# Patient Record
Sex: MALE | Race: BLACK OR AFRICAN AMERICAN | NOT HISPANIC OR LATINO | Employment: OTHER | URBAN - METROPOLITAN AREA
[De-identification: names, ages, dates, MRNs, and addresses within clinical notes are randomized per-mention and may not be internally consistent; named-entity substitution may affect disease eponyms.]

---

## 2017-01-10 ENCOUNTER — OFFICE VISIT (OUTPATIENT)
Dept: ORTHOPEDICS | Facility: CLINIC | Age: 62
End: 2017-01-10
Payer: COMMERCIAL

## 2017-01-10 VITALS
RESPIRATION RATE: 12 BRPM | DIASTOLIC BLOOD PRESSURE: 74 MMHG | HEART RATE: 64 BPM | WEIGHT: 228.81 LBS | SYSTOLIC BLOOD PRESSURE: 109 MMHG | HEIGHT: 69 IN | BODY MASS INDEX: 33.89 KG/M2

## 2017-01-10 DIAGNOSIS — M94.261 CHONDROMALACIA, RIGHT KNEE: Primary | ICD-10-CM

## 2017-01-10 DIAGNOSIS — S81.041S: ICD-10-CM

## 2017-01-10 PROCEDURE — 3078F DIAST BP <80 MM HG: CPT | Mod: S$GLB,,, | Performed by: ORTHOPAEDIC SURGERY

## 2017-01-10 PROCEDURE — 99999 PR PBB SHADOW E&M-EST. PATIENT-LVL III: CPT | Mod: PBBFAC,,, | Performed by: ORTHOPAEDIC SURGERY

## 2017-01-10 PROCEDURE — 3074F SYST BP LT 130 MM HG: CPT | Mod: S$GLB,,, | Performed by: ORTHOPAEDIC SURGERY

## 2017-01-10 PROCEDURE — 1159F MED LIST DOCD IN RCRD: CPT | Mod: S$GLB,,, | Performed by: ORTHOPAEDIC SURGERY

## 2017-01-10 PROCEDURE — 99214 OFFICE O/P EST MOD 30 MIN: CPT | Mod: S$GLB,,, | Performed by: ORTHOPAEDIC SURGERY

## 2017-01-10 RX ORDER — MELOXICAM 7.5 MG/1
7.5 TABLET ORAL DAILY
COMMUNITY
End: 2017-12-01

## 2017-01-10 RX ORDER — TRAMADOL HYDROCHLORIDE 50 MG/1
50 TABLET ORAL EVERY 6 HOURS PRN
COMMUNITY
End: 2020-11-24

## 2017-01-10 NOTE — PROGRESS NOTES
"       CC:This is a 61-year-old male that complains of right knee pain.    HPI:The patient states that he has had long-standing right knee pain that interferes with his exact activities of daily living.  The [facility]" [facility]".  He states that the pain is rated a 3 out of 10.He does report mechanical symptoms and has pain over the lateral aspect of his knee.  He has undergone back surgery in the past.    PMH:    Past Medical History   Diagnosis Date    [facility]      [facility]    Asthma     BPH (benign prostatic hyperplasia)     CAD (coronary artery disease)     Cardiomyopathy     CHF (congestive heart failure)     Chronic hoarseness      vocal cord surg    Chronic pain     CVA (cerebral infarction)      8/2012 olol; reviewed ed note    Ex-smoker     Hepatitis C     Hypertension     Pancreatitis     Sleep apnea     Stroke     Substance abuse      cocaine, etoh , tob in past       PSH:    Past Surgical History   Procedure Laterality Date    Foot surgery      Back surgery      Penile prosthesis implant      Cardiac catheterization      Upper gastrointestinal endoscopy  2011    Colonoscopy  2011       Family Hx:    Family History   Problem Relation Age of Onset    Heart disease Mother     Heart attack Sister     Heart attack Brother     Colon cancer Neg Hx     Colon polyps Neg Hx     Liver cancer Neg Hx     Inflammatory bowel disease Neg Hx     Liver disease Neg Hx     Rectal cancer Neg Hx     Stomach cancer Neg Hx     Ulcerative colitis Neg Hx        Allergy:  Review of patient's allergies indicates:  No Known Allergies    Medication:    Current Outpatient Prescriptions:     aspirin (ECOTRIN) 81 MG EC tablet, Take 81 mg by mouth once daily., Disp: , Rfl:     carvedilol (COREG) 25 MG tablet, Take 12.5 mg by mouth 2 (two) times daily., Disp: , Rfl:     clonazePAM (KLONOPIN) 0.5 MG tablet, Take 0.5 mg by mouth., Disp: , Rfl:     divalproex " "(DEPAKOTE) 250 MG EC tablet, Take 250 mg by mouth., Disp: , Rfl:     flunisolide 25 mcg, 0.025%, (NASALIDE) 25 mcg (0.025 %) Spry, 2 sprays by Nasal route once daily., Disp: , Rfl:     losartan (COZAAR) 100 MG tablet, Take 100 mg by mouth once daily., Disp: , Rfl:     meloxicam (MOBIC) 7.5 MG tablet, Take 7.5 mg by mouth once daily., Disp: , Rfl:     methyl salicylate-menthol 15-10% 15-10 % Crea, Apply topically 2 (two) times daily., Disp: , Rfl:     pantoprazole (PROTONIX) 40 MG tablet, Take 40 mg by mouth., Disp: , Rfl:     sertraline (ZOLOFT) 100 MG tablet, Take 100 mg by mouth once daily., Disp: , Rfl:     tamsulosin (FLOMAX) 0.4 mg Cp24, Take 0.4 mg by mouth once daily., Disp: , Rfl:     tramadol (ULTRAM) 50 mg tablet, Take 50 mg by mouth every 6 (six) hours as needed for Pain., Disp: , Rfl:     Social History:    Social History     Social History    Marital status:      Spouse name: N/A    Number of children: N/A    Years of education: N/A     Occupational History    Not on file.     Social History Main Topics    Smoking status: Former Smoker     Packs/day: 2.00     Years: 8.00     Quit date: 8/24/2012    Smokeless tobacco: Never Used    Alcohol use No    Drug use: No    Sexual activity: Yes     Other Topics Concern    Not on file     Social History Narrative       Vitals:     Visit Vitals    /74    Pulse 64    Resp 12    Ht 5' 9" (1.753 m)    Wt 103.8 kg (228 lb 13.4 oz)    BMI 33.79 kg/m2        ROS:  GENERAL: No fever, chills, fatigability or weight loss.  SKIN: No rashes, itching or changes in color or texture of skin.  HEAD: No headaches or recent head trauma.  EYES: Visual acuity fine. No photophobia, ocular pain or diplopia.  EARS: Denies ear pain, discharge or vertigo.  NOSE: No loss of smell, no epistaxis or postnasal drip.  MOUTH & THROAT: No hoarseness or change in voice. No excessive gum bleeding.  NODES: Denies swollen glands.  CHEST: Denies BIRD, cyanosis, " wheezing, cough and sputum production.  CARDIOVASCULAR: Denies chest pain, PND, orthopnea or reduced exercise tolerance.  ABDOMEN: Appetite fine. No weight loss. Denies diarrhea, abdominal pain, hematemesis or blood in stool.  URINARY: No flank pain, dysuria or hematuria.  PERIPHERAL VASCULAR: No claudication or cyanosis.  NEUROLOGIC: No history of seizures, paralysis, alteration of gait or coordination.  MUSCULOSKELETAL: See HPI    PE:  APPEARANCE: Well nourished, well developed, in no acute distress.   HEAD: Normocephalic, atraumatic.  EYES: PERRL. EOMI.   EARS: TM's intact. Light reflex normal. No retraction or perforation.   NOSE: Mucosa pink. Airway clear.  MOUTH & THROAT: No tonsillar enlargement. No pharyngeal erythema or exudate. No stridor.  NECK: Supple.   NODES: No cervical, axillary or inguinal lymph node enlargement.  CHEST: Lungs clear to auscultation.  CARDIOVASCULAR: Normal S1, S2. No rubs, murmurs or gallops.  ABDOMEN: Bowel sounds normal. Not distended. Soft. No tenderness or masses.  NEUROLOGIC: Cranial Nerves: II-XII grossly intact, also see MUSCULOSKELETAL  MUSCULOSKELETAL:          Right  Knee Exam-abnormal    Gait-abnormal  Muscle Appearance:abnormal  Grooming:normal  Spine Alignment-normal  Muscle Atrophy-Positive  Deformities-Negative  Tenderness-Positive  Paresthesias-Negative  Range of Motion         Ext-normal, 0 degrees         Flex-abnormal  Muscle Strength-abnormal  Sensation-normal  Reflexes-normal  Crepitus-Positive                                Swelling-Negative  Effusion- Positive                                Edema-Negative  Lachman-Negative                                Erythema-Negative  [facility]'s-Positive                              Apley Grind-Positive  Patellar Comp-Positive                         Alignment-normal/symmetric  Patellar Apprehension-Negative              Synovial fullness-Positive  Passive Patellar Tilt-normal  Patellar Tracking-normal   Patellar  Glide-normal  Q-Angle at 90 degrees-normal  Patellar Grind-abnormal  P-Vffi-[id number]  Fatigue-Negative                                     HS Tightness-Negative  Tests on Exam, No ligamentous laxity  Neurovascular Status-normal+2 DP and PT artery pulses  Skin-normal    Assessment:  The patient understands that a radiculopathy from the lumbar spine can cause the skin over the knee to be sensitive.             Diagnosis:              1.right knee arthritis                2.  right knee meniscus tear    Diagnostic Studies  MRI-no, I have discussed the results.  X-Ray-No  EMG/NCV-No  Arthrogram-No  Bone Scan-No  CT Scan-No  Doppler-No  ESR-No  CRP-No  CBC with Diff-No   Rheumatoid/Arthritis Panel-No      Plan:                                                 1. PT-yes                                                 2.OT-no                                          3.NSAID-yes                                        4. Narcotics-no                                     5. Wound care-No                                 6. Rest-yes                                           7. Surgery-I recommend the patient undergo a right knee arthroscope.                                        8. JESÚS Hose-no                                    9. Anticoagulation therapy-no               10. Elevation-no                                     11. Crutches-no                                    12. Walker-no             13. Cane no                        14. Referral-no                                     15.Injection-no                            16. Splint   /    Cast   /   Cast Shoe-No              17. RICE-none            18. Follow up- 3 weeks

## 2017-01-11 PROBLEM — S81.041A: Status: ACTIVE | Noted: 2017-01-11

## 2017-01-11 PROBLEM — M94.261 CHONDROMALACIA, RIGHT KNEE: Status: ACTIVE | Noted: 2017-01-11

## 2017-01-11 NOTE — PATIENT INSTRUCTIONS
How Your Knee Works  A healthy knee bends easily and rotates slightly. The joint absorbs stress and moves smoothly. This allows you to walk, squat, and turn without pain.    A healthy knee  The knee is a hinge joint, formed where the thighbone (femur) and the shinbone (tibia) meet. It is the largest joint in the body. The joint is covered with smooth tissue and powered by large muscles. When all the parts listed below are healthy, a knee should move easily:  · Cartilage is a layer of smooth tissue. It covers the ends of the thighbone and shinbone. It also lines the back side of the kneecap. Healthy cartilage absorbs stress and allows the knee to bend easily.  · Muscles power the knee and leg for movement.  · Tendons attach the muscles to the bones.  · Ligaments are bands of tissue that connect bones and brace the joint.  · Bones that make up your knee joint include your thighbone (femur), shinbone (tibia), and kneecap (patella).  · Menisci are 2 wedge shaped pieces of cartilage that absorb shock between the thighbone and shinbone.  © 0344-5309 [employer]. [address] [place] [address], [place] [address]. All rights reserved. This information is not intended as a substitute for professional medical care. Always follow your healthcare professional's instructions.

## 2017-01-17 ENCOUNTER — OFFICE VISIT (OUTPATIENT)
Dept: INTERNAL MEDICINE | Facility: CLINIC | Age: 62
End: 2017-01-17
Payer: COMMERCIAL

## 2017-01-17 ENCOUNTER — LAB VISIT (OUTPATIENT)
Dept: LAB | Facility: HOSPITAL | Age: 62
End: 2017-01-17
Attending: PEDIATRICS
Payer: COMMERCIAL

## 2017-01-17 VITALS
RESPIRATION RATE: 16 BRPM | BODY MASS INDEX: 34.12 KG/M2 | SYSTOLIC BLOOD PRESSURE: 112 MMHG | TEMPERATURE: 97 F | HEIGHT: 69 IN | DIASTOLIC BLOOD PRESSURE: 78 MMHG | WEIGHT: 230.38 LBS

## 2017-01-17 DIAGNOSIS — M10.072 IDIOPATHIC GOUT OF LEFT ANKLE, UNSPECIFIED CHRONICITY: Primary | ICD-10-CM

## 2017-01-17 DIAGNOSIS — M10.072 IDIOPATHIC GOUT OF LEFT ANKLE, UNSPECIFIED CHRONICITY: ICD-10-CM

## 2017-01-17 PROBLEM — M10.9 GOUT OF LEFT ANKLE: Status: ACTIVE | Noted: 2017-01-17

## 2017-01-17 PROCEDURE — 1159F MED LIST DOCD IN RCRD: CPT | Mod: S$GLB,,, | Performed by: PHYSICIAN ASSISTANT

## 2017-01-17 PROCEDURE — 3074F SYST BP LT 130 MM HG: CPT | Mod: S$GLB,,, | Performed by: PHYSICIAN ASSISTANT

## 2017-01-17 PROCEDURE — 36415 COLL VENOUS BLD VENIPUNCTURE: CPT | Mod: PO

## 2017-01-17 PROCEDURE — 3078F DIAST BP <80 MM HG: CPT | Mod: S$GLB,,, | Performed by: PHYSICIAN ASSISTANT

## 2017-01-17 PROCEDURE — 99999 PR PBB SHADOW E&M-EST. PATIENT-LVL IV: CPT | Mod: PBBFAC,,, | Performed by: PHYSICIAN ASSISTANT

## 2017-01-17 PROCEDURE — 84550 ASSAY OF BLOOD/URIC ACID: CPT

## 2017-01-17 PROCEDURE — 99213 OFFICE O/P EST LOW 20 MIN: CPT | Mod: S$GLB,,, | Performed by: PHYSICIAN ASSISTANT

## 2017-01-17 RX ORDER — INDOMETHACIN 25 MG/1
25 CAPSULE ORAL 3 TIMES DAILY
Qty: 21 CAPSULE | Refills: 2 | Status: SHIPPED | OUTPATIENT
Start: 2017-01-17 | End: 2017-12-01 | Stop reason: SDUPTHER

## 2017-01-17 RX ORDER — PROBENECID AND COLCHICINE .5; 5 MG/1; MG/1
1 TABLET ORAL DAILY
Qty: 30 TABLET | Refills: 11 | Status: SHIPPED | OUTPATIENT
Start: 2017-01-17 | End: 2017-12-01 | Stop reason: SDUPTHER

## 2017-01-17 NOTE — PROGRESS NOTES
Subjective:       Patient ID: Abdullahi Urias is a 61 y.o.B/ male.    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  He is on his feet a lot in his job.  He does work [employer] in his employment which go up well over the ankle to give him support.  He started last Thursday to swell on his left ankle on the medial aspect and it has gotten bigger almost daily.  Is also been a little red and hot on the medial side.  He has had gout occur in his big toe in the past but he's never had it occur in his ankle.  He did start retaking his allopurinol.  He did not have any cultures seen at home and he has not been taking any OTC NSAIDs.  He has tried using ice and heat packs to try and make the swelling go down but nothing is help so far.  It is making him limp at this point because it is tender enough to do that.  It's a 9/10 level of pain.        He does have a history of elevation of his uric acid 8.4 about 11 years ago and he did have gout diagnosed at that time but it has not been on his problem list.  To his knowledge, he doesn't have any renal disease and he is not on any blood thinners.  He is taking some blood pressure medicine and occasionally he will take a thiazide.  He has not done anything to injure his ankle such as spraining it, striking anything against it while walking etc.    Review of Systems    Otherwise negative concerning the ORTHOPEDIC, MUSCULOSKELETAL, RHEUMATOLOGIC system review.    Objective:      Physical Exam    He has medial swelling of his left ankle and there is a red spot that is somewhat warm to touch just below the medial malleolus.  There is no ecchymoses present on either side of the ankle and foot.  The right ankle appears normal with no swelling whatsoever.  His ambulation is normal and I don't detect any kind of limp.  He is compensating pretty well for the discomfort  that he has.    Assessment:       1. Idiopathic gout of left ankle, unspecified chronicity        Plan:     1.  Will get a uric acid on him today.  2.  Start Indocin 25 mg 3 times a day with food for the next 7-10 days and then stop that medicine.  3.  Probenemid to be taken twice a day for 10 days.  4.  Info sheet concerning gout was given to the patient.  It also outlined foods that he should avoid in order to prevent a gout attack from happening.  5.  He is not to start taking his allopurinol until 30 days from now.  Then when he starts it, he needs to stay on it daily throughout life.

## 2017-01-17 NOTE — PATIENT INSTRUCTIONS
Gout    Gout or is an inflammation of a joint due to a build-up of gout crystals in the joint fluid. This occurs when there is an excess of uric acid (a normal waste product) in the body. Uric acid builds up in the body when the kidneys are unable to filter enough of it from the blood. This may occur with age. It is also associated with kidney disease. Gout occurs more often in persons with obesity, diabetes, hypertension, or high levels of fats in the blood. It may be run in families. Gout tends to come and go. A flare up of gout is called an attack. Drinking alcohol or eating certain foods (such as shellfish or foods with additives such as high-fructose corn syrup) may increase uric acid levels in the blood and cause a gout attack.  During a gout attack, the affected joint may become a hot, red, swollen and painful. If you have had one attack of gout, you are likely to have another. An attack of gout can be treated with medicine. If these attacks become frequent, a daily medicine may be prescribed to help the kidneys remove uric acid from the body.  Home care  During a gout attack:  · Rest painful joints. If gout affects the joints of your foot or leg, you may want to use crutches for the first few days to keep from bearing weight on the affected joint.  · When sitting or lying down, raise the painful joint to a level higher than your heart.  · Apply an ice pack (ice cubes in a plastic bag wrapped in a thin towel) over the injured area for 20 minutes every 1-2 hours the first day for pain relief. Continue this 3-4 times a day for swelling and pain.  · Avoid alcohol and foods listed below (see Preventing attacks) during a gout attack. Drink extra fluid to help flush the uric acid through your kidneys.  · If you were prescribed a medication to treat gout, take it as your healthcare provider has instructed. Don't skip doses.  · Take anti-inflammatory medicine as directed.   · If pain medicines have been prescribed,  take them exactly as directed.    Preventing attacks  · Minimize or avoid alcohol use. Excess alcohol intake can cause a gout attack.  · Limit these foods and beverages:  ¨ Organ meats, such as kidneys and liver  ¨ Certain seafoods (anchovies, sardines, shrimp, scallops, herring, mackerel)  ¨ Wild game, meat extracts and meat gravies  ¨ Foods and beverages sweetened with high-fructose corn syrup, such as sodas  · Eat a healthy diet including low-fat and nonfat dairy, whole grains, and vegetables.  · If you are overweight, talk to your healthcare provider about a weight reduction plan. Avoid fasting or extreme low calorie diets (less than 900 calories per day). This will increase uric acid levels in the body.  · If you have diabetes or high blood pressure, work with your doctor to manage these conditions.  · Protect the joint from injury. Trauma can trigger a gout attack.  Follow-up care  Follow up with your healthcare provider or as advised.   When to seek medical advice  Call your healthcare provider if you have any of the following:  · Fever over 100.4°F (38.ºC) with worsening joint pain  · Increasing redness around the joint  · Pain developing in another joint  · Repeated vomiting, abdominal pain, or blood in the vomit or stool (black or red color)  © 5382-2105 [employer]. [address] [place] [address], [place] [address]. All rights reserved. This information is not intended as a substitute for professional medical care. Always follow your healthcare professional's instructions.        Gout Diet  Gout is a painful condition caused by an excess of uric acid, a waste product made by the body. Uric acid forms crystals that collect in the joints. The immune response to these crystals brings on symptoms of joint pain and swelling. This is called a gout attack. Often, medications and diet changes are combined to manage gout. Below are some guidelines for changing your diet to help you manage gout and prevent  attacks. Your health care provider will help you determine the best eating plan for you.     Eating to manage gout  Weight loss for those who are overweight may help reduce gout attacks.  Eat less of these foods  Eating too many foods containing purines may raise the levels of uric acid in your body. This raises your risk for a gout attack. Try to limit these foods and drinks:  · Alcohol, such as beer and red wine. You may be told to avoid alcohol completely.  · Soft drinks that contain sugar or high fructose corn syrup  · Certain fish, including anchovies, sardines, fish eggs, and herring  · Shellfish  · Certain meats, such as red meat, hot dogs, luncheon meats, and turkey  · Organ meats, such as liver, kidneys, and sweetbreads  · Legumes, such as dried beans and peas  · Other high fat foods such as gravy, whole milk, and high fat cheeses  · Vegetables such as asparagus, cauliflower, spinach, and mushrooms used to be thought to contribute to an increased risk for a gout attack, but recent studies show that high purine vegetables don't increase the risk for a gout attack.  Eat more of these foods  Other foods may be helpful for people with gout. Add some of these foods to your diet:  · Cherries contain chemicals that may lower uric acid.  · Omega fatty acids. These are found in some fatty fish such as salmon, certain oils (flax, olive, or nut), and nuts themselves. Omega fatty acids may help prevent inflammation due to gout.  · Dairy products that are low-fat or fat-free, such as cheese and yogurt  · Complex carbohydrate foods, including whole grains, brown rice, oats, and beans  · Coffee, in moderation  · Water, approximately 64 ounces per day  Follow-up care  Follow up with your healthcare provider as advised.  When to seek medical advice  Call your healthcare provider right away if any of these occur:  · Return of gout symptoms, usually at night:  · Severe pain, swelling, and heat in a joint, especially the base  of the big toe  · Affected joint is hard to move  · Skin of the affected joint is purple or red  · Fever of 100.4°F (38°C) or higher  · Pain that doesn't get better even with prescribed medicine   © 3754-4451 [employer]. [address] [place] [address]. All rights reserved. This information is not intended as a substitute for professional medical care. Always follow your healthcare professional's instructions.

## 2017-01-17 NOTE — MR AVS SNAPSHOT
[facility]  [address]  [place] [address]  Phone: [phone]  Fax: [phone]                  Abdullahi Urias   2017 3:00 PM   Office Visit    Description:  Male : 1955   Provider:  Demetris Chavez PA-C   Department:  [facility] - Internal Medicine           Reason for Visit     Joint Swelling           Diagnoses this Visit        Comments    Idiopathic gout of left ankle, unspecified chronicity    -  Primary            To Do List           Goals (5 Years of Data)     None       These Medications        Disp Refills Start End    colchicine-probenecid 0.5-500 mg (CO-BENEMID) 0.5-500 mg Tab 30 tablet 11 2017    Take 1 tablet by mouth once daily. - Oral    [place] [facility] [place] [address] Ph #: [phone]       indomethacin (INDOCIN) 25 MG capsule 21 capsule 2 2017     Take 1 capsule (25 mg total) by mouth 3 (three) times daily. With food. - Oral    Pharmacy: [facility] [place] [address] Ph #: [phone]         [facility]     [facility] Care [facility] -  Assistance  Registered nurses in the [facility] provide clinical advisement, health education, appointment booking, and other advisory services.  Call for this free service at [phone].             Medications           Message regarding Medications     Verify the changes and/or additions to your medication regime listed below are the same as discussed with your clinician today.  If any of these changes or additions are incorrect, please notify your healthcare provider.        START taking these NEW medications        Refills    colchicine-probenecid 0.5-500 mg (CO-BENEMID) 0.5-500 mg Tab 11    Sig: Take 1 tablet by mouth once daily.    Class: Normal    Route: Oral    indomethacin (INDOCIN) 25 MG capsule 2    Sig: Take 1 capsule (25 mg total) by mouth 3 (three) times daily. With  "food.    Class: Normal    Route: Oral           Verify that the below list of medications is an accurate representation of the medications you are currently taking.  If none reported, the list may be blank. If incorrect, please contact your healthcare provider. Carry this list with you in case of emergency.           Current Medications     aspirin (ECOTRIN) 81 MG EC tablet Take 81 mg by mouth once daily.    carvedilol (COREG) 25 MG tablet Take 12.5 mg by mouth 2 (two) times daily.    clonazePAM (KLONOPIN) 0.5 MG tablet Take 0.5 mg by mouth.    divalproex (DEPAKOTE) 250 MG EC tablet Take 250 mg by mouth.    flunisolide 25 mcg, 0.025%, (NASALIDE) 25 mcg (0.025 %) Spry 2 sprays by Nasal route once daily.    losartan (COZAAR) 100 MG tablet Take 100 mg by mouth once daily.    meloxicam (MOBIC) 7.5 MG tablet Take 7.5 mg by mouth once daily.    methyl salicylate-menthol 15-10% 15-10 % Crea Apply topically 2 (two) times daily.    pantoprazole (PROTONIX) 40 MG tablet Take 40 mg by mouth.    sertraline (ZOLOFT) 100 MG tablet Take 100 mg by mouth once daily.    tamsulosin (FLOMAX) 0.4 mg Cp24 Take 0.4 mg by mouth once daily.    tramadol (ULTRAM) 50 mg tablet Take 50 mg by mouth every 6 (six) hours as needed for Pain.    colchicine-probenecid 0.5-500 mg (CO-BENEMID) 0.5-500 mg Tab Take 1 tablet by mouth once daily.    indomethacin (INDOCIN) 25 MG capsule Take 1 capsule (25 mg total) by mouth 3 (three) times daily. With food.           Clinical Reference Information           Vital Signs - Last Recorded  Most recent update: 1/17/2017  3:02 PM by Bernice Boyce    BP Temp Resp    112/78 (BP Location: Right arm, Patient Position: Sitting, BP Method: Manual) 97.3 °F (36.3 °C) (Tympanic) 16    Ht Wt BMI    5' 9" (1.753 m) 104.5 kg (230 lb 6.1 oz) 34.02 kg/m2      Blood Pressure          Most Recent Value    BP  112/78      Allergies as of 1/17/2017     No Known Allergies      Immunizations Administered on Date of Encounter - " 1/17/2017     None      [facility] Sign-Up     Activating your [facility] account is as easy as 1-2-3!     1) Visit [email or web address], select Sign Up Now, enter this activation code and your date of birth, then select Next.  [email or web address]  Expires: 3/3/2017  3:39 PM      2) Create a username and password to use when you visit [facility] in the future and select a security question in case you lose your password and select Next.    3) Enter your e-mail address and click Sign Up!    Additional Information  If you have questions, please e-mail [email or web address] or call [phone] to talk to our [facility] staff. Remember, [facility] is NOT to be used for urgent needs. For medical emergencies, dial 911.         Instructions      Gout    Gout or is an inflammation of a joint due to a build-up of gout crystals in the joint fluid. This occurs when there is an excess of uric acid (a normal waste product) in the body. Uric acid builds up in the body when the kidneys are unable to filter enough of it from the blood. This may occur with age. It is also associated with kidney disease. Gout occurs more often in persons with obesity, diabetes, hypertension, or high levels of fats in the blood. It may be run in families. Gout tends to come and go. A flare up of gout is called an attack. Drinking alcohol or eating certain foods (such as shellfish or foods with additives such as high-fructose corn syrup) may increase uric acid levels in the blood and cause a gout attack.  During a gout attack, the affected joint may become a hot, red, swollen and painful. If you have had one attack of gout, you are likely to have another. An attack of gout can be treated with medicine. If these attacks become frequent, a daily medicine may be prescribed to help the kidneys remove uric acid from the body.  Home care  During a gout attack:  · Rest painful joints. If gout affects the joints of your foot or leg, you may want to use crutches for the  first few days to keep from bearing weight on the affected joint.  · When sitting or lying down, raise the painful joint to a level higher than your heart.  · Apply an ice pack (ice cubes in a plastic bag wrapped in a thin towel) over the injured area for 20 minutes every 1-2 hours the first day for pain relief. Continue this 3-4 times a day for swelling and pain.  · Avoid alcohol and foods listed below (see Preventing attacks) during a gout attack. Drink extra fluid to help flush the uric acid through your kidneys.  · If you were prescribed a medication to treat gout, take it as your healthcare provider has instructed. Don't skip doses.  · Take anti-inflammatory medicine as directed.   · If pain medicines have been prescribed, take them exactly as directed.    Preventing attacks  · Minimize or avoid alcohol use. Excess alcohol intake can cause a gout attack.  · Limit these foods and beverages:  ¨ Organ meats, such as kidneys and liver  ¨ Certain seafoods (anchovies, sardines, shrimp, scallops, herring, mackerel)  ¨ Wild game, meat extracts and meat gravies  ¨ Foods and beverages sweetened with high-fructose corn syrup, such as sodas  · Eat a healthy diet including low-fat and nonfat dairy, whole grains, and vegetables.  · If you are overweight, talk to your healthcare provider about a weight reduction plan. Avoid fasting or extreme low calorie diets (less than 900 calories per day). This will increase uric acid levels in the body.  · If you have diabetes or high blood pressure, work with your doctor to manage these conditions.  · Protect the joint from injury. Trauma can trigger a gout attack.  Follow-up care  Follow up with your healthcare provider or as advised.   When to seek medical advice  Call your healthcare provider if you have any of the following:  · Fever over 100.4°F (38.ºC) with worsening joint pain  · Increasing redness around the joint  · Pain developing in another joint  · Repeated vomiting,  abdominal pain, or blood in the vomit or stool (black or red color)  © 7110-4864 [employer]. [address] [place] [address], [place] [address]. All rights reserved. This information is not intended as a substitute for professional medical care. Always follow your healthcare professional's instructions.        Gout Diet  Gout is a painful condition caused by an excess of uric acid, a waste product made by the body. Uric acid forms crystals that collect in the joints. The immune response to these crystals brings on symptoms of joint pain and swelling. This is called a gout attack. Often, medications and diet changes are combined to manage gout. Below are some guidelines for changing your diet to help you manage gout and prevent attacks. Your health care provider will help you determine the best eating plan for you.     Eating to manage gout  Weight loss for those who are overweight may help reduce gout attacks.  Eat less of these foods  Eating too many foods containing purines may raise the levels of uric acid in your body. This raises your risk for a gout attack. Try to limit these foods and drinks:  · Alcohol, such as beer and red wine. You may be told to avoid alcohol completely.  · Soft drinks that contain sugar or high fructose corn syrup  · Certain fish, including anchovies, sardines, fish eggs, and herring  · Shellfish  · Certain meats, such as red meat, hot dogs, luncheon meats, and turkey  · Organ meats, such as liver, kidneys, and sweetbreads  · Legumes, such as dried beans and peas  · Other high fat foods such as gravy, whole milk, and high fat cheeses  · Vegetables such as asparagus, cauliflower, spinach, and mushrooms used to be thought to contribute to an increased risk for a gout attack, but recent studies show that high purine vegetables don't increase the risk for a gout attack.  Eat more of these foods  Other foods may be helpful for people with gout. Add some of these foods to your  diet:  · Cherries contain chemicals that may lower uric acid.  · Omega fatty acids. These are found in some fatty fish such as salmon, certain oils (flax, olive, or nut), and nuts themselves. Omega fatty acids may help prevent inflammation due to gout.  · Dairy products that are low-fat or fat-free, such as cheese and yogurt  · Complex carbohydrate foods, including whole grains, brown rice, oats, and beans  · Coffee, in moderation  · Water, approximately 64 ounces per day  Follow-up care  Follow up with your healthcare provider as advised.  When to seek medical advice  Call your healthcare provider right away if any of these occur:  · Return of gout symptoms, usually at night:  · Severe pain, swelling, and heat in a joint, especially the base of the big toe  · Affected joint is hard to move  · Skin of the affected joint is purple or red  · Fever of 100.4°F (38°C) or higher  · Pain that doesn't get better even with prescribed medicine   © 6031-5998 [employer]. [address] [place] [address], [place] [address]. All rights reserved. This information is not intended as a substitute for professional medical care. Always follow your healthcare professional's instructions.

## 2017-01-18 ENCOUNTER — TELEPHONE (OUTPATIENT)
Dept: INTERNAL MEDICINE | Facility: CLINIC | Age: 62
End: 2017-01-18

## 2017-01-18 LAB — URATE SERPL-MCNC: 4.4 MG/DL

## 2017-01-18 NOTE — TELEPHONE ENCOUNTER
----- Message from Nabila Simon sent at 1/18/2017  8:27 AM CST -----  Please call patient in regards to indomethacin, [phone] (home)

## 2017-01-18 NOTE — TELEPHONE ENCOUNTER
Pt called stating he started Indomethacin on yesterday and had really bad night sweats which he's never had before. He states he is nervous about continuing this med and wants to know if he can have something different prescribed.     He has not started colchicine because it was not in stock ar his pharmacy on yesterday.

## 2017-01-18 NOTE — TELEPHONE ENCOUNTER
Spoke with pt, states he has picked up Colchicine and notified to stop indocin. Patient verbalized understanding.

## 2017-01-18 NOTE — TELEPHONE ENCOUNTER
Pt can stop the indocin but he must take the Co-Benemid. Spoke with pharmacy and they filled this for him, and he needs to take it

## 2017-01-23 ENCOUNTER — TELEPHONE (OUTPATIENT)
Dept: INTERNAL MEDICINE | Facility: CLINIC | Age: 62
End: 2017-01-23

## 2017-01-23 NOTE — TELEPHONE ENCOUNTER
Pt called this morning stating that his gout has moved from his L ankle and now causing pain in L knee. He stated that he still has Indomethacin and Colchicine he is wondering if can he start taking these. Informed that his uric acid level was normal. Can you please advise.

## 2017-01-24 NOTE — TELEPHONE ENCOUNTER
His uric acid is not elevated so the only thing that's helpful is to take his Indocin 25 mg 3 times a day.  With food.  Call back if no improvement within 7-10 days.

## 2017-12-01 ENCOUNTER — OFFICE VISIT (OUTPATIENT)
Dept: INTERNAL MEDICINE | Facility: CLINIC | Age: 62
End: 2017-12-01
Payer: COMMERCIAL

## 2017-12-01 VITALS
BODY MASS INDEX: 33.99 KG/M2 | TEMPERATURE: 98 F | WEIGHT: 229.5 LBS | SYSTOLIC BLOOD PRESSURE: 110 MMHG | HEART RATE: 87 BPM | OXYGEN SATURATION: 97 % | RESPIRATION RATE: 16 BRPM | HEIGHT: 69 IN | DIASTOLIC BLOOD PRESSURE: 82 MMHG

## 2017-12-01 DIAGNOSIS — M10.062 ACUTE IDIOPATHIC GOUT OF LEFT KNEE: Primary | ICD-10-CM

## 2017-12-01 PROCEDURE — 99999 PR PBB SHADOW E&M-EST. PATIENT-LVL IV: CPT | Mod: PBBFAC,,, | Performed by: PHYSICIAN ASSISTANT

## 2017-12-01 PROCEDURE — 99213 OFFICE O/P EST LOW 20 MIN: CPT | Mod: S$GLB,,, | Performed by: PHYSICIAN ASSISTANT

## 2017-12-01 RX ORDER — ALLOPURINOL 300 MG/1
300 TABLET ORAL DAILY
Qty: 90 TABLET | Refills: 3 | Status: SHIPPED | OUTPATIENT
Start: 2017-12-01 | End: 2021-01-12

## 2017-12-01 RX ORDER — PROBENECID AND COLCHICINE .5; 5 MG/1; MG/1
1 TABLET ORAL DAILY
Qty: 20 TABLET | Refills: 1 | Status: SHIPPED | OUTPATIENT
Start: 2017-12-01 | End: 2018-07-25

## 2017-12-01 RX ORDER — CYCLOBENZAPRINE HCL 10 MG
10 TABLET ORAL
COMMUNITY
Start: 2015-11-20 | End: 2018-09-14

## 2017-12-01 RX ORDER — INDOMETHACIN 25 MG/1
25 CAPSULE ORAL 3 TIMES DAILY
Qty: 21 CAPSULE | Refills: 2 | Status: SHIPPED | OUTPATIENT
Start: 2017-12-01 | End: 2018-09-14

## 2017-12-02 NOTE — PROGRESS NOTES
Subjective:       Patient ID: Abdullahi Urias is a 62 y.o.B/ male.    Chief Complaint: Knee Pain (L) and Joint Swelling (L)    HPI         He comes in today accompanied by his wife and has the above problem.  He started with joint swelling about 48 hours ago on his left knee.  It's also very painful for him to try to bear weight on it.  He's not using a crutch or cane at this point and he doesn't have any walker.  In the past he's had to have fluid drained from his knee in the past 18 months.  The first time 24 cc was drained from the knee and it was sent to rheumatology for evaluation for uric acid crystals which turned up positive.  The second time he had to have 14 cc removed about 2 weeks later.  He's been okay for the past almost a year without a flare.  He has not been taking his allopurinol like he supposed to.  He may need a refill on that medicine also.  He's hoping the get his knee tapped today and the fluid drained out.    Review of Systems    Otherwise negative concerning the RHEUMATOLOGIC, MUSCULOSKELETAL, ORTHOPEDIC system review.    Objective:      Physical Exam    Both knees are almost identical and normal.  The left one is slightly swollen but is not red or hot to touch.  He also has no popliteal swelling.  There is a trace amount of post patellar effusion felt on the medial and lateral aspect of the knee.  Flexion-extension is completely full and normal at this time.    Assessment:       1. Acute idiopathic gout of left knee        Plan:     1.  Info sheets concerning gout were given to the patient.  2.  Start him back on his Indocin 25 mg 3 times a day with food ×10 days.  3.  Also start probenecid med to be taken twice a day for the next 10 days.  4.  Will probably start him back on Zyloprim 300 mg in about 3 weeks' time and he is to stay on that daily.  5.  Recheck Tuesday or Wednesday if the swelling persist or gets larger and we may take the fluid off that time.

## 2018-07-25 DIAGNOSIS — M10.062 ACUTE IDIOPATHIC GOUT OF LEFT KNEE: ICD-10-CM

## 2018-07-31 RX ORDER — PROBENECID AND COLCHICINE .5; 5 MG/1; MG/1
1 TABLET ORAL DAILY
Qty: 20 TABLET | Refills: 1 | Status: SHIPPED | OUTPATIENT
Start: 2018-07-31 | End: 2018-09-14

## 2018-09-14 ENCOUNTER — OFFICE VISIT (OUTPATIENT)
Dept: INTERNAL MEDICINE | Facility: CLINIC | Age: 63
End: 2018-09-14
Payer: COMMERCIAL

## 2018-09-14 ENCOUNTER — HOSPITAL ENCOUNTER (OUTPATIENT)
Dept: RADIOLOGY | Facility: HOSPITAL | Age: 63
Discharge: HOME | End: 2018-09-14
Attending: PHYSICIAN ASSISTANT
Payer: COMMERCIAL

## 2018-09-14 VITALS
RESPIRATION RATE: 16 BRPM | HEART RATE: 49 BPM | HEIGHT: 69 IN | TEMPERATURE: 98 F | DIASTOLIC BLOOD PRESSURE: 70 MMHG | BODY MASS INDEX: 33.16 KG/M2 | OXYGEN SATURATION: 99 % | SYSTOLIC BLOOD PRESSURE: 100 MMHG | WEIGHT: 223.88 LBS

## 2018-09-14 DIAGNOSIS — J45.41 MODERATE PERSISTENT ASTHMATIC BRONCHITIS WITH ACUTE EXACERBATION: Primary | ICD-10-CM

## 2018-09-14 DIAGNOSIS — J45.41 MODERATE PERSISTENT ASTHMATIC BRONCHITIS WITH ACUTE EXACERBATION: ICD-10-CM

## 2018-09-14 PROBLEM — G47.33 OSA ON CPAP: Status: ACTIVE | Noted: 2018-09-14

## 2018-09-14 PROCEDURE — 3008F BODY MASS INDEX DOCD: CPT | Mod: CPTII,S$GLB,, | Performed by: PHYSICIAN ASSISTANT

## 2018-09-14 PROCEDURE — 71046 X-RAY EXAM CHEST 2 VIEWS: CPT | Mod: 26,,, | Performed by: RADIOLOGY

## 2018-09-14 PROCEDURE — 3074F SYST BP LT 130 MM HG: CPT | Mod: CPTII,S$GLB,, | Performed by: PHYSICIAN ASSISTANT

## 2018-09-14 PROCEDURE — 71046 X-RAY EXAM CHEST 2 VIEWS: CPT | Mod: TC,FY,PO

## 2018-09-14 PROCEDURE — 99213 OFFICE O/P EST LOW 20 MIN: CPT | Mod: 25,S$GLB,, | Performed by: PHYSICIAN ASSISTANT

## 2018-09-14 PROCEDURE — 99999 PR PBB SHADOW E&M-EST. PATIENT-LVL IV: CPT | Mod: PBBFAC,,, | Performed by: PHYSICIAN ASSISTANT

## 2018-09-14 PROCEDURE — 3078F DIAST BP <80 MM HG: CPT | Mod: CPTII,S$GLB,, | Performed by: PHYSICIAN ASSISTANT

## 2018-09-14 PROCEDURE — 94640 AIRWAY INHALATION TREATMENT: CPT | Mod: S$GLB,,, | Performed by: PHYSICIAN ASSISTANT

## 2018-09-14 RX ORDER — METHYLPREDNISOLONE 4 MG/1
TABLET ORAL
Qty: 21 TABLET | Refills: 0 | Status: SHIPPED | OUTPATIENT
Start: 2018-09-14 | End: 2020-04-21

## 2018-09-14 RX ORDER — IPRATROPIUM BROMIDE AND ALBUTEROL SULFATE 2.5; .5 MG/3ML; MG/3ML
3 SOLUTION RESPIRATORY (INHALATION)
Status: COMPLETED | OUTPATIENT
Start: 2018-09-14 | End: 2018-09-14

## 2018-09-14 RX ORDER — LEVOFLOXACIN 500 MG/1
500 TABLET, FILM COATED ORAL DAILY
Qty: 8 TABLET | Refills: 0 | Status: SHIPPED | OUTPATIENT
Start: 2018-09-14 | End: 2018-09-22

## 2018-09-14 RX ADMIN — IPRATROPIUM BROMIDE AND ALBUTEROL SULFATE 3 ML: 2.5; .5 SOLUTION RESPIRATORY (INHALATION) at 10:09

## 2018-09-14 NOTE — PROGRESS NOTES
Subjective:       Patient ID: Abdullahi Urias is a 62 y.o.B/ male.    Chief Complaint: Cough (X 4 days); Flank Pain; and Nasal Congestion    HPI         He comes in by himself and has the above complaint.  This came on him rather suddenly.  He has been strangling himself when he coughs and coughing real severe to the point where he is getting severe pleurisy.  When he coughs he all walls bends over with pain in his right lower rib cage on the lateral aspect.  He has been getting yellow mucus up with his cough the last 2 days.  He also had 101 degree temperature 2 days ago.  He really does not have any nose congestion or stuffiness and no earache or sore throat.  He has been using Tussend DM cough syrup but it does not seem to be helping that much.    Review of Systems    Otherwise negative concerning the:  ENT, RESPIRATORY, PULMONARY, and GI system review.    Objective:      Physical Exam    ENT:  All essentially WNL.  His canals are devoid of wax and normal in color and size without swelling.  Tympanic membranes are transparent and clear.  His nose looks open and clear without turbinate redness or edema.  There is no rhinorrhea or mucopurulent present.  His throat is open and clear with no redness or swelling to the posterior pharynx or soft palate.  He has no exudate or halitosis.  He does not have any tender glands or adenopathy.  CHEST:  He has a wheezy type of cough but I do not really hear wheezes inside his lungs or rales or rhonchi.  Deep breathing almost chased him off the table because of pain in his right lateral posterior rib cage with a deep breath.  He was given a nebulized DuoNeb treatment here in the office.  Then he was sent upstairs for some lab work.  CBC:  Normal.  CHEST PA AND LATERAL X-RAY:  Radiology interpreted as normal without any changes.  No pneumonia is seen.  The patient felt much better breathing and coughing less severely after his return from the lab.    Assessment:       1. Moderate  persistent asthmatic bronchitis with acute exacerbation        Plan:     1.  He is to take in more fluids.  Start Mucinex DM 1200 mg q.12 hours to loosen and promote drainage.  2.  Start Levaquin 500 mg q.d. x8 days.  Also start Medrol Dosepak with food at supper time as directed.  3. Recheck in 3-4 days if no significant improvement with his coughing.

## 2018-09-18 ENCOUNTER — OFFICE VISIT (OUTPATIENT)
Dept: INTERNAL MEDICINE | Facility: CLINIC | Age: 63
End: 2018-09-18
Payer: COMMERCIAL

## 2018-09-18 VITALS
BODY MASS INDEX: 33.96 KG/M2 | SYSTOLIC BLOOD PRESSURE: 104 MMHG | DIASTOLIC BLOOD PRESSURE: 64 MMHG | HEART RATE: 95 BPM | TEMPERATURE: 98 F | HEIGHT: 69 IN | OXYGEN SATURATION: 98 % | WEIGHT: 229.25 LBS

## 2018-09-18 DIAGNOSIS — J45.50 SEVERE PERSISTENT ASTHMA WITHOUT COMPLICATION: Primary | ICD-10-CM

## 2018-09-18 PROCEDURE — 99213 OFFICE O/P EST LOW 20 MIN: CPT | Mod: 25,S$GLB,, | Performed by: PHYSICIAN ASSISTANT

## 2018-09-18 PROCEDURE — 99999 PR PBB SHADOW E&M-EST. PATIENT-LVL III: CPT | Mod: PBBFAC,,, | Performed by: PHYSICIAN ASSISTANT

## 2018-09-18 PROCEDURE — 94640 AIRWAY INHALATION TREATMENT: CPT | Mod: S$GLB,,, | Performed by: PHYSICIAN ASSISTANT

## 2018-09-18 PROCEDURE — 3078F DIAST BP <80 MM HG: CPT | Mod: CPTII,S$GLB,, | Performed by: PHYSICIAN ASSISTANT

## 2018-09-18 PROCEDURE — 3074F SYST BP LT 130 MM HG: CPT | Mod: CPTII,S$GLB,, | Performed by: PHYSICIAN ASSISTANT

## 2018-09-18 PROCEDURE — 3008F BODY MASS INDEX DOCD: CPT | Mod: CPTII,S$GLB,, | Performed by: PHYSICIAN ASSISTANT

## 2018-09-18 RX ORDER — IPRATROPIUM BROMIDE AND ALBUTEROL SULFATE 2.5; .5 MG/3ML; MG/3ML
3 SOLUTION RESPIRATORY (INHALATION)
Status: COMPLETED | OUTPATIENT
Start: 2018-09-18 | End: 2018-09-18

## 2018-09-18 RX ADMIN — IPRATROPIUM BROMIDE AND ALBUTEROL SULFATE 3 ML: 2.5; .5 SOLUTION RESPIRATORY (INHALATION) at 10:09

## 2018-09-18 NOTE — PROGRESS NOTES
Subjective:       Patient ID: Adbullahi Urias is a 62 y.o.B/ male.    Chief Complaint: Follow-up    HPI         I just saw him 4 days ago on the 14th of September and he is back today because he is still having a lot of bronchospasm.  He is still taking the antibiotic and using the steroid Dosepak and Mucinex DM.  He does not have any handheld bronchodilators or steroids and he does not have a nebulizer at home.  His daughter does have a nebulizer.  He wants to get a breathing treatment today because it helps so much for days ago.  He is doing a lot of audible wheezing here in the office today and coughing frequently because of the wheezes.    Review of Systems    Otherwise negative concerning the:  ENT, RESPIRATORY, PULMONARY, and GI system review.    Objective:      Physical Exam    CHEST:  He has loud wheezes with inspiration and expiration and there are a few rhonchi present also.  He is not retracting any rib muscles or using any accessory muscles to breathe.  He was given a DuoNeb nebulized treatment here in the office and his wheezes completely disappeared and he was feeling somewhat better.    Assessment:       1. Severe persistent asthma without complication        Plan:     1.  He asked if he needed to by a nebulizer, and I told him I did think it was necessary right now.  He can either use his daughter's nebulizer or he can come in here every 4 hr while we are open to get a breathing treatment.  2.  Continue with his antibiotic and steroid Dosepak and then recheck in about 5 or 6 days if he still having problems.

## 2019-03-01 ENCOUNTER — NURSE TRIAGE (OUTPATIENT)
Dept: ADMINISTRATIVE | Facility: CLINIC | Age: 64
End: 2019-03-01

## 2019-03-01 NOTE — TELEPHONE ENCOUNTER
Patient called to report the following:     -hx of epidural Wednesday   -started feeling bad yesterday evening   -this is the worst I have   -chills and fever, congestion, headache   -back pain, pain in legs, back has never hurt this bad   -urinary incontinence   -can barely stand   -fever 102.9  -advised to report to ED     Reason for Disposition   Shock suspected (e.g., cold/pale/clammy skin, too weak to stand, low BP, rapid pulse)    Protocols used:  FEVER-A-

## 2020-03-17 ENCOUNTER — OFFICE VISIT (OUTPATIENT)
Dept: DERMATOLOGY | Facility: CLINIC | Age: 65
End: 2020-03-17
Payer: COMMERCIAL

## 2020-03-17 ENCOUNTER — OFFICE VISIT (OUTPATIENT)
Dept: INTERNAL MEDICINE | Facility: CLINIC | Age: 65
End: 2020-03-17
Payer: COMMERCIAL

## 2020-03-17 VITALS
OXYGEN SATURATION: 97 % | HEART RATE: 85 BPM | HEIGHT: 69 IN | SYSTOLIC BLOOD PRESSURE: 130 MMHG | TEMPERATURE: 98 F | BODY MASS INDEX: 33.44 KG/M2 | DIASTOLIC BLOOD PRESSURE: 78 MMHG | WEIGHT: 225.75 LBS

## 2020-03-17 DIAGNOSIS — L81.9 HYPERPIGMENTATION: ICD-10-CM

## 2020-03-17 DIAGNOSIS — R21 RASH: Primary | ICD-10-CM

## 2020-03-17 PROCEDURE — 3008F PR BODY MASS INDEX (BMI) DOCUMENTED: ICD-10-PCS | Mod: CPTII,S$GLB,, | Performed by: NURSE PRACTITIONER

## 2020-03-17 PROCEDURE — 88312 PR  SPECIAL STAINS,GROUP I: ICD-10-PCS | Mod: 26,,, | Performed by: PATHOLOGY

## 2020-03-17 PROCEDURE — 3075F PR MOST RECENT SYSTOLIC BLOOD PRESS GE 130-139MM HG: ICD-10-PCS | Mod: CPTII,S$GLB,, | Performed by: NURSE PRACTITIONER

## 2020-03-17 PROCEDURE — 99202 OFFICE O/P NEW SF 15 MIN: CPT | Mod: 25,S$GLB,, | Performed by: DERMATOLOGY

## 2020-03-17 PROCEDURE — 3078F DIAST BP <80 MM HG: CPT | Mod: CPTII,S$GLB,, | Performed by: DERMATOLOGY

## 2020-03-17 PROCEDURE — 11104 PR PUNCH BIOPSY, SKIN, SINGLE LESION: ICD-10-PCS | Mod: S$GLB,,, | Performed by: DERMATOLOGY

## 2020-03-17 PROCEDURE — 3078F DIAST BP <80 MM HG: CPT | Mod: CPTII,S$GLB,, | Performed by: NURSE PRACTITIONER

## 2020-03-17 PROCEDURE — 99999 PR PBB SHADOW E&M-EST. PATIENT-LVL III: CPT | Mod: PBBFAC,,, | Performed by: DERMATOLOGY

## 2020-03-17 PROCEDURE — 88312 SPECIAL STAINS GROUP 1: CPT | Mod: 26,,, | Performed by: PATHOLOGY

## 2020-03-17 PROCEDURE — 99999 PR PBB SHADOW E&M-EST. PATIENT-LVL III: ICD-10-PCS | Mod: PBBFAC,,, | Performed by: DERMATOLOGY

## 2020-03-17 PROCEDURE — 99999 PR PBB SHADOW E&M-EST. PATIENT-LVL V: ICD-10-PCS | Mod: PBBFAC,,, | Performed by: NURSE PRACTITIONER

## 2020-03-17 PROCEDURE — 99999 PR PBB SHADOW E&M-EST. PATIENT-LVL V: CPT | Mod: PBBFAC,,, | Performed by: NURSE PRACTITIONER

## 2020-03-17 PROCEDURE — 11104 PUNCH BX SKIN SINGLE LESION: CPT | Mod: S$GLB,,, | Performed by: DERMATOLOGY

## 2020-03-17 PROCEDURE — 3074F SYST BP LT 130 MM HG: CPT | Mod: CPTII,S$GLB,, | Performed by: DERMATOLOGY

## 2020-03-17 PROCEDURE — 99214 OFFICE O/P EST MOD 30 MIN: CPT | Mod: S$GLB,,, | Performed by: NURSE PRACTITIONER

## 2020-03-17 PROCEDURE — 3078F PR MOST RECENT DIASTOLIC BLOOD PRESSURE < 80 MM HG: ICD-10-PCS | Mod: CPTII,S$GLB,, | Performed by: DERMATOLOGY

## 2020-03-17 PROCEDURE — 3075F SYST BP GE 130 - 139MM HG: CPT | Mod: CPTII,S$GLB,, | Performed by: NURSE PRACTITIONER

## 2020-03-17 PROCEDURE — 3008F BODY MASS INDEX DOCD: CPT | Mod: CPTII,S$GLB,, | Performed by: NURSE PRACTITIONER

## 2020-03-17 PROCEDURE — 88305 TISSUE EXAM BY PATHOLOGIST: CPT | Mod: 59 | Performed by: PATHOLOGY

## 2020-03-17 PROCEDURE — 3078F PR MOST RECENT DIASTOLIC BLOOD PRESSURE < 80 MM HG: ICD-10-PCS | Mod: CPTII,S$GLB,, | Performed by: NURSE PRACTITIONER

## 2020-03-17 PROCEDURE — 3074F PR MOST RECENT SYSTOLIC BLOOD PRESSURE < 130 MM HG: ICD-10-PCS | Mod: CPTII,S$GLB,, | Performed by: DERMATOLOGY

## 2020-03-17 PROCEDURE — 88305 TISSUE EXAM BY PATHOLOGIST: ICD-10-PCS | Mod: 26,,, | Performed by: PATHOLOGY

## 2020-03-17 PROCEDURE — 99214 PR OFFICE/OUTPT VISIT, EST, LEVL IV, 30-39 MIN: ICD-10-PCS | Mod: S$GLB,,, | Performed by: NURSE PRACTITIONER

## 2020-03-17 PROCEDURE — 88312 SPECIAL STAINS GROUP 1: CPT | Performed by: PATHOLOGY

## 2020-03-17 PROCEDURE — 88305 TISSUE EXAM BY PATHOLOGIST: CPT | Mod: 26,,, | Performed by: PATHOLOGY

## 2020-03-17 PROCEDURE — 99202 PR OFFICE/OUTPT VISIT, NEW, LEVL II, 15-29 MIN: ICD-10-PCS | Mod: 25,S$GLB,, | Performed by: DERMATOLOGY

## 2020-03-17 RX ORDER — KETOCONAZOLE 20 MG/G
CREAM TOPICAL 2 TIMES DAILY
Qty: 1 TUBE | Refills: 0 | Status: SHIPPED | OUTPATIENT
Start: 2020-03-17 | End: 2020-11-24

## 2020-03-17 RX ORDER — FAMOTIDINE 20 MG/1
20 TABLET, FILM COATED ORAL 2 TIMES DAILY
COMMUNITY
End: 2021-01-12

## 2020-03-17 NOTE — PROGRESS NOTES
Subjective:       Patient ID:  Abdullahi Urias is a 64 y.o. male who presents for   Chief Complaint   Patient presents with    Rash     ARMS     Hyperpigmentation     ARMS     Mr. Urias is a 64M who presents to clinic in consultation today for a rash and hyperpigmentation.   He notes this started about 2 years ago on the head and neck. [facility] but [facility] not [facility] a dermatologist. He has had persistent dark spots in this area since then.   Yesterday, he noticed new spots on the forearms.   He denies any associated symptoms.   He has used a topical cream on these areas, cannot recall name, no changes with treatment.     The only new medications he has started is GERD medicine (famotidine listed in medication list) and zolpidem.       Review of Systems   Constitutional: Negative for fever and malaise.   Skin: Positive for rash. Negative for itching.        Objective:    Physical Exam   Constitutional: He appears well-developed and well-nourished. No distress.   Eyes: No conjunctival no injection.   Neurological: He is alert and oriented to person, place, and time.   Psychiatric: He has a normal mood and affect.   Skin:   Areas Examined (abnormalities noted in diagram):   Scalp / Hair Palpated and Inspected  Head / Face Inspection Performed  Neck Inspection Performed  Chest / Axilla Inspection Performed  Abdomen Inspection Performed  Back Inspection Performed  RUE Inspected  LUE Inspection Performed              Diagram Legend     Erythematous scaling macule/papule c/w actinic keratosis       Vascular papule c/w angioma      Pigmented verrucoid papule/plaque c/w seborrheic keratosis      Yellow umbilicated papule c/w sebaceous hyperplasia      Irregularly shaped tan macule c/w lentigo     1-2 mm smooth white papules consistent with Milia      Movable subcutaneous cyst with punctum c/w epidermal inclusion cyst      Subcutaneous movable cyst c/w pilar cyst      Firm pink to brown papule  c/w dermatofibroma      Pedunculated fleshy papule(s) c/w skin tag(s)      Evenly pigmented macule c/w junctional nevus     Mildly variegated pigmented, slightly irregular-bordered macule c/w mildly atypical nevus      Flesh colored to evenly pigmented papule c/w intradermal nevus       Pink pearly papule/plaque c/w basal cell carcinoma      Erythematous hyperkeratotic cursted plaque c/w SCC      Surgical scar with no sign of skin cancer recurrence      Open and closed comedones      Inflammatory papules and pustules      Verrucoid papule consistent consistent with wart     Erythematous eczematous patches and plaques     Dystrophic onycholytic nail with subungual debris c/w onychomycosis     Umbilicated papule    Erythematous-base heme-crusted tan verrucoid plaque consistent with inflamed seborrheic keratosis     Erythematous Silvery Scaling Plaque c/w Psoriasis     See annotation      Assessment / Plan:      Pathology Orders:     Normal Orders This Visit    Specimen to Pathology, Dermatology     Questions:    Procedure Type:  Dermatology and skin neoplasms    Number of Specimens:  2    ------------------------:  -------------------------    Spec 1 Procedure:  Biopsy Comment - punch    Spec 1 Clinical Impression:  EDP vs multifocal FDE vs drug induced hyperpigmentation vs other    Spec 1 Source:  right forearm    ------------------------:  -------------------------    Spec 2 Procedure:  Biopsy Comment - punch    Spec 2 Clinical Impression:  EDP vs multifocal FDE vs drug induced hyperpigmentation vs other    Spec 2 Source:  left neck        Rash  -     Ambulatory referral/consult to Dermatology  -     PUNCH BIOPSY, f/u results  -     Specimen to Pathology, Dermatology    Punch biopsy procedure note:  Punch biopsy performed after verbal consent obtained. Area marked and prepped with alcohol. Approximately 1cc of 1% lidocaine with epinephrine injected. 4 mm disposable punch used to remove lesion. Hemostasis obtained  and biopsy site closed with 1 - 2 Ethilon sutures. Wound care instructions reviewed with patient and handout given.    Patient is asymptomatic, treatment based on biopsy results      Hyperpigmentation  Punch biopsy as above, f/u results           Follow up for based on biopsy results.

## 2020-03-17 NOTE — PROGRESS NOTES
Subjective:       Patient ID: Abdullahi Urias is a 64 y.o. male.    Chief Complaint: Rash    Patient present with some dark raised to bilateral.  No itching.  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  Noticed that changes earlier this week.     Review of Systems   Constitutional: Negative for chills and fatigue.   Respiratory: Negative for cough and shortness of breath.    Genitourinary: Negative for frequency.   Skin: Positive for color change and rash.   Psychiatric/Behavioral: Negative for agitation and confusion.       Objective:      Physical Exam   Constitutional: He is oriented to person, place, and time. Vital signs are normal. He appears well-developed and well-nourished.   HENT:   Head: Normocephalic and atraumatic.   Neck: Normal range of motion.   Cardiovascular: Normal rate.   Pulmonary/Chest: Effort normal.   Musculoskeletal: Normal range of motion.   Neurological: He is alert and oriented to person, place, and time.   Skin: Skin is warm. Rash noted.        Very few raised circular area noted to forearms.  Denies itching.   More prominent to right calvert.    Psychiatric: He has a normal mood and affect. His behavior is normal.       Assessment:       1. Rash        Plan:         Rash  -     ketoconazole (NIZORAL) 2 % cream; Apply topically 2 (two) times daily. Continue use for 1 week after resolution of fungal rash.  Dispense: 1 Tube; Refill: 0  -     Ambulatory referral/consult to Dermatology; Future; Expected date: 03/24/2020        Overall the skin look normal.   A few slightly raised areas to the right forearm.  Possibly tinea.  Will start ketoconazole cream and schedule derm appointment.     Will schedule annual exam with Dr. Valentin.  Patient request to start care back at [facility].

## 2020-03-20 ENCOUNTER — TELEPHONE (OUTPATIENT)
Dept: DERMATOLOGY | Facility: CLINIC | Age: 65
End: 2020-03-20

## 2020-03-20 NOTE — TELEPHONE ENCOUNTER
Spoke with patient and told him his results are not in yet from the biopsy.       ----- Message from Kirstin Lan sent at 3/20/2020 10:55 AM CDT -----  Contact: self  Type:  Patient Returning Call    Who Called:pt  Who Left Message for Patient:n/a  Does the patient know what this is regarding?:no  Would the patient rather a call back or a response via [facility]? Call back  Best Call Back Number:[phone]  Additional Information: none    Thanks,  Kirstin Lan

## 2020-03-23 LAB
FINAL PATHOLOGIC DIAGNOSIS: NORMAL
GROSS: NORMAL
MICROSCOPIC EXAM: NORMAL

## 2020-03-26 ENCOUNTER — TELEPHONE (OUTPATIENT)
Dept: DERMATOLOGY | Facility: CLINIC | Age: 65
End: 2020-03-26

## 2020-03-26 DIAGNOSIS — L53.8 ERYTHEMA DYSCHROMICUM PERSTANS: Primary | ICD-10-CM

## 2020-03-26 RX ORDER — TRIAMCINOLONE ACETONIDE 1 MG/G
CREAM TOPICAL 2 TIMES DAILY PRN
Qty: 80 G | Refills: 2 | Status: SHIPPED | OUTPATIENT
Start: 2020-03-26 | End: 2020-11-24

## 2020-03-26 RX ORDER — HYDROCORTISONE 25 MG/G
CREAM TOPICAL 2 TIMES DAILY PRN
Qty: 30 G | Refills: 2 | Status: SHIPPED | OUTPATIENT
Start: 2020-03-26

## 2020-03-26 NOTE — TELEPHONE ENCOUNTER
Spoke to pt and confirmed mailing address on file to place his rx in the mail.  Instructed pt to contact clinic once he receives the rx.  Pt verbalized understanding to all information given and had no further questions.  Rx placed in mail today.

## 2020-03-31 ENCOUNTER — TELEPHONE (OUTPATIENT)
Dept: DERMATOLOGY | Facility: CLINIC | Age: 65
End: 2020-03-31

## 2020-03-31 NOTE — TELEPHONE ENCOUNTER
Spoke to pt and he states he had help from a family member and they removed the sutures.  Pt states the areas are looking great with no complications.  Pt instructed to call clinic with any concerns.  Pt verbalized understanding to all information given and had no further questions.

## 2020-04-21 ENCOUNTER — OFFICE VISIT (OUTPATIENT)
Dept: INTERNAL MEDICINE | Facility: CLINIC | Age: 65
End: 2020-04-21
Payer: COMMERCIAL

## 2020-04-21 DIAGNOSIS — Z86.19 HISTORY OF HEPATITIS C: ICD-10-CM

## 2020-04-21 DIAGNOSIS — Z11.4 ENCOUNTER FOR SCREENING FOR HUMAN IMMUNODEFICIENCY VIRUS (HIV): ICD-10-CM

## 2020-04-21 DIAGNOSIS — I25.10 CORONARY ARTERY DISEASE, ANGINA PRESENCE UNSPECIFIED, UNSPECIFIED VESSEL OR LESION TYPE, UNSPECIFIED WHETHER NATIVE OR TRANSPLANTED HEART: Chronic | ICD-10-CM

## 2020-04-21 DIAGNOSIS — Z86.73 HISTORY OF CVA IN ADULTHOOD: ICD-10-CM

## 2020-04-21 DIAGNOSIS — R09.81 NASAL CONGESTION: Primary | ICD-10-CM

## 2020-04-21 DIAGNOSIS — M1A.9XX0 CHRONIC GOUT INVOLVING TOE WITHOUT TOPHUS, UNSPECIFIED CAUSE, UNSPECIFIED LATERALITY: ICD-10-CM

## 2020-04-21 DIAGNOSIS — C61 PROSTATE CANCER: ICD-10-CM

## 2020-04-21 PROCEDURE — 99214 OFFICE O/P EST MOD 30 MIN: CPT | Mod: 95,,, | Performed by: FAMILY MEDICINE

## 2020-04-21 PROCEDURE — 99214 PR OFFICE/OUTPT VISIT, EST, LEVL IV, 30-39 MIN: ICD-10-PCS | Mod: 95,,, | Performed by: FAMILY MEDICINE

## 2020-04-21 RX ORDER — MINERAL OIL
180 ENEMA (ML) RECTAL DAILY PRN
Qty: 30 TABLET | Refills: 5 | Status: SHIPPED | OUTPATIENT
Start: 2020-04-21 | End: 2023-04-13

## 2020-04-21 RX ORDER — ALBUTEROL SULFATE 90 UG/1
2 AEROSOL, METERED RESPIRATORY (INHALATION) EVERY 6 HOURS PRN
Qty: 1 G | Refills: 2 | Status: SHIPPED | OUTPATIENT
Start: 2020-04-21 | End: 2022-08-25 | Stop reason: SDUPTHER

## 2020-04-21 NOTE — PROGRESS NOTES
Subjective:       Patient ID: Abdullahi Urias is a 64 y.o. male.    Chief Complaint: Multiple issues see below    HPI The patient location is:home  The chief complaint leading to consultation is in hpi  Visit type: Virtual visit with synchronous audio and video  Total time spent with patient: 20 min  Each patient to whom he or she provides medical services by telemedicine is:  (1) informed of the relationship between the physician and patient and the respective role of any other health care provider with respect to management of the patient; and (2) notified that he or she may decline to receive medical services by telemedicine and may withdraw from such care at any time.    One month nasal lavern. No fever , sob. Clear mucous. Using flonase    [facility]ad [facility]    [facility]    Asthma typ ok . Some wheezing w current congestion    Hep c hx says was cured w harvni    Prost [facility] s[facility]    Past Medical History:   Diagnosis Date    [facility]    Asthma     BPH (benign prostatic hyperplasia)     CAD (coronary artery disease)     Cardiomyopathy     CHF (congestive heart failure)     Chronic hoarseness     vocal cord surg    Chronic pain     CVA (cerebral infarction)     8/2012 olol; reviewed ed note    Ex-smoker     Hepatitis C     treatedharvoni says cured    Hypertension     Pancreatitis     Prostate cancer     Prostate cancer     Substance abuse     cocaine, etoh , tob in past     Past Surgical History:   Procedure Laterality Date    AORTIC VALVE REPLACEMENT  05/19/2014    Tissue valve replacement    BACK SURGERY      CARDIAC CATHETERIZATION      COLONOSCOPY  2011    FOOT SURGERY      PENILE PROSTHESIS IMPLANT      PENILE PROSTHESIS REVISION  04/30/2018    [facility]    UPPER GASTROINTESTINAL ENDOSCOPY  2011     Family History   Problem Relation Age of Onset    Heart disease Mother      Heart attack Sister     Heart attack Brother     Colon cancer Neg Hx     Colon polyps Neg Hx     Liver cancer Neg Hx     Inflammatory bowel disease Neg Hx     Liver disease Neg Hx     Rectal cancer Neg Hx     Stomach cancer Neg Hx     Ulcerative colitis Neg Hx      Social History     Socioeconomic History    Marital status:      Spouse name: Not on file    Number of children: Not on file    Years of education: Not on file    Highest education level: Not on file   Occupational History    Not on file   Social Needs    Financial resource strain: Not on file    Food insecurity:     Worry: Not on file     Inability: Not on file    Transportation needs:     Medical: Not on file     Non-medical: Not on file   Tobacco Use    Smoking status: Former Smoker     Packs/day: 2.00     Years: 8.00     Pack years: 16.00     Last attempt to quit: 2012     Years since quittin.6    Smokeless tobacco: Never Used   Substance and Sexual Activity    Alcohol use: No     Comment: Sober since 2012    Drug use: No    Sexual activity: Yes   Lifestyle    Physical activity:     Days per week: Not on file     Minutes per session: Not on file    Stress: Not on file   Relationships    Social connections:     Talks on phone: Not on file     Gets together: Not on file     Attends Gnosticist service: Not on file     Active member of club or organization: Not on file     Attends meetings of clubs or organizations: Not on file     Relationship status: Not on file   Other Topics Concern    Not on file   Social History Narrative    No pets in household, wife and daughter smokers. Former [employer].    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 Nasal congestion    2. History of CVA in adulthood    3. Coronary artery disease, angina presence unspecified, unspecified vessel or lesion  type, unspecified whether native or transplanted heart    4. Chronic gout involving toe without tophus, unspecified cause, unspecified laterality      asthma  Plan:       *Nasal congestion    History of CVA in adulthood    Coronary artery disease, angina presence unspecified, unspecified vessel or lesion type, unspecified whether native or transplanted heart  -     Ambulatory referral/consult to Cardiology; Future; Expected date: 07/08/2020  -     Lipid panel; Future; Expected date: 07/08/2020    Chronic gout involving toe without tophus, unspecified cause, unspecified laterality  -     Comprehensive metabolic panel; Future; Expected date: 07/08/2020  -     CBC auto differential; Future; Expected date: 07/08/2020  -     Uric acid; Future; Expected date: 07/08/2020    Prostate cancer    Encounter for screening for human immunodeficiency virus (HIV)  -     HIV 1/2 Ag/Ab (4th Gen); Future; Expected date: 07/08/2020    History of hepatitis C  -     Hepatitis C RNA, quantitative, PCR; Future; Expected date: 07/08/2020    Other orders  -     albuterol (PROVENTIL/VENTOLIN HFA) 90 mcg/actuation inhaler; Inhale 2 puffs into the lungs every 6 (six) hours as needed for Wheezing.  Dispense: 1 g; Refill: 2  -     fexofenadine (ALLEGRA) 180 MG tablet; Take 1 tablet (180 mg total) by mouth daily as needed.  Dispense: 30 tablet; Refill: 5; notify no help    **  Lab andf /u two months  Also cardiol in couple months

## 2020-06-10 ENCOUNTER — LAB VISIT (OUTPATIENT)
Dept: PRIMARY CARE CLINIC | Facility: OTHER | Age: 65
End: 2020-06-10
Payer: COMMERCIAL

## 2020-06-10 DIAGNOSIS — Z03.818 ENCOUNTER FOR OBSERVATION FOR SUSPECTED EXPOSURE TO OTHER BIOLOGICAL AGENTS RULED OUT: Primary | ICD-10-CM

## 2020-06-10 PROCEDURE — U0003 INFECTIOUS AGENT DETECTION BY NUCLEIC ACID (DNA OR RNA); SEVERE ACUTE RESPIRATORY SYNDROME CORONAVIRUS 2 (SARS-COV-2) (CORONAVIRUS DISEASE [COVID-19]), AMPLIFIED PROBE TECHNIQUE, MAKING USE OF HIGH THROUGHPUT TECHNOLOGIES AS DESCRIBED BY CMS-2020-01-R: HCPCS

## 2020-06-12 LAB — SARS-COV-2 RNA RESP QL NAA+PROBE: NOT DETECTED

## 2020-06-26 DIAGNOSIS — I10 ESSENTIAL HYPERTENSION: Primary | ICD-10-CM

## 2020-06-30 ENCOUNTER — LAB VISIT (OUTPATIENT)
Dept: LAB | Facility: HOSPITAL | Age: 65
End: 2020-06-30
Attending: FAMILY MEDICINE
Payer: COMMERCIAL

## 2020-06-30 ENCOUNTER — OFFICE VISIT (OUTPATIENT)
Dept: CARDIOLOGY | Facility: CLINIC | Age: 65
End: 2020-06-30
Payer: COMMERCIAL

## 2020-06-30 ENCOUNTER — OFFICE VISIT (OUTPATIENT)
Dept: INTERNAL MEDICINE | Facility: CLINIC | Age: 65
End: 2020-06-30
Payer: COMMERCIAL

## 2020-06-30 ENCOUNTER — HOSPITAL ENCOUNTER (OUTPATIENT)
Dept: CARDIOLOGY | Facility: HOSPITAL | Age: 65
Discharge: HOME | End: 2020-06-30
Attending: INTERNAL MEDICINE
Payer: COMMERCIAL

## 2020-06-30 VITALS
TEMPERATURE: 98 F | HEART RATE: 83 BPM | DIASTOLIC BLOOD PRESSURE: 76 MMHG | HEIGHT: 70 IN | BODY MASS INDEX: 33.07 KG/M2 | WEIGHT: 231 LBS | SYSTOLIC BLOOD PRESSURE: 106 MMHG

## 2020-06-30 VITALS
DIASTOLIC BLOOD PRESSURE: 78 MMHG | WEIGHT: 231 LBS | BODY MASS INDEX: 34.11 KG/M2 | SYSTOLIC BLOOD PRESSURE: 110 MMHG | OXYGEN SATURATION: 96 % | HEART RATE: 83 BPM

## 2020-06-30 DIAGNOSIS — Z11.4 ENCOUNTER FOR SCREENING FOR HUMAN IMMUNODEFICIENCY VIRUS (HIV): ICD-10-CM

## 2020-06-30 DIAGNOSIS — I10 ESSENTIAL HYPERTENSION: Primary | Chronic | ICD-10-CM

## 2020-06-30 DIAGNOSIS — Z95.2 S/P AVR (AORTIC VALVE REPLACEMENT): ICD-10-CM

## 2020-06-30 DIAGNOSIS — Z86.73 HISTORY OF CVA IN ADULTHOOD: ICD-10-CM

## 2020-06-30 DIAGNOSIS — I10 ESSENTIAL HYPERTENSION: ICD-10-CM

## 2020-06-30 DIAGNOSIS — N18.30 STAGE 3 CHRONIC KIDNEY DISEASE: ICD-10-CM

## 2020-06-30 DIAGNOSIS — I25.10 CORONARY ARTERY DISEASE, ANGINA PRESENCE UNSPECIFIED, UNSPECIFIED VESSEL OR LESION TYPE, UNSPECIFIED WHETHER NATIVE OR TRANSPLANTED HEART: Chronic | ICD-10-CM

## 2020-06-30 DIAGNOSIS — C61 PROSTATE CANCER: ICD-10-CM

## 2020-06-30 DIAGNOSIS — Z86.19 HISTORY OF HEPATITIS C: ICD-10-CM

## 2020-06-30 DIAGNOSIS — M1A.9XX0 CHRONIC GOUT INVOLVING TOE WITHOUT TOPHUS, UNSPECIFIED CAUSE, UNSPECIFIED LATERALITY: ICD-10-CM

## 2020-06-30 DIAGNOSIS — I35.0 AORTIC VALVE STENOSIS, ETIOLOGY OF CARDIAC VALVE DISEASE UNSPECIFIED: ICD-10-CM

## 2020-06-30 DIAGNOSIS — E66.9 OBESITY, CLASS I, BMI 30-34.9: Chronic | ICD-10-CM

## 2020-06-30 DIAGNOSIS — R94.31 EKG ABNORMALITIES: ICD-10-CM

## 2020-06-30 DIAGNOSIS — R07.89 OTHER CHEST PAIN: ICD-10-CM

## 2020-06-30 LAB
ALBUMIN SERPL BCP-MCNC: 3.9 G/DL (ref 3.5–5.2)
ALP SERPL-CCNC: 112 U/L (ref 55–135)
ALT SERPL W/O P-5'-P-CCNC: 16 U/L (ref 10–44)
ANION GAP SERPL CALC-SCNC: 8 MMOL/L (ref 8–16)
AST SERPL-CCNC: 27 U/L (ref 10–40)
BASOPHILS # BLD AUTO: 0.03 K/UL (ref 0–0.2)
BASOPHILS NFR BLD: 0.7 % (ref 0–1.9)
BILIRUB SERPL-MCNC: 0.3 MG/DL (ref 0.1–1)
BNP SERPL-MCNC: 48 PG/ML (ref 0–99)
BUN SERPL-MCNC: 17 MG/DL (ref 8–23)
CALCIUM SERPL-MCNC: 9.3 MG/DL (ref 8.7–10.5)
CHLORIDE SERPL-SCNC: 109 MMOL/L (ref 95–110)
CHOLEST SERPL-MCNC: 190 MG/DL (ref 120–199)
CHOLEST/HDLC SERPL: 4.6 {RATIO} (ref 2–5)
CO2 SERPL-SCNC: 22 MMOL/L (ref 23–29)
CREAT SERPL-MCNC: 1.5 MG/DL (ref 0.5–1.4)
DIFFERENTIAL METHOD: ABNORMAL
EOSINOPHIL # BLD AUTO: 0.2 K/UL (ref 0–0.5)
EOSINOPHIL NFR BLD: 5 % (ref 0–8)
ERYTHROCYTE [DISTWIDTH] IN BLOOD BY AUTOMATED COUNT: 14.2 % (ref 11.5–14.5)
EST. GFR  (AFRICAN AMERICAN): 56.1 ML/MIN/1.73 M^2
EST. GFR  (NON AFRICAN AMERICAN): 48.5 ML/MIN/1.73 M^2
GLUCOSE SERPL-MCNC: 97 MG/DL (ref 70–110)
HCT VFR BLD AUTO: 39.1 % (ref 40–54)
HDLC SERPL-MCNC: 41 MG/DL (ref 40–75)
HDLC SERPL: 21.6 % (ref 20–50)
HGB BLD-MCNC: 13.1 G/DL (ref 14–18)
IMM GRANULOCYTES # BLD AUTO: 0.01 K/UL (ref 0–0.04)
IMM GRANULOCYTES NFR BLD AUTO: 0.2 % (ref 0–0.5)
LDLC SERPL CALC-MCNC: 121 MG/DL (ref 63–159)
LYMPHOCYTES # BLD AUTO: 1.7 K/UL (ref 1–4.8)
LYMPHOCYTES NFR BLD: 38.1 % (ref 18–48)
MCH RBC QN AUTO: 28.3 PG (ref 27–31)
MCHC RBC AUTO-ENTMCNC: 33.5 G/DL (ref 32–36)
MCV RBC AUTO: 84 FL (ref 82–98)
MONOCYTES # BLD AUTO: 0.5 K/UL (ref 0.3–1)
MONOCYTES NFR BLD: 11.3 % (ref 4–15)
NEUTROPHILS # BLD AUTO: 2 K/UL (ref 1.8–7.7)
NEUTROPHILS NFR BLD: 44.7 % (ref 38–73)
NONHDLC SERPL-MCNC: 149 MG/DL
NRBC BLD-RTO: 0 /100 WBC
PLATELET # BLD AUTO: 179 K/UL (ref 150–350)
PMV BLD AUTO: 11 FL (ref 9.2–12.9)
POTASSIUM SERPL-SCNC: 4.2 MMOL/L (ref 3.5–5.1)
PROT SERPL-MCNC: 7.7 G/DL (ref 6–8.4)
RBC # BLD AUTO: 4.63 M/UL (ref 4.6–6.2)
SODIUM SERPL-SCNC: 139 MMOL/L (ref 136–145)
TRIGL SERPL-MCNC: 140 MG/DL (ref 30–150)
TSH SERPL DL<=0.005 MIU/L-ACNC: 3.37 UIU/ML (ref 0.4–4)
URATE SERPL-MCNC: 3.7 MG/DL (ref 3.4–7)
WBC # BLD AUTO: 4.41 K/UL (ref 3.9–12.7)

## 2020-06-30 PROCEDURE — 84550 ASSAY OF BLOOD/URIC ACID: CPT

## 2020-06-30 PROCEDURE — 99205 OFFICE O/P NEW HI 60 MIN: CPT | Mod: S$GLB,,, | Performed by: INTERNAL MEDICINE

## 2020-06-30 PROCEDURE — 3078F PR MOST RECENT DIASTOLIC BLOOD PRESSURE < 80 MM HG: ICD-10-PCS | Mod: CPTII,S$GLB,, | Performed by: FAMILY MEDICINE

## 2020-06-30 PROCEDURE — 3074F SYST BP LT 130 MM HG: CPT | Mod: CPTII,S$GLB,, | Performed by: INTERNAL MEDICINE

## 2020-06-30 PROCEDURE — 93010 ELECTROCARDIOGRAM REPORT: CPT | Mod: ,,, | Performed by: INTERNAL MEDICINE

## 2020-06-30 PROCEDURE — 3078F DIAST BP <80 MM HG: CPT | Mod: CPTII,S$GLB,, | Performed by: INTERNAL MEDICINE

## 2020-06-30 PROCEDURE — 3008F BODY MASS INDEX DOCD: CPT | Mod: CPTII,S$GLB,, | Performed by: FAMILY MEDICINE

## 2020-06-30 PROCEDURE — 84443 ASSAY THYROID STIM HORMONE: CPT

## 2020-06-30 PROCEDURE — 99999 PR PBB SHADOW E&M-EST. PATIENT-LVL IV: CPT | Mod: PBBFAC,,, | Performed by: INTERNAL MEDICINE

## 2020-06-30 PROCEDURE — 93005 ELECTROCARDIOGRAM TRACING: CPT

## 2020-06-30 PROCEDURE — 3008F BODY MASS INDEX DOCD: CPT | Mod: CPTII,S$GLB,, | Performed by: INTERNAL MEDICINE

## 2020-06-30 PROCEDURE — 3078F PR MOST RECENT DIASTOLIC BLOOD PRESSURE < 80 MM HG: ICD-10-PCS | Mod: CPTII,S$GLB,, | Performed by: INTERNAL MEDICINE

## 2020-06-30 PROCEDURE — 3008F PR BODY MASS INDEX (BMI) DOCUMENTED: ICD-10-PCS | Mod: CPTII,S$GLB,, | Performed by: FAMILY MEDICINE

## 2020-06-30 PROCEDURE — 93010 EKG 12-LEAD: ICD-10-PCS | Mod: ,,, | Performed by: INTERNAL MEDICINE

## 2020-06-30 PROCEDURE — 3008F PR BODY MASS INDEX (BMI) DOCUMENTED: ICD-10-PCS | Mod: CPTII,S$GLB,, | Performed by: INTERNAL MEDICINE

## 2020-06-30 PROCEDURE — 99214 OFFICE O/P EST MOD 30 MIN: CPT | Mod: S$GLB,,, | Performed by: FAMILY MEDICINE

## 2020-06-30 PROCEDURE — 3074F PR MOST RECENT SYSTOLIC BLOOD PRESSURE < 130 MM HG: ICD-10-PCS | Mod: CPTII,S$GLB,, | Performed by: INTERNAL MEDICINE

## 2020-06-30 PROCEDURE — 83880 ASSAY OF NATRIURETIC PEPTIDE: CPT

## 2020-06-30 PROCEDURE — 3074F PR MOST RECENT SYSTOLIC BLOOD PRESSURE < 130 MM HG: ICD-10-PCS | Mod: CPTII,S$GLB,, | Performed by: FAMILY MEDICINE

## 2020-06-30 PROCEDURE — 99999 PR PBB SHADOW E&M-EST. PATIENT-LVL IV: ICD-10-PCS | Mod: PBBFAC,,, | Performed by: FAMILY MEDICINE

## 2020-06-30 PROCEDURE — 86703 HIV-1/HIV-2 1 RESULT ANTBDY: CPT

## 2020-06-30 PROCEDURE — 87522 HEPATITIS C REVRS TRNSCRPJ: CPT

## 2020-06-30 PROCEDURE — 80061 LIPID PANEL: CPT

## 2020-06-30 PROCEDURE — 99214 PR OFFICE/OUTPT VISIT, EST, LEVL IV, 30-39 MIN: ICD-10-PCS | Mod: S$GLB,,, | Performed by: FAMILY MEDICINE

## 2020-06-30 PROCEDURE — 85025 COMPLETE CBC W/AUTO DIFF WBC: CPT

## 2020-06-30 PROCEDURE — 99205 PR OFFICE/OUTPT VISIT, NEW, LEVL V, 60-74 MIN: ICD-10-PCS | Mod: S$GLB,,, | Performed by: INTERNAL MEDICINE

## 2020-06-30 PROCEDURE — 3078F DIAST BP <80 MM HG: CPT | Mod: CPTII,S$GLB,, | Performed by: FAMILY MEDICINE

## 2020-06-30 PROCEDURE — 36415 COLL VENOUS BLD VENIPUNCTURE: CPT

## 2020-06-30 PROCEDURE — 99999 PR PBB SHADOW E&M-EST. PATIENT-LVL IV: ICD-10-PCS | Mod: PBBFAC,,, | Performed by: INTERNAL MEDICINE

## 2020-06-30 PROCEDURE — 99999 PR PBB SHADOW E&M-EST. PATIENT-LVL IV: CPT | Mod: PBBFAC,,, | Performed by: FAMILY MEDICINE

## 2020-06-30 PROCEDURE — 3074F SYST BP LT 130 MM HG: CPT | Mod: CPTII,S$GLB,, | Performed by: FAMILY MEDICINE

## 2020-06-30 PROCEDURE — 80053 COMPREHEN METABOLIC PANEL: CPT

## 2020-06-30 RX ORDER — ACETAMINOPHEN AND CODEINE PHOSPHATE 300; 30 MG/1; MG/1
TABLET ORAL
COMMUNITY
Start: 2020-06-04 | End: 2022-01-27 | Stop reason: ALTCHOICE

## 2020-06-30 RX ORDER — CLINDAMYCIN HYDROCHLORIDE 300 MG/1
CAPSULE ORAL
COMMUNITY
Start: 2020-06-26 | End: 2020-11-24

## 2020-06-30 NOTE — PROGRESS NOTES
Subjective:       Patient ID: Abdullahi Urias is a . male.    Chief Complaint: Multiple issues see below    HPInasal lavern better s/ p neg covid test    Cad now [facility] card     [facility]; he is uncertain if on ppi vs pepcid;egd [facility] planned    Asthma typ ok . Some wheezing w current congestion    Hep c hx says was cured w harvni    Prost ca [facility] s/p prostectomy    Past Medical History:   Diagnosis Date    [facility]    Asthma     BPH (benign prostatic hyperplasia)     CAD (coronary artery disease)     Cardiomyopathy     CHF (congestive heart failure)     Chronic hoarseness     vocal cord surg    Chronic pain     CVA (cerebral infarction)     8/2012 olol; reviewed ed note    Ex-smoker     Hepatitis C     treatedharvoni says cured    Hypertension     Pancreatitis     Prostate cancer     Prostate cancer     Substance abuse     cocaine, etoh , tob in past     Past Surgical History:   Procedure Laterality Date    AORTIC VALVE REPLACEMENT  05/19/2014    Tissue valve replacement    BACK SURGERY      CARDIAC CATHETERIZATION      COLONOSCOPY  2011    FOOT SURGERY      PENILE PROSTHESIS IMPLANT      PENILE PROSTHESIS REVISION  04/30/2018    [facility]    UPPER GASTROINTESTINAL ENDOSCOPY  2011     Family History   Problem Relation Age of Onset    Heart disease Mother     Heart attack Sister     Heart attack Brother     Colon cancer Neg Hx     Colon polyps Neg Hx     Liver cancer Neg Hx     Inflammatory bowel disease Neg Hx     Liver disease Neg Hx     Rectal cancer Neg Hx     Stomach cancer Neg Hx     Ulcerative colitis Neg Hx      Social History     Socioeconomic History    Marital status:      Spouse name: Not on file    Number of children: Not on file    Years of education: Not on file    Highest education level: Not on file   Occupational History    Not on file   Social Needs    Financial resource  strain: Not on file    Food insecurity:     Worry: Not on file     Inability: Not on file    Transportation needs:     Medical: Not on file     Non-medical: Not on file   Tobacco Use    Smoking status: Former Smoker     Packs/day: 2.00     Years: 8.00     Pack years: 16.00     Last attempt to quit: 2012     Years since quittin.6    Smokeless tobacco: Never Used   Substance and Sexual Activity    Alcohol use: No     Comment: Sober since 2012    Drug use: No    Sexual activity: Yes   Lifestyle    Physical activity:     Days per week: Not on file     Minutes per session: Not on file    Stress: Not on file   Relationships    Social connections:     Talks on phone: Not on file     Gets together: Not on file     Attends Jehovah's witness service: Not on file     Active member of club or organization: Not on file     Attends meetings of clubs or organizations: Not on file     Relationship status: Not on file   Other Topics Concern    Not on file   Social History Narrative    No pets in household, wife and daughter smokers. Former [employer].    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 Nasal congestion    2. History of CVA in adulthood    3. Coronary artery disease, angina presence unspecified, unspecified vessel or lesion type, unspecified whether native or transplanted heart    4. Chronic gout involving toe without tophus, unspecified cause, unspecified laterality      Asthma  gerd  Plan:   F/u card , urol when due  Bring meds to f/u  [facility]    Determine if can start statin at f/u  [facility] p[facility]av or prevnar [facility]    [facility]     Add lab to todays lab    F/u 4 weeks    Essential hypertension    History of CVA in adulthood    Chronic gout involving toe without tophus, unspecified cause, unspecified laterality  -     Uric acid; Future; Expected date:  06/30/2020    Coronary artery disease, angina presence unspecified, unspecified vessel or lesion type, unspecified whether native or transplanted heart    Aortic valve stenosis, etiology of cardiac valve disease unspecified    Prostate cancer    Stage 3 chronic kidney disease    History of hepatitis C  -     Hepatitis C RNA, quantitative, PCR; Future; Expected date: 06/30/2020    Encounter for screening for human immunodeficiency virus (HIV)  -     HIV 1/2 Ag/Ab (4th Gen); Future; Expected date: 06/30/2020

## 2020-06-30 NOTE — LETTER
June 30, 2020      Reji Valentin MD  [id number] [address] [facility] [address]           [facility]  [address]  [place] [address]  Phone: [phone]  Fax: [phone]          Patient: Abdullahi Urias   MR Number: [id number]   YOB: 1955   Date of Visit: 6/30/2020       Dear Dr. Reji Valentin:    Thank you for referring Abdullahi Urias to me for evaluation. Attached you will find relevant portions of my assessment and plan of care.    If you have questions, please do not hesitate to call me. I look forward to following Abdullahi Urias along with you.    Sincerely,    Jeffery Mckeon MD    Enclosure  CC:  No Recipients    If you would like to receive this communication electronically, please contact [email or web address] or [phone] to request more information on [employer] access.    For providers and/or their staff who would like to refer a patient to [facility], please contact us through our one-stop-shop provider referral line, [facility] , at [phone].    If you feel you have received this communication in error or would no longer like to receive these types of communications, please e-mail [email or web address]

## 2020-06-30 NOTE — PROGRESS NOTES
Subjective:   Patient ID:  Abdullahi Urias is a 64 y.o. male who presents for evaluation of No chief complaint on file.      65 yo male[facility]  PMH s/p bioprosthetic AVR at [facility] in 2014 Salazar [id number] 25 mm pericardial tissue valve, h/o stroke in 2012, HTN, HLD CHEO on CPAP remote h/o cocaine in 2012, quit drinking in 2012 and no smoking.  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 Lasted for minutes and no radiating pain  C/o SOB when tied the shoes  Sleeps with 3 pillows and CPAP at night  Off CPAP for 1 month due to congestion  BNP in 2016 37    ECHO in  normal EF, perivalvular AI and s/p AVR mean G 16 mmHG and peak V 2.85      Past Medical History:   Diagnosis Date    [facility]    Asthma     BPH (benign prostatic hyperplasia)     CAD (coronary artery disease)     Cardiomyopathy     CHF (congestive heart failure)     Chronic hoarseness     vocal cord surg    Chronic pain     CVA (cerebral infarction)     8/2012 olol; reviewed ed note    Ex-smoker     Hepatitis C     treatedharvoni says cured    Hypertension     Pancreatitis     Prostate cancer     Prostate cancer     Substance abuse     cocaine, etoh , tob in past       Past Surgical History:   Procedure Laterality Date    AORTIC VALVE REPLACEMENT  05/19/2014    Tissue valve replacement    BACK SURGERY      CARDIAC CATHETERIZATION      COLONOSCOPY  2011    FOOT SURGERY      PENILE PROSTHESIS IMPLANT      PENILE PROSTHESIS REVISION  04/30/2018    [facility]    UPPER GASTROINTESTINAL ENDOSCOPY  2011       Social History     Tobacco Use    Smoking status: Former Smoker     Packs/day: 2.00     Years: 8.00     Pack years: 16.00     Quit date: 8/24/2012     Years since  quittin.8    Smokeless tobacco: Never Used   Substance Use Topics    Alcohol use: No     Comment: Sober since 2012    Drug use: No       Family History   Problem Relation Age of Onset    Heart disease Mother     Heart attack Sister     Heart attack Brother     Colon cancer Neg Hx     Colon polyps Neg Hx     Liver cancer Neg Hx     Inflammatory bowel disease Neg Hx     Liver disease Neg Hx     Rectal cancer Neg Hx     Stomach cancer Neg Hx     Ulcerative colitis Neg Hx        Review of Systems   Constitution: Negative for decreased appetite, diaphoresis, fever, malaise/fatigue and night sweats.   HENT: Negative for nosebleeds.    Eyes: Negative for blurred vision and double vision.   Cardiovascular: Positive for chest pain and dyspnea on exertion. Negative for claudication, irregular heartbeat, leg swelling, near-syncope, orthopnea, palpitations, paroxysmal nocturnal dyspnea and syncope.   Respiratory: Negative for cough, shortness of breath, sleep disturbances due to breathing, snoring, sputum production and wheezing.    Endocrine: Negative for cold intolerance and polyuria.   Hematologic/Lymphatic: Does not bruise/bleed easily.   Skin: Negative for rash.   Musculoskeletal: Positive for arthritis and back pain. Negative for falls, joint pain, joint swelling and neck pain.   Gastrointestinal: Positive for heartburn. Negative for abdominal pain, nausea and vomiting.   Genitourinary: Negative for dysuria, frequency and hematuria.   Neurological: Negative for difficulty with concentration, dizziness, focal weakness, headaches, light-headedness, numbness, seizures and weakness.   Psychiatric/Behavioral: Negative for depression, memory loss and substance abuse. The patient does not have insomnia.    Allergic/Immunologic: Negative for HIV exposure and hives.       Objective:   Physical Exam   Constitutional: He is oriented to person, place, and time. He appears well-nourished.   HENT:   Head:  Normocephalic.   Eyes: Pupils are equal, round, and reactive to light.   Neck: Normal carotid pulses and no JVD present. Carotid bruit is not present. No thyromegaly present.   Cardiovascular: Normal rate, regular rhythm and normal pulses.  No extrasystoles are present. PMI is not displaced. Exam reveals no gallop and no S3.   Murmur (ESM + on RUSB ) heard.  Pulmonary/Chest: Breath sounds normal. No stridor. No respiratory distress.   Abdominal: Soft. Bowel sounds are normal. There is no abdominal tenderness. There is no rebound.   Musculoskeletal: Normal range of motion.   Neurological: He is alert and oriented to person, place, and time.   Skin: Skin is intact. No rash noted.   Psychiatric: His behavior is normal.       Lab Results   Component Value Date    CHOL 151 04/08/2014    CHOL 141 03/29/2012    CHOL 145 12/20/2010     Lab Results   Component Value Date    HDL 33 (L) 04/08/2014    HDL 35 (L) 03/29/2012    HDL 40 12/20/2010     Lab Results   Component Value Date    LDLCALC 101.0 04/08/2014    LDLCALC 94.0 03/29/2012    LDLCALC 85.4 12/20/2010     Lab Results   Component Value Date    TRIG 85 04/08/2014    TRIG 61 03/29/2012    TRIG 98 12/20/2010     Lab Results   Component Value Date    CHOLHDL 21.9 04/08/2014    CHOLHDL 24.8 03/29/2012    CHOLHDL 27.6 12/20/2010       Chemistry        Component Value Date/Time     04/08/2014 1455    K 4.6 04/08/2014 1455     04/08/2014 1455    CO2 28 04/08/2014 1455    BUN 26 (H) 04/08/2014 1455    CREATININE 1.6 (H) 04/08/2014 1455    GLU 82 04/08/2014 1455        Component Value Date/Time    CALCIUM 10.0 04/08/2014 1455    ALKPHOS 73 04/08/2014 1455    AST 64 (H) 04/08/2014 1455     (H) 04/08/2014 1455    BILITOT 0.6 04/08/2014 1455    ESTGFRAFRICA 54.1 (A) 04/08/2014 1455    EGFRNONAA 46.8 (A) 04/08/2014 1455          Lab Results   Component Value Date    HGBA1C 5.2 10/23/2007     Lab Results   Component Value Date    TSH 1.1 01/24/2005     Lab Results    Component Value Date    INR 1.0 04/08/2014    INR 1.0 03/26/2010    INR 0.9 08/28/2009     Lab Results   Component Value Date    WBC 6.37 09/14/2018    HGB 14.1 09/14/2018    HCT 41.7 09/14/2018    MCV 86 09/14/2018     (L) 09/14/2018     BNP  @LABRCNTIP(BNP,BNPTRIAGEBLO)@  CrCl cannot be calculated (Patient's most recent lab result is older than the maximum 7 days allowed.).  No results found in the last 24 hours.  No results found in the last 24 hours.  No results found in the last 24 hours.    Assessment:      1. Essential hypertension    2. Coronary artery disease, angina presence unspecified, unspecified vessel or lesion type, unspecified whether native or transplanted heart    3. S/P AVR (aortic valve replacement) biopresthetic miller 25 mm in 2014     4. History of CVA in adulthood    5. Obesity, Class I, BMI 30-34.9    6. EKG abnormalities    7. Other chest pain    8. Stage 3 chronic kidney disease        Plan:   Echo for s/p AVR  Phar MPI for abnormal EKG and atypical CP.  Check BMP  CBC and BNP TSH Lipid profile  Continue ASA Coreg and Losartan  Counseled DASH  Check Lipid profile in 6 months  Recommend heart-healthy diet, weight control and regular exercise.  Wilbur. Risk modification.   RTC in 6 months    I have reviewed all pertinent labs and cardiac studies independently. Plans and recommendations have been formulated under my direct supervision. All questions answered and patient voiced understanding. Patient to continue current medications.   Return sooner for concerns or questions.   If symptoms persist go to the ED

## 2020-07-01 ENCOUNTER — HOSPITAL ENCOUNTER (OUTPATIENT)
Dept: CARDIOLOGY | Facility: HOSPITAL | Age: 65
Discharge: HOME | End: 2020-07-01
Attending: INTERNAL MEDICINE
Payer: COMMERCIAL

## 2020-07-01 VITALS — HEIGHT: 70 IN | BODY MASS INDEX: 33.15 KG/M2

## 2020-07-01 DIAGNOSIS — Z95.2 S/P AVR (AORTIC VALVE REPLACEMENT): ICD-10-CM

## 2020-07-01 DIAGNOSIS — R07.89 OTHER CHEST PAIN: ICD-10-CM

## 2020-07-01 DIAGNOSIS — I25.10 CORONARY ARTERY DISEASE, ANGINA PRESENCE UNSPECIFIED, UNSPECIFIED VESSEL OR LESION TYPE, UNSPECIFIED WHETHER NATIVE OR TRANSPLANTED HEART: ICD-10-CM

## 2020-07-01 LAB
AV INDEX (PROSTH): 0.42
AV MEAN GRADIENT: 19 MMHG
AV PEAK GRADIENT: 32 MMHG
AV VALVE AREA: 1.37 CM2
AV VELOCITY RATIO: 0.47
CV ECHO LV RWT: 0.46 CM
DOP CALC AO PEAK VEL: 2.85 M/S
DOP CALC AO VTI: 51.29 CM
DOP CALC LVOT AREA: 3.2 CM2
DOP CALC LVOT DIAMETER: 2.03 CM
DOP CALC LVOT PEAK VEL: 1.33 M/S
DOP CALC LVOT STROKE VOLUME: 70.42 CM3
DOP CALC RVOT PEAK VEL: 0.73 M/S
DOP CALC RVOT VTI: 15.37 CM
DOP CALCLVOT PEAK VEL VTI: 21.77 CM
E WAVE DECELERATION TIME: 297.49 MSEC
E/A RATIO: 1.08
E/E' RATIO: 13.5 M/S
ECHO LV POSTERIOR WALL: 0.99 CM (ref 0.6–1.1)
FRACTIONAL SHORTENING: 31 % (ref 28–44)
HIV 1+2 AB+HIV1 P24 AG SERPL QL IA: NEGATIVE
INTERVENTRICULAR SEPTUM: 1.21 CM (ref 0.6–1.1)
IVRT: 82.78 MSEC
LA MAJOR: 4.68 CM
LA MINOR: 4.13 CM
LA WIDTH: 2.99 CM
LEFT ATRIUM SIZE: 3.96 CM
LEFT ATRIUM VOLUME: 44.16 CM3
LEFT INTERNAL DIMENSION IN SYSTOLE: 2.99 CM (ref 2.1–4)
LEFT VENTRICLE DIASTOLIC VOLUME: 83.78 ML
LEFT VENTRICLE SYSTOLIC VOLUME: 34.77 ML
LEFT VENTRICULAR INTERNAL DIMENSION IN DIASTOLE: 4.32 CM (ref 3.5–6)
LEFT VENTRICULAR MASS: 164.13 G
LV LATERAL E/E' RATIO: 13.5 M/S
LV SEPTAL E/E' RATIO: 13.5 M/S
MV PEAK A VEL: 1 M/S
MV PEAK E VEL: 1.08 M/S
PISA TR MAX VEL: 3.07 M/S
PV MEAN GRADIENT: 1.41 MMHG
PV PEAK VELOCITY: 1.24 CM/S
RA MAJOR: 5.13 CM
RA PRESSURE: 3 MMHG
RA WIDTH: 3.54 CM
RIGHT VENTRICULAR END-DIASTOLIC DIMENSION: 3 CM
TDI LATERAL: 0.08 M/S
TDI SEPTAL: 0.08 M/S
TDI: 0.08 M/S
TR MAX PG: 38 MMHG
TV REST PULMONARY ARTERY PRESSURE: 41 MMHG

## 2020-07-01 PROCEDURE — 93306 TTE W/DOPPLER COMPLETE: CPT | Mod: 26,,, | Performed by: INTERNAL MEDICINE

## 2020-07-01 PROCEDURE — 93306 TTE W/DOPPLER COMPLETE: CPT

## 2020-07-01 PROCEDURE — 93306 ECHO (CUPID ONLY): ICD-10-PCS | Mod: 26,,, | Performed by: INTERNAL MEDICINE

## 2020-07-02 ENCOUNTER — TELEPHONE (OUTPATIENT)
Dept: CARDIOLOGY | Facility: CLINIC | Age: 65
End: 2020-07-02

## 2020-07-02 NOTE — TELEPHONE ENCOUNTER
Patient contacted and was advised that   LAB SHOWED TSH WNL.  NO CHF  CKD STAGE III F/U WITH PCP. The patient stated understanding with no questions or concerns.

## 2020-07-06 ENCOUNTER — TELEPHONE (OUTPATIENT)
Dept: CARDIOLOGY | Facility: CLINIC | Age: 65
End: 2020-07-06

## 2020-07-06 NOTE — TELEPHONE ENCOUNTER
The patient contacted and was advised that his  Echo showed s/p AVR. Small perivalvular AI. Function is ok   the patient was reminded that he has an appointment for a nuke stress test. The patient was advised to take all morning medications with a sip of water and toast for breakfast. No caffeine products.

## 2020-07-08 LAB
HCV RNA SERPL NAA+PROBE-LOG IU: <1.08 LOG (10) IU/ML
HCV RNA SERPL QL NAA+PROBE: NOT DETECTED IU/ML
HCV RNA SPEC NAA+PROBE-ACNC: <12 IU/ML

## 2020-07-09 ENCOUNTER — HOSPITAL ENCOUNTER (OUTPATIENT)
Dept: CARDIOLOGY | Facility: HOSPITAL | Age: 65
Discharge: HOME | End: 2020-07-09
Attending: INTERNAL MEDICINE
Payer: COMMERCIAL

## 2020-07-09 ENCOUNTER — HOSPITAL ENCOUNTER (OUTPATIENT)
Dept: RADIOLOGY | Facility: HOSPITAL | Age: 65
Discharge: HOME | End: 2020-07-09
Attending: INTERNAL MEDICINE
Payer: COMMERCIAL

## 2020-07-09 VITALS — WEIGHT: 231 LBS | BODY MASS INDEX: 33.15 KG/M2

## 2020-07-09 DIAGNOSIS — R07.89 OTHER CHEST PAIN: ICD-10-CM

## 2020-07-09 DIAGNOSIS — I25.10 CORONARY ARTERY DISEASE, ANGINA PRESENCE UNSPECIFIED, UNSPECIFIED VESSEL OR LESION TYPE, UNSPECIFIED WHETHER NATIVE OR TRANSPLANTED HEART: Chronic | ICD-10-CM

## 2020-07-09 LAB
CV STRESS BASE HR: 79 BPM
DIASTOLIC BLOOD PRESSURE: 76 MMHG
NUC REST EJECTION FRACTION: 60
NUC STRESS EJECTION FRACTION: 55 %
OHS CV CPX 1 MINUTE RECOVERY HEART RATE: 121 BPM
OHS CV CPX 85 PERCENT MAX PREDICTED HEART RATE MALE: 133
OHS CV CPX ESTIMATED METS: 7
OHS CV CPX MAX PREDICTED HEART RATE: 156
OHS CV CPX PATIENT IS FEMALE: 0
OHS CV CPX PATIENT IS MALE: 1
OHS CV CPX PEAK DIASTOLIC BLOOD PRESSURE: 81 MMHG
OHS CV CPX PEAK HEAR RATE: 136 BPM
OHS CV CPX PEAK RATE PRESSURE PRODUCT: NORMAL
OHS CV CPX PEAK SYSTOLIC BLOOD PRESSURE: 167 MMHG
OHS CV CPX PERCENT MAX PREDICTED HEART RATE ACHIEVED: 87
OHS CV CPX RATE PRESSURE PRODUCT PRESENTING: 8532
STRESS ECHO POST EXERCISE DUR MIN: 6 MINUTES
SYSTOLIC BLOOD PRESSURE: 108 MMHG

## 2020-07-09 PROCEDURE — 78452 STRESS TEST WITH MYOCARDIAL PERFUSION (CUPID ONLY): ICD-10-PCS | Mod: 26,,, | Performed by: INTERNAL MEDICINE

## 2020-07-09 PROCEDURE — 78452 HT MUSCLE IMAGE SPECT MULT: CPT | Mod: 26,,, | Performed by: INTERNAL MEDICINE

## 2020-07-09 PROCEDURE — 93018 CV STRESS TEST I&R ONLY: CPT | Mod: ,,, | Performed by: INTERNAL MEDICINE

## 2020-07-09 PROCEDURE — 93018 STRESS TEST WITH MYOCARDIAL PERFUSION (CUPID ONLY): ICD-10-PCS | Mod: ,,, | Performed by: INTERNAL MEDICINE

## 2020-07-09 PROCEDURE — 93017 CV STRESS TEST TRACING ONLY: CPT

## 2020-07-09 PROCEDURE — 93016 STRESS TEST WITH MYOCARDIAL PERFUSION (CUPID ONLY): ICD-10-PCS | Mod: ,,, | Performed by: INTERNAL MEDICINE

## 2020-07-09 PROCEDURE — 93016 CV STRESS TEST SUPVJ ONLY: CPT | Mod: ,,, | Performed by: INTERNAL MEDICINE

## 2020-07-09 PROCEDURE — A9502 TC99M TETROFOSMIN: HCPCS

## 2020-07-10 ENCOUNTER — TELEPHONE (OUTPATIENT)
Dept: CARDIOLOGY | Facility: CLINIC | Age: 65
End: 2020-07-10

## 2020-07-10 NOTE — TELEPHONE ENCOUNTER
Patient contacted and was advised that his  NUKE STRESS TEST NORMAL    the patient stated understanding with no question or concerns

## 2020-07-24 ENCOUNTER — TELEPHONE (OUTPATIENT)
Dept: CARDIOLOGY | Facility: CLINIC | Age: 65
End: 2020-07-24

## 2020-07-24 ENCOUNTER — HOSPITAL ENCOUNTER (INPATIENT)
Facility: HOSPITAL | Age: 65
LOS: 1 days | Discharge: HOME | DRG: 281 | End: 2020-07-25
Attending: EMERGENCY MEDICINE | Admitting: INTERNAL MEDICINE
Payer: OTHER GOVERNMENT

## 2020-07-24 ENCOUNTER — NURSE TRIAGE (OUTPATIENT)
Dept: ADMINISTRATIVE | Facility: CLINIC | Age: 65
End: 2020-07-24

## 2020-07-24 DIAGNOSIS — R79.89 ELEVATED TROPONIN: ICD-10-CM

## 2020-07-24 DIAGNOSIS — E66.9 OBESITY, CLASS I, BMI 30-34.9: Chronic | ICD-10-CM

## 2020-07-24 DIAGNOSIS — R79.89 TROPONIN LEVEL ELEVATED: ICD-10-CM

## 2020-07-24 DIAGNOSIS — I21.4 NSTEMI (NON-ST ELEVATED MYOCARDIAL INFARCTION): ICD-10-CM

## 2020-07-24 DIAGNOSIS — R07.9 CHEST PAIN: Primary | ICD-10-CM

## 2020-07-24 LAB
ALBUMIN SERPL BCP-MCNC: 4.1 G/DL (ref 3.5–5.2)
ALP SERPL-CCNC: 98 U/L (ref 55–135)
ALT SERPL W/O P-5'-P-CCNC: 14 U/L (ref 10–44)
ANION GAP SERPL CALC-SCNC: 11 MMOL/L (ref 8–16)
APTT BLDCRRT: 26.4 SEC (ref 21–32)
AST SERPL-CCNC: 22 U/L (ref 10–40)
BASOPHILS # BLD AUTO: 0.04 K/UL (ref 0–0.2)
BASOPHILS NFR BLD: 0.6 % (ref 0–1.9)
BILIRUB SERPL-MCNC: 0.5 MG/DL (ref 0.1–1)
BILIRUB UR QL STRIP: NEGATIVE
BNP SERPL-MCNC: 21 PG/ML (ref 0–99)
BUN SERPL-MCNC: 14 MG/DL (ref 8–23)
CALCIUM SERPL-MCNC: 9.7 MG/DL (ref 8.7–10.5)
CHLORIDE SERPL-SCNC: 104 MMOL/L (ref 95–110)
CK SERPL-CCNC: 205 U/L (ref 20–200)
CLARITY UR: CLEAR
CO2 SERPL-SCNC: 22 MMOL/L (ref 23–29)
COLOR UR: YELLOW
CREAT SERPL-MCNC: 1.7 MG/DL (ref 0.5–1.4)
D DIMER PPP IA.FEU-MCNC: 1.87 MG/L FEU
DIFFERENTIAL METHOD: ABNORMAL
EOSINOPHIL # BLD AUTO: 0.2 K/UL (ref 0–0.5)
EOSINOPHIL NFR BLD: 2.5 % (ref 0–8)
ERYTHROCYTE [DISTWIDTH] IN BLOOD BY AUTOMATED COUNT: 13.4 % (ref 11.5–14.5)
EST. GFR  (AFRICAN AMERICAN): 48 ML/MIN/1.73 M^2
EST. GFR  (NON AFRICAN AMERICAN): 42 ML/MIN/1.73 M^2
GLUCOSE SERPL-MCNC: 93 MG/DL (ref 70–110)
GLUCOSE UR QL STRIP: NEGATIVE
HCT VFR BLD AUTO: 40.7 % (ref 40–54)
HGB BLD-MCNC: 13.6 G/DL (ref 14–18)
HGB UR QL STRIP: NEGATIVE
IMM GRANULOCYTES # BLD AUTO: 0.03 K/UL (ref 0–0.04)
IMM GRANULOCYTES NFR BLD AUTO: 0.4 % (ref 0–0.5)
INR PPP: 1 (ref 0.8–1.2)
KETONES UR QL STRIP: NEGATIVE
LEUKOCYTE ESTERASE UR QL STRIP: NEGATIVE
LYMPHOCYTES # BLD AUTO: 1.7 K/UL (ref 1–4.8)
LYMPHOCYTES NFR BLD: 24.5 % (ref 18–48)
MCH RBC QN AUTO: 28.3 PG (ref 27–31)
MCHC RBC AUTO-ENTMCNC: 33.4 G/DL (ref 32–36)
MCV RBC AUTO: 85 FL (ref 82–98)
MONOCYTES # BLD AUTO: 0.6 K/UL (ref 0.3–1)
MONOCYTES NFR BLD: 8 % (ref 4–15)
NEUTROPHILS # BLD AUTO: 4.6 K/UL (ref 1.8–7.7)
NEUTROPHILS NFR BLD: 64 % (ref 38–73)
NITRITE UR QL STRIP: NEGATIVE
NRBC BLD-RTO: 0 /100 WBC
PH UR STRIP: 7 [PH] (ref 5–8)
PLATELET # BLD AUTO: 194 K/UL (ref 150–350)
PMV BLD AUTO: 10 FL (ref 9.2–12.9)
POTASSIUM SERPL-SCNC: 4.7 MMOL/L (ref 3.5–5.1)
PROT SERPL-MCNC: 8 G/DL (ref 6–8.4)
PROT UR QL STRIP: NEGATIVE
PROTHROMBIN TIME: 10.8 SEC (ref 9–12.5)
RBC # BLD AUTO: 4.8 M/UL (ref 4.6–6.2)
SARS-COV-2 RDRP RESP QL NAA+PROBE: NEGATIVE
SODIUM SERPL-SCNC: 137 MMOL/L (ref 136–145)
SP GR UR STRIP: <=1.005 (ref 1–1.03)
TROPONIN I SERPL DL<=0.01 NG/ML-MCNC: 0.15 NG/ML (ref 0–0.03)
TROPONIN I SERPL DL<=0.01 NG/ML-MCNC: 0.2 NG/ML (ref 0–0.03)
URN SPEC COLLECT METH UR: ABNORMAL
UROBILINOGEN UR STRIP-ACNC: NEGATIVE EU/DL
WBC # BLD AUTO: 7.11 K/UL (ref 3.9–12.7)

## 2020-07-24 PROCEDURE — 25000003 PHARM REV CODE 250: Performed by: NURSE PRACTITIONER

## 2020-07-24 PROCEDURE — 25000003 PHARM REV CODE 250: Performed by: EMERGENCY MEDICINE

## 2020-07-24 PROCEDURE — 93010 EKG 12-LEAD: ICD-10-PCS | Mod: 59,,, | Performed by: INTERNAL MEDICINE

## 2020-07-24 PROCEDURE — 27201423 OPTIME MED/SURG SUP & DEVICES STERILE SUPPLY: Performed by: INTERNAL MEDICINE

## 2020-07-24 PROCEDURE — 93454 CORONARY ARTERY ANGIO S&I: CPT | Performed by: INTERNAL MEDICINE

## 2020-07-24 PROCEDURE — 25000003 PHARM REV CODE 250: Performed by: INTERNAL MEDICINE

## 2020-07-24 PROCEDURE — 63600175 PHARM REV CODE 636 W HCPCS: Performed by: INTERNAL MEDICINE

## 2020-07-24 PROCEDURE — U0002 COVID-19 LAB TEST NON-CDC: HCPCS

## 2020-07-24 PROCEDURE — 21400001 HC TELEMETRY ROOM

## 2020-07-24 PROCEDURE — 81003 URINALYSIS AUTO W/O SCOPE: CPT

## 2020-07-24 PROCEDURE — C1760 CLOSURE DEV, VASC: HCPCS | Performed by: INTERNAL MEDICINE

## 2020-07-24 PROCEDURE — 36415 COLL VENOUS BLD VENIPUNCTURE: CPT

## 2020-07-24 PROCEDURE — C1769 GUIDE WIRE: HCPCS | Performed by: INTERNAL MEDICINE

## 2020-07-24 PROCEDURE — 25500020 PHARM REV CODE 255: Performed by: INTERNAL MEDICINE

## 2020-07-24 PROCEDURE — 94761 N-INVAS EAR/PLS OXIMETRY MLT: CPT

## 2020-07-24 PROCEDURE — 99152 MOD SED SAME PHYS/QHP 5/>YRS: CPT | Performed by: INTERNAL MEDICINE

## 2020-07-24 PROCEDURE — 93454 CORONARY ARTERY ANGIO S&I: CPT | Mod: 26,,, | Performed by: INTERNAL MEDICINE

## 2020-07-24 PROCEDURE — 99152 PR MOD CONSCIOUS SEDATION, SAME PHYS, 5+ YRS, FIRST 15 MIN: ICD-10-PCS | Mod: ,,, | Performed by: INTERNAL MEDICINE

## 2020-07-24 PROCEDURE — 93454 PR CATH PLACE/CORONARY ANGIO, IMG SUPER/INTERP: ICD-10-PCS | Mod: 26,,, | Performed by: INTERNAL MEDICINE

## 2020-07-24 PROCEDURE — 84484 ASSAY OF TROPONIN QUANT: CPT

## 2020-07-24 PROCEDURE — 63600175 PHARM REV CODE 636 W HCPCS: Performed by: NURSE PRACTITIONER

## 2020-07-24 PROCEDURE — 84484 ASSAY OF TROPONIN QUANT: CPT | Mod: 91

## 2020-07-24 PROCEDURE — 99152 MOD SED SAME PHYS/QHP 5/>YRS: CPT | Mod: ,,, | Performed by: INTERNAL MEDICINE

## 2020-07-24 PROCEDURE — 85025 COMPLETE CBC W/AUTO DIFF WBC: CPT

## 2020-07-24 PROCEDURE — 93005 ELECTROCARDIOGRAM TRACING: CPT

## 2020-07-24 PROCEDURE — 82550 ASSAY OF CK (CPK): CPT

## 2020-07-24 PROCEDURE — 63600175 PHARM REV CODE 636 W HCPCS: Performed by: EMERGENCY MEDICINE

## 2020-07-24 PROCEDURE — 85379 FIBRIN DEGRADATION QUANT: CPT

## 2020-07-24 PROCEDURE — 80053 COMPREHEN METABOLIC PANEL: CPT

## 2020-07-24 PROCEDURE — 93010 ELECTROCARDIOGRAM REPORT: CPT | Mod: 59,,, | Performed by: INTERNAL MEDICINE

## 2020-07-24 PROCEDURE — 99214 PR OFFICE/OUTPT VISIT, EST, LEVL IV, 30-39 MIN: ICD-10-PCS | Mod: 25,,, | Performed by: INTERNAL MEDICINE

## 2020-07-24 PROCEDURE — 85730 THROMBOPLASTIN TIME PARTIAL: CPT

## 2020-07-24 PROCEDURE — 85610 PROTHROMBIN TIME: CPT

## 2020-07-24 PROCEDURE — 99291 CRITICAL CARE FIRST HOUR: CPT | Mod: 25

## 2020-07-24 PROCEDURE — 99214 OFFICE O/P EST MOD 30 MIN: CPT | Mod: 25,,, | Performed by: INTERNAL MEDICINE

## 2020-07-24 PROCEDURE — 96374 THER/PROPH/DIAG INJ IV PUSH: CPT

## 2020-07-24 PROCEDURE — 83880 ASSAY OF NATRIURETIC PEPTIDE: CPT

## 2020-07-24 RX ORDER — SODIUM CHLORIDE 9 MG/ML
INJECTION, SOLUTION INTRAVENOUS CONTINUOUS
Status: CANCELLED | OUTPATIENT
Start: 2020-07-24

## 2020-07-24 RX ORDER — ONDANSETRON 2 MG/ML
4 INJECTION INTRAMUSCULAR; INTRAVENOUS EVERY 8 HOURS PRN
Status: DISCONTINUED | OUTPATIENT
Start: 2020-07-24 | End: 2020-07-25 | Stop reason: HOSPADM

## 2020-07-24 RX ORDER — MORPHINE SULFATE 4 MG/ML
4 INJECTION, SOLUTION INTRAMUSCULAR; INTRAVENOUS EVERY 4 HOURS PRN
Status: DISCONTINUED | OUTPATIENT
Start: 2020-07-24 | End: 2020-07-25 | Stop reason: HOSPADM

## 2020-07-24 RX ORDER — DIPHENHYDRAMINE HCL 25 MG
25 TABLET ORAL NIGHTLY PRN
COMMUNITY
End: 2023-09-07

## 2020-07-24 RX ORDER — HEPARIN SODIUM,PORCINE/D5W 25000/250
12 INTRAVENOUS SOLUTION INTRAVENOUS CONTINUOUS
Status: DISCONTINUED | OUTPATIENT
Start: 2020-07-24 | End: 2020-07-24

## 2020-07-24 RX ORDER — NITROGLYCERIN 0.4 MG/1
0.4 TABLET SUBLINGUAL EVERY 5 MIN PRN
Status: DISCONTINUED | OUTPATIENT
Start: 2020-07-24 | End: 2020-07-25 | Stop reason: HOSPADM

## 2020-07-24 RX ORDER — SIMETHICONE 80 MG
80 TABLET,CHEWABLE ORAL EVERY 6 HOURS PRN
COMMUNITY

## 2020-07-24 RX ORDER — CETIRIZINE HYDROCHLORIDE 10 MG/1
10 TABLET ORAL DAILY
Status: DISCONTINUED | OUTPATIENT
Start: 2020-07-25 | End: 2020-07-25 | Stop reason: HOSPADM

## 2020-07-24 RX ORDER — DIPHENHYDRAMINE HYDROCHLORIDE 50 MG/ML
INJECTION INTRAMUSCULAR; INTRAVENOUS
Status: DISCONTINUED | OUTPATIENT
Start: 2020-07-24 | End: 2020-07-24 | Stop reason: HOSPADM

## 2020-07-24 RX ORDER — LIDOCAINE HYDROCHLORIDE 20 MG/ML
INJECTION, SOLUTION EPIDURAL; INFILTRATION; INTRACAUDAL; PERINEURAL
Status: DISCONTINUED | OUTPATIENT
Start: 2020-07-24 | End: 2020-07-24 | Stop reason: HOSPADM

## 2020-07-24 RX ORDER — CARVEDILOL 12.5 MG/1
25 TABLET ORAL 2 TIMES DAILY
Status: DISCONTINUED | OUTPATIENT
Start: 2020-07-24 | End: 2020-07-25 | Stop reason: HOSPADM

## 2020-07-24 RX ORDER — ATROPINE SULFATE 0.1 MG/ML
0.5 INJECTION INTRAVENOUS
Status: DISCONTINUED | OUTPATIENT
Start: 2020-07-24 | End: 2020-07-25 | Stop reason: HOSPADM

## 2020-07-24 RX ORDER — ATORVASTATIN CALCIUM 10 MG/1
20 TABLET, FILM COATED ORAL DAILY
Status: DISCONTINUED | OUTPATIENT
Start: 2020-07-25 | End: 2020-07-25 | Stop reason: HOSPADM

## 2020-07-24 RX ORDER — OXYCODONE AND ACETAMINOPHEN 5; 325 MG/1; MG/1
1 TABLET ORAL EVERY 4 HOURS PRN
COMMUNITY
End: 2020-11-24

## 2020-07-24 RX ORDER — PANTOPRAZOLE SODIUM 40 MG/1
40 TABLET, DELAYED RELEASE ORAL DAILY
Status: DISCONTINUED | OUTPATIENT
Start: 2020-07-24 | End: 2020-07-25 | Stop reason: HOSPADM

## 2020-07-24 RX ORDER — GABAPENTIN 300 MG/1
400 CAPSULE ORAL 3 TIMES DAILY
COMMUNITY
End: 2022-02-22 | Stop reason: DRUGHIGH

## 2020-07-24 RX ORDER — SUCRALFATE 1 G/10ML
1 SUSPENSION ORAL 4 TIMES DAILY
COMMUNITY

## 2020-07-24 RX ORDER — SODIUM CHLORIDE 9 MG/ML
INJECTION, SOLUTION INTRAVENOUS CONTINUOUS
Status: ACTIVE | OUTPATIENT
Start: 2020-07-24 | End: 2020-07-25

## 2020-07-24 RX ORDER — MIDAZOLAM HYDROCHLORIDE 1 MG/ML
INJECTION, SOLUTION INTRAMUSCULAR; INTRAVENOUS
Status: DISCONTINUED | OUTPATIENT
Start: 2020-07-24 | End: 2020-07-24 | Stop reason: HOSPADM

## 2020-07-24 RX ORDER — NAPROXEN SODIUM 220 MG/1
81 TABLET, FILM COATED ORAL DAILY
Status: DISCONTINUED | OUTPATIENT
Start: 2020-07-25 | End: 2020-07-25 | Stop reason: HOSPADM

## 2020-07-24 RX ORDER — TAMSULOSIN HYDROCHLORIDE 0.4 MG/1
0.4 CAPSULE ORAL DAILY
Status: DISCONTINUED | OUTPATIENT
Start: 2020-07-25 | End: 2020-07-25 | Stop reason: HOSPADM

## 2020-07-24 RX ORDER — MAG HYDROX/ALUMINUM HYD/SIMETH 200-200-20
30 SUSPENSION, ORAL (FINAL DOSE FORM) ORAL
Status: COMPLETED | OUTPATIENT
Start: 2020-07-24 | End: 2020-07-24

## 2020-07-24 RX ORDER — GABAPENTIN 300 MG/1
300 CAPSULE ORAL 3 TIMES DAILY
Status: DISCONTINUED | OUTPATIENT
Start: 2020-07-24 | End: 2020-07-25 | Stop reason: HOSPADM

## 2020-07-24 RX ORDER — OXYCODONE HYDROCHLORIDE 5 MG/1
10 TABLET ORAL EVERY 4 HOURS PRN
Status: DISCONTINUED | OUTPATIENT
Start: 2020-07-24 | End: 2020-07-25 | Stop reason: HOSPADM

## 2020-07-24 RX ORDER — DIPHENHYDRAMINE HCL 50 MG
50 CAPSULE ORAL ONCE
Status: CANCELLED | OUTPATIENT
Start: 2020-07-24

## 2020-07-24 RX ORDER — CETIRIZINE HYDROCHLORIDE 10 MG/1
10 TABLET ORAL DAILY
COMMUNITY
End: 2021-11-11 | Stop reason: ALTCHOICE

## 2020-07-24 RX ORDER — ACETAMINOPHEN 325 MG/1
650 TABLET ORAL EVERY 6 HOURS PRN
Status: DISCONTINUED | OUTPATIENT
Start: 2020-07-24 | End: 2020-07-25 | Stop reason: HOSPADM

## 2020-07-24 RX ORDER — ALLOPURINOL 300 MG/1
300 TABLET ORAL DAILY
Status: DISCONTINUED | OUTPATIENT
Start: 2020-07-25 | End: 2020-07-25 | Stop reason: HOSPADM

## 2020-07-24 RX ORDER — ATORVASTATIN CALCIUM 20 MG/1
20 TABLET, FILM COATED ORAL DAILY
COMMUNITY
End: 2021-01-12

## 2020-07-24 RX ORDER — SUCRALFATE 1 G/10ML
1 SUSPENSION ORAL 4 TIMES DAILY
Status: DISCONTINUED | OUTPATIENT
Start: 2020-07-24 | End: 2020-07-25 | Stop reason: HOSPADM

## 2020-07-24 RX ORDER — CALCIUM CARBONATE 200(500)MG
500 TABLET,CHEWABLE ORAL DAILY PRN
Status: DISCONTINUED | OUTPATIENT
Start: 2020-07-24 | End: 2020-07-25 | Stop reason: HOSPADM

## 2020-07-24 RX ORDER — HYDROCODONE BITARTRATE AND ACETAMINOPHEN 5; 325 MG/1; MG/1
1 TABLET ORAL EVERY 4 HOURS PRN
Status: DISCONTINUED | OUTPATIENT
Start: 2020-07-24 | End: 2020-07-25 | Stop reason: HOSPADM

## 2020-07-24 RX ORDER — SODIUM CHLORIDE 0.9 % (FLUSH) 0.9 %
10 SYRINGE (ML) INJECTION
Status: DISCONTINUED | OUTPATIENT
Start: 2020-07-24 | End: 2020-07-25 | Stop reason: HOSPADM

## 2020-07-24 RX ORDER — MAG HYDROX/ALUMINUM HYD/SIMETH 200-200-20
30 SUSPENSION, ORAL (FINAL DOSE FORM) ORAL
COMMUNITY
End: 2021-01-12

## 2020-07-24 RX ORDER — FENTANYL CITRATE 50 UG/ML
INJECTION, SOLUTION INTRAMUSCULAR; INTRAVENOUS
Status: DISCONTINUED | OUTPATIENT
Start: 2020-07-24 | End: 2020-07-24 | Stop reason: HOSPADM

## 2020-07-24 RX ORDER — SERTRALINE HYDROCHLORIDE 50 MG/1
150 TABLET, FILM COATED ORAL DAILY
Status: DISCONTINUED | OUTPATIENT
Start: 2020-07-25 | End: 2020-07-25 | Stop reason: HOSPADM

## 2020-07-24 RX ORDER — LOSARTAN POTASSIUM 50 MG/1
100 TABLET ORAL DAILY
Status: DISCONTINUED | OUTPATIENT
Start: 2020-07-25 | End: 2020-07-25 | Stop reason: HOSPADM

## 2020-07-24 RX ADMIN — CARVEDILOL 25 MG: 12.5 TABLET, FILM COATED ORAL at 08:07

## 2020-07-24 RX ADMIN — PROMETHAZINE HYDROCHLORIDE 12.5 MG: 25 INJECTION INTRAMUSCULAR; INTRAVENOUS at 04:07

## 2020-07-24 RX ADMIN — PANTOPRAZOLE SODIUM 40 MG: 40 TABLET, DELAYED RELEASE ORAL at 03:07

## 2020-07-24 RX ADMIN — HEPARIN SODIUM 12 UNITS/KG/HR: 10000 INJECTION, SOLUTION INTRAVENOUS at 01:07

## 2020-07-24 RX ADMIN — ALUMINUM HYDROXIDE, MAGNESIUM HYDROXIDE, AND SIMETHICONE 30 ML: 200; 200; 20 SUSPENSION ORAL at 11:07

## 2020-07-24 RX ADMIN — GABAPENTIN 300 MG: 300 CAPSULE ORAL at 08:07

## 2020-07-24 RX ADMIN — SUCRALFATE 1 G: 1 SUSPENSION ORAL at 08:07

## 2020-07-24 RX ADMIN — SODIUM CHLORIDE: 0.9 INJECTION, SOLUTION INTRAVENOUS at 06:07

## 2020-07-24 NOTE — TELEPHONE ENCOUNTER
Since Wednesday night he's been having pain in the center of his chest. Chest discomfort 7/10. Constant since Wednesday. Pt stated he can't eat any food or drink. Care advice recommend pt call 911. Pt stated he may drive himself in. Informed pt that care advice recommends he call 911. Not sure if pt will follow advice.     Reason for Disposition   [1] Chest pain lasts > 5 minutes AND [2] age > 45    Additional Information   Negative: Severe difficulty breathing (e.g., struggling for each breath, speaks in single words)   Negative: Difficult to awaken or acting confused (e.g., disoriented, slurred speech)   Negative: Shock suspected (e.g., cold/pale/clammy skin, too weak to stand, low BP, rapid pulse)    Protocols used: CHEST PAIN-A-AH

## 2020-07-24 NOTE — Clinical Note
140 ml injected throughout the case. 10 mL total wasted during the case. 150 mL total used in the case.

## 2020-07-24 NOTE — ASSESSMENT & PLAN NOTE
ACUTE CORONARY SYNDROME  B BLOCKERS, ASA, PLAVIX, HEPARIN, RECOMMEND CATH  Risks and benefits explained

## 2020-07-24 NOTE — ED NOTES
Pt in NAD, VSS, Resp e/u. Stretcher locked in lowest position. Side rails up x2. Call bell within reach. Will continue to monitor.

## 2020-07-24 NOTE — PLAN OF CARE
POC reviewed with patient and spouce  Received from Cath lab post heart cath  Bedrest,  Fall risk- bed alarm , non skid sock  Call light in reach

## 2020-07-24 NOTE — CONSULTS
[facility] -   Cardiology  Consult Note    Patient Name: Abdullahi Urias  MRN: [id number]  Admission Date: 7/24/2020  Hospital Length of Stay: 0 days  Code Status: Full Code   Attending Provider: Raymon Snider MD   Consulting Provider: Pasha Martin MD  Primary Care Physician: Reji Valentin MD  Principal Problem:<principal problem not specified>    Patient information was obtained from patient and ER records.     Inpatient consult to Cardiology  Consult performed by: Pasha Martin MD  Consult ordered by: ANSHU Alfaro        Subjective:     Chief Complaint:  CP     HPI:   Pt states CP different from his GERD. NMT stress last week normal. 1st troponin (+).  63 yo male, care [facility] pt  PMH s/p bioprosthetic AVR at [facility] in 2014 Salazar [id number] 25 mm pericardial tissue valve, h/o stroke in 2012, HTN, HLD CHEO on CPAP remote h/o cocaine in 2012, quit drinking in 2012 and no smoking.  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 Lasted for minutes and no radiating pain  C/o SOB when tied the shoes  Sleeps with 3 pillows and CPAP at night  Off CPAP for 1 month due to congestion  BNP in 2016 37     ECHO in  normal EF, perivalvular AI and s/p AVR mean G 16 mmHG and peak V 2.85          Past Medical History:   Diagnosis Date    [facility]    Asthma     BPH (benign prostatic hyperplasia)     CAD (coronary artery disease)     Cardiomyopathy     CHF (congestive heart failure)     Chronic hoarseness     vocal cord surg    Chronic pain     CVA (cerebral infarction)     8/2012 olol; reviewed ed note    Ex-smoker     GERD (gastroesophageal reflux disease)     Hepatitis C     treatedharvoni says cured, RNA NEG 6/2020    Hypertension     Pancreatitis      Prostate cancer     Prostate cancer     Substance abuse     cocaine, etoh , tob in past       Past Surgical History:   Procedure Laterality Date    AORTIC VALVE REPLACEMENT  05/19/2014    Tissue valve replacement    BACK SURGERY      CARDIAC CATHETERIZATION      COLONOSCOPY  2011    FOOT SURGERY      PENILE PROSTHESIS IMPLANT      PENILE PROSTHESIS REVISION  04/30/2018    [facility]    UPPER GASTROINTESTINAL ENDOSCOPY  2011       Review of patient's allergies indicates:  No Known Allergies    No current facility-administered medications on file prior to encounter.      Current Outpatient Medications on File Prior to Encounter   Medication Sig    acetaminophen-codeine 300-30mg (TYLENOL #3) 300-30 mg Tab TK 1 T PO Q 4 TO 6 H PRF PAIN    albuterol (PROVENTIL/VENTOLIN HFA) 90 mcg/actuation inhaler Inhale 2 puffs into the lungs every 6 (six) hours as needed for Wheezing.    allopurinol (ZYLOPRIM) 300 MG tablet Take 1 tablet (300 mg total) by mouth once daily.    aluminum-magnesium hydroxide-simethicone (MAALOX) 200-200-20 mg/5 mL Susp Take 30 mLs by mouth 4 (four) times daily before meals and nightly.    aspirin (ECOTRIN) 81 MG EC tablet Take 81 mg by mouth once daily.    atorvastatin (LIPITOR) 20 MG tablet Take 20 mg by mouth once daily.    carvedilol (COREG) 25 MG tablet Take 12.5 mg by mouth 2 (two) times daily.    cetirizine (ZYRTEC) 10 MG tablet Take 10 mg by mouth once daily.    clindamycin (CLEOCIN) 300 MG capsule TK 1 C PO QID UNTIL GONE BEGIN 2 DAYS PRIOR TO SURGERY    diphenhydrAMINE (SOMINEX) 25 mg tablet Take 25 mg by mouth nightly as needed for Insomnia.    famotidine (PEPCID) 20 MG tablet Take 20 mg by mouth 2 (two) times daily.    fexofenadine (ALLEGRA) 180 MG tablet Take 1 tablet (180 mg total) by mouth daily as needed.    flunisolide 25 mcg, 0.025%, (NASALIDE) 25 mcg (0.025 %) Spry 2 sprays by Nasal route as needed.     gabapentin (NEURONTIN) 300 MG  capsule Take 300 mg by mouth 3 (three) times daily.    hydrocortisone 2.5 % cream Apply topically 2 (two) times daily as needed. Apply to affected areas of the face and neck.    ketoconazole (NIZORAL) 2 % cream Apply topically 2 (two) times daily. Continue use for 1 week after resolution of fungal rash.    losartan (COZAAR) 100 MG tablet Take 100 mg by mouth once daily.    methyl salicylate-menthol 15-10% 15-10 % Crea Apply topically as needed.     oxyCODONE-acetaminophen (PERCOCET) 5-325 mg per tablet Take 1 tablet by mouth every 4 (four) hours as needed for Pain.    pantoprazole (PROTONIX) 40 MG tablet Take 40 mg by mouth 2 (two) times daily.     sertraline (ZOLOFT) 100 MG tablet Take 150 mg by mouth once daily.     simethicone (MYLICON) 80 MG chewable tablet Take 80 mg by mouth every 6 (six) hours as needed for Flatulence.    sucralfate (CARAFATE) 100 mg/mL suspension Take 1 g by mouth 4 (four) times daily.    tamsulosin (FLOMAX) 0.4 mg Cp24 Take 0.4 mg by mouth once daily.    tramadol (ULTRAM) 50 mg tablet Take 50 mg by mouth every 6 (six) hours as needed for Pain.    triamcinolone acetonide 0.1% (KENALOG) 0.1 % cream Apply topically 2 (two) times daily as needed. For discolored areas of the arms and scalp.     Family History     Problem Relation (Age of Onset)    Heart attack Sister, Brother    Heart disease Mother        Tobacco Use    Smoking status: Former Smoker     Packs/day: 2.00     Years: 8.00     Pack years: 16.00     Quit date: 2012     Years since quittin.9    Smokeless tobacco: Never Used   Substance and Sexual Activity    Alcohol use: No     Comment: Sober since 2012    Drug use: No    Sexual activity: Yes     Review of Systems   Constitution: Negative. Negative for weight gain.   HENT: Negative.    Eyes: Negative.    Cardiovascular: Negative.  Negative for chest pain, leg swelling and palpitations.   Respiratory: Negative.  Negative for shortness of breath.     Endocrine: Negative.    Hematologic/Lymphatic: Negative.    Skin: Negative.    Musculoskeletal: Negative for muscle weakness.   Gastrointestinal: Negative.    Genitourinary: Negative.    Neurological: Negative.  Negative for dizziness.   Psychiatric/Behavioral: Negative.    Allergic/Immunologic: Negative.      Objective:     Vital Signs (Most Recent):  Temp: 98.6 °F (37 °C) (07/24/20 0918)  Pulse: 72 (07/24/20 1230)  Resp: 19 (07/24/20 1230)  BP: 131/78 (07/24/20 1230)  SpO2: 95 % (07/24/20 1230) Vital Signs (24h Range):  Temp:  [98.6 °F (37 °C)] 98.6 °F (37 °C)  Pulse:  [69-82] 72  Resp:  [18-20] 19  SpO2:  [95 %-98 %] 95 %  BP: (131-135)/(78-91) 131/78     Weight: 106 kg (233 lb 11.2 oz)  Body mass index is 33.53 kg/m².    SpO2: 95 %  O2 Device (Oxygen Therapy): room air    No intake or output data in the 24 hours ending 07/24/20 1542    Lines/Drains/Airways     Peripheral Intravenous Line     Name Duration         Peripheral IV - Single Lumen 07/24/20 0937 18 G Left Forearm less than 1 day         Peripheral IV - Single Lumen 07/24/20 1253 20 G Right Antecubital less than 1 day                Physical Exam   Constitutional: He is oriented to person, place, and time. He appears well-developed and well-nourished.   HENT:   Head: Normocephalic and atraumatic.   Eyes: Pupils are equal, round, and reactive to light. Conjunctivae are normal.   Neck: Normal range of motion. Neck supple.   Cardiovascular: Normal rate, regular rhythm, normal heart sounds and intact distal pulses.   Pulmonary/Chest: Effort normal and breath sounds normal.   Abdominal: Soft. Bowel sounds are normal.   Neurological: He is alert and oriented to person, place, and time.   Skin: Skin is warm and dry.   Psychiatric: He has a normal mood and affect.   Nursing note and vitals reviewed.      Significant Labs: All pertinent lab results from the last 24 hours have been reviewed.    Significant Imaging: X-Ray: CXR: X-Ray Chest 1 View (CXR): No  results found for this visit on 07/24/20.    Assessment and Plan:     Chest pain  ACUTE CORONARY SYNDROME  B BLOCKERS, ASA, PLAVIX, HEPARIN, RECOMMEND CATH  Risks and benefits explained        VTE Risk Mitigation (From admission, onward)         Ordered     heparin 25,000 units in dextrose 5% 250 mL (100 units/mL) infusion LOW INTENSITY nomogram - [facility]  Continuous     Question:  Heparin Infusion Adjustment (DO NOT MODIFY ANSWER)  Answer:  \\[email or web address]\epic\Images\Pharmacy\HeparinInfusions\heparin LOW INTENSITY nomogram for [facility] [id number].pdf    07/24/20 1244     heparin 25,000 units in dextrose 5% (100 units/ml) IV bolus from bag - ADDITIONAL PRN BOLUS - 60 units/kg (max bolus 4000 units)  As needed (PRN)     Question:  Heparin Infusion Adjustment (DO NOT MODIFY ANSWER)  Answer:  \\[email or web address]\epic\Images\Pharmacy\HeparinInfusions\heparin LOW INTENSITY nomogram for [facility] [id number].pdf    07/24/20 1244     heparin 25,000 units in dextrose 5% (100 units/ml) IV bolus from bag - ADDITIONAL PRN BOLUS - 30 units/kg (max bolus 4000 units)  As needed (PRN)     Question:  Heparin Infusion Adjustment (DO NOT MODIFY ANSWER)  Answer:  \\[email or web address]\epic\Images\Pharmacy\HeparinInfusions\heparin LOW INTENSITY nomogram for [facility] [id number].pdf    07/24/20 1244                Thank you for your consult. I will follow-up with patient. Please contact us if you have any additional questions.    Pasha Martin MD  Cardiology   [facility]

## 2020-07-24 NOTE — SUBJECTIVE & OBJECTIVE
Past Medical History:   Diagnosis Date    [facility]    Asthma     BPH (benign prostatic hyperplasia)     CAD (coronary artery disease)     Cardiomyopathy     CHF (congestive heart failure)     Chronic hoarseness     vocal cord surg    Chronic pain     CVA (cerebral infarction)     8/2012 olol; reviewed ed note    Ex-smoker     GERD (gastroesophageal reflux disease)     Hepatitis C     treatedharvoni says cured, RNA NEG 6/2020    Hypertension     Pancreatitis     Prostate cancer     Prostate cancer     Substance abuse     cocaine, etoh , tob in past       Past Surgical History:   Procedure Laterality Date    AORTIC VALVE REPLACEMENT  05/19/2014    Tissue valve replacement    BACK SURGERY      CARDIAC CATHETERIZATION      COLONOSCOPY  2011    FOOT SURGERY      PENILE PROSTHESIS IMPLANT      PENILE PROSTHESIS REVISION  04/30/2018    [facility]    UPPER GASTROINTESTINAL ENDOSCOPY  2011       Review of patient's allergies indicates:  No Known Allergies    No current facility-administered medications on file prior to encounter.      Current Outpatient Medications on File Prior to Encounter   Medication Sig    acetaminophen-codeine 300-30mg (TYLENOL #3) 300-30 mg Tab TK 1 T PO Q 4 TO 6 H PRF PAIN    albuterol (PROVENTIL/VENTOLIN HFA) 90 mcg/actuation inhaler Inhale 2 puffs into the lungs every 6 (six) hours as needed for Wheezing.    allopurinol (ZYLOPRIM) 300 MG tablet Take 1 tablet (300 mg total) by mouth once daily.    aluminum-magnesium hydroxide-simethicone (MAALOX) 200-200-20 mg/5 mL Susp Take 30 mLs by mouth 4 (four) times daily before meals and nightly.    aspirin (ECOTRIN) 81 MG EC tablet Take 81 mg by mouth once daily.    atorvastatin (LIPITOR) 20 MG tablet Take 20 mg by mouth once daily.    carvedilol (COREG) 25 MG tablet Take 12.5 mg by mouth 2 (two) times daily.    cetirizine (ZYRTEC) 10 MG tablet Take 10 mg by mouth  once daily.    clindamycin (CLEOCIN) 300 MG capsule TK 1 C PO QID UNTIL GONE BEGIN 2 DAYS PRIOR TO SURGERY    diphenhydrAMINE (SOMINEX) 25 mg tablet Take 25 mg by mouth nightly as needed for Insomnia.    famotidine (PEPCID) 20 MG tablet Take 20 mg by mouth 2 (two) times daily.    fexofenadine (ALLEGRA) 180 MG tablet Take 1 tablet (180 mg total) by mouth daily as needed.    flunisolide 25 mcg, 0.025%, (NASALIDE) 25 mcg (0.025 %) Spry 2 sprays by Nasal route as needed.     gabapentin (NEURONTIN) 300 MG capsule Take 300 mg by mouth 3 (three) times daily.    hydrocortisone 2.5 % cream Apply topically 2 (two) times daily as needed. Apply to affected areas of the face and neck.    ketoconazole (NIZORAL) 2 % cream Apply topically 2 (two) times daily. Continue use for 1 week after resolution of fungal rash.    losartan (COZAAR) 100 MG tablet Take 100 mg by mouth once daily.    methyl salicylate-menthol 15-10% 15-10 % Crea Apply topically as needed.     oxyCODONE-acetaminophen (PERCOCET) 5-325 mg per tablet Take 1 tablet by mouth every 4 (four) hours as needed for Pain.    pantoprazole (PROTONIX) 40 MG tablet Take 40 mg by mouth 2 (two) times daily.     sertraline (ZOLOFT) 100 MG tablet Take 150 mg by mouth once daily.     simethicone (MYLICON) 80 MG chewable tablet Take 80 mg by mouth every 6 (six) hours as needed for Flatulence.    sucralfate (CARAFATE) 100 mg/mL suspension Take 1 g by mouth 4 (four) times daily.    tamsulosin (FLOMAX) 0.4 mg Cp24 Take 0.4 mg by mouth once daily.    tramadol (ULTRAM) 50 mg tablet Take 50 mg by mouth every 6 (six) hours as needed for Pain.    triamcinolone acetonide 0.1% (KENALOG) 0.1 % cream Apply topically 2 (two) times daily as needed. For discolored areas of the arms and scalp.     Family History     Problem Relation (Age of Onset)    Heart attack Sister, Brother    Heart disease Mother        Tobacco Use    Smoking status: Former Smoker     Packs/day: 2.00     Years:  8.00     Pack years: 16.00     Quit date: 2012     Years since quittin.9    Smokeless tobacco: Never Used   Substance and Sexual Activity    Alcohol use: No     Comment: Sober since 2012    Drug use: No    Sexual activity: Yes     Review of Systems   Constitutional: Positive for activity change.   HENT: Negative for trouble swallowing.    Eyes: Negative for visual disturbance.   Respiratory: Negative for apnea, cough, choking, chest tightness, shortness of breath and stridor.    Cardiovascular: Positive for chest pain. Negative for palpitations and leg swelling.   Gastrointestinal: Negative for abdominal pain, constipation, diarrhea, nausea and vomiting.   Genitourinary: Negative for decreased urine volume, difficulty urinating, dysuria, frequency and urgency.   Musculoskeletal: Negative for arthralgias, back pain, gait problem, joint swelling, myalgias, neck pain and neck stiffness.   Skin: Negative for color change.   Neurological: Negative for dizziness, tremors, seizures, syncope, facial asymmetry, speech difficulty, weakness, light-headedness, numbness and headaches.   Hematological: Negative.    Psychiatric/Behavioral: Negative for agitation, behavioral problems and confusion.     Objective:     Vital Signs (Most Recent):  Temp: 97.2 °F (36.2 °C) (20 1645)  Pulse: 76 (20 1715)  Resp: 12 (20 171)  BP: 125/76 (20 1715)  SpO2: 96 % (20 171) Vital Signs (24h Range):  Temp:  [97.2 °F (36.2 °C)-98.6 °F (37 °C)] 97.2 °F (36.2 °C)  Pulse:  [69-82] 76  Resp:  [11-20] 12  SpO2:  [95 %-98 %] 96 %  BP: (125-152)/(76-91) 125/76     Weight: 106 kg (233 lb 11.2 oz)  Body mass index is 33.53 kg/m².    Physical Exam  Vitals signs and nursing note reviewed.   Constitutional:       General: He is not in acute distress.     Appearance: Normal appearance. He is not ill-appearing or diaphoretic.   HENT:      Head: Normocephalic and atraumatic.      Mouth/Throat:      Mouth: Mucous  membranes are moist.   Eyes:      Extraocular Movements: Extraocular movements intact.      Conjunctiva/sclera: Conjunctivae normal.   Neck:      Musculoskeletal: Normal range of motion and neck supple. No neck rigidity or muscular tenderness.   Cardiovascular:      Rate and Rhythm: Normal rate and regular rhythm.      Heart sounds: Normal heart sounds.   Pulmonary:      Effort: Pulmonary effort is normal. No respiratory distress.      Breath sounds: Normal breath sounds. No stridor. No wheezing, rhonchi or rales.   Chest:      Chest wall: No tenderness.   Abdominal:      General: Bowel sounds are normal. There is no distension.      Palpations: Abdomen is soft.      Tenderness: There is no abdominal tenderness. There is no guarding or rebound.   Genitourinary:     Comments: Deferred  Musculoskeletal:        Legs:    Skin:     General: Skin is warm and dry.      Capillary Refill: Capillary refill takes less than 2 seconds.   Neurological:      General: No focal deficit present.      Mental Status: He is alert and oriented to person, place, and time. Mental status is at baseline.   Psychiatric:         Mood and Affect: Mood normal.         Behavior: Behavior normal.         Thought Content: Thought content normal.             Significant Labs:   CBC:   Recent Labs   Lab 07/24/20  0937   WBC 7.11   HGB 13.6*   HCT 40.7        CMP:   Recent Labs   Lab 07/24/20  1139      K 4.7      CO2 22*   GLU 93   BUN 14   CREATININE 1.7*   CALCIUM 9.7   PROT 8.0   ALBUMIN 4.1   BILITOT 0.5   ALKPHOS 98   AST 22   ALT 14   ANIONGAP 11   EGFRNONAA 42*     Coagulation:   Recent Labs   Lab 07/24/20  1253   INR 1.0   APTT 26.4     Troponin:   Recent Labs   Lab 07/24/20  1139   TROPONINI 0.201*       Significant Imaging: I have reviewed all pertinent imaging results/findings within the past 24 hours.

## 2020-07-24 NOTE — HPI
Abdullahi Urias is a 64 year old male with a PMHx of HTN, GERD, CHF, CAD, Asthma, Hepatitis C, and BPH who presented to the Emergency Department with c/o chest pain. Located mid sternum x 1 week. Pt reports that he went to his cardiologist last week and had tests done -- NM stress test on 07/09/20 -- normal myocardial perfusion, free of evidence from myocardial ischemia or injury. ECHO on 07/01 showed EF 60% with PHTN. ED discussed case with Cardiology who recommending heparin gtt. Cardiology performed LHC today, luminal irregularities, otherwise normal coronaries. Will place pt in observation to monitor overnight

## 2020-07-24 NOTE — HPI
Pt states CP different from his GERD. NMT stress last week normal. 1st troponin (+).  65 [facility] male[facility]  PMH s/p bioprosthetic AVR at [facility] in 2014 Salazar [id number] 25 mm pericardial tissue valve, h/o stroke in 2012, HTN, HLD CHEO on CPAP remote h/o cocaine in 2012, quit drinking in 2012 and no smoking.  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 Lasted for minutes and no radiating pain  C/o SOB when tied the shoes  Sleeps with 3 pillows and CPAP at night  Off CPAP for 1 month due to congestion  BNP in 2016 37     ECHO in  normal EF, perivalvular AI and s/p AVR mean G 16 mmHG and peak V 2.85

## 2020-07-24 NOTE — NURSING TRANSFER
Nursing Transfer Note      7/24/2020     Transfer TRANSFER TO . 243/FROM:[facility]    Transfer via stretcher    Transfer with belongings    Transported by [facility]    Medicines sent:normal saline/home meds brought in by patient    Chart send with patient: YES    Notified: wife at bedside    Patient reassessed at: 7/24/20 1730    Upon arrival to floor: right groin dressing clean, dry and intact received by Cali CLIFTON

## 2020-07-24 NOTE — BRIEF OP NOTE
<[facility]  Surgery Department  Operative Note    SUMMARY     Date of Procedure: 7/24/2020     Procedure: Procedure(s) (LRB):  CATHETERIZATION, HEART, LEFT (Left)     Surgeon(s) and Role:     * Pasha Martin MD - Primary    Assisting Surgeon: None    Pre-Operative Diagnosis:     Post-Operative Diagnosis: Post-Op Diagnosis Codes:     * NSTEMI (non-ST elevated myocardial infarction) [I21.4]    Anesthesia: RN IV Sedation    Technical Procedures Used: [facility]    Description of the Findings of the Procedure: [facility], DX: ABNORMAL TROPONIN, FINDINGS: LUMINAL IRREGULARITIES, OTHERWISE NORMAL CORONARIES    RECOMMEND CONTINUE MEDICAL TREATMENT, FOLLOW UP DR. PIPER 2-4 WEEEKS    Significant Surgical Tasks Conducted by the Assistant(s), if Applicable: NONE    Complications: No    Estimated Blood Loss (EBL): < 50 cc           Implants: * No implants in log *    Specimens:   Specimen (12h ago, onward)    None                  Condition: Good    Disposition: PACU - hemodynamically stable.    Attestation: I was present and scrubbed for the entire procedure.

## 2020-07-24 NOTE — H&P
[facility] Hospital Medicine  History & Physical    Patient Name: Abdullahi Urias  MRN: [id number]  Admission Date: 7/24/2020  Attending Physician: Demetris Torres MD   Primary Care Provider: Reji Valentin MD         Patient information was obtained from patient and ER records.     Subjective:     Principal Problem:Chest pain    Chief Complaint:   Chief Complaint   Patient presents with    Chest Pain     Midsternal chest pain since Wed. Hx GERD but CP unrelieved by reflux meds. Saw cardiologist last week.        HPI: Abdullahi Urias is a 64 year old male with a PMHx of HTN, GERD, CHF, CAD, Asthma, Hepatitis C, and BPH who presented to the Emergency Department with c/o chest pain. Located mid sternum x 1 week. Pt reports that he went to his cardiologist last week and had tests done -- NM stress test on 07/09/20 -- normal myocardial perfusion, free of evidence from myocardial ischemia or injury. ECHO on 07/01 showed EF 60% with PHTN. ED discussed case with Cardiology who recommending heparin gtt. Cardiology performed LHC today, luminal irregularities, otherwise normal coronaries. Will place pt in observation to monitor overnight    Past Medical History:   Diagnosis Date    [facility]    Asthma     BPH (benign prostatic hyperplasia)     CAD (coronary artery disease)     Cardiomyopathy     CHF (congestive heart failure)     Chronic hoarseness     vocal cord surg    Chronic pain     CVA (cerebral infarction)     8/2012 olol; reviewed ed note    Ex-smoker     GERD (gastroesophageal reflux disease)     Hepatitis C     treatedharvoni says cured, RNA NEG 6/2020    Hypertension     Pancreatitis     Prostate cancer     Prostate cancer     Substance abuse     cocaine, etoh , tob in past       Past Surgical History:   Procedure Laterality Date    AORTIC VALVE REPLACEMENT  05/19/2014    Tissue valve replacement    BACK SURGERY      CARDIAC CATHETERIZATION       COLONOSCOPY  2011    FOOT SURGERY      PENILE PROSTHESIS IMPLANT      PENILE PROSTHESIS REVISION  04/30/2018    [facility]    UPPER GASTROINTESTINAL ENDOSCOPY  2011       Review of patient's allergies indicates:  No Known Allergies    No current facility-administered medications on file prior to encounter.      Current Outpatient Medications on File Prior to Encounter   Medication Sig    acetaminophen-codeine 300-30mg (TYLENOL #3) 300-30 mg Tab TK 1 T PO Q 4 TO 6 H PRF PAIN    albuterol (PROVENTIL/VENTOLIN HFA) 90 mcg/actuation inhaler Inhale 2 puffs into the lungs every 6 (six) hours as needed for Wheezing.    allopurinol (ZYLOPRIM) 300 MG tablet Take 1 tablet (300 mg total) by mouth once daily.    aluminum-magnesium hydroxide-simethicone (MAALOX) 200-200-20 mg/5 mL Susp Take 30 mLs by mouth 4 (four) times daily before meals and nightly.    aspirin (ECOTRIN) 81 MG EC tablet Take 81 mg by mouth once daily.    atorvastatin (LIPITOR) 20 MG tablet Take 20 mg by mouth once daily.    carvedilol (COREG) 25 MG tablet Take 12.5 mg by mouth 2 (two) times daily.    cetirizine (ZYRTEC) 10 MG tablet Take 10 mg by mouth once daily.    clindamycin (CLEOCIN) 300 MG capsule TK 1 C PO QID UNTIL GONE BEGIN 2 DAYS PRIOR TO SURGERY    diphenhydrAMINE (SOMINEX) 25 mg tablet Take 25 mg by mouth nightly as needed for Insomnia.    famotidine (PEPCID) 20 MG tablet Take 20 mg by mouth 2 (two) times daily.    fexofenadine (ALLEGRA) 180 MG tablet Take 1 tablet (180 mg total) by mouth daily as needed.    flunisolide 25 mcg, 0.025%, (NASALIDE) 25 mcg (0.025 %) Spry 2 sprays by Nasal route as needed.     gabapentin (NEURONTIN) 300 MG capsule Take 300 mg by mouth 3 (three) times daily.    hydrocortisone 2.5 % cream Apply topically 2 (two) times daily as needed. Apply to affected areas of the face and neck.    ketoconazole (NIZORAL) 2 % cream Apply topically 2 (two) times daily. Continue use for 1 week  after resolution of fungal rash.    losartan (COZAAR) 100 MG tablet Take 100 mg by mouth once daily.    methyl salicylate-menthol 15-10% 15-10 % Crea Apply topically as needed.     oxyCODONE-acetaminophen (PERCOCET) 5-325 mg per tablet Take 1 tablet by mouth every 4 (four) hours as needed for Pain.    pantoprazole (PROTONIX) 40 MG tablet Take 40 mg by mouth 2 (two) times daily.     sertraline (ZOLOFT) 100 MG tablet Take 150 mg by mouth once daily.     simethicone (MYLICON) 80 MG chewable tablet Take 80 mg by mouth every 6 (six) hours as needed for Flatulence.    sucralfate (CARAFATE) 100 mg/mL suspension Take 1 g by mouth 4 (four) times daily.    tamsulosin (FLOMAX) 0.4 mg Cp24 Take 0.4 mg by mouth once daily.    tramadol (ULTRAM) 50 mg tablet Take 50 mg by mouth every 6 (six) hours as needed for Pain.    triamcinolone acetonide 0.1% (KENALOG) 0.1 % cream Apply topically 2 (two) times daily as needed. For discolored areas of the arms and scalp.     Family History     Problem Relation (Age of Onset)    Heart attack Sister, Brother    Heart disease Mother        Tobacco Use    Smoking status: Former Smoker     Packs/day: 2.00     Years: 8.00     Pack years: 16.00     Quit date: 2012     Years since quittin.9    Smokeless tobacco: Never Used   Substance and Sexual Activity    Alcohol use: No     Comment: Sober since 2012    Drug use: No    Sexual activity: Yes     Review of Systems   Constitutional: Positive for activity change.   HENT: Negative for trouble swallowing.    Eyes: Negative for visual disturbance.   Respiratory: Negative for apnea, cough, choking, chest tightness, shortness of breath and stridor.    Cardiovascular: Positive for chest pain. Negative for palpitations and leg swelling.   Gastrointestinal: Negative for abdominal pain, constipation, diarrhea, nausea and vomiting.   Genitourinary: Negative for decreased urine volume, difficulty urinating, dysuria, frequency and  urgency.   Musculoskeletal: Negative for arthralgias, back pain, gait problem, joint swelling, myalgias, neck pain and neck stiffness.   Skin: Negative for color change.   Neurological: Negative for dizziness, tremors, seizures, syncope, facial asymmetry, speech difficulty, weakness, light-headedness, numbness and headaches.   Hematological: Negative.    Psychiatric/Behavioral: Negative for agitation, behavioral problems and confusion.     Objective:     Vital Signs (Most Recent):  Temp: 97.2 °F (36.2 °C) (07/24/20 1645)  Pulse: 76 (07/24/20 1715)  Resp: 12 (07/24/20 1715)  BP: 125/76 (07/24/20 1715)  SpO2: 96 % (07/24/20 1715) Vital Signs (24h Range):  Temp:  [97.2 °F (36.2 °C)-98.6 °F (37 °C)] 97.2 °F (36.2 °C)  Pulse:  [69-82] 76  Resp:  [11-20] 12  SpO2:  [95 %-98 %] 96 %  BP: (125-152)/(76-91) 125/76     Weight: 106 kg (233 lb 11.2 oz)  Body mass index is 33.53 kg/m².    Physical Exam  Vitals signs and nursing note reviewed.   Constitutional:       General: He is not in acute distress.     Appearance: Normal appearance. He is not ill-appearing or diaphoretic.   HENT:      Head: Normocephalic and atraumatic.      Mouth/Throat:      Mouth: Mucous membranes are moist.   Eyes:      Extraocular Movements: Extraocular movements intact.      Conjunctiva/sclera: Conjunctivae normal.   Neck:      Musculoskeletal: Normal range of motion and neck supple. No neck rigidity or muscular tenderness.   Cardiovascular:      Rate and Rhythm: Normal rate and regular rhythm.      Heart sounds: Normal heart sounds.   Pulmonary:      Effort: Pulmonary effort is normal. No respiratory distress.      Breath sounds: Normal breath sounds. No stridor. No wheezing, rhonchi or rales.   Chest:      Chest wall: No tenderness.   Abdominal:      General: Bowel sounds are normal. There is no distension.      Palpations: Abdomen is soft.      Tenderness: There is no abdominal tenderness. There is no guarding or rebound.   Genitourinary:      Comments: Deferred  Musculoskeletal:        Legs:    Skin:     General: Skin is warm and dry.      Capillary Refill: Capillary refill takes less than 2 seconds.   Neurological:      General: No focal deficit present.      Mental Status: He is alert and oriented to person, place, and time. Mental status is at baseline.   Psychiatric:         Mood and Affect: Mood normal.         Behavior: Behavior normal.         Thought Content: Thought content normal.             Significant Labs:   CBC:   Recent Labs   Lab 07/24/20  0937   WBC 7.11   HGB 13.6*   HCT 40.7        CMP:   Recent Labs   Lab 07/24/20  1139      K 4.7      CO2 22*   GLU 93   BUN 14   CREATININE 1.7*   CALCIUM 9.7   PROT 8.0   ALBUMIN 4.1   BILITOT 0.5   ALKPHOS 98   AST 22   ALT 14   ANIONGAP 11   EGFRNONAA 42*     Coagulation:   Recent Labs   Lab 07/24/20  1253   INR 1.0   APTT 26.4     Troponin:   Recent Labs   Lab 07/24/20  1139   TROPONINI 0.201*       Significant Imaging: I have reviewed all pertinent imaging results/findings within the past 24 hours.    Assessment/Plan:     * Chest pain  - Observation  - Initial troponin 0.201  - Cardiology consulted -- performed LHC today -- luminal irregularities otherwise normal coronaries  - Continue ASA, Statin, ARB, BB  - Morphine PRN  - Supplemental oxygen PRN, keep O2 sats > 92%  - Obtain TSH and lipid panel on 06/30 unremarkable  - Symptoms may be due to GERD. May need to f/u with GI      BPH (benign prostatic hyperplasia)  - Continue home medication including flomax      CAD (coronary artery disease)  - Continue home medications including asa, statin, bb      Hypertension  - Continue home medications including carvedilol, losartan      VTE Risk Mitigation (From admission, onward)         Ordered     Place sequential compression device  Until discontinued      07/24/20 9676                   ANSHU Chris  Department of Hospital Medicine   [facility]

## 2020-07-24 NOTE — SUBJECTIVE & OBJECTIVE
Past Medical History:   Diagnosis Date    [facility]    Asthma     BPH (benign prostatic hyperplasia)     CAD (coronary artery disease)     Cardiomyopathy     CHF (congestive heart failure)     Chronic hoarseness     vocal cord surg    Chronic pain     CVA (cerebral infarction)     8/2012 olol; reviewed ed note    Ex-smoker     GERD (gastroesophageal reflux disease)     Hepatitis C     treatedharvoni says cured, RNA NEG 6/2020    Hypertension     Pancreatitis     Prostate cancer     Prostate cancer     Substance abuse     cocaine, etoh , tob in past       Past Surgical History:   Procedure Laterality Date    AORTIC VALVE REPLACEMENT  05/19/2014    Tissue valve replacement    BACK SURGERY      CARDIAC CATHETERIZATION      COLONOSCOPY  2011    FOOT SURGERY      PENILE PROSTHESIS IMPLANT      PENILE PROSTHESIS REVISION  04/30/2018    [facility]    UPPER GASTROINTESTINAL ENDOSCOPY  2011       Review of patient's allergies indicates:  No Known Allergies    No current facility-administered medications on file prior to encounter.      Current Outpatient Medications on File Prior to Encounter   Medication Sig    acetaminophen-codeine 300-30mg (TYLENOL #3) 300-30 mg Tab TK 1 T PO Q 4 TO 6 H PRF PAIN    albuterol (PROVENTIL/VENTOLIN HFA) 90 mcg/actuation inhaler Inhale 2 puffs into the lungs every 6 (six) hours as needed for Wheezing.    allopurinol (ZYLOPRIM) 300 MG tablet Take 1 tablet (300 mg total) by mouth once daily.    aluminum-magnesium hydroxide-simethicone (MAALOX) 200-200-20 mg/5 mL Susp Take 30 mLs by mouth 4 (four) times daily before meals and nightly.    aspirin (ECOTRIN) 81 MG EC tablet Take 81 mg by mouth once daily.    atorvastatin (LIPITOR) 20 MG tablet Take 20 mg by mouth once daily.    carvedilol (COREG) 25 MG tablet Take 12.5 mg by mouth 2 (two) times daily.    cetirizine (ZYRTEC) 10 MG tablet Take 10 mg by mouth  once daily.    clindamycin (CLEOCIN) 300 MG capsule TK 1 C PO QID UNTIL GONE BEGIN 2 DAYS PRIOR TO SURGERY    diphenhydrAMINE (SOMINEX) 25 mg tablet Take 25 mg by mouth nightly as needed for Insomnia.    famotidine (PEPCID) 20 MG tablet Take 20 mg by mouth 2 (two) times daily.    fexofenadine (ALLEGRA) 180 MG tablet Take 1 tablet (180 mg total) by mouth daily as needed.    flunisolide 25 mcg, 0.025%, (NASALIDE) 25 mcg (0.025 %) Spry 2 sprays by Nasal route as needed.     gabapentin (NEURONTIN) 300 MG capsule Take 300 mg by mouth 3 (three) times daily.    hydrocortisone 2.5 % cream Apply topically 2 (two) times daily as needed. Apply to affected areas of the face and neck.    ketoconazole (NIZORAL) 2 % cream Apply topically 2 (two) times daily. Continue use for 1 week after resolution of fungal rash.    losartan (COZAAR) 100 MG tablet Take 100 mg by mouth once daily.    methyl salicylate-menthol 15-10% 15-10 % Crea Apply topically as needed.     oxyCODONE-acetaminophen (PERCOCET) 5-325 mg per tablet Take 1 tablet by mouth every 4 (four) hours as needed for Pain.    pantoprazole (PROTONIX) 40 MG tablet Take 40 mg by mouth 2 (two) times daily.     sertraline (ZOLOFT) 100 MG tablet Take 150 mg by mouth once daily.     simethicone (MYLICON) 80 MG chewable tablet Take 80 mg by mouth every 6 (six) hours as needed for Flatulence.    sucralfate (CARAFATE) 100 mg/mL suspension Take 1 g by mouth 4 (four) times daily.    tamsulosin (FLOMAX) 0.4 mg Cp24 Take 0.4 mg by mouth once daily.    tramadol (ULTRAM) 50 mg tablet Take 50 mg by mouth every 6 (six) hours as needed for Pain.    triamcinolone acetonide 0.1% (KENALOG) 0.1 % cream Apply topically 2 (two) times daily as needed. For discolored areas of the arms and scalp.     Family History     Problem Relation (Age of Onset)    Heart attack Sister, Brother    Heart disease Mother        Tobacco Use    Smoking status: Former Smoker     Packs/day: 2.00     Years:  8.00     Pack years: 16.00     Quit date: 2012     Years since quittin.9    Smokeless tobacco: Never Used   Substance and Sexual Activity    Alcohol use: No     Comment: Sober since 2012    Drug use: No    Sexual activity: Yes     Review of Systems   Constitution: Negative. Negative for weight gain.   HENT: Negative.    Eyes: Negative.    Cardiovascular: Negative.  Negative for chest pain, leg swelling and palpitations.   Respiratory: Negative.  Negative for shortness of breath.    Endocrine: Negative.    Hematologic/Lymphatic: Negative.    Skin: Negative.    Musculoskeletal: Negative for muscle weakness.   Gastrointestinal: Negative.    Genitourinary: Negative.    Neurological: Negative.  Negative for dizziness.   Psychiatric/Behavioral: Negative.    Allergic/Immunologic: Negative.      Objective:     Vital Signs (Most Recent):  Temp: 98.6 °F (37 °C) (20 0918)  Pulse: 72 (20 1230)  Resp: 19 (20 1230)  BP: 131/78 (20 1230)  SpO2: 95 % (20 1230) Vital Signs (24h Range):  Temp:  [98.6 °F (37 °C)] 98.6 °F (37 °C)  Pulse:  [69-82] 72  Resp:  [18-20] 19  SpO2:  [95 %-98 %] 95 %  BP: (131-135)/(78-91) 131/78     Weight: 106 kg (233 lb 11.2 oz)  Body mass index is 33.53 kg/m².    SpO2: 95 %  O2 Device (Oxygen Therapy): room air    No intake or output data in the 24 hours ending 20 1542    Lines/Drains/Airways     Peripheral Intravenous Line     Name Duration         Peripheral IV - Single Lumen 20 0937 18 G Left Forearm less than 1 day         Peripheral IV - Single Lumen 20 1253 20 G Right Antecubital less than 1 day                Physical Exam   Constitutional: He is oriented to person, place, and time. He appears well-developed and well-nourished.   HENT:   Head: Normocephalic and atraumatic.   Eyes: Pupils are equal, round, and reactive to light. Conjunctivae are normal.   Neck: Normal range of motion. Neck supple.   Cardiovascular: Normal rate,  regular rhythm, normal heart sounds and intact distal pulses.   Pulmonary/Chest: Effort normal and breath sounds normal.   Abdominal: Soft. Bowel sounds are normal.   Neurological: He is alert and oriented to person, place, and time.   Skin: Skin is warm and dry.   Psychiatric: He has a normal mood and affect.   Nursing note and vitals reviewed.      Significant Labs: All pertinent lab results from the last 24 hours have been reviewed.    Significant Imaging: X-Ray: CXR: X-Ray Chest 1 View (CXR): No results found for this visit on 07/24/20.

## 2020-07-24 NOTE — ASSESSMENT & PLAN NOTE
- Observation  - Initial troponin 0.201  - Cardiology consulted -- performed LHC today -- luminal irregularities otherwise normal coronaries  - Continue ASA, Statin, ARB, BB  - Morphine PRN  - Supplemental oxygen PRN, keep O2 sats > 92%  - Obtain TSH and lipid panel on 06/30 unremarkable  - Symptoms may be due to GERD. May need to f/u with GI

## 2020-07-24 NOTE — ED PROVIDER NOTES
SCRIBE #1 NOTE: I, Kimberley King, am scribing for, and in the presence of, Raymon Snider MD. I have scribed the entire note.       History     Chief Complaint   Patient presents with    Chest Pain     Midsternal chest pain since Wed. Hx GERD but CP unrelieved by reflux meds. Saw cardiologist last week.     Review of patient's allergies indicates:  No Known Allergies      History of Present Illness     HPI    7/24/2020, 9:25 AM  History obtained from the patient      History of Present Illness: Abdullahi Urias is a 64 y.o. male patient with a h/o aortic stenosis, BPH, CAD, cardiomyopathy, CHF, CVA, hepatitis C, GERD, pancreatitis, prostate cancer, who presents to the Emergency Department for evaluation of mid-sternal CP which onset last week. Pt reports that he went to his cardiologist last week and had tests done, Symptoms are intermittent and moderate in severity. No mitigating or exacerbating factors reported. Associated sxs include intermittent mild facial numbness and bilateral arm numbness. Patient denies any fever, cough, body aches, SOB, and all other sxs at this time. Prior Tx includes reflux medications with no improvement in sxs. No further complaints or concerns at this time.       Arrival mode: Personal transportation     PCP: Reji Valentin MD      Past Medical History:  Past Medical History:   Diagnosis Date    [facility]    Asthma     BPH (benign prostatic hyperplasia)     CAD (coronary artery disease)     Cardiomyopathy     CHF (congestive heart failure)     Chronic hoarseness     vocal cord surg    Chronic pain     CVA (cerebral infarction)     8/2012 olol; reviewed ed note    Ex-smoker     GERD (gastroesophageal reflux disease)     Hepatitis C     treatedharvoni says cured, RNA NEG 6/2020    Hypertension     Pancreatitis     Prostate cancer     Prostate cancer     Substance abuse     cocaine, etoh , tob in past       Past Surgical  History:  Past Surgical History:   Procedure Laterality Date    AORTIC VALVE REPLACEMENT  2014    Tissue valve replacement    BACK SURGERY      CARDIAC CATHETERIZATION      COLONOSCOPY      FOOT SURGERY      PENILE PROSTHESIS IMPLANT      PENILE PROSTHESIS REVISION  2018    [facility]    UPPER GASTROINTESTINAL ENDOSCOPY           Family History:  Family History   Problem Relation Age of Onset    Heart disease Mother     Heart attack Sister     Heart attack Brother     Colon cancer Neg Hx     Colon polyps Neg Hx     Liver cancer Neg Hx     Inflammatory bowel disease Neg Hx     Liver disease Neg Hx     Rectal cancer Neg Hx     Stomach cancer Neg Hx     Ulcerative colitis Neg Hx        Social History:   Social History     Tobacco Use    Smoking status: Former Smoker     Packs/day: 2.00     Years: 8.00     Pack years: 16.00     Quit date: 2012     Years since quittin.9    Smokeless tobacco: Never Used   Substance and Sexual Activity    Alcohol use: No     Comment: Sober since 2012    Drug use: No    Sexual activity: Yes        Review of Systems     Review of Systems   Constitutional: Negative for fever.   HENT: Negative for sore throat.    Respiratory: Negative for shortness of breath.    Cardiovascular: Positive for chest pain (mid sternal).   Gastrointestinal: Negative for diarrhea, nausea and vomiting.   Genitourinary: Negative for dysuria.   Musculoskeletal: Negative for back pain.        (-) body aches   Skin: Negative for rash.   Neurological: Positive for numbness (facial and bilateral arms). Negative for headaches.   Hematological: Does not bruise/bleed easily.   All other systems reviewed and are negative.       Physical Exam     Initial Vitals [20 0918]   BP Pulse Resp Temp SpO2   131/86 82 18 98.6 °F (37 °C) 95 %      MAP       --          Physical Exam  Nursing Notes and Vital Signs Reviewed.  Constitutional:  Well-developed and well-nourished.   Head: Atraumatic. Normocephalic.  Eyes: EOM intact. No scleral icterus.  ENT: Mucous membranes are moist. Oropharynx is clear and symmetric.    Neck: Supple. Full ROM. No lymphadenopathy.  Cardiovascular: Regular rate. Regular rhythm. No murmurs, rubs, or gallops. Distal pulses are 2+ and symmetric.  Pulmonary/Chest: No respiratory distress. Clear to auscultation bilaterally. No wheezing or rales.  Abdominal: Soft and non-distended.  There is no tenderness.  No rebound, guarding, or rigidity. Good bowel sounds.  Genitourinary: No CVA tenderness  Musculoskeletal: Moves all extremities. No obvious deformities. No calf tenderness.  Skin: Warm and dry.  Neurological:  Alert, awake, and appropriate.  Normal speech.  No acute focal neurological deficits are appreciated.  Psychiatric: Normal affect. Good eye contact. Appropriate in content.     ED Course   Critical Care    Date/Time: 7/24/2020 12:47 PM  Performed by: Raymon Snider MD  Authorized by: Raymon Snider MD   Direct patient critical care time: 17 minutes  Additional history critical care time: 8 minutes  Ordering / reviewing critical care time: 16 minutes  Documentation critical care time: 19 minutes  Total critical care time (exclusive of procedural time) : 60 minutes  Critical care time was exclusive of separately billable procedures and treating other patients and teaching time.  Critical care was necessary to treat or prevent imminent or life-threatening deterioration of the following conditions: elevated troponin and NSTEMI.  Critical care was time spent personally by me on the following activities: blood draw for specimens, development of treatment plan with patient or surrogate, interpretation of cardiac output measurements, evaluation of patient's response to treatment, examination of patient, obtaining history from patient or surrogate, ordering and performing treatments and interventions, ordering and review  of laboratory studies, ordering and review of radiographic studies, pulse oximetry, re-evaluation of patient's condition and review of old charts.        ED Vital Signs:  Vitals:    07/24/20 0918 07/24/20 0934 07/24/20 1100 07/24/20 1130   BP: 131/86  (!) 132/91 135/78   Pulse: 82 79 76 69   Resp: 18  20 20   Temp: 98.6 °F (37 °C)      TempSrc: Oral      SpO2: 95%  96% 98%   Weight:        07/24/20 1230 07/24/20 1240   BP: 131/78    Pulse: 72    Resp: 19    Temp:     TempSrc:     SpO2: 95%    Weight:  106 kg (233 lb 11.2 oz)       Abnormal Lab Results:  Labs Reviewed   CBC W/ AUTO DIFFERENTIAL - Abnormal; Notable for the following components:       Result Value    Hemoglobin 13.6 (*)     All other components within normal limits   URINALYSIS, REFLEX TO URINE CULTURE - Abnormal; Notable for the following components:    Specific Gravity, UA <=1.005 (*)     All other components within normal limits    Narrative:     Specimen Source->Urine   CK - Abnormal; Notable for the following components:     (*)     All other components within normal limits   COMPREHENSIVE METABOLIC PANEL - Abnormal; Notable for the following components:    CO2 22 (*)     Creatinine 1.7 (*)     eGFR if  48 (*)     eGFR if non  42 (*)     All other components within normal limits   TROPONIN I - Abnormal; Notable for the following components:    Troponin I 0.201 (*)     All other components within normal limits   B-TYPE NATRIURETIC PEPTIDE   SARS-COV-2 RNA AMPLIFICATION, QUAL   APTT    Narrative:     (if patient is on warfarin prior to heparin therapy)   PROTIME-INR    Narrative:     (if patient is on warfarin prior to heparin therapy)        All Lab Results:  Results for orders placed or performed during the hospital encounter of 07/24/20   CBC auto differential   Result Value Ref Range    WBC 7.11 3.90 - 12.70 K/uL    RBC 4.80 4.60 - 6.20 M/uL    Hemoglobin 13.6 (L) 14.0 - 18.0 g/dL    Hematocrit 40.7 40.0 -  54.0 %    Mean Corpuscular Volume 85 82 - 98 fL    Mean Corpuscular Hemoglobin 28.3 27.0 - 31.0 pg    Mean Corpuscular Hemoglobin Conc 33.4 32.0 - 36.0 g/dL    RDW 13.4 11.5 - 14.5 %    Platelets 194 150 - 350 K/uL    MPV 10.0 9.2 - 12.9 fL    Immature Granulocytes 0.4 0.0 - 0.5 %    Gran # (ANC) 4.6 1.8 - 7.7 K/uL    Immature Grans (Abs) 0.03 0.00 - 0.04 K/uL    Lymph # 1.7 1.0 - 4.8 K/uL    Mono # 0.6 0.3 - 1.0 K/uL    Eos # 0.2 0.0 - 0.5 K/uL    Baso # 0.04 0.00 - 0.20 K/uL    nRBC 0 0 /100 WBC    Gran% 64.0 38.0 - 73.0 %    Lymph% 24.5 18.0 - 48.0 %    Mono% 8.0 4.0 - 15.0 %    Eosinophil% 2.5 0.0 - 8.0 %    Basophil% 0.6 0.0 - 1.9 %    Differential Method Automated    Urinalysis, Reflex to Urine Culture Urine, Clean Catch    Specimen: Urine   Result Value Ref Range    Specimen UA Urine, Clean Catch     Color, UA Yellow Yellow, Straw, Kallie    Appearance, UA Clear Clear    pH, UA 7.0 5.0 - 8.0    Specific Gravity, UA <=1.005 (A) 1.005 - 1.030    Protein, UA Negative Negative    Glucose, UA Negative Negative    Ketones, UA Negative Negative    Bilirubin (UA) Negative Negative    Occult Blood UA Negative Negative    Nitrite, UA Negative Negative    Urobilinogen, UA Negative <2.0 EU/dL    Leukocytes, UA Negative Negative   Brain Natriuretic Peptide   Result Value Ref Range    BNP 21 0 - 99 pg/mL   CK   Result Value Ref Range     (H) 20 - 200 U/L   Comprehensive metabolic panel   Result Value Ref Range    Sodium 137 136 - 145 mmol/L    Potassium 4.7 3.5 - 5.1 mmol/L    Chloride 104 95 - 110 mmol/L    CO2 22 (L) 23 - 29 mmol/L    Glucose 93 70 - 110 mg/dL    BUN, Bld 14 8 - 23 mg/dL    Creatinine 1.7 (H) 0.5 - 1.4 mg/dL    Calcium 9.7 8.7 - 10.5 mg/dL    Total Protein 8.0 6.0 - 8.4 g/dL    Albumin 4.1 3.5 - 5.2 g/dL    Total Bilirubin 0.5 0.1 - 1.0 mg/dL    Alkaline Phosphatase 98 55 - 135 U/L    AST 22 10 - 40 U/L    ALT 14 10 - 44 U/L    Anion Gap 11 8 - 16 mmol/L    eGFR if  48 (A) >60  mL/min/1.73 m^2    eGFR if non African American 42 (A) >60 mL/min/1.73 m^2   Troponin I   Result Value Ref Range    Troponin I 0.201 (H) 0.000 - 0.026 ng/mL   COVID-19 Rapid Screening   Result Value Ref Range    SARS-CoV-2 RNA, Amplification, Qual Negative Negative   APTT   Result Value Ref Range    aPTT 26.4 21.0 - 32.0 sec   Protime-INR   Result Value Ref Range    Prothrombin Time 10.8 9.0 - 12.5 sec    INR 1.0 0.8 - 1.2         Imaging Results          X-Ray Chest AP Portable (Final result)  Result time 07/24/20 09:52:49    Final result by Reji Liz MD (07/24/20 09:52:49)                 Impression:      1.  Negative for acute process involving the chest.    2.  Stable findings as noted above.      Electronically signed by: Reji Liz MD  Date:    07/24/2020  Time:    09:52             Narrative:    EXAMINATION:  XR CHEST AP PORTABLE    CLINICAL HISTORY:  chest pain;    COMPARISON:  September 14, 2018    FINDINGS:  EKG leads overlie the chest which is lordotic in position and rotated to the right.  Low lung volumes.  The lungs are clear. The cardiac silhouette size is enlarged.  The trachea is midline and the mediastinal width is normal. Negative for focal infiltrate, effusion or pneumothorax. Pulmonary vasculature is normal. Negative for osseous abnormalities. Median sternotomy wires and prosthetic valve device in place in the aortic region.  Ectatic and tortuous aorta.  Cardiophrenic fat pads.  Mild degenerative changes of the spine and both shoulder girdles.  Soft tissue calcification within the right neck.  Bruit?                                 The EKG was ordered, reviewed, and independently interpreted by the ED provider.  Interpretation time: 9:21  Rate: 82 BPM  Rhythm: Sinus rhythm with possible premature atrial complexes with aberrant conduction  Interpretation: ST & T wave abnormality, consider anterior ischemia. Prolonged QT. No STEMI.      The Emergency Provider reviewed the vital signs and  test results, which are outlined above.     ED Discussion     12:37 PM: Discussed pt's case with Dr. Martin (Cardiology) who recommends IV heparin and admitting pt to hospital.    2:08 PM: Discussed case with Ryley Reynolds NP (Hospital Medicine). Dr. Torres agrees with current care and management of pt and accepts admission.   Admitting Service: Hospital Medicine  Admit to: obs med tele    Re-evaluated pt. I have discussed test results, shared treatment plan, and the need for admission with patient and family at bedside. Pt and family express understanding at this time and agree with all information. All questions answered. Pt and family have no further questions or concerns at this time. Pt is ready for admit.       MDM        Medical Decision Making:   Clinical Tests:   Lab Tests: Ordered and Reviewed  Radiological Study: Ordered and Reviewed  Medical Tests: Ordered and Reviewed           ED Medication(s):  Medications   heparin 25,000 units in dextrose 5% 250 mL (100 units/mL) infusion LOW INTENSITY nomogram - [facility] (12 Units/kg/hr × 86.2 kg (Adjusted) Intravenous New Bag 7/24/20 1338)   heparin 25,000 units in dextrose 5% (100 units/ml) IV bolus from bag - ADDITIONAL PRN BOLUS - 60 units/kg (max bolus 4000 units) (has no administration in time range)   heparin 25,000 units in dextrose 5% (100 units/ml) IV bolus from bag - ADDITIONAL PRN BOLUS - 30 units/kg (max bolus 4000 units) (has no administration in time range)   aluminum-magnesium hydroxide-simethicone 200-200-20 mg/5 mL suspension 30 mL (30 mLs Oral Given 7/24/20 1148)   heparin 25,000 units in dextrose 5% (100 units/ml) IV bolus from bag INITIAL BOLUS (max bolus 4000 units) (4,000 Units Intravenous Bolus from Bag 7/24/20 1338)       New Prescriptions    No medications on file               Scribe Attestation:   Scribe #1: I performed the above scribed service and the documentation accurately describes the services I performed. I attest to the  accuracy of the note.     Attending:   Physician Attestation Statement for Scribe #1: I, Raymon Snider MD, personally performed the services described in this documentation, as scribed by Kimberley King, in my presence, and it is both accurate and complete.           Clinical Impression       ICD-10-CM ICD-9-CM   1. Chest pain  R07.9 786.50   2. Elevated troponin  R79.89 790.6   3. NSTEMI (non-ST elevated myocardial infarction)  I21.4 410.70       Disposition:   Disposition: Placed in Observation  Condition: Fair         Raymon Snider MD  07/24/20 0278

## 2020-07-24 NOTE — ED NOTES
Secure chat sent to hospital medicine:    Good afternoon! What time would you like for me to draw a second troponin? It has been right under 3 hours since the first one resulted. Thank you so much.

## 2020-07-25 VITALS
DIASTOLIC BLOOD PRESSURE: 75 MMHG | OXYGEN SATURATION: 97 % | RESPIRATION RATE: 18 BRPM | BODY MASS INDEX: 31.72 KG/M2 | SYSTOLIC BLOOD PRESSURE: 114 MMHG | HEIGHT: 70 IN | WEIGHT: 221.56 LBS | TEMPERATURE: 98 F | HEART RATE: 75 BPM

## 2020-07-25 PROBLEM — R79.89 TROPONIN LEVEL ELEVATED: Status: RESOLVED | Noted: 2020-07-24 | Resolved: 2020-07-25

## 2020-07-25 LAB
ALBUMIN SERPL BCP-MCNC: 3.6 G/DL (ref 3.5–5.2)
ALP SERPL-CCNC: 91 U/L (ref 55–135)
ALT SERPL W/O P-5'-P-CCNC: 13 U/L (ref 10–44)
ANION GAP SERPL CALC-SCNC: 11 MMOL/L (ref 8–16)
AST SERPL-CCNC: 19 U/L (ref 10–40)
BASOPHILS # BLD AUTO: 0.04 K/UL (ref 0–0.2)
BASOPHILS NFR BLD: 0.8 % (ref 0–1.9)
BILIRUB SERPL-MCNC: 0.5 MG/DL (ref 0.1–1)
BUN SERPL-MCNC: 14 MG/DL (ref 8–23)
CALCIUM SERPL-MCNC: 9.3 MG/DL (ref 8.7–10.5)
CHLORIDE SERPL-SCNC: 107 MMOL/L (ref 95–110)
CHOLEST SERPL-MCNC: 201 MG/DL (ref 120–199)
CHOLEST/HDLC SERPL: 5.9 {RATIO} (ref 2–5)
CO2 SERPL-SCNC: 22 MMOL/L (ref 23–29)
CREAT SERPL-MCNC: 1.5 MG/DL (ref 0.5–1.4)
DIFFERENTIAL METHOD: ABNORMAL
EOSINOPHIL # BLD AUTO: 0.2 K/UL (ref 0–0.5)
EOSINOPHIL NFR BLD: 3.3 % (ref 0–8)
ERYTHROCYTE [DISTWIDTH] IN BLOOD BY AUTOMATED COUNT: 13.5 % (ref 11.5–14.5)
EST. GFR  (AFRICAN AMERICAN): 56 ML/MIN/1.73 M^2
EST. GFR  (NON AFRICAN AMERICAN): 49 ML/MIN/1.73 M^2
GLUCOSE SERPL-MCNC: 93 MG/DL (ref 70–110)
HCT VFR BLD AUTO: 40 % (ref 40–54)
HDLC SERPL-MCNC: 34 MG/DL (ref 40–75)
HDLC SERPL: 16.9 % (ref 20–50)
HGB BLD-MCNC: 13.2 G/DL (ref 14–18)
IMM GRANULOCYTES # BLD AUTO: 0.02 K/UL (ref 0–0.04)
IMM GRANULOCYTES NFR BLD AUTO: 0.4 % (ref 0–0.5)
LDLC SERPL CALC-MCNC: 146 MG/DL (ref 63–159)
LYMPHOCYTES # BLD AUTO: 1.3 K/UL (ref 1–4.8)
LYMPHOCYTES NFR BLD: 26.2 % (ref 18–48)
MAGNESIUM SERPL-MCNC: 2.1 MG/DL (ref 1.6–2.6)
MCH RBC QN AUTO: 28.3 PG (ref 27–31)
MCHC RBC AUTO-ENTMCNC: 33 G/DL (ref 32–36)
MCV RBC AUTO: 86 FL (ref 82–98)
MONOCYTES # BLD AUTO: 0.5 K/UL (ref 0.3–1)
MONOCYTES NFR BLD: 8.9 % (ref 4–15)
NEUTROPHILS # BLD AUTO: 3.1 K/UL (ref 1.8–7.7)
NEUTROPHILS NFR BLD: 60.4 % (ref 38–73)
NONHDLC SERPL-MCNC: 167 MG/DL
NRBC BLD-RTO: 0 /100 WBC
PLATELET # BLD AUTO: 169 K/UL (ref 150–350)
PMV BLD AUTO: 9.9 FL (ref 9.2–12.9)
POTASSIUM SERPL-SCNC: 4.6 MMOL/L (ref 3.5–5.1)
PROT SERPL-MCNC: 7.3 G/DL (ref 6–8.4)
RBC # BLD AUTO: 4.67 M/UL (ref 4.6–6.2)
SODIUM SERPL-SCNC: 140 MMOL/L (ref 136–145)
TRIGL SERPL-MCNC: 105 MG/DL (ref 30–150)
TROPONIN I SERPL DL<=0.01 NG/ML-MCNC: 0.17 NG/ML (ref 0–0.03)
WBC # BLD AUTO: 5.08 K/UL (ref 3.9–12.7)

## 2020-07-25 PROCEDURE — 36415 COLL VENOUS BLD VENIPUNCTURE: CPT

## 2020-07-25 PROCEDURE — 80053 COMPREHEN METABOLIC PANEL: CPT

## 2020-07-25 PROCEDURE — 25000003 PHARM REV CODE 250: Performed by: NURSE PRACTITIONER

## 2020-07-25 PROCEDURE — 80061 LIPID PANEL: CPT

## 2020-07-25 PROCEDURE — 85025 COMPLETE CBC W/AUTO DIFF WBC: CPT

## 2020-07-25 PROCEDURE — 25000003 PHARM REV CODE 250: Performed by: INTERNAL MEDICINE

## 2020-07-25 PROCEDURE — 83735 ASSAY OF MAGNESIUM: CPT

## 2020-07-25 RX ORDER — CLOPIDOGREL BISULFATE 75 MG/1
75 TABLET ORAL DAILY
Status: DISCONTINUED | OUTPATIENT
Start: 2020-07-25 | End: 2020-07-25 | Stop reason: HOSPADM

## 2020-07-25 RX ADMIN — ASPIRIN 81 MG 81 MG: 81 TABLET ORAL at 08:07

## 2020-07-25 RX ADMIN — CLOPIDOGREL 75 MG: 75 TABLET, FILM COATED ORAL at 08:07

## 2020-07-25 RX ADMIN — SUCRALFATE 1 G: 1 SUSPENSION ORAL at 01:07

## 2020-07-25 RX ADMIN — SODIUM CHLORIDE: 0.9 INJECTION, SOLUTION INTRAVENOUS at 12:07

## 2020-07-25 RX ADMIN — SERTRALINE HYDROCHLORIDE 150 MG: 50 TABLET ORAL at 08:07

## 2020-07-25 RX ADMIN — GABAPENTIN 300 MG: 300 CAPSULE ORAL at 08:07

## 2020-07-25 RX ADMIN — GABAPENTIN 300 MG: 300 CAPSULE ORAL at 03:07

## 2020-07-25 RX ADMIN — ATORVASTATIN CALCIUM 20 MG: 10 TABLET, FILM COATED ORAL at 08:07

## 2020-07-25 RX ADMIN — TAMSULOSIN HYDROCHLORIDE 0.4 MG: 0.4 CAPSULE ORAL at 08:07

## 2020-07-25 RX ADMIN — ALLOPURINOL 300 MG: 300 TABLET ORAL at 08:07

## 2020-07-25 RX ADMIN — LOSARTAN POTASSIUM 100 MG: 50 TABLET, FILM COATED ORAL at 08:07

## 2020-07-25 RX ADMIN — CETIRIZINE HYDROCHLORIDE 10 MG: 10 TABLET, FILM COATED ORAL at 08:07

## 2020-07-25 RX ADMIN — CARVEDILOL 25 MG: 12.5 TABLET, FILM COATED ORAL at 08:07

## 2020-07-25 RX ADMIN — PANTOPRAZOLE SODIUM 40 MG: 40 TABLET, DELAYED RELEASE ORAL at 08:07

## 2020-07-25 RX ADMIN — SUCRALFATE 1 G: 1 SUSPENSION ORAL at 08:07

## 2020-07-25 NOTE — PLAN OF CARE
POC reviewed with patient and wife  AAO- able to make needs known  Fall risk - bed alarm, non skid socks   Dressing to Right groin CDI.   Ambulated in room independently   SR on cardiac monitor   Call light in reach

## 2020-07-25 NOTE — PLAN OF CARE
64 year old male admitted with chief complaint of chest pain and also found to have elevated troponin.  Pt is alert and oriented and reports that he is still experiencing chest pain.  Did not identify any precipitating factors that led to chest pain. Pt is independent with ADLs.  Help at home provided by his spouse.  No changes to appetite or sleep habits.  No discharge needs anticipated at this time.  CM provided a transitional care folder, information on advanced directives, information on pharmacy bedside delivery, and discharge planning begins on admission with contact information for any needs/questions.    D/C plan: home        07/25/20 1124   Discharge Assessment   Assessment Type Discharge Planning Assessment   Confirmed/corrected address and phone number on facesheet? Yes   Assessment information obtained from? Patient;Medical Record   Expected Length of Stay (days) 2   Communicated expected length of stay with patient/caregiver no   Prior to hospitilization cognitive status: Alert/Oriented   Prior to hospitalization functional status: Independent   Current cognitive status: Alert/Oriented   Current Functional Status: Independent   Facility Arrived From: home   Lives With spouse   Able to Return to Prior Arrangements yes   Is patient able to care for self after discharge? Yes   Who are your caregiver(s) and their phone number(s)? Vinita Urias, spouse: [phone]   Patient's perception of discharge disposition home or selfcare   Readmission Within the Last 30 Days no previous admission in last 30 days   Patient currently being followed by outpatient case management? No   Patient currently receives any other outside agency services? No   Equipment Currently Used at Home none   Do you have any problems affording any of your prescribed medications? No   Is the patient taking medications as prescribed? yes   Does the patient have transportation home? Yes   Transportation Anticipated family or friend will  provide   Does the patient receive services at the [facility]? No   Discharge Plan A Home   Discharge Plan B Home   DME Needed Upon Discharge  none   Patient/Family in Agreement with Plan yes

## 2020-07-25 NOTE — PLAN OF CARE
Patient in bed resting quiet. Easily aroused. AAOX4. Denies chest pain, SOB, palpitations, dizziness, weakness, numbness, tingling. No acute distress noted. Groin incision dressing clean, dry, intact. Site soft, no hematoma noted. Neuro checks q4 WNL. Cardiac monitoring HR70s  sinus rhythm. Plan of care reviewed, verbalized understanding. Patient educated on frequent weight shifting to prevent pressure injuries. Safety measures in place, bed in lowest position, wheels locked, call light with in reach, non-skid socks in use, bed alarm in use, SCDs in use. Will continue to monitor.

## 2020-07-25 NOTE — NURSING
Discharge orders reviewed with patient and spouse  Verbalizes understanding  PIV removed, Cardiac monitor removed  Spouse to transport pt home

## 2020-07-26 NOTE — HOSPITAL COURSE
Patient 63 yo male placed in observation due to CP. Patient ruled out for acs, symptoms improved prior to his discharge to home. Patient instructed to take all medications and follow up with his PCP.

## 2020-07-26 NOTE — DISCHARGE SUMMARY
[facility] Hospital Medicine  Discharge Summary      Patient Name: Abdullahi Urias  MRN: [id number]  Admission Date: 7/24/2020  Hospital Length of Stay: 1 days  Discharge Date and Time: 7/25/2020  4:19 PM  Attending Physician: No att. providers found   Discharging Provider: Demetris Torres MD  Primary Care Provider: Reji Valentin MD      HPI:   Abdullahi Urias is a 64 year old male with a PMHx of HTN, GERD, CHF, CAD, Asthma, Hepatitis C, and BPH who presented to the Emergency Department with c/o chest pain. Located mid sternum x 1 week. Pt reports that he went to his cardiologist last week and had tests done -- NM stress test on 07/09/20 -- normal myocardial perfusion, free of evidence from myocardial ischemia or injury. ECHO on 07/01 showed EF 60% with PHTN. ED discussed case with Cardiology who recommending heparin gtt. Cardiology performed LHC today, luminal irregularities, otherwise normal coronaries. Will place pt in observation to monitor overnight    Procedure(s) (LRB):  CATHETERIZATION, HEART, LEFT (Left)      Hospital Course:   Patient 65 yo male placed in observation due to CP. Patient ruled out for acs, symptoms improved prior to his discharge to home. Patient instructed to take all medications and follow up with his PCP.      Consults:   Consults (From admission, onward)        Status Ordering Provider     Inpatient consult to Cardiology  Once     Provider:  Pasha Martin MD    Completed JOY PAIGE          No new Assessment & Plan notes have been filed under this hospital service since the last note was generated.  Service: Hospital Medicine    Final Active Diagnoses:    Diagnosis Date Noted POA    Hypertension [I10]  Yes     Chronic    CAD (coronary artery disease) [I25.10]  Yes     Chronic    BPH (benign prostatic hyperplasia) [N40.0]  Yes     Chronic      Problems Resolved During this Admission:    Diagnosis Date Noted Date Resolved POA    PRINCIPAL PROBLEM:  Troponin  level elevated [R79.89] 07/24/2020 07/25/2020 Yes       Discharged Condition: stable    Disposition: Home or Self Care    Follow Up:  Follow-up Information     Reji Valentin MD In 3 days.    Specialty: Family Medicine  Contact information:  [address] [facility]  [place] [id number]  [phone]                 Patient Instructions:      Diet Cardiac     Activity as tolerated       Significant Diagnostic Studies: Labs:   BMP:   Recent Labs   Lab 07/25/20  0530   GLU 93      K 4.6      CO2 22*   BUN 14   CREATININE 1.5*   CALCIUM 9.3   MG 2.1   , CMP   Recent Labs   Lab 07/25/20  0530      K 4.6      CO2 22*   GLU 93   BUN 14   CREATININE 1.5*   CALCIUM 9.3   PROT 7.3   ALBUMIN 3.6   BILITOT 0.5   ALKPHOS 91   AST 19   ALT 13   ANIONGAP 11   ESTGFRAFRICA 56*   EGFRNONAA 49*   , CBC   Recent Labs   Lab 07/25/20  0530   WBC 5.08   HGB 13.2*   HCT 40.0      , Troponin   Recent Labs   Lab 07/24/20  2321   TROPONINI 0.172*    and All labs within the past 24 hours have been reviewed    Pending Diagnostic Studies:     None         Medications:  Reconciled Home Medications:      Medication List      CONTINUE taking these medications    acetaminophen-codeine 300-30mg 300-30 mg Tab  Commonly known as: TYLENOL #3  TK 1 T PO Q 4 TO 6 H PRF PAIN     albuterol 90 mcg/actuation inhaler  Commonly known as: PROVENTIL/VENTOLIN HFA  Inhale 2 puffs into the lungs every 6 (six) hours as needed for Wheezing.     allopurinoL 300 MG tablet  Commonly known as: ZYLOPRIM  Take 1 tablet (300 mg total) by mouth once daily.     aluminum-magnesium hydroxide-simethicone 200-200-20 mg/5 mL Susp  Commonly known as: MAALOX  Take 30 mLs by mouth 4 (four) times daily before meals and nightly.     aspirin 81 MG EC tablet  Commonly known as: ECOTRIN  Take 81 mg by mouth once daily.     CARAFATE 100 mg/mL suspension  Generic drug: sucralfate  Take 1 g by mouth 4 (four) times daily.     carvediloL 25 MG tablet  Commonly  known as: COREG  Take 25 mg by mouth 2 (two) times daily.     cetirizine 10 MG tablet  Commonly known as: ZYRTEC  Take 10 mg by mouth once daily.     clindamycin 300 MG capsule  Commonly known as: CLEOCIN  TK 1 C PO QID UNTIL GONE BEGIN 2 DAYS PRIOR TO SURGERY     diphenhydrAMINE 25 mg tablet  Commonly known as: SOMINEX  Take 25 mg by mouth nightly as needed for Insomnia.     famotidine 20 MG tablet  Commonly known as: PEPCID  Take 20 mg by mouth 2 (two) times daily.     fexofenadine 180 MG tablet  Commonly known as: ALLEGRA  Take 1 tablet (180 mg total) by mouth daily as needed.     flunisolide 25 mcg (0.025%) 25 mcg (0.025 %) Spry  Commonly known as: NASALIDE  2 sprays by Nasal route as needed.     gabapentin 300 MG capsule  Commonly known as: NEURONTIN  Take 300 mg by mouth 3 (three) times daily.     hydrocortisone 2.5 % cream  Apply topically 2 (two) times daily as needed. Apply to affected areas of the face and neck.     ketoconazole 2 % cream  Commonly known as: NIZORAL  Apply topically 2 (two) times daily. Continue use for 1 week after resolution of fungal rash.     LIPITOR 20 MG tablet  Generic drug: atorvastatin  Take 20 mg by mouth once daily.     losartan 100 MG tablet  Commonly known as: COZAAR  Take 100 mg by mouth once daily.     methyl salicylate-menthol 15-10% 15-10 % Crea  Apply topically as needed.     oxyCODONE-acetaminophen 5-325 mg per tablet  Commonly known as: PERCOCET  Take 1 tablet by mouth every 4 (four) hours as needed for Pain.     pantoprazole 40 MG tablet  Commonly known as: PROTONIX  Take 40 mg by mouth 2 (two) times daily.     sertraline 100 MG tablet  Commonly known as: ZOLOFT  Take 150 mg by mouth once daily.     simethicone 80 MG chewable tablet  Commonly known as: MYLICON  Take 80 mg by mouth every 6 (six) hours as needed for Flatulence.     tamsulosin 0.4 mg Cap  Commonly known as: FLOMAX  Take 0.4 mg by mouth once daily.     traMADoL 50 mg tablet  Commonly known as: ULTRAM  Take  50 mg by mouth every 6 (six) hours as needed for Pain.     triamcinolone acetonide 0.1% 0.1 % cream  Commonly known as: KENALOG  Apply topically 2 (two) times daily as needed. For discolored areas of the arms and scalp.            Indwelling Lines/Drains at time of discharge:   Lines/Drains/Airways     None                 Time spent on the discharge of patient: 36 minutes  Patient was seen and examined on the date of discharge and determined to be suitable for discharge.         Demetris Torres MD  Department of Hospital Medicine  [facility]

## 2020-10-18 ENCOUNTER — PATIENT MESSAGE (OUTPATIENT)
Dept: INTERNAL MEDICINE | Facility: CLINIC | Age: 65
End: 2020-10-18

## 2020-10-19 ENCOUNTER — TELEPHONE (OUTPATIENT)
Dept: INTERNAL MEDICINE | Facility: CLINIC | Age: 65
End: 2020-10-19

## 2020-10-19 NOTE — TELEPHONE ENCOUNTER
----- Message from Chantal Allred sent at 10/19/2020  9:41 AM CDT -----  Regarding: reschedule  Contact: pt  Pt got tied up at work and was unable to make appt today. Can pt reschedule before the 1st available in January? [phone]

## 2020-11-23 ENCOUNTER — TELEPHONE (OUTPATIENT)
Dept: INTERNAL MEDICINE | Facility: CLINIC | Age: 65
End: 2020-11-23

## 2020-11-23 NOTE — TELEPHONE ENCOUNTER
Spoke with patient. Request visit to be changed to virtual audio visit. Visit changed. Provider notified.//ddw

## 2020-11-23 NOTE — TELEPHONE ENCOUNTER
----- Message from Felisa Alvarez sent at 11/23/2020 11:01 AM CST -----  Contact: [phone]  Type:  Needs Medical Advice    Who Called: PT  Reason for call: pt would like to speak with the nurse regarding changing his appt to a Virtual Visit for Today, pt has been in Contact with a family Member that has the Virus  Would the patient rather a call back or a response via [facility]? Call back  Best Call Back Number: [phone]

## 2020-11-23 NOTE — TELEPHONE ENCOUNTER
----- Message from Patsy Pendleton sent at 11/23/2020  2:45 PM CST -----  Type:  Same Day Appointment Request    Caller is requesting a same day appointment.  Caller declined first available appointment listed below.    Name of Caller: Pt  Abdullahi   When is the first available appointment?   Symptoms:  Pt states he had an appt with Dr Valentin at 2:20pm today//11/23/20//it was suppose to be a virtual but the Dr wanted him to come into the office//when he started getting dress he got a panic attack and not able to drive to the appt//  Best Call Back Number:  [phone]  Additional Information:   Please call to discuss//thanks///[facility]

## 2020-11-24 ENCOUNTER — OFFICE VISIT (OUTPATIENT)
Dept: INTERNAL MEDICINE | Facility: CLINIC | Age: 65
End: 2020-11-24
Payer: MEDICARE

## 2020-11-24 VITALS
WEIGHT: 232.81 LBS | TEMPERATURE: 96 F | BODY MASS INDEX: 33.33 KG/M2 | HEART RATE: 85 BPM | DIASTOLIC BLOOD PRESSURE: 90 MMHG | OXYGEN SATURATION: 99 % | HEIGHT: 70 IN | SYSTOLIC BLOOD PRESSURE: 112 MMHG

## 2020-11-24 DIAGNOSIS — M54.2 MUSCULOSKELETAL NECK PAIN: Primary | ICD-10-CM

## 2020-11-24 DIAGNOSIS — M47.892 OTHER SPONDYLOSIS, CERVICAL REGION: ICD-10-CM

## 2020-11-24 DIAGNOSIS — M50.30 DDD (DEGENERATIVE DISC DISEASE), CERVICAL: ICD-10-CM

## 2020-11-24 DIAGNOSIS — R51.9 ACUTE NONINTRACTABLE HEADACHE, UNSPECIFIED HEADACHE TYPE: ICD-10-CM

## 2020-11-24 PROCEDURE — 99999 PR PBB SHADOW E&M-EST. PATIENT-LVL V: CPT | Mod: PBBFAC,,, | Performed by: NURSE PRACTITIONER

## 2020-11-24 PROCEDURE — 99214 PR OFFICE/OUTPT VISIT, EST, LEVL IV, 30-39 MIN: ICD-10-PCS | Mod: S$GLB,,, | Performed by: NURSE PRACTITIONER

## 2020-11-24 PROCEDURE — 99214 OFFICE O/P EST MOD 30 MIN: CPT | Mod: S$GLB,,, | Performed by: NURSE PRACTITIONER

## 2020-11-24 PROCEDURE — 99215 OFFICE O/P EST HI 40 MIN: CPT | Mod: PBBFAC | Performed by: NURSE PRACTITIONER

## 2020-11-24 PROCEDURE — 99999 PR PBB SHADOW E&M-EST. PATIENT-LVL V: ICD-10-PCS | Mod: PBBFAC,,, | Performed by: NURSE PRACTITIONER

## 2020-11-24 RX ORDER — TIZANIDINE 4 MG/1
4 TABLET ORAL 2 TIMES DAILY PRN
Qty: 30 TABLET | Refills: 0 | Status: SHIPPED | OUTPATIENT
Start: 2020-11-24 | End: 2020-12-09

## 2020-11-24 NOTE — PROGRESS NOTES
Subjective:       Patient ID: Abdullahi Urias is a 65 y.o. male.    Chief Complaint: Neck Pain (x 3 wks)    Patient presents with neck pain that radiates to head (occipital) that started about 3 weeks ago.  Thinks it could have pulled a muscle working out but pain continues.  Has stopped working out.  Tried Tylenol, Tramadol, and Gabapentin.     CT Cervical Spine 11/2016  IMPRESSION:    1.  No evidence of fracture, subluxation, or other acute bony abnormality in the cervical spine.    2.  Multilevel cervical degenerative disc disease and spondylosis, predominating in the lower cervical spine, with multilevel stenoses, as discussed above. There is also narrowing of the AP dimension of the spinal canal in the upper to midthoracic spine, suggestive of some superimposed congenital shortening of the cervical vertebral pedicles.      Review of Systems   Constitutional: Negative for chills and fatigue.   Respiratory: Negative for cough and shortness of breath.    Musculoskeletal: Positive for arthralgias, myalgias, neck pain and neck stiffness.   Neurological: Positive for numbness and headaches.   Psychiatric/Behavioral: Negative for agitation, confusion and decreased concentration.         Objective:      Physical Exam  Vitals signs reviewed.   HENT:      Head: Normocephalic.   Eyes:      Pupils: Pupils are equal, round, and reactive to light.   Neck:      Musculoskeletal: Muscular tenderness present.     Cardiovascular:      Rate and Rhythm: Normal rate and regular rhythm.   Pulmonary:      Effort: Pulmonary effort is normal.      Breath sounds: Normal breath sounds.   Skin:     General: Skin is warm.   Neurological:      General: No focal deficit present.      Mental Status: He is alert.      Cranial Nerves: No facial asymmetry.      Motor: No weakness.      Comments: Decrease facial sensation to the left side.    Psychiatric:         Mood and Affect: Mood normal.         Behavior: Behavior normal. Behavior is  cooperative.         Assessment:       1. Musculoskeletal neck pain    2. DDD (degenerative disc disease), cervical    3. Other spondylosis, cervical region    4. Acute nonintractable headache, unspecified headache type        Plan:         Musculoskeletal neck pain    DDD (degenerative disc disease), cervical  -     tiZANidine (ZANAFLEX) 4 MG tablet; Take 1 tablet (4 mg total) by mouth 2 (two) times daily as needed.  Dispense: 30 tablet; Refill: 0    Other spondylosis, cervical region  -     CT Cervical Spine Without Contrast; Future; Expected date: 11/24/2020    Acute nonintractable headache, unspecified headache type  -     CT Head Without Contrast; Future; Expected date: 11/24/2020        Possible muscular pain.  Will get CT of head and cervical spine.     Follow up as scheduled or sooner if needed.

## 2020-12-01 ENCOUNTER — HOSPITAL ENCOUNTER (OUTPATIENT)
Dept: RADIOLOGY | Facility: HOSPITAL | Age: 65
Discharge: HOME | End: 2020-12-01
Attending: NURSE PRACTITIONER
Payer: MEDICARE

## 2020-12-01 ENCOUNTER — HOSPITAL ENCOUNTER (OUTPATIENT)
Dept: RADIOLOGY | Facility: HOSPITAL | Age: 65
Discharge: HOME | End: 2020-12-01
Attending: NURSE PRACTITIONER
Payer: OTHER GOVERNMENT

## 2020-12-01 DIAGNOSIS — R51.9 ACUTE NONINTRACTABLE HEADACHE, UNSPECIFIED HEADACHE TYPE: ICD-10-CM

## 2020-12-01 DIAGNOSIS — M47.892 OTHER SPONDYLOSIS, CERVICAL REGION: ICD-10-CM

## 2020-12-01 PROCEDURE — 72125 CT NECK SPINE W/O DYE: CPT | Mod: TC

## 2020-12-01 PROCEDURE — 72125 CT NECK SPINE W/O DYE: CPT | Mod: 26,,, | Performed by: RADIOLOGY

## 2020-12-01 PROCEDURE — 70450 CT HEAD/BRAIN W/O DYE: CPT | Mod: TC

## 2020-12-01 PROCEDURE — 72125 CT CERVICAL SPINE WITHOUT CONTRAST: ICD-10-PCS | Mod: 26,,, | Performed by: RADIOLOGY

## 2020-12-01 PROCEDURE — 70450 CT HEAD WITHOUT CONTRAST: ICD-10-PCS | Mod: 26,,, | Performed by: RADIOLOGY

## 2020-12-01 PROCEDURE — 70450 CT HEAD/BRAIN W/O DYE: CPT | Mod: 26,,, | Performed by: RADIOLOGY

## 2020-12-02 ENCOUNTER — TELEPHONE (OUTPATIENT)
Dept: INTERNAL MEDICINE | Facility: CLINIC | Age: 65
End: 2020-12-02

## 2020-12-02 DIAGNOSIS — M50.30 DDD (DEGENERATIVE DISC DISEASE), CERVICAL: Primary | ICD-10-CM

## 2020-12-02 NOTE — TELEPHONE ENCOUNTER
----- Message from Janina Wick sent at 12/2/2020  3:20 PM CST -----  Regarding: explain his results  Contact: pt  Caller is requesting a call back regarding explaining his results.  Please call back at [phone] Thanks.

## 2020-12-02 NOTE — TELEPHONE ENCOUNTER
I returned a call back to the pt who was inquiring about his CT results I informed him of results of the neck and spine . He verbalized understanding and would like the PT . Please place order. //kah

## 2020-12-04 ENCOUNTER — TELEPHONE (OUTPATIENT)
Dept: URGENT CARE | Facility: CLINIC | Age: 65
End: 2020-12-04

## 2020-12-04 DIAGNOSIS — M54.9 BACK PAIN, UNSPECIFIED BACK LOCATION, UNSPECIFIED BACK PAIN LATERALITY, UNSPECIFIED CHRONICITY: Primary | ICD-10-CM

## 2020-12-04 NOTE — TELEPHONE ENCOUNTER
I returned a call back to the pt and he states he would need another referral to for his back pain per the PT when they called and scheduled him . Can you add another referral for that . Thanks //kah

## 2020-12-04 NOTE — TELEPHONE ENCOUNTER
----- Message from Chantal Allred sent at 12/4/2020 12:58 PM CST -----  Regarding: referral for siatic nerve  Contact: pt  Pt needs a referral for siatic nerve for his back. Pt can reached at [phone]

## 2020-12-07 ENCOUNTER — OFFICE VISIT (OUTPATIENT)
Dept: INTERNAL MEDICINE | Facility: CLINIC | Age: 65
End: 2020-12-07
Payer: COMMERCIAL

## 2020-12-07 ENCOUNTER — TELEPHONE (OUTPATIENT)
Dept: INTERNAL MEDICINE | Facility: CLINIC | Age: 65
End: 2020-12-07

## 2020-12-07 DIAGNOSIS — M54.2 NECK PAIN: ICD-10-CM

## 2020-12-07 DIAGNOSIS — C61 PROSTATE CANCER: ICD-10-CM

## 2020-12-07 DIAGNOSIS — Z12.11 SCREEN FOR COLON CANCER: Primary | ICD-10-CM

## 2020-12-07 DIAGNOSIS — M25.559 HIP PAIN: ICD-10-CM

## 2020-12-07 DIAGNOSIS — I50.9 CONGESTIVE HEART FAILURE, UNSPECIFIED HF CHRONICITY, UNSPECIFIED HEART FAILURE TYPE: ICD-10-CM

## 2020-12-07 DIAGNOSIS — Z13.6 SCREENING FOR ABDOMINAL AORTIC ANEURYSM: ICD-10-CM

## 2020-12-07 DIAGNOSIS — N28.9 RENAL INSUFFICIENCY: ICD-10-CM

## 2020-12-07 PROCEDURE — 99442 PR PHYSICIAN TELEPHONE EVALUATION 11-20 MIN: CPT | Mod: 95,,, | Performed by: FAMILY MEDICINE

## 2020-12-07 PROCEDURE — 99442 PR PHYSICIAN TELEPHONE EVALUATION 11-20 MIN: ICD-10-PCS | Mod: 95,,, | Performed by: FAMILY MEDICINE

## 2020-12-07 NOTE — PROGRESS NOTES
Established Patient - Audio Only Telehealth Visit     The patient location is: home  The chief complaint leading to consultation is: *neck , hip pain  Visit type: Virtual visit with audio only (telephone)  Total time spent with patient: 15     The reason for the audio only service rather than synchronous audio and video virtual visit was related to technical difficulties or patient preference/necessity.     Each patient to whom I provide medical services by telemedicine is:  (1) informed of the relationship between the physician and patient and the respective role of any other health care provider with respect to management of the patient; and (2) notified that they may decline to receive medical services by telemedicine and may withdraw from such care at any time. Patient verbally consented to receive this service via voice-only telephone call.       HPI: *neck , hip pain for a month.[facility] will [facility] 12[facility];  And sees pain mgmt next injxn avail April 2021    utd urol prost cancer;last psa 0.7 a month or two ; [facility]rol at [facility]    Mild inc creat needs abril    Asthma no c/o    No chest pain sob    Past Medical History:   Diagnosis Date    [facility] stenosis     [facility]    Asthma     BPH (benign prostatic hyperplasia)     CAD (coronary artery disease)     Cardiomyopathy     CHF (congestive heart failure)     Chronic hoarseness     vocal cord surg    Chronic pain     CVA (cerebral infarction)     8/2012 olol; reviewed ed note    Ex-smoker     GERD (gastroesophageal reflux disease)     Hepatitis C     treatedharvoni says cured, RNA NEG 6/2020    Hypertension     Pancreatitis     Prostate cancer     Prostate cancer     Substance abuse     cocaine, etoh , tob in past     Past Surgical History:   Procedure Laterality Date    AORTIC VALVE REPLACEMENT  05/19/2014    Tissue valve replacement    BACK SURGERY      CARDIAC CATHETERIZATION      COLONOSCOPY  2011    FOOT SURGERY       LEFT HEART CATHETERIZATION Left 2020    Procedure: CATHETERIZATION, HEART, LEFT;  Surgeon: Pasha Martin MD;  [facility] CATH LAB;  Service: Cardiology;  Laterality: Left;    PENILE PROSTHESIS IMPLANT      PENILE PROSTHESIS REVISION  2018    PROST[facility]    UPPER GASTROINTESTINAL ENDOSCOPY       Family History   Problem Relation Age of Onset    Heart disease Mother     Heart attack Sister     Heart attack Brother     Colon cancer Neg Hx     Colon polyps Neg Hx     Liver cancer Neg Hx     Inflammatory bowel disease Neg Hx     Liver disease Neg Hx     Rectal cancer Neg Hx     Stomach cancer Neg Hx     Ulcerative colitis Neg Hx      Social History     Socioeconomic History    Marital status:      Spouse name: Not on file    Number of children: Not on file    Years of education: Not on file    Highest education level: Not on file   Occupational History    Not on file   Social Needs    Financial resource strain: Not on file    Food insecurity     Worry: Not on file     Inability: Not on file    Transportation needs     Medical: Not on file     Non-medical: Not on file   Tobacco Use    Smoking status: Former Smoker     Packs/day: 2.00     Years: 8.00     Pack years: 16.00     Quit date: 2012     Years since quittin.2    Smokeless tobacco: Never Used   Substance and Sexual Activity    Alcohol use: No     Comment: Sober since 2012    Drug use: No    Sexual activity: Yes   Lifestyle    Physical activity     Days per week: Not on file     Minutes per session: Not on file    Stress: Not at all   Relationships    Social connections     Talks on phone: Not on file     Gets together: Not on file     Attends Taoist service: Not on file     Active member of club or organization: Not on file     Attends meetings of clubs or organizations: Not on file     Relationship status: Not on file   Other Topics Concern    Not on file    Social History Narrative    No pets in household, wife and daughter smokers. [employer].    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      Hep c hx says was cured w yenny     Prost ca [facility] s/p prost[facility]    He wants [facility] of [facility]; add hip pain to current [facility] doing now for neck pain    [facility] since [facility] to 65 (?))     [facility]    laband f/u in person review lab/pain mgmt etc      [facility] so [facility]             This service was not originating from a related E/M service provided within the previous 7 days nor will  to an E/M service or procedure within the next 24 hours or my soonest available appointment.  Prevailing standard of care was able to be met in this audio-only visit.

## 2020-12-07 NOTE — TELEPHONE ENCOUNTER
----- Message from Anca Jeronimo sent at 12/7/2020  3:09 PM CST -----  .Type:  Patient Returning Call    Who Called:pt  Who Left Message for Patient:nurse  Does the patient know what this is regarding?:yes  Would the patient rather a call back or a response via [facility]? Call back  Best Call Back Number:[phone]  Additional Information:

## 2020-12-08 ENCOUNTER — CLINICAL SUPPORT (OUTPATIENT)
Dept: REHABILITATION | Facility: HOSPITAL | Age: 65
End: 2020-12-08
Attending: NURSE PRACTITIONER
Payer: MEDICARE

## 2020-12-08 DIAGNOSIS — R53.1 WEAKNESS: ICD-10-CM

## 2020-12-08 DIAGNOSIS — M25.559 HIP PAIN: ICD-10-CM

## 2020-12-08 DIAGNOSIS — M54.9 BACK PAIN, UNSPECIFIED BACK LOCATION, UNSPECIFIED BACK PAIN LATERALITY, UNSPECIFIED CHRONICITY: ICD-10-CM

## 2020-12-08 DIAGNOSIS — M50.30 DDD (DEGENERATIVE DISC DISEASE), CERVICAL: ICD-10-CM

## 2020-12-08 DIAGNOSIS — M54.2 NECK PAIN: ICD-10-CM

## 2020-12-08 DIAGNOSIS — M25.60 DECREASED RANGE OF MOTION: ICD-10-CM

## 2020-12-08 DIAGNOSIS — M79.604 PAIN OF RIGHT LOWER EXTREMITY: ICD-10-CM

## 2020-12-08 PROCEDURE — 97110 THERAPEUTIC EXERCISES: CPT

## 2020-12-08 PROCEDURE — 97162 PT EVAL MOD COMPLEX 30 MIN: CPT
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
## 2020-12-09 ENCOUNTER — PATIENT OUTREACH (OUTPATIENT)
Dept: ADMINISTRATIVE | Facility: OTHER | Age: 65
End: 2020-12-09

## 2020-12-09 ENCOUNTER — LAB VISIT (OUTPATIENT)
Dept: LAB | Facility: HOSPITAL | Age: 65
End: 2020-12-09
Attending: UROLOGY
Payer: MEDICARE

## 2020-12-09 ENCOUNTER — OFFICE VISIT (OUTPATIENT)
Dept: UROLOGY | Facility: CLINIC | Age: 65
End: 2020-12-09
Payer: MEDICARE

## 2020-12-09 VITALS
SYSTOLIC BLOOD PRESSURE: 137 MMHG | BODY MASS INDEX: 33.99 KG/M2 | HEIGHT: 70 IN | HEART RATE: 80 BPM | DIASTOLIC BLOOD PRESSURE: 88 MMHG | TEMPERATURE: 98 F | WEIGHT: 237.44 LBS

## 2020-12-09 DIAGNOSIS — Z12.11 ENCOUNTER FOR FIT (FECAL IMMUNOCHEMICAL TEST) SCREENING: Primary | ICD-10-CM

## 2020-12-09 DIAGNOSIS — C61 PROSTATE CANCER: Primary | ICD-10-CM

## 2020-12-09 DIAGNOSIS — C61 PROSTATE CANCER: ICD-10-CM

## 2020-12-09 LAB
MICROSCOPIC COMMENT: NORMAL
SQUAMOUS #/AREA URNS HPF: 1 /HPF

## 2020-12-09 PROCEDURE — 99214 OFFICE O/P EST MOD 30 MIN: CPT | Mod: PBBFAC | Performed by: UROLOGY

## 2020-12-09 PROCEDURE — 87086 URINE CULTURE/COLONY COUNT: CPT

## 2020-12-09 PROCEDURE — 99999 PR PBB SHADOW E&M-EST. PATIENT-LVL IV: ICD-10-PCS | Mod: PBBFAC,,, | Performed by: UROLOGY

## 2020-12-09 PROCEDURE — 36415 COLL VENOUS BLD VENIPUNCTURE: CPT

## 2020-12-09 PROCEDURE — 99999 PR PBB SHADOW E&M-EST. PATIENT-LVL IV: CPT | Mod: PBBFAC,,, | Performed by: UROLOGY

## 2020-12-09 PROCEDURE — 84154 ASSAY OF PSA FREE: CPT

## 2020-12-09 PROCEDURE — 81000 URINALYSIS NONAUTO W/SCOPE: CPT

## 2020-12-09 PROCEDURE — 99203 OFFICE O/P NEW LOW 30 MIN: CPT | Mod: S$PBB,,, | Performed by: UROLOGY

## 2020-12-09 PROCEDURE — 99203 PR OFFICE/OUTPT VISIT, NEW, LEVL III, 30-44 MIN: ICD-10-PCS | Mod: S$PBB,,, | Performed by: UROLOGY

## 2020-12-09 NOTE — LETTER
December 9, 2020      Reji Valentin MD  [address] [place] [address]           [facility]  [address] [place] [address]  Phone: [phone]  Fax: [phone]          Patient: Abdullahi Urias   MR Number: [id number]   YOB: 1955   Date of Visit: 12/9/2020       Dear Dr. Reji Valentin:    Thank you for referring Abdullahi Urias to me for evaluation. Attached you will find relevant portions of my assessment and plan of care.    If you have questions, please do not hesitate to call me. I look forward to following Abdullahi Urias along with you.    Sincerely,    Rommel Rodriguez MD    Enclosure  CC:  No Recipients    If you would like to receive this communication electronically, please contact [email or web address] or [phone] to request more information on [employer] access.    For providers and/or their staff who would like to refer a patient to [facility], please contact us through our one-stop-shop provider referral line, [facility], at [phone].    If you feel you have received this communication in error or would no longer like to receive these types of communications, please e-mail [email or web address]

## 2020-12-10 ENCOUNTER — TELEPHONE (OUTPATIENT)
Dept: RADIOLOGY | Facility: HOSPITAL | Age: 65
End: 2020-12-10

## 2020-12-10 LAB
PROSTATE SPECIFIC ANTIGEN, TOTAL: 0.11 NG/ML (ref 0–4)
PSA FREE MFR SERPL: 54.55 %
PSA FREE SERPL-MCNC: 0.06 NG/ML (ref 0.01–1.5)

## 2020-12-10 NOTE — PROGRESS NOTES
Chief Complaint:  History of prostate cancer    HPI:   Abdullahi Urias is a 65 y.o. male that presents today as a referral from Dr. Valentin for history of prostate cancer.  Patient notes a history of prostate cancer for which he [facility] [place] [facility].  Patient is not sure what Sara grade prostate cancer he had or with the pathology was a from his radical prostatectomy, and I do not have his records from the [facility] as yet.  He notes that his initial postop PSA was undetectable however he has had subsequent biochemical recurrence, again I am unsure of the current values as I do not have his outside records.  [facility] states that [facility] post[facility]state[facility] in mid November which was concerning for prostate cancer recurrence and stated that he would need radiation.  Thus the patient has decided to seek a 2nd opinion.  Patient notes that he initially had incontinence after his surgery, but this has resolved.  He currently wears no pads.  He had erectile dysfunction prior to his radical prostatectomy and had an IPP placed in 2018.  This continues to work well for him.  Patient does note some sciatic nerve pain on the back of his right leg for the past 6 months.  He notes chronic back pain since 2001 for which he gets epidural injections which works well for him.  Other significant medical history includes a stroke in 2012 and in open heart surgery in 2014.  He denies any gross hematuria but does confirm some dysuria today.  He denies a history of kidney stones.  He also denies a family history of prostate cancer.      PMH:  Past Medical History:   Diagnosis Date    [facility]    Asthma     BPH (benign prostatic hyperplasia)     CAD (coronary artery disease)     Cardiomyopathy     CHF (congestive heart failure)     Chronic hoarseness     vocal cord surg    Chronic pain     CVA (cerebral infarction)     8/2012  maggie; reviewed ed note    Ex-smoker     GERD (gastroesophageal reflux disease)     Hepatitis C     treatedharvostephanie says cured, RNA NEG 2020    Hypertension     Pancreatitis     Prostate cancer     Prostate cancer     Substance abuse     cocaine, etoh , tob in past       PSH:  Past Surgical History:   Procedure Laterality Date    AORTIC VALVE REPLACEMENT  2014    Tissue valve replacement    BACK SURGERY      CARDIAC CATHETERIZATION      COLONOSCOPY      FOOT SURGERY      LEFT HEART CATHETERIZATION Left 2020    Procedure: CATHETERIZATION, HEART, LEFT;  Surgeon: Psaha Martin MD;  [facility];  Service: Cardiology;  Laterality: Left;    PENILE PROSTHESIS IMPLANT      PENILE PROSTHESIS REVISION  2018    [facility]    UPPER GASTROINTESTINAL ENDOSCOPY         Family History:  Family History   Problem Relation Age of Onset    Heart disease Mother     Heart attack Sister     Heart attack Brother     Colon cancer Neg Hx     Colon polyps Neg Hx     Liver cancer Neg Hx     Inflammatory bowel disease Neg Hx     Liver disease Neg Hx     Rectal cancer Neg Hx     Stomach cancer Neg Hx     Ulcerative colitis Neg Hx        Social History:  Social History     Tobacco Use    Smoking status: Former Smoker     Packs/day: 2.00     Years: 8.00     Pack years: 16.00     Quit date: 2012     Years since quittin.3    Smokeless tobacco: Never Used   Substance Use Topics    Alcohol use: No     Comment: Sober since 2012    Drug use: No        Review of Systems:  General: No fever, chills  Skin: No rashes  Chest:  Denies cough and sputum production  Heart: Denies chest pain  Resp: Denies dyspnea  Abdomen: Denies diarrhea, abdominal pain, hematemesis, or blood in stool.  Musculoskeletal: No joint stiffness or swelling. Denies back pain.  : see HPI  Neuro: no dizziness or weakness    Allergies:  Patient has no known  allergies.    Medications:    Current Outpatient Medications:     acetaminophen-codeine 300-30mg (TYLENOL #3) 300-30 mg Tab, TK 1 T PO Q 4 TO 6 H PRF PAIN, Disp: , Rfl:     albuterol (PROVENTIL/VENTOLIN HFA) 90 mcg/actuation inhaler, Inhale 2 puffs into the lungs every 6 (six) hours as needed for Wheezing., Disp: 1 g, Rfl: 2    allopurinol (ZYLOPRIM) 300 MG tablet, Take 1 tablet (300 mg total) by mouth once daily., Disp: 90 tablet, Rfl: 3    aluminum-magnesium hydroxide-simethicone (MAALOX) 200-200-20 mg/5 mL Susp, Take 30 mLs by mouth 4 (four) times daily before meals and nightly., Disp: , Rfl:     aspirin (ECOTRIN) 81 MG EC tablet, Take 81 mg by mouth once daily., Disp: , Rfl:     atorvastatin (LIPITOR) 20 MG tablet, Take 20 mg by mouth once daily., Disp: , Rfl:     carvedilol (COREG) 25 MG tablet, Take 25 mg by mouth 2 (two) times daily. , Disp: , Rfl:     cetirizine (ZYRTEC) 10 MG tablet, Take 10 mg by mouth once daily., Disp: , Rfl:     diphenhydrAMINE (SOMINEX) 25 mg tablet, Take 25 mg by mouth nightly as needed for Insomnia., Disp: , Rfl:     famotidine (PEPCID) 20 MG tablet, Take 20 mg by mouth 2 (two) times daily., Disp: , Rfl:     fexofenadine (ALLEGRA) 180 MG tablet, Take 1 tablet (180 mg total) by mouth daily as needed., Disp: 30 tablet, Rfl: 5    flunisolide 25 mcg, 0.025%, (NASALIDE) 25 mcg (0.025 %) Spry, 2 sprays by Nasal route as needed. , Disp: , Rfl:     gabapentin (NEURONTIN) 300 MG capsule, Take 300 mg by mouth 3 (three) times daily., Disp: , Rfl:     hydrocortisone 2.5 % cream, Apply topically 2 (two) times daily as needed. Apply to affected areas of the face and neck., Disp: 30 g, Rfl: 2    losartan (COZAAR) 100 MG tablet, Take 100 mg by mouth once daily., Disp: , Rfl:     methyl salicylate-menthol 15-10% 15-10 % Crea, Apply topically as needed. , Disp: , Rfl:     pantoprazole (PROTONIX) 40 MG tablet, Take 40 mg by mouth 2 (two) times daily. , Disp: , Rfl:     sertraline  (ZOLOFT) 100 MG tablet, Take 150 mg by mouth once daily. , Disp: , Rfl:     simethicone (MYLICON) 80 MG chewable tablet, Take 80 mg by mouth every 6 (six) hours as needed for Flatulence., Disp: , Rfl:     sucralfate (CARAFATE) 100 mg/mL suspension, Take 1 g by mouth 4 (four) times daily., Disp: , Rfl:     Physical Exam:  Vitals:    12/09/20 1118   BP: 137/88   Pulse: 80   Temp: 98.1 °F (36.7 °C)     General: awake, alert, cooperative  Head: NC/AT  Ears: external ears normal  Eyes: sclera normal  Lungs: normal inspiration, NAD  Heart: well-perfused  Abdomen: Soft, NT, ND  : Normal circ'd phallus, meatus normal in size and position, BL testicles palpable, no masses, nontender, no abnormalities of epididymi, all components of IPP nontender and well seated  WILLIE: deferred  Lymphatic: groin nodes negative  Skin: The skin is warm and dry  Ext: No c/c/e.  Neuro: grossly intact, AOx3    RADIOLOGY:  No recent relevant imaging available for review.    LABS:  I personally reviewed the following lab values:  Lab Results   Component Value Date    WBC 5.08 07/25/2020    HGB 13.2 (L) 07/25/2020    HCT 40.0 07/25/2020     07/25/2020     07/25/2020    K 4.6 07/25/2020     07/25/2020    CREATININE 1.5 (H) 07/25/2020    BUN 14 07/25/2020    CO2 22 (L) 07/25/2020    TSH 3.370 06/30/2020    PSA 0.88 04/08/2014    INR 1.0 07/24/2020    GLUF 83 03/13/2007    HGBA1C 5.2 10/23/2007    CHOL 201 (H) 07/25/2020    TRIG 105 07/25/2020    HDL 34 (L) 07/25/2020    ALT 13 07/25/2020    AST 19 07/25/2020       URINALYSIS:  Microscopic urinalysis negative    Assessment/Plan:   Abdullahi Urias is a 65 y.o. male with history of a prostate cancer status post radical prostatectomy in September of 2019 with biochemical recurrence.  [facility] including [facility] results, PSA trends, and surgery records.  Once these have been reviewed I will call the patient and discuss further steps.  Patient understands  and agrees I will obtain a PSA today and send his urine off for culture.    Thank you for allowing me the opportunity to participate in this patient's care.     12/10/2020 - PSA - 0.11, Free% - 54    Rommel Rodriguez MD  Urology

## 2020-12-11 ENCOUNTER — HOSPITAL ENCOUNTER (OUTPATIENT)
Dept: RADIOLOGY | Facility: HOSPITAL | Age: 65
Discharge: HOME | End: 2020-12-11
Attending: FAMILY MEDICINE
Payer: MEDICARE

## 2020-12-11 DIAGNOSIS — Z13.6 SCREENING FOR ABDOMINAL AORTIC ANEURYSM: ICD-10-CM

## 2020-12-11 LAB — BACTERIA UR CULT: NO GROWTH

## 2020-12-11 PROCEDURE — 76775 US ABDOMINAL AORTA: ICD-10-PCS | Mod: 26,,, | Performed by: RADIOLOGY

## 2020-12-11 PROCEDURE — 76775 US EXAM ABDO BACK WALL LIM: CPT | Mod: 26,,, | Performed by: RADIOLOGY

## 2020-12-11 PROCEDURE — 76775 US EXAM ABDO BACK WALL LIM: CPT | Mod: TC

## 2020-12-14 ENCOUNTER — CLINICAL SUPPORT (OUTPATIENT)
Dept: REHABILITATION | Facility: HOSPITAL | Age: 65
End: 2020-12-14
Payer: MEDICARE

## 2020-12-14 DIAGNOSIS — R53.1 WEAKNESS: ICD-10-CM

## 2020-12-14 DIAGNOSIS — M79.604 PAIN OF RIGHT LOWER EXTREMITY: ICD-10-CM

## 2020-12-14 DIAGNOSIS — M54.2 NECK PAIN: ICD-10-CM

## 2020-12-14 DIAGNOSIS — M25.60 DECREASED RANGE OF MOTION: ICD-10-CM

## 2020-12-14 PROCEDURE — 97140 MANUAL THERAPY 1/> REGIONS: CPT

## 2020-12-14 PROCEDURE — 97110 THERAPEUTIC EXERCISES: CPT

## 2020-12-14 NOTE — PROGRESS NOTES
"  Physical Therapy Treatment Note     Name: Abdullahi Urias  Clinic Number: [id number]    Therapy Diagnosis:   Encounter Diagnoses   Name Primary?    Weakness     Decreased range of motion     Neck pain     Pain of right lower extremity      Physician: Tiera Lopez NP    Visit Date: 12/14/2020    Physician Orders: PT Eval and Treat   Medical Diagnosis from Referral: M54.2 (ICD-10-CM) - Neck pain; M25.559 (ICD-10-CM) - Hip pain; M54.9 (ICD-10-CM) - Back pain, unspecified back location, unspecified back pain laterality, unspecified chronicity  Authorization Period Expiration: 12/07/2021  Plan of Care Expiration: 12/31/2021  Visit # / Visits authorized: 1/20 (total 2)    Time In: 9:30 am  Time Out: 10:15 am  Total Billable Time: 39 minutes    Precautions: history of cancer    Subjective     Pt reports:  Can only stand for about 5 min before starts to have problems, his neck feels "electrical" when he bends forward to tie his shoes.  It spreads from his neck up to his head on both sides.  He reports that he is having pain in his R buttock musculature.    He was compliant with home exercise program.  Response to previous treatment: No change  Functional change: no change    Pain: 5/10  Location: right buttocks and down to foot    Objective     Abdullahi received therapeutic exercises to develop strength, endurance, flexibility and core stabilization for 27 minutes including:    Bike 5 min, to increase ROM, strength  Bridges 15x  Clamshells 10x/ea  Brooten and arrow B sides 10x/ea  S/L hip ABD 10x/ea  LTR 20x  Sciatic nerve glide 5x pumps, 3x  Sit to stand 10x 2  paloff press 15s, 3x green band  demo'd lacrosse ball for gluteals and scapular muscles    Increased time with all therex due to positioning and avoiding substitution.      Abdullahi received the following manual therapy techniques: Myofacial release and Soft tissue Mobilization were applied to the: B subscapularis, levator scap and upper trap, R piriformis upper and " mid gluteal for 12 minutes    Home Exercises Provided and Patient Education Provided     Education provided:   - HEP, importance of activity.    Written Home Exercises Provided: yes.  Exercises were reviewed and Abdullahi was able to demonstrate them prior to the end of the session.  Abdullahi demonstrated good  understanding of the education provided.     See EMR under Patient Instructions for exercises provided 12/14/2020.    Assessment     Pt tolerated treatment well, he reports relief of symptoms post treatment in leg/hip but reports he is having increased pain in his low back.  He requires cues both verbal and tactile to perform all therex correctly and avoid substitution.    Abdullahi is progressing well towards his goals.   Pt prognosis is Good.     Pt will continue to benefit from skilled outpatient physical therapy to address the deficits listed in the problem list box on initial evaluation, provide pt/family education and to maximize pt's level of independence in the home and community environment.     Pt's spiritual, cultural and educational needs considered and pt agreeable to plan of care and goals.     Anticipated barriers to physical therapy: prior LB surgery, long history of pain, overall deconditioning, high BMI, multiple medical issues    Goals: Short Term Goals:  In 4 weeks   1.Pt to be educated on HEP.  2.Patient to increase LB AROM by 10 degrees or % all classical planes with out increased pain in order to improve overall ease of walking.  3.Patient to increase UE/LE MMT strength by 1/2 grade, to increase endurance.  4.Patient to have pain less than 6/10 at all times, to improve QOL.  5.Patient to complete FOTO with goals made PRN.    6. Pt to be educated on postural/body mechanics awareness.  7. Pt to move through available ROM of cervical spine without pain, in order to improve ability of patient to perform tasks involving cervical ROM.     Long Term Goals: In 8 weeks  1. Patient to have LTG of FOTO  made PRN.  2. Patient to demo increase in UE/LE strength to 4+/5-5/5, to increase endurance.  3. Patient to have decreased pain to 3/10 at all times, to improve QOL.  4. Patient to move through AROM LB WFL's without pain, order to improve overall ease of walking.  5. Patient to perform daily activities including walk, and stand for greater than 5 minutes, and move neck during normal ADL's without increased symptoms.  6.  Increase SL balance to 15s each side in order to assist with improving gait pattern, and decreasing fall risk.        Plan     Plan of care Certification: 12/8/2020 to 2/2/2021.     Outpatient Physical Therapy 2 times weekly for 8 weeks to include the following interventions: Electrical Stimulation PRN, Gait Training, Manual Therapy, Moist Heat/ Ice, Neuromuscular Re-ed, Patient Education, Self Care, Therapeutic Activites and Therapeutic Exercise, Virtual visits.    Kaylah Bartlett, PT

## 2020-12-15 NOTE — PROGRESS NOTES
"  Physical Therapy Treatment Note     Name: Abdullahi Urias  Clinic Number: [id number]    Therapy Diagnosis:   Encounter Diagnoses   Name Primary?    Weakness     Decreased range of motion     Neck pain     Pain of right lower extremity      Physician: Tiera Lopez NP    Visit Date: 12/16/2020    Physician Orders: PT Eval and Treat   Medical Diagnosis from Referral: M54.2 (ICD-10-CM) - Neck pain; M25.559 (ICD-10-CM) - Hip pain; M54.9 (ICD-10-CM) - Back pain, unspecified back location, unspecified back pain laterality, unspecified chronicity  Authorization Period Expiration: 12/07/2021  Plan of Care Expiration: 12/31/2021  Visit # / Visits authorized: 2/20 (total 3)    Time In: 0842  Time Out: 0932  Total Billable Time: 50 minutes    Precautions: history of cancer    Subjective     Pt reports: doing well today. Has been working on HEP from Monday. Tried to do some aerobics but was limited by symptoms after about 15 minutes.    He was compliant with home exercise program.  Response to previous treatment: No change  Functional change: no change    Pain: 3/10  Location: right buttocks and down to foot    Objective     Abdullahi received therapeutic exercises to develop strength, endurance, flexibility and core stabilization for 50 minutes including:  Supine LTR x20 ea  SKTC w/ towel x15 ea  Seated 3D ball rollout x10 ea  Goblet box squat 3x8 15#  KB deadlift 3x8 15#  Standing cable AR press 2x10 ea 40#  Supine bridge 2x12 (3")  Sidelying clam 2x12 (3") ea NR    Abdullahi received the following manual therapy techniques: Myofacial release and Soft tissue Mobilization were applied to the: B subscapularis, levator scap and upper trap, R piriformis upper and mid gluteal for 0 minutes -NT    Home Exercises Provided and Patient Education Provided     Education provided:   - HEP, importance of activity.    Written Home Exercises Provided: yes.  Exercises were reviewed and Abdullahi was able to demonstrate them prior to the end " of the session.  Abdullahi demonstrated good  understanding of the education provided.     See EMR under Patient Instructions for exercises provided 12/14/2020.    Assessment     Doing much better today. Pain and radicular symptoms much more controlled at this time, able to significant progress loading in clinic. Good tolerance to large functional patterns with external loading challenging trunk and lower quarter. Good understanding of symptom modulation strategies to utilize outside of clinic.    Abdullahi is progressing well towards his goals.   Pt prognosis is Good.     Pt will continue to benefit from skilled outpatient physical therapy to address the deficits listed in the problem list box on initial evaluation, provide pt/family education and to maximize pt's level of independence in the home and community environment.     Pt's spiritual, cultural and educational needs considered and pt agreeable to plan of care and goals.     Anticipated barriers to physical therapy: prior LB surgery, long history of pain, overall deconditioning, high BMI, multiple medical issues    Goals: Short Term Goals:  In 4 weeks  (progressing, not met)  1.Pt to be educated on HEP.  2.Patient to increase LB AROM by 10 degrees or % all classical planes with out increased pain in order to improve overall ease of walking.  3.Patient to increase UE/LE MMT strength by 1/2 grade, to increase endurance.  4.Patient to have pain less than 6/10 at all times, to improve QOL.  5.Patient to complete FOTO with goals made PRN.    6. Pt to be educated on postural/body mechanics awareness.  7. Pt to move through available ROM of cervical spine without pain, in order to improve ability of patient to perform tasks involving cervical ROM.     Long Term Goals: In 8 weeks (progressing, not met)  1. Patient to have LTG of FOTO made PRN.  2. Patient to demo increase in UE/LE strength to 4+/5-5/5, to increase endurance.  3. Patient to have decreased pain to 3/10 at  all times, to improve QOL.  4. Patient to move through AROM LB WFL's without pain, order to improve overall ease of walking.  5. Patient to perform daily activities including walk, and stand for greater than 5 minutes, and move neck during normal ADL's without increased symptoms.  6.  Increase SL balance to 15s each side in order to assist with improving gait pattern, and decreasing fall risk.        Plan     Plan of care Certification: 12/8/2020 to 2/2/2021.     Outpatient Physical Therapy 2 times weekly for 8 weeks to include the following interventions: Electrical Stimulation PRN, Gait Training, Manual Therapy, Moist Heat/ Ice, Neuromuscular Re-ed, Patient Education, Self Care, Therapeutic Activites and Therapeutic Exercise, Virtual visits.    Karie Murray PT

## 2020-12-16 ENCOUNTER — CLINICAL SUPPORT (OUTPATIENT)
Dept: REHABILITATION | Facility: HOSPITAL | Age: 65
End: 2020-12-16
Payer: MEDICARE

## 2020-12-16 ENCOUNTER — PATIENT OUTREACH (OUTPATIENT)
Dept: ADMINISTRATIVE | Facility: HOSPITAL | Age: 65
End: 2020-12-16

## 2020-12-16 ENCOUNTER — TELEPHONE (OUTPATIENT)
Dept: INTERNAL MEDICINE | Facility: CLINIC | Age: 65
End: 2020-12-16

## 2020-12-16 DIAGNOSIS — M54.2 NECK PAIN: ICD-10-CM

## 2020-12-16 DIAGNOSIS — M25.60 DECREASED RANGE OF MOTION: ICD-10-CM

## 2020-12-16 DIAGNOSIS — R53.1 WEAKNESS: ICD-10-CM

## 2020-12-16 DIAGNOSIS — M79.604 PAIN OF RIGHT LOWER EXTREMITY: ICD-10-CM

## 2020-12-16 PROCEDURE — 97110 THERAPEUTIC EXERCISES: CPT

## 2020-12-16 NOTE — PROGRESS NOTES
Working HTN Report.  Requested updated bp reading. BP checked while on the phone 116/89. Remote entry made.

## 2020-12-18 ENCOUNTER — TELEPHONE (OUTPATIENT)
Dept: INTERNAL MEDICINE | Facility: CLINIC | Age: 65
End: 2020-12-18

## 2020-12-18 NOTE — TELEPHONE ENCOUNTER
----- Message from Mary Bennett sent at 12/18/2020 12:48 PM CST -----  Contact: Abdullahi Fernando would like a call back at [phone], Regards to getting something called in to for pain.    [facility] [employer] [id number] - [place] - [address] AT [facility] [address]  [address] [address]  Phone: [phone] Fax: [phone]    Thanks  Td

## 2020-12-18 NOTE — TELEPHONE ENCOUNTER
Spoke with patient. C/o back pain that radiates to leg and R foot. 7.5/10 on pain scale. Pt is requesting pain medication to be sent to pharmacy ([facility]).//ddw

## 2020-12-30 ENCOUNTER — DOCUMENTATION ONLY (OUTPATIENT)
Dept: REHABILITATION | Facility: HOSPITAL | Age: 65
End: 2020-12-30

## 2020-12-30 NOTE — PROGRESS NOTES
Spoke with patient regarding recent cancellations and no-shows for last 3 appointments. Pt states he's been having some neck pain that's prevented him from coming. States that he still has back pain but is able to do exercises to improve it and work around it. Does note he intends to come in for next scheduled appointment on Monday 1/4. Will plan for this, but if pt again fails to present will have to discuss termination of remaining appointments based on clinic policy. Will f/u prn.    Karie Murray, PT, DPT

## 2021-01-04 ENCOUNTER — CLINICAL SUPPORT (OUTPATIENT)
Dept: REHABILITATION | Facility: HOSPITAL | Age: 66
End: 2021-01-04
Payer: MEDICARE

## 2021-01-04 DIAGNOSIS — M79.604 PAIN OF RIGHT LOWER EXTREMITY: ICD-10-CM

## 2021-01-04 DIAGNOSIS — R53.1 WEAKNESS: ICD-10-CM

## 2021-01-04 DIAGNOSIS — M54.2 NECK PAIN: ICD-10-CM

## 2021-01-04 DIAGNOSIS — M25.60 DECREASED RANGE OF MOTION: ICD-10-CM

## 2021-01-04 PROCEDURE — 97110 THERAPEUTIC EXERCISES: CPT

## 2021-01-05 ENCOUNTER — LAB VISIT (OUTPATIENT)
Dept: LAB | Facility: HOSPITAL | Age: 66
End: 2021-01-05
Attending: FAMILY MEDICINE
Payer: MEDICARE

## 2021-01-05 DIAGNOSIS — N28.9 RENAL INSUFFICIENCY: ICD-10-CM

## 2021-01-05 LAB
ALBUMIN SERPL BCP-MCNC: 4.2 G/DL (ref 3.5–5.2)
ALP SERPL-CCNC: 94 U/L (ref 55–135)
ALT SERPL W/O P-5'-P-CCNC: 15 U/L (ref 10–44)
ANION GAP SERPL CALC-SCNC: 8 MMOL/L (ref 8–16)
AST SERPL-CCNC: 23 U/L (ref 10–40)
BASOPHILS # BLD AUTO: 0.04 K/UL (ref 0–0.2)
BASOPHILS NFR BLD: 0.9 % (ref 0–1.9)
BILIRUB SERPL-MCNC: 0.6 MG/DL (ref 0.1–1)
BUN SERPL-MCNC: 20 MG/DL (ref 8–23)
CALCIUM SERPL-MCNC: 9.4 MG/DL (ref 8.7–10.5)
CHLORIDE SERPL-SCNC: 107 MMOL/L (ref 95–110)
CHOLEST SERPL-MCNC: 185 MG/DL (ref 120–199)
CHOLEST/HDLC SERPL: 4.9 {RATIO} (ref 2–5)
CO2 SERPL-SCNC: 24 MMOL/L (ref 23–29)
CREAT SERPL-MCNC: 1.8 MG/DL (ref 0.5–1.4)
DIFFERENTIAL METHOD: ABNORMAL
EOSINOPHIL # BLD AUTO: 0.3 K/UL (ref 0–0.5)
EOSINOPHIL NFR BLD: 7.1 % (ref 0–8)
ERYTHROCYTE [DISTWIDTH] IN BLOOD BY AUTOMATED COUNT: 14.7 % (ref 11.5–14.5)
EST. GFR  (AFRICAN AMERICAN): 44.7 ML/MIN/1.73 M^2
EST. GFR  (NON AFRICAN AMERICAN): 38.6 ML/MIN/1.73 M^2
GLUCOSE SERPL-MCNC: 100 MG/DL (ref 70–110)
HCT VFR BLD AUTO: 42 % (ref 40–54)
HDLC SERPL-MCNC: 38 MG/DL (ref 40–75)
HDLC SERPL: 20.5 % (ref 20–50)
HGB BLD-MCNC: 13.7 G/DL (ref 14–18)
IMM GRANULOCYTES # BLD AUTO: 0.02 K/UL (ref 0–0.04)
IMM GRANULOCYTES NFR BLD AUTO: 0.5 % (ref 0–0.5)
LDLC SERPL CALC-MCNC: 127.2 MG/DL (ref 63–159)
LYMPHOCYTES # BLD AUTO: 1.6 K/UL (ref 1–4.8)
LYMPHOCYTES NFR BLD: 38.6 % (ref 18–48)
MCH RBC QN AUTO: 28.5 PG (ref 27–31)
MCHC RBC AUTO-ENTMCNC: 32.6 G/DL (ref 32–36)
MCV RBC AUTO: 87 FL (ref 82–98)
MONOCYTES # BLD AUTO: 0.4 K/UL (ref 0.3–1)
MONOCYTES NFR BLD: 10.2 % (ref 4–15)
NEUTROPHILS # BLD AUTO: 1.8 K/UL (ref 1.8–7.7)
NEUTROPHILS NFR BLD: 42.7 % (ref 38–73)
NONHDLC SERPL-MCNC: 147 MG/DL
NRBC BLD-RTO: 0 /100 WBC
PLATELET # BLD AUTO: 192 K/UL (ref 150–350)
PMV BLD AUTO: 10.6 FL (ref 9.2–12.9)
POTASSIUM SERPL-SCNC: 4.5 MMOL/L (ref 3.5–5.1)
PROT SERPL-MCNC: 8 G/DL (ref 6–8.4)
RBC # BLD AUTO: 4.81 M/UL (ref 4.6–6.2)
SODIUM SERPL-SCNC: 139 MMOL/L (ref 136–145)
TRIGL SERPL-MCNC: 99 MG/DL (ref 30–150)
URATE SERPL-MCNC: 4.4 MG/DL (ref 3.4–7)
WBC # BLD AUTO: 4.22 K/UL (ref 3.9–12.7)

## 2021-01-05 PROCEDURE — 84550 ASSAY OF BLOOD/URIC ACID: CPT

## 2021-01-05 PROCEDURE — 36415 COLL VENOUS BLD VENIPUNCTURE: CPT

## 2021-01-05 PROCEDURE — 80053 COMPREHEN METABOLIC PANEL: CPT

## 2021-01-05 PROCEDURE — 85025 COMPLETE CBC W/AUTO DIFF WBC: CPT

## 2021-01-05 PROCEDURE — 80061 LIPID PANEL: CPT

## 2021-01-06 ENCOUNTER — CLINICAL SUPPORT (OUTPATIENT)
Dept: REHABILITATION | Facility: HOSPITAL | Age: 66
End: 2021-01-06
Payer: MEDICARE

## 2021-01-06 DIAGNOSIS — M25.60 DECREASED RANGE OF MOTION: ICD-10-CM

## 2021-01-06 DIAGNOSIS — R53.1 WEAKNESS: ICD-10-CM

## 2021-01-06 DIAGNOSIS — M79.604 PAIN OF RIGHT LOWER EXTREMITY: ICD-10-CM

## 2021-01-06 DIAGNOSIS — M54.2 NECK PAIN: ICD-10-CM

## 2021-01-06 PROCEDURE — 97110 THERAPEUTIC EXERCISES: CPT

## 2021-01-12 ENCOUNTER — OFFICE VISIT (OUTPATIENT)
Dept: INTERNAL MEDICINE | Facility: CLINIC | Age: 66
End: 2021-01-12
Payer: COMMERCIAL

## 2021-01-12 ENCOUNTER — HOSPITAL ENCOUNTER (OUTPATIENT)
Dept: RADIOLOGY | Facility: HOSPITAL | Age: 66
Discharge: HOME | End: 2021-01-12
Attending: SPECIALIST
Payer: MEDICARE

## 2021-01-12 VITALS
WEIGHT: 240.06 LBS | HEIGHT: 70 IN | HEART RATE: 90 BPM | DIASTOLIC BLOOD PRESSURE: 76 MMHG | BODY MASS INDEX: 34.37 KG/M2 | TEMPERATURE: 97 F | SYSTOLIC BLOOD PRESSURE: 108 MMHG | OXYGEN SATURATION: 100 %

## 2021-01-12 DIAGNOSIS — N18.30 STAGE 3 CHRONIC KIDNEY DISEASE, UNSPECIFIED WHETHER STAGE 3A OR 3B CKD: Primary | ICD-10-CM

## 2021-01-12 DIAGNOSIS — J45.50 SEVERE PERSISTENT ASTHMA WITHOUT COMPLICATION: ICD-10-CM

## 2021-01-12 DIAGNOSIS — K21.9 GASTROESOPHAGEAL REFLUX DISEASE, UNSPECIFIED WHETHER ESOPHAGITIS PRESENT: ICD-10-CM

## 2021-01-12 DIAGNOSIS — I10 ESSENTIAL HYPERTENSION: Chronic | ICD-10-CM

## 2021-01-12 DIAGNOSIS — M1A.9XX0 CHRONIC GOUT INVOLVING TOE WITHOUT TOPHUS, UNSPECIFIED CAUSE, UNSPECIFIED LATERALITY: ICD-10-CM

## 2021-01-12 DIAGNOSIS — C61 PROSTATE CANCER: ICD-10-CM

## 2021-01-12 DIAGNOSIS — N28.9 RENAL INSUFFICIENCY: ICD-10-CM

## 2021-01-12 DIAGNOSIS — I25.10 CORONARY ARTERY DISEASE, ANGINA PRESENCE UNSPECIFIED, UNSPECIFIED VESSEL OR LESION TYPE, UNSPECIFIED WHETHER NATIVE OR TRANSPLANTED HEART: Chronic | ICD-10-CM

## 2021-01-12 DIAGNOSIS — Z86.73 HISTORY OF CVA IN ADULTHOOD: ICD-10-CM

## 2021-01-12 DIAGNOSIS — I35.0 AORTIC VALVE STENOSIS, ETIOLOGY OF CARDIAC VALVE DISEASE UNSPECIFIED: ICD-10-CM

## 2021-01-12 DIAGNOSIS — G47.33 OSA ON CPAP: ICD-10-CM

## 2021-01-12 DIAGNOSIS — Z01.89 ENCOUNTER FOR OTHER SPECIFIED SPECIAL EXAMINATIONS: ICD-10-CM

## 2021-01-12 PROCEDURE — 99999 PR PBB SHADOW E&M-EST. PATIENT-LVL III: CPT | Mod: PBBFAC,,, | Performed by: FAMILY MEDICINE

## 2021-01-12 PROCEDURE — 72148 MRI LUMBAR SPINE W/O DYE: CPT | Mod: TC

## 2021-01-12 PROCEDURE — 99214 OFFICE O/P EST MOD 30 MIN: CPT | Mod: S$PBB,,, | Performed by: FAMILY MEDICINE

## 2021-01-12 PROCEDURE — 72148 MRI LUMBAR SPINE W/O DYE: CPT | Mod: 26,,, | Performed by: RADIOLOGY

## 2021-01-12 PROCEDURE — 72148 MRI LUMBAR SPINE WITHOUT CONTRAST: ICD-10-PCS | Mod: 26,,, | Performed by: RADIOLOGY

## 2021-01-12 PROCEDURE — 99213 OFFICE O/P EST LOW 20 MIN: CPT | Mod: PBBFAC | Performed by: FAMILY MEDICINE

## 2021-01-12 PROCEDURE — 99999 PR PBB SHADOW E&M-EST. PATIENT-LVL III: ICD-10-PCS | Mod: PBBFAC,,, | Performed by: FAMILY MEDICINE

## 2021-01-12 PROCEDURE — 99214 PR OFFICE/OUTPT VISIT, EST, LEVL IV, 30-39 MIN: ICD-10-PCS | Mod: S$PBB,,, | Performed by: FAMILY MEDICINE

## 2021-01-12 RX ORDER — ASPIRIN 81 MG/1
TABLET ORAL
COMMUNITY
Start: 2020-05-14 | End: 2022-07-19 | Stop reason: SDUPTHER

## 2021-01-12 RX ORDER — ATORVASTATIN CALCIUM 20 MG/1
TABLET, FILM COATED ORAL
COMMUNITY
Start: 2020-11-04 | End: 2021-07-22

## 2021-01-12 RX ORDER — FERROUS FUMARATE 324(106)MG
TABLET ORAL
COMMUNITY
Start: 2020-11-04 | End: 2022-01-27 | Stop reason: ALTCHOICE

## 2021-01-12 RX ORDER — CARVEDILOL 25 MG/1
TABLET ORAL
COMMUNITY
Start: 2020-11-04 | End: 2021-05-28 | Stop reason: SDUPTHER

## 2021-01-12 RX ORDER — FAMOTIDINE 40 MG/1
TABLET, FILM COATED ORAL
COMMUNITY
Start: 2020-04-30

## 2021-01-12 RX ORDER — MONTELUKAST SODIUM 10 MG/1
TABLET ORAL
COMMUNITY
Start: 2020-03-30 | End: 2022-01-27

## 2021-01-12 RX ORDER — ESOMEPRAZOLE MAGNESIUM 40 MG/1
CAPSULE, DELAYED RELEASE ORAL
COMMUNITY
Start: 2020-09-18 | End: 2022-04-08

## 2021-01-12 RX ORDER — SERTRALINE HYDROCHLORIDE 100 MG/1
TABLET, FILM COATED ORAL
COMMUNITY
Start: 2020-09-30

## 2021-01-12 RX ORDER — ALPRAZOLAM 0.25 MG/1
TABLET ORAL
COMMUNITY
Start: 2020-09-30 | End: 2022-01-27 | Stop reason: ALTCHOICE

## 2021-01-12 RX ORDER — OXYBUTYNIN CHLORIDE 10 MG/1
TABLET, EXTENDED RELEASE ORAL
COMMUNITY
Start: 2020-06-11 | End: 2022-01-27 | Stop reason: ALTCHOICE

## 2021-01-12 RX ORDER — ZOLPIDEM TARTRATE 12.5 MG/1
TABLET, FILM COATED, EXTENDED RELEASE ORAL
COMMUNITY
Start: 2020-09-30 | End: 2022-01-27 | Stop reason: ALTCHOICE

## 2021-01-12 RX ORDER — LOSARTAN POTASSIUM 100 MG/1
TABLET ORAL
COMMUNITY
Start: 2020-08-03 | End: 2021-07-22

## 2021-01-12 RX ORDER — HYDROXYZINE HYDROCHLORIDE 25 MG/1
25 TABLET, FILM COATED ORAL 3 TIMES DAILY PRN
COMMUNITY
End: 2023-07-21 | Stop reason: SDUPTHER

## 2021-01-12 RX ORDER — ALLOPURINOL 300 MG/1
TABLET ORAL
COMMUNITY
Start: 2020-11-04 | End: 2021-05-28 | Stop reason: SDUPTHER

## 2021-01-12 RX ORDER — CYCLOBENZAPRINE HCL 10 MG
TABLET ORAL
COMMUNITY
Start: 2020-12-22 | End: 2022-10-11 | Stop reason: ALTCHOICE

## 2021-01-12 RX ORDER — LORATADINE 10 MG/1
TABLET ORAL
COMMUNITY
Start: 2020-11-04 | End: 2021-11-11 | Stop reason: ALTCHOICE

## 2021-01-12 RX ORDER — ALUMINUM HYDROXIDE, MAGNESIUM HYDROXIDE, AND SIMETHICONE 2400; 240; 2400 MG/30ML; MG/30ML; MG/30ML
SUSPENSION ORAL
COMMUNITY
Start: 2020-06-11

## 2021-01-15 ENCOUNTER — TELEPHONE (OUTPATIENT)
Dept: ENDOSCOPY | Facility: HOSPITAL | Age: 66
End: 2021-01-15

## 2021-01-15 DIAGNOSIS — K21.9 GASTROESOPHAGEAL REFLUX DISEASE, UNSPECIFIED WHETHER ESOPHAGITIS PRESENT: Primary | ICD-10-CM

## 2021-01-15 RX ORDER — SODIUM, POTASSIUM,MAG SULFATES 17.5-3.13G
1 SOLUTION, RECONSTITUTED, ORAL ORAL DAILY
Qty: 1 KIT | Refills: 0 | Status: SHIPPED | OUTPATIENT
Start: 2021-01-15 | End: 2021-01-17

## 2021-01-19 ENCOUNTER — TELEPHONE (OUTPATIENT)
Dept: UROLOGY | Facility: CLINIC | Age: 66
End: 2021-01-19

## 2021-01-20 ENCOUNTER — PATIENT OUTREACH (OUTPATIENT)
Dept: ADMINISTRATIVE | Facility: OTHER | Age: 66
End: 2021-01-20

## 2021-01-21 ENCOUNTER — TELEPHONE (OUTPATIENT)
Dept: REHABILITATION | Facility: HOSPITAL | Age: 66
End: 2021-01-21

## 2021-01-21 ENCOUNTER — OFFICE VISIT (OUTPATIENT)
Dept: UROLOGY | Facility: CLINIC | Age: 66
End: 2021-01-21
Payer: MEDICARE

## 2021-01-21 VITALS
BODY MASS INDEX: 33.96 KG/M2 | WEIGHT: 237.19 LBS | SYSTOLIC BLOOD PRESSURE: 128 MMHG | HEART RATE: 74 BPM | HEIGHT: 70 IN | TEMPERATURE: 98 F | DIASTOLIC BLOOD PRESSURE: 76 MMHG

## 2021-01-21 DIAGNOSIS — C61 PROSTATE CANCER: Primary | ICD-10-CM

## 2021-01-21 PROCEDURE — 99215 OFFICE O/P EST HI 40 MIN: CPT | Mod: PBBFAC | Performed by: UROLOGY

## 2021-01-21 PROCEDURE — 99213 PR OFFICE/OUTPT VISIT, EST, LEVL III, 20-29 MIN: ICD-10-PCS | Mod: S$PBB,,, | Performed by: UROLOGY

## 2021-01-21 PROCEDURE — 99999 PR PBB SHADOW E&M-EST. PATIENT-LVL V: ICD-10-PCS | Mod: PBBFAC,,, | Performed by: UROLOGY

## 2021-01-21 PROCEDURE — 99999 PR PBB SHADOW E&M-EST. PATIENT-LVL V: CPT | Mod: PBBFAC,,, | Performed by: UROLOGY

## 2021-01-21 PROCEDURE — 99213 OFFICE O/P EST LOW 20 MIN: CPT | Mod: S$PBB,,, | Performed by: UROLOGY

## 2021-02-01 ENCOUNTER — INITIAL CONSULT (OUTPATIENT)
Dept: RADIATION ONCOLOGY | Facility: CLINIC | Age: 66
End: 2021-02-01
Payer: MEDICARE

## 2021-02-01 VITALS
HEART RATE: 81 BPM | DIASTOLIC BLOOD PRESSURE: 75 MMHG | BODY MASS INDEX: 35.15 KG/M2 | OXYGEN SATURATION: 98 % | HEIGHT: 69 IN | SYSTOLIC BLOOD PRESSURE: 122 MMHG | RESPIRATION RATE: 18 BRPM | TEMPERATURE: 98 F | WEIGHT: 237.31 LBS

## 2021-02-01 DIAGNOSIS — C61 PROSTATE CANCER: Primary | ICD-10-CM

## 2021-02-01 PROCEDURE — 99999 PR PBB SHADOW E&M-EST. PATIENT-LVL V: CPT | Mod: PBBFAC,,, | Performed by: RADIOLOGY

## 2021-02-01 PROCEDURE — 99205 OFFICE O/P NEW HI 60 MIN: CPT | Mod: S$PBB,,, | Performed by: RADIOLOGY

## 2021-02-01 PROCEDURE — 99215 OFFICE O/P EST HI 40 MIN: CPT | Mod: PBBFAC | Performed by: RADIOLOGY

## 2021-02-01 PROCEDURE — 99205 PR OFFICE/OUTPT VISIT, NEW, LEVL V, 60-74 MIN: ICD-10-PCS | Mod: S$PBB,,, | Performed by: RADIOLOGY

## 2021-02-01 PROCEDURE — 99999 PR PBB SHADOW E&M-EST. PATIENT-LVL V: ICD-10-PCS | Mod: PBBFAC,,, | Performed by: RADIOLOGY

## 2021-02-02 ENCOUNTER — TELEPHONE (OUTPATIENT)
Dept: UROLOGY | Facility: CLINIC | Age: 66
End: 2021-02-02

## 2021-02-03 ENCOUNTER — OFFICE VISIT (OUTPATIENT)
Dept: NEPHROLOGY | Facility: CLINIC | Age: 66
End: 2021-02-03
Payer: MEDICARE

## 2021-02-03 ENCOUNTER — TELEPHONE (OUTPATIENT)
Dept: REHABILITATION | Facility: HOSPITAL | Age: 66
End: 2021-02-03

## 2021-02-03 ENCOUNTER — DOCUMENTATION ONLY (OUTPATIENT)
Dept: REHABILITATION | Facility: HOSPITAL | Age: 66
End: 2021-02-03

## 2021-02-03 VITALS
HEIGHT: 69 IN | HEART RATE: 74 BPM | WEIGHT: 235.44 LBS | SYSTOLIC BLOOD PRESSURE: 120 MMHG | BODY MASS INDEX: 34.87 KG/M2 | DIASTOLIC BLOOD PRESSURE: 80 MMHG

## 2021-02-03 DIAGNOSIS — M54.2 NECK PAIN: ICD-10-CM

## 2021-02-03 DIAGNOSIS — N28.9 RENAL INSUFFICIENCY: ICD-10-CM

## 2021-02-03 DIAGNOSIS — M79.604 PAIN OF RIGHT LOWER EXTREMITY: ICD-10-CM

## 2021-02-03 DIAGNOSIS — R53.1 WEAKNESS: Primary | ICD-10-CM

## 2021-02-03 DIAGNOSIS — N18.31 STAGE 3A CHRONIC KIDNEY DISEASE: Primary | ICD-10-CM

## 2021-02-03 DIAGNOSIS — M25.60 DECREASED RANGE OF MOTION: ICD-10-CM

## 2021-02-03 DIAGNOSIS — I10 HTN (HYPERTENSION), BENIGN: ICD-10-CM

## 2021-02-03 DIAGNOSIS — N25.81 SECONDARY HYPERPARATHYROIDISM OF RENAL ORIGIN: ICD-10-CM

## 2021-02-03 PROCEDURE — 99999 PR PBB SHADOW E&M-EST. PATIENT-LVL V: ICD-10-PCS | Mod: PBBFAC,,, | Performed by: INTERNAL MEDICINE

## 2021-02-03 PROCEDURE — 99215 OFFICE O/P EST HI 40 MIN: CPT | Mod: PBBFAC | Performed by: INTERNAL MEDICINE

## 2021-02-03 PROCEDURE — 99204 PR OFFICE/OUTPT VISIT, NEW, LEVL IV, 45-59 MIN: ICD-10-PCS | Mod: S$PBB,,, | Performed by: INTERNAL MEDICINE

## 2021-02-03 PROCEDURE — 99204 OFFICE O/P NEW MOD 45 MIN: CPT | Mod: S$PBB,,, | Performed by: INTERNAL MEDICINE

## 2021-02-03 PROCEDURE — 99999 PR PBB SHADOW E&M-EST. PATIENT-LVL V: CPT | Mod: PBBFAC,,, | Performed by: INTERNAL MEDICINE

## 2021-02-10 RX ORDER — SODIUM, POTASSIUM,MAG SULFATES 17.5-3.13G
1 SOLUTION, RECONSTITUTED, ORAL ORAL DAILY
Qty: 1 KIT | Refills: 0 | Status: SHIPPED | OUTPATIENT
Start: 2021-02-10 | End: 2021-02-14

## 2021-02-12 ENCOUNTER — LAB VISIT (OUTPATIENT)
Dept: OTOLARYNGOLOGY | Facility: CLINIC | Age: 66
End: 2021-02-12
Payer: MEDICARE

## 2021-02-12 DIAGNOSIS — K21.9 GASTROESOPHAGEAL REFLUX DISEASE, UNSPECIFIED WHETHER ESOPHAGITIS PRESENT: ICD-10-CM

## 2021-02-12 LAB — SARS-COV-2 RNA RESP QL NAA+PROBE: NOT DETECTED

## 2021-02-12 PROCEDURE — U0003 INFECTIOUS AGENT DETECTION BY NUCLEIC ACID (DNA OR RNA); SEVERE ACUTE RESPIRATORY SYNDROME CORONAVIRUS 2 (SARS-COV-2) (CORONAVIRUS DISEASE [COVID-19]), AMPLIFIED PROBE TECHNIQUE, MAKING USE OF HIGH THROUGHPUT TECHNOLOGIES AS DESCRIBED BY CMS-2020-01-R: HCPCS

## 2021-02-12 PROCEDURE — U0005 INFEC AGEN DETEC AMPLI PROBE: HCPCS

## 2021-02-17 ENCOUNTER — TELEPHONE (OUTPATIENT)
Dept: ENDOSCOPY | Facility: HOSPITAL | Age: 66
End: 2021-02-17

## 2021-02-17 DIAGNOSIS — Z01.818 PRE-OP TESTING: ICD-10-CM

## 2021-02-19 ENCOUNTER — TELEPHONE (OUTPATIENT)
Dept: ENDOSCOPY | Facility: HOSPITAL | Age: 66
End: 2021-02-19

## 2021-02-22 ENCOUNTER — LAB VISIT (OUTPATIENT)
Dept: OTOLARYNGOLOGY | Facility: CLINIC | Age: 66
End: 2021-02-22
Payer: MEDICARE

## 2021-02-22 DIAGNOSIS — Z01.818 PRE-OP TESTING: ICD-10-CM

## 2021-02-22 PROCEDURE — U0005 INFEC AGEN DETEC AMPLI PROBE: HCPCS

## 2021-02-22 PROCEDURE — U0003 INFECTIOUS AGENT DETECTION BY NUCLEIC ACID (DNA OR RNA); SEVERE ACUTE RESPIRATORY SYNDROME CORONAVIRUS 2 (SARS-COV-2) (CORONAVIRUS DISEASE [COVID-19]), AMPLIFIED PROBE TECHNIQUE, MAKING USE OF HIGH THROUGHPUT TECHNOLOGIES AS DESCRIBED BY CMS-2020-01-R: HCPCS

## 2021-02-23 PROBLEM — Z12.11 ENCOUNTER FOR SCREENING COLONOSCOPY: Status: ACTIVE | Noted: 2021-02-23

## 2021-02-23 PROBLEM — K21.9 GERD (GASTROESOPHAGEAL REFLUX DISEASE): Status: ACTIVE | Noted: 2021-02-23

## 2021-02-23 LAB — SARS-COV-2 RNA RESP QL NAA+PROBE: NOT DETECTED

## 2021-02-24 ENCOUNTER — ANESTHESIA EVENT (OUTPATIENT)
Dept: ENDOSCOPY | Facility: HOSPITAL | Age: 66
End: 2021-02-24
Payer: MEDICARE

## 2021-02-24 ENCOUNTER — HOSPITAL ENCOUNTER (OUTPATIENT)
Facility: HOSPITAL | Age: 66
Discharge: HOME | End: 2021-02-24
Attending: INTERNAL MEDICINE | Admitting: INTERNAL MEDICINE
Payer: MEDICARE

## 2021-02-24 ENCOUNTER — ANESTHESIA (OUTPATIENT)
Dept: ENDOSCOPY | Facility: HOSPITAL | Age: 66
End: 2021-02-24
Payer: MEDICARE

## 2021-02-24 VITALS
OXYGEN SATURATION: 92 % | HEART RATE: 78 BPM | WEIGHT: 227.31 LBS | TEMPERATURE: 98 F | HEIGHT: 70 IN | DIASTOLIC BLOOD PRESSURE: 82 MMHG | SYSTOLIC BLOOD PRESSURE: 105 MMHG | BODY MASS INDEX: 32.54 KG/M2 | RESPIRATION RATE: 20 BRPM

## 2021-02-24 DIAGNOSIS — K21.9 GERD (GASTROESOPHAGEAL REFLUX DISEASE): ICD-10-CM

## 2021-02-24 DIAGNOSIS — Z12.11 COLON CANCER SCREENING: ICD-10-CM

## 2021-02-24 DIAGNOSIS — Z12.11 ENCOUNTER FOR SCREENING COLONOSCOPY: Primary | ICD-10-CM

## 2021-02-24 PROBLEM — Z86.0100 PERSONAL HISTORY OF COLONIC POLYPS: Status: ACTIVE | Noted: 2021-02-23

## 2021-02-24 PROBLEM — Z86.010 PERSONAL HISTORY OF COLONIC POLYPS: Status: ACTIVE | Noted: 2021-02-23

## 2021-02-24 PROCEDURE — 27201012 HC FORCEPS, HOT/COLD, DISP: Performed by: INTERNAL MEDICINE

## 2021-02-24 PROCEDURE — 45380 COLONOSCOPY AND BIOPSY: CPT | Performed by: INTERNAL MEDICINE

## 2021-02-24 PROCEDURE — 63600175 PHARM REV CODE 636 W HCPCS: Performed by: NURSE ANESTHETIST, CERTIFIED REGISTERED

## 2021-02-24 PROCEDURE — 43239 EGD BIOPSY SINGLE/MULTIPLE: CPT | Mod: 51,,, | Performed by: INTERNAL MEDICINE

## 2021-02-24 PROCEDURE — 88305 TISSUE EXAM BY PATHOLOGIST: CPT | Mod: 26,,, | Performed by: PATHOLOGY

## 2021-02-24 PROCEDURE — 37000009 HC ANESTHESIA EA ADD 15 MINS: Performed by: INTERNAL MEDICINE

## 2021-02-24 PROCEDURE — 45380 PR COLONOSCOPY,BIOPSY: ICD-10-PCS | Mod: PT,,, | Performed by: INTERNAL MEDICINE

## 2021-02-24 PROCEDURE — 43239 PR EGD, FLEX, W/BIOPSY, SGL/MULTI: ICD-10-PCS | Mod: 51,,, | Performed by: INTERNAL MEDICINE

## 2021-02-24 PROCEDURE — 88305 TISSUE EXAM BY PATHOLOGIST: CPT | Performed by: PATHOLOGY

## 2021-02-24 PROCEDURE — 88305 TISSUE EXAM BY PATHOLOGIST: ICD-10-PCS | Mod: 26,,, | Performed by: PATHOLOGY

## 2021-02-24 PROCEDURE — 43239 EGD BIOPSY SINGLE/MULTIPLE: CPT | Performed by: INTERNAL MEDICINE

## 2021-02-24 PROCEDURE — 37000008 HC ANESTHESIA 1ST 15 MINUTES: Performed by: INTERNAL MEDICINE

## 2021-02-24 PROCEDURE — 45380 COLONOSCOPY AND BIOPSY: CPT | Mod: PT,,, | Performed by: INTERNAL MEDICINE

## 2021-02-24 PROCEDURE — 25000003 PHARM REV CODE 250: Performed by: NURSE ANESTHETIST, CERTIFIED REGISTERED

## 2021-02-24 RX ORDER — SODIUM CHLORIDE 0.9 % (FLUSH) 0.9 %
10 SYRINGE (ML) INJECTION
Status: ACTIVE | OUTPATIENT
Start: 2021-02-24

## 2021-02-24 RX ORDER — LIDOCAINE HYDROCHLORIDE 20 MG/ML
INJECTION, SOLUTION EPIDURAL; INFILTRATION; INTRACAUDAL; PERINEURAL
Status: DISCONTINUED | OUTPATIENT
Start: 2021-02-24 | End: 2021-02-24

## 2021-02-24 RX ORDER — PROPOFOL 10 MG/ML
VIAL (ML) INTRAVENOUS
Status: DISCONTINUED | OUTPATIENT
Start: 2021-02-24 | End: 2021-02-24

## 2021-02-24 RX ORDER — SODIUM CHLORIDE, SODIUM LACTATE, POTASSIUM CHLORIDE, CALCIUM CHLORIDE 600; 310; 30; 20 MG/100ML; MG/100ML; MG/100ML; MG/100ML
INJECTION, SOLUTION INTRAVENOUS CONTINUOUS PRN
Status: DISCONTINUED | OUTPATIENT
Start: 2021-02-24 | End: 2021-02-24

## 2021-02-24 RX ADMIN — PROPOFOL 30 MG: 10 INJECTION, EMULSION INTRAVENOUS at 07:02

## 2021-02-24 RX ADMIN — GLYCOPYRROLATE 0.2 MG: 0.2 INJECTION, SOLUTION INTRAMUSCULAR; INTRAVITREAL at 07:02

## 2021-02-24 RX ADMIN — PROPOFOL 100 MG: 10 INJECTION, EMULSION INTRAVENOUS at 07:02

## 2021-02-24 RX ADMIN — SODIUM CHLORIDE, SODIUM LACTATE, POTASSIUM CHLORIDE, AND CALCIUM CHLORIDE: 600; 310; 30; 20 INJECTION, SOLUTION INTRAVENOUS at 07:02

## 2021-02-24 RX ADMIN — LIDOCAINE HYDROCHLORIDE 100 MG: 20 INJECTION, SOLUTION EPIDURAL; INFILTRATION; INTRACAUDAL; PERINEURAL at 07:02

## 2021-02-26 ENCOUNTER — LAB VISIT (OUTPATIENT)
Dept: LAB | Facility: HOSPITAL | Age: 66
End: 2021-02-26
Attending: RADIOLOGY
Payer: MEDICARE

## 2021-02-26 DIAGNOSIS — C61 PROSTATE CANCER: ICD-10-CM

## 2021-02-26 PROCEDURE — 84153 ASSAY OF PSA TOTAL: CPT

## 2021-02-26 PROCEDURE — 36415 COLL VENOUS BLD VENIPUNCTURE: CPT

## 2021-02-27 LAB — COMPLEXED PSA SERPL-MCNC: 0.29 NG/ML (ref 0–4)

## 2021-03-01 ENCOUNTER — TELEPHONE (OUTPATIENT)
Dept: UROLOGY | Facility: CLINIC | Age: 66
End: 2021-03-01

## 2021-03-01 DIAGNOSIS — C61 PROSTATE CANCER: Primary | ICD-10-CM

## 2021-03-01 LAB
FINAL PATHOLOGIC DIAGNOSIS: NORMAL
GROSS: NORMAL
Lab: NORMAL

## 2021-03-02 DIAGNOSIS — C61 PROSTATE CANCER: Primary | ICD-10-CM

## 2021-03-04 ENCOUNTER — TELEPHONE (OUTPATIENT)
Dept: UROLOGY | Facility: CLINIC | Age: 66
End: 2021-03-04

## 2021-03-05 ENCOUNTER — CLINICAL SUPPORT (OUTPATIENT)
Dept: UROLOGY | Facility: CLINIC | Age: 66
End: 2021-03-05
Payer: MEDICARE

## 2021-03-05 VITALS
WEIGHT: 226.44 LBS | BODY MASS INDEX: 32.49 KG/M2 | TEMPERATURE: 97 F | SYSTOLIC BLOOD PRESSURE: 112 MMHG | DIASTOLIC BLOOD PRESSURE: 70 MMHG

## 2021-03-05 DIAGNOSIS — C61 PROSTATE CANCER: Primary | ICD-10-CM

## 2021-03-05 PROCEDURE — 99999 PR PBB SHADOW E&M-EST. PATIENT-LVL V: ICD-10-PCS | Mod: PBBFAC,,, | Performed by: UROLOGY

## 2021-03-05 PROCEDURE — 99215 OFFICE O/P EST HI 40 MIN: CPT | Mod: PBBFAC,25 | Performed by: UROLOGY

## 2021-03-05 PROCEDURE — 99999 PR PBB SHADOW E&M-EST. PATIENT-LVL V: CPT | Mod: PBBFAC,,, | Performed by: UROLOGY

## 2021-03-05 PROCEDURE — 99214 PR OFFICE/OUTPT VISIT, EST, LEVL IV, 30-39 MIN: ICD-10-PCS | Mod: S$PBB,,, | Performed by: UROLOGY

## 2021-03-05 PROCEDURE — 99214 OFFICE O/P EST MOD 30 MIN: CPT | Mod: S$PBB,,, | Performed by: UROLOGY

## 2021-03-05 PROCEDURE — 96402 CHEMO HORMON ANTINEOPL SQ/IM: CPT | Mod: PBBFAC

## 2021-03-05 RX ADMIN — LEUPROLIDE ACETATE 45 MG: KIT at 10:03

## 2021-03-22 ENCOUNTER — TELEPHONE (OUTPATIENT)
Dept: RADIOLOGY | Facility: HOSPITAL | Age: 66
End: 2021-03-22

## 2021-03-24 ENCOUNTER — HOSPITAL ENCOUNTER (OUTPATIENT)
Dept: RADIOLOGY | Facility: HOSPITAL | Age: 66
Discharge: HOME | End: 2021-03-24
Attending: RADIOLOGY
Payer: MEDICARE

## 2021-03-24 DIAGNOSIS — C61 PROSTATE CANCER: ICD-10-CM

## 2021-03-24 PROCEDURE — A9588 FLUCICLOVINE F-18: HCPCS

## 2021-03-24 PROCEDURE — 78815 NM PET CT FLUCICLOVINE F18(PROSTATE CANCER RECURRENCE): ICD-10-PCS | Mod: 26,PI,, | Performed by: RADIOLOGY

## 2021-03-24 PROCEDURE — 78815 PET IMAGE W/CT SKULL-THIGH: CPT | Mod: 26,PI,, | Performed by: RADIOLOGY

## 2021-04-01 ENCOUNTER — TELEPHONE (OUTPATIENT)
Dept: UROLOGY | Facility: CLINIC | Age: 66
End: 2021-04-01

## 2021-04-01 ENCOUNTER — HOSPITAL ENCOUNTER (OUTPATIENT)
Dept: RADIOLOGY | Facility: HOSPITAL | Age: 66
Discharge: HOME | End: 2021-04-01
Attending: RADIOLOGY
Payer: MEDICARE

## 2021-04-01 ENCOUNTER — HOSPITAL ENCOUNTER (OUTPATIENT)
Dept: RADIATION THERAPY | Facility: HOSPITAL | Age: 66
Discharge: HOME | End: 2021-04-01
Attending: RADIOLOGY
Payer: MEDICARE

## 2021-04-01 PROCEDURE — 77334 PR  RADN TREATMENT AID(S) COMPLX: ICD-10-PCS | Mod: 26,,, | Performed by: RADIOLOGY

## 2021-04-01 PROCEDURE — 77014 HC CT GUIDANCE RADIATION THERAPY FLDS PLACEMENT: CPT | Mod: TC | Performed by: RADIOLOGY

## 2021-04-01 PROCEDURE — 77263 THER RADIOLOGY TX PLNG CPLX: CPT | Mod: ,,, | Performed by: RADIOLOGY

## 2021-04-01 PROCEDURE — 77290 THER RAD SIMULAJ FIELD CPLX: CPT | Mod: TC | Performed by: RADIOLOGY

## 2021-04-01 PROCEDURE — 77334 RADIATION TREATMENT AID(S): CPT | Mod: TC | Performed by: RADIOLOGY

## 2021-04-01 PROCEDURE — 77263 PR  RADIATION THERAPY PLAN COMPLEX: ICD-10-PCS | Mod: ,,, | Performed by: RADIOLOGY

## 2021-04-01 PROCEDURE — 77334 RADIATION TREATMENT AID(S): CPT | Mod: 26,,, | Performed by: RADIOLOGY

## 2021-04-08 PROCEDURE — 77301 RADIOTHERAPY DOSE PLAN IMRT: CPT | Mod: TC | Performed by: RADIOLOGY

## 2021-04-08 PROCEDURE — 77301 RADIOTHERAPY DOSE PLAN IMRT: CPT | Mod: 26,,, | Performed by: RADIOLOGY

## 2021-04-08 PROCEDURE — 77301 PR  INTEN MOD RADIOTHER PLAN W/DOSE VOL HIST: ICD-10-PCS | Mod: 26,,, | Performed by: RADIOLOGY

## 2021-04-09 PROCEDURE — 77338 PR  MLC IMRT DESIGN & CONSTRUCTION PER IMRT PLAN: ICD-10-PCS | Mod: 26,,, | Performed by: RADIOLOGY

## 2021-04-09 PROCEDURE — 77338 DESIGN MLC DEVICE FOR IMRT: CPT | Mod: TC | Performed by: RADIOLOGY

## 2021-04-09 PROCEDURE — 77300 PR RADIATION THERAPY,DOSIMETRY PLAN: ICD-10-PCS | Mod: 26,,, | Performed by: RADIOLOGY

## 2021-04-09 PROCEDURE — 77338 DESIGN MLC DEVICE FOR IMRT: CPT | Mod: 26,,, | Performed by: RADIOLOGY

## 2021-04-09 PROCEDURE — 77300 RADIATION THERAPY DOSE PLAN: CPT | Mod: TC | Performed by: RADIOLOGY

## 2021-04-09 PROCEDURE — 77300 RADIATION THERAPY DOSE PLAN: CPT | Mod: 26,,, | Performed by: RADIOLOGY

## 2021-04-12 PROCEDURE — 77014 PR  CT GUIDANCE PLACEMENT RAD THERAPY FIELDS: ICD-10-PCS | Mod: 26,,, | Performed by: RADIOLOGY

## 2021-04-12 PROCEDURE — 77014 HC CT GUIDANCE RADIATION THERAPY FLDS PLACEMENT: CPT | Mod: TC | Performed by: RADIOLOGY

## 2021-04-12 PROCEDURE — 77014 PR  CT GUIDANCE PLACEMENT RAD THERAPY FIELDS: CPT | Mod: 26,,, | Performed by: RADIOLOGY

## 2021-04-12 PROCEDURE — 77385 HC IMRT, SIMPLE: CPT | Performed by: RADIOLOGY

## 2021-04-13 ENCOUNTER — DOCUMENTATION ONLY (OUTPATIENT)
Dept: RADIATION ONCOLOGY | Facility: CLINIC | Age: 66
End: 2021-04-13

## 2021-04-13 PROCEDURE — 77014 HC CT GUIDANCE RADIATION THERAPY FLDS PLACEMENT: CPT | Mod: TC | Performed by: RADIOLOGY

## 2021-04-13 PROCEDURE — 77385 HC IMRT, SIMPLE: CPT | Performed by: RADIOLOGY

## 2021-04-13 PROCEDURE — 77014 PR  CT GUIDANCE PLACEMENT RAD THERAPY FIELDS: ICD-10-PCS | Mod: 26,,, | Performed by: RADIOLOGY

## 2021-04-13 PROCEDURE — 77014 PR  CT GUIDANCE PLACEMENT RAD THERAPY FIELDS: CPT | Mod: 26,,, | Performed by: RADIOLOGY

## 2021-04-14 ENCOUNTER — DOCUMENTATION ONLY (OUTPATIENT)
Dept: RADIATION ONCOLOGY | Facility: CLINIC | Age: 66
End: 2021-04-14

## 2021-04-14 PROCEDURE — 77014 PR  CT GUIDANCE PLACEMENT RAD THERAPY FIELDS: CPT | Mod: 26,,, | Performed by: RADIOLOGY

## 2021-04-14 PROCEDURE — 77014 HC CT GUIDANCE RADIATION THERAPY FLDS PLACEMENT: CPT | Mod: TC | Performed by: RADIOLOGY

## 2021-04-14 PROCEDURE — 77385 HC IMRT, SIMPLE: CPT | Performed by: RADIOLOGY

## 2021-04-14 PROCEDURE — 77014 PR  CT GUIDANCE PLACEMENT RAD THERAPY FIELDS: ICD-10-PCS | Mod: 26,,, | Performed by: RADIOLOGY

## 2021-04-15 PROCEDURE — 77014 PR  CT GUIDANCE PLACEMENT RAD THERAPY FIELDS: CPT | Mod: 26,,, | Performed by: RADIOLOGY

## 2021-04-15 PROCEDURE — 77385 HC IMRT, SIMPLE: CPT | Performed by: RADIOLOGY

## 2021-04-15 PROCEDURE — 77014 HC CT GUIDANCE RADIATION THERAPY FLDS PLACEMENT: CPT | Mod: TC | Performed by: RADIOLOGY

## 2021-04-15 PROCEDURE — 77014 PR  CT GUIDANCE PLACEMENT RAD THERAPY FIELDS: ICD-10-PCS | Mod: 26,,, | Performed by: RADIOLOGY

## 2021-04-16 PROCEDURE — 77014 HC CT GUIDANCE RADIATION THERAPY FLDS PLACEMENT: CPT | Mod: TC | Performed by: RADIOLOGY

## 2021-04-16 PROCEDURE — 77385 HC IMRT, SIMPLE: CPT | Performed by: RADIOLOGY

## 2021-04-16 PROCEDURE — 77014 PR  CT GUIDANCE PLACEMENT RAD THERAPY FIELDS: ICD-10-PCS | Mod: 26,,, | Performed by: RADIOLOGY

## 2021-04-16 PROCEDURE — 77014 PR  CT GUIDANCE PLACEMENT RAD THERAPY FIELDS: CPT | Mod: 26,,, | Performed by: RADIOLOGY

## 2021-04-19 PROCEDURE — 77014 HC CT GUIDANCE RADIATION THERAPY FLDS PLACEMENT: CPT | Mod: TC | Performed by: RADIOLOGY

## 2021-04-19 PROCEDURE — 77014 PR  CT GUIDANCE PLACEMENT RAD THERAPY FIELDS: CPT | Mod: 26,,, | Performed by: RADIOLOGY

## 2021-04-19 PROCEDURE — 77014 PR  CT GUIDANCE PLACEMENT RAD THERAPY FIELDS: ICD-10-PCS | Mod: 26,,, | Performed by: RADIOLOGY

## 2021-04-19 PROCEDURE — 77385 HC IMRT, SIMPLE: CPT | Performed by: RADIOLOGY

## 2021-04-19 PROCEDURE — 77336 RADIATION PHYSICS CONSULT: CPT | Performed by: RADIOLOGY

## 2021-04-20 PROCEDURE — 77014 PR  CT GUIDANCE PLACEMENT RAD THERAPY FIELDS: CPT | Mod: 26,,, | Performed by: RADIOLOGY

## 2021-04-20 PROCEDURE — 77385 HC IMRT, SIMPLE: CPT | Performed by: RADIOLOGY

## 2021-04-20 PROCEDURE — 77014 PR  CT GUIDANCE PLACEMENT RAD THERAPY FIELDS: ICD-10-PCS | Mod: 26,,, | Performed by: RADIOLOGY

## 2021-04-20 PROCEDURE — 77014 HC CT GUIDANCE RADIATION THERAPY FLDS PLACEMENT: CPT | Mod: TC | Performed by: RADIOLOGY

## 2021-04-21 ENCOUNTER — DOCUMENTATION ONLY (OUTPATIENT)
Dept: RADIATION ONCOLOGY | Facility: CLINIC | Age: 66
End: 2021-04-21

## 2021-04-21 PROCEDURE — 77385 HC IMRT, SIMPLE: CPT | Performed by: RADIOLOGY

## 2021-04-21 PROCEDURE — 77014 PR  CT GUIDANCE PLACEMENT RAD THERAPY FIELDS: ICD-10-PCS | Mod: 26,,, | Performed by: RADIOLOGY

## 2021-04-21 PROCEDURE — 77014 PR  CT GUIDANCE PLACEMENT RAD THERAPY FIELDS: CPT | Mod: 26,,, | Performed by: RADIOLOGY

## 2021-04-21 PROCEDURE — 77014 HC CT GUIDANCE RADIATION THERAPY FLDS PLACEMENT: CPT | Mod: TC | Performed by: RADIOLOGY

## 2021-04-22 PROCEDURE — 77014 PR  CT GUIDANCE PLACEMENT RAD THERAPY FIELDS: CPT | Mod: 26,,, | Performed by: RADIOLOGY

## 2021-04-22 PROCEDURE — 77014 HC CT GUIDANCE RADIATION THERAPY FLDS PLACEMENT: CPT | Mod: TC | Performed by: RADIOLOGY

## 2021-04-22 PROCEDURE — 77014 PR  CT GUIDANCE PLACEMENT RAD THERAPY FIELDS: ICD-10-PCS | Mod: 26,,, | Performed by: RADIOLOGY

## 2021-04-22 PROCEDURE — 77385 HC IMRT, SIMPLE: CPT | Performed by: RADIOLOGY

## 2021-04-23 PROCEDURE — 77385 HC IMRT, SIMPLE: CPT | Performed by: RADIOLOGY

## 2021-04-23 PROCEDURE — 77014 PR  CT GUIDANCE PLACEMENT RAD THERAPY FIELDS: ICD-10-PCS | Mod: 26,,, | Performed by: RADIOLOGY

## 2021-04-23 PROCEDURE — 77014 PR  CT GUIDANCE PLACEMENT RAD THERAPY FIELDS: CPT | Mod: 26,,, | Performed by: RADIOLOGY

## 2021-04-23 PROCEDURE — 77014 HC CT GUIDANCE RADIATION THERAPY FLDS PLACEMENT: CPT | Mod: TC | Performed by: RADIOLOGY

## 2021-04-26 PROCEDURE — 77014 PR  CT GUIDANCE PLACEMENT RAD THERAPY FIELDS: CPT | Mod: 26,,, | Performed by: RADIOLOGY

## 2021-04-26 PROCEDURE — 77014 HC CT GUIDANCE RADIATION THERAPY FLDS PLACEMENT: CPT | Mod: TC | Performed by: RADIOLOGY

## 2021-04-26 PROCEDURE — 77385 HC IMRT, SIMPLE: CPT | Performed by: RADIOLOGY

## 2021-04-26 PROCEDURE — 77014 PR  CT GUIDANCE PLACEMENT RAD THERAPY FIELDS: ICD-10-PCS | Mod: 26,,, | Performed by: RADIOLOGY

## 2021-04-27 PROCEDURE — 77336 RADIATION PHYSICS CONSULT: CPT | Performed by: RADIOLOGY

## 2021-04-27 PROCEDURE — 77014 PR  CT GUIDANCE PLACEMENT RAD THERAPY FIELDS: CPT | Mod: 26,,, | Performed by: RADIOLOGY

## 2021-04-27 PROCEDURE — 77014 PR  CT GUIDANCE PLACEMENT RAD THERAPY FIELDS: ICD-10-PCS | Mod: 26,,, | Performed by: RADIOLOGY

## 2021-04-27 PROCEDURE — 77385 HC IMRT, SIMPLE: CPT | Performed by: RADIOLOGY

## 2021-04-27 PROCEDURE — 77014 HC CT GUIDANCE RADIATION THERAPY FLDS PLACEMENT: CPT | Mod: TC | Performed by: RADIOLOGY

## 2021-04-28 ENCOUNTER — DOCUMENTATION ONLY (OUTPATIENT)
Dept: RADIATION ONCOLOGY | Facility: CLINIC | Age: 66
End: 2021-04-28

## 2021-04-28 PROCEDURE — 77014 PR  CT GUIDANCE PLACEMENT RAD THERAPY FIELDS: ICD-10-PCS | Mod: 26,,, | Performed by: RADIOLOGY

## 2021-04-28 PROCEDURE — 77014 PR  CT GUIDANCE PLACEMENT RAD THERAPY FIELDS: CPT | Mod: 26,,, | Performed by: RADIOLOGY

## 2021-04-28 PROCEDURE — 77014 HC CT GUIDANCE RADIATION THERAPY FLDS PLACEMENT: CPT | Mod: TC | Performed by: RADIOLOGY

## 2021-04-28 PROCEDURE — 77385 HC IMRT, SIMPLE: CPT | Performed by: RADIOLOGY

## 2021-04-29 PROCEDURE — 77014 HC CT GUIDANCE RADIATION THERAPY FLDS PLACEMENT: CPT | Mod: TC | Performed by: RADIOLOGY

## 2021-04-29 PROCEDURE — 77014 PR  CT GUIDANCE PLACEMENT RAD THERAPY FIELDS: CPT | Mod: 26,,, | Performed by: RADIOLOGY

## 2021-04-29 PROCEDURE — 77385 HC IMRT, SIMPLE: CPT | Performed by: RADIOLOGY

## 2021-04-29 PROCEDURE — 77014 PR  CT GUIDANCE PLACEMENT RAD THERAPY FIELDS: ICD-10-PCS | Mod: 26,,, | Performed by: RADIOLOGY

## 2021-04-30 PROCEDURE — 77014 HC CT GUIDANCE RADIATION THERAPY FLDS PLACEMENT: CPT | Mod: TC | Performed by: RADIOLOGY

## 2021-04-30 PROCEDURE — 77014 PR  CT GUIDANCE PLACEMENT RAD THERAPY FIELDS: CPT | Mod: 26,,, | Performed by: RADIOLOGY

## 2021-04-30 PROCEDURE — 77014 PR  CT GUIDANCE PLACEMENT RAD THERAPY FIELDS: ICD-10-PCS | Mod: 26,,, | Performed by: RADIOLOGY

## 2021-04-30 PROCEDURE — 77385 HC IMRT, SIMPLE: CPT | Performed by: RADIOLOGY

## 2021-05-03 ENCOUNTER — HOSPITAL ENCOUNTER (OUTPATIENT)
Dept: RADIATION THERAPY | Facility: HOSPITAL | Age: 66
Discharge: HOME | End: 2021-05-03
Attending: RADIOLOGY
Payer: MEDICARE

## 2021-05-03 PROCEDURE — 77014 PR  CT GUIDANCE PLACEMENT RAD THERAPY FIELDS: ICD-10-PCS | Mod: 26,,, | Performed by: RADIOLOGY

## 2021-05-03 PROCEDURE — 77014 HC CT GUIDANCE RADIATION THERAPY FLDS PLACEMENT: CPT | Mod: TC | Performed by: RADIOLOGY

## 2021-05-03 PROCEDURE — 77014 PR  CT GUIDANCE PLACEMENT RAD THERAPY FIELDS: CPT | Mod: 26,,, | Performed by: RADIOLOGY

## 2021-05-03 PROCEDURE — 77385 HC IMRT, SIMPLE: CPT | Performed by: RADIOLOGY

## 2021-05-04 PROCEDURE — 77385 HC IMRT, SIMPLE: CPT | Performed by: RADIOLOGY

## 2021-05-04 PROCEDURE — 77014 HC CT GUIDANCE RADIATION THERAPY FLDS PLACEMENT: CPT | Mod: TC | Performed by: RADIOLOGY

## 2021-05-04 PROCEDURE — 77014 PR  CT GUIDANCE PLACEMENT RAD THERAPY FIELDS: ICD-10-PCS | Mod: 26,,, | Performed by: RADIOLOGY

## 2021-05-04 PROCEDURE — 77014 PR  CT GUIDANCE PLACEMENT RAD THERAPY FIELDS: CPT | Mod: 26,,, | Performed by: RADIOLOGY

## 2021-05-05 ENCOUNTER — DOCUMENTATION ONLY (OUTPATIENT)
Dept: RADIATION ONCOLOGY | Facility: CLINIC | Age: 66
End: 2021-05-05

## 2021-05-05 PROCEDURE — 77336 RADIATION PHYSICS CONSULT: CPT | Performed by: RADIOLOGY

## 2021-05-05 PROCEDURE — 77014 PR  CT GUIDANCE PLACEMENT RAD THERAPY FIELDS: ICD-10-PCS | Mod: 26,,, | Performed by: RADIOLOGY

## 2021-05-05 PROCEDURE — 77014 PR  CT GUIDANCE PLACEMENT RAD THERAPY FIELDS: CPT | Mod: 26,,, | Performed by: RADIOLOGY

## 2021-05-05 PROCEDURE — 77385 HC IMRT, SIMPLE: CPT | Performed by: RADIOLOGY

## 2021-05-05 PROCEDURE — 77014 HC CT GUIDANCE RADIATION THERAPY FLDS PLACEMENT: CPT | Mod: TC | Performed by: RADIOLOGY

## 2021-05-06 PROCEDURE — 77385 HC IMRT, SIMPLE: CPT | Performed by: RADIOLOGY

## 2021-05-06 PROCEDURE — 77014 HC CT GUIDANCE RADIATION THERAPY FLDS PLACEMENT: CPT | Mod: TC | Performed by: RADIOLOGY

## 2021-05-06 PROCEDURE — 77014 PR  CT GUIDANCE PLACEMENT RAD THERAPY FIELDS: ICD-10-PCS | Mod: 26,,, | Performed by: RADIOLOGY

## 2021-05-06 PROCEDURE — 77014 PR  CT GUIDANCE PLACEMENT RAD THERAPY FIELDS: CPT | Mod: 26,,, | Performed by: RADIOLOGY

## 2021-05-07 PROCEDURE — 77014 HC CT GUIDANCE RADIATION THERAPY FLDS PLACEMENT: CPT | Mod: TC | Performed by: RADIOLOGY

## 2021-05-07 PROCEDURE — 77385 HC IMRT, SIMPLE: CPT | Performed by: RADIOLOGY

## 2021-05-07 PROCEDURE — 77014 PR  CT GUIDANCE PLACEMENT RAD THERAPY FIELDS: ICD-10-PCS | Mod: 26,,, | Performed by: RADIOLOGY

## 2021-05-07 PROCEDURE — 77014 PR  CT GUIDANCE PLACEMENT RAD THERAPY FIELDS: CPT | Mod: 26,,, | Performed by: RADIOLOGY

## 2021-05-10 PROCEDURE — 77385 HC IMRT, SIMPLE: CPT | Performed by: RADIOLOGY

## 2021-05-10 PROCEDURE — 77014 HC CT GUIDANCE RADIATION THERAPY FLDS PLACEMENT: CPT | Mod: TC | Performed by: RADIOLOGY

## 2021-05-10 PROCEDURE — 77014 PR  CT GUIDANCE PLACEMENT RAD THERAPY FIELDS: CPT | Mod: 26,,, | Performed by: RADIOLOGY

## 2021-05-10 PROCEDURE — 77014 PR  CT GUIDANCE PLACEMENT RAD THERAPY FIELDS: ICD-10-PCS | Mod: 26,,, | Performed by: RADIOLOGY

## 2021-05-11 PROCEDURE — 77014 PR  CT GUIDANCE PLACEMENT RAD THERAPY FIELDS: ICD-10-PCS | Mod: 26,,, | Performed by: RADIOLOGY

## 2021-05-11 PROCEDURE — 77385 HC IMRT, SIMPLE: CPT | Performed by: RADIOLOGY

## 2021-05-11 PROCEDURE — 77014 HC CT GUIDANCE RADIATION THERAPY FLDS PLACEMENT: CPT | Mod: TC | Performed by: RADIOLOGY

## 2021-05-11 PROCEDURE — 77014 PR  CT GUIDANCE PLACEMENT RAD THERAPY FIELDS: CPT | Mod: 26,,, | Performed by: RADIOLOGY

## 2021-05-12 ENCOUNTER — DOCUMENTATION ONLY (OUTPATIENT)
Dept: RADIATION ONCOLOGY | Facility: CLINIC | Age: 66
End: 2021-05-12

## 2021-05-12 PROCEDURE — 77014 PR  CT GUIDANCE PLACEMENT RAD THERAPY FIELDS: ICD-10-PCS | Mod: 26,,, | Performed by: RADIOLOGY

## 2021-05-12 PROCEDURE — 77014 HC CT GUIDANCE RADIATION THERAPY FLDS PLACEMENT: CPT | Mod: TC | Performed by: RADIOLOGY

## 2021-05-12 PROCEDURE — 77385 HC IMRT, SIMPLE: CPT | Performed by: RADIOLOGY

## 2021-05-12 PROCEDURE — 77336 RADIATION PHYSICS CONSULT: CPT | Performed by: RADIOLOGY

## 2021-05-12 PROCEDURE — 77014 PR  CT GUIDANCE PLACEMENT RAD THERAPY FIELDS: CPT | Mod: 26,,, | Performed by: RADIOLOGY

## 2021-05-13 PROCEDURE — 77385 HC IMRT, SIMPLE: CPT | Performed by: RADIOLOGY

## 2021-05-13 PROCEDURE — 77014 HC CT GUIDANCE RADIATION THERAPY FLDS PLACEMENT: CPT | Mod: TC | Performed by: RADIOLOGY

## 2021-05-13 PROCEDURE — 77014 PR  CT GUIDANCE PLACEMENT RAD THERAPY FIELDS: CPT | Mod: 26,,, | Performed by: RADIOLOGY

## 2021-05-13 PROCEDURE — 77014 PR  CT GUIDANCE PLACEMENT RAD THERAPY FIELDS: ICD-10-PCS | Mod: 26,,, | Performed by: RADIOLOGY

## 2021-05-14 ENCOUNTER — TELEPHONE (OUTPATIENT)
Dept: DERMATOLOGY | Facility: CLINIC | Age: 66
End: 2021-05-14

## 2021-05-14 PROCEDURE — 77014 PR  CT GUIDANCE PLACEMENT RAD THERAPY FIELDS: ICD-10-PCS | Mod: 26,,, | Performed by: RADIOLOGY

## 2021-05-14 PROCEDURE — 77300 RADIATION THERAPY DOSE PLAN: CPT | Mod: 26,,, | Performed by: RADIOLOGY

## 2021-05-14 PROCEDURE — 77338 DESIGN MLC DEVICE FOR IMRT: CPT | Mod: TC,59 | Performed by: RADIOLOGY

## 2021-05-14 PROCEDURE — 77014 HC CT GUIDANCE RADIATION THERAPY FLDS PLACEMENT: CPT | Mod: TC | Performed by: RADIOLOGY

## 2021-05-14 PROCEDURE — 77300 PR RADIATION THERAPY,DOSIMETRY PLAN: ICD-10-PCS | Mod: 26,,, | Performed by: RADIOLOGY

## 2021-05-14 PROCEDURE — 77385 HC IMRT, SIMPLE: CPT | Performed by: RADIOLOGY

## 2021-05-14 PROCEDURE — 77014 PR  CT GUIDANCE PLACEMENT RAD THERAPY FIELDS: CPT | Mod: 26,,, | Performed by: RADIOLOGY

## 2021-05-14 PROCEDURE — 77338 PR  MLC IMRT DESIGN & CONSTRUCTION PER IMRT PLAN: ICD-10-PCS | Mod: 26,,, | Performed by: RADIOLOGY

## 2021-05-14 PROCEDURE — 77338 DESIGN MLC DEVICE FOR IMRT: CPT | Mod: 26,,, | Performed by: RADIOLOGY

## 2021-05-14 PROCEDURE — 77300 RADIATION THERAPY DOSE PLAN: CPT | Mod: TC | Performed by: RADIOLOGY

## 2021-05-17 PROCEDURE — 77385 HC IMRT, SIMPLE: CPT | Performed by: RADIOLOGY

## 2021-05-17 PROCEDURE — 77014 HC CT GUIDANCE RADIATION THERAPY FLDS PLACEMENT: CPT | Mod: TC | Performed by: RADIOLOGY

## 2021-05-17 PROCEDURE — 77014 PR  CT GUIDANCE PLACEMENT RAD THERAPY FIELDS: CPT | Mod: 26,,, | Performed by: RADIOLOGY

## 2021-05-17 PROCEDURE — 77014 PR  CT GUIDANCE PLACEMENT RAD THERAPY FIELDS: ICD-10-PCS | Mod: 26,,, | Performed by: RADIOLOGY

## 2021-05-18 PROCEDURE — 77014 HC CT GUIDANCE RADIATION THERAPY FLDS PLACEMENT: CPT | Mod: TC | Performed by: RADIOLOGY

## 2021-05-18 PROCEDURE — 77014 PR  CT GUIDANCE PLACEMENT RAD THERAPY FIELDS: ICD-10-PCS | Mod: 26,,, | Performed by: RADIOLOGY

## 2021-05-18 PROCEDURE — 77385 HC IMRT, SIMPLE: CPT | Performed by: RADIOLOGY

## 2021-05-18 PROCEDURE — 77014 PR  CT GUIDANCE PLACEMENT RAD THERAPY FIELDS: CPT | Mod: 26,,, | Performed by: RADIOLOGY

## 2021-05-19 PROCEDURE — 77014 HC CT GUIDANCE RADIATION THERAPY FLDS PLACEMENT: CPT | Mod: TC | Performed by: RADIOLOGY

## 2021-05-19 PROCEDURE — 77014 PR  CT GUIDANCE PLACEMENT RAD THERAPY FIELDS: CPT | Mod: 26,,, | Performed by: RADIOLOGY

## 2021-05-19 PROCEDURE — 77385 HC IMRT, SIMPLE: CPT | Performed by: RADIOLOGY

## 2021-05-19 PROCEDURE — 77014 PR  CT GUIDANCE PLACEMENT RAD THERAPY FIELDS: ICD-10-PCS | Mod: 26,,, | Performed by: RADIOLOGY

## 2021-05-20 ENCOUNTER — DOCUMENTATION ONLY (OUTPATIENT)
Dept: RADIATION ONCOLOGY | Facility: CLINIC | Age: 66
End: 2021-05-20

## 2021-05-20 PROCEDURE — 77014 PR  CT GUIDANCE PLACEMENT RAD THERAPY FIELDS: CPT | Mod: 26,,, | Performed by: RADIOLOGY

## 2021-05-20 PROCEDURE — 77336 RADIATION PHYSICS CONSULT: CPT | Performed by: RADIOLOGY

## 2021-05-20 PROCEDURE — 77014 HC CT GUIDANCE RADIATION THERAPY FLDS PLACEMENT: CPT | Mod: TC | Performed by: RADIOLOGY

## 2021-05-20 PROCEDURE — 77014 PR  CT GUIDANCE PLACEMENT RAD THERAPY FIELDS: ICD-10-PCS | Mod: 26,,, | Performed by: RADIOLOGY

## 2021-05-20 PROCEDURE — 77385 HC IMRT, SIMPLE: CPT | Performed by: RADIOLOGY

## 2021-05-21 PROCEDURE — 77014 PR  CT GUIDANCE PLACEMENT RAD THERAPY FIELDS: ICD-10-PCS | Mod: 26,,, | Performed by: RADIOLOGY

## 2021-05-21 PROCEDURE — 77014 HC CT GUIDANCE RADIATION THERAPY FLDS PLACEMENT: CPT | Mod: TC | Performed by: RADIOLOGY

## 2021-05-21 PROCEDURE — 77385 HC IMRT, SIMPLE: CPT | Performed by: RADIOLOGY

## 2021-05-21 PROCEDURE — 77014 PR  CT GUIDANCE PLACEMENT RAD THERAPY FIELDS: CPT | Mod: 26,,, | Performed by: RADIOLOGY

## 2021-05-24 PROCEDURE — 77385 HC IMRT, SIMPLE: CPT | Performed by: RADIOLOGY

## 2021-05-24 PROCEDURE — 77014 PR  CT GUIDANCE PLACEMENT RAD THERAPY FIELDS: CPT | Mod: 26,,, | Performed by: RADIOLOGY

## 2021-05-24 PROCEDURE — 77014 HC CT GUIDANCE RADIATION THERAPY FLDS PLACEMENT: CPT | Mod: TC | Performed by: RADIOLOGY

## 2021-05-24 PROCEDURE — 77014 PR  CT GUIDANCE PLACEMENT RAD THERAPY FIELDS: ICD-10-PCS | Mod: 26,,, | Performed by: RADIOLOGY

## 2021-05-25 ENCOUNTER — LAB VISIT (OUTPATIENT)
Dept: LAB | Facility: HOSPITAL | Age: 66
End: 2021-05-25
Attending: INTERNAL MEDICINE
Payer: MEDICARE

## 2021-05-25 DIAGNOSIS — N25.81 SECONDARY HYPERPARATHYROIDISM OF RENAL ORIGIN: ICD-10-CM

## 2021-05-25 DIAGNOSIS — N18.31 STAGE 3A CHRONIC KIDNEY DISEASE: ICD-10-CM

## 2021-05-25 LAB
25(OH)D3+25(OH)D2 SERPL-MCNC: 13 NG/ML (ref 30–96)
ALBUMIN SERPL BCP-MCNC: 3.7 G/DL (ref 3.5–5.2)
ANION GAP SERPL CALC-SCNC: 11 MMOL/L (ref 8–16)
BASOPHILS # BLD AUTO: 0.02 K/UL (ref 0–0.2)
BASOPHILS NFR BLD: 0.5 % (ref 0–1.9)
BUN SERPL-MCNC: 16 MG/DL (ref 8–23)
CALCIUM SERPL-MCNC: 9.2 MG/DL (ref 8.7–10.5)
CHLORIDE SERPL-SCNC: 105 MMOL/L (ref 95–110)
CO2 SERPL-SCNC: 21 MMOL/L (ref 23–29)
CREAT SERPL-MCNC: 1.6 MG/DL (ref 0.5–1.4)
DIFFERENTIAL METHOD: ABNORMAL
EOSINOPHIL # BLD AUTO: 0.3 K/UL (ref 0–0.5)
EOSINOPHIL NFR BLD: 9.3 % (ref 0–8)
ERYTHROCYTE [DISTWIDTH] IN BLOOD BY AUTOMATED COUNT: 14.1 % (ref 11.5–14.5)
EST. GFR  (AFRICAN AMERICAN): 51 ML/MIN/1.73 M^2
EST. GFR  (NON AFRICAN AMERICAN): 45 ML/MIN/1.73 M^2
GLUCOSE SERPL-MCNC: 119 MG/DL (ref 70–110)
HCT VFR BLD AUTO: 33.7 % (ref 40–54)
HGB BLD-MCNC: 11.7 G/DL (ref 14–18)
IMM GRANULOCYTES # BLD AUTO: 0.01 K/UL (ref 0–0.04)
IMM GRANULOCYTES NFR BLD AUTO: 0.3 % (ref 0–0.5)
LYMPHOCYTES # BLD AUTO: 0.7 K/UL (ref 1–4.8)
LYMPHOCYTES NFR BLD: 18.5 % (ref 18–48)
MCH RBC QN AUTO: 29.4 PG (ref 27–31)
MCHC RBC AUTO-ENTMCNC: 34.7 G/DL (ref 32–36)
MCV RBC AUTO: 85 FL (ref 82–98)
MONOCYTES # BLD AUTO: 0.4 K/UL (ref 0.3–1)
MONOCYTES NFR BLD: 9.5 % (ref 4–15)
NEUTROPHILS # BLD AUTO: 2.3 K/UL (ref 1.8–7.7)
NEUTROPHILS NFR BLD: 61.9 % (ref 38–73)
NRBC BLD-RTO: 0 /100 WBC
PHOSPHATE SERPL-MCNC: 3.9 MG/DL (ref 2.7–4.5)
PLATELET # BLD AUTO: 145 K/UL (ref 150–450)
PMV BLD AUTO: 9.6 FL (ref 9.2–12.9)
POTASSIUM SERPL-SCNC: 4.3 MMOL/L (ref 3.5–5.1)
PTH-INTACT SERPL-MCNC: 66 PG/ML (ref 9–77)
RBC # BLD AUTO: 3.98 M/UL (ref 4.6–6.2)
SODIUM SERPL-SCNC: 137 MMOL/L (ref 136–145)
WBC # BLD AUTO: 3.67 K/UL (ref 3.9–12.7)

## 2021-05-25 PROCEDURE — 82306 VITAMIN D 25 HYDROXY: CPT | Performed by: INTERNAL MEDICINE

## 2021-05-25 PROCEDURE — 77014 PR  CT GUIDANCE PLACEMENT RAD THERAPY FIELDS: ICD-10-PCS | Mod: 26,,, | Performed by: RADIOLOGY

## 2021-05-25 PROCEDURE — 85025 COMPLETE CBC W/AUTO DIFF WBC: CPT | Performed by: INTERNAL MEDICINE

## 2021-05-25 PROCEDURE — 36415 COLL VENOUS BLD VENIPUNCTURE: CPT | Performed by: INTERNAL MEDICINE

## 2021-05-25 PROCEDURE — 83970 ASSAY OF PARATHORMONE: CPT | Performed by: INTERNAL MEDICINE

## 2021-05-25 PROCEDURE — 77385 HC IMRT, SIMPLE: CPT | Performed by: RADIOLOGY

## 2021-05-25 PROCEDURE — 77014 HC CT GUIDANCE RADIATION THERAPY FLDS PLACEMENT: CPT | Mod: TC | Performed by: RADIOLOGY

## 2021-05-25 PROCEDURE — 80069 RENAL FUNCTION PANEL: CPT | Performed by: INTERNAL MEDICINE

## 2021-05-25 PROCEDURE — 77014 PR  CT GUIDANCE PLACEMENT RAD THERAPY FIELDS: CPT | Mod: 26,,, | Performed by: RADIOLOGY

## 2021-05-26 ENCOUNTER — DOCUMENTATION ONLY (OUTPATIENT)
Dept: RADIATION ONCOLOGY | Facility: CLINIC | Age: 66
End: 2021-05-26

## 2021-05-26 PROCEDURE — 77014 PR  CT GUIDANCE PLACEMENT RAD THERAPY FIELDS: ICD-10-PCS | Mod: 26,,, | Performed by: RADIOLOGY

## 2021-05-26 PROCEDURE — 77014 HC CT GUIDANCE RADIATION THERAPY FLDS PLACEMENT: CPT | Mod: TC | Performed by: RADIOLOGY

## 2021-05-26 PROCEDURE — 77385 HC IMRT, SIMPLE: CPT | Performed by: RADIOLOGY

## 2021-05-26 PROCEDURE — 77014 PR  CT GUIDANCE PLACEMENT RAD THERAPY FIELDS: CPT | Mod: 26,,, | Performed by: RADIOLOGY

## 2021-05-27 PROCEDURE — 77014 PR  CT GUIDANCE PLACEMENT RAD THERAPY FIELDS: ICD-10-PCS | Mod: 26,,, | Performed by: RADIOLOGY

## 2021-05-27 PROCEDURE — 77014 PR  CT GUIDANCE PLACEMENT RAD THERAPY FIELDS: CPT | Mod: 26,,, | Performed by: RADIOLOGY

## 2021-05-27 PROCEDURE — 77385 HC IMRT, SIMPLE: CPT | Performed by: RADIOLOGY

## 2021-05-27 PROCEDURE — 77014 HC CT GUIDANCE RADIATION THERAPY FLDS PLACEMENT: CPT | Mod: TC | Performed by: RADIOLOGY

## 2021-05-28 ENCOUNTER — OFFICE VISIT (OUTPATIENT)
Dept: NEPHROLOGY | Facility: CLINIC | Age: 66
End: 2021-05-28
Payer: MEDICARE

## 2021-05-28 VITALS
OXYGEN SATURATION: 99 % | WEIGHT: 235.25 LBS | DIASTOLIC BLOOD PRESSURE: 62 MMHG | SYSTOLIC BLOOD PRESSURE: 90 MMHG | HEART RATE: 77 BPM | HEIGHT: 70 IN | BODY MASS INDEX: 33.68 KG/M2

## 2021-05-28 DIAGNOSIS — N18.30 STAGE 3 CHRONIC KIDNEY DISEASE, UNSPECIFIED WHETHER STAGE 3A OR 3B CKD: Primary | ICD-10-CM

## 2021-05-28 PROCEDURE — 99214 PR OFFICE/OUTPT VISIT, EST, LEVL IV, 30-39 MIN: ICD-10-PCS | Mod: S$PBB,,, | Performed by: INTERNAL MEDICINE

## 2021-05-28 PROCEDURE — 77014 PR  CT GUIDANCE PLACEMENT RAD THERAPY FIELDS: ICD-10-PCS | Mod: 26,,, | Performed by: RADIOLOGY

## 2021-05-28 PROCEDURE — 99999 PR PBB SHADOW E&M-EST. PATIENT-LVL III: ICD-10-PCS | Mod: PBBFAC,,, | Performed by: INTERNAL MEDICINE

## 2021-05-28 PROCEDURE — 99999 PR PBB SHADOW E&M-EST. PATIENT-LVL III: CPT | Mod: PBBFAC,,, | Performed by: INTERNAL MEDICINE

## 2021-05-28 PROCEDURE — 77014 PR  CT GUIDANCE PLACEMENT RAD THERAPY FIELDS: CPT | Mod: 26,,, | Performed by: RADIOLOGY

## 2021-05-28 PROCEDURE — 77336 RADIATION PHYSICS CONSULT: CPT | Performed by: RADIOLOGY

## 2021-05-28 PROCEDURE — 99213 OFFICE O/P EST LOW 20 MIN: CPT | Mod: PBBFAC,25 | Performed by: INTERNAL MEDICINE

## 2021-05-28 PROCEDURE — 99214 OFFICE O/P EST MOD 30 MIN: CPT | Mod: S$PBB,,, | Performed by: INTERNAL MEDICINE

## 2021-05-28 PROCEDURE — 77385 HC IMRT, SIMPLE: CPT | Performed by: RADIOLOGY

## 2021-05-28 PROCEDURE — 77014 HC CT GUIDANCE RADIATION THERAPY FLDS PLACEMENT: CPT | Mod: TC | Performed by: RADIOLOGY

## 2021-05-28 RX ORDER — CARVEDILOL 12.5 MG/1
12.5 TABLET ORAL 2 TIMES DAILY
Qty: 60 TABLET | Refills: 11 | Status: SHIPPED | OUTPATIENT
Start: 2021-05-28

## 2021-05-28 RX ORDER — ALLOPURINOL 100 MG/1
100 TABLET ORAL DAILY
Qty: 30 TABLET | Refills: 11 | Status: ON HOLD | OUTPATIENT
Start: 2021-05-28 | End: 2022-10-20 | Stop reason: HOSPADM

## 2021-05-31 PROCEDURE — 77385 HC IMRT, SIMPLE: CPT | Performed by: RADIOLOGY

## 2021-05-31 PROCEDURE — 77014 HC CT GUIDANCE RADIATION THERAPY FLDS PLACEMENT: CPT | Mod: TC | Performed by: RADIOLOGY

## 2021-05-31 PROCEDURE — 77014 PR  CT GUIDANCE PLACEMENT RAD THERAPY FIELDS: CPT | Mod: 26,,, | Performed by: RADIOLOGY

## 2021-05-31 PROCEDURE — 77014 PR  CT GUIDANCE PLACEMENT RAD THERAPY FIELDS: ICD-10-PCS | Mod: 26,,, | Performed by: RADIOLOGY

## 2021-06-01 ENCOUNTER — HOSPITAL ENCOUNTER (OUTPATIENT)
Dept: RADIATION THERAPY | Facility: HOSPITAL | Age: 66
Discharge: HOME | End: 2021-06-01
Attending: RADIOLOGY
Payer: MEDICARE

## 2021-06-01 PROCEDURE — 77014 HC CT GUIDANCE RADIATION THERAPY FLDS PLACEMENT: CPT | Mod: TC | Performed by: RADIOLOGY

## 2021-06-01 PROCEDURE — 77014 PR  CT GUIDANCE PLACEMENT RAD THERAPY FIELDS: CPT | Mod: 26,,, | Performed by: RADIOLOGY

## 2021-06-01 PROCEDURE — 77385 HC IMRT, SIMPLE: CPT | Performed by: RADIOLOGY

## 2021-06-01 PROCEDURE — 77014 PR  CT GUIDANCE PLACEMENT RAD THERAPY FIELDS: ICD-10-PCS | Mod: 26,,, | Performed by: RADIOLOGY

## 2021-06-02 ENCOUNTER — DOCUMENTATION ONLY (OUTPATIENT)
Dept: RADIATION ONCOLOGY | Facility: CLINIC | Age: 66
End: 2021-06-02

## 2021-06-02 DIAGNOSIS — C61 PROSTATE CANCER: Primary | ICD-10-CM

## 2021-06-02 PROCEDURE — 77014 PR  CT GUIDANCE PLACEMENT RAD THERAPY FIELDS: CPT | Mod: 26,,, | Performed by: RADIOLOGY

## 2021-06-02 PROCEDURE — 77014 PR  CT GUIDANCE PLACEMENT RAD THERAPY FIELDS: ICD-10-PCS | Mod: 26,,, | Performed by: RADIOLOGY

## 2021-06-02 PROCEDURE — 77385 HC IMRT, SIMPLE: CPT | Performed by: RADIOLOGY

## 2021-06-02 PROCEDURE — 77336 RADIATION PHYSICS CONSULT: CPT | Performed by: RADIOLOGY

## 2021-06-02 PROCEDURE — 77014 HC CT GUIDANCE RADIATION THERAPY FLDS PLACEMENT: CPT | Mod: TC | Performed by: RADIOLOGY

## 2021-06-04 ENCOUNTER — OFFICE VISIT (OUTPATIENT)
Dept: DERMATOLOGY | Facility: CLINIC | Age: 66
End: 2021-06-04
Payer: MEDICARE

## 2021-06-04 DIAGNOSIS — L85.3 XEROSIS CUTIS: Primary | ICD-10-CM

## 2021-06-04 DIAGNOSIS — L29.9 PRURITUS: ICD-10-CM

## 2021-06-04 DIAGNOSIS — L81.0 POSTINFLAMMATORY HYPERPIGMENTATION: ICD-10-CM

## 2021-06-04 PROCEDURE — 99213 PR OFFICE/OUTPT VISIT, EST, LEVL III, 20-29 MIN: ICD-10-PCS | Mod: S$PBB,,, | Performed by: STUDENT IN AN ORGANIZED HEALTH CARE EDUCATION/TRAINING PROGRAM

## 2021-06-04 PROCEDURE — 99213 OFFICE O/P EST LOW 20 MIN: CPT | Mod: S$PBB,,, | Performed by: STUDENT IN AN ORGANIZED HEALTH CARE EDUCATION/TRAINING PROGRAM

## 2021-06-04 PROCEDURE — 99999 PR PBB SHADOW E&M-EST. PATIENT-LVL III: ICD-10-PCS | Mod: PBBFAC,,, | Performed by: STUDENT IN AN ORGANIZED HEALTH CARE EDUCATION/TRAINING PROGRAM

## 2021-06-04 PROCEDURE — 99999 PR PBB SHADOW E&M-EST. PATIENT-LVL III: CPT | Mod: PBBFAC,,, | Performed by: STUDENT IN AN ORGANIZED HEALTH CARE EDUCATION/TRAINING PROGRAM

## 2021-06-04 PROCEDURE — 99213 OFFICE O/P EST LOW 20 MIN: CPT | Mod: PBBFAC | Performed by: STUDENT IN AN ORGANIZED HEALTH CARE EDUCATION/TRAINING PROGRAM

## 2021-06-04 RX ORDER — TRIAMCINOLONE ACETONIDE 1 MG/G
CREAM TOPICAL 2 TIMES DAILY
Qty: 454 G | Refills: 1 | Status: SHIPPED | OUTPATIENT
Start: 2021-06-04 | End: 2022-07-14

## 2021-06-04 RX ORDER — ASPIRIN 81 MG/1
TABLET ORAL
COMMUNITY
Start: 2021-06-02 | End: 2022-01-27 | Stop reason: SDUPTHER

## 2021-06-04 RX ORDER — FERROUS GLUCONATE 324(38)MG
TABLET ORAL
COMMUNITY
Start: 2020-11-04 | End: 2022-01-27 | Stop reason: ALTCHOICE

## 2021-06-04 RX ORDER — ALLOPURINOL 300 MG/1
TABLET ORAL
COMMUNITY
Start: 2020-11-04 | End: 2022-01-27 | Stop reason: SDUPTHER

## 2021-06-04 RX ORDER — DESONIDE 0.5 MG/G
CREAM TOPICAL
COMMUNITY
Start: 2021-05-05 | End: 2022-01-27 | Stop reason: ALTCHOICE

## 2021-06-04 RX ORDER — GABAPENTIN 300 MG/1
CAPSULE ORAL
COMMUNITY
Start: 2021-05-05 | End: 2022-01-27 | Stop reason: SDUPTHER

## 2021-06-04 RX ORDER — MONTELUKAST SODIUM 10 MG/1
TABLET ORAL
COMMUNITY
Start: 2021-05-07 | End: 2022-01-27 | Stop reason: ALTCHOICE

## 2021-06-04 RX ORDER — ZOLPIDEM TARTRATE 12.5 MG/1
TABLET, FILM COATED, EXTENDED RELEASE ORAL
COMMUNITY
Start: 2021-05-18 | End: 2022-01-27 | Stop reason: ALTCHOICE

## 2021-06-04 RX ORDER — OXYBUTYNIN CHLORIDE 10 MG/1
TABLET, EXTENDED RELEASE ORAL
COMMUNITY
Start: 2020-06-11 | End: 2022-01-27 | Stop reason: ALTCHOICE

## 2021-06-04 RX ORDER — ESOMEPRAZOLE MAGNESIUM 40 MG/1
CAPSULE, DELAYED RELEASE ORAL
COMMUNITY
Start: 2020-09-18 | End: 2022-01-27 | Stop reason: SDUPTHER

## 2021-06-04 RX ORDER — CARVEDILOL 25 MG/1
TABLET ORAL
COMMUNITY
Start: 2020-11-04 | End: 2022-01-18

## 2021-06-04 RX ORDER — LOSARTAN POTASSIUM 100 MG/1
TABLET ORAL
COMMUNITY
Start: 2020-08-03 | End: 2021-07-22

## 2021-06-04 RX ORDER — SERTRALINE HYDROCHLORIDE 100 MG/1
TABLET, FILM COATED ORAL
COMMUNITY
Start: 2021-05-18 | End: 2022-01-27 | Stop reason: SDUPTHER

## 2021-06-04 RX ORDER — ALPRAZOLAM 0.25 MG/1
TABLET ORAL
COMMUNITY
Start: 2021-05-18 | End: 2022-01-27 | Stop reason: ALTCHOICE

## 2021-06-04 RX ORDER — ATORVASTATIN CALCIUM 20 MG/1
TABLET, FILM COATED ORAL
COMMUNITY
Start: 2020-11-04 | End: 2021-07-22

## 2021-06-04 RX ORDER — ALUMINUM HYDROXIDE, MAGNESIUM HYDROXIDE, AND SIMETHICONE 2400; 240; 2400 MG/30ML; MG/30ML; MG/30ML
SUSPENSION ORAL
COMMUNITY
Start: 2020-06-11 | End: 2022-01-27 | Stop reason: SDUPTHER

## 2021-06-04 RX ORDER — CYCLOBENZAPRINE HCL 10 MG
TABLET ORAL
COMMUNITY
Start: 2020-12-22 | End: 2022-01-27 | Stop reason: SDUPTHER

## 2021-06-11 ENCOUNTER — OFFICE VISIT (OUTPATIENT)
Dept: UROLOGY | Facility: CLINIC | Age: 66
End: 2021-06-11
Payer: MEDICARE

## 2021-06-11 VITALS
DIASTOLIC BLOOD PRESSURE: 79 MMHG | WEIGHT: 232.13 LBS | BODY MASS INDEX: 33.31 KG/M2 | SYSTOLIC BLOOD PRESSURE: 114 MMHG | HEART RATE: 75 BPM

## 2021-06-11 DIAGNOSIS — C61 PROSTATE CANCER: Primary | ICD-10-CM

## 2021-06-11 PROCEDURE — 99999 PR PBB SHADOW E&M-EST. PATIENT-LVL IV: ICD-10-PCS | Mod: PBBFAC,,, | Performed by: UROLOGY

## 2021-06-11 PROCEDURE — 99213 OFFICE O/P EST LOW 20 MIN: CPT | Mod: S$PBB,,, | Performed by: UROLOGY

## 2021-06-11 PROCEDURE — 99213 PR OFFICE/OUTPT VISIT, EST, LEVL III, 20-29 MIN: ICD-10-PCS | Mod: S$PBB,,, | Performed by: UROLOGY

## 2021-06-11 PROCEDURE — 99999 PR PBB SHADOW E&M-EST. PATIENT-LVL IV: CPT | Mod: PBBFAC,,, | Performed by: UROLOGY

## 2021-06-11 PROCEDURE — 99214 OFFICE O/P EST MOD 30 MIN: CPT | Mod: PBBFAC | Performed by: UROLOGY

## 2021-07-11 ENCOUNTER — PATIENT MESSAGE (OUTPATIENT)
Dept: INTERNAL MEDICINE | Facility: CLINIC | Age: 66
End: 2021-07-11

## 2021-07-12 ENCOUNTER — OFFICE VISIT (OUTPATIENT)
Dept: INTERNAL MEDICINE | Facility: CLINIC | Age: 66
End: 2021-07-12
Payer: MEDICARE

## 2021-07-12 VITALS
SYSTOLIC BLOOD PRESSURE: 110 MMHG | BODY MASS INDEX: 35.36 KG/M2 | HEIGHT: 69 IN | OXYGEN SATURATION: 98 % | HEART RATE: 92 BPM | DIASTOLIC BLOOD PRESSURE: 60 MMHG | RESPIRATION RATE: 20 BRPM | TEMPERATURE: 98 F | WEIGHT: 238.75 LBS

## 2021-07-12 DIAGNOSIS — J06.9 VIRAL URI WITH COUGH: Primary | ICD-10-CM

## 2021-07-12 PROCEDURE — 99213 PR OFFICE/OUTPT VISIT, EST, LEVL III, 20-29 MIN: ICD-10-PCS | Mod: 25,S$PBB,, | Performed by: INTERNAL MEDICINE

## 2021-07-12 PROCEDURE — 99213 OFFICE O/P EST LOW 20 MIN: CPT | Mod: 25,S$PBB,, | Performed by: INTERNAL MEDICINE

## 2021-07-12 PROCEDURE — 99999 PR PBB SHADOW E&M-EST. PATIENT-LVL V: ICD-10-PCS | Mod: PBBFAC,,, | Performed by: INTERNAL MEDICINE

## 2021-07-12 PROCEDURE — 99999 PR PBB SHADOW E&M-EST. PATIENT-LVL V: CPT | Mod: PBBFAC,,, | Performed by: INTERNAL MEDICINE

## 2021-07-12 PROCEDURE — 99215 OFFICE O/P EST HI 40 MIN: CPT | Mod: PBBFAC,PO | Performed by: INTERNAL MEDICINE

## 2021-07-12 PROCEDURE — 96372 THER/PROPH/DIAG INJ SC/IM: CPT | Mod: PBBFAC,PO

## 2021-07-12 RX ORDER — PROMETHAZINE HYDROCHLORIDE AND CODEINE PHOSPHATE 6.25; 1 MG/5ML; MG/5ML
5 SOLUTION ORAL EVERY 4 HOURS PRN
Qty: 118 ML | Refills: 0 | Status: SHIPPED | OUTPATIENT
Start: 2021-07-12 | End: 2021-07-22

## 2021-07-12 RX ORDER — METHYLPREDNISOLONE ACETATE 80 MG/ML
80 INJECTION, SUSPENSION INTRA-ARTICULAR; INTRALESIONAL; INTRAMUSCULAR; SOFT TISSUE
Status: COMPLETED | OUTPATIENT
Start: 2021-07-12 | End: 2021-07-12

## 2021-07-12 RX ADMIN — METHYLPREDNISOLONE ACETATE 80 MG: 80 INJECTION, SUSPENSION INTRALESIONAL; INTRAMUSCULAR; INTRASYNOVIAL; SOFT TISSUE at 02:07

## 2021-07-16 ENCOUNTER — TELEPHONE (OUTPATIENT)
Dept: INTERNAL MEDICINE | Facility: CLINIC | Age: 66
End: 2021-07-16

## 2021-07-20 ENCOUNTER — OFFICE VISIT (OUTPATIENT)
Dept: INTERNAL MEDICINE | Facility: CLINIC | Age: 66
End: 2021-07-20
Payer: MEDICARE

## 2021-07-20 VITALS
WEIGHT: 238.56 LBS | OXYGEN SATURATION: 99 % | HEIGHT: 69 IN | SYSTOLIC BLOOD PRESSURE: 110 MMHG | HEART RATE: 77 BPM | BODY MASS INDEX: 35.33 KG/M2 | TEMPERATURE: 95 F | DIASTOLIC BLOOD PRESSURE: 68 MMHG

## 2021-07-20 DIAGNOSIS — N18.30 STAGE 3 CHRONIC KIDNEY DISEASE, UNSPECIFIED WHETHER STAGE 3A OR 3B CKD: ICD-10-CM

## 2021-07-20 DIAGNOSIS — J45.20 MILD INTERMITTENT ASTHMA WITHOUT COMPLICATION: ICD-10-CM

## 2021-07-20 DIAGNOSIS — M1A.00X0 IDIOPATHIC CHRONIC GOUT WITHOUT TOPHUS, UNSPECIFIED SITE: ICD-10-CM

## 2021-07-20 DIAGNOSIS — G47.33 OSA ON CPAP: ICD-10-CM

## 2021-07-20 DIAGNOSIS — I35.0 AORTIC VALVE STENOSIS, ETIOLOGY OF CARDIAC VALVE DISEASE UNSPECIFIED: ICD-10-CM

## 2021-07-20 DIAGNOSIS — C61 PROSTATE CANCER: ICD-10-CM

## 2021-07-20 DIAGNOSIS — I25.10 CORONARY ARTERY DISEASE INVOLVING NATIVE CORONARY ARTERY OF NATIVE HEART WITHOUT ANGINA PECTORIS: Chronic | ICD-10-CM

## 2021-07-20 DIAGNOSIS — I10 ESSENTIAL HYPERTENSION: Primary | Chronic | ICD-10-CM

## 2021-07-20 PROCEDURE — 99214 PR OFFICE/OUTPT VISIT, EST, LEVL IV, 30-39 MIN: ICD-10-PCS | Mod: S$PBB,,, | Performed by: FAMILY MEDICINE

## 2021-07-20 PROCEDURE — 99999 PR PBB SHADOW E&M-EST. PATIENT-LVL V: CPT | Mod: PBBFAC,,, | Performed by: FAMILY MEDICINE

## 2021-07-20 PROCEDURE — 99999 PR PBB SHADOW E&M-EST. PATIENT-LVL V: ICD-10-PCS | Mod: PBBFAC,,, | Performed by: FAMILY MEDICINE

## 2021-07-20 PROCEDURE — 99215 OFFICE O/P EST HI 40 MIN: CPT | Mod: PBBFAC | Performed by: FAMILY MEDICINE

## 2021-07-20 PROCEDURE — 99214 OFFICE O/P EST MOD 30 MIN: CPT | Mod: S$PBB,,, | Performed by: FAMILY MEDICINE

## 2021-07-21 ENCOUNTER — PATIENT OUTREACH (OUTPATIENT)
Dept: ADMINISTRATIVE | Facility: OTHER | Age: 66
End: 2021-07-21

## 2021-07-22 ENCOUNTER — OFFICE VISIT (OUTPATIENT)
Dept: CARDIOLOGY | Facility: CLINIC | Age: 66
End: 2021-07-22
Payer: MEDICARE

## 2021-07-22 ENCOUNTER — LAB VISIT (OUTPATIENT)
Dept: LAB | Facility: HOSPITAL | Age: 66
End: 2021-07-22
Attending: INTERNAL MEDICINE
Payer: MEDICARE

## 2021-07-22 VITALS
BODY MASS INDEX: 35.35 KG/M2 | HEART RATE: 76 BPM | SYSTOLIC BLOOD PRESSURE: 106 MMHG | OXYGEN SATURATION: 100 % | DIASTOLIC BLOOD PRESSURE: 75 MMHG | WEIGHT: 235.88 LBS

## 2021-07-22 DIAGNOSIS — Z86.73 HISTORY OF CVA IN ADULTHOOD: ICD-10-CM

## 2021-07-22 DIAGNOSIS — R55 SYNCOPE AND COLLAPSE: ICD-10-CM

## 2021-07-22 DIAGNOSIS — I10 ESSENTIAL HYPERTENSION: Chronic | ICD-10-CM

## 2021-07-22 DIAGNOSIS — I25.10 CORONARY ARTERY DISEASE INVOLVING NATIVE CORONARY ARTERY OF NATIVE HEART WITHOUT ANGINA PECTORIS: Chronic | ICD-10-CM

## 2021-07-22 DIAGNOSIS — G47.33 OSA ON CPAP: ICD-10-CM

## 2021-07-22 DIAGNOSIS — C61 PROSTATE CANCER: ICD-10-CM

## 2021-07-22 DIAGNOSIS — E78.2 MIXED HYPERLIPIDEMIA: ICD-10-CM

## 2021-07-22 DIAGNOSIS — N18.30 STAGE 3 CHRONIC KIDNEY DISEASE, UNSPECIFIED WHETHER STAGE 3A OR 3B CKD: ICD-10-CM

## 2021-07-22 DIAGNOSIS — Z95.2 S/P AVR (AORTIC VALVE REPLACEMENT): Primary | ICD-10-CM

## 2021-07-22 PROCEDURE — 84484 ASSAY OF TROPONIN QUANT: CPT | Performed by: INTERNAL MEDICINE

## 2021-07-22 PROCEDURE — 80053 COMPREHEN METABOLIC PANEL: CPT | Performed by: INTERNAL MEDICINE

## 2021-07-22 PROCEDURE — 99214 PR OFFICE/OUTPT VISIT, EST, LEVL IV, 30-39 MIN: ICD-10-PCS | Mod: S$PBB,,, | Performed by: INTERNAL MEDICINE

## 2021-07-22 PROCEDURE — 83880 ASSAY OF NATRIURETIC PEPTIDE: CPT | Performed by: INTERNAL MEDICINE

## 2021-07-22 PROCEDURE — 99215 OFFICE O/P EST HI 40 MIN: CPT | Mod: PBBFAC | Performed by: INTERNAL MEDICINE

## 2021-07-22 PROCEDURE — 99999 PR PBB SHADOW E&M-EST. PATIENT-LVL V: ICD-10-PCS | Mod: PBBFAC,,, | Performed by: INTERNAL MEDICINE

## 2021-07-22 PROCEDURE — 99214 OFFICE O/P EST MOD 30 MIN: CPT | Mod: S$PBB,,, | Performed by: INTERNAL MEDICINE

## 2021-07-22 PROCEDURE — 36415 COLL VENOUS BLD VENIPUNCTURE: CPT | Performed by: INTERNAL MEDICINE

## 2021-07-22 PROCEDURE — 99999 PR PBB SHADOW E&M-EST. PATIENT-LVL V: CPT | Mod: PBBFAC,,, | Performed by: INTERNAL MEDICINE

## 2021-07-22 RX ORDER — ATORVASTATIN CALCIUM 40 MG/1
40 TABLET, FILM COATED ORAL DAILY
Qty: 90 TABLET | Refills: 3 | Status: SHIPPED | OUTPATIENT
Start: 2021-07-22 | End: 2022-01-18

## 2021-07-22 RX ORDER — LOSARTAN POTASSIUM 50 MG/1
50 TABLET ORAL DAILY
Qty: 30 TABLET | Refills: 11 | Status: SHIPPED | OUTPATIENT
Start: 2021-07-22 | End: 2021-07-22 | Stop reason: SDUPTHER

## 2021-07-22 RX ORDER — LOSARTAN POTASSIUM 50 MG/1
50 TABLET ORAL DAILY
Qty: 30 TABLET | Refills: 11 | Status: SHIPPED | OUTPATIENT
Start: 2021-07-22

## 2021-07-23 LAB
ALBUMIN SERPL BCP-MCNC: 4.1 G/DL (ref 3.5–5.2)
ALP SERPL-CCNC: 103 U/L (ref 55–135)
ALT SERPL W/O P-5'-P-CCNC: 13 U/L (ref 10–44)
ANION GAP SERPL CALC-SCNC: 9 MMOL/L (ref 8–16)
AST SERPL-CCNC: 18 U/L (ref 10–40)
BILIRUB SERPL-MCNC: 0.3 MG/DL (ref 0.1–1)
BNP SERPL-MCNC: 32 PG/ML (ref 0–99)
BUN SERPL-MCNC: 24 MG/DL (ref 8–23)
CALCIUM SERPL-MCNC: 9.9 MG/DL (ref 8.7–10.5)
CHLORIDE SERPL-SCNC: 104 MMOL/L (ref 95–110)
CO2 SERPL-SCNC: 25 MMOL/L (ref 23–29)
CREAT SERPL-MCNC: 1.7 MG/DL (ref 0.5–1.4)
EST. GFR  (AFRICAN AMERICAN): 47.9 ML/MIN/1.73 M^2
EST. GFR  (NON AFRICAN AMERICAN): 41.4 ML/MIN/1.73 M^2
GLUCOSE SERPL-MCNC: 83 MG/DL (ref 70–110)
POTASSIUM SERPL-SCNC: 4.6 MMOL/L (ref 3.5–5.1)
PROT SERPL-MCNC: 8.2 G/DL (ref 6–8.4)
SODIUM SERPL-SCNC: 138 MMOL/L (ref 136–145)
TROPONIN I SERPL DL<=0.01 NG/ML-MCNC: <0.006 NG/ML (ref 0–0.03)

## 2021-07-29 DIAGNOSIS — I25.10 CORONARY ARTERY DISEASE INVOLVING NATIVE CORONARY ARTERY OF NATIVE HEART WITHOUT ANGINA PECTORIS: ICD-10-CM

## 2021-07-29 DIAGNOSIS — N18.30 STAGE 3 CHRONIC KIDNEY DISEASE, UNSPECIFIED WHETHER STAGE 3A OR 3B CKD: ICD-10-CM

## 2021-07-29 DIAGNOSIS — R94.31 EKG ABNORMALITIES: ICD-10-CM

## 2021-07-29 DIAGNOSIS — Z86.73 HISTORY OF CVA IN ADULTHOOD: Primary | ICD-10-CM

## 2021-07-29 DIAGNOSIS — G47.33 OSA ON CPAP: ICD-10-CM

## 2021-07-29 DIAGNOSIS — I35.0 AORTIC VALVE STENOSIS, ETIOLOGY OF CARDIAC VALVE DISEASE UNSPECIFIED: ICD-10-CM

## 2021-07-29 DIAGNOSIS — I10 HYPERTENSION, UNSPECIFIED TYPE: ICD-10-CM

## 2021-07-29 DIAGNOSIS — Z95.2 S/P AVR (AORTIC VALVE REPLACEMENT): ICD-10-CM

## 2021-07-29 DIAGNOSIS — R55 SYNCOPE AND COLLAPSE: ICD-10-CM

## 2021-07-29 DIAGNOSIS — E66.9 OBESITY, CLASS I, BMI 30-34.9: ICD-10-CM

## 2021-07-29 DIAGNOSIS — I42.9 CARDIOMYOPATHY, UNSPECIFIED TYPE: ICD-10-CM

## 2021-08-10 ENCOUNTER — HOSPITAL ENCOUNTER (OUTPATIENT)
Dept: CARDIOLOGY | Facility: HOSPITAL | Age: 66
Discharge: HOME | End: 2021-08-10
Attending: INTERNAL MEDICINE
Payer: MEDICARE

## 2021-08-10 DIAGNOSIS — Z95.2 S/P AVR (AORTIC VALVE REPLACEMENT): ICD-10-CM

## 2021-08-10 DIAGNOSIS — G47.33 OSA ON CPAP: ICD-10-CM

## 2021-08-10 DIAGNOSIS — E66.9 OBESITY, CLASS I, BMI 30-34.9: ICD-10-CM

## 2021-08-10 DIAGNOSIS — Z86.73 HISTORY OF CVA IN ADULTHOOD: ICD-10-CM

## 2021-08-10 DIAGNOSIS — I42.9 CARDIOMYOPATHY, UNSPECIFIED TYPE: ICD-10-CM

## 2021-08-10 DIAGNOSIS — R55 SYNCOPE AND COLLAPSE: ICD-10-CM

## 2021-08-10 DIAGNOSIS — I10 HYPERTENSION, UNSPECIFIED TYPE: ICD-10-CM

## 2021-08-10 DIAGNOSIS — N18.30 STAGE 3 CHRONIC KIDNEY DISEASE, UNSPECIFIED WHETHER STAGE 3A OR 3B CKD: ICD-10-CM

## 2021-08-10 DIAGNOSIS — I25.10 CORONARY ARTERY DISEASE INVOLVING NATIVE CORONARY ARTERY OF NATIVE HEART WITHOUT ANGINA PECTORIS: ICD-10-CM

## 2021-08-10 DIAGNOSIS — R94.31 EKG ABNORMALITIES: ICD-10-CM

## 2021-08-10 DIAGNOSIS — I35.0 AORTIC VALVE STENOSIS, ETIOLOGY OF CARDIAC VALVE DISEASE UNSPECIFIED: ICD-10-CM

## 2021-08-10 PROCEDURE — 93010 EKG 12-LEAD: ICD-10-PCS | Mod: ,,, | Performed by: INTERNAL MEDICINE

## 2021-08-10 PROCEDURE — 93010 ELECTROCARDIOGRAM REPORT: CPT | Mod: ,,, | Performed by: INTERNAL MEDICINE

## 2021-08-10 PROCEDURE — 93005 ELECTROCARDIOGRAM TRACING: CPT

## 2021-09-03 ENCOUNTER — LAB VISIT (OUTPATIENT)
Dept: LAB | Facility: HOSPITAL | Age: 66
End: 2021-09-03
Attending: RADIOLOGY
Payer: MEDICARE

## 2021-09-03 DIAGNOSIS — C61 PROSTATE CANCER: ICD-10-CM

## 2021-09-03 LAB — COMPLEXED PSA SERPL-MCNC: <0.01 NG/ML (ref 0–4)

## 2021-09-03 PROCEDURE — 84153 ASSAY OF PSA TOTAL: CPT | Performed by: RADIOLOGY

## 2021-09-03 PROCEDURE — 36415 COLL VENOUS BLD VENIPUNCTURE: CPT | Performed by: RADIOLOGY

## 2021-09-09 ENCOUNTER — OFFICE VISIT (OUTPATIENT)
Dept: RADIATION ONCOLOGY | Facility: CLINIC | Age: 66
End: 2021-09-09
Payer: MEDICARE

## 2021-09-09 VITALS
RESPIRATION RATE: 18 BRPM | HEART RATE: 89 BPM | WEIGHT: 242.13 LBS | HEIGHT: 69 IN | BODY MASS INDEX: 35.86 KG/M2 | SYSTOLIC BLOOD PRESSURE: 112 MMHG | TEMPERATURE: 98 F | OXYGEN SATURATION: 96 % | DIASTOLIC BLOOD PRESSURE: 75 MMHG

## 2021-09-09 DIAGNOSIS — C61 PROSTATE CANCER: Primary | ICD-10-CM

## 2021-09-09 PROCEDURE — 99999 PR PBB SHADOW E&M-EST. PATIENT-LVL V: ICD-10-PCS | Mod: PBBFAC,,, | Performed by: RADIOLOGY

## 2021-09-09 PROCEDURE — 99215 OFFICE O/P EST HI 40 MIN: CPT | Mod: PBBFAC | Performed by: RADIOLOGY

## 2021-09-09 PROCEDURE — 99999 PR PBB SHADOW E&M-EST. PATIENT-LVL V: CPT | Mod: PBBFAC,,, | Performed by: RADIOLOGY

## 2021-09-09 PROCEDURE — 99213 OFFICE O/P EST LOW 20 MIN: CPT | Mod: S$PBB,,, | Performed by: RADIOLOGY

## 2021-09-09 PROCEDURE — 99213 PR OFFICE/OUTPT VISIT, EST, LEVL III, 20-29 MIN: ICD-10-PCS | Mod: S$PBB,,, | Performed by: RADIOLOGY

## 2021-09-16 ENCOUNTER — PATIENT OUTREACH (OUTPATIENT)
Dept: ADMINISTRATIVE | Facility: OTHER | Age: 66
End: 2021-09-16

## 2021-09-17 ENCOUNTER — OFFICE VISIT (OUTPATIENT)
Dept: UROLOGY | Facility: CLINIC | Age: 66
End: 2021-09-17
Payer: MEDICARE

## 2021-09-17 VITALS
BODY MASS INDEX: 35.79 KG/M2 | SYSTOLIC BLOOD PRESSURE: 118 MMHG | HEART RATE: 76 BPM | WEIGHT: 241.63 LBS | DIASTOLIC BLOOD PRESSURE: 72 MMHG | HEIGHT: 69 IN

## 2021-09-17 DIAGNOSIS — C61 PROSTATE CANCER: Primary | ICD-10-CM

## 2021-09-17 PROCEDURE — 99213 OFFICE O/P EST LOW 20 MIN: CPT | Mod: S$PBB,,, | Performed by: UROLOGY

## 2021-09-17 PROCEDURE — 99999 PR PBB SHADOW E&M-EST. PATIENT-LVL III: ICD-10-PCS | Mod: PBBFAC,,, | Performed by: UROLOGY

## 2021-09-17 PROCEDURE — 99213 OFFICE O/P EST LOW 20 MIN: CPT | Mod: PBBFAC | Performed by: UROLOGY

## 2021-09-17 PROCEDURE — 99213 PR OFFICE/OUTPT VISIT, EST, LEVL III, 20-29 MIN: ICD-10-PCS | Mod: S$PBB,,, | Performed by: UROLOGY

## 2021-09-17 PROCEDURE — 99999 PR PBB SHADOW E&M-EST. PATIENT-LVL III: CPT | Mod: PBBFAC,,, | Performed by: UROLOGY

## 2021-09-17 RX ORDER — ATORVASTATIN CALCIUM 80 MG/1
TABLET, FILM COATED ORAL
COMMUNITY
Start: 2021-07-23

## 2021-10-22 ENCOUNTER — LAB VISIT (OUTPATIENT)
Dept: LAB | Facility: HOSPITAL | Age: 66
End: 2021-10-22
Attending: INTERNAL MEDICINE
Payer: MEDICARE

## 2021-10-22 DIAGNOSIS — R55 SYNCOPE AND COLLAPSE: ICD-10-CM

## 2021-10-22 DIAGNOSIS — Z86.73 HISTORY OF CVA IN ADULTHOOD: ICD-10-CM

## 2021-10-22 DIAGNOSIS — E78.2 MIXED HYPERLIPIDEMIA: ICD-10-CM

## 2021-10-22 LAB
CHOLEST SERPL-MCNC: 134 MG/DL (ref 120–199)
CHOLEST/HDLC SERPL: 3 {RATIO} (ref 2–5)
D DIMER PPP IA.FEU-MCNC: 1.12 MG/L FEU
HDLC SERPL-MCNC: 44 MG/DL (ref 40–75)
HDLC SERPL: 32.8 % (ref 20–50)
LDLC SERPL CALC-MCNC: 78.8 MG/DL (ref 63–159)
NONHDLC SERPL-MCNC: 90 MG/DL
TRIGL SERPL-MCNC: 56 MG/DL (ref 30–150)

## 2021-10-22 PROCEDURE — 85379 FIBRIN DEGRADATION QUANT: CPT | Performed by: INTERNAL MEDICINE

## 2021-10-22 PROCEDURE — 80061 LIPID PANEL: CPT | Performed by: INTERNAL MEDICINE

## 2021-10-22 PROCEDURE — 36415 COLL VENOUS BLD VENIPUNCTURE: CPT | Performed by: INTERNAL MEDICINE

## 2021-11-04 ENCOUNTER — OFFICE VISIT (OUTPATIENT)
Dept: INTERNAL MEDICINE | Facility: CLINIC | Age: 66
End: 2021-11-04
Payer: MEDICARE

## 2021-11-04 ENCOUNTER — HOSPITAL ENCOUNTER (OUTPATIENT)
Dept: RADIOLOGY | Facility: HOSPITAL | Age: 66
Discharge: HOME | End: 2021-11-04
Attending: PHYSICIAN ASSISTANT
Payer: MEDICARE

## 2021-11-04 VITALS
WEIGHT: 245.81 LBS | DIASTOLIC BLOOD PRESSURE: 70 MMHG | HEART RATE: 94 BPM | BODY MASS INDEX: 36.41 KG/M2 | OXYGEN SATURATION: 96 % | SYSTOLIC BLOOD PRESSURE: 112 MMHG | HEIGHT: 69 IN | TEMPERATURE: 97 F

## 2021-11-04 DIAGNOSIS — I10 PRIMARY HYPERTENSION: Chronic | ICD-10-CM

## 2021-11-04 DIAGNOSIS — R06.2 WHEEZING: ICD-10-CM

## 2021-11-04 DIAGNOSIS — R06.02 SHORTNESS OF BREATH: ICD-10-CM

## 2021-11-04 DIAGNOSIS — J20.9 ACUTE BRONCHITIS, UNSPECIFIED ORGANISM: ICD-10-CM

## 2021-11-04 DIAGNOSIS — R06.02 SHORTNESS OF BREATH: Primary | ICD-10-CM

## 2021-11-04 PROCEDURE — 99215 OFFICE O/P EST HI 40 MIN: CPT | Mod: PBBFAC,25 | Performed by: PHYSICIAN ASSISTANT

## 2021-11-04 PROCEDURE — 71046 X-RAY EXAM CHEST 2 VIEWS: CPT | Mod: 26,,, | Performed by: RADIOLOGY

## 2021-11-04 PROCEDURE — 99999 PR PBB SHADOW E&M-EST. PATIENT-LVL V: ICD-10-PCS | Mod: PBBFAC,,, | Performed by: PHYSICIAN ASSISTANT

## 2021-11-04 PROCEDURE — 71046 X-RAY EXAM CHEST 2 VIEWS: CPT | Mod: TC

## 2021-11-04 PROCEDURE — 71046 XR CHEST PA AND LATERAL: ICD-10-PCS | Mod: 26,,, | Performed by: RADIOLOGY

## 2021-11-04 PROCEDURE — 99999 PR PBB SHADOW E&M-EST. PATIENT-LVL V: CPT | Mod: PBBFAC,,, | Performed by: PHYSICIAN ASSISTANT

## 2021-11-04 PROCEDURE — 99214 OFFICE O/P EST MOD 30 MIN: CPT | Mod: S$PBB,,, | Performed by: PHYSICIAN ASSISTANT

## 2021-11-04 PROCEDURE — 99214 PR OFFICE/OUTPT VISIT, EST, LEVL IV, 30-39 MIN: ICD-10-PCS | Mod: S$PBB,,, | Performed by: PHYSICIAN ASSISTANT

## 2021-11-04 RX ORDER — DOXYCYCLINE 100 MG/1
100 CAPSULE ORAL 2 TIMES DAILY
Qty: 20 CAPSULE | Refills: 0 | Status: SHIPPED | OUTPATIENT
Start: 2021-11-04 | End: 2021-11-14

## 2021-11-04 RX ORDER — METHYLPREDNISOLONE 4 MG/1
TABLET ORAL
Qty: 21 TABLET | Refills: 0 | Status: SHIPPED | OUTPATIENT
Start: 2021-11-04 | End: 2022-01-19

## 2021-11-04 RX ORDER — ALBUTEROL SULFATE 90 UG/1
2 AEROSOL, METERED RESPIRATORY (INHALATION) EVERY 6 HOURS PRN
Qty: 6.7 G | Refills: 0 | Status: SHIPPED | OUTPATIENT
Start: 2021-11-04 | End: 2022-05-20 | Stop reason: SDUPTHER

## 2021-11-11 ENCOUNTER — OFFICE VISIT (OUTPATIENT)
Dept: INTERNAL MEDICINE | Facility: CLINIC | Age: 66
End: 2021-11-11
Payer: MEDICARE

## 2021-11-11 VITALS
HEIGHT: 69 IN | WEIGHT: 249.81 LBS | BODY MASS INDEX: 37 KG/M2 | SYSTOLIC BLOOD PRESSURE: 132 MMHG | HEART RATE: 94 BPM | OXYGEN SATURATION: 97 % | DIASTOLIC BLOOD PRESSURE: 78 MMHG

## 2021-11-11 DIAGNOSIS — J30.9 ALLERGIC RHINITIS, UNSPECIFIED SEASONALITY, UNSPECIFIED TRIGGER: Primary | ICD-10-CM

## 2021-11-11 PROCEDURE — 99999 PR PBB SHADOW E&M-EST. PATIENT-LVL V: ICD-10-PCS | Mod: PBBFAC,,, | Performed by: PHYSICIAN ASSISTANT

## 2021-11-11 PROCEDURE — 99213 OFFICE O/P EST LOW 20 MIN: CPT | Mod: S$PBB,,, | Performed by: PHYSICIAN ASSISTANT

## 2021-11-11 PROCEDURE — 99999 PR PBB SHADOW E&M-EST. PATIENT-LVL V: CPT | Mod: PBBFAC,,, | Performed by: PHYSICIAN ASSISTANT

## 2021-11-11 PROCEDURE — 99213 PR OFFICE/OUTPT VISIT, EST, LEVL III, 20-29 MIN: ICD-10-PCS | Mod: S$PBB,,, | Performed by: PHYSICIAN ASSISTANT

## 2021-11-11 PROCEDURE — 99215 OFFICE O/P EST HI 40 MIN: CPT | Mod: PBBFAC | Performed by: PHYSICIAN ASSISTANT

## 2021-11-11 RX ORDER — FLUTICASONE PROPIONATE 50 MCG
1 SPRAY, SUSPENSION (ML) NASAL DAILY
Qty: 16 G | Refills: 0 | Status: SHIPPED | OUTPATIENT
Start: 2021-11-11 | End: 2022-02-23 | Stop reason: SDUPTHER

## 2021-11-11 RX ORDER — LEVOCETIRIZINE DIHYDROCHLORIDE 5 MG/1
5 TABLET, FILM COATED ORAL NIGHTLY
Qty: 30 TABLET | Refills: 0 | Status: ON HOLD | OUTPATIENT
Start: 2021-11-11 | End: 2023-01-26 | Stop reason: HOSPADM

## 2021-12-09 ENCOUNTER — LAB VISIT (OUTPATIENT)
Dept: LAB | Facility: HOSPITAL | Age: 66
End: 2021-12-09
Attending: UROLOGY
Payer: MEDICARE

## 2021-12-09 DIAGNOSIS — C61 PROSTATE CANCER: ICD-10-CM

## 2021-12-09 PROCEDURE — 36415 COLL VENOUS BLD VENIPUNCTURE: CPT | Performed by: UROLOGY

## 2021-12-09 PROCEDURE — 84153 ASSAY OF PSA TOTAL: CPT | Performed by: UROLOGY

## 2021-12-10 LAB — COMPLEXED PSA SERPL-MCNC: <0.01 NG/ML (ref 0–4)

## 2021-12-16 ENCOUNTER — PATIENT OUTREACH (OUTPATIENT)
Dept: ADMINISTRATIVE | Facility: OTHER | Age: 66
End: 2021-12-16
Payer: OTHER GOVERNMENT

## 2021-12-17 ENCOUNTER — OFFICE VISIT (OUTPATIENT)
Dept: UROLOGY | Facility: CLINIC | Age: 66
End: 2021-12-17
Payer: MEDICARE

## 2021-12-17 VITALS
SYSTOLIC BLOOD PRESSURE: 120 MMHG | DIASTOLIC BLOOD PRESSURE: 75 MMHG | HEIGHT: 68 IN | BODY MASS INDEX: 37.86 KG/M2 | TEMPERATURE: 97 F | HEART RATE: 76 BPM | WEIGHT: 249.81 LBS

## 2021-12-17 DIAGNOSIS — C61 PROSTATE CANCER: Primary | ICD-10-CM

## 2021-12-17 PROCEDURE — 99999 PR PBB SHADOW E&M-EST. PATIENT-LVL V: ICD-10-PCS | Mod: PBBFAC,,, | Performed by: UROLOGY

## 2021-12-17 PROCEDURE — 99215 OFFICE O/P EST HI 40 MIN: CPT | Mod: PBBFAC | Performed by: UROLOGY

## 2021-12-17 PROCEDURE — 99213 OFFICE O/P EST LOW 20 MIN: CPT | Mod: S$PBB,,, | Performed by: UROLOGY

## 2021-12-17 PROCEDURE — 99999 PR PBB SHADOW E&M-EST. PATIENT-LVL V: CPT | Mod: PBBFAC,,, | Performed by: UROLOGY

## 2021-12-17 PROCEDURE — 99213 PR OFFICE/OUTPT VISIT, EST, LEVL III, 20-29 MIN: ICD-10-PCS | Mod: S$PBB,,, | Performed by: UROLOGY

## 2021-12-21 ENCOUNTER — TELEPHONE (OUTPATIENT)
Dept: PAIN MEDICINE | Facility: CLINIC | Age: 66
End: 2021-12-21
Payer: OTHER GOVERNMENT

## 2021-12-30 ENCOUNTER — TELEPHONE (OUTPATIENT)
Dept: INTERNAL MEDICINE | Facility: CLINIC | Age: 66
End: 2021-12-30
Payer: OTHER GOVERNMENT

## 2021-12-30 ENCOUNTER — PATIENT MESSAGE (OUTPATIENT)
Dept: ADMINISTRATIVE | Facility: CLINIC | Age: 66
End: 2021-12-30
Payer: OTHER GOVERNMENT

## 2021-12-30 ENCOUNTER — OFFICE VISIT (OUTPATIENT)
Dept: INTERNAL MEDICINE | Facility: CLINIC | Age: 66
End: 2021-12-30
Payer: MEDICARE

## 2021-12-30 ENCOUNTER — CLINICAL SUPPORT (OUTPATIENT)
Dept: INTERNAL MEDICINE | Facility: CLINIC | Age: 66
End: 2021-12-30
Payer: MEDICARE

## 2021-12-30 VITALS
HEART RATE: 85 BPM | HEIGHT: 68 IN | BODY MASS INDEX: 37.86 KG/M2 | OXYGEN SATURATION: 100 % | TEMPERATURE: 98 F | SYSTOLIC BLOOD PRESSURE: 132 MMHG | DIASTOLIC BLOOD PRESSURE: 78 MMHG | WEIGHT: 249.81 LBS

## 2021-12-30 DIAGNOSIS — U07.1 COVID-19 VIRUS INFECTION: Primary | ICD-10-CM

## 2021-12-30 DIAGNOSIS — R09.81 SINUS CONGESTION: ICD-10-CM

## 2021-12-30 DIAGNOSIS — R06.02 SHORTNESS OF BREATH: Primary | ICD-10-CM

## 2021-12-30 LAB
CTP QC/QA: YES
SARS-COV-2 RDRP RESP QL NAA+PROBE: POSITIVE

## 2021-12-30 PROCEDURE — 99999 PR PBB SHADOW E&M-EST. PATIENT-LVL V: CPT | Mod: PBBFAC,CR,, | Performed by: FAMILY MEDICINE

## 2021-12-30 PROCEDURE — U0002 COVID-19 LAB TEST NON-CDC: HCPCS | Mod: PBBFAC

## 2021-12-30 PROCEDURE — 99214 OFFICE O/P EST MOD 30 MIN: CPT | Mod: S$PBB,CR,, | Performed by: FAMILY MEDICINE

## 2021-12-30 PROCEDURE — 99999 PR PBB SHADOW E&M-EST. PATIENT-LVL V: ICD-10-PCS | Mod: PBBFAC,CR,, | Performed by: FAMILY MEDICINE

## 2021-12-30 PROCEDURE — 99215 OFFICE O/P EST HI 40 MIN: CPT | Mod: PBBFAC | Performed by: FAMILY MEDICINE

## 2021-12-30 PROCEDURE — 99214 PR OFFICE/OUTPT VISIT, EST, LEVL IV, 30-39 MIN: ICD-10-PCS | Mod: S$PBB,CR,, | Performed by: FAMILY MEDICINE

## 2021-12-30 RX ORDER — BENZONATATE 200 MG/1
200 CAPSULE ORAL 3 TIMES DAILY PRN
Qty: 30 CAPSULE | Refills: 1 | Status: SHIPPED | OUTPATIENT
Start: 2021-12-30 | End: 2022-01-09

## 2021-12-30 NOTE — PROGRESS NOTES
"CHIEF COMPLAINT: COVID-19 Concerns    He reports symptoms concerning for COVID-19. (This is a new problem - acute illness with systemic symptoms.)    Onset of symptoms reported as yesterday. Quality of symptoms described as nonproductive cough. Associated symptoms include fatigue and malaise and nasal/sinus congestion. Severity of symptoms described as mild-moderate. He reports no fever. Symptoms denied include shortness of breath and chest pain. Alleviating factors were not identified.    THIS PATIENT'S COVID RISK SCORE = 6    Clinical Support on 12/30/2021   Component Date Value Ref Range Status    POC Rapid COVID 12/30/2021 Positive* Negative Final     Acceptable 12/30/2021 Yes   Final      Instructions were reviewed with him. Refer to "Patient Instructions" section of after visit summary.     DIAGNOSES SPECIFICALLY EVALUATED AND TREATED THIS ENCOUNTER  1. COVID-19 virus infection  - Ambulatory referral/consult to [facility]; Future  - COVID-19 Home Symptom Monitoring  - Duration (days): 14  - COVID-19 Surveillance Program  - benzonatate (TESSALON) 200 MG capsule; Take 1 capsule (200 mg total) by mouth 3 (three) times daily as needed for Cough.  Dispense: 30 capsule; Refill: 1  - dextromethorphan-guaiFENesin  mg/15 mL Liqd; Take 10 mLs by mouth every 6 (six) hours as needed (for cough).  Dispense: 354 mL; Refill: 0     No follow-ups on file.    Problem List Items Addressed This Visit    None     Visit Diagnoses     COVID-19 virus infection    -  Primary    Relevant Medications    benzonatate (TESSALON) 200 MG capsule    dextromethorphan-guaiFENesin  mg/15 mL Liqd    Other Relevant Orders    Ambulatory referral/consult to [facility]    COVID-19 Home Symptom Monitoring  - Duration (days): 14    COVID-19 Surveillance Program (Completed)      Unless noted herein, any chronic conditions are represented as and appear compensated/controlled and stable, and no other significant complaints " or concerns were reported.    PRESCRIPTION DRUG MANAGEMENT  Outpatient Medications Prior to Visit   Medication Sig Dispense Refill    acetaminophen-codeine 300-30mg (TYLENOL #3) 300-30 mg Tab TK 1 T PO Q 4 TO 6 H PRF PAIN      albuterol (PROVENTIL/VENTOLIN HFA) 90 mcg/actuation inhaler Inhale 2 puffs into the lungs every 6 (six) hours as needed for Wheezing. 1 g 2    allopurinoL (ZYLOPRIM) 100 MG tablet Take 1 tablet (100 mg total) by mouth once daily. 30 tablet 11    allopurinoL (ZYLOPRIM) 300 MG tablet TAKE ONE TABLET BY MOUTH EVERY DAY FOR GOUT      ALPRAZolam (XANAX) 0.25 MG tablet TAKE ONE TABLET BY MOUTH EVERY DAY AS NEEDED FOR ANXIETY TAKE IT FOR PANIC ATTACKS.      ALPRAZolam (XANAX) 0.25 MG tablet TAKE ONE TABLET BY MOUTH EVERY DAY AS NEEDED FOR ANXIETY TAKE IT FOR PANIC ATTACKS.      aluminum & magnesium hydroxide-simethicone (MYLANTA MAX STRENGTH) 400-400-40 mg/5 mL suspension TAKE 15ML BY MOUTH FOUR TIMES A DAY AS NEEDED FOR STOMACH ACID      aluminum & magnesium hydroxide-simethicone (MYLANTA MAX STRENGTH) 400-400-40 mg/5 mL suspension TAKE 15ML BY MOUTH FOUR TIMES A DAY AS NEEDED FOR STOMACH ACID      aspirin (ECOTRIN) 81 MG EC tablet TAKE ONE TABLET BY MOUTH EVERY DAY TO PREVENT BLOOD CLOT      aspirin (ECOTRIN) 81 MG EC tablet TAKE ONE TABLET BY MOUTH EVERY DAY TO PREVENT BLOOD CLOT      atorvastatin (LIPITOR) 40 MG tablet Take 1 tablet (40 mg total) by mouth once daily. 90 tablet 3    atorvastatin (LIPITOR) 80 MG tablet TAKE ONE-HALF TABLET BY MOUTH EVERY DAY FOR CHOLESTEROL      carvediloL (COREG) 12.5 MG tablet Take 1 tablet (12.5 mg total) by mouth 2 (two) times daily. 60 tablet 11    carvediloL (COREG) 25 MG tablet TAKE ONE TABLET BY MOUTH TWICE A DAY FOR HEART      cyclobenzaprine (FLEXERIL) 10 MG tablet TAKE ONE TABLET BY MOUTH THREE TIMES A DAY AS A MUSCLE RELAXANT      cyclobenzaprine (FLEXERIL) 10 MG tablet TAKE ONE TABLET BY MOUTH THREE TIMES A DAY AS A MUSCLE RELAXANT       desonide (DESOWEN) 0.05 % cream APPLY MODERATE AMOUNT TOPICALLY TWICE A DAY FOR RASH/IRRITATION      diphenhydrAMINE (SOMINEX) 25 mg tablet Take 25 mg by mouth nightly as needed for Insomnia.      esomeprazole (NEXIUM) 40 MG capsule TAKE ONE CAPSULE BY MOUTH TWICE A DAY ON AN EMPTY STOMACH FOR ACID REFLUX      esomeprazole (NEXIUM) 40 MG capsule TAKE ONE CAPSULE BY MOUTH TWICE A DAY ON AN EMPTY STOMACH FOR ACID REFLUX      famotidine (PEPCID) 40 MG tablet TAKE ONE TABLET BY MOUTH AT BEDTIME FOR ACID REFLUX      ferrous fumarate 324 mg (106 mg iron) Tab TAKE ONE TABLET BY MOUTH TWICE A DAY AS AN IRON SUPPLEMENT      ferrous gluconate (FERGON) 324 MG tablet TAKE ONE TABLET BY MOUTH TWICE A DAY AS AN IRON SUPPLEMENT      fexofenadine (ALLEGRA) 180 MG tablet Take 1 tablet (180 mg total) by mouth daily as needed. 30 tablet 5    flunisolide 25 mcg, 0.025%, (NASALIDE) 25 mcg (0.025 %) Spry 2 sprays by Nasal route as needed.       gabapentin (NEURONTIN) 300 MG capsule Take 300 mg by mouth 3 (three) times daily.      gabapentin (NEURONTIN) 300 MG capsule TAKE ONE CAPSULE BY MOUTH THREE TIMES A DAY FOR NERVE PAIN      hydrocortisone 2.5 % cream Apply topically 2 (two) times daily as needed. Apply to affected areas of the face and neck. 30 g 2    hydrocortisone-pramoxine (PROCTOFOAM-HS) rectal foam INSERT 1 APPLICATORFUL INTO RECTALLY TWICE A DAY FOR HEMORRHOIDS      hydrOXYzine HCL (ATARAX) 25 MG tablet Take 25 mg by mouth 3 (three) times daily as needed for Itching.      levocetirizine (XYZAL) 5 MG tablet Take 1 tablet (5 mg total) by mouth every evening. 30 tablet 0    losartan (COZAAR) 50 MG tablet Take 1 tablet (50 mg total) by mouth once daily. 30 tablet 11    methyl salicylate-menthol 15-10% 15-10 % Crea Apply topically as needed.       methylPREDNISolone (MEDROL DOSEPACK) 4 mg tablet Take as directed on package (Patient taking differently: Take as directed on package) 21 tablet 0    montelukast  (SINGULAIR) 10 mg tablet TAKE ONE TABLET BY MOUTH EVERY DAY FOR BREATHING      montelukast (SINGULAIR) 10 mg tablet TAKE ONE TABLET BY MOUTH ONCE DAILY ROUTINELY FOR BREATHING      oxybutynin (DITROPAN-XL) 10 MG 24 hr tablet TAKE ONE TABLET BY MOUTH ONCE DAILY FOR OVERACTIVE BLADDER      oxybutynin (DITROPAN-XL) 10 MG 24 hr tablet TAKE ONE TABLET BY MOUTH ONCE DAILY FOR OVERACTIVE BLADDER      pantoprazole (PROTONIX) 40 MG tablet Take 40 mg by mouth 2 (two) times daily.       sertraline (ZOLOFT) 100 MG tablet TAKE TWO TABLETS BY MOUTH EVERY DAY FOR MENTAL HEALTH DOSE INCREASED TO 200MG/DAY      sertraline (ZOLOFT) 100 MG tablet TAKE TWO TABLETS BY MOUTH EVERY DAY FOR MENTAL HEALTH DOSE INCREASED TO 200MG/DAY      simethicone (MYLICON) 80 MG chewable tablet Take 80 mg by mouth every 6 (six) hours as needed for Flatulence.      sucralfate (CARAFATE) 100 mg/mL suspension Take 1 g by mouth 4 (four) times daily.       triamcinolone acetonide 0.1% (KENALOG) 0.1 % cream Apply topically 2 (two) times daily. Use for up to 2 weeks as needed for itching. Repeat if flaring. 454 g 1    zolpidem (AMBIEN CR) 12.5 MG CR tablet TAKE ONE TABLET BY MOUTH AT BEDTIME AS NEEDED      zolpidem (AMBIEN CR) 12.5 MG CR tablet TAKE ONE TABLET BY MOUTH AT BEDTIME AS NEEDED FOR SLEEP       Facility-Administered Medications Prior to Visit   Medication Dose Route Frequency Provider Last Rate Last Admin    sodium chloride 0.9% flush 10 mL  10 mL Intravenous PRN Wilda Horne MD       There are no discontinued medications.  Medications Ordered This Encounter   Medications    benzonatate (TESSALON) 200 MG capsule     Sig: Take 1 capsule (200 mg total) by mouth 3 (three) times daily as needed for Cough.     Dispense:  30 capsule     Refill:  1    dextromethorphan-guaiFENesin  mg/15 mL Liqd     Sig: Take 10 mLs by mouth every 6 (six) hours as needed (for cough).     Dispense:  354 mL     Refill:  0     PHYSICAL EXAM  Vitals:  "   12/30/21 1603   BP: 132/78   BP Location: Right arm   Patient Position: Sitting   BP Method: Large (Manual)   Pulse: 85   Temp: 98.4 °F (36.9 °C)   TempSrc: Oral   SpO2: 100%   Weight: 113.3 kg (249 lb 12.5 oz)   Height: 5' 8" (1.727 m)   CONSTITUTIONAL: Vital signs noted. No apparent distress. Does not appear acutely ill or septic. Appears adequately hydrated.  EYE: Sclerae anicteric.  ENT: External ENT unremarkable. Hearing grossly intact.  PULM: Lungs clear. Breathing unlabored.  CV: Auscultation reveals regular rate and rhythm.  DERM: Skin warm and moist with normal turgor.  NEURO: There are no gross focal motor deficits or gross deficits of cranial nerves III-XII.  PSYCH: Alert and oriented x 3. Mood is grossly neutral. Affect appropriate. Judgment and insight grossly intact.     Documentation entered by me for this encounter may have been done in part using speech-recognition technology. Although I have made an effort to ensure accuracy, "sound like" errors may exist and should be interpreted in context.   "

## 2021-12-30 NOTE — PATIENT INSTRUCTIONS
Abdullahi Teixeira.    Your test was POSITIVE for COVID-19 (coronavirus). This means that you have COVID-19.     Based on your risk for severe illness from COVID-19, you may benefit from monoclonal antibody infusion.    Some people are at higher risk for severe illness and death from COVID-19, and monoclonal antibody therapy can help lower the risk for hospitalization and death in people at the highest risk. However, there is a critical nationwide shortage of the monoclonal antibody formula effective against the Omicron variant of COVID-19, so the supply is being prioritized for people with high COVID-19 risk scores.    The COVID-19 risk score predicts a person's risk for severe illness and death from COVID-19 infection. Your COVID-19 risk score is 6 out of a possible total score of 15. If we have enough supply to make it available for people with a COVID-19 risk score of 6, you will be contacted and given information about the treatment and offered an appointment. You will be allowed to ask questions before you receive the treatment.    I've entered an order for you to be enrolled in [facility]'s COVID-19 home symptom monitoring program. You'll be getting a text message notifying you about this. The service is free, and it will make it easier for you to communicate your symptoms to our trained nurses who can help you with guidance while you are recovering.    You MUST FOLLOW the CDC's instructions to isolate yourself to protect your friends and family now. Until you have completed your required isolation period, you must:   Separate yourself from other people and animals in your home.   Call ahead before visiting your doctor to notify them that you have COVID-19.   Wear a face mask.   Cover your coughs and sneezes.   Wash your hands often with soap and water or alcohol-based hand .   Avoid sharing personal household items.   Wipe down surfaces used daily.   Monitor your symptoms. Seek prompt medical  attention if your illness is worsening (e.g., difficulty breathing).    Before seeking care, call your healthcare provider.   If you have a medical emergency and need to call 911, notify the dispatch personnel that you have COVID-19. Put on a face mask before emergency medical services arrive.    Please take the time now to visit the following websites to learn important information about how to care for yourself and protect others while you are sick:   [email or web address]   https://www.cdc.gov/coronavirus/2019-ncov/if-you-are-sick/    If you are getting worse and don't know what to do, you can:   -Call [facility] COVID-19 Information Line at [phone];    -Call [facility] at [phone] to be connected to one of our excellent triage nurses who can give you advice; or   -Schedule a telemedicine virtual visit with an [facility] healthcare provider. To schedule a virtual visit, go to one of these websites:     [email or web address]     [email or web address]    In the meantime, stay well-hydrated, and you can take over-the-counter medicines if you have fever, aches, or cough.    Thanks for letting me care for you, and thanks for [facility]. I hope you feel better soon.    Sincerely,    CAM Tinsley MD

## 2021-12-31 ENCOUNTER — PATIENT MESSAGE (OUTPATIENT)
Dept: INFECTIOUS DISEASES | Facility: HOSPITAL | Age: 66
End: 2021-12-31
Payer: OTHER GOVERNMENT

## 2021-12-31 ENCOUNTER — NURSE TRIAGE (OUTPATIENT)
Dept: ADMINISTRATIVE | Facility: CLINIC | Age: 66
End: 2021-12-31
Payer: OTHER GOVERNMENT

## 2021-12-31 ENCOUNTER — PATIENT MESSAGE (OUTPATIENT)
Dept: INTERNAL MEDICINE | Facility: CLINIC | Age: 66
End: 2021-12-31
Payer: OTHER GOVERNMENT

## 2021-12-31 ENCOUNTER — PATIENT MESSAGE (OUTPATIENT)
Dept: ADMINISTRATIVE | Facility: OTHER | Age: 66
End: 2021-12-31
Payer: OTHER GOVERNMENT

## 2022-01-01 ENCOUNTER — PATIENT MESSAGE (OUTPATIENT)
Dept: ADMINISTRATIVE | Facility: OTHER | Age: 67
End: 2022-01-01
Payer: MEDICARE

## 2022-01-01 ENCOUNTER — PATIENT MESSAGE (OUTPATIENT)
Dept: ADMINISTRATIVE | Facility: CLINIC | Age: 67
End: 2022-01-01
Payer: MEDICARE

## 2022-01-02 ENCOUNTER — PATIENT MESSAGE (OUTPATIENT)
Dept: ADMINISTRATIVE | Facility: OTHER | Age: 67
End: 2022-01-02
Payer: MEDICARE

## 2022-01-03 ENCOUNTER — PATIENT MESSAGE (OUTPATIENT)
Dept: ADMINISTRATIVE | Facility: OTHER | Age: 67
End: 2022-01-03
Payer: MEDICARE

## 2022-01-03 ENCOUNTER — INFUSION (OUTPATIENT)
Dept: INFECTIOUS DISEASES | Facility: HOSPITAL | Age: 67
End: 2022-01-03
Attending: FAMILY MEDICINE
Payer: MEDICARE

## 2022-01-03 VITALS
SYSTOLIC BLOOD PRESSURE: 138 MMHG | TEMPERATURE: 98 F | DIASTOLIC BLOOD PRESSURE: 76 MMHG | HEART RATE: 66 BPM | OXYGEN SATURATION: 98 % | RESPIRATION RATE: 20 BRPM

## 2022-01-03 DIAGNOSIS — U07.1 COVID-19 VIRUS INFECTION: ICD-10-CM

## 2022-01-03 PROCEDURE — M0243 CASIRIVI AND IMDEVI INFUSION: HCPCS | Performed by: INTERNAL MEDICINE

## 2022-01-03 PROCEDURE — 63600175 PHARM REV CODE 636 W HCPCS: Performed by: INTERNAL MEDICINE

## 2022-01-03 PROCEDURE — 25000003 PHARM REV CODE 250: Performed by: INTERNAL MEDICINE

## 2022-01-03 RX ORDER — ACETAMINOPHEN 325 MG/1
650 TABLET ORAL ONCE AS NEEDED
Status: DISCONTINUED | OUTPATIENT
Start: 2022-01-03 | End: 2022-04-08

## 2022-01-03 RX ORDER — ONDANSETRON 4 MG/1
4 TABLET, ORALLY DISINTEGRATING ORAL ONCE AS NEEDED
Status: DISCONTINUED | OUTPATIENT
Start: 2022-01-03 | End: 2022-04-08

## 2022-01-03 RX ORDER — SODIUM CHLORIDE 0.9 % (FLUSH) 0.9 %
10 SYRINGE (ML) INJECTION
Status: DISCONTINUED | OUTPATIENT
Start: 2022-01-03 | End: 2022-01-27

## 2022-01-03 RX ORDER — EPINEPHRINE 0.3 MG/.3ML
0.3 INJECTION SUBCUTANEOUS
Status: DISCONTINUED | OUTPATIENT
Start: 2022-01-03 | End: 2022-04-08

## 2022-01-03 RX ORDER — DIPHENHYDRAMINE HYDROCHLORIDE 50 MG/ML
25 INJECTION INTRAMUSCULAR; INTRAVENOUS ONCE AS NEEDED
Status: DISCONTINUED | OUTPATIENT
Start: 2022-01-03 | End: 2022-04-08

## 2022-01-03 RX ORDER — ALBUTEROL SULFATE 90 UG/1
2 AEROSOL, METERED RESPIRATORY (INHALATION)
Status: DISCONTINUED | OUTPATIENT
Start: 2022-01-03 | End: 2022-01-27 | Stop reason: SDUPTHER

## 2022-01-03 RX ADMIN — CASIRIVIMAB AND IMDEVIMAB 600 MG: 600; 600 INJECTION, SOLUTION, CONCENTRATE INTRAVENOUS at 07:01

## 2022-01-03 NOTE — PROGRESS NOTES
If you have any further COVID related symptoms that have not improved or feel you're having a reaction to your infusion please notify your primary care physician, go to the nearest emergency room or call 911. Patient discharged with escort to front door of hospital in no apparent distress. Tolerated infusion well. Instructed patient to notify primary care physician if symptoms do not resolve or worsen and to continue to quarantine for the full 10 day period. Also, instructed to wait 90 days post-infusion to receive any form of the Covid vaccine. Patient verbalized intent to comply.

## 2022-01-04 ENCOUNTER — PATIENT MESSAGE (OUTPATIENT)
Dept: ADMINISTRATIVE | Facility: OTHER | Age: 67
End: 2022-01-04
Payer: MEDICARE

## 2022-01-05 ENCOUNTER — PATIENT MESSAGE (OUTPATIENT)
Dept: ADMINISTRATIVE | Facility: OTHER | Age: 67
End: 2022-01-05
Payer: MEDICARE

## 2022-01-06 ENCOUNTER — PATIENT MESSAGE (OUTPATIENT)
Dept: ADMINISTRATIVE | Facility: OTHER | Age: 67
End: 2022-01-06
Payer: MEDICARE

## 2022-01-07 ENCOUNTER — PATIENT MESSAGE (OUTPATIENT)
Dept: ADMINISTRATIVE | Facility: CLINIC | Age: 67
End: 2022-01-07
Payer: MEDICARE

## 2022-01-07 ENCOUNTER — PATIENT MESSAGE (OUTPATIENT)
Dept: ADMINISTRATIVE | Facility: OTHER | Age: 67
End: 2022-01-07
Payer: MEDICARE

## 2022-01-08 ENCOUNTER — PATIENT MESSAGE (OUTPATIENT)
Dept: ADMINISTRATIVE | Facility: OTHER | Age: 67
End: 2022-01-08
Payer: MEDICARE

## 2022-01-09 ENCOUNTER — PATIENT MESSAGE (OUTPATIENT)
Dept: ADMINISTRATIVE | Facility: OTHER | Age: 67
End: 2022-01-09
Payer: MEDICARE

## 2022-01-10 ENCOUNTER — PATIENT MESSAGE (OUTPATIENT)
Dept: ADMINISTRATIVE | Facility: OTHER | Age: 67
End: 2022-01-10
Payer: MEDICARE

## 2022-01-11 ENCOUNTER — PATIENT MESSAGE (OUTPATIENT)
Dept: ADMINISTRATIVE | Facility: OTHER | Age: 67
End: 2022-01-11
Payer: MEDICARE

## 2022-01-12 ENCOUNTER — PATIENT MESSAGE (OUTPATIENT)
Dept: ADMINISTRATIVE | Facility: OTHER | Age: 67
End: 2022-01-12
Payer: MEDICARE

## 2022-01-13 ENCOUNTER — PATIENT MESSAGE (OUTPATIENT)
Dept: ADMINISTRATIVE | Facility: OTHER | Age: 67
End: 2022-01-13
Payer: MEDICARE

## 2022-01-15 ENCOUNTER — NURSE TRIAGE (OUTPATIENT)
Dept: ADMINISTRATIVE | Facility: CLINIC | Age: 67
End: 2022-01-15
Payer: MEDICARE

## 2022-01-15 NOTE — TELEPHONE ENCOUNTER
Pt called with c/o severe pain in back took meds and not helping has appt with pain management but uncontrolled with OTC. Pt told to go to the ED for medication and no one will call in pain medication for him Pt will go to the ED   Reason for Disposition   [1] SEVERE back pain (e.g., excruciating, unable to do any normal activities) AND [2] not improved 2 hours after pain medicine    Additional Information   Negative: Passed out (i.e., lost consciousness, collapsed and was not responding)   Negative: Shock suspected (e.g., cold/pale/clammy skin, too weak to stand, low BP, rapid pulse)   Negative: Sounds like a life-threatening emergency to the triager   Negative: [1] SEVERE back pain (e.g., excruciating) AND [2] sudden onset AND [3] age > 60 years   Negative: [1] Unable to urinate (or only a few drops) > 4 hours AND [2] bladder feels very full (e.g., palpable bladder or strong urge to urinate)   Negative: [1] Loss of bladder or bowel control (urine or bowel incontinence; wetting self, leaking stool) AND [2] new-onset   Negative: Numbness in groin or rectal area (i.e., loss of sensation)   Negative: [1] SEVERE abdominal pain AND [2] present > 1 hour   Negative: [1] Abdominal pain AND [2] age > 60 years   Negative: Weakness of a leg or foot (e.g., unable to bear weight, dragging foot)   Negative: Unable to walk   Negative: Patient sounds very sick or weak to the triager    Protocols used: BACK PAIN-A-

## 2022-01-16 ENCOUNTER — PATIENT OUTREACH (OUTPATIENT)
Dept: ADMINISTRATIVE | Facility: OTHER | Age: 67
End: 2022-01-16
Payer: MEDICARE

## 2022-01-18 ENCOUNTER — TELEPHONE (OUTPATIENT)
Dept: INTERNAL MEDICINE | Facility: CLINIC | Age: 67
End: 2022-01-18
Payer: MEDICARE

## 2022-01-18 ENCOUNTER — HOSPITAL ENCOUNTER (OUTPATIENT)
Dept: CARDIOLOGY | Facility: HOSPITAL | Age: 67
Discharge: HOME | End: 2022-01-18
Attending: INTERNAL MEDICINE
Payer: MEDICARE

## 2022-01-18 ENCOUNTER — OFFICE VISIT (OUTPATIENT)
Dept: CARDIOLOGY | Facility: CLINIC | Age: 67
End: 2022-01-18
Payer: MEDICARE

## 2022-01-18 VITALS
WEIGHT: 246 LBS | DIASTOLIC BLOOD PRESSURE: 64 MMHG | HEIGHT: 68 IN | BODY MASS INDEX: 37.28 KG/M2 | SYSTOLIC BLOOD PRESSURE: 110 MMHG

## 2022-01-18 VITALS
BODY MASS INDEX: 37.51 KG/M2 | SYSTOLIC BLOOD PRESSURE: 130 MMHG | OXYGEN SATURATION: 96 % | WEIGHT: 246.69 LBS | HEART RATE: 68 BPM | DIASTOLIC BLOOD PRESSURE: 82 MMHG

## 2022-01-18 DIAGNOSIS — M79.604 PAIN IN BOTH LOWER EXTREMITIES: ICD-10-CM

## 2022-01-18 DIAGNOSIS — E66.9 OBESITY, CLASS I, BMI 30-34.9: Chronic | ICD-10-CM

## 2022-01-18 DIAGNOSIS — Z95.2 S/P AVR (AORTIC VALVE REPLACEMENT): ICD-10-CM

## 2022-01-18 DIAGNOSIS — R22.42 LOCALIZED SWELLING OF LEFT FOOT: ICD-10-CM

## 2022-01-18 DIAGNOSIS — I25.10 CORONARY ARTERY DISEASE INVOLVING NATIVE CORONARY ARTERY OF NATIVE HEART WITHOUT ANGINA PECTORIS: Primary | Chronic | ICD-10-CM

## 2022-01-18 DIAGNOSIS — M79.605 PAIN IN BOTH LOWER EXTREMITIES: ICD-10-CM

## 2022-01-18 DIAGNOSIS — I10 PRIMARY HYPERTENSION: Chronic | ICD-10-CM

## 2022-01-18 DIAGNOSIS — I35.0 AORTIC VALVE STENOSIS, ETIOLOGY OF CARDIAC VALVE DISEASE UNSPECIFIED: ICD-10-CM

## 2022-01-18 PROCEDURE — 99999 PR PBB SHADOW E&M-EST. PATIENT-LVL IV: CPT | Mod: PBBFAC,,, | Performed by: INTERNAL MEDICINE

## 2022-01-18 PROCEDURE — 93922 UPR/L XTREMITY ART 2 LEVELS: CPT

## 2022-01-18 PROCEDURE — 93922 ANKLE BRACHIAL INDICES (ABI): ICD-10-PCS | Mod: 26,,, | Performed by: INTERNAL MEDICINE

## 2022-01-18 PROCEDURE — 99214 OFFICE O/P EST MOD 30 MIN: CPT | Mod: PBBFAC | Performed by: INTERNAL MEDICINE

## 2022-01-18 PROCEDURE — 93922 UPR/L XTREMITY ART 2 LEVELS: CPT | Mod: 26,,, | Performed by: INTERNAL MEDICINE

## 2022-01-18 PROCEDURE — 99214 PR OFFICE/OUTPT VISIT, EST, LEVL IV, 30-39 MIN: ICD-10-PCS | Mod: S$GLB,,, | Performed by: INTERNAL MEDICINE

## 2022-01-18 PROCEDURE — 99999 PR PBB SHADOW E&M-EST. PATIENT-LVL IV: ICD-10-PCS | Mod: PBBFAC,,, | Performed by: INTERNAL MEDICINE

## 2022-01-18 PROCEDURE — 99214 OFFICE O/P EST MOD 30 MIN: CPT | Mod: S$GLB,,, | Performed by: INTERNAL MEDICINE

## 2022-01-18 NOTE — PROGRESS NOTES
Subjective:   Patient ID:  Abdullahi Urias is a 66 y.o. male who presents for follow up of No chief complaint on file.      67 yo male, 6 monthsf/u  PMH s/p bioprosthetic AVR at [facility] in 2014 Salazar [id number] 25 mm pericardial tissue valve, nonobstructive CAD s/p LHC in  nonobstructive, 20% midLAD and RCA by Dr. quiroz, h/o stroke in 2012, HTN, HLD CHEO on CPAP remote h/o cocaine in 2012, quit drinking in 2012 and no smoking. 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 Lasted for minutes and no radiating pain  C/o SOB when tied the shoes  Sleeps with 3 pillows and CPAP at night  Off CPAP for 1 month due to congestion  BNP in 2016 37  ECHO in  normal EF, perivalvular AI and s/p AVR mean G 16 mmHG and peak V 2.85    07/2021 visit  Limited exercise due to hip pain. C/o arm numbness. Sweating at rest.    [facility] cancer and [facility] [place] [facility]  BIRD when climbing the stairs over 6 months, and episode of syncope after got out the bed. Woke up quickly  V/Q scan in  low risk for PE and d dimer 1.8    Interval history  H/O COVID 19 infection on 12/30/2021 and had AB infusion at [facility].   Hands and legs numbness for few months. Occasional chest discomfort and ingestion. Lower back pain.   Some calf pain and left foot swelling. The burning sensation worse below the knee. Some mild numbness and pain at thigh, L> R. occred at rest and with exertion.     Allergic to Pletal and will add Plavix  Past Medical History:   Diagnosis Date    [facility]    Asthma     BPH (benign prostatic hyperplasia)     CAD (coronary artery disease)     Cardiomyopathy     CHF (congestive heart failure)      Chronic hoarseness     vocal cord surg    Chronic pain     CKD (chronic kidney disease) stage 3, GFR 30-59 ml/min     CVA (cerebral infarction)     2012 olol; reviewed ed note    Ex-smoker     GERD (gastroesophageal reflux disease)     Hepatitis C     treatedharvoni says cured, RNA NEG 2020    Hypertension     Pancreatitis     Prostate cancer     Prostate cancer     Prostate cancer     Renal insufficiency     Substance abuse     cocaine, etoh , tob in past       Past Surgical History:   Procedure Laterality Date    AORTIC VALVE REPLACEMENT  2014    Tissue valve replacement    BACK SURGERY      CARDIAC CATHETERIZATION      COLONOSCOPY      COLONOSCOPY N/A 2021    Procedure: COLONOSCOPY;  Surgeon: Faith Carrillo MD;  Location: [facility] ENDO;  Service: Endoscopy;  Laterality: N/A;    ESOPHAGOGASTRODUODENOSCOPY N/A 2021    Procedure: ESOPHAGOGASTRODUODENOSCOPY (EGD);  Surgeon: Faith Carrillo MD;  Location: [facility] ENDO;  Service: Endoscopy;  Laterality: N/A;    FOOT SURGERY      LEFT HEART CATHETERIZATION Left 2020    Procedure: CATHETERIZATION, HEART, LEFT;  Surgeon: Pasha Martin MD;  Location: [facility] CATH LAB;  Service: Cardiology;  Laterality: Left;    PENILE PROSTHESIS IMPLANT      PENILE PROSTHESIS REVISION  2018    [facility]    radiation for prostate      UPPER GASTROINTESTINAL ENDOSCOPY         Social History     Tobacco Use    Smoking status: Former Smoker     Packs/day: 2.00     Years: 8.00     Pack years: 16.00     Quit date: 2012     Years since quittin.4    Smokeless tobacco: Never Used   Substance Use Topics    Alcohol use: No     Comment: Sober since 2012    Drug use: No       Family History   Problem Relation Age of Onset    Heart disease Mother     Heart attack Sister     Heart attack Brother     Colon cancer Neg Hx     Colon polyps Neg Hx     Liver cancer Neg Hx      Inflammatory bowel disease Neg Hx     Liver disease Neg Hx     Rectal cancer Neg Hx     Stomach cancer Neg Hx     Ulcerative colitis Neg Hx          Review of Systems   Constitutional: Negative for decreased appetite, diaphoresis, fever, malaise/fatigue and night sweats.   HENT: Negative for nosebleeds.    Eyes: Negative for blurred vision and double vision.   Cardiovascular: Positive for chest pain and dyspnea on exertion. Negative for claudication, irregular heartbeat, leg swelling, near-syncope, orthopnea, palpitations and paroxysmal nocturnal dyspnea.   Respiratory: Negative for cough, shortness of breath, sleep disturbances due to breathing, snoring, sputum production and wheezing.    Endocrine: Negative for cold intolerance and polyuria.   Hematologic/Lymphatic: Does not bruise/bleed easily.   Skin: Negative for rash.   Musculoskeletal: Positive for arthritis and back pain. Negative for falls, joint pain, joint swelling and neck pain.   Gastrointestinal: Positive for heartburn. Negative for abdominal pain, nausea and vomiting.   Genitourinary: Negative for dysuria, frequency and hematuria.   Neurological: Negative for difficulty with concentration, dizziness, focal weakness, headaches, light-headedness, numbness, seizures and weakness.   Psychiatric/Behavioral: Negative for depression, memory loss and substance abuse. The patient does not have insomnia.    Allergic/Immunologic: Negative for HIV exposure and hives.       Objective:   Physical Exam  HENT:      Head: Normocephalic.   Eyes:      Pupils: Pupils are equal, round, and reactive to light.   Neck:      Thyroid: No thyromegaly.      Vascular: Normal carotid pulses. No carotid bruit or JVD.   Cardiovascular:      Rate and Rhythm: Normal rate and regular rhythm.  No extrasystoles are present.     Chest Wall: PMI is not displaced.      Pulses: Normal pulses.      Heart sounds: Murmur (ESM + on RUSB ) heard.   No gallop. No S3 sounds.    Pulmonary:       Effort: No respiratory distress.      Breath sounds: Normal breath sounds. No stridor.   Abdominal:      General: Bowel sounds are normal.      Palpations: Abdomen is soft.      Tenderness: There is no abdominal tenderness. There is no rebound.   Musculoskeletal:         General: Normal range of motion.   Skin:     Findings: No rash.   Neurological:      Mental Status: He is alert and oriented to person, place, and time.   Psychiatric:         Behavior: Behavior normal.         Lab Results   Component Value Date    CHOL 134 10/22/2021    CHOL 185 01/05/2021    CHOL 201 (H) 07/25/2020     Lab Results   Component Value Date    HDL 44 10/22/2021    HDL 38 (L) 01/05/2021    HDL 34 (L) 07/25/2020     Lab Results   Component Value Date    LDLCALC 78.8 10/22/2021    LDLCALC 127.2 01/05/2021    LDLCALC 146.0 07/25/2020     Lab Results   Component Value Date    TRIG 56 10/22/2021    TRIG 99 01/05/2021    TRIG 105 07/25/2020     Lab Results   Component Value Date    CHOLHDL 32.8 10/22/2021    CHOLHDL 20.5 01/05/2021    CHOLHDL 16.9 (L) 07/25/2020       Chemistry        Component Value Date/Time     07/22/2021 1454    K 4.6 07/22/2021 1454     07/22/2021 1454    CO2 25 07/22/2021 1454    BUN 24 (H) 07/22/2021 1454    CREATININE 1.7 (H) 07/22/2021 1454    GLU 83 07/22/2021 1454        Component Value Date/Time    CALCIUM 9.9 07/22/2021 1454    ALKPHOS 103 07/22/2021 1454    AST 18 07/22/2021 1454    ALT 13 07/22/2021 1454    BILITOT 0.3 07/22/2021 1454    ESTGFRAFRICA 47.9 (A) 07/22/2021 1454    EGFRNONAA 41.4 (A) 07/22/2021 1454          Lab Results   Component Value Date    HGBA1C 5.2 10/23/2007     Lab Results   Component Value Date    TSH 3.370 06/30/2020     Lab Results   Component Value Date    INR 1.0 07/24/2020    INR 1.0 04/08/2014    INR 1.0 03/26/2010     Lab Results   Component Value Date    WBC 3.67 (L) 05/25/2021    HGB 11.7 (L) 05/25/2021    HCT 33.7 (L) 05/25/2021    MCV 85 05/25/2021     (L)  05/25/2021     BMP  Sodium   Date Value Ref Range Status   07/22/2021 138 136 - 145 mmol/L Final     Potassium   Date Value Ref Range Status   07/22/2021 4.6 3.5 - 5.1 mmol/L Final     Chloride   Date Value Ref Range Status   07/22/2021 104 95 - 110 mmol/L Final     CO2   Date Value Ref Range Status   07/22/2021 25 23 - 29 mmol/L Final     BUN   Date Value Ref Range Status   07/22/2021 24 (H) 8 - 23 mg/dL Final     Creatinine   Date Value Ref Range Status   07/22/2021 1.7 (H) 0.5 - 1.4 mg/dL Final     Calcium   Date Value Ref Range Status   07/22/2021 9.9 8.7 - 10.5 mg/dL Final     Anion Gap   Date Value Ref Range Status   07/22/2021 9 8 - 16 mmol/L Final     eGFR if    Date Value Ref Range Status   07/22/2021 47.9 (A) >60 mL/min/1.73 m^2 Final     eGFR if non    Date Value Ref Range Status   07/22/2021 41.4 (A) >60 mL/min/1.73 m^2 Final     Comment:     Calculation used to obtain the estimated glomerular filtration  rate (eGFR) is the CKD-EPI equation.        BNP  @LABRCNTIP(BNP,BNPTRIAGEBLO)@  @LABRCNTIP(troponini)@  CrCl cannot be calculated (Patient's most recent lab result is older than the maximum 7 days allowed.).  No results found in the last 24 hours.  No results found in the last 24 hours.  No results found in the last 24 hours.    Assessment:      1. Coronary artery disease involving native coronary artery of native heart without angina pectoris    2. Primary hypertension    3. S/P AVR (aortic valve replacement) biopresthetic miller 25 mm in 2014     4. Aortic valve stenosis, etiology of cardiac valve disease unspecified    5. Obesity, Class I, BMI 30-34.9    6. Pain in both lower extremities    7. Localized swelling of left foot        Plan:   LE venous US to r/o DVT due to left foot swelling and recent h/o COVID  LE arterial US to r/o PAD due to calf muscle pain  Continue ASA Lipitor Coreg and Losartan  F/u PCP for peripheral neuropathy  Counseled DASH  Check Lipid  profile in 6 months  Recommend heart-healthy diet, weight control and regular exercise.  Wilbur. Risk modification.   I have reviewed all pertinent labs and cardiac studies independently. Plans and recommendations have been formulated under my direct supervision. All questions answered and patient voiced understanding.   If symptoms persist go to the ED  RTC in 6 months

## 2022-01-18 NOTE — TELEPHONE ENCOUNTER
----- Message from Talat Willson sent at 1/18/2022 11:56 AM CST -----  Contact: [phone]  Patient is calling to speak with nurse, states he is having leg pain. Please call back at [phone]. Thanks/francine

## 2022-01-18 NOTE — TELEPHONE ENCOUNTER
Spoke with pt. Informed pt provider does not have availability until 4/2022. Offered to schedule with another provider. Declined due to appt being available after the appt he has scheduled with provider on 1/27/2022. Pt is requesting to be seen sooner. Transferred pt to main line to speak with the representative to check availability with another provider at a different location.

## 2022-01-19 ENCOUNTER — OFFICE VISIT (OUTPATIENT)
Dept: FAMILY MEDICINE | Facility: CLINIC | Age: 67
End: 2022-01-19
Payer: MEDICARE

## 2022-01-19 VITALS
RESPIRATION RATE: 16 BRPM | WEIGHT: 244.94 LBS | BODY MASS INDEX: 37.24 KG/M2 | TEMPERATURE: 98 F | DIASTOLIC BLOOD PRESSURE: 76 MMHG | SYSTOLIC BLOOD PRESSURE: 110 MMHG

## 2022-01-19 DIAGNOSIS — G89.29 CHRONIC MIDLINE LOW BACK PAIN, UNSPECIFIED WHETHER SCIATICA PRESENT: Primary | ICD-10-CM

## 2022-01-19 DIAGNOSIS — M54.50 CHRONIC MIDLINE LOW BACK PAIN, UNSPECIFIED WHETHER SCIATICA PRESENT: Primary | ICD-10-CM

## 2022-01-19 DIAGNOSIS — M79.604 PAIN IN BOTH LOWER EXTREMITIES: ICD-10-CM

## 2022-01-19 DIAGNOSIS — M79.605 PAIN IN BOTH LOWER EXTREMITIES: ICD-10-CM

## 2022-01-19 PROCEDURE — 4010F ACE/ARB THERAPY RXD/TAKEN: CPT | Mod: CPTII,S$GLB,, | Performed by: INTERNAL MEDICINE

## 2022-01-19 PROCEDURE — 3008F BODY MASS INDEX DOCD: CPT | Mod: CPTII,S$GLB,, | Performed by: INTERNAL MEDICINE

## 2022-01-19 PROCEDURE — 1159F PR MEDICATION LIST DOCUMENTED IN MEDICAL RECORD: ICD-10-PCS | Mod: CPTII,S$GLB,, | Performed by: INTERNAL MEDICINE

## 2022-01-19 PROCEDURE — 1159F MED LIST DOCD IN RCRD: CPT | Mod: CPTII,S$GLB,, | Performed by: INTERNAL MEDICINE

## 2022-01-19 PROCEDURE — 99999 PR PBB SHADOW E&M-EST. PATIENT-LVL V: CPT | Mod: PBBFAC,,, | Performed by: INTERNAL MEDICINE

## 2022-01-19 PROCEDURE — 1101F PR PT FALLS ASSESS DOC 0-1 FALLS W/OUT INJ PAST YR: ICD-10-PCS | Mod: CPTII,S$GLB,, | Performed by: INTERNAL MEDICINE

## 2022-01-19 PROCEDURE — 3078F PR MOST RECENT DIASTOLIC BLOOD PRESSURE < 80 MM HG: ICD-10-PCS | Mod: CPTII,S$GLB,, | Performed by: INTERNAL MEDICINE

## 2022-01-19 PROCEDURE — 1101F PT FALLS ASSESS-DOCD LE1/YR: CPT | Mod: CPTII,S$GLB,, | Performed by: INTERNAL MEDICINE

## 2022-01-19 PROCEDURE — 99213 OFFICE O/P EST LOW 20 MIN: CPT | Mod: S$GLB,,, | Performed by: INTERNAL MEDICINE

## 2022-01-19 PROCEDURE — 3288F FALL RISK ASSESSMENT DOCD: CPT | Mod: CPTII,S$GLB,, | Performed by: INTERNAL MEDICINE

## 2022-01-19 PROCEDURE — 3074F SYST BP LT 130 MM HG: CPT | Mod: CPTII,S$GLB,, | Performed by: INTERNAL MEDICINE

## 2022-01-19 PROCEDURE — 99999 PR PBB SHADOW E&M-EST. PATIENT-LVL V: ICD-10-PCS | Mod: PBBFAC,,, | Performed by: INTERNAL MEDICINE

## 2022-01-19 PROCEDURE — 3008F PR BODY MASS INDEX (BMI) DOCUMENTED: ICD-10-PCS | Mod: CPTII,S$GLB,, | Performed by: INTERNAL MEDICINE

## 2022-01-19 PROCEDURE — 99213 PR OFFICE/OUTPT VISIT, EST, LEVL III, 20-29 MIN: ICD-10-PCS | Mod: S$GLB,,, | Performed by: INTERNAL MEDICINE

## 2022-01-19 PROCEDURE — 3078F DIAST BP <80 MM HG: CPT | Mod: CPTII,S$GLB,, | Performed by: INTERNAL MEDICINE

## 2022-01-19 PROCEDURE — 1125F AMNT PAIN NOTED PAIN PRSNT: CPT | Mod: CPTII,S$GLB,, | Performed by: INTERNAL MEDICINE

## 2022-01-19 PROCEDURE — 4010F PR ACE/ARB THEARPY RXD/TAKEN: ICD-10-PCS | Mod: CPTII,S$GLB,, | Performed by: INTERNAL MEDICINE

## 2022-01-19 PROCEDURE — 1125F PR PAIN SEVERITY QUANTIFIED, PAIN PRESENT: ICD-10-PCS | Mod: CPTII,S$GLB,, | Performed by: INTERNAL MEDICINE

## 2022-01-19 PROCEDURE — 3074F PR MOST RECENT SYSTOLIC BLOOD PRESSURE < 130 MM HG: ICD-10-PCS | Mod: CPTII,S$GLB,, | Performed by: INTERNAL MEDICINE

## 2022-01-19 PROCEDURE — 3288F PR FALLS RISK ASSESSMENT DOCUMENTED: ICD-10-PCS | Mod: CPTII,S$GLB,, | Performed by: INTERNAL MEDICINE

## 2022-01-19 RX ORDER — ZOLPIDEM TARTRATE 12.5 MG/1
1 TABLET, FILM COATED, EXTENDED RELEASE ORAL NIGHTLY PRN
COMMUNITY
Start: 2021-10-05 | End: 2022-01-27 | Stop reason: ALTCHOICE

## 2022-01-19 RX ORDER — LIDOCAINE 50 MG/G
PATCH TOPICAL
COMMUNITY
Start: 2021-11-23

## 2022-01-19 RX ORDER — AMITRIPTYLINE HYDROCHLORIDE 10 MG/1
TABLET, FILM COATED ORAL
Qty: 30 TABLET | Refills: 0 | Status: SHIPPED | OUTPATIENT
Start: 2022-01-19 | End: 2022-01-19

## 2022-01-19 RX ORDER — ALPRAZOLAM 0.5 MG/1
TABLET ORAL
COMMUNITY
Start: 2021-10-05 | End: 2022-01-27 | Stop reason: ALTCHOICE

## 2022-01-19 RX ORDER — DICLOFENAC SODIUM 10 MG/G
GEL TOPICAL
COMMUNITY
Start: 2021-11-18 | End: 2023-09-07

## 2022-01-19 RX ORDER — LOSARTAN POTASSIUM 100 MG/1
TABLET ORAL
COMMUNITY
Start: 2021-07-23 | End: 2022-01-27 | Stop reason: SDUPTHER

## 2022-01-19 RX ORDER — PREDNISONE 20 MG/1
20 TABLET ORAL DAILY
Qty: 5 TABLET | Refills: 0 | Status: SHIPPED | OUTPATIENT
Start: 2022-01-19 | End: 2022-01-24

## 2022-01-19 RX ORDER — GABAPENTIN 400 MG/1
1 CAPSULE ORAL 3 TIMES DAILY
COMMUNITY
Start: 2021-11-23 | End: 2022-02-22 | Stop reason: DRUGHIGH

## 2022-01-19 NOTE — PROGRESS NOTES
Subjective:       Patient ID: Abdullahi Urias is a 66 y.o. male.    Chief Complaint: Low-back Pain, Leg Pain, and Back Pain    Long hx of low back pain to bilat leg pain---was seeing pain doc.      Has had multiple procedures and difrerrent meds----    Low-back Pain  This is a chronic problem. The current episode started more than 1 year ago. Associated symptoms include arthralgias. Pertinent negatives include no abdominal pain, chest pain, chills, coughing, fever, nausea, vomiting or weakness.   Leg Pain     Back Pain  Associated symptoms include leg pain. Pertinent negatives include no abdominal pain, chest pain, fever or weakness.     Past Medical History:   Diagnosis Date    [facility]    Asthma     BPH (benign prostatic hyperplasia)     CAD (coronary artery disease)     Cardiomyopathy     CHF (congestive heart failure)     Chronic hoarseness     vocal cord surg    Chronic pain     CKD (chronic kidney disease) stage 3, GFR 30-59 ml/min     CVA (cerebral infarction)     8/2012 olol; reviewed ed note    Ex-smoker     GERD (gastroesophageal reflux disease)     Hepatitis C     treatedharvoni says cured, RNA NEG 6/2020    Hypertension     Pancreatitis     Prostate cancer     Prostate cancer     Prostate cancer     Renal insufficiency     Substance abuse     cocaine, etoh , tob in past     Past Surgical History:   Procedure Laterality Date    AORTIC VALVE REPLACEMENT  05/19/2014    Tissue valve replacement    BACK SURGERY      CARDIAC CATHETERIZATION      COLONOSCOPY  2011    COLONOSCOPY N/A 2/24/2021    Procedure: COLONOSCOPY;  Surgeon: Faith Carrillo MD;  Location: [facility];  Service: Endoscopy;  Laterality: N/A;    ESOPHAGOGASTRODUODENOSCOPY N/A 2/24/2021    Procedure: ESOPHAGOGASTRODUODENOSCOPY (EGD);  Surgeon: Faith Carrillo MD;  Location: [facility];  Service: Endoscopy;  Laterality: N/A;    FOOT SURGERY      LEFT HEART CATHETERIZATION Left  2020    Procedure: CATHETERIZATION, HEART, LEFT;  Surgeon: Pasha Martin MD;  Location[facility] CATH LAB;  Service: Cardiology;  Laterality: Left;    PENILE PROSTHESIS IMPLANT      PENILE PROSTHESIS REVISION  2018    [facility]    radiation for prostate      UPPER GASTROINTESTINAL ENDOSCOPY       Family History   Problem Relation Age of Onset    Heart disease Mother     Heart attack Sister     Heart attack Brother     Colon cancer Neg Hx     Colon polyps Neg Hx     Liver cancer Neg Hx     Inflammatory bowel disease Neg Hx     Liver disease Neg Hx     Rectal cancer Neg Hx     Stomach cancer Neg Hx     Ulcerative colitis Neg Hx      Social History     Socioeconomic History    Marital status:    Tobacco Use    Smoking status: Former Smoker     Packs/day: 2.00     Years: 8.00     Pack years: 16.00     Quit date: 2012     Years since quittin.4    Smokeless tobacco: Never Used   Substance and Sexual Activity    Alcohol use: No     Comment: Sober since 2012    Drug use: No    Sexual activity: Yes   Social History Narrative    No pets in household, wife and daughter smokers. Former [employer].    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   HENT: Negative.    Respiratory: Negative for apnea, cough, choking, chest tightness, shortness of breath, wheezing and stridor.    Cardiovascular: Negative for chest pain and palpitations.   Gastrointestinal: Negative for abdominal pain, nausea and vomiting.   Musculoskeletal: Positive for arthralgias, back pain and gait problem.   Neurological: Negative for weakness.   Psychiatric/Behavioral: Negative for agitation, behavioral problems and confusion.       Objective:      Physical Exam  Vitals and nursing note reviewed.   Constitutional:       General: He is not in acute distress.     Appearance: Normal  appearance. He is well-developed and well-nourished. He is not diaphoretic.   HENT:      Head: Normocephalic and atraumatic.      Mouth/Throat:      Pharynx: No oropharyngeal exudate.   Eyes:      General: No scleral icterus.     Pupils: Pupils are equal, round, and reactive to light.   Neck:      Thyroid: No thyromegaly.      Vascular: No carotid bruit or JVD.      Trachea: No tracheal deviation.   Cardiovascular:      Rate and Rhythm: Normal rate and regular rhythm.      Pulses: Intact distal pulses.      Heart sounds: Normal heart sounds.   Pulmonary:      Effort: Pulmonary effort is normal. No respiratory distress.      Breath sounds: Normal breath sounds. No wheezing or rales.   Chest:      Chest wall: No tenderness.   Abdominal:      General: Bowel sounds are normal. There is no distension.      Palpations: Abdomen is soft.      Tenderness: There is no abdominal tenderness. There is no guarding or rebound.   Musculoskeletal:         General: No tenderness or edema. Normal range of motion.      Cervical back: Normal range of motion and neck supple.   Lymphadenopathy:      Cervical: No cervical adenopathy.   Skin:     General: Skin is warm and dry.      Coloration: Skin is not pale.      Findings: No erythema or rash.   Neurological:      Mental Status: He is alert and oriented to person, place, and time.   Psychiatric:         Mood and Affect: Mood and affect normal.         Behavior: Behavior normal.         Thought Content: Thought content normal.         Judgment: Judgment normal.         CMP  Sodium   Date Value Ref Range Status   01/18/2022 137 136 - 145 mmol/L Final     Potassium   Date Value Ref Range Status   01/18/2022 5.0 3.5 - 5.1 mmol/L Final     Chloride   Date Value Ref Range Status   01/18/2022 103 95 - 110 mmol/L Final     CO2   Date Value Ref Range Status   01/18/2022 24 23 - 29 mmol/L Final     Glucose   Date Value Ref Range Status   01/18/2022 82 70 - 110 mg/dL Final     BUN   Date Value Ref  Range Status   01/18/2022 13 8 - 23 mg/dL Final     Creatinine   Date Value Ref Range Status   01/18/2022 1.4 0.5 - 1.4 mg/dL Final     Calcium   Date Value Ref Range Status   01/18/2022 9.4 8.7 - 10.5 mg/dL Final     Total Protein   Date Value Ref Range Status   01/18/2022 7.1 6.0 - 8.4 g/dL Final     Albumin   Date Value Ref Range Status   01/18/2022 4.1 3.5 - 5.2 g/dL Final     Total Bilirubin   Date Value Ref Range Status   01/18/2022 0.5 0.1 - 1.0 mg/dL Final     Comment:     For infants and newborns, interpretation of results should be based  on gestational age, weight and in agreement with clinical  observations.    Premature Infant recommended reference ranges:  Up to 24 hours.............<8.0 mg/dL  Up to 48 hours............<12.0 mg/dL  3-5 days..................<15.0 mg/dL  6-29 days.................<15.0 mg/dL       Alkaline Phosphatase   Date Value Ref Range Status   01/18/2022 104 55 - 135 U/L Final     AST   Date Value Ref Range Status   01/18/2022 22 10 - 40 U/L Final     ALT   Date Value Ref Range Status   01/18/2022 16 10 - 44 U/L Final     Anion Gap   Date Value Ref Range Status   01/18/2022 10 8 - 16 mmol/L Final     eGFR if    Date Value Ref Range Status   01/18/2022 >60.0 >60 mL/min/1.73 m^2 Final     eGFR if non    Date Value Ref Range Status   01/18/2022 52.0 (A) >60 mL/min/1.73 m^2 Final     Comment:     Calculation used to obtain the estimated glomerular filtration  rate (eGFR) is the CKD-EPI equation.        Lab Results   Component Value Date    WBC 3.67 (L) 05/25/2021    HGB 11.7 (L) 05/25/2021    HCT 33.7 (L) 05/25/2021    MCV 85 05/25/2021     (L) 05/25/2021     Lab Results   Component Value Date    CHOL 123 01/18/2022     Lab Results   Component Value Date    HDL 39 (L) 01/18/2022     Lab Results   Component Value Date    LDLCALC 72.6 01/18/2022     Lab Results   Component Value Date    TRIG 57 01/18/2022     Lab Results   Component Value Date     CHOLHDL 31.7 01/18/2022     Lab Results   Component Value Date    TSH 3.370 06/30/2020     Lab Results   Component Value Date    HGBA1C 5.2 10/23/2007     Assessment:       1. Chronic midline low back pain, unspecified whether sciatica present    2. Pain in both lower extremities        Plan:   Chronic midline low back pain, unspecified whether sciatica present    Pain in both lower extremities    Other orders  -     Discontinue: amitriptyline (ELAVIL) 10 MG tablet; One q hs prn pain  Dispense: 30 tablet; Refill: 0  -     predniSONE (DELTASONE) 20 MG tablet; Take 1 tablet (20 mg total) by mouth once daily. for 5 days  Dispense: 5 tablet; Refill: 0    Continue meds----------trial prednisone-------------f/u pain clinic as scheduled----------has u/s legs -veins and arterial scheduled --thru cards and jason[facility] done yesterday---------f/u PCP----------

## 2022-01-24 ENCOUNTER — HOSPITAL ENCOUNTER (OUTPATIENT)
Dept: CARDIOLOGY | Facility: HOSPITAL | Age: 67
Discharge: HOME | End: 2022-01-24
Attending: INTERNAL MEDICINE
Payer: MEDICARE

## 2022-01-24 DIAGNOSIS — R22.42 LOCALIZED SWELLING OF LEFT FOOT: ICD-10-CM

## 2022-01-24 LAB
LEFT ABI: 0.77
LEFT ANT TIBIAL SYS PSV: 33 CM/S
LEFT ARM BP: 110 MMHG
LEFT CFA PSV: 110 CM/S
LEFT DORSALIS PEDIS: 85 MMHG
LEFT PERONEAL SYS PSV: 63 CM/S
LEFT POPLITEAL PSV: 49 CM/S
LEFT POST TIBIAL SYS PSV: 40 CM/S
LEFT POSTERIOR TIBIAL: 73 MMHG
LEFT PROFUNDA SYS PSV: 49 CM/S
LEFT SUPER FEMORAL DIST SYS PSV: 66 CM/S
LEFT SUPER FEMORAL MID SYS PSV: 95 CM/S
LEFT SUPER FEMORAL OSTIAL SYS PSV: 167 CM/S
LEFT SUPER FEMORAL PROX SYS PSV: 85 CM/S
LEFT TBI: 0.12
LEFT TIB/PER TRUNK SYS PSV: 64 CM/S
LEFT TOE PRESSURE: 13 MMHG
OHS CV LEFT LOWER EXTREMITY ABI (NO CALC): 0.77
OHS CV RIGHT ABI LOWER EXTREMITY (NO CALC): 0.65
RIGHT ABI: 0.65
RIGHT ANT TIBIAL SYS PSV: 22 CM/S
RIGHT ARM BP: 107 MMHG
RIGHT CFA PSV: 131 CM/S
RIGHT DORSALIS PEDIS: 72 MMHG
RIGHT PERONEAL SYS PSV: 13 CM/S
RIGHT POPLITEAL PSV: 52 CM/S
RIGHT POST TIBIAL SYS PSV: 200 CM/S
RIGHT POSTERIOR TIBIAL: 71 MMHG
RIGHT PROFUNDA SYS PSV: 77 CM/S
RIGHT SUPER FEMORAL DIST SYS PSV: 46 CM/S
RIGHT SUPER FEMORAL MID SYS PSV: 142 CM/S
RIGHT SUPER FEMORAL OSTIAL SYS PSV: 143 CM/S
RIGHT SUPER FEMORAL PROX SYS PSV: 86 CM/S
RIGHT TIB/PER TRUNK SYS PSV: 67 CM/S

## 2022-01-24 PROCEDURE — 93970 EXTREMITY STUDY: CPT | Mod: 26,,, | Performed by: INTERNAL MEDICINE

## 2022-01-24 PROCEDURE — 93970 CV US DOPPLER VENOUS LEGS BILATERAL (CUPID ONLY): ICD-10-PCS | Mod: 26,,, | Performed by: INTERNAL MEDICINE

## 2022-01-24 PROCEDURE — 93925 CV US DOPPLER ARTERIAL LEGS BILATERAL (CUPID ONLY): ICD-10-PCS | Mod: 26,,, | Performed by: INTERNAL MEDICINE

## 2022-01-24 PROCEDURE — 93925 LOWER EXTREMITY STUDY: CPT | Mod: 26,,, | Performed by: INTERNAL MEDICINE

## 2022-01-24 PROCEDURE — 93970 EXTREMITY STUDY: CPT | Mod: 50

## 2022-01-24 PROCEDURE — 93925 LOWER EXTREMITY STUDY: CPT

## 2022-01-26 ENCOUNTER — TELEPHONE (OUTPATIENT)
Dept: CARDIOLOGY | Facility: CLINIC | Age: 67
End: 2022-01-26
Payer: MEDICARE

## 2022-01-26 RX ORDER — CILOSTAZOL 50 MG/1
50 TABLET ORAL 2 TIMES DAILY
Qty: 60 TABLET | Refills: 11 | Status: SHIPPED | OUTPATIENT
Start: 2022-01-26 | End: 2022-02-01

## 2022-01-26 NOTE — TELEPHONE ENCOUNTER
Patient contacted and was advised that   The LE arterial US showed moderate Dz  Add Pletal 50 mg bid  RTC in 2 months  Encourage walking 30 min a day and 5 days a week    The patient stated understanding with no questions.

## 2022-01-27 ENCOUNTER — LAB VISIT (OUTPATIENT)
Dept: LAB | Facility: HOSPITAL | Age: 67
End: 2022-01-27
Attending: FAMILY MEDICINE
Payer: MEDICARE

## 2022-01-27 ENCOUNTER — OFFICE VISIT (OUTPATIENT)
Dept: PAIN MEDICINE | Facility: CLINIC | Age: 67
End: 2022-01-27
Payer: MEDICARE

## 2022-01-27 ENCOUNTER — OFFICE VISIT (OUTPATIENT)
Dept: INTERNAL MEDICINE | Facility: CLINIC | Age: 67
End: 2022-01-27
Payer: MEDICARE

## 2022-01-27 VITALS
DIASTOLIC BLOOD PRESSURE: 76 MMHG | HEART RATE: 85 BPM | BODY MASS INDEX: 37.43 KG/M2 | WEIGHT: 246.94 LBS | HEIGHT: 68 IN | SYSTOLIC BLOOD PRESSURE: 114 MMHG

## 2022-01-27 VITALS
HEART RATE: 85 BPM | SYSTOLIC BLOOD PRESSURE: 114 MMHG | WEIGHT: 246.94 LBS | DIASTOLIC BLOOD PRESSURE: 76 MMHG | TEMPERATURE: 98 F | OXYGEN SATURATION: 98 % | HEIGHT: 68 IN | BODY MASS INDEX: 37.43 KG/M2

## 2022-01-27 DIAGNOSIS — G89.29 CHRONIC MYOFASCIAL PAIN: ICD-10-CM

## 2022-01-27 DIAGNOSIS — G57.93 NEUROPATHIC PAIN OF BOTH FEET: ICD-10-CM

## 2022-01-27 DIAGNOSIS — R20.8 OTHER DISTURBANCES OF SKIN SENSATION: ICD-10-CM

## 2022-01-27 DIAGNOSIS — Z00.00 ROUTINE HEALTH MAINTENANCE: Primary | ICD-10-CM

## 2022-01-27 DIAGNOSIS — I10 PRIMARY HYPERTENSION: Chronic | ICD-10-CM

## 2022-01-27 DIAGNOSIS — G89.4 CHRONIC PAIN SYNDROME: ICD-10-CM

## 2022-01-27 DIAGNOSIS — Z85.46 HISTORY OF PROSTATE CANCER: ICD-10-CM

## 2022-01-27 DIAGNOSIS — M47.816 LUMBAR SPONDYLOSIS: ICD-10-CM

## 2022-01-27 DIAGNOSIS — G47.33 OSA ON CPAP: ICD-10-CM

## 2022-01-27 DIAGNOSIS — I25.10 CORONARY ARTERY DISEASE INVOLVING NATIVE CORONARY ARTERY OF NATIVE HEART WITHOUT ANGINA PECTORIS: Chronic | ICD-10-CM

## 2022-01-27 DIAGNOSIS — I73.9 PVD (PERIPHERAL VASCULAR DISEASE): ICD-10-CM

## 2022-01-27 DIAGNOSIS — M79.18 MYALGIA, OTHER SITE: ICD-10-CM

## 2022-01-27 DIAGNOSIS — M1A.00X0 IDIOPATHIC CHRONIC GOUT WITHOUT TOPHUS, UNSPECIFIED SITE: ICD-10-CM

## 2022-01-27 DIAGNOSIS — G57.03 PIRIFORMIS SYNDROME OF BOTH SIDES: Primary | ICD-10-CM

## 2022-01-27 DIAGNOSIS — Z86.73 HISTORY OF CVA IN ADULTHOOD: ICD-10-CM

## 2022-01-27 DIAGNOSIS — I35.0 AORTIC VALVE STENOSIS, ETIOLOGY OF CARDIAC VALVE DISEASE UNSPECIFIED: ICD-10-CM

## 2022-01-27 DIAGNOSIS — M79.18 CHRONIC MYOFASCIAL PAIN: ICD-10-CM

## 2022-01-27 DIAGNOSIS — J45.20 MILD INTERMITTENT ASTHMA WITHOUT COMPLICATION: ICD-10-CM

## 2022-01-27 DIAGNOSIS — M96.1 POSTLAMINECTOMY SYNDROME OF LUMBAR REGION: ICD-10-CM

## 2022-01-27 DIAGNOSIS — M51.36 DDD (DEGENERATIVE DISC DISEASE), LUMBAR: ICD-10-CM

## 2022-01-27 PROCEDURE — 1101F PR PT FALLS ASSESS DOC 0-1 FALLS W/OUT INJ PAST YR: ICD-10-PCS | Mod: CPTII,S$GLB,, | Performed by: FAMILY MEDICINE

## 2022-01-27 PROCEDURE — 1159F MED LIST DOCD IN RCRD: CPT | Mod: CPTII,S$GLB,, | Performed by: PHYSICAL MEDICINE & REHABILITATION

## 2022-01-27 PROCEDURE — 3288F PR FALLS RISK ASSESSMENT DOCUMENTED: ICD-10-PCS | Mod: CPTII,S$GLB,, | Performed by: FAMILY MEDICINE

## 2022-01-27 PROCEDURE — 36415 COLL VENOUS BLD VENIPUNCTURE: CPT | Performed by: FAMILY MEDICINE

## 2022-01-27 PROCEDURE — 3008F BODY MASS INDEX DOCD: CPT | Mod: CPTII,S$GLB,, | Performed by: PHYSICAL MEDICINE & REHABILITATION

## 2022-01-27 PROCEDURE — 3288F FALL RISK ASSESSMENT DOCD: CPT | Mod: CPTII,S$GLB,, | Performed by: PHYSICAL MEDICINE & REHABILITATION

## 2022-01-27 PROCEDURE — 1159F PR MEDICATION LIST DOCUMENTED IN MEDICAL RECORD: ICD-10-PCS | Mod: CPTII,S$GLB,, | Performed by: FAMILY MEDICINE

## 2022-01-27 PROCEDURE — 20552 NJX 1/MLT TRIGGER POINT 1/2: CPT | Mod: S$GLB,,, | Performed by: PHYSICAL MEDICINE & REHABILITATION

## 2022-01-27 PROCEDURE — 82607 VITAMIN B-12: CPT | Performed by: FAMILY MEDICINE

## 2022-01-27 PROCEDURE — 3008F BODY MASS INDEX DOCD: CPT | Mod: CPTII,S$GLB,, | Performed by: FAMILY MEDICINE

## 2022-01-27 PROCEDURE — 1159F MED LIST DOCD IN RCRD: CPT | Mod: CPTII,S$GLB,, | Performed by: FAMILY MEDICINE

## 2022-01-27 PROCEDURE — 99999 PR PBB SHADOW E&M-EST. PATIENT-LVL V: ICD-10-PCS | Mod: PBBFAC,,, | Performed by: FAMILY MEDICINE

## 2022-01-27 PROCEDURE — 3078F DIAST BP <80 MM HG: CPT | Mod: CPTII,S$GLB,, | Performed by: PHYSICAL MEDICINE & REHABILITATION

## 2022-01-27 PROCEDURE — 20552 PR INJECT TRIGGER POINT, 1 OR 2: ICD-10-PCS | Mod: S$GLB,,, | Performed by: PHYSICAL MEDICINE & REHABILITATION

## 2022-01-27 PROCEDURE — 3074F SYST BP LT 130 MM HG: CPT | Mod: CPTII,S$GLB,, | Performed by: PHYSICAL MEDICINE & REHABILITATION

## 2022-01-27 PROCEDURE — 1159F PR MEDICATION LIST DOCUMENTED IN MEDICAL RECORD: ICD-10-PCS | Mod: CPTII,S$GLB,, | Performed by: PHYSICAL MEDICINE & REHABILITATION

## 2022-01-27 PROCEDURE — 1125F AMNT PAIN NOTED PAIN PRSNT: CPT | Mod: CPTII,S$GLB,, | Performed by: PHYSICAL MEDICINE & REHABILITATION

## 2022-01-27 PROCEDURE — 99999 PR PBB SHADOW E&M-EST. PATIENT-LVL V: CPT | Mod: PBBFAC,,, | Performed by: FAMILY MEDICINE

## 2022-01-27 PROCEDURE — 3288F PR FALLS RISK ASSESSMENT DOCUMENTED: ICD-10-PCS | Mod: CPTII,S$GLB,, | Performed by: PHYSICAL MEDICINE & REHABILITATION

## 2022-01-27 PROCEDURE — 3008F PR BODY MASS INDEX (BMI) DOCUMENTED: ICD-10-PCS | Mod: CPTII,S$GLB,, | Performed by: FAMILY MEDICINE

## 2022-01-27 PROCEDURE — 1101F PT FALLS ASSESS-DOCD LE1/YR: CPT | Mod: CPTII,S$GLB,, | Performed by: FAMILY MEDICINE

## 2022-01-27 PROCEDURE — 99999 PR PBB SHADOW E&M-EST. PATIENT-LVL V: CPT | Mod: PBBFAC,,, | Performed by: PHYSICAL MEDICINE & REHABILITATION

## 2022-01-27 PROCEDURE — 3074F SYST BP LT 130 MM HG: CPT | Mod: CPTII,S$GLB,, | Performed by: FAMILY MEDICINE

## 2022-01-27 PROCEDURE — 1160F PR REVIEW ALL MEDS BY PRESCRIBER/CLIN PHARMACIST DOCUMENTED: ICD-10-PCS | Mod: CPTII,S$GLB,, | Performed by: PHYSICAL MEDICINE & REHABILITATION

## 2022-01-27 PROCEDURE — 3078F DIAST BP <80 MM HG: CPT | Mod: CPTII,S$GLB,, | Performed by: FAMILY MEDICINE

## 2022-01-27 PROCEDURE — 1125F PR PAIN SEVERITY QUANTIFIED, PAIN PRESENT: ICD-10-PCS | Mod: CPTII,S$GLB,, | Performed by: FAMILY MEDICINE

## 2022-01-27 PROCEDURE — 1125F PR PAIN SEVERITY QUANTIFIED, PAIN PRESENT: ICD-10-PCS | Mod: CPTII,S$GLB,, | Performed by: PHYSICAL MEDICINE & REHABILITATION

## 2022-01-27 PROCEDURE — 1101F PT FALLS ASSESS-DOCD LE1/YR: CPT | Mod: CPTII,S$GLB,, | Performed by: PHYSICAL MEDICINE & REHABILITATION

## 2022-01-27 PROCEDURE — 1160F RVW MEDS BY RX/DR IN RCRD: CPT | Mod: CPTII,S$GLB,, | Performed by: PHYSICAL MEDICINE & REHABILITATION

## 2022-01-27 PROCEDURE — 99204 PR OFFICE/OUTPT VISIT, NEW, LEVL IV, 45-59 MIN: ICD-10-PCS | Mod: 25,S$GLB,, | Performed by: PHYSICAL MEDICINE & REHABILITATION

## 2022-01-27 PROCEDURE — 99204 OFFICE O/P NEW MOD 45 MIN: CPT | Mod: 25,S$GLB,, | Performed by: PHYSICAL MEDICINE & REHABILITATION

## 2022-01-27 PROCEDURE — 99397 PR PREVENTIVE VISIT,EST,65 & OVER: ICD-10-PCS | Mod: S$GLB,,, | Performed by: FAMILY MEDICINE

## 2022-01-27 PROCEDURE — 3288F FALL RISK ASSESSMENT DOCD: CPT | Mod: CPTII,S$GLB,, | Performed by: FAMILY MEDICINE

## 2022-01-27 PROCEDURE — 3078F PR MOST RECENT DIASTOLIC BLOOD PRESSURE < 80 MM HG: ICD-10-PCS | Mod: CPTII,S$GLB,, | Performed by: PHYSICAL MEDICINE & REHABILITATION

## 2022-01-27 PROCEDURE — 4010F PR ACE/ARB THEARPY RXD/TAKEN: ICD-10-PCS | Mod: CPTII,S$GLB,, | Performed by: FAMILY MEDICINE

## 2022-01-27 PROCEDURE — 3074F PR MOST RECENT SYSTOLIC BLOOD PRESSURE < 130 MM HG: ICD-10-PCS | Mod: CPTII,S$GLB,, | Performed by: PHYSICAL MEDICINE & REHABILITATION

## 2022-01-27 PROCEDURE — 99397 PER PM REEVAL EST PAT 65+ YR: CPT | Mod: S$GLB,,, | Performed by: FAMILY MEDICINE

## 2022-01-27 PROCEDURE — 99999 PR PBB SHADOW E&M-EST. PATIENT-LVL V: ICD-10-PCS | Mod: PBBFAC,,, | Performed by: PHYSICAL MEDICINE & REHABILITATION

## 2022-01-27 PROCEDURE — 1125F AMNT PAIN NOTED PAIN PRSNT: CPT | Mod: CPTII,S$GLB,, | Performed by: FAMILY MEDICINE

## 2022-01-27 PROCEDURE — 3078F PR MOST RECENT DIASTOLIC BLOOD PRESSURE < 80 MM HG: ICD-10-PCS | Mod: CPTII,S$GLB,, | Performed by: FAMILY MEDICINE

## 2022-01-27 PROCEDURE — 1101F PR PT FALLS ASSESS DOC 0-1 FALLS W/OUT INJ PAST YR: ICD-10-PCS | Mod: CPTII,S$GLB,, | Performed by: PHYSICAL MEDICINE & REHABILITATION

## 2022-01-27 PROCEDURE — 4010F ACE/ARB THERAPY RXD/TAKEN: CPT | Mod: CPTII,S$GLB,, | Performed by: FAMILY MEDICINE

## 2022-01-27 PROCEDURE — 3074F PR MOST RECENT SYSTOLIC BLOOD PRESSURE < 130 MM HG: ICD-10-PCS | Mod: CPTII,S$GLB,, | Performed by: FAMILY MEDICINE

## 2022-01-27 PROCEDURE — 3008F PR BODY MASS INDEX (BMI) DOCUMENTED: ICD-10-PCS | Mod: CPTII,S$GLB,, | Performed by: PHYSICAL MEDICINE & REHABILITATION

## 2022-01-27 RX ORDER — METHYLPREDNISOLONE ACETATE 40 MG/ML
40 INJECTION, SUSPENSION INTRA-ARTICULAR; INTRALESIONAL; INTRAMUSCULAR; SOFT TISSUE
Status: COMPLETED | OUTPATIENT
Start: 2022-01-27 | End: 2022-01-27

## 2022-01-27 RX ADMIN — METHYLPREDNISOLONE ACETATE 40 MG: 40 INJECTION, SUSPENSION INTRA-ARTICULAR; INTRALESIONAL; INTRAMUSCULAR; SOFT TISSUE at 04:01

## 2022-01-27 NOTE — PROGRESS NOTES
Subjective:       Patient ID: Abdullahi Urias is a . male.    Chief Complaint: Multiple issues see below    HPI    Cad now [facility] card     [facility] 2020 per him was incomplete and [facility] but hasnt been done    Asthma  ok .     Hep c hx says was cured w yenny;hep C rna negative summer 2020    Prost ca now sees [facility] ;[facility]    Gout:  No recent flares. Tolerating medication     Cri d/wd nephrol;better    Couple months burning bottom feet. Hx  lumb disc dz. No problem if lying down (on gpntin)    pvd recent dx via dr pina 1/22;pltal erxd    Past Medical History:   Diagnosis Date    [facility]    Asthma     BPH (benign prostatic hyperplasia)     CAD (coronary artery disease)     Cardiomyopathy     CHF (congestive heart failure)     Chronic hoarseness     vocal cord surg    Chronic pain     CVA (cerebral infarction)     8/2012 olol; reviewed ed note    Ex-smoker     GERD (gastroesophageal reflux disease)     Hepatitis C     treatedharvoni says cured, RNA NEG 6/2020    Hypertension     Pancreatitis     Prostate cancer     Prostate cancer     Renal insufficiency     Substance abuse     cocaine, etoh , tob in past     Past Surgical History:   Procedure Laterality Date    AORTIC VALVE REPLACEMENT  05/19/2014    Tissue valve replacement    BACK SURGERY      CARDIAC CATHETERIZATION      COLONOSCOPY  2011    FOOT SURGERY      LEFT HEART CATHETERIZATION Left 7/24/2020    Procedure: CATHETERIZATION, HEART, LEFT;  Surgeon: Pasha Martin MD;  Location: [facility] CATH LAB;  Service: Cardiology;  Laterality: Left;    PENILE PROSTHESIS IMPLANT      PENILE PROSTHESIS REVISION  04/30/2018    [facility]    UPPER GASTROINTESTINAL ENDOSCOPY  2011     Family History   Problem Relation Age of Onset    Heart disease Mother     Heart attack Sister     Heart attack Brother      Colon cancer Neg Hx     Colon polyps Neg Hx     Liver cancer Neg Hx     Inflammatory bowel disease Neg Hx     Liver disease Neg Hx     Rectal cancer Neg Hx     Stomach cancer Neg Hx     Ulcerative colitis Neg Hx      Social History     Socioeconomic History    Marital status:      Spouse name: Not on file    Number of children: Not on file    Years of education: Not on file    Highest education level: Not on file   Occupational History    Not on file   Social Needs    Financial resource strain: Not on file    Food insecurity     Worry: Not on file     Inability: Not on file    Transportation needs     Medical: Not on file     Non-medical: Not on file   Tobacco Use    Smoking status: Former Smoker     Packs/day: 2.00     Years: 8.00     Pack years: 16.00     Quit date: 2012     Years since quittin.3    Smokeless tobacco: Never Used   Substance and Sexual Activity    Alcohol use: No     Comment: Sober since 2012    Drug use: No    Sexual activity: Yes   Lifestyle    Physical activity     Days per week: Not on file     Minutes per session: Not on file    Stress: Not at all   Relationships    Social connections     Talks on phone: Not on file     Gets together: Not on file     Attends Yazidi service: Not on file     Active member of club or organization: Not on file     Attends meetings of clubs or organizations: Not on file     Relationship status: Not on file   Other Topics Concern    Not on file   Social History Narrative    No pets in household, wife and daughter smokers. [employer]    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 History of CVA in  adulthood    3. Coronary artery disease, angina presence unspecified, unspecified vessel or lesion type, unspecified whether native or transplanted heart    4. Chronic gout involving toe without tophus, unspecified cause, unspecified laterality      Asthma  gerd  Cri improved  Plan:   F/u , urol, radonc when due  F/u card due    c    [facility]ed meds today;[facility]         Lab and F/u 12months    He will talk w pain mgmt today about foot burning and disc issues  Will check b`12

## 2022-01-27 NOTE — PROGRESS NOTES
New Patient Chronic Pain Note (Initial Visit)    Referring Physician: David Dollrefcoltal    PCP: Reji Valentin MD    Chief Complaint:   Chief Complaint   Patient presents with    Low-back Pain     Radiating into bilateral lower ext.        SUBJECTIVE:    Abdullahi Urias is a 66 y.o. male who presents to the clinic for the evaluation of lower back pain.  He is self referred.  He has past medical history of CVA, substance abuse, asthma, CHF, hypertension, CAD, cardiomyopathy, renal insufficiency, CKD stage 3, H/O hepatitis-C, H/O prostate cancer, multiple other medical comorbidities as listed in his chart.  The pain started several years ago and symptoms have been worsening.The pain is located in the lumbosacral area and radiates to the bilateral lower extremities extending posteriorly to the bilateral feet.  Of note, patient recently had cardiovascular studies done on his lower extremities which did demonstrate vasculogenic claudication.  The pain is described as Aching, burning, tingling and is rated as 8/10. The pain is rated with a score of  6/10 on the BEST day and a score of 10/10 on the WORST day.  Symptoms interfere with daily activity. The pain is exacerbated by activities.  The pain is mitigated by medications.    Patient denies night fever/night sweats, urinary incontinence, bowel incontinence, significant weight loss, significant motor weakness and loss of sensations.    Pain Disability Index Review:  Last 3 PDI Scores 1/27/2022   Pain Disability Index (PDI) 56       Non-Pharmacologic Treatments:  Physical Therapy/Home Exercise: yes  Ice/Heat:yes  TENS: yes  Acupuncture: no  Massage: yes  Chiropractic: no    Other: no      Pain Medications:  - Opioids: Norco, Tylenol 3  - Adjuvant Medications: Ambien, alprazolam, Flexeril, Voltaren gel, gabapentin, lidocaine patch, Zoloft  - Anti-Coagulants: Aspirin, Pletal     report:  Reviewed and consistent with medication use as prescribed.      Pain Procedures:    -previous lumbar PIEDAD  -previous lumbar MBB/RFA  -previous lumbar laminectomy in 2001 at L5-S1      Imaging:   MRI lumbar spine 01/12/2021:  Visualized distal cord demonstrates normal signal.     No marrow replacement process.  No fracture.  No listhesis.     There are degenerative changes of the lower lumbar spine with mild disc space narrowing, most severe at L5-S1 with marginal spurring with facet arthropathy     L1-L2: There is posterior disc bulge with indentation of the thecal sac.  No spinal canal or neural foraminal encroachment.     L2-L3: Mild facet arthropathy.  Mild posterior disc bulge.  No spinal canal or neural foraminal encroachment.     L3-L4: Bilateral ligamentum flavum and facet arthropathy.  Mild posterior disc bulge with indentation of thecal sac.  No spinal canal or neural foraminal encroachment.     L4-L5: Ligamentum flavum and facet arthropathy with broad-based disc bulge.  No significant spinal canal stenosis.  There is crowding of the lateral recesses with narrowing along the inferior right greater than left neural foramen.     L5-S1: Posterior disc osteophyte complex with facet arthropathy.  Appearance of possible prior right hemilaminectomy changes..  There is severe narrowing along the right neural foramen  with mild to moderate narrowing along the left neural foramen.  No significant spinal canal stenosis      CT cervical spine 12/01/2020:  Vertebral body heights maintained.  2 mm anterolisthesis of C3 on C4.  Disc height loss and osteophyte changes at C6-7 and C7-T1.  Multilevel facet degenerative findings most prevalent at the right C2-3 and left C3-4 levels.     Neck soft tissues are unremarkable.     C2-3: No CT evidence of spinal canal stenosis.  Mild right neural foraminal narrowing.     C3-4: No CT evidence of significant spinal canal stenosis.  Left greater than right facet and uncovertebral degenerative changes results in mild right and severe left neural foraminal  narrowing.     C4-5: Posterior disc osteophyte complex present with mild spinal canal stenosis.  Mild to moderate right neural foraminal narrowing secondary to facet and uncovertebral degenerative findings.     C5-6: Posterior disc osteophyte complex present asymmetric to the right with mild spinal canal stenosis.  Severe right neural foraminal narrowing secondary to facet and uncovertebral degenerative findings.     C6-7: Posterior disc osteophyte complex present causing at least mild spinal canal stenosis.  Left greater than right facet and uncovertebral degenerative findings with mild-to-moderate right and moderate to severe left neural foraminal narrowing.     C7-T1: Posterior disc osteophyte complex with spinal canal stenosis suspected.  Mild left neural foraminal narrowing secondary to uncovertebral degenerative findings.       Past Medical History:   Diagnosis Date    [facility]    Asthma     BPH (benign prostatic hyperplasia)     CAD (coronary artery disease)     Cardiomyopathy     CHF (congestive heart failure)     Chronic hoarseness     vocal cord surg    Chronic pain     CKD (chronic kidney disease) stage 3, GFR 30-59 ml/min     CVA (cerebral infarction)     8/2012 olol; reviewed ed note    Ex-smoker     GERD (gastroesophageal reflux disease)     Hepatitis C     treatedharvoni says cured, RNA NEG 6/2020    Hypertension     Pancreatitis     Prostate cancer     Prostate cancer     Prostate cancer     PVD (peripheral vascular disease)     Renal insufficiency     Substance abuse     cocaine, etoh , tob in past     Past Surgical History:   Procedure Laterality Date    AORTIC VALVE REPLACEMENT  05/19/2014    Tissue valve replacement    BACK SURGERY      CARDIAC CATHETERIZATION      COLONOSCOPY  2011    COLONOSCOPY N/A 2/24/2021    Procedure: COLONOSCOPY;  Surgeon: Faith Carrillo MD;  Location[facility];  Service: Endoscopy;  Laterality: N/A;     ESOPHAGOGASTRODUODENOSCOPY N/A 2021    Procedure: ESOPHAGOGASTRODUODENOSCOPY (EGD);  Surgeon: Faith Carrillo MD;  Location: [facility] ENDO;  Service: Endoscopy;  Laterality: N/A;    FOOT SURGERY      LEFT HEART CATHETERIZATION Left 2020    Procedure: CATHETERIZATION, HEART, LEFT;  Surgeon: Pasha Martin MD;  Location: [facility] CATH LAB;  Service: Cardiology;  Laterality: Left;    PENILE PROSTHESIS IMPLANT      PENILE PROSTHESIS REVISION  2018    [facility]    radiation for prostate      UPPER GASTROINTESTINAL ENDOSCOPY       Social History     Socioeconomic History    Marital status:    Tobacco Use    Smoking status: Former Smoker     Packs/day: 2.00     Years: 8.00     Pack years: 16.00     Quit date: 2012     Years since quittin.4    Smokeless tobacco: Never Used   Substance and Sexual Activity    Alcohol use: No     Comment: Sober since 2012    Drug use: No    Sexual activity: Yes   Social History Narrative    No pets in household, wife and daughter smokers. Former [employer].    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; as of  retired     Family History   Problem Relation Age of Onset    Heart disease Mother     Heart attack Sister     Heart attack Brother     Colon cancer Neg Hx     Colon polyps Neg Hx     Liver cancer Neg Hx     Inflammatory bowel disease Neg Hx     Liver disease Neg Hx     Rectal cancer Neg Hx     Stomach cancer Neg Hx     Ulcerative colitis Neg Hx        Review of patient's allergies indicates:  No Known Allergies    Current Outpatient Medications   Medication Sig    albuterol (PROVENTIL/VENTOLIN HFA) 90 mcg/actuation inhaler Inhale 2 puffs into the lungs every 6 (six) hours as needed for Wheezing.    allopurinoL (ZYLOPRIM) 100 MG tablet Take 1 tablet (100 mg total) by mouth once daily.    aluminum & magnesium hydroxide-simethicone (MYLANTA MAX STRENGTH) 400-400-40 mg/5 mL suspension TAKE 15ML  BY MOUTH FOUR TIMES A DAY AS NEEDED FOR STOMACH ACID    aspirin (ECOTRIN) 81 MG EC tablet TAKE ONE TABLET BY MOUTH EVERY DAY TO PREVENT BLOOD CLOT    atorvastatin (LIPITOR) 80 MG tablet TAKE ONE-HALF TABLET BY MOUTH EVERY DAY FOR CHOLESTEROL    carvediloL (COREG) 12.5 MG tablet Take 1 tablet (12.5 mg total) by mouth 2 (two) times daily.    cilostazoL (PLETAL) 50 MG Tab Take 1 tablet (50 mg total) by mouth 2 (two) times daily.    diclofenac sodium (VOLTAREN) 1 % Gel APPLY 2 GRAMS TOPICALLY FOUR TIMES A DAY AS NEEDED FOR PAIN AND INFLAMMATION. USE ENCLOSED DOSING CARD.    esomeprazole (NEXIUM) 40 MG capsule TAKE ONE CAPSULE BY MOUTH TWICE A DAY ON AN EMPTY STOMACH FOR ACID REFLUX    famotidine (PEPCID) 40 MG tablet TAKE ONE TABLET BY MOUTH AT BEDTIME FOR ACID REFLUX    flunisolide 25 mcg, 0.025%, (NASALIDE) 25 mcg (0.025 %) Spry 2 sprays by Nasal route as needed.     gabapentin (NEURONTIN) 300 MG capsule Take 400 mg by mouth 3 (three) times daily.    gabapentin (NEURONTIN) 400 MG capsule Take 1 capsule by mouth 3 (three) times daily.    hydrocortisone 2.5 % cream Apply topically 2 (two) times daily as needed. Apply to affected areas of the face and neck.    hydrocortisone-pramoxine (PROCTOFOAM-HS) rectal foam INSERT 1 APPLICATORFUL INTO RECTALLY TWICE A DAY FOR HEMORRHOIDS    hydrOXYzine HCL (ATARAX) 25 MG tablet Take 25 mg by mouth 3 (three) times daily as needed for Itching.    levocetirizine (XYZAL) 5 MG tablet Take 1 tablet (5 mg total) by mouth every evening.    LIDOcaine (LIDODERM) 5 % APPLY 1 PATCH TOPICALLY EVERY DAY FOR PAIN. WEAR FOR 12 HOURS, THEN REMOVE. DO NOT APPLY NEW PATCH FOR AT LEAST 12 HOURS.    losartan (COZAAR) 50 MG tablet Take 1 tablet (50 mg total) by mouth once daily.    montelukast (SINGULAIR) 10 mg tablet TAKE ONE TABLET BY MOUTH EVERY DAY FOR BREATHING    oxybutynin (DITROPAN-XL) 10 MG 24 hr tablet TAKE ONE TABLET BY MOUTH ONCE DAILY FOR OVERACTIVE BLADDER    sertraline  (ZOLOFT) 100 MG tablet TAKE TWO TABLETS BY MOUTH EVERY DAY FOR MENTAL HEALTH DOSE INCREASED TO 200MG/DAY    simethicone (MYLICON) 80 MG chewable tablet Take 80 mg by mouth every 6 (six) hours as needed for Flatulence.    sucralfate (CARAFATE) 100 mg/mL suspension Take 1 g by mouth 4 (four) times daily.     triamcinolone acetonide 0.1% (KENALOG) 0.1 % cream Apply topically 2 (two) times daily. Use for up to 2 weeks as needed for itching. Repeat if flaring.    ALPRAZolam (XANAX) 0.25 MG tablet TAKE ONE TABLET BY MOUTH EVERY DAY AS NEEDED FOR ANXIETY TAKE IT FOR PANIC ATTACKS.    ALPRAZolam (XANAX) 0.25 MG tablet TAKE ONE TABLET BY MOUTH EVERY DAY AS NEEDED FOR ANXIETY TAKE IT FOR PANIC ATTACKS.    ALPRAZolam (XANAX) 0.5 MG tablet TAKE ONE TABLET BY MOUTH EVERY DAY AS NEEDED FOR ANXIETY TAKE IT FOR PANIC ATTACKS.    cyclobenzaprine (FLEXERIL) 10 MG tablet TAKE ONE TABLET BY MOUTH THREE TIMES A DAY AS A MUSCLE RELAXANT    desonide (DESOWEN) 0.05 % cream APPLY MODERATE AMOUNT TOPICALLY TWICE A DAY FOR RASH/IRRITATION    diphenhydrAMINE (SOMINEX) 25 mg tablet Take 25 mg by mouth nightly as needed for Insomnia.    ferrous fumarate 324 mg (106 mg iron) Tab TAKE ONE TABLET BY MOUTH TWICE A DAY AS AN IRON SUPPLEMENT    ferrous gluconate (FERGON) 324 MG tablet TAKE ONE TABLET BY MOUTH TWICE A DAY AS AN IRON SUPPLEMENT    fexofenadine (ALLEGRA) 180 MG tablet Take 1 tablet (180 mg total) by mouth daily as needed.    methyl salicylate-menthol 15-10% 15-10 % Crea Apply topically as needed.     montelukast (SINGULAIR) 10 mg tablet TAKE ONE TABLET BY MOUTH ONCE DAILY ROUTINELY FOR BREATHING    oxybutynin (DITROPAN-XL) 10 MG 24 hr tablet TAKE ONE TABLET BY MOUTH ONCE DAILY FOR OVERACTIVE BLADDER    pantoprazole (PROTONIX) 40 MG tablet Take 40 mg by mouth 2 (two) times daily.     zolpidem (AMBIEN CR) 12.5 MG CR tablet TAKE ONE TABLET BY MOUTH AT BEDTIME AS NEEDED    zolpidem (AMBIEN CR) 12.5 MG CR tablet TAKE ONE  "TABLET BY MOUTH AT BEDTIME AS NEEDED FOR SLEEP    zolpidem (AMBIEN CR) 12.5 MG CR tablet Take 1 tablet by mouth nightly as needed.     Current Facility-Administered Medications   Medication    acetaminophen tablet 650 mg    diphenhydrAMINE injection 25 mg    EPINEPHrine (EPIPEN) 0.3 mg/0.3 mL pen injection 0.3 mg    methylPREDNISolone sodium succinate injection 40 mg    ondansetron disintegrating tablet 4 mg    sodium chloride 0.9% 500 mL flush bag    sodium chloride 0.9% flush 10 mL     Facility-Administered Medications Ordered in Other Visits   Medication    sodium chloride 0.9% flush 10 mL       Review of Systems     GENERAL:  No weight loss, malaise or fevers.  HEENT:   No recent changes in vision or hearing  NECK:  Negative for lumps, no difficulty with swallowing.  RESPIRATORY:  Negative for cough, wheezing or shortness of breath, patient denies any recent URI.  CARDIOVASCULAR:  Negative for chest pain, leg swelling or palpitations.  GI:  Negative for abdominal discomfort, blood in stools or black stools or change in bowel habits.  MUSCULOSKELETAL:  See HPI.  SKIN:  Negative for lesions, rash, and itching.  PSYCH:  No mood disorder or recent psychosocial stressors.  Patients sleep is not disturbed secondary to pain.  HEMATOLOGY/LYMPHOLOGY:  Negative for prolonged bleeding, bruising easily or swollen nodes.  Patient is currently taking anti-coagulants - ASA and Pletal  NEURO:   No history of headaches, syncope, paralysis, seizures or tremors.  All other reviewed and negative other than HPI.    OBJECTIVE:    /76   Pulse 85   Ht 5' 8" (1.727 m)   Wt 112 kg (246 lb 14.6 oz)   BMI 37.54 kg/m²         Physical Exam    GENERAL: Well appearing, in no acute distress, alert and oriented x3.  PSYCH:  Mood and affect appropriate.  SKIN: Skin color, texture, turgor normal, no rashes or lesions.  HEAD/FACE:  Normocephalic, atraumatic. Cranial nerves grossly intact.   CV: RRR with palpation of the radial " artery.  PULM: No evidence of respiratory difficulty, symmetric chest rise.  GI:  Soft and non-tender.  BACK:  Surgical scarring evident.  Straight leg raising in the sitting and supine positions is positive to radicular pain on the left.  Moderate pain to palpation over the facet joints of the lumbar spine and lumbar paraspinals limited range of motion secondary to pain reproduction.  Axial loading positive bilaterally  EXTREMITIES: Peripheral joint ROM is full and pain free without obvious instability or laxity in all four extremities. No deformities, edema, or skin discoloration. Good capillary refill.  MUSCULOSKELETAL:  Hip and knee provocative maneuvers are unremarkable.  There is mild pain with palpation over the sacroiliac joints bilaterally.  FABERs test is equivocal.  FADIRs test is positive bilaterally.   Bilateral upper and lower extremity strength is normal and symmetric.  No atrophy or tone abnormalities are noted.  NEURO: Bilateral upper and lower extremity coordination and muscle stretch reflexes are physiologic and symmetric.  Plantar response are downgoing. No clonus.  No loss of sensation is noted.  GAIT:  Slow, antalgic.      LABS:  Lab Results   Component Value Date    WBC 3.67 (L) 05/25/2021    HGB 11.7 (L) 05/25/2021    HCT 33.7 (L) 05/25/2021    MCV 85 05/25/2021     (L) 05/25/2021       CMP  Sodium   Date Value Ref Range Status   01/18/2022 137 136 - 145 mmol/L Final     Potassium   Date Value Ref Range Status   01/18/2022 5.0 3.5 - 5.1 mmol/L Final     Chloride   Date Value Ref Range Status   01/18/2022 103 95 - 110 mmol/L Final     CO2   Date Value Ref Range Status   01/18/2022 24 23 - 29 mmol/L Final     Glucose   Date Value Ref Range Status   01/18/2022 82 70 - 110 mg/dL Final     BUN   Date Value Ref Range Status   01/18/2022 13 8 - 23 mg/dL Final     Creatinine   Date Value Ref Range Status   01/18/2022 1.4 0.5 - 1.4 mg/dL Final     Calcium   Date Value Ref Range Status    01/18/2022 9.4 8.7 - 10.5 mg/dL Final     Total Protein   Date Value Ref Range Status   01/18/2022 7.1 6.0 - 8.4 g/dL Final     Albumin   Date Value Ref Range Status   01/18/2022 4.1 3.5 - 5.2 g/dL Final     Total Bilirubin   Date Value Ref Range Status   01/18/2022 0.5 0.1 - 1.0 mg/dL Final     Comment:     For infants and newborns, interpretation of results should be based  on gestational age, weight and in agreement with clinical  observations.    Premature Infant recommended reference ranges:  Up to 24 hours.............<8.0 mg/dL  Up to 48 hours............<12.0 mg/dL  3-5 days..................<15.0 mg/dL  6-29 days.................<15.0 mg/dL       Alkaline Phosphatase   Date Value Ref Range Status   01/18/2022 104 55 - 135 U/L Final     AST   Date Value Ref Range Status   01/18/2022 22 10 - 40 U/L Final     ALT   Date Value Ref Range Status   01/18/2022 16 10 - 44 U/L Final     Anion Gap   Date Value Ref Range Status   01/18/2022 10 8 - 16 mmol/L Final     eGFR if    Date Value Ref Range Status   01/18/2022 >60.0 >60 mL/min/1.73 m^2 Final     eGFR if non    Date Value Ref Range Status   01/18/2022 52.0 (A) >60 mL/min/1.73 m^2 Final     Comment:     Calculation used to obtain the estimated glomerular filtration  rate (eGFR) is the CKD-EPI equation.          Lab Results   Component Value Date    HGBA1C 5.2 10/23/2007             ASSESSMENT: 66 y.o. year old male with lower back and leg pain, consistent with     1. Piriformis syndrome of both sides     2. PVD (peripheral vascular disease)     3. DDD (degenerative disc disease), lumbar     4. Postlaminectomy syndrome of lumbar region     5. Lumbar spondylosis     6. Chronic myofascial pain     7. Chronic pain syndrome     8. Myalgia, other site           PLAN:   - Interventions: Performed bilateral piriformis trigger point injections today in clinic for diagnostic therapeutic purposes.. Explained the risks and benefits of the  procedure in detail with the patient today in clinic along with alternative treatment options, and the patient elected to pursue the intervention at this time.  Informed consent obtained, see procedure note below for more details.    - Anticoagulation use: yes Aspirin and Pletal    - If no benefit with above procedure, consider Repeat lumbar RFA.     - Medications: I have stressed the importance of physical activity and a home exercise plan to help with pain and improve health. and Patient can continue with medications for now since they are providing benefits, using them appropriately, and without side effects.  Advised patient that I do not believe opioids are in his best interest for his chronic non malignant pain.    - Therapy:  Advised patient continue with home exercises as tolerated    - Psychological:  Discussed coping mechanisms of address chronic pain issues    - Labs:  Reviewed    - Imaging: Reviewed available imaging with patient and answered any questions they had regarding study.    - Consults/Referrals:  None at this time    - Records:   Attempt  to obtain outside records from previous pain management provider.  Reviewed/Obtain old records from outside physicians and imaging    - Follow up visit: return to clinic as needed, will contact patient after we have received outside records    - Counseled patient regarding the importance of activity modification and physical therapy    - This condition does not require this patient to take time off of work, and the primary goal of our Pain Management services is to improve the patient's functional capacity.    - Patient Questions: Answered all of the patient's questions regarding diagnosis, therapy, and treatment      PROCEDURE NOTE:  Piriformis trigger point Injection:   The procedure was discussed with the patient including complications of nerve damage,  bleeding, infection, and failure of pain relief.   After bony landmarks and points of maximal  tenderness were identified by palpation and marked. Alcohol swab prep of sites done. A mixture of 4mL 1% lidocaine + 40mg Depo-Medrol  was prepared (5 mL total).   A 25-gauge needle was advanced into the right piriformis, and  5mL  was injected after negative aspiration.  The left piriformis trigger point injection was done in the same manner. Patient tolerated the procedure well and without complications. Patient was monitored for 15 min following the injection before discharged from the clinic.         The above plan and management options were discussed at length with patient. Patient is in agreement with the above and verbalized understanding.    I discussed the goals of interventional chronic pain management with the patient on today's visit.  I explained the utility of injections for diagnostic and therapeutic purposes.  We discussed a multimodal approach to pain including treating the patient's given worst pain at any given time.  We will use a systematic approach to addressing pain.  We will also adopt a multimodal approach that includes injections, adjuvant medications, physical therapy, at times psychiatry.  There may be a limited role for opioid use intermittently in the treatment of pain, more particularly for acute pain although no one approach can be used as a sole treatment modality.    I emphasized the importance of regular exercise, core strengthening and stretching, diet and weight loss as a cornerstone of long-term pain management.      Nehemiah Carmen MD  Interventional Pain Management  [facility]    Disclaimer:  This note was prepared using voice recognition system and is likely to have sound alike errors that may have been overlooked even after proof reading.  Please call me with any questions

## 2022-01-28 ENCOUNTER — TELEPHONE (OUTPATIENT)
Dept: PAIN MEDICINE | Facility: CLINIC | Age: 67
End: 2022-01-28
Payer: MEDICARE

## 2022-01-28 LAB — VIT B12 SERPL-MCNC: 441 PG/ML (ref 210–950)

## 2022-02-01 ENCOUNTER — PATIENT MESSAGE (OUTPATIENT)
Dept: CARDIOLOGY | Facility: CLINIC | Age: 67
End: 2022-02-01
Payer: MEDICARE

## 2022-02-01 ENCOUNTER — HOSPITAL ENCOUNTER (EMERGENCY)
Facility: HOSPITAL | Age: 67
Discharge: HOME | End: 2022-02-02
Attending: EMERGENCY MEDICINE
Payer: MEDICARE

## 2022-02-01 DIAGNOSIS — I73.9 CLAUDICATION, INTERMITTENT: ICD-10-CM

## 2022-02-01 DIAGNOSIS — R21 RASH: Primary | ICD-10-CM

## 2022-02-01 PROCEDURE — 63600175 PHARM REV CODE 636 W HCPCS: Performed by: EMERGENCY MEDICINE

## 2022-02-01 PROCEDURE — 99284 EMERGENCY DEPT VISIT MOD MDM: CPT | Mod: 25

## 2022-02-01 PROCEDURE — 96372 THER/PROPH/DIAG INJ SC/IM: CPT

## 2022-02-01 RX ORDER — OXYCODONE AND ACETAMINOPHEN 10; 325 MG/1; MG/1
1 TABLET ORAL EVERY 4 HOURS PRN
Qty: 9 TABLET | Refills: 0 | Status: SHIPPED | OUTPATIENT
Start: 2022-02-01 | End: 2022-06-17

## 2022-02-01 RX ORDER — CLOPIDOGREL BISULFATE 75 MG/1
75 TABLET ORAL DAILY
Qty: 30 TABLET | Refills: 11 | Status: SHIPPED | OUTPATIENT
Start: 2022-02-01 | End: 2023-01-17

## 2022-02-01 RX ORDER — DEXAMETHASONE SODIUM PHOSPHATE 4 MG/ML
8 INJECTION, SOLUTION INTRA-ARTICULAR; INTRALESIONAL; INTRAMUSCULAR; INTRAVENOUS; SOFT TISSUE
Status: COMPLETED | OUTPATIENT
Start: 2022-02-01 | End: 2022-02-01

## 2022-02-01 RX ORDER — HYDROMORPHONE HYDROCHLORIDE 1 MG/ML
1 INJECTION, SOLUTION INTRAMUSCULAR; INTRAVENOUS; SUBCUTANEOUS
Status: COMPLETED | OUTPATIENT
Start: 2022-02-01 | End: 2022-02-01

## 2022-02-01 RX ORDER — CLOPIDOGREL BISULFATE 75 MG/1
75 TABLET ORAL DAILY
Qty: 30 TABLET | Refills: 11 | Status: SHIPPED | OUTPATIENT
Start: 2022-02-01 | End: 2022-02-01 | Stop reason: SDUPTHER

## 2022-02-01 RX ADMIN — HYDROMORPHONE HYDROCHLORIDE 1 MG: 1 INJECTION, SOLUTION INTRAMUSCULAR; INTRAVENOUS; SUBCUTANEOUS at 11:02

## 2022-02-01 RX ADMIN — DEXAMETHASONE SODIUM PHOSPHATE 8 MG: 4 INJECTION INTRA-ARTICULAR; INTRALESIONAL; INTRAMUSCULAR; INTRAVENOUS; SOFT TISSUE at 11:02

## 2022-02-01 NOTE — TELEPHONE ENCOUNTER
Pt states the blood thinner is giving him a rash on right side of stomach and right arm. Pt states raised and red. He is c/o more pain in his legs 8/10. Please advise thanks pb

## 2022-02-01 NOTE — ADDENDUM NOTE
Addended by: YONNY PIPER on: 2/1/2022 03:58 PM     Modules accepted: Orders     Inpatient Rehabilitation - Speech Language Pathology Treatment Note  [facility]     Patient Name: Chance Lopez  : 1955  MRN: [id number]  Today's Date: 2021             Admit Date: 2021     Chance Lopez was seen this am in speech therapy office for continued speech/language/cognitive therapy.  He is pleasant and interactive to participate in all tx tasks.        Long Term Goal:  Pt will demonstrate wfl speech language skills in all activities and w/ all communication partners to complete adls.      Short Term Goals:  1. Pt will recall various pictures/items to increase STM in 3/5 opp over 3 consecutive sessions w/ min cues.   * 5 stimuli objects presented w/ requests to recall. He is able to recall 2 w/o cues initially, 3 w/ min cues.  Second time 4 independently, min cues for 1.       2. Pt will recall personal LTM information in 3/5 opp over 3 consecutive sessions w/ min cues.   *Mr. Lopez is able to verbally provide his name correctly. Min-mod cues required to provide  and age.  He receptively ids his grandchildren's names from field of 4 names initially.  Second time, able to independently provide grandchildren's names and general ages.  He is not able to verbally describe to me his previous work history, despite cues. He is evidenced w/ moderate circumlocations and perseverations in his attempts to provide this information.      3. Pt will complete following multi-step directives tasks related to adls in 3/5 opp over 3 consecutive sessions w/ min cues.   * No specifically targeted on this date.     4. Pt will perform sequencing tasks related to adls in 3/5 opp over 3 consecutive sessions w/ min cues.   * 7 steps of familiar task. (changing oil in car)      5. Pt will perform convergent naming tasks w/ min cues in 3/5 opp over 3 consecutive sessions.   * Not specifically addressed on this date.     6. Pt will perform divergent naming tasks w/ min cues in approx 1 min in 3/5 opp over 3 consecutive  sessions.   *  Not specifically addressed on this date.     7. Pt will perform graphic tasks related to adls w/ min cues in 3/5 opp over 3 consecutive sessions.   * Pt able to write name independently.      8. Pt will demonstrate topic maintenance across session w/ min cues in 3/5 opp over 3 consecutive sessions.    * Mr. Lopez maintained topics of family, fixing cars, daily activities w/o cues and in appropriate contexts for approximatley 5 minutes each topic. He independently transitions appropriately to new topic.     9. Pt will label items/pictures presented in 3/5 opp to decrease anomia w/ min cues over 3 consecutive sessions.   * Confrontation naming of common nouns in pictures at 100% w/o cues.     10. Pt will respond to simple/moderate OE questions pertaining to adls w/ moderate cues 3/5 opp over 3 consecutive sessions.   * responds to simple OE questions pertaining to adls w/ mod cues in 2/5 opp.       ----------------------------------------------------------------------------------------------------      DYSPHAGIA THERAPY PLAN OF CARE:     Chance Lopez was seen this am in speech therapy office for formal therapy.      Long Term Goal:  Pt will accept least restrictive diet tolerance w/o overt s/s aspiration.      Short Term Goals:     1. Pt will perform OROM/SHANA exercises x3 sets x10 reps w/ min cues.  * Not addressed on this date.     2. Pt will perform compensatory techniques during meals w/ min cues.  * Discussed w/ pt regarding techniques during meals. Assisted w/ tray set up.            Thank you-  Claribel Lovell M.S., CFY-SLP     Visit Dx:  No diagnosis found.  Patient Active Problem List   Diagnosis   • Colon cancer screening   • Gastritis   • CVA (cerebral vascular accident) (CMS/HCC)     Past Medical History:   Diagnosis Date   • Diabetes mellitus (CMS/HCC)    • Diarrhea    • Full dentures     INSTRUCTED NO ADHESIVES THE DOS. REPORTS USUALLY ONLY WEARS UPPER PLATE.   • Hearing loss     REPORTS MORE LOSS  ON LEFT SIDE BUT THAT HE HAS BILATERAL LOSS. NO USE OF HEARING AIDS.   • Hepatitis     REPORTS AS A CHILD.  UNSURE IF TYPE A OR B.   • History of acute pancreatitis     REPORTS PRIOR TO 2000   • History of gout    • Hypertension    • Seasonal allergies    • Stroke (CMS/HCC)    • Wears glasses     RX GLASSES     Past Surgical History:   Procedure Laterality Date   • COLONOSCOPY N/A 11/26/2018    Procedure: COLONOSCOPY;  Surgeon: Ha Sykes MD;  Location: [facility] ENDOSCOPY;  Service: Gastroenterology   • ENDOSCOPY N/A 11/26/2018    Procedure: ESOPHAGOGASTRODUODENOSCOPY with cold forcep biopsy;  Surgeon: Ha Sykes MD;  Location: [facility] ENDOSCOPY;  Service: Gastroenterology   • TOOTH EXTRACTION        EDUCATION  The patient has been educated in the following areas:     Cognitive Impairment Communication Impairment.    Impression: Mr. Lopez continues to make progress towards his goals.     SLP Recommendation and Plan   Continue current POC to allow for maximal opportunities for improvement.     Thank you   Claribel Lovell M.S., CFY-SLP                   Time Calculation:                        Claribel Lovell MS, CFY-SLP  7/8/2021

## 2022-02-01 NOTE — TELEPHONE ENCOUNTER
Patient was contacted and was advised to stop the pletal and start the Plavix medication. The patient stated understanding with no questions .    The patient stated understanding with no questions.

## 2022-02-02 ENCOUNTER — TELEPHONE (OUTPATIENT)
Dept: CARDIOLOGY | Facility: CLINIC | Age: 67
End: 2022-02-02
Payer: MEDICARE

## 2022-02-02 VITALS
RESPIRATION RATE: 18 BRPM | BODY MASS INDEX: 37.58 KG/M2 | HEIGHT: 68 IN | WEIGHT: 247.94 LBS | TEMPERATURE: 98 F | HEART RATE: 88 BPM | SYSTOLIC BLOOD PRESSURE: 138 MMHG | OXYGEN SATURATION: 99 % | DIASTOLIC BLOOD PRESSURE: 70 MMHG

## 2022-02-02 NOTE — DISCHARGE INSTRUCTIONS
Have 2 issues today.  He had bilateral leg pain likely due to claudication or decreased blood flow that is worse with exercise.  Please see her cardiologist for re-evaluation.  They can direct for possible stenting etc..  And on use Percocet for pain.  Rash that you have appears to be a reaction to some chemical.  Have given the steroids in the ED.  Use Benadryl for itching.  Return as needed for any worsening symptoms, problems, questions or concerns.

## 2022-02-02 NOTE — TELEPHONE ENCOUNTER
Pt verbalized understanding of orders. Stated he went to ER on 2/1/2022 for leg pain. He stated he was given something for pain and an injection for itching.        Ok to d/c Pletal   Add Plavix 75 mg daily now

## 2022-02-02 NOTE — TELEPHONE ENCOUNTER
Pt contacted about leg pain and let him know that we have sent a message to Dr. pina to let him know what is going on and we are just waiting on a response.            ----- Message from Zita Urias sent at 2/2/2022  2:38 PM CST -----  Contact: [phone]  Patient would like to get medical advice.  Please call patient about his ER visit, and leg blockage? Patient is scheduled to go out of town on Friday and need to know if he need to go. Please call and advise.

## 2022-02-03 ENCOUNTER — OFFICE VISIT (OUTPATIENT)
Dept: CARDIOLOGY | Facility: CLINIC | Age: 67
End: 2022-02-03
Payer: MEDICARE

## 2022-02-03 ENCOUNTER — TELEPHONE (OUTPATIENT)
Dept: CARDIOLOGY | Facility: CLINIC | Age: 67
End: 2022-02-03
Payer: MEDICARE

## 2022-02-03 VITALS
DIASTOLIC BLOOD PRESSURE: 76 MMHG | HEART RATE: 78 BPM | WEIGHT: 244.69 LBS | HEIGHT: 68 IN | OXYGEN SATURATION: 98 % | BODY MASS INDEX: 37.08 KG/M2 | SYSTOLIC BLOOD PRESSURE: 116 MMHG

## 2022-02-03 DIAGNOSIS — I73.9 PAD (PERIPHERAL ARTERY DISEASE): Primary | ICD-10-CM

## 2022-02-03 DIAGNOSIS — E66.9 OBESITY, CLASS I, BMI 30-34.9: Chronic | ICD-10-CM

## 2022-02-03 DIAGNOSIS — N18.30 STAGE 3 CHRONIC KIDNEY DISEASE, UNSPECIFIED WHETHER STAGE 3A OR 3B CKD: ICD-10-CM

## 2022-02-03 DIAGNOSIS — C61 PROSTATE CANCER: ICD-10-CM

## 2022-02-03 DIAGNOSIS — I73.9 PVD (PERIPHERAL VASCULAR DISEASE): ICD-10-CM

## 2022-02-03 DIAGNOSIS — R94.31 EKG ABNORMALITIES: ICD-10-CM

## 2022-02-03 DIAGNOSIS — I35.0 AORTIC VALVE STENOSIS, ETIOLOGY OF CARDIAC VALVE DISEASE UNSPECIFIED: ICD-10-CM

## 2022-02-03 DIAGNOSIS — G47.33 OSA ON CPAP: ICD-10-CM

## 2022-02-03 DIAGNOSIS — I10 PRIMARY HYPERTENSION: Chronic | ICD-10-CM

## 2022-02-03 DIAGNOSIS — J45.20 MILD INTERMITTENT ASTHMA WITHOUT COMPLICATION: ICD-10-CM

## 2022-02-03 DIAGNOSIS — I25.10 CORONARY ARTERY DISEASE INVOLVING NATIVE CORONARY ARTERY OF NATIVE HEART WITHOUT ANGINA PECTORIS: Chronic | ICD-10-CM

## 2022-02-03 DIAGNOSIS — Z86.73 HISTORY OF CVA IN ADULTHOOD: ICD-10-CM

## 2022-02-03 DIAGNOSIS — Z95.2 S/P AVR (AORTIC VALVE REPLACEMENT): ICD-10-CM

## 2022-02-03 DIAGNOSIS — R55 SYNCOPE AND COLLAPSE: ICD-10-CM

## 2022-02-03 PROCEDURE — 3074F PR MOST RECENT SYSTOLIC BLOOD PRESSURE < 130 MM HG: ICD-10-PCS | Mod: CPTII,S$GLB,, | Performed by: INTERNAL MEDICINE

## 2022-02-03 PROCEDURE — 3078F PR MOST RECENT DIASTOLIC BLOOD PRESSURE < 80 MM HG: ICD-10-PCS | Mod: CPTII,S$GLB,, | Performed by: INTERNAL MEDICINE

## 2022-02-03 PROCEDURE — 3288F FALL RISK ASSESSMENT DOCD: CPT | Mod: CPTII,S$GLB,, | Performed by: INTERNAL MEDICINE

## 2022-02-03 PROCEDURE — 3078F DIAST BP <80 MM HG: CPT | Mod: CPTII,S$GLB,, | Performed by: INTERNAL MEDICINE

## 2022-02-03 PROCEDURE — 1126F AMNT PAIN NOTED NONE PRSNT: CPT | Mod: CPTII,S$GLB,, | Performed by: INTERNAL MEDICINE

## 2022-02-03 PROCEDURE — 3074F SYST BP LT 130 MM HG: CPT | Mod: CPTII,S$GLB,, | Performed by: INTERNAL MEDICINE

## 2022-02-03 PROCEDURE — 3288F PR FALLS RISK ASSESSMENT DOCUMENTED: ICD-10-PCS | Mod: CPTII,S$GLB,, | Performed by: INTERNAL MEDICINE

## 2022-02-03 PROCEDURE — 99999 PR PBB SHADOW E&M-EST. PATIENT-LVL V: CPT | Mod: PBBFAC,,, | Performed by: INTERNAL MEDICINE

## 2022-02-03 PROCEDURE — 99214 PR OFFICE/OUTPT VISIT, EST, LEVL IV, 30-39 MIN: ICD-10-PCS | Mod: S$GLB,,, | Performed by: INTERNAL MEDICINE

## 2022-02-03 PROCEDURE — 1159F PR MEDICATION LIST DOCUMENTED IN MEDICAL RECORD: ICD-10-PCS | Mod: CPTII,S$GLB,, | Performed by: INTERNAL MEDICINE

## 2022-02-03 PROCEDURE — 1101F PT FALLS ASSESS-DOCD LE1/YR: CPT | Mod: CPTII,S$GLB,, | Performed by: INTERNAL MEDICINE

## 2022-02-03 PROCEDURE — 3008F PR BODY MASS INDEX (BMI) DOCUMENTED: ICD-10-PCS | Mod: CPTII,S$GLB,, | Performed by: INTERNAL MEDICINE

## 2022-02-03 PROCEDURE — 99999 PR PBB SHADOW E&M-EST. PATIENT-LVL V: ICD-10-PCS | Mod: PBBFAC,,, | Performed by: INTERNAL MEDICINE

## 2022-02-03 PROCEDURE — 3008F BODY MASS INDEX DOCD: CPT | Mod: CPTII,S$GLB,, | Performed by: INTERNAL MEDICINE

## 2022-02-03 PROCEDURE — 1159F MED LIST DOCD IN RCRD: CPT | Mod: CPTII,S$GLB,, | Performed by: INTERNAL MEDICINE

## 2022-02-03 PROCEDURE — 1126F PR PAIN SEVERITY QUANTIFIED, NO PAIN PRESENT: ICD-10-PCS | Mod: CPTII,S$GLB,, | Performed by: INTERNAL MEDICINE

## 2022-02-03 PROCEDURE — 99214 OFFICE O/P EST MOD 30 MIN: CPT | Mod: S$GLB,,, | Performed by: INTERNAL MEDICINE

## 2022-02-03 PROCEDURE — 1101F PR PT FALLS ASSESS DOC 0-1 FALLS W/OUT INJ PAST YR: ICD-10-PCS | Mod: CPTII,S$GLB,, | Performed by: INTERNAL MEDICINE

## 2022-02-03 NOTE — PROGRESS NOTES
Subjective:   Patient ID:  Abdullahi Urias is a 66 y.o. male who presents for evaluation of Peripheral Arterial Disease      HPI  65 yo male, 6 monthsf/u  PMH s/p bioprosthetic AVR at [facility] in 2014 Salazar [id number] 25 mm pericardial tissue valve, nonobstructive CAD s/p LHC in  nonobstructive, 20% midLAD and RCA by Dr. quiroz, h/o stroke in 2012, HTN, HLD CHEO on CPAP remote h/o cocaine in 2012, quit drinking in 2012 and no smoking. 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 Lasted for minutes and no radiating pain  C/o SOB when tied the shoes  Sleeps with 3 pillows and CPAP at night  Off CPAP for 1 month due to congestion  BNP in 2016 37  ECHO in  normal EF, perivalvular AI and s/p AVR mean G 16 mmHG and peak V 2.85     07/2021 visit  Limited exercise due to hip pain. C/o arm numbness. Sweating at rest.    Fin[facility] for prostate cancer and [facility] [place] [facility]  BIRD when climbing the stairs over 6 months, and episode of syncope after got out the bed. Woke up quickly  V/Q scan in  low risk for PE and d dimer 1.8     Interval history  H/O COVID 19 infection on 12/30/2021 and had AB infusion at [facility].   Hands and legs numbness for few months. Occasional chest discomfort and ingestion. Lower back pain.   Some calf pain and left foot swelling. The burning sensation worse below the knee. Some mild numbness and pain at thigh, L> R. occred at rest and with exertion.      Allergic to Pletal and will add Plavix    2/3/2022   Here referred from DR PIPER FOR EVALUATION OF PVD AND LEG PAIN. THE PAIN IS CONSTANT IN BOTH LEGS FROM KNEE TO FEET IT IS BURNING AND HURTING PAIN OCCURS WHILE LYING STANDING AND WALKING NO DISCOLORATION IN  FEET  HE HAD BACK SURGERY IN 2001  . THE PAIN IN HIS LEGS IS OF MANY YEARS rs duration has no discoloration in his feet or ulceration. He is s/p avr he had non invasive veal showing worse pvd on the rt than left his PAIN DOES NOT RESPOND TO NEURONTIN.  Past Medical History:   Diagnosis Date    [facility]    Asthma     BPH (benign prostatic hyperplasia)     CAD (coronary artery disease)     Cardiomyopathy     CHF (congestive heart failure)     Chronic hoarseness     vocal cord surg    Chronic pain     CKD (chronic kidney disease) stage 3, GFR 30-59 ml/min     CVA (cerebral infarction)     8/2012 olol; reviewed ed note    Ex-smoker     GERD (gastroesophageal reflux disease)     Hepatitis C     treatedharvoni says cured, RNA NEG 6/2020    Hypertension     Pancreatitis     Prostate cancer     Prostate cancer     Prostate cancer     PVD (peripheral vascular disease)     Renal insufficiency     Substance abuse     cocaine, etoh , tob in past       Past Surgical History:   Procedure Laterality Date    AORTIC VALVE REPLACEMENT  05/19/2014    Tissue valve replacement    BACK SURGERY      CARDIAC CATHETERIZATION      COLONOSCOPY  2011    COLONOSCOPY N/A 2/24/2021    Procedure: COLONOSCOPY;  Surgeon: Faith Carrillo MD;  Location: [facility] ENDO;  Service: Endoscopy;  Laterality: N/A;    ESOPHAGOGASTRODUODENOSCOPY N/A 2/24/2021    Procedure: ESOPHAGOGASTRODUODENOSCOPY (EGD);  Surgeon: Faith Carrillo MD;  Location: [facility] ENDO;  Service: Endoscopy;  Laterality: N/A;    FOOT SURGERY      LEFT HEART CATHETERIZATION Left 7/24/2020    Procedure: CATHETERIZATION, HEART, LEFT;  Surgeon: Pasha Martin MD;  Location: [facility] CATH LAB;  Service: Cardiology;  Laterality: Left;    PENILE PROSTHESIS IMPLANT      PENILE PROSTHESIS REVISION  04/30/2018    [facility]    radiation for prostate      UPPER GASTROINTESTINAL ENDOSCOPY  2011       Social  History     Tobacco Use    Smoking status: Former Smoker     Packs/day: 2.00     Years: 8.00     Pack years: 16.00     Quit date: 2012     Years since quittin.4    Smokeless tobacco: Never Used   Substance Use Topics    Alcohol use: No     Comment: Sober since 2012    Drug use: No       Family History   Problem Relation Age of Onset    Heart disease Mother     Heart attack Sister     Heart attack Brother     Colon cancer Neg Hx     Colon polyps Neg Hx     Liver cancer Neg Hx     Inflammatory bowel disease Neg Hx     Liver disease Neg Hx     Rectal cancer Neg Hx     Stomach cancer Neg Hx     Ulcerative colitis Neg Hx        Current Outpatient Medications   Medication Sig    albuterol (PROVENTIL/VENTOLIN HFA) 90 mcg/actuation inhaler Inhale 2 puffs into the lungs every 6 (six) hours as needed for Wheezing.    allopurinoL (ZYLOPRIM) 100 MG tablet Take 1 tablet (100 mg total) by mouth once daily.    aluminum & magnesium hydroxide-simethicone (MYLANTA MAX STRENGTH) 400-400-40 mg/5 mL suspension TAKE 15ML BY MOUTH FOUR TIMES A DAY AS NEEDED FOR STOMACH ACID    aspirin (ECOTRIN) 81 MG EC tablet TAKE ONE TABLET BY MOUTH EVERY DAY TO PREVENT BLOOD CLOT    atorvastatin (LIPITOR) 80 MG tablet TAKE ONE-HALF TABLET BY MOUTH EVERY DAY FOR CHOLESTEROL    carvediloL (COREG) 12.5 MG tablet Take 1 tablet (12.5 mg total) by mouth 2 (two) times daily.    cyclobenzaprine (FLEXERIL) 10 MG tablet TAKE ONE TABLET BY MOUTH THREE TIMES A DAY AS A MUSCLE RELAXANT    diclofenac sodium (VOLTAREN) 1 % Gel APPLY 2 GRAMS TOPICALLY FOUR TIMES A DAY AS NEEDED FOR PAIN AND INFLAMMATION. USE ENCLOSED DOSING CARD.    diphenhydrAMINE (SOMINEX) 25 mg tablet Take 25 mg by mouth nightly as needed for Insomnia.    famotidine (PEPCID) 40 MG tablet TAKE ONE TABLET BY MOUTH AT BEDTIME FOR ACID REFLUX    fexofenadine (ALLEGRA) 180 MG tablet Take 1 tablet (180 mg total) by mouth daily as needed.    flunisolide 25 mcg,  0.025%, (NASALIDE) 25 mcg (0.025 %) Spry 2 sprays by Nasal route as needed.     gabapentin (NEURONTIN) 400 MG capsule Take 1 capsule by mouth 3 (three) times daily.    hydrOXYzine HCL (ATARAX) 25 MG tablet Take 25 mg by mouth 3 (three) times daily as needed for Itching.    LIDOcaine (LIDODERM) 5 % APPLY 1 PATCH TOPICALLY EVERY DAY FOR PAIN. WEAR FOR 12 HOURS, THEN REMOVE. DO NOT APPLY NEW PATCH FOR AT LEAST 12 HOURS.    losartan (COZAAR) 50 MG tablet Take 1 tablet (50 mg total) by mouth once daily.    methyl salicylate-menthol 15-10% 15-10 % Crea Apply topically as needed.     oxyCODONE-acetaminophen (PERCOCET)  mg per tablet Take 1 tablet by mouth every 4 (four) hours as needed for Pain.    pantoprazole (PROTONIX) 40 MG tablet Take 40 mg by mouth 2 (two) times daily.     sertraline (ZOLOFT) 100 MG tablet TAKE TWO TABLETS BY MOUTH EVERY DAY FOR MENTAL HEALTH DOSE INCREASED TO 200MG/DAY    simethicone (MYLICON) 80 MG chewable tablet Take 80 mg by mouth every 6 (six) hours as needed for Flatulence.    sucralfate (CARAFATE) 100 mg/mL suspension Take 1 g by mouth 4 (four) times daily.     triamcinolone acetonide 0.1% (KENALOG) 0.1 % cream Apply topically 2 (two) times daily. Use for up to 2 weeks as needed for itching. Repeat if flaring.    clopidogreL (PLAVIX) 75 mg tablet Take 1 tablet (75 mg total) by mouth once daily.    esomeprazole (NEXIUM) 40 MG capsule TAKE ONE CAPSULE BY MOUTH TWICE A DAY ON AN EMPTY STOMACH FOR ACID REFLUX    gabapentin (NEURONTIN) 300 MG capsule Take 400 mg by mouth 3 (three) times daily.    hydrocortisone 2.5 % cream Apply topically 2 (two) times daily as needed. Apply to affected areas of the face and neck. (Patient not taking: Reported on 2/3/2022)    hydrocortisone-pramoxine (PROCTOFOAM-HS) rectal foam INSERT 1 APPLICATORFUL INTO RECTALLY TWICE A DAY FOR HEMORRHOIDS    levocetirizine (XYZAL) 5 MG tablet Take 1 tablet (5 mg total) by mouth every evening.     Current  Facility-Administered Medications   Medication    acetaminophen tablet 650 mg    diphenhydrAMINE injection 25 mg    EPINEPHrine (EPIPEN) 0.3 mg/0.3 mL pen injection 0.3 mg    methylPREDNISolone sodium succinate injection 40 mg    ondansetron disintegrating tablet 4 mg     Facility-Administered Medications Ordered in Other Visits   Medication    sodium chloride 0.9% flush 10 mL     Current Outpatient Medications on File Prior to Visit   Medication Sig    albuterol (PROVENTIL/VENTOLIN HFA) 90 mcg/actuation inhaler Inhale 2 puffs into the lungs every 6 (six) hours as needed for Wheezing.    allopurinoL (ZYLOPRIM) 100 MG tablet Take 1 tablet (100 mg total) by mouth once daily.    aluminum & magnesium hydroxide-simethicone (MYLANTA MAX STRENGTH) 400-400-40 mg/5 mL suspension TAKE 15ML BY MOUTH FOUR TIMES A DAY AS NEEDED FOR STOMACH ACID    aspirin (ECOTRIN) 81 MG EC tablet TAKE ONE TABLET BY MOUTH EVERY DAY TO PREVENT BLOOD CLOT    atorvastatin (LIPITOR) 80 MG tablet TAKE ONE-HALF TABLET BY MOUTH EVERY DAY FOR CHOLESTEROL    carvediloL (COREG) 12.5 MG tablet Take 1 tablet (12.5 mg total) by mouth 2 (two) times daily.    cyclobenzaprine (FLEXERIL) 10 MG tablet TAKE ONE TABLET BY MOUTH THREE TIMES A DAY AS A MUSCLE RELAXANT    diclofenac sodium (VOLTAREN) 1 % Gel APPLY 2 GRAMS TOPICALLY FOUR TIMES A DAY AS NEEDED FOR PAIN AND INFLAMMATION. USE ENCLOSED DOSING CARD.    diphenhydrAMINE (SOMINEX) 25 mg tablet Take 25 mg by mouth nightly as needed for Insomnia.    famotidine (PEPCID) 40 MG tablet TAKE ONE TABLET BY MOUTH AT BEDTIME FOR ACID REFLUX    fexofenadine (ALLEGRA) 180 MG tablet Take 1 tablet (180 mg total) by mouth daily as needed.    flunisolide 25 mcg, 0.025%, (NASALIDE) 25 mcg (0.025 %) Spry 2 sprays by Nasal route as needed.     gabapentin (NEURONTIN) 400 MG capsule Take 1 capsule by mouth 3 (three) times daily.    hydrOXYzine HCL (ATARAX) 25 MG tablet Take 25 mg by mouth 3 (three) times daily  as needed for Itching.    LIDOcaine (LIDODERM) 5 % APPLY 1 PATCH TOPICALLY EVERY DAY FOR PAIN. WEAR FOR 12 HOURS, THEN REMOVE. DO NOT APPLY NEW PATCH FOR AT LEAST 12 HOURS.    losartan (COZAAR) 50 MG tablet Take 1 tablet (50 mg total) by mouth once daily.    methyl salicylate-menthol 15-10% 15-10 % Crea Apply topically as needed.     oxyCODONE-acetaminophen (PERCOCET)  mg per tablet Take 1 tablet by mouth every 4 (four) hours as needed for Pain.    pantoprazole (PROTONIX) 40 MG tablet Take 40 mg by mouth 2 (two) times daily.     sertraline (ZOLOFT) 100 MG tablet TAKE TWO TABLETS BY MOUTH EVERY DAY FOR MENTAL HEALTH DOSE INCREASED TO 200MG/DAY    simethicone (MYLICON) 80 MG chewable tablet Take 80 mg by mouth every 6 (six) hours as needed for Flatulence.    sucralfate (CARAFATE) 100 mg/mL suspension Take 1 g by mouth 4 (four) times daily.     triamcinolone acetonide 0.1% (KENALOG) 0.1 % cream Apply topically 2 (two) times daily. Use for up to 2 weeks as needed for itching. Repeat if flaring.    clopidogreL (PLAVIX) 75 mg tablet Take 1 tablet (75 mg total) by mouth once daily.    esomeprazole (NEXIUM) 40 MG capsule TAKE ONE CAPSULE BY MOUTH TWICE A DAY ON AN EMPTY STOMACH FOR ACID REFLUX    gabapentin (NEURONTIN) 300 MG capsule Take 400 mg by mouth 3 (three) times daily.    hydrocortisone 2.5 % cream Apply topically 2 (two) times daily as needed. Apply to affected areas of the face and neck. (Patient not taking: Reported on 2/3/2022)    hydrocortisone-pramoxine (PROCTOFOAM-HS) rectal foam INSERT 1 APPLICATORFUL INTO RECTALLY TWICE A DAY FOR HEMORRHOIDS    levocetirizine (XYZAL) 5 MG tablet Take 1 tablet (5 mg total) by mouth every evening.    [DISCONTINUED] amitriptyline (ELAVIL) 10 MG tablet One q hs prn pain     Current Facility-Administered Medications on File Prior to Visit   Medication    acetaminophen tablet 650 mg    diphenhydrAMINE injection 25 mg    EPINEPHrine (EPIPEN) 0.3 mg/0.3 mL  pen injection 0.3 mg    methylPREDNISolone sodium succinate injection 40 mg    ondansetron disintegrating tablet 4 mg    sodium chloride 0.9% flush 10 mL       Review of patient's allergies indicates:   Allergen Reactions    Pletal [cilostazol]      Rash and leg pain       Review of Systems   Constitutional: Negative for malaise/fatigue.   Eyes: Negative for blurred vision.   Cardiovascular: Negative for chest pain, claudication, cyanosis, dyspnea on exertion, irregular heartbeat, leg swelling, near-syncope, orthopnea, palpitations and paroxysmal nocturnal dyspnea.   Respiratory: Negative for cough, hemoptysis and shortness of breath.    Hematologic/Lymphatic: Negative for bleeding problem. Does not bruise/bleed easily.   Skin: Negative for dry skin and itching.   Musculoskeletal: Positive for back pain, joint pain and stiffness. Negative for falls, muscle weakness and myalgias.   Gastrointestinal: Negative for abdominal pain, diarrhea, heartburn, hematemesis, hematochezia and melena.   Genitourinary: Negative for flank pain and hematuria.   Neurological: Positive for numbness and paresthesias. Negative for dizziness, focal weakness, headaches, light-headedness, seizures and weakness.   Psychiatric/Behavioral: Negative for altered mental status and memory loss. The patient is not nervous/anxious.    Allergic/Immunologic: Negative for hives.       Objective:   Physical Exam  Vitals and nursing note reviewed.   Constitutional:       General: He is not in acute distress.     Appearance: He is well-developed and well-nourished. He is not diaphoretic.   HENT:      Head: Normocephalic and atraumatic.   Eyes:      General:         Right eye: No discharge.         Left eye: No discharge.      Extraocular Movements: EOM normal.      Pupils: Pupils are equal, round, and reactive to light.   Neck:      Thyroid: No thyromegaly.      Vascular: No JVD.   Cardiovascular:      Rate and Rhythm: Normal rate and regular rhythm.     "  Pulses: Intact distal pulses.           Carotid pulses are 2+ on the right side and 2+ on the left side.       Radial pulses are 2+ on the right side and 2+ on the left side.        Femoral pulses are 2+ on the right side and 2+ on the left side.       Popliteal pulses are 2+ on the right side and 2+ on the left side.        Dorsalis pedis pulses are 0 on the right side and 0 on the left side.        Posterior tibial pulses are 0 on the right side and 0 on the left side.      Heart sounds: Normal heart sounds. No murmur heard.  No friction rub. No gallop.    Pulmonary:      Effort: Pulmonary effort is normal. No respiratory distress.      Breath sounds: Normal breath sounds. No wheezing or rales.      Comments: SCAR CABG WELL HEALED.   Chest:      Chest wall: No tenderness.   Abdominal:      General: Bowel sounds are normal. There is no distension.      Palpations: Abdomen is soft.      Tenderness: There is no abdominal tenderness.      Comments: OBESE    Musculoskeletal:         General: No edema. Normal range of motion.      Cervical back: Neck supple.      Right lower leg: No edema.      Left lower leg: No edema.   Skin:     General: Skin is warm and dry.      Findings: No erythema or rash.   Neurological:      Mental Status: He is alert and oriented to person, place, and time.      Cranial Nerves: No cranial nerve deficit.   Psychiatric:         Mood and Affect: Mood and affect normal.         Behavior: Behavior normal.         Judgment: Judgment normal.       Vitals:    02/03/22 1448 02/03/22 1451   BP: 120/80 116/76   BP Location: Left arm Right arm   Patient Position: Sitting Sitting   BP Method: Large (Manual) Large (Manual)   Pulse: 78    SpO2: 98%    Weight: 111 kg (244 lb 11.4 oz)    Height: 5' 8" (1.727 m)      Lab Results   Component Value Date    CHOL 123 01/18/2022    CHOL 134 10/22/2021    CHOL 185 01/05/2021     Lab Results   Component Value Date    HDL 39 (L) 01/18/2022    HDL 44 10/22/2021    " HDL 38 (L) 01/05/2021     Lab Results   Component Value Date    LDLCALC 72.6 01/18/2022    LDLCALC 78.8 10/22/2021    LDLCALC 127.2 01/05/2021     Lab Results   Component Value Date    TRIG 57 01/18/2022    TRIG 56 10/22/2021    TRIG 99 01/05/2021     Lab Results   Component Value Date    CHOLHDL 31.7 01/18/2022    CHOLHDL 32.8 10/22/2021    CHOLHDL 20.5 01/05/2021       Chemistry        Component Value Date/Time     01/18/2022 0851    K 5.0 01/18/2022 0851     01/18/2022 0851    CO2 24 01/18/2022 0851    BUN 13 01/18/2022 0851    CREATININE 1.4 01/18/2022 0851    GLU 82 01/18/2022 0851        Component Value Date/Time    CALCIUM 9.4 01/18/2022 0851    ALKPHOS 104 01/18/2022 0851    AST 22 01/18/2022 0851    ALT 16 01/18/2022 0851    BILITOT 0.5 01/18/2022 0851    ESTGFRAFRICA >60.0 01/18/2022 0851    EGFRNONAA 52.0 (A) 01/18/2022 0851          Lab Results   Component Value Date    TSH 3.370 06/30/2020     Lab Results   Component Value Date    INR 1.0 07/24/2020    INR 1.0 04/08/2014    INR 1.0 03/26/2010     Lab Results   Component Value Date    WBC 3.67 (L) 05/25/2021    HGB 11.7 (L) 05/25/2021    HCT 33.7 (L) 05/25/2021    MCV 85 05/25/2021     (L) 05/25/2021     BNP  @LABRCNTIP(BNP,BNPTRIAGEBLO)@  CrCl cannot be calculated (Patient's most recent lab result is older than the maximum 7 days allowed.).   Conclusion    · Bilateral lillie suggestive of moderate disease worse on the right side.  · The rt lower extremity waveforms are normal and triphaisci in the popliteal sfa and cfa. the infrapopliteal vessels have monophasic waveforms in the wilver and peroneal the pta has a 50-75% stenosis.  · The left infrapopliteal vessels have monophasic waveforms otherwise the waveforms are normal w/o any significant disease.       Conclusion    · Decreased LILLIE bilaterally with monophasic waveforms  · Moderate to severe PAD bilaterally       Assessment:     1. PAD (peripheral artery disease)    2. Coronary artery  disease involving native coronary artery of native heart without angina pectoris    3. Primary hypertension    4. Obesity, Class I, BMI 30-34.9    5. Aortic valve stenosis, etiology of cardiac valve disease unspecified    6. Mild intermittent asthma without complication    7. CHEO on CPAP    8. History of CVA in adulthood    9. Prostate cancer    10. S/P AVR (aortic valve replacement) biopresthetic miller 25 mm in 2014     11. EKG abnormalities    12. Stage 3 chronic kidney disease, unspecified whether stage 3a or 3b CKD    13. Syncope and collapse    14. PVD (peripheral vascular disease)    HE HAS PVD INFRAPOPLITEAL WORSE RT THAN LEFT WITH CONTINOUS SYMPTOMS OF MANY YEARS DURATION THAT APPEARS UNRELATED TO HIS PVD . I THINK HE AHS NEUROPATHIC PAIN AND DOES NOT HAVE CLAUDICATION. HIS RF ZAMORA WELL CONTROLLED HE IS ON APPROPRIATE LIPID THERAPY AND ANTIPLATELET THERAPY I HAVE NO FURTHER RECOMMENDATION AS  FAR AS PVD MANAGEMENT EXCEPT WALKING EXERCISE PROGRAM AND GET PROFESSIONAL FOOT CARE WITH PODIATRY.   HE HAS NO CONTRAINDICATION TO TRAVEL FROM PVD STANDPOINT HE NEEDS TO GET UP AND MOVE AROUND ON THE PLANE.    I HAVE REVIEWED HIS VASCULAR STUDIES MMYSELF AGREE WITH OFFICIAL INTERPRETATION.   Plan:   AS PER ABOVE.   DISCUSSED WITH DR PIPER

## 2022-02-15 ENCOUNTER — OFFICE VISIT (OUTPATIENT)
Dept: PODIATRY | Facility: CLINIC | Age: 67
End: 2022-02-15
Payer: MEDICARE

## 2022-02-15 VITALS — HEIGHT: 68 IN | WEIGHT: 244 LBS | BODY MASS INDEX: 36.98 KG/M2

## 2022-02-15 DIAGNOSIS — Z86.73 HISTORY OF CVA IN ADULTHOOD: ICD-10-CM

## 2022-02-15 DIAGNOSIS — G60.9 IDIOPATHIC PERIPHERAL NEUROPATHY: Primary | ICD-10-CM

## 2022-02-15 DIAGNOSIS — Z79.01 CURRENT USE OF LONG TERM ANTICOAGULATION: ICD-10-CM

## 2022-02-15 DIAGNOSIS — N18.31 STAGE 3A CHRONIC KIDNEY DISEASE: ICD-10-CM

## 2022-02-15 DIAGNOSIS — L60.3 ONYCHODYSTROPHY: ICD-10-CM

## 2022-02-15 PROCEDURE — 11721 PR DEBRIDEMENT OF NAILS, 6 OR MORE: ICD-10-PCS | Mod: Q9,S$GLB,, | Performed by: PODIATRIST

## 2022-02-15 PROCEDURE — 1101F PR PT FALLS ASSESS DOC 0-1 FALLS W/OUT INJ PAST YR: ICD-10-PCS | Mod: CPTII,S$GLB,, | Performed by: PODIATRIST

## 2022-02-15 PROCEDURE — 1101F PT FALLS ASSESS-DOCD LE1/YR: CPT | Mod: CPTII,S$GLB,, | Performed by: PODIATRIST

## 2022-02-15 PROCEDURE — 99999 PR PBB SHADOW E&M-EST. PATIENT-LVL V: CPT | Mod: PBBFAC,,, | Performed by: PODIATRIST

## 2022-02-15 PROCEDURE — 1126F PR PAIN SEVERITY QUANTIFIED, NO PAIN PRESENT: ICD-10-PCS | Mod: CPTII,S$GLB,, | Performed by: PODIATRIST

## 2022-02-15 PROCEDURE — 1159F PR MEDICATION LIST DOCUMENTED IN MEDICAL RECORD: ICD-10-PCS | Mod: CPTII,S$GLB,, | Performed by: PODIATRIST

## 2022-02-15 PROCEDURE — 99203 OFFICE O/P NEW LOW 30 MIN: CPT | Mod: 25,S$GLB,, | Performed by: PODIATRIST

## 2022-02-15 PROCEDURE — 1160F RVW MEDS BY RX/DR IN RCRD: CPT | Mod: CPTII,S$GLB,, | Performed by: PODIATRIST

## 2022-02-15 PROCEDURE — 3008F PR BODY MASS INDEX (BMI) DOCUMENTED: ICD-10-PCS | Mod: CPTII,S$GLB,, | Performed by: PODIATRIST

## 2022-02-15 PROCEDURE — 1159F MED LIST DOCD IN RCRD: CPT | Mod: CPTII,S$GLB,, | Performed by: PODIATRIST

## 2022-02-15 PROCEDURE — 11721 DEBRIDE NAIL 6 OR MORE: CPT | Mod: Q9,S$GLB,, | Performed by: PODIATRIST

## 2022-02-15 PROCEDURE — 1160F PR REVIEW ALL MEDS BY PRESCRIBER/CLIN PHARMACIST DOCUMENTED: ICD-10-PCS | Mod: CPTII,S$GLB,, | Performed by: PODIATRIST

## 2022-02-15 PROCEDURE — 99999 PR PBB SHADOW E&M-EST. PATIENT-LVL V: ICD-10-PCS | Mod: PBBFAC,,, | Performed by: PODIATRIST

## 2022-02-15 PROCEDURE — 3288F FALL RISK ASSESSMENT DOCD: CPT | Mod: CPTII,S$GLB,, | Performed by: PODIATRIST

## 2022-02-15 PROCEDURE — 3008F BODY MASS INDEX DOCD: CPT | Mod: CPTII,S$GLB,, | Performed by: PODIATRIST

## 2022-02-15 PROCEDURE — 99203 PR OFFICE/OUTPT VISIT, NEW, LEVL III, 30-44 MIN: ICD-10-PCS | Mod: 25,S$GLB,, | Performed by: PODIATRIST

## 2022-02-15 PROCEDURE — 3288F PR FALLS RISK ASSESSMENT DOCUMENTED: ICD-10-PCS | Mod: CPTII,S$GLB,, | Performed by: PODIATRIST

## 2022-02-15 PROCEDURE — 1126F AMNT PAIN NOTED NONE PRSNT: CPT | Mod: CPTII,S$GLB,, | Performed by: PODIATRIST

## 2022-02-15 NOTE — PROGRESS NOTES
Subjective:       Patient ID: Abdullahi Urias is a 66 y.o. male.    Chief Complaint: Routine Foot Care (Routine foot care, LTAT, Neuropathy, wears tennis and socks, last seen PCP Dr. Valentin 01/27/22)    HPI: Abdullahi Urias presents to the clinic today with the complaint of persistent burning and tingling to the B/L lower extremity. Patient states these symptoms have been on going now for the past several months, and are worsening. Patient states the symptoms seem to be moreso nocturnal. Pain/Discomfort is rated at approx. 8/10. Pain/Discomfort is described as an irritant, but is occasionally sharp and shooting like. Patient does have a history of lumbosacral pathology; spinal stenosis, radiculopathy, sciatica.Patient is currently on Gabapentin.. Patient states no trauma.  Also complains of elongated, thickened toenails to the right left foot which causes pain with in closed shoe gear.  He continues to follow with his primary care provider for anticoagulation therapy due to history of CVA.  Ambulates with a cane.    Review of patient's allergies indicates:   Allergen Reactions    Pletal [cilostazol]      Rash and leg pain       Past Medical History:   Diagnosis Date    [facility]    Asthma     BPH (benign prostatic hyperplasia)     CAD (coronary artery disease)     Cardiomyopathy     CHF (congestive heart failure)     Chronic hoarseness     vocal cord surg    Chronic pain     CKD (chronic kidney disease) stage 3, GFR 30-59 ml/min     CVA (cerebral infarction)     8/2012 olol; reviewed ed note    Ex-smoker     GERD (gastroesophageal reflux disease)     Hepatitis C     treatedharvoni says cured, RNA NEG 6/2020    Hypertension     Pancreatitis     Prostate cancer     Prostate cancer     Prostate cancer     PVD (peripheral vascular disease)     Renal insufficiency     Substance abuse     cocaine, etoh , tob in past       Family History   Problem Relation Age of Onset     "Heart disease Mother     Heart attack Sister     Heart attack Brother     Colon cancer Neg Hx     Colon polyps Neg Hx     Liver cancer Neg Hx     Inflammatory bowel disease Neg Hx     Liver disease Neg Hx     Rectal cancer Neg Hx     Stomach cancer Neg Hx     Ulcerative colitis Neg Hx        Social History     Socioeconomic History    Marital status:    Tobacco Use    Smoking status: Former Smoker     Packs/day: 2.00     Years: 8.00     Pack years: 16.00     Quit date: 2012     Years since quittin.4    Smokeless tobacco: Never Used   Substance and Sexual Activity    Alcohol use: No     Comment: Sober since 2012    Drug use: No    Sexual activity: Yes   Social History Narrative    No pets in household, wife and daughter smokers. Former [email or web address].    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; as of  retired       Past Surgical History:   Procedure Laterality Date    AORTIC VALVE REPLACEMENT  2014    Tissue valve replacement    BACK SURGERY      CARDIAC CATHETERIZATION      COLONOSCOPY      COLONOSCOPY N/A 2021    Procedure: COLONOSCOPY;  Surgeon: Faith Carrillo MD;  Location: [facility] ENDO;  Service: Endoscopy;  Laterality: N/A;    ESOPHAGOGASTRODUODENOSCOPY N/A 2021    Procedure: ESOPHAGOGASTRODUODENOSCOPY (EGD);  Surgeon: Faith Carrillo MD;  Location: [facility] ENDO;  Service: Endoscopy;  Laterality: N/A;    FOOT SURGERY      LEFT HEART CATHETERIZATION Left 2020    Procedure: CATHETERIZATION, HEART, LEFT;  Surgeon: Pasha Martin MD;  Location: [facility] CATH LAB;  Service: Cardiology;  Laterality: Left;    PENILE PROSTHESIS IMPLANT      PENILE PROSTHESIS REVISION  2018    [facility]    radiation for prostate      UPPER GASTROINTESTINAL ENDOSCOPY         Review of Systems       Objective:   Ht 5' 8" (1.727 m)   Wt 110.7 kg (244 lb)   BMI 37.10 kg/m²     CV Ultrasound doppler arterial legs bilat  · " Bilateral lillie suggestive of moderate disease worse on the right side.  · The rt lower extremity waveforms are normal and triphaisci in the   popliteal sfa and cfa. the infrapopliteal vessels have monophasic   waveforms in the wilver and peroneal the pta has a 50-75% stenosis.  · The left infrapopliteal vessels have monophasic waveforms otherwise the   waveforms are normal w/o any significant disease.     CV Ultrasound doppler venous legs bilat  · There is no evidence of a right lower extremity DVT.  · There is no evidence of a left lower extremity DVT.     Ankle Brachial Indices (LILLIE)  · Decreased LILLIE bilaterally with monophasic waveforms  · Moderate to severe PAD bilaterally            Physical Exam    LOWER EXTREMITY PHYSICAL EXAMINATION    Vascular:  The right dorsalis pedis pulse 2/4 and the right posterior tibial pulse 1/4.  The left dorsalis pedis pulse 2/4 and posterior tibial pulse on the left is 1/4.  Capillary refill is intact.  Pedal hair growth decreased.    Neurological:  Protective sensation is not intact to the right left foot.  Vibratory sensation is diminished.  Proprioception is intact.  Sharp/dull is reduced.    Dermatological:  Skin is supple, moist, intact.  There is no signs of callusing, ulcerations, other lesions identified to the dorsal or plantar aspect of the right or left foot.  The R1, 2, 5 and left L1,2, 5 are thickened, discolored dystrophic.  There is subungual debris.  Nail plates have area of dark discoloration.  The remaining nails 3-4 on the right foot and the left foot are elongated but of normal color, thickness, and texture.   There is no signs of ingrowing into the medial or lateral borders.  There is no evidence of wounds or skin breakdown.  No edema or erythema.  No obvious lacerations or fissuring.  Interdigital spaces are clean, dry, intact.  No rashes or scars appreciated.    Musculoskeletal:  No gross or anatomic deformity noted bilaterally.  Manual muscle strength testing  is equal bilateral.  Ambulates with a cane      Assessment:     1. Idiopathic peripheral neuropathy    2. Current use of long term anticoagulation    3. History of CVA in adulthood    4. Stage 3a chronic kidney disease    5. Onychodystrophy        Plan:     Idiopathic peripheral neuropathy    Current use of long term anticoagulation    History of CVA in adulthood    Stage 3a chronic kidney disease    Onychodystrophy        The probable diagnosis, given the clinical examination and history presented, does have a differential of peripheral neuropathy with radiculopathy. Thorough discussion is had with the patient today, concerning the diagnosis, its etiology, and the treatment algorithm at present.     Foot counseling and education is provided at this visit. Patient is advised to wear socks and shoes at all times.  Do not walk barefoot, or with just socks, even when indoors.  Be sure to check and inspect the inside of the shoe before putting them on her feet.  Protect your feet at all times.  Walking shoes and/or athletic shoes are the best types of shoe gear. Do not wear vinyl or plastic type shoe gear, as they do not stretch and/or breathe.  Protect your feet from hot and/or cold. Elevate the extremities when sitting.  Do not wear excessively tight socks and/or shoe gear. Wiggle your toes for a few minutes throughout the day. Move your ankles up and down, in and out, to help blood flow in your lower extremity.    Dystrophic nail plates, as outlined above (R#1,2,5  ; L#1,2,5 ), are sharply debrided with double action nail nipper, and/or with the assistance of a mechanical rotary jami, with removal of all offending nail and nail border(s), for reduction of pains. Nails are reduced in terms of length, width and girth with removal of subungual debris to facilitate pain free weight bearing and ambulation. The elongated nails as outlined in the objective portion of this note, were trimmed to appropriate length, with a  double action nail nipper, for alleviation/reduction of pains as well. Follow up in approx. 3-4 months.      Future Appointments   Date Time Provider Department Center   3/3/2022  9:00 AM TIKA FRANCOIS CC LAB [facility] LAB DS [facility]   3/10/2022 11:00 AM Dominic Garces III, MD [facility] RAD ONC BR   3/11/2022  9:40 AM Jeffery Mckeon MD [facility] CARDIO [facility]   3/11/2022 10:10 AM LABORATORY, [facility] HG LAB [facility]   3/18/2022 11:00 AM Rommel Rodriguez MD [facility] UROLOGY [facility]   5/30/2022 10:15 AM Osiris Lilly DPM ONLC POD [facility]   7/19/2022  9:00 AM Jeffery Mckeon MD [facility] CARDIO [facility]   1/25/2023  7:50 AM LABORATORY, [facility] HGV LAB [facility]   1/30/2023  1:40 PM Reji Valentin MD [facility] IM [place]

## 2022-02-16 ENCOUNTER — TELEPHONE (OUTPATIENT)
Dept: CARDIOLOGY | Facility: CLINIC | Age: 67
End: 2022-02-16
Payer: MEDICARE

## 2022-02-16 NOTE — TELEPHONE ENCOUNTER
LVM for pt to be off blood thinner for five days  ----- Message from Jeffery Mckeon MD sent at 2/16/2022  4:36 PM CST -----  Five days  ----- Message -----  From: Belkis Willson MA  Sent: 2/16/2022  11:45 AM CST  To: Jeffery Mckeon MD    Pt contacted and wanted to know how many days he should be off his blood thinner?  ----- Message -----  From: Joycelyn Lopez MA  Sent: 2/16/2022  10:52 AM CST  To: Mike Reyes    Good Morning,    This patient showed up here at the clinic to get [facility]. Please call patient at [phone]. Dr. Mckeon is the patient's cardiologist.    Thanks Joycelyn

## 2022-02-18 ENCOUNTER — PATIENT MESSAGE (OUTPATIENT)
Dept: PAIN MEDICINE | Facility: CLINIC | Age: 67
End: 2022-02-18
Payer: MEDICARE

## 2022-02-18 NOTE — TELEPHONE ENCOUNTER
Good Afternoon, I just spoke with patient about his continous pain on his leg and back thats been last for 24 hours now. Pt recived an Performed bilateral piriformis trigger point injections in clinic on 1/27/22 and pt stated that's it gave him 80% for 1 1/2 week. Pt inform me that he went to the ER recently for the pain that's is going down the legs.  The patient is reqesting for you to give him any type of medication to help the pain. Pt is currenlty taking Pardeep 300mg and had a short term scrip of Oxycodone on 2/11/2022. Is there something that you are will to do ?     Jesus Alberto Morgan MA  Interventional Pain Management Department

## 2022-02-22 RX ORDER — BACLOFEN 10 MG/1
10 TABLET ORAL 3 TIMES DAILY
Qty: 90 TABLET | Refills: 11 | Status: SHIPPED | OUTPATIENT
Start: 2022-02-22 | End: 2023-01-09 | Stop reason: SDUPTHER

## 2022-02-22 RX ORDER — GABAPENTIN 600 MG/1
600 TABLET ORAL 3 TIMES DAILY
Qty: 90 TABLET | Refills: 11 | Status: SHIPPED | OUTPATIENT
Start: 2022-02-22 | End: 2023-01-09 | Stop reason: SDUPTHER

## 2022-02-23 ENCOUNTER — PATIENT MESSAGE (OUTPATIENT)
Dept: PAIN MEDICINE | Facility: CLINIC | Age: 67
End: 2022-02-23
Payer: MEDICARE

## 2022-02-23 DIAGNOSIS — J30.9 ALLERGIC RHINITIS, UNSPECIFIED SEASONALITY, UNSPECIFIED TRIGGER: ICD-10-CM

## 2022-02-23 NOTE — TELEPHONE ENCOUNTER
No new care gaps identified.  Powered by [employer]. Reference number: [id number].   2/23/2022 3:59:07 AM CST

## 2022-02-24 RX ORDER — FLUTICASONE PROPIONATE 50 MCG
1 SPRAY, SUSPENSION (ML) NASAL DAILY
Qty: 16 G | Refills: 0 | Status: SHIPPED | OUTPATIENT
Start: 2022-02-24 | End: 2023-01-20 | Stop reason: SDUPTHER

## 2022-02-25 RX ORDER — AMITRIPTYLINE HYDROCHLORIDE 10 MG/1
TABLET, FILM COATED ORAL
Qty: 30 TABLET | Refills: 0 | Status: SHIPPED | OUTPATIENT
Start: 2022-02-25 | End: 2022-03-29

## 2022-03-03 ENCOUNTER — LAB VISIT (OUTPATIENT)
Dept: LAB | Facility: HOSPITAL | Age: 67
End: 2022-03-03
Attending: RADIOLOGY
Payer: MEDICARE

## 2022-03-03 DIAGNOSIS — C61 PROSTATE CANCER: ICD-10-CM

## 2022-03-03 LAB — COMPLEXED PSA SERPL-MCNC: <0.01 NG/ML (ref 0–4)

## 2022-03-03 PROCEDURE — 36415 COLL VENOUS BLD VENIPUNCTURE: CPT | Performed by: RADIOLOGY

## 2022-03-03 PROCEDURE — 84153 ASSAY OF PSA TOTAL: CPT | Performed by: RADIOLOGY

## 2022-03-04 DIAGNOSIS — I25.10 CORONARY ARTERY DISEASE INVOLVING NATIVE CORONARY ARTERY OF NATIVE HEART WITHOUT ANGINA PECTORIS: Primary | ICD-10-CM

## 2022-03-10 ENCOUNTER — PATIENT OUTREACH (OUTPATIENT)
Dept: ADMINISTRATIVE | Facility: OTHER | Age: 67
End: 2022-03-10
Payer: MEDICARE

## 2022-03-11 ENCOUNTER — HOSPITAL ENCOUNTER (OUTPATIENT)
Dept: CARDIOLOGY | Facility: HOSPITAL | Age: 67
Discharge: HOME | End: 2022-03-11
Attending: INTERNAL MEDICINE
Payer: MEDICARE

## 2022-03-11 ENCOUNTER — OFFICE VISIT (OUTPATIENT)
Dept: CARDIOLOGY | Facility: CLINIC | Age: 67
End: 2022-03-11
Payer: MEDICARE

## 2022-03-11 VITALS
HEART RATE: 86 BPM | DIASTOLIC BLOOD PRESSURE: 64 MMHG | WEIGHT: 246 LBS | SYSTOLIC BLOOD PRESSURE: 116 MMHG | BODY MASS INDEX: 37.4 KG/M2 | OXYGEN SATURATION: 99 %

## 2022-03-11 DIAGNOSIS — I25.10 CORONARY ARTERY DISEASE INVOLVING NATIVE CORONARY ARTERY OF NATIVE HEART WITHOUT ANGINA PECTORIS: ICD-10-CM

## 2022-03-11 DIAGNOSIS — Z86.73 HISTORY OF CVA IN ADULTHOOD: ICD-10-CM

## 2022-03-11 DIAGNOSIS — I10 PRIMARY HYPERTENSION: Chronic | ICD-10-CM

## 2022-03-11 DIAGNOSIS — I73.9 PAD (PERIPHERAL ARTERY DISEASE): ICD-10-CM

## 2022-03-11 DIAGNOSIS — I42.9 CARDIOMYOPATHY, UNSPECIFIED TYPE: ICD-10-CM

## 2022-03-11 DIAGNOSIS — Z95.2 S/P AVR (AORTIC VALVE REPLACEMENT): ICD-10-CM

## 2022-03-11 DIAGNOSIS — I25.10 CORONARY ARTERY DISEASE INVOLVING NATIVE CORONARY ARTERY OF NATIVE HEART WITHOUT ANGINA PECTORIS: Chronic | ICD-10-CM

## 2022-03-11 DIAGNOSIS — Z01.810 PREOP CARDIOVASCULAR EXAM: Primary | ICD-10-CM

## 2022-03-11 DIAGNOSIS — N18.31 STAGE 3A CHRONIC KIDNEY DISEASE: ICD-10-CM

## 2022-03-11 PROCEDURE — 93010 ELECTROCARDIOGRAM REPORT: CPT | Mod: ,,, | Performed by: INTERNAL MEDICINE

## 2022-03-11 PROCEDURE — 99214 OFFICE O/P EST MOD 30 MIN: CPT | Mod: S$GLB,,, | Performed by: INTERNAL MEDICINE

## 2022-03-11 PROCEDURE — 1159F MED LIST DOCD IN RCRD: CPT | Mod: CPTII,S$GLB,, | Performed by: INTERNAL MEDICINE

## 2022-03-11 PROCEDURE — 99999 PR PBB SHADOW E&M-EST. PATIENT-LVL V: CPT | Mod: PBBFAC,,, | Performed by: INTERNAL MEDICINE

## 2022-03-11 PROCEDURE — 3008F BODY MASS INDEX DOCD: CPT | Mod: CPTII,S$GLB,, | Performed by: INTERNAL MEDICINE

## 2022-03-11 PROCEDURE — 3008F PR BODY MASS INDEX (BMI) DOCUMENTED: ICD-10-PCS | Mod: CPTII,S$GLB,, | Performed by: INTERNAL MEDICINE

## 2022-03-11 PROCEDURE — 3078F DIAST BP <80 MM HG: CPT | Mod: CPTII,S$GLB,, | Performed by: INTERNAL MEDICINE

## 2022-03-11 PROCEDURE — 99499 UNLISTED E&M SERVICE: CPT | Mod: HCNC,S$GLB,, | Performed by: INTERNAL MEDICINE

## 2022-03-11 PROCEDURE — 3074F PR MOST RECENT SYSTOLIC BLOOD PRESSURE < 130 MM HG: ICD-10-PCS | Mod: CPTII,S$GLB,, | Performed by: INTERNAL MEDICINE

## 2022-03-11 PROCEDURE — 93010 EKG 12-LEAD: ICD-10-PCS | Mod: ,,, | Performed by: INTERNAL MEDICINE

## 2022-03-11 PROCEDURE — 1160F RVW MEDS BY RX/DR IN RCRD: CPT | Mod: CPTII,S$GLB,, | Performed by: INTERNAL MEDICINE

## 2022-03-11 PROCEDURE — 99999 PR PBB SHADOW E&M-EST. PATIENT-LVL V: ICD-10-PCS | Mod: PBBFAC,,, | Performed by: INTERNAL MEDICINE

## 2022-03-11 PROCEDURE — 1160F PR REVIEW ALL MEDS BY PRESCRIBER/CLIN PHARMACIST DOCUMENTED: ICD-10-PCS | Mod: CPTII,S$GLB,, | Performed by: INTERNAL MEDICINE

## 2022-03-11 PROCEDURE — 1159F PR MEDICATION LIST DOCUMENTED IN MEDICAL RECORD: ICD-10-PCS | Mod: CPTII,S$GLB,, | Performed by: INTERNAL MEDICINE

## 2022-03-11 PROCEDURE — 99214 PR OFFICE/OUTPT VISIT, EST, LEVL IV, 30-39 MIN: ICD-10-PCS | Mod: S$GLB,,, | Performed by: INTERNAL MEDICINE

## 2022-03-11 PROCEDURE — 93005 ELECTROCARDIOGRAM TRACING: CPT

## 2022-03-11 PROCEDURE — 99499 RISK ADDL DX/OHS AUDIT: ICD-10-PCS | Mod: HCNC,S$GLB,, | Performed by: INTERNAL MEDICINE

## 2022-03-11 PROCEDURE — 3074F SYST BP LT 130 MM HG: CPT | Mod: CPTII,S$GLB,, | Performed by: INTERNAL MEDICINE

## 2022-03-11 PROCEDURE — 3078F PR MOST RECENT DIASTOLIC BLOOD PRESSURE < 80 MM HG: ICD-10-PCS | Mod: CPTII,S$GLB,, | Performed by: INTERNAL MEDICINE

## 2022-03-11 NOTE — PROGRESS NOTES
Subjective:   Patient ID:  Abdullahi Urias is a 66 y.o. male who presents for follow up of No chief complaint on file.      67 yo male, came in for dental work clearance   PMH s/p bioprosthetic AVR at [facility] in 2014 Sal[place] [id number] 25 mm pericardial tissue valve, nonobstructive CAD s/p LHC in  nonobstructive, 20% midLAD and RCA by Dr. quiroz, h/o stroke in 2012, HTN, HLD CHEO on CPAP remote h/o cocaine in 2012, quit drinking in 2012 and no smoking. H/o prostate cancer in 2018,   PAD leg pain from neuropathy.  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 Lasted for minutes and no radiating pain  C/o SOB when tied the shoes  Sleeps with 3 pillows and CPAP at night  Off CPAP for 1 month due to congestion  BNP in 2016 37  ECHO in  normal EF, perivalvular AI and s/p AVR mean G 16 mmHG and peak V 2.85    07/2021 visit  Limited exercise due to hip pain. C/o arm numbness. Sweating at rest.    [facility] X[facility] for prostate cancer and [facility] [place] [facility]  BIRD when climbing the stairs over 6 months, and episode of syncope after got out the bed. Woke up quickly  V/Q scan in  low risk for PE and d dimer 1.8     visit  H/O COVID 19 infection on 12/30/2021 and had AB infusion at [facility].   Hands and legs numbness for few months. Occasional chest discomfort and ingestion. Lower back pain.   Some calf pain and left foot swelling. The burning sensation worse below the knee. Some mild numbness and pain at thigh, L> R. occred at rest and with exertion.     Interval history  eval by Dr Kamara at vascular medicine clinic in  and r/o PAD related leg pain. Had uncomplicated CA travel.   Leg pain controlled and f/u at pain clinic  Plan to have dental work. No chest  pain. SOB chronic stable. A lot of GERD.   On plavix for PAD and allergic to pletal      Past Medical History:   Diagnosis Date    [facility]    Asthma     BPH (benign prostatic hyperplasia)     CAD (coronary artery disease)     Cardiomyopathy     CHF (congestive heart failure)     Chronic hoarseness     vocal cord surg    Chronic pain     CKD (chronic kidney disease) stage 3, GFR 30-59 ml/min     CVA (cerebral infarction)     8/2012 olol; reviewed ed note    Ex-smoker     GERD (gastroesophageal reflux disease)     Hepatitis C     treatedharvoni says cured, RNA NEG 6/2020    Hypertension     Pancreatitis     Prostate cancer     Prostate cancer     Prostate cancer     PVD (peripheral vascular disease)     Renal insufficiency     Substance abuse     cocaine, etoh , tob in past       Past Surgical History:   Procedure Laterality Date    AORTIC VALVE REPLACEMENT  05/19/2014    Tissue valve replacement    BACK SURGERY      CARDIAC CATHETERIZATION      COLONOSCOPY  2011    COLONOSCOPY N/A 2/24/2021    Procedure: COLONOSCOPY;  Surgeon: Faith Carrillo MD;  Location: [facility] ENDO;  Service: Endoscopy;  Laterality: N/A;    ESOPHAGOGASTRODUODENOSCOPY N/A 2/24/2021    Procedure: ESOPHAGOGASTRODUODENOSCOPY (EGD);  Surgeon: Faith Carrillo MD;  Location: [facility] ENDO;  Service: Endoscopy;  Laterality: N/A;    FOOT SURGERY      LEFT HEART CATHETERIZATION Left 7/24/2020    Procedure: CATHETERIZATION, HEART, LEFT;  Surgeon: Pasha Martin MD;  Location: [facility] CATH LAB;  Service: Cardiology;  Laterality: Left;    PENILE PROSTHESIS IMPLANT      PENILE PROSTHESIS REVISION  04/30/2018    [facility]    radiation for prostate      UPPER GASTROINTESTINAL ENDOSCOPY  2011       Social History     Tobacco Use    Smoking status: Former Smoker     Packs/day: 2.00     Years: 8.00     Pack years: 16.00     Quit date: 8/24/2012     Years since  quittin.5    Smokeless tobacco: Never Used   Substance Use Topics    Alcohol use: No     Comment: Sober since 2012    Drug use: No       Family History   Problem Relation Age of Onset    Heart disease Mother     Heart attack Sister     Heart attack Brother     Colon cancer Neg Hx     Colon polyps Neg Hx     Liver cancer Neg Hx     Inflammatory bowel disease Neg Hx     Liver disease Neg Hx     Rectal cancer Neg Hx     Stomach cancer Neg Hx     Ulcerative colitis Neg Hx          Review of Systems   Constitutional: Negative for decreased appetite, diaphoresis, fever, malaise/fatigue and night sweats.   HENT: Negative for nosebleeds.    Eyes: Negative for blurred vision and double vision.   Cardiovascular: Positive for chest pain and dyspnea on exertion. Negative for claudication, irregular heartbeat, leg swelling, near-syncope, orthopnea, palpitations and paroxysmal nocturnal dyspnea.   Respiratory: Negative for cough, shortness of breath, sleep disturbances due to breathing, snoring, sputum production and wheezing.    Endocrine: Negative for cold intolerance and polyuria.   Hematologic/Lymphatic: Does not bruise/bleed easily.   Skin: Negative for rash.   Musculoskeletal: Positive for arthritis and back pain. Negative for falls, joint pain, joint swelling and neck pain.   Gastrointestinal: Positive for heartburn. Negative for abdominal pain, nausea and vomiting.   Genitourinary: Negative for dysuria, frequency and hematuria.   Neurological: Negative for difficulty with concentration, dizziness, focal weakness, headaches, light-headedness, numbness, seizures and weakness.   Psychiatric/Behavioral: Negative for depression, memory loss and substance abuse. The patient does not have insomnia.    Allergic/Immunologic: Negative for HIV exposure and hives.       Objective:   Physical Exam  HENT:      Head: Normocephalic.   Eyes:      Pupils: Pupils are equal, round, and reactive to light.   Neck:       Thyroid: No thyromegaly.      Vascular: Normal carotid pulses. No carotid bruit or JVD.   Cardiovascular:      Rate and Rhythm: Normal rate and regular rhythm.  No extrasystoles are present.     Chest Wall: PMI is not displaced.      Pulses: Normal pulses.      Heart sounds: Murmur (ESM + on RUSB ) heard.     No gallop. No S3 sounds.   Pulmonary:      Effort: No respiratory distress.      Breath sounds: Normal breath sounds. No stridor.   Abdominal:      General: Bowel sounds are normal.      Palpations: Abdomen is soft.      Tenderness: There is no abdominal tenderness. There is no rebound.   Musculoskeletal:         General: Normal range of motion.   Skin:     Findings: No rash.   Neurological:      Mental Status: He is alert and oriented to person, place, and time.   Psychiatric:         Behavior: Behavior normal.         Lab Results   Component Value Date    CHOL 123 01/18/2022    CHOL 134 10/22/2021    CHOL 185 01/05/2021     Lab Results   Component Value Date    HDL 39 (L) 01/18/2022    HDL 44 10/22/2021    HDL 38 (L) 01/05/2021     Lab Results   Component Value Date    LDLCALC 72.6 01/18/2022    LDLCALC 78.8 10/22/2021    LDLCALC 127.2 01/05/2021     Lab Results   Component Value Date    TRIG 57 01/18/2022    TRIG 56 10/22/2021    TRIG 99 01/05/2021     Lab Results   Component Value Date    CHOLHDL 31.7 01/18/2022    CHOLHDL 32.8 10/22/2021    CHOLHDL 20.5 01/05/2021       Chemistry        Component Value Date/Time     01/18/2022 0851    K 5.0 01/18/2022 0851     01/18/2022 0851    CO2 24 01/18/2022 0851    BUN 13 01/18/2022 0851    CREATININE 1.4 01/18/2022 0851    GLU 82 01/18/2022 0851        Component Value Date/Time    CALCIUM 9.4 01/18/2022 0851    ALKPHOS 104 01/18/2022 0851    AST 22 01/18/2022 0851    ALT 16 01/18/2022 0851    BILITOT 0.5 01/18/2022 0851    ESTGFRAFRICA >60.0 01/18/2022 0851    EGFRNONAA 52.0 (A) 01/18/2022 0851          Lab Results   Component Value Date    HGBA1C 5.2  10/23/2007     Lab Results   Component Value Date    TSH 3.370 06/30/2020     Lab Results   Component Value Date    INR 1.0 07/24/2020    INR 1.0 04/08/2014    INR 1.0 03/26/2010     Lab Results   Component Value Date    WBC 3.67 (L) 05/25/2021    HGB 11.7 (L) 05/25/2021    HCT 33.7 (L) 05/25/2021    MCV 85 05/25/2021     (L) 05/25/2021     BMP  Sodium   Date Value Ref Range Status   01/18/2022 137 136 - 145 mmol/L Final     Potassium   Date Value Ref Range Status   01/18/2022 5.0 3.5 - 5.1 mmol/L Final     Chloride   Date Value Ref Range Status   01/18/2022 103 95 - 110 mmol/L Final     CO2   Date Value Ref Range Status   01/18/2022 24 23 - 29 mmol/L Final     BUN   Date Value Ref Range Status   01/18/2022 13 8 - 23 mg/dL Final     Creatinine   Date Value Ref Range Status   01/18/2022 1.4 0.5 - 1.4 mg/dL Final     Calcium   Date Value Ref Range Status   01/18/2022 9.4 8.7 - 10.5 mg/dL Final     Anion Gap   Date Value Ref Range Status   01/18/2022 10 8 - 16 mmol/L Final     eGFR if    Date Value Ref Range Status   01/18/2022 >60.0 >60 mL/min/1.73 m^2 Final     eGFR if non    Date Value Ref Range Status   01/18/2022 52.0 (A) >60 mL/min/1.73 m^2 Final     Comment:     Calculation used to obtain the estimated glomerular filtration  rate (eGFR) is the CKD-EPI equation.        BNP  @LABRCNTIP(BNP,BNPTRIAGEBLO)@  @LABRCNTIP(troponini)@  CrCl cannot be calculated (Patient's most recent lab result is older than the maximum 7 days allowed.).  No results found in the last 24 hours.  No results found in the last 24 hours.  No results found in the last 24 hours.    Assessment:      1. Preop cardiovascular exam    2. History of CVA in adulthood    3. Coronary artery disease involving native coronary artery of native heart without angina pectoris    4. S/P AVR (aortic valve replacement) biopresthetic miller 25 mm in 2014     5. Primary hypertension    6. Cardiomyopathy, unspecified type     7. PAD (peripheral artery disease)    8. Stage 3a chronic kidney disease        Plan:   Elevated periop risk of CV events for dental procedure.  Good functional and exercise capacity.  No chest pain, active arrhythmia and CHF symptoms.  Ok to proceed the scheduled surgery without further cardiac study.  OK to hold Aspirin and Plavix 5 to 7 days before the procedure and resume ASAP postop.    .  Continue ASA Plavix (for PAD and allergic to pletal) Lipitor Coreg and Losartan  DM Rx per PCP  Counseled DASH  Check Lipid profile in 6 months  Recommend heart-healthy diet, weight control and regular exercise.  Wilbur. Risk modification.   I have reviewed all pertinent labs and cardiac studies independently. Plans and recommendations have been formulated under my direct supervision. All questions answered and patient voiced understanding.   If symptoms persist go to the ED  RTC in 4 months

## 2022-03-14 ENCOUNTER — OFFICE VISIT (OUTPATIENT)
Dept: RADIATION ONCOLOGY | Facility: CLINIC | Age: 67
End: 2022-03-14
Payer: MEDICARE

## 2022-03-14 VITALS
RESPIRATION RATE: 18 BRPM | SYSTOLIC BLOOD PRESSURE: 134 MMHG | HEART RATE: 76 BPM | OXYGEN SATURATION: 97 % | HEIGHT: 69 IN | BODY MASS INDEX: 36.86 KG/M2 | TEMPERATURE: 97 F | DIASTOLIC BLOOD PRESSURE: 91 MMHG | WEIGHT: 248.88 LBS

## 2022-03-14 DIAGNOSIS — C61 PROSTATE CANCER: Primary | ICD-10-CM

## 2022-03-14 PROCEDURE — 3288F PR FALLS RISK ASSESSMENT DOCUMENTED: ICD-10-PCS | Mod: CPTII,S$GLB,, | Performed by: RADIOLOGY

## 2022-03-14 PROCEDURE — 3080F DIAST BP >= 90 MM HG: CPT | Mod: CPTII,S$GLB,, | Performed by: RADIOLOGY

## 2022-03-14 PROCEDURE — 99999 PR PBB SHADOW E&M-EST. PATIENT-LVL V: CPT | Mod: PBBFAC,,, | Performed by: RADIOLOGY

## 2022-03-14 PROCEDURE — 1159F PR MEDICATION LIST DOCUMENTED IN MEDICAL RECORD: ICD-10-PCS | Mod: CPTII,S$GLB,, | Performed by: RADIOLOGY

## 2022-03-14 PROCEDURE — 3080F PR MOST RECENT DIASTOLIC BLOOD PRESSURE >= 90 MM HG: ICD-10-PCS | Mod: CPTII,S$GLB,, | Performed by: RADIOLOGY

## 2022-03-14 PROCEDURE — 99213 OFFICE O/P EST LOW 20 MIN: CPT | Mod: S$GLB,,, | Performed by: RADIOLOGY

## 2022-03-14 PROCEDURE — 3075F PR MOST RECENT SYSTOLIC BLOOD PRESS GE 130-139MM HG: ICD-10-PCS | Mod: CPTII,S$GLB,, | Performed by: RADIOLOGY

## 2022-03-14 PROCEDURE — 99213 PR OFFICE/OUTPT VISIT, EST, LEVL III, 20-29 MIN: ICD-10-PCS | Mod: S$GLB,,, | Performed by: RADIOLOGY

## 2022-03-14 PROCEDURE — 1101F PR PT FALLS ASSESS DOC 0-1 FALLS W/OUT INJ PAST YR: ICD-10-PCS | Mod: CPTII,S$GLB,, | Performed by: RADIOLOGY

## 2022-03-14 PROCEDURE — 1126F AMNT PAIN NOTED NONE PRSNT: CPT | Mod: CPTII,S$GLB,, | Performed by: RADIOLOGY

## 2022-03-14 PROCEDURE — 3288F FALL RISK ASSESSMENT DOCD: CPT | Mod: CPTII,S$GLB,, | Performed by: RADIOLOGY

## 2022-03-14 PROCEDURE — 1126F PR PAIN SEVERITY QUANTIFIED, NO PAIN PRESENT: ICD-10-PCS | Mod: CPTII,S$GLB,, | Performed by: RADIOLOGY

## 2022-03-14 PROCEDURE — 3008F BODY MASS INDEX DOCD: CPT | Mod: CPTII,S$GLB,, | Performed by: RADIOLOGY

## 2022-03-14 PROCEDURE — 3075F SYST BP GE 130 - 139MM HG: CPT | Mod: CPTII,S$GLB,, | Performed by: RADIOLOGY

## 2022-03-14 PROCEDURE — 1159F MED LIST DOCD IN RCRD: CPT | Mod: CPTII,S$GLB,, | Performed by: RADIOLOGY

## 2022-03-14 PROCEDURE — 99999 PR PBB SHADOW E&M-EST. PATIENT-LVL V: ICD-10-PCS | Mod: PBBFAC,,, | Performed by: RADIOLOGY

## 2022-03-14 PROCEDURE — 1101F PT FALLS ASSESS-DOCD LE1/YR: CPT | Mod: CPTII,S$GLB,, | Performed by: RADIOLOGY

## 2022-03-14 PROCEDURE — 3008F PR BODY MASS INDEX (BMI) DOCUMENTED: ICD-10-PCS | Mod: CPTII,S$GLB,, | Performed by: RADIOLOGY

## 2022-03-14 NOTE — PROGRESS NOTES
"[facility]  RADIATION ONCOLOGY FOLLOW UP    Name: Abdullahi Urias : 1955     DIAGNOSIS: biochemical recurrence after prostatectomy in 2019, pre-tx PSA 0.29, pT3aN0, negative margins, [place] 4+5    TREATMENT HISTORY:   1. Prostatectomy 2019  2. 68.4Gy/38fx to prostate fossa and pelvic nodes with concurrent short term ADT completed 21    INTERVAL HISTORY: Abdullahi Urias is a pleasant 66 y.o. male who presents today for follow-up.    Last seen 21.    Today, he is doing well overall. Feels more energetic and less hot flashes.   He denies diarrhea, constipation, melena, rectal pain.   He denies hematuria, dysuria, or straining.  No bladder meds, states his leakage has improved.  PSA 3/11/22 <0.01  Bleeding from external hemorrhoids using preparation H and bothering him more lately.    PHYSICAL EXAM:   Constitutional: well appearing, no acute distress, ECOG 1 - Ambulates, capable of light work  Vitals:    BP (!) 134/91   Pulse 76   Temp 97.3 °F (36.3 °C)   Resp 18   Ht 5' 9" (1.753 m)   Wt 112.9 kg (248 lb 14.4 oz)   SpO2 97%   BMI 36.76 kg/m²   Eyes: sclera anicteric, EOMI, pupils equal, round and reactive to light  ENT: oral cavity without lesions, moist mucous membranes  Cardiovascular: regular rate, no edema of the upper or lower extremities, radial pulse 2+  Respiratory: unlabored effort, clear to auscultation, no wheezes  Abdomen: soft, non-tender, no rigidity, no masses, no hepatomegaly  Spine: non-tender to percussion cervical, thoracic and lumbosacral spine  Rectal: external hemorrhoids noted at 12:00, no gross blood    Laboratory & X-Ray Findings: Per above.      PSA  3/11/22 <0.01  9/3/21 - <0.01  21 - 0.29  20 - 0.07  20 - 0.03  Osmel at 0.01 on 20    ASSESSMENT: no evidence of disease    PLAN: Mr. Urias is doing well. PSA is undetectable.  Sees urology this week.  Ref to colorectal surgery for symptomatic hemorrhoids after discussion. "     Follow up 6 months with PSA.     I spent approximately 20 minutes reviewing the available records and evaluating the patient, out of which over 50% of the time was spent face to face with the patient in counseling and coordinating this patient's care.    Dominic Garces III, M.D.  Radiation Oncology  [facility] [address] [place] [address]  Ph: [phone]  [email or web address]

## 2022-03-23 ENCOUNTER — LAB VISIT (OUTPATIENT)
Dept: PRIMARY CARE CLINIC | Facility: OTHER | Age: 67
End: 2022-03-23
Attending: INTERNAL MEDICINE
Payer: MEDICARE

## 2022-03-23 DIAGNOSIS — Z20.822 ENCOUNTER FOR LABORATORY TESTING FOR COVID-19 VIRUS: Primary | ICD-10-CM

## 2022-03-23 LAB — SARS-COV-2 RNA RESP QL NAA+PROBE: NOT DETECTED

## 2022-03-23 PROCEDURE — U0003 INFECTIOUS AGENT DETECTION BY NUCLEIC ACID (DNA OR RNA); SEVERE ACUTE RESPIRATORY SYNDROME CORONAVIRUS 2 (SARS-COV-2) (CORONAVIRUS DISEASE [COVID-19]), AMPLIFIED PROBE TECHNIQUE, MAKING USE OF HIGH THROUGHPUT TECHNOLOGIES AS DESCRIBED BY CMS-2020-01-R: HCPCS | Performed by: INTERNAL MEDICINE

## 2022-04-07 ENCOUNTER — TELEPHONE (OUTPATIENT)
Dept: SURGERY | Facility: CLINIC | Age: 67
End: 2022-04-07
Payer: MEDICARE

## 2022-04-07 ENCOUNTER — TELEPHONE (OUTPATIENT)
Dept: NEPHROLOGY | Facility: CLINIC | Age: 67
End: 2022-04-07
Payer: MEDICARE

## 2022-04-07 NOTE — TELEPHONE ENCOUNTER
Spoke to and confirmed patient's appt with Dr Berg for Monday 4/18 [facility] @ 3040 - Patient correctly repeated back all appt information      ----- Message from Talat Willson sent at 4/7/2022  8:09 AM CDT -----  Contact: [phone]  Patient is calling regarding appt. Please call back at [phone] . Thanks/dj

## 2022-04-07 NOTE — TELEPHONE ENCOUNTER
----- Message from Talat Willson sent at 4/7/2022  8:07 AM CDT -----  Contact: [phone]  Patient is calling regarding appt. Please call back at [phone]. Thanks/dj

## 2022-04-08 ENCOUNTER — OFFICE VISIT (OUTPATIENT)
Dept: INTERNAL MEDICINE | Facility: CLINIC | Age: 67
End: 2022-04-08
Payer: MEDICARE

## 2022-04-08 VITALS
HEIGHT: 69 IN | OXYGEN SATURATION: 97 % | WEIGHT: 252 LBS | HEART RATE: 84 BPM | DIASTOLIC BLOOD PRESSURE: 80 MMHG | BODY MASS INDEX: 37.33 KG/M2 | SYSTOLIC BLOOD PRESSURE: 120 MMHG

## 2022-04-08 DIAGNOSIS — J30.1 SEASONAL ALLERGIC RHINITIS DUE TO POLLEN: Primary | ICD-10-CM

## 2022-04-08 PROCEDURE — 99999 PR PBB SHADOW E&M-EST. PATIENT-LVL V: ICD-10-PCS | Mod: PBBFAC,,, | Performed by: INTERNAL MEDICINE

## 2022-04-08 PROCEDURE — 3008F BODY MASS INDEX DOCD: CPT | Mod: CPTII,S$GLB,, | Performed by: INTERNAL MEDICINE

## 2022-04-08 PROCEDURE — 96372 THER/PROPH/DIAG INJ SC/IM: CPT | Mod: S$GLB,,, | Performed by: INTERNAL MEDICINE

## 2022-04-08 PROCEDURE — 99999 PR PBB SHADOW E&M-EST. PATIENT-LVL V: CPT | Mod: PBBFAC,,, | Performed by: INTERNAL MEDICINE

## 2022-04-08 PROCEDURE — 3288F FALL RISK ASSESSMENT DOCD: CPT | Mod: CPTII,S$GLB,, | Performed by: INTERNAL MEDICINE

## 2022-04-08 PROCEDURE — 3074F PR MOST RECENT SYSTOLIC BLOOD PRESSURE < 130 MM HG: ICD-10-PCS | Mod: CPTII,S$GLB,, | Performed by: INTERNAL MEDICINE

## 2022-04-08 PROCEDURE — 3008F PR BODY MASS INDEX (BMI) DOCUMENTED: ICD-10-PCS | Mod: CPTII,S$GLB,, | Performed by: INTERNAL MEDICINE

## 2022-04-08 PROCEDURE — 3288F PR FALLS RISK ASSESSMENT DOCUMENTED: ICD-10-PCS | Mod: CPTII,S$GLB,, | Performed by: INTERNAL MEDICINE

## 2022-04-08 PROCEDURE — 1101F PR PT FALLS ASSESS DOC 0-1 FALLS W/OUT INJ PAST YR: ICD-10-PCS | Mod: CPTII,S$GLB,, | Performed by: INTERNAL MEDICINE

## 2022-04-08 PROCEDURE — 96372 PR INJECTION,THERAP/PROPH/DIAG2ST, IM OR SUBCUT: ICD-10-PCS | Mod: S$GLB,,, | Performed by: INTERNAL MEDICINE

## 2022-04-08 PROCEDURE — 99213 PR OFFICE/OUTPT VISIT, EST, LEVL III, 20-29 MIN: ICD-10-PCS | Mod: 25,S$GLB,, | Performed by: INTERNAL MEDICINE

## 2022-04-08 PROCEDURE — 3079F PR MOST RECENT DIASTOLIC BLOOD PRESSURE 80-89 MM HG: ICD-10-PCS | Mod: CPTII,S$GLB,, | Performed by: INTERNAL MEDICINE

## 2022-04-08 PROCEDURE — 1159F PR MEDICATION LIST DOCUMENTED IN MEDICAL RECORD: ICD-10-PCS | Mod: CPTII,S$GLB,, | Performed by: INTERNAL MEDICINE

## 2022-04-08 PROCEDURE — 3079F DIAST BP 80-89 MM HG: CPT | Mod: CPTII,S$GLB,, | Performed by: INTERNAL MEDICINE

## 2022-04-08 PROCEDURE — 1126F AMNT PAIN NOTED NONE PRSNT: CPT | Mod: CPTII,S$GLB,, | Performed by: INTERNAL MEDICINE

## 2022-04-08 PROCEDURE — 99213 OFFICE O/P EST LOW 20 MIN: CPT | Mod: 25,S$GLB,, | Performed by: INTERNAL MEDICINE

## 2022-04-08 PROCEDURE — 1126F PR PAIN SEVERITY QUANTIFIED, NO PAIN PRESENT: ICD-10-PCS | Mod: CPTII,S$GLB,, | Performed by: INTERNAL MEDICINE

## 2022-04-08 PROCEDURE — 1101F PT FALLS ASSESS-DOCD LE1/YR: CPT | Mod: CPTII,S$GLB,, | Performed by: INTERNAL MEDICINE

## 2022-04-08 PROCEDURE — 1159F MED LIST DOCD IN RCRD: CPT | Mod: CPTII,S$GLB,, | Performed by: INTERNAL MEDICINE

## 2022-04-08 PROCEDURE — 3074F SYST BP LT 130 MM HG: CPT | Mod: CPTII,S$GLB,, | Performed by: INTERNAL MEDICINE

## 2022-04-08 RX ORDER — METHYLPREDNISOLONE ACETATE 40 MG/ML
80 INJECTION, SUSPENSION INTRA-ARTICULAR; INTRALESIONAL; INTRAMUSCULAR; SOFT TISSUE
Status: COMPLETED | OUTPATIENT
Start: 2022-04-08 | End: 2022-04-08

## 2022-04-08 RX ADMIN — METHYLPREDNISOLONE ACETATE 80 MG: 40 INJECTION, SUSPENSION INTRA-ARTICULAR; INTRALESIONAL; INTRAMUSCULAR; SOFT TISSUE at 11:04

## 2022-04-08 NOTE — PROGRESS NOTES
Administered Methylprednisolone  80 mg / 2 ml    into  LVT    . Patient tolerated well , suggested 15 min wait

## 2022-04-08 NOTE — PROGRESS NOTES
"HPI:  Patient is 66-year-old man who comes today with complaints of sinus and head congestion for the last several weeks.  He denies any fever.  He does have thick mucus.  Denies any ear congestion pressure or pain    Current meds have been verified and updated per the EMR  Exam:/80 (BP Location: Right arm)   Pulse 84   Ht 5' 9" (1.753 m)   Wt 114.3 kg (251 lb 15.8 oz)   SpO2 97%   BMI 37.21 kg/m²   TMs are normal, neck is supple without adenopathy, nasal mucosa unremarkable, throat shows normal oropharynx  Chest clear  Lab Results   Component Value Date    WBC 3.67 (L) 05/25/2021    HGB 11.7 (L) 05/25/2021    HCT 33.7 (L) 05/25/2021     (L) 05/25/2021    CHOL 123 01/18/2022    TRIG 57 01/18/2022    HDL 39 (L) 01/18/2022    ALT 16 01/18/2022    AST 22 01/18/2022     01/18/2022    K 5.0 01/18/2022     01/18/2022    CREATININE 1.4 01/18/2022    BUN 13 01/18/2022    CO2 24 01/18/2022    TSH 3.370 06/30/2020    PSA 0.88 04/08/2014    INR 1.0 07/24/2020    GLUF 83 03/13/2007    HGBA1C 5.2 10/23/2007       Impression:  Allergic sinusitis  Patient Active Problem List   Diagnosis    Aortic stenosis    ED (erectile dysfunction)    Asthma    Hypertension    CAD (coronary artery disease)    BPH (benign prostatic hyperplasia)    Chronic pain    Cardiomyopathy    BRBPR (bright red blood per rectum)    Obesity, Class I, BMI 30-34.9    Old peripheral tear of lateral meniscus of right knee    Arthritis of right knee    Chondromalacia, right knee    Penetrating foreign body of skin of right knee    Chronic gout    CHEO on CPAP    History of CVA in adulthood    Prostate cancer    S/P AVR (aortic valve replacement) biopresthetic miller 25 mm in 2014     EKG abnormalities    Stage 3 chronic kidney disease    History of hepatitis C    Pain in both lower extremities    GERD (gastroesophageal reflux disease)    Personal history of colonic polyps    Syncope and collapse    Localized " swelling of left foot    History of prostate cancer    PAD (peripheral artery disease)    Preop cardiovascular exam       Plan:  Orders Placed This Encounter    methylPREDNISolone acetate injection 80 mg     He is given 1 cc of Depo-Medrol 80. He should continue with the Nasalide, Allegra and Mucinex DM    This note is generated with speech recognition software and is subject to transcription error and sound alike phrases that may be missed by proofreading.

## 2022-04-18 ENCOUNTER — OFFICE VISIT (OUTPATIENT)
Dept: SURGERY | Facility: CLINIC | Age: 67
End: 2022-04-18
Payer: MEDICARE

## 2022-04-18 VITALS
WEIGHT: 247.38 LBS | DIASTOLIC BLOOD PRESSURE: 69 MMHG | SYSTOLIC BLOOD PRESSURE: 112 MMHG | HEART RATE: 87 BPM | BODY MASS INDEX: 36.53 KG/M2 | TEMPERATURE: 99 F

## 2022-04-18 DIAGNOSIS — K64.8 INTERNAL HEMORRHOID: ICD-10-CM

## 2022-04-18 DIAGNOSIS — K92.1 HEMATOCHEZIA: Primary | ICD-10-CM

## 2022-04-18 DIAGNOSIS — C61 PROSTATE CANCER: ICD-10-CM

## 2022-04-18 PROCEDURE — 1126F PR PAIN SEVERITY QUANTIFIED, NO PAIN PRESENT: ICD-10-PCS | Mod: CPTII,S$GLB,, | Performed by: COLON & RECTAL SURGERY

## 2022-04-18 PROCEDURE — 3074F SYST BP LT 130 MM HG: CPT | Mod: CPTII,S$GLB,, | Performed by: COLON & RECTAL SURGERY

## 2022-04-18 PROCEDURE — 3008F PR BODY MASS INDEX (BMI) DOCUMENTED: ICD-10-PCS | Mod: CPTII,S$GLB,, | Performed by: COLON & RECTAL SURGERY

## 2022-04-18 PROCEDURE — 3078F PR MOST RECENT DIASTOLIC BLOOD PRESSURE < 80 MM HG: ICD-10-PCS | Mod: CPTII,S$GLB,, | Performed by: COLON & RECTAL SURGERY

## 2022-04-18 PROCEDURE — 1126F AMNT PAIN NOTED NONE PRSNT: CPT | Mod: CPTII,S$GLB,, | Performed by: COLON & RECTAL SURGERY

## 2022-04-18 PROCEDURE — 99204 OFFICE O/P NEW MOD 45 MIN: CPT | Mod: 25,S$GLB,, | Performed by: COLON & RECTAL SURGERY

## 2022-04-18 PROCEDURE — 3078F DIAST BP <80 MM HG: CPT | Mod: CPTII,S$GLB,, | Performed by: COLON & RECTAL SURGERY

## 2022-04-18 PROCEDURE — 99204 PR OFFICE/OUTPT VISIT, NEW, LEVL IV, 45-59 MIN: ICD-10-PCS | Mod: 25,S$GLB,, | Performed by: COLON & RECTAL SURGERY

## 2022-04-18 PROCEDURE — 3008F BODY MASS INDEX DOCD: CPT | Mod: CPTII,S$GLB,, | Performed by: COLON & RECTAL SURGERY

## 2022-04-18 PROCEDURE — 46600 DIAGNOSTIC ANOSCOPY SPX: CPT | Mod: S$GLB,,, | Performed by: COLON & RECTAL SURGERY

## 2022-04-18 PROCEDURE — 99999 PR PBB SHADOW E&M-EST. PATIENT-LVL III: CPT | Mod: PBBFAC,,, | Performed by: COLON & RECTAL SURGERY

## 2022-04-18 PROCEDURE — 3074F PR MOST RECENT SYSTOLIC BLOOD PRESSURE < 130 MM HG: ICD-10-PCS | Mod: CPTII,S$GLB,, | Performed by: COLON & RECTAL SURGERY

## 2022-04-18 PROCEDURE — 46600 PR DIAG2STIC A2SCOPY: ICD-10-PCS | Mod: S$GLB,,, | Performed by: COLON & RECTAL SURGERY

## 2022-04-18 PROCEDURE — 99999 PR PBB SHADOW E&M-EST. PATIENT-LVL III: ICD-10-PCS | Mod: PBBFAC,,, | Performed by: COLON & RECTAL SURGERY

## 2022-04-19 NOTE — PROGRESS NOTES
History & Physical    SUBJECTIVE:     Chief Complaint   Patient presents with    Hemorrhoids   Ref: Dominic Garces MD    History of Present Illness:  Patient is a 66 y.o. male presents for evaluation hemorrhoidal disease.  Patient has had multi month history swelling more so than bleeding with bowel movements.  He states he sees bleeding both in the toilet and when wiping after bowel movements.  He has known previous history of colonic polyps last underwent colonoscopy in February 2021.  He is on aspirin and Plavix for anticoagulation.  He denies any family history of colorectal cancer.  He denies a significant pain with bowel movements.  He states he has 2-3 bowel movements per day, the stool is not hard, he does not strain, spends 30 minutes on the toilet per bowel movement, drinks greater than 64 oz of water per day, does not take fiber supplements, does not take stool softeners, and uses both toilet paper and wet wipes after bowel movements.  He denies any fever, chills, nausea, vomiting.      Review of patient's allergies indicates:   Allergen Reactions    Pletal [cilostazol]      Rash and leg pain       Current Outpatient Medications   Medication Sig Dispense Refill    albuterol (PROVENTIL/VENTOLIN HFA) 90 mcg/actuation inhaler Inhale 2 puffs into the lungs every 6 (six) hours as needed for Wheezing. 1 g 2    allopurinoL (ZYLOPRIM) 100 MG tablet Take 1 tablet (100 mg total) by mouth once daily. 30 tablet 11    aluminum & magnesium hydroxide-simethicone (MYLANTA MAX STRENGTH) 400-400-40 mg/5 mL suspension TAKE 15ML BY MOUTH FOUR TIMES A DAY AS NEEDED FOR STOMACH ACID      amitriptyline (ELAVIL) 10 MG tablet TAKE 1 TABLET BY MOUTH AT BEDTIME AS NEEDED FOR PAIN 30 tablet 11    aspirin (ECOTRIN) 81 MG EC tablet TAKE ONE TABLET BY MOUTH EVERY DAY TO PREVENT BLOOD CLOT      atorvastatin (LIPITOR) 80 MG tablet TAKE ONE-HALF TABLET BY MOUTH EVERY DAY FOR CHOLESTEROL      baclofen (LIORESAL) 10 MG tablet Take 1  tablet (10 mg total) by mouth 3 (three) times daily. 90 tablet 11    carvediloL (COREG) 12.5 MG tablet Take 1 tablet (12.5 mg total) by mouth 2 (two) times daily. 60 tablet 11    clopidogreL (PLAVIX) 75 mg tablet Take 1 tablet (75 mg total) by mouth once daily. 30 tablet 11    cyclobenzaprine (FLEXERIL) 10 MG tablet TAKE ONE TABLET BY MOUTH THREE TIMES A DAY AS A MUSCLE RELAXANT      diclofenac sodium (VOLTAREN) 1 % Gel APPLY 2 GRAMS TOPICALLY FOUR TIMES A DAY AS NEEDED FOR PAIN AND INFLAMMATION. USE ENCLOSED DOSING CARD.      diphenhydrAMINE (SOMINEX) 25 mg tablet Take 25 mg by mouth nightly as needed for Insomnia.      famotidine (PEPCID) 40 MG tablet TAKE ONE TABLET BY MOUTH AT BEDTIME FOR ACID REFLUX      fexofenadine (ALLEGRA) 180 MG tablet Take 1 tablet (180 mg total) by mouth daily as needed. 30 tablet 5    flunisolide 25 mcg, 0.025%, (NASALIDE) 25 mcg (0.025 %) Spry 2 sprays by Nasal route as needed.       gabapentin (NEURONTIN) 600 MG tablet Take 1 tablet (600 mg total) by mouth 3 (three) times daily. 90 tablet 11    hydrocortisone 2.5 % cream Apply topically 2 (two) times daily as needed. Apply to affected areas of the face and neck. 30 g 2    hydrocortisone-pramoxine (PROCTOFOAM-HS) rectal foam INSERT 1 APPLICATORFUL INTO RECTALLY TWICE A DAY FOR HEMORRHOIDS      hydrOXYzine HCL (ATARAX) 25 MG tablet Take 25 mg by mouth 3 (three) times daily as needed for Itching.      LIDOcaine (LIDODERM) 5 % APPLY 1 PATCH TOPICALLY EVERY DAY FOR PAIN. WEAR FOR 12 HOURS, THEN REMOVE. DO NOT APPLY NEW PATCH FOR AT LEAST 12 HOURS.      losartan (COZAAR) 50 MG tablet Take 1 tablet (50 mg total) by mouth once daily. 30 tablet 11    methyl salicylate-menthol 15-10% 15-10 % Crea Apply topically as needed.       oxyCODONE-acetaminophen (PERCOCET)  mg per tablet Take 1 tablet by mouth every 4 (four) hours as needed for Pain. 9 tablet 0    pantoprazole (PROTONIX) 40 MG tablet Take 40 mg by mouth 2 (two)  times daily.       sertraline (ZOLOFT) 100 MG tablet TAKE TWO TABLETS BY MOUTH EVERY DAY FOR MENTAL HEALTH DOSE INCREASED TO 200MG/DAY      simethicone (MYLICON) 80 MG chewable tablet Take 80 mg by mouth every 6 (six) hours as needed for Flatulence.      sucralfate (CARAFATE) 100 mg/mL suspension Take 1 g by mouth 4 (four) times daily.       triamcinolone acetonide 0.1% (KENALOG) 0.1 % cream Apply topically 2 (two) times daily. Use for up to 2 weeks as needed for itching. Repeat if flaring. 454 g 1    levocetirizine (XYZAL) 5 MG tablet Take 1 tablet (5 mg total) by mouth every evening. 30 tablet 0     No current facility-administered medications for this visit.     Facility-Administered Medications Ordered in Other Visits   Medication Dose Route Frequency Provider Last Rate Last Admin    sodium chloride 0.9% flush 10 mL  10 mL Intravenous PRN Wilda Horne MD           Past Medical History:   Diagnosis Date    [facility]    Asthma     BPH (benign prostatic hyperplasia)     CAD (coronary artery disease)     Cardiomyopathy     CHF (congestive heart failure)     Chronic hoarseness     vocal cord surg    Chronic pain     CKD (chronic kidney disease) stage 3, GFR 30-59 ml/min     CVA (cerebral infarction)     8/2012 olol; reviewed ed note    Ex-smoker     GERD (gastroesophageal reflux disease)     Hepatitis C     treatedharvoni says cured, RNA NEG 6/2020    Hypertension     Pancreatitis     Prostate cancer     Prostate cancer     Prostate cancer     PVD (peripheral vascular disease)     Renal insufficiency     Substance abuse     cocaine, etoh , tob in past     Past Surgical History:   Procedure Laterality Date    AORTIC VALVE REPLACEMENT  05/19/2014    Tissue valve replacement    BACK SURGERY      CARDIAC CATHETERIZATION      COLONOSCOPY  2011    COLONOSCOPY N/A 2/24/2021    Procedure: COLONOSCOPY;  Surgeon: Faith Carrillo MD;  Location: [facility];   Service: Endoscopy;  Laterality: N/A;    ESOPHAGOGASTRODUODENOSCOPY N/A 2021    Procedure: ESOPHAGOGASTRODUODENOSCOPY (EGD);  Surgeon: Faith Carrillo MD;  Location: [facility] ENDO;  Service: Endoscopy;  Laterality: N/A;    FOOT SURGERY      LEFT HEART CATHETERIZATION Left 2020    Procedure: CATHETERIZATION, HEART, LEFT;  Surgeon: Pasha Martin MD;  Location: [facility] CATH LAB;  Service: Cardiology;  Laterality: Left;    PENILE PROSTHESIS IMPLANT      PENILE PROSTHESIS REVISION  2018    [facility]    radiation for prostate      UPPER GASTROINTESTINAL ENDOSCOPY       Family History   Problem Relation Age of Onset    Heart disease Mother     Heart attack Sister     Heart attack Brother     Colon cancer Neg Hx     Colon polyps Neg Hx     Liver cancer Neg Hx     Inflammatory bowel disease Neg Hx     Liver disease Neg Hx     Rectal cancer Neg Hx     Stomach cancer Neg Hx     Ulcerative colitis Neg Hx      Social History     Tobacco Use    Smoking status: Former Smoker     Packs/day: 2.00     Years: 8.00     Pack years: 16.00     Quit date: 2012     Years since quittin.6    Smokeless tobacco: Never Used   Substance Use Topics    Alcohol use: No     Comment: Sober since 2012    Drug use: No        Review of Systems:  Review of Systems   Constitutional: Negative for activity change, appetite change, chills, fatigue, fever and unexpected weight change.   HENT: Negative for congestion, ear pain, sore throat and trouble swallowing.    Eyes: Negative for pain, redness and itching.   Respiratory: Negative for cough, shortness of breath and wheezing.    Cardiovascular: Negative for chest pain, palpitations and leg swelling.   Gastrointestinal: Positive for anal bleeding and blood in stool. Negative for abdominal distention, abdominal pain, constipation, diarrhea, nausea and vomiting.   Endocrine: Negative for cold intolerance, heat intolerance  and polyuria.   Genitourinary: Negative for dysuria, flank pain, frequency and hematuria.   Musculoskeletal: Negative for gait problem, joint swelling and neck pain.   Skin: Negative for color change, rash and wound.   Allergic/Immunologic: Negative for environmental allergies and immunocompromised state.   Neurological: Negative for dizziness, speech difficulty, weakness and numbness.   Psychiatric/Behavioral: Negative for agitation, confusion and hallucinations.       OBJECTIVE:     Vital Signs (Most Recent)  Temp: 98.6 °F (37 °C) (04/18/22 1515)  Pulse: 87 (04/18/22 1515)  BP: 112/69 (04/18/22 1515)     112.2 kg (247 lb 5.7 oz)     Physical Exam:  Physical Exam  Exam conducted with a chaperone present.   Constitutional:       Appearance: He is well-developed.   HENT:      Head: Normocephalic and atraumatic.   Eyes:      Conjunctiva/sclera: Conjunctivae normal.   Neck:      Thyroid: No thyromegaly.   Cardiovascular:      Rate and Rhythm: Normal rate and regular rhythm.   Pulmonary:      Effort: Pulmonary effort is normal. No respiratory distress.   Abdominal:      General: There is no distension.      Palpations: Abdomen is soft. There is no mass.      Tenderness: There is no abdominal tenderness.   Genitourinary:     Comments: Anorectal: no external lesions; WILLIE with good tone, no blood, no palpable masses or defects  Musculoskeletal:         General: No tenderness. Normal range of motion.      Cervical back: Normal range of motion.   Skin:     General: Skin is warm and dry.      Capillary Refill: Capillary refill takes less than 2 seconds.      Findings: No rash.   Neurological:      Mental Status: He is alert and oriented to person, place, and time.       Anoscopy Procedure Note    Pre-procedure diagnosis: Rectal bleeding    Post-procedure diagnosis: Internal hemorrhoids    Procedure: Anoscopy    Surgeon: Tyrell Berg MD    Assistant: Paulette Olsen LPN    Specimen: none    Findings:  Anoscope inserted and all  4 quadrants examined.  Mildly enlarged left lateral, right posterior right anterior internal hemorrhoidal columns without evidence of recent bleeding or prolapse.  No other large lesions or defects appreciated.    Patient tolerated procedure well.     Laboratory  Lab Results   Component Value Date    WBC 3.67 (L) 05/25/2021    HGB 11.7 (L) 05/25/2021    HCT 33.7 (L) 05/25/2021     (L) 05/25/2021    CHOL 123 01/18/2022    TRIG 57 01/18/2022    HDL 39 (L) 01/18/2022    ALT 16 01/18/2022    AST 22 01/18/2022     01/18/2022    K 5.0 01/18/2022     01/18/2022    CREATININE 1.4 01/18/2022    BUN 13 01/18/2022    CO2 24 01/18/2022    TSH 3.370 06/30/2020    PSA 0.88 04/08/2014    INR 1.0 07/24/2020    GLUF 83 03/13/2007    HGBA1C 5.2 10/23/2007     Diagnostic Results:  Colonoscopy: reviewed    ASSESSMENT/PLAN:     67yo M with hematochezia and internal hemorrhoids    - Long discussed with the patient regarding his hemorrhoidal disease, causes and treatment options  - Discussed that a minimum I would recommend behavioral, lifestyle and medication modifications to improve bowel habits. Usual bowel management handout given to patient. This includes a stool softener twice per day, fiber powder supplementation daily, drinking at least 64oz of water/day, avoiding straining with bowel movements, spending less than 5 min on toilet per bowel movement, eating a high fiber diet, using miralax as needed to achieve a bowel movement daily and using wet wipes to wipe after bowel movements when irritated.   - discussed further interventions such as hemorrhoid banding or surgical excision.  We discussed the risks, benefits and alternatives to each approach, including holding his anticoagulation with periprocedure.  During these time frames.  After discussing this, he would like to hold off on any further interventions at this time which I think is reasonable  - did not see any evidence of radiation proctitis causing the  bleeding at this time  - RTC PRN    Tyrell Berg MD  Colon and Rectal Surgery  [facility]

## 2022-04-20 ENCOUNTER — OFFICE VISIT (OUTPATIENT)
Dept: PODIATRY | Facility: CLINIC | Age: 67
End: 2022-04-20
Payer: MEDICARE

## 2022-04-20 ENCOUNTER — PATIENT OUTREACH (OUTPATIENT)
Dept: ADMINISTRATIVE | Facility: OTHER | Age: 67
End: 2022-04-20
Payer: MEDICARE

## 2022-04-20 VITALS — HEIGHT: 69 IN | WEIGHT: 247.38 LBS | BODY MASS INDEX: 36.64 KG/M2

## 2022-04-20 DIAGNOSIS — Z79.01 CURRENT USE OF LONG TERM ANTICOAGULATION: ICD-10-CM

## 2022-04-20 DIAGNOSIS — L60.3 ONYCHODYSTROPHY: ICD-10-CM

## 2022-04-20 DIAGNOSIS — G60.9 IDIOPATHIC PERIPHERAL NEUROPATHY: Primary | ICD-10-CM

## 2022-04-20 DIAGNOSIS — Z86.73 HISTORY OF CVA IN ADULTHOOD: ICD-10-CM

## 2022-04-20 PROCEDURE — 99499 UNLISTED E&M SERVICE: CPT | Mod: S$GLB,,, | Performed by: PODIATRIST

## 2022-04-20 PROCEDURE — 99212 OFFICE O/P EST SF 10 MIN: CPT | Mod: PBBFAC | Performed by: PODIATRIST

## 2022-04-20 PROCEDURE — 11721 PR DEBRIDEMENT OF NAILS, 6 OR MORE: ICD-10-PCS | Mod: Q9,S$GLB,ICN, | Performed by: PODIATRIST

## 2022-04-20 PROCEDURE — 11721 DEBRIDE NAIL 6 OR MORE: CPT | Mod: Q9,PBBFAC | Performed by: PODIATRIST

## 2022-04-20 PROCEDURE — 99999 PR PBB SHADOW E&M-EST. PATIENT-LVL II: ICD-10-PCS | Mod: PBBFAC,,, | Performed by: PODIATRIST

## 2022-04-20 PROCEDURE — 99999 PR PBB SHADOW E&M-EST. PATIENT-LVL II: CPT | Mod: PBBFAC,,, | Performed by: PODIATRIST

## 2022-04-20 PROCEDURE — 99499 NO LOS: ICD-10-PCS | Mod: S$GLB,,, | Performed by: PODIATRIST

## 2022-04-20 PROCEDURE — 11721 DEBRIDE NAIL 6 OR MORE: CPT | Mod: Q9,S$GLB,ICN, | Performed by: PODIATRIST

## 2022-04-20 NOTE — PROGRESS NOTES
Subjective:       Patient ID: Abdullahi Urias is a 66 y.o. male.    Chief Complaint: Ingrown Toenail (C/o ingrown toenail on left great toe, rates pain 8/10, nondiabetic, wearing socks and slippers, last seen PCP Dr. Valentin on  01/27/2022.)      HPI: Patient presents to the office today with the chief complaint of elongated, thickened and dystrophic nail plates to the B/L foot. This patient does have a PMHx. of Peripheral Neuropathy. Patient does follow with Primary Care for management of comorbid states. This patient's PMD is Reji Valentin MD. This patient last saw his/her primary care provider on 01/27/2022.  Patient continues on long-term anticoagulation therapy as well.  States 8/10 pain associated with possible ingrown toenails.    Review of patient's allergies indicates:   Allergen Reactions    Pletal [cilostazol]      Rash and leg pain       Past Medical History:   Diagnosis Date    [facility]    Asthma     BPH (benign prostatic hyperplasia)     CAD (coronary artery disease)     Cardiomyopathy     CHF (congestive heart failure)     Chronic hoarseness     vocal cord surg    Chronic pain     CKD (chronic kidney disease) stage 3, GFR 30-59 ml/min     CVA (cerebral infarction)     8/2012 olol; reviewed ed note    Ex-smoker     GERD (gastroesophageal reflux disease)     Hepatitis C     treatedharvoni says cured, RNA NEG 6/2020    Hypertension     Pancreatitis     Prostate cancer     Prostate cancer     Prostate cancer     PVD (peripheral vascular disease)     Renal insufficiency     Substance abuse     cocaine, etoh , tob in past       Family History   Problem Relation Age of Onset    Heart disease Mother     Heart attack Sister     Heart attack Brother     Colon cancer Neg Hx     Colon polyps Neg Hx     Liver cancer Neg Hx     Inflammatory bowel disease Neg Hx     Liver disease Neg Hx     Rectal cancer Neg Hx     Stomach cancer Neg Hx      "Ulcerative colitis Neg Hx        Social History     Socioeconomic History    Marital status:    Tobacco Use    Smoking status: Former Smoker     Packs/day: 2.00     Years: 8.00     Pack years: 16.00     Quit date: 2012     Years since quittin.6    Smokeless tobacco: Never Used   Substance and Sexual Activity    Alcohol use: No     Comment: Sober since 2012    Drug use: No    Sexual activity: Yes   Social History Narrative    No pets in household, wife and daughter smokers. [employer].    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; as of  retired       Past Surgical History:   Procedure Laterality Date    AORTIC VALVE REPLACEMENT  2014    Tissue valve replacement    BACK SURGERY      CARDIAC CATHETERIZATION      COLONOSCOPY      COLONOSCOPY N/A 2021    Procedure: COLONOSCOPY;  Surgeon: Faith Carrillo MD;  Location: [facility];  Service: Endoscopy;  Laterality: N/A;    ESOPHAGOGASTRODUODENOSCOPY N/A 2021    Procedure: ESOPHAGOGASTRODUODENOSCOPY (EGD);  Surgeon: Faith Carrillo MD;  Location: [facility];  Service: Endoscopy;  Laterality: N/A;    FOOT SURGERY      LEFT HEART CATHETERIZATION Left 2020    Procedure: CATHETERIZATION, HEART, LEFT;  Surgeon: Pasha Martin MD;  Location[facility] CATH LAB;  Service: Cardiology;  Laterality: Left;    PENILE PROSTHESIS IMPLANT      PENILE PROSTHESIS REVISION  2018    [facility]    radiation for prostate      UPPER GASTROINTESTINAL ENDOSCOPY         Review of Systems       Objective:   Ht 5' 9" (1.753 m)   Wt 112.2 kg (247 lb 5.7 oz)   BMI 36.53 kg/m²     Physical Exam  LOWER EXTREMITY PHYSICAL EXAMINATION    VASCULAR:  The right dorsalis pedis pulse 2/4 and the right posterior tibial pulse 2/4.  The left dorsalis pedis pulse 2/4 and posterior tibial pulse on the left is 2/4.  Capillary refill is intact.  Pedal hair growth intact    NEUROLOGY: Protective " sensation is not intact to the left and right plantar surfaces of the foot and digits, as the patient has no sensation/detection at greater than 4 distinct points of contact with 5.07 [place] monofilament. Sensation to light touch is intact on the left and right foot. Proprioception is intact, bilateral. Sensation to pin prick is reduced to absent. Vibratory sensation is diminished.    DERMATOLOGY:  Skin is supple, moist, intact.  There is no signs of callusing, ulcerations, other lesions identified to the dorsal or plantar aspect of the right or left foot.  The R1, 2, 5 and left L1,2, 5 are thickened, discolored dystrophic.  There is subungual debris.  Nail plates have area of dark discoloration.  The remaining nails 3-4 on the right foot and the left foot are elongated but of normal color, thickness, and texture.   Slight ingrowing to the medial borders bilaterally.  There is no evidence of wounds or skin breakdown.  No edema or erythema.  No obvious lacerations or fissuring.  Interdigital spaces are clean, dry, intact.  No rashes or scars appreciated.    ORTHOPEDIC: Manual Muscle Testing is 5/5 in all planes on the left and right, without pains, with and without resistance. Gait pattern is non-antalgic.    Assessment:     1. Idiopathic peripheral neuropathy    2. Current use of long term anticoagulation    3. History of CVA in adulthood    4. Onychodystrophy        Plan:     Idiopathic peripheral neuropathy    Current use of long term anticoagulation    History of CVA in adulthood    Onychodystrophy        Dystrophic nail plates, as outlined above (R#1,2,5  ; L#1,2,5 ), are sharply debrided with double action nail nipper, and/or with the assistance of a mechanical rotary jami, with removal of all offending nail and nail border(s), for reduction of pains. Nails are reduced in terms of length, width and girth with removal of subungual debris to facilitate pain free weight bearing and ambulation. The  elongated nails as outlined in the objective portion of this note, were trimmed to appropriate length, with a double action nail nipper, for alleviation/reduction of pains as well. Follow up in approx. 3-4 months.    Foot counseling and education is provided at this visit. Patient is advised to wear socks and shoes at all times.  Do not walk barefoot, or with just socks, even when indoors.  Be sure to check and inspect the inside of the shoe before putting them on her feet.  Protect your feet at all times.  Walking shoes and/or athletic shoes are the best types of shoe gear. Do not wear vinyl or plastic type shoe gear, as they do not stretch and/or breathe.  Protect your feet from hot and/or cold. Elevate the extremities when sitting.  Do not wear excessively tight socks and/or shoe gear. Wiggle your toes for a few minutes throughout the day. Move your ankles up and down, in and out, to help blood flow in your lower extremity.         Future Appointments   Date Time Provider Department Center   4/28/2022  3:30 PM Rommel Rodriguez MD ON UROLOGY  [facility]   5/30/2022 10:15 AM Osiris Lilly DPM ON POD  [facility]   6/2/2022 10:50 AM TIKA FRANCOIS CC LAB [facility] LAB [facility]   6/9/2022  8:30 AM Brijesh Delatorre MD ON NEPHRO  [facility]   7/19/2022  9:00 AM Jeffery Mckeon MD [facility] CARDIO [facility]   9/13/2022 10:00 AM TIKA FRANCOIS CC LAB [facility] LAB DS [facility]   9/20/2022  1:00 PM Dominic Garces III, MD [facility] RAD ONC [facility]   1/25/2023  7:50 AM LABORATORY, HGV [facility] LAB [facility]   1/30/2023  1:40 PM Reji Valentin MD [facility] [place]

## 2022-04-20 NOTE — PROGRESS NOTES
Health Maintenance Due   Topic Date Due    Shingles Vaccine (1 of 2) 04/01/2016     Updates were requested from care everywhere.  Chart was reviewed for overdue Proactive [facility] ([facility]) topics (CRS, Breast Cancer Screening, Eye exam)  Health Maintenance has been updated.  LINKS immunization registry triggered.  Immunizations were reconciled.

## 2022-04-28 ENCOUNTER — OFFICE VISIT (OUTPATIENT)
Dept: UROLOGY | Facility: CLINIC | Age: 67
End: 2022-04-28
Payer: MEDICARE

## 2022-04-28 VITALS
WEIGHT: 248.38 LBS | DIASTOLIC BLOOD PRESSURE: 79 MMHG | HEART RATE: 86 BPM | SYSTOLIC BLOOD PRESSURE: 130 MMHG | BODY MASS INDEX: 36.67 KG/M2

## 2022-04-28 DIAGNOSIS — C61 PROSTATE CANCER: Primary | ICD-10-CM

## 2022-04-28 PROCEDURE — 3078F DIAST BP <80 MM HG: CPT | Mod: CPTII,S$GLB,, | Performed by: UROLOGY

## 2022-04-28 PROCEDURE — 3008F PR BODY MASS INDEX (BMI) DOCUMENTED: ICD-10-PCS | Mod: CPTII,S$GLB,, | Performed by: UROLOGY

## 2022-04-28 PROCEDURE — 99213 PR OFFICE/OUTPT VISIT, EST, LEVL III, 20-29 MIN: ICD-10-PCS | Mod: S$GLB,,, | Performed by: UROLOGY

## 2022-04-28 PROCEDURE — 1159F PR MEDICATION LIST DOCUMENTED IN MEDICAL RECORD: ICD-10-PCS | Mod: CPTII,S$GLB,, | Performed by: UROLOGY

## 2022-04-28 PROCEDURE — 3078F PR MOST RECENT DIASTOLIC BLOOD PRESSURE < 80 MM HG: ICD-10-PCS | Mod: CPTII,S$GLB,, | Performed by: UROLOGY

## 2022-04-28 PROCEDURE — 99999 PR PBB SHADOW E&M-EST. PATIENT-LVL III: ICD-10-PCS | Mod: PBBFAC,,, | Performed by: UROLOGY

## 2022-04-28 PROCEDURE — 99999 PR PBB SHADOW E&M-EST. PATIENT-LVL III: CPT | Mod: PBBFAC,,, | Performed by: UROLOGY

## 2022-04-28 PROCEDURE — 99213 OFFICE O/P EST LOW 20 MIN: CPT | Mod: S$GLB,,, | Performed by: UROLOGY

## 2022-04-28 PROCEDURE — 3075F SYST BP GE 130 - 139MM HG: CPT | Mod: CPTII,S$GLB,, | Performed by: UROLOGY

## 2022-04-28 PROCEDURE — 3075F PR MOST RECENT SYSTOLIC BLOOD PRESS GE 130-139MM HG: ICD-10-PCS | Mod: CPTII,S$GLB,, | Performed by: UROLOGY

## 2022-04-28 PROCEDURE — 3008F BODY MASS INDEX DOCD: CPT | Mod: CPTII,S$GLB,, | Performed by: UROLOGY

## 2022-04-28 PROCEDURE — 1159F MED LIST DOCD IN RCRD: CPT | Mod: CPTII,S$GLB,, | Performed by: UROLOGY

## 2022-04-28 PROCEDURE — 1160F PR REVIEW ALL MEDS BY PRESCRIBER/CLIN PHARMACIST DOCUMENTED: ICD-10-PCS | Mod: CPTII,S$GLB,, | Performed by: UROLOGY

## 2022-04-28 PROCEDURE — 1160F RVW MEDS BY RX/DR IN RCRD: CPT | Mod: CPTII,S$GLB,, | Performed by: UROLOGY

## 2022-04-30 NOTE — PROGRESS NOTES
Chief Complaint:  History of prostate cancer    HPI:   04/28/2022 - returns today for follow-up, PSA remains undetectable, energy good, voiding well, denies GH or leakage    12/17/2021 - patient returns today for follow-up, PSA remains undetectable, his energy level has improved and his hot flashes have diminished as his ADT has worn off, voiding well, denies any gross hematuria    09/17/2021 - patient returns for follow-up, notes decreased urgency, is voiding well with a good stream and denies gross hematuria, nocturia x2 but is not bothersome, PSA undetectable, has some hot flashes which are bothersome but he can tolerate them    06/11/2021 - patient presents for follow-up, has completed radiation, did fine during radiation but notes fatigue possibly from radiation versus the Lupron, denies any gross hematuria or voiding difficulty, not having any leakage, denies UTIs  03/05/2021 - patient presents for follow-up, [facility] GG 4+5= 9 prostate cancer with negative margins, patient's PSA never went to undetectable, Dr Garces thinks Axumin  PET followed by salvage radiation with ADT x 6 months is the best course of action  01/21/2021 - patient presents for follow-up, we were unable to obtain his outside records due to a clerical error, however we have requested these and they should be faxed soon, he he continues to note dysuria and notes that his urine is very dark and concentrated, he denies any foul-smelling urine or gross hematuria, his follow-up PSA was 0.11, denies weak stream or incomplete emptying  12/10/2020 - 66 y.o. male that presents for history of prostate cancer, [facility] [place] [facility], patient believes he was told that he had aggressive prostate cancer, he believes thathis initial postop PSA was undetectable however he has had subsequent biochemical recurrence, then had a postprostatectomy MRI  in mid November which was concerning for prostate cancer recurrence and was told that he would need radiation.  Thus the patient has decided to seek a 2nd opinion.  Patient notes that he initially had incontinence after his surgery, but this has resolved.  He currently wears no pads.  He had erectile dysfunction prior to his radical prostatectomy and had an IPP placed in 2018.  This continues to work well for him.  Patient does note some sciatic nerve pain on the back of his right leg for the past 6 months.  He notes chronic back pain since 2001 for which he gets epidural injections which works well for him.  Other significant medical history includes a stroke in 2012 and in open heart surgery in 2014.  He denies any gross hematuria but does confirm some dysuria. He denies a history of kidney stones.  He also denies a family history of prostate cancer.      PMH:  Past Medical History:   Diagnosis Date    [facility]    Asthma     BPH (benign prostatic hyperplasia)     CAD (coronary artery disease)     Cardiomyopathy     CHF (congestive heart failure)     Chronic hoarseness     vocal cord surg    Chronic pain     CKD (chronic kidney disease) stage 3, GFR 30-59 ml/min     CVA (cerebral infarction)     8/2012 olol; reviewed ed note    Ex-smoker     GERD (gastroesophageal reflux disease)     Hepatitis C     treatedharvoni says cured, RNA NEG 6/2020    Hypertension     Pancreatitis     Prostate cancer     Prostate cancer     Prostate cancer     PVD (peripheral vascular disease)     Renal insufficiency     Substance abuse     cocaine, etoh , tob in past       PSH:  Past Surgical History:   Procedure Laterality Date    AORTIC VALVE REPLACEMENT  05/19/2014    Tissue valve replacement    BACK SURGERY      CARDIAC CATHETERIZATION      COLONOSCOPY  2011    COLONOSCOPY N/A 2/24/2021    Procedure: COLONOSCOPY;  Surgeon: Faith Carrillo MD;  [facility];  Service:  Endoscopy;  Laterality: N/A;    ESOPHAGOGASTRODUODENOSCOPY N/A 2021    Procedure: ESOPHAGOGASTRODUODENOSCOPY (EGD);  Surgeon: Faith Carrillo MD;  Location: [facility] ENDO;  Service: Endoscopy;  Laterality: N/A;    FOOT SURGERY      LEFT HEART CATHETERIZATION Left 2020    Procedure: CATHETERIZATION, HEART, LEFT;  Surgeon: Pasha Martin MD;  Location: [facility] CATH LAB;  Service: Cardiology;  Laterality: Left;    PENILE PROSTHESIS IMPLANT      PENILE PROSTHESIS REVISION  2018    [facility]    radiation for prostate      UPPER GASTROINTESTINAL ENDOSCOPY         Family History:  Family History   Problem Relation Age of Onset    Heart disease Mother     Heart attack Sister     Heart attack Brother     Colon cancer Neg Hx     Colon polyps Neg Hx     Liver cancer Neg Hx     Inflammatory bowel disease Neg Hx     Liver disease Neg Hx     Rectal cancer Neg Hx     Stomach cancer Neg Hx     Ulcerative colitis Neg Hx        Social History:  Social History     Tobacco Use    Smoking status: Former Smoker     Packs/day: 2.00     Years: 8.00     Pack years: 16.00     Quit date: 2012     Years since quittin.6    Smokeless tobacco: Never Used   Substance Use Topics    Alcohol use: No     Comment: Sober since 2012    Drug use: No        Review of Systems:  General: No fever, chills  Skin: No rashes  Chest:  Denies cough and sputum production  Heart: Denies chest pain  Resp: Denies dyspnea  Abdomen: Denies diarrhea, abdominal pain, hematemesis, or blood in stool.  Musculoskeletal: No joint stiffness or swelling. Denies back pain.  : see HPI  Neuro: no dizziness or weakness    Allergies:  Pletal [cilostazol]    Medications:    Current Outpatient Medications:     albuterol (PROVENTIL/VENTOLIN HFA) 90 mcg/actuation inhaler, Inhale 2 puffs into the lungs every 6 (six) hours as needed for Wheezing., Disp: 1 g, Rfl: 2    allopurinoL (ZYLOPRIM) 100 MG  tablet, Take 1 tablet (100 mg total) by mouth once daily., Disp: 30 tablet, Rfl: 11    aluminum & magnesium hydroxide-simethicone (MYLANTA MAX STRENGTH) 400-400-40 mg/5 mL suspension, TAKE 15ML BY MOUTH FOUR TIMES A DAY AS NEEDED FOR STOMACH ACID, Disp: , Rfl:     amitriptyline (ELAVIL) 10 MG tablet, TAKE 1 TABLET BY MOUTH AT BEDTIME AS NEEDED FOR PAIN, Disp: 30 tablet, Rfl: 11    aspirin (ECOTRIN) 81 MG EC tablet, TAKE ONE TABLET BY MOUTH EVERY DAY TO PREVENT BLOOD CLOT, Disp: , Rfl:     atorvastatin (LIPITOR) 80 MG tablet, TAKE ONE-HALF TABLET BY MOUTH EVERY DAY FOR CHOLESTEROL, Disp: , Rfl:     baclofen (LIORESAL) 10 MG tablet, Take 1 tablet (10 mg total) by mouth 3 (three) times daily., Disp: 90 tablet, Rfl: 11    carvediloL (COREG) 12.5 MG tablet, Take 1 tablet (12.5 mg total) by mouth 2 (two) times daily., Disp: 60 tablet, Rfl: 11    clopidogreL (PLAVIX) 75 mg tablet, Take 1 tablet (75 mg total) by mouth once daily., Disp: 30 tablet, Rfl: 11    cyclobenzaprine (FLEXERIL) 10 MG tablet, TAKE ONE TABLET BY MOUTH THREE TIMES A DAY AS A MUSCLE RELAXANT, Disp: , Rfl:     diclofenac sodium (VOLTAREN) 1 % Gel, APPLY 2 GRAMS TOPICALLY FOUR TIMES A DAY AS NEEDED FOR PAIN AND INFLAMMATION. USE ENCLOSED DOSING CARD., Disp: , Rfl:     diphenhydrAMINE (SOMINEX) 25 mg tablet, Take 25 mg by mouth nightly as needed for Insomnia., Disp: , Rfl:     famotidine (PEPCID) 40 MG tablet, TAKE ONE TABLET BY MOUTH AT BEDTIME FOR ACID REFLUX, Disp: , Rfl:     fexofenadine (ALLEGRA) 180 MG tablet, Take 1 tablet (180 mg total) by mouth daily as needed., Disp: 30 tablet, Rfl: 5    flunisolide 25 mcg, 0.025%, (NASALIDE) 25 mcg (0.025 %) Spry, 2 sprays by Nasal route as needed. , Disp: , Rfl:     gabapentin (NEURONTIN) 600 MG tablet, Take 1 tablet (600 mg total) by mouth 3 (three) times daily., Disp: 90 tablet, Rfl: 11    hydrocortisone 2.5 % cream, Apply topically 2 (two) times daily as needed. Apply to affected areas of the  face and neck., Disp: 30 g, Rfl: 2    hydrocortisone-pramoxine (PROCTOFOAM-HS) rectal foam, INSERT 1 APPLICATORFUL INTO RECTALLY TWICE A DAY FOR HEMORRHOIDS, Disp: , Rfl:     hydrOXYzine HCL (ATARAX) 25 MG tablet, Take 25 mg by mouth 3 (three) times daily as needed for Itching., Disp: , Rfl:     LIDOcaine (LIDODERM) 5 %, APPLY 1 PATCH TOPICALLY EVERY DAY FOR PAIN. WEAR FOR 12 HOURS, THEN REMOVE. DO NOT APPLY NEW PATCH FOR AT LEAST 12 HOURS., Disp: , Rfl:     losartan (COZAAR) 50 MG tablet, Take 1 tablet (50 mg total) by mouth once daily., Disp: 30 tablet, Rfl: 11    methyl salicylate-menthol 15-10% 15-10 % Crea, Apply topically as needed. , Disp: , Rfl:     oxyCODONE-acetaminophen (PERCOCET)  mg per tablet, Take 1 tablet by mouth every 4 (four) hours as needed for Pain., Disp: 9 tablet, Rfl: 0    pantoprazole (PROTONIX) 40 MG tablet, Take 40 mg by mouth 2 (two) times daily. , Disp: , Rfl:     sertraline (ZOLOFT) 100 MG tablet, TAKE TWO TABLETS BY MOUTH EVERY DAY FOR MENTAL HEALTH DOSE INCREASED TO 200MG/DAY, Disp: , Rfl:     simethicone (MYLICON) 80 MG chewable tablet, Take 80 mg by mouth every 6 (six) hours as needed for Flatulence., Disp: , Rfl:     sucralfate (CARAFATE) 100 mg/mL suspension, Take 1 g by mouth 4 (four) times daily. , Disp: , Rfl:     triamcinolone acetonide 0.1% (KENALOG) 0.1 % cream, Apply topically 2 (two) times daily. Use for up to 2 weeks as needed for itching. Repeat if flaring., Disp: 454 g, Rfl: 1    levocetirizine (XYZAL) 5 MG tablet, Take 1 tablet (5 mg total) by mouth every evening., Disp: 30 tablet, Rfl: 0  No current facility-administered medications for this visit.    Facility-Administered Medications Ordered in Other Visits:     sodium chloride 0.9% flush 10 mL, 10 mL, Intravenous, PRN, Wilda Horne MD    Physical Exam:  Vitals:    04/28/22 1506   BP: 130/79   Pulse: 86     General: awake, alert, cooperative  Head: NC/AT  Ears: external ears normal  Eyes:  sclera normal  Lungs: normal inspiration, NAD  Heart: well-perfused  Skin: The skin is warm and dry  Ext: No c/c/e.  Neuro: grossly intact, AOx3    RADIOLOGY:  NM PET CT FLUCICLOVINE F18 03/24/2021     CLINICAL HISTORY:  Biochemical failure after prostatectomy in 2019, high risk;  Malignant neoplasm of prostate     TECHNIQUE:  Segmented attenuation corrected 3-D PET imaging was obtained from the skull base through the mid thighs utilizing 10.38 mCi F-18-fluciclovine.  Noncontrast CT imaging was performed for attenuation correction, diagnosis, and anatomical fusion with PET.     COMPARISON:  None.     FINDINGS:  There is physiologic tracer uptake in the liver, pancreas, and bowel.     Patient is status post prostatectomy.  No abnormal hypermetabolism is demonstrated within the prostate bed.  No hypermetabolic regional lymphadenopathy in the pelvis. No hypermetabolic osseous lesions are identified.     Incidental: Penile prosthesis, aortic atherosclerosis, right renal cyst, median sternotomy/CABG changes, degenerative changes in the spine.     Impression:     Status post prostatectomy.  No evidence to indicate residual or recurrent malignancy.    LABS:  I personally reviewed the following lab values:  Lab Results   Component Value Date    WBC 3.67 (L) 05/25/2021    HGB 11.7 (L) 05/25/2021    HCT 33.7 (L) 05/25/2021     (L) 05/25/2021     01/18/2022    K 5.0 01/18/2022     01/18/2022    CREATININE 1.4 01/18/2022    BUN 13 01/18/2022    CO2 24 01/18/2022    TSH 3.370 06/30/2020    PSA 0.88 04/08/2014    INR 1.0 07/24/2020    GLUF 83 03/13/2007    HGBA1C 5.2 10/23/2007    CHOL 123 01/18/2022    TRIG 57 01/18/2022    HDL 39 (L) 01/18/2022    ALT 16 01/18/2022    AST 22 01/18/2022     Assessment/Plan:   Abdullahi Urias is a 66 y.o. male with history of GG 9 prostate cancer status post radical prostatectomy in September of 2019 with biochemical recurrence now s/p RT and ADT, PSA remains undetectable, f/u 3  months with PSA    Thank you for allowing me the opportunity to participate in this patient's care.       Rommel Rodriguez MD  Urology

## 2022-05-02 ENCOUNTER — PATIENT MESSAGE (OUTPATIENT)
Dept: PODIATRY | Facility: CLINIC | Age: 67
End: 2022-05-02
Payer: MEDICARE

## 2022-05-03 ENCOUNTER — TELEPHONE (OUTPATIENT)
Dept: PODIATRY | Facility: CLINIC | Age: 67
End: 2022-05-03
Payer: MEDICARE

## 2022-05-11 ENCOUNTER — OFFICE VISIT (OUTPATIENT)
Dept: PAIN MEDICINE | Facility: CLINIC | Age: 67
End: 2022-05-11
Payer: OTHER GOVERNMENT

## 2022-05-11 ENCOUNTER — OFFICE VISIT (OUTPATIENT)
Dept: PODIATRY | Facility: CLINIC | Age: 67
End: 2022-05-11
Payer: MEDICARE

## 2022-05-11 VITALS
DIASTOLIC BLOOD PRESSURE: 71 MMHG | HEART RATE: 85 BPM | WEIGHT: 246.69 LBS | BODY MASS INDEX: 36.54 KG/M2 | HEIGHT: 69 IN | SYSTOLIC BLOOD PRESSURE: 108 MMHG

## 2022-05-11 VITALS — HEIGHT: 69 IN | WEIGHT: 248.25 LBS | BODY MASS INDEX: 36.77 KG/M2

## 2022-05-11 DIAGNOSIS — G60.9 IDIOPATHIC PERIPHERAL NEUROPATHY: ICD-10-CM

## 2022-05-11 DIAGNOSIS — Z79.01 CURRENT USE OF LONG TERM ANTICOAGULATION: ICD-10-CM

## 2022-05-11 DIAGNOSIS — M54.12 CERVICAL RADICULOPATHY: Primary | ICD-10-CM

## 2022-05-11 DIAGNOSIS — L60.0 ONYCHOCRYPTOSIS: Primary | ICD-10-CM

## 2022-05-11 DIAGNOSIS — M47.812 CERVICAL SPONDYLOSIS: ICD-10-CM

## 2022-05-11 DIAGNOSIS — Z86.73 HISTORY OF CVA IN ADULTHOOD: ICD-10-CM

## 2022-05-11 PROCEDURE — 1159F PR MEDICATION LIST DOCUMENTED IN MEDICAL RECORD: ICD-10-PCS | Mod: CPTII,S$GLB,, | Performed by: PODIATRIST

## 2022-05-11 PROCEDURE — 99999 PR PBB SHADOW E&M-EST. PATIENT-LVL V: CPT | Mod: PBBFAC,,, | Performed by: PHYSICAL MEDICINE & REHABILITATION

## 2022-05-11 PROCEDURE — 99214 PR OFFICE/OUTPT VISIT, EST, LEVL IV, 30-39 MIN: ICD-10-PCS | Mod: S$GLB,,, | Performed by: PODIATRIST

## 2022-05-11 PROCEDURE — 99214 OFFICE O/P EST MOD 30 MIN: CPT | Mod: S$GLB,,, | Performed by: PODIATRIST

## 2022-05-11 PROCEDURE — 1125F AMNT PAIN NOTED PAIN PRSNT: CPT | Mod: CPTII,S$GLB,, | Performed by: PODIATRIST

## 2022-05-11 PROCEDURE — 3288F PR FALLS RISK ASSESSMENT DOCUMENTED: ICD-10-PCS | Mod: CPTII,S$GLB,, | Performed by: PODIATRIST

## 2022-05-11 PROCEDURE — 99999 PR PBB SHADOW E&M-EST. PATIENT-LVL V: ICD-10-PCS | Mod: PBBFAC,,, | Performed by: PHYSICAL MEDICINE & REHABILITATION

## 2022-05-11 PROCEDURE — 1101F PR PT FALLS ASSESS DOC 0-1 FALLS W/OUT INJ PAST YR: ICD-10-PCS | Mod: CPTII,S$GLB,, | Performed by: PODIATRIST

## 2022-05-11 PROCEDURE — 1125F PR PAIN SEVERITY QUANTIFIED, PAIN PRESENT: ICD-10-PCS | Mod: CPTII,S$GLB,, | Performed by: PODIATRIST

## 2022-05-11 PROCEDURE — 99214 OFFICE O/P EST MOD 30 MIN: CPT | Mod: S$PBB,,, | Performed by: PHYSICAL MEDICINE & REHABILITATION

## 2022-05-11 PROCEDURE — 3008F BODY MASS INDEX DOCD: CPT | Mod: CPTII,S$GLB,, | Performed by: PODIATRIST

## 2022-05-11 PROCEDURE — 1160F PR REVIEW ALL MEDS BY PRESCRIBER/CLIN PHARMACIST DOCUMENTED: ICD-10-PCS | Mod: CPTII,S$GLB,, | Performed by: PODIATRIST

## 2022-05-11 PROCEDURE — 1101F PT FALLS ASSESS-DOCD LE1/YR: CPT | Mod: CPTII,S$GLB,, | Performed by: PODIATRIST

## 2022-05-11 PROCEDURE — 99215 OFFICE O/P EST HI 40 MIN: CPT | Mod: PBBFAC | Performed by: PHYSICAL MEDICINE & REHABILITATION

## 2022-05-11 PROCEDURE — 3008F PR BODY MASS INDEX (BMI) DOCUMENTED: ICD-10-PCS | Mod: CPTII,S$GLB,, | Performed by: PODIATRIST

## 2022-05-11 PROCEDURE — 1159F MED LIST DOCD IN RCRD: CPT | Mod: CPTII,S$GLB,, | Performed by: PODIATRIST

## 2022-05-11 PROCEDURE — 3288F FALL RISK ASSESSMENT DOCD: CPT | Mod: CPTII,S$GLB,, | Performed by: PODIATRIST

## 2022-05-11 PROCEDURE — 1160F RVW MEDS BY RX/DR IN RCRD: CPT | Mod: CPTII,S$GLB,, | Performed by: PODIATRIST

## 2022-05-11 PROCEDURE — 99214 PR OFFICE/OUTPT VISIT, EST, LEVL IV, 30-39 MIN: ICD-10-PCS | Mod: S$PBB,,, | Performed by: PHYSICAL MEDICINE & REHABILITATION

## 2022-05-11 PROCEDURE — 99999 PR PBB SHADOW E&M-EST. PATIENT-LVL II: ICD-10-PCS | Mod: PBBFAC,,, | Performed by: PODIATRIST

## 2022-05-11 PROCEDURE — 99999 PR PBB SHADOW E&M-EST. PATIENT-LVL II: CPT | Mod: PBBFAC,,, | Performed by: PODIATRIST

## 2022-05-11 RX ORDER — AMOXICILLIN AND CLAVULANATE POTASSIUM 875; 125 MG/1; MG/1
1 TABLET, FILM COATED ORAL 2 TIMES DAILY
Qty: 6 TABLET | Refills: 0 | Status: SHIPPED | OUTPATIENT
Start: 2022-05-11 | End: 2022-05-14

## 2022-05-11 NOTE — PROGRESS NOTES
Subjective:       Patient ID: Abdullahi Urias is a 66 y.o. male.    Chief Complaint: Ingrown Toenail (C/o left ingrown hallux toe, rates pain 8/10, nondiabetic, wearing socks and slippers, last seen PCP Dr. Valentin on 01/27/2022.)      HPI: Abdullahi Urias presents to the office with complaints of pains to the left great  toe, due to ingrowing. States mild drainage. States swelling, redness and moderate to severe pains. Symptoms have been on going for several days and are worsening. States difficulties with walking as a result of pains. Walking and standing, particularly with shoe gear, exacerbates the ailment. Pains are sharp in nature and are rated at approx. 8/10. Patient's Primary Care Provider is Reji Valentin MD.     Review of patient's allergies indicates:   Allergen Reactions    Pletal [cilostazol]      Rash and leg pain       Past Medical History:   Diagnosis Date    [facility]    Asthma     BPH (benign prostatic hyperplasia)     CAD (coronary artery disease)     Cardiomyopathy     CHF (congestive heart failure)     Chronic hoarseness     vocal cord surg    Chronic pain     CKD (chronic kidney disease) stage 3, GFR 30-59 ml/min     CVA (cerebral infarction)     8/2012 olol; reviewed ed note    Ex-smoker     GERD (gastroesophageal reflux disease)     Hepatitis C     treatedharvoni says cured, RNA NEG 6/2020    Hypertension     Pancreatitis     Prostate cancer     Prostate cancer     Prostate cancer     PVD (peripheral vascular disease)     Renal insufficiency     Substance abuse     cocaine, etoh , tob in past       Family History   Problem Relation Age of Onset    Heart disease Mother     Heart attack Sister     Heart attack Brother     Colon cancer Neg Hx     Colon polyps Neg Hx     Liver cancer Neg Hx     Inflammatory bowel disease Neg Hx     Liver disease Neg Hx     Rectal cancer Neg Hx     Stomach cancer Neg Hx     Ulcerative colitis Neg  "Hx        Social History     Socioeconomic History    Marital status:    Tobacco Use    Smoking status: Former Smoker     Packs/day: 2.00     Years: 8.00     Pack years: 16.00     Quit date: 2012     Years since quittin.7    Smokeless tobacco: Never Used   Substance and Sexual Activity    Alcohol use: No     Comment: Sober since 2012    Drug use: No    Sexual activity: Yes   Social History Narrative    No pets in household, wife and daughter smokers. [employer].    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; as of  retired       Past Surgical History:   Procedure Laterality Date    AORTIC VALVE REPLACEMENT  2014    Tissue valve replacement    BACK SURGERY      CARDIAC CATHETERIZATION      COLONOSCOPY      COLONOSCOPY N/A 2021    Procedure: COLONOSCOPY;  Surgeon: Faith Carrillo MD;  Location: [facility];  Service: Endoscopy;  Laterality: N/A;    ESOPHAGOGASTRODUODENOSCOPY N/A 2021    Procedure: ESOPHAGOGASTRODUODENOSCOPY (EGD);  Surgeon: Faith Carrillo MD;  Location: [facility];  Service: Endoscopy;  Laterality: N/A;    FOOT SURGERY      LEFT HEART CATHETERIZATION Left 2020    Procedure: CATHETERIZATION, HEART, LEFT;  Surgeon: Pasha Martin MD;  Location: [facility] CATH LAB;  Service: Cardiology;  Laterality: Left;    PENILE PROSTHESIS IMPLANT      PENILE PROSTHESIS REVISION  2018    PROSTATECTOMY  2019    [facility]rol at [facility] for prostate      UPPER GASTROINTESTINAL ENDOSCOPY         Review of Systems      Objective:   Ht 5' 9" (1.753 m)   Wt 112.6 kg (248 lb 3.8 oz)   BMI 36.66 kg/m²     Physical Exam  LOWER EXTREMITY PHYSICAL EXAMINATION    NEUROLOGY: Sensation to light touch is intact. Proprioception is intact.     VASCULAR: On the left foot, the dorsalis pedis pulse is 2/4 and the posterior tibial pulse is 2/4. Capillary refill time is less than 3 seconds. Hair growth is present on the dorsum of the foot and at " the digits. Proximal to distal temperature is warm to warm.    DERMATOLOGY: Ingrowing of the left foot lateral nail border of the great toe. The nail is incurvated into the skin of the affected border, causing pains, which are moderate in nature. There is minimal edema. There is mild cellulitis noted. There is scant drainage. No fluctuance. Granuloma formation is absent.    ORTHOPEDIC: Manual Muscle Testing is 5/5 in all planes on the left, without pains, with and without resistance. Gait pattern is slightly antalgic.    Assessment:     1. Onychocryptosis    2. Idiopathic peripheral neuropathy    3. Current use of long term anticoagulation    4. History of CVA in adulthood        Plan:     Onychocryptosis    Idiopathic peripheral neuropathy    Current use of long term anticoagulation    History of CVA in adulthood        We discussed patient's options for treating the ingrown toenail.  We discussed slant back procedure to remove the distal offending edge, we discussed temporary partial avulsion, temporary complete avulsion, and attempted permanent matricectomy.  Patient would like to proceed with slant back as patient is on long-term anticoagulation therapy  Discussed the risk and benefits of the procedure.  Discussed risk of recurrence.  Patient did give written consent to proceed with procedure.    Patient tolerated procedure well without complications.  Large spicule was removed without complications.  History patient will start soaking in warm water and Epson salt twice daily.  Apply antibiotic cream and a Band-Aid to the affected borders following soaking and showering.  Patient will call if there is any acute signs of infection associated with increased redness, swelling, abnormal drainage, increased pain.    Will start patient on oral antibiotics as there is a small amount of purulence/trapped drainage present to the lateral nail fold..        Future Appointments   Date Time Provider Department Center    5/30/2022 10:15 AM Osiris Lilly DPM [facility] POD [facility]   6/2/2022 10:50 AM TIKA FRANCOIS [facility] LAB [facility] LAB DS BR   6/9/2022  8:30 AM Brijesh Delatorre MD [facility] NEPHRO [facility]   7/19/2022  9:00 AM Jeffery Mckeon MD [facility] CARDIO [facility]   9/13/2022 10:00 AM TIKA FRANCOIS [facility] LAB DS [facility]   9/20/2022  1:00 PM Dominic Garces III, MD [facility] RAD ONC BR   11/3/2022  3:15 PM Rommel Rodriguez MD [facility] UROLOGY  Medical C   1/25/2023  7:50 AM LABORATORY, [facility] LAB [facility]   1/30/2023  1:40 PM Reji Valentin MD [facility]

## 2022-05-11 NOTE — PROGRESS NOTES
Established Patient Chronic Pain Note (Follow up visit)    Chief Complaint:   Chief Complaint   Patient presents with    Chronic Pain Syndrome    Piriformis syndrome of both sides       SUBJECTIVE:    Abdullahi Urias is a 66 y.o. male who presents to the clinic for a follow-up.  He reports that his back pain is much improved ever since undergoing bilateral piriformis trigger point injections.  He wants to focus more on his neck and upper extremity pain currently.  Since the last visit, Abdullahi Urias states the pain has been worsening. Current pain intensity is 8/10.      Patient denies night fever/night sweats, urinary incontinence, bowel incontinence, significant weight loss, significant motor weakness and loss of sensations.    Pain Disability Index Review:  Last 3 PDI Scores 5/11/2022 1/27/2022   Pain Disability Index (PDI) 58 56     Interval HPI 01/27/2022:  Abdullahi Urias is a 66 y.o. male who presents to the clinic for the evaluation of lower back pain.  He is self referred.  He has past medical history of CVA, substance abuse, asthma, CHF, hypertension, CAD, cardiomyopathy, renal insufficiency, CKD stage 3, H/O hepatitis-C, H/O prostate cancer, multiple other medical comorbidities as listed in his chart.  The pain started several years ago and symptoms have been worsening.The pain is located in the lumbosacral area and radiates to the bilateral lower extremities extending posteriorly to the bilateral feet.  Of note, patient recently had cardiovascular studies done on his lower extremities which did demonstrate vasculogenic claudication.  The pain is described as Aching, burning, tingling and is rated as 8/10. The pain is rated with a score of  6/10 on the BEST day and a score of 10/10 on the WORST day.  Symptoms interfere with daily activity. The pain is exacerbated by activities.  The pain is mitigated by medications.           Non-Pharmacologic Treatments:  Physical Therapy/Home Exercise:  yes  Ice/Heat:yes  TENS: yes  Acupuncture: no  Massage: yes  Chiropractic: no    Other: no        Pain Medications:  - Opioids: Norco, Tylenol 3  - Adjuvant Medications: Ambien, alprazolam, Flexeril, Voltaren gel, gabapentin, lidocaine patch, Zoloft  - Anti-Coagulants: Aspirin, Pletal      report:  Reviewed and consistent with medication use as prescribed.       Pain Procedures:   -previous lumbar PIEDAD  -previous lumbar MBB/RFA  -previous lumbar laminectomy in 2001 at L5-S1  -01/27/2022:  Bilateral piriformis trigger point injections, significant relief that is still ongoing        Imaging:   MRI lumbar spine 01/12/2021:  Visualized distal cord demonstrates normal signal.     No marrow replacement process.  No fracture.  No listhesis.     There are degenerative changes of the lower lumbar spine with mild disc space narrowing, most severe at L5-S1 with marginal spurring with facet arthropathy     L1-L2: There is posterior disc bulge with indentation of the thecal sac.  No spinal canal or neural foraminal encroachment.     L2-L3: Mild facet arthropathy.  Mild posterior disc bulge.  No spinal canal or neural foraminal encroachment.     L3-L4: Bilateral ligamentum flavum and facet arthropathy.  Mild posterior disc bulge with indentation of thecal sac.  No spinal canal or neural foraminal encroachment.     L4-L5: Ligamentum flavum and facet arthropathy with broad-based disc bulge.  No significant spinal canal stenosis.  There is crowding of the lateral recesses with narrowing along the inferior right greater than left neural foramen.     L5-S1: Posterior disc osteophyte complex with facet arthropathy.  Appearance of possible prior right hemilaminectomy changes..  There is severe narrowing along the right neural foramen  with mild to moderate narrowing along the left neural foramen.  No significant spinal canal stenosis        CT cervical spine 12/01/2020:  Vertebral body heights maintained.  2 mm anterolisthesis of C3  on C4.  Disc height loss and osteophyte changes at C6-7 and C7-T1.  Multilevel facet degenerative findings most prevalent at the right C2-3 and left C3-4 levels.     Neck soft tissues are unremarkable.     C2-3: No CT evidence of spinal canal stenosis.  Mild right neural foraminal narrowing.     C3-4: No CT evidence of significant spinal canal stenosis.  Left greater than right facet and uncovertebral degenerative changes results in mild right and severe left neural foraminal narrowing.     C4-5: Posterior disc osteophyte complex present with mild spinal canal stenosis.  Mild to moderate right neural foraminal narrowing secondary to facet and uncovertebral degenerative findings.     C5-6: Posterior disc osteophyte complex present asymmetric to the right with mild spinal canal stenosis.  Severe right neural foraminal narrowing secondary to facet and uncovertebral degenerative findings.     C6-7: Posterior disc osteophyte complex present causing at least mild spinal canal stenosis.  Left greater than right facet and uncovertebral degenerative findings with mild-to-moderate right and moderate to severe left neural foraminal narrowing.     C7-T1: Posterior disc osteophyte complex with spinal canal stenosis suspected.  Mild left neural foraminal narrowing secondary to uncovertebral degenerative findings.        PMHx,PSHx, Social history, and Family history:  I have reviewed the patient's medical, surgical, social, and family history in detail and updated the computerized patient record.      Review of patient's allergies indicates:   Allergen Reactions    Pletal [cilostazol]      Rash and leg pain       Current Outpatient Medications   Medication Sig    albuterol (PROVENTIL/VENTOLIN HFA) 90 mcg/actuation inhaler Inhale 2 puffs into the lungs every 6 (six) hours as needed for Wheezing.    allopurinoL (ZYLOPRIM) 100 MG tablet Take 1 tablet (100 mg total) by mouth once daily.    aluminum & magnesium  hydroxide-simethicone (MYLANTA MAX STRENGTH) 400-400-40 mg/5 mL suspension TAKE 15ML BY MOUTH FOUR TIMES A DAY AS NEEDED FOR STOMACH ACID    amitriptyline (ELAVIL) 10 MG tablet TAKE 1 TABLET BY MOUTH AT BEDTIME AS NEEDED FOR PAIN    amoxicillin-clavulanate 875-125mg (AUGMENTIN) 875-125 mg per tablet Take 1 tablet by mouth 2 (two) times daily. for 3 days    aspirin (ECOTRIN) 81 MG EC tablet TAKE ONE TABLET BY MOUTH EVERY DAY TO PREVENT BLOOD CLOT    atorvastatin (LIPITOR) 80 MG tablet TAKE ONE-HALF TABLET BY MOUTH EVERY DAY FOR CHOLESTEROL    baclofen (LIORESAL) 10 MG tablet Take 1 tablet (10 mg total) by mouth 3 (three) times daily.    carvediloL (COREG) 12.5 MG tablet Take 1 tablet (12.5 mg total) by mouth 2 (two) times daily.    clopidogreL (PLAVIX) 75 mg tablet Take 1 tablet (75 mg total) by mouth once daily.    cyclobenzaprine (FLEXERIL) 10 MG tablet TAKE ONE TABLET BY MOUTH THREE TIMES A DAY AS A MUSCLE RELAXANT    diclofenac sodium (VOLTAREN) 1 % Gel APPLY 2 GRAMS TOPICALLY FOUR TIMES A DAY AS NEEDED FOR PAIN AND INFLAMMATION. USE ENCLOSED DOSING CARD.    diphenhydrAMINE (SOMINEX) 25 mg tablet Take 25 mg by mouth nightly as needed for Insomnia.    famotidine (PEPCID) 40 MG tablet TAKE ONE TABLET BY MOUTH AT BEDTIME FOR ACID REFLUX    fexofenadine (ALLEGRA) 180 MG tablet Take 1 tablet (180 mg total) by mouth daily as needed.    flunisolide 25 mcg, 0.025%, (NASALIDE) 25 mcg (0.025 %) Spry 2 sprays by Nasal route as needed.     gabapentin (NEURONTIN) 600 MG tablet Take 1 tablet (600 mg total) by mouth 3 (three) times daily.    hydrocortisone 2.5 % cream Apply topically 2 (two) times daily as needed. Apply to affected areas of the face and neck.    hydrocortisone-pramoxine (PROCTOFOAM-HS) rectal foam INSERT 1 APPLICATORFUL INTO RECTALLY TWICE A DAY FOR HEMORRHOIDS    hydrOXYzine HCL (ATARAX) 25 MG tablet Take 25 mg by mouth 3 (three) times daily as needed for Itching.    LIDOcaine (LIDODERM) 5 %  APPLY 1 PATCH TOPICALLY EVERY DAY FOR PAIN. WEAR FOR 12 HOURS, THEN REMOVE. DO NOT APPLY NEW PATCH FOR AT LEAST 12 HOURS.    losartan (COZAAR) 50 MG tablet Take 1 tablet (50 mg total) by mouth once daily.    methyl salicylate-menthol 15-10% 15-10 % Crea Apply topically as needed.     oxyCODONE-acetaminophen (PERCOCET)  mg per tablet Take 1 tablet by mouth every 4 (four) hours as needed for Pain.    pantoprazole (PROTONIX) 40 MG tablet Take 40 mg by mouth 2 (two) times daily.     sertraline (ZOLOFT) 100 MG tablet TAKE TWO TABLETS BY MOUTH EVERY DAY FOR MENTAL HEALTH DOSE INCREASED TO 200MG/DAY    simethicone (MYLICON) 80 MG chewable tablet Take 80 mg by mouth every 6 (six) hours as needed for Flatulence.    sucralfate (CARAFATE) 100 mg/mL suspension Take 1 g by mouth 4 (four) times daily.     triamcinolone acetonide 0.1% (KENALOG) 0.1 % cream Apply topically 2 (two) times daily. Use for up to 2 weeks as needed for itching. Repeat if flaring.    levocetirizine (XYZAL) 5 MG tablet Take 1 tablet (5 mg total) by mouth every evening.     No current facility-administered medications for this visit.     Facility-Administered Medications Ordered in Other Visits   Medication    sodium chloride 0.9% flush 10 mL         REVIEW OF SYSTEMS:    GENERAL:  No weight loss, malaise or fevers.  HEENT:   No recent changes in vision or hearing  NECK:  Negative for lumps, no difficulty with swallowing.  RESPIRATORY:  Negative for cough, wheezing or shortness of breath, patient denies any recent URI.  CARDIOVASCULAR:  Negative for chest pain, leg swelling or palpitations.  GI:  Negative for abdominal discomfort, blood in stools or black stools or change in bowel habits.  MUSCULOSKELETAL:  See HPI.  SKIN:  Negative for lesions, rash, and itching.  PSYCH:  No mood disorder or recent psychosocial stressors.  Patients sleep is not disturbed secondary to pain.  HEMATOLOGY/LYMPHOLOGY:  Negative for prolonged bleeding, bruising  "easily or swollen nodes.  Patient is currently taking anti-coagulants - ASA and Pletal  NEURO:   No history of headaches, syncope, paralysis, seizures or tremors.  All other reviewed and negative other than HPI.       OBJECTIVE:    /71   Pulse 85   Ht 5' 9" (1.753 m)   Wt 111.9 kg (246 lb 11.1 oz)   BMI 36.43 kg/m²     PHYSICAL EXAMINATION:    GENERAL: Well appearing, in no acute distress, alert and oriented x3.  PSYCH:  Mood and affect appropriate.  SKIN: Skin color, texture, turgor normal, no rashes or lesions.  HEAD/FACE:  Normocephalic, atraumatic. Cranial nerves grossly intact.   NECK:  Tenderness palpation over the bilateral cervical paraspinous muscles.  Spurling's positive 5 bilaterally.  Limited range of motion secondary to pain in all planes.  CV: RRR with palpation of the radial artery.  PULM: No evidence of respiratory difficulty, symmetric chest rise.  GI:  Soft and non-tender.  BACK:  Surgical scarring evident.  Straight leg raising in the sitting and supine positions is positive to radicular pain on the left.  Moderate pain to palpation over the facet joints of the lumbar spine and lumbar paraspinals limited range of motion secondary to pain reproduction.  Axial loading positive bilaterally  EXTREMITIES: Peripheral joint ROM is full and pain free without obvious instability or laxity in all four extremities. No deformities, edema, or skin discoloration. Good capillary refill.  MUSCULOSKELETAL:   mild shoulder impingement signs bilaterally.  Hip and knee provocative maneuvers are unremarkable.  There is mild pain with palpation over the sacroiliac joints bilaterally.  FABERs test is equivocal.  FADIRs test is positive bilaterally.   Bilateral upper and lower extremity strength is normal and symmetric.  No atrophy or tone abnormalities are noted.  NEURO: Bilateral upper and lower extremity coordination and muscle stretch reflexes are physiologic and symmetric.  Plantar response are downgoing. " No clonus.  No loss of sensation is noted.  GAIT:  Slow, antalgic.      LABS:  Lab Results   Component Value Date    WBC 3.67 (L) 05/25/2021    HGB 11.7 (L) 05/25/2021    HCT 33.7 (L) 05/25/2021    MCV 85 05/25/2021     (L) 05/25/2021       CMP  Sodium   Date Value Ref Range Status   01/18/2022 137 136 - 145 mmol/L Final     Potassium   Date Value Ref Range Status   01/18/2022 5.0 3.5 - 5.1 mmol/L Final     Chloride   Date Value Ref Range Status   01/18/2022 103 95 - 110 mmol/L Final     CO2   Date Value Ref Range Status   01/18/2022 24 23 - 29 mmol/L Final     Glucose   Date Value Ref Range Status   01/18/2022 82 70 - 110 mg/dL Final     BUN   Date Value Ref Range Status   01/18/2022 13 8 - 23 mg/dL Final     Creatinine   Date Value Ref Range Status   01/18/2022 1.4 0.5 - 1.4 mg/dL Final     Calcium   Date Value Ref Range Status   01/18/2022 9.4 8.7 - 10.5 mg/dL Final     Total Protein   Date Value Ref Range Status   01/18/2022 7.1 6.0 - 8.4 g/dL Final     Albumin   Date Value Ref Range Status   01/18/2022 4.1 3.5 - 5.2 g/dL Final     Total Bilirubin   Date Value Ref Range Status   01/18/2022 0.5 0.1 - 1.0 mg/dL Final     Comment:     For infants and newborns, interpretation of results should be based  on gestational age, weight and in agreement with clinical  observations.    Premature Infant recommended reference ranges:  Up to 24 hours.............<8.0 mg/dL  Up to 48 hours............<12.0 mg/dL  3-5 days..................<15.0 mg/dL  6-29 days.................<15.0 mg/dL       Alkaline Phosphatase   Date Value Ref Range Status   01/18/2022 104 55 - 135 U/L Final     AST   Date Value Ref Range Status   01/18/2022 22 10 - 40 U/L Final     ALT   Date Value Ref Range Status   01/18/2022 16 10 - 44 U/L Final     Anion Gap   Date Value Ref Range Status   01/18/2022 10 8 - 16 mmol/L Final     eGFR if    Date Value Ref Range Status   01/18/2022 >60.0 >60 mL/min/1.73 m^2 Final     eGFR if non     Date Value Ref Range Status   01/18/2022 52.0 (A) >60 mL/min/1.73 m^2 Final     Comment:     Calculation used to obtain the estimated glomerular filtration  rate (eGFR) is the CKD-EPI equation.          Lab Results   Component Value Date    HGBA1C 5.2 10/23/2007             ASSESSMENT: 66 y.o. year old male with neck pain, consistent with     1. Cervical radiculopathy  IR PIEDAD Cervical/THoracic w/ Img    Case Request-RAD/Other Procedure Area: C7-T1 IL PIEDAD         PLAN:   - Interventions:  will schedule for C7-T1 IL PIEDAD for diagnostic and therapeutic purposes.  Explained the risks and benefits of the procedure in detail with the patient today in clinic along with alternative treatment options, and the patient elected to pursue the intervention at this time.     If limited relief with above, consider cervical medial branch blocks targeting C4-7 bilaterally    - Anticoagulation use: yes Aspirin and Pletal, need to hold aspirin for 5 days and Pletal for 3 days to perform cervical PIEDAD     - Medications: I have stressed the importance of physical activity and a home exercise plan to help with pain and improve health. and Patient can continue with medications for now since they are providing benefits, using them appropriately, and without side effects.  Advised patient that I do not believe opioids are in his best interest for his chronic non malignant pain.     - Therapy:  Advised patient continue with home exercises as tolerated     - Psychological:  Discussed coping mechanisms of address chronic pain issues     - Labs:  Reviewed     - Imaging: Reviewed available imaging with patient and answered any questions they had regarding study.     - Consults/Referrals:  None at this time     - Records:   Attempt  to obtain outside records from previous pain management provider.  Reviewed/Obtain old records from outside physicians and imaging     - Follow up visit: return to clinic as needed, will contact  patient after we have received outside records     - Counseled patient regarding the importance of activity modification and physical therapy     - This condition does not require this patient to take time off of work, and the primary goal of our Pain Management services is to improve the patient's functional capacity.     - Patient Questions: Answered all of the patient's questions regarding diagnosis, therapy, and treatment       The above plan and management options were discussed at length with patient. Patient is in agreement with the above and verbalized understanding.      Nehemiah Carmen MD  Interventional Pain Management  [facility]    Disclaimer:  This note was prepared using voice recognition system and is likely to have sound alike errors that may have been overlooked even after proof reading.  Please call me with any questions

## 2022-05-20 DIAGNOSIS — R06.02 SHORTNESS OF BREATH: ICD-10-CM

## 2022-05-22 ENCOUNTER — HOSPITAL ENCOUNTER (OUTPATIENT)
Dept: RADIOLOGY | Facility: CLINIC | Age: 67
Discharge: HOME | End: 2022-05-22
Attending: PHYSICIAN ASSISTANT

## 2022-05-22 ENCOUNTER — OFFICE VISIT (OUTPATIENT)
Dept: URGENT CARE | Facility: CLINIC | Age: 67
End: 2022-05-22
Payer: MEDICARE

## 2022-05-22 VITALS
OXYGEN SATURATION: 99 % | TEMPERATURE: 97 F | HEART RATE: 99 BPM | RESPIRATION RATE: 20 BRPM | HEIGHT: 69 IN | DIASTOLIC BLOOD PRESSURE: 73 MMHG | SYSTOLIC BLOOD PRESSURE: 126 MMHG | BODY MASS INDEX: 36.29 KG/M2 | WEIGHT: 245 LBS

## 2022-05-22 DIAGNOSIS — R05.9 COUGH: ICD-10-CM

## 2022-05-22 DIAGNOSIS — K21.9 GASTROESOPHAGEAL REFLUX DISEASE, UNSPECIFIED WHETHER ESOPHAGITIS PRESENT: Primary | ICD-10-CM

## 2022-05-22 DIAGNOSIS — R06.02 SHORTNESS OF BREATH: ICD-10-CM

## 2022-05-22 LAB
CTP QC/QA: YES
SARS-COV-2 RDRP RESP QL NAA+PROBE: NEGATIVE

## 2022-05-22 PROCEDURE — 1126F AMNT PAIN NOTED NONE PRSNT: CPT | Mod: CPTII,S$GLB,, | Performed by: PHYSICIAN ASSISTANT

## 2022-05-22 PROCEDURE — 93010 EKG 12-LEAD: ICD-10-PCS | Mod: S$GLB,,, | Performed by: STUDENT IN AN ORGANIZED HEALTH CARE EDUCATION/TRAINING PROGRAM

## 2022-05-22 PROCEDURE — 1126F PR PAIN SEVERITY QUANTIFIED, NO PAIN PRESENT: ICD-10-PCS | Mod: CPTII,S$GLB,, | Performed by: PHYSICIAN ASSISTANT

## 2022-05-22 PROCEDURE — 99214 OFFICE O/P EST MOD 30 MIN: CPT | Mod: S$GLB,,, | Performed by: PHYSICIAN ASSISTANT

## 2022-05-22 PROCEDURE — 93010 ELECTROCARDIOGRAM REPORT: CPT | Mod: S$GLB,,, | Performed by: STUDENT IN AN ORGANIZED HEALTH CARE EDUCATION/TRAINING PROGRAM

## 2022-05-22 PROCEDURE — 3008F BODY MASS INDEX DOCD: CPT | Mod: CPTII,S$GLB,, | Performed by: PHYSICIAN ASSISTANT

## 2022-05-22 PROCEDURE — 1160F PR REVIEW ALL MEDS BY PRESCRIBER/CLIN PHARMACIST DOCUMENTED: ICD-10-PCS | Mod: CPTII,S$GLB,, | Performed by: PHYSICIAN ASSISTANT

## 2022-05-22 PROCEDURE — 3074F PR MOST RECENT SYSTOLIC BLOOD PRESSURE < 130 MM HG: ICD-10-PCS | Mod: CPTII,S$GLB,, | Performed by: PHYSICIAN ASSISTANT

## 2022-05-22 PROCEDURE — 1159F MED LIST DOCD IN RCRD: CPT | Mod: CPTII,S$GLB,, | Performed by: PHYSICIAN ASSISTANT

## 2022-05-22 PROCEDURE — 71046 XR CHEST PA AND LATERAL: ICD-10-PCS | Mod: S$GLB,,, | Performed by: RADIOLOGY

## 2022-05-22 PROCEDURE — U0002 COVID-19 LAB TEST NON-CDC: HCPCS | Mod: QW,S$GLB,, | Performed by: PHYSICIAN ASSISTANT

## 2022-05-22 PROCEDURE — 1159F PR MEDICATION LIST DOCUMENTED IN MEDICAL RECORD: ICD-10-PCS | Mod: CPTII,S$GLB,, | Performed by: PHYSICIAN ASSISTANT

## 2022-05-22 PROCEDURE — 93005 EKG 12-LEAD: ICD-10-PCS | Mod: S$GLB,,, | Performed by: PHYSICIAN ASSISTANT

## 2022-05-22 PROCEDURE — U0002: ICD-10-PCS | Mod: QW,S$GLB,, | Performed by: PHYSICIAN ASSISTANT

## 2022-05-22 PROCEDURE — 3074F SYST BP LT 130 MM HG: CPT | Mod: CPTII,S$GLB,, | Performed by: PHYSICIAN ASSISTANT

## 2022-05-22 PROCEDURE — 71046 X-RAY EXAM CHEST 2 VIEWS: CPT | Mod: S$GLB,,, | Performed by: RADIOLOGY

## 2022-05-22 PROCEDURE — 99214 PR OFFICE/OUTPT VISIT, EST, LEVL IV, 30-39 MIN: ICD-10-PCS | Mod: S$GLB,,, | Performed by: PHYSICIAN ASSISTANT

## 2022-05-22 PROCEDURE — 3078F DIAST BP <80 MM HG: CPT | Mod: CPTII,S$GLB,, | Performed by: PHYSICIAN ASSISTANT

## 2022-05-22 PROCEDURE — 3078F PR MOST RECENT DIASTOLIC BLOOD PRESSURE < 80 MM HG: ICD-10-PCS | Mod: CPTII,S$GLB,, | Performed by: PHYSICIAN ASSISTANT

## 2022-05-22 PROCEDURE — 1160F RVW MEDS BY RX/DR IN RCRD: CPT | Mod: CPTII,S$GLB,, | Performed by: PHYSICIAN ASSISTANT

## 2022-05-22 PROCEDURE — 93005 ELECTROCARDIOGRAM TRACING: CPT | Mod: S$GLB,,, | Performed by: PHYSICIAN ASSISTANT

## 2022-05-22 PROCEDURE — 3008F PR BODY MASS INDEX (BMI) DOCUMENTED: ICD-10-PCS | Mod: CPTII,S$GLB,, | Performed by: PHYSICIAN ASSISTANT

## 2022-05-22 RX ORDER — MAG HYDROX/ALUMINUM HYD/SIMETH 200-200-20
30 SUSPENSION, ORAL (FINAL DOSE FORM) ORAL
Status: COMPLETED | OUTPATIENT
Start: 2022-05-22 | End: 2022-05-22

## 2022-05-22 RX ORDER — DICYCLOMINE HYDROCHLORIDE 10 MG/5ML
20 SOLUTION ORAL
Status: COMPLETED | OUTPATIENT
Start: 2022-05-22 | End: 2022-05-22

## 2022-05-22 RX ORDER — LIDOCAINE HYDROCHLORIDE 20 MG/ML
10 SOLUTION OROPHARYNGEAL
Status: COMPLETED | OUTPATIENT
Start: 2022-05-22 | End: 2022-05-22

## 2022-05-22 RX ADMIN — LIDOCAINE HYDROCHLORIDE 10 ML: 20 SOLUTION OROPHARYNGEAL at 05:05

## 2022-05-22 RX ADMIN — Medication 30 ML: at 05:05

## 2022-05-22 RX ADMIN — DICYCLOMINE HYDROCHLORIDE 20 MG: 10 SOLUTION ORAL at 05:05

## 2022-05-22 NOTE — PROGRESS NOTES
"Subjective:       Patient ID: Abdullahi Urias is a 66 y.o. male.    Vitals:  height is 5' 9" (1.753 m) and weight is 111.1 kg (245 lb). His tympanic temperature is 97 °F (36.1 °C). His blood pressure is 126/73 and his pulse is 99. His respiration is 20 and oxygen saturation is 99%.     Chief Complaint: Shortness of Breath    Pt complaining of congestion and cough for the last couple of weeks.  Pt states shortness of breath for the last few days.  Pt denies any fever.      Shortness of Breath  This is a new problem. The current episode started 1 to 4 weeks ago. The problem occurs constantly. The problem has been gradually worsening. Associated symptoms include a sore throat. Pertinent negatives include no abdominal pain, chest pain, claudication, coryza, ear pain, fever, headaches, hemoptysis, leg pain, leg swelling, neck pain, orthopnea, PND, rash, rhinorrhea, sputum production, swollen glands, syncope, vomiting or wheezing. Nothing aggravates the symptoms. The patient has no known risk factors for DVT/PE. He has tried nothing for the symptoms.       Constitution: Negative for fever.   HENT: Positive for sore throat. Negative for ear pain.    Neck: Negative for neck pain.   Cardiovascular: Negative for chest pain, leg swelling and passing out.   Respiratory: Positive for shortness of breath. Negative for sputum production, bloody sputum and wheezing.    Gastrointestinal: Negative for abdominal pain and vomiting.   Skin: Negative for rash.   Neurological: Negative for headaches.       Objective:      Physical Exam   HENT:   Head: Normocephalic.   Ears:   Right Ear: Tympanic membrane normal.   Left Ear: Tympanic membrane normal.   Nose: Rhinorrhea and congestion present.   Mouth/Throat: Mucous membranes are moist.   Neck: No neck rigidity present.   Cardiovascular: Normal rate and regular rhythm.   Pulmonary/Chest: No stridor. No respiratory distress. He has no wheezes. He has no rhonchi. He exhibits no tenderness. "   Abdominal: Bowel sounds are normal.   Lymphadenopathy:     He has no cervical adenopathy.   Neurological: He is alert.   Nursing note and vitals reviewed.        Assessment:       1. Gastroesophageal reflux disease, unspecified whether esophagitis present    2. Cough    3. Shortness of breath        Here with shortness of breath, sinus congestion and acid reflux. He has been having chest burning on and off for the last 3 days, but reports it feels like his reflux. EKG today shows NSR with ST wave abnormalities but those changes were present on last 2 EKG performed last year. He was given GI cocktail, covid test was ordered and chest xray was ordered. Covid was negative. Chest xray did not show any acute infiltrate but will call with final read.     After walking to xray pulse ox was 99%. He denies increase in swelling in hands or legs. He denies shortness of breath getting worse with laying down. He has been constantly belching and complaints of lots of reflux. He was given GI cocktail with some relief. With his shortness of breath on exertion I recommended he go to the ED to have his heart checked out. He felt that his reflux was the issue. He states that if anything changed or worsening he could not hesitate to go to the ED. I told him there were no EKG changes but could not rule out cardiac issues without blood work that could be done in the ED.     Plan:         Gastroesophageal reflux disease, unspecified whether esophagitis present  -     LIDOcaine HCl 2% oral solution 10 mL  -     aluminum-magnesium hydroxide-simethicone 200-200-20 mg/5 mL suspension 30 mL  -     dicyclomine 10 mg/5 mL syrup 20 mg    Cough  -     POCT COVID-19 Rapid Screening  -     XR CHEST PA AND LATERAL; Future; Expected date: 05/22/2022    Shortness of breath  -     IN OFFICE EKG 12-LEAD (to Alex)

## 2022-05-23 ENCOUNTER — TELEPHONE (OUTPATIENT)
Dept: URGENT CARE | Facility: CLINIC | Age: 67
End: 2022-05-23
Payer: MEDICARE

## 2022-05-24 ENCOUNTER — TELEPHONE (OUTPATIENT)
Dept: URGENT CARE | Facility: CLINIC | Age: 67
End: 2022-05-24
Payer: MEDICARE

## 2022-05-25 ENCOUNTER — TELEPHONE (OUTPATIENT)
Dept: URGENT CARE | Facility: CLINIC | Age: 67
End: 2022-05-25
Payer: MEDICARE

## 2022-05-25 ENCOUNTER — PATIENT MESSAGE (OUTPATIENT)
Dept: RESEARCH | Facility: HOSPITAL | Age: 67
End: 2022-05-25
Payer: MEDICARE

## 2022-05-25 ENCOUNTER — OFFICE VISIT (OUTPATIENT)
Dept: INTERNAL MEDICINE | Facility: CLINIC | Age: 67
End: 2022-05-25
Payer: MEDICARE

## 2022-05-25 VITALS
SYSTOLIC BLOOD PRESSURE: 132 MMHG | RESPIRATION RATE: 20 BRPM | TEMPERATURE: 98 F | DIASTOLIC BLOOD PRESSURE: 80 MMHG | HEIGHT: 69 IN | HEART RATE: 86 BPM | WEIGHT: 246.69 LBS | BODY MASS INDEX: 36.54 KG/M2 | OXYGEN SATURATION: 94 %

## 2022-05-25 DIAGNOSIS — J02.9 PHARYNGITIS, UNSPECIFIED ETIOLOGY: ICD-10-CM

## 2022-05-25 DIAGNOSIS — R49.0 VOICE HOARSENESSES: ICD-10-CM

## 2022-05-25 DIAGNOSIS — R22.1 LOCALIZED SWELLING, MASS AND LUMP, NECK: Primary | ICD-10-CM

## 2022-05-25 PROCEDURE — 3079F DIAST BP 80-89 MM HG: CPT | Mod: CPTII,S$GLB,, | Performed by: NURSE PRACTITIONER

## 2022-05-25 PROCEDURE — 3008F BODY MASS INDEX DOCD: CPT | Mod: CPTII,S$GLB,, | Performed by: NURSE PRACTITIONER

## 2022-05-25 PROCEDURE — 99214 PR OFFICE/OUTPT VISIT, EST, LEVL IV, 30-39 MIN: ICD-10-PCS | Mod: S$GLB,,, | Performed by: NURSE PRACTITIONER

## 2022-05-25 PROCEDURE — 3075F SYST BP GE 130 - 139MM HG: CPT | Mod: CPTII,S$GLB,, | Performed by: NURSE PRACTITIONER

## 2022-05-25 PROCEDURE — 3008F PR BODY MASS INDEX (BMI) DOCUMENTED: ICD-10-PCS | Mod: CPTII,S$GLB,, | Performed by: NURSE PRACTITIONER

## 2022-05-25 PROCEDURE — 3079F PR MOST RECENT DIASTOLIC BLOOD PRESSURE 80-89 MM HG: ICD-10-PCS | Mod: CPTII,S$GLB,, | Performed by: NURSE PRACTITIONER

## 2022-05-25 PROCEDURE — 1159F PR MEDICATION LIST DOCUMENTED IN MEDICAL RECORD: ICD-10-PCS | Mod: CPTII,S$GLB,, | Performed by: NURSE PRACTITIONER

## 2022-05-25 PROCEDURE — 1159F MED LIST DOCD IN RCRD: CPT | Mod: CPTII,S$GLB,, | Performed by: NURSE PRACTITIONER

## 2022-05-25 PROCEDURE — 99999 PR PBB SHADOW E&M-EST. PATIENT-LVL III: CPT | Mod: PBBFAC,,, | Performed by: NURSE PRACTITIONER

## 2022-05-25 PROCEDURE — 99999 PR PBB SHADOW E&M-EST. PATIENT-LVL III: ICD-10-PCS | Mod: PBBFAC,,, | Performed by: NURSE PRACTITIONER

## 2022-05-25 PROCEDURE — 99214 OFFICE O/P EST MOD 30 MIN: CPT | Mod: S$GLB,,, | Performed by: NURSE PRACTITIONER

## 2022-05-25 PROCEDURE — 3075F PR MOST RECENT SYSTOLIC BLOOD PRESS GE 130-139MM HG: ICD-10-PCS | Mod: CPTII,S$GLB,, | Performed by: NURSE PRACTITIONER

## 2022-05-25 RX ORDER — METHYLPREDNISOLONE 4 MG/1
TABLET ORAL
Qty: 21 EACH | Refills: 0 | Status: SHIPPED | OUTPATIENT
Start: 2022-05-25 | End: 2022-06-15

## 2022-05-25 RX ORDER — AMOXICILLIN 500 MG/1
500 TABLET, FILM COATED ORAL EVERY 12 HOURS
Qty: 14 TABLET | Refills: 0 | Status: SHIPPED | OUTPATIENT
Start: 2022-05-25 | End: 2022-06-01

## 2022-05-25 NOTE — TELEPHONE ENCOUNTER
Telephone and notified of EKG results, follow-up with Cardiology in the near future.  Advise go to emergency room for further evaluation if shortness of breath and chest pain.

## 2022-05-25 NOTE — PROGRESS NOTES
Subjective:       Patient ID: Abdullahi Urias is a 66 y.o. male.    Chief Complaint: Sore Throat    HPI    Pt here with c/o sore throat x 4 days/lump in neck R sided (x1 neck) Hx of chronic hoarseness  Had vocal cord surgery in the past       Past Medical History:   Diagnosis Date    [facility]    Asthma     BPH (benign prostatic hyperplasia)     CAD (coronary artery disease)     Cardiomyopathy     CHF (congestive heart failure)     Chronic hoarseness     vocal cord surg    Chronic pain     CKD (chronic kidney disease) stage 3, GFR 30-59 ml/min     CVA (cerebral infarction)     8/2012 olol; reviewed ed note    Ex-smoker     GERD (gastroesophageal reflux disease)     Hepatitis C     treatedharvoni says cured, RNA NEG 6/2020    Hypertension     Pancreatitis     Prostate cancer     Prostate cancer     Prostate cancer     PVD (peripheral vascular disease)     Renal insufficiency     Substance abuse     cocaine, etoh , tob in past     Past Surgical History:   Procedure Laterality Date    AORTIC VALVE REPLACEMENT  05/19/2014    Tissue valve replacement    BACK SURGERY      CARDIAC CATHETERIZATION      COLONOSCOPY  2011    COLONOSCOPY N/A 2/24/2021    Procedure: COLONOSCOPY;  Surgeon: Faith Carrillo MD;  Location: [facility];  Service: Endoscopy;  Laterality: N/A;    ESOPHAGOGASTRODUODENOSCOPY N/A 2/24/2021    Procedure: ESOPHAGOGASTRODUODENOSCOPY (EGD);  Surgeon: Faith Carrillo MD;  Location: [facility];  Service: Endoscopy;  Laterality: N/A;    FOOT SURGERY      LEFT HEART CATHETERIZATION Left 7/24/2020    Procedure: CATHETERIZATION, HEART, LEFT;  Surgeon: Pasha Martin MD;  Location: [facility] CATH LAB;  Service: Cardiology;  Laterality: Left;    PENILE PROSTHESIS IMPLANT      PENILE PROSTHESIS REVISION  04/30/2018    [facility]    radiation for prostate      UPPER GASTROINTESTINAL ENDOSCOPY  2011     Social History      Socioeconomic History    Marital status:    Tobacco Use    Smoking status: Former Smoker     Packs/day: 2.00     Years: 8.00     Pack years: 16.00     Quit date: 2012     Years since quittin.7    Smokeless tobacco: Never Used   Substance and Sexual Activity    Alcohol use: No     Comment: Sober since 2012    Drug use: No    Sexual activity: Yes   Social History Narrative    No pets in household, wife and daughter smokers. Former [employer].    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    allopurinoL (ZYLOPRIM) 100 MG tablet Take 1 tablet (100 mg total) by mouth once daily.    aluminum & magnesium hydroxide-simethicone (MYLANTA MAX STRENGTH) 400-400-40 mg/5 mL suspension TAKE 15ML BY MOUTH FOUR TIMES A DAY AS NEEDED FOR STOMACH ACID    amitriptyline (ELAVIL) 10 MG tablet TAKE 1 TABLET BY MOUTH AT BEDTIME AS NEEDED FOR PAIN    aspirin (ECOTRIN) 81 MG EC tablet TAKE ONE TABLET BY MOUTH EVERY DAY TO PREVENT BLOOD CLOT    atorvastatin (LIPITOR) 80 MG tablet TAKE ONE-HALF TABLET BY MOUTH EVERY DAY FOR CHOLESTEROL    baclofen (LIORESAL) 10 MG tablet Take 1 tablet (10 mg total) by mouth 3 (three) times daily.    carvediloL (COREG) 12.5 MG tablet Take 1 tablet (12.5 mg total) by mouth 2 (two) times daily.    clopidogreL (PLAVIX) 75 mg tablet Take 1 tablet (75 mg total) by mouth once daily.    cyclobenzaprine (FLEXERIL) 10 MG tablet TAKE ONE TABLET BY MOUTH THREE TIMES A DAY AS A MUSCLE RELAXANT    diclofenac sodium (VOLTAREN) 1 % Gel APPLY 2 GRAMS TOPICALLY FOUR TIMES A DAY AS NEEDED FOR PAIN AND INFLAMMATION. USE ENCLOSED DOSING CARD.    diphenhydrAMINE (SOMINEX) 25  mg tablet Take 25 mg by mouth nightly as needed for Insomnia.    famotidine (PEPCID) 40 MG tablet TAKE ONE TABLET BY MOUTH AT BEDTIME FOR ACID REFLUX    fexofenadine (ALLEGRA) 180 MG tablet Take 1 tablet (180 mg total) by mouth daily as needed.    flunisolide 25 mcg, 0.025%, (NASALIDE) 25 mcg (0.025 %) Spry 2 sprays by Nasal route as needed.     gabapentin (NEURONTIN) 600 MG tablet Take 1 tablet (600 mg total) by mouth 3 (three) times daily.    hydrocortisone 2.5 % cream Apply topically 2 (two) times daily as needed. Apply to affected areas of the face and neck.    hydrocortisone-pramoxine (PROCTOFOAM-HS) rectal foam INSERT 1 APPLICATORFUL INTO RECTALLY TWICE A DAY FOR HEMORRHOIDS    hydrOXYzine HCL (ATARAX) 25 MG tablet Take 25 mg by mouth 3 (three) times daily as needed for Itching.    LIDOcaine (LIDODERM) 5 % APPLY 1 PATCH TOPICALLY EVERY DAY FOR PAIN. WEAR FOR 12 HOURS, THEN REMOVE. DO NOT APPLY NEW PATCH FOR AT LEAST 12 HOURS.    losartan (COZAAR) 50 MG tablet Take 1 tablet (50 mg total) by mouth once daily.    methyl salicylate-menthol 15-10% 15-10 % Crea Apply topically as needed.     oxyCODONE-acetaminophen (PERCOCET)  mg per tablet Take 1 tablet by mouth every 4 (four) hours as needed for Pain.    pantoprazole (PROTONIX) 40 MG tablet Take 40 mg by mouth 2 (two) times daily.     sertraline (ZOLOFT) 100 MG tablet TAKE TWO TABLETS BY MOUTH EVERY DAY FOR MENTAL HEALTH DOSE INCREASED TO 200MG/DAY    simethicone (MYLICON) 80 MG chewable tablet Take 80 mg by mouth every 6 (six) hours as needed for Flatulence.    sucralfate (CARAFATE) 100 mg/mL suspension Take 1 g by mouth 4 (four) times daily.     triamcinolone acetonide 0.1% (KENALOG) 0.1 % cream Apply topically 2 (two) times daily. Use for up to 2 weeks as needed for itching. Repeat if flaring.    amoxicillin (AMOXIL) 500 MG Tab Take 1 tablet (500 mg total) by mouth every 12 (twelve) hours. for 7 days    levocetirizine (XYZAL) 5 MG tablet  Take 1 tablet (5 mg total) by mouth every evening.    methylPREDNISolone (MEDROL DOSEPACK) 4 mg tablet use as directed     No current facility-administered medications for this visit.     Facility-Administered Medications Ordered in Other Visits   Medication    sodium chloride 0.9% flush 10 mL           Review of Systems   Constitutional: Negative for activity change, appetite change, chills, diaphoresis, fatigue, fever and unexpected weight change.   HENT: Positive for sore throat and voice change. Negative for congestion, ear pain, postnasal drip, rhinorrhea, sinus pressure, sinus pain, sneezing, tinnitus and trouble swallowing.         Neck pain R side   Eyes: Negative for photophobia, pain and visual disturbance.   Respiratory: Negative for cough, chest tightness, shortness of breath and wheezing.    Cardiovascular: Negative for chest pain, palpitations and leg swelling.   Gastrointestinal: Negative for abdominal distention, abdominal pain, constipation, diarrhea, nausea and vomiting.   Genitourinary: Negative for decreased urine volume, difficulty urinating, dysuria, flank pain, frequency, hematuria and urgency.   Musculoskeletal: Negative for arthralgias, back pain, joint swelling, neck pain and neck stiffness.   Allergic/Immunologic: Negative for immunocompromised state.   Neurological: Negative for dizziness, tremors, seizures, syncope, facial asymmetry, speech difficulty, weakness, light-headedness, numbness and headaches.   Hematological: Negative for adenopathy. Does not bruise/bleed easily.   Psychiatric/Behavioral: Negative for confusion and sleep disturbance.       Objective:      Physical Exam  Vitals reviewed.   HENT:      Right Ear: Tympanic membrane normal.      Left Ear: Tympanic membrane normal.      Nose: Rhinorrhea present.      Mouth/Throat:      Pharynx: Posterior oropharyngeal erythema present.   Neck:        Comments: Mass noted to R vocal cord region-soft/nontender  Cardiovascular:       Rate and Rhythm: Normal rate and regular rhythm.      Heart sounds: Normal heart sounds.   Pulmonary:      Effort: Pulmonary effort is normal.      Breath sounds: Normal breath sounds.   Musculoskeletal:      Cervical back: Normal range of motion and neck supple.   Neurological:      Mental Status: He is alert.         Assessment:     Vitals:    05/25/22 1042   BP: 132/80   Pulse: 86   Resp: 20   Temp: 98.4 °F (36.9 °C)         1. Localized swelling, mass and lump, neck    2. Pharyngitis, unspecified etiology    3. Voice hoarsenesses        Plan:   Localized swelling, mass and lump, neck  -     US Soft Tissue Head Neck Thyroid; Future; Expected date: 05/25/2022    Pharyngitis, unspecified etiology    Voice hoarsenesses    Other orders  -     amoxicillin (AMOXIL) 500 MG Tab; Take 1 tablet (500 mg total) by mouth every 12 (twelve) hours. for 7 days  Dispense: 14 tablet; Refill: 0  -     methylPREDNISolone (MEDROL DOSEPACK) 4 mg tablet; use as directed  Dispense: 1 each; Refill: 0

## 2022-05-26 ENCOUNTER — HOSPITAL ENCOUNTER (OUTPATIENT)
Dept: RADIOLOGY | Facility: HOSPITAL | Age: 67
Discharge: HOME | End: 2022-05-26
Attending: NURSE PRACTITIONER
Payer: MEDICARE

## 2022-05-26 DIAGNOSIS — R22.1 LOCALIZED SWELLING, MASS AND LUMP, NECK: ICD-10-CM

## 2022-05-26 PROCEDURE — 76536 US EXAM OF HEAD AND NECK: CPT | Mod: TC

## 2022-05-26 PROCEDURE — 76536 US SOFT TISSUE HEAD NECK THYROID: ICD-10-PCS | Mod: 26,,, | Performed by: RADIOLOGY

## 2022-05-26 PROCEDURE — 76536 US EXAM OF HEAD AND NECK: CPT | Mod: 26,,, | Performed by: RADIOLOGY

## 2022-05-26 RX ORDER — ALBUTEROL SULFATE 90 UG/1
2 AEROSOL, METERED RESPIRATORY (INHALATION) EVERY 6 HOURS PRN
Qty: 6.7 G | Refills: 11 | Status: SHIPPED | OUTPATIENT
Start: 2022-05-26 | End: 2023-02-14

## 2022-05-27 ENCOUNTER — TELEPHONE (OUTPATIENT)
Dept: PAIN MEDICINE | Facility: CLINIC | Age: 67
End: 2022-05-27
Payer: MEDICARE

## 2022-05-27 DIAGNOSIS — M54.12 CERVICAL RADICULOPATHY: Primary | ICD-10-CM

## 2022-05-27 NOTE — TELEPHONE ENCOUNTER
Pt states he is no longer taking Pletal and now takes Clopidogrel (Plavix) 75 mg, states is still takes ASA 81 mg. Will get updates clearance instructions and request clearance from cardiologist, Dr. Mckeon accordingly.

## 2022-05-27 NOTE — TELEPHONE ENCOUNTER
Pt procedure was scheduled with Dr. Carmen  on 6/21/22. Went over pre procedure instructions and sent instructions to [facility]. Pt must hold plavix 7 days prior to procedure and asa 5 days. Go clearance .Patient verbalized understanding

## 2022-05-27 NOTE — TELEPHONE ENCOUNTER
Patient is taking ASA and clopidogrel (Plavix); he will have to stop ASA for 5 days and Plavix for 7 days prior to procedure.  Will get clearance from Dr. Mckeon (Cardiology).

## 2022-06-01 ENCOUNTER — OFFICE VISIT (OUTPATIENT)
Dept: URGENT CARE | Facility: CLINIC | Age: 67
End: 2022-06-01
Payer: MEDICARE

## 2022-06-01 ENCOUNTER — OFFICE VISIT (OUTPATIENT)
Dept: PODIATRY | Facility: CLINIC | Age: 67
End: 2022-06-01
Payer: MEDICARE

## 2022-06-01 VITALS — BODY MASS INDEX: 36.54 KG/M2 | WEIGHT: 246.69 LBS | HEIGHT: 69 IN

## 2022-06-01 VITALS
RESPIRATION RATE: 17 BRPM | HEART RATE: 78 BPM | TEMPERATURE: 98 F | HEIGHT: 69 IN | SYSTOLIC BLOOD PRESSURE: 119 MMHG | WEIGHT: 246.69 LBS | OXYGEN SATURATION: 98 % | BODY MASS INDEX: 36.54 KG/M2 | DIASTOLIC BLOOD PRESSURE: 83 MMHG

## 2022-06-01 DIAGNOSIS — N18.30 STAGE 3 CHRONIC KIDNEY DISEASE, UNSPECIFIED WHETHER STAGE 3A OR 3B CKD: ICD-10-CM

## 2022-06-01 DIAGNOSIS — I10 HYPERTENSION, UNSPECIFIED TYPE: Primary | ICD-10-CM

## 2022-06-01 DIAGNOSIS — Z79.01 CURRENT USE OF LONG TERM ANTICOAGULATION: ICD-10-CM

## 2022-06-01 DIAGNOSIS — L60.0 ONYCHOCRYPTOSIS: Primary | ICD-10-CM

## 2022-06-01 DIAGNOSIS — L60.9 NAIL PROBLEM: ICD-10-CM

## 2022-06-01 DIAGNOSIS — L60.3 ONYCHODYSTROPHY: ICD-10-CM

## 2022-06-01 PROCEDURE — 1101F PR PT FALLS ASSESS DOC 0-1 FALLS W/OUT INJ PAST YR: ICD-10-PCS | Mod: CPTII,S$GLB,, | Performed by: PODIATRIST

## 2022-06-01 PROCEDURE — 3074F PR MOST RECENT SYSTOLIC BLOOD PRESSURE < 130 MM HG: ICD-10-PCS | Mod: CPTII,S$GLB,, | Performed by: NURSE PRACTITIONER

## 2022-06-01 PROCEDURE — 3288F FALL RISK ASSESSMENT DOCD: CPT | Mod: CPTII,S$GLB,, | Performed by: PODIATRIST

## 2022-06-01 PROCEDURE — 1101F PT FALLS ASSESS-DOCD LE1/YR: CPT | Mod: CPTII,S$GLB,, | Performed by: PODIATRIST

## 2022-06-01 PROCEDURE — 3079F PR MOST RECENT DIASTOLIC BLOOD PRESSURE 80-89 MM HG: ICD-10-PCS | Mod: CPTII,S$GLB,, | Performed by: NURSE PRACTITIONER

## 2022-06-01 PROCEDURE — 3008F BODY MASS INDEX DOCD: CPT | Mod: CPTII,S$GLB,, | Performed by: PODIATRIST

## 2022-06-01 PROCEDURE — 99214 PR OFFICE/OUTPT VISIT, EST, LEVL IV, 30-39 MIN: ICD-10-PCS | Mod: S$GLB,,, | Performed by: NURSE PRACTITIONER

## 2022-06-01 PROCEDURE — 1126F PR PAIN SEVERITY QUANTIFIED, NO PAIN PRESENT: ICD-10-PCS | Mod: CPTII,S$GLB,, | Performed by: NURSE PRACTITIONER

## 2022-06-01 PROCEDURE — 1159F MED LIST DOCD IN RCRD: CPT | Mod: CPTII,S$GLB,, | Performed by: PODIATRIST

## 2022-06-01 PROCEDURE — 11750 EXCISION NAIL&NAIL MATRIX: CPT | Mod: TA,S$GLB,, | Performed by: PODIATRIST

## 2022-06-01 PROCEDURE — 99214 OFFICE O/P EST MOD 30 MIN: CPT | Mod: S$GLB,,, | Performed by: NURSE PRACTITIONER

## 2022-06-01 PROCEDURE — 3079F DIAST BP 80-89 MM HG: CPT | Mod: CPTII,S$GLB,, | Performed by: NURSE PRACTITIONER

## 2022-06-01 PROCEDURE — 3008F PR BODY MASS INDEX (BMI) DOCUMENTED: ICD-10-PCS | Mod: CPTII,S$GLB,, | Performed by: NURSE PRACTITIONER

## 2022-06-01 PROCEDURE — 99499 UNLISTED E&M SERVICE: CPT | Mod: S$GLB,,, | Performed by: PODIATRIST

## 2022-06-01 PROCEDURE — 3008F PR BODY MASS INDEX (BMI) DOCUMENTED: ICD-10-PCS | Mod: CPTII,S$GLB,, | Performed by: PODIATRIST

## 2022-06-01 PROCEDURE — 1160F RVW MEDS BY RX/DR IN RCRD: CPT | Mod: CPTII,S$GLB,, | Performed by: NURSE PRACTITIONER

## 2022-06-01 PROCEDURE — 1160F RVW MEDS BY RX/DR IN RCRD: CPT | Mod: CPTII,S$GLB,, | Performed by: PODIATRIST

## 2022-06-01 PROCEDURE — 99999 PR PBB SHADOW E&M-EST. PATIENT-LVL IV: ICD-10-PCS | Mod: PBBFAC,,, | Performed by: PODIATRIST

## 2022-06-01 PROCEDURE — 1160F PR REVIEW ALL MEDS BY PRESCRIBER/CLIN PHARMACIST DOCUMENTED: ICD-10-PCS | Mod: CPTII,S$GLB,, | Performed by: PODIATRIST

## 2022-06-01 PROCEDURE — 1125F PR PAIN SEVERITY QUANTIFIED, PAIN PRESENT: ICD-10-PCS | Mod: CPTII,S$GLB,, | Performed by: PODIATRIST

## 2022-06-01 PROCEDURE — 1160F PR REVIEW ALL MEDS BY PRESCRIBER/CLIN PHARMACIST DOCUMENTED: ICD-10-PCS | Mod: CPTII,S$GLB,, | Performed by: NURSE PRACTITIONER

## 2022-06-01 PROCEDURE — 1125F AMNT PAIN NOTED PAIN PRSNT: CPT | Mod: CPTII,S$GLB,, | Performed by: PODIATRIST

## 2022-06-01 PROCEDURE — 1126F AMNT PAIN NOTED NONE PRSNT: CPT | Mod: CPTII,S$GLB,, | Performed by: NURSE PRACTITIONER

## 2022-06-01 PROCEDURE — 3008F BODY MASS INDEX DOCD: CPT | Mod: CPTII,S$GLB,, | Performed by: NURSE PRACTITIONER

## 2022-06-01 PROCEDURE — 1159F MED LIST DOCD IN RCRD: CPT | Mod: CPTII,S$GLB,, | Performed by: NURSE PRACTITIONER

## 2022-06-01 PROCEDURE — 99999 PR PBB SHADOW E&M-EST. PATIENT-LVL IV: CPT | Mod: PBBFAC,,, | Performed by: PODIATRIST

## 2022-06-01 PROCEDURE — 3288F PR FALLS RISK ASSESSMENT DOCUMENTED: ICD-10-PCS | Mod: CPTII,S$GLB,, | Performed by: PODIATRIST

## 2022-06-01 PROCEDURE — 11750 NAIL REMOVAL: ICD-10-PCS | Mod: TA,S$GLB,, | Performed by: PODIATRIST

## 2022-06-01 PROCEDURE — 1159F PR MEDICATION LIST DOCUMENTED IN MEDICAL RECORD: ICD-10-PCS | Mod: CPTII,S$GLB,, | Performed by: NURSE PRACTITIONER

## 2022-06-01 PROCEDURE — 3074F SYST BP LT 130 MM HG: CPT | Mod: CPTII,S$GLB,, | Performed by: NURSE PRACTITIONER

## 2022-06-01 PROCEDURE — 99499 NO LOS: ICD-10-PCS | Mod: S$GLB,,, | Performed by: PODIATRIST

## 2022-06-01 PROCEDURE — 1159F PR MEDICATION LIST DOCUMENTED IN MEDICAL RECORD: ICD-10-PCS | Mod: CPTII,S$GLB,, | Performed by: PODIATRIST

## 2022-06-01 NOTE — PROCEDURES
Nail Removal    Date/Time: 6/1/2022 8:45 AM  Performed by: Osiris Lilly DPM  Authorized by: Osiris Lilly DPM     Consent Done?:  Yes (Written)  Location:     Location:  Left foot    Location detail:  Left big toe  Anesthesia:     Anesthesia:  Digital block    Local anesthetic:  Lidocaine 1% without epinephrine    Anesthetic total (ml):  3  Procedure Details:     Preparation:  Skin prepped with alcohol and skin prepped with Betadine    Amount removed:  Complete    Wedge excision of skin of nail fold: No      Nail bed sutured?: No      Nail matrix removed:  Complete    Removal method:  Phenol and alcohol    Removed nail replaced and anchored: No      Dressing applied:  4x4, antibiotic ointment and dressing applied    Patient tolerance:  Patient tolerated the procedure well with no immediate complications

## 2022-06-01 NOTE — PROGRESS NOTES
"Subjective:       Patient ID: Abdullahi Urias is a 66 y.o. male.    Vitals:  height is 5' 9" (1.753 m) and weight is 111.9 kg (246 lb 11.1 oz). His tympanic temperature is 98.2 °F (36.8 °C). His blood pressure is 119/83 and his pulse is 78. His respiration is 17 and oxygen saturation is 98%.     Chief Complaint: Nail Problem    Patient presents to Urgent Care this afternoon complaining of bleeding significantly from his left foot big toe. Patient had toe nail removed this morning at Podiatry with Dr. Lilly. Patient states toe has never stopped bleeding, but, got worse since leaving podiatry office. Patient states he has been taking blood thinners and aspirin. Patient did not take dose yesterday or this morning of either medication due to procedure. Patient denies any pain to the area. Patient states he tried to put a bandage on area, but, it was bleeding too significantly.     Nail Problem  This is a new problem. The current episode started today. The problem occurs constantly. Pertinent negatives include no abdominal pain, anorexia, arthralgias, change in bowel habit, chest pain, chills, congestion, coughing, diaphoresis, fatigue, fever, headaches, joint swelling, myalgias, nausea, neck pain, numbness, rash, sore throat, swollen glands, urinary symptoms, vertigo, visual change, vomiting or weakness. Nothing aggravates the symptoms. He has tried nothing for the symptoms.       Constitution: Negative for chills, sweating, fatigue and fever.   HENT: Negative for congestion and sore throat.    Neck: Negative for neck pain.   Cardiovascular: Negative for chest pain.   Respiratory: Negative for cough.    Gastrointestinal: Negative for abdominal pain, nausea and vomiting.   Musculoskeletal: Negative for joint pain, joint swelling and muscle ache.   Skin: Negative for rash.   Neurological: Negative for history of vertigo, headaches and numbness.         Past Medical History:   Diagnosis Date    [facility]    Asthma     BPH (benign prostatic hyperplasia)     CAD (coronary artery disease)     Cardiomyopathy     CHF (congestive heart failure)     Chronic hoarseness     vocal cord surg    Chronic pain     CKD (chronic kidney disease) stage 3, GFR 30-59 ml/min     CVA (cerebral infarction)     2012 olol; reviewed ed note    Ex-smoker     GERD (gastroesophageal reflux disease)     Hepatitis C     treatedharvoni says cured, RNA NEG 2020    Hypertension     Pancreatitis     Prostate cancer     Prostate cancer     Prostate cancer     PVD (peripheral vascular disease)     Renal insufficiency     Substance abuse     cocaine, etoh , tob in past          Social History     Socioeconomic History    Marital status:    Tobacco Use    Smoking status: Former Smoker     Packs/day: 2.00     Years: 8.00     Pack years: 16.00     Quit date: 2012     Years since quittin.7    Smokeless tobacco: Never Used   Substance and Sexual Activity    Alcohol use: No     Comment: Sober since 2012    Drug use: No    Sexual activity: Yes   Social History Narrative    No pets in household, wife and daughter smokers. Former [employer].    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  SKIN: No rashes, itching or changes in color or texture of skin.  HEENT: No headaches or recent head trauma. Visual acuity fine. No photophobia, ocular pain or diplopia. Denies ear pain, discharge or vertigo. No loss of smell, no epistaxis or postnasal drip. No hoarseness or  change in voice.   NODES: No masses or lesions. Denies swollen glands.  CHEST: Denies cyanosis, wheezing, cough and sputum production.  CARDIOVASCULAR: Denies chest pain, PND, orthopnea or reduced exercise tolerance.  ABDOMEN: Appetite fine. No weight loss. Denies diarrhea, abdominal pain, hematemesis or blood in stool.  MUSCULOSKELETAL:  Left great toenail bed with blood clot, Denies back pain.  NEUROLOGIC: No history of seizures, paralysis, alteration of gait or coordination.  PSYCHIATRIC: Denies mood swings, depression or suicidal thoughts.    PE:   APPEARANCE: Well nourished, well developed, in no acute distress.   V/S: Reviewed.  SKIN:  Left great toenail bed with blood clot, no active bleeding, blood tinged fluid on of left foot and toes.  CHEST:  No respiratory symptoms.  CARDIOVASCULAR: Regular rate and rhythm   MUSCULOSKELETAL:  Left great toe and foot with full range of motion, toenail bed with blood clot, no active bleeding, blood-tinged fluid on of foot and toes, left foot and toes clean and pressure dressing applied to left great toenail bed  NEUROLOGIC: No sensory deficits. Gait & Posture: Normal gait and fine motion. No cerebellar signs.  MENTAL STATUS: Patient alert, oriented x 3 & conversant.    PLAN:   Consult podiatry  Advise elevate and apply cool compress  Advise clean foot and apply dressing  Practice good handwashing.  Tylenol  for fever, headache and body aches..  Advise follow up with PCP in 2- 3 days for recheck  Advise go to ER if nausea, vomiting, fever, increased pain, or fail to improve with treatment.  AVS provided and reviewed with patient including supportive care, follow up, and red flag symptoms.   Patient verbalizes understanding and agrees with treatment plan. Discharged from Urgent Care in stable condition.      DIAGNOSIS:  Left great toenail/nail bed problem  Hypertension  Chronic kidney disease  Anticoagulation/long-term

## 2022-06-01 NOTE — PROGRESS NOTES
Subjective:       Patient ID: Abdullahi Urias is a 66 y.o. male.    Chief Complaint: Ingrown Toenail (C/o ingrown toenail on left hallux, rates pain 7/10, nondiabetic, wearing socks and slippers, last seen PCP Dr. Valentin on 01/27/2022.)      HPI: Abdullahi Urias presents to the office with complaints of pains to the left great  toe, due to ingrowing. States no drainage. States swelling, redness and moderate to severe pains. Symptoms have been on going for several days and are worsening. States difficulties with walking as a result of pains. Walking and standing, particularly with shoe gear, exacerbates the ailment. Pains are sharp in nature and are rated at approx. 7/10.  Has held his anticoagulation therapy for 2 days prior.  Will restart medication tomorrow following procedure.  Patient's Primary Care Provider is Reji Valentin MD.     Review of patient's allergies indicates:   Allergen Reactions    Pletal [cilostazol]      Rash and leg pain       Past Medical History:   Diagnosis Date    [facility]    Asthma     BPH (benign prostatic hyperplasia)     CAD (coronary artery disease)     Cardiomyopathy     CHF (congestive heart failure)     Chronic hoarseness     vocal cord surg    Chronic pain     CKD (chronic kidney disease) stage 3, GFR 30-59 ml/min     CVA (cerebral infarction)     8/2012 olol; reviewed ed note    Ex-smoker     GERD (gastroesophageal reflux disease)     Hepatitis C     treatedharvoni says cured, RNA NEG 6/2020    Hypertension     Pancreatitis     Prostate cancer     Prostate cancer     Prostate cancer     PVD (peripheral vascular disease)     Renal insufficiency     Substance abuse     cocaine, etoh , tob in past       Family History   Problem Relation Age of Onset    Heart disease Mother     Heart attack Sister     Heart attack Brother     Colon cancer Neg Hx     Colon polyps Neg Hx     Liver cancer Neg Hx     Inflammatory bowel  "disease Neg Hx     Liver disease Neg Hx     Rectal cancer Neg Hx     Stomach cancer Neg Hx     Ulcerative colitis Neg Hx        Social History     Socioeconomic History    Marital status:    Tobacco Use    Smoking status: Former Smoker     Packs/day: 2.00     Years: 8.00     Pack years: 16.00     Quit date: 2012     Years since quittin.7    Smokeless tobacco: Never Used   Substance and Sexual Activity    Alcohol use: No     Comment: Sober since 2012    Drug use: No    Sexual activity: Yes   Social History Narrative    No pets in household, wife and daughter smokers. Former [employer].    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; as of  retired       Past Surgical History:   Procedure Laterality Date    AORTIC VALVE REPLACEMENT  2014    Tissue valve replacement    BACK SURGERY      CARDIAC CATHETERIZATION      COLONOSCOPY      COLONOSCOPY N/A 2021    Procedure: COLONOSCOPY;  Surgeon: Faith Carrillo MD;  Location: [facility] ENDO;  Service: Endoscopy;  Laterality: N/A;    ESOPHAGOGASTRODUODENOSCOPY N/A 2021    Procedure: ESOPHAGOGASTRODUODENOSCOPY (EGD);  Surgeon: Faith Carrillo MD;  Location: [facility] ENDO;  Service: Endoscopy;  Laterality: N/A;    FOOT SURGERY      LEFT HEART CATHETERIZATION Left 2020    Procedure: CATHETERIZATION, HEART, LEFT;  Surgeon: Pasha Martin MD;  Location: [facility] CATH LAB;  Service: Cardiology;  Laterality: Left;    PENILE PROSTHESIS IMPLANT      PENILE PROSTHESIS REVISION  2018    [facility]    radiation for prostate      UPPER GASTROINTESTINAL ENDOSCOPY         Review of Systems      Objective:   Ht 5' 9" (1.753 m)   Wt 111.9 kg (246 lb 11.1 oz)   BMI 36.43 kg/m²     Physical Exam  LOWER EXTREMITY PHYSICAL EXAMINATION    NEUROLOGY: Sensation to light touch is intact. Proprioception is intact.     VASCULAR: On the left foot, the dorsalis pedis pulse is 2/4 and the posterior " tibial pulse is 2/4. Capillary refill time is less than 3 seconds. Hair growth is present on the dorsum of the foot and at the digits. Proximal to distal temperature is warm to warm.    DERMATOLOGY: Ingrowing of the left foot medial and lateral nail border of the great toe. The nail is incurvated into the skin of the affected border, causing pains, which are moderate in nature. There is moderate to severe edema. There is no cellulitis noted. There is no drainage. No fluctuance. Granuloma formation is absent.    ORTHOPEDIC: Manual Muscle Testing is 5/5 in all planes on the left, without pains, with and without resistance. Gait pattern is slightly antalgic.    Assessment:     1. Onychocryptosis    2. Onychodystrophy    3. Current use of long term anticoagulation        Plan:     Onychocryptosis  -     Nail Removal    Onychodystrophy  -     Nail Removal    Current use of long term anticoagulation        We discussed patient's options for treating the ingrown toenail.  We discussed slant back procedure to remove the distal offending edge, we discussed temporary partial avulsion, temporary complete avulsion, and attempted permanent matricectomy.  Patient would like to proceed with attempted per matricectomy of the left hallux nail plate.  Discussed the risk and benefits of the procedure.  Discussed risk of recurrence.  Patient did give written consent to proceed with procedure.    Patient tolerated procedure well without complications.  Large spicule was removed without complications.  History patient will start soaking in warm water and Epson salt twice daily.  Apply antibiotic cream and a Band-Aid to the affected borders following soaking and showering.  Patient will call if there is any acute signs of infection associated with increased redness, swelling, abnormal drainage, increased pain.        Future Appointments   Date Time Provider Department Center   6/2/2022 10:50 AM TIKA FRANCOIS [facility]    6/9/2022  8:30 AM Brijesh Delatorre MD [facility]   6/17/2022  3:00 PM Ramses Juarez III, MD [facility] GASTRO [facility]   7/19/2022  9:00 AM Jeffery Mckeon MD [facility] CARDIO [facility]   9/13/2022 10:00 AM TIKA FRANCOIS [facility]   9/20/2022  1:00 PM Dominic Garces III, MD [facility]   11/3/2022  3:15 PM Rommel Rodriguez MD [facility] UROLOGY [facility]   1/25/2023  7:50 AM LABORATORY, [facility]   1/30/2023  1:40 PM Reji Valentin MD [facility]

## 2022-06-01 NOTE — PATIENT INSTRUCTIONS
PLAN:   Consult podiatry  Advise elevate and apply cool compress  Advise clean foot and apply dressing  Practice good handwashing.  Tylenol  for fever, headache and body aches..  Advise follow up with PCP in 2- 3 days for recheck  Advise go to ER if nausea, vomiting, fever, increased pain, or fail to improve with treatment.  AVS provided and reviewed with patient including supportive care, follow up, and red flag symptoms.   Patient verbalizes understanding and agrees with treatment plan. Discharged from Urgent Care in stable condition.

## 2022-06-02 ENCOUNTER — LAB VISIT (OUTPATIENT)
Dept: LAB | Facility: HOSPITAL | Age: 67
End: 2022-06-02
Attending: INTERNAL MEDICINE
Payer: MEDICARE

## 2022-06-02 DIAGNOSIS — N18.30 STAGE 3 CHRONIC KIDNEY DISEASE, UNSPECIFIED WHETHER STAGE 3A OR 3B CKD: ICD-10-CM

## 2022-06-02 LAB
ALBUMIN SERPL BCP-MCNC: 3.8 G/DL (ref 3.5–5.2)
ANION GAP SERPL CALC-SCNC: 7 MMOL/L (ref 8–16)
BASOPHILS # BLD AUTO: 0.02 K/UL (ref 0–0.2)
BASOPHILS NFR BLD: 0.4 % (ref 0–1.9)
BUN SERPL-MCNC: 17 MG/DL (ref 8–23)
CALCIUM SERPL-MCNC: 8.9 MG/DL (ref 8.7–10.5)
CHLORIDE SERPL-SCNC: 107 MMOL/L (ref 95–110)
CO2 SERPL-SCNC: 24 MMOL/L (ref 23–29)
CREAT SERPL-MCNC: 1.6 MG/DL (ref 0.5–1.4)
DIFFERENTIAL METHOD: ABNORMAL
EOSINOPHIL # BLD AUTO: 0.1 K/UL (ref 0–0.5)
EOSINOPHIL NFR BLD: 2.2 % (ref 0–8)
ERYTHROCYTE [DISTWIDTH] IN BLOOD BY AUTOMATED COUNT: 14.7 % (ref 11.5–14.5)
EST. GFR  (AFRICAN AMERICAN): 51.1 ML/MIN/1.73 M^2
EST. GFR  (NON AFRICAN AMERICAN): 44.2 ML/MIN/1.73 M^2
GLUCOSE SERPL-MCNC: 97 MG/DL (ref 70–110)
HCT VFR BLD AUTO: 36.2 % (ref 40–54)
HGB BLD-MCNC: 12.3 G/DL (ref 14–18)
IMM GRANULOCYTES # BLD AUTO: 0.05 K/UL (ref 0–0.04)
IMM GRANULOCYTES NFR BLD AUTO: 1.1 % (ref 0–0.5)
LYMPHOCYTES # BLD AUTO: 1 K/UL (ref 1–4.8)
LYMPHOCYTES NFR BLD: 20.5 % (ref 18–48)
MCH RBC QN AUTO: 28.6 PG (ref 27–31)
MCHC RBC AUTO-ENTMCNC: 34 G/DL (ref 32–36)
MCV RBC AUTO: 84 FL (ref 82–98)
MONOCYTES # BLD AUTO: 0.4 K/UL (ref 0.3–1)
MONOCYTES NFR BLD: 9.5 % (ref 4–15)
NEUTROPHILS # BLD AUTO: 3.1 K/UL (ref 1.8–7.7)
NEUTROPHILS NFR BLD: 66.3 % (ref 38–73)
NRBC BLD-RTO: 0 /100 WBC
PHOSPHATE SERPL-MCNC: 3.4 MG/DL (ref 2.7–4.5)
PLATELET # BLD AUTO: 154 K/UL (ref 150–450)
PMV BLD AUTO: 9.8 FL (ref 9.2–12.9)
POTASSIUM SERPL-SCNC: 4.1 MMOL/L (ref 3.5–5.1)
PTH-INTACT SERPL-MCNC: 153.2 PG/ML (ref 9–77)
RBC # BLD AUTO: 4.3 M/UL (ref 4.6–6.2)
SODIUM SERPL-SCNC: 138 MMOL/L (ref 136–145)
WBC # BLD AUTO: 4.63 K/UL (ref 3.9–12.7)

## 2022-06-02 PROCEDURE — 85025 COMPLETE CBC W/AUTO DIFF WBC: CPT | Performed by: INTERNAL MEDICINE

## 2022-06-02 PROCEDURE — 36415 COLL VENOUS BLD VENIPUNCTURE: CPT | Performed by: INTERNAL MEDICINE

## 2022-06-02 PROCEDURE — 80069 RENAL FUNCTION PANEL: CPT | Performed by: INTERNAL MEDICINE

## 2022-06-02 PROCEDURE — 83970 ASSAY OF PARATHORMONE: CPT | Performed by: INTERNAL MEDICINE

## 2022-06-08 NOTE — PRE-PROCEDURE INSTRUCTIONS
Spoke with patient regarding procedure scheduled on 6.21     Arrival time 1000     Has patient been sick with fever or on antibiotics within the last 7 days? No     Does the patient have any open wounds, sores or rashes? No     Does the patient have any recent fractures? no     Has patient received a vaccination within the last 7 days? No     Received the COVID vaccination?      Has the patient stopped all medications as directed? na     Does patient have a pacemaker and or defibrillator? no     Does the patient have a ride to and from procedure and someone reliable to remain with patient? wife     Is the patient diabetic? no     Does the patient have sleep apnea? Or use O2 at home? elda cpap     Is the patient receiving sedation? yes     Is the patient instructed to remain NPO beginning at midnight the night before their procedure? yes     Procedure location confirmed with patient? Yes     Covid- Denies signs/symptoms. Instructed to notify PAT/MD if any changes.

## 2022-06-09 ENCOUNTER — OFFICE VISIT (OUTPATIENT)
Dept: NEPHROLOGY | Facility: CLINIC | Age: 67
End: 2022-06-09
Payer: MEDICARE

## 2022-06-09 VITALS
HEIGHT: 69 IN | RESPIRATION RATE: 20 BRPM | DIASTOLIC BLOOD PRESSURE: 88 MMHG | WEIGHT: 246.69 LBS | BODY MASS INDEX: 36.54 KG/M2 | SYSTOLIC BLOOD PRESSURE: 122 MMHG | HEART RATE: 57 BPM

## 2022-06-09 DIAGNOSIS — N18.30 STAGE 3 CHRONIC KIDNEY DISEASE, UNSPECIFIED WHETHER STAGE 3A OR 3B CKD: Primary | ICD-10-CM

## 2022-06-09 PROCEDURE — 99214 OFFICE O/P EST MOD 30 MIN: CPT | Mod: S$GLB,,, | Performed by: INTERNAL MEDICINE

## 2022-06-09 PROCEDURE — 1125F PR PAIN SEVERITY QUANTIFIED, PAIN PRESENT: ICD-10-PCS | Mod: CPTII,S$GLB,, | Performed by: INTERNAL MEDICINE

## 2022-06-09 PROCEDURE — 3008F PR BODY MASS INDEX (BMI) DOCUMENTED: ICD-10-PCS | Mod: CPTII,S$GLB,, | Performed by: INTERNAL MEDICINE

## 2022-06-09 PROCEDURE — 3074F SYST BP LT 130 MM HG: CPT | Mod: CPTII,S$GLB,, | Performed by: INTERNAL MEDICINE

## 2022-06-09 PROCEDURE — 1125F AMNT PAIN NOTED PAIN PRSNT: CPT | Mod: CPTII,S$GLB,, | Performed by: INTERNAL MEDICINE

## 2022-06-09 PROCEDURE — 3074F PR MOST RECENT SYSTOLIC BLOOD PRESSURE < 130 MM HG: ICD-10-PCS | Mod: CPTII,S$GLB,, | Performed by: INTERNAL MEDICINE

## 2022-06-09 PROCEDURE — 3066F PR DOCUMENTATION OF TREATMENT FOR NEPHROPATHY: ICD-10-PCS | Mod: CPTII,S$GLB,, | Performed by: INTERNAL MEDICINE

## 2022-06-09 PROCEDURE — 1101F PT FALLS ASSESS-DOCD LE1/YR: CPT | Mod: CPTII,S$GLB,, | Performed by: INTERNAL MEDICINE

## 2022-06-09 PROCEDURE — 3288F PR FALLS RISK ASSESSMENT DOCUMENTED: ICD-10-PCS | Mod: CPTII,S$GLB,, | Performed by: INTERNAL MEDICINE

## 2022-06-09 PROCEDURE — 3288F FALL RISK ASSESSMENT DOCD: CPT | Mod: CPTII,S$GLB,, | Performed by: INTERNAL MEDICINE

## 2022-06-09 PROCEDURE — 99499 UNLISTED E&M SERVICE: CPT | Mod: S$GLB,,, | Performed by: INTERNAL MEDICINE

## 2022-06-09 PROCEDURE — 99999 PR PBB SHADOW E&M-EST. PATIENT-LVL IV: CPT | Mod: PBBFAC,,, | Performed by: INTERNAL MEDICINE

## 2022-06-09 PROCEDURE — 1159F PR MEDICATION LIST DOCUMENTED IN MEDICAL RECORD: ICD-10-PCS | Mod: CPTII,S$GLB,, | Performed by: INTERNAL MEDICINE

## 2022-06-09 PROCEDURE — 3008F BODY MASS INDEX DOCD: CPT | Mod: CPTII,S$GLB,, | Performed by: INTERNAL MEDICINE

## 2022-06-09 PROCEDURE — 3079F DIAST BP 80-89 MM HG: CPT | Mod: CPTII,S$GLB,, | Performed by: INTERNAL MEDICINE

## 2022-06-09 PROCEDURE — 3066F NEPHROPATHY DOC TX: CPT | Mod: CPTII,S$GLB,, | Performed by: INTERNAL MEDICINE

## 2022-06-09 PROCEDURE — 99214 PR OFFICE/OUTPT VISIT, EST, LEVL IV, 30-39 MIN: ICD-10-PCS | Mod: S$GLB,,, | Performed by: INTERNAL MEDICINE

## 2022-06-09 PROCEDURE — 1159F MED LIST DOCD IN RCRD: CPT | Mod: CPTII,S$GLB,, | Performed by: INTERNAL MEDICINE

## 2022-06-09 PROCEDURE — 99499 RISK ADDL DX/OHS AUDIT: ICD-10-PCS | Mod: S$GLB,,, | Performed by: INTERNAL MEDICINE

## 2022-06-09 PROCEDURE — 99999 PR PBB SHADOW E&M-EST. PATIENT-LVL IV: ICD-10-PCS | Mod: PBBFAC,,, | Performed by: INTERNAL MEDICINE

## 2022-06-09 PROCEDURE — 3079F PR MOST RECENT DIASTOLIC BLOOD PRESSURE 80-89 MM HG: ICD-10-PCS | Mod: CPTII,S$GLB,, | Performed by: INTERNAL MEDICINE

## 2022-06-09 PROCEDURE — 1101F PR PT FALLS ASSESS DOC 0-1 FALLS W/OUT INJ PAST YR: ICD-10-PCS | Mod: CPTII,S$GLB,, | Performed by: INTERNAL MEDICINE

## 2022-06-09 NOTE — PROGRESS NOTES
Renal clinic f/u note:  Date of clinic visit: 6/9/22  Reason for f/u and chief c/o: CKD stage 3     HPI: Pt is a 64 y/o male with CKD stage 3, gout, HTN, and prostate cancer, who presents for f/u. Pt was last seen in renal clinic 1 year ago. Chart and urology note reviewed. Pt has no new c/o's today, feels no SOB, no leg swelling. No recent gout attacks. BP meds reviewed with pt.     PAST MEDICAL HISTORY: CKD stage 3, HTN, aortic stenosis, Asthma, BPH (benign prostatic hyperplasia), CAD (coronary artery disease), Cardiomyopathy, CHF (congestive heart failure), Chronic hoarseness, CVA (cerebral infarction), Ex-smoker, GERD (gastroesophageal reflux disease), Hepatitis C, Pancreatitis, Prostate cancer, Prostate cancer (h/o of prostatectomy and XRT treatment) and Substance abuse.     PAST SURGICAL HISTORY:  He  has a past surgical history that includes Foot surgery; Back surgery; Penile prosthesis implant; Cardiac catheterization; Upper gastrointestinal endoscopy (2011); Colonoscopy (2011); Aortic valve replacement (05/19/2014); Penile prosthesis revision (04/30/2018); Prostatectomy (09/2019); Left heart catheterization (Left, 7/24/2020); Esophagogastroduodenoscopy (N/A, 2/24/2021); and Colonoscopy (N/A, 2/24/2021).     SOCIAL HISTORY:  He  reports that he quit smoking about 8 years ago. He has a 16.00 pack-year smoking history. He has never used smokeless tobacco. He reports that he does not drink alcohol and does not use drugs.     FAMILY MEDICAL HISTORY:  His family history includes Heart attack in his brother and sister; Heart disease in his mother.     Review of patient's allergies indicates:  No Known Allergies     Meds reviewed    Current Outpatient Medications:     albuterol (PROVENTIL HFA) 90 mcg/actuation inhaler, Inhale 2 puffs into the lungs every 6 (six) hours as needed for Wheezing. Rescue, Disp: 6.7 g, Rfl: 11    allopurinoL (ZYLOPRIM) 100 MG tablet, Take 1 tablet (100 mg total) by mouth once daily.,  Disp: 30 tablet, Rfl: 11    aluminum & magnesium hydroxide-simethicone (MYLANTA MAX STRENGTH) 400-400-40 mg/5 mL suspension, TAKE 15ML BY MOUTH FOUR TIMES A DAY AS NEEDED FOR STOMACH ACID, Disp: , Rfl:     amitriptyline (ELAVIL) 10 MG tablet, TAKE 1 TABLET BY MOUTH AT BEDTIME AS NEEDED FOR PAIN, Disp: 30 tablet, Rfl: 11    aspirin (ECOTRIN) 81 MG EC tablet, TAKE ONE TABLET BY MOUTH EVERY DAY TO PREVENT BLOOD CLOT, Disp: , Rfl:     atorvastatin (LIPITOR) 80 MG tablet, TAKE ONE-HALF TABLET BY MOUTH EVERY DAY FOR CHOLESTEROL, Disp: , Rfl:     baclofen (LIORESAL) 10 MG tablet, Take 1 tablet (10 mg total) by mouth 3 (three) times daily., Disp: 90 tablet, Rfl: 11    carvediloL (COREG) 12.5 MG tablet, Take 1 tablet (12.5 mg total) by mouth 2 (two) times daily., Disp: 60 tablet, Rfl: 11    clopidogreL (PLAVIX) 75 mg tablet, Take 1 tablet (75 mg total) by mouth once daily., Disp: 30 tablet, Rfl: 11    cyclobenzaprine (FLEXERIL) 10 MG tablet, TAKE ONE TABLET BY MOUTH THREE TIMES A DAY AS A MUSCLE RELAXANT, Disp: , Rfl:     diclofenac sodium (VOLTAREN) 1 % Gel, APPLY 2 GRAMS TOPICALLY FOUR TIMES A DAY AS NEEDED FOR PAIN AND INFLAMMATION. USE ENCLOSED DOSING CARD., Disp: , Rfl:     diphenhydrAMINE (SOMINEX) 25 mg tablet, Take 25 mg by mouth nightly as needed for Insomnia., Disp: , Rfl:     famotidine (PEPCID) 40 MG tablet, TAKE ONE TABLET BY MOUTH AT BEDTIME FOR ACID REFLUX, Disp: , Rfl:     fexofenadine (ALLEGRA) 180 MG tablet, Take 1 tablet (180 mg total) by mouth daily as needed., Disp: 30 tablet, Rfl: 5    flunisolide 25 mcg, 0.025%, (NASALIDE) 25 mcg (0.025 %) Spry, 2 sprays by Nasal route as needed. , Disp: , Rfl:     gabapentin (NEURONTIN) 600 MG tablet, Take 1 tablet (600 mg total) by mouth 3 (three) times daily., Disp: 90 tablet, Rfl: 11    hydrocortisone 2.5 % cream, Apply topically 2 (two) times daily as needed. Apply to affected areas of the face and neck., Disp: 30 g, Rfl: 2     hydrocortisone-pramoxine (PROCTOFOAM-HS) rectal foam, INSERT 1 APPLICATORFUL INTO RECTALLY TWICE A DAY FOR HEMORRHOIDS, Disp: , Rfl:     hydrOXYzine HCL (ATARAX) 25 MG tablet, Take 25 mg by mouth 3 (three) times daily as needed for Itching., Disp: , Rfl:     LIDOcaine (LIDODERM) 5 %, APPLY 1 PATCH TOPICALLY EVERY DAY FOR PAIN. WEAR FOR 12 HOURS, THEN REMOVE. DO NOT APPLY NEW PATCH FOR AT LEAST 12 HOURS., Disp: , Rfl:     losartan (COZAAR) 50 MG tablet, Take 1 tablet (50 mg total) by mouth once daily., Disp: 30 tablet, Rfl: 11    methyl salicylate-menthol 15-10% 15-10 % Crea, Apply topically as needed. , Disp: , Rfl:     pantoprazole (PROTONIX) 40 MG tablet, Take 40 mg by mouth 2 (two) times daily. , Disp: , Rfl:     sertraline (ZOLOFT) 100 MG tablet, TAKE TWO TABLETS BY MOUTH EVERY DAY FOR MENTAL HEALTH DOSE INCREASED TO 200MG/DAY, Disp: , Rfl:     simethicone (MYLICON) 80 MG chewable tablet, Take 80 mg by mouth every 6 (six) hours as needed for Flatulence., Disp: , Rfl:     sucralfate (CARAFATE) 100 mg/mL suspension, Take 1 g by mouth 4 (four) times daily. , Disp: , Rfl:     triamcinolone acetonide 0.1% (KENALOG) 0.1 % cream, Apply topically 2 (two) times daily. Use for up to 2 weeks as needed for itching. Repeat if flaring., Disp: 454 g, Rfl: 1    albuterol (PROVENTIL/VENTOLIN HFA) 90 mcg/actuation inhaler, Inhale 2 puffs into the lungs every 6 (six) hours as needed for Wheezing., Disp: 1 g, Rfl: 2    levocetirizine (XYZAL) 5 MG tablet, Take 1 tablet (5 mg total) by mouth every evening., Disp: 30 tablet, Rfl: 0    methylPREDNISolone (MEDROL DOSEPACK) 4 mg tablet, use as directed, Disp: 21 each, Rfl: 0    oxyCODONE-acetaminophen (PERCOCET)  mg per tablet, Take 1 tablet by mouth every 4 (four) hours as needed for Pain. (Patient not taking: Reported on 6/9/2022), Disp: 9 tablet, Rfl: 0     REVIEW OF SYSTEMS:  Patient has no fever, fatigue, visual changes, chest pain, edema, cough, dyspnea, nausea,  "vomiting, constipation, diarrhea, arthralgias, pruritis, dizziness, weakness, depression, confusion.        PHYSICAL EXAM:  Blood pressure 122/88, pulse (!) 57, resp. rate 20, height 5' 9" (1.753 m), weight 111.9 kg (246 lb 11.1 oz).  Gen:    WDWN male in no apparent distress  Psych: Normal mood and affect  Neck:   No JVD  Chest:  Clear with no rales, rhonchi, wheezing with normal effort  CV:      Regular with no murmurs, gallops or rubs  Abd:     Soft, nontender, no distension  Ext:      No edema        Labs reviewed  BMP  Lab Results   Component Value Date     06/02/2022    K 4.1 06/02/2022     06/02/2022    CO2 24 06/02/2022    BUN 17 06/02/2022    CREATININE 1.6 (H) 06/02/2022    CALCIUM 8.9 06/02/2022    ANIONGAP 7 (L) 06/02/2022    ESTGFRAFRICA 51.1 (A) 06/02/2022    EGFRNONAA 44.2 (A) 06/02/2022     Lab Results   Component Value Date    WBC 4.63 06/02/2022    HGB 12.3 (L) 06/02/2022    HCT 36.2 (L) 06/02/2022    MCV 84 06/02/2022     06/02/2022       Lab Results   Component Value Date    .2 (H) 06/02/2022    CALCIUM 8.9 06/02/2022    PHOS 3.4 06/02/2022     PSA zero     U/a: no protein, no blood        IMPRESSION AND RECOMMENDATIONS:  64 y/o male with CKD stage 3 presents for f/u:     1. Renal: s Cr stable, at baseline, no change  Stable renal function. CKD stage 3  K normal  Na normal  Ca normal  Acid base stable  PTH normal  PO4 normal  Secondary hyperparathyroidism, mild  Anemia, mild     CKD likely due to HTN and gout/hyperuricemia     2. HTN: controlled, not longer low after reducing coreg dose  Meds reviewed     3. H/o of prostate CA: reviewed the chart  S/p radical prostatectomy Sept 2019  Had residually elevated PSA  S/p XRT  PSA undetectable      4. Med review: was done     Plans and recommendations:  As reviewed above.  Total time spent 30 minutes including time needed to review the records, the   patient evaluation, documentation, face-to-face discussion with the patient, "   more than 50% of the time was spent on coordination of care and counseling.    Level IV visit.  RTC 1 year     Brijesh Delatorre MD

## 2022-06-17 ENCOUNTER — TELEPHONE (OUTPATIENT)
Dept: GASTROENTEROLOGY | Facility: CLINIC | Age: 67
End: 2022-06-17

## 2022-06-17 ENCOUNTER — OFFICE VISIT (OUTPATIENT)
Dept: GASTROENTEROLOGY | Facility: CLINIC | Age: 67
End: 2022-06-17
Payer: MEDICARE

## 2022-06-17 VITALS
DIASTOLIC BLOOD PRESSURE: 78 MMHG | HEART RATE: 97 BPM | HEIGHT: 69 IN | OXYGEN SATURATION: 99 % | WEIGHT: 245.38 LBS | SYSTOLIC BLOOD PRESSURE: 110 MMHG | BODY MASS INDEX: 36.34 KG/M2

## 2022-06-17 DIAGNOSIS — K21.9 GASTROESOPHAGEAL REFLUX DISEASE WITHOUT ESOPHAGITIS: Primary | ICD-10-CM

## 2022-06-17 PROCEDURE — 3074F PR MOST RECENT SYSTOLIC BLOOD PRESSURE < 130 MM HG: ICD-10-PCS | Mod: CPTII,S$GLB,, | Performed by: INTERNAL MEDICINE

## 2022-06-17 PROCEDURE — 1125F PR PAIN SEVERITY QUANTIFIED, PAIN PRESENT: ICD-10-PCS | Mod: CPTII,S$GLB,, | Performed by: INTERNAL MEDICINE

## 2022-06-17 PROCEDURE — 3288F FALL RISK ASSESSMENT DOCD: CPT | Mod: CPTII,S$GLB,, | Performed by: INTERNAL MEDICINE

## 2022-06-17 PROCEDURE — 3066F NEPHROPATHY DOC TX: CPT | Mod: CPTII,S$GLB,, | Performed by: INTERNAL MEDICINE

## 2022-06-17 PROCEDURE — 99999 PR PBB SHADOW E&M-EST. PATIENT-LVL III: CPT | Mod: PBBFAC,,, | Performed by: INTERNAL MEDICINE

## 2022-06-17 PROCEDURE — 3078F DIAST BP <80 MM HG: CPT | Mod: CPTII,S$GLB,, | Performed by: INTERNAL MEDICINE

## 2022-06-17 PROCEDURE — 3074F SYST BP LT 130 MM HG: CPT | Mod: CPTII,S$GLB,, | Performed by: INTERNAL MEDICINE

## 2022-06-17 PROCEDURE — 3008F BODY MASS INDEX DOCD: CPT | Mod: CPTII,S$GLB,, | Performed by: INTERNAL MEDICINE

## 2022-06-17 PROCEDURE — 3008F PR BODY MASS INDEX (BMI) DOCUMENTED: ICD-10-PCS | Mod: CPTII,S$GLB,, | Performed by: INTERNAL MEDICINE

## 2022-06-17 PROCEDURE — 3066F PR DOCUMENTATION OF TREATMENT FOR NEPHROPATHY: ICD-10-PCS | Mod: CPTII,S$GLB,, | Performed by: INTERNAL MEDICINE

## 2022-06-17 PROCEDURE — 3288F PR FALLS RISK ASSESSMENT DOCUMENTED: ICD-10-PCS | Mod: CPTII,S$GLB,, | Performed by: INTERNAL MEDICINE

## 2022-06-17 PROCEDURE — 1101F PT FALLS ASSESS-DOCD LE1/YR: CPT | Mod: CPTII,S$GLB,, | Performed by: INTERNAL MEDICINE

## 2022-06-17 PROCEDURE — 3078F PR MOST RECENT DIASTOLIC BLOOD PRESSURE < 80 MM HG: ICD-10-PCS | Mod: CPTII,S$GLB,, | Performed by: INTERNAL MEDICINE

## 2022-06-17 PROCEDURE — 99999 PR PBB SHADOW E&M-EST. PATIENT-LVL III: ICD-10-PCS | Mod: PBBFAC,,, | Performed by: INTERNAL MEDICINE

## 2022-06-17 PROCEDURE — 1101F PR PT FALLS ASSESS DOC 0-1 FALLS W/OUT INJ PAST YR: ICD-10-PCS | Mod: CPTII,S$GLB,, | Performed by: INTERNAL MEDICINE

## 2022-06-17 PROCEDURE — 99213 PR OFFICE/OUTPT VISIT, EST, LEVL III, 20-29 MIN: ICD-10-PCS | Mod: S$GLB,,, | Performed by: INTERNAL MEDICINE

## 2022-06-17 PROCEDURE — 99213 OFFICE O/P EST LOW 20 MIN: CPT | Mod: S$GLB,,, | Performed by: INTERNAL MEDICINE

## 2022-06-17 PROCEDURE — 1125F AMNT PAIN NOTED PAIN PRSNT: CPT | Mod: CPTII,S$GLB,, | Performed by: INTERNAL MEDICINE

## 2022-06-17 NOTE — PROGRESS NOTES
Subjective:       Patient ID: Abdullahi Urias is a 66 y.o. male.    Chief Complaint: Gastroesophageal Reflux (Acid Reflux issue has been going on for so long, just getting worse)    The patient is known to service from previous encounters, but was only last seen in the office in 2015 regarding issues were related to hepatitis-C.  Our most recent encounter was last year in endoscopy unit when the patient was referred by his PCP regarding symptoms of GERD.  The patient was also in need of a colonoscopy because of history of colon polyps.  The colonoscopy was only remarkable for polyp shape structures which on she histological review showed no neoplastic tissue.  The EGD on biopsy showed reactive gastropathy, but endoscopically was without evidence of a reflux apparatus.    Despite the negative findings, the patient continues to exhibit what he considers symptoms of ongoing and worsening reflux.  He was seen in his PCPs office with complaints of sore throat and a palpable protrusion on the right side of his neck and pharyngeal erythema.  He was treated with antibiotics without resolution of this picture.  He suspects it may be from reflux.    Review of Systems   Constitutional: Positive for fatigue. Negative for activity change, appetite change, chills, diaphoresis, fever and unexpected weight change.   HENT: Positive for voice change. Negative for congestion, ear discharge, facial swelling, hearing loss, nosebleeds, postnasal drip, sinus pressure, sneezing, tinnitus and trouble swallowing.    Eyes: Positive for visual disturbance. Negative for photophobia and redness.   Respiratory: Positive for wheezing. Negative for cough, chest tightness and shortness of breath.    Cardiovascular: Negative for chest pain and palpitations.   Gastrointestinal: Negative for abdominal distention, abdominal pain, blood in stool, constipation, diarrhea, nausea, rectal pain and vomiting.        Bloating  Gas  ASIA   Genitourinary: Negative  for difficulty urinating, dysuria, flank pain, frequency, hematuria, penile discharge, scrotal swelling, testicular pain and urgency.   Musculoskeletal: Positive for back pain. Negative for arthralgias, gait problem, joint swelling, myalgias and neck stiffness.        Leg weakness  Joint stiffness   Skin: Positive for color change and rash. Negative for pallor and wound.        Pruritus   Neurological: Negative for dizziness, tremors, seizures, syncope, facial asymmetry, speech difficulty, weakness, light-headedness, numbness and headaches.   Hematological: Negative for adenopathy. Does not bruise/bleed easily.   Psychiatric/Behavioral: Negative for agitation, confusion, hallucinations, sleep disturbance and suicidal ideas.       Objective:      Physical Exam  Vitals reviewed.         Assessment:   Gastroesophageal reflux disease without esophagitis  -     Case Request Endoscopy: EGD (ESOPHAGOGASTRODUODENOSCOPY), PH MONITORING, ESOPHAGUS, WIRELESS, (OFF REFLUX MEDS)          Plan:   As above.

## 2022-06-17 NOTE — TELEPHONE ENCOUNTER
Anuj Willson, Dr tavarez has ordered for a procedure on this pt, can you please contact the pt. to schedule as usual?, and thanks for much in advance, happy weekend.

## 2022-06-20 ENCOUNTER — TELEPHONE (OUTPATIENT)
Dept: CARDIOLOGY | Facility: CLINIC | Age: 67
End: 2022-06-20
Payer: MEDICARE

## 2022-06-20 ENCOUNTER — TELEPHONE (OUTPATIENT)
Dept: GASTROENTEROLOGY | Facility: CLINIC | Age: 67
End: 2022-06-20
Payer: MEDICARE

## 2022-06-20 NOTE — TELEPHONE ENCOUNTER
Ok to proceed the scheduled surgery without further cardiac study.  OK to hold Aspirin and Plavix 7 days before the procedure and resume ASAP postop.

## 2022-06-20 NOTE — TELEPHONE ENCOUNTER
Pt. informed to stop his Aspirin and Plavix 7 days prior to his procedure per Dr. Mckeon's office. Mr. Willson will contact pt. to schedule his procedure.

## 2022-06-20 NOTE — TELEPHONE ENCOUNTER
----- Message from Belkis Willson MA sent at 6/20/2022 10:45 AM CDT -----  Regarding: FW: clearance  Please advise.  ----- Message -----  From: Kayla Marley MA  Sent: 6/20/2022   9:00 AM CDT  To: [facility] Staff  Subject: clearance                                        Good Morning Dr. Mckeon's office, I send this message to you guys few minutes ago and forgot to attach the pt, but he is attached now. Please Dr. Juarez is requesting a clearance for the pt. Procedure, thanks in advance for helping.

## 2022-06-21 ENCOUNTER — HOSPITAL ENCOUNTER (OUTPATIENT)
Facility: HOSPITAL | Age: 67
Discharge: HOME | End: 2022-06-21
Attending: PHYSICAL MEDICINE & REHABILITATION | Admitting: PHYSICAL MEDICINE & REHABILITATION
Payer: MEDICARE

## 2022-06-21 VITALS
OXYGEN SATURATION: 97 % | RESPIRATION RATE: 18 BRPM | BODY MASS INDEX: 36.24 KG/M2 | WEIGHT: 244.69 LBS | TEMPERATURE: 98 F | HEIGHT: 69 IN | DIASTOLIC BLOOD PRESSURE: 80 MMHG | SYSTOLIC BLOOD PRESSURE: 136 MMHG | HEART RATE: 83 BPM

## 2022-06-21 DIAGNOSIS — M54.12 CERVICAL RADICULOPATHY: Primary | ICD-10-CM

## 2022-06-21 PROCEDURE — 25000003 PHARM REV CODE 250: Performed by: PHYSICAL MEDICINE & REHABILITATION

## 2022-06-21 PROCEDURE — 63600175 PHARM REV CODE 636 W HCPCS: Performed by: PHYSICAL MEDICINE & REHABILITATION

## 2022-06-21 PROCEDURE — 62321 NJX INTERLAMINAR CRV/THRC: CPT | Mod: ,,, | Performed by: PHYSICAL MEDICINE & REHABILITATION

## 2022-06-21 PROCEDURE — 62321 NJX INTERLAMINAR CRV/THRC: CPT | Performed by: PHYSICAL MEDICINE & REHABILITATION

## 2022-06-21 PROCEDURE — 62321 PR INJ CERV/THORAC, W/GUIDANCE: ICD-10-PCS | Mod: ,,, | Performed by: PHYSICAL MEDICINE & REHABILITATION

## 2022-06-21 PROCEDURE — 25500020 PHARM REV CODE 255: Performed by: PHYSICAL MEDICINE & REHABILITATION

## 2022-06-21 RX ORDER — FENTANYL CITRATE 50 UG/ML
INJECTION, SOLUTION INTRAMUSCULAR; INTRAVENOUS
Status: DISCONTINUED | OUTPATIENT
Start: 2022-06-21 | End: 2022-06-21 | Stop reason: HOSPADM

## 2022-06-21 RX ORDER — ONDANSETRON 2 MG/ML
4 INJECTION INTRAMUSCULAR; INTRAVENOUS ONCE AS NEEDED
Status: DISCONTINUED | OUTPATIENT
Start: 2022-06-21 | End: 2022-06-21 | Stop reason: HOSPADM

## 2022-06-21 RX ORDER — BUPIVACAINE HYDROCHLORIDE 2.5 MG/ML
INJECTION, SOLUTION EPIDURAL; INFILTRATION; INTRACAUDAL
Status: DISCONTINUED | OUTPATIENT
Start: 2022-06-21 | End: 2022-06-21 | Stop reason: HOSPADM

## 2022-06-21 RX ORDER — MIDAZOLAM HYDROCHLORIDE 1 MG/ML
INJECTION, SOLUTION INTRAMUSCULAR; INTRAVENOUS
Status: DISCONTINUED | OUTPATIENT
Start: 2022-06-21 | End: 2022-06-21 | Stop reason: HOSPADM

## 2022-06-21 RX ORDER — DEXAMETHASONE SODIUM PHOSPHATE 10 MG/ML
INJECTION INTRAMUSCULAR; INTRAVENOUS
Status: DISCONTINUED | OUTPATIENT
Start: 2022-06-21 | End: 2022-06-21 | Stop reason: HOSPADM

## 2022-06-21 NOTE — DISCHARGE SUMMARY
Discharge Note  Short Stay      SUMMARY     Admit Date: 6/21/2022    Attending Physician: Nehemiah Carmen MD        Discharge Physician: Nehemiah Carmen MD        Discharge Date: 6/21/2022 11:12 AM    Procedure(s) (LRB):  C7-T1 IL PIEDAD (N/A)    Final Diagnosis: Cervical radiculopathy [M54.12]    Disposition: Home or self care    Patient Instructions:   Current Discharge Medication List      CONTINUE these medications which have NOT CHANGED    Details   allopurinoL (ZYLOPRIM) 100 MG tablet Take 1 tablet (100 mg total) by mouth once daily.  Qty: 30 tablet, Refills: 11      aluminum & magnesium hydroxide-simethicone (MYLANTA MAX STRENGTH) 400-400-40 mg/5 mL suspension TAKE 15ML BY MOUTH FOUR TIMES A DAY AS NEEDED FOR STOMACH ACID      amitriptyline (ELAVIL) 10 MG tablet TAKE 1 TABLET BY MOUTH AT BEDTIME AS NEEDED FOR PAIN  Qty: 30 tablet, Refills: 11      aspirin (ECOTRIN) 81 MG EC tablet TAKE ONE TABLET BY MOUTH EVERY DAY TO PREVENT BLOOD CLOT      atorvastatin (LIPITOR) 80 MG tablet TAKE ONE-HALF TABLET BY MOUTH EVERY DAY FOR CHOLESTEROL      carvediloL (COREG) 12.5 MG tablet Take 1 tablet (12.5 mg total) by mouth 2 (two) times daily.  Qty: 60 tablet, Refills: 11    Comments: .      clopidogreL (PLAVIX) 75 mg tablet Take 1 tablet (75 mg total) by mouth once daily.  Qty: 30 tablet, Refills: 11      cyclobenzaprine (FLEXERIL) 10 MG tablet TAKE ONE TABLET BY MOUTH THREE TIMES A DAY AS A MUSCLE RELAXANT      diphenhydrAMINE (SOMINEX) 25 mg tablet Take 25 mg by mouth nightly as needed for Insomnia.      famotidine (PEPCID) 40 MG tablet TAKE ONE TABLET BY MOUTH AT BEDTIME FOR ACID REFLUX      fexofenadine (ALLEGRA) 180 MG tablet Take 1 tablet (180 mg total) by mouth daily as needed.  Qty: 30 tablet, Refills: 5      gabapentin (NEURONTIN) 600 MG tablet Take 1 tablet (600 mg total) by mouth 3 (three) times daily.  Qty: 90 tablet, Refills: 11      hydrOXYzine HCL (ATARAX) 25 MG tablet Take 25 mg by mouth 3 (three) times  daily as needed for Itching.      losartan (COZAAR) 50 MG tablet Take 1 tablet (50 mg total) by mouth once daily.  Qty: 30 tablet, Refills: 11    Comments: .      pantoprazole (PROTONIX) 40 MG tablet Take 40 mg by mouth 2 (two) times daily.       sertraline (ZOLOFT) 100 MG tablet TAKE TWO TABLETS BY MOUTH EVERY DAY FOR MENTAL HEALTH DOSE INCREASED TO 200MG/DAY      simethicone (MYLICON) 80 MG chewable tablet Take 80 mg by mouth every 6 (six) hours as needed for Flatulence.      sucralfate (CARAFATE) 100 mg/mL suspension Take 1 g by mouth 4 (four) times daily.       !! albuterol (PROVENTIL HFA) 90 mcg/actuation inhaler Inhale 2 puffs into the lungs every 6 (six) hours as needed for Wheezing. Rescue  Qty: 6.7 g, Refills: 11    Associated Diagnoses: Shortness of breath      !! albuterol (PROVENTIL/VENTOLIN HFA) 90 mcg/actuation inhaler Inhale 2 puffs into the lungs every 6 (six) hours as needed for Wheezing.  Qty: 1 g, Refills: 2      baclofen (LIORESAL) 10 MG tablet Take 1 tablet (10 mg total) by mouth 3 (three) times daily.  Qty: 90 tablet, Refills: 11      diclofenac sodium (VOLTAREN) 1 % Gel APPLY 2 GRAMS TOPICALLY FOUR TIMES A DAY AS NEEDED FOR PAIN AND INFLAMMATION. USE ENCLOSED DOSING CARD.      flunisolide 25 mcg, 0.025%, (NASALIDE) 25 mcg (0.025 %) Spry 2 sprays by Nasal route as needed.       hydrocortisone 2.5 % cream Apply topically 2 (two) times daily as needed. Apply to affected areas of the face and neck.  Qty: 30 g, Refills: 2    Associated Diagnoses: Erythema dyschromicum perstans      hydrocortisone-pramoxine (PROCTOFOAM-HS) rectal foam INSERT 1 APPLICATORFUL INTO RECTALLY TWICE A DAY FOR HEMORRHOIDS      levocetirizine (XYZAL) 5 MG tablet Take 1 tablet (5 mg total) by mouth every evening.  Qty: 30 tablet, Refills: 0    Associated Diagnoses: Allergic rhinitis, unspecified seasonality, unspecified trigger      LIDOcaine (LIDODERM) 5 % APPLY 1 PATCH TOPICALLY EVERY DAY FOR PAIN. WEAR FOR 12 HOURS, THEN  REMOVE. DO NOT APPLY NEW PATCH FOR AT LEAST 12 HOURS.      methyl salicylate-menthol 15-10% 15-10 % Crea Apply topically as needed.       triamcinolone acetonide 0.1% (KENALOG) 0.1 % cream Apply topically 2 (two) times daily. Use for up to 2 weeks as needed for itching. Repeat if flaring.  Qty: 454 g, Refills: 1    Associated Diagnoses: Xerosis cutis; Pruritus       !! - Potential duplicate medications found. Please discuss with provider.              Discharge Diagnosis: Cervical radiculopathy [M54.12]  Condition on Discharge: Stable with no complications to procedure   Diet on Discharge: Same as before.  Activity: as per instruction sheet.  Discharge to: Home with a responsible adult.  Follow up: 2-4 weeks       Please call the office at [phone] if you experience any weakness or loss of sensation, fever > 101.5, pain uncontrolled with oral medications, persistent nausea/vomiting/or diarrhea, redness or drainage from the incisions, or any other worrisome concerns. If physician on call was not reached or could not communicate with our office for any reason please go to the nearest emergency department

## 2022-06-21 NOTE — H&P
HPI  Patient presenting for Procedure(s) (LRB):  C7-T1 IL PIEDAD (N/A)     Patient on Anti-coagulation Yes    No health changes since previous encounter    Past Medical History:   Diagnosis Date    [facility]    Asthma     BPH (benign prostatic hyperplasia)     CAD (coronary artery disease)     Cardiomyopathy     CHF (congestive heart failure)     Chronic hoarseness     vocal cord surg    Chronic pain     CKD (chronic kidney disease) stage 3, GFR 30-59 ml/min     CVA (cerebral infarction)     8/2012 olol; reviewed ed note    Ex-smoker     GERD (gastroesophageal reflux disease)     Hepatitis C     treatedharvoni says cured, RNA NEG 6/2020    Hypertension     Pancreatitis     Prostate cancer     Prostate cancer     Prostate cancer     PVD (peripheral vascular disease)     Renal insufficiency     Substance abuse     cocaine, etoh , tob in past     Past Surgical History:   Procedure Laterality Date    AORTIC VALVE REPLACEMENT  05/19/2014    Tissue valve replacement    BACK SURGERY      CARDIAC CATHETERIZATION      COLONOSCOPY  2011    COLONOSCOPY N/A 2/24/2021    Procedure: COLONOSCOPY;  Surgeon: Faith Carrillo MD;  Location: [facility] ENDO;  Service: Endoscopy;  Laterality: N/A;    ESOPHAGOGASTRODUODENOSCOPY N/A 2/24/2021    Procedure: ESOPHAGOGASTRODUODENOSCOPY (EGD);  Surgeon: Faith Carrillo MD;  Location: [facility] ENDO;  Service: Endoscopy;  Laterality: N/A;    FOOT SURGERY      LEFT HEART CATHETERIZATION Left 7/24/2020    Procedure: CATHETERIZATION, HEART, LEFT;  Surgeon: Pasha Martin MD;  Location: [facility] CATH LAB;  Service: Cardiology;  Laterality: Left;    PENILE PROSTHESIS IMPLANT      PENILE PROSTHESIS REVISION  04/30/2018    [facility]    radiation for prostate      UPPER GASTROINTESTINAL ENDOSCOPY  2011     Review of patient's allergies indicates:   Allergen Reactions    Pletal [cilostazol]      Rash and leg pain         Current Facility-Administered Medications on File Prior to Encounter   Medication Dose Route Frequency Provider Last Rate Last Admin    sodium chloride 0.9% flush 10 mL  10 mL Intravenous PRN Wilda Horne MD         Current Outpatient Medications on File Prior to Encounter   Medication Sig Dispense Refill    allopurinoL (ZYLOPRIM) 100 MG tablet Take 1 tablet (100 mg total) by mouth once daily. 30 tablet 11    aluminum & magnesium hydroxide-simethicone (MYLANTA MAX STRENGTH) 400-400-40 mg/5 mL suspension TAKE 15ML BY MOUTH FOUR TIMES A DAY AS NEEDED FOR STOMACH ACID      amitriptyline (ELAVIL) 10 MG tablet TAKE 1 TABLET BY MOUTH AT BEDTIME AS NEEDED FOR PAIN 30 tablet 11    aspirin (ECOTRIN) 81 MG EC tablet TAKE ONE TABLET BY MOUTH EVERY DAY TO PREVENT BLOOD CLOT      atorvastatin (LIPITOR) 80 MG tablet TAKE ONE-HALF TABLET BY MOUTH EVERY DAY FOR CHOLESTEROL      carvediloL (COREG) 12.5 MG tablet Take 1 tablet (12.5 mg total) by mouth 2 (two) times daily. 60 tablet 11    clopidogreL (PLAVIX) 75 mg tablet Take 1 tablet (75 mg total) by mouth once daily. 30 tablet 11    cyclobenzaprine (FLEXERIL) 10 MG tablet TAKE ONE TABLET BY MOUTH THREE TIMES A DAY AS A MUSCLE RELAXANT      diphenhydrAMINE (SOMINEX) 25 mg tablet Take 25 mg by mouth nightly as needed for Insomnia.      famotidine (PEPCID) 40 MG tablet TAKE ONE TABLET BY MOUTH AT BEDTIME FOR ACID REFLUX      fexofenadine (ALLEGRA) 180 MG tablet Take 1 tablet (180 mg total) by mouth daily as needed. 30 tablet 5    gabapentin (NEURONTIN) 600 MG tablet Take 1 tablet (600 mg total) by mouth 3 (three) times daily. 90 tablet 11    hydrOXYzine HCL (ATARAX) 25 MG tablet Take 25 mg by mouth 3 (three) times daily as needed for Itching.      losartan (COZAAR) 50 MG tablet Take 1 tablet (50 mg total) by mouth once daily. 30 tablet 11    pantoprazole (PROTONIX) 40 MG tablet Take 40 mg by mouth 2 (two) times daily.       sertraline (ZOLOFT) 100 MG tablet  "TAKE TWO TABLETS BY MOUTH EVERY DAY FOR MENTAL HEALTH DOSE INCREASED TO 200MG/DAY      simethicone (MYLICON) 80 MG chewable tablet Take 80 mg by mouth every 6 (six) hours as needed for Flatulence.      sucralfate (CARAFATE) 100 mg/mL suspension Take 1 g by mouth 4 (four) times daily.       albuterol (PROVENTIL/VENTOLIN HFA) 90 mcg/actuation inhaler Inhale 2 puffs into the lungs every 6 (six) hours as needed for Wheezing. 1 g 2    baclofen (LIORESAL) 10 MG tablet Take 1 tablet (10 mg total) by mouth 3 (three) times daily. 90 tablet 11    diclofenac sodium (VOLTAREN) 1 % Gel APPLY 2 GRAMS TOPICALLY FOUR TIMES A DAY AS NEEDED FOR PAIN AND INFLAMMATION. USE ENCLOSED DOSING CARD.      flunisolide 25 mcg, 0.025%, (NASALIDE) 25 mcg (0.025 %) Spry 2 sprays by Nasal route as needed.       hydrocortisone 2.5 % cream Apply topically 2 (two) times daily as needed. Apply to affected areas of the face and neck. 30 g 2    hydrocortisone-pramoxine (PROCTOFOAM-HS) rectal foam INSERT 1 APPLICATORFUL INTO RECTALLY TWICE A DAY FOR HEMORRHOIDS      levocetirizine (XYZAL) 5 MG tablet Take 1 tablet (5 mg total) by mouth every evening. 30 tablet 0    LIDOcaine (LIDODERM) 5 % APPLY 1 PATCH TOPICALLY EVERY DAY FOR PAIN. WEAR FOR 12 HOURS, THEN REMOVE. DO NOT APPLY NEW PATCH FOR AT LEAST 12 HOURS.      methyl salicylate-menthol 15-10% 15-10 % Crea Apply topically as needed.       triamcinolone acetonide 0.1% (KENALOG) 0.1 % cream Apply topically 2 (two) times daily. Use for up to 2 weeks as needed for itching. Repeat if flaring. 454 g 1        PMHx, PSHx, Allergies, Medications reviewed in epic    ROS negative except pain complaints in HPI    OBJECTIVE:    BP (!) 141/88 (BP Location: Right arm, Patient Position: Lying)   Pulse 87   Temp 97.4 °F (36.3 °C) (Temporal)   Resp 17   Ht 5' 9" (1.753 m)   Wt 111 kg (244 lb 11.4 oz)   SpO2 96%   BMI 36.14 kg/m²     PHYSICAL EXAMINATION:    GENERAL: Well appearing, in no acute " distress, alert and oriented x3.  PSYCH:  Mood and affect appropriate.  SKIN: Skin color, texture, turgor normal, no rashes or lesions which will impact the procedure.  CV: RRR with palpation of the radial artery.  PULM: No evidence of respiratory difficulty, symmetric chest rise. Clear to auscultation.  NEURO: Cranial nerves grossly intact.    Plan:    Proceed with procedure as planned Procedure(s) (LRB):  C7-T1 IL PIEDAD (N/A)    Nehemiah Carmen MD  06/21/2022

## 2022-06-21 NOTE — DISCHARGE INSTRUCTIONS

## 2022-06-21 NOTE — OP NOTE
Cervical Interlaminar Epidural Steroid Injection under Fluoroscopic Guidance.   Time-out taken to identify patient and procedure prior to starting the procedure.     Date of Procedure: 06/21/2022    PROCEDURE: Cervical Interlaminar epidural steroid injection C7/T1 under fluoroscopic guidance.     Pre-Op diagnosis: Cervical radiculopathy [M54.12]    Post-Op diagnosis: Cervical radiculopathy [M54.12]    PHYSICIAN: Nehemiah Carmen MD    ASSISTANTS: None     SEDATION: Conscious sedation provided by M.D    The patient was monitored with continuous pulse oximetry, EKG, and intermittent blood pressure monitors, immediately prior to administration of sedation.  The patient was hemodynamically stable throughout the entire process was responsive to voice, and breathing spontaneously.  Supplemental O2 was provided at 2L/min via nasal cannula.  Patient was comfortable for the duration of the procedure.     There was a total of 2mg IV Midazolam and 50mcg Fentanyl titrated for the procedure    Total sedation time was >10minutes and <20minutes      MEDICATIONS INJECTED: Preservative-free Decadron 10 mg with 1mL of sterile 0.25%Bupivicaine and 3ml of preservative free normal saline.     LOCAL ANESTHETIC INJECTED: Xylocaine 1% 3mL    ESTIMATED BLOOD LOSS: none.     COMPLICATIONS: none.     TECHNIQUE: With the patient laying in a prone position, the area was prepped and draped in the usual sterile fashion using ChloraPrep and a fenestrated drape. Local anesthetic was given using a 27-gauge needle by raising a wheal and going down to the hub of the needle over the C7/T1 interlaminar space.  The interlaminar space was then approached with a 3.5 inch 18-gauge Touhy needle was introduced under fluoroscopic guidance in the AP and Lateral view. Once the Ligamentum flavum was encountered loss of resistance to saline was used to enter the epidural space. With positive loss of resistance and negative CSF or Blood, 2mL contrast dye Omnipaque  (300mg/ml) was injected to confirm placement and there was no vascular runoff. The medication was then injected slowly. The patient tolerated the procedure well.       The patient was monitored after the procedure.   They were given post-procedure and discharge instructions to follow at home.  The patient was discharged in a stable condition.

## 2022-07-07 ENCOUNTER — TELEPHONE (OUTPATIENT)
Dept: DERMATOLOGY | Facility: CLINIC | Age: 67
End: 2022-07-07
Payer: MEDICARE

## 2022-07-07 ENCOUNTER — PATIENT MESSAGE (OUTPATIENT)
Dept: DERMATOLOGY | Facility: CLINIC | Age: 67
End: 2022-07-07
Payer: MEDICARE

## 2022-07-10 ENCOUNTER — OFFICE VISIT (OUTPATIENT)
Dept: URGENT CARE | Facility: CLINIC | Age: 67
End: 2022-07-10
Payer: MEDICARE

## 2022-07-10 VITALS
HEART RATE: 84 BPM | BODY MASS INDEX: 35.4 KG/M2 | HEIGHT: 69 IN | DIASTOLIC BLOOD PRESSURE: 79 MMHG | OXYGEN SATURATION: 99 % | WEIGHT: 239 LBS | TEMPERATURE: 97 F | SYSTOLIC BLOOD PRESSURE: 119 MMHG | RESPIRATION RATE: 14 BRPM

## 2022-07-10 DIAGNOSIS — N18.30 STAGE 3 CHRONIC KIDNEY DISEASE, UNSPECIFIED WHETHER STAGE 3A OR 3B CKD: ICD-10-CM

## 2022-07-10 DIAGNOSIS — I10 HYPERTENSION, UNSPECIFIED TYPE: ICD-10-CM

## 2022-07-10 DIAGNOSIS — R21 RASH: ICD-10-CM

## 2022-07-10 DIAGNOSIS — L30.9 DERMATITIS: Primary | ICD-10-CM

## 2022-07-10 DIAGNOSIS — L29.9 PRURITUS: ICD-10-CM

## 2022-07-10 PROCEDURE — 99214 OFFICE O/P EST MOD 30 MIN: CPT | Mod: S$GLB,,, | Performed by: NURSE PRACTITIONER

## 2022-07-10 PROCEDURE — 3008F PR BODY MASS INDEX (BMI) DOCUMENTED: ICD-10-PCS | Mod: CPTII,S$GLB,, | Performed by: NURSE PRACTITIONER

## 2022-07-10 PROCEDURE — 1126F AMNT PAIN NOTED NONE PRSNT: CPT | Mod: CPTII,S$GLB,, | Performed by: NURSE PRACTITIONER

## 2022-07-10 PROCEDURE — 1160F RVW MEDS BY RX/DR IN RCRD: CPT | Mod: CPTII,S$GLB,, | Performed by: NURSE PRACTITIONER

## 2022-07-10 PROCEDURE — 3008F BODY MASS INDEX DOCD: CPT | Mod: CPTII,S$GLB,, | Performed by: NURSE PRACTITIONER

## 2022-07-10 PROCEDURE — 1159F MED LIST DOCD IN RCRD: CPT | Mod: CPTII,S$GLB,, | Performed by: NURSE PRACTITIONER

## 2022-07-10 PROCEDURE — 3078F DIAST BP <80 MM HG: CPT | Mod: CPTII,S$GLB,, | Performed by: NURSE PRACTITIONER

## 2022-07-10 PROCEDURE — 3066F PR DOCUMENTATION OF TREATMENT FOR NEPHROPATHY: ICD-10-PCS | Mod: CPTII,S$GLB,, | Performed by: NURSE PRACTITIONER

## 2022-07-10 PROCEDURE — 3078F PR MOST RECENT DIASTOLIC BLOOD PRESSURE < 80 MM HG: ICD-10-PCS | Mod: CPTII,S$GLB,, | Performed by: NURSE PRACTITIONER

## 2022-07-10 PROCEDURE — 1126F PR PAIN SEVERITY QUANTIFIED, NO PAIN PRESENT: ICD-10-PCS | Mod: CPTII,S$GLB,, | Performed by: NURSE PRACTITIONER

## 2022-07-10 PROCEDURE — 3074F PR MOST RECENT SYSTOLIC BLOOD PRESSURE < 130 MM HG: ICD-10-PCS | Mod: CPTII,S$GLB,, | Performed by: NURSE PRACTITIONER

## 2022-07-10 PROCEDURE — 3074F SYST BP LT 130 MM HG: CPT | Mod: CPTII,S$GLB,, | Performed by: NURSE PRACTITIONER

## 2022-07-10 PROCEDURE — 3066F NEPHROPATHY DOC TX: CPT | Mod: CPTII,S$GLB,, | Performed by: NURSE PRACTITIONER

## 2022-07-10 PROCEDURE — 1160F PR REVIEW ALL MEDS BY PRESCRIBER/CLIN PHARMACIST DOCUMENTED: ICD-10-PCS | Mod: CPTII,S$GLB,, | Performed by: NURSE PRACTITIONER

## 2022-07-10 PROCEDURE — 1159F PR MEDICATION LIST DOCUMENTED IN MEDICAL RECORD: ICD-10-PCS | Mod: CPTII,S$GLB,, | Performed by: NURSE PRACTITIONER

## 2022-07-10 PROCEDURE — 99214 PR OFFICE/OUTPT VISIT, EST, LEVL IV, 30-39 MIN: ICD-10-PCS | Mod: S$GLB,,, | Performed by: NURSE PRACTITIONER

## 2022-07-10 RX ORDER — HYDROXYZINE HYDROCHLORIDE 25 MG/1
25 TABLET, FILM COATED ORAL NIGHTLY
Qty: 10 TABLET | Refills: 0 | Status: SHIPPED | OUTPATIENT
Start: 2022-07-10 | End: 2022-07-20

## 2022-07-10 RX ORDER — PREDNISONE 20 MG/1
20 TABLET ORAL 2 TIMES DAILY
Qty: 10 TABLET | Refills: 0 | Status: SHIPPED | OUTPATIENT
Start: 2022-07-10 | End: 2022-07-15

## 2022-07-10 NOTE — PROGRESS NOTES
"Subjective:       Patient ID: Abdullahi Urias is a 66 y.o. male.    Vitals:  height is 5' 9" (1.753 m) and weight is 108.4 kg (238 lb 15.7 oz). His tympanic temperature is 97.3 °F (36.3 °C). His blood pressure is 119/79 and his pulse is 84. His respiration is 14 and oxygen saturation is 99%.     Chief Complaint: Rash    Patient presents with rash on left side of trunk and along the neck.  Symptoms started 2 weeks ago.    Rash  This is a new problem. The current episode started 1 to 4 weeks ago (2). The problem has been gradually worsening since onset. The affected locations include the torso, back and neck. The rash is characterized by itchiness. Associated symptoms include congestion. Pertinent negatives include no anorexia, cough, diarrhea, eye pain, facial edema, fatigue, fever, joint pain, nail changes, rhinorrhea, shortness of breath, sore throat or vomiting. Past treatments include topical steroids and antihistamine. The treatment provided mild relief.       Constitution: Negative for fatigue and fever.   HENT: Positive for congestion. Negative for sore throat.    Eyes: Negative for eye pain.   Respiratory: Negative for cough and shortness of breath.    Gastrointestinal: Negative for vomiting and diarrhea.   Skin: Positive for rash.            CHIEF COMPLAINT/REASON FOR VISIT:  Itchy rash on neck & back    HISTORY OF PRESENT ILLNESS:  66-year-old male complains of itchy rash on neck & back onset 2-3 weeks ago.  Patient admits had steroid injection in back approximately 3 weeks ago per pain management.  Patient denies any new foods & drinks.  Admits tried over-the-counter Benadryl, topical hydrocortisone cream and lotions with no relief.  Denies seeking emergency room treatment. Patient denies chest pain, shortness of breath, congestion,  cough, dizziness, blurred vision, nausea, vomiting, diarrhea, " aching all over", fatigue, loss of appetite & fever      Past Medical History:   Diagnosis Date    [facility] " [facility]     [facility]    Asthma     BPH (benign prostatic hyperplasia)     CAD (coronary artery disease)     Cardiomyopathy     CHF (congestive heart failure)     Chronic hoarseness     vocal cord surg    Chronic pain     CKD (chronic kidney disease) stage 3, GFR 30-59 ml/min     CVA (cerebral infarction)     2012 olol; reviewed ed note    Ex-smoker     GERD (gastroesophageal reflux disease)     Hepatitis C     treatedharvoni says cured, RNA NEG 2020    Hypertension     Pancreatitis     Prostate cancer     Prostate cancer     Prostate cancer     PVD (peripheral vascular disease)     Renal insufficiency     Substance abuse     cocaine, etoh , tob in past          Social History     Socioeconomic History    Marital status:    Tobacco Use    Smoking status: Former Smoker     Packs/day: 2.00     Years: 8.00     Pack years: 16.00     Quit date: 2012     Years since quittin.8    Smokeless tobacco: Never Used   Substance and Sexual Activity    Alcohol use: No     Comment: Sober since 2012    Drug use: No    Sexual activity: Yes   Social History Narrative    No pets in household, wife and daughter smokers. Former [employer].    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  SKIN:  Itchy rash.  HEENT: No headaches or recent head trauma. Visual acuity fine. No photophobia, ocular pain or diplopia. Denies ear pain, discharge or vertigo. No loss of smell, no epistaxis or postnasal drip. No hoarseness or change in voice.   NODES: No  masses or lesions. Denies swollen glands.  CHEST: Denies cyanosis, wheezing, cough and sputum production.  CARDIOVASCULAR: Denies chest pain, PND, orthopnea or reduced exercise tolerance.  ABDOMEN: Appetite fine. No weight loss. Denies diarrhea, abdominal pain, .  MUSCULOSKELETAL: No joint stiffness or swelling. Denies back pain.  NEUROLOGIC: No history of seizures, paralysis, alteration of gait or coordination.  PSYCHIATRIC: Denies mood swings, depression or suicidal thoughts.    PE:   APPEARANCE: Well nourished, well developed, in moderate distress.  Temp 97.3°, pulse ox 99%  V/S: Reviewed.  SKIN:  Lumbar region with large minimal red macular/ papular lesions with excoriations, posterior neck with hyperpigmented dry skin lesions      HEENT: turbinates pink, pink pharynx, TMs clear bilateral.  CHEST: Lungs clear to auscultation.  CARDIOVASCULAR: Regular rate and rhythm   MUSCULOSKELETAL: Motor: 5/5 strength major flexors/extensors.  NEUROLOGIC: No sensory deficits. Gait & Posture: Normal gait and fine motion. No cerebellar signs.  MENTAL STATUS: Patient alert, oriented x 3 & conversant.    PLAN:   Consult pain management  Advise keep appointments with Dermatology  Advise increase p.o. fluids-- water/juice & rest  Meds: Prednisone, Atarax / no refills  Advise unscented soaps  Advise use of All clear detergents  Advise avoid hot baths  Practice good handwashing.  Tylenol  for fever, headache and body aches..  Advise follow up with PCP in 2- 3 days for recheck  Advise go to ER if nausea, vomiting, fever, increased pain, or fail to improve with treatment.  AVS provided and reviewed with patient including supportive care, follow up, and red flag symptoms.   Patient verbalizes understanding and agrees with treatment plan. Discharged from Urgent Care in stable condition.          DIAGNOSIS:  Rash  Pruritus  Dermatitis  Essential hypertension  Chronic kidney disease

## 2022-07-10 NOTE — PATIENT INSTRUCTIONS
PLAN:   Consult pain management  Advise keep appointments with Dermatology  Advise increase p.o. fluids-- water/juice & rest  Meds: Prednisone, Atarax / no refills  Advise unscented soaps  Advise use of All clear detergents  Advise avoid hot baths  Practice good handwashing.  Tylenol  for fever, headache and body aches..  Advise follow up with PCP in 2- 3 days for recheck  Advise go to ER if nausea, vomiting, fever, increased pain, or fail to improve with treatment.  AVS provided and reviewed with patient including supportive care, follow up, and red flag symptoms.   Patient verbalizes understanding and agrees with treatment plan. Discharged from Urgent Care in stable condition.

## 2022-07-14 ENCOUNTER — OFFICE VISIT (OUTPATIENT)
Dept: DERMATOLOGY | Facility: CLINIC | Age: 67
End: 2022-07-14
Payer: MEDICARE

## 2022-07-14 DIAGNOSIS — L53.8 ERYTHEMA DYSCHROMICUM PERSTANS: Primary | ICD-10-CM

## 2022-07-14 PROCEDURE — 1160F RVW MEDS BY RX/DR IN RCRD: CPT | Mod: CPTII,S$GLB,, | Performed by: STUDENT IN AN ORGANIZED HEALTH CARE EDUCATION/TRAINING PROGRAM

## 2022-07-14 PROCEDURE — 3066F PR DOCUMENTATION OF TREATMENT FOR NEPHROPATHY: ICD-10-PCS | Mod: CPTII,S$GLB,, | Performed by: STUDENT IN AN ORGANIZED HEALTH CARE EDUCATION/TRAINING PROGRAM

## 2022-07-14 PROCEDURE — 1160F PR REVIEW ALL MEDS BY PRESCRIBER/CLIN PHARMACIST DOCUMENTED: ICD-10-PCS | Mod: CPTII,S$GLB,, | Performed by: STUDENT IN AN ORGANIZED HEALTH CARE EDUCATION/TRAINING PROGRAM

## 2022-07-14 PROCEDURE — 99999 PR PBB SHADOW E&M-EST. PATIENT-LVL II: ICD-10-PCS | Mod: PBBFAC,,, | Performed by: STUDENT IN AN ORGANIZED HEALTH CARE EDUCATION/TRAINING PROGRAM

## 2022-07-14 PROCEDURE — 99999 PR PBB SHADOW E&M-EST. PATIENT-LVL II: CPT | Mod: PBBFAC,,, | Performed by: STUDENT IN AN ORGANIZED HEALTH CARE EDUCATION/TRAINING PROGRAM

## 2022-07-14 PROCEDURE — 1159F PR MEDICATION LIST DOCUMENTED IN MEDICAL RECORD: ICD-10-PCS | Mod: CPTII,S$GLB,, | Performed by: STUDENT IN AN ORGANIZED HEALTH CARE EDUCATION/TRAINING PROGRAM

## 2022-07-14 PROCEDURE — 99214 PR OFFICE/OUTPT VISIT, EST, LEVL IV, 30-39 MIN: ICD-10-PCS | Mod: S$GLB,,, | Performed by: STUDENT IN AN ORGANIZED HEALTH CARE EDUCATION/TRAINING PROGRAM

## 2022-07-14 PROCEDURE — 1159F MED LIST DOCD IN RCRD: CPT | Mod: CPTII,S$GLB,, | Performed by: STUDENT IN AN ORGANIZED HEALTH CARE EDUCATION/TRAINING PROGRAM

## 2022-07-14 PROCEDURE — 99214 OFFICE O/P EST MOD 30 MIN: CPT | Mod: S$GLB,,, | Performed by: STUDENT IN AN ORGANIZED HEALTH CARE EDUCATION/TRAINING PROGRAM

## 2022-07-14 PROCEDURE — 3066F NEPHROPATHY DOC TX: CPT | Mod: CPTII,S$GLB,, | Performed by: STUDENT IN AN ORGANIZED HEALTH CARE EDUCATION/TRAINING PROGRAM

## 2022-07-14 RX ORDER — TRIAMCINOLONE ACETONIDE 1 MG/G
CREAM TOPICAL 2 TIMES DAILY
Qty: 80 G | Refills: 1 | Status: SHIPPED | OUTPATIENT
Start: 2022-07-14 | End: 2023-09-07

## 2022-07-14 NOTE — PROGRESS NOTES
Patient Information  Name: Abdullahi Urias  : 1955  MRN: [id number]     Referring Physician:  Dr. Mendoza ref. provider found   Primary Care Physician:  Dr. Reji Valentin MD   Date of Visit: 2022      Subjective:       Abdullahi Urias is a 66 y.o. male who presents for   Chief Complaint   Patient presents with    Rash     On neck. Tx steriod pills+ atarax . Improving since the steriod pills.      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 He reports has been using oral steroids with improvement but minimal.    Patient was last seen:Visit date not found     Prior notes by myself reviewed.   Clinical documentation obtained by nursing staff reviewed.    Review of Systems   Skin: Positive for itching and rash.        Objective:    Physical Exam   Constitutional: He appears well-developed and well-nourished. No distress.   Eyes: No conjunctival no injection.   Neurological: He is alert and oriented to person, place, and time. He is not disoriented.   Psychiatric: He has a normal mood and affect.   Skin:   Areas Examined (abnormalities noted in diagram):   Scalp / Hair Palpated and Inspected  Head / Face Inspection Performed  Neck Inspection Performed  RUE Inspected  LUE Inspection Performed              Diagram Legend     Erythematous scaling macule/papule c/w actinic keratosis       Vascular papule c/w angioma      Pigmented verrucoid papule/plaque c/w seborrheic keratosis      Yellow umbilicated papule c/w sebaceous hyperplasia      Irregularly shaped tan macule c/w lentigo     1-2 mm smooth white papules consistent with Milia      Movable subcutaneous cyst with punctum c/w epidermal inclusion cyst      Subcutaneous movable cyst c/w pilar cyst      Firm pink to brown papule c/w dermatofibroma      Pedunculated fleshy papule(s) c/w skin tag(s)      Evenly pigmented macule c/w junctional nevus     Mildly variegated pigmented, slightly irregular-bordered macule c/w mildly  atypical nevus      Flesh colored to evenly pigmented papule c/w intradermal nevus       Pink pearly papule/plaque c/w basal cell carcinoma      Erythematous hyperkeratotic cursted plaque c/w SCC      Surgical scar with no sign of skin cancer recurrence      Open and closed comedones      Inflammatory papules and pustules      Verrucoid papule consistent consistent with wart     Erythematous eczematous patches and plaques     Dystrophic onycholytic nail with subungual debris c/w onychomycosis     Umbilicated papule    Erythematous-base heme-crusted tan verrucoid plaque consistent with inflamed seborrheic keratosis     Erythematous Silvery Scaling Plaque c/w Psoriasis     See annotation      No images are attached to the encounter or orders placed in the encounter.    [] Data reviewed  [] Independent review of test  [] Management discussed with another provider    Assessment / Plan:        Erythema dyschromicum perstans  -     triamcinolone acetonide 0.1% (KENALOG) 0.1 % cream; Apply topically 2 (two) times daily.  Dispense: 80 g; Refill: 1  Counseling on topical steroids:  Patient counseled that the prolonged use of topical steroids can result in the increased appearance superficial blood vessels (telangiectasias) lightening (hypopigmentation), and   thinning of the skin ( atrophy).  Patient understands to avoid using high potency steroids in skin folds, the groin or the face.  The patient verbalized understanding of proper use and possible adverse effects of topical steroids.  All patient's questions and concerns were addressed.  - Discussed difficult nature of treating condition as there are not a lot of treatment options for EDP         LOS NUMBER AND COMPLEXITY OF PROBLEMS    COMPLEXITY OF DATA RISK TOTAL TIME (m)   83284  [id number] [] 1 self-limited or minor problem [x] Minimal to none [] No treatment recommended or patient to monitor 15-29  10-19   98852  26969 Low  [] 2 or > self limited or minor problems  [] 1  stable chronic illness  [] 1 acute, uncomplicated illness or injury Limited (2)  [] Prior external notes from each unique source  [] Review result of each unique test  [] Order each unique test []  Low  OTC medications, minor skin biopsy 30-44  20-29   08237  54090 Moderate  [x]  1 or > chronic illness with progression, exacerbation or SE of treatment  []  2 or more stable chronic illnesses  []  1 acute illness with systemic symptoms  []  1 acute complicated injury  []  1 undiagnosed new problem with uncertain prognosis Moderate (1/3 below)  []  3 or more data items        *Now includes assessment requiring independent historian  []  Independent interpretation of a test  []  Discuss management/test with another provider Moderate  [x]  Prescription drug mgmt  []  Minor surgery with risk discussed  []  Mgmt limited by social determinates 45-59  30-39   27173  73514 High  []  1 or more chronic illness with severe exacerbation, progression or SE of treatment  []  1 acute or chronic illness/injury that poses a threat to life or bodily function Extensive (2/3 below)  []  3 or more data items        *Now includes assessment requiring independent historian.  []  Independent interpretation of a test  []  Discuss management/test with another provider High  []  Major surgery with risk discussed  []  Drug therapy requiring intensive monitoring for toxicity  []  Hospitalization  []  Decision for DNR 60-74  40-54      No follow-ups on file.    Stormy Medina MD, FAAD  [facility]

## 2022-07-19 ENCOUNTER — OFFICE VISIT (OUTPATIENT)
Dept: CARDIOLOGY | Facility: CLINIC | Age: 67
End: 2022-07-19
Payer: MEDICARE

## 2022-07-19 VITALS
OXYGEN SATURATION: 99 % | HEART RATE: 67 BPM | SYSTOLIC BLOOD PRESSURE: 112 MMHG | DIASTOLIC BLOOD PRESSURE: 80 MMHG | HEIGHT: 69 IN | WEIGHT: 240.31 LBS | BODY MASS INDEX: 35.59 KG/M2

## 2022-07-19 DIAGNOSIS — I73.9 PAD (PERIPHERAL ARTERY DISEASE): ICD-10-CM

## 2022-07-19 DIAGNOSIS — E66.9 OBESITY, CLASS I, BMI 30-34.9: Chronic | ICD-10-CM

## 2022-07-19 DIAGNOSIS — Z86.73 HISTORY OF CVA IN ADULTHOOD: ICD-10-CM

## 2022-07-19 DIAGNOSIS — I10 PRIMARY HYPERTENSION: Chronic | ICD-10-CM

## 2022-07-19 DIAGNOSIS — I25.10 CORONARY ARTERY DISEASE INVOLVING NATIVE CORONARY ARTERY OF NATIVE HEART WITHOUT ANGINA PECTORIS: Chronic | ICD-10-CM

## 2022-07-19 DIAGNOSIS — I35.0 AORTIC VALVE STENOSIS, ETIOLOGY OF CARDIAC VALVE DISEASE UNSPECIFIED: ICD-10-CM

## 2022-07-19 DIAGNOSIS — Z95.2 S/P AVR (AORTIC VALVE REPLACEMENT): Primary | ICD-10-CM

## 2022-07-19 PROCEDURE — 3008F BODY MASS INDEX DOCD: CPT | Mod: CPTII,S$GLB,, | Performed by: INTERNAL MEDICINE

## 2022-07-19 PROCEDURE — 3074F SYST BP LT 130 MM HG: CPT | Mod: CPTII,S$GLB,, | Performed by: INTERNAL MEDICINE

## 2022-07-19 PROCEDURE — 1159F MED LIST DOCD IN RCRD: CPT | Mod: CPTII,S$GLB,, | Performed by: INTERNAL MEDICINE

## 2022-07-19 PROCEDURE — 1160F RVW MEDS BY RX/DR IN RCRD: CPT | Mod: CPTII,S$GLB,, | Performed by: INTERNAL MEDICINE

## 2022-07-19 PROCEDURE — 3074F PR MOST RECENT SYSTOLIC BLOOD PRESSURE < 130 MM HG: ICD-10-PCS | Mod: CPTII,S$GLB,, | Performed by: INTERNAL MEDICINE

## 2022-07-19 PROCEDURE — 3008F PR BODY MASS INDEX (BMI) DOCUMENTED: ICD-10-PCS | Mod: CPTII,S$GLB,, | Performed by: INTERNAL MEDICINE

## 2022-07-19 PROCEDURE — 1159F PR MEDICATION LIST DOCUMENTED IN MEDICAL RECORD: ICD-10-PCS | Mod: CPTII,S$GLB,, | Performed by: INTERNAL MEDICINE

## 2022-07-19 PROCEDURE — 3066F PR DOCUMENTATION OF TREATMENT FOR NEPHROPATHY: ICD-10-PCS | Mod: CPTII,S$GLB,, | Performed by: INTERNAL MEDICINE

## 2022-07-19 PROCEDURE — 99214 OFFICE O/P EST MOD 30 MIN: CPT | Mod: S$GLB,,, | Performed by: INTERNAL MEDICINE

## 2022-07-19 PROCEDURE — 1126F AMNT PAIN NOTED NONE PRSNT: CPT | Mod: CPTII,S$GLB,, | Performed by: INTERNAL MEDICINE

## 2022-07-19 PROCEDURE — 1160F PR REVIEW ALL MEDS BY PRESCRIBER/CLIN PHARMACIST DOCUMENTED: ICD-10-PCS | Mod: CPTII,S$GLB,, | Performed by: INTERNAL MEDICINE

## 2022-07-19 PROCEDURE — 99499 RISK ADDL DX/OHS AUDIT: ICD-10-PCS | Mod: S$GLB,,, | Performed by: INTERNAL MEDICINE

## 2022-07-19 PROCEDURE — 3079F PR MOST RECENT DIASTOLIC BLOOD PRESSURE 80-89 MM HG: ICD-10-PCS | Mod: CPTII,S$GLB,, | Performed by: INTERNAL MEDICINE

## 2022-07-19 PROCEDURE — 99499 UNLISTED E&M SERVICE: CPT | Mod: S$GLB,,, | Performed by: INTERNAL MEDICINE

## 2022-07-19 PROCEDURE — 1126F PR PAIN SEVERITY QUANTIFIED, NO PAIN PRESENT: ICD-10-PCS | Mod: CPTII,S$GLB,, | Performed by: INTERNAL MEDICINE

## 2022-07-19 PROCEDURE — 3066F NEPHROPATHY DOC TX: CPT | Mod: CPTII,S$GLB,, | Performed by: INTERNAL MEDICINE

## 2022-07-19 PROCEDURE — 99214 PR OFFICE/OUTPT VISIT, EST, LEVL IV, 30-39 MIN: ICD-10-PCS | Mod: S$GLB,,, | Performed by: INTERNAL MEDICINE

## 2022-07-19 PROCEDURE — 3079F DIAST BP 80-89 MM HG: CPT | Mod: CPTII,S$GLB,, | Performed by: INTERNAL MEDICINE

## 2022-07-19 PROCEDURE — 99999 PR PBB SHADOW E&M-EST. PATIENT-LVL III: CPT | Mod: PBBFAC,,, | Performed by: INTERNAL MEDICINE

## 2022-07-19 PROCEDURE — 99999 PR PBB SHADOW E&M-EST. PATIENT-LVL III: ICD-10-PCS | Mod: PBBFAC,,, | Performed by: INTERNAL MEDICINE

## 2022-07-19 RX ORDER — ASPIRIN 81 MG/1
81 TABLET ORAL DAILY
Qty: 90 TABLET | Refills: 3 | Status: SHIPPED | OUTPATIENT
Start: 2022-07-19 | End: 2022-07-19 | Stop reason: SDUPTHER

## 2022-07-19 RX ORDER — ASPIRIN 81 MG/1
81 TABLET ORAL DAILY
Qty: 90 TABLET | Refills: 3 | Status: SHIPPED | OUTPATIENT
Start: 2022-07-19

## 2022-07-19 NOTE — PROGRESS NOTES
Patient, Abdullahi Urias (MRN #[id number]), presented with a recorded BMI of 35.49 kg/m^2 and a documented comorbidity(s):  - Hypertension  to which the severe obesity is a contributing factor. This is consistent with the definition of severe obesity (BMI 35.0-39.9) with comorbidity (ICD-10 E66.01, Z68.35). The patient's severe obesity was monitored, evaluated, addressed and/or treated. This addendum to the medical record is made on 07/19/2022.

## 2022-08-23 ENCOUNTER — TELEPHONE (OUTPATIENT)
Dept: PAIN MEDICINE | Facility: CLINIC | Age: 67
End: 2022-08-23
Payer: MEDICARE

## 2022-08-23 NOTE — TELEPHONE ENCOUNTER
Patient instructed to go to the emergency room or call cardiology to be evaluated due to face involvement per his previous message.  Dr. Vela and Dr. Carmen both agreed.  Patient verbalized understanding.

## 2022-08-25 ENCOUNTER — OFFICE VISIT (OUTPATIENT)
Dept: INTERNAL MEDICINE | Facility: CLINIC | Age: 67
End: 2022-08-25
Payer: OTHER GOVERNMENT

## 2022-08-25 ENCOUNTER — PATIENT MESSAGE (OUTPATIENT)
Dept: INFECTIOUS DISEASES | Facility: HOSPITAL | Age: 67
End: 2022-08-25
Payer: MEDICARE

## 2022-08-25 ENCOUNTER — HOSPITAL ENCOUNTER (EMERGENCY)
Facility: HOSPITAL | Age: 67
Discharge: HOME | End: 2022-08-26
Attending: EMERGENCY MEDICINE
Payer: MEDICARE

## 2022-08-25 ENCOUNTER — TELEPHONE (OUTPATIENT)
Dept: INTERNAL MEDICINE | Facility: CLINIC | Age: 67
End: 2022-08-25
Payer: MEDICARE

## 2022-08-25 ENCOUNTER — NURSE TRIAGE (OUTPATIENT)
Dept: ADMINISTRATIVE | Facility: CLINIC | Age: 67
End: 2022-08-25
Payer: MEDICARE

## 2022-08-25 VITALS
SYSTOLIC BLOOD PRESSURE: 110 MMHG | BODY MASS INDEX: 35.96 KG/M2 | HEART RATE: 74 BPM | OXYGEN SATURATION: 97 % | TEMPERATURE: 98 F | WEIGHT: 242.81 LBS | HEIGHT: 69 IN | DIASTOLIC BLOOD PRESSURE: 74 MMHG

## 2022-08-25 DIAGNOSIS — R05.9 COUGH: Primary | ICD-10-CM

## 2022-08-25 DIAGNOSIS — U07.1 COVID: ICD-10-CM

## 2022-08-25 DIAGNOSIS — U07.1 COVID-19 VIRUS INFECTION: ICD-10-CM

## 2022-08-25 DIAGNOSIS — R06.02 SOB (SHORTNESS OF BREATH): ICD-10-CM

## 2022-08-25 DIAGNOSIS — U07.1 COVID: Primary | ICD-10-CM

## 2022-08-25 LAB
ALBUMIN SERPL BCP-MCNC: 3.7 G/DL (ref 3.5–5.2)
ALP SERPL-CCNC: 94 U/L (ref 55–135)
ALT SERPL W/O P-5'-P-CCNC: 14 U/L (ref 10–44)
ANION GAP SERPL CALC-SCNC: 10 MMOL/L (ref 8–16)
APTT BLDCRRT: 22.7 SEC (ref 21–32)
AST SERPL-CCNC: 21 U/L (ref 10–40)
BASOPHILS # BLD AUTO: 0.02 K/UL (ref 0–0.2)
BASOPHILS NFR BLD: 0.4 % (ref 0–1.9)
BILIRUB SERPL-MCNC: 0.4 MG/DL (ref 0.1–1)
BILIRUB UR QL STRIP: NEGATIVE
BNP SERPL-MCNC: 36 PG/ML (ref 0–99)
BUN SERPL-MCNC: 15 MG/DL (ref 8–23)
CALCIUM SERPL-MCNC: 8.9 MG/DL (ref 8.7–10.5)
CHLORIDE SERPL-SCNC: 106 MMOL/L (ref 95–110)
CLARITY UR: CLEAR
CO2 SERPL-SCNC: 22 MMOL/L (ref 23–29)
COLOR UR: COLORLESS
CREAT SERPL-MCNC: 1.7 MG/DL (ref 0.5–1.4)
CTP QC/QA: YES
D DIMER PPP IA.FEU-MCNC: 0.89 MG/L FEU
DIFFERENTIAL METHOD: ABNORMAL
EOSINOPHIL # BLD AUTO: 0.1 K/UL (ref 0–0.5)
EOSINOPHIL NFR BLD: 2.1 % (ref 0–8)
ERYTHROCYTE [DISTWIDTH] IN BLOOD BY AUTOMATED COUNT: 13.6 % (ref 11.5–14.5)
EST. GFR  (NO RACE VARIABLE): 44 ML/MIN/1.73 M^2
GLUCOSE SERPL-MCNC: 100 MG/DL (ref 70–110)
GLUCOSE UR QL STRIP: NEGATIVE
HCT VFR BLD AUTO: 35.6 % (ref 40–54)
HGB BLD-MCNC: 11.9 G/DL (ref 14–18)
HGB UR QL STRIP: NEGATIVE
IMM GRANULOCYTES # BLD AUTO: 0.03 K/UL (ref 0–0.04)
IMM GRANULOCYTES NFR BLD AUTO: 0.6 % (ref 0–0.5)
INR PPP: 1 (ref 0.8–1.2)
KETONES UR QL STRIP: NEGATIVE
LEUKOCYTE ESTERASE UR QL STRIP: NEGATIVE
LYMPHOCYTES # BLD AUTO: 0.9 K/UL (ref 1–4.8)
LYMPHOCYTES NFR BLD: 16.9 % (ref 18–48)
MCH RBC QN AUTO: 28.3 PG (ref 27–31)
MCHC RBC AUTO-ENTMCNC: 33.4 G/DL (ref 32–36)
MCV RBC AUTO: 85 FL (ref 82–98)
MONOCYTES # BLD AUTO: 0.6 K/UL (ref 0.3–1)
MONOCYTES NFR BLD: 12 % (ref 4–15)
NEUTROPHILS # BLD AUTO: 3.6 K/UL (ref 1.8–7.7)
NEUTROPHILS NFR BLD: 68 % (ref 38–73)
NITRITE UR QL STRIP: NEGATIVE
NRBC BLD-RTO: 0 /100 WBC
PH UR STRIP: 7 [PH] (ref 5–8)
PLATELET # BLD AUTO: 133 K/UL (ref 150–450)
PMV BLD AUTO: 10.2 FL (ref 9.2–12.9)
POTASSIUM SERPL-SCNC: 3.8 MMOL/L (ref 3.5–5.1)
PROT SERPL-MCNC: 7 G/DL (ref 6–8.4)
PROT UR QL STRIP: NEGATIVE
PROTHROMBIN TIME: 10.8 SEC (ref 9–12.5)
RBC # BLD AUTO: 4.2 M/UL (ref 4.6–6.2)
SARS-COV-2 RDRP RESP QL NAA+PROBE: POSITIVE
SODIUM SERPL-SCNC: 138 MMOL/L (ref 136–145)
SP GR UR STRIP: <1.005 (ref 1–1.03)
TROPONIN I SERPL DL<=0.01 NG/ML-MCNC: 0.01 NG/ML (ref 0–0.03)
URN SPEC COLLECT METH UR: ABNORMAL
UROBILINOGEN UR STRIP-ACNC: NEGATIVE EU/DL
WBC # BLD AUTO: 5.33 K/UL (ref 3.9–12.7)

## 2022-08-25 PROCEDURE — U0002: ICD-10-PCS | Mod: QW,S$GLB,, | Performed by: FAMILY MEDICINE

## 2022-08-25 PROCEDURE — 3288F FALL RISK ASSESSMENT DOCD: CPT | Mod: CPTII,S$GLB,, | Performed by: FAMILY MEDICINE

## 2022-08-25 PROCEDURE — 84484 ASSAY OF TROPONIN QUANT: CPT | Performed by: EMERGENCY MEDICINE

## 2022-08-25 PROCEDURE — 99214 PR OFFICE/OUTPT VISIT, EST, LEVL IV, 30-39 MIN: ICD-10-PCS | Mod: S$GLB,,, | Performed by: FAMILY MEDICINE

## 2022-08-25 PROCEDURE — 1126F PR PAIN SEVERITY QUANTIFIED, NO PAIN PRESENT: ICD-10-PCS | Mod: CPTII,S$GLB,, | Performed by: FAMILY MEDICINE

## 2022-08-25 PROCEDURE — 3066F NEPHROPATHY DOC TX: CPT | Mod: CPTII,S$GLB,, | Performed by: FAMILY MEDICINE

## 2022-08-25 PROCEDURE — 93005 ELECTROCARDIOGRAM TRACING: CPT

## 2022-08-25 PROCEDURE — 1101F PR PT FALLS ASSESS DOC 0-1 FALLS W/OUT INJ PAST YR: ICD-10-PCS | Mod: CPTII,S$GLB,, | Performed by: FAMILY MEDICINE

## 2022-08-25 PROCEDURE — 3066F PR DOCUMENTATION OF TREATMENT FOR NEPHROPATHY: ICD-10-PCS | Mod: CPTII,S$GLB,, | Performed by: FAMILY MEDICINE

## 2022-08-25 PROCEDURE — 85025 COMPLETE CBC W/AUTO DIFF WBC: CPT | Performed by: EMERGENCY MEDICINE

## 2022-08-25 PROCEDURE — 83880 ASSAY OF NATRIURETIC PEPTIDE: CPT | Performed by: EMERGENCY MEDICINE

## 2022-08-25 PROCEDURE — 1159F PR MEDICATION LIST DOCUMENTED IN MEDICAL RECORD: ICD-10-PCS | Mod: CPTII,S$GLB,, | Performed by: FAMILY MEDICINE

## 2022-08-25 PROCEDURE — 3078F PR MOST RECENT DIASTOLIC BLOOD PRESSURE < 80 MM HG: ICD-10-PCS | Mod: CPTII,S$GLB,, | Performed by: FAMILY MEDICINE

## 2022-08-25 PROCEDURE — 3078F DIAST BP <80 MM HG: CPT | Mod: CPTII,S$GLB,, | Performed by: FAMILY MEDICINE

## 2022-08-25 PROCEDURE — 80053 COMPREHEN METABOLIC PANEL: CPT | Performed by: EMERGENCY MEDICINE

## 2022-08-25 PROCEDURE — 25500020 PHARM REV CODE 255: Performed by: EMERGENCY MEDICINE

## 2022-08-25 PROCEDURE — 93010 ELECTROCARDIOGRAM REPORT: CPT | Mod: ,,, | Performed by: INTERNAL MEDICINE

## 2022-08-25 PROCEDURE — 1160F RVW MEDS BY RX/DR IN RCRD: CPT | Mod: CPTII,S$GLB,, | Performed by: FAMILY MEDICINE

## 2022-08-25 PROCEDURE — 85730 THROMBOPLASTIN TIME PARTIAL: CPT | Performed by: EMERGENCY MEDICINE

## 2022-08-25 PROCEDURE — 1159F MED LIST DOCD IN RCRD: CPT | Mod: CPTII,S$GLB,, | Performed by: FAMILY MEDICINE

## 2022-08-25 PROCEDURE — 96360 HYDRATION IV INFUSION INIT: CPT

## 2022-08-25 PROCEDURE — 3074F PR MOST RECENT SYSTOLIC BLOOD PRESSURE < 130 MM HG: ICD-10-PCS | Mod: CPTII,S$GLB,, | Performed by: FAMILY MEDICINE

## 2022-08-25 PROCEDURE — 93010 EKG 12-LEAD: ICD-10-PCS | Mod: ,,, | Performed by: INTERNAL MEDICINE

## 2022-08-25 PROCEDURE — 99999 PR PBB SHADOW E&M-EST. PATIENT-LVL V: CPT | Mod: PBBFAC,,, | Performed by: FAMILY MEDICINE

## 2022-08-25 PROCEDURE — 3008F PR BODY MASS INDEX (BMI) DOCUMENTED: ICD-10-PCS | Mod: CPTII,S$GLB,, | Performed by: FAMILY MEDICINE

## 2022-08-25 PROCEDURE — 3074F SYST BP LT 130 MM HG: CPT | Mod: CPTII,S$GLB,, | Performed by: FAMILY MEDICINE

## 2022-08-25 PROCEDURE — 3008F BODY MASS INDEX DOCD: CPT | Mod: CPTII,S$GLB,, | Performed by: FAMILY MEDICINE

## 2022-08-25 PROCEDURE — 1126F AMNT PAIN NOTED NONE PRSNT: CPT | Mod: CPTII,S$GLB,, | Performed by: FAMILY MEDICINE

## 2022-08-25 PROCEDURE — 81003 URINALYSIS AUTO W/O SCOPE: CPT | Mod: HCNC | Performed by: EMERGENCY MEDICINE

## 2022-08-25 PROCEDURE — 85379 FIBRIN DEGRADATION QUANT: CPT | Performed by: EMERGENCY MEDICINE

## 2022-08-25 PROCEDURE — 85610 PROTHROMBIN TIME: CPT | Performed by: EMERGENCY MEDICINE

## 2022-08-25 PROCEDURE — 99214 OFFICE O/P EST MOD 30 MIN: CPT | Mod: S$GLB,,, | Performed by: FAMILY MEDICINE

## 2022-08-25 PROCEDURE — 1160F PR REVIEW ALL MEDS BY PRESCRIBER/CLIN PHARMACIST DOCUMENTED: ICD-10-PCS | Mod: CPTII,S$GLB,, | Performed by: FAMILY MEDICINE

## 2022-08-25 PROCEDURE — 99285 EMERGENCY DEPT VISIT HI MDM: CPT | Mod: 25

## 2022-08-25 PROCEDURE — 96361 HYDRATE IV INFUSION ADD-ON: CPT

## 2022-08-25 PROCEDURE — U0002 COVID-19 LAB TEST NON-CDC: HCPCS | Mod: QW,S$GLB,, | Performed by: FAMILY MEDICINE

## 2022-08-25 PROCEDURE — 25000003 PHARM REV CODE 250: Performed by: EMERGENCY MEDICINE

## 2022-08-25 PROCEDURE — 3288F PR FALLS RISK ASSESSMENT DOCUMENTED: ICD-10-PCS | Mod: CPTII,S$GLB,, | Performed by: FAMILY MEDICINE

## 2022-08-25 PROCEDURE — 99999 PR PBB SHADOW E&M-EST. PATIENT-LVL V: ICD-10-PCS | Mod: PBBFAC,,, | Performed by: FAMILY MEDICINE

## 2022-08-25 PROCEDURE — 1101F PT FALLS ASSESS-DOCD LE1/YR: CPT | Mod: CPTII,S$GLB,, | Performed by: FAMILY MEDICINE

## 2022-08-25 PROCEDURE — 63600175 PHARM REV CODE 636 W HCPCS: Performed by: EMERGENCY MEDICINE

## 2022-08-25 RX ORDER — ACETAMINOPHEN 500 MG
1000 TABLET ORAL
Status: COMPLETED | OUTPATIENT
Start: 2022-08-25 | End: 2022-08-25

## 2022-08-25 RX ORDER — ALBUTEROL SULFATE 90 UG/1
2 AEROSOL, METERED RESPIRATORY (INHALATION) EVERY 6 HOURS PRN
Qty: 18 G | Refills: 0 | Status: SHIPPED | OUTPATIENT
Start: 2022-08-25 | End: 2022-09-26

## 2022-08-25 RX ORDER — BENZONATATE 100 MG/1
100-200 CAPSULE ORAL 3 TIMES DAILY PRN
Qty: 30 CAPSULE | Refills: 1 | Status: SHIPPED | OUTPATIENT
Start: 2022-08-25 | End: 2022-09-04

## 2022-08-25 RX ADMIN — ACETAMINOPHEN 1000 MG: 500 TABLET ORAL at 11:08

## 2022-08-25 RX ADMIN — SODIUM CHLORIDE, POTASSIUM CHLORIDE, SODIUM LACTATE AND CALCIUM CHLORIDE 1000 ML: 600; 310; 30; 20 INJECTION, SOLUTION INTRAVENOUS at 09:08

## 2022-08-25 RX ADMIN — IOHEXOL 100 ML: 350 INJECTION, SOLUTION INTRAVENOUS at 11:08

## 2022-08-25 NOTE — TELEPHONE ENCOUNTER
Reason for Disposition   MODERATE difficulty breathing (e.g., speaks in phrases, SOB even at rest, pulse 100-120)    Additional Information   Negative: SEVERE difficulty breathing (e.g., struggling for each breath, speaks in single words)   Negative: Difficult to awaken or acting confused (e.g., disoriented, slurred speech)   Negative: Bluish (or gray) lips or face now   Negative: Shock suspected (e.g., cold/pale/clammy skin, too weak to stand, low BP, rapid pulse)   Negative: Sounds like a life-threatening emergency to the triager   Negative: [1] Diagnosed or suspected COVID-19 AND [2] symptoms lasting 3 or more weeks   Negative: [1] COVID-19 exposure AND [2] NO symptoms   Negative: COVID-19 vaccine reaction suspected (e.g., fever, headache, muscle aches) occurring 1 to 3 days after getting vaccine   Negative: COVID-19 vaccine, questions about   Negative: [1] Lives with someone known to have influenza (flu test positive) AND [2] flu-like symptoms (e.g., cough, runny nose, sore throat, SOB; with or without fever)   Negative: [1] Adult with possible COVID-19 symptoms AND [2] triager concerned about severity of symptoms or other causes   Negative: COVID-19 and breastfeeding, questions about   Negative: SEVERE or constant chest pain or pressure  (Exception: Mild central chest pain, present only when coughing.)   Negative: Passed out (i.e., lost consciousness, collapsed and was not responding)   Negative: Shock suspected (e.g., cold/pale/clammy skin, too weak to stand, low BP, rapid pulse)   Negative: Difficult to awaken or acting confused  (e.g., disoriented, slurred speech)    Protocols used: CORONAVIRUS (COVID-19) DIAGNOSED OR [id number], ST HEART RATE AND HEARTBEAT [id number]    Pt stated he tested positive for COVID. Stated he has difficulty breathing and feels sob. Pt is with his wife. Per triage protocol advised to go to the ED now for evaluation. Instructed to wear a mask and inform  staff he has COVID. Pt verbalized understanding.

## 2022-08-25 NOTE — PATIENT INSTRUCTIONS
ER if in distress.    Please copy and paste these links for more information on isolating: https://www.cdc.gov/coronavirus/2019-ncov/if-you-are-sick/steps-when-sick.html  https://www.cdc.gov/coronavirus/2019-ncov/if-you-are-sick/isolation.html  https://www.cdc.gov/coronavirus/2019-ncov/if-[email or web address]-sick/end-home-isolation.html

## 2022-08-25 NOTE — TELEPHONE ENCOUNTER
----- Message from Jaycee Agarwal sent at 8/25/2022  7:12 AM CDT -----  Type:  Same Day Appointment Request    Caller is requesting a same day appointment.  Caller declined first available appointment listed below.    Name of Caller:patient  When is the first available appointment?na  Symptoms:sinus,coughing,sore throat  Best Call Back Number:[phone]  Additional Information: na

## 2022-08-25 NOTE — PROGRESS NOTES
Subjective:       Patient ID: Abdullahi Urias is a 66 y.o. male.    Chief Complaint: Sinus Problem    PCP: Dr. Valentin    Patient is new to me. Patient presents to clinic today for cough and congestion. Patient reports bad cough x 4-5 days, getting worse. He reports sinus congestion, headache. Reports chills. No fever. Denies chest pain or SOB. Patient is otherwise without concerns today.      Review of Systems   Constitutional: Positive for chills. Negative for fatigue, fever and unexpected weight change.   HENT: Positive for congestion.    Eyes: Negative for visual disturbance.   Respiratory: Positive for cough. Negative for shortness of breath.    Cardiovascular: Negative for chest pain.   Musculoskeletal: Negative for myalgias.   Neurological: Negative for headaches.         Objective:      Physical Exam  Vitals reviewed.   Constitutional:       General: He is not in acute distress.     Appearance: He is well-developed.   HENT:      Head: Normocephalic and atraumatic.   Eyes:      General: Lids are normal. No scleral icterus.     Extraocular Movements: Extraocular movements intact.      Conjunctiva/sclera: Conjunctivae normal.      Pupils: Pupils are equal, round, and reactive to light.   Cardiovascular:      Rate and Rhythm: Normal rate and regular rhythm.      Heart sounds: No murmur heard.    No friction rub. No gallop.   Pulmonary:      Effort: Pulmonary effort is normal.      Breath sounds: Normal breath sounds. No decreased breath sounds, wheezing, rhonchi or rales.      Comments: Able to speak in complete sentences without difficulty.  Neurological:      Mental Status: He is alert and oriented to person, place, and time.      Cranial Nerves: No cranial nerve deficit.      Gait: Gait normal.   Psychiatric:         Mood and Affect: Mood and affect normal.         Assessment:       1. Cough    2. COVID-19 virus infection    3. COVID        Plan:   1. Cough  -     POCT COVID-19 Rapid Screening  -     albuterol  (PROVENTIL/VENTOLIN HFA) 90 mcg/actuation inhaler  -     benzonatate (TESSALON) 100 MG capsule    2. COVID-19 virus infection  -     COVID-19 Home Symptom Monitoring  - Duration (days): 14  -     Ambulatory referral/consult to [facility]    3. COVID    covid risk score is 6. paxlovid is contraindicated due to plavix.  Infusion ordered.  Advised rest and fluids.  Cough medications as above.  Follow up if worsening.  Advised ER if in distress.  Isolate per CDC guidelines.  Patient expressed understanding and agreement with plan.

## 2022-08-26 ENCOUNTER — PATIENT MESSAGE (OUTPATIENT)
Dept: ADMINISTRATIVE | Facility: OTHER | Age: 67
End: 2022-08-26
Payer: MEDICARE

## 2022-08-26 ENCOUNTER — INFUSION (OUTPATIENT)
Dept: INFECTIOUS DISEASES | Facility: HOSPITAL | Age: 67
End: 2022-08-26
Attending: FAMILY MEDICINE
Payer: MEDICARE

## 2022-08-26 VITALS
OXYGEN SATURATION: 99 % | DIASTOLIC BLOOD PRESSURE: 79 MMHG | DIASTOLIC BLOOD PRESSURE: 86 MMHG | SYSTOLIC BLOOD PRESSURE: 133 MMHG | RESPIRATION RATE: 20 BRPM | HEART RATE: 87 BPM | BODY MASS INDEX: 36.41 KG/M2 | TEMPERATURE: 98 F | RESPIRATION RATE: 24 BRPM | TEMPERATURE: 99 F | HEIGHT: 69 IN | OXYGEN SATURATION: 95 % | WEIGHT: 245.81 LBS | HEART RATE: 81 BPM | SYSTOLIC BLOOD PRESSURE: 120 MMHG

## 2022-08-26 DIAGNOSIS — U07.1 COVID-19 VIRUS INFECTION: Primary | ICD-10-CM

## 2022-08-26 PROCEDURE — 63600175 PHARM REV CODE 636 W HCPCS: Performed by: FAMILY MEDICINE

## 2022-08-26 PROCEDURE — M0222 HC IV INJECTION, BEBTELOVIMAB, INCL POST ADMIN MONIT: HCPCS | Performed by: FAMILY MEDICINE

## 2022-08-26 RX ORDER — BEBTELOVIMAB 87.5 MG/ML
175 INJECTION, SOLUTION INTRAVENOUS
Status: COMPLETED | OUTPATIENT
Start: 2022-08-26 | End: 2022-08-26

## 2022-08-26 RX ORDER — ACETAMINOPHEN 325 MG/1
650 TABLET ORAL
Status: ACTIVE | OUTPATIENT
Start: 2022-08-26 | End: 2022-08-27

## 2022-08-26 RX ORDER — ONDANSETRON 4 MG/1
4 TABLET, ORALLY DISINTEGRATING ORAL
Status: ACTIVE | OUTPATIENT
Start: 2022-08-26 | End: 2022-08-27

## 2022-08-26 RX ORDER — EPINEPHRINE 0.3 MG/.3ML
0.3 INJECTION SUBCUTANEOUS
Status: ACTIVE | OUTPATIENT
Start: 2022-08-26 | End: 2022-08-29

## 2022-08-26 RX ORDER — ALBUTEROL SULFATE 90 UG/1
2 AEROSOL, METERED RESPIRATORY (INHALATION)
Status: ACTIVE | OUTPATIENT
Start: 2022-08-26 | End: 2022-08-29

## 2022-08-26 RX ORDER — DIPHENHYDRAMINE HYDROCHLORIDE 50 MG/ML
25 INJECTION INTRAMUSCULAR; INTRAVENOUS
Status: ACTIVE | OUTPATIENT
Start: 2022-08-26 | End: 2022-08-27

## 2022-08-26 RX ADMIN — BEBTELOVIMAB 175 MG: 87.5 INJECTION, SOLUTION INTRAVENOUS at 11:08

## 2022-08-26 NOTE — ED PROVIDER NOTES
SCRIBE #1 NOTE: I, Davy Cam, am scribing for, and in the presence of, Nehemiah Monroy MD. I have scribed the entire note.       History     Chief Complaint   Patient presents with    Chest Pain     And SOB; +COVID today     Review of patient's allergies indicates:   Allergen Reactions    Pletal [cilostazol]      Rash and leg pain         History of Present Illness     HPI    8/25/2022, 9:50 PM  History obtained from the spouse and patient      History of Present Illness: Abdullahi Urias is a 66 y.o. male patient with a PMHx of aortic stenosis, asthma, BPH, CAD, cardiomyopathy, CHF, CKD, CVA, GERD, ex-smoker, hepatitis C, HTN, pancreatitis, PVD, renal insufficiency who presents to the Emergency Department for evaluation of sore throat which onset gradually 5 days PTA. Pt reports fatigue, palpitations, chest pain, cough. Pt tested positive for COVID today. Pt was vaccinated and received his booster shots. Pt also describes his usual chronic neck pain. Symptoms are constant and moderate in severity. No mitigating or exacerbating factors reported. Associated sxs include fatigue, palpitations, cough, chest pain. Patient denies any urinary incontinence, n/v/d, dysuria, weakness, and all other sxs at this time. No prior Tx reported. No further complaints or concerns at this time.       Arrival mode: Personal vehicle    PCP: Reji Valentin MD        Past Medical History:  Past Medical History:   Diagnosis Date    [facility]    Asthma     BPH (benign prostatic hyperplasia)     CAD (coronary artery disease)     Cardiomyopathy     CHF (congestive heart failure)     Chronic hoarseness     vocal cord surg    Chronic pain     CKD (chronic kidney disease) stage 3, GFR 30-59 ml/min     CVA (cerebral infarction)     8/2012 olol; reviewed ed note    Ex-smoker     GERD (gastroesophageal reflux disease)     Hepatitis C     treatedharvoni says cured, RNA NEG 6/2020    Hypertension      Pancreatitis     Prostate cancer     Prostate cancer     Prostate cancer     PVD (peripheral vascular disease)     Renal insufficiency     Substance abuse     cocaine, etoh , tob in past       Past Surgical History:  Past Surgical History:   Procedure Laterality Date    AORTIC VALVE REPLACEMENT  05/19/2014    Tissue valve replacement    BACK SURGERY      CARDIAC CATHETERIZATION      COLONOSCOPY  2011    COLONOSCOPY N/A 2/24/2021    Procedure: COLONOSCOPY;  Surgeon: Faith Carrillo MD;  Location: [facility] ENDO;  Service: Endoscopy;  Laterality: N/A;    EPIDURAL STEROID INJECTION INTO CERVICAL SPINE N/A 6/21/2022    Procedure: C7-T1 IL PIEDAD;  Surgeon: Nehemiah Carmen MD;  Location: [facility] PAIN MGT;  Service: Pain Management;  Laterality: N/A;    ESOPHAGOGASTRODUODENOSCOPY N/A 2/24/2021    Procedure: ESOPHAGOGASTRODUODENOSCOPY (EGD);  Surgeon: Faith Carrillo MD;  Location: [facility] ENDO;  Service: Endoscopy;  Laterality: N/A;    FOOT SURGERY      LEFT HEART CATHETERIZATION Left 7/24/2020    Procedure: CATHETERIZATION, HEART, LEFT;  Surgeon: Pasha Martin MD;  Location: [facility] CATH LAB;  Service: Cardiology;  Laterality: Left;    PENILE PROSTHESIS IMPLANT      PENILE PROSTHESIS REVISION  04/30/2018    [facility]    radiation for prostate      UPPER GASTROINTESTINAL ENDOSCOPY  2011         Family History:  Family History   Problem Relation Age of Onset    Heart disease Mother     Heart attack Sister     Heart attack Brother     Colon cancer Neg Hx     Colon polyps Neg Hx     Liver cancer Neg Hx     Inflammatory bowel disease Neg Hx     Liver disease Neg Hx     Rectal cancer Neg Hx     Stomach cancer Neg Hx     Ulcerative colitis Neg Hx        Social History:  Social History     Tobacco Use    Smoking status: Former Smoker     Packs/day: 2.00     Years: 8.00     Pack years: 16.00     Quit date: 8/24/2012     Years since quitting: 10.0    Smokeless tobacco:  Never Used   Substance and Sexual Activity    Alcohol use: No     Comment: Sober since 08/24/2012    Drug use: No    Sexual activity: Yes        Review of Systems     Review of Systems   Constitutional: Positive for fatigue. Negative for fever.   HENT: Positive for sore throat.    Respiratory: Positive for cough.    Cardiovascular: Positive for chest pain and palpitations.   Gastrointestinal: Negative for diarrhea, nausea and vomiting.   Genitourinary: Negative for dysuria.        (-) Urinary incontinence   Musculoskeletal: Negative for back pain.   Skin: Negative for rash.   Neurological: Negative for weakness.   Hematological: Does not bruise/bleed easily.   All other systems reviewed and are negative.     Physical Exam     Initial Vitals [08/25/22 2039]   BP Pulse Resp Temp SpO2   (!) 81/63 96 20 100 °F (37.8 °C) 98 %      MAP       --          Physical Exam  Nursing Notes and Vital Signs Reviewed.  Constitutional: Patient is in no acute distress. Well-developed and well-nourished.  Head: Atraumatic. Normocephalic.  Eyes: PERRL. EOM intact. Conjunctivae are not pale. No scleral icterus.  ENT: Mucous membranes are moist. Oropharynx is clear and symmetric.    Neck: Supple. Full ROM. No lymphadenopathy.  Cardiovascular: Regular rate. Regular rhythm. No murmurs, rubs, or gallops. Distal pulses are 2+ and symmetric.  Pulmonary/Chest: No respiratory distress. Clear to auscultation bilaterally. No wheezing or rales.  Abdominal: Soft and non-distended.  There is no tenderness.  No rebound, guarding, or rigidity. Good bowel sounds.  Genitourinary: No CVA tenderness  Musculoskeletal: Moves all extremities. No obvious deformities. No edema. No calf tenderness.  Skin: Warm and dry.  Neurological:  Alert, awake, and appropriate.  Normal speech.  No acute focal neurological deficits are appreciated.  Psychiatric: Normal affect. Good eye contact. Appropriate in content.     ED Course   Procedures  ED Vital Signs:  Vitals:     "08/25/22 2039 08/25/22 2145 08/25/22 2300 08/26/22 0000   BP: (!) 81/63 103/70 133/80 133/79   Pulse: 96 91 82 81   Resp: 20 (!) 27 (!) 25 (!) 24   Temp: 100 °F (37.8 °C) 99.9 °F (37.7 °C) 99.9 °F (37.7 °C) 99.2 °F (37.3 °C)   TempSrc: Oral      SpO2: 98% 95% (!) 94% 95%   Weight: 111.5 kg (245 lb 13 oz)      Height: 5' 9" (1.753 m)          Abnormal Lab Results:  Labs Reviewed   CBC W/ AUTO DIFFERENTIAL - Abnormal; Notable for the following components:       Result Value    RBC 4.20 (*)     Hemoglobin 11.9 (*)     Hematocrit 35.6 (*)     Platelets 133 (*)     Immature Granulocytes 0.6 (*)     Lymph # 0.9 (*)     Lymph % 16.9 (*)     All other components within normal limits   COMPREHENSIVE METABOLIC PANEL - Abnormal; Notable for the following components:    CO2 22 (*)     Creatinine 1.7 (*)     eGFR 44 (*)     All other components within normal limits   D DIMER, QUANTITATIVE - Abnormal; Notable for the following components:    D-Dimer 0.89 (*)     All other components within normal limits   URINALYSIS, REFLEX TO URINE CULTURE - Abnormal; Notable for the following components:    Color, UA Colorless (*)     Specific Gravity, UA <1.005 (*)     All other components within normal limits    Narrative:     Specimen Source->Urine   APTT   PROTIME-INR   TROPONIN I   B-TYPE NATRIURETIC PEPTIDE        All Lab Results:  Results for orders placed or performed during the hospital encounter of 08/25/22   CBC auto differential   Result Value Ref Range    WBC 5.33 3.90 - 12.70 K/uL    RBC 4.20 (L) 4.60 - 6.20 M/uL    Hemoglobin 11.9 (L) 14.0 - 18.0 g/dL    Hematocrit 35.6 (L) 40.0 - 54.0 %    MCV 85 82 - 98 fL    MCH 28.3 27.0 - 31.0 pg    MCHC 33.4 32.0 - 36.0 g/dL    RDW 13.6 11.5 - 14.5 %    Platelets 133 (L) 150 - 450 K/uL    MPV 10.2 9.2 - 12.9 fL    Immature Granulocytes 0.6 (H) 0.0 - 0.5 %    Gran # (ANC) 3.6 1.8 - 7.7 K/uL    Immature Grans (Abs) 0.03 0.00 - 0.04 K/uL    Lymph # 0.9 (L) 1.0 - 4.8 K/uL    Mono # 0.6 0.3 - " 1.0 K/uL    Eos # 0.1 0.0 - 0.5 K/uL    Baso # 0.02 0.00 - 0.20 K/uL    nRBC 0 0 /100 WBC    Gran % 68.0 38.0 - 73.0 %    Lymph % 16.9 (L) 18.0 - 48.0 %    Mono % 12.0 4.0 - 15.0 %    Eosinophil % 2.1 0.0 - 8.0 %    Basophil % 0.4 0.0 - 1.9 %    Differential Method Automated    Comprehensive metabolic panel   Result Value Ref Range    Sodium 138 136 - 145 mmol/L    Potassium 3.8 3.5 - 5.1 mmol/L    Chloride 106 95 - 110 mmol/L    CO2 22 (L) 23 - 29 mmol/L    Glucose 100 70 - 110 mg/dL    BUN 15 8 - 23 mg/dL    Creatinine 1.7 (H) 0.5 - 1.4 mg/dL    Calcium 8.9 8.7 - 10.5 mg/dL    Total Protein 7.0 6.0 - 8.4 g/dL    Albumin 3.7 3.5 - 5.2 g/dL    Total Bilirubin 0.4 0.1 - 1.0 mg/dL    Alkaline Phosphatase 94 55 - 135 U/L    AST 21 10 - 40 U/L    ALT 14 10 - 44 U/L    Anion Gap 10 8 - 16 mmol/L    eGFR 44 (A) >60 mL/min/1.73 m^2   APTT   Result Value Ref Range    aPTT 22.7 21.0 - 32.0 sec   Protime-INR   Result Value Ref Range    Prothrombin Time 10.8 9.0 - 12.5 sec    INR 1.0 0.8 - 1.2   D dimer, quantitative   Result Value Ref Range    D-Dimer 0.89 (H) <0.50 mg/L FEU   Troponin I   Result Value Ref Range    Troponin I 0.013 0.000 - 0.026 ng/mL   Brain natriuretic peptide   Result Value Ref Range    BNP 36 0 - 99 pg/mL   Urinalysis, Reflex to Urine Culture Urine, Clean Catch    Specimen: Urine   Result Value Ref Range    Specimen UA Urine, Clean Catch     Color, UA Colorless (A) Yellow, Straw, Kallie    Appearance, UA Clear Clear    pH, UA 7.0 5.0 - 8.0    Specific Gravity, UA <1.005 (A) 1.005 - 1.030    Protein, UA Negative Negative    Glucose, UA Negative Negative    Ketones, UA Negative Negative    Bilirubin (UA) Negative Negative    Occult Blood UA Negative Negative    Nitrite, UA Negative Negative    Urobilinogen, UA Negative <2.0 EU/dL    Leukocytes, UA Negative Negative         Imaging Results:  Imaging Results          CTA Chest Non-Coronary (PE Study) (Final result)  Result time 08/25/22 23:24:18    Final  result by Bonilla Madrigal MD (08/25/22 23:24:18)                 Impression:      No definite acute abnormality identified.  Lungs are clear.  No pulmonary thromboembolism.    All CT scans at this facility are performed  using dose modulation techniques as appropriate to performed exam including the following:  automated exposure control; adjustment of mA and/or kV according to the patients size (this includes techniques or standardized protocols for targeted exams where dose is matched to indication/reason for exam: i.e. extremities or head);  iterative reconstruction technique.      Electronically signed by: Bonilla Madrigal  Date:    08/25/2022  Time:    23:24             Narrative:    EXAMINATION:  CTA CHEST NON CORONARY    CLINICAL HISTORY:  Pulmonary embolism (PE) suspected, positive D-dimer;    TECHNIQUE:  Low dose axial images, sagittal and coronal reformations were obtained from the thoracic inlet to the lung bases following the IV administration of 100 mL of Omnipaque 350.  Contrast timing was optimized to evaluate the pulmonary arteries.  MIP images were performed.    COMPARISON:  None    FINDINGS:  Base of Neck: No significant abnormality.    Thoracic soft tissues: Unremarkable.    Aorta: Left-sided aortic arch.  No aneurysm and no significant atherosclerosis    Heart: Normal size. No effusion.  Moderate coronary artery atherosclerosis is present.    Pulmonary vasculature: Pulmonary arteries are well opacified.  There is no pulmonary thromboembolism.    Stefania/Mediastinum: No pathologic alfonso enlargement.    Airways: Patent.    Lungs/Pleura: Clear lungs. No pleural effusion or thickening.    Esophagus: Unremarkable.    Upper Abdomen: No abnormality of the partially imaged upper abdomen.    Bones: No acute fracture. No suspicious lytic or sclerotic lesions.                               X-Ray Chest AP Portable (Final result)  Result time 08/25/22 21:38:32    Final result by Bonilla Madrigal MD (08/25/22  21:38:32)                 Impression:      No acute abnormality.      Electronically signed by: Bonilla Madrigal  Date:    08/25/2022  Time:    21:38             Narrative:    EXAMINATION:  XR CHEST AP PORTABLE    CLINICAL HISTORY:  SOB;    TECHNIQUE:  Single frontal view of the chest was performed.    COMPARISON:  None    FINDINGS:  The lungs are clear, with normal appearance of pulmonary vasculature and no pleural effusion or pneumothorax.    The cardiac silhouette is normal in size. The hilar and mediastinal contours are unremarkable.    Bones are intact.                                 The EKG was ordered, reviewed, and independently interpreted by the ED provider.  Interpretation time: 2027  Rate: 103 BPM  Rhythm: sinus tachycardia with frequent premature ventricular complexes  Interpretation: T wave abnormality, consider anterolateral ischemia. Abnormal EKG. No STEMI.             The Emergency Provider reviewed the vital signs and test results, which are outlined above.     ED Discussion       11:45 PM: Reassessed pt at this time. Discussed with pt all pertinent ED information and results. Discussed pt dx and plan of tx. Gave pt all f/u and return to the ED instructions. All questions and concerns were addressed at this time. Pt expresses understanding of information and instructions, and is comfortable with plan to discharge. Pt is stable for discharge.    I discussed with patient and/or family/caretaker that evaluation in the ED does not suggest any emergent or life threatening medical conditions requiring immediate intervention beyond what was provided in the ED, and I believe patient is safe for discharge.  Regardless, an unremarkable evaluation in the ED does not preclude the development or presence of a serious of life threatening condition. As such, patient was instructed to return immediately for any worsening or change in current symptoms.         Medical Decision Making:   Clinical Tests:   Lab Tests:  Ordered and Reviewed  Radiological Study: Ordered and Reviewed  Medical Tests: Ordered and Reviewed           ED Medication(s):  Medications   lactated ringers bolus 1,000 mL (0 mLs Intravenous Stopped 8/25/22 2318)   acetaminophen tablet 1,000 mg (1,000 mg Oral Given 8/25/22 2321)   iohexoL (OMNIPAQUE 350) injection 100 mL (100 mLs Intravenous Given 8/25/22 2310)       Discharge Medication List as of 8/26/2022 12:07 AM           Follow-up Information     Reji Valentin MD. Call in 1 day.    Specialty: Family Medicine  Why: For re-evaluation and further treatment  Contact information:  [address]  [place] [address] [phone]             [facility] - Emergency Dept.. Go today.    Specialty: Emergency Medicine  Why: If symptoms worsen, For re-evaluation and further treatment, As needed  Contact information:  [address] [facility] [address] [phone]                           Scribe Attestation:   Scribe #1: I performed the above scribed service and the documentation accurately describes the services I performed. I attest to the accuracy of the note.     Attending:   Physician Attestation Statement for Scribe #1: I, Nehemiah Monroy MD, personally performed the services described in this documentation, as scribed by Davy Cam, in my presence, and it is both accurate and complete.           Clinical Impression       ICD-10-CM ICD-9-CM   1. COVID  U07.1 079.89   2. SOB (shortness of breath)  R06.02 786.05       Disposition:   Disposition: Discharged  Condition: Stable         Nehemiah Monroy MD  08/26/22 [id number]

## 2022-09-06 ENCOUNTER — NURSE TRIAGE (OUTPATIENT)
Dept: ADMINISTRATIVE | Facility: CLINIC | Age: 67
End: 2022-09-06
Payer: MEDICARE

## 2022-09-06 NOTE — TELEPHONE ENCOUNTER
[facility] call -       Pt c/o  chest congestion, coughed up deep yellow phlegm, afebrile, mild sob with walking. Covid protocol followed and pt advised to tx at home while waiting for further advice from pcp, and a message will be sent to his pcp for further evaluation per protocol disposition. Education provided on covid, isolation, how to tx cough, use of expectorant to clear secretions, when to call ooc at 1 [phone] at anytime for new or worsening symptoms or questions, see care advice for additional education. Alert and oriented, Pt agrees with above and has no further questions. Encounter routed to PCP/staff as high priority.   Reason for Disposition   MILD difficulty breathing (e.g., minimal/no SOB at rest, SOB with walking, pulse <100)    Additional Information   Negative: SEVERE difficulty breathing (e.g., struggling for each breath, speaks in single words)   Negative: Difficult to awaken or acting confused (e.g., disoriented, slurred speech)   Negative: Bluish (or gray) lips or face now   Negative: Shock suspected (e.g., cold/pale/clammy skin, too weak to stand, low BP, rapid pulse)   Negative: Sounds like a life-threatening emergency to the triager   Negative: SEVERE or constant chest pain or pressure  (Exception: Mild central chest pain, present only when coughing.)   Negative: MODERATE difficulty breathing (e.g., speaks in phrases, SOB even at rest, pulse 100-120)   Negative: Headache and stiff neck (can't touch chin to chest)   Negative: Oxygen level (e.g., pulse oximetry) 90 percent or lower   Negative: Chest pain or pressure   Negative: Patient sounds very sick or weak to the triager    Protocols used: Coronavirus (COVID-19) Diagnosed or [id number]

## 2022-09-08 ENCOUNTER — NURSE TRIAGE (OUTPATIENT)
Dept: ADMINISTRATIVE | Facility: CLINIC | Age: 67
End: 2022-09-08
Payer: MEDICARE

## 2022-09-12 ENCOUNTER — HOSPITAL ENCOUNTER (OUTPATIENT)
Dept: CARDIOLOGY | Facility: HOSPITAL | Age: 67
Discharge: HOME | End: 2022-09-12
Attending: INTERNAL MEDICINE
Payer: MEDICARE

## 2022-09-12 VITALS
HEIGHT: 69 IN | WEIGHT: 245 LBS | SYSTOLIC BLOOD PRESSURE: 120 MMHG | DIASTOLIC BLOOD PRESSURE: 86 MMHG | BODY MASS INDEX: 36.29 KG/M2

## 2022-09-12 DIAGNOSIS — Z95.2 S/P AVR (AORTIC VALVE REPLACEMENT): ICD-10-CM

## 2022-09-12 LAB
AORTIC ROOT ANNULUS: 3.29 CM
ASCENDING AORTA: 3.37 CM
AV INDEX (PROSTH): 0.57
AV MEAN GRADIENT: 14 MMHG
AV PEAK GRADIENT: 24 MMHG
AV VALVE AREA: 1.79 CM2
AV VELOCITY RATIO: 0.52
BSA FOR ECHO PROCEDURE: 2.33 M2
CV ECHO LV RWT: 0.45 CM
DOP CALC AO PEAK VEL: 2.47 M/S
DOP CALC AO VTI: 46.1 CM
DOP CALC LVOT AREA: 3.1 CM2
DOP CALC LVOT DIAMETER: 2 CM
DOP CALC LVOT PEAK VEL: 1.28 M/S
DOP CALC LVOT STROKE VOLUME: 82.58 CM3
DOP CALC RVOT PEAK VEL: 0.56 M/S
DOP CALC RVOT VTI: 11.8 CM
DOP CALCLVOT PEAK VEL VTI: 26.3 CM
E WAVE DECELERATION TIME: 278.17 MSEC
E/A RATIO: 0.86
E/E' RATIO: 13.69 M/S
ECHO LV POSTERIOR WALL: 1.03 CM (ref 0.6–1.1)
EJECTION FRACTION: 65 %
FRACTIONAL SHORTENING: 30 % (ref 28–44)
INTERVENTRICULAR SEPTUM: 1.1 CM (ref 0.6–1.1)
IVC DIAMETER: 1.45 CM
IVRT: 105.61 MSEC
LA MAJOR: 5.22 CM
LA MINOR: 4.93 CM
LA WIDTH: 3.7 CM
LEFT ATRIUM SIZE: 3.41 CM
LEFT ATRIUM VOLUME INDEX MOD: 26.3 ML/M2
LEFT ATRIUM VOLUME INDEX: 24.2 ML/M2
LEFT ATRIUM VOLUME MOD: 59.22 CM3
LEFT ATRIUM VOLUME: 54.38 CM3
LEFT INTERNAL DIMENSION IN SYSTOLE: 3.17 CM (ref 2.1–4)
LEFT VENTRICLE DIASTOLIC VOLUME INDEX: 41.94 ML/M2
LEFT VENTRICLE DIASTOLIC VOLUME: 94.37 ML
LEFT VENTRICLE MASS INDEX: 75 G/M2
LEFT VENTRICLE SYSTOLIC VOLUME INDEX: 17.7 ML/M2
LEFT VENTRICLE SYSTOLIC VOLUME: 39.93 ML
LEFT VENTRICULAR INTERNAL DIMENSION IN DIASTOLE: 4.54 CM (ref 3.5–6)
LEFT VENTRICULAR MASS: 169.63 G
LV LATERAL E/E' RATIO: 12.71 M/S
LV SEPTAL E/E' RATIO: 14.83 M/S
LVOT MG: 3.57 MMHG
LVOT MV: 0.85 CM/S
MV PEAK A VEL: 1.04 M/S
MV PEAK E VEL: 0.89 M/S
MV STENOSIS PRESSURE HALF TIME: 80.67 MS
MV VALVE AREA P 1/2 METHOD: 2.73 CM2
PISA TR MAX VEL: 2.72 M/S
PV MEAN GRADIENT: 0.61 MMHG
PV PEAK VELOCITY: 1.18 CM/S
RA MAJOR: 5.95 CM
RA PRESSURE: 3 MMHG
RA WIDTH: 5.73 CM
RIGHT VENTRICULAR END-DIASTOLIC DIMENSION: 4.48 CM
SINUS: 3.32 CM
STJ: 2.85 CM
TDI LATERAL: 0.07 M/S
TDI SEPTAL: 0.06 M/S
TDI: 0.07 M/S
TR MAX PG: 30 MMHG
TRICUSPID ANNULAR PLANE SYSTOLIC EXCURSION: 1.07 CM
TV REST PULMONARY ARTERY PRESSURE: 33 MMHG

## 2022-09-12 PROCEDURE — 93306 TTE W/DOPPLER COMPLETE: CPT | Mod: 26,,, | Performed by: INTERNAL MEDICINE

## 2022-09-12 PROCEDURE — 93306 TTE W/DOPPLER COMPLETE: CPT

## 2022-09-12 PROCEDURE — 93306 ECHO (CUPID ONLY): ICD-10-PCS | Mod: 26,,, | Performed by: INTERNAL MEDICINE

## 2022-09-13 ENCOUNTER — TELEPHONE (OUTPATIENT)
Dept: CARDIOLOGY | Facility: CLINIC | Age: 67
End: 2022-09-13
Payer: MEDICARE

## 2022-09-13 NOTE — TELEPHONE ENCOUNTER
Pt contacted about results, pt verbalized understanding.            ----- Message from Jeffery Mckeon MD sent at 9/13/2022  7:55 AM CDT -----  Echo showed normal biv function, artifact aortic valve functioning well.

## 2022-09-16 ENCOUNTER — TELEPHONE (OUTPATIENT)
Dept: PAIN MEDICINE | Facility: CLINIC | Age: 67
End: 2022-09-16
Payer: MEDICARE

## 2022-09-16 NOTE — TELEPHONE ENCOUNTER
----- Message from Sasha Dodge sent at 9/16/2022 11:34 AM CDT -----  Pt stated his back is hurting so bad and it goes down his right leg. He stated the pain have him in depression and anxiety. He stated he is in pain 24 hours a day and can't take it.  Please call him back at .[phone]. x. EL

## 2022-09-16 NOTE — TELEPHONE ENCOUNTER
Reached out to patient to schedule appointment from messages because pt is have right leg pain. Pt is requesting for a injection. . Apt has been made.   Pt understand. All questions answered.     Jesus Alberto Morgan  Medical Assistant

## 2022-09-19 ENCOUNTER — TELEPHONE (OUTPATIENT)
Dept: PAIN MEDICINE | Facility: CLINIC | Age: 67
End: 2022-09-19
Payer: MEDICARE

## 2022-09-19 NOTE — TELEPHONE ENCOUNTER
called multiple times to try to reschedule 9/23 apt since provider out, in the morning and afternoon no answer LVM

## 2022-09-20 ENCOUNTER — OFFICE VISIT (OUTPATIENT)
Dept: RADIATION ONCOLOGY | Facility: CLINIC | Age: 67
End: 2022-09-20
Payer: MEDICARE

## 2022-09-20 VITALS
TEMPERATURE: 97 F | SYSTOLIC BLOOD PRESSURE: 129 MMHG | WEIGHT: 254.5 LBS | HEART RATE: 93 BPM | HEIGHT: 69 IN | RESPIRATION RATE: 18 BRPM | BODY MASS INDEX: 37.69 KG/M2 | DIASTOLIC BLOOD PRESSURE: 66 MMHG | OXYGEN SATURATION: 99 %

## 2022-09-20 DIAGNOSIS — C61 PROSTATE CANCER: Primary | ICD-10-CM

## 2022-09-20 PROCEDURE — 3066F PR DOCUMENTATION OF TREATMENT FOR NEPHROPATHY: ICD-10-PCS | Mod: CPTII,S$GLB,, | Performed by: RADIOLOGY

## 2022-09-20 PROCEDURE — 1101F PT FALLS ASSESS-DOCD LE1/YR: CPT | Mod: CPTII,S$GLB,, | Performed by: RADIOLOGY

## 2022-09-20 PROCEDURE — 3288F FALL RISK ASSESSMENT DOCD: CPT | Mod: CPTII,S$GLB,, | Performed by: RADIOLOGY

## 2022-09-20 PROCEDURE — 3078F PR MOST RECENT DIASTOLIC BLOOD PRESSURE < 80 MM HG: ICD-10-PCS | Mod: CPTII,S$GLB,, | Performed by: RADIOLOGY

## 2022-09-20 PROCEDURE — 99213 PR OFFICE/OUTPT VISIT, EST, LEVL III, 20-29 MIN: ICD-10-PCS | Mod: S$GLB,,, | Performed by: RADIOLOGY

## 2022-09-20 PROCEDURE — 1159F MED LIST DOCD IN RCRD: CPT | Mod: CPTII,S$GLB,, | Performed by: RADIOLOGY

## 2022-09-20 PROCEDURE — 1125F PR PAIN SEVERITY QUANTIFIED, PAIN PRESENT: ICD-10-PCS | Mod: CPTII,S$GLB,, | Performed by: RADIOLOGY

## 2022-09-20 PROCEDURE — 3008F BODY MASS INDEX DOCD: CPT | Mod: CPTII,S$GLB,, | Performed by: RADIOLOGY

## 2022-09-20 PROCEDURE — 3074F SYST BP LT 130 MM HG: CPT | Mod: CPTII,S$GLB,, | Performed by: RADIOLOGY

## 2022-09-20 PROCEDURE — 1159F PR MEDICATION LIST DOCUMENTED IN MEDICAL RECORD: ICD-10-PCS | Mod: CPTII,S$GLB,, | Performed by: RADIOLOGY

## 2022-09-20 PROCEDURE — 1101F PR PT FALLS ASSESS DOC 0-1 FALLS W/OUT INJ PAST YR: ICD-10-PCS | Mod: CPTII,S$GLB,, | Performed by: RADIOLOGY

## 2022-09-20 PROCEDURE — 3078F DIAST BP <80 MM HG: CPT | Mod: CPTII,S$GLB,, | Performed by: RADIOLOGY

## 2022-09-20 PROCEDURE — 99999 PR PBB SHADOW E&M-EST. PATIENT-LVL III: CPT | Mod: PBBFAC,,, | Performed by: RADIOLOGY

## 2022-09-20 PROCEDURE — 3288F PR FALLS RISK ASSESSMENT DOCUMENTED: ICD-10-PCS | Mod: CPTII,S$GLB,, | Performed by: RADIOLOGY

## 2022-09-20 PROCEDURE — 3066F NEPHROPATHY DOC TX: CPT | Mod: CPTII,S$GLB,, | Performed by: RADIOLOGY

## 2022-09-20 PROCEDURE — 3074F PR MOST RECENT SYSTOLIC BLOOD PRESSURE < 130 MM HG: ICD-10-PCS | Mod: CPTII,S$GLB,, | Performed by: RADIOLOGY

## 2022-09-20 PROCEDURE — 1125F AMNT PAIN NOTED PAIN PRSNT: CPT | Mod: CPTII,S$GLB,, | Performed by: RADIOLOGY

## 2022-09-20 PROCEDURE — 99999 PR PBB SHADOW E&M-EST. PATIENT-LVL III: ICD-10-PCS | Mod: PBBFAC,,, | Performed by: RADIOLOGY

## 2022-09-20 PROCEDURE — 99213 OFFICE O/P EST LOW 20 MIN: CPT | Mod: S$GLB,,, | Performed by: RADIOLOGY

## 2022-09-20 PROCEDURE — 3008F PR BODY MASS INDEX (BMI) DOCUMENTED: ICD-10-PCS | Mod: CPTII,S$GLB,, | Performed by: RADIOLOGY

## 2022-09-20 NOTE — PROGRESS NOTES
"[facility] [place]  RADIATION ONCOLOGY FOLLOW UP    Name: Abdullahi Urias : 1955     DIAGNOSIS: biochemical recurrence after prostatectomy in 2019, pre-tx PSA 0.29, pT3aN0, negative margins, [place] 4+5    TREATMENT HISTORY:   1. Prostatectomy 2019  2. 68.4Gy/38fx to prostate fossa and pelvic nodes with concurrent short term ADT completed 21    INTERVAL HISTORY: Abdullahi Urias is a pleasant 66 y.o. male who presents today for follow-up.    Last seen 2021  Had seen Dr Berg for hemorrhoids conservative measures  Also had pain med procedure for neck/back    Today, he is doing well overall.  Hot flashes gone.  He denies diarrhea, constipation, melena, rectal pain.   He denies hematuria, dysuria, or straining.  No bladder meds, states his leakage has worsened on standing.  PSA 22 <0.01    PHYSICAL EXAM:   Constitutional: well appearing, no acute distress, ECOG 1 - Ambulates, capable of light work  Vitals:    /66   Pulse 93   Temp 97.2 °F (36.2 °C)   Resp 18   Ht 5' 9" (1.753 m)   Wt 115.4 kg (254 lb 8 oz)   SpO2 99%   BMI 37.58 kg/m²   Eyes: sclera anicteric, EOMI, pupils equal, round and reactive to light  ENT: oral cavity without lesions, moist mucous membranes  Cardiovascular: regular rate, no edema of the upper or lower extremities, radial pulse 2+  Respiratory: unlabored effort, clear to auscultation, no wheezes  Abdomen: soft, non-tender, no rigidity, no masses, no hepatomegaly  Spine: non-tender to percussion cervical, thoracic and lumbosacral spine    Laboratory & X-Ray Findings: Per above.      PSA  22 <0.01  3/11/22 <0.01  9/3/21 - <0.01  21 - 0.29  20 - 0.07  20 - 0.03  Osmel at 0.01 on 20    ASSESSMENT: no evidence of disease    PLAN: Mr. Urias is doing well. PSA is undetectable.  Sees urology in November, may need medication for leakage or pelvic PT.    Follow up May 2023 with PSA.     I spent approximately 20 minutes reviewing the " available records and evaluating the patient, out of which over 50% of the time was spent face to face with the patient in counseling and coordinating this patient's care.    Dominic Garces III, M.D.  Radiation Oncology  [facility] [address] [place] [address]  Ph: [phone]  [email or web address]

## 2022-10-03 ENCOUNTER — TELEPHONE (OUTPATIENT)
Dept: PAIN MEDICINE | Facility: CLINIC | Age: 67
End: 2022-10-03
Payer: MEDICARE

## 2022-10-04 ENCOUNTER — OFFICE VISIT (OUTPATIENT)
Dept: PAIN MEDICINE | Facility: CLINIC | Age: 67
End: 2022-10-04
Payer: MEDICARE

## 2022-10-04 VITALS
BODY MASS INDEX: 37.65 KG/M2 | HEIGHT: 69 IN | SYSTOLIC BLOOD PRESSURE: 139 MMHG | DIASTOLIC BLOOD PRESSURE: 85 MMHG | HEART RATE: 90 BPM | WEIGHT: 254.19 LBS

## 2022-10-04 DIAGNOSIS — M96.1 POSTLAMINECTOMY SYNDROME OF LUMBAR REGION: Primary | ICD-10-CM

## 2022-10-04 DIAGNOSIS — M54.16 LUMBAR RADICULOPATHY: ICD-10-CM

## 2022-10-04 PROCEDURE — 1125F AMNT PAIN NOTED PAIN PRSNT: CPT | Mod: CPTII,S$GLB,, | Performed by: PHYSICIAN ASSISTANT

## 2022-10-04 PROCEDURE — 3288F PR FALLS RISK ASSESSMENT DOCUMENTED: ICD-10-PCS | Mod: CPTII,S$GLB,, | Performed by: PHYSICIAN ASSISTANT

## 2022-10-04 PROCEDURE — 3066F PR DOCUMENTATION OF TREATMENT FOR NEPHROPATHY: ICD-10-PCS | Mod: CPTII,S$GLB,, | Performed by: PHYSICIAN ASSISTANT

## 2022-10-04 PROCEDURE — 1159F PR MEDICATION LIST DOCUMENTED IN MEDICAL RECORD: ICD-10-PCS | Mod: CPTII,S$GLB,, | Performed by: PHYSICIAN ASSISTANT

## 2022-10-04 PROCEDURE — 3008F BODY MASS INDEX DOCD: CPT | Mod: CPTII,S$GLB,, | Performed by: PHYSICIAN ASSISTANT

## 2022-10-04 PROCEDURE — 3079F DIAST BP 80-89 MM HG: CPT | Mod: CPTII,S$GLB,, | Performed by: PHYSICIAN ASSISTANT

## 2022-10-04 PROCEDURE — 3079F PR MOST RECENT DIASTOLIC BLOOD PRESSURE 80-89 MM HG: ICD-10-PCS | Mod: CPTII,S$GLB,, | Performed by: PHYSICIAN ASSISTANT

## 2022-10-04 PROCEDURE — 99999 PR PBB SHADOW E&M-EST. PATIENT-LVL V: CPT | Mod: PBBFAC,,, | Performed by: PHYSICIAN ASSISTANT

## 2022-10-04 PROCEDURE — 1159F MED LIST DOCD IN RCRD: CPT | Mod: CPTII,S$GLB,, | Performed by: PHYSICIAN ASSISTANT

## 2022-10-04 PROCEDURE — 99214 PR OFFICE/OUTPT VISIT, EST, LEVL IV, 30-39 MIN: ICD-10-PCS | Mod: S$GLB,,, | Performed by: PHYSICIAN ASSISTANT

## 2022-10-04 PROCEDURE — 3008F PR BODY MASS INDEX (BMI) DOCUMENTED: ICD-10-PCS | Mod: CPTII,S$GLB,, | Performed by: PHYSICIAN ASSISTANT

## 2022-10-04 PROCEDURE — 1101F PT FALLS ASSESS-DOCD LE1/YR: CPT | Mod: CPTII,S$GLB,, | Performed by: PHYSICIAN ASSISTANT

## 2022-10-04 PROCEDURE — 1125F PR PAIN SEVERITY QUANTIFIED, PAIN PRESENT: ICD-10-PCS | Mod: CPTII,S$GLB,, | Performed by: PHYSICIAN ASSISTANT

## 2022-10-04 PROCEDURE — 99214 OFFICE O/P EST MOD 30 MIN: CPT | Mod: S$GLB,,, | Performed by: PHYSICIAN ASSISTANT

## 2022-10-04 PROCEDURE — 1101F PR PT FALLS ASSESS DOC 0-1 FALLS W/OUT INJ PAST YR: ICD-10-PCS | Mod: CPTII,S$GLB,, | Performed by: PHYSICIAN ASSISTANT

## 2022-10-04 PROCEDURE — 3288F FALL RISK ASSESSMENT DOCD: CPT | Mod: CPTII,S$GLB,, | Performed by: PHYSICIAN ASSISTANT

## 2022-10-04 PROCEDURE — 3075F PR MOST RECENT SYSTOLIC BLOOD PRESS GE 130-139MM HG: ICD-10-PCS | Mod: CPTII,S$GLB,, | Performed by: PHYSICIAN ASSISTANT

## 2022-10-04 PROCEDURE — 3075F SYST BP GE 130 - 139MM HG: CPT | Mod: CPTII,S$GLB,, | Performed by: PHYSICIAN ASSISTANT

## 2022-10-04 PROCEDURE — 99999 PR PBB SHADOW E&M-EST. PATIENT-LVL V: ICD-10-PCS | Mod: PBBFAC,,, | Performed by: PHYSICIAN ASSISTANT

## 2022-10-04 PROCEDURE — 3066F NEPHROPATHY DOC TX: CPT | Mod: CPTII,S$GLB,, | Performed by: PHYSICIAN ASSISTANT

## 2022-10-04 PROCEDURE — 1160F RVW MEDS BY RX/DR IN RCRD: CPT | Mod: CPTII,S$GLB,, | Performed by: PHYSICIAN ASSISTANT

## 2022-10-04 PROCEDURE — 1160F PR REVIEW ALL MEDS BY PRESCRIBER/CLIN PHARMACIST DOCUMENTED: ICD-10-PCS | Mod: CPTII,S$GLB,, | Performed by: PHYSICIAN ASSISTANT

## 2022-10-04 RX ORDER — SERTRALINE HYDROCHLORIDE 100 MG/1
200 TABLET, FILM COATED ORAL
COMMUNITY
Start: 2022-08-16 | End: 2023-04-13 | Stop reason: SDUPTHER

## 2022-10-04 RX ORDER — ESOMEPRAZOLE MAGNESIUM 40 MG/1
40 CAPSULE, DELAYED RELEASE ORAL
COMMUNITY
Start: 2022-09-11 | End: 2023-09-07

## 2022-10-04 NOTE — PROGRESS NOTES
Established Patient Chronic Pain Note (Follow up visit)    Chief Complaint:   Chief Complaint   Patient presents with    Back Pain    Leg Pain     Back and leg pain radiating into the feet          SUBJECTIVE:  Interval History (10/4/2022): Abdullahi Urias presents today for follow-up visit.  he underwent C7/T1 IL PIEDAD on 06/21/2022.  The patient reports that he is/was better following the procedure.  he reports 60% pain relief.  The changes lasted 6 weeks before neck stiffness and arm numbness returned. Patient reports pain as 8/10 today.    Today he reports his primary complaint is low back pain with radiation into right leg into the posterior and lateral aspect along L4-S1 distribution. Reports back pain is worse than neck pain at this time. Also has history of vasculogenic claudication. Patient reports pain is causing him to be depressed and anxious. Pain interferes with daily activities. [facility]ator [facility]. Recently went on a cruise, needed to use a wheelchair due to weakness and pain.    MRI lumbar spine  FINDINGS:  Visualized distal cord demonstrates normal signal.     No marrow replacement process.  No fracture.  No listhesis.     There are degenerative changes of the lower lumbar spine with mild disc space narrowing, most severe at L5-S1 with marginal spurring with facet arthropathy     L1-L2: There is posterior disc bulge with indentation of the thecal sac.  No spinal canal or neural foraminal encroachment.     L2-L3: Mild facet arthropathy.  Mild posterior disc bulge.  No spinal canal or neural foraminal encroachment.     L3-L4: Bilateral ligamentum flavum and facet arthropathy.  Mild posterior disc bulge with indentation of thecal sac.  No spinal canal or neural foraminal encroachment.     L4-L5: Ligamentum flavum and facet arthropathy with broad-based disc bulge.  No significant spinal canal stenosis.  There is crowding of the lateral recesses with narrowing along the  inferior right greater than left neural foramen.     L5-S1: Posterior disc osteophyte complex with facet arthropathy.  Appearance of possible prior right hemilaminectomy changes..  There is severe narrowing along the right neural foramen  with mild to moderate narrowing along the left neural foramen.  No significant spinal canal stenosis.     Impression:     Discogenic degenerative changes most severe at L5-S1 as above.    Interval HPI  Abdullahi Urias is a 67 y.o. male who presents to the clinic for a follow-up.  He reports that his back pain is much improved ever since undergoing bilateral piriformis trigger point injections.  He wants to focus more on his neck and upper extremity pain currently.  Since the last visit, Abdullahi Urias states the pain has been worsening. Current pain intensity is 8/10.      Patient denies night fever/night sweats, urinary incontinence, bowel incontinence, significant weight loss, significant motor weakness and loss of sensations.    Pain Disability Index Review:  Last 3 PDI Scores 5/11/2022 1/27/2022   Pain Disability Index (PDI) 58 56     Interval HPI 01/27/2022:  Abdullahi Urias is a 66 y.o. male who presents to the clinic for the evaluation of lower back pain.  He is self referred.  He has past medical history of CVA, substance abuse, asthma, CHF, hypertension, CAD, cardiomyopathy, renal insufficiency, CKD stage 3, H/O hepatitis-C, H/O prostate cancer, multiple other medical comorbidities as listed in his chart.  The pain started several years ago and symptoms have been worsening.The pain is located in the lumbosacral area and radiates to the bilateral lower extremities extending posteriorly to the bilateral feet.  Of note, patient recently had cardiovascular studies done on his lower extremities which did demonstrate vasculogenic claudication.  The pain is described as Aching, burning, tingling and is rated as 8/10. The pain is rated with a score of  6/10 on the BEST day and a score  of 10/10 on the WORST day.  Symptoms interfere with daily activity. The pain is exacerbated by activities.  The pain is mitigated by medications.           Non-Pharmacologic Treatments:  Physical Therapy/Home Exercise: yes  Ice/Heat:yes  TENS: yes  Acupuncture: no  Massage: yes  Chiropractic: no    Other: no        Pain Medications:  - Opioids: Norco, Tylenol 3  - Adjuvant Medications: Ambien, alprazolam, Flexeril, Voltaren gel, gabapentin, lidocaine patch, Zoloft  - Anti-Coagulants: Aspirin, Pletal      report:  Reviewed and consistent with medication use as prescribed.       Pain Procedures:   -previous lumbar PIEDAD  -previous lumbar MBB/RFA  -previous lumbar laminectomy in 2001 at L5-S1  -01/27/2022:  Bilateral piriformis trigger point injections, significant relief that is still ongoing        Imaging:   MRI lumbar spine 01/12/2021:  Visualized distal cord demonstrates normal signal.     No marrow replacement process.  No fracture.  No listhesis.     There are degenerative changes of the lower lumbar spine with mild disc space narrowing, most severe at L5-S1 with marginal spurring with facet arthropathy     L1-L2: There is posterior disc bulge with indentation of the thecal sac.  No spinal canal or neural foraminal encroachment.     L2-L3: Mild facet arthropathy.  Mild posterior disc bulge.  No spinal canal or neural foraminal encroachment.     L3-L4: Bilateral ligamentum flavum and facet arthropathy.  Mild posterior disc bulge with indentation of thecal sac.  No spinal canal or neural foraminal encroachment.     L4-L5: Ligamentum flavum and facet arthropathy with broad-based disc bulge.  No significant spinal canal stenosis.  There is crowding of the lateral recesses with narrowing along the inferior right greater than left neural foramen.     L5-S1: Posterior disc osteophyte complex with facet arthropathy.  Appearance of possible prior right hemilaminectomy changes..  There is severe narrowing along the right  neural foramen  with mild to moderate narrowing along the left neural foramen.  No significant spinal canal stenosis        CT cervical spine 12/01/2020:  Vertebral body heights maintained.  2 mm anterolisthesis of C3 on C4.  Disc height loss and osteophyte changes at C6-7 and C7-T1.  Multilevel facet degenerative findings most prevalent at the right C2-3 and left C3-4 levels.     Neck soft tissues are unremarkable.     C2-3: No CT evidence of spinal canal stenosis.  Mild right neural foraminal narrowing.     C3-4: No CT evidence of significant spinal canal stenosis.  Left greater than right facet and uncovertebral degenerative changes results in mild right and severe left neural foraminal narrowing.     C4-5: Posterior disc osteophyte complex present with mild spinal canal stenosis.  Mild to moderate right neural foraminal narrowing secondary to facet and uncovertebral degenerative findings.     C5-6: Posterior disc osteophyte complex present asymmetric to the right with mild spinal canal stenosis.  Severe right neural foraminal narrowing secondary to facet and uncovertebral degenerative findings.     C6-7: Posterior disc osteophyte complex present causing at least mild spinal canal stenosis.  Left greater than right facet and uncovertebral degenerative findings with mild-to-moderate right and moderate to severe left neural foraminal narrowing.     C7-T1: Posterior disc osteophyte complex with spinal canal stenosis suspected.  Mild left neural foraminal narrowing secondary to uncovertebral degenerative findings.        PMHx,PSHx, Social history, and Family history:  I have reviewed the patient's medical, surgical, social, and family history in detail and updated the computerized patient record.      Review of patient's allergies indicates:  No Known Allergies      Current Outpatient Medications   Medication Sig    albuterol (PROVENTIL/VENTOLIN HFA) 90 mcg/actuation inhaler INHALE 2 PUFFS INTO THE LUNGS EVERY 6  HOURS AS NEEDED FOR COUGH    aluminum & magnesium hydroxide-simethicone (MYLANTA MAX STRENGTH) 400-400-40 mg/5 mL suspension TAKE 15ML BY MOUTH FOUR TIMES A DAY AS NEEDED FOR STOMACH ACID    amitriptyline (ELAVIL) 10 MG tablet TAKE 1 TABLET BY MOUTH AT BEDTIME AS NEEDED FOR PAIN    aspirin (ECOTRIN) 81 MG EC tablet Take 1 tablet (81 mg total) by mouth once daily.    atorvastatin (LIPITOR) 80 MG tablet TAKE ONE-HALF TABLET BY MOUTH EVERY DAY FOR CHOLESTEROL    carvediloL (COREG) 12.5 MG tablet Take 1 tablet (12.5 mg total) by mouth 2 (two) times daily.    clopidogreL (PLAVIX) 75 mg tablet Take 1 tablet (75 mg total) by mouth once daily.    cyclobenzaprine (FLEXERIL) 10 MG tablet TAKE ONE TABLET BY MOUTH THREE TIMES A DAY AS A MUSCLE RELAXANT    diclofenac sodium (VOLTAREN) 1 % Gel APPLY 2 GRAMS TOPICALLY FOUR TIMES A DAY AS NEEDED FOR PAIN AND INFLAMMATION. USE ENCLOSED DOSING CARD.    esomeprazole (NEXIUM) 40 MG capsule 40 mg.    famotidine (PEPCID) 40 MG tablet TAKE ONE TABLET BY MOUTH AT BEDTIME FOR ACID REFLUX    fexofenadine (ALLEGRA) 180 MG tablet Take 1 tablet (180 mg total) by mouth daily as needed.    gabapentin (NEURONTIN) 600 MG tablet Take 1 tablet (600 mg total) by mouth 3 (three) times daily.    hydrocortisone 2.5 % cream Apply topically 2 (two) times daily as needed. Apply to affected areas of the face and neck.    hydrOXYzine HCL (ATARAX) 25 MG tablet Take 25 mg by mouth 3 (three) times daily as needed for Itching.    LIDOcaine (LIDODERM) 5 % APPLY 1 PATCH TOPICALLY EVERY DAY FOR PAIN. WEAR FOR 12 HOURS, THEN REMOVE. DO NOT APPLY NEW PATCH FOR AT LEAST 12 HOURS.    losartan (COZAAR) 50 MG tablet Take 1 tablet (50 mg total) by mouth once daily.    pantoprazole (PROTONIX) 40 MG tablet Take 40 mg by mouth 2 (two) times daily.     sertraline (ZOLOFT) 100 MG tablet TAKE TWO TABLETS BY MOUTH EVERY DAY FOR MENTAL HEALTH DOSE INCREASED TO 200MG/DAY    simethicone (MYLICON) 80 MG chewable tablet Take 80 mg by  mouth every 6 (six) hours as needed for Flatulence.    sucralfate (CARAFATE) 100 mg/mL suspension Take 1 g by mouth 4 (four) times daily.     triamcinolone acetonide 0.1% (KENALOG) 0.1 % cream Apply topically 2 (two) times daily.    allopurinoL (ZYLOPRIM) 100 MG tablet Take 1 tablet (100 mg total) by mouth once daily. (Patient not taking: Reported on 10/4/2022)    baclofen (LIORESAL) 10 MG tablet Take 1 tablet (10 mg total) by mouth 3 (three) times daily. (Patient not taking: Reported on 10/4/2022)    diphenhydrAMINE (SOMINEX) 25 mg tablet Take 25 mg by mouth nightly as needed for Insomnia.    flunisolide 25 mcg, 0.025%, (NASALIDE) 25 mcg (0.025 %) Spry 2 sprays by Nasal route as needed.     hydrocortisone-pramoxine (PROCTOFOAM-HS) rectal foam INSERT 1 APPLICATORFUL INTO RECTALLY TWICE A DAY FOR HEMORRHOIDS    levocetirizine (XYZAL) 5 MG tablet Take 1 tablet (5 mg total) by mouth every evening.    methyl salicylate-menthol 15-10% 15-10 % Crea Apply topically as needed.     sertraline (ZOLOFT) 100 MG tablet 200 mg.     No current facility-administered medications for this visit.     Facility-Administered Medications Ordered in Other Visits   Medication    sodium chloride 0.9% flush 10 mL         REVIEW OF SYSTEMS:    GENERAL:  No weight loss, malaise or fevers.  HEENT:   No recent changes in vision or hearing  NECK:  Negative for lumps, no difficulty with swallowing.  RESPIRATORY:  Negative for cough, wheezing or shortness of breath, patient denies any recent URI.  CARDIOVASCULAR:  Negative for chest pain, leg swelling or palpitations.  GI:  Negative for abdominal discomfort, blood in stools or black stools or change in bowel habits.  MUSCULOSKELETAL:  See HPI.  SKIN:  Negative for lesions, rash, and itching.  PSYCH:  No mood disorder or recent psychosocial stressors.  Patients sleep is not disturbed secondary to pain.  HEMATOLOGY/LYMPHOLOGY:  Negative for prolonged bleeding, bruising easily or swollen nodes.   "Patient is currently taking anti-coagulants - ASA and Pletal  NEURO:   No history of headaches, syncope, paralysis, seizures or tremors.  All other reviewed and negative other than HPI.       OBJECTIVE:    /85   Pulse 90   Ht 5' 9" (1.753 m)   Wt 115.3 kg (254 lb 3.1 oz)   BMI 37.54 kg/m²     PHYSICAL EXAMINATION:    GENERAL: Well appearing, in no acute distress, alert and oriented x3.  PSYCH:  Mood and affect appropriate.  SKIN: Skin color, texture, turgor normal, no rashes or lesions.  HEAD/FACE:  Normocephalic, atraumatic. Cranial nerves grossly intact.   NECK:  Tenderness palpation over the bilateral cervical paraspinous muscles.  Spurling's positive 5 bilaterally.  Limited range of motion secondary to pain in all planes.  CV: RRR with palpation of the radial artery.  PULM: No evidence of respiratory difficulty, symmetric chest rise.  GI:  Soft and non-tender.  BACK:  Surgical scarring evident.  Straight leg raising in the sitting and supine positions is positive to radicular pain on the left.  Moderate pain to palpation over the facet joints of the lumbar spine and lumbar paraspinals limited range of motion secondary to pain reproduction.  Axial loading positive bilaterally  EXTREMITIES: Peripheral joint ROM is full and pain free without obvious instability or laxity in all four extremities. No deformities, edema, or skin discoloration. Good capillary refill.  MUSCULOSKELETAL:   mild shoulder impingement signs bilaterally.  Hip and knee provocative maneuvers are unremarkable.  There is mild pain with palpation over the sacroiliac joints bilaterally.  FABERs test is equivocal.  FADIRs test is positive bilaterally.   Bilateral upper and lower extremity strength is normal and symmetric.  No atrophy or tone abnormalities are noted.  NEURO: Bilateral upper and lower extremity coordination and muscle stretch reflexes are physiologic and symmetric.  Plantar response are downgoing. No clonus.  No loss of " sensation is noted.  GAIT:  Slow, antalgic.      LABS:  Lab Results   Component Value Date    WBC 5.33 08/25/2022    HGB 11.9 (L) 08/25/2022    HCT 35.6 (L) 08/25/2022    MCV 85 08/25/2022     (L) 08/25/2022       CMP  Sodium   Date Value Ref Range Status   08/25/2022 138 136 - 145 mmol/L Final     Potassium   Date Value Ref Range Status   08/25/2022 3.8 3.5 - 5.1 mmol/L Final     Chloride   Date Value Ref Range Status   08/25/2022 106 95 - 110 mmol/L Final     CO2   Date Value Ref Range Status   08/25/2022 22 (L) 23 - 29 mmol/L Final     Glucose   Date Value Ref Range Status   08/25/2022 100 70 - 110 mg/dL Final     BUN   Date Value Ref Range Status   08/25/2022 15 8 - 23 mg/dL Final     Creatinine   Date Value Ref Range Status   08/25/2022 1.7 (H) 0.5 - 1.4 mg/dL Final     Calcium   Date Value Ref Range Status   08/25/2022 8.9 8.7 - 10.5 mg/dL Final     Total Protein   Date Value Ref Range Status   08/25/2022 7.0 6.0 - 8.4 g/dL Final     Albumin   Date Value Ref Range Status   08/25/2022 3.7 3.5 - 5.2 g/dL Final     Total Bilirubin   Date Value Ref Range Status   08/25/2022 0.4 0.1 - 1.0 mg/dL Final     Comment:     For infants and newborns, interpretation of results should be based  on gestational age, weight and in agreement with clinical  observations.    Premature Infant recommended reference ranges:  Up to 24 hours.............<8.0 mg/dL  Up to 48 hours............<12.0 mg/dL  3-5 days..................<15.0 mg/dL  6-29 days.................<15.0 mg/dL       Alkaline Phosphatase   Date Value Ref Range Status   08/25/2022 94 55 - 135 U/L Final     AST   Date Value Ref Range Status   08/25/2022 21 10 - 40 U/L Final     ALT   Date Value Ref Range Status   08/25/2022 14 10 - 44 U/L Final     Anion Gap   Date Value Ref Range Status   08/25/2022 10 8 - 16 mmol/L Final     eGFR if    Date Value Ref Range Status   06/02/2022 51.1 (A) >60 mL/min/1.73 m^2 Final     eGFR if non African American    Date Value Ref Range Status   06/02/2022 44.2 (A) >60 mL/min/1.73 m^2 Final     Comment:     Calculation used to obtain the estimated glomerular filtration  rate (eGFR) is the CKD-EPI equation.          Lab Results   Component Value Date    HGBA1C 5.2 10/23/2007             ASSESSMENT: 67 y.o. year old male with neck pain, consistent with     1. Postlaminectomy syndrome of lumbar region  IR Epidural Transforaminal Inj 1st Vert Lumbar Uni    IR Epidural Transfor Inj Ea Add Lumb Uni    Case Request-RAD/Other Procedure Area: Right  L4/5 + L5/S1 TF PIEDAD RN IV Sedation      2. Lumbar radiculopathy  IR Epidural Transforaminal Inj 1st Vert Lumbar Uni    IR Epidural Transfor Inj Ea Add Lumb Uni    Case Request-RAD/Other Procedure Area: Right  L4/5 + L5/S1 TF PIEDAD RN IV Sedation              PLAN:   - Interventions:  Schedule for right L4/5 +L5/S1 TF PIEDAD for right sided radicular symptoms. Explained the risks and benefits of the procedure in detail with the patient today in clinic along with alternative treatment options, and the patient elected to pursue the intervention at this time.     S/p C7-T1 IL PIEDAD for diagnostic and therapeutic purposes with 60% relief    - Anticoagulation use: yes Aspirin and Plavix,do not need to hold aspirin and hold Plavix for 5 days prior to perform lumbar PIEDAD - Dr. Mckeon for clearance     - Medications: I have stressed the importance of physical activity and a home exercise plan to help with pain and improve health. and Patient can continue with medications for now since they are providing benefits, using them appropriately, and without side effects.  Advised patient that I do not believe opioids are in his best interest for his chronic non malignant pain.     - Therapy:  Advised patient continue with home exercises as tolerated     - Psychological:  Discussed coping mechanisms of address chronic pain issues     - Labs:  Reviewed     - Imaging: Reviewed available imaging with patient and  answered any questions they had regarding study.     - Consults/Referrals:  None at this time     - Records:   Attempt  to obtain outside records from previous pain management provider.  Reviewed/Obtain old records from outside physicians and imaging     - Follow up visit: return to clinic as needed, will contact patient after we have received outside records     - Counseled patient regarding the importance of activity modification and physical therapy     - This condition does not require this patient to take time off of work, and the primary goal of our Pain Management services is to improve the patient's functional capacity.     - Patient Questions: Answered all of the patient's questions regarding diagnosis, therapy, and treatment       The above plan and management options were discussed at length with patient. Patient is in agreement with the above and verbalized understanding.      Khadijah Machado PA-C  Interventional Pain Management  [facility]    Disclaimer:  This note was prepared using voice recognition system and is likely to have sound alike errors that may have been overlooked even after proof reading.  Please call me with any questions

## 2022-10-06 ENCOUNTER — TELEPHONE (OUTPATIENT)
Dept: PAIN MEDICINE | Facility: CLINIC | Age: 67
End: 2022-10-06
Payer: MEDICARE

## 2022-10-11 ENCOUNTER — TELEPHONE (OUTPATIENT)
Dept: PAIN MEDICINE | Facility: CLINIC | Age: 67
End: 2022-10-11
Payer: MEDICARE

## 2022-10-11 ENCOUNTER — OFFICE VISIT (OUTPATIENT)
Dept: URGENT CARE | Facility: CLINIC | Age: 67
End: 2022-10-11
Payer: MEDICARE

## 2022-10-11 VITALS
HEART RATE: 87 BPM | DIASTOLIC BLOOD PRESSURE: 75 MMHG | OXYGEN SATURATION: 99 % | RESPIRATION RATE: 20 BRPM | HEIGHT: 69 IN | TEMPERATURE: 97 F | WEIGHT: 254 LBS | BODY MASS INDEX: 37.62 KG/M2 | SYSTOLIC BLOOD PRESSURE: 121 MMHG

## 2022-10-11 DIAGNOSIS — M54.16 LUMBAR BACK PAIN WITH RADICULOPATHY AFFECTING RIGHT LOWER EXTREMITY: Primary | ICD-10-CM

## 2022-10-11 PROCEDURE — 3078F DIAST BP <80 MM HG: CPT | Mod: CPTII,S$GLB,, | Performed by: PHYSICIAN ASSISTANT

## 2022-10-11 PROCEDURE — 1125F AMNT PAIN NOTED PAIN PRSNT: CPT | Mod: CPTII,S$GLB,, | Performed by: PHYSICIAN ASSISTANT

## 2022-10-11 PROCEDURE — 99213 OFFICE O/P EST LOW 20 MIN: CPT | Mod: S$GLB,,, | Performed by: PHYSICIAN ASSISTANT

## 2022-10-11 PROCEDURE — 1125F PR PAIN SEVERITY QUANTIFIED, PAIN PRESENT: ICD-10-PCS | Mod: CPTII,S$GLB,, | Performed by: PHYSICIAN ASSISTANT

## 2022-10-11 PROCEDURE — 3008F PR BODY MASS INDEX (BMI) DOCUMENTED: ICD-10-PCS | Mod: CPTII,S$GLB,, | Performed by: PHYSICIAN ASSISTANT

## 2022-10-11 PROCEDURE — 3074F SYST BP LT 130 MM HG: CPT | Mod: CPTII,S$GLB,, | Performed by: PHYSICIAN ASSISTANT

## 2022-10-11 PROCEDURE — 3074F PR MOST RECENT SYSTOLIC BLOOD PRESSURE < 130 MM HG: ICD-10-PCS | Mod: CPTII,S$GLB,, | Performed by: PHYSICIAN ASSISTANT

## 2022-10-11 PROCEDURE — 1160F RVW MEDS BY RX/DR IN RCRD: CPT | Mod: CPTII,S$GLB,, | Performed by: PHYSICIAN ASSISTANT

## 2022-10-11 PROCEDURE — 1160F PR REVIEW ALL MEDS BY PRESCRIBER/CLIN PHARMACIST DOCUMENTED: ICD-10-PCS | Mod: CPTII,S$GLB,, | Performed by: PHYSICIAN ASSISTANT

## 2022-10-11 PROCEDURE — 3078F PR MOST RECENT DIASTOLIC BLOOD PRESSURE < 80 MM HG: ICD-10-PCS | Mod: CPTII,S$GLB,, | Performed by: PHYSICIAN ASSISTANT

## 2022-10-11 PROCEDURE — 1159F MED LIST DOCD IN RCRD: CPT | Mod: CPTII,S$GLB,, | Performed by: PHYSICIAN ASSISTANT

## 2022-10-11 PROCEDURE — 3008F BODY MASS INDEX DOCD: CPT | Mod: CPTII,S$GLB,, | Performed by: PHYSICIAN ASSISTANT

## 2022-10-11 PROCEDURE — 99213 PR OFFICE/OUTPT VISIT, EST, LEVL III, 20-29 MIN: ICD-10-PCS | Mod: S$GLB,,, | Performed by: PHYSICIAN ASSISTANT

## 2022-10-11 PROCEDURE — 1159F PR MEDICATION LIST DOCUMENTED IN MEDICAL RECORD: ICD-10-PCS | Mod: CPTII,S$GLB,, | Performed by: PHYSICIAN ASSISTANT

## 2022-10-11 PROCEDURE — 3066F NEPHROPATHY DOC TX: CPT | Mod: CPTII,S$GLB,, | Performed by: PHYSICIAN ASSISTANT

## 2022-10-11 PROCEDURE — 3066F PR DOCUMENTATION OF TREATMENT FOR NEPHROPATHY: ICD-10-PCS | Mod: CPTII,S$GLB,, | Performed by: PHYSICIAN ASSISTANT

## 2022-10-11 RX ORDER — METHOCARBAMOL 500 MG/1
500 TABLET, FILM COATED ORAL 3 TIMES DAILY
Qty: 15 TABLET | Refills: 0 | Status: SHIPPED | OUTPATIENT
Start: 2022-10-11 | End: 2022-10-16

## 2022-10-11 RX ORDER — PREDNISONE 10 MG/1
10 TABLET ORAL 2 TIMES DAILY
Qty: 10 TABLET | Refills: 0 | Status: SHIPPED | OUTPATIENT
Start: 2022-10-11 | End: 2022-10-16

## 2022-10-11 RX ORDER — ACETAMINOPHEN AND CODEINE PHOSPHATE 300; 30 MG/1; MG/1
1 TABLET ORAL EVERY 8 HOURS PRN
Qty: 10 TABLET | Refills: 0 | Status: SHIPPED | OUTPATIENT
Start: 2022-10-11 | End: 2022-10-16

## 2022-10-11 NOTE — TELEPHONE ENCOUNTER
MARTIN Machado PA-C (no meds given)    Pt on baclofen currently 10mg TID  Gabapentin 600mh TID  Elavil 10mg QHS PRN  Scheduled injection 10/20/2022    Please advise.   Pt requesting pain medication or muscle relaxer to get Dr. Carmen to his injection.

## 2022-10-11 NOTE — PROGRESS NOTES
"Subjective:       Patient ID: Abdullahi Urias is a 67 y.o. male.    Vitals:  height is 5' 9" (1.753 m) and weight is 115.2 kg (254 lb). His temperature is 96.7 °F (35.9 °C). His blood pressure is 121/75 and his pulse is 87. His respiration is 20 and oxygen saturation is 99%.     Chief Complaint: Back Pain    PT states he is having bad back pain that leads all the down to his right foot. The pain started 3 weeks ago and he states he cant deal with it anymore.  Patient has epidural injection scheduled for 10 20 with pain management.  Patient has been taking Tylenol without relief.  Patient denies any bowel or bladder incontinence.  Patient has not had any saddle paresthesias.  Patient does complain of tingling in the leg.  Patient states this has been persistent for months now.  Patient denies any new injury.  Patient states standing gives most relief.  Patient states that walking increases pain.    Back Pain  This is a recurrent problem. The current episode started 1 to 4 weeks ago. The problem occurs constantly. The problem is unchanged. The pain radiates to the right foot. The pain is at a severity of 10/10. The pain is severe. The pain is The same all the time. The symptoms are aggravated by bending. Stiffness is present All day. Pertinent negatives include no abdominal pain, bladder incontinence, bowel incontinence, chest pain, dysuria, fever, headaches, leg pain, numbness, paresis, paresthesias, pelvic pain, perianal numbness, tingling, weakness or weight loss. The treatment provided no relief.     Constitution: Negative for chills and fever.   Cardiovascular:  Negative for chest pain.   Respiratory:  Negative for shortness of breath.    Gastrointestinal:  Negative for abdominal pain and bowel incontinence.   Genitourinary:  Negative for dysuria, bladder incontinence and pelvic pain.   Musculoskeletal:  Positive for back pain, pain with walking and muscle ache. Negative for pain, trauma, joint pain, joint " swelling, abnormal ROM of joint and muscle cramps.   Neurological:  Positive for tingling. Negative for headaches and numbness.     Objective:      Physical Exam   Constitutional: He is oriented to person, place, and time. He appears well-developed. He is cooperative. No distress.   HENT:   Head: Normocephalic and atraumatic.   Nose: Nose normal.   Mouth/Throat: Oropharynx is clear and moist and mucous membranes are normal.   Eyes: Conjunctivae and lids are normal.   Neck: Trachea normal and phonation normal. Neck supple.   Cardiovascular: Normal rate, regular rhythm, normal heart sounds and normal pulses.   No murmur heard.  Pulmonary/Chest: Effort normal and breath sounds normal. No respiratory distress.   Abdominal: Normal appearance. He exhibits no mass. Soft.   Musculoskeletal:         General: No deformity.      Lumbar back: He exhibits tenderness. He exhibits normal range of motion, no bony tenderness, no swelling, no edema, no deformity, no laceration and no spasm.        Back:    Neurological: He is alert and oriented to person, place, and time. He has normal strength and normal reflexes. No sensory deficit.   Skin: Skin is warm, dry, intact and not diaphoretic.   Psychiatric: His speech is normal and behavior is normal. Judgment and thought content normal.   Nursing note and vitals reviewed.      Assessment:       1. Lumbar back pain with radiculopathy affecting right lower extremity          Plan:         Lumbar back pain with radiculopathy affecting right lower extremity  -     predniSONE (DELTASONE) 10 MG tablet; Take 1 tablet (10 mg total) by mouth 2 (two) times daily. for 5 days  Dispense: 10 tablet; Refill: 0  -     acetaminophen-codeine 300-30mg (TYLENOL #3) 300-30 mg Tab; Take 1 tablet by mouth every 8 (eight) hours as needed (pain).  Dispense: 10 tablet; Refill: 0  -     methocarbamoL (ROBAXIN) 500 MG Tab; Take 1 tablet (500 mg total) by mouth 3 (three) times daily. for 5 days  Dispense: 15  tablet; Refill: 0       Discussed Tylenol 3 for pain control.  Discussed Robaxin as muscle relaxer need to not drive or operate heavy machinery on taking this medication.  Discussed oral prednisone for anti-inflammatory effect.  Do not start taking this medication until you have spoken with your pain management doctor as to minimize risk of delaying epidural injection.  Discussed strict ER precautions for worsening or changing symptoms.  Patient verbalized understanding and agrees with plan.  Patient's wife is present verbalizes understanding and agrees with pain.    Dalila Lewis PA-C    Patient Instructions   Muscle strain (Back Spasm)  Please return here or go to the Emergency Department for any concerns or worsening of condition.  If you were prescribed a narcotic medication, do not drive or operate heavy equipment or machinery while taking these medications.  Warm compresses to affected area three times a day.  Mild back stretching as discussed.  Avoid any heavy lifting with current symptoms.  Please follow up with your primary care doctor or specialist in the next 48-72hrs if no improvement.     See below Occupational Medicine Referral for follow up and management of your condition.  If you  smoke, please stop smoking.

## 2022-10-11 NOTE — TELEPHONE ENCOUNTER
----- Message from Marnie Matt sent at 10/11/2022  3:40 PM CDT -----  Contact: Abdullahi Fernando is needing a call back regarding him needing something for pain to be called in as the pain is too severe for him top go until his procedure without any medication. He stated he wants either pain medication or muscle relaxers. Please call him back at [phone].      Please Send it to:   [id number] - [place] - [facility] AT [facility] [address] [place]  [address] [id number]  Phone: [phone] Fax: [phone]

## 2022-10-12 NOTE — PRE-PROCEDURE INSTRUCTIONS
Spoke with patient regarding procedure scheduled on 10.20     Arrival time 0740     Has patient been sick with fever or on antibiotics within the last 7 days? No     Does the patient have any open wounds, sores or rashes? No     Does the patient have any recent fractures? no     Has patient received a vaccination within the last 7 days? No     Received the COVID vaccination?      Has the patient stopped all medications as directed? Plavix 5 days prior     Does patient have a pacemaker and or defibrillator? no     Does the patient have a ride to and from procedure and someone reliable to remain with patient? wife      Is the patient diabetic? no     Does the patient have sleep apnea? Or use O2 at home? Quirino cpap      Is the patient receiving sedation? yes     Is the patient instructed to remain NPO beginning at midnight the night before their procedure? yes     Procedure location confirmed with patient? Yes     Covid- Denies signs/symptoms. Instructed to notify PAT/MD if any changes.

## 2022-10-13 RX ORDER — TRAMADOL HYDROCHLORIDE 50 MG/1
50 TABLET ORAL EVERY 12 HOURS PRN
Qty: 14 TABLET | Refills: 0 | Status: SHIPPED | OUTPATIENT
Start: 2022-10-13 | End: 2022-10-13

## 2022-10-13 RX ORDER — TRAMADOL HYDROCHLORIDE 50 MG/1
50 TABLET ORAL EVERY 12 HOURS PRN
Qty: 14 TABLET | Refills: 0 | Status: SHIPPED | OUTPATIENT
Start: 2022-10-13 | End: 2022-10-20

## 2022-10-14 ENCOUNTER — TELEPHONE (OUTPATIENT)
Dept: UROLOGY | Facility: CLINIC | Age: 67
End: 2022-10-14
Payer: MEDICARE

## 2022-10-14 NOTE — TELEPHONE ENCOUNTER
Returned call to pt; states he is experiencing lots of leakage. Last night, he soaked his entire bed without knowing it.  Please advise.

## 2022-10-20 ENCOUNTER — PATIENT MESSAGE (OUTPATIENT)
Dept: PAIN MEDICINE | Facility: CLINIC | Age: 67
End: 2022-10-20

## 2022-10-20 ENCOUNTER — HOSPITAL ENCOUNTER (OUTPATIENT)
Facility: HOSPITAL | Age: 67
Discharge: HOME | End: 2022-10-20
Attending: PHYSICAL MEDICINE & REHABILITATION | Admitting: PHYSICAL MEDICINE & REHABILITATION
Payer: MEDICARE

## 2022-10-20 VITALS
DIASTOLIC BLOOD PRESSURE: 90 MMHG | OXYGEN SATURATION: 97 % | SYSTOLIC BLOOD PRESSURE: 129 MMHG | TEMPERATURE: 98 F | HEART RATE: 75 BPM | RESPIRATION RATE: 20 BRPM | BODY MASS INDEX: 37.81 KG/M2 | HEIGHT: 69 IN | WEIGHT: 255.25 LBS

## 2022-10-20 DIAGNOSIS — M54.16 LUMBAR RADICULOPATHY: Primary | ICD-10-CM

## 2022-10-20 PROCEDURE — 64483 PR EPIDURAL INJ, ANES/STEROID, TRANSFORAMINAL, LUMB/SACR, SNGL LEVL: ICD-10-PCS | Mod: RT,,, | Performed by: PHYSICAL MEDICINE & REHABILITATION

## 2022-10-20 PROCEDURE — 63600175 PHARM REV CODE 636 W HCPCS: Performed by: PHYSICAL MEDICINE & REHABILITATION

## 2022-10-20 PROCEDURE — 64484 NJX AA&/STRD TFRM EPI L/S EA: CPT | Mod: RT,,, | Performed by: PHYSICAL MEDICINE & REHABILITATION

## 2022-10-20 PROCEDURE — 25000003 PHARM REV CODE 250: Performed by: PHYSICAL MEDICINE & REHABILITATION

## 2022-10-20 PROCEDURE — 64483 NJX AA&/STRD TFRM EPI L/S 1: CPT | Mod: RT,,, | Performed by: PHYSICAL MEDICINE & REHABILITATION

## 2022-10-20 PROCEDURE — 25500020 PHARM REV CODE 255: Performed by: PHYSICAL MEDICINE & REHABILITATION

## 2022-10-20 PROCEDURE — 64484 NJX AA&/STRD TFRM EPI L/S EA: CPT | Performed by: PHYSICAL MEDICINE & REHABILITATION

## 2022-10-20 PROCEDURE — 64484 PRA INJECT ANES/STEROID FORAMEN LUMBAR/SACRAL W IMG GUIDE ,EA ADD LEVEL: ICD-10-PCS | Mod: RT,,, | Performed by: PHYSICAL MEDICINE & REHABILITATION

## 2022-10-20 PROCEDURE — 64483 NJX AA&/STRD TFRM EPI L/S 1: CPT | Performed by: PHYSICAL MEDICINE & REHABILITATION

## 2022-10-20 RX ORDER — MIDAZOLAM HYDROCHLORIDE 1 MG/ML
INJECTION, SOLUTION INTRAMUSCULAR; INTRAVENOUS
Status: DISCONTINUED | OUTPATIENT
Start: 2022-10-20 | End: 2022-10-20 | Stop reason: HOSPADM

## 2022-10-20 RX ORDER — BUPIVACAINE HYDROCHLORIDE 2.5 MG/ML
INJECTION, SOLUTION EPIDURAL; INFILTRATION; INTRACAUDAL
Status: DISCONTINUED | OUTPATIENT
Start: 2022-10-20 | End: 2022-10-20 | Stop reason: HOSPADM

## 2022-10-20 RX ORDER — FENTANYL CITRATE 50 UG/ML
INJECTION, SOLUTION INTRAMUSCULAR; INTRAVENOUS
Status: DISCONTINUED | OUTPATIENT
Start: 2022-10-20 | End: 2022-10-20 | Stop reason: HOSPADM

## 2022-10-20 RX ORDER — ONDANSETRON 2 MG/ML
4 INJECTION INTRAMUSCULAR; INTRAVENOUS ONCE AS NEEDED
Status: ACTIVE | OUTPATIENT
Start: 2022-10-20 | End: 2034-03-18

## 2022-10-20 RX ORDER — METHYLPREDNISOLONE ACETATE 40 MG/ML
INJECTION, SUSPENSION INTRA-ARTICULAR; INTRALESIONAL; INTRAMUSCULAR; SOFT TISSUE
Status: DISCONTINUED | OUTPATIENT
Start: 2022-10-20 | End: 2022-10-20 | Stop reason: HOSPADM

## 2022-10-20 NOTE — H&P
HPI  Patient presenting for Procedure(s) (LRB):  Right  L4/5 + L5/S1 TF PIEDAD RN IV Sedation (Right)     Patient on Anti-coagulation Yes    No health changes since previous encounter    Past Medical History:   Diagnosis Date    Aortic stenosis     dr ph[facility]    Asthma     BPH (benign prostatic hyperplasia)     CAD (coronary artery disease)     Cardiomyopathy     CHF (congestive heart failure)     Chronic hoarseness     vocal cord surg    Chronic pain     CKD (chronic kidney disease) stage 3, GFR 30-59 ml/min     CVA (cerebral infarction)     8/2012 olol; reviewed ed note    Ex-smoker     GERD (gastroesophageal reflux disease)     Hepatitis C     treatedharvoni says cured, RNA NEG 6/2020    Hypertension     Pancreatitis     Prostate cancer     Prostate cancer     Prostate cancer     PVD (peripheral vascular disease)     Renal insufficiency     Substance abuse     cocaine, etoh , tob in past     Past Surgical History:   Procedure Laterality Date    AORTIC VALVE REPLACEMENT  05/19/2014    Tissue valve replacement    BACK SURGERY      CARDIAC CATHETERIZATION      COLONOSCOPY  2011    COLONOSCOPY N/A 2/24/2021    Procedure: COLONOSCOPY;  Surgeon: Faith Carrillo MD;  Location: [facility] ENDO;  Service: Endoscopy;  Laterality: N/A;    EPIDURAL STEROID INJECTION INTO CERVICAL SPINE N/A 6/21/2022    Procedure: C7-T1 IL PIEDAD;  Surgeon: Nehemiah Carmen MD;  Location: [facility] PAIN MGT;  Service: Pain Management;  Laterality: N/A;    ESOPHAGOGASTRODUODENOSCOPY N/A 2/24/2021    Procedure: ESOPHAGOGASTRODUODENOSCOPY (EGD);  Surgeon: Faith Carrillo MD;  Location: [facility] ENDO;  Service: Endoscopy;  Laterality: N/A;    FOOT SURGERY      LEFT HEART CATHETERIZATION Left 7/24/2020    Procedure: CATHETERIZATION, HEART, LEFT;  Surgeon: Pasha Martin MD;  Location: [facility] CATH LAB;  Service: Cardiology;  Laterality: Left;    PENILE PROSTHESIS IMPLANT      PENILE PROSTHESIS REVISION  04/30/2018    PROSTATECTOMY  09/2019    [facility]    radiation for prostate      UPPER GASTROINTESTINAL ENDOSCOPY  2011     Review of patient's allergies indicates:  No Known Allergies     Current Facility-Administered Medications on File Prior to Encounter   Medication Dose Route Frequency Provider Last Rate Last Admin    sodium chloride 0.9% flush 10 mL  10 mL Intravenous PRN Wilda Horne MD         Current Outpatient Medications on File Prior to Encounter   Medication Sig Dispense Refill    albuterol (PROVENTIL/VENTOLIN HFA) 90 mcg/actuation inhaler INHALE 2 PUFFS INTO THE LUNGS EVERY 6 HOURS AS NEEDED FOR COUGH 8.5 g 11    amitriptyline (ELAVIL) 10 MG tablet TAKE 1 TABLET BY MOUTH AT BEDTIME AS NEEDED FOR PAIN 30 tablet 11    baclofen (LIORESAL) 10 MG tablet Take 1 tablet (10 mg total) by mouth 3 (three) times daily. 90 tablet 11    carvediloL (COREG) 12.5 MG tablet Take 1 tablet (12.5 mg total) by mouth 2 (two) times daily. 60 tablet 11    gabapentin (NEURONTIN) 600 MG tablet Take 1 tablet (600 mg total) by mouth 3 (three) times daily. 90 tablet 11    losartan (COZAAR) 50 MG tablet Take 1 tablet (50 mg total) by mouth once daily. 30 tablet 11    allopurinoL (ZYLOPRIM) 100 MG tablet Take 1 tablet (100 mg total) by mouth once daily. (Patient not taking: Reported on 10/4/2022) 30 tablet 11    aluminum & magnesium hydroxide-simethicone (MYLANTA MAX STRENGTH) 400-400-40 mg/5 mL suspension TAKE 15ML BY MOUTH FOUR TIMES A DAY AS NEEDED FOR STOMACH ACID      aspirin (ECOTRIN) 81 MG EC tablet Take 1 tablet (81 mg total) by mouth once daily. 90 tablet 3    atorvastatin (LIPITOR) 80 MG tablet TAKE ONE-HALF TABLET BY MOUTH EVERY DAY FOR CHOLESTEROL      clopidogreL (PLAVIX) 75 mg tablet Take 1 tablet (75 mg total) by mouth once daily. 30 tablet 11    diclofenac sodium (VOLTAREN) 1 % Gel APPLY 2 GRAMS TOPICALLY FOUR TIMES A DAY AS NEEDED FOR PAIN AND INFLAMMATION. USE ENCLOSED DOSING CARD.      diphenhydrAMINE (SOMINEX) 25 mg tablet Take 25 mg by mouth nightly  "as needed for Insomnia.      esomeprazole (NEXIUM) 40 MG capsule 40 mg.      famotidine (PEPCID) 40 MG tablet TAKE ONE TABLET BY MOUTH AT BEDTIME FOR ACID REFLUX      fexofenadine (ALLEGRA) 180 MG tablet Take 1 tablet (180 mg total) by mouth daily as needed. 30 tablet 5    flunisolide 25 mcg, 0.025%, (NASALIDE) 25 mcg (0.025 %) Spry 2 sprays by Nasal route as needed.       hydrocortisone 2.5 % cream Apply topically 2 (two) times daily as needed. Apply to affected areas of the face and neck. 30 g 2    hydrocortisone-pramoxine (PROCTOFOAM-HS) rectal foam INSERT 1 APPLICATORFUL INTO RECTALLY TWICE A DAY FOR HEMORRHOIDS      hydrOXYzine HCL (ATARAX) 25 MG tablet Take 25 mg by mouth 3 (three) times daily as needed for Itching.      levocetirizine (XYZAL) 5 MG tablet Take 1 tablet (5 mg total) by mouth every evening. 30 tablet 0    LIDOcaine (LIDODERM) 5 % APPLY 1 PATCH TOPICALLY EVERY DAY FOR PAIN. WEAR FOR 12 HOURS, THEN REMOVE. DO NOT APPLY NEW PATCH FOR AT LEAST 12 HOURS.      methyl salicylate-menthol 15-10% 15-10 % Crea Apply topically as needed.       pantoprazole (PROTONIX) 40 MG tablet Take 40 mg by mouth 2 (two) times daily.       sertraline (ZOLOFT) 100 MG tablet TAKE TWO TABLETS BY MOUTH EVERY DAY FOR MENTAL HEALTH DOSE INCREASED TO 200MG/DAY      sertraline (ZOLOFT) 100 MG tablet 200 mg.      simethicone (MYLICON) 80 MG chewable tablet Take 80 mg by mouth every 6 (six) hours as needed for Flatulence.      sucralfate (CARAFATE) 100 mg/mL suspension Take 1 g by mouth 4 (four) times daily.       triamcinolone acetonide 0.1% (KENALOG) 0.1 % cream Apply topically 2 (two) times daily. 80 g 1        PMHx, PSHx, Allergies, Medications reviewed in epic    ROS negative except pain complaints in HPI    OBJECTIVE:    BP (!) 140/80 (BP Location: Right arm, Patient Position: Sitting)   Pulse 84   Temp 97.2 °F (36.2 °C) (Temporal)   Resp 16   Ht 5' 9" (1.753 m)   Wt 115.8 kg (255 lb 4 oz)   SpO2 96%   BMI 37.69 kg/m² "     PHYSICAL EXAMINATION:    GENERAL: Well appearing, in no acute distress, alert and oriented x3.  PSYCH:  Mood and affect appropriate.  SKIN: Skin color, texture, turgor normal, no rashes or lesions which will impact the procedure.  CV: RRR with palpation of the radial artery.  PULM: No evidence of respiratory difficulty, symmetric chest rise. Clear to auscultation.  NEURO: Cranial nerves grossly intact.    Plan:    Proceed with procedure as planned Procedure(s) (LRB):  Right  L4/5 + L5/S1 TF PIEDAD RN IV Sedation (Right)    Nehemiah Carmen MD  10/20/2022

## 2022-10-20 NOTE — DISCHARGE INSTRUCTIONS

## 2022-10-20 NOTE — DISCHARGE SUMMARY
Discharge Note  Short Stay      SUMMARY     Admit Date: 10/20/2022    Attending Physician: Nehemiah Carmen MD        Discharge Physician: Nehemiah Carmen MD        Discharge Date: 10/20/2022 9:35 AM    Procedure(s) (LRB):  Right  L4/5 + L5/S1 TF PIEDAD RN IV Sedation (Right)    Final Diagnosis: Postlaminectomy syndrome of lumbar region [M96.1]  Lumbar radiculopathy [M54.16]    Disposition: Home or self care    Patient Instructions:   Current Discharge Medication List        CONTINUE these medications which have NOT CHANGED    Details   albuterol (PROVENTIL/VENTOLIN HFA) 90 mcg/actuation inhaler INHALE 2 PUFFS INTO THE LUNGS EVERY 6 HOURS AS NEEDED FOR COUGH  Qty: 8.5 g, Refills: 11    Associated Diagnoses: Cough      amitriptyline (ELAVIL) 10 MG tablet TAKE 1 TABLET BY MOUTH AT BEDTIME AS NEEDED FOR PAIN  Qty: 30 tablet, Refills: 11      baclofen (LIORESAL) 10 MG tablet Take 1 tablet (10 mg total) by mouth 3 (three) times daily.  Qty: 90 tablet, Refills: 11      carvediloL (COREG) 12.5 MG tablet Take 1 tablet (12.5 mg total) by mouth 2 (two) times daily.  Qty: 60 tablet, Refills: 11    Comments: .      gabapentin (NEURONTIN) 600 MG tablet Take 1 tablet (600 mg total) by mouth 3 (three) times daily.  Qty: 90 tablet, Refills: 11      losartan (COZAAR) 50 MG tablet Take 1 tablet (50 mg total) by mouth once daily.  Qty: 30 tablet, Refills: 11    Comments: .      traMADoL (ULTRAM) 50 mg tablet Take 1 tablet (50 mg total) by mouth every 12 (twelve) hours as needed for Pain (pain).  Qty: 14 tablet, Refills: 0    Comments: Quantity prescribed more than 7 day supply? No      aluminum & magnesium hydroxide-simethicone (MYLANTA MAX STRENGTH) 400-400-40 mg/5 mL suspension TAKE 15ML BY MOUTH FOUR TIMES A DAY AS NEEDED FOR STOMACH ACID      aspirin (ECOTRIN) 81 MG EC tablet Take 1 tablet (81 mg total) by mouth once daily.  Qty: 90 tablet, Refills: 3      atorvastatin (LIPITOR) 80 MG tablet TAKE ONE-HALF TABLET BY MOUTH EVERY DAY  FOR CHOLESTEROL      clopidogreL (PLAVIX) 75 mg tablet Take 1 tablet (75 mg total) by mouth once daily.  Qty: 30 tablet, Refills: 11      diclofenac sodium (VOLTAREN) 1 % Gel APPLY 2 GRAMS TOPICALLY FOUR TIMES A DAY AS NEEDED FOR PAIN AND INFLAMMATION. USE ENCLOSED DOSING CARD.      diphenhydrAMINE (SOMINEX) 25 mg tablet Take 25 mg by mouth nightly as needed for Insomnia.      esomeprazole (NEXIUM) 40 MG capsule 40 mg.      famotidine (PEPCID) 40 MG tablet TAKE ONE TABLET BY MOUTH AT BEDTIME FOR ACID REFLUX      fexofenadine (ALLEGRA) 180 MG tablet Take 1 tablet (180 mg total) by mouth daily as needed.  Qty: 30 tablet, Refills: 5      flunisolide 25 mcg, 0.025%, (NASALIDE) 25 mcg (0.025 %) Spry 2 sprays by Nasal route as needed.       hydrocortisone 2.5 % cream Apply topically 2 (two) times daily as needed. Apply to affected areas of the face and neck.  Qty: 30 g, Refills: 2    Associated Diagnoses: Erythema dyschromicum perstans      hydrocortisone-pramoxine (PROCTOFOAM-HS) rectal foam INSERT 1 APPLICATORFUL INTO RECTALLY TWICE A DAY FOR HEMORRHOIDS      hydrOXYzine HCL (ATARAX) 25 MG tablet Take 25 mg by mouth 3 (three) times daily as needed for Itching.      levocetirizine (XYZAL) 5 MG tablet Take 1 tablet (5 mg total) by mouth every evening.  Qty: 30 tablet, Refills: 0    Associated Diagnoses: Allergic rhinitis, unspecified seasonality, unspecified trigger      LIDOcaine (LIDODERM) 5 % APPLY 1 PATCH TOPICALLY EVERY DAY FOR PAIN. WEAR FOR 12 HOURS, THEN REMOVE. DO NOT APPLY NEW PATCH FOR AT LEAST 12 HOURS.      methyl salicylate-menthol 15-10% 15-10 % Crea Apply topically as needed.       pantoprazole (PROTONIX) 40 MG tablet Take 40 mg by mouth 2 (two) times daily.       !! sertraline (ZOLOFT) 100 MG tablet TAKE TWO TABLETS BY MOUTH EVERY DAY FOR MENTAL HEALTH DOSE INCREASED TO 200MG/DAY      !! sertraline (ZOLOFT) 100 MG tablet 200 mg.      simethicone (MYLICON) 80 MG chewable tablet Take 80 mg by mouth every 6  (six) hours as needed for Flatulence.      sucralfate (CARAFATE) 100 mg/mL suspension Take 1 g by mouth 4 (four) times daily.       triamcinolone acetonide 0.1% (KENALOG) 0.1 % cream Apply topically 2 (two) times daily.  Qty: 80 g, Refills: 1    Associated Diagnoses: Erythema dyschromicum perstans       !! - Potential duplicate medications found. Please discuss with provider.        STOP taking these medications       allopurinoL (ZYLOPRIM) 100 MG tablet Comments:   Reason for Stopping:                   Discharge Diagnosis: Postlaminectomy syndrome of lumbar region [M96.1]  Lumbar radiculopathy [M54.16]  Condition on Discharge: Stable with no complications to procedure   Diet on Discharge: Same as before.  Activity: as per instruction sheet.  Discharge to: Home with a responsible adult.  Follow up: 2-4 weeks       Please call the office at [phone] if you experience any weakness or loss of sensation, fever > 101.5, pain uncontrolled with oral medications, persistent nausea/vomiting/or diarrhea, redness or drainage from the incisions, or any other worrisome concerns. If physician on call was not reached or could not communicate with our office for any reason please go to the nearest emergency department

## 2022-10-20 NOTE — OP NOTE
INFORMED CONSENT: The procedure, risks, benefits and options were discussed with patient. There are no contraindications to the procedure. The patient expressed understanding and agreed to proceed. The personnel performing the procedure was discussed.    10/20/2022    Surgeon: Nehemiah Carmen MD    Assistants: None    Sedation: Conscious sedation provided by M.D    The patient was monitored with continuous pulse oximetry, EKG, and intermittent blood pressure monitors, immediately prior to administration of sedation.  The patient was hemodynamically stable throughout the entire process was responsive to voice, and breathing spontaneously.  Supplemental O2 was provided at 2L/min via nasal cannula.  Patient was comfortable for the duration of the procedure.     There was a total of 2mg IV Midazolam and 100mcg Fentanyl titrated for the procedure    Total sedation time was >10minutes and <20minutes      PROCEDURE:    1)  Right  L4-5 TRANSFORAMINAL EPIDURAL STEROID INJECTION    2)  Right  L5-S1 TRANSFORAMINAL EPIDURAL STEROID INJECTION    Pre Procedure diagnosis:    Right  L4 and L5  Postlaminectomy syndrome of lumbar region [M96.1]  Lumbar radiculopathy [M54.16]    Post-Procedure diagnosis:   same    Complications: None    Specimens: None      DESCRIPTION OF PROCEDURE: The patient was brought to the procedure room. IV access was obtained prior to the procedure. The patient was positioned prone on the fluoroscopy table. Continuous hemodynamic monitoring was initiated including blood pressure, EKG, and pulse oximetry. . The skin was prepped with chlorhexidine and draped in a sterile fashion. Skin anesthesia was achieved using a total of 10mL of lidocaine, 5mL over each respective injection site.     The  L4-5 and L5-S1  transforaminal spaces were identified with fluoroscopy in the  AP, oblique, and lateral views.  A 22 gauge spinal quinke needle was then advanced into the area of the trans foraminal spaces at the above  levels with confirmation of proper needle position using AP, oblique, and lateral fluoroscopic views. Once the needle tip was in the area of the transforaminal space, and there was no blood, CSF or paraesthesias,  1 mL of Omnipaque 300mg/ml was injected on each side for a total of 2 mL.  Fluoroscopic imaging in the AP and lateral views revealed a clear outline of the spinal nerve with proximal spread of agent through the neural foramen into the epidural space. A total combination of 1 mL of Bupivicaine 0.25% and 40 mg depo medrol was injected into each level for a total of 4mL of injected medications with displacement of the contrast dye confirming that the medication went into the area of the transforaminal spaces at each level. A sterile dressing was applied.   Patient tolerated the procedure well.    Patient was taken back to the recovery room for further observation.     The patient was discharged to home in stable condition

## 2022-10-21 ENCOUNTER — TELEPHONE (OUTPATIENT)
Dept: UROLOGY | Facility: CLINIC | Age: 67
End: 2022-10-21
Payer: MEDICARE

## 2022-10-21 NOTE — TELEPHONE ENCOUNTER
Attempted to call pt to followup on his urinary leakage; if he was still leaking, Dr. Rodriguez wanted him to drop off a sample for urine culture; no answer.  Unable to leave .

## 2022-11-03 ENCOUNTER — OFFICE VISIT (OUTPATIENT)
Dept: UROLOGY | Facility: CLINIC | Age: 67
End: 2022-11-03
Payer: MEDICARE

## 2022-11-03 VITALS
SYSTOLIC BLOOD PRESSURE: 99 MMHG | DIASTOLIC BLOOD PRESSURE: 66 MMHG | HEIGHT: 69 IN | HEART RATE: 76 BPM | WEIGHT: 253 LBS | RESPIRATION RATE: 18 BRPM | TEMPERATURE: 98 F | BODY MASS INDEX: 37.47 KG/M2

## 2022-11-03 DIAGNOSIS — C61 PROSTATE CANCER: ICD-10-CM

## 2022-11-03 DIAGNOSIS — N39.3 STRESS INCONTINENCE: Primary | ICD-10-CM

## 2022-11-03 PROCEDURE — 99999 PR PBB SHADOW E&M-EST. PATIENT-LVL III: ICD-10-PCS | Mod: PBBFAC,,, | Performed by: UROLOGY

## 2022-11-03 PROCEDURE — 3074F PR MOST RECENT SYSTOLIC BLOOD PRESSURE < 130 MM HG: ICD-10-PCS | Mod: CPTII,S$GLB,, | Performed by: UROLOGY

## 2022-11-03 PROCEDURE — 3066F PR DOCUMENTATION OF TREATMENT FOR NEPHROPATHY: ICD-10-PCS | Mod: CPTII,S$GLB,, | Performed by: UROLOGY

## 2022-11-03 PROCEDURE — 3078F DIAST BP <80 MM HG: CPT | Mod: CPTII,S$GLB,, | Performed by: UROLOGY

## 2022-11-03 PROCEDURE — 1159F PR MEDICATION LIST DOCUMENTED IN MEDICAL RECORD: ICD-10-PCS | Mod: CPTII,S$GLB,, | Performed by: UROLOGY

## 2022-11-03 PROCEDURE — 1160F RVW MEDS BY RX/DR IN RCRD: CPT | Mod: CPTII,S$GLB,, | Performed by: UROLOGY

## 2022-11-03 PROCEDURE — 3008F BODY MASS INDEX DOCD: CPT | Mod: CPTII,S$GLB,, | Performed by: UROLOGY

## 2022-11-03 PROCEDURE — 99214 PR OFFICE/OUTPT VISIT, EST, LEVL IV, 30-39 MIN: ICD-10-PCS | Mod: S$GLB,,, | Performed by: UROLOGY

## 2022-11-03 PROCEDURE — 99214 OFFICE O/P EST MOD 30 MIN: CPT | Mod: S$GLB,,, | Performed by: UROLOGY

## 2022-11-03 PROCEDURE — 1126F AMNT PAIN NOTED NONE PRSNT: CPT | Mod: CPTII,S$GLB,, | Performed by: UROLOGY

## 2022-11-03 PROCEDURE — 3074F SYST BP LT 130 MM HG: CPT | Mod: CPTII,S$GLB,, | Performed by: UROLOGY

## 2022-11-03 PROCEDURE — 3078F PR MOST RECENT DIASTOLIC BLOOD PRESSURE < 80 MM HG: ICD-10-PCS | Mod: CPTII,S$GLB,, | Performed by: UROLOGY

## 2022-11-03 PROCEDURE — 3288F FALL RISK ASSESSMENT DOCD: CPT | Mod: CPTII,S$GLB,, | Performed by: UROLOGY

## 2022-11-03 PROCEDURE — 3288F PR FALLS RISK ASSESSMENT DOCUMENTED: ICD-10-PCS | Mod: CPTII,S$GLB,, | Performed by: UROLOGY

## 2022-11-03 PROCEDURE — 1126F PR PAIN SEVERITY QUANTIFIED, NO PAIN PRESENT: ICD-10-PCS | Mod: CPTII,S$GLB,, | Performed by: UROLOGY

## 2022-11-03 PROCEDURE — 99999 PR PBB SHADOW E&M-EST. PATIENT-LVL III: CPT | Mod: PBBFAC,,, | Performed by: UROLOGY

## 2022-11-03 PROCEDURE — 1160F PR REVIEW ALL MEDS BY PRESCRIBER/CLIN PHARMACIST DOCUMENTED: ICD-10-PCS | Mod: CPTII,S$GLB,, | Performed by: UROLOGY

## 2022-11-03 PROCEDURE — 3066F NEPHROPATHY DOC TX: CPT | Mod: CPTII,S$GLB,, | Performed by: UROLOGY

## 2022-11-03 PROCEDURE — 1101F PR PT FALLS ASSESS DOC 0-1 FALLS W/OUT INJ PAST YR: ICD-10-PCS | Mod: CPTII,S$GLB,, | Performed by: UROLOGY

## 2022-11-03 PROCEDURE — 1101F PT FALLS ASSESS-DOCD LE1/YR: CPT | Mod: CPTII,S$GLB,, | Performed by: UROLOGY

## 2022-11-03 PROCEDURE — 1159F MED LIST DOCD IN RCRD: CPT | Mod: CPTII,S$GLB,, | Performed by: UROLOGY

## 2022-11-03 PROCEDURE — 3008F PR BODY MASS INDEX (BMI) DOCUMENTED: ICD-10-PCS | Mod: CPTII,S$GLB,, | Performed by: UROLOGY

## 2022-11-03 NOTE — PROGRESS NOTES
Chief Complaint:  History of prostate cancer    HPI:   11/03/2022 - returns for follow-up, PSA remains undetectable, notes some new stress incontinence when he stands from a seated or lying position, does not do pelvic floor exercises, also notes some urinary leakage when he achieves orgasm, as well as some postvoid dribbling, no gross hematuria or dysuria    04/28/2022 - returns today for follow-up, PSA remains undetectable, energy good, voiding well, denies GH or leakage    12/17/2021 - patient returns today for follow-up, PSA remains undetectable, his energy level has improved and his hot flashes have diminished as his ADT has worn off, voiding well, denies any gross hematuria    09/17/2021 - patient returns for follow-up, notes decreased urgency, is voiding well with a good stream and denies gross hematuria, nocturia x2 but is not bothersome, PSA undetectable, has some hot flashes which are bothersome but he can tolerate them    06/11/2021 - patient presents for follow-up, has completed radiation, did fine during radiation but notes fatigue possibly from radiation versus the Lupron, denies any gross hematuria or voiding difficulty, not having any leakage, denies UTIs  03/05/2021 - patient presents for follow-up, [facility] which [facility] GG 4+5= 9 prostate cancer with negative margins, patient's PSA never went to undetectable, Dr Garces thinks Axumin  PET followed by salvage radiation with ADT x 6 months is the best course of action  01/21/2021 - patient presents for follow-up, we were unable to obtain his outside records due to a clerical error, however we have requested these and they should be faxed soon, he he continues to note dysuria and notes that his urine is very dark and concentrated, he denies any foul-smelling urine or gross hematuria, his follow-up PSA was 0.11, denies weak stream or incomplete emptying  12/10/2020 - 67 y.o. male that presents for history of  prostate cancer, [facility] [place] [facility], patient believes he was told that he had aggressive prostate cancer, he believes thathis initial postop PSA was undetectable however he has had subsequent biochemical recurrence, then had a postprostatectomy MRI in mid November which was concerning for prostate cancer recurrence and was told that he would need radiation.  Thus the patient has decided to seek a 2nd opinion.  Patient notes that he initially had incontinence after his surgery, but this has resolved.  He currently wears no pads.  He had erectile dysfunction prior to his radical prostatectomy and had an IPP placed in 2018.  This continues to work well for him.  Patient does note some sciatic nerve pain on the back of his right leg for the past 6 months.  He notes chronic back pain since 2001 for which he gets epidural injections which works well for him.  Other significant medical history includes a stroke in 2012 and in open heart surgery in 2014.  He denies any gross hematuria but does confirm some dysuria. He denies a history of kidney stones.  He also denies a family history of prostate cancer.      PMH:  Past Medical History:   Diagnosis Date    Aortic stenosis     [facility]    Asthma     BPH (benign prostatic hyperplasia)     CAD (coronary artery disease)     Cardiomyopathy     CHF (congestive heart failure)     Chronic hoarseness     vocal cord surg    Chronic pain     CKD (chronic kidney disease) stage 3, GFR 30-59 ml/min     CVA (cerebral infarction)     8/2012 olol; reviewed ed note    Ex-smoker     GERD (gastroesophageal reflux disease)     Hepatitis C     treatedharvoni says cured, RNA NEG 6/2020    Hypertension     Pancreatitis     Prostate cancer     Prostate cancer     Prostate cancer     PVD (peripheral vascular disease)     Renal insufficiency     Substance abuse     cocaine, etoh , tob in past       PSH:  Past Surgical History:    Procedure Laterality Date    AORTIC VALVE REPLACEMENT  05/19/2014    Tissue valve replacement    BACK SURGERY      CARDIAC CATHETERIZATION      COLONOSCOPY  2011    COLONOSCOPY N/A 2/24/2021    Procedure: COLONOSCOPY;  Surgeon: Faith Carrillo MD;  Location: [facility] ENDO;  Service: Endoscopy;  Laterality: N/A;    EPIDURAL STEROID INJECTION INTO CERVICAL SPINE N/A 6/21/2022    Procedure: C7-T1 IL PIEDAD;  Surgeon: Nehemiah Carmen MD;  Location: [facility] PAIN MGT;  Service: Pain Management;  Laterality: N/A;    ESOPHAGOGASTRODUODENOSCOPY N/A 2/24/2021    Procedure: ESOPHAGOGASTRODUODENOSCOPY (EGD);  Surgeon: Faith Carrillo MD;  Location: [facility] ENDO;  Service: Endoscopy;  Laterality: N/A;    FOOT SURGERY      LEFT HEART CATHETERIZATION Left 7/24/2020    Procedure: CATHETERIZATION, HEART, LEFT;  Surgeon: Pasha Martin MD;  Location: [facility] CATH LAB;  Service: Cardiology;  Laterality: Left;    PENILE PROSTHESIS IMPLANT      PENILE PROSTHESIS REVISION  04/30/2018    PROSTATECTOMY  09/2019    [facility]    radiation for prostate      TRANSFORAMINAL EPIDURAL INJECTION OF STEROID Right 10/20/2022    Procedure: Right  L4/5 + L5/S1 TF PIEDAD RN IV Sedation;  Surgeon: Nehemiah Carmen MD;  Location: [facility] PAIN MGT;  Service: Pain Management;  Laterality: Right;    UPPER GASTROINTESTINAL ENDOSCOPY  2011       Family History:  Family History   Problem Relation Age of Onset    Heart disease Mother     Heart attack Sister     Heart attack Brother     Colon cancer Neg Hx     Colon polyps Neg Hx     Liver cancer Neg Hx     Inflammatory bowel disease Neg Hx     Liver disease Neg Hx     Rectal cancer Neg Hx     Stomach cancer Neg Hx     Ulcerative colitis Neg Hx        Social History:  Social History     Tobacco Use    Smoking status: Former     Packs/day: 2.00     Years: 8.00     Pack years: 16.00     Types: Cigarettes     Quit date: 8/24/2012     Years since quitting: 10.2    Smokeless tobacco: Never   Substance Use Topics     Alcohol use: No     Comment: Sober since 08/24/2012    Drug use: No        Review of Systems:  General: No fever, chills  Skin: No rashes  Chest:  Denies cough and sputum production  Heart: Denies chest pain  Resp: Denies dyspnea  Abdomen: Denies diarrhea, abdominal pain, hematemesis, or blood in stool.  Musculoskeletal: No joint stiffness or swelling. Denies back pain.  : see HPI  Neuro: no dizziness or weakness    Allergies:  Patient has no known allergies.    Medications:    Current Outpatient Medications:     albuterol (PROVENTIL/VENTOLIN HFA) 90 mcg/actuation inhaler, INHALE 2 PUFFS INTO THE LUNGS EVERY 6 HOURS AS NEEDED FOR COUGH, Disp: 8.5 g, Rfl: 11    aluminum & magnesium hydroxide-simethicone (MYLANTA MAX STRENGTH) 400-400-40 mg/5 mL suspension, TAKE 15ML BY MOUTH FOUR TIMES A DAY AS NEEDED FOR STOMACH ACID, Disp: , Rfl:     amitriptyline (ELAVIL) 10 MG tablet, TAKE 1 TABLET BY MOUTH AT BEDTIME AS NEEDED FOR PAIN, Disp: 30 tablet, Rfl: 11    aspirin (ECOTRIN) 81 MG EC tablet, Take 1 tablet (81 mg total) by mouth once daily., Disp: 90 tablet, Rfl: 3    atorvastatin (LIPITOR) 80 MG tablet, TAKE ONE-HALF TABLET BY MOUTH EVERY DAY FOR CHOLESTEROL, Disp: , Rfl:     baclofen (LIORESAL) 10 MG tablet, Take 1 tablet (10 mg total) by mouth 3 (three) times daily., Disp: 90 tablet, Rfl: 11    carvediloL (COREG) 12.5 MG tablet, Take 1 tablet (12.5 mg total) by mouth 2 (two) times daily., Disp: 60 tablet, Rfl: 11    clopidogreL (PLAVIX) 75 mg tablet, Take 1 tablet (75 mg total) by mouth once daily., Disp: 30 tablet, Rfl: 11    diclofenac sodium (VOLTAREN) 1 % Gel, APPLY 2 GRAMS TOPICALLY FOUR TIMES A DAY AS NEEDED FOR PAIN AND INFLAMMATION. USE ENCLOSED DOSING CARD., Disp: , Rfl:     diphenhydrAMINE (SOMINEX) 25 mg tablet, Take 25 mg by mouth nightly as needed for Insomnia., Disp: , Rfl:     esomeprazole (NEXIUM) 40 MG capsule, 40 mg., Disp: , Rfl:     famotidine (PEPCID) 40 MG tablet, TAKE ONE TABLET BY MOUTH AT  BEDTIME FOR ACID REFLUX, Disp: , Rfl:     fexofenadine (ALLEGRA) 180 MG tablet, Take 1 tablet (180 mg total) by mouth daily as needed., Disp: 30 tablet, Rfl: 5    flunisolide 25 mcg, 0.025%, (NASALIDE) 25 mcg (0.025 %) Spry, 2 sprays by Nasal route as needed. , Disp: , Rfl:     gabapentin (NEURONTIN) 600 MG tablet, Take 1 tablet (600 mg total) by mouth 3 (three) times daily., Disp: 90 tablet, Rfl: 11    hydrocortisone 2.5 % cream, Apply topically 2 (two) times daily as needed. Apply to affected areas of the face and neck., Disp: 30 g, Rfl: 2    hydrocortisone-pramoxine (PROCTOFOAM-HS) rectal foam, INSERT 1 APPLICATORFUL INTO RECTALLY TWICE A DAY FOR HEMORRHOIDS, Disp: , Rfl:     hydrOXYzine HCL (ATARAX) 25 MG tablet, Take 25 mg by mouth 3 (three) times daily as needed for Itching., Disp: , Rfl:     LIDOcaine (LIDODERM) 5 %, APPLY 1 PATCH TOPICALLY EVERY DAY FOR PAIN. WEAR FOR 12 HOURS, THEN REMOVE. DO NOT APPLY NEW PATCH FOR AT LEAST 12 HOURS., Disp: , Rfl:     losartan (COZAAR) 50 MG tablet, Take 1 tablet (50 mg total) by mouth once daily., Disp: 30 tablet, Rfl: 11    methyl salicylate-menthol 15-10% 15-10 % Crea, Apply topically as needed. , Disp: , Rfl:     pantoprazole (PROTONIX) 40 MG tablet, Take 40 mg by mouth 2 (two) times daily. , Disp: , Rfl:     sertraline (ZOLOFT) 100 MG tablet, TAKE TWO TABLETS BY MOUTH EVERY DAY FOR MENTAL HEALTH DOSE INCREASED TO 200MG/DAY, Disp: , Rfl:     sertraline (ZOLOFT) 100 MG tablet, 200 mg., Disp: , Rfl:     simethicone (MYLICON) 80 MG chewable tablet, Take 80 mg by mouth every 6 (six) hours as needed for Flatulence., Disp: , Rfl:     sucralfate (CARAFATE) 100 mg/mL suspension, Take 1 g by mouth 4 (four) times daily. , Disp: , Rfl:     triamcinolone acetonide 0.1% (KENALOG) 0.1 % cream, Apply topically 2 (two) times daily., Disp: 80 g, Rfl: 1    levocetirizine (XYZAL) 5 MG tablet, Take 1 tablet (5 mg total) by mouth every evening., Disp: 30 tablet, Rfl: 0  No current  facility-administered medications for this visit.    Facility-Administered Medications Ordered in Other Visits:     ondansetron injection 4 mg, 4 mg, Intravenous, Once PRN, Nehemiah Carmen MD    sodium chloride 0.9% flush 10 mL, 10 mL, Intravenous, PRN, Wilda Horne MD    Physical Exam:  Vitals:    11/03/22 1508   BP: 99/66   Pulse: 76   Resp: 18   Temp: 97.6 °F (36.4 °C)     General: awake, alert, cooperative  Head: NC/AT  Ears: external ears normal  Eyes: sclera normal  Lungs: normal inspiration, NAD  Heart: well-perfused  Skin: The skin is warm and dry  Ext: No c/c/e.  Neuro: grossly intact, AOx3    RADIOLOGY:  NM PET CT FLUCICLOVINE F18 03/24/2021     CLINICAL HISTORY:  Biochemical failure after prostatectomy in 2019, high risk;  Malignant neoplasm of prostate     TECHNIQUE:  Segmented attenuation corrected 3-D PET imaging was obtained from the skull base through the mid thighs utilizing 10.38 mCi F-18-fluciclovine.  Noncontrast CT imaging was performed for attenuation correction, diagnosis, and anatomical fusion with PET.     COMPARISON:  None.     FINDINGS:  There is physiologic tracer uptake in the liver, pancreas, and bowel.     Patient is status post prostatectomy.  No abnormal hypermetabolism is demonstrated within the prostate bed.  No hypermetabolic regional lymphadenopathy in the pelvis. No hypermetabolic osseous lesions are identified.     Incidental: Penile prosthesis, aortic atherosclerosis, right renal cyst, median sternotomy/CABG changes, degenerative changes in the spine.     Impression:     Status post prostatectomy.  No evidence to indicate residual or recurrent malignancy.    LABS:  I personally reviewed the following lab values:  Lab Results   Component Value Date    WBC 5.33 08/25/2022    HGB 11.9 (L) 08/25/2022    HCT 35.6 (L) 08/25/2022     (L) 08/25/2022     08/25/2022    K 3.8 08/25/2022     08/25/2022    CREATININE 1.7 (H) 08/25/2022    BUN 15 08/25/2022     CO2 22 (L) 08/25/2022    TSH 3.370 06/30/2020    PSA 0.88 04/08/2014    INR 1.0 08/25/2022    GLUF 83 03/13/2007    HGBA1C 5.2 10/23/2007    CHOL 123 01/18/2022    TRIG 57 01/18/2022    HDL 39 (L) 01/18/2022    ALT 14 08/25/2022    AST 21 08/25/2022     Assessment/Plan:   Abdullahi Urias is a 67 y.o. male with:    History of GG 9 prostate cancer - status post radical prostatectomy in September of 2019 with biochemical recurrence now s/p RT and ADT, PSA remains undetectable, f/u 6 months with PSA    NATI - refer to PFPT    Leakage with orgasm - likely due to rhythmic contractions of the pelvic floor, options would be to wear an occlusive penile ring or a condom      Rommel Rodriguez MD  Urology

## 2022-11-21 ENCOUNTER — PES CALL (OUTPATIENT)
Dept: ADMINISTRATIVE | Facility: CLINIC | Age: 67
End: 2022-11-21
Payer: MEDICARE

## 2022-11-22 ENCOUNTER — OFFICE VISIT (OUTPATIENT)
Dept: INTERNAL MEDICINE | Facility: CLINIC | Age: 67
End: 2022-11-22
Payer: MEDICARE

## 2022-11-22 VITALS
HEART RATE: 84 BPM | DIASTOLIC BLOOD PRESSURE: 90 MMHG | HEIGHT: 69 IN | OXYGEN SATURATION: 98 % | BODY MASS INDEX: 37.84 KG/M2 | WEIGHT: 255.5 LBS | SYSTOLIC BLOOD PRESSURE: 140 MMHG

## 2022-11-22 DIAGNOSIS — I10 HYPERTENSION, UNSPECIFIED TYPE: ICD-10-CM

## 2022-11-22 DIAGNOSIS — G47.33 OSA (OBSTRUCTIVE SLEEP APNEA): ICD-10-CM

## 2022-11-22 DIAGNOSIS — Z99.89 DEPENDENCE ON OTHER ENABLING MACHINES AND DEVICES: ICD-10-CM

## 2022-11-22 DIAGNOSIS — R15.9 INCONTINENCE OF FECES, UNSPECIFIED FECAL INCONTINENCE TYPE: ICD-10-CM

## 2022-11-22 DIAGNOSIS — R20.8 BURNING SENSATION: ICD-10-CM

## 2022-11-22 DIAGNOSIS — R26.9 ABNORMALITY OF GAIT AND MOBILITY: ICD-10-CM

## 2022-11-22 DIAGNOSIS — R20.2 NUMBNESS AND TINGLING: ICD-10-CM

## 2022-11-22 DIAGNOSIS — I25.10 CORONARY ARTERY DISEASE, UNSPECIFIED VESSEL OR LESION TYPE, UNSPECIFIED WHETHER ANGINA PRESENT, UNSPECIFIED WHETHER NATIVE OR TRANSPLANTED HEART: ICD-10-CM

## 2022-11-22 DIAGNOSIS — E66.01 SEVERE OBESITY (BMI 35.0-39.9) WITH COMORBIDITY: ICD-10-CM

## 2022-11-22 DIAGNOSIS — Z13.31 POSITIVE DEPRESSION SCREENING: ICD-10-CM

## 2022-11-22 DIAGNOSIS — Z00.00 ENCOUNTER FOR PREVENTIVE HEALTH EXAMINATION: Primary | ICD-10-CM

## 2022-11-22 DIAGNOSIS — J45.909 ASTHMA, UNSPECIFIED ASTHMA SEVERITY, UNSPECIFIED WHETHER COMPLICATED, UNSPECIFIED WHETHER PERSISTENT: ICD-10-CM

## 2022-11-22 DIAGNOSIS — I73.9 PAD (PERIPHERAL ARTERY DISEASE): ICD-10-CM

## 2022-11-22 DIAGNOSIS — Z95.2 S/P AVR: ICD-10-CM

## 2022-11-22 DIAGNOSIS — I42.9 CARDIOMYOPATHY, UNSPECIFIED TYPE: ICD-10-CM

## 2022-11-22 DIAGNOSIS — R20.0 NUMBNESS AND TINGLING: ICD-10-CM

## 2022-11-22 DIAGNOSIS — Z91.89 POTENTIAL FOR COGNITIVE IMPAIRMENT: ICD-10-CM

## 2022-11-22 DIAGNOSIS — N18.32 STAGE 3B CHRONIC KIDNEY DISEASE: ICD-10-CM

## 2022-11-22 DIAGNOSIS — Z85.46 HISTORY OF PROSTATE CANCER: ICD-10-CM

## 2022-11-22 DIAGNOSIS — R32 URINARY INCONTINENCE, UNSPECIFIED TYPE: ICD-10-CM

## 2022-11-22 DIAGNOSIS — H91.90 DECREASED HEARING, UNSPECIFIED LATERALITY: ICD-10-CM

## 2022-11-22 DIAGNOSIS — I70.0 AORTO-ILIAC ATHEROSCLEROSIS: ICD-10-CM

## 2022-11-22 DIAGNOSIS — I70.8 AORTO-ILIAC ATHEROSCLEROSIS: ICD-10-CM

## 2022-11-22 PROCEDURE — 1101F PR PT FALLS ASSESS DOC 0-1 FALLS W/OUT INJ PAST YR: ICD-10-PCS | Mod: CPTII,S$GLB,, | Performed by: NURSE PRACTITIONER

## 2022-11-22 PROCEDURE — 1160F RVW MEDS BY RX/DR IN RCRD: CPT | Mod: CPTII,S$GLB,, | Performed by: NURSE PRACTITIONER

## 2022-11-22 PROCEDURE — 3288F FALL RISK ASSESSMENT DOCD: CPT | Mod: CPTII,S$GLB,, | Performed by: NURSE PRACTITIONER

## 2022-11-22 PROCEDURE — 3066F NEPHROPATHY DOC TX: CPT | Mod: CPTII,S$GLB,, | Performed by: NURSE PRACTITIONER

## 2022-11-22 PROCEDURE — 3077F PR MOST RECENT SYSTOLIC BLOOD PRESSURE >= 140 MM HG: ICD-10-PCS | Mod: CPTII,S$GLB,, | Performed by: NURSE PRACTITIONER

## 2022-11-22 PROCEDURE — 1125F PR PAIN SEVERITY QUANTIFIED, PAIN PRESENT: ICD-10-PCS | Mod: CPTII,S$GLB,, | Performed by: NURSE PRACTITIONER

## 2022-11-22 PROCEDURE — G0439 PPPS, SUBSEQ VISIT: HCPCS | Mod: S$GLB,,, | Performed by: NURSE PRACTITIONER

## 2022-11-22 PROCEDURE — 1159F MED LIST DOCD IN RCRD: CPT | Mod: CPTII,S$GLB,, | Performed by: NURSE PRACTITIONER

## 2022-11-22 PROCEDURE — 3077F SYST BP >= 140 MM HG: CPT | Mod: CPTII,S$GLB,, | Performed by: NURSE PRACTITIONER

## 2022-11-22 PROCEDURE — 3008F BODY MASS INDEX DOCD: CPT | Mod: CPTII,S$GLB,, | Performed by: NURSE PRACTITIONER

## 2022-11-22 PROCEDURE — 99999 PR PBB SHADOW E&M-EST. PATIENT-LVL V: CPT | Mod: PBBFAC,,, | Performed by: NURSE PRACTITIONER

## 2022-11-22 PROCEDURE — 3288F PR FALLS RISK ASSESSMENT DOCUMENTED: ICD-10-PCS | Mod: CPTII,S$GLB,, | Performed by: NURSE PRACTITIONER

## 2022-11-22 PROCEDURE — 1101F PT FALLS ASSESS-DOCD LE1/YR: CPT | Mod: CPTII,S$GLB,, | Performed by: NURSE PRACTITIONER

## 2022-11-22 PROCEDURE — G0439 PR MEDICARE ANNUAL WELLNESS SUBSEQUENT VISIT: ICD-10-PCS | Mod: S$GLB,,, | Performed by: NURSE PRACTITIONER

## 2022-11-22 PROCEDURE — 3066F PR DOCUMENTATION OF TREATMENT FOR NEPHROPATHY: ICD-10-PCS | Mod: CPTII,S$GLB,, | Performed by: NURSE PRACTITIONER

## 2022-11-22 PROCEDURE — 1160F PR REVIEW ALL MEDS BY PRESCRIBER/CLIN PHARMACIST DOCUMENTED: ICD-10-PCS | Mod: CPTII,S$GLB,, | Performed by: NURSE PRACTITIONER

## 2022-11-22 PROCEDURE — 99499 RISK ADDL DX/OHS AUDIT: ICD-10-PCS | Mod: HCNC,S$GLB,, | Performed by: NURSE PRACTITIONER

## 2022-11-22 PROCEDURE — 99499 UNLISTED E&M SERVICE: CPT | Mod: HCNC,S$GLB,, | Performed by: NURSE PRACTITIONER

## 2022-11-22 PROCEDURE — 1170F FXNL STATUS ASSESSED: CPT | Mod: CPTII,S$GLB,, | Performed by: NURSE PRACTITIONER

## 2022-11-22 PROCEDURE — 3080F DIAST BP >= 90 MM HG: CPT | Mod: CPTII,S$GLB,, | Performed by: NURSE PRACTITIONER

## 2022-11-22 PROCEDURE — 3080F PR MOST RECENT DIASTOLIC BLOOD PRESSURE >= 90 MM HG: ICD-10-PCS | Mod: CPTII,S$GLB,, | Performed by: NURSE PRACTITIONER

## 2022-11-22 PROCEDURE — 1125F AMNT PAIN NOTED PAIN PRSNT: CPT | Mod: CPTII,S$GLB,, | Performed by: NURSE PRACTITIONER

## 2022-11-22 PROCEDURE — 1159F PR MEDICATION LIST DOCUMENTED IN MEDICAL RECORD: ICD-10-PCS | Mod: CPTII,S$GLB,, | Performed by: NURSE PRACTITIONER

## 2022-11-22 PROCEDURE — 1170F PR FUNCTIONAL STATUS ASSESSED: ICD-10-PCS | Mod: CPTII,S$GLB,, | Performed by: NURSE PRACTITIONER

## 2022-11-22 PROCEDURE — 99999 PR PBB SHADOW E&M-EST. PATIENT-LVL V: ICD-10-PCS | Mod: PBBFAC,,, | Performed by: NURSE PRACTITIONER

## 2022-11-22 PROCEDURE — 3008F PR BODY MASS INDEX (BMI) DOCUMENTED: ICD-10-PCS | Mod: CPTII,S$GLB,, | Performed by: NURSE PRACTITIONER

## 2022-11-22 RX ORDER — ALPRAZOLAM 1 MG/1
1 TABLET ORAL DAILY
COMMUNITY

## 2022-11-22 RX ORDER — ALLOPURINOL 100 MG/1
100 TABLET ORAL DAILY
COMMUNITY

## 2022-11-22 NOTE — PATIENT INSTRUCTIONS
Counseling and Referral of Other Preventative  (Italic type indicates deductible and co-insurance are waived)    Patient Name: Abdullahi Urias  Today's Date: 11/22/2022    Health Maintenance       Date Due Completion Date    Shingles Vaccine (1 of 2) 04/01/2016 2/5/2016    COVID-19 Vaccine (5 - Booster for Pfizer series) 05/19/2022 3/24/2022    Aspirin/Antiplatelet Therapy 11/03/2023 11/3/2022    Lipid Panel 01/18/2027 1/18/2022    Colorectal Cancer Screening 02/24/2028 2/24/2021    TETANUS VACCINE 10/19/2032 10/19/2022        Orders Placed This Encounter   Procedures    Ambulatory referral/consult to Audiology       The following information is provided to all patients.  This information is to help you find resources for any of the problems found today that may be affecting your health:                Living healthy guide: www.[facility].[place].gov      Understanding Diabetes: www.diabetes.org      Eating healthy: www.cdc.gov/healthyweight      [facility] home safety checklist: www.cdc.gov/steadi/patient.html      Agency on Aging: www.goea.[place].[facility]      Alcoholics anonymous (AA): www.aa.org      Physical Activity: www.kashif.nih.gov/dw9ixyx      Tobacco use: www.quitwithusla.org

## 2022-11-22 NOTE — Clinical Note
1 week PO: Patient is doing well post-operatively. The importance of post-op drop compliance was emphasized. Drop scheduled reviewed with patient. Patient to call if any visual changes or concerns. Lab andf /u two monthsAlso cardiol in couple months

## 2022-11-22 NOTE — PROGRESS NOTES
"  Abdullahi Urias presented for a  Medicare AWV and comprehensive Health Risk Assessment today. The following components were reviewed and updated:    Medical history  Family History  Social history  Allergies and Current Medications  Health Risk Assessment  Health Maintenance  Care Team         ** See Completed Assessments for Annual Wellness Visit within the encounter summary.**         The following assessments were completed:  Living Situation  CAGE  Depression Screening  Timed Get Up and Go  Whisper Test  Cognitive Function Screening  Nutrition Screening  ADL Screening  PAQ Screening        Vitals:    22 0917   BP: (!) 140/90   BP Location: Left arm   Patient Position: Sitting   Pulse: 84   SpO2: 98%   Weight: 115.9 kg (255 lb 8.2 oz)   Height: 5' 9" (1.753 m)     Body mass index is 37.73 kg/m².  Physical Exam  Vitals and nursing note reviewed.   Constitutional:       Appearance: He is well-developed.   HENT:      Head: Normocephalic.   Cardiovascular:      Rate and Rhythm: Normal rate and regular rhythm.      Pulses:           Dorsalis pedis pulses are 0 on the right side and 0 on the left side.      Heart sounds: Normal heart sounds.      Comments: Reports edema is normal finding on RLE  No erythema, increased warmth, or tenderness noted to either calf.    Bilateral feet warm to touch with normal color.   Pulmonary:      Effort: Pulmonary effort is normal. No respiratory distress.      Breath sounds: Decreased breath sounds present.   Abdominal:      Palpations: Abdomen is soft. There is no mass.      Tenderness: There is no abdominal tenderness.   Musculoskeletal:         General: Normal range of motion.      Right lower le+ Edema present.      Left lower leg: No edema.   Skin:     General: Skin is warm and dry.   Neurological:      Mental Status: He is alert and oriented to person, place, and time.      Motor: No abnormal muscle tone.   Psychiatric:         Speech: Speech normal.         Behavior: " Behavior normal.             Diagnoses and health risks identified today and associated recommendations/orders:    1. Encounter for preventive health examination  MA to schedule  PCP  Optometry      Advised to bring medications to next appointment so that chart can be updated appropriately. Patient verbalized understanding.    Discussed receiving Shingrix [facility].    Discussed locations to receive COVID booster   Reports cardiac conditions are stable.    Discussed s/s of heart failure (patient denies any s/s) and advised to follow up with cardiologist/ER (if severe) if occur. Patient expressed understanding.      - Ambulatory referral/consult to Outpatient Case Management       Review for Opioid Screening: Patient on Tramadol (approx a month per pt)-.   Followed by pain management  Risk factors reviewed. Risk factors may include Sleep-disordered breathing, renal or hepatic insufficiency, increased risk for falls and fractures, risk of opioid misuse/overdose/opioid use disorder.  Pain evaluated during visit, documented under vitals.  Discussed nonpharmacologic treatments options, will follow up with prescribing provider to discuss further     Review for Substance Use Disorders: Patient does not use substance  per chart     2. PAD (peripheral artery disease)  Reports chronic leg pain with legs elevated that improves independent position  Red flag s/s discussed (denies any) and advised 911/ER if occur. Patient expressed understanding.    US 1/22  Advised to follow up with cardiologist for further evaluation and recommendations. Patient expressed understanding.      3. Aorto-iliac atherosclerosis  US 12/20  Stable. Continue current treatment plan as previously prescribed with your  pcp     4. Cardiomyopathy, unspecified type  Stable. Continue current treatment plan as previously prescribed with your  cardiologist.     5. Stage 3b chronic kidney disease  Stable. Continue current treatment plan as previously  prescribed with your  nephrologist.     6. Severe obesity (BMI 35.0-39.9) with comorbidity  Encouraged healthy diet and exercise as tolerated to help bring BMI into normal range.    Continue current treatment plan as previously prescribed with your  pcp     7. Asthma, unspecified asthma severity, unspecified whether complicated, unspecified whether persistent  Reports a gradual worsening of SOB and wheezing  He knows to go to the ER for severe symptoms.   Advised to follow up with PCP for further evaluation and recommendations. Patient expressed understanding.      8. Coronary artery disease, unspecified vessel or lesion type, unspecified whether angina present, unspecified whether native or transplanted heart  Continue current treatment plan as previously prescribed with your  cardiologist.     9. Hypertension, unspecified type  Reports home readings 110s-120s/60s-70s  Has not taken BP medication but will take as soon as possible.    Advised patient to monitor BP (keep a log) and if continues to stay elevated (greater than 140/90) to follow up with PCP for further evaluation and treatment. He will bring in machine to correlate at upcoming apt    10. S/P AVR  Continue current treatment plan as previously prescribed with your  cardiologist.     11. CHEO (obstructive sleep apnea)  Continue current [facility] as [facility]    12. History of prostate cancer  Continue current treatment plan as previously prescribed with your  urologist and radiation oncologist.     13. Urinary incontinence, unspecified type  Continue current treatment plan as previously prescribed with your  urologist.     14. Incontinence of feces, unspecified fecal incontinence type  Chronic  Advised to follow up with PCP for further evaluation and recommendations. Patient expressed understanding.      15. Potential for cognitive impairment  Reports problems with memory and concentration  Abnormal cognitive function  screening.    Advised to follow up with PCP for further evaluation and recommendations. Patient expressed understanding.        16. Positive depression screening  Reports stress  PHQ 9-11  Denies SI  [facility]  [facility] with [facility] for [facility] and recommendations. Patient expressed understanding.      17. Decreased hearing, unspecified laterality  Abnormal whisper test.   Advised to follow up with PCP for further evaluation and recommendations. Patient expressed understanding.     - Ambulatory referral/consult to Audiology; Future    18. Numbness and tingling  Chronic  BLE and BUE  Encouraged daily foot inspections.   Advised to follow up with PCP for further evaluation and recommendations. Patient expressed understanding.      19. Burning sensation  Feet  Chronic  Advised to follow up with PCP for further evaluation and recommendations. Patient expressed understanding.      20. Dependence on other enabling machines and devices   normal timed get up and go test. Denies any falls in the last 12 months. Uses a cane to aid with ambulation.   Fall precautions reviewed with patient. Advised to follow up with PCP for further recommendations. Patient expressed understanding.       21. Abnormality of gait and mobility   normal timed get up and go test. Denies any falls in the last 12 months. Uses a cane to aid with ambulation.   Fall precautions reviewed with patient. Advised to follow up with PCP for further recommendations. Patient expressed understanding.         Provided Abdullahi with a 5-10 year written screening schedule and personal prevention plan. Recommendations were developed using the [place]STF age appropriate recommendations. Education, counseling, and referrals were provided as needed. After Visit Summary printed and given to patient which includes a list of additional screenings\tests needed.    Follow up in about 1 year (around 11/22/2023) for awv.    Stephanie Sanderson NP  I offered  to discuss advanced care planning, including how to pick a person who would make decisions for you if you were unable to make them for yourself, called a health care power of , and what kind of decisions you might make such as use of life sustaining treatments such as ventilators and tube feeding when faced with a life limiting illness recorded on a living will that they will need to know. (How you want to be cared for as you near the end of your natural life)     X Patient is interested in learning more about how to make advanced directives.  I provided them paperwork and offered to discuss this with them.

## 2022-11-30 ENCOUNTER — OFFICE VISIT (OUTPATIENT)
Dept: INTERNAL MEDICINE | Facility: CLINIC | Age: 67
End: 2022-11-30
Payer: MEDICARE

## 2022-11-30 VITALS
OXYGEN SATURATION: 96 % | TEMPERATURE: 97 F | WEIGHT: 252 LBS | RESPIRATION RATE: 16 BRPM | BODY MASS INDEX: 37.33 KG/M2 | DIASTOLIC BLOOD PRESSURE: 86 MMHG | HEART RATE: 76 BPM | HEIGHT: 69 IN | SYSTOLIC BLOOD PRESSURE: 138 MMHG

## 2022-11-30 DIAGNOSIS — F33.1 MODERATE EPISODE OF RECURRENT MAJOR DEPRESSIVE DISORDER: ICD-10-CM

## 2022-11-30 DIAGNOSIS — N40.0 BENIGN PROSTATIC HYPERPLASIA, UNSPECIFIED WHETHER LOWER URINARY TRACT SYMPTOMS PRESENT: Chronic | ICD-10-CM

## 2022-11-30 DIAGNOSIS — N18.30 STAGE 3 CHRONIC KIDNEY DISEASE, UNSPECIFIED WHETHER STAGE 3A OR 3B CKD: ICD-10-CM

## 2022-11-30 DIAGNOSIS — I10 HYPERTENSION, UNSPECIFIED TYPE: Primary | Chronic | ICD-10-CM

## 2022-11-30 PROCEDURE — 1159F PR MEDICATION LIST DOCUMENTED IN MEDICAL RECORD: ICD-10-PCS | Mod: CPTII,S$GLB,, | Performed by: NURSE PRACTITIONER

## 2022-11-30 PROCEDURE — 3288F PR FALLS RISK ASSESSMENT DOCUMENTED: ICD-10-PCS | Mod: CPTII,S$GLB,, | Performed by: NURSE PRACTITIONER

## 2022-11-30 PROCEDURE — 3066F NEPHROPATHY DOC TX: CPT | Mod: CPTII,S$GLB,, | Performed by: NURSE PRACTITIONER

## 2022-11-30 PROCEDURE — 1101F PR PT FALLS ASSESS DOC 0-1 FALLS W/OUT INJ PAST YR: ICD-10-PCS | Mod: CPTII,S$GLB,, | Performed by: NURSE PRACTITIONER

## 2022-11-30 PROCEDURE — 99499 UNLISTED E&M SERVICE: CPT | Mod: HCNC,S$GLB,, | Performed by: NURSE PRACTITIONER

## 2022-11-30 PROCEDURE — 1159F MED LIST DOCD IN RCRD: CPT | Mod: CPTII,S$GLB,, | Performed by: NURSE PRACTITIONER

## 2022-11-30 PROCEDURE — 3066F PR DOCUMENTATION OF TREATMENT FOR NEPHROPATHY: ICD-10-PCS | Mod: CPTII,S$GLB,, | Performed by: NURSE PRACTITIONER

## 2022-11-30 PROCEDURE — 3008F PR BODY MASS INDEX (BMI) DOCUMENTED: ICD-10-PCS | Mod: CPTII,S$GLB,, | Performed by: NURSE PRACTITIONER

## 2022-11-30 PROCEDURE — 99214 OFFICE O/P EST MOD 30 MIN: CPT | Mod: S$GLB,,, | Performed by: NURSE PRACTITIONER

## 2022-11-30 PROCEDURE — 99999 PR PBB SHADOW E&M-EST. PATIENT-LVL V: ICD-10-PCS | Mod: PBBFAC,,, | Performed by: NURSE PRACTITIONER

## 2022-11-30 PROCEDURE — 99999 PR PBB SHADOW E&M-EST. PATIENT-LVL V: CPT | Mod: PBBFAC,,, | Performed by: NURSE PRACTITIONER

## 2022-11-30 PROCEDURE — 99499 RISK ADDL DX/OHS AUDIT: ICD-10-PCS | Mod: HCNC,S$GLB,, | Performed by: NURSE PRACTITIONER

## 2022-11-30 PROCEDURE — 1125F PR PAIN SEVERITY QUANTIFIED, PAIN PRESENT: ICD-10-PCS | Mod: CPTII,S$GLB,, | Performed by: NURSE PRACTITIONER

## 2022-11-30 PROCEDURE — 3079F DIAST BP 80-89 MM HG: CPT | Mod: CPTII,S$GLB,, | Performed by: NURSE PRACTITIONER

## 2022-11-30 PROCEDURE — 3075F SYST BP GE 130 - 139MM HG: CPT | Mod: CPTII,S$GLB,, | Performed by: NURSE PRACTITIONER

## 2022-11-30 PROCEDURE — 3008F BODY MASS INDEX DOCD: CPT | Mod: CPTII,S$GLB,, | Performed by: NURSE PRACTITIONER

## 2022-11-30 PROCEDURE — 3288F FALL RISK ASSESSMENT DOCD: CPT | Mod: CPTII,S$GLB,, | Performed by: NURSE PRACTITIONER

## 2022-11-30 PROCEDURE — 3075F PR MOST RECENT SYSTOLIC BLOOD PRESS GE 130-139MM HG: ICD-10-PCS | Mod: CPTII,S$GLB,, | Performed by: NURSE PRACTITIONER

## 2022-11-30 PROCEDURE — 3079F PR MOST RECENT DIASTOLIC BLOOD PRESSURE 80-89 MM HG: ICD-10-PCS | Mod: CPTII,S$GLB,, | Performed by: NURSE PRACTITIONER

## 2022-11-30 PROCEDURE — 1125F AMNT PAIN NOTED PAIN PRSNT: CPT | Mod: CPTII,S$GLB,, | Performed by: NURSE PRACTITIONER

## 2022-11-30 PROCEDURE — 1101F PT FALLS ASSESS-DOCD LE1/YR: CPT | Mod: CPTII,S$GLB,, | Performed by: NURSE PRACTITIONER

## 2022-11-30 PROCEDURE — 99214 PR OFFICE/OUTPT VISIT, EST, LEVL IV, 30-39 MIN: ICD-10-PCS | Mod: S$GLB,,, | Performed by: NURSE PRACTITIONER

## 2022-11-30 NOTE — PROGRESS NOTES
"  HPI     Chief Complaint  Chief Complaint   Patient presents with    Follow-up       HPI  Abdullahi Urias is a 67 y.o. male with multiple medical diagnoses as listed in the medical history and problem list that presents for Positive Depression Screening. This patient is new to me.     Positive Depression Screening: Visited for Preventative Health Examination 11/22/2022 where he answered questions that lead to a positive depression screening. He reports a nephew was killed 09/29/2022 that lead to some depression however, he reports current treatment at [facility] for Depression.  Currently using Zoloft and Xanax to treat Depression. He denies SI or HI. He denies a plan to hurt himself or others. When asked if he love himself he replied "yes." [facility] Delayed responses today in clinic due to history of CVA. Patient explained he knows where his outlets are in the event he becomes suicidal or experiences severe depression. Wife lives at home with him.     History     PAST MEDICAL HISTORY:  Past Medical History:   Diagnosis Date    Aortic stenosis     [facility]    Asthma     BPH (benign prostatic hyperplasia)     CAD (coronary artery disease)     Cardiomyopathy     CHF (congestive heart failure)     Chronic hoarseness     vocal cord surg    Chronic pain     CKD (chronic kidney disease) stage 3, GFR 30-59 ml/min     CVA (cerebral infarction)     8/2012 olol; reviewed ed note    Ex-smoker     GERD (gastroesophageal reflux disease)     Hepatitis C     treatedharvoni says cured, RNA NEG 6/2020    Hypertension     Pancreatitis     Prostate cancer     Prostate cancer     Prostate cancer     PVD (peripheral vascular disease)     Renal insufficiency     Substance abuse     cocaine, etoh , tob in past       PAST SURGICAL HISTORY:  Past Surgical History:   Procedure Laterality Date    AORTIC VALVE REPLACEMENT  05/19/2014    Tissue valve replacement    BACK SURGERY      CARDIAC CATHETERIZATION   " "   COLONOSCOPY  2011    COLONOSCOPY N/A 2/24/2021    Procedure: COLONOSCOPY;  Surgeon: Faith Carrillo MD;  Location: [facility] ENDO;  Service: Endoscopy;  Laterality: N/A;    EPIDURAL STEROID INJECTION INTO CERVICAL SPINE N/A 6/21/2022    Procedure: C7-T1 IL PIEDAD;  Surgeon: Nehemiah Carmen MD;  Location: [facility] PAIN MGT;  Service: Pain Management;  Laterality: N/A;    ESOPHAGOGASTRODUODENOSCOPY N/A 2/24/2021    Procedure: ESOPHAGOGASTRODUODENOSCOPY (EGD);  Surgeon: Faith Carrillo MD;  Location: [facility] ENDO;  Service: Endoscopy;  Laterality: N/A;    FOOT SURGERY      LEFT HEART CATHETERIZATION Left 7/24/2020    Procedure: CATHETERIZATION, HEART, LEFT;  Surgeon: Pasha Martin MD;  Location: [facility] CATH LAB;  Service: Cardiology;  Laterality: Left;    PENILE PROSTHESIS IMPLANT      PENILE PROSTHESIS REVISION  04/30/2018    PROSTATECTOMY  09/2019    [facility]    radiation for prostate      TRANSFORAMINAL EPIDURAL INJECTION OF STEROID Right 10/20/2022    Procedure: Right  L4/5 + L5/S1 TF PIEDAD RN IV Sedation;  Surgeon: Nehemiah Cramen MD;  Location: [facility] PAIN MGT;  Service: Pain Management;  Laterality: Right;    UPPER GASTROINTESTINAL ENDOSCOPY  2011       SOCIAL HISTORY:  Social History     Socioeconomic History    Marital status:    Tobacco Use    Smoking status: Former     Packs/day: 2.00     Years: 8.00     Pack years: 16.00     Types: Cigarettes     Quit date: 8/24/2012     Years since quitting: 10.2    Smokeless tobacco: Never   Substance and Sexual Activity    Alcohol use: No     Comment: Sober since 08/24/2012    Drug use: Not Currently     Types: "Crack" cocaine, Marijuana     Comment: Quit in 2012    Sexual activity: Yes   Social History Narrative    No pets in household, wife and daughter smokers. Former [employer].    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     Social Determinants of Health     Financial Resource Strain: Low Risk     Difficulty of Paying Living Expenses: Not " hard at all   Food Insecurity: No Food Insecurity    Worried About Running Out of Food in the Last Year: Never true    Ran Out of Food in the Last Year: Never true   Transportation Needs: No Transportation Needs    Lack of Transportation (Medical): No    Lack of Transportation (Non-Medical): No   Physical Activity: Inactive    Days of Exercise per Week: 0 days    Minutes of Exercise per Session: 0 min   Stress: Stress Concern Present    Feeling of Stress : Very much   Social Connections: Moderately Integrated    Frequency of Communication with Friends and Family: Three times a week    Frequency of Social Gatherings with Friends and Family: Once a week    Attends [employer] Services: 1 to 4 times per year    Active Member of Clubs or Organizations: No    Attends Club or Organization Meetings: Never    Marital Status:    Housing Stability: Low Risk     Unable to Pay for Housing in the Last Year: No    Number of Places Lived in the Last Year: 1    Unstable Housing in the Last Year: No       FAMILY HISTORY:  Family History   Problem Relation Age of Onset    Heart disease Mother     Heart attack Sister     Heart attack Brother     Colon cancer Neg Hx     Colon polyps Neg Hx     Liver cancer Neg Hx     Inflammatory bowel disease Neg Hx     Liver disease Neg Hx     Rectal cancer Neg Hx     Stomach cancer Neg Hx     Ulcerative colitis Neg Hx        ALLERGIES AND MEDICATIONS: updated and reviewed.  Review of patient's allergies indicates:  No Known Allergies  Current Outpatient Medications   Medication Sig Dispense Refill    albuterol (PROVENTIL/VENTOLIN HFA) 90 mcg/actuation inhaler INHALE 2 PUFFS INTO THE LUNGS EVERY 6 HOURS AS NEEDED FOR COUGH 8.5 g 11    allopurinoL (ZYLOPRIM) 100 MG tablet Take 100 mg by mouth once daily. Takes 0.5 tab      ALPRAZolam (XANAX) 1 MG tablet Take 1 mg by mouth.      aluminum & magnesium hydroxide-simethicone (MYLANTA MAX STRENGTH) 400-400-40 mg/5 mL suspension TAKE 15ML BY MOUTH FOUR  TIMES A DAY AS NEEDED FOR STOMACH ACID      amitriptyline (ELAVIL) 10 MG tablet TAKE 1 TABLET BY MOUTH AT BEDTIME AS NEEDED FOR PAIN 30 tablet 11    aspirin (ECOTRIN) 81 MG EC tablet Take 1 tablet (81 mg total) by mouth once daily. 90 tablet 3    atorvastatin (LIPITOR) 80 MG tablet TAKE ONE-HALF TABLET BY MOUTH EVERY DAY FOR CHOLESTEROL      baclofen (LIORESAL) 10 MG tablet Take 1 tablet (10 mg total) by mouth 3 (three) times daily. (Patient taking differently: Take 10 mg by mouth 3 (three) times daily. Takes prn) 90 tablet 11    carvediloL (COREG) 12.5 MG tablet Take 1 tablet (12.5 mg total) by mouth 2 (two) times daily. 60 tablet 11    clopidogreL (PLAVIX) 75 mg tablet Take 1 tablet (75 mg total) by mouth once daily. 30 tablet 11    cyclobenzaprine HCl (FLEXERIL ORAL) Take by mouth. Takes prn      diclofenac sodium (VOLTAREN) 1 % Gel APPLY 2 GRAMS TOPICALLY FOUR TIMES A DAY AS NEEDED FOR PAIN AND INFLAMMATION. USE ENCLOSED DOSING CARD.      diphenhydrAMINE (SOMINEX) 25 mg tablet Take 25 mg by mouth nightly as needed for Insomnia.      esomeprazole (NEXIUM) 40 MG capsule 40 mg.      famotidine (PEPCID) 40 MG tablet TAKE ONE TABLET BY MOUTH AT BEDTIME FOR ACID REFLUX      fexofenadine (ALLEGRA) 180 MG tablet Take 1 tablet (180 mg total) by mouth daily as needed. 30 tablet 5    flunisolide 25 mcg, 0.025%, (NASALIDE) 25 mcg (0.025 %) Spry 2 sprays by Nasal route as needed.       gabapentin (NEURONTIN) 600 MG tablet Take 1 tablet (600 mg total) by mouth 3 (three) times daily. 90 tablet 11    hydrocortisone 2.5 % cream Apply topically 2 (two) times daily as needed. Apply to affected areas of the face and neck. 30 g 2    hydrocortisone-pramoxine (PROCTOFOAM-HS) rectal foam INSERT 1 APPLICATORFUL INTO RECTALLY TWICE A DAY FOR HEMORRHOIDS      hydrOXYzine HCL (ATARAX) 25 MG tablet Take 25 mg by mouth 3 (three) times daily as needed for Itching.      LIDOcaine (LIDODERM) 5 % APPLY 1 PATCH TOPICALLY EVERY DAY FOR PAIN. WEAR  FOR 12 HOURS, THEN REMOVE. DO NOT APPLY NEW PATCH FOR AT LEAST 12 HOURS.      losartan (COZAAR) 50 MG tablet Take 1 tablet (50 mg total) by mouth once daily. 30 tablet 11    methyl salicylate-menthol 15-10% 15-10 % Crea Apply topically as needed.       pantoprazole (PROTONIX) 40 MG tablet Take 40 mg by mouth 2 (two) times daily.       sertraline (ZOLOFT) 100 MG tablet 200 mg.      simethicone (MYLICON) 80 MG chewable tablet Take 80 mg by mouth every 6 (six) hours as needed for Flatulence.      sucralfate (CARAFATE) 100 mg/mL suspension Take 1 g by mouth 4 (four) times daily.       tramadol HCl (TRAMADOL ORAL) Take by mouth. Takes prn      triamcinolone acetonide 0.1% (KENALOG) 0.1 % cream Apply topically 2 (two) times daily. 80 g 1    levocetirizine (XYZAL) 5 MG tablet Take 1 tablet (5 mg total) by mouth every evening. 30 tablet 0    sertraline (ZOLOFT) 100 MG tablet TAKE TWO TABLETS BY MOUTH EVERY DAY FOR MENTAL HEALTH DOSE INCREASED TO 200MG/DAY       No current facility-administered medications for this visit.     Facility-Administered Medications Ordered in Other Visits   Medication Dose Route Frequency Provider Last Rate Last Admin    ondansetron injection 4 mg  4 mg Intravenous Once PRN Nehemiah Carmen MD        sodium chloride 0.9% flush 10 mL  10 mL Intravenous PRN Wilda Horne MD           Exam     ROS  Review of Systems   Constitutional:  Negative for appetite change, chills, fatigue and fever.   HENT:  Negative for congestion, ear pain, postnasal drip, rhinorrhea, sneezing, sore throat and tinnitus.    Respiratory:  Negative for cough and shortness of breath.    Cardiovascular:  Negative for chest pain and palpitations.   Gastrointestinal:  Negative for abdominal pain, constipation, diarrhea, nausea and vomiting.   Genitourinary:  Negative for difficulty urinating and dysuria.   Musculoskeletal:  Negative for arthralgias.   Neurological:  Negative for headaches.   Psychiatric/Behavioral:   "Negative for agitation, behavioral problems, confusion, decreased concentration, dysphoric mood, hallucinations, self-injury, sleep disturbance and suicidal ideas. The patient is nervous/anxious. The patient is not hyperactive.          Physical Exam  Vitals:    11/30/22 0815   BP: 138/86   BP Location: Right arm   Patient Position: Sitting   BP Method: Large (Manual)   Pulse: 76   Resp: 16   Temp: 97.1 °F (36.2 °C)   TempSrc: Tympanic   SpO2: 96%   Weight: 114.3 kg (251 lb 15.8 oz)   Height: 5' 9" (1.753 m)    Body mass index is 37.21 kg/m².  Weight: 114.3 kg (251 lb 15.8 oz)   Height: 5' 9" (175.3 cm)   Physical Exam  Constitutional:       General: He is not in acute distress.     Appearance: Normal appearance. He is well-developed. He is obese.   HENT:      Head: Normocephalic and atraumatic.      Right Ear: External ear normal.      Left Ear: External ear normal.      Nose: Nose normal.      Mouth/Throat:      Pharynx: No oropharyngeal exudate.   Eyes:      Pupils: Pupils are equal, round, and reactive to light.   Cardiovascular:      Rate and Rhythm: Normal rate and regular rhythm.      Heart sounds: No murmur heard.    No friction rub. No gallop.   Pulmonary:      Effort: Pulmonary effort is normal. No respiratory distress.      Breath sounds: Normal breath sounds. No wheezing.   Abdominal:      General: Bowel sounds are normal.      Palpations: Abdomen is soft.   Musculoskeletal:      Cervical back: Neck supple.   Lymphadenopathy:      Cervical: No cervical adenopathy.   Skin:     General: Skin is warm and dry.      Capillary Refill: Capillary refill takes less than 2 seconds.   Neurological:      General: No focal deficit present.      Mental Status: He is alert and oriented to person, place, and time. Mental status is at baseline.   Psychiatric:         Mood and Affect: Mood normal.         Health Maintenance         Date Due Completion Date    Shingles Vaccine (1 of 2) 04/01/2016 2/5/2016    COVID-19 " Vaccine (5 - Booster for Pfizer series) 05/19/2022 3/24/2022    Aspirin/Antiplatelet Therapy 11/22/2023 11/22/2022    Lipid Panel 01/18/2027 1/18/2022    Colorectal Cancer Screening 02/24/2028 2/24/2021    TETANUS VACCINE 10/19/2032 10/19/2022            Assessment & Plan     Assessment & Plan  Problem List Items Addressed This Visit          Cardiac/Vascular    Hypertension - Primary (Chronic)  -The current medical regimen is effective;  continue present plan and medications.     Relevant Orders    Hypertension Digital Medicine (HDMP) Enrollment Order (Completed)    Hypertension Digital Medicine (HDMP): Assign Onboarding Questionnaires (Completed)       Renal/    BPH (benign prostatic hyperplasia) (Chronic)  -The current medical regimen is effective;  continue present plan and medications.     Stage 3 chronic kidney disease  -Stable; Monitor     Other Visit Diagnoses       Moderate episode of recurrent major depressive disorder      -The current medical regimen is effective;  continue present plan and medications.   - [facility]              Health Maintenance reviewed: Deferred per patient    Follow-up: 2 months for Routine Physical Exam     30+ minutes of total time spent on the encounter, which includes face to face time and non-face to face time preparing to see the patient (eg, review of tests), Obtaining and/or reviewing separately obtained history, documenting clinical information in the electronic or other health record, independently interpreting results (not separately reported) and communicating results to the patient/family/caregiver, or Care coordination (not separately reported).

## 2022-12-08 ENCOUNTER — OUTPATIENT CASE MANAGEMENT (OUTPATIENT)
Dept: ADMINISTRATIVE | Facility: OTHER | Age: 67
End: 2022-12-08
Payer: MEDICARE

## 2022-12-13 ENCOUNTER — OUTPATIENT CASE MANAGEMENT (OUTPATIENT)
Dept: ADMINISTRATIVE | Facility: OTHER | Age: 67
End: 2022-12-13
Payer: MEDICARE

## 2022-12-16 ENCOUNTER — TELEPHONE (OUTPATIENT)
Dept: PAIN MEDICINE | Facility: CLINIC | Age: 67
End: 2022-12-16
Payer: MEDICARE

## 2022-12-16 ENCOUNTER — OUTPATIENT CASE MANAGEMENT (OUTPATIENT)
Dept: ADMINISTRATIVE | Facility: OTHER | Age: 67
End: 2022-12-16
Payer: MEDICARE

## 2022-12-16 NOTE — TELEPHONE ENCOUNTER
Notified Pt we cannot RX pain medication as we have not had a recent visit with him. Pt states it is not his fault he did not follow up and as pain management we should manage his pain but he will [facility] the [facility] to [facility] as he is going on a cruise 12/31/22

## 2022-12-16 NOTE — PROGRESS NOTES
Outpatient Care Management  Initial Patient Assessment    Patient: Abdullahi Urias  MRN: [id number]  Date of Service: 12/16/2022  Completed by: Yung Ramos RN  Referral Date: 11/22/2022  Program: High Risk  Status: Ongoing  Effective Dates: 12/16/2022 - present  Responsible Staff: Yung Ramos RN        Reason for Visit   Patient presents with    OPCM Enrollment Call    Initial Assessment    OPCM Chart Review    PHQ-9    Update Plan Of Care    OPCM Welcome Letter    Plan Of Care    OPCM RN Third Follow-Up Attempt       Brief Summary:  Abdullahi Urias was referred by Dr. Valentin for lumbar pain. Patient qualifies for program based on his risk score of 74.1%.   Active problem list, medical, surgical and social history reviewed.  Areas of need identified by patient include sciatica pain in right hip.   Complex care plan created with patient/caregiver input. By next encounter, patient agrees to collaborate with Dr. Carmen for pain control plan and participate in follow up educational calls  .   Next steps: Mail:  Welcome letter  Pain education  Collaborate with Dr. Carmen to notify him of Mr. Urias pain  Follow up in ten days

## 2022-12-16 NOTE — TELEPHONE ENCOUNTER
----- Message from Yung Ramos RN sent at 12/16/2022  2:34 PM CST -----  Regarding: Hip pain  Good afternoon,    I just spoke with Mr. Urias and he stated that he was having right hip pain again. He reports that he got an injection about a month ago and it helped significantly but now the pain is back. Thank you for your time and assistance.    Kind regards,    Yung Ramos RN O[facility]

## 2022-12-16 NOTE — LETTER
December 16, 2022 December 16, 2022    Abdullahi Adonay  [address] [address]        Dear Mr. Urias,     Welcome to [facility]s Complex Care Management Program.  It was a pleasure talking with you today.  My name is Yung Ramos. I look forward to working with you as your .  My goal is to help you function at the healthiest and highest level possible.  You can contact me directly at [phone].    As an [facility] patient with [employer], some of the services we may be able to provide include:      Development of an individualized care plan with a Registered Nurse    Connection with a    Connection with available resources and services     Coordinate communication among your care team members    Provide coaching and education    Help you understand your doctors treatment plan   Help you obtain information about your insurance coverage.     All services provided by [facility]s Complex Care Managers and other care team members are coordinated with and communicated to your primary care team.    As part of your enrollment, you will be receiving education materials and more information about these services in your [facility] account, by phone or through the mail.  If you do not wish to participate or receive information, please contact our office at [phone].      Sincerely,    Yung Ramos, RN  [facility]   Out-patient RN Complex Care Manager

## 2022-12-16 NOTE — TELEPHONE ENCOUNTER
----- Message from Timur Pak sent at 12/16/2022  2:47 PM CST -----  Contact: Abdullahi Benavidez is requesting meds for pain. Please call him back at [phone].            [id number] - [place] - [address] AT [facility] [address] [facility]  [address] [address]  Phone: [phone] Fax: [phone]          Thanks  DD

## 2023-01-06 ENCOUNTER — TELEPHONE (OUTPATIENT)
Dept: PAIN MEDICINE | Facility: CLINIC | Age: 68
End: 2023-01-06
Payer: MEDICARE

## 2023-01-09 ENCOUNTER — OFFICE VISIT (OUTPATIENT)
Dept: PAIN MEDICINE | Facility: CLINIC | Age: 68
End: 2023-01-09
Payer: MEDICARE

## 2023-01-09 VITALS
SYSTOLIC BLOOD PRESSURE: 152 MMHG | BODY MASS INDEX: 38.57 KG/M2 | HEIGHT: 69 IN | WEIGHT: 260.38 LBS | DIASTOLIC BLOOD PRESSURE: 95 MMHG | HEART RATE: 88 BPM

## 2023-01-09 DIAGNOSIS — M54.16 LUMBAR RADICULOPATHY: Primary | ICD-10-CM

## 2023-01-09 PROCEDURE — 3288F FALL RISK ASSESSMENT DOCD: CPT | Mod: HCNC,CPTII,S$GLB, | Performed by: PHYSICIAN ASSISTANT

## 2023-01-09 PROCEDURE — 3008F BODY MASS INDEX DOCD: CPT | Mod: HCNC,CPTII,S$GLB, | Performed by: PHYSICIAN ASSISTANT

## 2023-01-09 PROCEDURE — 99214 PR OFFICE/OUTPT VISIT, EST, LEVL IV, 30-39 MIN: ICD-10-PCS | Mod: HCNC,S$GLB,, | Performed by: PHYSICIAN ASSISTANT

## 2023-01-09 PROCEDURE — 1159F PR MEDICATION LIST DOCUMENTED IN MEDICAL RECORD: ICD-10-PCS | Mod: HCNC,CPTII,S$GLB, | Performed by: PHYSICIAN ASSISTANT

## 2023-01-09 PROCEDURE — 99999 PR PBB SHADOW E&M-EST. PATIENT-LVL V: ICD-10-PCS | Mod: PBBFAC,HCNC,, | Performed by: PHYSICIAN ASSISTANT

## 2023-01-09 PROCEDURE — 1125F AMNT PAIN NOTED PAIN PRSNT: CPT | Mod: HCNC,CPTII,S$GLB, | Performed by: PHYSICIAN ASSISTANT

## 2023-01-09 PROCEDURE — 99999 PR PBB SHADOW E&M-EST. PATIENT-LVL V: CPT | Mod: PBBFAC,HCNC,, | Performed by: PHYSICIAN ASSISTANT

## 2023-01-09 PROCEDURE — 3080F PR MOST RECENT DIASTOLIC BLOOD PRESSURE >= 90 MM HG: ICD-10-PCS | Mod: HCNC,CPTII,S$GLB, | Performed by: PHYSICIAN ASSISTANT

## 2023-01-09 PROCEDURE — 3077F SYST BP >= 140 MM HG: CPT | Mod: HCNC,CPTII,S$GLB, | Performed by: PHYSICIAN ASSISTANT

## 2023-01-09 PROCEDURE — 3288F PR FALLS RISK ASSESSMENT DOCUMENTED: ICD-10-PCS | Mod: HCNC,CPTII,S$GLB, | Performed by: PHYSICIAN ASSISTANT

## 2023-01-09 PROCEDURE — 3077F PR MOST RECENT SYSTOLIC BLOOD PRESSURE >= 140 MM HG: ICD-10-PCS | Mod: HCNC,CPTII,S$GLB, | Performed by: PHYSICIAN ASSISTANT

## 2023-01-09 PROCEDURE — 1159F MED LIST DOCD IN RCRD: CPT | Mod: HCNC,CPTII,S$GLB, | Performed by: PHYSICIAN ASSISTANT

## 2023-01-09 PROCEDURE — 1101F PT FALLS ASSESS-DOCD LE1/YR: CPT | Mod: HCNC,CPTII,S$GLB, | Performed by: PHYSICIAN ASSISTANT

## 2023-01-09 PROCEDURE — 1101F PR PT FALLS ASSESS DOC 0-1 FALLS W/OUT INJ PAST YR: ICD-10-PCS | Mod: HCNC,CPTII,S$GLB, | Performed by: PHYSICIAN ASSISTANT

## 2023-01-09 PROCEDURE — 1160F PR REVIEW ALL MEDS BY PRESCRIBER/CLIN PHARMACIST DOCUMENTED: ICD-10-PCS | Mod: HCNC,CPTII,S$GLB, | Performed by: PHYSICIAN ASSISTANT

## 2023-01-09 PROCEDURE — 3080F DIAST BP >= 90 MM HG: CPT | Mod: HCNC,CPTII,S$GLB, | Performed by: PHYSICIAN ASSISTANT

## 2023-01-09 PROCEDURE — 1160F RVW MEDS BY RX/DR IN RCRD: CPT | Mod: HCNC,CPTII,S$GLB, | Performed by: PHYSICIAN ASSISTANT

## 2023-01-09 PROCEDURE — 99214 OFFICE O/P EST MOD 30 MIN: CPT | Mod: HCNC,S$GLB,, | Performed by: PHYSICIAN ASSISTANT

## 2023-01-09 PROCEDURE — 1125F PR PAIN SEVERITY QUANTIFIED, PAIN PRESENT: ICD-10-PCS | Mod: HCNC,CPTII,S$GLB, | Performed by: PHYSICIAN ASSISTANT

## 2023-01-09 PROCEDURE — 3008F PR BODY MASS INDEX (BMI) DOCUMENTED: ICD-10-PCS | Mod: HCNC,CPTII,S$GLB, | Performed by: PHYSICIAN ASSISTANT

## 2023-01-09 RX ORDER — BACLOFEN 10 MG/1
10 TABLET ORAL 3 TIMES DAILY
Qty: 90 TABLET | Refills: 11 | Status: SHIPPED | OUTPATIENT
Start: 2023-01-09 | End: 2023-04-13 | Stop reason: ALTCHOICE

## 2023-01-09 RX ORDER — GABAPENTIN 600 MG/1
600 TABLET ORAL 3 TIMES DAILY
Qty: 90 TABLET | Refills: 11 | Status: SHIPPED | OUTPATIENT
Start: 2023-01-09 | End: 2024-02-26

## 2023-01-09 NOTE — PROGRESS NOTES
Established Patient Chronic Pain Note (Follow up visit)    Chief Complaint:   Chief Complaint   Patient presents with    Low-back Pain         SUBJECTIVE:  Interval History (1/9/2023): Abdullahi Urias presents today for follow-up visit.  he underwent right L4/5 +L5/S1 TF PIEDAD on 10/20/22.  The patient reports that he is/was better following the procedure.  he reports 40% pain relief.  The changes lasted for a few weeks before the pain insidiously returned.  Patient reports pain as 9/10 today. Also has history of vasculogenic claudication. Recently went on a cruise, needed a wheelchair to get on the ship. Continues to report low back pain with radiation into his right lower extremity along L4/5-S1 distribution with associated numbness in the right foot.      Interval History (10/4/2022): Abdullahi Urias presents today for follow-up visit.  he underwent C7/T1 IL PIEDAD on 06/21/2022.  The patient reports that he is/was better following the procedure.  he reports 60% pain relief.  The changes lasted 6 weeks before neck stiffness and arm numbness returned. Patient reports pain as 8/10 today.    Today he reports his primary complaint is low back pain with radiation into right leg into the posterior and lateral aspect along L4-S1 distribution. Reports back pain is worse than neck pain at this time. Also has history of vasculogenic claudication. Patient reports pain is causing him to be depressed and anxious. Pain interferes with daily activities. [facility]. Recently went on a cruise, needed to use a wheelchair due to weakness and pain.    MRI lumbar spine  FINDINGS:  Visualized distal cord demonstrates normal signal.     No marrow replacement process.  No fracture.  No listhesis.     There are degenerative changes of the lower lumbar spine with mild disc space narrowing, most severe at L5-S1 with marginal spurring with facet arthropathy     L1-L2: There is posterior disc bulge with  indentation of the thecal sac.  No spinal canal or neural foraminal encroachment.     L2-L3: Mild facet arthropathy.  Mild posterior disc bulge.  No spinal canal or neural foraminal encroachment.     L3-L4: Bilateral ligamentum flavum and facet arthropathy.  Mild posterior disc bulge with indentation of thecal sac.  No spinal canal or neural foraminal encroachment.     L4-L5: Ligamentum flavum and facet arthropathy with broad-based disc bulge.  No significant spinal canal stenosis.  There is crowding of the lateral recesses with narrowing along the inferior right greater than left neural foramen.     L5-S1: Posterior disc osteophyte complex with facet arthropathy.  Appearance of possible prior right hemilaminectomy changes..  There is severe narrowing along the right neural foramen  with mild to moderate narrowing along the left neural foramen.  No significant spinal canal stenosis.     Impression:     Discogenic degenerative changes most severe at L5-S1 as above.    Interval HPI  Abdullahi Urias is a 67 y.o. male who presents to the clinic for a follow-up.  He reports that his back pain is much improved ever since undergoing bilateral piriformis trigger point injections.  He wants to focus more on his neck and upper extremity pain currently.  Since the last visit, Abdullahi Urias states the pain has been worsening. Current pain intensity is 8/10.      Patient denies night fever/night sweats, urinary incontinence, bowel incontinence, significant weight loss, significant motor weakness and loss of sensations.    Pain Disability Index Review:  Last 3 PDI Scores 1/9/2023 5/11/2022 1/27/2022   Pain Disability Index (PDI) 57 58 56     Interval HPI 01/27/2022:  Abdullahi Urias is a 66 y.o. male who presents to the clinic for the evaluation of lower back pain.  He is self referred.  He has past medical history of CVA, substance abuse, asthma, CHF, hypertension, CAD, cardiomyopathy, renal insufficiency, CKD stage 3, H/O  hepatitis-C, H/O prostate cancer, multiple other medical comorbidities as listed in his chart.  The pain started several years ago and symptoms have been worsening.The pain is located in the lumbosacral area and radiates to the bilateral lower extremities extending posteriorly to the bilateral feet.  Of note, patient recently had cardiovascular studies done on his lower extremities which did demonstrate vasculogenic claudication.  The pain is described as Aching, burning, tingling and is rated as 8/10. The pain is rated with a score of  6/10 on the BEST day and a score of 10/10 on the WORST day.  Symptoms interfere with daily activity. The pain is exacerbated by activities.  The pain is mitigated by medications.           Non-Pharmacologic Treatments:  Physical Therapy/Home Exercise: yes  Ice/Heat:yes  TENS: yes  Acupuncture: no  Massage: yes  Chiropractic: no    Other: no        Pain Medications:  - Opioids: Norco, Tylenol 3  - Adjuvant Medications: Ambien, alprazolam, Flexeril, Voltaren gel, gabapentin, lidocaine patch, Zoloft  - Anti-Coagulants: Aspirin, Pletal      report:  Reviewed and consistent with medication use as prescribed.       Pain Procedures:   -previous lumbar PIEDAD  -previous lumbar MBB/RFA  -previous lumbar laminectomy in 2001 at L5-S1  -01/27/2022:  Bilateral piriformis trigger point injections, significant relief that is still ongoing  -right L4/5 +L5/S1 TF PIEDAD on 10/20/22 with 40% relief        Imaging:   MRI lumbar spine 01/12/2021:  Visualized distal cord demonstrates normal signal.     No marrow replacement process.  No fracture.  No listhesis.     There are degenerative changes of the lower lumbar spine with mild disc space narrowing, most severe at L5-S1 with marginal spurring with facet arthropathy     L1-L2: There is posterior disc bulge with indentation of the thecal sac.  No spinal canal or neural foraminal encroachment.     L2-L3: Mild facet arthropathy.  Mild posterior disc bulge.   No spinal canal or neural foraminal encroachment.     L3-L4: Bilateral ligamentum flavum and facet arthropathy.  Mild posterior disc bulge with indentation of thecal sac.  No spinal canal or neural foraminal encroachment.     L4-L5: Ligamentum flavum and facet arthropathy with broad-based disc bulge.  No significant spinal canal stenosis.  There is crowding of the lateral recesses with narrowing along the inferior right greater than left neural foramen.     L5-S1: Posterior disc osteophyte complex with facet arthropathy.  Appearance of possible prior right hemilaminectomy changes..  There is severe narrowing along the right neural foramen  with mild to moderate narrowing along the left neural foramen.  No significant spinal canal stenosis        CT cervical spine 12/01/2020:  Vertebral body heights maintained.  2 mm anterolisthesis of C3 on C4.  Disc height loss and osteophyte changes at C6-7 and C7-T1.  Multilevel facet degenerative findings most prevalent at the right C2-3 and left C3-4 levels.     Neck soft tissues are unremarkable.     C2-3: No CT evidence of spinal canal stenosis.  Mild right neural foraminal narrowing.     C3-4: No CT evidence of significant spinal canal stenosis.  Left greater than right facet and uncovertebral degenerative changes results in mild right and severe left neural foraminal narrowing.     C4-5: Posterior disc osteophyte complex present with mild spinal canal stenosis.  Mild to moderate right neural foraminal narrowing secondary to facet and uncovertebral degenerative findings.     C5-6: Posterior disc osteophyte complex present asymmetric to the right with mild spinal canal stenosis.  Severe right neural foraminal narrowing secondary to facet and uncovertebral degenerative findings.     C6-7: Posterior disc osteophyte complex present causing at least mild spinal canal stenosis.  Left greater than right facet and uncovertebral degenerative findings with mild-to-moderate right and  moderate to severe left neural foraminal narrowing.     C7-T1: Posterior disc osteophyte complex with spinal canal stenosis suspected.  Mild left neural foraminal narrowing secondary to uncovertebral degenerative findings.        PMHx,PSHx, Social history, and Family history:  I have reviewed the patient's medical, surgical, social, and family history in detail and updated the computerized patient record.      Review of patient's allergies indicates:  No Known Allergies      Current Outpatient Medications   Medication Sig    albuterol (PROVENTIL/VENTOLIN HFA) 90 mcg/actuation inhaler INHALE 2 PUFFS INTO THE LUNGS EVERY 6 HOURS AS NEEDED FOR COUGH    allopurinoL (ZYLOPRIM) 100 MG tablet Take 100 mg by mouth once daily. Takes 0.5 tab    ALPRAZolam (XANAX) 1 MG tablet Take 1 mg by mouth.    aluminum & magnesium hydroxide-simethicone (MYLANTA MAX STRENGTH) 400-400-40 mg/5 mL suspension TAKE 15ML BY MOUTH FOUR TIMES A DAY AS NEEDED FOR STOMACH ACID    aspirin (ECOTRIN) 81 MG EC tablet Take 1 tablet (81 mg total) by mouth once daily.    atorvastatin (LIPITOR) 80 MG tablet TAKE ONE-HALF TABLET BY MOUTH EVERY DAY FOR CHOLESTEROL    carvediloL (COREG) 12.5 MG tablet Take 1 tablet (12.5 mg total) by mouth 2 (two) times daily.    clopidogreL (PLAVIX) 75 mg tablet Take 1 tablet (75 mg total) by mouth once daily.    cyclobenzaprine HCl (FLEXERIL ORAL) Take by mouth. Takes prn    diclofenac sodium (VOLTAREN) 1 % Gel APPLY 2 GRAMS TOPICALLY FOUR TIMES A DAY AS NEEDED FOR PAIN AND INFLAMMATION. USE ENCLOSED DOSING CARD.    diphenhydrAMINE (SOMINEX) 25 mg tablet Take 25 mg by mouth nightly as needed for Insomnia.    esomeprazole (NEXIUM) 40 MG capsule 40 mg.    famotidine (PEPCID) 40 MG tablet TAKE ONE TABLET BY MOUTH AT BEDTIME FOR ACID REFLUX    fexofenadine (ALLEGRA) 180 MG tablet Take 1 tablet (180 mg total) by mouth daily as needed.    flunisolide 25 mcg, 0.025%, (NASALIDE) 25 mcg (0.025 %) Spry 2 sprays by Nasal route as  needed.     hydrocortisone 2.5 % cream Apply topically 2 (two) times daily as needed. Apply to affected areas of the face and neck.    hydrocortisone-pramoxine (PROCTOFOAM-HS) rectal foam INSERT 1 APPLICATORFUL INTO RECTALLY TWICE A DAY FOR HEMORRHOIDS    hydrOXYzine HCL (ATARAX) 25 MG tablet Take 25 mg by mouth 3 (three) times daily as needed for Itching.    LIDOcaine (LIDODERM) 5 % APPLY 1 PATCH TOPICALLY EVERY DAY FOR PAIN. WEAR FOR 12 HOURS, THEN REMOVE. DO NOT APPLY NEW PATCH FOR AT LEAST 12 HOURS.    losartan (COZAAR) 50 MG tablet Take 1 tablet (50 mg total) by mouth once daily.    sertraline (ZOLOFT) 100 MG tablet TAKE TWO TABLETS BY MOUTH EVERY DAY FOR MENTAL HEALTH DOSE INCREASED TO 200MG/DAY    sertraline (ZOLOFT) 100 MG tablet 200 mg.    simethicone (MYLICON) 80 MG chewable tablet Take 80 mg by mouth every 6 (six) hours as needed for Flatulence.    sucralfate (CARAFATE) 100 mg/mL suspension Take 1 g by mouth 4 (four) times daily.     tramadol HCl (TRAMADOL ORAL) Take by mouth. Takes prn    triamcinolone acetonide 0.1% (KENALOG) 0.1 % cream Apply topically 2 (two) times daily.    baclofen (LIORESAL) 10 MG tablet Take 1 tablet (10 mg total) by mouth 3 (three) times daily.    gabapentin (NEURONTIN) 600 MG tablet Take 1 tablet (600 mg total) by mouth 3 (three) times daily.    levocetirizine (XYZAL) 5 MG tablet Take 1 tablet (5 mg total) by mouth every evening. (Patient not taking: Reported on 1/9/2023)    methyl salicylate-menthol 15-10% 15-10 % Crea Apply topically as needed.     pantoprazole (PROTONIX) 40 MG tablet Take 40 mg by mouth 2 (two) times daily.      No current facility-administered medications for this visit.     Facility-Administered Medications Ordered in Other Visits   Medication    ondansetron injection 4 mg    sodium chloride 0.9% flush 10 mL         REVIEW OF SYSTEMS:    GENERAL:  No weight loss, malaise or fevers.  HEENT:   No recent changes in vision or hearing  NECK:  Negative for lumps,  "no difficulty with swallowing.  RESPIRATORY:  Negative for cough, wheezing or shortness of breath, patient denies any recent URI.  CARDIOVASCULAR:  Negative for chest pain, leg swelling or palpitations.  GI:  Negative for abdominal discomfort, blood in stools or black stools or change in bowel habits.  MUSCULOSKELETAL:  See HPI.  SKIN:  Negative for lesions, rash, and itching.  PSYCH:  No mood disorder or recent psychosocial stressors.  Patients sleep is not disturbed secondary to pain.  HEMATOLOGY/LYMPHOLOGY:  Negative for prolonged bleeding, bruising easily or swollen nodes.  Patient is currently taking anti-coagulants - ASA and Pletal  NEURO:   No history of headaches, syncope, paralysis, seizures or tremors.  All other reviewed and negative other than HPI.       OBJECTIVE:    BP (!) 152/95   Pulse 88   Ht 5' 9" (1.753 m)   Wt 118.1 kg (260 lb 5.8 oz)   BMI 38.45 kg/m²     PHYSICAL EXAMINATION:    GENERAL: Well appearing, in no acute distress, alert and oriented x3.  PSYCH:  Mood and affect appropriate.  SKIN: Skin color, texture, turgor normal, no rashes or lesions.  HEAD/FACE:  Normocephalic, atraumatic. Cranial nerves grossly intact.   NECK:  Tenderness palpation over the bilateral cervical paraspinous muscles.  Spurling's positive 5 bilaterally.  Limited range of motion secondary to pain in all planes.  CV: RRR with palpation of the radial artery.  PULM: No evidence of respiratory difficulty, symmetric chest rise.  GI:  Soft and non-tender.  BACK:  Surgical scarring evident.  Straight leg raising in the sitting and supine positions is positive to radicular pain on the right.  Moderate pain to palpation over the facet joints of the lumbar spine and lumbar paraspinals limited range of motion secondary to pain reproduction.  Axial loading positive bilaterally  EXTREMITIES: Peripheral joint ROM is full and pain free without obvious instability or laxity in all four extremities. No deformities, edema, or skin " discoloration. Good capillary refill.  MUSCULOSKELETAL:   mild shoulder impingement signs bilaterally.  Hip and knee provocative maneuvers are unremarkable.  There is mild pain with palpation over the sacroiliac joints bilaterally.  FABERs test is equivocal.  FADIRs test is positive bilaterally.   Bilateral upper and lower extremity strength is normal and symmetric.  No atrophy or tone abnormalities are noted.  NEURO: Bilateral upper and lower extremity coordination and muscle stretch reflexes are physiologic and symmetric.  Plantar response are downgoing. No clonus.  No loss of sensation is noted.  GAIT:  Slow, antalgic.      LABS:  Lab Results   Component Value Date    WBC 5.33 08/25/2022    HGB 11.9 (L) 08/25/2022    HCT 35.6 (L) 08/25/2022    MCV 85 08/25/2022     (L) 08/25/2022       CMP  Sodium   Date Value Ref Range Status   08/25/2022 138 136 - 145 mmol/L Final     Potassium   Date Value Ref Range Status   08/25/2022 3.8 3.5 - 5.1 mmol/L Final     Chloride   Date Value Ref Range Status   08/25/2022 106 95 - 110 mmol/L Final     CO2   Date Value Ref Range Status   08/25/2022 22 (L) 23 - 29 mmol/L Final     Glucose   Date Value Ref Range Status   08/25/2022 100 70 - 110 mg/dL Final     BUN   Date Value Ref Range Status   08/25/2022 15 8 - 23 mg/dL Final     Creatinine   Date Value Ref Range Status   08/25/2022 1.7 (H) 0.5 - 1.4 mg/dL Final     Calcium   Date Value Ref Range Status   08/25/2022 8.9 8.7 - 10.5 mg/dL Final     Total Protein   Date Value Ref Range Status   08/25/2022 7.0 6.0 - 8.4 g/dL Final     Albumin   Date Value Ref Range Status   08/25/2022 3.7 3.5 - 5.2 g/dL Final     Total Bilirubin   Date Value Ref Range Status   08/25/2022 0.4 0.1 - 1.0 mg/dL Final     Comment:     For infants and newborns, interpretation of results should be based  on gestational age, weight and in agreement with clinical  observations.    Premature Infant recommended reference ranges:  Up to 24  hours.............<8.0 mg/dL  Up to 48 hours............<12.0 mg/dL  3-5 days..................<15.0 mg/dL  6-29 days.................<15.0 mg/dL       Alkaline Phosphatase   Date Value Ref Range Status   08/25/2022 94 55 - 135 U/L Final     AST   Date Value Ref Range Status   08/25/2022 21 10 - 40 U/L Final     ALT   Date Value Ref Range Status   08/25/2022 14 10 - 44 U/L Final     Anion Gap   Date Value Ref Range Status   08/25/2022 10 8 - 16 mmol/L Final     eGFR if    Date Value Ref Range Status   06/02/2022 51.1 (A) >60 mL/min/1.73 m^2 Final     eGFR if non    Date Value Ref Range Status   06/02/2022 44.2 (A) >60 mL/min/1.73 m^2 Final     Comment:     Calculation used to obtain the estimated glomerular filtration  rate (eGFR) is the CKD-EPI equation.          Lab Results   Component Value Date    HGBA1C 5.2 10/23/2007             ASSESSMENT: 67 y.o. year old male with neck pain, consistent with     1. Lumbar radiculopathy  baclofen (LIORESAL) 10 MG tablet    gabapentin (NEURONTIN) 600 MG tablet    IR Epidural Transforaminal Inj 1st Vert Lumbar Uni    IR Epidural Transfor Inj Ea Add Lumb Uni    Case Request-RAD/Other Procedure Area: Right  L5/S1 + S1 TF PIEDAD RN IV Sedation                PLAN:   - Interventions:  Schedule for right L4/5 +L5/S1 TF PIEDAD for right sided radicular symptoms. Explained the risks and benefits of the procedure in detail with the patient today in clinic along with alternative treatment options, and the patient elected to pursue the intervention at this time.     If no improvement, consider referral to NSG    S/p right L4/5 +L5/S1 TF PIEDAD on 10/20/22 with 40% relief  S/p C7-T1 IL PIEDAD for diagnostic and therapeutic purposes with 60% relief    - Anticoagulation use: yes Aspirin and Plavix,do not need to hold aspirin and hold Plavix for 5 days prior to perform lumbar PIEDAD - Dr. Mckeon for clearance     - Medications: I have stressed the importance of physical  activity and a home exercise plan to help with pain and improve health. and Patient can continue with medications for now since they are providing benefits, using them appropriately, and without side effects.  Advised patient that I do not believe opioids are in his best interest for his chronic non malignant pain.     - Therapy:  Advised patient continue with home exercises as tolerated     - Psychological:  Discussed coping mechanisms of address chronic pain issues     - Labs:  Reviewed     - Imaging: Reviewed available imaging with patient and answered any questions they had regarding study.     - Consults/Referrals:  None at this time     - Records:   Attempt  to obtain outside records from previous pain management provider.  Reviewed/Obtain old records from outside physicians and imaging     - Follow up visit: 4 weeks after injection     - Counseled patient regarding the importance of activity modification and physical therapy     - This condition does not require this patient to take time off of work, and the primary goal of our Pain Management services is to improve the patient's functional capacity.     - Patient Questions: Answered all of the patient's questions regarding diagnosis, therapy, and treatment       The above plan and management options were discussed at length with patient. Patient is in agreement with the above and verbalized understanding.      Khadijah Machado PA-C  Interventional Pain Management  [facility]    Disclaimer:  This note was prepared using voice recognition system and is likely to have sound alike errors that may have been overlooked even after proof reading.  Please call me with any questions

## 2023-01-11 ENCOUNTER — OUTPATIENT CASE MANAGEMENT (OUTPATIENT)
Dept: ADMINISTRATIVE | Facility: OTHER | Age: 68
End: 2023-01-11
Payer: MEDICARE

## 2023-01-11 ENCOUNTER — TELEPHONE (OUTPATIENT)
Dept: CARDIOLOGY | Facility: CLINIC | Age: 68
End: 2023-01-11
Payer: MEDICARE

## 2023-01-11 NOTE — PROGRESS NOTES
Outpatient Care Management  Plan of Care Follow Up Visit    Patient: Abdullahi Urias  MRN: [id number]  Date of Service: 01/11/2023  Completed by: Yung Ramos RN  Referral Date: 11/22/2022    Reason for Visit   Patient presents with    OPCM Chart Review    Update Plan Of Care       Brief Summary: Phone contact made with Mr. Urias and we discussed his pain care plan. He needs cardiac clearance before he can have his pain procedure. I will reach out to Dr. Mckeon for this. Plan to follow up in two weeks.

## 2023-01-11 NOTE — TELEPHONE ENCOUNTER
Pt contacted m for pt to call back to get appt scheduled        ----- Message from Jeffery Mckeon MD sent at 1/11/2023  1:41 PM CST -----  Regarding: RE: cardiac clearance  Schedule a visit for preop    ----- Message -----  From: Belkis Willson MA  Sent: 1/11/2023   1:17 PM CST  To: Jeffery Mckeon MD  Subject: FW: cardiac clearance                              ----- Message -----  From: Yung Ramos RN  Sent: 1/11/2023  12:55 PM CST  To: [facility] Staff  Subject: cardiac clearance                                Good afternoon,    I just spoke to Mr. Urias and he reported that he needs cardiac clearance before he can have a procedure to control his pain. He was wondering if the office could call him to schedule this. Thank you so much for your time and assistance.    Kind regards,    Yung Ramos RN OPCM

## 2023-01-12 ENCOUNTER — OFFICE VISIT (OUTPATIENT)
Dept: URGENT CARE | Facility: CLINIC | Age: 68
End: 2023-01-12
Payer: MEDICARE

## 2023-01-12 ENCOUNTER — TELEPHONE (OUTPATIENT)
Dept: INTERNAL MEDICINE | Facility: CLINIC | Age: 68
End: 2023-01-12
Payer: MEDICARE

## 2023-01-12 ENCOUNTER — HOSPITAL ENCOUNTER (OUTPATIENT)
Dept: RADIOLOGY | Facility: CLINIC | Age: 68
Discharge: HOME | End: 2023-01-12
Attending: NURSE PRACTITIONER
Payer: MEDICARE

## 2023-01-12 VITALS
SYSTOLIC BLOOD PRESSURE: 136 MMHG | DIASTOLIC BLOOD PRESSURE: 74 MMHG | OXYGEN SATURATION: 98 % | RESPIRATION RATE: 16 BRPM | HEIGHT: 69 IN | TEMPERATURE: 97 F | HEART RATE: 91 BPM | BODY MASS INDEX: 38.51 KG/M2 | WEIGHT: 260 LBS

## 2023-01-12 DIAGNOSIS — R10.9 ABDOMINAL PAIN, UNSPECIFIED ABDOMINAL LOCATION: Primary | ICD-10-CM

## 2023-01-12 DIAGNOSIS — R10.9 ABDOMINAL PAIN, UNSPECIFIED ABDOMINAL LOCATION: ICD-10-CM

## 2023-01-12 PROCEDURE — 3075F PR MOST RECENT SYSTOLIC BLOOD PRESS GE 130-139MM HG: ICD-10-PCS | Mod: CPTII,S$GLB,, | Performed by: NURSE PRACTITIONER

## 2023-01-12 PROCEDURE — 74018 RADEX ABDOMEN 1 VIEW: CPT | Mod: S$GLB,,, | Performed by: RADIOLOGY

## 2023-01-12 PROCEDURE — 1160F RVW MEDS BY RX/DR IN RCRD: CPT | Mod: CPTII,S$GLB,, | Performed by: NURSE PRACTITIONER

## 2023-01-12 PROCEDURE — 1160F PR REVIEW ALL MEDS BY PRESCRIBER/CLIN PHARMACIST DOCUMENTED: ICD-10-PCS | Mod: CPTII,S$GLB,, | Performed by: NURSE PRACTITIONER

## 2023-01-12 PROCEDURE — 1126F PR PAIN SEVERITY QUANTIFIED, NO PAIN PRESENT: ICD-10-PCS | Mod: CPTII,S$GLB,, | Performed by: NURSE PRACTITIONER

## 2023-01-12 PROCEDURE — 1126F AMNT PAIN NOTED NONE PRSNT: CPT | Mod: CPTII,S$GLB,, | Performed by: NURSE PRACTITIONER

## 2023-01-12 PROCEDURE — 3008F PR BODY MASS INDEX (BMI) DOCUMENTED: ICD-10-PCS | Mod: CPTII,S$GLB,, | Performed by: NURSE PRACTITIONER

## 2023-01-12 PROCEDURE — 1159F MED LIST DOCD IN RCRD: CPT | Mod: CPTII,S$GLB,, | Performed by: NURSE PRACTITIONER

## 2023-01-12 PROCEDURE — 3078F PR MOST RECENT DIASTOLIC BLOOD PRESSURE < 80 MM HG: ICD-10-PCS | Mod: CPTII,S$GLB,, | Performed by: NURSE PRACTITIONER

## 2023-01-12 PROCEDURE — 99214 OFFICE O/P EST MOD 30 MIN: CPT | Mod: S$GLB,,, | Performed by: NURSE PRACTITIONER

## 2023-01-12 PROCEDURE — 1159F PR MEDICATION LIST DOCUMENTED IN MEDICAL RECORD: ICD-10-PCS | Mod: CPTII,S$GLB,, | Performed by: NURSE PRACTITIONER

## 2023-01-12 PROCEDURE — 99214 PR OFFICE/OUTPT VISIT, EST, LEVL IV, 30-39 MIN: ICD-10-PCS | Mod: S$GLB,,, | Performed by: NURSE PRACTITIONER

## 2023-01-12 PROCEDURE — 74018 XR KUB: ICD-10-PCS | Mod: S$GLB,,, | Performed by: RADIOLOGY

## 2023-01-12 PROCEDURE — 3078F DIAST BP <80 MM HG: CPT | Mod: CPTII,S$GLB,, | Performed by: NURSE PRACTITIONER

## 2023-01-12 PROCEDURE — 3075F SYST BP GE 130 - 139MM HG: CPT | Mod: CPTII,S$GLB,, | Performed by: NURSE PRACTITIONER

## 2023-01-12 PROCEDURE — 3008F BODY MASS INDEX DOCD: CPT | Mod: CPTII,S$GLB,, | Performed by: NURSE PRACTITIONER

## 2023-01-12 RX ORDER — SYRING-NEEDL,DISP,INSUL,0.3 ML 29 G X1/2"
296 SYRINGE, EMPTY DISPOSABLE MISCELLANEOUS ONCE
Qty: 293 ML | Refills: 0 | Status: SHIPPED | OUTPATIENT
Start: 2023-01-12 | End: 2023-01-12

## 2023-01-12 NOTE — TELEPHONE ENCOUNTER
Spoke with pt. States he has an air filled knot in his abd that he noticed x 5 days ago. States when he push on the area it sounds/feels like air in coming out. Denies nausea and vomiting. State his is having regular bowel movements and he can eat without any problems. Advised pt to be seen in the ER or urgent care for new issue. Verbalized understanding.//elaine ADLER

## 2023-01-12 NOTE — PROGRESS NOTES
"Subjective:       Patient ID: Abdullahi Urias is a 67 y.o. male.    Vitals:  height is 5' 9" (1.753 m) and weight is 117.9 kg (260 lb). His tympanic temperature is 97 °F (36.1 °C). His blood pressure is 136/74 and his pulse is 91. His respiration is 16 and oxygen saturation is 98%.     Chief Complaint: Abdominal Pain    Patient presents with abdominal pain.  Symptoms started 3 days ago. Patient states that he feels a knot just above the navel.  He rates the pain 7/10.      Abdominal Pain  This is a new problem. The current episode started in the past 7 days (3). The problem occurs intermittently. The problem has been gradually worsening. The pain is located in the periumbilical region. The pain is at a severity of 7/10. The pain is moderate. The quality of the pain is aching. The abdominal pain does not radiate. Associated symptoms include flatus. Pertinent negatives include no anorexia, arthralgias, belching, constipation, diarrhea, dysuria, fever, frequency, headaches, hematochezia, hematuria, melena, myalgias, nausea, vomiting or weight loss. He has tried nothing for the symptoms.     Constitution: Negative for fever.   Gastrointestinal:  Positive for abdominal pain. Negative for nausea, vomiting, constipation, diarrhea and bright red blood in stool.   Genitourinary:  Negative for dysuria, frequency and hematuria.   Musculoskeletal:  Negative for joint pain and muscle ache.   Neurological:  Negative for headaches.     Objective:      Physical Exam   Constitutional: He is oriented to person, place, and time. He appears well-developed. He is cooperative.  Non-toxic appearance. He does not appear ill. No distress.   HENT:   Head: Normocephalic and atraumatic.   Ears:   Right Ear: Hearing, tympanic membrane, external ear and ear canal normal.   Left Ear: Hearing, tympanic membrane, external ear and ear canal normal.   Nose: Nose normal. No mucosal edema, rhinorrhea or nasal deformity. No epistaxis. Right sinus exhibits " no maxillary sinus tenderness and no frontal sinus tenderness. Left sinus exhibits no maxillary sinus tenderness and no frontal sinus tenderness.   Mouth/Throat: Uvula is midline, oropharynx is clear and moist and mucous membranes are normal. No trismus in the jaw. Normal dentition. No uvula swelling. No posterior oropharyngeal erythema.   Eyes: Conjunctivae and lids are normal. Right eye exhibits no discharge. Left eye exhibits no discharge. No scleral icterus.   Neck: Trachea normal and phonation normal. Neck supple.   Cardiovascular: Normal rate, regular rhythm, normal heart sounds and normal pulses.   Pulmonary/Chest: Effort normal and breath sounds normal. No respiratory distress.   Abdominal: Normal appearance. He exhibits distension (increase). He exhibits no mass. Soft. Bowel sounds are decreased. There is abdominal tenderness. There is no rebound, no guarding, negative De La Cruz's sign, no left CVA tenderness, negative Rovsing's sign and no right CVA tenderness.   Musculoskeletal: Normal range of motion.         General: No deformity. Normal range of motion.   Neurological: He is alert and oriented to person, place, and time. He exhibits normal muscle tone. Coordination normal.   Skin: Skin is warm, dry, intact, not diaphoretic and not pale.   Psychiatric: His speech is normal and behavior is normal. Judgment and thought content normal.   Nursing note and vitals reviewed.      Assessment:       1. Abdominal pain, unspecified abdominal location          Plan:         Abdominal pain, unspecified abdominal location  -     XR KUB; Future; Expected date: 01/12/2023  -     magnesium citrate solution; Take 296 mLs by mouth once. for 1 dose  Dispense: 293 mL; Refill: 0         Patient presents for evaluation of abdominal pain. He is noted th have severe abdominal distention on exam. Patient does report having bowl movements. KUB imaging reviewed, however radiology interpretation not available. Will give dose of Mag  citrate and assess response.

## 2023-01-13 ENCOUNTER — TELEPHONE (OUTPATIENT)
Dept: URGENT CARE | Facility: CLINIC | Age: 68
End: 2023-01-13
Payer: MEDICARE

## 2023-01-13 ENCOUNTER — PATIENT MESSAGE (OUTPATIENT)
Dept: PAIN MEDICINE | Facility: CLINIC | Age: 68
End: 2023-01-13
Payer: MEDICARE

## 2023-01-14 ENCOUNTER — HOSPITAL ENCOUNTER (EMERGENCY)
Facility: HOSPITAL | Age: 68
Discharge: HOME | End: 2023-01-14
Attending: FAMILY MEDICINE
Payer: MEDICARE

## 2023-01-14 VITALS
WEIGHT: 257.69 LBS | BODY MASS INDEX: 38.17 KG/M2 | HEART RATE: 74 BPM | RESPIRATION RATE: 20 BRPM | DIASTOLIC BLOOD PRESSURE: 90 MMHG | SYSTOLIC BLOOD PRESSURE: 152 MMHG | HEIGHT: 69 IN | OXYGEN SATURATION: 99 % | TEMPERATURE: 98 F

## 2023-01-14 DIAGNOSIS — K42.9 UMBILICAL HERNIA WITHOUT OBSTRUCTION AND WITHOUT GANGRENE: ICD-10-CM

## 2023-01-14 DIAGNOSIS — R10.9 ABDOMINAL PAIN: Primary | ICD-10-CM

## 2023-01-14 LAB
ALBUMIN SERPL BCP-MCNC: 3.9 G/DL (ref 3.5–5.2)
ALP SERPL-CCNC: 95 U/L (ref 55–135)
ALT SERPL W/O P-5'-P-CCNC: 21 U/L (ref 10–44)
ANION GAP SERPL CALC-SCNC: 12 MMOL/L (ref 8–16)
AST SERPL-CCNC: 21 U/L (ref 10–40)
BASOPHILS # BLD AUTO: 0.03 K/UL (ref 0–0.2)
BASOPHILS NFR BLD: 0.7 % (ref 0–1.9)
BILIRUB SERPL-MCNC: 0.4 MG/DL (ref 0.1–1)
BILIRUB UR QL STRIP: NEGATIVE
BNP SERPL-MCNC: 27 PG/ML (ref 0–99)
BUN SERPL-MCNC: 12 MG/DL (ref 8–23)
CALCIUM SERPL-MCNC: 9.3 MG/DL (ref 8.7–10.5)
CHLORIDE SERPL-SCNC: 108 MMOL/L (ref 95–110)
CLARITY UR: CLEAR
CO2 SERPL-SCNC: 20 MMOL/L (ref 23–29)
COLOR UR: YELLOW
CREAT SERPL-MCNC: 1.7 MG/DL (ref 0.5–1.4)
DIFFERENTIAL METHOD: ABNORMAL
EOSINOPHIL # BLD AUTO: 0.4 K/UL (ref 0–0.5)
EOSINOPHIL NFR BLD: 9.1 % (ref 0–8)
ERYTHROCYTE [DISTWIDTH] IN BLOOD BY AUTOMATED COUNT: 14.3 % (ref 11.5–14.5)
EST. GFR  (NO RACE VARIABLE): 44 ML/MIN/1.73 M^2
GLUCOSE SERPL-MCNC: 113 MG/DL (ref 70–110)
GLUCOSE UR QL STRIP: NEGATIVE
HCT VFR BLD AUTO: 36.9 % (ref 40–54)
HGB BLD-MCNC: 12.4 G/DL (ref 14–18)
HGB UR QL STRIP: NEGATIVE
IMM GRANULOCYTES # BLD AUTO: 0.02 K/UL (ref 0–0.04)
IMM GRANULOCYTES NFR BLD AUTO: 0.5 % (ref 0–0.5)
KETONES UR QL STRIP: NEGATIVE
LACTATE SERPL-SCNC: 1.3 MMOL/L (ref 0.5–2.2)
LEUKOCYTE ESTERASE UR QL STRIP: NEGATIVE
LIPASE SERPL-CCNC: 78 U/L (ref 4–60)
LYMPHOCYTES # BLD AUTO: 1.3 K/UL (ref 1–4.8)
LYMPHOCYTES NFR BLD: 29.2 % (ref 18–48)
MCH RBC QN AUTO: 28.8 PG (ref 27–31)
MCHC RBC AUTO-ENTMCNC: 33.6 G/DL (ref 32–36)
MCV RBC AUTO: 86 FL (ref 82–98)
MONOCYTES # BLD AUTO: 0.4 K/UL (ref 0.3–1)
MONOCYTES NFR BLD: 8.6 % (ref 4–15)
NEUTROPHILS # BLD AUTO: 2.2 K/UL (ref 1.8–7.7)
NEUTROPHILS NFR BLD: 51.9 % (ref 38–73)
NITRITE UR QL STRIP: NEGATIVE
NRBC BLD-RTO: 0 /100 WBC
PH UR STRIP: 6 [PH] (ref 5–8)
PLATELET # BLD AUTO: 163 K/UL (ref 150–450)
PMV BLD AUTO: 9.8 FL (ref 9.2–12.9)
POTASSIUM SERPL-SCNC: 3.8 MMOL/L (ref 3.5–5.1)
PROCALCITONIN SERPL IA-MCNC: 0.05 NG/ML
PROT SERPL-MCNC: 7.5 G/DL (ref 6–8.4)
PROT UR QL STRIP: NEGATIVE
RBC # BLD AUTO: 4.31 M/UL (ref 4.6–6.2)
SODIUM SERPL-SCNC: 140 MMOL/L (ref 136–145)
SP GR UR STRIP: 1.02 (ref 1–1.03)
TROPONIN I SERPL DL<=0.01 NG/ML-MCNC: <0.006 NG/ML (ref 0–0.03)
URN SPEC COLLECT METH UR: NORMAL
UROBILINOGEN UR STRIP-ACNC: NEGATIVE EU/DL
WBC # BLD AUTO: 4.28 K/UL (ref 3.9–12.7)

## 2023-01-14 PROCEDURE — 83690 ASSAY OF LIPASE: CPT | Mod: HCNC | Performed by: FAMILY MEDICINE

## 2023-01-14 PROCEDURE — 83880 ASSAY OF NATRIURETIC PEPTIDE: CPT | Mod: HCNC | Performed by: FAMILY MEDICINE

## 2023-01-14 PROCEDURE — 83605 ASSAY OF LACTIC ACID: CPT | Mod: HCNC | Performed by: FAMILY MEDICINE

## 2023-01-14 PROCEDURE — 93005 ELECTROCARDIOGRAM TRACING: CPT | Mod: HCNC

## 2023-01-14 PROCEDURE — 99285 EMERGENCY DEPT VISIT HI MDM: CPT | Mod: 25,HCNC

## 2023-01-14 PROCEDURE — 93010 ELECTROCARDIOGRAM REPORT: CPT | Mod: HCNC,,, | Performed by: INTERNAL MEDICINE

## 2023-01-14 PROCEDURE — 25000003 PHARM REV CODE 250: Mod: HCNC | Performed by: FAMILY MEDICINE

## 2023-01-14 PROCEDURE — 84145 PROCALCITONIN (PCT): CPT | Mod: HCNC | Performed by: FAMILY MEDICINE

## 2023-01-14 PROCEDURE — 93010 EKG 12-LEAD: ICD-10-PCS | Mod: HCNC,,, | Performed by: INTERNAL MEDICINE

## 2023-01-14 PROCEDURE — 81003 URINALYSIS AUTO W/O SCOPE: CPT | Mod: HCNC | Performed by: FAMILY MEDICINE

## 2023-01-14 PROCEDURE — 63600175 PHARM REV CODE 636 W HCPCS: Mod: HCNC | Performed by: FAMILY MEDICINE

## 2023-01-14 PROCEDURE — 85025 COMPLETE CBC W/AUTO DIFF WBC: CPT | Mod: HCNC | Performed by: FAMILY MEDICINE

## 2023-01-14 PROCEDURE — 87040 BLOOD CULTURE FOR BACTERIA: CPT | Mod: HCNC | Performed by: FAMILY MEDICINE

## 2023-01-14 PROCEDURE — 96360 HYDRATION IV INFUSION INIT: CPT | Mod: HCNC

## 2023-01-14 PROCEDURE — 80053 COMPREHEN METABOLIC PANEL: CPT | Mod: HCNC | Performed by: FAMILY MEDICINE

## 2023-01-14 PROCEDURE — 84484 ASSAY OF TROPONIN QUANT: CPT | Mod: HCNC | Performed by: FAMILY MEDICINE

## 2023-01-14 RX ORDER — HYDROCODONE BITARTRATE AND ACETAMINOPHEN 10; 325 MG/1; MG/1
1 TABLET ORAL EVERY 4 HOURS PRN
Qty: 18 TABLET | Refills: 0 | Status: ON HOLD | OUTPATIENT
Start: 2023-01-14 | End: 2023-01-26 | Stop reason: HOSPADM

## 2023-01-14 RX ORDER — HYDROCODONE BITARTRATE AND ACETAMINOPHEN 10; 325 MG/1; MG/1
1 TABLET ORAL
Status: COMPLETED | OUTPATIENT
Start: 2023-01-14 | End: 2023-01-14

## 2023-01-14 RX ADMIN — HYDROCODONE BITARTRATE AND ACETAMINOPHEN 1 TABLET: 10; 325 TABLET ORAL at 11:01

## 2023-01-14 RX ADMIN — SODIUM CHLORIDE, POTASSIUM CHLORIDE, SODIUM LACTATE AND CALCIUM CHLORIDE 3507 ML: 600; 310; 30; 20 INJECTION, SOLUTION INTRAVENOUS at 08:01

## 2023-01-15 ENCOUNTER — TELEPHONE (OUTPATIENT)
Dept: URGENT CARE | Facility: CLINIC | Age: 68
End: 2023-01-15
Payer: MEDICARE

## 2023-01-15 NOTE — TELEPHONE ENCOUNTER
Courtesy call to patient . States he went to ER last night had a Cat Scan and was diagnosed with a hernia.

## 2023-01-15 NOTE — ED PROVIDER NOTES
"SCRIBE #1 NOTE: I, Irma Andre, am scribing for, and in the presence of, Geraldine Ceja MD. I have scribed the entire note.       History     Chief Complaint   Patient presents with    Abdominal Pain     Pt reports umbilical pain from "knot that is getting bigger" X5 days. UC sent pt to the ER.     Review of patient's allergies indicates:  No Known Allergies      History of Present Illness     HPI    1/14/2023, 8:16 PM  History obtained from the patient      History of Present Illness: Abdullahi Urias is a 67 y.o. male patient with a PMHx of prostate cancer and pancreatitis who presents to the Emergency Department for evaluation of umbilical abdominal pain from a "knot that is getting bigger". Urgent care sent pt to ER. Symptoms are constant and moderate in severity. No mitigating or exacerbating factors reported. No other sxs reported. Patient denies any fever, chills, chest pain, N/V/D, and all other sxs at this time. No prior tx reported. No further complaints or concerns at this time.       Arrival mode: Personal vehicle      PCP: Reji Valentin MD        Past Medical History:  Past Medical History:   Diagnosis Date    Aortic stenosis     dr [facility]    Asthma     BPH (benign prostatic hyperplasia)     CAD (coronary artery disease)     Cardiomyopathy     CHF (congestive heart failure)     Chronic hoarseness     vocal cord surg    Chronic pain     CKD (chronic kidney disease) stage 3, GFR 30-59 ml/min     CVA (cerebral infarction)     8/2012 olol; reviewed ed note    Ex-smoker     GERD (gastroesophageal reflux disease)     Hepatitis C     treatedharvoni says cured, RNA NEG 6/2020    Hypertension     Pancreatitis     Prostate cancer     Prostate cancer     Prostate cancer     PVD (peripheral vascular disease)     Renal insufficiency     Substance abuse     cocaine, etoh , tob in past       Past Surgical History:  Past Surgical History:   Procedure Laterality Date    AORTIC VALVE REPLACEMENT  " 05/19/2014    Tissue valve replacement    BACK SURGERY      CARDIAC CATHETERIZATION      COLONOSCOPY  2011    COLONOSCOPY N/A 2/24/2021    Procedure: COLONOSCOPY;  Surgeon: Faith Carrillo MD;  Location: [facility] ENDO;  Service: Endoscopy;  Laterality: N/A;    EPIDURAL STEROID INJECTION INTO CERVICAL SPINE N/A 6/21/2022    Procedure: C7-T1 IL PIEDAD;  Surgeon: Nehemiah Carmen MD;  Location: [facility] PAIN MGT;  Service: Pain Management;  Laterality: N/A;    ESOPHAGOGASTRODUODENOSCOPY N/A 2/24/2021    Procedure: ESOPHAGOGASTRODUODENOSCOPY (EGD);  Surgeon: Faith Carrillo MD;  Location: [facility] ENDO;  Service: Endoscopy;  Laterality: N/A;    FOOT SURGERY      LEFT HEART CATHETERIZATION Left 7/24/2020    Procedure: CATHETERIZATION, HEART, LEFT;  Surgeon: Pasha Martin MD;  Location: [facility] CATH LAB;  Service: Cardiology;  Laterality: Left;    PENILE PROSTHESIS IMPLANT      PENILE PROSTHESIS REVISION  04/30/2018    PROSTATECTOMY  09/2019    [facility]    radiation for prostate      TRANSFORAMINAL EPIDURAL INJECTION OF STEROID Right 10/20/2022    Procedure: Right  L4/5 + L5/S1 TF PIEDAD RN IV Sedation;  Surgeon: Nehemiah Carmen MD;  Location: [facility] PAIN MGT;  Service: Pain Management;  Laterality: Right;    UPPER GASTROINTESTINAL ENDOSCOPY  2011         Family History:  Family History   Problem Relation Age of Onset    Heart disease Mother     Heart attack Sister     Heart attack Brother     Colon cancer Neg Hx     Colon polyps Neg Hx     Liver cancer Neg Hx     Inflammatory bowel disease Neg Hx     Liver disease Neg Hx     Rectal cancer Neg Hx     Stomach cancer Neg Hx     Ulcerative colitis Neg Hx        Social History:  Social History     Tobacco Use    Smoking status: Former     Packs/day: 2.00     Years: 8.00     Pack years: 16.00     Types: Cigarettes     Quit date: 8/24/2012     Years since quitting: 10.4    Smokeless tobacco: Never   Substance and Sexual Activity    Alcohol use: No     Comment: Sober since  "08/24/2012    Drug use: Not Currently     Types: "Crack" cocaine, Marijuana     Comment: Quit in 2012    Sexual activity: Yes        Review of Systems     Review of Systems   Constitutional:  Negative for chills and fever.   HENT:  Negative for congestion and sore throat.    Eyes:  Negative for pain and redness.   Respiratory:  Negative for cough and shortness of breath.    Cardiovascular:  Negative for chest pain and palpitations.   Gastrointestinal:  Positive for abdominal pain. Negative for diarrhea, nausea and vomiting.   Genitourinary:  Negative for dysuria and hematuria.   Musculoskeletal:  Negative for back pain and neck pain.   Skin:  Negative for rash and wound.   Neurological:  Positive for dizziness and weakness.   Psychiatric/Behavioral:  Negative for agitation and confusion.    All other systems reviewed and are negative.     Physical Exam     Initial Vitals [01/14/23 2006]   BP Pulse Resp Temp SpO2   108/73 96 20 97.5 °F (36.4 °C) 98 %      MAP       --          Physical Exam  Nursing Notes and Vital Signs Reviewed.  Constitutional: Patient is in no acute distress. Well-developed and well-nourished.  Head: Atraumatic. Normocephalic.  Eyes:  EOM intact. Conjunctivae are not pale. No scleral icterus.  ENT: Mucous membranes are moist. Oropharynx is clear and symmetric.    Neck: Supple. Full ROM.   Cardiovascular: Regular rate. Regular rhythm. No murmurs, rubs, or gallops. Distal pulses are 2+ and symmetric.  Pulmonary/Chest: No respiratory distress. Clear to auscultation bilaterally. No wheezing or rales.  Abdominal: Soft and non-distended.  Diffuse kassy-umbilical tenderness.  No rebound, guarding, or rigidity.   Musculoskeletal: Moves all extremities. No obvious deformities. No edema.   Skin: Warm and dry.  Neurological:  Alert, awake, and appropriate.  Normal speech.  No acute focal neurological deficits are appreciated.  Psychiatric: Normal affect. Good eye contact. Appropriate in content.     ED " "Course   Procedures  ED Vital Signs:  Vitals:    01/14/23 2006 01/14/23 2100 01/14/23 2202 01/14/23 2313   BP: 108/73 120/79 137/79    Pulse: 96 87 80    Resp: 20 20 (!) 25 18   Temp: 97.5 °F (36.4 °C)      TempSrc: Oral      SpO2: 98% 95% 97%    Weight: 116.9 kg (257 lb 11.5 oz)      Height: 5' 9" (1.753 m)       01/14/23 2331   BP: (!) 152/90   Pulse: 74   Resp: 20   Temp:    TempSrc:    SpO2: 99%   Weight:    Height:        Abnormal Lab Results:  Labs Reviewed   CBC W/ AUTO DIFFERENTIAL - Abnormal; Notable for the following components:       Result Value    RBC 4.31 (*)     Hemoglobin 12.4 (*)     Hematocrit 36.9 (*)     Eosinophil % 9.1 (*)     All other components within normal limits   COMPREHENSIVE METABOLIC PANEL - Abnormal; Notable for the following components:    CO2 20 (*)     Glucose 113 (*)     Creatinine 1.7 (*)     eGFR 44 (*)     All other components within normal limits   LIPASE - Abnormal; Notable for the following components:    Lipase 78 (*)     All other components within normal limits   CULTURE, BLOOD    Narrative:     Aerobic and anaerobic   CULTURE, BLOOD    Narrative:     Aerobic and anaerobic   LACTIC ACID, PLASMA   URINALYSIS, REFLEX TO URINE CULTURE    Narrative:     Specimen Source->Urine   B-TYPE NATRIURETIC PEPTIDE   TROPONIN I   PROCALCITONIN        All Lab Results:  Results for orders placed or performed during the hospital encounter of 01/14/23   Blood culture x two cultures. Draw prior to antibiotics.    Specimen: Peripheral, Wrist, Right; Blood   Result Value Ref Range    Blood Culture, Routine No Growth to date    Blood culture x two cultures. Draw prior to antibiotics.    Specimen: Peripheral, Antecubital, Right; Blood   Result Value Ref Range    Blood Culture, Routine No Growth to date    CBC auto differential   Result Value Ref Range    WBC 4.28 3.90 - 12.70 K/uL    RBC 4.31 (L) 4.60 - 6.20 M/uL    Hemoglobin 12.4 (L) 14.0 - 18.0 g/dL    Hematocrit 36.9 (L) 40.0 - 54.0 %    " MCV 86 82 - 98 fL    MCH 28.8 27.0 - 31.0 pg    MCHC 33.6 32.0 - 36.0 g/dL    RDW 14.3 11.5 - 14.5 %    Platelets 163 150 - 450 K/uL    MPV 9.8 9.2 - 12.9 fL    Immature Granulocytes 0.5 0.0 - 0.5 %    Gran # (ANC) 2.2 1.8 - 7.7 K/uL    Immature Grans (Abs) 0.02 0.00 - 0.04 K/uL    Lymph # 1.3 1.0 - 4.8 K/uL    Mono # 0.4 0.3 - 1.0 K/uL    Eos # 0.4 0.0 - 0.5 K/uL    Baso # 0.03 0.00 - 0.20 K/uL    nRBC 0 0 /100 WBC    Gran % 51.9 38.0 - 73.0 %    Lymph % 29.2 18.0 - 48.0 %    Mono % 8.6 4.0 - 15.0 %    Eosinophil % 9.1 (H) 0.0 - 8.0 %    Basophil % 0.7 0.0 - 1.9 %    Differential Method Automated    Comprehensive metabolic panel   Result Value Ref Range    Sodium 140 136 - 145 mmol/L    Potassium 3.8 3.5 - 5.1 mmol/L    Chloride 108 95 - 110 mmol/L    CO2 20 (L) 23 - 29 mmol/L    Glucose 113 (H) 70 - 110 mg/dL    BUN 12 8 - 23 mg/dL    Creatinine 1.7 (H) 0.5 - 1.4 mg/dL    Calcium 9.3 8.7 - 10.5 mg/dL    Total Protein 7.5 6.0 - 8.4 g/dL    Albumin 3.9 3.5 - 5.2 g/dL    Total Bilirubin 0.4 0.1 - 1.0 mg/dL    Alkaline Phosphatase 95 55 - 135 U/L    AST 21 10 - 40 U/L    ALT 21 10 - 44 U/L    Anion Gap 12 8 - 16 mmol/L    eGFR 44 (A) >60 mL/min/1.73 m^2   Lactic acid, plasma #1   Result Value Ref Range    Lactate (Lactic Acid) 1.3 0.5 - 2.2 mmol/L   Urinalysis, Reflex to Urine Culture Urine, Clean Catch    Specimen: Urine   Result Value Ref Range    Specimen UA Urine, Clean Catch     Color, UA Yellow Yellow, Straw, Kallie    Appearance, UA Clear Clear    pH, UA 6.0 5.0 - 8.0    Specific Gravity, UA 1.020 1.005 - 1.030    Protein, UA Negative Negative    Glucose, UA Negative Negative    Ketones, UA Negative Negative    Bilirubin (UA) Negative Negative    Occult Blood UA Negative Negative    Nitrite, UA Negative Negative    Urobilinogen, UA Negative <2.0 EU/dL    Leukocytes, UA Negative Negative   Brain natriuretic peptide   Result Value Ref Range    BNP 27 0 - 99 pg/mL   Troponin I   Result Value Ref Range    Troponin I  <0.006 0.000 - 0.026 ng/mL   Lipase   Result Value Ref Range    Lipase 78 (H) 4 - 60 U/L   Procalcitonin   Result Value Ref Range    Procalcitonin 0.05 <0.25 ng/mL         Imaging Results:  Imaging Results              CT Abdomen Pelvis  Without Contrast (Final result)  Result time 01/14/23 22:08:20      Final result by Geri Jackson MD (01/14/23 22:08:20)                   Impression:      Fat containing supraumbilical hernia with mild stranding raises possibility of incarceration/complication particularly if clinical symptoms correlate    All CT scans at this facility use dose modulation, iterative reconstruction, and/or weight based dosing when appropriate to reduce radiation dose to as low as reasonable achievable.      Electronically signed by: Geri Jackson  Date:    01/14/2023  Time:    22:08               Narrative:    EXAMINATION:  CT ABDOMEN PELVIS WITHOUT CONTRAST    CLINICAL HISTORY:  Abdominal pain, acute, nonlocalized;    TECHNIQUE:  Unenhanced imaging, 2D reformations provided    COMPARISON:  March 2021    FINDINGS:  Unenhanced liver and spleen stable size    Gallbladder present    Pancreas stable    No adrenal gland mass    No hydronephrosis.  Small exophytic hypodensity right renal stable.    No hydroureter    Urinary bladder decompressed    No obstructive or inflammatory bowel findings    Fat containing paraumbilical hernia with mild fat stranding similar to more conspicuous                                       X-Ray Chest AP Portable (Final result)  Result time 01/14/23 20:22:32      Final result by Bonilla Madrigal MD (01/14/23 20:22:32)                   Impression:      No acute abnormality.      Electronically signed by: Bonilla Madrigal  Date:    01/14/2023  Time:    20:22               Narrative:    EXAMINATION:  XR CHEST AP PORTABLE    CLINICAL HISTORY:  Sepsis;    TECHNIQUE:  Single frontal view of the chest was performed.    COMPARISON:  Multiple priors.    FINDINGS:  Median  sternotomy wires in place.    The lungs are clear, with normal appearance of pulmonary vasculature and no pleural effusion or pneumothorax.    The cardiac silhouette is normal in size. The hilar and mediastinal contours are unremarkable.    Degenerative change of the shoulders.                                       The EKG was ordered, reviewed, and independently interpreted by the ED provider.  Interpretation time: 20:31  Rate: 91 BPM  Rhythm: Sinus rhythm with frequent premature ventricular complexes  Interpretation: T wave abnormality. Consider anterior ischemia. Abnormal ECG. No STEMI.             The Emergency Provider reviewed the vital signs and test results, which are outlined above.     ED Discussion       11:15 PM: Discussed pt's case with Tyrell Berg MD (General Surgery) who recommends f/u in general surgery clinic as outpatient.     11:36 PM: Reassessed pt at this time.  Discussed with pt all pertinent ED information and results. Discussed pt dx and plan of tx. Gave pt all f/u and return to the ED instructions. All questions and concerns were addressed at this time. Pt expresses understanding of information and instructions, and is comfortable with plan to discharge. Pt is stable for discharge.    I discussed with patient and/or family/caretaker that evaluation in the ED does not suggest any emergent or life threatening medical conditions requiring immediate intervention beyond what was provided in the ED, and I believe patient is safe for discharge.  Regardless, an unremarkable evaluation in the ED does not preclude the development or presence of a serious of life threatening condition. As such, patient was instructed to return immediately for any worsening or change in current symptoms.      Medical Decision Making:   Clinical Tests:   Lab Tests: Ordered and Reviewed  Radiological Study: Ordered and Reviewed  Medical Tests: Ordered and Reviewed         ED Medication(s):  Medications    HYDROcodone-acetaminophen  mg per tablet 1 tablet (1 tablet Oral Given 1/14/23 2313)       Discharge Medication List as of 1/14/2023 11:19 PM        START taking these medications    Details   HYDROcodone-acetaminophen (NORCO)  mg per tablet Take 1 tablet by mouth every 4 (four) hours as needed., Starting Sat 1/14/2023, Print              Follow-up Information       Schedule an appointment as soon as possible for a visit  with Tyrell Berg MD.    Specialties: Colon and Rectal Surgery, General Surgery  Why: As needed  Contact information:  [address] [place] [address] [address]onal GARCIA [id number]  [phone]                                 Scribe Attestation:   Scribe #1: I performed the above scribed service and the documentation accurately describes the services I performed. I attest to the accuracy of the note.     Attending:   Physician Attestation Statement for Scribe #1: I, Geraldine Ceja MD, personally performed the services described in this documentation, as scribed by Irma Holly, in my presence, and it is both accurate and complete.           Clinical Impression       ICD-10-CM ICD-9-CM   1. Abdominal pain  R10.9 789.00   2. Umbilical hernia without obstruction and without gangrene  K42.9 553.1       Disposition:   Disposition: Discharged  Condition: Stable       Geraldine Ceja MD  01/15/23 1020

## 2023-01-18 ENCOUNTER — OFFICE VISIT (OUTPATIENT)
Dept: SURGERY | Facility: CLINIC | Age: 68
End: 2023-01-18
Payer: MEDICARE

## 2023-01-18 ENCOUNTER — PATIENT MESSAGE (OUTPATIENT)
Dept: SLEEP MEDICINE | Facility: CLINIC | Age: 68
End: 2023-01-18
Payer: MEDICARE

## 2023-01-18 VITALS
HEIGHT: 69 IN | DIASTOLIC BLOOD PRESSURE: 89 MMHG | TEMPERATURE: 98 F | SYSTOLIC BLOOD PRESSURE: 125 MMHG | BODY MASS INDEX: 37.03 KG/M2 | WEIGHT: 250 LBS | HEART RATE: 89 BPM

## 2023-01-18 DIAGNOSIS — R06.02 SHORTNESS OF BREATH: ICD-10-CM

## 2023-01-18 DIAGNOSIS — G47.30 SLEEP APNEA, UNSPECIFIED TYPE: ICD-10-CM

## 2023-01-18 DIAGNOSIS — J45.909 ASTHMA, UNSPECIFIED ASTHMA SEVERITY, UNSPECIFIED WHETHER COMPLICATED, UNSPECIFIED WHETHER PERSISTENT: Primary | ICD-10-CM

## 2023-01-18 PROCEDURE — 99204 OFFICE O/P NEW MOD 45 MIN: CPT | Mod: HCNC,S$GLB,, | Performed by: SURGERY

## 2023-01-18 PROCEDURE — 1125F AMNT PAIN NOTED PAIN PRSNT: CPT | Mod: HCNC,CPTII,S$GLB, | Performed by: SURGERY

## 2023-01-18 PROCEDURE — 1125F PR PAIN SEVERITY QUANTIFIED, PAIN PRESENT: ICD-10-PCS | Mod: HCNC,CPTII,S$GLB, | Performed by: SURGERY

## 2023-01-18 PROCEDURE — 99204 PR OFFICE/OUTPT VISIT, NEW, LEVL IV, 45-59 MIN: ICD-10-PCS | Mod: HCNC,S$GLB,, | Performed by: SURGERY

## 2023-01-18 PROCEDURE — 1160F RVW MEDS BY RX/DR IN RCRD: CPT | Mod: HCNC,CPTII,S$GLB, | Performed by: SURGERY

## 2023-01-18 PROCEDURE — 3074F SYST BP LT 130 MM HG: CPT | Mod: HCNC,CPTII,S$GLB, | Performed by: SURGERY

## 2023-01-18 PROCEDURE — 3079F DIAST BP 80-89 MM HG: CPT | Mod: HCNC,CPTII,S$GLB, | Performed by: SURGERY

## 2023-01-18 PROCEDURE — 3074F PR MOST RECENT SYSTOLIC BLOOD PRESSURE < 130 MM HG: ICD-10-PCS | Mod: HCNC,CPTII,S$GLB, | Performed by: SURGERY

## 2023-01-18 PROCEDURE — 1160F PR REVIEW ALL MEDS BY PRESCRIBER/CLIN PHARMACIST DOCUMENTED: ICD-10-PCS | Mod: HCNC,CPTII,S$GLB, | Performed by: SURGERY

## 2023-01-18 PROCEDURE — 3008F BODY MASS INDEX DOCD: CPT | Mod: HCNC,CPTII,S$GLB, | Performed by: SURGERY

## 2023-01-18 PROCEDURE — 99999 PR PBB SHADOW E&M-EST. PATIENT-LVL V: ICD-10-PCS | Mod: PBBFAC,HCNC,, | Performed by: SURGERY

## 2023-01-18 PROCEDURE — 1159F PR MEDICATION LIST DOCUMENTED IN MEDICAL RECORD: ICD-10-PCS | Mod: HCNC,CPTII,S$GLB, | Performed by: SURGERY

## 2023-01-18 PROCEDURE — 99999 PR PBB SHADOW E&M-EST. PATIENT-LVL V: CPT | Mod: PBBFAC,HCNC,, | Performed by: SURGERY

## 2023-01-18 PROCEDURE — 3008F PR BODY MASS INDEX (BMI) DOCUMENTED: ICD-10-PCS | Mod: HCNC,CPTII,S$GLB, | Performed by: SURGERY

## 2023-01-18 PROCEDURE — 1159F MED LIST DOCD IN RCRD: CPT | Mod: HCNC,CPTII,S$GLB, | Performed by: SURGERY

## 2023-01-18 PROCEDURE — 3079F PR MOST RECENT DIASTOLIC BLOOD PRESSURE 80-89 MM HG: ICD-10-PCS | Mod: HCNC,CPTII,S$GLB, | Performed by: SURGERY

## 2023-01-20 DIAGNOSIS — J30.9 ALLERGIC RHINITIS, UNSPECIFIED SEASONALITY, UNSPECIFIED TRIGGER: ICD-10-CM

## 2023-01-20 LAB
BACTERIA BLD CULT: NORMAL
BACTERIA BLD CULT: NORMAL

## 2023-01-23 ENCOUNTER — OUTPATIENT CASE MANAGEMENT (OUTPATIENT)
Dept: ADMINISTRATIVE | Facility: OTHER | Age: 68
End: 2023-01-23
Payer: MEDICARE

## 2023-01-23 ENCOUNTER — TELEPHONE (OUTPATIENT)
Dept: INTERNAL MEDICINE | Facility: CLINIC | Age: 68
End: 2023-01-23
Payer: MEDICARE

## 2023-01-23 NOTE — TELEPHONE ENCOUNTER
----- Message from Yung Ramos RN sent at 1/23/2023 12:22 PM CST -----  Regarding: cough shortness of breath  Good afternoon,    Mr. Urias just called me and stated that for the last week he has had increased shortness of breath and a cough. He has been using his inhaler and taking Mucinex but states no relief. I contacted Dr. Baez's staff with pulmonology to schedule him an appointment with them, but I also wanted to make you aware as well. He is willing to be seen in the office if needed if he is unable to get an appointment with pulmonology right away. Thank you for your time and assistance!    Kind regards,    Yung Ramos RN OPCM

## 2023-01-23 NOTE — PROGRESS NOTES
Outpatient Care Management  Plan of Care Follow Up Visit    Patient: Abdullahi Urias  MRN: [id number]  Date of Service: 01/23/2023  Completed by: Yung Ramos RN  Referral Date: 11/22/2022    Reason for Visit   Patient presents with    OPCM Chart Review    Update Plan Of Care       Brief Summary: Mr. Urias called today and said he had an increase in wheezing and shortness of breath. He has been using his inhaler and Mucinex without relief. I contacted his pulmonologist to schedule an appointment as well as his PCP. Will follow up in two weeks to complete pain education

## 2023-01-24 ENCOUNTER — TELEPHONE (OUTPATIENT)
Dept: PAIN MEDICINE | Facility: CLINIC | Age: 68
End: 2023-01-24
Payer: MEDICARE

## 2023-01-24 ENCOUNTER — OFFICE VISIT (OUTPATIENT)
Dept: OPHTHALMOLOGY | Facility: CLINIC | Age: 68
End: 2023-01-24
Payer: MEDICARE

## 2023-01-24 ENCOUNTER — HOSPITAL ENCOUNTER (OUTPATIENT)
Facility: HOSPITAL | Age: 68
Discharge: HOME | End: 2023-01-26
Attending: EMERGENCY MEDICINE | Admitting: INTERNAL MEDICINE
Payer: MEDICARE

## 2023-01-24 DIAGNOSIS — I10 HYPERTENSION, UNSPECIFIED TYPE: Chronic | ICD-10-CM

## 2023-01-24 DIAGNOSIS — E66.9 OBESITY, CLASS I, BMI 30-34.9: Chronic | ICD-10-CM

## 2023-01-24 DIAGNOSIS — H52.203 HYPEROPIA WITH ASTIGMATISM AND PRESBYOPIA, BILATERAL: ICD-10-CM

## 2023-01-24 DIAGNOSIS — R06.02 SOB (SHORTNESS OF BREATH): ICD-10-CM

## 2023-01-24 DIAGNOSIS — H25.13 NUCLEAR SCLEROSIS, BILATERAL: Primary | ICD-10-CM

## 2023-01-24 DIAGNOSIS — H52.4 HYPEROPIA WITH ASTIGMATISM AND PRESBYOPIA, BILATERAL: ICD-10-CM

## 2023-01-24 DIAGNOSIS — G47.33 OSA ON CPAP: ICD-10-CM

## 2023-01-24 DIAGNOSIS — H52.03 HYPEROPIA WITH ASTIGMATISM AND PRESBYOPIA, BILATERAL: ICD-10-CM

## 2023-01-24 DIAGNOSIS — H04.129 DRY EYE: ICD-10-CM

## 2023-01-24 DIAGNOSIS — J20.9 ACUTE BRONCHITIS, UNSPECIFIED ORGANISM: Primary | ICD-10-CM

## 2023-01-24 DIAGNOSIS — H25.013 CORTICAL AGE-RELATED CATARACT OF BOTH EYES: ICD-10-CM

## 2023-01-24 DIAGNOSIS — J45.901 ASTHMATIC BRONCHITIS WITH ACUTE EXACERBATION, UNSPECIFIED ASTHMA SEVERITY, UNSPECIFIED WHETHER PERSISTENT: ICD-10-CM

## 2023-01-24 LAB
ALBUMIN SERPL BCP-MCNC: 4 G/DL (ref 3.5–5.2)
ALP SERPL-CCNC: 97 U/L (ref 55–135)
ALT SERPL W/O P-5'-P-CCNC: 12 U/L (ref 10–44)
ANION GAP SERPL CALC-SCNC: 12 MMOL/L (ref 8–16)
AST SERPL-CCNC: 20 U/L (ref 10–40)
BASOPHILS # BLD AUTO: 0.04 K/UL (ref 0–0.2)
BASOPHILS NFR BLD: 0.8 % (ref 0–1.9)
BILIRUB SERPL-MCNC: 0.4 MG/DL (ref 0.1–1)
BNP SERPL-MCNC: 40 PG/ML (ref 0–99)
BUN SERPL-MCNC: 17 MG/DL (ref 8–23)
CALCIUM SERPL-MCNC: 9.5 MG/DL (ref 8.7–10.5)
CHLORIDE SERPL-SCNC: 110 MMOL/L (ref 95–110)
CO2 SERPL-SCNC: 18 MMOL/L (ref 23–29)
CREAT SERPL-MCNC: 1.7 MG/DL (ref 0.5–1.4)
DIFFERENTIAL METHOD: ABNORMAL
EOSINOPHIL # BLD AUTO: 0.5 K/UL (ref 0–0.5)
EOSINOPHIL NFR BLD: 10.3 % (ref 0–8)
ERYTHROCYTE [DISTWIDTH] IN BLOOD BY AUTOMATED COUNT: 14.1 % (ref 11.5–14.5)
EST. GFR  (NO RACE VARIABLE): 44 ML/MIN/1.73 M^2
GLUCOSE SERPL-MCNC: 115 MG/DL (ref 70–110)
HCT VFR BLD AUTO: 37.9 % (ref 40–54)
HGB BLD-MCNC: 12.9 G/DL (ref 14–18)
IMM GRANULOCYTES # BLD AUTO: 0.02 K/UL (ref 0–0.04)
IMM GRANULOCYTES NFR BLD AUTO: 0.4 % (ref 0–0.5)
INR PPP: 1 (ref 0.8–1.2)
LYMPHOCYTES # BLD AUTO: 1.4 K/UL (ref 1–4.8)
LYMPHOCYTES NFR BLD: 28.6 % (ref 18–48)
MCH RBC QN AUTO: 28.7 PG (ref 27–31)
MCHC RBC AUTO-ENTMCNC: 34 G/DL (ref 32–36)
MCV RBC AUTO: 84 FL (ref 82–98)
MONOCYTES # BLD AUTO: 0.4 K/UL (ref 0.3–1)
MONOCYTES NFR BLD: 8.3 % (ref 4–15)
NEUTROPHILS # BLD AUTO: 2.6 K/UL (ref 1.8–7.7)
NEUTROPHILS NFR BLD: 51.6 % (ref 38–73)
NRBC BLD-RTO: 0 /100 WBC
PLATELET # BLD AUTO: 171 K/UL (ref 150–450)
PMV BLD AUTO: 9.9 FL (ref 9.2–12.9)
POTASSIUM SERPL-SCNC: 3.9 MMOL/L (ref 3.5–5.1)
PROT SERPL-MCNC: 7.7 G/DL (ref 6–8.4)
PROTHROMBIN TIME: 10.9 SEC (ref 9–12.5)
RBC # BLD AUTO: 4.49 M/UL (ref 4.6–6.2)
SARS-COV-2 RDRP RESP QL NAA+PROBE: NEGATIVE
SODIUM SERPL-SCNC: 140 MMOL/L (ref 136–145)
TROPONIN I SERPL DL<=0.01 NG/ML-MCNC: <0.006 NG/ML (ref 0–0.03)
WBC # BLD AUTO: 4.96 K/UL (ref 3.9–12.7)

## 2023-01-24 PROCEDURE — 85610 PROTHROMBIN TIME: CPT | Mod: HCNC | Performed by: EMERGENCY MEDICINE

## 2023-01-24 PROCEDURE — 1160F PR REVIEW ALL MEDS BY PRESCRIBER/CLIN PHARMACIST DOCUMENTED: ICD-10-PCS | Mod: HCNC,CPTII,S$GLB, | Performed by: OPTOMETRIST

## 2023-01-24 PROCEDURE — 99999 PR PBB SHADOW E&M-EST. PATIENT-LVL II: CPT | Mod: PBBFAC,HCNC,, | Performed by: OPTOMETRIST

## 2023-01-24 PROCEDURE — G0378 HOSPITAL OBSERVATION PER HR: HCPCS | Mod: HCNC

## 2023-01-24 PROCEDURE — 94640 AIRWAY INHALATION TREATMENT: CPT | Mod: HCNC,XB

## 2023-01-24 PROCEDURE — 85025 COMPLETE CBC W/AUTO DIFF WBC: CPT | Mod: HCNC | Performed by: EMERGENCY MEDICINE

## 2023-01-24 PROCEDURE — 92004 COMPRE OPH EXAM NEW PT 1/>: CPT | Mod: HCNC,S$GLB,, | Performed by: OPTOMETRIST

## 2023-01-24 PROCEDURE — 93010 EKG 12-LEAD: ICD-10-PCS | Mod: HCNC,,, | Performed by: INTERNAL MEDICINE

## 2023-01-24 PROCEDURE — U0002 COVID-19 LAB TEST NON-CDC: HCPCS | Mod: HCNC | Performed by: EMERGENCY MEDICINE

## 2023-01-24 PROCEDURE — 80053 COMPREHEN METABOLIC PANEL: CPT | Mod: HCNC | Performed by: EMERGENCY MEDICINE

## 2023-01-24 PROCEDURE — 25000003 PHARM REV CODE 250: Mod: HCNC | Performed by: INTERNAL MEDICINE

## 2023-01-24 PROCEDURE — 92004 PR EYE EXAM, NEW PATIENT,COMPREHESV: ICD-10-PCS | Mod: HCNC,S$GLB,, | Performed by: OPTOMETRIST

## 2023-01-24 PROCEDURE — 1159F MED LIST DOCD IN RCRD: CPT | Mod: HCNC,CPTII,S$GLB, | Performed by: OPTOMETRIST

## 2023-01-24 PROCEDURE — 96374 THER/PROPH/DIAG INJ IV PUSH: CPT | Mod: HCNC

## 2023-01-24 PROCEDURE — 25000242 PHARM REV CODE 250 ALT 637 W/ HCPCS: Mod: HCNC | Performed by: EMERGENCY MEDICINE

## 2023-01-24 PROCEDURE — 83880 ASSAY OF NATRIURETIC PEPTIDE: CPT | Mod: HCNC | Performed by: EMERGENCY MEDICINE

## 2023-01-24 PROCEDURE — 93010 ELECTROCARDIOGRAM REPORT: CPT | Mod: HCNC,,, | Performed by: INTERNAL MEDICINE

## 2023-01-24 PROCEDURE — 1159F PR MEDICATION LIST DOCUMENTED IN MEDICAL RECORD: ICD-10-PCS | Mod: HCNC,CPTII,S$GLB, | Performed by: OPTOMETRIST

## 2023-01-24 PROCEDURE — 92015 DETERMINE REFRACTIVE STATE: CPT | Mod: HCNC,S$GLB,, | Performed by: OPTOMETRIST

## 2023-01-24 PROCEDURE — 25000242 PHARM REV CODE 250 ALT 637 W/ HCPCS: Mod: HCNC | Performed by: INTERNAL MEDICINE

## 2023-01-24 PROCEDURE — 92015 PR REFRACTION: ICD-10-PCS | Mod: HCNC,S$GLB,, | Performed by: OPTOMETRIST

## 2023-01-24 PROCEDURE — 99900035 HC TECH TIME PER 15 MIN (STAT): Mod: HCNC

## 2023-01-24 PROCEDURE — 84484 ASSAY OF TROPONIN QUANT: CPT | Mod: HCNC | Performed by: EMERGENCY MEDICINE

## 2023-01-24 PROCEDURE — 94761 N-INVAS EAR/PLS OXIMETRY MLT: CPT | Mod: HCNC

## 2023-01-24 PROCEDURE — 99285 EMERGENCY DEPT VISIT HI MDM: CPT | Mod: 25,HCNC,CS

## 2023-01-24 PROCEDURE — 1160F RVW MEDS BY RX/DR IN RCRD: CPT | Mod: HCNC,CPTII,S$GLB, | Performed by: OPTOMETRIST

## 2023-01-24 PROCEDURE — 99999 PR PBB SHADOW E&M-EST. PATIENT-LVL II: ICD-10-PCS | Mod: PBBFAC,HCNC,, | Performed by: OPTOMETRIST

## 2023-01-24 PROCEDURE — 63600175 PHARM REV CODE 636 W HCPCS: Mod: HCNC | Performed by: EMERGENCY MEDICINE

## 2023-01-24 PROCEDURE — 93005 ELECTROCARDIOGRAM TRACING: CPT | Mod: HCNC

## 2023-01-24 PROCEDURE — 27000221 HC OXYGEN, UP TO 24 HOURS: Mod: HCNC

## 2023-01-24 PROCEDURE — 63700000 PHARM REV CODE 250 ALT 637 W/O HCPCS: Mod: HCNC | Performed by: INTERNAL MEDICINE

## 2023-01-24 RX ORDER — MAG HYDROX/ALUMINUM HYD/SIMETH 200-200-20
30 SUSPENSION, ORAL (FINAL DOSE FORM) ORAL EVERY 6 HOURS PRN
Status: DISCONTINUED | OUTPATIENT
Start: 2023-01-24 | End: 2023-01-26 | Stop reason: HOSPADM

## 2023-01-24 RX ORDER — AZITHROMYCIN 250 MG/1
250 TABLET, FILM COATED ORAL DAILY
Status: COMPLETED | OUTPATIENT
Start: 2023-01-24 | End: 2023-01-26

## 2023-01-24 RX ORDER — IPRATROPIUM BROMIDE AND ALBUTEROL SULFATE 2.5; .5 MG/3ML; MG/3ML
3 SOLUTION RESPIRATORY (INHALATION)
Status: COMPLETED | OUTPATIENT
Start: 2023-01-24 | End: 2023-01-24

## 2023-01-24 RX ORDER — IPRATROPIUM BROMIDE AND ALBUTEROL SULFATE 2.5; .5 MG/3ML; MG/3ML
3 SOLUTION RESPIRATORY (INHALATION) EVERY 4 HOURS PRN
Status: DISCONTINUED | OUTPATIENT
Start: 2023-01-24 | End: 2023-01-26 | Stop reason: HOSPADM

## 2023-01-24 RX ORDER — ACETAMINOPHEN 325 MG/1
650 TABLET ORAL EVERY 6 HOURS PRN
Status: DISCONTINUED | OUTPATIENT
Start: 2023-01-24 | End: 2023-01-26 | Stop reason: HOSPADM

## 2023-01-24 RX ORDER — HYDRALAZINE HYDROCHLORIDE 20 MG/ML
10 INJECTION INTRAMUSCULAR; INTRAVENOUS EVERY 8 HOURS PRN
Status: DISCONTINUED | OUTPATIENT
Start: 2023-01-24 | End: 2023-01-26 | Stop reason: HOSPADM

## 2023-01-24 RX ORDER — LOSARTAN POTASSIUM 50 MG/1
50 TABLET ORAL DAILY
Status: DISCONTINUED | OUTPATIENT
Start: 2023-01-25 | End: 2023-01-26 | Stop reason: HOSPADM

## 2023-01-24 RX ORDER — METHYLPREDNISOLONE SOD SUCC 125 MG
125 VIAL (EA) INJECTION
Status: COMPLETED | OUTPATIENT
Start: 2023-01-24 | End: 2023-01-24

## 2023-01-24 RX ORDER — ALPRAZOLAM 1 MG/1
1 TABLET ORAL 2 TIMES DAILY PRN
Status: DISCONTINUED | OUTPATIENT
Start: 2023-01-24 | End: 2023-01-26 | Stop reason: HOSPADM

## 2023-01-24 RX ORDER — BENZONATATE 100 MG/1
100 CAPSULE ORAL 3 TIMES DAILY
Status: DISCONTINUED | OUTPATIENT
Start: 2023-01-24 | End: 2023-01-26 | Stop reason: HOSPADM

## 2023-01-24 RX ORDER — IPRATROPIUM BROMIDE AND ALBUTEROL SULFATE 2.5; .5 MG/3ML; MG/3ML
3 SOLUTION RESPIRATORY (INHALATION) EVERY 4 HOURS
Status: DISPENSED | OUTPATIENT
Start: 2023-01-24 | End: 2023-01-25

## 2023-01-24 RX ORDER — GABAPENTIN 300 MG/1
600 CAPSULE ORAL 3 TIMES DAILY
Status: DISCONTINUED | OUTPATIENT
Start: 2023-01-25 | End: 2023-01-26 | Stop reason: HOSPADM

## 2023-01-24 RX ORDER — ASPIRIN 81 MG/1
81 TABLET ORAL DAILY
Status: DISCONTINUED | OUTPATIENT
Start: 2023-01-25 | End: 2023-01-26 | Stop reason: HOSPADM

## 2023-01-24 RX ORDER — SERTRALINE HYDROCHLORIDE 50 MG/1
200 TABLET, FILM COATED ORAL DAILY
Status: DISCONTINUED | OUTPATIENT
Start: 2023-01-25 | End: 2023-01-26 | Stop reason: HOSPADM

## 2023-01-24 RX ORDER — GUAIFENESIN 100 MG/5ML
200 SOLUTION ORAL EVERY 4 HOURS PRN
Status: DISCONTINUED | OUTPATIENT
Start: 2023-01-24 | End: 2023-01-26 | Stop reason: HOSPADM

## 2023-01-24 RX ORDER — HYDROCODONE BITARTRATE AND ACETAMINOPHEN 10; 325 MG/1; MG/1
1 TABLET ORAL EVERY 4 HOURS PRN
Status: DISCONTINUED | OUTPATIENT
Start: 2023-01-24 | End: 2023-01-26 | Stop reason: HOSPADM

## 2023-01-24 RX ORDER — ATORVASTATIN CALCIUM 40 MG/1
80 TABLET, FILM COATED ORAL NIGHTLY
Status: DISCONTINUED | OUTPATIENT
Start: 2023-01-24 | End: 2023-01-26 | Stop reason: HOSPADM

## 2023-01-24 RX ORDER — CLOPIDOGREL BISULFATE 75 MG/1
75 TABLET ORAL DAILY
Status: DISCONTINUED | OUTPATIENT
Start: 2023-01-25 | End: 2023-01-26 | Stop reason: HOSPADM

## 2023-01-24 RX ORDER — PREDNISONE 20 MG/1
40 TABLET ORAL DAILY
Status: DISCONTINUED | OUTPATIENT
Start: 2023-01-25 | End: 2023-01-26 | Stop reason: HOSPADM

## 2023-01-24 RX ORDER — ONDANSETRON 2 MG/ML
4 INJECTION INTRAMUSCULAR; INTRAVENOUS EVERY 8 HOURS PRN
Status: DISCONTINUED | OUTPATIENT
Start: 2023-01-24 | End: 2023-01-26 | Stop reason: HOSPADM

## 2023-01-24 RX ADMIN — METHYLPREDNISOLONE SODIUM SUCCINATE 125 MG: 125 INJECTION, POWDER, FOR SOLUTION INTRAMUSCULAR; INTRAVENOUS at 05:01

## 2023-01-24 RX ADMIN — IPRATROPIUM BROMIDE AND ALBUTEROL SULFATE 3 ML: 2.5; .5 SOLUTION RESPIRATORY (INHALATION) at 05:01

## 2023-01-24 RX ADMIN — ATORVASTATIN CALCIUM 80 MG: 40 TABLET, FILM COATED ORAL at 11:01

## 2023-01-24 RX ADMIN — BENZONATATE 100 MG: 100 CAPSULE ORAL at 11:01

## 2023-01-24 RX ADMIN — IPRATROPIUM BROMIDE AND ALBUTEROL SULFATE 3 ML: 2.5; .5 SOLUTION RESPIRATORY (INHALATION) at 08:01

## 2023-01-24 RX ADMIN — AZITHROMYCIN MONOHYDRATE 250 MG: 250 TABLET ORAL at 11:01

## 2023-01-24 RX ADMIN — HYDROCODONE BITARTRATE AND ACETAMINOPHEN 1 TABLET: 10; 325 TABLET ORAL at 11:01

## 2023-01-24 RX ADMIN — ALPRAZOLAM 1 MG: 1 TABLET ORAL at 11:01

## 2023-01-24 RX ADMIN — IPRATROPIUM BROMIDE AND ALBUTEROL SULFATE 3 ML: 2.5; .5 SOLUTION RESPIRATORY (INHALATION) at 11:01

## 2023-01-24 NOTE — Clinical Note
Diagnosis: SOB (shortness of breath) [295853]   Future Attending Provider: BAKARI VELOZ [[id number]]   Admitting Provider:: BAKARI VELOZ [[id number]]   Special Needs:: No Special Needs [1]

## 2023-01-24 NOTE — ED PROVIDER NOTES
SCRIBE #1 NOTE: I, Kesha Murray, am scribing for, and in the presence of, Morris Oviedo MD. I have scribed the entire note.      History      Chief Complaint   Patient presents with    Shortness of Breath     Shortness of breath since last night; wheezing in triage; unrelieved at home with inhaler       Review of patient's allergies indicates:  No Known Allergies     HPI   HPI    1/24/2023, 5:42 PM   History obtained from the patient      History of Present Illness: Abdullahi Urias is a 67 y.o. male patient with a PMHx of aortic stenosis, asthma, CAD, cardiomyopathy, CHF, CKD, CVA, hepatitis C, HTN, prostate cancer, PVD, and substance abuse who presents to the Emergency Department for SOB which onset gradually last night. Symptoms are constant and moderate in severity. No mitigating or exacerbating factors reported. Associated sxs include orthopnea, wheezing, cough, and diaphoresis. Patient denies any leg swelling, fever, chills, CP, weakness, numbness, and all other sxs at this time. Pt has used his inhaler for his SOB with no relief of sxs. No further complaints or concerns at this time.         Arrival mode: Personal vehicle      PCP: Reji Valentin MD       Past Medical History:  Past Medical History:   Diagnosis Date    Aortic stenosis     dr ph[facility]    Asthma     BPH (benign prostatic hyperplasia)     CAD (coronary artery disease)     Cardiomyopathy     CHF (congestive heart failure)     Chronic hoarseness     vocal cord surg    Chronic pain     CKD (chronic kidney disease) stage 3, GFR 30-59 ml/min     CVA (cerebral infarction)     8/2012 olol; reviewed ed note    Ex-smoker     GERD (gastroesophageal reflux disease)     Hepatitis C     treatedharvoni says cured, RNA NEG 6/2020    Hypertension     Pancreatitis     Prostate cancer     Prostate cancer     Prostate cancer     PVD (peripheral vascular disease)     Renal insufficiency     Substance abuse     cocaine, etoh , tob in past       Past  Surgical History:  Past Surgical History:   Procedure Laterality Date    AORTIC VALVE REPLACEMENT  05/19/2014    Tissue valve replacement    BACK SURGERY      CARDIAC CATHETERIZATION      COLONOSCOPY  2011    COLONOSCOPY N/A 2/24/2021    Procedure: COLONOSCOPY;  Surgeon: Faith Carrillo MD;  Location: [facility] ENDO;  Service: Endoscopy;  Laterality: N/A;    EPIDURAL STEROID INJECTION INTO CERVICAL SPINE N/A 6/21/2022    Procedure: C7-T1 IL PIEDAD;  Surgeon: Nehemiah Carmen MD;  Location: [facility] PAIN MGT;  Service: Pain Management;  Laterality: N/A;    ESOPHAGOGASTRODUODENOSCOPY N/A 2/24/2021    Procedure: ESOPHAGOGASTRODUODENOSCOPY (EGD);  Surgeon: Faith Carrillo MD;  Location: [facility] ENDO;  Service: Endoscopy;  Laterality: N/A;    FOOT SURGERY      LEFT HEART CATHETERIZATION Left 7/24/2020    Procedure: CATHETERIZATION, HEART, LEFT;  Surgeon: Pasha Martin MD;  Location: [facility] CATH LAB;  Service: Cardiology;  Laterality: Left;    PENILE PROSTHESIS IMPLANT      PENILE PROSTHESIS REVISION  04/30/2018    PROSTATECTOMY  09/2019    [facility]    radiation for prostate      TRANSFORAMINAL EPIDURAL INJECTION OF STEROID Right 10/20/2022    Procedure: Right  L4/5 + L5/S1 TF PIEDAD RN IV Sedation;  Surgeon: Nehemiah Carmen MD;  Location: [facility] PAIN MGT;  Service: Pain Management;  Laterality: Right;    UPPER GASTROINTESTINAL ENDOSCOPY  2011         Family History:  Family History   Problem Relation Age of Onset    Heart disease Mother     Heart disease Father     Heart attack Sister     Heart attack Brother     Colon cancer Neg Hx     Colon polyps Neg Hx     Liver cancer Neg Hx     Inflammatory bowel disease Neg Hx     Liver disease Neg Hx     Rectal cancer Neg Hx     Stomach cancer Neg Hx     Ulcerative colitis Neg Hx        Social History:  Social History     Tobacco Use    Smoking status: Former     Packs/day: 2.00     Years: 8.00     Pack years: 16.00     Types: Cigarettes     Quit date: 8/24/2012     Years since  "quitting: 10.4    Smokeless tobacco: Never   Substance and Sexual Activity    Alcohol use: No     Comment: Sober since 08/24/2012    Drug use: Not Currently     Types: "Crack" cocaine, Marijuana     Comment: Quit in 2012    Sexual activity: Yes       ROS   Review of Systems   Constitutional:  Positive for diaphoresis. Negative for chills and fever.   HENT:  Negative for sore throat.    Respiratory:  Positive for cough, shortness of breath and wheezing.         (+) orthopnea   Cardiovascular:  Negative for chest pain and leg swelling.   Gastrointestinal:  Negative for nausea.   Genitourinary:  Negative for dysuria.   Musculoskeletal:  Negative for back pain.   Skin:  Negative for rash.   Neurological:  Negative for weakness and numbness.   Hematological:  Does not bruise/bleed easily.   All other systems reviewed and are negative.    Physical Exam      Initial Vitals [01/24/23 1705]   BP Pulse Resp Temp SpO2   (!) 142/68 89 (!) 25 97.5 °F (36.4 °C) 95 %      MAP       --          Physical Exam  Nursing Notes and Vital Signs Reviewed.  Constitutional: Patient is in no acute distress. Well-developed and well-nourished.  Head: Atraumatic. Normocephalic.  Eyes: PERRL. EOM intact. Conjunctivae are not pale. No scleral icterus.  ENT: Mucous membranes are moist. Oropharynx is clear and symmetric.    Neck: Supple. Full ROM. No lymphadenopathy.  Cardiovascular: Regular rate. Regular rhythm. No murmurs, rubs, or gallops. Distal pulses are 2+ and symmetric.  Pulmonary/Chest: No respiratory distress. Significant wheezing.  Abdominal: Soft and non-distended.  There is no tenderness.  No rebound, guarding, or rigidity.  Musculoskeletal: Moves all extremities. No obvious deformities. No edema.  Skin: Warm and dry.  Neurological:  Alert, awake, and appropriate.  Normal speech.  No acute focal neurological deficits are appreciated.  Psychiatric: Normal affect. Good eye contact. Appropriate in content.    ED Course    Procedures  ED " Vital Signs:  Vitals:    01/24/23 1705 01/24/23 1721 01/24/23 1740 01/24/23 1743   BP: (!) 142/68      Pulse: 89 86 86 82   Resp: (!) 25  (!) 26 (!) 24   Temp: 97.5 °F (36.4 °C)      TempSrc: Oral      SpO2: 95%  100%    Weight: 112.9 kg (249 lb)       01/24/23 1745   BP:    Pulse: 84   Resp: (!) 24   Temp:    TempSrc:    SpO2:    Weight:        Abnormal Lab Results:  Labs Reviewed   CBC W/ AUTO DIFFERENTIAL - Abnormal; Notable for the following components:       Result Value    RBC 4.49 (*)     Hemoglobin 12.9 (*)     Hematocrit 37.9 (*)     Eosinophil % 10.3 (*)     All other components within normal limits   COMPREHENSIVE METABOLIC PANEL - Abnormal; Notable for the following components:    CO2 18 (*)     Glucose 115 (*)     Creatinine 1.7 (*)     eGFR 44 (*)     All other components within normal limits   TROPONIN I   B-TYPE NATRIURETIC PEPTIDE   PROTIME-INR   SARS-COV-2 RNA AMPLIFICATION, QUAL        All Lab Results:  Results for orders placed or performed during the hospital encounter of 01/24/23   CBC auto differential   Result Value Ref Range    WBC 4.96 3.90 - 12.70 K/uL    RBC 4.49 (L) 4.60 - 6.20 M/uL    Hemoglobin 12.9 (L) 14.0 - 18.0 g/dL    Hematocrit 37.9 (L) 40.0 - 54.0 %    MCV 84 82 - 98 fL    MCH 28.7 27.0 - 31.0 pg    MCHC 34.0 32.0 - 36.0 g/dL    RDW 14.1 11.5 - 14.5 %    Platelets 171 150 - 450 K/uL    MPV 9.9 9.2 - 12.9 fL    Immature Granulocytes 0.4 0.0 - 0.5 %    Gran # (ANC) 2.6 1.8 - 7.7 K/uL    Immature Grans (Abs) 0.02 0.00 - 0.04 K/uL    Lymph # 1.4 1.0 - 4.8 K/uL    Mono # 0.4 0.3 - 1.0 K/uL    Eos # 0.5 0.0 - 0.5 K/uL    Baso # 0.04 0.00 - 0.20 K/uL    nRBC 0 0 /100 WBC    Gran % 51.6 38.0 - 73.0 %    Lymph % 28.6 18.0 - 48.0 %    Mono % 8.3 4.0 - 15.0 %    Eosinophil % 10.3 (H) 0.0 - 8.0 %    Basophil % 0.8 0.0 - 1.9 %    Differential Method Automated    Comprehensive metabolic panel   Result Value Ref Range    Sodium 140 136 - 145 mmol/L    Potassium 3.9 3.5 - 5.1 mmol/L    Chloride  110 95 - 110 mmol/L    CO2 18 (L) 23 - 29 mmol/L    Glucose 115 (H) 70 - 110 mg/dL    BUN 17 8 - 23 mg/dL    Creatinine 1.7 (H) 0.5 - 1.4 mg/dL    Calcium 9.5 8.7 - 10.5 mg/dL    Total Protein 7.7 6.0 - 8.4 g/dL    Albumin 4.0 3.5 - 5.2 g/dL    Total Bilirubin 0.4 0.1 - 1.0 mg/dL    Alkaline Phosphatase 97 55 - 135 U/L    AST 20 10 - 40 U/L    ALT 12 10 - 44 U/L    Anion Gap 12 8 - 16 mmol/L    eGFR 44 (A) >60 mL/min/1.73 m^2   Troponin I   Result Value Ref Range    Troponin I <0.006 0.000 - 0.026 ng/mL   Brain natriuretic peptide   Result Value Ref Range    BNP 40 0 - 99 pg/mL   Protime-INR   Result Value Ref Range    Prothrombin Time 10.9 9.0 - 12.5 sec    INR 1.0 0.8 - 1.2   COVID-19 Rapid Screening   Result Value Ref Range    SARS-CoV-2 RNA, Amplification, Qual Negative Negative         Imaging Results:  Imaging Results              X-Ray Chest AP Portable (Final result)  Result time 01/24/23 18:41:41      Final result by Bonilla Madrigal MD (01/24/23 18:41:41)                   Impression:      Cardiomegaly without definite acute abnormality.      Electronically signed by: Bonilla Madrigal  Date:    01/24/2023  Time:    18:41               Narrative:    EXAMINATION:  XR CHEST AP PORTABLE    CLINICAL HISTORY:  shortness of breath;    TECHNIQUE:  Single frontal view of the chest was performed.    COMPARISON:  Multiple priors.    FINDINGS:  Median sternotomy wires in place.    The lungs are clear, with normal appearance of pulmonary vasculature and no pleural effusion or pneumothorax.    The cardiac silhouette is enlarged.  The hilar and mediastinal contours are unremarkable.    Bones are intact.                                     The EKG was ordered, reviewed, and independently interpreted by the ED provider.  Interpretation time: 17:09  Rate: 88 BPM  Rhythm: normal sinus rhythm  Interpretation: T wave inversions anterolaterally. No STEMI.  When compared to EKG performed on 1/14/23, there are no significant  changes.           The Emergency Provider reviewed the vital signs and test results, which are outlined above.    ED Discussion     7:01 PM: Re-evaluated pt. Pt still has significant wheezing.  Abdullahi Urias is a 67 y.o. male patient with a PMHx of aortic stenosis, asthma, CAD, cardiomyopathy, CHF, CKD, CVA, hepatitis C, HTN, prostate cancer, PVD, and substance abuse who presents to the Emergency Department for SOB/ orthopnea.  He was wheezing significantly but not hypoxic on arrival.  He responded well to 3 nebs however still has significant expiratory wheezes.  I anticipate acute bronchitis but he will need serial nebs and observation overnight.    7:28 PM: Discussed case with Whit Sampson NP ([facility]). Dr. Mendoza agrees with current care and management of pt and accepts admission.   Admitting Service: Hospital Medicine   Admitting Physician: Dr. Mendoza  Admit to: Obs Med Tele    7:29 PM: Re-evaluated pt. I have discussed test results, shared treatment plan, and the need for admission with patient and family at bedside. Pt and family express understanding at this time and agree with all information. All questions answered. Pt and family have no further questions or concerns at this time. Pt is ready for admit.      ED Course as of 01/24/23 1931 Tue Jan 24, 2023 1928 SARS-CoV-2 RNA, Amplification, Qual: Negative [JENNIFER]   1929 Troponin I: <0.006 [JENNIFER]   1929 X-Ray Chest AP Portable [JENNIFER]      ED Course User Index  [JENNIFER] Morris Oviedo MD       ED Medication(s):  Medications   albuterol-ipratropium 2.5 mg-0.5 mg/3 mL nebulizer solution 3 mL (has no administration in time range)   acetaminophen tablet 650 mg (has no administration in time range)   ondansetron injection 4 mg (has no administration in time range)   guaiFENesin 100 mg/5 ml syrup 200 mg (has no administration in time range)   aluminum-magnesium hydroxide-simethicone 200-200-20 mg/5 mL suspension 30 mL (has no administration in time range)    albuterol-ipratropium 2.5 mg-0.5 mg/3 mL nebulizer solution 3 mL (has no administration in time range)   hydrALAZINE injection 10 mg (has no administration in time range)   albuterol-ipratropium 2.5 mg-0.5 mg/3 mL nebulizer solution 3 mL (3 mLs Nebulization Given 1/24/23 1745)   methylPREDNISolone sodium succinate injection 125 mg (125 mg Intravenous Given 1/24/23 1741)           New Prescriptions    No medications on file         Medical Decision Making    Medical Decision Making:   History:   Old Records Summarized: other records.       <> Summary of Records: Review of external records:  Reviewed the pt's EKG from 1/14/23.  Independently Interpreted Test(s):   I have ordered and independently interpreted EKG Reading(s) - see prior notes  Clinical Tests:   Lab Tests: Ordered and Reviewed  Radiological Study: Ordered and Reviewed  Medical Tests: Ordered and Reviewed  Other:   I have discussed this case with another health care provider.       <> Summary of the Discussion: See summary in the ED Discussion.         Scribe Attestation:   Scribe #1: I performed the above scribed service and the documentation accurately describes the services I performed. I attest to the accuracy of the note.    Attending:   Physician Attestation Statement for Scribe #1: I, Morris Oviedo MD, personally performed the services described in this documentation, as scribed by Kesha Murray, in my presence, and it is both accurate and complete.          Clinical Impression     Final diagnoses:  [R06.02] SOB (shortness of breath)  [J20.9] Acute bronchitis, unspecified organism (Primary)  [I10] Hypertension, unspecified type (Chronic)  [E66.9] Obesity, Class I, BMI 30-34.9 (Chronic)  [G47.33, Z99.89] CHEO on CPAP  [J45.901] Asthmatic bronchitis with acute exacerbation, unspecified asthma severity, unspecified whether persistent     Disposition:   Disposition: Placed in Observation  Condition: Fair       Morris Oviedo MD  01/25/23 [id number]

## 2023-01-24 NOTE — PROGRESS NOTES
HPI     Annual Exam            Comments: NP          Comments    Vision changes since last eye exam?: stable but complains that his near   vision is a little fuzzy      Any eye pain today: no    Other ocular symptoms: no    Interested in contact lens fitting today? no                      Last edited by Gwen Roberts on 1/24/2023 10:08 AM.            Assessment /Plan     For exam results, see Encounter Report.    Nuclear sclerosis, bilateral  Cortical age-related cataract of both eyes  Visually significant cataract.  Patient is at the option stage.   Cataract surgery will improve vision, but necessity is dependent on patient's symptoms.   Pt declines surgical consult at this time.  Will continue to monitor over the next 12 months. Pt to call or RTC with any significant change in vision prior to next visit.    Dry eye  Recommended artificial tears bid OU  Ivizia coupon given to pt    Hyperopia with astigmatism and presbyopia, bilateral  Eyeglass Final Rx       Eyeglass Final Rx         Sphere Cylinder Axis Add    Right +0.25   +2.50    Left +0.50 +0.25 050 +2.50      Expiration Date: 1/24/2024                  RTC 1 yr for dilated eye exam or PRN if any problems.   Discussed above and answered questions.

## 2023-01-25 LAB
ALBUMIN SERPL BCP-MCNC: 3.8 G/DL (ref 3.5–5.2)
ALP SERPL-CCNC: 93 U/L (ref 55–135)
ALT SERPL W/O P-5'-P-CCNC: 11 U/L (ref 10–44)
ANION GAP SERPL CALC-SCNC: 12 MMOL/L (ref 8–16)
AST SERPL-CCNC: 17 U/L (ref 10–40)
BASOPHILS # BLD AUTO: 0.01 K/UL (ref 0–0.2)
BASOPHILS NFR BLD: 0.2 % (ref 0–1.9)
BILIRUB SERPL-MCNC: 0.4 MG/DL (ref 0.1–1)
BUN SERPL-MCNC: 19 MG/DL (ref 8–23)
CALCIUM SERPL-MCNC: 9.5 MG/DL (ref 8.7–10.5)
CHLORIDE SERPL-SCNC: 111 MMOL/L (ref 95–110)
CO2 SERPL-SCNC: 16 MMOL/L (ref 23–29)
CREAT SERPL-MCNC: 1.6 MG/DL (ref 0.5–1.4)
DIFFERENTIAL METHOD: ABNORMAL
EOSINOPHIL # BLD AUTO: 0 K/UL (ref 0–0.5)
EOSINOPHIL NFR BLD: 0 % (ref 0–8)
ERYTHROCYTE [DISTWIDTH] IN BLOOD BY AUTOMATED COUNT: 14.4 % (ref 11.5–14.5)
EST. GFR  (NO RACE VARIABLE): 47 ML/MIN/1.73 M^2
GLUCOSE SERPL-MCNC: 172 MG/DL (ref 70–110)
HCT VFR BLD AUTO: 39.7 % (ref 40–54)
HGB BLD-MCNC: 12.7 G/DL (ref 14–18)
IMM GRANULOCYTES # BLD AUTO: 0.02 K/UL (ref 0–0.04)
IMM GRANULOCYTES NFR BLD AUTO: 0.4 % (ref 0–0.5)
LYMPHOCYTES # BLD AUTO: 0.6 K/UL (ref 1–4.8)
LYMPHOCYTES NFR BLD: 12.4 % (ref 18–48)
MAGNESIUM SERPL-MCNC: 1.9 MG/DL (ref 1.6–2.6)
MCH RBC QN AUTO: 28.9 PG (ref 27–31)
MCHC RBC AUTO-ENTMCNC: 32 G/DL (ref 32–36)
MCV RBC AUTO: 90 FL (ref 82–98)
MONOCYTES # BLD AUTO: 0.1 K/UL (ref 0.3–1)
MONOCYTES NFR BLD: 2 % (ref 4–15)
NEUTROPHILS # BLD AUTO: 3.9 K/UL (ref 1.8–7.7)
NEUTROPHILS NFR BLD: 85 % (ref 38–73)
NRBC BLD-RTO: 0 /100 WBC
PLATELET # BLD AUTO: 157 K/UL (ref 150–450)
PMV BLD AUTO: 10.5 FL (ref 9.2–12.9)
POTASSIUM SERPL-SCNC: 4.3 MMOL/L (ref 3.5–5.1)
PROT SERPL-MCNC: 7.6 G/DL (ref 6–8.4)
RBC # BLD AUTO: 4.39 M/UL (ref 4.6–6.2)
SODIUM SERPL-SCNC: 139 MMOL/L (ref 136–145)
WBC # BLD AUTO: 4.58 K/UL (ref 3.9–12.7)

## 2023-01-25 PROCEDURE — 25000003 PHARM REV CODE 250: Mod: HCNC | Performed by: INTERNAL MEDICINE

## 2023-01-25 PROCEDURE — 94640 AIRWAY INHALATION TREATMENT: CPT | Mod: HCNC

## 2023-01-25 PROCEDURE — 94640 AIRWAY INHALATION TREATMENT: CPT | Mod: HCNC,XB

## 2023-01-25 PROCEDURE — 36415 COLL VENOUS BLD VENIPUNCTURE: CPT | Mod: HCNC | Performed by: INTERNAL MEDICINE

## 2023-01-25 PROCEDURE — 83735 ASSAY OF MAGNESIUM: CPT | Mod: HCNC | Performed by: INTERNAL MEDICINE

## 2023-01-25 PROCEDURE — 99900035 HC TECH TIME PER 15 MIN (STAT): Mod: HCNC

## 2023-01-25 PROCEDURE — 25000242 PHARM REV CODE 250 ALT 637 W/ HCPCS: Mod: HCNC | Performed by: INTERNAL MEDICINE

## 2023-01-25 PROCEDURE — 85025 COMPLETE CBC W/AUTO DIFF WBC: CPT | Mod: HCNC | Performed by: INTERNAL MEDICINE

## 2023-01-25 PROCEDURE — 63700000 PHARM REV CODE 250 ALT 637 W/O HCPCS: Mod: HCNC | Performed by: INTERNAL MEDICINE

## 2023-01-25 PROCEDURE — 25000242 PHARM REV CODE 250 ALT 637 W/ HCPCS: Mod: HCNC | Performed by: NURSE PRACTITIONER

## 2023-01-25 PROCEDURE — G0378 HOSPITAL OBSERVATION PER HR: HCPCS | Mod: HCNC

## 2023-01-25 PROCEDURE — 80053 COMPREHEN METABOLIC PANEL: CPT | Mod: HCNC | Performed by: INTERNAL MEDICINE

## 2023-01-25 PROCEDURE — 27000221 HC OXYGEN, UP TO 24 HOURS: Mod: HCNC

## 2023-01-25 PROCEDURE — 63600175 PHARM REV CODE 636 W HCPCS: Mod: HCNC | Performed by: NURSE PRACTITIONER

## 2023-01-25 PROCEDURE — 96372 THER/PROPH/DIAG INJ SC/IM: CPT | Performed by: NURSE PRACTITIONER

## 2023-01-25 PROCEDURE — 63600175 PHARM REV CODE 636 W HCPCS: Mod: HCNC | Performed by: INTERNAL MEDICINE

## 2023-01-25 PROCEDURE — 94761 N-INVAS EAR/PLS OXIMETRY MLT: CPT | Mod: HCNC

## 2023-01-25 RX ORDER — ARFORMOTEROL TARTRATE 15 UG/2ML
15 SOLUTION RESPIRATORY (INHALATION) EVERY 12 HOURS
Status: DISCONTINUED | OUTPATIENT
Start: 2023-01-25 | End: 2023-01-26 | Stop reason: HOSPADM

## 2023-01-25 RX ORDER — FLUTICASONE PROPIONATE 50 MCG
1 SPRAY, SUSPENSION (ML) NASAL DAILY
Qty: 16 G | Refills: 0 | Status: SHIPPED | OUTPATIENT
Start: 2023-01-25 | End: 2023-08-16 | Stop reason: SDUPTHER

## 2023-01-25 RX ADMIN — CLOPIDOGREL BISULFATE 75 MG: 75 TABLET ORAL at 08:01

## 2023-01-25 RX ADMIN — GUAIFENESIN 200 MG: 200 SOLUTION ORAL at 03:01

## 2023-01-25 RX ADMIN — GABAPENTIN 600 MG: 300 CAPSULE ORAL at 08:01

## 2023-01-25 RX ADMIN — IPRATROPIUM BROMIDE AND ALBUTEROL SULFATE 3 ML: 2.5; .5 SOLUTION RESPIRATORY (INHALATION) at 01:01

## 2023-01-25 RX ADMIN — METHYLPREDNISOLONE SODIUM SUCCINATE 40 MG: 40 INJECTION, POWDER, FOR SOLUTION INTRAMUSCULAR; INTRAVENOUS at 03:01

## 2023-01-25 RX ADMIN — HYDROCODONE BITARTRATE AND ACETAMINOPHEN 1 TABLET: 10; 325 TABLET ORAL at 11:01

## 2023-01-25 RX ADMIN — ALPRAZOLAM 1 MG: 1 TABLET ORAL at 11:01

## 2023-01-25 RX ADMIN — ARFORMOTEROL TARTRATE 15 MCG: 15 SOLUTION RESPIRATORY (INHALATION) at 07:01

## 2023-01-25 RX ADMIN — AZITHROMYCIN MONOHYDRATE 250 MG: 250 TABLET ORAL at 08:01

## 2023-01-25 RX ADMIN — LOSARTAN POTASSIUM 50 MG: 50 TABLET, FILM COATED ORAL at 08:01

## 2023-01-25 RX ADMIN — GABAPENTIN 600 MG: 300 CAPSULE ORAL at 03:01

## 2023-01-25 RX ADMIN — PREDNISONE 40 MG: 20 TABLET ORAL at 08:01

## 2023-01-25 RX ADMIN — BENZONATATE 100 MG: 100 CAPSULE ORAL at 10:01

## 2023-01-25 RX ADMIN — SERTRALINE HYDROCHLORIDE 200 MG: 50 TABLET ORAL at 08:01

## 2023-01-25 RX ADMIN — GUAIFENESIN 200 MG: 200 SOLUTION ORAL at 12:01

## 2023-01-25 RX ADMIN — IPRATROPIUM BROMIDE AND ALBUTEROL SULFATE 3 ML: 2.5; .5 SOLUTION RESPIRATORY (INHALATION) at 02:01

## 2023-01-25 RX ADMIN — BENZONATATE 100 MG: 100 CAPSULE ORAL at 03:01

## 2023-01-25 RX ADMIN — ASPIRIN 81 MG: 81 TABLET, COATED ORAL at 08:01

## 2023-01-25 RX ADMIN — IPRATROPIUM BROMIDE AND ALBUTEROL SULFATE 3 ML: 2.5; .5 SOLUTION RESPIRATORY (INHALATION) at 07:01

## 2023-01-25 RX ADMIN — BENZONATATE 100 MG: 100 CAPSULE ORAL at 08:01

## 2023-01-25 RX ADMIN — GABAPENTIN 600 MG: 300 CAPSULE ORAL at 10:01

## 2023-01-25 RX ADMIN — IPRATROPIUM BROMIDE AND ALBUTEROL SULFATE 3 ML: 2.5; .5 SOLUTION RESPIRATORY (INHALATION) at 04:01

## 2023-01-25 RX ADMIN — ATORVASTATIN CALCIUM 80 MG: 40 TABLET, FILM COATED ORAL at 10:01

## 2023-01-25 NOTE — H&P
[facility]  History & Physical    Patient Name: Abdullahi Urias  MRN: [id number]  Patient Class: OP- Observation  Admission Date: 1/24/2023  Attending Physician: Nathan Mendoza MD   Primary Care Provider: Reji Valentin MD         Patient information was obtained from patient, past medical records and ER records.     Subjective:     Principal Problem:Asthmatic bronchitis    Chief Complaint:   Chief Complaint   Patient presents with    Shortness of Breath     Shortness of breath since last night; wheezing in triage; unrelieved at home with inhaler        HPI: Mr. Urias is a 67-year-old  female with PMH significant for aortic stenosis with bioprosthetic aortic valve replacement, diastolic CHF, CKD stage 3, HTN, prostate cancer, former tobacco user, presented to the ED complaining of SOB/wheezing for the past 1-2 days.  Patient is not hypoxic, arrival, does not use supplemental oxygen at home.  Denies fever, chills, but complains of dry nonproductive cough.  COVID-19 is negative.  CXR reveals no evidence of infiltrates, masses or effusions.  Laboratory workup is unremarkable, except for creatinine 1.7, which is at baseline.  Received Solu-Medrol 125 mg IV x1 in the ED, along with three rounds of bronchodilators, with some improvement.  Still has expiratory wheezing, hence placed in observation for asthmatic bronchitis.      Past Medical History:   Diagnosis Date    [facility]    Asthma     BPH (benign prostatic hyperplasia)     CAD (coronary artery disease)     Cardiomyopathy     CHF (congestive heart failure)     Chronic hoarseness     vocal cord surg    Chronic pain     CKD (chronic kidney disease) stage 3, GFR 30-59 ml/min     CVA (cerebral infarction)     8/2012 olol; reviewed ed note    Ex-smoker     GERD (gastroesophageal reflux disease)     Hepatitis C     treatedharvoni says cured, RNA NEG 6/2020    Hypertension      Pancreatitis     Prostate cancer     Prostate cancer     Prostate cancer     PVD (peripheral vascular disease)     Renal insufficiency     Substance abuse     cocaine, etoh , tob in past       Past Surgical History:   Procedure Laterality Date    AORTIC VALVE REPLACEMENT  05/19/2014    Tissue valve replacement    BACK SURGERY      CARDIAC CATHETERIZATION      COLONOSCOPY  2011    COLONOSCOPY N/A 2/24/2021    Procedure: COLONOSCOPY;  Surgeon: Faith Carrillo MD;  Location: [facility] ENDO;  Service: Endoscopy;  Laterality: N/A;    EPIDURAL STEROID INJECTION INTO CERVICAL SPINE N/A 6/21/2022    Procedure: C7-T1 IL PIEDAD;  Surgeon: Nehemiah Carmen MD;  Location: [facility] PAIN MGT;  Service: Pain Management;  Laterality: N/A;    ESOPHAGOGASTRODUODENOSCOPY N/A 2/24/2021    Procedure: ESOPHAGOGASTRODUODENOSCOPY (EGD);  Surgeon: Faith Carrillo MD;  Location: [facility] ENDO;  Service: Endoscopy;  Laterality: N/A;    FOOT SURGERY      LEFT HEART CATHETERIZATION Left 7/24/2020    Procedure: CATHETERIZATION, HEART, LEFT;  Surgeon: Pasha Martin MD;  Location: [facility] CATH LAB;  Service: Cardiology;  Laterality: Left;    PENILE PROSTHESIS IMPLANT      PENILE PROSTHESIS REVISION  04/30/2018    [facility]    radiation for prostate      TRANSFORAMINAL EPIDURAL INJECTION OF STEROID Right 10/20/2022    Procedure: Right  L4/5 + L5/S1 TF PIEDAD RN IV Sedation;  Surgeon: Nehemiah Carmen MD;  Location: [facility] PAIN MGT;  Service: Pain Management;  Laterality: Right;    UPPER GASTROINTESTINAL ENDOSCOPY  2011       Review of patient's allergies indicates:  No Known Allergies    Current Facility-Administered Medications on File Prior to Encounter   Medication    ondansetron injection 4 mg    sodium chloride 0.9% flush 10 mL     Current Outpatient Medications on File Prior to Encounter   Medication Sig    albuterol (PROVENTIL HFA) 90 mcg/actuation inhaler Inhale 2 puffs into the lungs every 6 (six)  hours as needed for Wheezing. Rescue    albuterol (PROVENTIL/VENTOLIN HFA) 90 mcg/actuation inhaler INHALE 2 PUFFS INTO THE LUNGS EVERY 6 HOURS AS NEEDED FOR COUGH    allopurinoL (ZYLOPRIM) 100 MG tablet Take 100 mg by mouth once daily. Takes 0.5 tab    ALPRAZolam (XANAX) 1 MG tablet Take 1 mg by mouth.    aluminum & magnesium hydroxide-simethicone (MYLANTA MAX STRENGTH) 400-400-40 mg/5 mL suspension TAKE 15ML BY MOUTH FOUR TIMES A DAY AS NEEDED FOR STOMACH ACID    aspirin (ECOTRIN) 81 MG EC tablet Take 1 tablet (81 mg total) by mouth once daily.    atorvastatin (LIPITOR) 80 MG tablet TAKE ONE-HALF TABLET BY MOUTH EVERY DAY FOR CHOLESTEROL    baclofen (LIORESAL) 10 MG tablet Take 1 tablet (10 mg total) by mouth 3 (three) times daily.    carvediloL (COREG) 12.5 MG tablet Take 1 tablet (12.5 mg total) by mouth 2 (two) times daily.    clopidogreL (PLAVIX) 75 mg tablet Take 1 tablet (75 mg total) by mouth once daily.    cyclobenzaprine HCl (FLEXERIL ORAL) Take by mouth. Takes prn    diclofenac sodium (VOLTAREN) 1 % Gel APPLY 2 GRAMS TOPICALLY FOUR TIMES A DAY AS NEEDED FOR PAIN AND INFLAMMATION. USE ENCLOSED DOSING CARD.    diphenhydrAMINE (SOMINEX) 25 mg tablet Take 25 mg by mouth nightly as needed for Insomnia.    esomeprazole (NEXIUM) 40 MG capsule 40 mg.    famotidine (PEPCID) 40 MG tablet TAKE ONE TABLET BY MOUTH AT BEDTIME FOR ACID REFLUX    fexofenadine (ALLEGRA) 180 MG tablet Take 1 tablet (180 mg total) by mouth daily as needed.    flunisolide 25 mcg, 0.025%, (NASALIDE) 25 mcg (0.025 %) Spry 2 sprays by Nasal route as needed.     gabapentin (NEURONTIN) 600 MG tablet Take 1 tablet (600 mg total) by mouth 3 (three) times daily.    HYDROcodone-acetaminophen (NORCO)  mg per tablet Take 1 tablet by mouth every 4 (four) hours as needed.    hydrocortisone 2.5 % cream Apply topically 2 (two) times daily as needed. Apply to affected areas of the face and neck.    hydrocortisone-pramoxine  "(PROCTOFOAM-HS) rectal foam INSERT 1 APPLICATORFUL INTO RECTALLY TWICE A DAY FOR HEMORRHOIDS    hydrOXYzine HCL (ATARAX) 25 MG tablet Take 25 mg by mouth 3 (three) times daily as needed for Itching.    levocetirizine (XYZAL) 5 MG tablet Take 1 tablet (5 mg total) by mouth every evening. (Patient not taking: Reported on 1/9/2023)    LIDOcaine (LIDODERM) 5 % APPLY 1 PATCH TOPICALLY EVERY DAY FOR PAIN. WEAR FOR 12 HOURS, THEN REMOVE. DO NOT APPLY NEW PATCH FOR AT LEAST 12 HOURS.    losartan (COZAAR) 50 MG tablet Take 1 tablet (50 mg total) by mouth once daily.    methyl salicylate-menthol 15-10% 15-10 % Crea Apply topically as needed.     pantoprazole (PROTONIX) 40 MG tablet Take 40 mg by mouth 2 (two) times daily.     sertraline (ZOLOFT) 100 MG tablet TAKE TWO TABLETS BY MOUTH EVERY DAY FOR MENTAL HEALTH DOSE INCREASED TO 200MG/DAY    sertraline (ZOLOFT) 100 MG tablet 200 mg.    simethicone (MYLICON) 80 MG chewable tablet Take 80 mg by mouth every 6 (six) hours as needed for Flatulence.    sucralfate (CARAFATE) 100 mg/mL suspension Take 1 g by mouth 4 (four) times daily.     tramadol HCl (TRAMADOL ORAL) Take by mouth. Takes prn    triamcinolone acetonide 0.1% (KENALOG) 0.1 % cream Apply topically 2 (two) times daily.     Family History       Problem Relation (Age of Onset)    Heart attack Sister, Brother    Heart disease Mother, Father          Tobacco Use    Smoking status: Former     Packs/day: 2.00     Years: 8.00     Pack years: 16.00     Types: Cigarettes     Quit date: 8/24/2012     Years since quitting: 10.4    Smokeless tobacco: Never   Substance and Sexual Activity    Alcohol use: No     Comment: Sober since 08/24/2012    Drug use: Not Currently     Types: "Crack" cocaine, Marijuana     Comment: Quit in 2012    Sexual activity: Yes     Review of Systems   Constitutional: Negative.  Negative for chills and fever.   HENT: Negative.  Negative for congestion, rhinorrhea, sore throat and trouble " swallowing.    Eyes: Negative.  Negative for visual disturbance.   Respiratory:  Positive for cough (Dry nonproductive), shortness of breath and wheezing.    Cardiovascular: Negative.  Negative for chest pain and palpitations.   Gastrointestinal: Negative.  Negative for abdominal pain, diarrhea, nausea and vomiting.   Endocrine: Negative.    Genitourinary: Negative.  Negative for dysuria and flank pain.   Musculoskeletal:  Positive for back pain (Chronic low back pain).   Skin: Negative.  Negative for rash.   Allergic/Immunologic: Negative.    Neurological: Negative.  Negative for speech difficulty, weakness, numbness and headaches.   Hematological: Negative.    Psychiatric/Behavioral: Negative.  Negative for hallucinations.    All other systems reviewed and are negative.  Objective:     Vital Signs (Most Recent):  Temp: 97.4 °F (36.3 °C) (01/24/23 2125)  Pulse: 82 (01/24/23 2125)  Resp: 20 (01/24/23 2125)  BP: 135/76 (01/24/23 2125)  SpO2: 98 % (01/24/23 2125) Vital Signs (24h Range):  Temp:  [97.4 °F (36.3 °C)-97.5 °F (36.4 °C)] 97.4 °F (36.3 °C)  Pulse:  [79-89] 82  Resp:  [20-26] 20  SpO2:  [95 %-100 %] 98 %  BP: (135-145)/(68-90) 135/76     Weight: 112.9 kg (249 lb)  Body mass index is 36.77 kg/m².    Physical Exam  Vitals and nursing note reviewed.   Constitutional:       General: He is awake. He is not in acute distress.     Appearance: He is obese. He is not ill-appearing.   HENT:      Head: Normocephalic and atraumatic.      Mouth/Throat:      Mouth: Mucous membranes are moist.   Eyes:      General: No scleral icterus.     Conjunctiva/sclera: Conjunctivae normal.   Cardiovascular:      Rate and Rhythm: Normal rate and regular rhythm.      Heart sounds: Murmur heard.   Pulmonary:      Effort: Pulmonary effort is normal. No respiratory distress.      Breath sounds: Wheezing present.   Abdominal:      Palpations: Abdomen is soft.      Tenderness: There is no abdominal tenderness.   Musculoskeletal:          General: No swelling. Normal range of motion.      Cervical back: Normal range of motion and neck supple.   Skin:     General: Skin is warm.      Coloration: Skin is not jaundiced.   Neurological:      General: No focal deficit present.      Mental Status: He is alert and oriented to person, place, and time. Mental status is at baseline.   Psychiatric:         Attention and Perception: Attention normal.         Speech: Speech normal.         Behavior: Behavior normal. Behavior is cooperative.           Significant Labs: All pertinent labs within the past 24 hours have been reviewed.  BMP:   Recent Labs   Lab 01/24/23  1729   *      K 3.9      CO2 18*   BUN 17   CREATININE 1.7*   CALCIUM 9.5     CBC:   Recent Labs   Lab 01/24/23  1729   WBC 4.96   HGB 12.9*   HCT 37.9*        CMP:   Recent Labs   Lab 01/24/23  1729      K 3.9      CO2 18*   *   BUN 17   CREATININE 1.7*   CALCIUM 9.5   PROT 7.7   ALBUMIN 4.0   BILITOT 0.4   ALKPHOS 97   AST 20   ALT 12   ANIONGAP 12     Troponin:   Recent Labs   Lab 01/24/23  1729   TROPONINI <0.006       Significant Imaging: I have reviewed all pertinent imaging results/findings within the past 24 hours.  I have reviewed and interpreted all pertinent imaging results/findings within the past 24 hours.    Imaging Results              X-Ray Chest AP Portable (Final result)  Result time 01/24/23 18:41:41      Final result by Bonilla Madrigal MD (01/24/23 18:41:41)                   Impression:      Cardiomegaly without definite acute abnormality.      Electronically signed by: Bonilla Madrigal  Date:    01/24/2023  Time:    18:41               Narrative:    EXAMINATION:  XR CHEST AP PORTABLE    CLINICAL HISTORY:  shortness of breath;    TECHNIQUE:  Single frontal view of the chest was performed.    COMPARISON:  Multiple priors.    FINDINGS:  Median sternotomy wires in place.    The lungs are clear, with normal appearance of pulmonary vasculature  and no pleural effusion or pneumothorax.    The cardiac silhouette is enlarged.  The hilar and mediastinal contours are unremarkable.    Bones are intact.                                    I have independently reviewed and interpreted the EKG.     I have independently reviewed all pertinent labs within the past 24 hours.    I have independently reviewed, visualized and interpreted all pertinent imaging results within the past 24 hours and discussed the findings with the ED physician, Dr. Oviedo          Assessment/Plan:     * Asthmatic bronchitis  -denies fever, chills  -complains of dry nonproductive cough.    -CXR without acute infiltrates, masses or effusions.    -received Solu-Medrol 125 mg IV x1 in the ED, continue prednisone 40 mg p.o. daily x5 doses.    -oral Zithromax empirically.    -DuoNebs every 4 hours scheduled.    -re-evaluate in a.m.      Stage 3 chronic kidney disease  -creatinine 1.8, at baseline.    -closely monitor.      S/P AVR (aortic valve replacement) biopresthetic miller 25 mm in 2014          Chronic back pain  -continue home dose Percocet.    -      CAD (coronary artery disease)  -denies chest pain.    -continue home medications including aspirin, Plavix, ARB, statin.    -hold beta-blocker for now.      VTE Risk Mitigation (From admission, onward)         Ordered     Place sequential compression device  Until discontinued         01/24/23 1929                 The patient is placed in OBSERVATION status.      Nathan Mendoza MD  Department of Hospital Medicine   [facility]

## 2023-01-25 NOTE — HOSPITAL COURSE
Abdullahi Urias is a 67 year old male who was placed in observation for acute bronchitis. Management included IV corticosteroids, bronchodilators, empiric abx, and supplemental oxygen.  Patient is noted have improvement of his symptoms and is presently on room air with oxygen saturation 98%.  Patient is medically stable for discharge with outpatient Pulmonary follow-up.

## 2023-01-25 NOTE — HPI
Mr. Urias is a 67-year-old  female with PMH significant for aortic stenosis with bioprosthetic aortic valve replacement, diastolic CHF, CKD stage 3, HTN, prostate cancer, former tobacco user, presented to the ED complaining of SOB/wheezing for the past 1-2 days.  Patient is not hypoxic, arrival, does not use supplemental oxygen at home.  Denies fever, chills, but complains of dry nonproductive cough.  COVID-19 is negative.  CXR reveals no evidence of infiltrates, masses or effusions.  Laboratory workup is unremarkable, except for creatinine 1.7, which is at baseline.  Received Solu-Medrol 125 mg IV x1 in the ED, along with three rounds of bronchodilators, with some improvement.  Still has expiratory wheezing, hence placed in observation for asthmatic bronchitis.

## 2023-01-25 NOTE — PLAN OF CARE
[facility]  Initial Discharge Assessment       Primary Care Provider: Reji Valentin MD    Admission Diagnosis: SOB (shortness of breath) [R06.02]    Admission Date: 1/24/2023  Expected Discharge Date:     Discharge Barriers Identified: None    Payor: HU[employer] MANAGED MEDICARE / Plan: HU[employer]NA TOTAL CARE ADVANTAGE / Product Type: Medicare Advantage /     Extended Emergency Contact Information  Primary Emergency Contact: Vinita Urias   [place]  Mobile Phone: [phone]  Relation: Spouse  Secondary Emergency Contact: Amanda Pickett  Mobile Phone: [phone]  Relation: Grandchild    Discharge Plan A: Home with family         [facility] [place]  [address] [place] [address]  Phone: [phone] Fax: [phone]    [id number] - [place] - [address]  [address]  [place] [address]  Phone: [phone] Fax: [phone]    [id number] [place] - [address]  [address]  [place] [address]  Phone: [phone] Fax: [phone]    [id number]  [place] - [address] [facility] [address] [facility]  [address] [place] [address]  Phone: [phone] Fax: [phone]      Initial Assessment (most recent)       Adult Discharge Assessment - 01/25/23 1057          Discharge Assessment    Assessment Type Discharge Planning Assessment     Confirmed/corrected address, phone number and insurance Yes     Confirmed Demographics Correct on Facesheet     Source of Information patient     Communicated AURELIA with patient/caregiver Date not available/Unable to determine     Reason For Admission Asthmatic bronchitis     People in Home grandchild(maritza);spouse     Facility Arrived From: home     Do you expect to return to your current living situation? Yes     Do you have help at home or someone to help you manage your care at home? Yes      Who are your caregiver(s) and their phone number(s)? spouse     Prior to hospitilization cognitive status: Alert/Oriented     Current cognitive status: Alert/Oriented     Home Accessibility wheelchair accessible     Equipment Currently Used at Home walker, rolling;cane, straight;power chair     Readmission within 30 days? No     Patient currently being followed by outpatient case management? No     Do you currently have service(s) that help you manage your care at home? No     Do you take prescription medications? Yes     Do you have prescription coverage? Yes     Coverage [employer]     Do you have any problems affording any of your prescribed medications? No     Is the patient taking medications as prescribed? yes     Who is going to help you get home at discharge? spouse     How do you get to doctors appointments? car, drives self     Are you on dialysis? No     Do you take coumadin? No     Discharge Plan A Home with family     DME Needed Upon Discharge  none     Discharge Plan discussed with: Patient     Discharge Barriers Identified None                      Anticipated DC dispo: home with family  Prior Level of Function: Pt has cane, rolling walker, and scooter to assist ambulating   PCP: Reji Valentin MD    Comments:  Swer met with patient at bedside to introduce role and discuss discharge planning. Patient lives with spouse and grandson who will also be help at home and can provide transport at time of discharge. Swer discharge needs depends on hospital progress. Swer will continue following to assist with other needs.

## 2023-01-25 NOTE — PROGRESS NOTES
[facility] Medicine  Progress Note    Patient Name: Abdullahi Urias  MRN: [id number]  Patient Class: OP- Observation   Admission Date: 1/24/2023  Length of Stay: 0 days  Attending Physician: Franklin Guerin, *  Primary Care Provider: Reji Valentin MD    Subjective:     Principal Problem:Asthmatic bronchitis        HPI:  Mr. Urias is a 67-year-old  female with PMH significant for aortic stenosis with bioprosthetic aortic valve replacement, diastolic CHF, CKD stage 3, HTN, prostate cancer, former tobacco user, presented to the ED complaining of SOB/wheezing for the past 1-2 days.  Patient is not hypoxic, arrival, does not use supplemental oxygen at home.  Denies fever, chills, but complains of dry nonproductive cough.  COVID-19 is negative.  CXR reveals no evidence of infiltrates, masses or effusions.  Laboratory workup is unremarkable, except for creatinine 1.7, which is at baseline.  Received Solu-Medrol 125 mg IV x1 in the ED, along with three rounds of bronchodilators, with some improvement.  Still has expiratory wheezing, hence placed in observation for asthmatic bronchitis.      Overview/Hospital Course:  Abdullahi Urias is a 67 year old male who was placed in observation for acute bronchitis. Management included IV corticosteroids, bronchodilators, empiric abx, and supplemental oxygen.       Interval History: still has some decreased breath sounds and dyspnea. Will give additional dose of IV steroids today. Wean supplemental oxygen.    Review of Systems   Constitutional: Negative.  Negative for chills and fever.   HENT: Negative.  Negative for congestion, rhinorrhea, sore throat and trouble swallowing.    Eyes: Negative.  Negative for visual disturbance.   Respiratory:  Positive for cough (Dry nonproductive), shortness of breath (improving) and wheezing.    Cardiovascular: Negative.  Negative for chest pain and palpitations.   Gastrointestinal: Negative.  Negative for  abdominal pain, diarrhea, nausea and vomiting.   Endocrine: Negative.    Genitourinary: Negative.  Negative for dysuria and flank pain.   Musculoskeletal:  Positive for back pain (Chronic low back pain).   Skin: Negative.  Negative for rash.   Allergic/Immunologic: Negative.    Neurological: Negative.  Negative for speech difficulty, weakness, numbness and headaches.   Hematological: Negative.    Psychiatric/Behavioral: Negative.  Negative for hallucinations.    All other systems reviewed and are negative.      Objective:     Vital Signs (Most Recent):  Temp: 98.2 °F (36.8 °C) (01/25/23 1217)  Pulse: 89 (01/25/23 1315)  Resp: 18 (01/25/23 1311)  BP: (!) 159/91 (01/25/23 1217)  SpO2: 97 % (01/25/23 1311)   Vital Signs (24h Range):  Temp:  [97.4 °F (36.3 °C)-98.2 °F (36.8 °C)] 98.2 °F (36.8 °C)  Pulse:  [] 89  Resp:  [17-26] 18  SpO2:  [95 %-100 %] 97 %  BP: ()/(51-91) 159/91     Weight: 117.8 kg (259 lb 11.2 oz)  Body mass index is 38.35 kg/m².    Intake/Output Summary (Last 24 hours) at 1/25/2023 1446  Last data filed at 1/25/2023 1200  Gross per 24 hour   Intake 300 ml   Output --   Net 300 ml      Physical Exam  Vitals and nursing note reviewed.   Constitutional:       General: He is awake. He is not in acute distress.     Appearance: He is obese. He is not ill-appearing.   HENT:      Head: Normocephalic and atraumatic.      Mouth/Throat:      Mouth: Mucous membranes are moist.   Eyes:      General: No scleral icterus.     Conjunctiva/sclera: Conjunctivae normal.   Cardiovascular:      Rate and Rhythm: Normal rate and regular rhythm.      Heart sounds: Murmur heard.   Pulmonary:      Effort: Pulmonary effort is normal. No respiratory distress.      Breath sounds: Wheezing present.   Abdominal:      Palpations: Abdomen is soft.      Tenderness: There is no abdominal tenderness.   Musculoskeletal:         General: No swelling. Normal range of motion.      Cervical back: Normal range of motion and neck  supple.   Skin:     General: Skin is warm.      Coloration: Skin is not jaundiced.   Neurological:      General: No focal deficit present.      Mental Status: He is alert and oriented to person, place, and time. Mental status is at baseline.   Psychiatric:         Attention and Perception: Attention normal.         Speech: Speech normal.         Behavior: Behavior normal. Behavior is cooperative.     Significant Labs: All pertinent labs within the past 24 hours have been reviewed.  CBC:   Recent Labs   Lab 01/24/23  1729 01/25/23  0555   WBC 4.96 4.58   HGB 12.9* 12.7*   HCT 37.9* 39.7*    157     CMP:   Recent Labs   Lab 01/24/23  1729 01/25/23  0554    139   K 3.9 4.3    111*   CO2 18* 16*   * 172*   BUN 17 19   CREATININE 1.7* 1.6*   CALCIUM 9.5 9.5   PROT 7.7 7.6   ALBUMIN 4.0 3.8   BILITOT 0.4 0.4   ALKPHOS 97 93   AST 20 17   ALT 12 11   ANIONGAP 12 12       Significant Imaging: I have reviewed all pertinent imaging results/findings within the past 24 hours.      Assessment/Plan:      * Asthmatic bronchitis  -denies fever, chills  -complains of dry nonproductive cough.    -CXR without acute infiltrates, masses or effusions.    -received Solu-Medrol 125 mg IV x1 in the ED, continue prednisone 40 mg p.o. daily x5 doses.    -oral Zithromax empirically.    -DuoNebs every 4 hours scheduled.    -re-evaluate in a.m.    1/25/23  Wean supplemental oxygen as tolerated  Give dose of IV steroid today.   Add long acting bronchodilator    Stage 3 chronic kidney disease  -creatinine 1.6 today  -closely monitor.      S/P AVR (aortic valve replacement) biopresthetic miller 25 mm in 2014          Chronic back pain  -continue home dose Percocet.    -      CAD (coronary artery disease)  -denies chest pain.    -continue home medications including aspirin, Plavix, ARB, statin.    -hold beta-blocker for now.      VTE Risk Mitigation (From admission, onward)         Ordered     Place sequential compression  device  Until discontinued         01/24/23 1929                Discharge Planning   AURELIA:      Code Status: Prior   Is the patient medically ready for discharge?:     Reason for patient still in hospital (select all that apply): Treatment  Discharge Plan A: Home with family          Daniela Martinez NP  Department of Hospital Medicine   '[place] [facility]

## 2023-01-25 NOTE — PLAN OF CARE
Plan of care discussed with pt. Pt verbalized understanding. Free from injury. No s/s of distress noted. Vitals stable. IV SL. Meds as ordered. No c/o pain. Diet tolerated. Tele monitor in place. Q2 hour rounding. No complaints. Will continue to monitor pt. 12 hour chart review.

## 2023-01-25 NOTE — ASSESSMENT & PLAN NOTE
-continue home dose Percocet.    -    
-creatinine 1.6 today  -closely monitor.    
-creatinine 1.8, at baseline.    -closely monitor.    
-denies chest pain.    -continue home medications including aspirin, Plavix, ARB, statin.    -hold beta-blocker for now.  
-denies fever, chills  -complains of dry nonproductive cough.    -CXR without acute infiltrates, masses or effusions.    -received Solu-Medrol 125 mg IV x1 in the ED, continue prednisone 40 mg p.o. daily x5 doses.    -oral Zithromax empirically.    -DuoNebs every 4 hours scheduled.    -re-evaluate in a.m.    
-denies fever, chills  -complains of dry nonproductive cough.    -CXR without acute infiltrates, masses or effusions.    -received Solu-Medrol 125 mg IV x1 in the ED, continue prednisone 40 mg p.o. daily x5 doses.    -oral Zithromax empirically.    -DuoNebs every 4 hours scheduled.    -re-evaluate in a.m.    1/25/23  Wean supplemental oxygen as tolerated  Give dose of IV steroid today.   Add long acting bronchodilator  
3.5
Dry/Warm

## 2023-01-25 NOTE — NURSING
Pt is resting in bed. No signs of distress noted. No adverse overnight events. Pt remains free from falls. Q12 chart check complete.

## 2023-01-25 NOTE — SUBJECTIVE & OBJECTIVE
Past Medical History:   Diagnosis Date    Aortic stenosis     dr ph[facility]    Asthma     BPH (benign prostatic hyperplasia)     CAD (coronary artery disease)     Cardiomyopathy     CHF (congestive heart failure)     Chronic hoarseness     vocal cord surg    Chronic pain     CKD (chronic kidney disease) stage 3, GFR 30-59 ml/min     CVA (cerebral infarction)     8/2012 olol; reviewed ed note    Ex-smoker     GERD (gastroesophageal reflux disease)     Hepatitis C     treatedharvoni says cured, RNA NEG 6/2020    Hypertension     Pancreatitis     Prostate cancer     Prostate cancer     Prostate cancer     PVD (peripheral vascular disease)     Renal insufficiency     Substance abuse     cocaine, etoh , tob in past       Past Surgical History:   Procedure Laterality Date    AORTIC VALVE REPLACEMENT  05/19/2014    Tissue valve replacement    BACK SURGERY      CARDIAC CATHETERIZATION      COLONOSCOPY  2011    COLONOSCOPY N/A 2/24/2021    Procedure: COLONOSCOPY;  Surgeon: Faith Carrillo MD;  Location: [facility] ENDO;  Service: Endoscopy;  Laterality: N/A;    EPIDURAL STEROID INJECTION INTO CERVICAL SPINE N/A 6/21/2022    Procedure: C7-T1 IL PIEDAD;  Surgeon: Nehemiah Carmen MD;  Location: [facility] PAIN MGT;  Service: Pain Management;  Laterality: N/A;    ESOPHAGOGASTRODUODENOSCOPY N/A 2/24/2021    Procedure: ESOPHAGOGASTRODUODENOSCOPY (EGD);  Surgeon: Faith Carrillo MD;  Location: [facility];  Service: Endoscopy;  Laterality: N/A;    FOOT SURGERY      LEFT HEART CATHETERIZATION Left 7/24/2020    Procedure: CATHETERIZATION, HEART, LEFT;  Surgeon: Pasha Martin MD;  Location: [facility] CATH LAB;  Service: Cardiology;  Laterality: Left;    PENILE PROSTHESIS IMPLANT      PENILE PROSTHESIS REVISION  04/30/2018    PROSTATECTOMY  09/2019    [facility]    radiation for prostate      TRANSFORAMINAL EPIDURAL INJECTION OF STEROID Right 10/20/2022    Procedure: Right  L4/5 + L5/S1 TF PIEDAD RN IV Sedation;  Surgeon: Nehemiah CHAN  MD Kermit;  Location: [facility] PAIN MGT;  Service: Pain Management;  Laterality: Right;    UPPER GASTROINTESTINAL ENDOSCOPY  2011       Review of patient's allergies indicates:  No Known Allergies    Current Facility-Administered Medications on File Prior to Encounter   Medication    ondansetron injection 4 mg    sodium chloride 0.9% flush 10 mL     Current Outpatient Medications on File Prior to Encounter   Medication Sig    albuterol (PROVENTIL HFA) 90 mcg/actuation inhaler Inhale 2 puffs into the lungs every 6 (six) hours as needed for Wheezing. Rescue    albuterol (PROVENTIL/VENTOLIN HFA) 90 mcg/actuation inhaler INHALE 2 PUFFS INTO THE LUNGS EVERY 6 HOURS AS NEEDED FOR COUGH    allopurinoL (ZYLOPRIM) 100 MG tablet Take 100 mg by mouth once daily. Takes 0.5 tab    ALPRAZolam (XANAX) 1 MG tablet Take 1 mg by mouth.    aluminum & magnesium hydroxide-simethicone (MYLANTA MAX STRENGTH) 400-400-40 mg/5 mL suspension TAKE 15ML BY MOUTH FOUR TIMES A DAY AS NEEDED FOR STOMACH ACID    aspirin (ECOTRIN) 81 MG EC tablet Take 1 tablet (81 mg total) by mouth once daily.    atorvastatin (LIPITOR) 80 MG tablet TAKE ONE-HALF TABLET BY MOUTH EVERY DAY FOR CHOLESTEROL    baclofen (LIORESAL) 10 MG tablet Take 1 tablet (10 mg total) by mouth 3 (three) times daily.    carvediloL (COREG) 12.5 MG tablet Take 1 tablet (12.5 mg total) by mouth 2 (two) times daily.    clopidogreL (PLAVIX) 75 mg tablet Take 1 tablet (75 mg total) by mouth once daily.    cyclobenzaprine HCl (FLEXERIL ORAL) Take by mouth. Takes prn    diclofenac sodium (VOLTAREN) 1 % Gel APPLY 2 GRAMS TOPICALLY FOUR TIMES A DAY AS NEEDED FOR PAIN AND INFLAMMATION. USE ENCLOSED DOSING CARD.    diphenhydrAMINE (SOMINEX) 25 mg tablet Take 25 mg by mouth nightly as needed for Insomnia.    esomeprazole (NEXIUM) 40 MG capsule 40 mg.    famotidine (PEPCID) 40 MG tablet TAKE ONE TABLET BY MOUTH AT BEDTIME FOR ACID REFLUX    fexofenadine (ALLEGRA) 180 MG tablet Take 1 tablet (180 mg  total) by mouth daily as needed.    flunisolide 25 mcg, 0.025%, (NASALIDE) 25 mcg (0.025 %) Spry 2 sprays by Nasal route as needed.     gabapentin (NEURONTIN) 600 MG tablet Take 1 tablet (600 mg total) by mouth 3 (three) times daily.    HYDROcodone-acetaminophen (NORCO)  mg per tablet Take 1 tablet by mouth every 4 (four) hours as needed.    hydrocortisone 2.5 % cream Apply topically 2 (two) times daily as needed. Apply to affected areas of the face and neck.    hydrocortisone-pramoxine (PROCTOFOAM-HS) rectal foam INSERT 1 APPLICATORFUL INTO RECTALLY TWICE A DAY FOR HEMORRHOIDS    hydrOXYzine HCL (ATARAX) 25 MG tablet Take 25 mg by mouth 3 (three) times daily as needed for Itching.    levocetirizine (XYZAL) 5 MG tablet Take 1 tablet (5 mg total) by mouth every evening. (Patient not taking: Reported on 1/9/2023)    LIDOcaine (LIDODERM) 5 % APPLY 1 PATCH TOPICALLY EVERY DAY FOR PAIN. WEAR FOR 12 HOURS, THEN REMOVE. DO NOT APPLY NEW PATCH FOR AT LEAST 12 HOURS.    losartan (COZAAR) 50 MG tablet Take 1 tablet (50 mg total) by mouth once daily.    methyl salicylate-menthol 15-10% 15-10 % Crea Apply topically as needed.     pantoprazole (PROTONIX) 40 MG tablet Take 40 mg by mouth 2 (two) times daily.     sertraline (ZOLOFT) 100 MG tablet TAKE TWO TABLETS BY MOUTH EVERY DAY FOR MENTAL HEALTH DOSE INCREASED TO 200MG/DAY    sertraline (ZOLOFT) 100 MG tablet 200 mg.    simethicone (MYLICON) 80 MG chewable tablet Take 80 mg by mouth every 6 (six) hours as needed for Flatulence.    sucralfate (CARAFATE) 100 mg/mL suspension Take 1 g by mouth 4 (four) times daily.     tramadol HCl (TRAMADOL ORAL) Take by mouth. Takes prn    triamcinolone acetonide 0.1% (KENALOG) 0.1 % cream Apply topically 2 (two) times daily.     Family History       Problem Relation (Age of Onset)    Heart attack Sister, Brother    Heart disease Mother, Father          Tobacco Use    Smoking status: Former     Packs/day: 2.00     Years: 8.00     Pack  "years: 16.00     Types: Cigarettes     Quit date: 8/24/2012     Years since quitting: 10.4    Smokeless tobacco: Never   Substance and Sexual Activity    Alcohol use: No     Comment: Sober since 08/24/2012    Drug use: Not Currently     Types: "Crack" cocaine, Marijuana     Comment: Quit in 2012    Sexual activity: Yes     Review of Systems   Constitutional: Negative.  Negative for chills and fever.   HENT: Negative.  Negative for congestion, rhinorrhea, sore throat and trouble swallowing.    Eyes: Negative.  Negative for visual disturbance.   Respiratory:  Positive for cough (Dry nonproductive), shortness of breath and wheezing.    Cardiovascular: Negative.  Negative for chest pain and palpitations.   Gastrointestinal: Negative.  Negative for abdominal pain, diarrhea, nausea and vomiting.   Endocrine: Negative.    Genitourinary: Negative.  Negative for dysuria and flank pain.   Musculoskeletal:  Positive for back pain (Chronic low back pain).   Skin: Negative.  Negative for rash.   Allergic/Immunologic: Negative.    Neurological: Negative.  Negative for speech difficulty, weakness, numbness and headaches.   Hematological: Negative.    Psychiatric/Behavioral: Negative.  Negative for hallucinations.    All other systems reviewed and are negative.  Objective:     Vital Signs (Most Recent):  Temp: 97.4 °F (36.3 °C) (01/24/23 2125)  Pulse: 82 (01/24/23 2125)  Resp: 20 (01/24/23 2125)  BP: 135/76 (01/24/23 2125)  SpO2: 98 % (01/24/23 2125) Vital Signs (24h Range):  Temp:  [97.4 °F (36.3 °C)-97.5 °F (36.4 °C)] 97.4 °F (36.3 °C)  Pulse:  [79-89] 82  Resp:  [20-26] 20  SpO2:  [95 %-100 %] 98 %  BP: (135-145)/(68-90) 135/76     Weight: 112.9 kg (249 lb)  Body mass index is 36.77 kg/m².    Physical Exam  Vitals and nursing note reviewed.   Constitutional:       General: He is awake. He is not in acute distress.     Appearance: He is obese. He is not ill-appearing.   HENT:      Head: Normocephalic and atraumatic.      " Mouth/Throat:      Mouth: Mucous membranes are moist.   Eyes:      General: No scleral icterus.     Conjunctiva/sclera: Conjunctivae normal.   Cardiovascular:      Rate and Rhythm: Normal rate and regular rhythm.      Heart sounds: Murmur heard.   Pulmonary:      Effort: Pulmonary effort is normal. No respiratory distress.      Breath sounds: Wheezing present.   Abdominal:      Palpations: Abdomen is soft.      Tenderness: There is no abdominal tenderness.   Musculoskeletal:         General: No swelling. Normal range of motion.      Cervical back: Normal range of motion and neck supple.   Skin:     General: Skin is warm.      Coloration: Skin is not jaundiced.   Neurological:      General: No focal deficit present.      Mental Status: He is alert and oriented to person, place, and time. Mental status is at baseline.   Psychiatric:         Attention and Perception: Attention normal.         Speech: Speech normal.         Behavior: Behavior normal. Behavior is cooperative.           Significant Labs: All pertinent labs within the past 24 hours have been reviewed.  BMP:   Recent Labs   Lab 01/24/23  1729   *      K 3.9      CO2 18*   BUN 17   CREATININE 1.7*   CALCIUM 9.5     CBC:   Recent Labs   Lab 01/24/23  1729   WBC 4.96   HGB 12.9*   HCT 37.9*        CMP:   Recent Labs   Lab 01/24/23  1729      K 3.9      CO2 18*   *   BUN 17   CREATININE 1.7*   CALCIUM 9.5   PROT 7.7   ALBUMIN 4.0   BILITOT 0.4   ALKPHOS 97   AST 20   ALT 12   ANIONGAP 12     Troponin:   Recent Labs   Lab 01/24/23  1729   TROPONINI <0.006       Significant Imaging: I have reviewed all pertinent imaging results/findings within the past 24 hours.  I have reviewed and interpreted all pertinent imaging results/findings within the past 24 hours.    Imaging Results              X-Ray Chest AP Portable (Final result)  Result time 01/24/23 18:41:41      Final result by Bonilla Madrigal MD (01/24/23 18:41:41)                    Impression:      Cardiomegaly without definite acute abnormality.      Electronically signed by: Bonilla Madrigal  Date:    01/24/2023  Time:    18:41               Narrative:    EXAMINATION:  XR CHEST AP PORTABLE    CLINICAL HISTORY:  shortness of breath;    TECHNIQUE:  Single frontal view of the chest was performed.    COMPARISON:  Multiple priors.    FINDINGS:  Median sternotomy wires in place.    The lungs are clear, with normal appearance of pulmonary vasculature and no pleural effusion or pneumothorax.    The cardiac silhouette is enlarged.  The hilar and mediastinal contours are unremarkable.    Bones are intact.                                    I have independently reviewed and interpreted the EKG.     I have independently reviewed all pertinent labs within the past 24 hours.    I have independently reviewed, visualized and interpreted all pertinent imaging results within the past 24 hours and discussed the findings with the ED physician, Dr. Oviedo

## 2023-01-25 NOTE — SUBJECTIVE & OBJECTIVE
Interval History: still has some decreased breath sounds and dyspnea. Will give additional dose of IV steroids today. Wean supplemental oxygen.    Review of Systems   Constitutional: Negative.  Negative for chills and fever.   HENT: Negative.  Negative for congestion, rhinorrhea, sore throat and trouble swallowing.    Eyes: Negative.  Negative for visual disturbance.   Respiratory:  Positive for cough (Dry nonproductive), shortness of breath (improving) and wheezing.    Cardiovascular: Negative.  Negative for chest pain and palpitations.   Gastrointestinal: Negative.  Negative for abdominal pain, diarrhea, nausea and vomiting.   Endocrine: Negative.    Genitourinary: Negative.  Negative for dysuria and flank pain.   Musculoskeletal:  Positive for back pain (Chronic low back pain).   Skin: Negative.  Negative for rash.   Allergic/Immunologic: Negative.    Neurological: Negative.  Negative for speech difficulty, weakness, numbness and headaches.   Hematological: Negative.    Psychiatric/Behavioral: Negative.  Negative for hallucinations.    All other systems reviewed and are negative.      Objective:     Vital Signs (Most Recent):  Temp: 98.2 °F (36.8 °C) (01/25/23 1217)  Pulse: 89 (01/25/23 1315)  Resp: 18 (01/25/23 1311)  BP: (!) 159/91 (01/25/23 1217)  SpO2: 97 % (01/25/23 1311)   Vital Signs (24h Range):  Temp:  [97.4 °F (36.3 °C)-98.2 °F (36.8 °C)] 98.2 °F (36.8 °C)  Pulse:  [] 89  Resp:  [17-26] 18  SpO2:  [95 %-100 %] 97 %  BP: ()/(51-91) 159/91     Weight: 117.8 kg (259 lb 11.2 oz)  Body mass index is 38.35 kg/m².    Intake/Output Summary (Last 24 hours) at 1/25/2023 1446  Last data filed at 1/25/2023 1200  Gross per 24 hour   Intake 300 ml   Output --   Net 300 ml      Physical Exam  Vitals and nursing note reviewed.   Constitutional:       General: He is awake. He is not in acute distress.     Appearance: He is obese. He is not ill-appearing.   HENT:      Head: Normocephalic and atraumatic.       Mouth/Throat:      Mouth: Mucous membranes are moist.   Eyes:      General: No scleral icterus.     Conjunctiva/sclera: Conjunctivae normal.   Cardiovascular:      Rate and Rhythm: Normal rate and regular rhythm.      Heart sounds: Murmur heard.   Pulmonary:      Effort: Pulmonary effort is normal. No respiratory distress.      Breath sounds: Wheezing present.   Abdominal:      Palpations: Abdomen is soft.      Tenderness: There is no abdominal tenderness.   Musculoskeletal:         General: No swelling. Normal range of motion.      Cervical back: Normal range of motion and neck supple.   Skin:     General: Skin is warm.      Coloration: Skin is not jaundiced.   Neurological:      General: No focal deficit present.      Mental Status: He is alert and oriented to person, place, and time. Mental status is at baseline.   Psychiatric:         Attention and Perception: Attention normal.         Speech: Speech normal.         Behavior: Behavior normal. Behavior is cooperative.     Significant Labs: All pertinent labs within the past 24 hours have been reviewed.  CBC:   Recent Labs   Lab 01/24/23  1729 01/25/23  0555   WBC 4.96 4.58   HGB 12.9* 12.7*   HCT 37.9* 39.7*    157     CMP:   Recent Labs   Lab 01/24/23  1729 01/25/23  0554    139   K 3.9 4.3    111*   CO2 18* 16*   * 172*   BUN 17 19   CREATININE 1.7* 1.6*   CALCIUM 9.5 9.5   PROT 7.7 7.6   ALBUMIN 4.0 3.8   BILITOT 0.4 0.4   ALKPHOS 97 93   AST 20 17   ALT 12 11   ANIONGAP 12 12       Significant Imaging: I have reviewed all pertinent imaging results/findings within the past 24 hours.

## 2023-01-26 VITALS
BODY MASS INDEX: 38.46 KG/M2 | RESPIRATION RATE: 18 BRPM | WEIGHT: 259.69 LBS | HEART RATE: 85 BPM | DIASTOLIC BLOOD PRESSURE: 91 MMHG | HEIGHT: 69 IN | OXYGEN SATURATION: 98 % | SYSTOLIC BLOOD PRESSURE: 138 MMHG | TEMPERATURE: 98 F

## 2023-01-26 LAB
ANION GAP SERPL CALC-SCNC: 10 MMOL/L (ref 8–16)
BASOPHILS # BLD AUTO: 0.01 K/UL (ref 0–0.2)
BASOPHILS NFR BLD: 0.1 % (ref 0–1.9)
BUN SERPL-MCNC: 21 MG/DL (ref 8–23)
CALCIUM SERPL-MCNC: 9.9 MG/DL (ref 8.7–10.5)
CHLORIDE SERPL-SCNC: 107 MMOL/L (ref 95–110)
CO2 SERPL-SCNC: 24 MMOL/L (ref 23–29)
CREAT SERPL-MCNC: 1.4 MG/DL (ref 0.5–1.4)
DIFFERENTIAL METHOD: ABNORMAL
EOSINOPHIL # BLD AUTO: 0 K/UL (ref 0–0.5)
EOSINOPHIL NFR BLD: 0 % (ref 0–8)
ERYTHROCYTE [DISTWIDTH] IN BLOOD BY AUTOMATED COUNT: 14.6 % (ref 11.5–14.5)
EST. GFR  (NO RACE VARIABLE): 55 ML/MIN/1.73 M^2
GLUCOSE SERPL-MCNC: 101 MG/DL (ref 70–110)
HCT VFR BLD AUTO: 37.2 % (ref 40–54)
HGB BLD-MCNC: 12.4 G/DL (ref 14–18)
IMM GRANULOCYTES # BLD AUTO: 0.05 K/UL (ref 0–0.04)
IMM GRANULOCYTES NFR BLD AUTO: 0.4 % (ref 0–0.5)
LYMPHOCYTES # BLD AUTO: 1 K/UL (ref 1–4.8)
LYMPHOCYTES NFR BLD: 7.9 % (ref 18–48)
MCH RBC QN AUTO: 29 PG (ref 27–31)
MCHC RBC AUTO-ENTMCNC: 33.3 G/DL (ref 32–36)
MCV RBC AUTO: 87 FL (ref 82–98)
MONOCYTES # BLD AUTO: 0.8 K/UL (ref 0.3–1)
MONOCYTES NFR BLD: 6.9 % (ref 4–15)
NEUTROPHILS # BLD AUTO: 10.2 K/UL (ref 1.8–7.7)
NEUTROPHILS NFR BLD: 84.7 % (ref 38–73)
NRBC BLD-RTO: 0 /100 WBC
PLATELET # BLD AUTO: 168 K/UL (ref 150–450)
PMV BLD AUTO: 10.1 FL (ref 9.2–12.9)
POTASSIUM SERPL-SCNC: 4.3 MMOL/L (ref 3.5–5.1)
RBC # BLD AUTO: 4.28 M/UL (ref 4.6–6.2)
SODIUM SERPL-SCNC: 141 MMOL/L (ref 136–145)
WBC # BLD AUTO: 12.05 K/UL (ref 3.9–12.7)

## 2023-01-26 PROCEDURE — 25000242 PHARM REV CODE 250 ALT 637 W/ HCPCS: Mod: HCNC | Performed by: NURSE PRACTITIONER

## 2023-01-26 PROCEDURE — 94640 AIRWAY INHALATION TREATMENT: CPT | Mod: HCNC

## 2023-01-26 PROCEDURE — 85025 COMPLETE CBC W/AUTO DIFF WBC: CPT | Mod: HCNC | Performed by: NURSE PRACTITIONER

## 2023-01-26 PROCEDURE — G0378 HOSPITAL OBSERVATION PER HR: HCPCS | Mod: HCNC

## 2023-01-26 PROCEDURE — 63600175 PHARM REV CODE 636 W HCPCS: Mod: HCNC | Performed by: INTERNAL MEDICINE

## 2023-01-26 PROCEDURE — 80048 BASIC METABOLIC PNL TOTAL CA: CPT | Mod: HCNC | Performed by: NURSE PRACTITIONER

## 2023-01-26 PROCEDURE — 36415 COLL VENOUS BLD VENIPUNCTURE: CPT | Mod: HCNC | Performed by: NURSE PRACTITIONER

## 2023-01-26 PROCEDURE — 63700000 PHARM REV CODE 250 ALT 637 W/O HCPCS: Mod: HCNC | Performed by: INTERNAL MEDICINE

## 2023-01-26 PROCEDURE — 25000242 PHARM REV CODE 250 ALT 637 W/ HCPCS: Mod: HCNC | Performed by: INTERNAL MEDICINE

## 2023-01-26 PROCEDURE — 27000221 HC OXYGEN, UP TO 24 HOURS: Mod: HCNC

## 2023-01-26 PROCEDURE — 25000003 PHARM REV CODE 250: Mod: HCNC | Performed by: INTERNAL MEDICINE

## 2023-01-26 PROCEDURE — 99900035 HC TECH TIME PER 15 MIN (STAT): Mod: HCNC

## 2023-01-26 PROCEDURE — 94761 N-INVAS EAR/PLS OXIMETRY MLT: CPT | Mod: HCNC

## 2023-01-26 RX ORDER — GUAIFENESIN 100 MG/5ML
200 SOLUTION ORAL EVERY 4 HOURS PRN
Qty: 180 ML | Refills: 0 | Status: SHIPPED | OUTPATIENT
Start: 2023-01-26 | End: 2023-02-05

## 2023-01-26 RX ORDER — HYDROCODONE BITARTRATE AND ACETAMINOPHEN 5; 325 MG/1; MG/1
1 TABLET ORAL EVERY 6 HOURS PRN
Qty: 10 TABLET | Refills: 0 | Status: SHIPPED | OUTPATIENT
Start: 2023-01-26 | End: 2023-03-16 | Stop reason: ALTCHOICE

## 2023-01-26 RX ADMIN — SERTRALINE HYDROCHLORIDE 200 MG: 50 TABLET ORAL at 08:01

## 2023-01-26 RX ADMIN — GUAIFENESIN 200 MG: 200 SOLUTION ORAL at 12:01

## 2023-01-26 RX ADMIN — ASPIRIN 81 MG: 81 TABLET, COATED ORAL at 08:01

## 2023-01-26 RX ADMIN — IPRATROPIUM BROMIDE AND ALBUTEROL SULFATE 3 ML: 2.5; .5 SOLUTION RESPIRATORY (INHALATION) at 07:01

## 2023-01-26 RX ADMIN — CLOPIDOGREL BISULFATE 75 MG: 75 TABLET ORAL at 08:01

## 2023-01-26 RX ADMIN — AZITHROMYCIN MONOHYDRATE 250 MG: 250 TABLET ORAL at 08:01

## 2023-01-26 RX ADMIN — PREDNISONE 40 MG: 20 TABLET ORAL at 08:01

## 2023-01-26 RX ADMIN — ARFORMOTEROL TARTRATE 15 MCG: 15 SOLUTION RESPIRATORY (INHALATION) at 07:01

## 2023-01-26 RX ADMIN — LOSARTAN POTASSIUM 50 MG: 50 TABLET, FILM COATED ORAL at 08:01

## 2023-01-26 RX ADMIN — BENZONATATE 100 MG: 100 CAPSULE ORAL at 08:01

## 2023-01-26 RX ADMIN — IPRATROPIUM BROMIDE AND ALBUTEROL SULFATE 3 ML: 2.5; .5 SOLUTION RESPIRATORY (INHALATION) at 02:01

## 2023-01-26 RX ADMIN — GABAPENTIN 600 MG: 300 CAPSULE ORAL at 08:01

## 2023-01-26 NOTE — PLAN OF CARE
AAOx4. Pt is being weaned off oxygen. Cardiac monitoring. Pt remained free from fall and injury. No sign of any distress noted. Safety precautions alert. Pt asked to call for assistance if needed. IV access clean dry and intact saline locked. Chart checked reviewed. VSS. Plan of care continued.

## 2023-01-26 NOTE — PLAN OF CARE
Pt ready for discharge. No s/s of distress noted. Pt free from injury. IV discontinued. Tele monitor discontinued. Belongings with pt. Rx delivered to pt's bedside. Discharge instructions reviewed with pt. Pt verbalized understanding. F/u appts made.

## 2023-01-26 NOTE — DISCHARGE SUMMARY
[facility] Medicine  Discharge Summary      Patient Name: Abdullahi Urias  MRN: [id number]  EFREN: [id number]  Patient Class: OP- Observation  Admission Date: 1/24/2023  Hospital Length of Stay: 0 days  Discharge Date and Time: 1/26/2023  4:26 PM  Attending Physician: No att. providers found   Discharging Provider: Lyly Martinez NP  Primary Care Provider: Reji Valentin MD    Primary Care Team: Networked reference to record PCT     HPI:   Mr. Urias is a 67-year-old  female with PMH significant for aortic stenosis with bioprosthetic aortic valve replacement, diastolic CHF, CKD stage 3, HTN, prostate cancer, former tobacco user, presented to the ED complaining of SOB/wheezing for the past 1-2 days.  Patient is not hypoxic, arrival, does not use supplemental oxygen at home.  Denies fever, chills, but complains of dry nonproductive cough.  COVID-19 is negative.  CXR reveals no evidence of infiltrates, masses or effusions.  Laboratory workup is unremarkable, except for creatinine 1.7, which is at baseline.  Received Solu-Medrol 125 mg IV x1 in the ED, along with three rounds of bronchodilators, with some improvement.  Still has expiratory wheezing, hence placed in observation for asthmatic bronchitis.      * No surgery found *      Hospital Course:   Abdullahi Urias is a 67 year old male who was placed in observation for acute bronchitis. Management included IV corticosteroids, bronchodilators, empiric abx, and supplemental oxygen.  Patient is noted have improvement of his symptoms and is presently on room air with oxygen saturation 98%.  Patient is medically stable for discharge with outpatient Pulmonary follow-up.       Goals of Care Treatment Preferences:  Code Status: Full Code      Consults:   Consults (From admission, onward)        Status Ordering Provider     Consult Respiratory Therapy if no hx of CHF  Once        Provider:  (Not yet assigned)    Acknowledged JENN SCOTT           No new Assessment & Plan notes have been filed under this hospital service since the last note was generated.  Service: Hospital Medicine    Final Active Diagnoses:    Diagnosis Date Noted POA    PRINCIPAL PROBLEM:  Asthmatic bronchitis [J45.909]  Yes    Stage 3 chronic kidney disease [N18.30] 06/30/2020 Yes    S/P AVR (aortic valve replacement) biopresthetic miller 25 mm in 2014  [Z95.2] 06/30/2020 Not Applicable    Chronic back pain [M54.9, G89.29]  Yes    CAD (coronary artery disease) [I25.10]  Yes     Chronic      Problems Resolved During this Admission:       Discharged Condition: stable    Disposition: Home or Self Care    Follow Up:   Follow-up Information     Reji Valentin MD Follow up in 1 week(s).    Specialty: Family Medicine  Contact information:  [address]  [place] [address] [phone]                       Patient Instructions:      Ambulatory referral/consult to Pulmonology   Standing Status: Future   Referral Priority: Routine Referral Type: Consultation   Referral Reason: Specialty Services Required   Requested Specialty: Pulmonary Disease   Number of Visits Requested: 1     Diet Cardiac     Notify your health care provider if you experience any of the following:  severe uncontrolled pain     Notify your health care provider if you experience any of the following:  temperature >100.4     Notify your health care provider if you experience any of the following:  difficulty breathing or increased cough     Activity as tolerated       Significant Diagnostic Studies: Labs:   BMP:   Recent Labs   Lab 01/24/23 1729 01/25/23  0554 01/26/23  0834   * 172* 101    139 141   K 3.9 4.3 4.3    111* 107   CO2 18* 16* 24   BUN 17 19 21   CREATININE 1.7* 1.6* 1.4   CALCIUM 9.5 9.5 9.9   MG  --  1.9  --     and CMP   Recent Labs   Lab 01/24/23  1729 01/25/23  0554 01/26/23  0834    139 141   K 3.9 4.3 4.3    111* 107   CO2 18* 16* 24   * 172*  101   BUN 17 19 21   CREATININE 1.7* 1.6* 1.4   CALCIUM 9.5 9.5 9.9   PROT 7.7 7.6  --    ALBUMIN 4.0 3.8  --    BILITOT 0.4 0.4  --    ALKPHOS 97 93  --    AST 20 17  --    ALT 12 11  --    ANIONGAP 12 12 10     Cardiac Graphics: Echocardiogram:   Transthoracic echo (TTE) complete (Cupid Only):   Results for orders placed or performed during the hospital encounter of 09/12/22   Echo   Result Value Ref Range    BSA 2.33 m2    TDI SEPTAL 0.06 m/s    LV LATERAL E/E' RATIO 12.71 m/s    LV SEPTAL E/E' RATIO 14.83 m/s    LA WIDTH 3.70 cm    IVC diameter 1.45 cm    Left Ventricular Outflow Tract Mean Velocity 0.85 cm/s    Left Ventricular Outflow Tract Mean Gradient 3.57 mmHg    TDI LATERAL 0.07 m/s    PV PEAK VELOCITY 1.18 cm/s    LVIDd 4.54 3.5 - 6.0 cm    IVS 1.10 0.6 - 1.1 cm    Posterior Wall 1.03 0.6 - 1.1 cm    Ao root annulus 3.29 cm    LVIDs 3.17 2.1 - 4.0 cm    FS 30 28 - 44 %    LA volume 54.38 cm3    Sinus 3.32 cm    STJ 2.85 cm    Ascending aorta 3.37 cm    LV mass 169.63 g    LA size 3.41 cm    RVDD 4.48 cm    TAPSE 1.07 cm    Left Ventricle Relative Wall Thickness 0.45 cm    AV mean gradient 14 mmHg    AV valve area 1.79 cm2    AV Velocity Ratio 0.52     AV index (prosthetic) 0.57     MV valve area p 1/2 method 2.73 cm2    E/A ratio 0.86     Mean e' 0.07 m/s    E wave deceleration time 278.17 msec    IVRT 105.61 msec    LVOT diameter 2.00 cm    LVOT area 3.1 cm2    LVOT peak luis 1.28 m/s    LVOT peak VTI 26.30 cm    Ao peak luis 2.47 m/s    Ao VTI 46.1 cm    RVOT peak luis 0.56 m/s    RVOT peak VTI 11.8 cm    LVOT stroke volume 82.58 cm3    AV peak gradient 24 mmHg    PV mean gradient 0.61 mmHg    E/E' ratio 13.69 m/s    MV Peak E Luis 0.89 m/s    TR Max Luis 2.72 m/s    MV stenosis pressure 1/2 time 80.67 ms    MV Peak A Luis 1.04 m/s    LV Systolic Volume 39.93 mL    LV Systolic Volume Index 17.7 mL/m2    LV Diastolic Volume 94.37 mL    LV Diastolic Volume Index 41.94 mL/m2    LA Volume Index 24.2 mL/m2    LV  Mass Index 75 g/m2    RA Major Axis 5.95 cm    Left Atrium Minor Axis 4.93 cm    Left Atrium Major Axis 5.22 cm    Triscuspid Valve Regurgitation Peak Gradient 30 mmHg    LA Volume Index (Mod) 26.3 mL/m2    LA volume (mod) 59.22 cm3    RA Width 5.73 cm    Right Atrial Pressure (from IVC) 3 mmHg    EF 65 %    TV rest pulmonary artery pressure 33 mmHg    Narrative    · The left ventricle is normal in size with normal systolic function.  · The estimated ejection fraction is 65%.  · Grade I left ventricular diastolic dysfunction.  · Normal right ventricular size with normal right ventricular systolic   function.  · There is a bioprosthetic aortic valve present. There is mild   paravalvular aortic insufficiency present.  · The aortic valve mean gradient is 14 mmHg with a dimensionless index of   0.57.  · Mild tricuspid regurgitation.  · The estimated PA systolic pressure is 33 mmHg.          Pending Diagnostic Studies:     None         Medications:  Reconciled Home Medications:      Medication List      START taking these medications    fluticasone propionate 50 mcg/actuation nasal spray  Commonly known as: FLONASE  1 spray (50 mcg total) by Each Nostril route once daily.     guaiFENesin 100 mg/5 ml 100 mg/5 mL syrup  Commonly known as: ROBITUSSIN  Take 10 mLs (200 mg total) by mouth every 4 (four) hours as needed for Cough or Congestion.     HYDROcodone-acetaminophen 5-325 mg per tablet  Commonly known as: NORCO  Take 1 tablet by mouth every 6 (six) hours as needed for Pain.  Replaces: HYDROcodone-acetaminophen  mg per tablet        CONTINUE taking these medications    * albuterol 90 mcg/actuation inhaler  Commonly known as: PROVENTIL HFA  Inhale 2 puffs into the lungs every 6 (six) hours as needed for Wheezing. Rescue     * albuterol 90 mcg/actuation inhaler  Commonly known as: PROVENTIL/VENTOLIN HFA  INHALE 2 PUFFS INTO THE LUNGS EVERY 6 HOURS AS NEEDED FOR COUGH     allopurinoL 100 MG tablet  Commonly known as:  ZYLOPRIM  Take 100 mg by mouth once daily. Takes 0.5 tab     ALPRAZolam 1 MG tablet  Commonly known as: XANAX  Take 1 mg by mouth.     aluminum & magnesium hydroxide-simethicone 400-400-40 mg/5 mL suspension  Commonly known as: MYLANTA MAX STRENGTH  TAKE 15ML BY MOUTH FOUR TIMES A DAY AS NEEDED FOR STOMACH ACID     aspirin 81 MG EC tablet  Commonly known as: ECOTRIN  Take 1 tablet (81 mg total) by mouth once daily.     atorvastatin 80 MG tablet  Commonly known as: LIPITOR  TAKE ONE-HALF TABLET BY MOUTH EVERY DAY FOR CHOLESTEROL     baclofen 10 MG tablet  Commonly known as: LIORESAL  Take 1 tablet (10 mg total) by mouth 3 (three) times daily.     CARAFATE 100 mg/mL suspension  Generic drug: sucralfate  Take 1 g by mouth 4 (four) times daily.     carvediloL 12.5 MG tablet  Commonly known as: COREG  Take 1 tablet (12.5 mg total) by mouth 2 (two) times daily.     clopidogreL 75 mg tablet  Commonly known as: PLAVIX  Take 1 tablet (75 mg total) by mouth once daily.     diclofenac sodium 1 % Gel  Commonly known as: VOLTAREN  APPLY 2 GRAMS TOPICALLY FOUR TIMES A DAY AS NEEDED FOR PAIN AND INFLAMMATION. USE ENCLOSED DOSING CARD.     diphenhydrAMINE 25 mg tablet  Commonly known as: SOMINEX  Take 25 mg by mouth nightly as needed for Insomnia.     esomeprazole 40 MG capsule  Commonly known as: NEXIUM  40 mg.     famotidine 40 MG tablet  Commonly known as: PEPCID  TAKE ONE TABLET BY MOUTH AT BEDTIME FOR ACID REFLUX     fexofenadine 180 MG tablet  Commonly known as: ALLEGRA  Take 1 tablet (180 mg total) by mouth daily as needed.     FLEXERIL ORAL  Take by mouth. Takes prn     flunisolide 25 mcg (0.025%) 25 mcg (0.025 %) Spry  Commonly known as: NASALIDE  2 sprays by Nasal route as needed.     gabapentin 600 MG tablet  Commonly known as: NEURONTIN  Take 1 tablet (600 mg total) by mouth 3 (three) times daily.     hydrocortisone 2.5 % cream  Apply topically 2 (two) times daily as needed. Apply to affected areas of the face and  neck.     hydrocortisone-pramoxine rectal foam  Commonly known as: PROCTOFOAM-HS  INSERT 1 APPLICATORFUL INTO RECTALLY TWICE A DAY FOR HEMORRHOIDS     hydrOXYzine HCL 25 MG tablet  Commonly known as: ATARAX  Take 25 mg by mouth 3 (three) times daily as needed for Itching.     LIDOcaine 5 %  Commonly known as: LIDODERM  APPLY 1 PATCH TOPICALLY EVERY DAY FOR PAIN. WEAR FOR 12 HOURS, THEN REMOVE. DO NOT APPLY NEW PATCH FOR AT LEAST 12 HOURS.     losartan 50 MG tablet  Commonly known as: COZAAR  Take 1 tablet (50 mg total) by mouth once daily.     methyl salicylate-menthol 15-10% 15-10 % Crea  Apply topically as needed.     pantoprazole 40 MG tablet  Commonly known as: PROTONIX  Take 40 mg by mouth 2 (two) times daily.     * sertraline 100 MG tablet  Commonly known as: ZOLOFT  TAKE TWO TABLETS BY MOUTH EVERY DAY FOR MENTAL HEALTH DOSE INCREASED TO 200MG/DAY     * sertraline 100 MG tablet  Commonly known as: ZOLOFT  200 mg.     simethicone 80 MG chewable tablet  Commonly known as: MYLICON  Take 80 mg by mouth every 6 (six) hours as needed for Flatulence.     TRAMADOL ORAL  Take by mouth. Takes prn     triamcinolone acetonide 0.1% 0.1 % cream  Commonly known as: KENALOG  Apply topically 2 (two) times daily.         * This list has 4 medication(s) that are the same as other medications prescribed for you. Read the directions carefully, and ask your doctor or other care provider to review them with you.            STOP taking these medications    HYDROcodone-acetaminophen  mg per tablet  Commonly known as: NORCO  Replaced by: HYDROcodone-acetaminophen 5-325 mg per tablet     levocetirizine 5 MG tablet  Commonly known as: XYZAL            Indwelling Lines/Drains at time of discharge:   Lines/Drains/Airways     None                 Time spent on the discharge of patient: 40 minutes         Lyly Martinez NP  Department of Hospital Medicine  '[facility] - Med Surg

## 2023-01-26 NOTE — PLAN OF CARE
[facility] - Med Surg  Discharge Final Note    Primary Care Provider: Reji Valentin MD    Expected Discharge Date: 1/26/2023    Final Discharge Note (most recent)       Final Note - 01/26/23 0845          Final Note    Assessment Type Final Discharge Note     Anticipated Discharge Disposition Home or Self Care     Hospital Resources/Appts/Education Provided Appointments scheduled and added to AVS                     Important Message from Medicare             Contact Info       Reji Valentin MD   Specialty: Family Medicine   Relationship: PCP - General    [address] [place] [id number]   Phone: [phone]       Next Steps: Follow up in 1 week(s)          DC Dispo: home  PCP f/u: Jan 30th

## 2023-01-27 ENCOUNTER — TELEPHONE (OUTPATIENT)
Dept: PAIN MEDICINE | Facility: CLINIC | Age: 68
End: 2023-01-27
Payer: MEDICARE

## 2023-01-27 NOTE — TELEPHONE ENCOUNTER
Spoke with the pt because he is currently on antibiotice to push his procedure back but he stated that when he was in the hospital they gave him Norcos, and that made his pain go away. So he will like to follow up with his PCP to try that route other than get the injections.

## 2023-01-30 ENCOUNTER — OFFICE VISIT (OUTPATIENT)
Dept: INTERNAL MEDICINE | Facility: CLINIC | Age: 68
End: 2023-01-30
Payer: MEDICARE

## 2023-01-30 VITALS
WEIGHT: 257.25 LBS | HEART RATE: 55 BPM | BODY MASS INDEX: 37.99 KG/M2 | OXYGEN SATURATION: 98 % | DIASTOLIC BLOOD PRESSURE: 76 MMHG | SYSTOLIC BLOOD PRESSURE: 128 MMHG | TEMPERATURE: 98 F

## 2023-01-30 DIAGNOSIS — J45.901 ASTHMATIC BRONCHITIS WITH ACUTE EXACERBATION, UNSPECIFIED ASTHMA SEVERITY, UNSPECIFIED WHETHER PERSISTENT: ICD-10-CM

## 2023-01-30 DIAGNOSIS — I73.9 PAD (PERIPHERAL ARTERY DISEASE): ICD-10-CM

## 2023-01-30 DIAGNOSIS — N18.30 STAGE 3 CHRONIC KIDNEY DISEASE, UNSPECIFIED WHETHER STAGE 3A OR 3B CKD: ICD-10-CM

## 2023-01-30 DIAGNOSIS — M1A.00X0 IDIOPATHIC CHRONIC GOUT WITHOUT TOPHUS, UNSPECIFIED SITE: ICD-10-CM

## 2023-01-30 DIAGNOSIS — Z79.52 CURRENT CHRONIC USE OF SYSTEMIC STEROIDS: ICD-10-CM

## 2023-01-30 DIAGNOSIS — I10 HYPERTENSION, UNSPECIFIED TYPE: Chronic | ICD-10-CM

## 2023-01-30 DIAGNOSIS — Z86.73 HISTORY OF CVA IN ADULTHOOD: ICD-10-CM

## 2023-01-30 DIAGNOSIS — I35.0 AORTIC VALVE STENOSIS, ETIOLOGY OF CARDIAC VALVE DISEASE UNSPECIFIED: ICD-10-CM

## 2023-01-30 DIAGNOSIS — I25.10 CORONARY ARTERY DISEASE INVOLVING NATIVE CORONARY ARTERY OF NATIVE HEART WITHOUT ANGINA PECTORIS: Chronic | ICD-10-CM

## 2023-01-30 DIAGNOSIS — R79.9 ABNORMAL FINDING OF BLOOD CHEMISTRY, UNSPECIFIED: ICD-10-CM

## 2023-01-30 DIAGNOSIS — Z00.00 ROUTINE HEALTH MAINTENANCE: Primary | ICD-10-CM

## 2023-01-30 DIAGNOSIS — C61 PROSTATE CANCER: ICD-10-CM

## 2023-01-30 PROCEDURE — 1126F AMNT PAIN NOTED NONE PRSNT: CPT | Mod: HCNC,CPTII,S$GLB, | Performed by: FAMILY MEDICINE

## 2023-01-30 PROCEDURE — 3288F FALL RISK ASSESSMENT DOCD: CPT | Mod: HCNC,CPTII,S$GLB, | Performed by: FAMILY MEDICINE

## 2023-01-30 PROCEDURE — 3074F PR MOST RECENT SYSTOLIC BLOOD PRESSURE < 130 MM HG: ICD-10-PCS | Mod: HCNC,CPTII,S$GLB, | Performed by: FAMILY MEDICINE

## 2023-01-30 PROCEDURE — 99999 PR PBB SHADOW E&M-EST. PATIENT-LVL V: ICD-10-PCS | Mod: PBBFAC,HCNC,, | Performed by: FAMILY MEDICINE

## 2023-01-30 PROCEDURE — 3008F PR BODY MASS INDEX (BMI) DOCUMENTED: ICD-10-PCS | Mod: HCNC,CPTII,S$GLB, | Performed by: FAMILY MEDICINE

## 2023-01-30 PROCEDURE — 99397 PR PREVENTIVE VISIT,EST,65 & OVER: ICD-10-PCS | Mod: HCNC,S$GLB,, | Performed by: FAMILY MEDICINE

## 2023-01-30 PROCEDURE — 3078F DIAST BP <80 MM HG: CPT | Mod: HCNC,CPTII,S$GLB, | Performed by: FAMILY MEDICINE

## 2023-01-30 PROCEDURE — 99999 PR PBB SHADOW E&M-EST. PATIENT-LVL V: CPT | Mod: PBBFAC,HCNC,, | Performed by: FAMILY MEDICINE

## 2023-01-30 PROCEDURE — 1101F PT FALLS ASSESS-DOCD LE1/YR: CPT | Mod: HCNC,CPTII,S$GLB, | Performed by: FAMILY MEDICINE

## 2023-01-30 PROCEDURE — 99397 PER PM REEVAL EST PAT 65+ YR: CPT | Mod: HCNC,S$GLB,, | Performed by: FAMILY MEDICINE

## 2023-01-30 PROCEDURE — 3078F PR MOST RECENT DIASTOLIC BLOOD PRESSURE < 80 MM HG: ICD-10-PCS | Mod: HCNC,CPTII,S$GLB, | Performed by: FAMILY MEDICINE

## 2023-01-30 PROCEDURE — 3074F SYST BP LT 130 MM HG: CPT | Mod: HCNC,CPTII,S$GLB, | Performed by: FAMILY MEDICINE

## 2023-01-30 PROCEDURE — 1101F PR PT FALLS ASSESS DOC 0-1 FALLS W/OUT INJ PAST YR: ICD-10-PCS | Mod: HCNC,CPTII,S$GLB, | Performed by: FAMILY MEDICINE

## 2023-01-30 PROCEDURE — 3008F BODY MASS INDEX DOCD: CPT | Mod: HCNC,CPTII,S$GLB, | Performed by: FAMILY MEDICINE

## 2023-01-30 PROCEDURE — 1126F PR PAIN SEVERITY QUANTIFIED, NO PAIN PRESENT: ICD-10-PCS | Mod: HCNC,CPTII,S$GLB, | Performed by: FAMILY MEDICINE

## 2023-01-30 PROCEDURE — 3288F PR FALLS RISK ASSESSMENT DOCUMENTED: ICD-10-PCS | Mod: HCNC,CPTII,S$GLB, | Performed by: FAMILY MEDICINE

## 2023-01-30 RX ORDER — FLUTICASONE PROPIONATE AND SALMETEROL 250; 50 UG/1; UG/1
1 POWDER RESPIRATORY (INHALATION) 2 TIMES DAILY
Qty: 60 EACH | Refills: 1 | Status: SHIPPED | OUTPATIENT
Start: 2023-01-30 | End: 2023-03-07

## 2023-01-30 NOTE — PROGRESS NOTES
Subjective:       Patient ID: Abdullahi Urias is a . male.    Chief Complaint: here for physical examination and issues  below      HPI  recent hosp placed in observation for acute bronchitis. Management included IV corticosteroids, bronchodilators, empiric abx, and supplemental oxygen.  Patient is noted have improvement of his symptoms and is presently on room air with oxygen saturation 98%.  Patient is medically stable for discharge with outpatient Pulmonary follow-up.    Cad prev [facility] card     [facility] 2020 per him was incomplete and [facility] but hasnt been done    Asthma  see above    Hep c hx says was cured w yenny;hep C rna negative summer 2020    Prost ca now sees [facility]rol ;[facility]    Gout:  No recent flares. Tolerating medication     Cri d/wd nephrol;saws June 22    Cpvd recent dx via dr pina 1/22;pltal erxd    Bilat foot burning . On gabapentin    Past Medical History:   Diagnosis Date    Aortic stenosis     [facility]    Asthma     BPH (benign prostatic hyperplasia)     CAD (coronary artery disease)     Cardiomyopathy     CHF (congestive heart failure)     Chronic hoarseness     vocal cord surg    Chronic pain     CVA (cerebral infarction)     8/2012 olol; reviewed ed note    Ex-smoker     GERD (gastroesophageal reflux disease)     Hepatitis C     treatedharvoni says cured, RNA NEG 6/2020    Hypertension     Pancreatitis     Prostate cancer     Prostate cancer     Renal insufficiency     Substance abuse     cocaine, etoh , tob in past     Past Surgical History:   Procedure Laterality Date    AORTIC VALVE REPLACEMENT  05/19/2014    Tissue valve replacement    BACK SURGERY      CARDIAC CATHETERIZATION      COLONOSCOPY  2011    FOOT SURGERY      LEFT HEART CATHETERIZATION Left 7/24/2020    Procedure: CATHETERIZATION, HEART, LEFT;  Surgeon: Pasha Martin MD;  Location: [facility] CATH LAB;  Service: Cardiology;   Laterality: Left;    PENILE PROSTHESIS IMPLANT      PENILE PROSTHESIS REVISION  2018    [facility]OSTATECTOMY  [facility]    [facility]    UPPER GASTROINTESTINAL ENDOSCOPY       Family History   Problem Relation Age of Onset    Heart disease Mother     Heart attack Sister     Heart attack Brother     Colon cancer Neg Hx     Colon polyps Neg Hx     Liver cancer Neg Hx     Inflammatory bowel disease Neg Hx     Liver disease Neg Hx     Rectal cancer Neg Hx     Stomach cancer Neg Hx     Ulcerative colitis Neg Hx      Social History     Socioeconomic History    Marital status:      Spouse name: Not on file    Number of children: Not on file    Years of education: Not on file    Highest education level: Not on file   Occupational History    Not on file   Social Needs    Financial resource strain: Not on file    Food insecurity     Worry: Not on file     Inability: Not on file    Transportation needs     Medical: Not on file     Non-medical: Not on file   Tobacco Use    Smoking status: Former Smoker     Packs/day: 2.00     Years: 8.00     Pack years: 16.00     Quit date: 2012     Years since quittin.3    Smokeless tobacco: Never Used   Substance and Sexual Activity    Alcohol use: No     Comment: Sober since 2012    Drug use: No    Sexual activity: Yes   Lifestyle    Physical activity     Days per week: Not on file     Minutes per session: Not on file    Stress: Not at all   Relationships    Social connections     Talks on phone: Not on file     Gets together: Not on file     Attends [employer] service: Not on file     Active member of club or organization: Not on file     Attends meetings of clubs or organizations: Not on file     Relationship status: Not on file   Other Topics Concern    Not on file   Social History Narrative    No pets in household, wife and daughter smokers. Former [employer].    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 History of CVA in adulthood    3. Coronary artery disease, angina presence unspecified, unspecified vessel or lesion type, unspecified whether native or transplanted heart    4. Chronic gout involving toe without tophus, unspecified cause, unspecified laterality      Asthma  gerd  Cri i  Plan:   F/u , urol, radonc, pain mgmt and nephrol when due  F/u card due    Try to move up pulm    Advair Side effects and dosing discussed for couple mnths; wash out mouth after    hx;[facility]         Lab and F/u 12months    He asked about poss dexa sec sami     Routine health maintenance  -     Hemoglobin A1C; Future; Expected date: 01/30/2024    Coronary artery disease involving native coronary artery of native heart without angina pectoris    Hypertension, unspecified type  -     CBC Auto Differential; Future; Expected date: 01/30/2024  -     Comprehensive Metabolic Panel; Future; Expected date: 01/30/2024  -     Lipid Panel; Future; Expected date: 01/30/2024    Prostate cancer    History of CVA in adulthood    Stage 3 chronic kidney disease, unspecified whether stage 3a or 3b CKD    PAD (peripheral artery disease)    Aortic valve stenosis, etiology of cardiac valve disease unspecified    Idiopathic chronic gout without tophus, unspecified site  -     Uric Acid; Future; Expected date: 01/30/2024    Asthmatic bronchitis with acute exacerbation, unspecified asthma severity, unspecified whether persistent    Abnormal finding of blood chemistry, unspecified  -     Hemoglobin A1C; Future; Expected date: 01/30/2024    Current chronic use of systemic steroids  -     DXA Bone Density Spine And Hip; Future; Expected date: 01/30/2023    Other orders  -     fluticasone-salmeterol diskus inhaler 250-50 mcg; Inhale 1  puff into the lungs 2 (two) times daily. Controller  Dispense: 60 each; Refill: 1     Follow up with dr wood nephrologist June 2023  Follow up with your heart doctor Mckeon    Lab 12 months and follow up after  Needs sooner appt with pulm than 2/27

## 2023-01-31 ENCOUNTER — TELEPHONE (OUTPATIENT)
Dept: PULMONOLOGY | Facility: CLINIC | Age: 68
End: 2023-01-31
Payer: MEDICARE

## 2023-01-31 ENCOUNTER — PATIENT MESSAGE (OUTPATIENT)
Dept: PULMONOLOGY | Facility: CLINIC | Age: 68
End: 2023-01-31
Payer: MEDICARE

## 2023-01-31 NOTE — PROGRESS NOTES
History & Physical    SUBJECTIVE:     History of Present Illness:  Patient is a 67 y.o. male referred for hernia. Reports bulge on abdomen. Denies significant pain.    No chief complaint on file.      Review of patient's allergies indicates:  No Known Allergies    Current Outpatient Medications   Medication Sig Dispense Refill    albuterol (PROVENTIL HFA) 90 mcg/actuation inhaler Inhale 2 puffs into the lungs every 6 (six) hours as needed for Wheezing. Rescue 6.7 g 11    albuterol (PROVENTIL/VENTOLIN HFA) 90 mcg/actuation inhaler INHALE 2 PUFFS INTO THE LUNGS EVERY 6 HOURS AS NEEDED FOR COUGH 8.5 g 11    allopurinoL (ZYLOPRIM) 100 MG tablet Take 100 mg by mouth once daily. Takes 0.5 tab      ALPRAZolam (XANAX) 1 MG tablet Take 1 mg by mouth.      aluminum & magnesium hydroxide-simethicone (MYLANTA MAX STRENGTH) 400-400-40 mg/5 mL suspension TAKE 15ML BY MOUTH FOUR TIMES A DAY AS NEEDED FOR STOMACH ACID      aspirin (ECOTRIN) 81 MG EC tablet Take 1 tablet (81 mg total) by mouth once daily. 90 tablet 3    atorvastatin (LIPITOR) 80 MG tablet TAKE ONE-HALF TABLET BY MOUTH EVERY DAY FOR CHOLESTEROL      baclofen (LIORESAL) 10 MG tablet Take 1 tablet (10 mg total) by mouth 3 (three) times daily. 90 tablet 11    carvediloL (COREG) 12.5 MG tablet Take 1 tablet (12.5 mg total) by mouth 2 (two) times daily. 60 tablet 11    clopidogreL (PLAVIX) 75 mg tablet Take 1 tablet (75 mg total) by mouth once daily. 30 tablet 11    cyclobenzaprine HCl (FLEXERIL ORAL) Take by mouth. Takes prn      diclofenac sodium (VOLTAREN) 1 % Gel APPLY 2 GRAMS TOPICALLY FOUR TIMES A DAY AS NEEDED FOR PAIN AND INFLAMMATION. USE ENCLOSED DOSING CARD.      diphenhydrAMINE (SOMINEX) 25 mg tablet Take 25 mg by mouth nightly as needed for Insomnia.      esomeprazole (NEXIUM) 40 MG capsule 40 mg.      famotidine (PEPCID) 40 MG tablet TAKE ONE TABLET BY MOUTH AT BEDTIME FOR ACID REFLUX      fexofenadine (ALLEGRA) 180 MG tablet Take 1 tablet (180 mg total) by  mouth daily as needed. 30 tablet 5    flunisolide 25 mcg, 0.025%, (NASALIDE) 25 mcg (0.025 %) Spry 2 sprays by Nasal route as needed.       fluticasone propionate (FLONASE) 50 mcg/actuation nasal spray 1 spray (50 mcg total) by Each Nostril route once daily. 16 g 0    fluticasone-salmeterol diskus inhaler 250-50 mcg Inhale 1 puff into the lungs 2 (two) times daily. Controller 60 each 1    gabapentin (NEURONTIN) 600 MG tablet Take 1 tablet (600 mg total) by mouth 3 (three) times daily. 90 tablet 11    guaiFENesin 100 mg/5 ml (ROBITUSSIN) 100 mg/5 mL syrup Take 10 mLs (200 mg total) by mouth every 4 (four) hours as needed for Cough or Congestion. 180 mL 0    HYDROcodone-acetaminophen (NORCO) 5-325 mg per tablet Take 1 tablet by mouth every 6 (six) hours as needed for Pain. 10 tablet 0    hydrocortisone 2.5 % cream Apply topically 2 (two) times daily as needed. Apply to affected areas of the face and neck. 30 g 2    hydrocortisone-pramoxine (PROCTOFOAM-HS) rectal foam INSERT 1 APPLICATORFUL INTO RECTALLY TWICE A DAY FOR HEMORRHOIDS      hydrOXYzine HCL (ATARAX) 25 MG tablet Take 25 mg by mouth 3 (three) times daily as needed for Itching.      LIDOcaine (LIDODERM) 5 % APPLY 1 PATCH TOPICALLY EVERY DAY FOR PAIN. WEAR FOR 12 HOURS, THEN REMOVE. DO NOT APPLY NEW PATCH FOR AT LEAST 12 HOURS.      losartan (COZAAR) 50 MG tablet Take 1 tablet (50 mg total) by mouth once daily. 30 tablet 11    methyl salicylate-menthol 15-10% 15-10 % Crea Apply topically as needed.       pantoprazole (PROTONIX) 40 MG tablet Take 40 mg by mouth 2 (two) times daily.       sertraline (ZOLOFT) 100 MG tablet TAKE TWO TABLETS BY MOUTH EVERY DAY FOR MENTAL HEALTH DOSE INCREASED TO 200MG/DAY      sertraline (ZOLOFT) 100 MG tablet 200 mg.      simethicone (MYLICON) 80 MG chewable tablet Take 80 mg by mouth every 6 (six) hours as needed for Flatulence.      sucralfate (CARAFATE) 100 mg/mL suspension Take 1 g by mouth 4 (four) times daily.       tramadol  HCl (TRAMADOL ORAL) Take by mouth. Takes prn      triamcinolone acetonide 0.1% (KENALOG) 0.1 % cream Apply topically 2 (two) times daily. 80 g 1     No current facility-administered medications for this visit.     Facility-Administered Medications Ordered in Other Visits   Medication Dose Route Frequency Provider Last Rate Last Admin    ondansetron injection 4 mg  4 mg Intravenous Once PRN Nehemiah Carmen MD        sodium chloride 0.9% flush 10 mL  10 mL Intravenous PRN Wilda Horne MD           Past Medical History:   Diagnosis Date    Aortic stenosis     [facility]    Asthma     BPH (benign prostatic hyperplasia)     CAD (coronary artery disease)     Cardiomyopathy     CHF (congestive heart failure)     Chronic hoarseness     vocal cord surg    Chronic pain     CKD (chronic kidney disease) stage 3, GFR 30-59 ml/min     CVA (cerebral infarction)     8/2012 olol; reviewed ed note    Ex-smoker     GERD (gastroesophageal reflux disease)     Hepatitis C     treatedharvoni says cured, RNA NEG 6/2020    Hypertension     Pancreatitis     Prostate cancer     Prostate cancer     Prostate cancer     PVD (peripheral vascular disease)     Renal insufficiency     Substance abuse     cocaine, etoh , tob in past     Past Surgical History:   Procedure Laterality Date    AORTIC VALVE REPLACEMENT  05/19/2014    Tissue valve replacement    BACK SURGERY      CARDIAC CATHETERIZATION      COLONOSCOPY  2011    COLONOSCOPY N/A 2/24/2021    Procedure: COLONOSCOPY;  Surgeon: Faith Carrillo MD;  Location: [facility];  Service: Endoscopy;  Laterality: N/A;    EPIDURAL STEROID INJECTION INTO CERVICAL SPINE N/A 6/21/2022    Procedure: C7-T1 IL PIEDAD;  Surgeon: Nehemiah Carmen MD;  Location: [facility] PAIN MGT;  Service: Pain Management;  Laterality: N/A;    ESOPHAGOGASTRODUODENOSCOPY N/A 2/24/2021    Procedure: ESOPHAGOGASTRODUODENOSCOPY (EGD);  Surgeon: Faith Carrillo MD;  Location: [facility];  Service: Endoscopy;   "Laterality: N/A;    FOOT SURGERY      LEFT HEART CATHETERIZATION Left 7/24/2020    Procedure: CATHETERIZATION, HEART, LEFT;  Surgeon: Pasha Martin MD;  Location: [facility] CATH LAB;  Service: Cardiology;  Laterality: Left;    PENILE PROSTHESIS IMPLANT      PENILE PROSTHESIS REVISION  04/30/2018    PROSTATECTOMY  09/2019    [facility]    radiation for prostate      TRANSFORAMINAL EPIDURAL INJECTION OF STEROID Right 10/20/2022    Procedure: Right  L4/5 + L5/S1 TF PIEDAD RN IV Sedation;  Surgeon: Nehemiah Carmen MD;  Location: [facility] PAIN MGT;  Service: Pain Management;  Laterality: Right;    UPPER GASTROINTESTINAL ENDOSCOPY  2011     Family History   Problem Relation Age of Onset    Heart disease Mother     Heart disease Father     Heart attack Sister     Heart attack Brother     Colon cancer Neg Hx     Colon polyps Neg Hx     Liver cancer Neg Hx     Inflammatory bowel disease Neg Hx     Liver disease Neg Hx     Rectal cancer Neg Hx     Stomach cancer Neg Hx     Ulcerative colitis Neg Hx      Social History     Tobacco Use    Smoking status: Former     Packs/day: 2.00     Years: 8.00     Pack years: 16.00     Types: Cigarettes     Quit date: 8/24/2012     Years since quitting: 10.4    Smokeless tobacco: Never   Substance Use Topics    Alcohol use: No     Comment: Sober since 08/24/2012    Drug use: Not Currently     Types: "Crack" cocaine, Marijuana     Comment: Quit in 2012        Review of Systems:  Review of Systems   Constitutional:  Positive for activity change and unexpected weight change. Negative for chills and fever.   HENT:  Positive for hearing loss, rhinorrhea and trouble swallowing. Negative for congestion.    Eyes:  Positive for discharge and visual disturbance.   Respiratory:  Positive for chest tightness and wheezing. Negative for shortness of breath.    Cardiovascular:  Positive for chest pain and palpitations.   Gastrointestinal:  Positive for blood in stool. Negative for abdominal distention, " "abdominal pain, constipation, diarrhea, nausea, rectal pain and vomiting.   Endocrine: Negative for polydipsia and polyuria.   Genitourinary:  Negative for difficulty urinating, dysuria, hematuria and urgency.   Musculoskeletal:  Positive for arthralgias and neck pain. Negative for joint swelling.   Skin:  Negative for rash.   Neurological:  Positive for weakness and headaches. Negative for light-headedness.   Hematological:  Negative for adenopathy.   Psychiatric/Behavioral:  Positive for confusion and dysphoric mood.      OBJECTIVE:     Vital Signs (Most Recent)  Temp: 98.2 °F (36.8 °C) (01/18/23 1032)  Pulse: 89 (01/18/23 1032)  BP: 125/89 (01/18/23 1032)  5' 9" (1.753 m)  113.4 kg (250 lb)     Physical Exam:  Physical Exam  Vitals reviewed.   Constitutional:       Appearance: He is well-developed.   HENT:      Head: Normocephalic and atraumatic.   Cardiovascular:      Rate and Rhythm: Normal rate and regular rhythm.   Pulmonary:      Effort: Pulmonary effort is normal.      Breath sounds: Normal breath sounds.   Abdominal:      General: Bowel sounds are normal. There is no distension.      Palpations: Abdomen is soft.      Tenderness: There is no abdominal tenderness.      Hernia: A hernia (umbilical) is present.   Musculoskeletal:      Cervical back: Normal range of motion and neck supple.   Skin:     General: Skin is warm and dry.   Neurological:      Mental Status: He is alert and oriented to person, place, and time.       Diagnostic Results:  CT:  Impression:   Fat containing supraumbilical hernia with mild stranding raises possibility of incarceration/complication particularly if clinical symptoms correlate    ASSESSMENT/PLAN:      67-year-old male with umbilical hernia    PLAN:Plan     Discussed options for observation with monitoring for symptoms of incarceration / strangulation or increasingly symptomatic  versus surgical repair including risks and benefits of surgery  Previous aortic valve replacement " as well as heart caths, on PlavixPatient would be at elevated risk for surgery with his comorbidities and underlying cardiac conditions. He would need cardiac clearance to hold antiplatelet medications prior to any surgery.    45 minutes of total time spent on the encounter, which includes face to face time and non-face to face time preparing to see the patient (eg, review of tests), Obtaining and/or reviewing separately obtained history, Documenting clinical information in the electronic or other health record, Independently interpreting results (not separately reported) and communicating results to the patient/family/caregiver, or Care coordination (not separately reported).

## 2023-01-31 NOTE — TELEPHONE ENCOUNTER
Called pt. No answer, lvm informing pt that appt with Radha Martinez on 2/27 has been cancelled. Reschedule to 2/28. Left contact information for pt to return call and sent [facility] message with same info.

## 2023-02-02 ENCOUNTER — TELEPHONE (OUTPATIENT)
Dept: INTERNAL MEDICINE | Facility: CLINIC | Age: 68
End: 2023-02-02
Payer: MEDICARE

## 2023-02-02 NOTE — TELEPHONE ENCOUNTER
fluticasone propionate (FLONASE) 50 mcg/actuation nasal spray 16 g 0 1/25/2023 3/27/2023 No   Sig - Route: 1 spray (50 mcg total) by Each Nostril route once daily. - Each Nostril   Sent to pharmacy as: fluticasone propionate (FLONASE) 50 mcg/actuation nasal spray   Class: Normal   Order: [id number]   Date/Time Signed: 1/25/2023 12:30         Pharmacy    [facility]    Associated Diagnoses    Allergic rhinitis, unspecified seasonality, unspecified trigger            fluticasone-salmeterol diskus inhaler 250-50 mcg 60 each 1 1/30/2023 1/30/2024 No   Sig - Route: Inhale 1 puff into the lungs 2 (two) times daily. Controller - Inhalation   Sent to pharmacy as: fluticasone-salmeterol 250-50 mcg/dose (ADVAIR) 250-50 mcg/dose diskus inhaler   Class: Normal   Notes to Pharmacy: Please instruct on use   Order: [id number]   Date/Time Signed: 1/30/2023 14:18         Pharmacy    [facility]

## 2023-02-02 NOTE — TELEPHONE ENCOUNTER
----- Message from Talat Willson sent at 2/2/2023 10:48 AM CST -----  Contact: [phone]  Calling regarding new prescription request for pt ,albuterol (PROVENTIL HFA) 90 mcg/actuation inhaler,fluticasone propionate (FLONASE) 50 mcg/actuation nasal spray . Please call back at [phone] . Thanks.[facility]  [place] [place], [place] [address] [phone]

## 2023-02-07 ENCOUNTER — APPOINTMENT (OUTPATIENT)
Dept: RADIOLOGY | Facility: HOSPITAL | Age: 68
End: 2023-02-07
Attending: FAMILY MEDICINE
Payer: MEDICARE

## 2023-02-07 DIAGNOSIS — Z79.52 CURRENT CHRONIC USE OF SYSTEMIC STEROIDS: ICD-10-CM

## 2023-02-07 DIAGNOSIS — Z00.00 ENCOUNTER FOR MEDICARE ANNUAL WELLNESS EXAM: ICD-10-CM

## 2023-02-07 PROCEDURE — 77080 DEXA BONE DENSITY SPINE HIP: ICD-10-PCS | Mod: 26,HCNC,, | Performed by: RADIOLOGY

## 2023-02-07 PROCEDURE — 77080 DXA BONE DENSITY AXIAL: CPT | Mod: 26,HCNC,, | Performed by: RADIOLOGY

## 2023-02-07 PROCEDURE — 77080 DXA BONE DENSITY AXIAL: CPT | Mod: TC,HCNC

## 2023-02-09 DIAGNOSIS — Z00.00 ENCOUNTER FOR MEDICARE ANNUAL WELLNESS EXAM: ICD-10-CM

## 2023-02-14 ENCOUNTER — OUTPATIENT CASE MANAGEMENT (OUTPATIENT)
Dept: ADMINISTRATIVE | Facility: OTHER | Age: 68
End: 2023-02-14
Payer: MEDICARE

## 2023-02-14 NOTE — PROGRESS NOTES
Outpatient Care Management  Plan of Care Follow Up Visit    Patient: Abdullahi Urias  MRN: [id number]  Date of Service: 02/14/2023  Completed by: Yung Ramos RN  Referral Date: 11/22/2022    Reason for Visit   Patient presents with    OPCM Chart Review     2/14/23    OPCM RN First Follow-Up Attempt     2/14/23    Update Plan Of Care     2/14/23       Brief Summary: Phone contact made with Mr. Urias and we discussed his pain care plan. No new issues at this time, Mr. Urias is healing from bronchitis, plan to follow up in two weeks

## 2023-02-28 ENCOUNTER — OUTPATIENT CASE MANAGEMENT (OUTPATIENT)
Dept: ADMINISTRATIVE | Facility: OTHER | Age: 68
End: 2023-02-28
Payer: MEDICARE

## 2023-02-28 NOTE — PROGRESS NOTES
Outpatient Care Management  Plan of Care Follow Up Visit    Patient: Abdullahi Urias  MRN: [id number]  Date of Service: 02/28/2023  Completed by: Yung Ramos RN  Referral Date: 11/22/2022    Reason for Visit   Patient presents with    OPCM Chart Review     2/28/23    OPCM RN First Follow-Up Attempt     2/28/23       Brief Summary:     2/28/23  1st Attempt to complete follow-up for Outpatient Care Management; left message requesting return call.

## 2023-03-03 ENCOUNTER — TELEPHONE (OUTPATIENT)
Dept: PAIN MEDICINE | Facility: CLINIC | Age: 68
End: 2023-03-03
Payer: MEDICARE

## 2023-03-06 ENCOUNTER — OFFICE VISIT (OUTPATIENT)
Dept: PAIN MEDICINE | Facility: CLINIC | Age: 68
End: 2023-03-06
Payer: MEDICARE

## 2023-03-06 ENCOUNTER — TELEPHONE (OUTPATIENT)
Dept: NEUROSURGERY | Facility: CLINIC | Age: 68
End: 2023-03-06
Payer: MEDICARE

## 2023-03-06 VITALS
BODY MASS INDEX: 37.88 KG/M2 | HEIGHT: 69 IN | HEART RATE: 87 BPM | DIASTOLIC BLOOD PRESSURE: 71 MMHG | SYSTOLIC BLOOD PRESSURE: 109 MMHG | WEIGHT: 255.75 LBS | RESPIRATION RATE: 16 BRPM

## 2023-03-06 DIAGNOSIS — M54.16 LUMBAR RADICULOPATHY, CHRONIC: Primary | ICD-10-CM

## 2023-03-06 PROCEDURE — 99999 PR PBB SHADOW E&M-EST. PATIENT-LVL V: CPT | Mod: PBBFAC,HCNC,, | Performed by: PHYSICIAN ASSISTANT

## 2023-03-06 PROCEDURE — 99214 PR OFFICE/OUTPT VISIT, EST, LEVL IV, 30-39 MIN: ICD-10-PCS | Mod: HCNC,S$GLB,, | Performed by: PHYSICIAN ASSISTANT

## 2023-03-06 PROCEDURE — 1160F PR REVIEW ALL MEDS BY PRESCRIBER/CLIN PHARMACIST DOCUMENTED: ICD-10-PCS | Mod: HCNC,CPTII,S$GLB, | Performed by: PHYSICIAN ASSISTANT

## 2023-03-06 PROCEDURE — 1101F PR PT FALLS ASSESS DOC 0-1 FALLS W/OUT INJ PAST YR: ICD-10-PCS | Mod: HCNC,CPTII,S$GLB, | Performed by: PHYSICIAN ASSISTANT

## 2023-03-06 PROCEDURE — 1125F AMNT PAIN NOTED PAIN PRSNT: CPT | Mod: HCNC,CPTII,S$GLB, | Performed by: PHYSICIAN ASSISTANT

## 2023-03-06 PROCEDURE — 3288F FALL RISK ASSESSMENT DOCD: CPT | Mod: HCNC,CPTII,S$GLB, | Performed by: PHYSICIAN ASSISTANT

## 2023-03-06 PROCEDURE — 3008F PR BODY MASS INDEX (BMI) DOCUMENTED: ICD-10-PCS | Mod: HCNC,CPTII,S$GLB, | Performed by: PHYSICIAN ASSISTANT

## 2023-03-06 PROCEDURE — 99214 OFFICE O/P EST MOD 30 MIN: CPT | Mod: HCNC,S$GLB,, | Performed by: PHYSICIAN ASSISTANT

## 2023-03-06 PROCEDURE — 3008F BODY MASS INDEX DOCD: CPT | Mod: HCNC,CPTII,S$GLB, | Performed by: PHYSICIAN ASSISTANT

## 2023-03-06 PROCEDURE — 99999 PR PBB SHADOW E&M-EST. PATIENT-LVL V: ICD-10-PCS | Mod: PBBFAC,HCNC,, | Performed by: PHYSICIAN ASSISTANT

## 2023-03-06 PROCEDURE — 1101F PT FALLS ASSESS-DOCD LE1/YR: CPT | Mod: HCNC,CPTII,S$GLB, | Performed by: PHYSICIAN ASSISTANT

## 2023-03-06 PROCEDURE — 3074F PR MOST RECENT SYSTOLIC BLOOD PRESSURE < 130 MM HG: ICD-10-PCS | Mod: HCNC,CPTII,S$GLB, | Performed by: PHYSICIAN ASSISTANT

## 2023-03-06 PROCEDURE — 3078F PR MOST RECENT DIASTOLIC BLOOD PRESSURE < 80 MM HG: ICD-10-PCS | Mod: HCNC,CPTII,S$GLB, | Performed by: PHYSICIAN ASSISTANT

## 2023-03-06 PROCEDURE — 3074F SYST BP LT 130 MM HG: CPT | Mod: HCNC,CPTII,S$GLB, | Performed by: PHYSICIAN ASSISTANT

## 2023-03-06 PROCEDURE — 1159F PR MEDICATION LIST DOCUMENTED IN MEDICAL RECORD: ICD-10-PCS | Mod: HCNC,CPTII,S$GLB, | Performed by: PHYSICIAN ASSISTANT

## 2023-03-06 PROCEDURE — 3288F PR FALLS RISK ASSESSMENT DOCUMENTED: ICD-10-PCS | Mod: HCNC,CPTII,S$GLB, | Performed by: PHYSICIAN ASSISTANT

## 2023-03-06 PROCEDURE — 1159F MED LIST DOCD IN RCRD: CPT | Mod: HCNC,CPTII,S$GLB, | Performed by: PHYSICIAN ASSISTANT

## 2023-03-06 PROCEDURE — 3078F DIAST BP <80 MM HG: CPT | Mod: HCNC,CPTII,S$GLB, | Performed by: PHYSICIAN ASSISTANT

## 2023-03-06 PROCEDURE — 1125F PR PAIN SEVERITY QUANTIFIED, PAIN PRESENT: ICD-10-PCS | Mod: HCNC,CPTII,S$GLB, | Performed by: PHYSICIAN ASSISTANT

## 2023-03-06 PROCEDURE — 1160F RVW MEDS BY RX/DR IN RCRD: CPT | Mod: HCNC,CPTII,S$GLB, | Performed by: PHYSICIAN ASSISTANT

## 2023-03-06 RX ORDER — TIZANIDINE 4 MG/1
4 TABLET ORAL 2 TIMES DAILY PRN
Qty: 60 TABLET | Refills: 1 | Status: SHIPPED | OUTPATIENT
Start: 2023-03-06 | End: 2023-05-10

## 2023-03-06 NOTE — PROGRESS NOTES
Established Patient Chronic Pain Note (Follow up visit)    Chief Complaint:   Chief Complaint   Patient presents with    Low-back Pain     Patient has lower back apin.  Pain scale 10/10         SUBJECTIVE:  Interval History (3/6/2023): Abdullahi Urias presents today for follow-up visit.  His last ordered injection for right L5/S1 +S1 TF PIEDAD was cancelled as patient was hospitalized and treated for bronchitis on 01/26/2023. Not interested in rescheduling injections as they have offered only minimal and short term relief. Reports he went on a week long cruise, returned yesterday and reports he could barely leave his room due to pain.   Patient reports pain as 10/10 today. Continues to report low back pain with radiation into his right lower extremity along L4/5-S1 distribution with associated numbness in the right foot.      Interval History (1/9/2023): Abdullahi Urias presents today for follow-up visit.  he underwent right L4/5 +L5/S1 TF PIEDAD on 10/20/22.  The patient reports that he is/was better following the procedure.  he reports 40% pain relief.  The changes lasted for a few weeks before the pain insidiously returned.  Patient reports pain as 9/10 today. Also has history of vasculogenic claudication. Recently went on a cruise, needed a wheelchair to get on the ship. Continues to report low back pain with radiation into his right lower extremity along L4/5-S1 distribution with associated numbness in the right foot.      Interval History (10/4/2022): Abdullahi Urias presents today for follow-up visit.  he underwent C7/T1 IL PIEDAD on 06/21/2022.  The patient reports that he is/was better following the procedure.  he reports 60% pain relief.  The changes lasted 6 weeks before neck stiffness and arm numbness returned. Patient reports pain as 8/10 today.    Today he reports his primary complaint is low back pain with radiation into right leg into the posterior and lateral aspect along L4-S1 distribution. Reports back pain is  worse than neck pain at this time. Also has history of vasculogenic claudication. Patient reports pain is causing him to be depressed and anxious. Pain interferes with daily activities. [facility]. Recently went on a cruise, needed to use a wheelchair due to weakness and pain.    MRI lumbar spine  FINDINGS:  Visualized distal cord demonstrates normal signal.     No marrow replacement process.  No fracture.  No listhesis.     There are degenerative changes of the lower lumbar spine with mild disc space narrowing, most severe at L5-S1 with marginal spurring with facet arthropathy     L1-L2: There is posterior disc bulge with indentation of the thecal sac.  No spinal canal or neural foraminal encroachment.     L2-L3: Mild facet arthropathy.  Mild posterior disc bulge.  No spinal canal or neural foraminal encroachment.     L3-L4: Bilateral ligamentum flavum and facet arthropathy.  Mild posterior disc bulge with indentation of thecal sac.  No spinal canal or neural foraminal encroachment.     L4-L5: Ligamentum flavum and facet arthropathy with broad-based disc bulge.  No significant spinal canal stenosis.  There is crowding of the lateral recesses with narrowing along the inferior right greater than left neural foramen.     L5-S1: Posterior disc osteophyte complex with facet arthropathy.  Appearance of possible prior right hemilaminectomy changes..  There is severe narrowing along the right neural foramen  with mild to moderate narrowing along the left neural foramen.  No significant spinal canal stenosis.     Impression:     Discogenic degenerative changes most severe at L5-S1 as above.    Interval HPI  Abdullaih Urias is a 67 y.o. male who presents to the clinic for a follow-up.  He reports that his back pain is much improved ever since undergoing bilateral piriformis trigger point injections.  He wants to focus more on his neck and upper extremity pain currently.  Since the last  visit, Abdullahi Urias states the pain has been worsening. Current pain intensity is 8/10.      Patient denies night fever/night sweats, urinary incontinence, bowel incontinence, significant weight loss, significant motor weakness and loss of sensations.    Pain Disability Index Review:  Last 3 PDI Scores 1/9/2023 5/11/2022 1/27/2022   Pain Disability Index (PDI) 57 58 56     Interval HPI 01/27/2022:  Abdullahi Urias is a 66 y.o. male who presents to the clinic for the evaluation of lower back pain.  He is self referred.  He has past medical history of CVA, substance abuse, asthma, CHF, hypertension, CAD, cardiomyopathy, renal insufficiency, CKD stage 3, H/O hepatitis-C, H/O prostate cancer, multiple other medical comorbidities as listed in his chart.  The pain started several years ago and symptoms have been worsening.The pain is located in the lumbosacral area and radiates to the bilateral lower extremities extending posteriorly to the bilateral feet.  Of note, patient recently had cardiovascular studies done on his lower extremities which did demonstrate vasculogenic claudication.  The pain is described as Aching, burning, tingling and is rated as 8/10. The pain is rated with a score of  6/10 on the BEST day and a score of 10/10 on the WORST day.  Symptoms interfere with daily activity. The pain is exacerbated by activities.  The pain is mitigated by medications.           Non-Pharmacologic Treatments:  Physical Therapy/Home Exercise: yes  Ice/Heat:yes  TENS: yes  Acupuncture: no  Massage: yes  Chiropractic: no    Other: no        Pain Medications:  - Opioids: Norco, Tylenol 3  - Adjuvant Medications: Ambien, alprazolam, Flexeril, Voltaren gel, gabapentin, lidocaine patch, Zoloft  - Anti-Coagulants: Aspirin, Pletal      report:  Reviewed and consistent with medication use as prescribed.       Pain Procedures:   -previous lumbar PIEDAD  -previous lumbar MBB/RFA  -previous lumbar laminectomy in 2001 at  L5-S1  -01/27/2022:  Bilateral piriformis trigger point injections, significant relief that is still ongoing  -right L4/5 +L5/S1 TF PIEDAD on 10/20/22 with 40% relief        Imaging:   MRI lumbar spine 01/12/2021:  Visualized distal cord demonstrates normal signal.     No marrow replacement process.  No fracture.  No listhesis.     There are degenerative changes of the lower lumbar spine with mild disc space narrowing, most severe at L5-S1 with marginal spurring with facet arthropathy     L1-L2: There is posterior disc bulge with indentation of the thecal sac.  No spinal canal or neural foraminal encroachment.     L2-L3: Mild facet arthropathy.  Mild posterior disc bulge.  No spinal canal or neural foraminal encroachment.     L3-L4: Bilateral ligamentum flavum and facet arthropathy.  Mild posterior disc bulge with indentation of thecal sac.  No spinal canal or neural foraminal encroachment.     L4-L5: Ligamentum flavum and facet arthropathy with broad-based disc bulge.  No significant spinal canal stenosis.  There is crowding of the lateral recesses with narrowing along the inferior right greater than left neural foramen.     L5-S1: Posterior disc osteophyte complex with facet arthropathy.  Appearance of possible prior right hemilaminectomy changes..  There is severe narrowing along the right neural foramen  with mild to moderate narrowing along the left neural foramen.  No significant spinal canal stenosis        CT cervical spine 12/01/2020:  Vertebral body heights maintained.  2 mm anterolisthesis of C3 on C4.  Disc height loss and osteophyte changes at C6-7 and C7-T1.  Multilevel facet degenerative findings most prevalent at the right C2-3 and left C3-4 levels.     Neck soft tissues are unremarkable.     C2-3: No CT evidence of spinal canal stenosis.  Mild right neural foraminal narrowing.     C3-4: No CT evidence of significant spinal canal stenosis.  Left greater than right facet and uncovertebral degenerative  changes results in mild right and severe left neural foraminal narrowing.     C4-5: Posterior disc osteophyte complex present with mild spinal canal stenosis.  Mild to moderate right neural foraminal narrowing secondary to facet and uncovertebral degenerative findings.     C5-6: Posterior disc osteophyte complex present asymmetric to the right with mild spinal canal stenosis.  Severe right neural foraminal narrowing secondary to facet and uncovertebral degenerative findings.     C6-7: Posterior disc osteophyte complex present causing at least mild spinal canal stenosis.  Left greater than right facet and uncovertebral degenerative findings with mild-to-moderate right and moderate to severe left neural foraminal narrowing.     C7-T1: Posterior disc osteophyte complex with spinal canal stenosis suspected.  Mild left neural foraminal narrowing secondary to uncovertebral degenerative findings.        PMHx,PSHx, Social history, and Family history:  I have reviewed the patient's medical, surgical, social, and family history in detail and updated the computerized patient record.      Review of patient's allergies indicates:  No Known Allergies      Current Outpatient Medications   Medication Sig    albuterol (PROVENTIL/VENTOLIN HFA) 90 mcg/actuation inhaler INHALE 2 PUFFS INTO THE LUNGS EVERY 6 HOURS AS NEEDED FOR COUGH    allopurinoL (ZYLOPRIM) 100 MG tablet Take 100 mg by mouth once daily. Takes 0.5 tab    ALPRAZolam (XANAX) 1 MG tablet Take 1 mg by mouth.    aluminum & magnesium hydroxide-simethicone (MYLANTA MAX STRENGTH) 400-400-40 mg/5 mL suspension TAKE 15ML BY MOUTH FOUR TIMES A DAY AS NEEDED FOR STOMACH ACID    aspirin (ECOTRIN) 81 MG EC tablet Take 1 tablet (81 mg total) by mouth once daily.    atorvastatin (LIPITOR) 80 MG tablet TAKE ONE-HALF TABLET BY MOUTH EVERY DAY FOR CHOLESTEROL    baclofen (LIORESAL) 10 MG tablet Take 1 tablet (10 mg total) by mouth 3 (three) times daily.    carvediloL (COREG) 12.5 MG  tablet Take 1 tablet (12.5 mg total) by mouth 2 (two) times daily.    clopidogreL (PLAVIX) 75 mg tablet Take 1 tablet (75 mg total) by mouth once daily.    diclofenac sodium (VOLTAREN) 1 % Gel APPLY 2 GRAMS TOPICALLY FOUR TIMES A DAY AS NEEDED FOR PAIN AND INFLAMMATION. USE ENCLOSED DOSING CARD.    esomeprazole (NEXIUM) 40 MG capsule 40 mg.    famotidine (PEPCID) 40 MG tablet TAKE ONE TABLET BY MOUTH AT BEDTIME FOR ACID REFLUX    fexofenadine (ALLEGRA) 180 MG tablet Take 1 tablet (180 mg total) by mouth daily as needed.    flunisolide 25 mcg, 0.025%, (NASALIDE) 25 mcg (0.025 %) Spry 2 sprays by Nasal route as needed.     fluticasone propionate (FLONASE) 50 mcg/actuation nasal spray 1 spray (50 mcg total) by Each Nostril route once daily.    fluticasone-salmeterol diskus inhaler 250-50 mcg Inhale 1 puff into the lungs 2 (two) times daily. Controller    gabapentin (NEURONTIN) 600 MG tablet Take 1 tablet (600 mg total) by mouth 3 (three) times daily.    hydrocortisone 2.5 % cream Apply topically 2 (two) times daily as needed. Apply to affected areas of the face and neck.    hydrocortisone-pramoxine (PROCTOFOAM-HS) rectal foam INSERT 1 APPLICATORFUL INTO RECTALLY TWICE A DAY FOR HEMORRHOIDS    hydrOXYzine HCL (ATARAX) 25 MG tablet Take 25 mg by mouth 3 (three) times daily as needed for Itching.    LIDOcaine (LIDODERM) 5 % APPLY 1 PATCH TOPICALLY EVERY DAY FOR PAIN. WEAR FOR 12 HOURS, THEN REMOVE. DO NOT APPLY NEW PATCH FOR AT LEAST 12 HOURS.    losartan (COZAAR) 50 MG tablet Take 1 tablet (50 mg total) by mouth once daily.    pantoprazole (PROTONIX) 40 MG tablet Take 40 mg by mouth 2 (two) times daily.     sertraline (ZOLOFT) 100 MG tablet TAKE TWO TABLETS BY MOUTH EVERY DAY FOR MENTAL HEALTH DOSE INCREASED TO 200MG/DAY    sertraline (ZOLOFT) 100 MG tablet 200 mg.    sucralfate (CARAFATE) 100 mg/mL suspension Take 1 g by mouth 4 (four) times daily.     tramadol HCl (TRAMADOL ORAL) Take by mouth. Takes prn     "triamcinolone acetonide 0.1% (KENALOG) 0.1 % cream Apply topically 2 (two) times daily.    diphenhydrAMINE (SOMINEX) 25 mg tablet Take 25 mg by mouth nightly as needed for Insomnia.    HYDROcodone-acetaminophen (NORCO) 5-325 mg per tablet Take 1 tablet by mouth every 6 (six) hours as needed for Pain. (Patient not taking: Reported on 3/6/2023)    methyl salicylate-menthol 15-10% 15-10 % Crea Apply topically as needed.     simethicone (MYLICON) 80 MG chewable tablet Take 80 mg by mouth every 6 (six) hours as needed for Flatulence.    tiZANidine (ZANAFLEX) 4 MG tablet Take 1 tablet (4 mg total) by mouth 2 (two) times daily as needed (muscle spasms). May cause drowsiness.     No current facility-administered medications for this visit.     Facility-Administered Medications Ordered in Other Visits   Medication    ondansetron injection 4 mg    sodium chloride 0.9% flush 10 mL         REVIEW OF SYSTEMS:    GENERAL:  No weight loss, malaise or fevers.  HEENT:   No recent changes in vision or hearing  NECK:  Negative for lumps, no difficulty with swallowing.  RESPIRATORY:  Negative for cough, wheezing or shortness of breath, patient denies any recent URI.  CARDIOVASCULAR:  Negative for chest pain, leg swelling or palpitations.  GI:  Negative for abdominal discomfort, blood in stools or black stools or change in bowel habits.  MUSCULOSKELETAL:  See HPI.  SKIN:  Negative for lesions, rash, and itching.  PSYCH:  No mood disorder or recent psychosocial stressors.  Patients sleep is not disturbed secondary to pain.  HEMATOLOGY/LYMPHOLOGY:  Negative for prolonged bleeding, bruising easily or swollen nodes.  Patient is currently taking anti-coagulants - ASA and Pletal  NEURO:   No history of headaches, syncope, paralysis, seizures or tremors.  All other reviewed and negative other than HPI.       OBJECTIVE:    /71   Pulse 87   Resp 16   Ht 5' 9" (1.753 m)   Wt 116 kg (255 lb 11.7 oz)   BMI 37.77 kg/m²     PHYSICAL " EXAMINATION:    GENERAL: Well appearing, in no acute distress, alert and oriented x3.  PSYCH:  Mood and affect appropriate.  SKIN: Skin color, texture, turgor normal, no rashes or lesions.  HEAD/FACE:  Normocephalic, atraumatic. Cranial nerves grossly intact.   NECK:  Tenderness palpation over the bilateral cervical paraspinous muscles.  Spurling's positive 5 bilaterally.  Limited range of motion secondary to pain in all planes.  CV: RRR with palpation of the radial artery.  PULM: No evidence of respiratory difficulty, symmetric chest rise.  GI:  Soft and non-tender.  BACK:  Surgical scarring evident.  Straight leg raising in the sitting and supine positions is positive to radicular pain on the right.  Moderate pain to palpation over the facet joints of the lumbar spine and lumbar paraspinals limited range of motion secondary to pain reproduction.  Axial loading positive bilaterally  EXTREMITIES: Peripheral joint ROM is full and pain free without obvious instability or laxity in all four extremities. No deformities, edema, or skin discoloration. Good capillary refill.  MUSCULOSKELETAL:   mild shoulder impingement signs bilaterally.  Hip and knee provocative maneuvers are unremarkable.  There is mild pain with palpation over the sacroiliac joints bilaterally.  FABERs test is equivocal.  FADIRs test is positive bilaterally.   Bilateral upper and lower extremity strength is normal and symmetric.  No atrophy or tone abnormalities are noted.  NEURO: Bilateral upper and lower extremity coordination and muscle stretch reflexes are physiologic and symmetric.  Plantar response are downgoing. No clonus.  No loss of sensation is noted.  GAIT:  Slow, antalgic.      LABS:  Lab Results   Component Value Date    WBC 12.05 01/26/2023    HGB 12.4 (L) 01/26/2023    HCT 37.2 (L) 01/26/2023    MCV 87 01/26/2023     01/26/2023       CMP  Sodium   Date Value Ref Range Status   01/26/2023 141 136 - 145 mmol/L Final     Potassium    Date Value Ref Range Status   01/26/2023 4.3 3.5 - 5.1 mmol/L Final     Chloride   Date Value Ref Range Status   01/26/2023 107 95 - 110 mmol/L Final     CO2   Date Value Ref Range Status   01/26/2023 24 23 - 29 mmol/L Final     Glucose   Date Value Ref Range Status   01/26/2023 101 70 - 110 mg/dL Final     BUN   Date Value Ref Range Status   01/26/2023 21 8 - 23 mg/dL Final     Creatinine   Date Value Ref Range Status   01/26/2023 1.4 0.5 - 1.4 mg/dL Final     Calcium   Date Value Ref Range Status   01/26/2023 9.9 8.7 - 10.5 mg/dL Final     Total Protein   Date Value Ref Range Status   01/25/2023 7.6 6.0 - 8.4 g/dL Final     Albumin   Date Value Ref Range Status   01/25/2023 3.8 3.5 - 5.2 g/dL Final     Total Bilirubin   Date Value Ref Range Status   01/25/2023 0.4 0.1 - 1.0 mg/dL Final     Comment:     For infants and newborns, interpretation of results should be based  on gestational age, weight and in agreement with clinical  observations.    Premature Infant recommended reference ranges:  Up to 24 hours.............<8.0 mg/dL  Up to 48 hours............<12.0 mg/dL  3-5 days..................<15.0 mg/dL  6-29 days.................<15.0 mg/dL       Alkaline Phosphatase   Date Value Ref Range Status   01/25/2023 93 55 - 135 U/L Final     AST   Date Value Ref Range Status   01/25/2023 17 10 - 40 U/L Final     ALT   Date Value Ref Range Status   01/25/2023 11 10 - 44 U/L Final     Anion Gap   Date Value Ref Range Status   01/26/2023 10 8 - 16 mmol/L Final     eGFR if    Date Value Ref Range Status   06/02/2022 51.1 (A) >60 mL/min/1.73 m^2 Final     eGFR if non    Date Value Ref Range Status   06/02/2022 44.2 (A) >60 mL/min/1.73 m^2 Final     Comment:     Calculation used to obtain the estimated glomerular filtration  rate (eGFR) is the CKD-EPI equation.          Lab Results   Component Value Date    HGBA1C 5.2 10/23/2007             ASSESSMENT: 67 y.o. year old male with neck pain,  consistent with     1. Lumbar radiculopathy, chronic  MRI Lumbar Spine Without Contrast    Ambulatory referral/consult to Neurosurgery    tiZANidine (ZANAFLEX) 4 MG tablet                  PLAN:   - Interventions:  None at this time, not interested in repeat injections.    S/p right L4/5 +L5/S1 TF PIEDAD on 10/20/22 with 40% relief  S/p C7-T1 IL PIEDAD for diagnostic and therapeutic purposes with 60% relief    - Anticoagulation use: yes Aspirin and Plavix     - Medications: I have stressed the importance of physical activity and a home exercise plan to help with pain and improve health. and Patient can continue with medications for now since they are providing benefits, using them appropriately, and without side effects.  Advised patient that I do not believe opioids are in his best interest for his chronic non malignant pain.  - Start Zanaflex 4mg BID PRN muscle spasms (or can take 1/2 tablet). Patient understands this may cause drowsiness.      - Therapy:  Advised patient continue with home exercises as tolerated     - Psychological:  Discussed coping mechanisms of address chronic pain issues     - Labs:  Reviewed     - Imaging: Reviewed available imaging with patient and answered any questions they had regarding study.  Order updated MRI of lumbar spine to further evaluate for worsening pain and weakness     - Consults/Referrals:  [facility] for further evaluation and surgical options    - Records:   Attempt  to obtain outside records from previous pain management provider.  Reviewed/Obtain old records from outside physicians and imaging     - Follow up visit: 4 weeks after injection     - Counseled patient regarding the importance of activity modification and physical therapy     - This condition does not require this patient to take time off of work, and the primary goal of our Pain Management services is to improve the patient's functional capacity.     - Patient Questions: Answered all of the patient's questions regarding  diagnosis, therapy, and treatment       The above plan and management options were discussed at length with patient. Patient is in agreement with the above and verbalized understanding.      Khadijah Machado PA-C  Interventional Pain Management  [facility]    Disclaimer:  This note was prepared using voice recognition system and is likely to have sound alike errors that may have been overlooked even after proof reading.  Please call me with any questions

## 2023-03-06 NOTE — TELEPHONE ENCOUNTER
Contacted patient regarding referral with Neurosurgery. Patient states no surgeries within last 2 years, nor was pain/injury caused by any accident that would be involved in any type of future litigation.    Dalila Mendez RN

## 2023-03-07 ENCOUNTER — OFFICE VISIT (OUTPATIENT)
Dept: PULMONOLOGY | Facility: CLINIC | Age: 68
End: 2023-03-07
Payer: MEDICARE

## 2023-03-07 VITALS
WEIGHT: 254.19 LBS | HEIGHT: 69 IN | BODY MASS INDEX: 37.65 KG/M2 | SYSTOLIC BLOOD PRESSURE: 122 MMHG | OXYGEN SATURATION: 97 % | DIASTOLIC BLOOD PRESSURE: 82 MMHG | RESPIRATION RATE: 14 BRPM | HEART RATE: 84 BPM

## 2023-03-07 DIAGNOSIS — G47.33 OSA ON CPAP: ICD-10-CM

## 2023-03-07 DIAGNOSIS — Z87.891 FORMER TOBACCO USE: ICD-10-CM

## 2023-03-07 DIAGNOSIS — J45.40 MODERATE PERSISTENT ASTHMA WITHOUT COMPLICATION: ICD-10-CM

## 2023-03-07 DIAGNOSIS — J45.901 ASTHMATIC BRONCHITIS WITH ACUTE EXACERBATION, UNSPECIFIED ASTHMA SEVERITY, UNSPECIFIED WHETHER PERSISTENT: ICD-10-CM

## 2023-03-07 DIAGNOSIS — J20.9 ACUTE BRONCHITIS, UNSPECIFIED ORGANISM: ICD-10-CM

## 2023-03-07 DIAGNOSIS — R05.3 CHRONIC COUGH: ICD-10-CM

## 2023-03-07 PROCEDURE — 3074F SYST BP LT 130 MM HG: CPT | Mod: HCNC,CPTII,S$GLB, | Performed by: HOSPITALIST

## 2023-03-07 PROCEDURE — 99214 OFFICE O/P EST MOD 30 MIN: CPT | Mod: HCNC,S$GLB,, | Performed by: HOSPITALIST

## 2023-03-07 PROCEDURE — 1101F PT FALLS ASSESS-DOCD LE1/YR: CPT | Mod: HCNC,CPTII,S$GLB, | Performed by: HOSPITALIST

## 2023-03-07 PROCEDURE — 1101F PR PT FALLS ASSESS DOC 0-1 FALLS W/OUT INJ PAST YR: ICD-10-PCS | Mod: HCNC,CPTII,S$GLB, | Performed by: HOSPITALIST

## 2023-03-07 PROCEDURE — 1159F PR MEDICATION LIST DOCUMENTED IN MEDICAL RECORD: ICD-10-PCS | Mod: HCNC,CPTII,S$GLB, | Performed by: HOSPITALIST

## 2023-03-07 PROCEDURE — 3074F PR MOST RECENT SYSTOLIC BLOOD PRESSURE < 130 MM HG: ICD-10-PCS | Mod: HCNC,CPTII,S$GLB, | Performed by: HOSPITALIST

## 2023-03-07 PROCEDURE — 99999 PR PBB SHADOW E&M-EST. PATIENT-LVL V: ICD-10-PCS | Mod: PBBFAC,HCNC,, | Performed by: HOSPITALIST

## 2023-03-07 PROCEDURE — 3079F PR MOST RECENT DIASTOLIC BLOOD PRESSURE 80-89 MM HG: ICD-10-PCS | Mod: HCNC,CPTII,S$GLB, | Performed by: HOSPITALIST

## 2023-03-07 PROCEDURE — 99999 PR PBB SHADOW E&M-EST. PATIENT-LVL V: CPT | Mod: PBBFAC,HCNC,, | Performed by: HOSPITALIST

## 2023-03-07 PROCEDURE — 3008F BODY MASS INDEX DOCD: CPT | Mod: HCNC,CPTII,S$GLB, | Performed by: HOSPITALIST

## 2023-03-07 PROCEDURE — 3288F FALL RISK ASSESSMENT DOCD: CPT | Mod: HCNC,CPTII,S$GLB, | Performed by: HOSPITALIST

## 2023-03-07 PROCEDURE — 3008F PR BODY MASS INDEX (BMI) DOCUMENTED: ICD-10-PCS | Mod: HCNC,CPTII,S$GLB, | Performed by: HOSPITALIST

## 2023-03-07 PROCEDURE — 1159F MED LIST DOCD IN RCRD: CPT | Mod: HCNC,CPTII,S$GLB, | Performed by: HOSPITALIST

## 2023-03-07 PROCEDURE — 1125F AMNT PAIN NOTED PAIN PRSNT: CPT | Mod: HCNC,CPTII,S$GLB, | Performed by: HOSPITALIST

## 2023-03-07 PROCEDURE — 99214 PR OFFICE/OUTPT VISIT, EST, LEVL IV, 30-39 MIN: ICD-10-PCS | Mod: HCNC,S$GLB,, | Performed by: HOSPITALIST

## 2023-03-07 PROCEDURE — 1125F PR PAIN SEVERITY QUANTIFIED, PAIN PRESENT: ICD-10-PCS | Mod: HCNC,CPTII,S$GLB, | Performed by: HOSPITALIST

## 2023-03-07 PROCEDURE — 1160F PR REVIEW ALL MEDS BY PRESCRIBER/CLIN PHARMACIST DOCUMENTED: ICD-10-PCS | Mod: HCNC,CPTII,S$GLB, | Performed by: HOSPITALIST

## 2023-03-07 PROCEDURE — 1160F RVW MEDS BY RX/DR IN RCRD: CPT | Mod: HCNC,CPTII,S$GLB, | Performed by: HOSPITALIST

## 2023-03-07 PROCEDURE — 3079F DIAST BP 80-89 MM HG: CPT | Mod: HCNC,CPTII,S$GLB, | Performed by: HOSPITALIST

## 2023-03-07 PROCEDURE — 3288F PR FALLS RISK ASSESSMENT DOCUMENTED: ICD-10-PCS | Mod: HCNC,CPTII,S$GLB, | Performed by: HOSPITALIST

## 2023-03-07 RX ORDER — ALBUTEROL SULFATE 90 UG/1
AEROSOL, METERED RESPIRATORY (INHALATION)
Qty: 8.5 G | Refills: 3 | Status: SHIPPED | OUTPATIENT
Start: 2023-03-07 | End: 2023-07-17

## 2023-03-07 RX ORDER — FLUTICASONE FUROATE, UMECLIDINIUM BROMIDE AND VILANTEROL TRIFENATATE 200; 62.5; 25 UG/1; UG/1; UG/1
1 POWDER RESPIRATORY (INHALATION) DAILY
Qty: 60 EACH | Refills: 1 | Status: ON HOLD | OUTPATIENT
Start: 2023-03-07 | End: 2023-05-19

## 2023-03-07 NOTE — ASSESSMENT & PLAN NOTE
- pt states he has been wheezing on and off for several months  - ICS- LABA added at the end of January, patient feels that it has helped, but that he is still having issues, continues to use Albuterol daily  - PFT ordered on return visit in one month, ?COPD   - have escalated to Trelegy for now  - Pt instructed to take Singulair daily  - referral placed to [facility] for inhaler training

## 2023-03-07 NOTE — PROGRESS NOTES
Subjective:       Patient ID: Abdullahi Urias is a 67 y.o. male.    Chief Complaint: Asthma    67 year old male with history of AS s/p bioprosthetic AVR, dCHF, CKD3, HTN, CVA, CHEO ([facility]), former tobacco user, prostate cancer 2020 s/p prostatectomy s/p radiation, in remission who was referred to Pulmonary clinic by Dick Baez MD for evaluation of  asthma. Mr. Urias was admitted to the hospital 1/25-1/26 for treatment of asthma exacerbation. He was treated with IV steroids, breathing treatments, empiric antibiotics and oxygen as needed. CXR clear, SpO2 98% on room air on day of discharge. Patient reports several months of wheezing. Has been using Advair diskus twice daily and Albuterol inhaler 2-3/day. He did notice that symptoms improved when he was on a cruise last week, and worsened when back in [place].     Quit smoking 2012 (with stroke), about 30 pack years    Pertinent Work Up:  CXR 1/24/2023- Clear chest, no pneumo or effusion. Cardiomegaly.  CTA Chest 8/25/2022: Negative for PE, clear lungs  Eosinophils borderline/mildly elevated 1/2023    Pulmonary Interventions:  Albuterol HFA, using 2-3x/day  Fluticasone- salmeterol 250-50mcg twice daily, been on since end of January    Review of Systems    Objective:      Physical Exam   Constitutional: He is oriented to person, place, and time. He appears well-developed and well-nourished. He is obese.   Cardiovascular: Normal rate and regular rhythm.   3/6 Systolic murmur   Pulmonary/Chest: Effort normal and breath sounds normal.   Musculoskeletal:         General: No edema.   Neurological: He is alert and oriented to person, place, and time.   Skin: Skin is warm and dry.   Psychiatric: He has a normal mood and affect.   Personal Diagnostic Review  As Above  No flowsheet data found.      Assessment:       No diagnosis found.    Outpatient Encounter Medications as of 3/7/2023   Medication Sig Dispense Refill    albuterol (PROVENTIL/VENTOLIN HFA) 90  mcg/actuation inhaler INHALE 2 PUFFS INTO THE LUNGS EVERY 6 HOURS AS NEEDED FOR COUGH 8.5 g 11    allopurinoL (ZYLOPRIM) 100 MG tablet Take 100 mg by mouth once daily. Takes 0.5 tab      ALPRAZolam (XANAX) 1 MG tablet Take 1 mg by mouth.      aluminum & magnesium hydroxide-simethicone (MYLANTA MAX STRENGTH) 400-400-40 mg/5 mL suspension TAKE 15ML BY MOUTH FOUR TIMES A DAY AS NEEDED FOR STOMACH ACID      aspirin (ECOTRIN) 81 MG EC tablet Take 1 tablet (81 mg total) by mouth once daily. 90 tablet 3    atorvastatin (LIPITOR) 80 MG tablet TAKE ONE-HALF TABLET BY MOUTH EVERY DAY FOR CHOLESTEROL      baclofen (LIORESAL) 10 MG tablet Take 1 tablet (10 mg total) by mouth 3 (three) times daily. 90 tablet 11    carvediloL (COREG) 12.5 MG tablet Take 1 tablet (12.5 mg total) by mouth 2 (two) times daily. 60 tablet 11    clopidogreL (PLAVIX) 75 mg tablet Take 1 tablet (75 mg total) by mouth once daily. 30 tablet 11    diclofenac sodium (VOLTAREN) 1 % Gel APPLY 2 GRAMS TOPICALLY FOUR TIMES A DAY AS NEEDED FOR PAIN AND INFLAMMATION. USE ENCLOSED DOSING CARD.      diphenhydrAMINE (SOMINEX) 25 mg tablet Take 25 mg by mouth nightly as needed for Insomnia.      esomeprazole (NEXIUM) 40 MG capsule 40 mg.      famotidine (PEPCID) 40 MG tablet TAKE ONE TABLET BY MOUTH AT BEDTIME FOR ACID REFLUX      fexofenadine (ALLEGRA) 180 MG tablet Take 1 tablet (180 mg total) by mouth daily as needed. 30 tablet 5    flunisolide 25 mcg, 0.025%, (NASALIDE) 25 mcg (0.025 %) Spry 2 sprays by Nasal route as needed.       fluticasone propionate (FLONASE) 50 mcg/actuation nasal spray 1 spray (50 mcg total) by Each Nostril route once daily. 16 g 0    fluticasone-salmeterol diskus inhaler 250-50 mcg Inhale 1 puff into the lungs 2 (two) times daily. Controller 60 each 1    gabapentin (NEURONTIN) 600 MG tablet Take 1 tablet (600 mg total) by mouth 3 (three) times daily. 90 tablet 11    HYDROcodone-acetaminophen (NORCO) 5-325 mg per tablet Take 1 tablet by  mouth every 6 (six) hours as needed for Pain. (Patient not taking: Reported on 3/6/2023) 10 tablet 0    hydrocortisone 2.5 % cream Apply topically 2 (two) times daily as needed. Apply to affected areas of the face and neck. 30 g 2    hydrocortisone-pramoxine (PROCTOFOAM-HS) rectal foam INSERT 1 APPLICATORFUL INTO RECTALLY TWICE A DAY FOR HEMORRHOIDS      hydrOXYzine HCL (ATARAX) 25 MG tablet Take 25 mg by mouth 3 (three) times daily as needed for Itching.      LIDOcaine (LIDODERM) 5 % APPLY 1 PATCH TOPICALLY EVERY DAY FOR PAIN. WEAR FOR 12 HOURS, THEN REMOVE. DO NOT APPLY NEW PATCH FOR AT LEAST 12 HOURS.      losartan (COZAAR) 50 MG tablet Take 1 tablet (50 mg total) by mouth once daily. 30 tablet 11    methyl salicylate-menthol 15-10% 15-10 % Crea Apply topically as needed.       pantoprazole (PROTONIX) 40 MG tablet Take 40 mg by mouth 2 (two) times daily.       sertraline (ZOLOFT) 100 MG tablet TAKE TWO TABLETS BY MOUTH EVERY DAY FOR MENTAL HEALTH DOSE INCREASED TO 200MG/DAY      sertraline (ZOLOFT) 100 MG tablet 200 mg.      simethicone (MYLICON) 80 MG chewable tablet Take 80 mg by mouth every 6 (six) hours as needed for Flatulence.      sucralfate (CARAFATE) 100 mg/mL suspension Take 1 g by mouth 4 (four) times daily.       tiZANidine (ZANAFLEX) 4 MG tablet Take 1 tablet (4 mg total) by mouth 2 (two) times daily as needed (muscle spasms). May cause drowsiness. 60 tablet 1    tramadol HCl (TRAMADOL ORAL) Take by mouth. Takes prn      triamcinolone acetonide 0.1% (KENALOG) 0.1 % cream Apply topically 2 (two) times daily. 80 g 1    [DISCONTINUED] cyclobenzaprine HCl (FLEXERIL ORAL) Take by mouth. Takes prn       Facility-Administered Encounter Medications as of 3/7/2023   Medication Dose Route Frequency Provider Last Rate Last Admin    ondansetron injection 4 mg  4 mg Intravenous Once PRN Nehemiah Carmen MD        sodium chloride 0.9% flush 10 mL  10 mL Intravenous PRN Wilda Horne MD         No orders of  the defined types were placed in this encounter.        Plan:       Problem List Items Addressed This Visit          Pulmonary    Asthmatic bronchitis    Relevant Medications    fluticasone-umeclidin-vilanter (TRELEGY ELLIPTA) 200-62.5-25 mcg inhaler    Other Relevant Orders    Complete PFT w/ bronchodilator    Ambulatory referral/consult to Pulmonary Disease Management w/ Respiratory Therapist    Moderate persistent asthma without complication     - pt states he has been wheezing on and off for several months  - ICS- LABA added at the end of January, patient feels that it has helped, but that he is still having issues, continues to use Albuterol daily  - PFT ordered on return visit in one month, ?COPD   - have escalated to Trelegy for now  - Pt instructed to take Singulair daily  - referral placed to [facility] for inhaler training            Other    CHEO on CPAP     - [facility], using [facility] after recall         Former tobacco use     - Quit in 2012, 30 pack year history   - CTA 8/2022 did not note any nodules          Other Visit Diagnoses       Acute bronchitis, unspecified organism        Cough        Relevant Medications    albuterol (PROVENTIL/VENTOLIN HFA) 90 mcg/actuation inhaler              Plan discussed with patient and he expressed understanding, all questions answered. Follow up in one month with PFT and to evaluate progress with Trelegy.

## 2023-03-08 ENCOUNTER — HOSPITAL ENCOUNTER (OUTPATIENT)
Dept: RADIOLOGY | Facility: HOSPITAL | Age: 68
Discharge: HOME | End: 2023-03-08
Attending: PHYSICIAN ASSISTANT
Payer: MEDICARE

## 2023-03-08 DIAGNOSIS — M54.16 LUMBAR RADICULOPATHY, CHRONIC: ICD-10-CM

## 2023-03-08 PROCEDURE — 72148 MRI LUMBAR SPINE WITHOUT CONTRAST: ICD-10-PCS | Mod: 26,HCNC,, | Performed by: RADIOLOGY

## 2023-03-08 PROCEDURE — 72148 MRI LUMBAR SPINE W/O DYE: CPT | Mod: TC,HCNC

## 2023-03-08 PROCEDURE — 72148 MRI LUMBAR SPINE W/O DYE: CPT | Mod: 26,HCNC,, | Performed by: RADIOLOGY

## 2023-03-09 ENCOUNTER — TELEPHONE (OUTPATIENT)
Dept: PULMONOLOGY | Facility: CLINIC | Age: 68
End: 2023-03-09
Payer: MEDICARE

## 2023-03-10 ENCOUNTER — PATIENT OUTREACH (OUTPATIENT)
Dept: ADMINISTRATIVE | Facility: HOSPITAL | Age: 68
End: 2023-03-10
Payer: MEDICARE

## 2023-03-10 NOTE — PROGRESS NOTES
Working Statin Report:       Pt does not have a current rx for statin. Remind me set to remind PCP to review prior to upcoming appointment.

## 2023-03-15 ENCOUNTER — TELEPHONE (OUTPATIENT)
Dept: PULMONOLOGY | Facility: CLINIC | Age: 68
End: 2023-03-15
Payer: MEDICARE

## 2023-03-16 ENCOUNTER — CLINICAL SUPPORT (OUTPATIENT)
Dept: PULMONOLOGY | Facility: CLINIC | Age: 68
End: 2023-03-16
Payer: MEDICARE

## 2023-03-16 ENCOUNTER — OFFICE VISIT (OUTPATIENT)
Dept: NEUROSURGERY | Facility: CLINIC | Age: 68
End: 2023-03-16
Payer: MEDICARE

## 2023-03-16 VITALS — HEART RATE: 96 BPM | WEIGHT: 254 LBS | BODY MASS INDEX: 38.49 KG/M2 | OXYGEN SATURATION: 96 % | HEIGHT: 68 IN

## 2023-03-16 VITALS
BODY MASS INDEX: 37.62 KG/M2 | SYSTOLIC BLOOD PRESSURE: 142 MMHG | DIASTOLIC BLOOD PRESSURE: 90 MMHG | HEIGHT: 69 IN | HEART RATE: 75 BPM | WEIGHT: 254 LBS

## 2023-03-16 DIAGNOSIS — M54.16 LUMBAR RADICULOPATHY, CHRONIC: ICD-10-CM

## 2023-03-16 DIAGNOSIS — J45.901 ASTHMATIC BRONCHITIS WITH ACUTE EXACERBATION, UNSPECIFIED ASTHMA SEVERITY, UNSPECIFIED WHETHER PERSISTENT: ICD-10-CM

## 2023-03-16 DIAGNOSIS — M71.38 SYNOVIAL CYST OF LUMBAR SPINE: ICD-10-CM

## 2023-03-16 DIAGNOSIS — M48.062 LUMBAR STENOSIS WITH NEUROGENIC CLAUDICATION: Primary | ICD-10-CM

## 2023-03-16 PROCEDURE — 1125F AMNT PAIN NOTED PAIN PRSNT: CPT | Mod: HCNC,CPTII,S$GLB, | Performed by: PHYSICIAN ASSISTANT

## 2023-03-16 PROCEDURE — 99999 PR PBB SHADOW E&M-EST. PATIENT-LVL V: ICD-10-PCS | Mod: PBBFAC,HCNC,, | Performed by: PHYSICIAN ASSISTANT

## 2023-03-16 PROCEDURE — 99204 OFFICE O/P NEW MOD 45 MIN: CPT | Mod: HCNC,S$GLB,, | Performed by: PHYSICIAN ASSISTANT

## 2023-03-16 PROCEDURE — 99999 PR PBB SHADOW E&M-EST. PATIENT-LVL V: CPT | Mod: PBBFAC,HCNC,, | Performed by: PHYSICIAN ASSISTANT

## 2023-03-16 PROCEDURE — 3077F SYST BP >= 140 MM HG: CPT | Mod: HCNC,CPTII,S$GLB, | Performed by: PHYSICIAN ASSISTANT

## 2023-03-16 PROCEDURE — 3288F PR FALLS RISK ASSESSMENT DOCUMENTED: ICD-10-PCS | Mod: HCNC,CPTII,S$GLB, | Performed by: PHYSICIAN ASSISTANT

## 2023-03-16 PROCEDURE — 3077F PR MOST RECENT SYSTOLIC BLOOD PRESSURE >= 140 MM HG: ICD-10-PCS | Mod: HCNC,CPTII,S$GLB, | Performed by: PHYSICIAN ASSISTANT

## 2023-03-16 PROCEDURE — 1101F PR PT FALLS ASSESS DOC 0-1 FALLS W/OUT INJ PAST YR: ICD-10-PCS | Mod: HCNC,CPTII,S$GLB, | Performed by: PHYSICIAN ASSISTANT

## 2023-03-16 PROCEDURE — 3288F FALL RISK ASSESSMENT DOCD: CPT | Mod: HCNC,CPTII,S$GLB, | Performed by: PHYSICIAN ASSISTANT

## 2023-03-16 PROCEDURE — 3008F PR BODY MASS INDEX (BMI) DOCUMENTED: ICD-10-PCS | Mod: HCNC,CPTII,S$GLB, | Performed by: PHYSICIAN ASSISTANT

## 2023-03-16 PROCEDURE — 1125F PR PAIN SEVERITY QUANTIFIED, PAIN PRESENT: ICD-10-PCS | Mod: HCNC,CPTII,S$GLB, | Performed by: PHYSICIAN ASSISTANT

## 2023-03-16 PROCEDURE — 3080F PR MOST RECENT DIASTOLIC BLOOD PRESSURE >= 90 MM HG: ICD-10-PCS | Mod: HCNC,CPTII,S$GLB, | Performed by: PHYSICIAN ASSISTANT

## 2023-03-16 PROCEDURE — 99999 PR PBB SHADOW E&M-EST. PATIENT-LVL IV: ICD-10-PCS | Mod: PBBFAC,HCNC,,

## 2023-03-16 PROCEDURE — 1101F PT FALLS ASSESS-DOCD LE1/YR: CPT | Mod: HCNC,CPTII,S$GLB, | Performed by: PHYSICIAN ASSISTANT

## 2023-03-16 PROCEDURE — 99999 PR PBB SHADOW E&M-EST. PATIENT-LVL IV: CPT | Mod: PBBFAC,HCNC,,

## 2023-03-16 PROCEDURE — 3080F DIAST BP >= 90 MM HG: CPT | Mod: HCNC,CPTII,S$GLB, | Performed by: PHYSICIAN ASSISTANT

## 2023-03-16 PROCEDURE — 3008F BODY MASS INDEX DOCD: CPT | Mod: HCNC,CPTII,S$GLB, | Performed by: PHYSICIAN ASSISTANT

## 2023-03-16 PROCEDURE — 99204 PR OFFICE/OUTPT VISIT, NEW, LEVL IV, 45-59 MIN: ICD-10-PCS | Mod: HCNC,S$GLB,, | Performed by: PHYSICIAN ASSISTANT

## 2023-03-16 RX ORDER — HYDROCODONE BITARTRATE AND ACETAMINOPHEN 7.5; 325 MG/1; MG/1
1 TABLET ORAL EVERY 8 HOURS PRN
Qty: 21 TABLET | Refills: 0 | Status: SHIPPED | OUTPATIENT
Start: 2023-03-16 | End: 2023-03-30 | Stop reason: SDUPTHER

## 2023-03-16 NOTE — PROGRESS NOTES
Subjective:      Patient ID: Abdullahi Urias is a 67 y.o. male.    HPI  The patient is here today for evaluation of back pain.   He is referred to our clinic by ERIK Machado.  His pain started years ago, in 2001.   He had back surgery (lumbar discectomy) in 2001 with Dr. Melgar who has since retired.  Patient states he did well after surgery (no complications).     Localizes pain to lower back and it radiates into hip and RLE/foot.  Has numbness in RLE and foot.   No issues with LLE.  Denies recent falls, other injuries.  Has a walker and scooter to use as needed.  Denies acute bladder/bowel changes.  Rates his pain 8/10 today.  Was taking dane relaxers but ran out. Has been taking Gabapentin.  Patient states he's been hurting so bad that he wanted to go to the ED.  He's had sporadic episodes of bladder/bowel incontinence. Currently he does not have incontinence.  In the past he had injections with Dr. Cam, Dr. Renee and Dr. Carmen.      Pain Procedures:   -previous lumbar PIEDAD with Dr. Renee.  -previous lumbar MBB/RFA  -previous lumbar laminectomy in 2001 at L5-S1  -01/27/2022:  Bilateral piriformis trigger point injections, significant relief that is still ongoing  -right L4/5 +L5/S1 TF PIEDAD on 10/20/22 with 40% relief       Non-Pharmacologic Treatments:  Physical Therapy/Home Exercise: yes  Ice/Heat:yes  TENS: yes  Acupuncture: no  Massage: yes  Chiropractic: no    Other: no      Pain Medications:  - Opioids: Norco, Tylenol 3  - Adjuvant Medications: Ambien, alprazolam, Flexeril, Voltaren gel, gabapentin, lidocaine patch, Zoloft  - Anti-Coagulants: Aspirin, Pletal    Objective:     Body mass index is 37.51 kg/m².  Vitals:    03/16/23 0912   BP: (!) 142/90   Pulse: 75      Right Hip Exam     Tenderness   The patient is experiencing tenderness in the posterior.    Tests   TEENA: positive      Left Hip Exam     Tests   TEENA: positive      Back Exam     Tenderness   The patient is experiencing  tenderness in the lumbar.    Other   Gait: abnormal           Back:  None  Paraspinal muscle spasms   None present Pain with flexion and extention   WNL limited Range of motion    Neg + right Straight leg raise     Motor   Right Right Left Left  Level Group   5 4 5  L2 Hip flexor (Psoas)   5 4+ 5 4+ L3 Leg extension (Quads)   5  5  L4 Dorsiflexion & foot inversion (Tibialis Anterior)   5  5  L5 Great toe extension ( EHL)   5 4+ 5 4+ S1 Foot eversion (Gastroc, PL & PB)     Sensation  NL Decreased (R/L/BL) Level Sensation    X  L2 Anterio-medial thigh   X  L3 Medial thigh around knee   X  L4 Medial foot   X  L5 Dorsum foot   X  S1 Lateral foot     Reflex  2+  Patellar tendon (L4)   2+  Achilles tendon (S1)       Results for orders placed during the hospital encounter of 03/08/23    MRI Lumbar Spine Without Contrast    COMPARISON:  Prior MRI of the lumbar spine from 01/12/2021.  FINDINGS:  Again demonstrated are postoperative findings from right hemilaminectomy at L5-S1.  Grade 1 degenerative spondylolisthesis at L3-L4 and L4-L5.  Vertebral body height is normal.  Fatty marrow replacement within L5 and S1 could relate to prior radiation therapy to the pelvis.  The conus medullaris terminates at the level of T12-L1.  No abnormal signal within the conus. Intervertebral disc levels are as follows:    T12-L1 disc: Normal.    L1-L2 disc : Disc space height loss with a circumferential disc bulge.  Annular fissure within the right foraminal region of the disc.  Anterior osteophytes.  Normal facet joints.  The dural canal measures 10 mm.  No significant foraminal stenosis.    L2-L3 disc: Normal.    L3-L4 disc: Minimal grade 1 spondylolisthesis with moderate to severe degenerative facet arthropathy.  Broad-based posterior disc bulge.  The dural canal measures 9 mm.  Minimal foraminal stenosis bilaterally.    L4-L5 disc: Grade 1 degenerative spondylolisthesis with severe degenerative facet hypertrophy bilaterally.  Marrow edema  pattern within the left facet joint and left L5 pedicle.  Broad-based posterior disc bulge that encroaches into the floors of the exit foramina.  The dural canal measures 5 mm in AP dimension compared to prior 7 mm.  There is a centrally projecting synovial cyst from the right facet joint measuring 6 mm it causes severe right lateral recess stenosis and may compress the descending right L5 spinal nerve roots.  This is best demonstrated on axial T2 series 6, image 25 in sagittal STIR series 5, image 10.  This is new compared to prior.  Mild bilateral foraminal stenosis.    L5-S1 disc: Prior right-sided hemilaminectomy.  No definite granulation/scar tissue compresses the thecal sac or neural structures.  Severe disc space height loss most pronounced toward the right with vacuum disc phenomenon.  Edematous and fatty Modic endplate change.  Disc and osteophyte encroaches into the floor of the right exit foramen.  Moderate degenerative facet hypertrophy.  Severe right foraminal stenosis that has progressed in the interval.    Impression  1. Prior right hemilaminectomy at L5-S1 without signs of granulation or scar tissue compressing the thecal sac or the neural structures.  2. Development of severe spinal stenosis at L4-L5 with a centrally projecting synovial cyst from the right facet joint that likely compresses the descending right L5 spinal nerve roots.  3. Progression of right-sided foraminal stenosis at L5-S1 which could affect the right L5 spinal nerve.      Lab Results   Component Value Date    WBC 3.89 (L) 04/13/2023    HCT 39.9 (L) 04/13/2023       INDEPENDENT INTERPRETATION OF TEST:  Relevant imaging results reviewed and interpreted by me, discussed with the patient and / or family today.  Assessment:     1. Lumbar stenosis with neurogenic claudication    2. Lumbar radiculopathy, chronic    3. Synovial cyst of lumbar spine      Plan:     Lumbar stenosis with neurogenic claudication  -     X-Ray Spine Survey AP  And Lateral; Future; Expected date: 03/16/2023  -     X-Ray Lumbar Spine Flexion And Extension Only; Future; Expected date: 03/16/2023    Lumbar radiculopathy, chronic  -     Ambulatory referral/consult to Neurosurgery  -     X-Ray Spine Survey AP And Lateral; Future; Expected date: 03/16/2023  -     X-Ray Lumbar Spine Flexion And Extension Only; Future; Expected date: 03/16/2023    Synovial cyst of lumbar spine  -     X-Ray Spine Survey AP And Lateral; Future; Expected date: 03/16/2023  -     X-Ray Lumbar Spine Flexion And Extension Only; Future; Expected date: 03/16/2023    Other orders  -     Discontinue: HYDROcodone-acetaminophen (NORCO) 7.5-325 mg per tablet; Take 1 tablet by mouth every 8 (eight) hours as needed for Pain.  Dispense: 21 tablet; Refill: 0      Discussed MRI findings of lumbar spine, including pathology and treatment options.  Patient made aware of multi-level degenerative changes, worse at  L5/S1, also present at L4/5.   Will order spine survey and flex/ext x-rays to look at alignment and instability.   Patient will follow-up with Dr. Valencia afterwards.  Rx given today for Noroc 7.5. Pt informed I cannot prescribe more than a 7 day supply at this time.      Yazmin Farfan PA-C  [facility]

## 2023-03-16 NOTE — PATIENT INSTRUCTIONS
Understanding Asthma         Asthma is a long-term (chronic) lung condition. It involves the airways (bronchial tubes). It happens when a trigger causes your airways to swell and become narrow. The muscles around your airways start to tighten. When your airways start to narrow, air can't move in and out of your lungs very well. Mucus also builds up along the airways. This makes it even harder to move air in and out of your lungs.  Experts are not exactly sure what causes asthma. It may be caused by a mix of inherited and environmental factors. People with asthma may have no symptoms until they are exposed to an allergen or trigger.  Healthy lungs  Inside your lungs there are branching airways made of stretchy tissue. Each airway is wrapped with bands of muscle. The airways are smaller as they go deeper into the lungs. The smallest airways end in clusters of tiny balloon-like air sacs (alveoli). These clusters are surrounded by blood vessels. When you breathe in (inhale), air enters the lungs. It travels down through the airways until it reaches the air sacs. When you breathe out (exhale), air travels up through the airways and out of the lungs. The airways make mucus that traps particles you breathe in. Normally, the mucus is then swept out of the lungs by tiny hairs (cilia) that line the airways. The mucus is swallowed or coughed up during your day.    What the lungs do  The air you inhale contains oxygen. When oxygen reaches the air sacs, it passes into the blood vessels around the sacs. Your blood then sends oxygen to all of your cells. As you exhale, carbon dioxide is removed in a similar way from the blood in the air sacs, and from your body.    When you have asthma  People with asthma have very sensitive airways. This means the airways react to certain things called triggers. Triggers can include pollen, dust, or smoke. Triggers cause inflammation. This makes the airways swell and become narrow. This is a  long-lasting (chronic) problem. Your airways may not always be narrow enough so that you notice breathing problems.  Symptoms of chronic inflammation include:   Coughing (chronic)   A feeling of tightness in your chest   Feeling short of breath   Wheezing (a whistling noise, especially when breathing out)   Low energy or feeling tired   In some people, over time chronic mild inflammation can lead to lasting (permanent) scarring of airways and loss of lung function.    Asthma flare-ups  When sensitive airways are irritated by a trigger, the muscles around the airways tighten. The lining of the airways swells. Thick, sticky mucus increases and partly clogs the airways. All of this makes it harder to breathe.  Symptoms of flare-ups may include:  Coughing, especially at night. You may not be able to sleep because of coughing.   Getting tired or out of breath easily   Wheezing   Chest tightness   Faster breathing when at rest   Flare-ups can be life-threatening. In a severe flare-up, the muscle tightening, swelling, and mucus are worse. Its very hard to breathe. Your body can't get enough oxygen and can't remove carbon dioxide. Waste gas is trapped in the alveoli. Gas exchange cant occur. The body is not getting enough oxygen. Without oxygen, body tissues, especially brain tissue, begin to get damaged. If this goes on for long, it can lead to severe brain damage or death.  Call 911 (or have someone call for you) if you have any of these symptoms and they are not relieved right away by taking your quick-relief medicine as prescribed:  Trouble breathing   Feeling too short of breath to talk or walk   Lips or fingers turning blue   Feeling lightheaded or dizzy, as though you are about to pass out   Peak flow less than 50% of your personal best, if you use a peak flow meter     Managing your asthma  Asthma is a long-term condition. So its important to work with your healthcare provider to manage it. If you have asthma,  you can prevent flare-ups. Develop an asthma action plan with your healthcare provider. It can help control your asthma and manage your symptoms. An asthma action plan also tells you and your family or friends what to do if your asthma flares up or gets worse.  Take your medicine as prescribed. Also learn about your asthma triggers. Knowing what causes your asthma to flare up in the first place can help you prevent future breathing problems.  If you smoke, get help to quit.   Asthma Trigger Checklist  Allergens, irritants, and other things may trigger your asthma. Check the box next to each of your triggers. After each trigger is a list of ways to avoid it.   Dust mites. Dust mites live in mattresses, bedding, carpets, curtains, and indoor dust.  To kill dust mites, wash bedding in hot water (130°F) each week.  Cover mattress and pillows with special dust-mite-proof cases.  Don't use upholstered furniture like sofas or chairs in the bedroom.  Use allergy-proof filters for air conditioners and furnaces. Replace or clean them as instructed.  If you can, replace carpeting with wood or tile harlan, especially in the bedroom.  Animals. Animals with fur or feathers shed dander (allergens).  It's best to choose a pet that doesn't have fur or feathers, such as a fish or a reptile.  If you have pets, keep them off your bed and out of your bedroom.  Wash your hands and clothes after handling pets.    Mold. Mold grows in damp places, such as bathrooms, basements, and closets.  Ask someone to clean damp areas in your home every week. Or try wearing a face mask while you clean.  Run an exhaust fan while bathing. Or leave a window open in the bathroom.  Repair water leaks in or around your home.  Have someone else cut grass or rake leaves, if possible.  Don't use vaporizers or humidifiers. They encourage mold growth.    Pollen. Pollen from trees, grasses, and weeds is a common allergen. (Flower pollens are generally not a  problem).  Try to learn what types of pollen affect you most. Pollen levels vary depending on the plant, the season, and the time of day.  If possible, use air conditioning instead of opening the windows in your home or car.  Have someone else do yard work, if possible.    Cockroaches. Roaches are found in many homes and produce allergens.  Keep your kitchen clean and dry. A leaky faucet or drain can attract roaches.  Remove garbage from your home daily.  Store food in tightly sealed containers. Wash dishes as soon as they are used.  Use bait stations or traps to control roaches. Avoid using chemical sprays.    Smoke. Smoke may be from cigarettes, cigars, pipes, incense or candles, barbecues or grills, and fireplaces.  Don't smoke. And don't let people smoke in your home or car.  When you travel, ask for nonsmoking rental cars and hotel rooms.  Avoid fireplaces and wood stoves. If you can't, sit away from them. Make sure the smoke is directed outside.  Don't burn incense or use candles.  Move away from smoky outdoor cooking grills.    Smog.  Smog is from car exhaust and other pollution.  Read or listen to local air-quality reports. These let you know when air quality is poor.  Stay indoors as much as you can on smoggy days. If possible, use air conditioning instead of opening the windows.  In your car, set air conditioning to recirculate air, so less pollution gets in.    Strong odors. These include air fresheners, deodorizers, and cleaning products; perfume, deodorant, and other beauty products; incense and candles; and insect sprays and other sprays.  Use scent-free products like deodorant or body lotion.  Avoid using cleaning products with bleach and ammonia. Make your own cleaning solution with white vinegar, baking soda, or mild dish soap.  Use exhaust fans while cooking. Or open a window, if possible.   Avoid perfumes, air fresheners, potpourri, and other scented products.          Other irritants. These  include dust, aerosol sprays, and powders.  Wear a face mask while doing tasks like sanding, dusting, sweeping, and yard work. Open doors and windows if working indoors.  Use pump spray bottles instead of aerosols.  Pour liquid  onto a rag or cloth instead of spraying them.    Weather. Weather conditions can trigger symptoms or make them worse.  Watch for very high or low temperatures, very humid conditions, or a lot of wind, as these conditions can make symptoms worse.  Limit outdoor activity during the type of weather that affects you.  Wear a scarf over your mouth and nose in cold weather.    Colds, flu, and sinus infections. Upper respiratory infections can trigger asthma.  Wash your hands often with soap and warm water or use a hand  containing alcohol.  Get a yearly flu shot. And ask if you should get a pneumonia vaccine.  Take care of your general health. Get plenty of sleep. And eat a variety of healthy foods.    Food additives. Food additives can trigger asthma flare-ups in some people.  Check food labels for sulfites or other similar ingredients. These are often found in foods such as wine, beer, and dried fruits.  Avoid foods that contain these additives.    Medicine. Aspirin, NSAIDS like ibuprofen and naproxen, and heart medicines like beta-blockers may be triggers.  Tell your health care provider if you think certain medicines trigger symptoms.   Be sure to read the labels on over-the-counter medicines. They may have ingredients that cause symptoms for you.   , Emotions. Laughing, crying, or feeling excited are triggers for some people.   To help you stay calm: Try breathing in slowly through your nose for a count of 2 seconds. Close your lips and breathe out for 4 seconds. Repeat.  Try to focus on a soothing image in your mind. This will help relax you and calm your breathing.  Remember to take your daily controller medicines. When you're upset or under stress, it's easy to forget.     Exercise. For some people, exercise can trigger symptoms. Don't let this keep you from being active.   If you have not been exercising regularly, start slow and work up gradually.  Take all of your medicines as prescribed.  If you use quick-relief medicine, make sure you have it with you when you exercise.  Stop if you have any symptoms. Make sure you talk with your provider about these symptoms.  © 6215-7029 [employer]. [address] [place] [address], [place] [address]. All rights reserved. This information is not intended as a substitute for professional medical care. Always follow your healthcare professional's instructions.              Using an Inhaler with a Spacer  To control asthma, you need to use your medicines the right way. Some medicines are inhaled using a device called a metered-dose inhaler (MDI). Metered-dose inhalers deliver medicine with a fine spray. You may be asked to use a spacer (holding tube) with your inhaler. The spacer helps make sure all the medicine you need goes into your lungs.   Steps for using an inhaler with a spacer  Step 1:  Remove the caps from the inhaler and spacer.  Shake the inhaler well and attach the spacer. If the inhaler is being used for the first time or has not been used for a while, prime it as directed by the product maker.  Step 2:  Breathe out normally.  Put the spacer between your teeth. Close your lips tightly around it.  Keep your chin up.  Step 3:  Spray 1 puff into the spacer by pressing down on the inhaler.  Then breathe in through your mouth as slowly and deeply as you can. This should take about 5-10 seconds. If you breathe too quickly, you may hear a whistling sound in certain spacers.  Step 4:  Take the spacer out of your mouth.  Hold your breath for a count of 10.  Then hold your lips together and slowly breathe out through your mouth  ACTION PLAN    GREEN ZONE  My sputum is clear/white/usual color and easily cleared.  My breathing is no harder  than usual.  I can do my usual activities.  I can think clearly.   Take your usual medicines, including oxygen, as you are told to do so by your health care provider.   YELLOW ZONE  My sputum has change (color, thickness, amount).  I am more short of breath than usual.  I cough or wheeze more.  I weigh more and my legs/feet swell.  I cannot do my usual activities without resting.   Call your health care provider. You will probably be told to begin taking an antibiotic and prednisone. Have your pharmacy phone number available.   RED ZONE  I cannot cough out my sputum.  I am much more short of breath than normal.  I need to sit up to breathe  I cannot do my usual activities.  I am unable to speak more than one or two words at a time.  I am confused.   Call your health care provider. You may be asked to come in to be seen, told to go to the emergency room, or told to call 9-1-1.

## 2023-03-16 NOTE — PROGRESS NOTES
Pulmonary Disease Management  [facility]  Initial Visit    Referring Provider: DENISSE Martinez  Diagnosis: Asthmatic bronchitis, Moderate persistent asthma without complication  Last Hospital Admission: 1/31/23  Last provider visit: 3/7/23      Current Outpatient Medications:     albuterol (PROVENTIL/VENTOLIN HFA) 90 mcg/actuation inhaler, INHALE 2 PUFFS INTO THE LUNGS EVERY 6 HOURS AS NEEDED FOR COUGH, Disp: 8.5 g, Rfl: 3    allopurinoL (ZYLOPRIM) 100 MG tablet, Take 100 mg by mouth once daily. Takes 0.5 tab, Disp: , Rfl:     ALPRAZolam (XANAX) 1 MG tablet, Take 1 mg by mouth., Disp: , Rfl:     aluminum & magnesium hydroxide-simethicone (MYLANTA MAX STRENGTH) 400-400-40 mg/5 mL suspension, TAKE 15ML BY MOUTH FOUR TIMES A DAY AS NEEDED FOR STOMACH ACID, Disp: , Rfl:     aspirin (ECOTRIN) 81 MG EC tablet, Take 1 tablet (81 mg total) by mouth once daily., Disp: 90 tablet, Rfl: 3    atorvastatin (LIPITOR) 80 MG tablet, TAKE ONE-HALF TABLET BY MOUTH EVERY DAY FOR CHOLESTEROL, Disp: , Rfl:     baclofen (LIORESAL) 10 MG tablet, Take 1 tablet (10 mg total) by mouth 3 (three) times daily., Disp: 90 tablet, Rfl: 11    carvediloL (COREG) 12.5 MG tablet, Take 1 tablet (12.5 mg total) by mouth 2 (two) times daily., Disp: 60 tablet, Rfl: 11    clopidogreL (PLAVIX) 75 mg tablet, Take 1 tablet (75 mg total) by mouth once daily., Disp: 30 tablet, Rfl: 11    diclofenac sodium (VOLTAREN) 1 % Gel, APPLY 2 GRAMS TOPICALLY FOUR TIMES A DAY AS NEEDED FOR PAIN AND INFLAMMATION. USE ENCLOSED DOSING CARD., Disp: , Rfl:     diphenhydrAMINE (SOMINEX) 25 mg tablet, Take 25 mg by mouth nightly as needed for Insomnia., Disp: , Rfl:     esomeprazole (NEXIUM) 40 MG capsule, 40 mg., Disp: , Rfl:     famotidine (PEPCID) 40 MG tablet, TAKE ONE TABLET BY MOUTH AT BEDTIME FOR ACID REFLUX, Disp: , Rfl:     fexofenadine (ALLEGRA) 180 MG tablet, Take 1 tablet (180 mg total) by mouth daily as needed., Disp: 30 tablet, Rfl: 5    flunisolide 25 mcg,  0.025%, (NASALIDE) 25 mcg (0.025 %) Spry, 2 sprays by Nasal route as needed. , Disp: , Rfl:     fluticasone propionate (FLONASE) 50 mcg/actuation nasal spray, 1 spray (50 mcg total) by Each Nostril route once daily., Disp: 16 g, Rfl: 0    fluticasone-umeclidin-vilanter (TRELEGY ELLIPTA) 200-62.5-25 mcg inhaler, Inhale 1 puff into the lungs once daily., Disp: 60 each, Rfl: 1    gabapentin (NEURONTIN) 600 MG tablet, Take 1 tablet (600 mg total) by mouth 3 (three) times daily., Disp: 90 tablet, Rfl: 11    HYDROcodone-acetaminophen (NORCO) 7.5-325 mg per tablet, Take 1 tablet by mouth every 8 (eight) hours as needed for Pain., Disp: 21 tablet, Rfl: 0    hydrocortisone 2.5 % cream, Apply topically 2 (two) times daily as needed. Apply to affected areas of the face and neck., Disp: 30 g, Rfl: 2    hydrocortisone-pramoxine (PROCTOFOAM-HS) rectal foam, INSERT 1 APPLICATORFUL INTO RECTALLY TWICE A DAY FOR HEMORRHOIDS, Disp: , Rfl:     hydrOXYzine HCL (ATARAX) 25 MG tablet, Take 25 mg by mouth 3 (three) times daily as needed for Itching., Disp: , Rfl:     LIDOcaine (LIDODERM) 5 %, APPLY 1 PATCH TOPICALLY EVERY DAY FOR PAIN. WEAR FOR 12 HOURS, THEN REMOVE. DO NOT APPLY NEW PATCH FOR AT LEAST 12 HOURS., Disp: , Rfl:     losartan (COZAAR) 50 MG tablet, Take 1 tablet (50 mg total) by mouth once daily., Disp: 30 tablet, Rfl: 11    methyl salicylate-menthol 15-10% 15-10 % Crea, Apply topically as needed. , Disp: , Rfl:     pantoprazole (PROTONIX) 40 MG tablet, Take 40 mg by mouth 2 (two) times daily. , Disp: , Rfl:     sertraline (ZOLOFT) 100 MG tablet, TAKE TWO TABLETS BY MOUTH EVERY DAY FOR MENTAL HEALTH DOSE INCREASED TO 200MG/DAY, Disp: , Rfl:     sertraline (ZOLOFT) 100 MG tablet, 200 mg., Disp: , Rfl:     simethicone (MYLICON) 80 MG chewable tablet, Take 80 mg by mouth every 6 (six) hours as needed for Flatulence., Disp: , Rfl:     sucralfate (CARAFATE) 100 mg/mL suspension, Take 1 g by mouth 4 (four) times daily. , Disp: ,  "Rfl:     tiZANidine (ZANAFLEX) 4 MG tablet, Take 1 tablet (4 mg total) by mouth 2 (two) times daily as needed (muscle spasms). May cause drowsiness., Disp: 60 tablet, Rfl: 1    triamcinolone acetonide 0.1% (KENALOG) 0.1 % cream, Apply topically 2 (two) times daily., Disp: 80 g, Rfl: 1  No current facility-administered medications for this visit.    Facility-Administered Medications Ordered in Other Visits:     ondansetron injection 4 mg, 4 mg, Intravenous, Once PRN, Nehemiah Carmen MD    sodium chloride 0.9% flush 10 mL, 10 mL, Intravenous, PRN, Wilda Horne MD    Review of patient's allergies indicates:  No Known Allergies    Smoking history:   Social History     Tobacco Use   Smoking Status Former    Packs/day: 2.00    Years: 8.00    Pack years: 16.00    Types: Cigarettes    Quit date: 8/24/2012    Years since quitting: 10.5   Smokeless Tobacco Never       Pulse: 96  SpO2: 96 %  Height: 5' 8" (172.7 cm)  Weight: 115.2 kg (254 lb)  BMI (kg/m2): 38.7  Resting Aristeo Dyspnea Rating : 4   Current Oxygen Use: No  [facility]   Does the patient use BiPAP, CPAP or Trilogy?: Yes         ACT Questionnaire:  Asthma Control Test  In the past 4  weeks, how much of the time did your asthma keep you from getting as much done at work, school or at home?: Most of the time  During the past 4 weeks, how often have you had shortness of breath?: More than once a day  During the past 4 weeks, how often did your asthma symptoms (wheezing, couging, shortness of breath, chest tightness or pain) wake you up at night or earlier than usual in the morning?: Not at all  During the past 4 weeks, how often have you used your rescue inhaler or nebulizer medication (such as albuterol)?: 3 or more times per day  How would you rate your asthma control during the past 4 weeks?: Somewhat controlled  If your score is 19 or less, your asthma may not be under control: 12  Is this patient a candidate for pulmonary rehab?: No  Method Used for " Education: Literature, Demonstration  Did the patient demonstrate understanding?: Yes  What tests has the patient had done today?: Spirometry, Six Minute Walk        Educational assessment:   [x]            Good  []            Fair  []            Poor    Readiness to learn:   [x]            Good  []            Fair  []            Poor    Vision Status:   [x]            Good  []            Fair  []            Poor    Reading Ability:  [x]            Good  []            Fair  []            Poor    Knowledge of condition:   [x]            Good  []            Fair  []            Poor    Language Barriers:   []            Good  []            Fair  []            Poor  [x]            None    Cognitive/ Physical Barriers:   []            Good  []            Fair  []            Poor  [x]            None    Learning best by:                       [x]            Seeing  []            Hearing  []            Reading                         [x]            Doing    Describe any barrier /Limitation or financial implications of care choices identified     []            Financial  []            Emotional  []            Education  []            Vision/Hearing  []            Physical  [x]            None  []                TOPIC /CONTENT FOR TODAY:    [x]            MDI with or without spacer  [x]            Dry powder inhaler  []            Acapella   []           Peak Flow meter  [x]            COPD action plan  [x]            Nebulizer use  []            Oxygen use safety  [x]            Breathing and cough techniques  [x]            Energy conservation  [x]            Infection prevention  [x]           CPAP use        Learner:    [x]            Patient   []            Caregiver    Method:    [x]            Verbal explanation  [x]            Audio visual    [x]            Literature  [x]            Teach back      Evaluation:    [x]            Teach back  [x]            Demonstrate  [x]            Follow up phone call    []             2 weeks     [x]            4 weeks   [x]            PRN      Therapist Comments:  Patient was seen today to review respiratory medication purpose and proper technique for use of inhalers. Patient practiced proper technique using MDI with valved holding chamber (spacer) and DPI inhalers. Patient demonstrated understanding. Literature was given to patient.     Discussed therapeutic goals for positive airway pressure therapy(CPAP or BiPAP): Ideal is usage 100% of nights for 6 - 8 hours per night. Minimum usage is 70% of night for at least 4 hours per night used. Reviewed CPAP habituation plan with patient. Patient expressed understanding.      Discussed complications of untreated sleep apnea with patient, including but not limited to high blood pressure, heart attack, stroke, obesity, diabetes and worsening heart failure. Patient voiced understanding.       Asthma trigger checklist was verbally reviewed and literature given to patient.     Infection prevention was discussed. Patient was reminded to get influenza vaccine. Hand hygiene and cleaning of respiratory equipment was also discussed. Patient verbalized understanding.      Asthma action plan was reviewed and literature was given to patient. Patient verbalized understanding.      Plan:  Monthly Pulmonary Disease Management Questionnaire  Follow-up PDM appointment scheduled for 4/12/23  Reinforce education  Meds: Trelegy, albuterol   [facility]  Action Plan: Asthma   Immunization: Pneumococcal- current, Flu-current, Covid- current   Next Provider Visit: 4/12/23  Next Spirometry/CPFT: Not scheduled   Approximate time spent with patient: 60 mins

## 2023-03-28 ENCOUNTER — TELEPHONE (OUTPATIENT)
Dept: NEUROSURGERY | Facility: CLINIC | Age: 68
End: 2023-03-28
Payer: MEDICARE

## 2023-03-28 NOTE — TELEPHONE ENCOUNTER
KARINAM confirming that x-rays are scheduled 30 min prior to his upcoming appt.     ----- Message from Jose Waller sent at 3/28/2023 10:45 AM CDT -----  Contact: Abdullahi  Patient stated that she needs to get xrays before his appt. Please call him back at [phone]

## 2023-03-30 ENCOUNTER — HOSPITAL ENCOUNTER (OUTPATIENT)
Dept: RADIOLOGY | Facility: HOSPITAL | Age: 68
Discharge: HOME | End: 2023-03-30
Attending: PHYSICIAN ASSISTANT
Payer: MEDICARE

## 2023-03-30 ENCOUNTER — OFFICE VISIT (OUTPATIENT)
Dept: NEUROSURGERY | Facility: CLINIC | Age: 68
End: 2023-03-30
Payer: MEDICARE

## 2023-03-30 VITALS
RESPIRATION RATE: 18 BRPM | DIASTOLIC BLOOD PRESSURE: 80 MMHG | HEART RATE: 80 BPM | BODY MASS INDEX: 38.42 KG/M2 | SYSTOLIC BLOOD PRESSURE: 120 MMHG | WEIGHT: 253.5 LBS | HEIGHT: 68 IN

## 2023-03-30 DIAGNOSIS — Z79.01 LONG TERM (CURRENT) USE OF ANTICOAGULANTS: ICD-10-CM

## 2023-03-30 DIAGNOSIS — M51.36 DEGENERATIVE DISC DISEASE, LUMBAR: ICD-10-CM

## 2023-03-30 DIAGNOSIS — M71.38 SYNOVIAL CYST OF LUMBAR SPINE: ICD-10-CM

## 2023-03-30 DIAGNOSIS — M48.062 LUMBAR STENOSIS WITH NEUROGENIC CLAUDICATION: ICD-10-CM

## 2023-03-30 DIAGNOSIS — Z01.818 PREPROCEDURAL EXAMINATION: Primary | ICD-10-CM

## 2023-03-30 DIAGNOSIS — M54.16 LUMBAR RADICULOPATHY, CHRONIC: ICD-10-CM

## 2023-03-30 DIAGNOSIS — M43.16 SPONDYLOLISTHESIS, LUMBAR REGION: ICD-10-CM

## 2023-03-30 PROCEDURE — 1125F PR PAIN SEVERITY QUANTIFIED, PAIN PRESENT: ICD-10-PCS | Mod: HCNC,CPTII,S$GLB, | Performed by: NEUROLOGICAL SURGERY

## 2023-03-30 PROCEDURE — 3008F BODY MASS INDEX DOCD: CPT | Mod: HCNC,CPTII,S$GLB, | Performed by: NEUROLOGICAL SURGERY

## 2023-03-30 PROCEDURE — 1125F AMNT PAIN NOTED PAIN PRSNT: CPT | Mod: HCNC,CPTII,S$GLB, | Performed by: NEUROLOGICAL SURGERY

## 2023-03-30 PROCEDURE — 72120 XR LUMBAR SPINE FLEXION AND EXTENSION ONLY: ICD-10-PCS | Mod: 26,59,HCNC, | Performed by: RADIOLOGY

## 2023-03-30 PROCEDURE — 3074F PR MOST RECENT SYSTOLIC BLOOD PRESSURE < 130 MM HG: ICD-10-PCS | Mod: HCNC,CPTII,S$GLB, | Performed by: NEUROLOGICAL SURGERY

## 2023-03-30 PROCEDURE — 3288F FALL RISK ASSESSMENT DOCD: CPT | Mod: HCNC,CPTII,S$GLB, | Performed by: NEUROLOGICAL SURGERY

## 2023-03-30 PROCEDURE — 99214 OFFICE O/P EST MOD 30 MIN: CPT | Mod: HCNC,S$GLB,, | Performed by: NEUROLOGICAL SURGERY

## 2023-03-30 PROCEDURE — 3074F SYST BP LT 130 MM HG: CPT | Mod: HCNC,CPTII,S$GLB, | Performed by: NEUROLOGICAL SURGERY

## 2023-03-30 PROCEDURE — 99214 PR OFFICE/OUTPT VISIT, EST, LEVL IV, 30-39 MIN: ICD-10-PCS | Mod: HCNC,S$GLB,, | Performed by: NEUROLOGICAL SURGERY

## 2023-03-30 PROCEDURE — 3008F PR BODY MASS INDEX (BMI) DOCUMENTED: ICD-10-PCS | Mod: HCNC,CPTII,S$GLB, | Performed by: NEUROLOGICAL SURGERY

## 2023-03-30 PROCEDURE — 99999 PR PBB SHADOW E&M-EST. PATIENT-LVL IV: CPT | Mod: PBBFAC,HCNC,, | Performed by: NEUROLOGICAL SURGERY

## 2023-03-30 PROCEDURE — 72082 X-RAY EXAM ENTIRE SPI 2/3 VW: CPT | Mod: TC,HCNC

## 2023-03-30 PROCEDURE — 72120 X-RAY BEND ONLY L-S SPINE: CPT | Mod: 26,59,HCNC, | Performed by: RADIOLOGY

## 2023-03-30 PROCEDURE — 3079F DIAST BP 80-89 MM HG: CPT | Mod: HCNC,CPTII,S$GLB, | Performed by: NEUROLOGICAL SURGERY

## 2023-03-30 PROCEDURE — 3079F PR MOST RECENT DIASTOLIC BLOOD PRESSURE 80-89 MM HG: ICD-10-PCS | Mod: HCNC,CPTII,S$GLB, | Performed by: NEUROLOGICAL SURGERY

## 2023-03-30 PROCEDURE — 1101F PT FALLS ASSESS-DOCD LE1/YR: CPT | Mod: HCNC,CPTII,S$GLB, | Performed by: NEUROLOGICAL SURGERY

## 2023-03-30 PROCEDURE — 72120 X-RAY BEND ONLY L-S SPINE: CPT | Mod: TC,HCNC,59

## 2023-03-30 PROCEDURE — 72082 XR SPINE SURVEY AP AND LATERAL: ICD-10-PCS | Mod: 26,HCNC,, | Performed by: RADIOLOGY

## 2023-03-30 PROCEDURE — 3288F PR FALLS RISK ASSESSMENT DOCUMENTED: ICD-10-PCS | Mod: HCNC,CPTII,S$GLB, | Performed by: NEUROLOGICAL SURGERY

## 2023-03-30 PROCEDURE — 1101F PR PT FALLS ASSESS DOC 0-1 FALLS W/OUT INJ PAST YR: ICD-10-PCS | Mod: HCNC,CPTII,S$GLB, | Performed by: NEUROLOGICAL SURGERY

## 2023-03-30 PROCEDURE — 72082 X-RAY EXAM ENTIRE SPI 2/3 VW: CPT | Mod: 26,HCNC,, | Performed by: RADIOLOGY

## 2023-03-30 PROCEDURE — 99999 PR PBB SHADOW E&M-EST. PATIENT-LVL IV: ICD-10-PCS | Mod: PBBFAC,HCNC,, | Performed by: NEUROLOGICAL SURGERY

## 2023-03-30 RX ORDER — TRAMADOL HYDROCHLORIDE 50 MG/1
50 TABLET ORAL EVERY 6 HOURS PRN
Qty: 60 TABLET | Refills: 0 | Status: CANCELLED | OUTPATIENT
Start: 2023-03-30

## 2023-03-30 RX ORDER — HYDROCODONE BITARTRATE AND ACETAMINOPHEN 7.5; 325 MG/1; MG/1
1 TABLET ORAL EVERY 8 HOURS PRN
Qty: 21 TABLET | Refills: 0 | Status: SHIPPED | OUTPATIENT
Start: 2023-03-30 | End: 2023-04-27 | Stop reason: SDUPTHER

## 2023-03-31 ENCOUNTER — TELEPHONE (OUTPATIENT)
Dept: PULMONOLOGY | Facility: CLINIC | Age: 68
End: 2023-03-31
Payer: MEDICARE

## 2023-03-31 DIAGNOSIS — J45.901 ASTHMATIC BRONCHITIS WITH ACUTE EXACERBATION, UNSPECIFIED ASTHMA SEVERITY, UNSPECIFIED WHETHER PERSISTENT: Primary | ICD-10-CM

## 2023-04-05 ENCOUNTER — TELEPHONE (OUTPATIENT)
Dept: INTERNAL MEDICINE | Facility: CLINIC | Age: 68
End: 2023-04-05
Payer: MEDICARE

## 2023-04-05 NOTE — TELEPHONE ENCOUNTER
I tried calling the patient no answer a message was left for the patient to return the call to Dr Valentin office.     The patient need a pre-op clearance. I will schedule the patient with Scarlet

## 2023-04-05 NOTE — TELEPHONE ENCOUNTER
----- Message from Jacey Healy MA sent at 3/30/2023  2:03 PM CDT -----  Hello    This pt was seen in Neurosurgery today and has been consented for TLIF procedure (back sx). Dr. Valencia has requested that this pt be medically cleared before proceeding. Can someone from your dept please reach out to the pt in regard to scheduling a pre-op clearance appt. The pt sx date is pending clearance status. If you have any questions or concerns feel free to reach back out to us.    Thanks in advance    Jacey

## 2023-04-06 NOTE — TELEPHONE ENCOUNTER
I spoke with the patient and scheduled him an pre-op clearance with Ms Duarte on 4/13/23 at 9:00 am. A reminder notice mailed out to the patient

## 2023-04-11 ENCOUNTER — TELEPHONE (OUTPATIENT)
Dept: PULMONOLOGY | Facility: CLINIC | Age: 68
End: 2023-04-11
Payer: MEDICARE

## 2023-04-11 NOTE — PROGRESS NOTES
Subjective:      Patient ID: Abdullahi Urias is a 67 y.o. male.    HPI    Pt here for follow up   Symptoms unchanged from prior visit             MRI  lumbar     1. Prior right hemilaminectomy at L5-S1 without signs of granulation or scar tissue compressing the thecal sac or the neural structures.  2. Development of severe spinal stenosis at L4-L5 with a centrally projecting synovial cyst from the right facet joint that likely compresses the descending right L5 spinal nerve roots.  3. Progression of right-sided foraminal stenosis at L5-S1 which could affect the right L5 spinal nerve.  \  PREVIOUS NOTES   The patient is here today for evaluation of back pain.   He is referred to our clinic by ERIK Machado.  His pain started years ago, in 2001.   He had back surgery (lumbar discectomy) in 2001 with Dr. Melgar who has since retired.  Patient states he did well after surgery (no complications).     Localizes pain to lower back and it radiates into hip and RLE/foot.  Has numbness in RLE and foot.   No issues with LLE.  Denies recent falls, other injuries.  Has a walker and scooter to use as needed.  Denies acute bladder/bowel changes.  Rates his pain 8/10 today.  Was taking dane relaxers but ran out. Has been taking Gabapentin.  Patient states he's been hurting so bad that he wanted to go to the ED.  He's had sporadic episodes of bladder/bowel incontinence. Currently he does not have incontinence.  In the past he had injections with Dr. Cam, Dr. Renee and Dr. Carmen.      Pain Procedures:   -previous lumbar PIEDAD  -previous lumbar MBB/RFA  -previous lumbar laminectomy in 2001 at L5-S1  -01/27/2022:  Bilateral piriformis trigger point injections, significant relief that is still ongoing  -right L4/5 +L5/S1 TF PIEDAD on 10/20/22 with 40% relief       Non-Pharmacologic Treatments:  Physical Therapy/Home Exercise: yes  Ice/Heat:yes  TENS: yes  Acupuncture: no  Massage: yes  Chiropractic: no    Other: no      Pain  Medications:  - Opioids: Norco, Tylenol 3  - Adjuvant Medications: Ambien, alprazolam, Flexeril, Voltaren gel, gabapentin, lidocaine patch, Zoloft  - Anti-Coagulants: Aspirin, Pletal    Objective:     Body mass index is 38.55 kg/m².  Vitals:    03/30/23 1321   BP: 120/80   Pulse: 80   Resp: 18      Right Hip Exam     Tenderness   The patient is experiencing tenderness in the posterior.    Tests   TEENA: positive      Left Hip Exam     Tests   TEENA: positive      Back Exam     Tenderness   The patient is experiencing tenderness in the lumbar.    Other   Gait: abnormal           Back:  None  Paraspinal muscle spasms   None present Pain with flexion and extention   WNL limited Range of motion    Neg + right Straight leg raise     Motor   Right Right Left Left  Level Group   5  5  L2 Hip flexor (Psoas)   5  5  L3 Leg extension (Quads)   5  5  L4 Dorsiflexion & foot inversion (Tibialis Anterior)   5  5  L5 Great toe extension ( EHL)   5  5  S1 Foot eversion (Gastroc, PL & PB)     Sensation  NL Decreased (R/L/BL) Level Sensation    X  L2 Anterio-medial thigh   X  L3 Medial thigh around knee   X  L4 Medial foot   X  L5 Dorsum foot   X  S1 Lateral foot     Reflex  2+  Patellar tendon (L4)   2+  Achilles tendon (S1)       Results for orders placed during the hospital encounter of 03/08/23    MRI Lumbar Spine Without Contrast  1. Prior right hemilaminectomy at L5-S1 without signs of granulation or scar tissue compressing the thecal sac or the neural structures.  2. Development of severe spinal stenosis at L4-L5 with a centrally projecting synovial cyst from the right facet joint that likely compresses the descending right L5 spinal nerve roots.  3. Progression of right-sided foraminal stenosis at L5-S1 which could affect the right L5 spinal nerve.       X-ray flexion-extension  Lower lumbar facet arthropathy.  Flexion and extension views demonstrate no dynamic instability.  Severe disc height loss L5-S1.     Lab Results    Component Value Date    WBC 12.05 01/26/2023    HCT 37.2 (L) 01/26/2023       INDEPENDENT INTERPRETATION OF TEST:  Relevant imaging results reviewed and interpreted by me, discussed with the patient and / or family today.  Assessment:     1. Preprocedural examination    2. Long term (current) use of anticoagulants      Plan:     Preprocedural examination  -     Comprehensive Metabolic Panel; Future; Expected date: 03/30/2023  -     CBC Auto Differential; Future; Expected date: 03/30/2023  -     URINALYSIS; Future; Expected date: 03/30/2023  -     APTT; Future; Expected date: 03/30/2023  -     PROTIME-INR; Future; Expected date: 03/30/2023  -     Back/Cervical Brace For Home Use  -     TYPE AND SCREEN PREOP; Future; Expected date: 03/30/2023    Long term (current) use of anticoagulants  -     APTT; Future; Expected date: 03/30/2023  -     PROTIME-INR; Future; Expected date: 03/30/2023      Patient is here today for evaluation of the lower back he has persistent lower back pain as well as lower extremity symptoms he has a synovial cyst at L4-5 on the right side with spondylolisthesis at this level.  Prior history of laminotomy with diskectomy at L5-S1  Severe degenerative disc disease with disc height loss at L5-S1  Discuss treatment options and given the patient's symptoms along with the radiology findings I have offered him a 2 level lumbar fusion at L4-5 and L5-S1  L4-5 to remove the synovial cyst and decompress the spinal canal and at L5-S1 to restore disc height    Patient has number of medical issues including coronary artery disease, peripheral artery disease, aortic valve replacement ,cardiomyopathy, stage 3 kidney disease, history of CVA in the past     he will need to have preoperative clearance prior to considering surgical date  Order preoperative labs    The patient was informed of all benefits and potential risk of the operation including but not limited to:  Pain, infection, bleeding, coma, paralysis,  death.  Cerebrospinal fluid leak, failure of any instrumentation, the need for additional procedures in the future. No guarantee was given that this procedure would alleviate all of the symptoms.    Tentatively scheduled procedure for May 15th    Consents signed today in the office    Thank you for the referral   Please call with any questions    Brandon Valencia MD  Neurosurgery     Disclaimer: This note was prepared using a voice recognition system and is likely to have sound alike errors within the text.

## 2023-04-11 NOTE — TELEPHONE ENCOUNTER
----- Message from Rochelle Watts sent at 4/11/2023  1:17 PM CDT -----  Contact: self/[phone]  Patient is calling regarding his appts on 04-, he would like a nurse to call him back at [phone]. Thanks/ar

## 2023-04-13 ENCOUNTER — OFFICE VISIT (OUTPATIENT)
Dept: INTERNAL MEDICINE | Facility: CLINIC | Age: 68
End: 2023-04-13
Payer: MEDICARE

## 2023-04-13 ENCOUNTER — HOSPITAL ENCOUNTER (OUTPATIENT)
Dept: RADIOLOGY | Facility: HOSPITAL | Age: 68
Discharge: HOME | End: 2023-04-13
Attending: HOSPITALIST
Payer: MEDICARE

## 2023-04-13 VITALS
WEIGHT: 252.44 LBS | BODY MASS INDEX: 38.26 KG/M2 | SYSTOLIC BLOOD PRESSURE: 128 MMHG | TEMPERATURE: 98 F | HEIGHT: 68 IN | OXYGEN SATURATION: 99 % | HEART RATE: 68 BPM | DIASTOLIC BLOOD PRESSURE: 84 MMHG

## 2023-04-13 DIAGNOSIS — Z95.2 S/P AVR (AORTIC VALVE REPLACEMENT): ICD-10-CM

## 2023-04-13 DIAGNOSIS — I10 HYPERTENSION, UNSPECIFIED TYPE: Primary | Chronic | ICD-10-CM

## 2023-04-13 DIAGNOSIS — I73.9 PAD (PERIPHERAL ARTERY DISEASE): ICD-10-CM

## 2023-04-13 DIAGNOSIS — I35.0 AORTIC VALVE STENOSIS, ETIOLOGY OF CARDIAC VALVE DISEASE UNSPECIFIED: ICD-10-CM

## 2023-04-13 DIAGNOSIS — I70.0 AORTO-ILIAC ATHEROSCLEROSIS: ICD-10-CM

## 2023-04-13 DIAGNOSIS — I25.10 CORONARY ARTERY DISEASE INVOLVING NATIVE CORONARY ARTERY OF NATIVE HEART WITHOUT ANGINA PECTORIS: Chronic | ICD-10-CM

## 2023-04-13 DIAGNOSIS — I70.8 AORTO-ILIAC ATHEROSCLEROSIS: ICD-10-CM

## 2023-04-13 DIAGNOSIS — N40.0 BENIGN PROSTATIC HYPERPLASIA, UNSPECIFIED WHETHER LOWER URINARY TRACT SYMPTOMS PRESENT: Chronic | ICD-10-CM

## 2023-04-13 DIAGNOSIS — J45.40 MODERATE PERSISTENT ASTHMA WITHOUT COMPLICATION: ICD-10-CM

## 2023-04-13 DIAGNOSIS — I42.9 CARDIOMYOPATHY, UNSPECIFIED TYPE: ICD-10-CM

## 2023-04-13 DIAGNOSIS — Z01.818 PREOP EXAM FOR INTERNAL MEDICINE: ICD-10-CM

## 2023-04-13 DIAGNOSIS — Z86.73 HISTORY OF CVA IN ADULTHOOD: ICD-10-CM

## 2023-04-13 DIAGNOSIS — E66.01 CLASS 2 SEVERE OBESITY DUE TO EXCESS CALORIES WITH SERIOUS COMORBIDITY AND BODY MASS INDEX (BMI) OF 38.0 TO 38.9 IN ADULT: ICD-10-CM

## 2023-04-13 DIAGNOSIS — N18.30 STAGE 3 CHRONIC KIDNEY DISEASE, UNSPECIFIED WHETHER STAGE 3A OR 3B CKD: ICD-10-CM

## 2023-04-13 DIAGNOSIS — G47.33 OSA ON CPAP: ICD-10-CM

## 2023-04-13 DIAGNOSIS — Z87.891 FORMER TOBACCO USE: ICD-10-CM

## 2023-04-13 DIAGNOSIS — J45.901 ASTHMATIC BRONCHITIS WITH ACUTE EXACERBATION, UNSPECIFIED ASTHMA SEVERITY, UNSPECIFIED WHETHER PERSISTENT: ICD-10-CM

## 2023-04-13 DIAGNOSIS — M1A.00X0 IDIOPATHIC CHRONIC GOUT WITHOUT TOPHUS, UNSPECIFIED SITE: ICD-10-CM

## 2023-04-13 DIAGNOSIS — I10 PRIMARY HYPERTENSION: Chronic | ICD-10-CM

## 2023-04-13 DIAGNOSIS — M48.062 SPINAL STENOSIS OF LUMBAR REGION WITH NEUROGENIC CLAUDICATION: Primary | ICD-10-CM

## 2023-04-13 DIAGNOSIS — Z85.46 HISTORY OF PROSTATE CANCER: ICD-10-CM

## 2023-04-13 PROCEDURE — 3008F PR BODY MASS INDEX (BMI) DOCUMENTED: ICD-10-PCS | Mod: HCNC,CPTII,S$GLB, | Performed by: PHYSICIAN ASSISTANT

## 2023-04-13 PROCEDURE — 71046 X-RAY EXAM CHEST 2 VIEWS: CPT | Mod: TC,HCNC

## 2023-04-13 PROCEDURE — 3079F DIAST BP 80-89 MM HG: CPT | Mod: HCNC,CPTII,S$GLB, | Performed by: PHYSICIAN ASSISTANT

## 2023-04-13 PROCEDURE — 3008F BODY MASS INDEX DOCD: CPT | Mod: HCNC,CPTII,S$GLB, | Performed by: PHYSICIAN ASSISTANT

## 2023-04-13 PROCEDURE — 1125F AMNT PAIN NOTED PAIN PRSNT: CPT | Mod: HCNC,CPTII,S$GLB, | Performed by: PHYSICIAN ASSISTANT

## 2023-04-13 PROCEDURE — 71046 X-RAY EXAM CHEST 2 VIEWS: CPT | Mod: 26,HCNC,, | Performed by: RADIOLOGY

## 2023-04-13 PROCEDURE — 3074F PR MOST RECENT SYSTOLIC BLOOD PRESSURE < 130 MM HG: ICD-10-PCS | Mod: HCNC,CPTII,S$GLB, | Performed by: PHYSICIAN ASSISTANT

## 2023-04-13 PROCEDURE — 3079F PR MOST RECENT DIASTOLIC BLOOD PRESSURE 80-89 MM HG: ICD-10-PCS | Mod: HCNC,CPTII,S$GLB, | Performed by: PHYSICIAN ASSISTANT

## 2023-04-13 PROCEDURE — 3288F PR FALLS RISK ASSESSMENT DOCUMENTED: ICD-10-PCS | Mod: HCNC,CPTII,S$GLB, | Performed by: PHYSICIAN ASSISTANT

## 2023-04-13 PROCEDURE — 99999 PR PBB SHADOW E&M-EST. PATIENT-LVL III: ICD-10-PCS | Mod: PBBFAC,HCNC,, | Performed by: PHYSICIAN ASSISTANT

## 2023-04-13 PROCEDURE — 1125F PR PAIN SEVERITY QUANTIFIED, PAIN PRESENT: ICD-10-PCS | Mod: HCNC,CPTII,S$GLB, | Performed by: PHYSICIAN ASSISTANT

## 2023-04-13 PROCEDURE — 1101F PR PT FALLS ASSESS DOC 0-1 FALLS W/OUT INJ PAST YR: ICD-10-PCS | Mod: HCNC,CPTII,S$GLB, | Performed by: PHYSICIAN ASSISTANT

## 2023-04-13 PROCEDURE — 3074F SYST BP LT 130 MM HG: CPT | Mod: HCNC,CPTII,S$GLB, | Performed by: PHYSICIAN ASSISTANT

## 2023-04-13 PROCEDURE — 1101F PT FALLS ASSESS-DOCD LE1/YR: CPT | Mod: HCNC,CPTII,S$GLB, | Performed by: PHYSICIAN ASSISTANT

## 2023-04-13 PROCEDURE — 99214 PR OFFICE/OUTPT VISIT, EST, LEVL IV, 30-39 MIN: ICD-10-PCS | Mod: HCNC,S$GLB,, | Performed by: PHYSICIAN ASSISTANT

## 2023-04-13 PROCEDURE — 99999 PR PBB SHADOW E&M-EST. PATIENT-LVL III: CPT | Mod: PBBFAC,HCNC,, | Performed by: PHYSICIAN ASSISTANT

## 2023-04-13 PROCEDURE — 3288F FALL RISK ASSESSMENT DOCD: CPT | Mod: HCNC,CPTII,S$GLB, | Performed by: PHYSICIAN ASSISTANT

## 2023-04-13 PROCEDURE — 71046 XR CHEST PA AND LATERAL: ICD-10-PCS | Mod: 26,HCNC,, | Performed by: RADIOLOGY

## 2023-04-13 PROCEDURE — 99214 OFFICE O/P EST MOD 30 MIN: CPT | Mod: HCNC,S$GLB,, | Performed by: PHYSICIAN ASSISTANT

## 2023-04-13 NOTE — PROGRESS NOTES
Subjective:      Patient ID: Abdullahi Urias is a 67 y.o. male.    Chief Complaint: Pre-op Exam    HPI  The patient is here for a preop clearance to have low back surgery surgery.  The physician that is performing the surgery is Dr. Valencia.     The surgery is not yet scheduled  I will send a copy of the referral to the surgeon.  The patient states that there has not been previous problems with sedation.    Kidney disease, stroke, MI, arrhythmia, seizures, angina, asthma, diabetes, chest pain, thyroid disease, blood/bleeding disorders?  Kidney disease and CVA in 2012    Patient Active Problem List   Diagnosis    Aortic stenosis    ED (erectile dysfunction)    Asthmatic bronchitis    Hypertension    CAD (coronary artery disease)    BPH (benign prostatic hyperplasia)    Chronic back pain    Cardiomyopathy    BRBPR (bright red blood per rectum)    Class 2 severe obesity due to excess calories with serious comorbidity and body mass index (BMI) of 38.0 to 38.9 in adult    Old peripheral tear of lateral meniscus of right knee    Arthritis of right knee    Chondromalacia, right knee    Penetrating foreign body of skin of right knee    Chronic gout    CHEO on CPAP    History of CVA in adulthood    Prostate cancer    S/P AVR (aortic valve replacement) biopresthetic miller 25 mm in 2014     EKG abnormalities    Stage 3 chronic kidney disease    History of hepatitis C    Pain in both lower extremities    GERD (gastroesophageal reflux disease)    Personal history of colonic polyps    Syncope and collapse    Localized swelling of left foot    History of prostate cancer    PAD (peripheral artery disease)    COVID    Aorto-iliac atherosclerosis    Moderate persistent asthma without complication    Former tobacco use     Past Surgical History:   Procedure Laterality Date    AORTIC VALVE REPLACEMENT  05/19/2014    Tissue valve replacement    BACK SURGERY      CARDIAC CATHETERIZATION      COLONOSCOPY  2011    COLONOSCOPY N/A 2/24/2021     Procedure: COLONOSCOPY;  Surgeon: Faith Carrillo MD;  Location: [facility] ENDO;  Service: Endoscopy;  Laterality: N/A;    EPIDURAL STEROID INJECTION INTO CERVICAL SPINE N/A 6/21/2022    Procedure: C7-T1 IL PIEDAD;  Surgeon: Nehemiah Carmen MD;  Location: [facility] PAIN MGT;  Service: Pain Management;  Laterality: N/A;    ESOPHAGOGASTRODUODENOSCOPY N/A 2/24/2021    Procedure: ESOPHAGOGASTRODUODENOSCOPY (EGD);  Surgeon: Faith Carrillo MD;  Location: [facility] ENDO;  Service: Endoscopy;  Laterality: N/A;    FOOT SURGERY      LEFT HEART CATHETERIZATION Left 7/24/2020    Procedure: CATHETERIZATION, HEART, LEFT;  Surgeon: Pasha Martin MD;  Location: [facility] CATH LAB;  Service: Cardiology;  Laterality: Left;    PENILE PROSTHESIS IMPLANT      PENILE PROSTHESIS REVISION  04/30/2018    PROSTATECTOMY  09/2019    [facility]    radiation for prostate      TRANSFORAMINAL EPIDURAL INJECTION OF STEROID Right 10/20/2022    Procedure: Right  L4/5 + L5/S1 TF PIEDAD RN IV Sedation;  Surgeon: Nehemiah Carmen MD;  Location: [facility] PAIN MGT;  Service: Pain Management;  Laterality: Right;    UPPER GASTROINTESTINAL ENDOSCOPY  2011             Current Outpatient Medications:     albuterol (PROVENTIL/VENTOLIN HFA) 90 mcg/actuation inhaler, INHALE 2 PUFFS INTO THE LUNGS EVERY 6 HOURS AS NEEDED FOR COUGH, Disp: 8.5 g, Rfl: 3    allopurinoL (ZYLOPRIM) 100 MG tablet, Take 100 mg by mouth once daily. Takes 0.5 tab, Disp: , Rfl:     ALPRAZolam (XANAX) 1 MG tablet, Take 1 mg by mouth., Disp: , Rfl:     aspirin (ECOTRIN) 81 MG EC tablet, Take 1 tablet (81 mg total) by mouth once daily., Disp: 90 tablet, Rfl: 3    atorvastatin (LIPITOR) 80 MG tablet, TAKE ONE-HALF TABLET BY MOUTH EVERY DAY FOR CHOLESTEROL, Disp: , Rfl:     carvediloL (COREG) 12.5 MG tablet, Take 1 tablet (12.5 mg total) by mouth 2 (two) times daily., Disp: 60 tablet, Rfl: 11    clopidogreL (PLAVIX) 75 mg tablet, Take 1 tablet (75 mg total) by mouth once daily., Disp: 30 tablet, Rfl:  11    diclofenac sodium (VOLTAREN) 1 % Gel, APPLY 2 GRAMS TOPICALLY FOUR TIMES A DAY AS NEEDED FOR PAIN AND INFLAMMATION. USE ENCLOSED DOSING CARD., Disp: , Rfl:     esomeprazole (NEXIUM) 40 MG capsule, 40 mg., Disp: , Rfl:     flunisolide 25 mcg, 0.025%, (NASALIDE) 25 mcg (0.025 %) Spry, 2 sprays by Nasal route as needed. , Disp: , Rfl:     fluticasone-umeclidin-vilanter (TRELEGY ELLIPTA) 200-62.5-25 mcg inhaler, Inhale 1 puff into the lungs once daily., Disp: 60 each, Rfl: 1    gabapentin (NEURONTIN) 600 MG tablet, Take 1 tablet (600 mg total) by mouth 3 (three) times daily., Disp: 90 tablet, Rfl: 11    HYDROcodone-acetaminophen (NORCO) 7.5-325 mg per tablet, Take 1 tablet by mouth every 8 (eight) hours as needed for Pain., Disp: 21 tablet, Rfl: 0    hydrocortisone 2.5 % cream, Apply topically 2 (two) times daily as needed. Apply to affected areas of the face and neck., Disp: 30 g, Rfl: 2    hydrocortisone-pramoxine (PROCTOFOAM-HS) rectal foam, INSERT 1 APPLICATORFUL INTO RECTALLY TWICE A DAY FOR HEMORRHOIDS, Disp: , Rfl:     LIDOcaine (LIDODERM) 5 %, APPLY 1 PATCH TOPICALLY EVERY DAY FOR PAIN. WEAR FOR 12 HOURS, THEN REMOVE. DO NOT APPLY NEW PATCH FOR AT LEAST 12 HOURS., Disp: , Rfl:     losartan (COZAAR) 50 MG tablet, Take 1 tablet (50 mg total) by mouth once daily., Disp: 30 tablet, Rfl: 11    methyl salicylate-menthol 15-10% 15-10 % Crea, Apply topically as needed. , Disp: , Rfl:     pantoprazole (PROTONIX) 40 MG tablet, Take 40 mg by mouth 2 (two) times daily. , Disp: , Rfl:     sertraline (ZOLOFT) 100 MG tablet, TAKE TWO TABLETS BY MOUTH EVERY DAY FOR MENTAL HEALTH DOSE INCREASED TO 200MG/DAY, Disp: , Rfl:     simethicone (MYLICON) 80 MG chewable tablet, Take 80 mg by mouth every 6 (six) hours as needed for Flatulence., Disp: , Rfl:     sucralfate (CARAFATE) 100 mg/mL suspension, Take 1 g by mouth 4 (four) times daily. , Disp: , Rfl:     triamcinolone acetonide 0.1% (KENALOG) 0.1 % cream, Apply topically 2  (two) times daily., Disp: 80 g, Rfl: 1    aluminum & magnesium hydroxide-simethicone (MYLANTA MAX STRENGTH) 400-400-40 mg/5 mL suspension, TAKE 15ML BY MOUTH FOUR TIMES A DAY AS NEEDED FOR STOMACH ACID, Disp: , Rfl:     diphenhydrAMINE (SOMINEX) 25 mg tablet, Take 25 mg by mouth nightly as needed for Insomnia., Disp: , Rfl:     famotidine (PEPCID) 40 MG tablet, TAKE ONE TABLET BY MOUTH AT BEDTIME FOR ACID REFLUX, Disp: , Rfl:     hydrOXYzine HCL (ATARAX) 25 MG tablet, Take 25 mg by mouth 3 (three) times daily as needed for Itching., Disp: , Rfl:   No current facility-administered medications for this visit.    Facility-Administered Medications Ordered in Other Visits:     ondansetron injection 4 mg, 4 mg, Intravenous, Once PRN, Nehemiah Carmen MD    sodium chloride 0.9% flush 10 mL, 10 mL, Intravenous, PRN, Wilda Horne MD    Review of Systems   Constitutional:  Negative for activity change, appetite change, chills, diaphoresis, fatigue, fever and unexpected weight change.   HENT: Negative.  Negative for congestion, hearing loss, postnasal drip, rhinorrhea, sore throat, trouble swallowing and voice change.    Eyes: Negative.  Negative for visual disturbance.   Respiratory: Negative.  Negative for cough, choking, chest tightness and shortness of breath.    Cardiovascular:  Negative for chest pain, palpitations and leg swelling.   Gastrointestinal:  Negative for abdominal distention, abdominal pain, blood in stool, constipation, diarrhea, nausea and vomiting.   Endocrine: Negative for cold intolerance, heat intolerance, polydipsia and polyuria.   Genitourinary: Negative.  Negative for difficulty urinating and frequency.   Musculoskeletal:  Negative for arthralgias, back pain, gait problem, joint swelling and myalgias.   Skin:  Negative for color change, pallor, rash and wound.   Neurological:  Negative for dizziness, tremors, weakness, light-headedness, numbness and headaches.   Hematological:  Negative for  "adenopathy.   Psychiatric/Behavioral:  Negative for behavioral problems, confusion, self-injury, sleep disturbance and suicidal ideas. The patient is not nervous/anxious.    Objective:   /84 (BP Location: Right arm, Patient Position: Sitting, BP Method: Large (Manual))   Pulse 68   Temp 97.6 °F (36.4 °C) (Tympanic)   Ht 5' 8" (1.727 m)   Wt 114.5 kg (252 lb 6.8 oz)   SpO2 99%   BMI 38.38 kg/m²     Physical Exam  Vitals reviewed.   Constitutional:       General: He is not in acute distress.     Appearance: Normal appearance. He is well-developed. He is not ill-appearing, toxic-appearing or diaphoretic.   HENT:      Head: Normocephalic and atraumatic.      Right Ear: External ear normal.      Left Ear: External ear normal.      Nose: Nose normal.   Eyes:      Conjunctiva/sclera: Conjunctivae normal.      Pupils: Pupils are equal, round, and reactive to light.   Cardiovascular:      Rate and Rhythm: Normal rate and regular rhythm.      Heart sounds: Normal heart sounds. No murmur heard.    No friction rub. No gallop.   Pulmonary:      Effort: Pulmonary effort is normal. No respiratory distress.      Breath sounds: Normal breath sounds. No wheezing or rales.   Chest:      Chest wall: No tenderness.   Abdominal:      General: There is no distension.      Palpations: Abdomen is soft.      Tenderness: There is no abdominal tenderness.   Musculoskeletal:         General: Normal range of motion.      Cervical back: Normal range of motion and neck supple.   Lymphadenopathy:      Cervical: No cervical adenopathy.   Skin:     General: Skin is warm and dry.      Capillary Refill: Capillary refill takes less than 2 seconds.      Findings: No rash.   Neurological:      Mental Status: He is alert and oriented to person, place, and time.      Motor: No weakness.      Coordination: Coordination normal.      Gait: Gait normal.   Psychiatric:         Mood and Affect: Mood normal.         Behavior: Behavior normal.         " Thought Content: Thought content normal.         Judgment: Judgment normal.     Lab Visit on 04/13/2023   Component Date Value Ref Range Status    Sodium 04/13/2023 140  136 - 145 mmol/L Final    Potassium 04/13/2023 4.3  3.5 - 5.1 mmol/L Final    Chloride 04/13/2023 109  95 - 110 mmol/L Final    CO2 04/13/2023 25  23 - 29 mmol/L Final    Glucose 04/13/2023 91  70 - 110 mg/dL Final    BUN 04/13/2023 15  8 - 23 mg/dL Final    Creatinine 04/13/2023 1.5 (H)  0.5 - 1.4 mg/dL Final    Calcium 04/13/2023 9.3  8.7 - 10.5 mg/dL Final    Total Protein 04/13/2023 7.2  6.0 - 8.4 g/dL Final    Albumin 04/13/2023 4.1  3.5 - 5.2 g/dL Final    Total Bilirubin 04/13/2023 0.3  0.1 - 1.0 mg/dL Final    Comment: For infants and newborns, interpretation of results should be based  on gestational age, weight and in agreement with clinical  observations.    Premature Infant recommended reference ranges:  Up to 24 hours.............<8.0 mg/dL  Up to 48 hours............<12.0 mg/dL  3-5 days..................<15.0 mg/dL  6-29 days.................<15.0 mg/dL      Alkaline Phosphatase 04/13/2023 101  55 - 135 U/L Final    AST 04/13/2023 24  10 - 40 U/L Final    ALT 04/13/2023 17  10 - 44 U/L Final    Anion Gap 04/13/2023 6 (L)  8 - 16 mmol/L Final    eGFR 04/13/2023 50.7 (A)  >60 mL/min/1.73 m^2 Final    WBC 04/13/2023 3.89 (L)  3.90 - 12.70 K/uL Final    RBC 04/13/2023 4.60  4.60 - 6.20 M/uL Final    Hemoglobin 04/13/2023 12.9 (L)  14.0 - 18.0 g/dL Final    Hematocrit 04/13/2023 39.9 (L)  40.0 - 54.0 % Final    MCV 04/13/2023 87  82 - 98 fL Final    MCH 04/13/2023 28.0  27.0 - 31.0 pg Final    MCHC 04/13/2023 32.3  32.0 - 36.0 g/dL Final    RDW 04/13/2023 14.2  11.5 - 14.5 % Final    Platelets 04/13/2023 173  150 - 450 K/uL Final    MPV 04/13/2023 11.2  9.2 - 12.9 fL Final    Immature Granulocytes 04/13/2023 0.3  0.0 - 0.5 % Final    Gran # (ANC) 04/13/2023 2.1  1.8 - 7.7 K/uL Final    Immature Grans (Abs) 04/13/2023 0.01  0.00 - 0.04 K/uL  Final    Comment: Mild elevation in immature granulocytes is non specific and   can be seen in a variety of conditions including stress response,   acute inflammation, trauma and pregnancy. Correlation with other   laboratory and clinical findings is essential.      Lymph # 04/13/2023 1.1  1.0 - 4.8 K/uL Final    Mono # 04/13/2023 0.5  0.3 - 1.0 K/uL Final    Eos # 04/13/2023 0.1  0.0 - 0.5 K/uL Final    Baso # 04/13/2023 0.02  0.00 - 0.20 K/uL Final    nRBC 04/13/2023 0  0 /100 WBC Final    Gran % 04/13/2023 54.6  38.0 - 73.0 % Final    Lymph % 04/13/2023 29.0  18.0 - 48.0 % Final    Mono % 04/13/2023 12.3  4.0 - 15.0 % Final    Eosinophil % 04/13/2023 3.3  0.0 - 8.0 % Final    Basophil % 04/13/2023 0.5  0.0 - 1.9 % Final    Differential Method 04/13/2023 Automated   Final    aPTT 04/13/2023 23.3  21.0 - 32.0 sec Final    aPTT therapeutic range = 39-69 seconds    Prothrombin Time 04/13/2023 10.8  9.0 - 12.5 sec Final    INR 04/13/2023 1.0  0.8 - 1.2 Final    Comment: Coumadin Therapy:  2.0 - 3.0 for INR for all indicators except mechanical heart valves  and antiphospholipid syndromes which should use 2.5 - 3.5.       X-Ray Chest PA And Lateral  Narrative: EXAM: XR CHEST PA AND LATERAL    CLINICAL HISTORY:  Asthma exacerbation    FINDINGS: Median sternotomy wires in place.    The lungs are clear, with normal appearance of pulmonary vasculature and no pleural effusion or pneumothorax.    The cardiac silhouette is enlarged.  Valve replacement is noted.  The hilar and mediastinal contours are unremarkable.    Bones are intact.  Impression:  No acute findings.    Finalized on: 4/13/2023 9:32 AM By:  Bob Yoon MD  [facility]# [id number]      2023-04-13 09:[id number]    [facility]     Results for orders placed or performed during the hospital encounter of 01/24/23   EKG 12-lead    Collection Time: 01/24/23  5:09 PM    Narrative    Test Reason : R06.02,    Vent. Rate : 088 BPM     Atrial Rate : 088 BPM     P-R Int : 146 ms           QRS Dur : 098 ms      QT Int : 392 ms       P-R-T Axes : 075 009 033 degrees     QTc Int : 474 ms    Normal sinus rhythm  T wave abnormality, consider anterolateral ischemia  Prolonged QT  Abnormal ECG  When compared with ECG of 14-JAN-2023 20:31,  Premature ventricular complexes are no longer Present  Confirmed by ERIC JACOB MD ([id number]) on 1/25/2023 3:34:48 PM    Referred By: [email or web address]   SELF           Confirmed By:ERIC JACOB MD      30+ minutes of total time spent on the encounter, which includes face to face time and non-face to face time preparing to see the patient (eg, review of tests), Obtaining and/or reviewing separately obtained history, documenting clinical information in the electronic or other health record, independently interpreting results (not separately reported) and communicating results to the patient/family/caregiver, or Care coordination (not separately reported).    Assessment:     1. Spinal stenosis of lumbar region with neurogenic claudication    2. Preop exam for internal medicine    3. Former tobacco use    4. Moderate persistent asthma without complication    5. Aorto-iliac atherosclerosis    6. PAD (peripheral artery disease)    7. History of prostate cancer    8. Stage 3 chronic kidney disease, unspecified whether stage 3a or 3b CKD    9. S/P AVR (aortic valve replacement) biopresthetic miller 25 mm in 2014     10. CHEO on CPAP    11. History of CVA in adulthood    12. Primary hypertension    13. Coronary artery disease involving native coronary artery of native heart without angina pectoris    14. Aortic valve stenosis, etiology of cardiac valve disease unspecified    15. Idiopathic chronic gout without tophus, unspecified site    16. Benign prostatic hyperplasia, unspecified whether lower urinary tract symptoms present    17. Cardiomyopathy, unspecified type    18. Asthmatic bronchitis with acute exacerbation, unspecified asthma severity, unspecified whether persistent    19. Class 2  severe obesity due to excess calories with serious comorbidity and body mass index (BMI) of 38.0 to 38.9 in adult      Plan:   Spinal stenosis of lumbar region with neurogenic claudication    Preop exam for internal medicine    Former tobacco use    Moderate persistent asthma without complication    Aorto-iliac atherosclerosis    PAD (peripheral artery disease)    History of prostate cancer    Stage 3 chronic kidney disease, unspecified whether stage 3a or 3b CKD    S/P AVR (aortic valve replacement) biopresthetic miller 25 mm in 2014     CHEO on CPAP    History of CVA in adulthood    Primary hypertension    Coronary artery disease involving native coronary artery of native heart without angina pectoris    Aortic valve stenosis, etiology of cardiac valve disease unspecified    Idiopathic chronic gout without tophus, unspecified site    Benign prostatic hyperplasia, unspecified whether lower urinary tract symptoms present    Cardiomyopathy, unspecified type    Asthmatic bronchitis with acute exacerbation, unspecified asthma severity, unspecified whether persistent    Class 2 severe obesity due to excess calories with serious comorbidity and body mass index (BMI) of 38.0 to 38.9 in adult      -getting clearance for cardiology and pulmonology.   -schedule for pulmonary pre-op work up  -will have him complete his preop labs today. Pt states that he will be on vacation during the time his labs are currently scheduled for.  -moderate risk for his upcoming surgery.     Follow up if symptoms worsen or fail to improve.

## 2023-04-14 ENCOUNTER — CLINICAL SUPPORT (OUTPATIENT)
Dept: PULMONOLOGY | Facility: CLINIC | Age: 68
End: 2023-04-14
Payer: MEDICARE

## 2023-04-14 VITALS — WEIGHT: 252.63 LBS | HEIGHT: 68 IN | BODY MASS INDEX: 38.29 KG/M2

## 2023-04-14 DIAGNOSIS — J45.901 ASTHMATIC BRONCHITIS WITH ACUTE EXACERBATION, UNSPECIFIED ASTHMA SEVERITY, UNSPECIFIED WHETHER PERSISTENT: ICD-10-CM

## 2023-04-14 LAB
BRPFT: ABNORMAL
DLCO ADJ PRE: 15.72 ML/(MIN*MMHG) (ref 19.25–33.11)
DLCO SINGLE BREATH LLN: 19.25
DLCO SINGLE BREATH PRE REF: 60 %
DLCO SINGLE BREATH REF: 26.18
DLCOC SBVA LLN: 2.66
DLCOC SBVA PRE REF: 117.9 %
DLCOC SBVA REF: 3.88
DLCOC SINGLE BREATH LLN: 19.25
DLCOC SINGLE BREATH PRE REF: 60 %
DLCOC SINGLE BREATH REF: 26.18
DLCOVA LLN: 2.66
DLCOVA PRE REF: 117.9 %
DLCOVA PRE: 4.58 ML/(MIN*MMHG*L) (ref 2.66–5.11)
DLCOVA REF: 3.88
DLVAADJ PRE: 4.58 ML/(MIN*MMHG*L) (ref 2.66–5.11)
ERV LLN: -16448.95
ERV PRE REF: 16.5 %
ERV REF: 1.05
FEF 25 75 LLN: 0.8
FEF 25 75 PRE REF: 55.4 %
FEF 25 75 REF: 2.14
FEV1 FVC LLN: 65
FEV1 FVC PRE REF: 90.4 %
FEV1 FVC REF: 77
FEV1 LLN: 1.85
FEV1 PRE REF: 80.1 %
FEV1 REF: 2.65
FRCPLETH LLN: 2.57
FRCPLETH PREREF: 62 %
FRCPLETH REF: 3.56
FVC LLN: 2.5
FVC PRE REF: 88.5 %
FVC REF: 3.44
IVC PRE: 2.42 L (ref 2.5–4.38)
IVC SINGLE BREATH LLN: 2.5
IVC SINGLE BREATH PRE REF: 70.3 %
IVC SINGLE BREATH REF: 3.44
MVV LLN: 102
MVV PRE REF: 70.7 %
MVV REF: 120
PEF LLN: 5.16
PEF PRE REF: 128.9 %
PEF REF: 7.67
PRE DLCO: 15.72 ML/(MIN*MMHG) (ref 19.25–33.11)
PRE ERV: 0.17 L (ref -16448.95–16451.05)
PRE FEF 25 75: 1.18 L/S (ref 0.8–3.47)
PRE FET 100: 12.06 SEC
PRE FEV1 FVC: 69.88 % (ref 64.61–89.95)
PRE FEV1: 2.13 L (ref 1.85–3.46)
PRE FRC PL: 2.21 L
PRE FVC: 3.04 L (ref 2.5–4.38)
PRE MVV: 85 L/MIN (ref 102.2–138.28)
PRE PEF: 9.89 L/S (ref 5.16–10.18)
PRE RV: 1.57 L (ref 1.84–3.18)
PRE TLC: 4.61 L (ref 5.59–7.89)
RAW LLN: 3.06
RAW PRE REF: 105 %
RAW PRE: 3.21 CMH2O*S/L (ref 3.06–3.06)
RAW REF: 3.06
RV LLN: 1.84
RV PRE REF: 62.5 %
RV REF: 2.51
RVTLC LLN: 31
RVTLC PRE REF: 84.9 %
RVTLC PRE: 34.02 % (ref 31.11–49.07)
RVTLC REF: 40
TLC LLN: 5.59
TLC PRE REF: 68.4 %
TLC REF: 6.74
VA PRE: 3.43 L (ref 6.59–6.59)
VA SINGLE BREATH LLN: 6.59
VA SINGLE BREATH PRE REF: 52.1 %
VA SINGLE BREATH REF: 6.59
VC LLN: 2.5
VC PRE REF: 88.5 %
VC PRE: 3.04 L (ref 2.5–4.38)
VC REF: 3.44
VTGRAWPRE: 2.57 L

## 2023-04-14 PROCEDURE — 99999 PR PBB SHADOW E&M-EST. PATIENT-LVL I: CPT | Mod: PBBFAC,HCNC,,

## 2023-04-14 PROCEDURE — 82803 PR  BLOOD GASES: PH, PO2 & PCO2: ICD-10-PCS | Mod: HCNC,S$GLB,, | Performed by: INTERNAL MEDICINE

## 2023-04-14 PROCEDURE — 94726 PULM FUNCT TST PLETHYSMOGRAP: ICD-10-PCS | Mod: HCNC,S$GLB,, | Performed by: INTERNAL MEDICINE

## 2023-04-14 PROCEDURE — 94618 PULMONARY STRESS TESTING: ICD-10-PCS | Mod: HCNC,S$GLB,, | Performed by: INTERNAL MEDICINE

## 2023-04-14 PROCEDURE — 94729 PR C02/MEMBANE DIFFUSE CAPACITY: ICD-10-PCS | Mod: HCNC,S$GLB,, | Performed by: INTERNAL MEDICINE

## 2023-04-14 PROCEDURE — 82803 BLOOD GASES ANY COMBINATION: CPT | Mod: HCNC,S$GLB,, | Performed by: INTERNAL MEDICINE

## 2023-04-14 PROCEDURE — 99999 PR PBB SHADOW E&M-EST. PATIENT-LVL I: ICD-10-PCS | Mod: PBBFAC,HCNC,,

## 2023-04-14 PROCEDURE — 94618 PULMONARY STRESS TESTING: CPT | Mod: HCNC,S$GLB,, | Performed by: INTERNAL MEDICINE

## 2023-04-14 PROCEDURE — 94010 BREATHING CAPACITY TEST: ICD-10-PCS | Mod: HCNC,S$GLB,, | Performed by: INTERNAL MEDICINE

## 2023-04-14 PROCEDURE — 94726 PLETHYSMOGRAPHY LUNG VOLUMES: CPT | Mod: HCNC,S$GLB,, | Performed by: INTERNAL MEDICINE

## 2023-04-14 PROCEDURE — 36600 WITHDRAWAL OF ARTERIAL BLOOD: CPT | Mod: HCNC,S$GLB,, | Performed by: INTERNAL MEDICINE

## 2023-04-14 PROCEDURE — 94729 DIFFUSING CAPACITY: CPT | Mod: HCNC,S$GLB,, | Performed by: INTERNAL MEDICINE

## 2023-04-14 PROCEDURE — 36600 PR WITHDRAWAL OF ARTERIAL BLOOD: ICD-10-PCS | Mod: HCNC,S$GLB,, | Performed by: INTERNAL MEDICINE

## 2023-04-14 PROCEDURE — 94010 BREATHING CAPACITY TEST: CPT | Mod: HCNC,S$GLB,, | Performed by: INTERNAL MEDICINE

## 2023-04-14 NOTE — PROCEDURES
"[facility]  Six Minute [facility]     SUMMARY     Ordering Provider: ERIK Martinez   Interpreting Provider:   Performing nurse/tech/RT: DANNY Red RRT  Diagnosis:  (Asthmatic Bronchitis with acute exacerbation)  Height: 5' 8" (172.7 cm)  Weight: 114.6 kg (252 lb 10.4 oz)  BMI (Calculated): 38.4   Patient Race:             Phase Oxygen Assessment Supplemental O2 Heart   Rate Blood Pressure Aristeo Dyspnea Scale Rating   Resting 96 % Room Air 69 bpm 131/80 1   Exercise        Minute        1 95 % Room Air 91 bpm     2 97 % Room Air 92 bpm     3 96 % Room Air 92 bpm     4 94 % Room Air 94 bpm     5 97 % Room Air 97 bpm     6  96 % Room Air 93 bpm (!) 157/95   3   Recovery        Minute        1 98 % Room Air 100 bpm     2 98 % Room Air 90 bpm     3 98 % Room Air 83 bpm     4 98 % Room Air 77 bpm 137/82 1     Six Minute Walk Summary  6MWT Status: completed without stopping  Patient Reported: Leg pain, Dyspnea (Back Pain)     Interpretation:  Did the patient stop or pause?: No               Total Time Walked (Calculated): 360 seconds  Final Partial Lap Distance (feet): 150 feet  Total Distance Meters (Calculated): 411.48 meters  Predicted Distance Meters (Calculated): 460.3 meters  Percentage of Predicted (Calculated): 89.39  Peak VO2 (Calculated): 16.32  Mets: 4.66  Has The Patient Had a Previous Six Minute Walk Test?: No       Previous 6MWT Results  Has The Patient Had a Previous Six Minute Walk Test?: No      Six minute walk distance is 411.48m /89.39 meters (89.39 % predicted) with light.Patient did complete the study, walking 360 seconds of the 360 second test . During exercise, there was no  significant desaturation while breathing room air .Lowest oxygen saturation was 94% .Maximum heart rate during exercise was 97 bpm which is 63 % of maximum predicted heart rate of 153 bpm. Blood pressure increased significantly and Heart rate remained stable Based upon age and body mass " index, exercise capacity is normal.  Peak VO2 during walking was 16.32 ml/kg/min which is 45 % of predicted Peak VO2 max of 36 ml/kg/min based on a resting heart rate of 69/min.   Patient has not had a previous study. No previous study performed.

## 2023-04-14 NOTE — PROCEDURES
ABG completed. See ABG Below.    pH: 7.363  PaO2:85  PaCO2:41  HCO3: 23.3  SpO2:96%  Room Air      Interpretation:  [x] Arterial blood gases on room air demonstrate normal pCO2 and pO2  [] Arterial blood gases on room air are abnormal demonstrating hypercarbia (pCO2 >45 mmHg)  [] Arterial blood gases on room air are abnormal demonstrating hypocarbia (pCO2 < 35 mmHg)  [] Arterial blood gases on room air are abnormal demonstrating hypoxemia (pO2 < 80 mmHg)  [] Arterial blood gases on room air are abnormal demonstrating hyperoxemia (pO2 >120 mmHg)  [] Arterial blood gases on room air are abnormal demonstrating hypoxemia (pO2 < 80 mmHg) and hypercarbia (pCO2>45 mmHg)    The table below summarizes the main interpretations of the relationship between the arterial blood gases, pH, pCO2 and HCO-3    []    I

## 2023-04-17 LAB
ALLENS TEST: ABNORMAL
DELSYS: ABNORMAL
FIO2: 21
HCO3 UR-SCNC: 23.3 MMOL/L (ref 24–28)
MODE: ABNORMAL
PCO2 BLDA: 41 MMHG (ref 35–45)
PH SMN: 7.36 [PH] (ref 7.35–7.45)
PO2 BLDA: 85 MMHG (ref 80–100)
POC BE: -2 MMOL/L
POC SATURATED O2: 96 % (ref 95–100)
SAMPLE: ABNORMAL
SITE: ABNORMAL

## 2023-04-18 ENCOUNTER — OFFICE VISIT (OUTPATIENT)
Dept: PULMONOLOGY | Facility: CLINIC | Age: 68
End: 2023-04-18
Payer: MEDICARE

## 2023-04-18 VITALS
OXYGEN SATURATION: 99 % | SYSTOLIC BLOOD PRESSURE: 114 MMHG | DIASTOLIC BLOOD PRESSURE: 74 MMHG | BODY MASS INDEX: 37.86 KG/M2 | WEIGHT: 249.81 LBS | HEART RATE: 85 BPM | RESPIRATION RATE: 20 BRPM | HEIGHT: 68 IN

## 2023-04-18 DIAGNOSIS — J45.40 MODERATE PERSISTENT ASTHMA WITHOUT COMPLICATION: Primary | ICD-10-CM

## 2023-04-18 DIAGNOSIS — Z01.811 ENCOUNTER FOR PREOPERATIVE PULMONARY EXAMINATION: ICD-10-CM

## 2023-04-18 DIAGNOSIS — G47.33 OSA ON CPAP: ICD-10-CM

## 2023-04-18 PROCEDURE — 3074F PR MOST RECENT SYSTOLIC BLOOD PRESSURE < 130 MM HG: ICD-10-PCS | Mod: HCNC,CPTII,S$GLB, | Performed by: HOSPITALIST

## 2023-04-18 PROCEDURE — 1160F PR REVIEW ALL MEDS BY PRESCRIBER/CLIN PHARMACIST DOCUMENTED: ICD-10-PCS | Mod: HCNC,CPTII,S$GLB, | Performed by: HOSPITALIST

## 2023-04-18 PROCEDURE — 1101F PT FALLS ASSESS-DOCD LE1/YR: CPT | Mod: HCNC,CPTII,S$GLB, | Performed by: HOSPITALIST

## 2023-04-18 PROCEDURE — 99213 OFFICE O/P EST LOW 20 MIN: CPT | Mod: HCNC,S$GLB,, | Performed by: HOSPITALIST

## 2023-04-18 PROCEDURE — 3008F BODY MASS INDEX DOCD: CPT | Mod: HCNC,CPTII,S$GLB, | Performed by: HOSPITALIST

## 2023-04-18 PROCEDURE — 3078F DIAST BP <80 MM HG: CPT | Mod: HCNC,CPTII,S$GLB, | Performed by: HOSPITALIST

## 2023-04-18 PROCEDURE — 1159F MED LIST DOCD IN RCRD: CPT | Mod: HCNC,CPTII,S$GLB, | Performed by: HOSPITALIST

## 2023-04-18 PROCEDURE — 3078F PR MOST RECENT DIASTOLIC BLOOD PRESSURE < 80 MM HG: ICD-10-PCS | Mod: HCNC,CPTII,S$GLB, | Performed by: HOSPITALIST

## 2023-04-18 PROCEDURE — 1126F AMNT PAIN NOTED NONE PRSNT: CPT | Mod: HCNC,CPTII,S$GLB, | Performed by: HOSPITALIST

## 2023-04-18 PROCEDURE — 3008F PR BODY MASS INDEX (BMI) DOCUMENTED: ICD-10-PCS | Mod: HCNC,CPTII,S$GLB, | Performed by: HOSPITALIST

## 2023-04-18 PROCEDURE — 99999 PR PBB SHADOW E&M-EST. PATIENT-LVL III: ICD-10-PCS | Mod: PBBFAC,HCNC,, | Performed by: HOSPITALIST

## 2023-04-18 PROCEDURE — 1126F PR PAIN SEVERITY QUANTIFIED, NO PAIN PRESENT: ICD-10-PCS | Mod: HCNC,CPTII,S$GLB, | Performed by: HOSPITALIST

## 2023-04-18 PROCEDURE — 3074F SYST BP LT 130 MM HG: CPT | Mod: HCNC,CPTII,S$GLB, | Performed by: HOSPITALIST

## 2023-04-18 PROCEDURE — 99213 PR OFFICE/OUTPT VISIT, EST, LEVL III, 20-29 MIN: ICD-10-PCS | Mod: HCNC,S$GLB,, | Performed by: HOSPITALIST

## 2023-04-18 PROCEDURE — 1101F PR PT FALLS ASSESS DOC 0-1 FALLS W/OUT INJ PAST YR: ICD-10-PCS | Mod: HCNC,CPTII,S$GLB, | Performed by: HOSPITALIST

## 2023-04-18 PROCEDURE — 3288F FALL RISK ASSESSMENT DOCD: CPT | Mod: HCNC,CPTII,S$GLB, | Performed by: HOSPITALIST

## 2023-04-18 PROCEDURE — 3288F PR FALLS RISK ASSESSMENT DOCUMENTED: ICD-10-PCS | Mod: HCNC,CPTII,S$GLB, | Performed by: HOSPITALIST

## 2023-04-18 PROCEDURE — 99999 PR PBB SHADOW E&M-EST. PATIENT-LVL III: CPT | Mod: PBBFAC,HCNC,, | Performed by: HOSPITALIST

## 2023-04-18 PROCEDURE — 1160F RVW MEDS BY RX/DR IN RCRD: CPT | Mod: HCNC,CPTII,S$GLB, | Performed by: HOSPITALIST

## 2023-04-18 PROCEDURE — 1159F PR MEDICATION LIST DOCUMENTED IN MEDICAL RECORD: ICD-10-PCS | Mod: HCNC,CPTII,S$GLB, | Performed by: HOSPITALIST

## 2023-04-18 NOTE — ASSESSMENT & PLAN NOTE
- JSESICASCSULMA and Molina low risk  - has had significant improvement in asthma symptoms from last month  - discussed bringing CPAP to surgery  - Cardiac pre-op visit pending

## 2023-04-18 NOTE — PROGRESS NOTES
Subjective:      Patient ID: Abdullahi Urias is a 67 y.o. male.    Chief Complaint: Pre-op clearance    Interval History 4/18/23:    Mr. Urias is seen in follow up of asthma as well as Pulmonary pre-op assessment for TLIF, I last saw him in March, at that [facility] for persistent asthma symptoms (using albuterol 3-4 times daily)    PRE OPERATIVE PULMONARY RISK ASSESSMENT:      Surgeon: Dr. Valencia  Procedure: TLIF  Procedure Date: Sometime in May  Anesthesia: General    ARISCAT Score: 3     0 to 25 points: Low risk: 1.6% pulmonary complication rate   26 to 44 points: Intermediate risk: 13.3% pulmonary complication rate   45 to 123 points: High risk: 42.1% pulmonary complication rate              Jesse Post-Operative Respiratory Risk Score: 0.1% Risk of mechanical ventilation for >48 hrs after surgery, or unplanned intubation ?30 days of surgery        Risks and possible pulmonary complications of surgery include risk of respiratory failure requiring mechanical ventilation, post operative pneumonia, post operative atelectasis, deep venous thrombosis, pulmonary embolism and death.     Their physiology is not prohibitive and there is no absolute contraindication to proposed surgery and/or anesthesia.  Would recommend albuterol 2.5 mg and Solu-Medrol 60 mg on-call to the OR as a pretreatment to maximize lung function prior to surgery.     Interim Testing:  Complete PFT 4/2023- Normal spirometry, loop consistent with restrictive pattern, bronchodilator portion not done as he used inhaler prior to exam  6MW- Remained on room air, Aristeo 1-3, walked 89% predicted  ABG - 7.36/41/85/23, done on room air  CXR 4/13- No acute findings, cardiomegaly, no effusions, no pneumo, no focal consolidation    Pulmonary interventions:   Albuterol- not having to use really since starting [facility]- has seen a big improvement  PDM- Benefited from technique education    HPI 3/7/23:  67 year old male with  history of AS s/p bioprosthetic AVR, dCHF, CKD3, HTN, CVA, CHEO ([facility]), former tobacco user, prostate cancer 2020 s/p prostatectomy s/p radiation, in remission who was referred to Pulmonary clinic by Dick Baez MD for evaluation of  asthma. Mr. Urias was admitted to the hospital 1/25-1/26 for treatment of asthma exacerbation. He was treated with IV steroids, breathing treatments, empiric antibiotics and oxygen as needed. CXR clear, SpO2 98% on room air on day of discharge. Patient reports several months of wheezing. Has been using Advair diskus twice daily and Albuterol inhaler 2-3/day. He did notice that symptoms improved when he was on a cruise last week, and worsened when back in [place].      Quit smoking 2012 (with stroke), about 30 pack years     Pertinent Work Up:  CXR 1/24/2023- Clear chest, no pneumo or effusion. Cardiomegaly.  CTA Chest 8/25/2022: Negative for PE, clear lungs  Eosinophils borderline/mildly elevated 1/2023     Pulmonary Interventions:  Albuterol HFA, using 2-3x/day  Fluticasone- salmeterol 250-50mcg twice daily, been on since end of January    Review of Systems   Respiratory:  Negative for cough, shortness of breath, wheezing and use of rescue inhaler.    Objective:     Physical Exam   Constitutional: He is oriented to person, place, and time. He appears well-developed and well-nourished. He is obese.   Cardiovascular: Normal rate and regular rhythm.   Pulmonary/Chest:   Normal resp rate at rest on room air, SpO2 99%, no wheezing and crackles   Musculoskeletal:         General: No edema.   Neurological: He is alert and oriented to person, place, and time.   Skin: Skin is warm and dry.   Personal Diagnostic Review  As Above  No flowsheet data found.     Assessment:     No diagnosis found.     Outpatient Encounter Medications as of 4/18/2023   Medication Sig Dispense Refill    albuterol (PROVENTIL/VENTOLIN HFA) 90 mcg/actuation inhaler INHALE 2 PUFFS INTO THE LUNGS EVERY 6  HOURS AS NEEDED FOR COUGH 8.5 g 3    allopurinoL (ZYLOPRIM) 100 MG tablet Take 100 mg by mouth once daily. Takes 0.5 tab      ALPRAZolam (XANAX) 1 MG tablet Take 1 mg by mouth.      aluminum & magnesium hydroxide-simethicone (MYLANTA MAX STRENGTH) 400-400-40 mg/5 mL suspension TAKE 15ML BY MOUTH FOUR TIMES A DAY AS NEEDED FOR STOMACH ACID      aspirin (ECOTRIN) 81 MG EC tablet Take 1 tablet (81 mg total) by mouth once daily. 90 tablet 3    atorvastatin (LIPITOR) 80 MG tablet TAKE ONE-HALF TABLET BY MOUTH EVERY DAY FOR CHOLESTEROL      carvediloL (COREG) 12.5 MG tablet Take 1 tablet (12.5 mg total) by mouth 2 (two) times daily. 60 tablet 11    clopidogreL (PLAVIX) 75 mg tablet Take 1 tablet (75 mg total) by mouth once daily. 30 tablet 11    diclofenac sodium (VOLTAREN) 1 % Gel APPLY 2 GRAMS TOPICALLY FOUR TIMES A DAY AS NEEDED FOR PAIN AND INFLAMMATION. USE ENCLOSED DOSING CARD.      diphenhydrAMINE (SOMINEX) 25 mg tablet Take 25 mg by mouth nightly as needed for Insomnia.      esomeprazole (NEXIUM) 40 MG capsule 40 mg.      famotidine (PEPCID) 40 MG tablet TAKE ONE TABLET BY MOUTH AT BEDTIME FOR ACID REFLUX      flunisolide 25 mcg, 0.025%, (NASALIDE) 25 mcg (0.025 %) Spry 2 sprays by Nasal route as needed.       fluticasone-umeclidin-vilanter (TRELEGY ELLIPTA) 200-62.5-25 mcg inhaler Inhale 1 puff into the lungs once daily. 60 each 1    gabapentin (NEURONTIN) 600 MG tablet Take 1 tablet (600 mg total) by mouth 3 (three) times daily. 90 tablet 11    HYDROcodone-acetaminophen (NORCO) 7.5-325 mg per tablet Take 1 tablet by mouth every 8 (eight) hours as needed for Pain. 21 tablet 0    hydrocortisone 2.5 % cream Apply topically 2 (two) times daily as needed. Apply to affected areas of the face and neck. 30 g 2    hydrocortisone-pramoxine (PROCTOFOAM-HS) rectal foam INSERT 1 APPLICATORFUL INTO RECTALLY TWICE A DAY FOR HEMORRHOIDS      hydrOXYzine HCL (ATARAX) 25 MG tablet Take 25 mg by mouth 3 (three) times daily as  needed for Itching.      LIDOcaine (LIDODERM) 5 % APPLY 1 PATCH TOPICALLY EVERY DAY FOR PAIN. WEAR FOR 12 HOURS, THEN REMOVE. DO NOT APPLY NEW PATCH FOR AT LEAST 12 HOURS.      losartan (COZAAR) 50 MG tablet Take 1 tablet (50 mg total) by mouth once daily. 30 tablet 11    methyl salicylate-menthol 15-10% 15-10 % Crea Apply topically as needed.       pantoprazole (PROTONIX) 40 MG tablet Take 40 mg by mouth 2 (two) times daily.       sertraline (ZOLOFT) 100 MG tablet TAKE TWO TABLETS BY MOUTH EVERY DAY FOR MENTAL HEALTH DOSE INCREASED TO 200MG/DAY      simethicone (MYLICON) 80 MG chewable tablet Take 80 mg by mouth every 6 (six) hours as needed for Flatulence.      sucralfate (CARAFATE) 100 mg/mL suspension Take 1 g by mouth 4 (four) times daily.       triamcinolone acetonide 0.1% (KENALOG) 0.1 % cream Apply topically 2 (two) times daily. 80 g 1    [DISCONTINUED] baclofen (LIORESAL) 10 MG tablet Take 1 tablet (10 mg total) by mouth 3 (three) times daily. (Patient not taking: Reported on 4/13/2023) 90 tablet 11    [DISCONTINUED] fexofenadine (ALLEGRA) 180 MG tablet Take 1 tablet (180 mg total) by mouth daily as needed. (Patient not taking: Reported on 4/13/2023) 30 tablet 5    [DISCONTINUED] sertraline (ZOLOFT) 100 MG tablet 200 mg.       Facility-Administered Encounter Medications as of 4/18/2023   Medication Dose Route Frequency Provider Last Rate Last Admin    ondansetron injection 4 mg  4 mg Intravenous Once PRN Nehemiah Carmen MD        sodium chloride 0.9% flush 10 mL  10 mL Intravenous PRN Wilda Horne MD         No orders of the defined types were placed in this encounter.        Plan:     Problem List Items Addressed This Visit          Pulmonary    Moderate persistent asthma without complication - Primary     - Much improved from last visit  - Continue Trelegy, albuterol as needed (not having to use)  - PFTs without obstruction, bronchodilator response not performed             Encounter for  preoperative pulmonary examination     - ARISCAT and Jesse low risk  - has had significant improvement in asthma symptoms from last month  - discussed bringing CPAP to surgery  - Cardiac pre-op visit pending              Other    CHEO on CPAP     - [facility]  - instructed to bring CPAP to surgery, as well as to use during the day if napping especially while taking narcotics              Pt on AutoPAP.  Patients with known or suspected obstructive sleep apnea should be monitored in the postanesthesia care unit, with particular attention to oxygenation and ventilation.Continuous pulse oximetry should be monitored until the patient is able to maintain adequate oxygenation on room air when left unstimulated.  When not contraindicated by surgical considerations, postoperative patients with CHEO should be cared for in the lateral or semi-upright position, rather than the supine position.    Attempt to minimize the use of systemic opioids with patients with CHEO.  Patient will bring CPAP with them to use in the postoperative period      In patients with CHEO, disturbance in sleep architecture is greatest on postoperative night 1; however, breathing disturbances are greatest on postoperative night 3 and may not normalize for several more nights. Patients using CPAP therapy should be instructed to consistently use CPAP during this period whenever sleep is likely, including the daytime.       Plan discussed with patient and he expressed understanding, all questions answered. RTC in 6 months for follow up Asthma. No Pulmonary contraindications to back surgery.

## 2023-04-18 NOTE — ASSESSMENT & PLAN NOTE
- [facility]  - instructed to bring CPAP to surgery, as well as to use during the day if napping especially while taking narcotics

## 2023-04-18 NOTE — ASSESSMENT & PLAN NOTE
- Much improved from last visit  - Continue Trelegy, albuterol as needed (not having to use)  - PFTs without obstruction, bronchodilator response not performed

## 2023-04-24 ENCOUNTER — OFFICE VISIT (OUTPATIENT)
Dept: CARDIOLOGY | Facility: CLINIC | Age: 68
End: 2023-04-24
Payer: MEDICARE

## 2023-04-24 ENCOUNTER — HOSPITAL ENCOUNTER (OUTPATIENT)
Dept: CARDIOLOGY | Facility: HOSPITAL | Age: 68
Discharge: HOME | End: 2023-04-24
Attending: INTERNAL MEDICINE
Payer: MEDICARE

## 2023-04-24 VITALS
OXYGEN SATURATION: 96 % | DIASTOLIC BLOOD PRESSURE: 74 MMHG | WEIGHT: 249.81 LBS | BODY MASS INDEX: 37.86 KG/M2 | HEART RATE: 75 BPM | SYSTOLIC BLOOD PRESSURE: 118 MMHG | HEIGHT: 68 IN

## 2023-04-24 DIAGNOSIS — I10 PRIMARY HYPERTENSION: Chronic | ICD-10-CM

## 2023-04-24 DIAGNOSIS — I25.10 CORONARY ARTERY DISEASE INVOLVING NATIVE CORONARY ARTERY OF NATIVE HEART WITHOUT ANGINA PECTORIS: Chronic | ICD-10-CM

## 2023-04-24 DIAGNOSIS — Z01.810 PREOP CARDIOVASCULAR EXAM: Primary | ICD-10-CM

## 2023-04-24 DIAGNOSIS — I10 HYPERTENSION, UNSPECIFIED TYPE: Chronic | ICD-10-CM

## 2023-04-24 DIAGNOSIS — Z95.2 S/P AVR (AORTIC VALVE REPLACEMENT): ICD-10-CM

## 2023-04-24 DIAGNOSIS — I70.0 AORTO-ILIAC ATHEROSCLEROSIS: ICD-10-CM

## 2023-04-24 DIAGNOSIS — I35.0 AORTIC VALVE STENOSIS, ETIOLOGY OF CARDIAC VALVE DISEASE UNSPECIFIED: ICD-10-CM

## 2023-04-24 DIAGNOSIS — Z86.73 HISTORY OF CVA IN ADULTHOOD: ICD-10-CM

## 2023-04-24 DIAGNOSIS — I70.8 AORTO-ILIAC ATHEROSCLEROSIS: ICD-10-CM

## 2023-04-24 DIAGNOSIS — I73.9 PAD (PERIPHERAL ARTERY DISEASE): ICD-10-CM

## 2023-04-24 DIAGNOSIS — E66.01 CLASS 2 SEVERE OBESITY DUE TO EXCESS CALORIES WITH SERIOUS COMORBIDITY AND BODY MASS INDEX (BMI) OF 38.0 TO 38.9 IN ADULT: ICD-10-CM

## 2023-04-24 PROCEDURE — 99999 PR PBB SHADOW E&M-EST. PATIENT-LVL V: CPT | Mod: PBBFAC,HCNC,, | Performed by: INTERNAL MEDICINE

## 2023-04-24 PROCEDURE — 99214 PR OFFICE/OUTPT VISIT, EST, LEVL IV, 30-39 MIN: ICD-10-PCS | Mod: HCNC,S$GLB,, | Performed by: INTERNAL MEDICINE

## 2023-04-24 PROCEDURE — 1126F PR PAIN SEVERITY QUANTIFIED, NO PAIN PRESENT: ICD-10-PCS | Mod: HCNC,CPTII,S$GLB, | Performed by: INTERNAL MEDICINE

## 2023-04-24 PROCEDURE — 3078F PR MOST RECENT DIASTOLIC BLOOD PRESSURE < 80 MM HG: ICD-10-PCS | Mod: HCNC,CPTII,S$GLB, | Performed by: INTERNAL MEDICINE

## 2023-04-24 PROCEDURE — 1159F PR MEDICATION LIST DOCUMENTED IN MEDICAL RECORD: ICD-10-PCS | Mod: HCNC,CPTII,S$GLB, | Performed by: INTERNAL MEDICINE

## 2023-04-24 PROCEDURE — 3008F BODY MASS INDEX DOCD: CPT | Mod: HCNC,CPTII,S$GLB, | Performed by: INTERNAL MEDICINE

## 2023-04-24 PROCEDURE — 3008F PR BODY MASS INDEX (BMI) DOCUMENTED: ICD-10-PCS | Mod: HCNC,CPTII,S$GLB, | Performed by: INTERNAL MEDICINE

## 2023-04-24 PROCEDURE — 3288F FALL RISK ASSESSMENT DOCD: CPT | Mod: HCNC,CPTII,S$GLB, | Performed by: INTERNAL MEDICINE

## 2023-04-24 PROCEDURE — 93005 ELECTROCARDIOGRAM TRACING: CPT | Mod: HCNC

## 2023-04-24 PROCEDURE — 3078F DIAST BP <80 MM HG: CPT | Mod: HCNC,CPTII,S$GLB, | Performed by: INTERNAL MEDICINE

## 2023-04-24 PROCEDURE — 1160F RVW MEDS BY RX/DR IN RCRD: CPT | Mod: HCNC,CPTII,S$GLB, | Performed by: INTERNAL MEDICINE

## 2023-04-24 PROCEDURE — 3074F SYST BP LT 130 MM HG: CPT | Mod: HCNC,CPTII,S$GLB, | Performed by: INTERNAL MEDICINE

## 2023-04-24 PROCEDURE — 99999 PR PBB SHADOW E&M-EST. PATIENT-LVL V: ICD-10-PCS | Mod: PBBFAC,HCNC,, | Performed by: INTERNAL MEDICINE

## 2023-04-24 PROCEDURE — 1101F PR PT FALLS ASSESS DOC 0-1 FALLS W/OUT INJ PAST YR: ICD-10-PCS | Mod: HCNC,CPTII,S$GLB, | Performed by: INTERNAL MEDICINE

## 2023-04-24 PROCEDURE — 3288F PR FALLS RISK ASSESSMENT DOCUMENTED: ICD-10-PCS | Mod: HCNC,CPTII,S$GLB, | Performed by: INTERNAL MEDICINE

## 2023-04-24 PROCEDURE — 3074F PR MOST RECENT SYSTOLIC BLOOD PRESSURE < 130 MM HG: ICD-10-PCS | Mod: HCNC,CPTII,S$GLB, | Performed by: INTERNAL MEDICINE

## 2023-04-24 PROCEDURE — 99214 OFFICE O/P EST MOD 30 MIN: CPT | Mod: HCNC,S$GLB,, | Performed by: INTERNAL MEDICINE

## 2023-04-24 PROCEDURE — 1101F PT FALLS ASSESS-DOCD LE1/YR: CPT | Mod: HCNC,CPTII,S$GLB, | Performed by: INTERNAL MEDICINE

## 2023-04-24 PROCEDURE — 1126F AMNT PAIN NOTED NONE PRSNT: CPT | Mod: HCNC,CPTII,S$GLB, | Performed by: INTERNAL MEDICINE

## 2023-04-24 PROCEDURE — 1160F PR REVIEW ALL MEDS BY PRESCRIBER/CLIN PHARMACIST DOCUMENTED: ICD-10-PCS | Mod: HCNC,CPTII,S$GLB, | Performed by: INTERNAL MEDICINE

## 2023-04-24 PROCEDURE — 1159F MED LIST DOCD IN RCRD: CPT | Mod: HCNC,CPTII,S$GLB, | Performed by: INTERNAL MEDICINE

## 2023-04-24 PROCEDURE — 93010 EKG 12-LEAD: ICD-10-PCS | Mod: HCNC,,, | Performed by: INTERNAL MEDICINE

## 2023-04-24 PROCEDURE — 93010 ELECTROCARDIOGRAM REPORT: CPT | Mod: HCNC,,, | Performed by: INTERNAL MEDICINE

## 2023-04-24 NOTE — PROGRESS NOTES
Subjective:   Patient ID:  Abdullahi Urias is a 67 y.o. male who presents for follow up of No chief complaint on file.      65 yo male, came in for preop cleaarnce  [facility] s/p bioprosthetic AVR at [facility] in 2014 Salazar [id number] 25 mm pericardial tissue valve, nonobstructive CAD s/p LHC in  nonobstructive, 20% midLAD and RCA by Dr. quiroz, h/o stroke in 2012, HTN, HLD CHEO on CPAP remote h/o cocaine in 2012, quit drinking in 2012 and no smoking. H/o prostate cancer in 2018,   PAD on plavix allergic to pletal and  leg pain from neuropathy.  MPI in 2012 small apical infarct  ekg in 2018 NSR LVH with 2nd stt change  BNP in 2016 was 37  ECHO in  normal EF, perivalvular AI and s/p AVR mean G 16 mmHG and peak V 2.85    07/2021 visit  Limited exercise due to hip pain. C/o arm numbness. Sweating at rest.    Fin[facility] XRT for prostate cancer and [facility] [place] [facility]  BIRD when climbing the stairs over 6 months, and episode of syncope after got out the bed. Woke up quickly  V/Q scan in  low risk for PE and d dimer 1.8     visit  H/O COVID 19 infection on 12/30/2021 and had AB infusion at [facility].   Hands and legs numbness for few months. Occasional chest discomfort and ingestion. Lower back pain.   Some calf pain and left foot swelling. The burning sensation worse below the knee. Some mild numbness and pain at thigh, L> R. occred at rest and with exertion.      visit  eval by Dr Kamara at vascular medicine clinic in  and r/o PAD related leg pain. Had uncomplicated CA travel.   Leg pain controlled and f/u at pain clinic  Plan to have dental work. No chest pain. SOB chronic stable. A lot of GERD.   On plavix for PAD and allergic to pletal     visit  The leg pain controlled now, and f/u at pain clinic on steroid injection  Some GERD burning. No SOB palpitation dizziness and leg swelling. Some exercise aerobic daily and gym work   No  smoking/drinking now. LDL 70    Interval history  Plan to have lumbar spinal procedure done by Dr. Valencia.  H/o nonobstructive CAD and s/p bioprosthetic AVR in 2014  Repeat echo on  EF nl, s/p AVR functioning well  SOB improved after trilicity rx  No chest pain dizziness palpitation and leg swelling. No active bleeding   Ekg today NSR TWI on alli inferior leads          Past Medical History:   Diagnosis Date    Aortic stenosis     [facility]    Asthma     BPH (benign prostatic hyperplasia)     CAD (coronary artery disease)     Cardiomyopathy     CHF (congestive heart failure)     Chronic hoarseness     vocal cord surg    Chronic pain     CKD (chronic kidney disease) stage 3, GFR 30-59 ml/min     CVA (cerebral infarction)     8/2012 olol; reviewed ed note    Ex-smoker     GERD (gastroesophageal reflux disease)     Hepatitis C     treatedharvoni says cured, RNA NEG 6/2020    Hypertension     Pancreatitis     Prostate cancer     Prostate cancer     Prostate cancer     PVD (peripheral vascular disease)     Renal insufficiency     Substance abuse     cocaine, etoh , tob in past       Past Surgical History:   Procedure Laterality Date    AORTIC VALVE REPLACEMENT  05/19/2014    Tissue valve replacement    BACK SURGERY      CARDIAC CATHETERIZATION      COLONOSCOPY  2011    COLONOSCOPY N/A 2/24/2021    Procedure: COLONOSCOPY;  Surgeon: Faith Carrillo MD;  Location: [facility] ENDO;  Service: Endoscopy;  Laterality: N/A;    EPIDURAL STEROID INJECTION INTO CERVICAL SPINE N/A 6/21/2022    Procedure: C7-T1 IL PIEDAD;  Surgeon: Nehemiah Carmen MD;  Location: [facility] PAIN MGT;  Service: Pain Management;  Laterality: N/A;    ESOPHAGOGASTRODUODENOSCOPY N/A 2/24/2021    Procedure: ESOPHAGOGASTRODUODENOSCOPY (EGD);  Surgeon: Faith Carrillo MD;  Location: [facility] ENDO;  Service: Endoscopy;  Laterality: N/A;    FOOT SURGERY      LEFT HEART CATHETERIZATION Left 7/24/2020    Procedure: CATHETERIZATION, HEART, LEFT;   "Surgeon: Pasha Martin MD;  Location: [facility] CATH LAB;  Service: Cardiology;  Laterality: Left;    PENILE PROSTHESIS IMPLANT      PENILE PROSTHESIS REVISION  04/30/2018    PROSTATECTOMY  09/2019    [facility]    radiation for prostate      TRANSFORAMINAL EPIDURAL INJECTION OF STEROID Right 10/20/2022    Procedure: Right  L4/5 + L5/S1 TF PIEDAD RN IV Sedation;  Surgeon: Nehemiah Carmen MD;  Location: [facility] PAIN MGT;  Service: Pain Management;  Laterality: Right;    UPPER GASTROINTESTINAL ENDOSCOPY  2011       Social History     Tobacco Use    Smoking status: Former     Packs/day: 2.00     Years: 8.00     Pack years: 16.00     Types: Cigarettes     Quit date: 8/24/2012     Years since quitting: 10.6    Smokeless tobacco: Never   Substance Use Topics    Alcohol use: No     Comment: Sober since 08/24/2012    Drug use: Not Currently     Types: "Crack" cocaine, Marijuana     Comment: Quit in 2012       Family History   Problem Relation Age of Onset    Heart disease Mother     Heart disease Father     Heart attack Sister     Heart attack Brother     Colon cancer Neg Hx     Colon polyps Neg Hx     Liver cancer Neg Hx     Inflammatory bowel disease Neg Hx     Liver disease Neg Hx     Rectal cancer Neg Hx     Stomach cancer Neg Hx     Ulcerative colitis Neg Hx          ROS    Objective:   Physical Exam  HENT:      Head: Normocephalic.   Eyes:      Pupils: Pupils are equal, round, and reactive to light.   Neck:      Thyroid: No thyromegaly.      Vascular: Normal carotid pulses. No carotid bruit or JVD.   Cardiovascular:      Rate and Rhythm: Normal rate and regular rhythm. No extrasystoles are present.     Chest Wall: PMI is not displaced.      Pulses: Normal pulses.      Heart sounds: Murmur (ESM + on RUSB ) heard.     No gallop. No S3 sounds.   Pulmonary:      Effort: No respiratory distress.      Breath sounds: Normal breath sounds. No stridor.   Abdominal:      General: Bowel sounds are normal.      Palpations: Abdomen " is soft.      Tenderness: There is no abdominal tenderness. There is no rebound.   Musculoskeletal:         General: Normal range of motion.   Skin:     Findings: No rash.   Neurological:      Mental Status: He is alert and oriented to person, place, and time.   Psychiatric:         Behavior: Behavior normal.       Lab Results   Component Value Date    CHOL 123 01/18/2022    CHOL 134 10/22/2021    CHOL 185 01/05/2021     Lab Results   Component Value Date    HDL 39 (L) 01/18/2022    HDL 44 10/22/2021    HDL 38 (L) 01/05/2021     Lab Results   Component Value Date    LDLCALC 72.6 01/18/2022    LDLCALC 78.8 10/22/2021    LDLCALC 127.2 01/05/2021     Lab Results   Component Value Date    TRIG 57 01/18/2022    TRIG 56 10/22/2021    TRIG 99 01/05/2021     Lab Results   Component Value Date    CHOLHDL 31.7 01/18/2022    CHOLHDL 32.8 10/22/2021    CHOLHDL 20.5 01/05/2021       Chemistry        Component Value Date/Time     04/13/2023 1551    K 4.3 04/13/2023 1551     04/13/2023 1551    CO2 25 04/13/2023 1551    BUN 15 04/13/2023 1551    CREATININE 1.5 (H) 04/13/2023 1551    GLU 91 04/13/2023 1551        Component Value Date/Time    CALCIUM 9.3 04/13/2023 1551    ALKPHOS 101 04/13/2023 1551    AST 24 04/13/2023 1551    ALT 17 04/13/2023 1551    BILITOT 0.3 04/13/2023 1551    ESTGFRAFRICA 51.1 (A) 06/02/2022 1246    EGFRNONAA 44.2 (A) 06/02/2022 1246          Lab Results   Component Value Date    HGBA1C 5.2 10/23/2007     Lab Results   Component Value Date    TSH 3.370 06/30/2020     Lab Results   Component Value Date    INR 1.0 04/13/2023    INR 1.0 01/24/2023    INR 1.0 08/25/2022     Lab Results   Component Value Date    WBC 3.89 (L) 04/13/2023    HGB 12.9 (L) 04/13/2023    HCT 39.9 (L) 04/13/2023    MCV 87 04/13/2023     04/13/2023     BMP  Sodium   Date Value Ref Range Status   04/13/2023 140 136 - 145 mmol/L Final     Potassium   Date Value Ref Range Status   04/13/2023 4.3 3.5 - 5.1 mmol/L Final      Chloride   Date Value Ref Range Status   04/13/2023 109 95 - 110 mmol/L Final     CO2   Date Value Ref Range Status   04/13/2023 25 23 - 29 mmol/L Final     BUN   Date Value Ref Range Status   04/13/2023 15 8 - 23 mg/dL Final     Creatinine   Date Value Ref Range Status   04/13/2023 1.5 (H) 0.5 - 1.4 mg/dL Final     Calcium   Date Value Ref Range Status   04/13/2023 9.3 8.7 - 10.5 mg/dL Final     Anion Gap   Date Value Ref Range Status   04/13/2023 6 (L) 8 - 16 mmol/L Final     eGFR if    Date Value Ref Range Status   06/02/2022 51.1 (A) >60 mL/min/1.73 m^2 Final     eGFR if non    Date Value Ref Range Status   06/02/2022 44.2 (A) >60 mL/min/1.73 m^2 Final     Comment:     Calculation used to obtain the estimated glomerular filtration  rate (eGFR) is the CKD-EPI equation.        BNP  @LABRCNTIP(BNP,BNPTRIAGEBLO)@  @LABRCNTIP(troponini)@  CrCl cannot be calculated (Patient's most recent lab result is older than the maximum 7 days allowed.).  No results found in the last 24 hours.  No results found in the last 24 hours.  No results found in the last 24 hours.    Assessment:      1. Preop cardiovascular exam    2. History of CVA in adulthood    3. Primary hypertension    4. Coronary artery disease involving native coronary artery of native heart without angina pectoris    5. S/P AVR (aortic valve replacement) biopresthetic miller 25 mm in 2014     6. PAD (peripheral artery disease)    7. Aorto-iliac atherosclerosis    8. Class 2 severe obesity due to excess calories with serious comorbidity and body mass index (BMI) of 38.0 to 38.9 in adult        Plan:     Elevated periop risk of CV events for non-high risk procedure.  Good functional and exercise capacity.  No chest pain, active arrhythmia and CHF symptoms.  Ok to proceed the scheduled surgery without further cardiac study.  OK to hold Aspirin and Plavix 10 days before the procedure and resume ASAP postop.       Continue ASA  Plavix (for PAD and allergic to pletal) Lipitor Coreg and Losartan  Encourage exercise  Counseled DASH  Check Lipid profile with PCP in 6 months  Recommend heart-healthy diet, weight control   Wilbur. Risk modification.   I have reviewed all pertinent labs and cardiac studies independently. Plans and recommendations have been formulated under my direct supervision. All questions answered and patient voiced understanding.   If symptoms persist go to the ED  RTC in 6 months

## 2023-04-27 RX ORDER — HYDROCODONE BITARTRATE AND ACETAMINOPHEN 7.5; 325 MG/1; MG/1
1 TABLET ORAL EVERY 8 HOURS PRN
Qty: 21 TABLET | Refills: 0 | Status: SHIPPED | OUTPATIENT
Start: 2023-04-27 | End: 2023-05-12 | Stop reason: SDUPTHER

## 2023-05-01 ENCOUNTER — OFFICE VISIT (OUTPATIENT)
Dept: INTERNAL MEDICINE | Facility: CLINIC | Age: 68
End: 2023-05-01
Payer: MEDICARE

## 2023-05-01 VITALS
OXYGEN SATURATION: 100 % | HEIGHT: 68 IN | BODY MASS INDEX: 38.46 KG/M2 | HEART RATE: 90 BPM | DIASTOLIC BLOOD PRESSURE: 72 MMHG | WEIGHT: 253.75 LBS | TEMPERATURE: 97 F | SYSTOLIC BLOOD PRESSURE: 112 MMHG

## 2023-05-01 DIAGNOSIS — C61 PROSTATE CANCER: ICD-10-CM

## 2023-05-01 DIAGNOSIS — I10 PRIMARY HYPERTENSION: Chronic | ICD-10-CM

## 2023-05-01 DIAGNOSIS — M48.00 SPINAL STENOSIS, UNSPECIFIED SPINAL REGION: Primary | ICD-10-CM

## 2023-05-01 DIAGNOSIS — N18.30 STAGE 3 CHRONIC KIDNEY DISEASE, UNSPECIFIED WHETHER STAGE 3A OR 3B CKD: ICD-10-CM

## 2023-05-01 DIAGNOSIS — G47.33 OSA ON CPAP: ICD-10-CM

## 2023-05-01 DIAGNOSIS — Z86.73 HISTORY OF CVA IN ADULTHOOD: ICD-10-CM

## 2023-05-01 DIAGNOSIS — I25.10 CORONARY ARTERY DISEASE INVOLVING NATIVE CORONARY ARTERY OF NATIVE HEART WITHOUT ANGINA PECTORIS: Chronic | ICD-10-CM

## 2023-05-01 PROBLEM — U07.1 COVID: Status: RESOLVED | Noted: 2022-08-25 | Resolved: 2023-05-01

## 2023-05-01 PROCEDURE — 3078F DIAST BP <80 MM HG: CPT | Mod: HCNC,CPTII,S$GLB, | Performed by: FAMILY MEDICINE

## 2023-05-01 PROCEDURE — 1125F AMNT PAIN NOTED PAIN PRSNT: CPT | Mod: HCNC,CPTII,S$GLB, | Performed by: FAMILY MEDICINE

## 2023-05-01 PROCEDURE — 99999 PR PBB SHADOW E&M-EST. PATIENT-LVL V: ICD-10-PCS | Mod: PBBFAC,HCNC,, | Performed by: FAMILY MEDICINE

## 2023-05-01 PROCEDURE — 3288F FALL RISK ASSESSMENT DOCD: CPT | Mod: HCNC,CPTII,S$GLB, | Performed by: FAMILY MEDICINE

## 2023-05-01 PROCEDURE — 1101F PT FALLS ASSESS-DOCD LE1/YR: CPT | Mod: HCNC,CPTII,S$GLB, | Performed by: FAMILY MEDICINE

## 2023-05-01 PROCEDURE — 1101F PR PT FALLS ASSESS DOC 0-1 FALLS W/OUT INJ PAST YR: ICD-10-PCS | Mod: HCNC,CPTII,S$GLB, | Performed by: FAMILY MEDICINE

## 2023-05-01 PROCEDURE — 3074F PR MOST RECENT SYSTOLIC BLOOD PRESSURE < 130 MM HG: ICD-10-PCS | Mod: HCNC,CPTII,S$GLB, | Performed by: FAMILY MEDICINE

## 2023-05-01 PROCEDURE — 99214 OFFICE O/P EST MOD 30 MIN: CPT | Mod: HCNC,S$GLB,, | Performed by: FAMILY MEDICINE

## 2023-05-01 PROCEDURE — 3008F PR BODY MASS INDEX (BMI) DOCUMENTED: ICD-10-PCS | Mod: HCNC,CPTII,S$GLB, | Performed by: FAMILY MEDICINE

## 2023-05-01 PROCEDURE — 3288F PR FALLS RISK ASSESSMENT DOCUMENTED: ICD-10-PCS | Mod: HCNC,CPTII,S$GLB, | Performed by: FAMILY MEDICINE

## 2023-05-01 PROCEDURE — 3074F SYST BP LT 130 MM HG: CPT | Mod: HCNC,CPTII,S$GLB, | Performed by: FAMILY MEDICINE

## 2023-05-01 PROCEDURE — 1159F PR MEDICATION LIST DOCUMENTED IN MEDICAL RECORD: ICD-10-PCS | Mod: HCNC,CPTII,S$GLB, | Performed by: FAMILY MEDICINE

## 2023-05-01 PROCEDURE — 99214 PR OFFICE/OUTPT VISIT, EST, LEVL IV, 30-39 MIN: ICD-10-PCS | Mod: HCNC,S$GLB,, | Performed by: FAMILY MEDICINE

## 2023-05-01 PROCEDURE — 3008F BODY MASS INDEX DOCD: CPT | Mod: HCNC,CPTII,S$GLB, | Performed by: FAMILY MEDICINE

## 2023-05-01 PROCEDURE — 1159F MED LIST DOCD IN RCRD: CPT | Mod: HCNC,CPTII,S$GLB, | Performed by: FAMILY MEDICINE

## 2023-05-01 PROCEDURE — 1125F PR PAIN SEVERITY QUANTIFIED, PAIN PRESENT: ICD-10-PCS | Mod: HCNC,CPTII,S$GLB, | Performed by: FAMILY MEDICINE

## 2023-05-01 PROCEDURE — 99999 PR PBB SHADOW E&M-EST. PATIENT-LVL V: CPT | Mod: PBBFAC,HCNC,, | Performed by: FAMILY MEDICINE

## 2023-05-01 PROCEDURE — 3078F PR MOST RECENT DIASTOLIC BLOOD PRESSURE < 80 MM HG: ICD-10-PCS | Mod: HCNC,CPTII,S$GLB, | Performed by: FAMILY MEDICINE

## 2023-05-01 NOTE — PROGRESS NOTES
Subjective:      Patient ID: Abdullahi Urias is a 67 y.o. male.    Chief Complaint: Pre-op Exam    HPI cleared by card and pulm for may 15 neurosurg spinal sten  No c.o    Past Medical History:   Diagnosis Date    Aortic stenosis     [facility]    Asthma     BPH (benign prostatic hyperplasia)     CAD (coronary artery disease)     Cardiomyopathy     CHF (congestive heart failure)     Chronic hoarseness     vocal cord surg    Chronic pain     CKD (chronic kidney disease) stage 3, GFR 30-59 ml/min     CVA (cerebral infarction)     8/2012 olol; reviewed ed note    Ex-smoker     GERD (gastroesophageal reflux disease)     Hepatitis C     treatedharvoni says cured, RNA NEG 6/2020    Hypertension     Pancreatitis     Prostate cancer     Prostate cancer     Prostate cancer     PVD (peripheral vascular disease)     Renal insufficiency     Substance abuse     cocaine, etoh , tob in past      Past Surgical History:   Procedure Laterality Date    AORTIC VALVE REPLACEMENT  05/19/2014    Tissue valve replacement    BACK SURGERY      CARDIAC CATHETERIZATION      COLONOSCOPY  2011    COLONOSCOPY N/A 2/24/2021    Procedure: COLONOSCOPY;  Surgeon: Faith Carrillo MD;  Location: [facility] ENDO;  Service: Endoscopy;  Laterality: N/A;    EPIDURAL STEROID INJECTION INTO CERVICAL SPINE N/A 6/21/2022    Procedure: C7-T1 IL PIEDAD;  Surgeon: Nehemiah Carmen MD;  Location: [facility] PAIN MGT;  Service: Pain Management;  Laterality: N/A;    ESOPHAGOGASTRODUODENOSCOPY N/A 2/24/2021    Procedure: ESOPHAGOGASTRODUODENOSCOPY (EGD);  Surgeon: Faith Carrillo MD;  Location: [facility] ENDO;  Service: Endoscopy;  Laterality: N/A;    FOOT SURGERY      LEFT HEART CATHETERIZATION Left 7/24/2020    Procedure: CATHETERIZATION, HEART, LEFT;  Surgeon: Pasha Martin MD;  Location: [facility] CATH LAB;  Service: Cardiology;  Laterality: Left;    PENILE PROSTHESIS IMPLANT      PENILE PROSTHESIS REVISION  04/30/2018    PROSTATECTOMY  09/[facility]    [facility]     "radiation for prostate      TRANSFORAMINAL EPIDURAL INJECTION OF STEROID Right 10/20/2022    Procedure: Right  L4/5 + L5/S1 TF PIEDAD RN IV Sedation;  Surgeon: Nehemiah Carmen MD;  Location: [facility] PAIN MGT;  Service: Pain Management;  Laterality: Right;    UPPER GASTROINTESTINAL ENDOSCOPY  2011      Social History     Socioeconomic History    Marital status:    Tobacco Use    Smoking status: Former     Packs/day: 2.00     Years: 8.00     Pack years: 16.00     Types: Cigarettes     Quit date: 8/24/2012     Years since quitting: 10.6    Smokeless tobacco: Never   Substance and Sexual Activity    Alcohol use: No     Comment: Sober since 08/24/2012    Drug use: Not Currently     Types: "Crack" cocaine, Marijuana     Comment: Quit in 2012    Sexual activity: Yes   Social History Narrative    No pets in household, wife and daughter smokers. Former [employer].    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     Social Determinants of Health     Financial Resource Strain: Low Risk     Difficulty of Paying Living Expenses: Not hard at all   Food Insecurity: No Food Insecurity    Worried About Running Out of Food in the Last Year: Never true    Ran Out of Food in the Last Year: Never true   Transportation Needs: No Transportation Needs    Lack of Transportation (Medical): No    Lack of Transportation (Non-Medical): No   Physical Activity: Inactive    Days of Exercise per Week: 0 days    Minutes of Exercise per Session: 0 min   Stress: Stress Concern Present    Feeling of Stress : Very much   Social Connections: Moderately Integrated    Frequency of Communication with Friends and Family: Three times a week    Frequency of Social Gatherings with Friends and Family: Once a week    Attends Amish Services: 1 to 4 times per year    Active Member of Clubs or Organizations: No    Attends Club or Organization Meetings: Never    Marital Status:    Housing Stability: Low Risk     Unable to Pay for Housing " in the Last Year: No    Number of Places Lived in the Last Year: 1    Unstable Housing in the Last Year: No      Family History   Problem Relation Age of Onset    Heart disease Mother     Heart disease Father     Heart attack Sister     Heart attack Brother     Colon cancer Neg Hx     Colon polyps Neg Hx     Liver cancer Neg Hx     Inflammatory bowel disease Neg Hx     Liver disease Neg Hx     Rectal cancer Neg Hx     Stomach cancer Neg Hx     Ulcerative colitis Neg Hx       Review of Systems  Cardiovascular: no chest pain  Chest: no shortness of breath  Abd: no abd pain  Remainder review of systems negative        Objective:     Physical Exam  Vitals and nursing note reviewed.   Constitutional:       Appearance: He is well-developed.   HENT:      Head: Normocephalic and atraumatic.      Right Ear: External ear normal.      Left Ear: External ear normal.   Eyes:      General: No scleral icterus.     Conjunctiva/sclera: Conjunctivae normal.      Pupils: Pupils are equal, round, and reactive to light.   Neck:      Vascular: No carotid bruit.   Cardiovascular:      Rate and Rhythm: Normal rate and regular rhythm.      Heart sounds: Normal heart sounds. No murmur heard.    No friction rub. No gallop.   Pulmonary:      Effort: Pulmonary effort is normal.      Breath sounds: Normal breath sounds. No wheezing.   Abdominal:      General: Bowel sounds are normal. There is no distension.      Palpations: Abdomen is soft. There is no mass.      Tenderness: There is no abdominal tenderness. There is no guarding or rebound.   Musculoskeletal:         General: No tenderness. Normal range of motion.      Cervical back: Normal range of motion and neck supple.   Lymphadenopathy:      Cervical: No cervical adenopathy.   Skin:     General: Skin is warm and dry.      Findings: No erythema or rash.   Neurological:      Mental Status: He is alert and oriented to person, place, and time.      Cranial Nerves: No cranial nerve deficit.       Coordination: Coordination normal.   Psychiatric:         Behavior: Behavior normal.         Thought Content: Thought content normal.         Judgment: Judgment normal.   Preop lab,cxr  ekg ok for surg  Assessment:         ICD-10-CM ICD-9-CM   1. Spinal stenosis, unspecified spinal region  M48.00 724.00   2. Stage 3 chronic kidney disease, unspecified whether stage 3a or 3b CKD  N18.30 585.3   3. History of CVA in adulthood  Z86.73 V12.54   4. Prostate cancer  C61 185   5. CHEO on CPAP  G47.33 327.23    Z99.89 V46.8   6. Coronary artery disease involving native coronary artery of native heart without angina pectoris  I25.10 414.01   7. Primary hypertension  I10 401.9      Plan:        1. Spinal stenosis, unspecified spinal region    2. Stage 3 chronic kidney disease, unspecified whether stage 3a or 3b CKD    3. History of CVA in adulthood    4. Prostate cancer    5. CHEO on CPAP    6. Coronary artery disease involving native coronary artery of native heart without angina pectoris    7. Primary hypertension     Post cheo precaut    He is cleared for surgery with postop sleep apnea precautions;msg send to dr jean staff    See prior note at 1/24 f/u

## 2023-05-02 ENCOUNTER — TELEPHONE (OUTPATIENT)
Dept: NEUROSURGERY | Facility: CLINIC | Age: 68
End: 2023-05-02
Payer: MEDICARE

## 2023-05-02 NOTE — TELEPHONE ENCOUNTER
I tried calling the pt and was unsuccessful.. LVM      ----- Message from Kerry Taylor sent at 5/2/2023  4:05 PM CDT -----  Contact: [phone]  Please call patient back at [phone].thanks

## 2023-05-08 DIAGNOSIS — M51.36 DEGENERATIVE DISC DISEASE, LUMBAR: ICD-10-CM

## 2023-05-08 DIAGNOSIS — M48.062 LUMBAR STENOSIS WITH NEUROGENIC CLAUDICATION: Primary | ICD-10-CM

## 2023-05-08 DIAGNOSIS — M54.16 LUMBAR RADICULOPATHY, CHRONIC: ICD-10-CM

## 2023-05-08 DIAGNOSIS — M71.38 SYNOVIAL CYST OF LUMBAR SPINE: ICD-10-CM

## 2023-05-10 ENCOUNTER — OUTPATIENT CASE MANAGEMENT (OUTPATIENT)
Dept: ADMINISTRATIVE | Facility: OTHER | Age: 68
End: 2023-05-10
Payer: MEDICARE

## 2023-05-10 DIAGNOSIS — G89.29 CHRONIC BACK PAIN, UNSPECIFIED BACK LOCATION, UNSPECIFIED BACK PAIN LATERALITY: Primary | ICD-10-CM

## 2023-05-10 DIAGNOSIS — M54.9 CHRONIC BACK PAIN, UNSPECIFIED BACK LOCATION, UNSPECIFIED BACK PAIN LATERALITY: Primary | ICD-10-CM

## 2023-05-10 NOTE — LETTER
Abdullahi Urias  [facility] [facility] [address]    Dear Abdullahi Urias,     Welcome to [facility]s Outpatient Care Management Program. We are here to assist patients with multiple long-term (chronic) conditions who often need more personalized healthcare.    It was a pleasure talking with you today. My name is Yung Ramos RN. I look forward to working with you as your Care Manager. I will be contacting you by telephone routinely to help coordinate care and resolve issues.    My goal is to help you function at the healthiest and highest level possible. You can contact me directly at [phone].    As an [facility] patient with [employer], some of the services we provide, at no cost to you, include:      Development of an individualized care plan with a Registered Nurse    Connection with a    Assistance from a Community Health Worker   Connection with available resources and services     Coordinate communication among your care team members    Provide coaching and education    Help you understand your doctors treatment plan   Help you obtain information about your insurance coverage.     All services provided by [facility]s Outpatient Care Managers and other care team members are coordinated with and communicated to your primary care team.      As part of your enrollment, you will be receiving education materials and more information about these services in your [facility] account, by phone, or through the mail. If you do not wish to participate or receive information, you can Opt Out by contacting our office at [phone].      Sincerely,        Yung Ramos RN  [facility]   Outpatient Care Management

## 2023-05-11 RX ORDER — MULTIVITAMIN
1 TABLET ORAL DAILY
COMMUNITY

## 2023-05-11 NOTE — PRE ADMISSION SCREENING
Pre op instructions reviewed with patient per phone on 5/11/23: Spoke about pre op process and surgery instructions, verbalized understanding.    Surgery is scheduled on 5/15/23.  We will call you the business day prior to surgery to confirm arrival time (after 2:30 pm), as it is subject to change due to cancellations & emergencies.    Please report to the [facility] (1st Floor) at [facility] located off of [facility] (2nd building on the left, in front of the [facility]),address: [address] [place] [address] [place]. [id number]    Your Type & Screen appointment is scheduled on 5/13 between the hours of 7:00am-12noon @ [facility]. Please DO NOT remove the red arm band applied.      Blood Thinners: Continue to hol Aspirin and Plavix prior to surgery per Physician Instructions! Call your Surgeon office to inquire about any questions regarding your blood thinner medication.    INSTRUCTIONS IMPORTANT!!!  Do Not Eat, Drink, or Smoke after 12 midnight! NO WATER after midnight! OK to brush teeth, no gum, candy or mints!    Take only these medicines with a small swallow of water-morning of surgery:  Xanax, if needed  Carvedilol  Nexium   Zoloft    ____  NO Acrylic/fake nails or nail polish worn day of surgery (specifically hand/arm & foot surgeries).  ____  NO powder, lotions, deodorants, oils or creams on body.  ____  Please Remove All jewelry & piercings prior to surgery.  ____  Please Remove Dentures, Hearing Aids & Contact Lens prior to the start of surgery.  ____  Please bring photo ID and insurance information to hospital (Leave Valuables at Home).  ____  If going home the same day, arrange for a ride home. You will not be able to drive 24 hrs if Anesthesia was used.   ____  Wear clean, loose fitting clothing. Allow for dressings, bandages.  ____  Stop all Aspirin products, Ibuprofen, Advil, Motrin & Aleve at least 5-7 days before surgery, unless otherwise instructed by your doctor, or the nurse.   ____   Blood Thinners are stopped based on your Provider's recommendation; Call Surgeon's Office to inquire when to stop/hold.  ____  Stop taking any Fish Oil supplements or Vitamins at least 5 days prior to surgery, unless instructed otherwise by your Doctor.            Diabetic Patients: If you take diabetic medication, do NOT take morning of surgery unless instructed by             Doctor. Metformin to be stopped 24 hrs prior to surgery time. DO NOT take long-acting insulin the evening before surgery. Blood sugars will be checked in pre-op morning of.    Bathing Instructions:       -Do not shave your face or body the day before or the day of surgery.              -Shower & Rinse your body as usual with anti-bacterial Soap (Dial), on the evening prior to surgery and the morning of surgery.               -With your hand, apply one packet of Hibiclens soap to the surgical site.               -Wash the site gently for 5 full minutes. Do Not scrub your skin too hard.               -Rinse your body thoroughly.                -Pat yourself day with a clean, soft towel.               -Do not use lotion, cream, powder or deodorant               -Dress in clean clothes      [facility] Visitor/Ride Policy:  Only 2 adults allowed (over the age of 18) to accompany you to the Hospital. You Must have a ride home from a responsible adult that you know and trust. Medical Transport, Uber or Lyft can only be used if patient has a responsible adult to accompany them during ride home.    Post-Op Instructions: You will receive Post-op/Discharge instructions by your Discharge Nurse prior to going home. Please call your Surgeon's office with any post-surgery questions/concerns.    *Call [facility] Pre-Admissions Department with surgery instruction questions @ [phone], [phone] or [phone] (Mon-Fri 8 am to 4 pm)    *If you are running late or have questions the morning of surgery, please call the Surgery Dept @  [phone]  *Insurance/ Financial Questions, please call [phone].

## 2023-05-12 ENCOUNTER — TELEPHONE (OUTPATIENT)
Dept: NEUROSURGERY | Facility: CLINIC | Age: 68
End: 2023-05-12
Payer: MEDICARE

## 2023-05-12 RX ORDER — HYDROCODONE BITARTRATE AND ACETAMINOPHEN 7.5; 325 MG/1; MG/1
1 TABLET ORAL EVERY 8 HOURS PRN
Qty: 21 TABLET | Refills: 0 | Status: ON HOLD | OUTPATIENT
Start: 2023-05-12 | End: 2023-05-20 | Stop reason: HOSPADM

## 2023-05-12 NOTE — PROGRESS NOTES
"Outpatient Care Management  Initial Patient Assessment    Patient: Abdullahi Urias  MRN: [id number]  Date of Service: 05/10/2023  Completed by: Yung Ramos RN  Referral Date: 05/10/2023  Program: High Risk  Status: Ongoing  Effective Dates: 5/10/2023 - present  Responsible Staff: Yung Ramos RN        Reason for Visit   Patient presents with    OPCM Enrollment Call       Brief Summary:  Abdullahi Urias was self referred for chronic pain management. Patient qualifies for program based on his risk score of 80.1%.   Active problem list, medical, surgical and social history reviewed. Areas of need identified by patient include chronic back pain .   Next steps: Collaborate with Neurosurgeon  Mail pain education   Follow up in two weeks       Spiritual Beliefs  Spiritual, Cultural Beliefs, Anglican Practices, Values that Affect Care: no      Social History     Socioeconomic History    Marital status:    Tobacco Use    Smoking status: Former     Packs/day: 2.00     Years: 8.00     Pack years: 16.00     Types: Cigarettes     Quit date: 8/24/2012     Years since quitting: 10.7    Smokeless tobacco: Never   Substance and Sexual Activity    Alcohol use: Never     Comment: Sober since 08/24/2012    Drug use: Not Currently     Types: "Crack" cocaine, Marijuana     Comment: Quit in 2012    Sexual activity: Yes   Social History Narrative    No pets in household, wife and daughter smokers. Former [employer].    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     Social Determinants of Health     Financial Resource Strain: Low Risk     Difficulty of Paying Living Expenses: Not hard at all   Food Insecurity: No Food Insecurity    Worried About Running Out of Food in the Last Year: Never true    Ran Out of Food in the Last Year: Never true   Transportation Needs: No Transportation Needs    Lack of Transportation (Medical): No    Lack of Transportation (Non-Medical): No   Physical Activity: Inactive    Days of " Exercise per Week: 0 days    Minutes of Exercise per Session: 0 min   Stress: Stress Concern Present    Feeling of Stress : Very much   Social Connections: Moderately Integrated    Frequency of Communication with Friends and Family: Three times a week    Frequency of Social Gatherings with Friends and Family: Once a week    Attends Mosque Services: 1 to 4 times per year    Active Member of Clubs or Organizations: No    Attends Club or Organization Meetings: Never    Marital Status:    Housing Stability: Low Risk     Unable to Pay for Housing in the Last Year: No    Number of Places Lived in the Last Year: 1    Unstable Housing in the Last Year: No       Roles and Relationships  Primary Source of Support/Comfort: spouse  Name of Support/Comfort Primary Source: [place]  Primary Roles/Responsibilities: retired      Advance Directives (For Healthcare)  Advance Directive  (If Adv Dir status is received, view document under Adv Dir in header or Chart Review Media tab): Patient does not have Advance Directive, declines information.        Patient Reported Insurance  Verified current insurance plan:: [employer] Medicare Advantage  [employer] benefits discussed:: OTC Prescription Discounts; Transportation; Mail Order Pharmacy        Depression Patient Health Questionnaire 5/10/2023 1/30/2023 12/16/2022 11/22/2022 1/19/2022 7/20/2021   Over the last two weeks how often have you been bothered by little interest or pleasure in doing things Not at all Nearly every day Not at all Not at all Not at all Several days   Over the last two weeks how often have you been bothered by feeling down, depressed or hopeless Not at all Nearly every day Not at all Nearly every day Not at all Several days   PHQ-2 Total Score 0 6 0 3 0 2   Over the last two weeks how often have you been bothered by trouble falling or staying asleep, or sleeping too much - - - More than half the days - -   Over the last two weeks how often have you been bothered by  feeling tired or having little energy - - - More than half the days - -   Over the last two weeks how often have you been bothered by a poor appetite or overeating - - - Not at all - -   Over the last two weeks how often have you been bothered by feeling bad about yourself - or that you are a failure or have let yourself or your family down - - - Not at all - -   Over the last two weeks how often have you been bothered by trouble concentrating on things, such as reading the newspaper or watching television - - - More than half the days - -   Over the last two weeks how often have you been bothered by moving or speaking so slowly that other people could have noticed. Or the opposite - being so fidgety or restless that you have been moving around a lot more than usual. - - - More than half the days - -   Over the last two weeks how often have you been bothered by thoughts that you would be better off dead, or of hurting yourself - - - Not at all - -   If you checked off any problems, how difficult have these problems made it for you to do your work, take care of things at home or get along with other people? - - - Not difficult at all - -   Total Score - - - 11 - -   Interpretation - - - Moderate - -       Learning Assessment       04/24/2023 1633 [facility] - Cardiology (4/24/2023 - Present)   Created by Roxana Herr Status: Complete                 PRIMARY LEARNER     Primary Learner Name:  Abdullahi Urias AN - 04/24/2023 1633    Relationship:  Patient AN - 04/24/2023 1633    Does the primary learner have any barriers to learning?:  Visual AN - 04/24/2023 1633    What is the preferred language of the primary learner?:  English AN - 04/24/2023 1633    Is an  required?:  No AN - 04/24/2023 1633    How does the primary learner prefer to learn new concepts?:  Listening, Demonstration AN - 04/24/2023 1633    How often do you need to have someone help you read instructions, pamphlets, or written material from your  doctor or pharmacy?:  Rarely AN - 04/24/2023 1633        CO-LEARNER #1     No question answered        CO-LEARNER #2     No question answered        SPECIAL TOPICS     No question answered        ANSWERED BY:     No question answered        Edit History       Roxana Herr   04/24/2023 [id number]

## 2023-05-12 NOTE — TELEPHONE ENCOUNTER
Spoke with pt informed pt per Vanessa Castillo PA-C she will refill pts hydrocodone pt is ok to take, pt vu

## 2023-05-16 ENCOUNTER — ANESTHESIA EVENT (OUTPATIENT)
Dept: SURGERY | Facility: HOSPITAL | Age: 68
DRG: 454 | End: 2023-05-16
Payer: MEDICARE

## 2023-05-16 ENCOUNTER — LAB VISIT (OUTPATIENT)
Dept: LAB | Facility: HOSPITAL | Age: 68
End: 2023-05-16
Attending: NEUROLOGICAL SURGERY
Payer: MEDICARE

## 2023-05-16 ENCOUNTER — TELEPHONE (OUTPATIENT)
Dept: PREADMISSION TESTING | Facility: HOSPITAL | Age: 68
End: 2023-05-16
Payer: MEDICARE

## 2023-05-16 DIAGNOSIS — Z01.818 PREPROCEDURAL EXAMINATION: ICD-10-CM

## 2023-05-16 LAB
ABO + RH BLD: NORMAL
BLD GP AB SCN CELLS X3 SERPL QL: NORMAL

## 2023-05-16 PROCEDURE — 36415 COLL VENOUS BLD VENIPUNCTURE: CPT | Performed by: NEUROLOGICAL SURGERY

## 2023-05-16 PROCEDURE — 86900 BLOOD TYPING SEROLOGIC ABO: CPT | Performed by: NEUROLOGICAL SURGERY

## 2023-05-16 NOTE — TELEPHONE ENCOUNTER
Called and spoke with pt about the following:     Please arrive to [facility] at 0530 am on 5/17/23 for your scheduled procedure.  Address: [address] [place] [address] [place] [address] (2nd Building on left, 1st Floor Lobby)  >>>NO eating or drinking after midnight unless instructed otherwise by your Surgeon<<<    Thank you,  -[facility] Pre Admit Testing Dept.  Mon-Fri 8 am - 4 pm [phone]

## 2023-05-16 NOTE — ANESTHESIA PREPROCEDURE EVALUATION
05/16/2023  Abdullahi Urias is a 67 y.o., male.    Patient Active Problem List   Diagnosis    Aortic stenosis    ED (erectile dysfunction)    Asthmatic bronchitis    Hypertension    CAD (coronary artery disease)    BPH (benign prostatic hyperplasia)    Chronic back pain    Cardiomyopathy    BRBPR (bright red blood per rectum)    Class 2 severe obesity due to excess calories with serious comorbidity and body mass index (BMI) of 38.0 to 38.9 in adult    Old peripheral tear of lateral meniscus of right knee    Arthritis of right knee    Chondromalacia, right knee    Penetrating foreign body of skin of right knee    Chronic gout    CHEO on CPAP    History of CVA in adulthood    Prostate cancer    S/P AVR (aortic valve replacement) biopresthetic miller 25 mm in 2014     EKG abnormalities    Stage 3 chronic kidney disease    History of hepatitis C    Pain in both lower extremities    GERD (gastroesophageal reflux disease)    Personal history of colonic polyps    Syncope and collapse    Localized swelling of left foot    History of prostate cancer    PAD (peripheral artery disease)    Aorto-iliac atherosclerosis    Moderate persistent asthma without complication    Former tobacco use    Preop cardiovascular exam     Past Surgical History:   Procedure Laterality Date    AORTIC VALVE REPLACEMENT  05/19/2014    Tissue valve replacement    BACK SURGERY      CARDIAC CATHETERIZATION      COLONOSCOPY  2011    COLONOSCOPY N/A 2/24/2021    Procedure: COLONOSCOPY;  Surgeon: Faith Carrillo MD;  Location[facility] ENDO;  Service: Endoscopy;  Laterality: N/A;    EPIDURAL STEROID INJECTION INTO CERVICAL SPINE N/A 6/21/2022    Procedure: C7-T1 IL PIEDAD;  Surgeon: Nehemiah Carmen MD;  Location: [facility] PAIN MGT;  Service: Pain Management;  Laterality: N/A;    ESOPHAGOGASTRODUODENOSCOPY N/A  2/24/2021    Procedure: ESOPHAGOGASTRODUODENOSCOPY (EGD);  Surgeon: Faith Carrillo MD;  Location: [facility] ENDO;  Service: Endoscopy;  Laterality: N/A;    FOOT SURGERY      LEFT HEART CATHETERIZATION Left 7/24/2020    Procedure: CATHETERIZATION, HEART, LEFT;  Surgeon: Pasha Martin MD;  Location: [facility] CATH LAB;  Service: Cardiology;  Laterality: Left;    PENILE PROSTHESIS IMPLANT      PENILE PROSTHESIS REVISION  04/30/2018    [facility]    radiation for prostate      TRANSFORAMINAL EPIDURAL INJECTION OF STEROID Right 10/20/2022    Procedure: Right  L4/5 + L5/S1 TF PIEDAD RN IV Sedation;  Surgeon: Nehemiah Carmen MD;  Location: [facility] PAIN MGT;  Service: Pain Management;  Laterality: Right;    UPPER GASTROINTESTINAL ENDOSCOPY  2011       Pre-op Assessment    I have reviewed the Patient Summary Reports.    I have reviewed the NPO Status.   I have reviewed the Medications.     Review of Systems  Anesthesia Hx:  No problems with previous Anesthesia  History of prior surgery of interest to airway management or planning: Previous anesthesia: General  Denies Personal Hx of Anesthesia complications.   Social:  Former Smoker    Hematology/Oncology:  Hematology Normal      Hematology Comments: H/H 12.9/39.9   --  Cancer in past history:  Oncology Comments: Hx prostate Ca.  S/P prostatectomy/radiation     Cardiovascular:   Hypertension CAD   CHF PVD ECG has been reviewed. S/p AVR    Seen and evaluated by Cards: Elevated periop risk of CV events for non-high risk procedure   Pulmonary:   Asthma Sleep Apnea, CPAP    Renal/:   Chronic Renal Disease, CKD BPH    Hepatic/GI:   GERD Hepatitis, C Hx of hep C   Neurological:   CVA    Endocrine:  Endocrine Normal  Obesity / BMI > 30      Physical Exam  General: Well nourished, Cooperative, Alert and Oriented    Airway:  Mallampati: III   Mouth Opening: Normal  TM Distance: Normal  Tongue: Normal  Neck ROM: Normal ROM    Dental:  Partial  Dentures  Partials removed; few teeth left, but denies any loose   Chest/Lungs:  Clear to auscultation, Normal Respiratory Rate        Anesthesia Plan  Type of Anesthesia, risks & benefits discussed:    Anesthesia Type: Gen ETT  Intra-op Monitoring Plan: Standard ASA Monitors  Post Op Pain Control Plan: multimodal analgesia and IV/PO Opioids PRN  Induction:  IV  Airway Plan: Direct, Post-Induction  Informed Consent: Informed consent signed with the Patient and all parties understand the risks and agree with anesthesia plan.  All questions answered.   ASA Score: 3  Day of Surgery Review of History & Physical: H&P Update referred to the surgeon/provider.    Ready For Surgery From Anesthesia Perspective.     .      Chemistry        Component Value Date/Time     04/13/2023 1551    K 4.3 04/13/2023 1551     04/13/2023 1551    CO2 25 04/13/2023 1551    BUN 15 04/13/2023 1551    CREATININE 1.5 (H) 04/13/2023 1551    GLU 91 04/13/2023 1551        Component Value Date/Time    CALCIUM 9.3 04/13/2023 1551    ALKPHOS 101 04/13/2023 1551    AST 24 04/13/2023 1551    ALT 17 04/13/2023 1551    BILITOT 0.3 04/13/2023 1551    ESTGFRAFRICA 51.1 (A) 06/02/2022 1246    EGFRNONAA 44.2 (A) 06/02/2022 1246        Lab Results   Component Value Date    WBC 3.89 (L) 04/13/2023    HGB 12.9 (L) 04/13/2023    HCT 39.9 (L) 04/13/2023    MCV 87 04/13/2023     04/13/2023       Normal sinus rhythm   T wave abnormality, consider anterior ischemia   Abnormal ECG   When compared with ECG of 24-JAN-2023 17:09,   No significant change was found   Confirmed by DARIA ASH MD ([id number]) on 4/25/2023 10:44:14 AM     Echo 9/12/22:   The left ventricle is normal in size with normal systolic function.   The estimated ejection fraction is 65%.   Grade I left ventricular diastolic dysfunction.   Normal right ventricular size with normal right ventricular systolic function.   There is a bioprosthetic aortic valve present. There is mild  paravalvular aortic insufficiency present.   The aortic valve mean gradient is 14 mmHg with a dimensionless index of 0.57.   Mild tricuspid regurgitation.   The estimated PA systolic pressure is 33 mmHg.     Nuc Stress 7/9/20:    The study shows normal myocardial perfusion.    The perfusion scan is free of evidence from myocardial ischemia or injury.    Gated perfusion images showed an ejection fraction of 60% at rest and 55% post stress.    The EKG portion of this study is negative for ischemia.    The patient reported no chest pain during the stress test.    Arrhythmias during stress: occasional PVCs.    Heart Cath 7/24/20:   Non-obstructive CAD.    Left Anterior Descending   Mid LAD lesion is 20% stenosed.      Right Coronary Artery   Mid RCA lesion is 20% stenosed.

## 2023-05-17 ENCOUNTER — HOSPITAL ENCOUNTER (INPATIENT)
Facility: HOSPITAL | Age: 68
LOS: 3 days | Discharge: HOME HEALTH | DRG: 454 | End: 2023-05-20
Attending: NEUROLOGICAL SURGERY | Admitting: NEUROLOGICAL SURGERY
Payer: MEDICARE

## 2023-05-17 ENCOUNTER — ANESTHESIA (OUTPATIENT)
Dept: SURGERY | Facility: HOSPITAL | Age: 68
DRG: 454 | End: 2023-05-17
Payer: MEDICARE

## 2023-05-17 DIAGNOSIS — M51.36 DDD (DEGENERATIVE DISC DISEASE), LUMBAR: ICD-10-CM

## 2023-05-17 DIAGNOSIS — T81.49XA INFECTED SURGICAL WOUND: ICD-10-CM

## 2023-05-17 DIAGNOSIS — M48.062 LUMBAR STENOSIS WITH NEUROGENIC CLAUDICATION: ICD-10-CM

## 2023-05-17 DIAGNOSIS — M54.16 LUMBAR RADICULOPATHY, CHRONIC: Primary | ICD-10-CM

## 2023-05-17 DIAGNOSIS — M71.38 SYNOVIAL CYST OF LUMBAR SPINE: ICD-10-CM

## 2023-05-17 PROCEDURE — 25000003 PHARM REV CODE 250: Performed by: NEUROLOGICAL SURGERY

## 2023-05-17 PROCEDURE — 11000001 HC ACUTE MED/SURG PRIVATE ROOM

## 2023-05-17 PROCEDURE — 63600175 PHARM REV CODE 636 W HCPCS: Performed by: PHYSICIAN ASSISTANT

## 2023-05-17 PROCEDURE — 71000039 HC RECOVERY, EACH ADD'L HOUR: Performed by: NEUROLOGICAL SURGERY

## 2023-05-17 PROCEDURE — 22634 ARTHRD CMBN 1NTRSPC EA ADDL: CPT | Mod: ,,, | Performed by: NEUROLOGICAL SURGERY

## 2023-05-17 PROCEDURE — 22633 ARTHRD CMBN 1NTRSPC LUMBAR: CPT | Mod: ,,, | Performed by: NEUROLOGICAL SURGERY

## 2023-05-17 PROCEDURE — 36000713 HC OR TIME LEV V EA ADD 15 MIN: Performed by: NEUROLOGICAL SURGERY

## 2023-05-17 PROCEDURE — 63600175 PHARM REV CODE 636 W HCPCS

## 2023-05-17 PROCEDURE — 20936 SP BONE AGRFT LOCAL ADD-ON: CPT | Mod: ,,, | Performed by: NEUROLOGICAL SURGERY

## 2023-05-17 PROCEDURE — 25000003 PHARM REV CODE 250: Performed by: PHYSICIAN ASSISTANT

## 2023-05-17 PROCEDURE — 22853 INSJ BIOMECHANICAL DEVICE: CPT | Mod: ,,, | Performed by: NEUROLOGICAL SURGERY

## 2023-05-17 PROCEDURE — 27201423 OPTIME MED/SURG SUP & DEVICES STERILE SUPPLY: Performed by: NEUROLOGICAL SURGERY

## 2023-05-17 PROCEDURE — 20930 SP BONE ALGRFT MORSEL ADD-ON: CPT | Mod: ,,, | Performed by: NEUROLOGICAL SURGERY

## 2023-05-17 PROCEDURE — 22842 INSERT SPINE FIXATION DEVICE: CPT | Mod: ,,, | Performed by: NEUROLOGICAL SURGERY

## 2023-05-17 PROCEDURE — 20936 PR AUTOGRAFT SPINE SURGERY LOCAL FROM SAME INCISION: ICD-10-PCS | Mod: ,,, | Performed by: NEUROLOGICAL SURGERY

## 2023-05-17 PROCEDURE — 63267 PR EXCIS INTRASP LESN,XDURAL,LUMBAR: ICD-10-PCS | Mod: 59,,, | Performed by: NEUROLOGICAL SURGERY

## 2023-05-17 PROCEDURE — 61783 SCAN PROC SPINAL: CPT | Mod: ,,, | Performed by: NEUROLOGICAL SURGERY

## 2023-05-17 PROCEDURE — 36000712 HC OR TIME LEV V 1ST 15 MIN: Performed by: NEUROLOGICAL SURGERY

## 2023-05-17 PROCEDURE — 25000003 PHARM REV CODE 250

## 2023-05-17 PROCEDURE — 63600175 PHARM REV CODE 636 W HCPCS: Performed by: NEUROLOGICAL SURGERY

## 2023-05-17 PROCEDURE — 63267 EXCISE INTRSPINL LESION LMBR: CPT | Mod: 59,,, | Performed by: NEUROLOGICAL SURGERY

## 2023-05-17 PROCEDURE — C1729 CATH, DRAINAGE: HCPCS | Performed by: NEUROLOGICAL SURGERY

## 2023-05-17 PROCEDURE — 71000033 HC RECOVERY, INTIAL HOUR: Performed by: NEUROLOGICAL SURGERY

## 2023-05-17 PROCEDURE — C1713 ANCHOR/SCREW BN/BN,TIS/BN: HCPCS | Performed by: NEUROLOGICAL SURGERY

## 2023-05-17 PROCEDURE — 22633 PR ARTHRODESIS, COMBINED TECHN, SNGL INTERSPACE, LUMBAR: ICD-10-PCS | Mod: ,,, | Performed by: NEUROLOGICAL SURGERY

## 2023-05-17 PROCEDURE — 22634 PR ARTHRODESIS, COMBINED TECHN, SNGL INTERSPACE, EA ADDTL: ICD-10-PCS | Mod: ,,, | Performed by: NEUROLOGICAL SURGERY

## 2023-05-17 PROCEDURE — 61783 PR STEREOTACTIC COMP ASSIST PROC,SPINAL: ICD-10-PCS | Mod: ,,, | Performed by: NEUROLOGICAL SURGERY

## 2023-05-17 PROCEDURE — 37000008 HC ANESTHESIA 1ST 15 MINUTES: Performed by: NEUROLOGICAL SURGERY

## 2023-05-17 PROCEDURE — 20930 PR ALLOGRAFT FOR SPINE SURGERY ONLY MORSELIZED: ICD-10-PCS | Mod: ,,, | Performed by: NEUROLOGICAL SURGERY

## 2023-05-17 PROCEDURE — 22853 PR INSERT BIOMECH DEV W/INTERBODY ARTHRODESIS, EA CONTIGUOUS DEFECT: ICD-10-PCS | Mod: ,,, | Performed by: NEUROLOGICAL SURGERY

## 2023-05-17 PROCEDURE — 22842 PR POSTERIOR SEGMENTAL INSTRUMENTATION 3-6 VRT SEG: ICD-10-PCS | Mod: ,,, | Performed by: NEUROLOGICAL SURGERY

## 2023-05-17 PROCEDURE — 37000009 HC ANESTHESIA EA ADD 15 MINS: Performed by: NEUROLOGICAL SURGERY

## 2023-05-17 PROCEDURE — 27800903 OPTIME MED/SURG SUP & DEVICES OTHER IMPLANTS: Performed by: NEUROLOGICAL SURGERY

## 2023-05-17 DEVICE — SPACER 6068073 CATALYFT PL SHORT 7MM
Type: IMPLANTABLE DEVICE | Site: SPINE LUMBAR | Status: FUNCTIONAL
Brand: CATALYFT PL EXPANDABLE INTERBODY SYSTEM

## 2023-05-17 DEVICE — SET SCREW HORIZON SOLERA 5.5-6
Type: IMPLANTABLE DEVICE | Site: SPINE LUMBAR | Status: FUNCTIONAL
Removed: 2023-07-05

## 2023-05-17 DEVICE — ALLOGRAFT TRINITY ELITE MED: Type: IMPLANTABLE DEVICE | Site: SPINE LUMBAR | Status: FUNCTIONAL

## 2023-05-17 RX ORDER — OXYCODONE AND ACETAMINOPHEN 5; 325 MG/1; MG/1
1 TABLET ORAL EVERY 4 HOURS PRN
Status: DISCONTINUED | OUTPATIENT
Start: 2023-05-18 | End: 2023-05-20 | Stop reason: HOSPADM

## 2023-05-17 RX ORDER — BACITRACIN ZINC 500 UNIT/G
OINTMENT (GRAM) TOPICAL
Status: DISCONTINUED | OUTPATIENT
Start: 2023-05-17 | End: 2023-05-17 | Stop reason: HOSPADM

## 2023-05-17 RX ORDER — ATORVASTATIN CALCIUM 40 MG/1
80 TABLET, FILM COATED ORAL DAILY
Status: DISCONTINUED | OUTPATIENT
Start: 2023-05-17 | End: 2023-05-20 | Stop reason: HOSPADM

## 2023-05-17 RX ORDER — MIDAZOLAM HYDROCHLORIDE 1 MG/ML
INJECTION INTRAMUSCULAR; INTRAVENOUS
Status: DISCONTINUED | OUTPATIENT
Start: 2023-05-17 | End: 2023-05-17

## 2023-05-17 RX ORDER — PROPOFOL 10 MG/ML
VIAL (ML) INTRAVENOUS
Status: DISCONTINUED | OUTPATIENT
Start: 2023-05-17 | End: 2023-05-17

## 2023-05-17 RX ORDER — LIDOCAINE HYDROCHLORIDE AND EPINEPHRINE 10; 10 MG/ML; UG/ML
1 INJECTION, SOLUTION INFILTRATION; PERINEURAL ONCE
Status: DISCONTINUED | OUTPATIENT
Start: 2023-05-17 | End: 2023-05-17 | Stop reason: HOSPADM

## 2023-05-17 RX ORDER — EPHEDRINE SULFATE 50 MG/ML
INJECTION, SOLUTION INTRAVENOUS
Status: DISCONTINUED | OUTPATIENT
Start: 2023-05-17 | End: 2023-05-17

## 2023-05-17 RX ORDER — MAG HYDROX/ALUMINUM HYD/SIMETH 200-200-20
30 SUSPENSION, ORAL (FINAL DOSE FORM) ORAL EVERY 4 HOURS PRN
Status: DISCONTINUED | OUTPATIENT
Start: 2023-05-17 | End: 2023-05-20 | Stop reason: HOSPADM

## 2023-05-17 RX ORDER — MEPERIDINE HYDROCHLORIDE 25 MG/ML
12.5 INJECTION INTRAMUSCULAR; INTRAVENOUS; SUBCUTANEOUS ONCE
Status: DISCONTINUED | OUTPATIENT
Start: 2023-05-17 | End: 2023-05-17 | Stop reason: HOSPADM

## 2023-05-17 RX ORDER — NALOXONE HCL 0.4 MG/ML
0.02 VIAL (ML) INJECTION
Status: DISCONTINUED | OUTPATIENT
Start: 2023-05-17 | End: 2023-05-20 | Stop reason: HOSPADM

## 2023-05-17 RX ORDER — OXYCODONE AND ACETAMINOPHEN 10; 325 MG/1; MG/1
1 TABLET ORAL EVERY 4 HOURS PRN
Status: DISCONTINUED | OUTPATIENT
Start: 2023-05-18 | End: 2023-05-20 | Stop reason: HOSPADM

## 2023-05-17 RX ORDER — KETOROLAC TROMETHAMINE 30 MG/ML
15 INJECTION, SOLUTION INTRAMUSCULAR; INTRAVENOUS EVERY 8 HOURS PRN
Status: DISCONTINUED | OUTPATIENT
Start: 2023-05-17 | End: 2023-05-17 | Stop reason: HOSPADM

## 2023-05-17 RX ORDER — SODIUM CHLORIDE 0.9 % (FLUSH) 0.9 %
3 SYRINGE (ML) INJECTION
Status: DISCONTINUED | OUTPATIENT
Start: 2023-05-17 | End: 2023-05-20 | Stop reason: HOSPADM

## 2023-05-17 RX ORDER — CARVEDILOL 12.5 MG/1
12.5 TABLET ORAL 2 TIMES DAILY WITH MEALS
Status: DISCONTINUED | OUTPATIENT
Start: 2023-05-17 | End: 2023-05-20 | Stop reason: HOSPADM

## 2023-05-17 RX ORDER — OXYCODONE AND ACETAMINOPHEN 7.5; 325 MG/1; MG/1
1 TABLET ORAL EVERY 4 HOURS PRN
Status: DISCONTINUED | OUTPATIENT
Start: 2023-05-18 | End: 2023-05-20 | Stop reason: HOSPADM

## 2023-05-17 RX ORDER — PROCHLORPERAZINE EDISYLATE 5 MG/ML
5 INJECTION INTRAMUSCULAR; INTRAVENOUS EVERY 30 MIN PRN
Status: DISCONTINUED | OUTPATIENT
Start: 2023-05-17 | End: 2023-05-17 | Stop reason: HOSPADM

## 2023-05-17 RX ORDER — ONDANSETRON 8 MG/1
8 TABLET, ORALLY DISINTEGRATING ORAL EVERY 6 HOURS PRN
Status: DISCONTINUED | OUTPATIENT
Start: 2023-05-17 | End: 2023-05-20 | Stop reason: HOSPADM

## 2023-05-17 RX ORDER — ROCURONIUM BROMIDE 10 MG/ML
INJECTION, SOLUTION INTRAVENOUS
Status: DISCONTINUED | OUTPATIENT
Start: 2023-05-17 | End: 2023-05-17

## 2023-05-17 RX ORDER — DIPHENHYDRAMINE HCL 25 MG
50 CAPSULE ORAL EVERY 6 HOURS PRN
Status: DISCONTINUED | OUTPATIENT
Start: 2023-05-17 | End: 2023-05-18

## 2023-05-17 RX ORDER — DEXAMETHASONE SODIUM PHOSPHATE 4 MG/ML
INJECTION, SOLUTION INTRA-ARTICULAR; INTRALESIONAL; INTRAMUSCULAR; INTRAVENOUS; SOFT TISSUE
Status: DISCONTINUED | OUTPATIENT
Start: 2023-05-17 | End: 2023-05-17

## 2023-05-17 RX ORDER — FENTANYL CITRATE 50 UG/ML
INJECTION, SOLUTION INTRAMUSCULAR; INTRAVENOUS
Status: DISCONTINUED | OUTPATIENT
Start: 2023-05-17 | End: 2023-05-17

## 2023-05-17 RX ORDER — LIDOCAINE HYDROCHLORIDE 20 MG/ML
INJECTION, SOLUTION EPIDURAL; INFILTRATION; INTRACAUDAL; PERINEURAL
Status: DISCONTINUED | OUTPATIENT
Start: 2023-05-17 | End: 2023-05-17

## 2023-05-17 RX ORDER — ONDANSETRON 2 MG/ML
4 INJECTION INTRAMUSCULAR; INTRAVENOUS DAILY PRN
Status: DISCONTINUED | OUTPATIENT
Start: 2023-05-17 | End: 2023-05-17 | Stop reason: HOSPADM

## 2023-05-17 RX ORDER — LOSARTAN POTASSIUM 50 MG/1
50 TABLET ORAL DAILY
Status: DISCONTINUED | OUTPATIENT
Start: 2023-05-17 | End: 2023-05-20 | Stop reason: HOSPADM

## 2023-05-17 RX ORDER — HYDROMORPHONE HCL IN 0.9% NACL 6 MG/30 ML
PATIENT CONTROLLED ANALGESIA SYRINGE INTRAVENOUS CONTINUOUS
Status: DISCONTINUED | OUTPATIENT
Start: 2023-05-17 | End: 2023-05-18

## 2023-05-17 RX ORDER — GABAPENTIN 300 MG/1
600 CAPSULE ORAL 2 TIMES DAILY
Status: DISCONTINUED | OUTPATIENT
Start: 2023-05-17 | End: 2023-05-20 | Stop reason: HOSPADM

## 2023-05-17 RX ORDER — HYDROMORPHONE HYDROCHLORIDE 2 MG/ML
0.2 INJECTION, SOLUTION INTRAMUSCULAR; INTRAVENOUS; SUBCUTANEOUS EVERY 5 MIN PRN
Status: DISCONTINUED | OUTPATIENT
Start: 2023-05-17 | End: 2023-05-17 | Stop reason: HOSPADM

## 2023-05-17 RX ORDER — ACETAMINOPHEN 325 MG/1
650 TABLET ORAL EVERY 6 HOURS PRN
Status: DISCONTINUED | OUTPATIENT
Start: 2023-05-17 | End: 2023-05-20 | Stop reason: HOSPADM

## 2023-05-17 RX ORDER — DIPHENHYDRAMINE HYDROCHLORIDE 50 MG/ML
25 INJECTION INTRAMUSCULAR; INTRAVENOUS EVERY 6 HOURS PRN
Status: DISCONTINUED | OUTPATIENT
Start: 2023-05-17 | End: 2023-05-17 | Stop reason: HOSPADM

## 2023-05-17 RX ORDER — DEXTROSE MONOHYDRATE AND SODIUM CHLORIDE 5; .9 G/100ML; G/100ML
INJECTION, SOLUTION INTRAVENOUS CONTINUOUS
Status: DISCONTINUED | OUTPATIENT
Start: 2023-05-17 | End: 2023-05-18

## 2023-05-17 RX ORDER — ONDANSETRON HYDROCHLORIDE 2 MG/ML
INJECTION, SOLUTION INTRAMUSCULAR; INTRAVENOUS
Status: DISCONTINUED | OUTPATIENT
Start: 2023-05-17 | End: 2023-05-17

## 2023-05-17 RX ORDER — PROCHLORPERAZINE EDISYLATE 5 MG/ML
5 INJECTION INTRAMUSCULAR; INTRAVENOUS EVERY 6 HOURS PRN
Status: DISCONTINUED | OUTPATIENT
Start: 2023-05-17 | End: 2023-05-20 | Stop reason: HOSPADM

## 2023-05-17 RX ORDER — ALBUTEROL SULFATE 0.83 MG/ML
2.5 SOLUTION RESPIRATORY (INHALATION) EVERY 4 HOURS PRN
Status: DISCONTINUED | OUTPATIENT
Start: 2023-05-17 | End: 2023-05-20 | Stop reason: HOSPADM

## 2023-05-17 RX ORDER — AMOXICILLIN 250 MG
2 CAPSULE ORAL NIGHTLY PRN
Status: DISCONTINUED | OUTPATIENT
Start: 2023-05-17 | End: 2023-05-20 | Stop reason: HOSPADM

## 2023-05-17 RX ORDER — OXYCODONE HYDROCHLORIDE 5 MG/1
5 TABLET ORAL
Status: DISCONTINUED | OUTPATIENT
Start: 2023-05-17 | End: 2023-05-17 | Stop reason: HOSPADM

## 2023-05-17 RX ORDER — SODIUM CHLORIDE 9 MG/ML
INJECTION, SOLUTION INTRAVENOUS CONTINUOUS
Status: DISCONTINUED | OUTPATIENT
Start: 2023-05-17 | End: 2023-05-19

## 2023-05-17 RX ORDER — FAMOTIDINE 20 MG/1
40 TABLET, FILM COATED ORAL NIGHTLY
Status: DISCONTINUED | OUTPATIENT
Start: 2023-05-17 | End: 2023-05-20 | Stop reason: HOSPADM

## 2023-05-17 RX ORDER — PHENYLEPHRINE HYDROCHLORIDE 10 MG/ML
INJECTION INTRAVENOUS
Status: DISCONTINUED | OUTPATIENT
Start: 2023-05-17 | End: 2023-05-17

## 2023-05-17 RX ORDER — ALBUTEROL SULFATE 0.83 MG/ML
2.5 SOLUTION RESPIRATORY (INHALATION) EVERY 4 HOURS PRN
Status: DISCONTINUED | OUTPATIENT
Start: 2023-05-17 | End: 2023-05-17 | Stop reason: HOSPADM

## 2023-05-17 RX ORDER — ALBUTEROL SULFATE 90 UG/1
1 AEROSOL, METERED RESPIRATORY (INHALATION) EVERY 6 HOURS PRN
Status: DISCONTINUED | OUTPATIENT
Start: 2023-05-17 | End: 2023-05-17

## 2023-05-17 RX ORDER — HYDROMORPHONE HYDROCHLORIDE 2 MG/ML
2 INJECTION, SOLUTION INTRAMUSCULAR; INTRAVENOUS; SUBCUTANEOUS
Status: DISCONTINUED | OUTPATIENT
Start: 2023-05-18 | End: 2023-05-20 | Stop reason: HOSPADM

## 2023-05-17 RX ORDER — METHOCARBAMOL 750 MG/1
750 TABLET, FILM COATED ORAL EVERY 8 HOURS PRN
Status: DISCONTINUED | OUTPATIENT
Start: 2023-05-17 | End: 2023-05-20 | Stop reason: HOSPADM

## 2023-05-17 RX ORDER — DOCUSATE SODIUM 100 MG/1
100 CAPSULE, LIQUID FILLED ORAL DAILY
Status: DISCONTINUED | OUTPATIENT
Start: 2023-05-17 | End: 2023-05-20 | Stop reason: HOSPADM

## 2023-05-17 RX ORDER — KETAMINE HCL IN 0.9 % NACL 50 MG/5 ML
SYRINGE (ML) INTRAVENOUS
Status: DISCONTINUED | OUTPATIENT
Start: 2023-05-17 | End: 2023-05-17

## 2023-05-17 RX ORDER — VANCOMYCIN HYDROCHLORIDE 1 G/20ML
INJECTION, POWDER, LYOPHILIZED, FOR SOLUTION INTRAVENOUS
Status: DISCONTINUED | OUTPATIENT
Start: 2023-05-17 | End: 2023-05-17 | Stop reason: HOSPADM

## 2023-05-17 RX ADMIN — GABAPENTIN 600 MG: 300 CAPSULE ORAL at 08:05

## 2023-05-17 RX ADMIN — FENTANYL CITRATE 25 MCG: 0.05 INJECTION, SOLUTION INTRAMUSCULAR; INTRAVENOUS at 07:05

## 2023-05-17 RX ADMIN — FENTANYL CITRATE 50 MCG: 0.05 INJECTION, SOLUTION INTRAMUSCULAR; INTRAVENOUS at 12:05

## 2023-05-17 RX ADMIN — SODIUM CHLORIDE, SODIUM LACTATE, POTASSIUM CHLORIDE, AND CALCIUM CHLORIDE: .6; .31; .03; .02 INJECTION, SOLUTION INTRAVENOUS at 08:05

## 2023-05-17 RX ADMIN — Medication 10 MG: at 10:05

## 2023-05-17 RX ADMIN — Medication 20 MG: at 07:05

## 2023-05-17 RX ADMIN — PHENYLEPHRINE HYDROCHLORIDE 100 MCG: 10 INJECTION INTRAVENOUS at 08:05

## 2023-05-17 RX ADMIN — FENTANYL CITRATE 25 MCG: 0.05 INJECTION, SOLUTION INTRAMUSCULAR; INTRAVENOUS at 12:05

## 2023-05-17 RX ADMIN — ATORVASTATIN CALCIUM 80 MG: 40 TABLET, FILM COATED ORAL at 06:05

## 2023-05-17 RX ADMIN — SODIUM CHLORIDE, SODIUM LACTATE, POTASSIUM CHLORIDE, AND CALCIUM CHLORIDE: .6; .31; .03; .02 INJECTION, SOLUTION INTRAVENOUS at 07:05

## 2023-05-17 RX ADMIN — ROCURONIUM BROMIDE 15 MG: 10 SOLUTION INTRAVENOUS at 08:05

## 2023-05-17 RX ADMIN — EPHEDRINE SULFATE 10 MG: 50 INJECTION INTRAVENOUS at 08:05

## 2023-05-17 RX ADMIN — VANCOMYCIN HYDROCHLORIDE 1500 MG: 1.5 INJECTION, POWDER, LYOPHILIZED, FOR SOLUTION INTRAVENOUS at 06:05

## 2023-05-17 RX ADMIN — Medication: at 01:05

## 2023-05-17 RX ADMIN — PHENYLEPHRINE HYDROCHLORIDE 200 MCG: 10 INJECTION INTRAVENOUS at 07:05

## 2023-05-17 RX ADMIN — LOSARTAN POTASSIUM 50 MG: 50 TABLET, FILM COATED ORAL at 06:05

## 2023-05-17 RX ADMIN — PHENYLEPHRINE HYDROCHLORIDE 200 MCG: 10 INJECTION INTRAVENOUS at 08:05

## 2023-05-17 RX ADMIN — ROCURONIUM BROMIDE 50 MG: 10 SOLUTION INTRAVENOUS at 07:05

## 2023-05-17 RX ADMIN — EPHEDRINE SULFATE 5 MG: 50 INJECTION INTRAVENOUS at 09:05

## 2023-05-17 RX ADMIN — MIDAZOLAM HYDROCHLORIDE 2 MG: 1 INJECTION, SOLUTION INTRAMUSCULAR; INTRAVENOUS at 07:05

## 2023-05-17 RX ADMIN — PROPOFOL 50 MG: 10 INJECTION, EMULSION INTRAVENOUS at 08:05

## 2023-05-17 RX ADMIN — FENTANYL CITRATE 50 MCG: 0.05 INJECTION, SOLUTION INTRAMUSCULAR; INTRAVENOUS at 01:05

## 2023-05-17 RX ADMIN — FENTANYL CITRATE 25 MCG: 0.05 INJECTION, SOLUTION INTRAMUSCULAR; INTRAVENOUS at 10:05

## 2023-05-17 RX ADMIN — GLYCOPYRROLATE 0.2 MG: 0.2 INJECTION, SOLUTION INTRAMUSCULAR; INTRAVENOUS at 08:05

## 2023-05-17 RX ADMIN — Medication 10 MG: at 09:05

## 2023-05-17 RX ADMIN — PHENYLEPHRINE HYDROCHLORIDE 100 MCG: 10 INJECTION INTRAVENOUS at 07:05

## 2023-05-17 RX ADMIN — DEXAMETHASONE SODIUM PHOSPHATE 4 MG: 4 INJECTION, SOLUTION INTRA-ARTICULAR; INTRALESIONAL; INTRAMUSCULAR; INTRAVENOUS; SOFT TISSUE at 08:05

## 2023-05-17 RX ADMIN — SODIUM CHLORIDE 0.25 MCG/KG/MIN: 9 INJECTION, SOLUTION INTRAVENOUS at 08:05

## 2023-05-17 RX ADMIN — CARVEDILOL 12.5 MG: 12.5 TABLET, FILM COATED ORAL at 06:05

## 2023-05-17 RX ADMIN — VANCOMYCIN HYDROCHLORIDE 1500 MG: 1.5 INJECTION, POWDER, LYOPHILIZED, FOR SOLUTION INTRAVENOUS at 07:05

## 2023-05-17 RX ADMIN — FENTANYL CITRATE 25 MCG: 0.05 INJECTION, SOLUTION INTRAMUSCULAR; INTRAVENOUS at 11:05

## 2023-05-17 RX ADMIN — ONDANSETRON 4 MG: 2 INJECTION INTRAMUSCULAR; INTRAVENOUS at 12:05

## 2023-05-17 RX ADMIN — SODIUM CHLORIDE: 9 INJECTION, SOLUTION INTRAVENOUS at 01:05

## 2023-05-17 RX ADMIN — PROPOFOL 110 MG: 10 INJECTION, EMULSION INTRAVENOUS at 07:05

## 2023-05-17 RX ADMIN — FAMOTIDINE 40 MG: 20 TABLET, FILM COATED ORAL at 08:05

## 2023-05-17 RX ADMIN — DIPHENHYDRAMINE HYDROCHLORIDE 50 MG: 25 CAPSULE ORAL at 08:05

## 2023-05-17 RX ADMIN — Medication 10 MG: at 11:05

## 2023-05-17 RX ADMIN — LIDOCAINE HYDROCHLORIDE 50 MG: 20 INJECTION, SOLUTION INTRAVENOUS at 07:05

## 2023-05-17 NOTE — H&P
History  Pt with hx of laminectomy L5-S1 in 2001   Long of of back pain going through LE   R /worse than L   N/T bilateral   Here for TLIF L4-S1     POA risk factors  coronary artery disease, peripheral artery disease, aortic valve replacement ,cardiomyopathy, stage 3 kidney disease, history of CVA, morbid obesity, BMI 39    The patient was informed of all benefits and potential risk of the operation including but not limited to:  Pain, infection, bleeding, coma, paralysis, death.  Cerebrospinal fluid leak, failure of any instrumentation, the need for additional procedures in the future. No guarantee was given that this procedure would alleviate all of the symptoms.     Thank you for the referral   Please call with any questions    Brandon Valencia MD  Neurosurgery     Disclaimer: This note was prepared using a voice recognition system and is likely to have sound alike errors within the text.      Medications:  Continuous Infusions:   dextrose 5 % and 0.9 % NaCl       Scheduled Meds:  PRN Meds:vancomycin (VANCOCIN) IVPB     Review of Systems   Constitutional:  Negative for activity change, appetite change and chills.   HENT:  Negative for hearing loss, sore throat and tinnitus.    Eyes:  Negative for pain, discharge and itching.   Cardiovascular:  Negative for chest pain.   Gastrointestinal:  Negative for abdominal pain.   Endocrine: Negative for cold intolerance and heat intolerance.   Genitourinary:  Negative for difficulty urinating and dysuria.   Musculoskeletal:  Positive for back pain and gait problem.   Allergic/Immunologic: Negative for environmental allergies.   Neurological:  Negative for dizziness, tremors, light-headedness and headaches.   Hematological:  Negative for adenopathy.   Psychiatric/Behavioral:  Negative for agitation, behavioral problems and confusion.    Objective:     Weight: 114.8 kg (253 lb)  Body mass index is 38.47 kg/m².  Vital Signs (Most Recent):  Temp: 97.7 °F (36.5 °C) (05/17/23  0642)  Pulse: 77 (05/17/23 0642)  Resp: 18 (05/17/23 0642)  BP: (!) 130/92 (05/17/23 0643)  SpO2: 99 % (05/17/23 0642) Vital Signs (24h Range):  Temp:  [97.7 °F (36.5 °C)] 97.7 °F (36.5 °C)  Pulse:  [77] 77  Resp:  [18] 18  SpO2:  [99 %] 99 %  BP: (130)/(92) 130/92                     Nursing note and vitals reviewed  Gen:Oriented to person, place, and time.             Appears stated age   Skin: no rashes or lesions identified   Head:Normocephalic and atraumatic.  Nose: Nose normal.    Eyes: EOM are normal. Pupils are equal, round, and reactive to light.   Neck: Neck supple. No masses or lesions palpated  Cardiovascular: Intact distal pulses.    Abdominal: Soft.   Neurological: Alert and oriented to person, place, and time.  No cranial nerve deficit.  Coordination normal. Normal muscle tone  Psychiatric: Normal mood and affect. Behavior is normal.      Back:       None  Paraspinal muscle spasms   None  Pain with flexion and extention   WNL  Range of motion    Neg  Straight leg raise     Motor   Right Right Left Left  Level Group   5  5  L2 Hip flexor (Psoas)   5  5  L3 Leg extension (Quads)   5  5  L4 Dorsiflexion & foot inversion (Tibialis Anterior)   5  5  L5 Great toe extension ( EHL)   5  5  S1 Foot eversion (Gastroc, PL & PB)     Sensation  NL Decreased (R/L/BL) Level Sensation    X  L2 Anterio-medial thigh   X  L3 Medial thigh around knee   X  L4 Medial foot   X  L5 Dorsum foot   X  S1 Lateral foot     Reflex  2+  Patellar tendon (L4)   2+  Achilles tendon (S1)                 Physical Exam         Neurosurgery Physical Exam    Significant Labs:  No results for input(s): GLU, NA, K, CL, CO2, BUN, CREATININE, CALCIUM, MG in the last 48 hours.  No results for input(s): WBC, HGB, HCT, PLT in the last 48 hours.  No results for input(s): LABPT, INR, APTT in the last 48 hours.  Microbiology Results (last 7 days)       ** No results found for the last 168 hours. **

## 2023-05-17 NOTE — ANESTHESIA PROCEDURE NOTES
Intubation    Date/Time: 5/17/2023 7:11 AM  Performed by: Kerline Vicente  Authorized by: Elvis José MD     Intubation:     Induction:  Intravenous    Intubated:  Postinduction    Mask Ventilation:  Easy with oral airway    Attempts:  1    Attempted By:  Student    Method of Intubation:  Direct    Blade:  Jade 3    Laryngeal View Grade: Grade IIA - cords partially seen      Difficult Airway Encountered?: No      Complications:  None    Airway Device:  Oral endotracheal tube    Airway Device Size:  7.5    Style/Cuff Inflation:  Cuffed (inflated to minimal occlusive pressure)    Tube secured:  2    Secured at:  The lips    Placement Verified By:  Capnometry    Complicating Factors:  None    Findings Post-Intubation:  BS equal bilateral

## 2023-05-17 NOTE — TRANSFER OF CARE
"Anesthesia Transfer of Care Note    Patient: Abdullahi Urias    Procedure(s) Performed: Procedure(s) (LRB):  FUSION, SPINE, LUMBAR, TLIF, USING COMPUTER-ASSISTED NAVIGATION (Bilateral)    Patient location: PACU    Anesthesia Type: general    Transport from OR: Transported from OR on room air with adequate spontaneous ventilation    Post pain: adequate analgesia    Post assessment: no apparent anesthetic complications and tolerated procedure well    Post vital signs: stable    Level of consciousness: awake, alert and oriented    Nausea/Vomiting: no nausea/vomiting    Complications: none    Transfer of care protocol was followed      Last vitals:   Visit Vitals  BP (!) 130/92   Pulse 77   Temp 36.5 °C (97.7 °F) (Temporal)   Resp 18   Ht 5' 8" (1.727 m)   Wt 114.8 kg (253 lb)   SpO2 99%   BMI 38.47 kg/m²     "
yes

## 2023-05-17 NOTE — ANESTHESIA POSTPROCEDURE EVALUATION
Anesthesia Post Evaluation    Patient: Abdullahi Urias    Procedure(s) Performed: Procedure(s) (LRB):  FUSION, SPINE, LUMBAR, TLIF, USING COMPUTER-ASSISTED NAVIGATION (Bilateral)    Final Anesthesia Type: general      Patient location during evaluation: PACU  Patient participation: Yes- Able to Participate  Level of consciousness: awake  Post-procedure vital signs: reviewed and stable  Pain management: adequate  Airway patency: patent    PONV status at discharge: No PONV  Anesthetic complications: no      Cardiovascular status: hemodynamically stable  Respiratory status: unassisted  Hydration status: euvolemic  Follow-up not needed.          Vitals Value Taken Time   /77 05/17/23 1501   Temp 36.6 °C (97.9 °F) 05/17/23 1300   Pulse 90 05/17/23 1514   Resp 65 05/17/23 1512   SpO2 98 % 05/17/23 1514   Vitals shown include unvalidated device data.      No case tracking events are documented in the log.      Pain/Shoshana Score: Pain Rating Prior to Med Admin: 10 (5/17/2023  1:37 PM)  Shoshana Score: 9 (5/17/2023  2:45 PM)

## 2023-05-17 NOTE — OP NOTE
[facility] ([facility])  Neurosurgery  Operative Note    SUMMARY      Date of Procedure: 5/17/2023     Procedure: Procedure(s) (LRB):  FUSION, SPINE, LUMBAR, TLIF, USING COMPUTER-ASSISTED NAVIGATION (Bilateral)     Surgeon(s) and Role:     * Brandon Valencia MD - Primary    Assisting Surgeon: None    Pre-Operative Diagnosis: Lumbar radiculopathy, chronic [M54.16]  Synovial cyst of lumbar spine [M71.38]  Lumbar stenosis with neurogenic claudication [M48.062]    Post-Operative Diagnosis: Post-Op Diagnosis Codes:     * Lumbar radiculopathy, chronic [M54.16]     * Synovial cyst of lumbar spine [M71.38]     * Lumbar stenosis with neurogenic claudication [M48.062]    Anesthesia: General    Operative Findings (including complications, if any):   Scar tissue from prior disc dissection L5-S1 right side  Facet disease with spondylolisthesis L4-5   Lateral recess stenosis with synovial cyst L4-5 resected  Severe stenosis bilaterally L4-S1  Description of Technical Procedures:   1. Laminectomy with removal of abnormal facets and resection of extradural mass consistent with synovial cyst L4-5  2. Combined arthrodesis posterior interbody and posterolateral technique L4-5 L5-S1  3. Placement of Pedicle Screws Medtronic Solera bilaterally using stereotactic   navigation bilaterally L4-S1  4. Insertion of Medtronic expandable interbody cage L4-5 and L5-S1   5. Use of synthetic bone   6. Use of autologous  7. Use of stereotactic navigation    Significant Surgical Tasks Conducted by the Assistant(s), if Applicable:   thecal sac retraction during interbody cage placement    Estimated Blood Loss (EBL): 200 mL           Specimens:   Specimen (24h ago, onward)      None             Implants:   Implant Name Type Inv. Item Serial No.  Lot No. LRB No. Used Action   [id number]  ALLOGRAFT Gear6 [id number] ORTHOFIX  N/A 1 Implanted   Interbody Cage, PL, short    [employer] [id number] N/A 1 Implanted   SPACER  SHORT CATALYFT PL 7MM - [id number]  SPACER SHORT CATALYFT PL 7MM  [employer] [facility] [id number] N/A 1 Implanted   SET SCREW HORIZON SOLERA 5.5-6 - [id number]  SET SCREW HORIZON SOLERA 5.5-6  [employer] [place]  N/A 6 Implanted   6.5 x 45 screw    [employer]  N/A 6 Implanted   60mm Sadnoval    [employer]  N/A 2 Implanted              Condition: Good    Disposition: PACU - hemodynamically stable.    Attestation: I was present and scrubbed for the entire procedure.    Procedure:        History:  Patient 68 yo male with hx of laminectomy and discectomy L5-S1   Presented to the clinic with recurrent symptoms. MRI showed scar tissue with compression and DDD L5-S1.  Spondylolisthesis with lateral projecting synovial cyst 4-5.    Patient agreed to L4-5 S1 TLIF with reduction of spondylolisthesis and synovial cyst L4-5     Surgical Risks:  The patient was well apprised of all objectives, benefits, risks and potential complications of the procedure, including but not limited to:  Worsening of current status, the possible need for further procedures, the risk of infection, headaches, CSF leak, possible spinal nerve injury resulting in paralysis, infection, injury to major vessels causing hemorrhage, stroke, loss of language function, coma and even death.  No assurance was given whether the symptoms would improve following the procedure.  Informed consent was obtained and secured to the chart after the patient voiced understanding of these risks and decided to proceed with the operation.     Description of procedure  The patient was transferred to the operating room and was given preoperative prophylactic IV antibiotics.  The Anesthesia Service sedated and intubated the patient.  The eyes were taped shut after ointment was applied to prevent corneal abrasion.  A Deana Hugger was placed over the upper body to maintain control of the core body temperature.  Dyer catheter was inserted.  The patient was turned prone on the Robert table.  All  pressure points were carefully padded.  The electrophysiology monitoring team inserted needles in their proper locations.     Operative Technique:  The patient was prepped and draped in the standard sterile fashion.  The C-arm fluoroscopy was draped and brought into the operative field and L4-S1 levels were identified.  Local anesthetic was infiltrated midline  The skin was subsequently opened sharply with a #10 blade.  Dissection was carried down superiorly and inferiorly in the midline to expose the supraspinous ligament and the lamina.  The musculature was elevated subperiosteally to expose the facets on the bilaterally with the Bovie electrocautery as well as the Abdi elevator.  Hemostasis was achieved.  Self retraining retractors were then inserted.  There was a large amount of scar tissue where the patient had prior laminotomy diskectomy at L5-S1       Next the stereotactic reference frame was fastened to the spinous process at the level above the planned procedure.  Sterile drapes were then prior placed around the operative site.  The O-arm navigation system was then brought into the operative field and intraoperative spin was done.  The O-arm was then removed.  Using stereotactic navigation a 5.5mm tap was used to create the trajectory for the pedicle screws at the above-mentioned levels bilaterally.  A ball-tip probe was used to feel down the pedicle tracts to make sure there was bone all the way around.  Next the lumbar pedicle screws were placed bilaterally using stereotactic navigation at L4, L5, and S1.  Once all the screws were in place each screw was stimulated to ensure that greater than 12 Volts require to produce EMG activity in the associated muscle group.    Next the medial facets were removed at L4-5 and L5-S1 on the right side for exposure to complete interbody cage placement.  Gentle retraction from the thecal sac was done by my assistant.  The disc was incised with a 15 blade.    The disc  space was then cleaned out using curved curettes and Hank and the endplates were prepped.Once the neural foraman was felt to be free and sufficiently decompressed the interbody portion of the surgery was commenced.  A trial interbody was placed to the interbody space then a interbody cage filled with synthetic and autograft bone  which were stripped of any remaining muscle and processed in the bone mill was placed into the interbody space at L4-5 and L5-S1. Proper placement and trajectory was confirmed with intraoperative fluoroscopy.  It was then expanded and locked into its final position.  Position confirmed with fluoroscopy     At this point, a high-speed drill was used to remove the spinous process, lamina, and medial facets up through the pars interarticularis bilaterally L4-S1.  There was a large synovial cyst on the right-sided L4-5 causing compression of the lateral recess.  Upgoing curette was used to make a plane between the synovial cyst in the thecal sac and was gently resected using the Kerrison's.   The thecal sac was completely decompressed and the nerve roots were visualized and the foramen remained appeared free bilaterally.     Next the inter screw distance was measured and a amrit was placed bilaterally into the poly axial screws. The spondylolisthesis L4-5 was reduced using sequential reducers.  Inner threaded caps were secured to the polyaxial screws to ensure fixation of the rods.   I secured the inner threaded caps to 80 feet/pounds with the final torquing wrench.     Next using a Abdi elevator the transverse processes bilaterally were exposed and decorticated using my assistance help.  The remaining bone chips from the patient's saved laminectomy bone were placed laterally for the posterolateral fusion with allograft bone chips.       The wound was copiously irrigated with antibiotic saline solution.  A 1/8 Hemovac drain was placed and brought out through a separate stab incision. 1 g of  vancomycin powder was placed to the surgical cavity.  The fascia was subsequently closed utilizing 0 Vicryl sutures.  The subcuticular layer was closed with a 2 0 Vicryl in an inverted fashion.  The skin was then approximated with staples.  The incision was dressed in a clean and dry dressing.     All sponge counts, needle counts and instrument counts were correct at the end of the case x 2.  The patient tolerated the procedure well without any complication and was transferred in a stable condition to the recovery room.      Brandon Valencia MD  Neurosurgery

## 2023-05-18 LAB
ANION GAP SERPL CALC-SCNC: 6 MMOL/L (ref 8–16)
BASOPHILS # BLD AUTO: 0.02 K/UL (ref 0–0.2)
BASOPHILS NFR BLD: 0.3 % (ref 0–1.9)
BUN SERPL-MCNC: 16 MG/DL (ref 8–23)
CALCIUM SERPL-MCNC: 8.6 MG/DL (ref 8.7–10.5)
CHLORIDE SERPL-SCNC: 106 MMOL/L (ref 95–110)
CO2 SERPL-SCNC: 25 MMOL/L (ref 23–29)
CREAT SERPL-MCNC: 1.5 MG/DL (ref 0.5–1.4)
DIFFERENTIAL METHOD: ABNORMAL
EOSINOPHIL # BLD AUTO: 0 K/UL (ref 0–0.5)
EOSINOPHIL NFR BLD: 0.3 % (ref 0–8)
ERYTHROCYTE [DISTWIDTH] IN BLOOD BY AUTOMATED COUNT: 13.4 % (ref 11.5–14.5)
EST. GFR  (NO RACE VARIABLE): 51 ML/MIN/1.73 M^2
GLUCOSE SERPL-MCNC: 109 MG/DL (ref 70–110)
HCT VFR BLD AUTO: 31.3 % (ref 40–54)
HGB BLD-MCNC: 10.2 G/DL (ref 14–18)
IMM GRANULOCYTES # BLD AUTO: 0.02 K/UL (ref 0–0.04)
IMM GRANULOCYTES NFR BLD AUTO: 0.3 % (ref 0–0.5)
LYMPHOCYTES # BLD AUTO: 0.9 K/UL (ref 1–4.8)
LYMPHOCYTES NFR BLD: 11.3 % (ref 18–48)
MCH RBC QN AUTO: 27.9 PG (ref 27–31)
MCHC RBC AUTO-ENTMCNC: 32.6 G/DL (ref 32–36)
MCV RBC AUTO: 86 FL (ref 82–98)
MONOCYTES # BLD AUTO: 0.5 K/UL (ref 0.3–1)
MONOCYTES NFR BLD: 5.9 % (ref 4–15)
NEUTROPHILS # BLD AUTO: 6.3 K/UL (ref 1.8–7.7)
NEUTROPHILS NFR BLD: 81.9 % (ref 38–73)
NRBC BLD-RTO: 0 /100 WBC
PLATELET # BLD AUTO: 123 K/UL (ref 150–450)
PMV BLD AUTO: 10.4 FL (ref 9.2–12.9)
POTASSIUM SERPL-SCNC: 4.6 MMOL/L (ref 3.5–5.1)
RBC # BLD AUTO: 3.66 M/UL (ref 4.6–6.2)
SODIUM SERPL-SCNC: 137 MMOL/L (ref 136–145)
WBC # BLD AUTO: 7.67 K/UL (ref 3.9–12.7)

## 2023-05-18 PROCEDURE — 11000001 HC ACUTE MED/SURG PRIVATE ROOM

## 2023-05-18 PROCEDURE — 27000221 HC OXYGEN, UP TO 24 HOURS

## 2023-05-18 PROCEDURE — 63600175 PHARM REV CODE 636 W HCPCS: Performed by: PHYSICIAN ASSISTANT

## 2023-05-18 PROCEDURE — 94761 N-INVAS EAR/PLS OXIMETRY MLT: CPT

## 2023-05-18 PROCEDURE — 36415 COLL VENOUS BLD VENIPUNCTURE: CPT | Performed by: PHYSICIAN ASSISTANT

## 2023-05-18 PROCEDURE — 85025 COMPLETE CBC W/AUTO DIFF WBC: CPT | Performed by: PHYSICIAN ASSISTANT

## 2023-05-18 PROCEDURE — 97530 THERAPEUTIC ACTIVITIES: CPT

## 2023-05-18 PROCEDURE — 25000003 PHARM REV CODE 250: Performed by: NURSE PRACTITIONER

## 2023-05-18 PROCEDURE — 25000003 PHARM REV CODE 250

## 2023-05-18 PROCEDURE — 80048 BASIC METABOLIC PNL TOTAL CA: CPT | Performed by: PHYSICIAN ASSISTANT

## 2023-05-18 PROCEDURE — 97161 PT EVAL LOW COMPLEX 20 MIN: CPT

## 2023-05-18 PROCEDURE — 25000003 PHARM REV CODE 250: Performed by: PHYSICIAN ASSISTANT

## 2023-05-18 PROCEDURE — 97165 OT EVAL LOW COMPLEX 30 MIN: CPT

## 2023-05-18 RX ORDER — HYDROXYZINE PAMOATE 25 MG/1
25 CAPSULE ORAL ONCE
Status: COMPLETED | OUTPATIENT
Start: 2023-05-18 | End: 2023-05-18

## 2023-05-18 RX ORDER — DIPHENHYDRAMINE HCL 12.5MG/5ML
25 LIQUID (ML) ORAL EVERY 6 HOURS PRN
Status: DISCONTINUED | OUTPATIENT
Start: 2023-05-18 | End: 2023-05-20 | Stop reason: HOSPADM

## 2023-05-18 RX ADMIN — Medication: at 12:05

## 2023-05-18 RX ADMIN — DIPHENHYDRAMINE HYDROCHLORIDE 25 MG: 2.5 LIQUID ORAL at 06:05

## 2023-05-18 RX ADMIN — THERA TABS 1 TABLET: TAB at 09:05

## 2023-05-18 RX ADMIN — ATORVASTATIN CALCIUM 80 MG: 40 TABLET, FILM COATED ORAL at 08:05

## 2023-05-18 RX ADMIN — SENNOSIDES AND DOCUSATE SODIUM 2 TABLET: 50; 8.6 TABLET ORAL at 05:05

## 2023-05-18 RX ADMIN — VANCOMYCIN HYDROCHLORIDE 1500 MG: 1.5 INJECTION, POWDER, LYOPHILIZED, FOR SOLUTION INTRAVENOUS at 08:05

## 2023-05-18 RX ADMIN — SODIUM CHLORIDE: 9 INJECTION, SOLUTION INTRAVENOUS at 07:05

## 2023-05-18 RX ADMIN — DIPHENHYDRAMINE HYDROCHLORIDE 50 MG: 25 CAPSULE ORAL at 08:05

## 2023-05-18 RX ADMIN — OXYCODONE AND ACETAMINOPHEN 1 TABLET: 10; 325 TABLET ORAL at 09:05

## 2023-05-18 RX ADMIN — HYDROMORPHONE HYDROCHLORIDE 2 MG: 2 INJECTION INTRAMUSCULAR; INTRAVENOUS; SUBCUTANEOUS at 01:05

## 2023-05-18 RX ADMIN — FAMOTIDINE 40 MG: 20 TABLET, FILM COATED ORAL at 08:05

## 2023-05-18 RX ADMIN — CARVEDILOL 12.5 MG: 12.5 TABLET, FILM COATED ORAL at 05:05

## 2023-05-18 RX ADMIN — DOCUSATE SODIUM 100 MG: 100 CAPSULE, LIQUID FILLED ORAL at 09:05

## 2023-05-18 RX ADMIN — DIPHENHYDRAMINE HYDROCHLORIDE 25 MG: 2.5 LIQUID ORAL at 12:05

## 2023-05-18 RX ADMIN — HYDROXYZINE PAMOATE 25 MG: 25 CAPSULE ORAL at 09:05

## 2023-05-18 RX ADMIN — GABAPENTIN 600 MG: 300 CAPSULE ORAL at 08:05

## 2023-05-18 RX ADMIN — HYDROMORPHONE HYDROCHLORIDE 2 MG: 2 INJECTION INTRAMUSCULAR; INTRAVENOUS; SUBCUTANEOUS at 05:05

## 2023-05-18 NOTE — HPI
Mr. Urias is a 67-year-old  male with PMH significant for HTN, hyperlipidemia, CKD III, chronic lumbar radiculopathy, severe stenosis with neurogenic claudication, was admitted to Dr. Valencia's service, patient successfully underwent lumbar fusion surgery earlier today.  Hospital medicine consulted for routine medical management.  Patient denies CP, SOB, N/V.  Currently on Dilaudid PCA pump, alert, oriented x3.  In no acute distress.  No family at the bedside.  Laboratory workup done one month ago (preop), reveals creatinine 1.5, appears to be chronic.

## 2023-05-18 NOTE — PROGRESS NOTES
[facility] Medicine  Progress Note    Patient Name: Abdullahi Urias  MRN: [id number]  Patient Class: OP- Outpatient Recovery   Admission Date: 5/17/2023  Length of Stay: 1 days  Attending Physician: Brandon Valencia MD  Primary Care Provider: Reji Valentin MD        Subjective:     Principal Problem:Lumbar stenosis with neurogenic claudication        HPI:  Mr. Urias is a 67-year-old  male with PMH significant for HTN, hyperlipidemia, CKD III, chronic lumbar radiculopathy, severe stenosis with neurogenic claudication, was admitted to Dr. Valencia's service, patient successfully underwent lumbar fusion surgery earlier today.  Hospital medicine consulted for routine medical management.  Patient denies CP, SOB, N/V.  Currently on Dilaudid PCA pump, alert, oriented x3.  In no acute distress.  No family at the bedside.  Laboratory workup done one month ago (preop), reveals creatinine 1.5, appears to be chronic.      Overview/Hospital Course:  Mr. Urias is a 67-year-old  male with PMH significant for HTN, hyperlipidemia, CKD III, chronic lumbar radiculopathy, severe stenosis with neurogenic claudication, was admitted to Dr. Valencia's service, patient successfully underwent lumbar fusion surgery on 5/17;  Hospital medicine consulted for routine medical management.    Postop patient has been placed on PCA pump;   Neurosurgery primary on patient;              Interval History:     No acute events overnight  HV drain in place with output approximately 650 cc overnight   Still complaining of 6 to 7/10 pain   Denied headache, dizziness, chest pain, shortness OO breath, bowel or bladder issues.    Needs to get LSO brace  To follow up with PT OT       Review of Systems    Constitutional: Negative.  Negative for chills and fever.   HENT: Negative.  Negative for congestion, rhinorrhea, sore throat and trouble swallowing.    Eyes: Negative.  Negative for visual disturbance.   Respiratory:  Negative.  Negative for cough, shortness of breath and wheezing.    Cardiovascular: Negative.  Negative for chest pain and palpitations.   Gastrointestinal: Negative.  Negative for abdominal pain, diarrhea, nausea and vomiting.   Endocrine: Negative.    Genitourinary: Negative.  Negative for dysuria and flank pain.   Musculoskeletal:  Positive for back pain.   Skin: Negative.  Negative for rash.   Allergic/Immunologic: Negative.    Neurological: Negative.  Negative for speech difficulty, weakness, numbness and headaches.   Hematological: Negative.    Psychiatric/Behavioral: Negative.  Negative for hallucinations.        Objective:     Vital Signs (Most Recent):  Temp: 96.7 °F (35.9 °C) (05/18/23 1131)  Pulse: 95 (05/18/23 1131)  Resp: 18 (05/18/23 1131)  BP: 125/80 (05/18/23 1131)  SpO2: 97 % (05/18/23 1131) Vital Signs (24h Range):  Temp:  [96.7 °F (35.9 °C)-97.9 °F (36.6 °C)] 96.7 °F (35.9 °C)  Pulse:  [64-98] 95  Resp:  [5-26] 18  SpO2:  [96 %-100 %] 97 %  BP: ()/(60-90) 125/80     Weight: 115.6 kg (254 lb 13.6 oz)  Body mass index is 38.75 kg/m².    Intake/Output Summary (Last 24 hours) at 5/18/2023 1257  Last data filed at 5/18/2023 0909  Gross per 24 hour   Intake 666.95 ml   Output 1050 ml   Net -383.05 ml         Physical Exam      Constitutional:       General: He is awake. He is not in acute distress.     Appearance: He is obese. He is not ill-appearing.   HENT:      Head: Normocephalic and atraumatic.      Mouth/Throat:      Mouth: Mucous membranes are moist.   Eyes:      General: No scleral icterus.     Conjunctiva/sclera: Conjunctivae normal.   Cardiovascular:      Rate and Rhythm: Normal rate and regular rhythm.      Heart sounds: No murmur heard.  Pulmonary:      Effort: Pulmonary effort is normal. No respiratory distress.      Breath sounds: Normal breath sounds. No wheezing.   Abdominal:      Palpations: Abdomen is soft.      Tenderness: There is no abdominal tenderness.   Musculoskeletal:          General: No swelling.      Cervical back: Normal range of motion and neck supple.      Comments: Bandage lumbar spine region, with drain in place. wound site appears intact, no erythema  Skin:     General: Skin is warm.   Neurological:      General: No focal deficit present.      Mental Status: He is alert and oriented to person, place, and time. Mental status is at baseline.   Psychiatric:         Attention and Perception: Attention normal.         Speech: Speech normal.         Behavior: Behavior is cooperative.           Significant Labs: All pertinent labs within the past 24 hours have been reviewed.  CBC:   Recent Labs   Lab 05/18/23  0600   WBC 7.67   HGB 10.2*   HCT 31.3*   *     CMP:   Recent Labs   Lab 05/18/23  0600      K 4.6      CO2 25      BUN 16   CREATININE 1.5*   CALCIUM 8.6*   ANIONGAP 6*       Significant Imaging:            Assessment/Plan:      * Lumbar stenosis with neurogenic claudication  -status post lumbar fusion surgery 5/17/23.    -defer further management to Dr. Valencia.    -currently on Dilaudid PCA pump.      5/18    PCA pump discontinued, transition to p.o. pain medications   LSO brace  PT OT  Neurosurgery on board         Lumbar radiculopathy, chronic  -status post lumbar fusion surgery earlier today.      CKD (chronic kidney disease) stage 3, GFR 30-59 ml/min  -creatinine elevated 1.5, appears to be chronic.    -monitor      S/P AVR (aortic valve replacement) biopresthetic miller 25 mm in 2014          Hypertension  -continue losartan, Coreg  -monitor      VTE Risk Mitigation (From admission, onward)         Ordered     IP VTE HIGH RISK PATIENT  Once         05/17/23 1300     Place sequential compression device  Until discontinued         05/17/23 1300     Place sequential compression device  Until discontinued         05/17/23 0619                Discharge Planning   AURELIA:      Code Status: Prior   Is the patient medically ready for discharge?:      Reason for patient still in hospital (select all that apply): monitor clinical improvement; NS f/u;   Discharge Plan A: Home with family                  Yi Leija MD  Department of Hospital Medicine   [facility]

## 2023-05-18 NOTE — PLAN OF CARE
Discussed poc with pt, pt verbalized understanding    Purposeful rounding every 2hours    VS wnl  Fall precautions in place, remains injury free  Pain and nausea under control with PRN meds. PCA discontinued.    IVFs  Accurate I&Os  Abx given as prescribed  Bed locked at lowest position  Call light within reach    Chart check complete  Will cont with POC

## 2023-05-18 NOTE — ASSESSMENT & PLAN NOTE
-status post lumbar fusion surgery 5/17/23.    -defer further management to Dr. Valencia.    -currently on Dilaudid PCA pump.      5/18    PCA pump discontinued, transition to p.o. pain medications   LSO brace  PT OT  Neurosurgery on board

## 2023-05-18 NOTE — PT/OT/SLP EVAL
Occupational Therapy Evaluation and Treatment    Name: Abdullahi Urias  MRN: [id number]  Admitting Diagnosis: Lumbar stenosis with neurogenic claudication 1 Day Post-Op  Recent Surgery: Procedure(s) (LRB):  FUSION, SPINE, LUMBAR, TLIF, USING COMPUTER-ASSISTED NAVIGATION (Bilateral) 1 Day Post-Op    Recommendations:     Discharge Recommendations:    Level of Assistance Recommended: 24 hours significant assistance  Discharge Equipment Recommendations: none  Barriers to discharge:      Assessment:     Abdullahi Urias is a 67 y.o. male with a medical diagnosis of Lumbar stenosis with neurogenic claudication. He presents with performance deficits affecting function including gait instability, weakness, decreased upper extremity function, decreased ROM, impaired endurance, impaired balance, impaired self care skills, impaired functional mobility, decreased safety awareness, decreased lower extremity function.   Rehab Prognosis: Good; patient would benefit from acute OT services to address these deficits and reach maximum level of function.    Plan:     Patient to be seen   to address the above listed problems via self-care/home management, therapeutic activities, therapeutic exercises  Plan of Care Expires:    Plan of Care Reviewed with:      Subjective     Chief Complaint: DEBILITY AND GENERALIZED WEAKNESS  Patient Comments/Goals:   Pain/Comfort:  Pain Rating 1: 7/10  Location - Orientation 1: generalized  Location 1: back    Patients cultural, spiritual, Anabaptism conflicts given the current situation:      Social History:  Living Environment: Patient lives with their spouse in a single story home with number of outside stair(s): 0  Prior Level of Function: Prior to admission, patient was independent.  Roles and Routines: Patient was driving and not working prior to admission.  Equipment Used at Home: cane, straight, rollator (ELECTRIC SCOOTER)  DME owned (not currently used): none  Assistance Upon Discharge:  family    Objective:     Communicated with NURSE AND Epic CHART REVIEW prior to session. Patient found HOB elevated with telemetry, hemovac, peripheral IV upon OT entry to room.    General Precautions: Standard, fall   Orthopedic Precautions: spinal precautions   Braces: LSO    Respiratory Status: Room air    Occupational Performance    Gait belt applied - No    Functional Mobility/Transfers:  Sit <> Stand Transfer with minimum assistance with hand-held assist  Bed <> Chair Transfer using Step Transfer technique with minimum assistance with hand-held assist  Functional Mobility: PT REQ MIN A WITH PRE-GAIT ACTIVITY    Activities of Daily Living:  Upper Body Dressing: minimum assistance  Lower Body Dressing: maximal assistance    Cognitive/Visual Perceptual:  Cognitive/Psychosocial Skills:    -     Oriented to: Person, Place, Time, Situation  -     Follows Commands/attention: Follows multistep  commands  -     Communication: clear/fluent  -     Memory: No Deficits noted  -     Safety awareness/insight to disability: intact    Physical Exam:  Upper Extremity Range of Motion:     -       Right Upper Extremity: WFL  -       Left Upper Extremity: WFL    AMPAC 6 Click ADL:  AMPAC Total Score: 16    Treatment & Education:  Educated on the importance of mobility to maximize recovery  Educated on the importance of OOB mobility within safe range in order to decrease adverse effects of prolonged bedrest  Will continue to educate as needed  Patient is clear to stand pivot transfer with RN/PCT, assist xMIN A  for OOB mobility with RN  Pt recited 2/3 spina; precautions. Pt educated of all spinal precaution    Patient clear to stand pivot transfer with RN/PCT, assist xMIN A  .    Patient left up in chair with all lines intact, call button in reach, and spouse present.    GOALS:   Multidisciplinary Problems       Occupational Therapy Goals          Problem: Occupational Therapy    Goal Priority Disciplines Outcome Interventions    Occupational Therapy Goal     OT, PT/OT     Description: O.T GOAL TO BE MET ON 6-1-23  PT WILL REQ MOD (I)  WITH KEVON LSO  MOD (I) WITH TOILET T/F'S  MIN A WITH LE DRESSING                        History:     Past Medical History:   Diagnosis Date    Aortic stenosis     [facility]    Asthma     BPH (benign prostatic hyperplasia)     CAD (coronary artery disease)     Cardiomyopathy     CHF (congestive heart failure)     Chronic hoarseness     vocal cord surg    Chronic pain     CKD (chronic kidney disease) stage 3, GFR 30-59 ml/min     CVA (cerebral infarction)     8/2012 olol; reviewed ed note    Ex-smoker     GERD (gastroesophageal reflux disease)     Hepatitis C     treatedharvoni says cured, RNA NEG 6/2020    Hypertension     Pancreatitis     Prostate cancer     Prostate cancer     Prostate cancer     PVD (peripheral vascular disease)     Renal insufficiency     Substance abuse     cocaine, etoh , tob in past         Past Surgical History:   Procedure Laterality Date    AORTIC VALVE REPLACEMENT  05/19/2014    Tissue valve replacement    BACK SURGERY      CARDIAC CATHETERIZATION      COLONOSCOPY  2011    COLONOSCOPY N/A 2/24/2021    Procedure: COLONOSCOPY;  Surgeon: Faith Carrillo MD;  Location: [facility] ENDO;  Service: Endoscopy;  Laterality: N/A;    EPIDURAL STEROID INJECTION INTO CERVICAL SPINE N/A 6/21/2022    Procedure: C7-T1 IL PIEDAD;  Surgeon: Nehemiah Carmen MD;  Location: [facility] PAIN MGT;  Service: Pain Management;  Laterality: N/A;    ESOPHAGOGASTRODUODENOSCOPY N/A 2/24/2021    Procedure: ESOPHAGOGASTRODUODENOSCOPY (EGD);  Surgeon: Faith Carrillo MD;  Location: [facility] ENDO;  Service: Endoscopy;  Laterality: N/A;    FOOT SURGERY      LEFT HEART CATHETERIZATION Left 7/24/2020    Procedure: CATHETERIZATION, HEART, LEFT;  Surgeon: Pasha Martin MD;  Location: [facility] CATH LAB;  Service: Cardiology;  Laterality: Left;    PENILE PROSTHESIS IMPLANT      PENILE PROSTHESIS REVISION  04/30/2018     PROSTATECTOMY  09/2019    [facility]    radiation for prostate      TRANSFORAMINAL EPIDURAL INJECTION OF STEROID Right 10/20/2022    Procedure: Right  L4/5 + L5/S1 TF PIEDAD RN IV Sedation;  Surgeon: Nehemiah Carmen MD;  Location: [facility];  Service: Pain Management;  Laterality: Right;    TRANSFORAMINAL LUMBAR INTERBODY FUSION (TLIF) USING COMPUTER-ASSISTED NAVIGATION Bilateral 5/17/2023    Procedure: FUSION, SPINE, LUMBAR, TLIF, USING COMPUTER-ASSISTED NAVIGATION;  Surgeon: Brandon Valencia MD;  Location: [facility];  Service: Neurosurgery;  Laterality: Bilateral;  2 level lumbar fusion at L4-5 and L5-S1    UPPER GASTROINTESTINAL ENDOSCOPY  2011       Time Tracking:     OT Date of Treatment: 05/18/23  OT Start Time: 1130  OT Stop Time: 1200  OT Total Time (min): 30 min    Billable Minutes: Evaluation 15 MINUTES and Therapeutic Activity 15 MINUTES    5/18/2023

## 2023-05-18 NOTE — HOSPITAL COURSE
Mr. Urias is a 67-year-old  male with PMH significant for HTN, hyperlipidemia, CKD III, chronic lumbar radiculopathy, severe stenosis with neurogenic claudication, was admitted to Dr. Valencia's service, patient successfully underwent lumbar fusion surgery on 5/17;  Hospital medicine consulted for routine medical management.    Postop patient has been placed on PCA pump;   Neurosurgery primary on patient;    On 05/20, patient was evaluated at bedside, patient denied acute issues overnight, stated pain controlled on current regimen.    Neurosurgery evaluated and plan for discharge today on pain medications, muscle relaxants, stool softeners.    Patient was recommended to follow up outpatient with primary care, Neurosurgery compliance with medications upon discharge.

## 2023-05-18 NOTE — PROGRESS NOTES
Progress Note  Neurospine    Admit Date: 5/17/2023  Post-operative Day: 1 Day Post-Op  Hospital Day: 2    SUBJECTIVE:     Follow-up For:  Procedure(s) (LRB):  FUSION, SPINE, LUMBAR, TLIF, USING COMPUTER-ASSISTED NAVIGATION (Bilateral)  Pt seen this morning.  No acute events overnight.  HV drain output recorded at 650 mL overnight  Still on PCA this morning but reports 7/10 pain  Pt denies any new N/T/ weakness  He does not have LSO brace yet but it is suppose to be brought to him today.  He reports some itching and would like some liquid benadryl.  Denies HA, SOB, CP or any other symptoms at this time.      Scheduled Meds:   atorvastatin  80 mg Oral Daily    carvediloL  12.5 mg Oral BID WM    docusate sodium  100 mg Oral Daily    famotidine  40 mg Oral QHS    gabapentin  600 mg Oral BID    losartan  50 mg Oral Daily    multivitamin  1 tablet Oral Daily    vancomycin (VANCOCIN) IVPB  1,500 mg Intravenous Q12H     Continuous Infusions:   sodium chloride 0.9% Stopped (05/17/23 1852)    dextrose 5 % and 0.9 % NaCl      hydromorphone in 0.9 % NaCl 6 mg/30 ml       PRN Meds:acetaminophen, albuterol sulfate, aluminum-magnesium hydroxide-simethicone, diphenhydrAMINE, HYDROmorphone, methocarbamoL, naloxone, ondansetron, oxyCODONE-acetaminophen, oxyCODONE-acetaminophen, oxyCODONE-acetaminophen, prochlorperazine, senna-docusate 8.6-50 mg, sodium chloride 0.9%    Review of patient's allergies indicates:  No Known Allergies    OBJECTIVE:     Vital Signs (Most Recent)  Temp: 97.7 °F (36.5 °C) (05/18/23 0729)  Pulse: 66 (05/18/23 0729)  Resp: 15 (05/18/23 0729)  BP: 93/62 (05/18/23 0729)  SpO2: 99 % (05/18/23 0729)    Vital Signs Range (Last 24H):  Temp:  [97.6 °F (36.4 °C)-97.9 °F (36.6 °C)]   Pulse:  [64-98]   Resp:  [5-26]   BP: ()/(60-90)   SpO2:  [96 %-100 %]     I & O (Last 24H):  Intake/Output Summary (Last 24 hours) at 5/18/2023 0759  Last data filed at 5/18/2023 0722  Gross per 24 hour   Intake 2458.95 ml   Output  1750 ml   Net 708.95 ml     Physical Exam:  Neurosurgery Physical Exam  Vitals and labs reviewed  Moves all 4 ext well  Wound/Incision:clean, dry, intact, no drainage.   There is no swelling, erythema or fluctuance.  HV drain in place  New dressing placed. Staples still in tact.      Lines/Drains:       Peripheral IV - Single Lumen 05/17/23 0629 (Active)   Site Assessment Clean;Intact;Dry;No redness;No swelling 05/18/23 0300   Extremity Assessment Distal to IV No warmth;No swelling;No redness;No abnormal discoloration 05/18/23 0300   Line Status Saline locked 05/18/23 0300   Dressing Status Clean;Dry;Intact 05/18/23 0300   Dressing Intervention Integrity maintained 05/18/23 0300   Dressing Change Due 05/21/23 05/18/23 0300   Number of days: 1            Peripheral IV - Single Lumen 05/17/23 0648 20 G Anterior;Left Forearm (Active)   Site Assessment Clean;Dry;Intact;No redness;No swelling 05/18/23 0300   Extremity Assessment Distal to IV No warmth;No swelling;No redness;No abnormal discoloration 05/18/23 0300   Line Status Infusing 05/18/23 0300   Dressing Status Clean;Dry;Intact 05/18/23 0300   Dressing Intervention Integrity maintained 05/18/23 0300   Dressing Change Due 05/21/23 05/18/23 0300   Number of days: 1            Closed/Suction Drain 05/17/23 1222 Medial Back Accordion 10 Fr. (Active)   Site Description Unable to view 05/18/23 0300   Dressing Type Other (Comment) 05/18/23 0300   Dressing Status Clean;Dry;Intact 05/18/23 0300   Dressing Intervention Integrity maintained 05/18/23 0300   Drainage Bloody 05/18/23 0300   Status To bulb suction 05/18/23 0300   Output (mL) 240 mL 05/18/23 0300   Number of days: 0         Laboratory:  I have reviewed all pertinent lab results within the past 24 hours.  CBC:   Recent Labs   Lab 05/18/23  0600   WBC 7.67   RBC 3.66*   HGB 10.2*   HCT 31.3*   *   MCV 86   MCH 27.9   MCHC 32.6       Diagnostic Results:  Post-op films show good hardware  position.  EXAMINATION:  XR LUMBAR SPINE AP AND LATERAL     CLINICAL HISTORY:  s/p tlif;  Radiculopathy, lumbar region     TECHNIQUE:  Three views of the lumbar spine were performed.     COMPARISON:  03/30/2023     FINDINGS:  Alignment: Alignment is maintained.     Vertebrae: Vertebral body heights are maintained.  No suspicious appearing lytic or blastic lesions.     Discs and facets: Disc heights are maintained. Facet joints are unremarkable.     Miscellaneous: Posterior lumbar fixation device with intervertebral disc spacers L4 through S1.  Skin staples noted.     Impression:     No acute abnormality.    ASSESSMENT/PLAN:     Assessment: patient neurologically stable.    Plan:   -discussed that he will work with PT/OT this morning.  -HV drain left in place, placed new dressing.  -LSO brace to be used with sitting up in bed and working with PT/OT.  -liquid benadryl ordered for itching.  -PCA dc this morning let pt know he will be switched over to oral pain medications.  -will consider discharging pt tomorrow.        Will continue to monitor. Please update      Vanessa Castillo PA-C  Neurosurgery-[place]

## 2023-05-18 NOTE — PLAN OF CARE
Problem: Adult Inpatient Plan of Care  Goal: Plan of Care Review  Outcome: Ongoing, Progressing     Problem: Adult Inpatient Plan of Care  Goal: Patient-Specific Goal (Individualized)  5/18/2023 0200 by Teresa Salazar LPN  Outcome: Ongoing, Progressing  5/18/2023 0159 by Teresa Salazar LPN  Flowsheets (Taken 5/18/2023 0159)  Anxieties, Fears or Concerns: pain control  Individualized Care Needs: snacks     Problem: Infection  Goal: Absence of Infection Signs and Symptoms  Outcome: Ongoing, Progressing     Problem: Fall Injury Risk  Goal: Absence of Fall and Fall-Related Injury  Outcome: Ongoing, Progressing     Problem: Pain Acute  Goal: Acceptable Pain Control and Functional Ability  Outcome: Ongoing, Progressing     Problem: Skin Injury Risk Increased  Goal: Skin Health and Integrity  Outcome: Ongoing, Progressing       Discussed POC with pt, verbalized understanding.  Patient remains free from injury. Safety precautions maintained. No s/s of acute distress.  Purposeful rounding every two hours.   Pain controlled per MD order. IVF and PCA infusing.   Diet orders continued, pt diet: cardiac  Vital signs continued per orders this shift.   Q2 turning done independently  Neurovascular checks q4 continued this shift.   Drain care continued this shift.   Chart and orders review completed. Pt education about care completed.

## 2023-05-18 NOTE — PT/OT/SLP EVAL
Physical Therapy Evaluation    Patient Name:  Abdullahi Urias   MRN:  [id number]    Recommendations:     Discharge Recommendations:     Discharge Equipment Recommendations: none   Barriers to discharge: None    Assessment:     Abdullahi Urias is a 67 y.o. male admitted with a medical diagnosis of Lumbar stenosis with neurogenic claudication.  He presents with the following impairments/functional limitations: weakness, impaired endurance, impaired functional mobility, impaired balance, gait instability, decreased lower extremity function, pain, decreased ROM .    Rehab Prognosis: Good; patient would benefit from acute skilled PT services to address these deficits and reach maximum level of function.    Recent Surgery: Procedure(s) (LRB):  FUSION, SPINE, LUMBAR, TLIF, USING COMPUTER-ASSISTED NAVIGATION (Bilateral) 1 Day Post-Op    Plan:     During this hospitalization, patient to be seen 3 x/week to address the identified rehab impairments via gait training, therapeutic activities, therapeutic exercises and progress toward the following goals:    Plan of Care Expires:  06/01/23    Subjective     Chief Complaint: PAIN   Patient/Family Comments/goals: INC MOBILITY   Pain/Comfort:  Pain Rating 1: 7/10  Location 1: back  Pain Rating Post-Intervention 1: 7/10    Patients cultural, spiritual, Jainism conflicts given the current situation:      Living Environment:  PT LIVES AT HOME WITH WIFE IN A ONE STORY HOME WITH NO STEPS   Prior to admission, patients level of function was MOD I WITH ROLLATOR .  Equipment used at home: rollator, cane, straight.  DME owned (not currently used): single point cane.  Upon discharge, patient will have assistance from WIFE .    Objective:     Communicated with NURSE GILES AND Epic CHART REVIEW  prior to session.  Patient found up in chair with peripheral IV, telemetry, hemovac  upon PT entry to room.    General Precautions: Standard, fall, other (see comments) (SPINAL)  Orthopedic  "Precautions:spinal precautions   Braces: LSO  Respiratory Status: Room air    Exams:  Cognitive Exam:  Patient is oriented to Person, Place, Time, and Situation  RLE ROM: WFL  RLE Strength: WFL  LLE ROM: WFL  LLE Strength: WFL    Functional Mobility:  Bed Mobility:     Rolling Left:  stand by assistance  Supine to Sit: stand by assistance  Transfers:     Sit to Stand:  stand by assistance with rolling walker  Bed to Chair: stand by assistance with  rolling walker  using  Stand Pivot  Gait: pt gt trained x 240' with rw and cga>sba      AM-PAC 6 CLICK MOBILITY  Total Score:18       Treatment & Education:  Pt returned to  t/f to chair for rest break. Pt educated on spinal precautions NO BENDING, LIFTING AND TWISTING. P.t. also educated pt on donning and doffing LSO. PT EDUCATED ON RISK FOR FALLS DUE TO GENERALIZED WEAKNESS, EDUCATED ON "CALL DON'T FALL", ENCOURAGED TO CALL FOR ASSISTANCE WITH ALL NEEDS SUCH AS BED<>CHAIR TRANSFERS OR TRIPS TO BATHROOM. Pt agreed.       Patient left up in chair with call button in reach.    GOALS:   Multidisciplinary Problems       Physical Therapy Goals          Problem: Physical Therapy    Goal Priority Disciplines Outcome Goal Variances Interventions   Physical Therapy Goal     PT, PT/OT      Description: LT23  1. PT WILL COMPLETE BED MOBILITY MARYBEL  2. PT WILL T/F TO CHAIR WITH RW MOD I  3. PT WILL GT TRAIN X 450' MOD I  4. PT WILL RECALL SPINAL PRECAUTIONS IND                       History:     Past Medical History:   Diagnosis Date    [facility]    Asthma     BPH (benign prostatic hyperplasia)     CAD (coronary artery disease)     Cardiomyopathy     CHF (congestive heart failure)     Chronic hoarseness     vocal cord surg    Chronic pain     CKD (chronic kidney disease) stage 3, GFR 30-59 ml/min     CVA (cerebral infarction)     2012 olol; reviewed ed note    Ex-smoker     GERD (gastroesophageal reflux disease)     Hepatitis C     " fantasma says cured, RNA NEG 6/2020    Hypertension     Pancreatitis     Prostate cancer     Prostate cancer     Prostate cancer     PVD (peripheral vascular disease)     Renal insufficiency     Substance abuse     cocaine, etoh , tob in past       Past Surgical History:   Procedure Laterality Date    AORTIC VALVE REPLACEMENT  05/19/2014    Tissue valve replacement    BACK SURGERY      CARDIAC CATHETERIZATION      COLONOSCOPY  2011    COLONOSCOPY N/A 2/24/2021    Procedure: COLONOSCOPY;  Surgeon: Faith Carrillo MD;  Location: [facility] ENDO;  Service: Endoscopy;  Laterality: N/A;    EPIDURAL STEROID INJECTION INTO CERVICAL SPINE N/A 6/21/2022    Procedure: C7-T1 IL PIEDAD;  Surgeon: Nehemiah Carmen MD;  Location: [facility] PAIN MGT;  Service: Pain Management;  Laterality: N/A;    ESOPHAGOGASTRODUODENOSCOPY N/A 2/24/2021    Procedure: ESOPHAGOGASTRODUODENOSCOPY (EGD);  Surgeon: Faith Carrillo MD;  Location: [facility];  Service: Endoscopy;  Laterality: N/A;    FOOT SURGERY      LEFT HEART CATHETERIZATION Left 7/24/2020    Procedure: CATHETERIZATION, HEART, LEFT;  Surgeon: Pasha Martin MD;  Location: [facility] CATH LAB;  Service: Cardiology;  Laterality: Left;    PENILE PROSTHESIS IMPLANT      PENILE PROSTHESIS REVISION  04/30/2018    [facility]OSTATECT[facility]Y  [facility]    rad[facility] for prostate      TRANSFORAMINAL EPIDURAL INJECTION OF STEROID Right 10/20/2022    Procedure: Right  L4/5 + L5/S1 TF PIEDAD RN IV Sedation;  Surgeon: Nehemiah Carmen MD;  Location: [facility] PAIN MGT;  Service: Pain Management;  Laterality: Right;    TRANSFORAMINAL LUMBAR INTERBODY FUSION (TLIF) USING COMPUTER-ASSISTED NAVIGATION Bilateral 5/17/2023    Procedure: FUSION, SPINE, LUMBAR, TLIF, USING COMPUTER-ASSISTED NAVIGATION;  Surgeon: Brandon Valencia MD;  Location: [facility] OR;  Service: Neurosurgery;  Laterality: Bilateral;  2 level lumbar fusion at L4-5 and L5-S1    UPPER GASTROINTESTINAL ENDOSCOPY  2011       Time Tracking:     PT  Received On: 05/18/23  PT Start Time: 1120     PT Stop Time: 1145  PT Total Time (min): 25 min     Billable Minutes: Evaluation 15 and Therapeutic Activity 10      05/18/2023

## 2023-05-18 NOTE — PLAN OF CARE
05/18/23 1440   Post-Acute Status   Post-Acute Authorization Home Health   Home Health Status Pending Payor Review   Hospital Resources/Appts/Education Provided Appointments scheduled by Navigator/Coordinator;Appointments scheduled and added to AVS   Discharge Delays None known at this time   Discharge Plan   Discharge Plan A Home Health     Sw notified by Vanessa SALCEDO, that pt will need HH upon d/c.     Sw spoke with pt and pt's wife, Vinita, regarding HH upon d/c. Pt and spouse are agreeable. Pt and pt's wife wants pt to have the same company that provides the aide through the [facility]. Sw explained she will send the completed VA form to the VA with the HH order and the VA will arrange HH services upon d/c; pt and pt's wife verbalized their understanding.

## 2023-05-18 NOTE — PLAN OF CARE
[facility]  Initial Discharge Assessment       Primary Care Provider: Reji Valentin MD    Admission Diagnosis: Lumbar radiculopathy, chronic [M54.16]  Synovial cyst of lumbar spine [M71.38]  Lumbar stenosis with neurogenic claudication [M48.062]    Admission Date: 5/17/2023  Expected Discharge Date: Per Attending     Transition of Care Barriers: None    Payor: HUMANA MANAGED MEDICARE / Plan: HUMANA TOTAL CARE ADVANTAGE / Product Type: Medicare Advantage /     Extended Emergency Contact Information  Primary Emergency Contact: AdonayVinita   [place]  Mobile Phone: [phone]  Relation: Spouse  Secondary Emergency Contact: PiyushAmanda  Mobile Phone: [phone]  Relation: Grandchild    Discharge Plan A: Home with family         [facility] - [place] - [address] [facility] [address]  [address]  [place] [address]  Phone: [phone] Fax: [phone]    [facility] [facility] [place] - [address]  [address]  [place] [address]  Phone: [phone] Fax: [phone]    [id number] - [place] - [address] [facility] [address] [facility]  [address] [place] [place] [address]  Phone: [phone] Fax: [phone]      Initial Assessment (most recent)       Adult Discharge Assessment - 05/18/23 0845          Discharge Assessment    Assessment Type Discharge Planning Assessment     Confirmed/corrected address, phone number and insurance Yes     Confirmed Demographics Correct on Facesheet     Source of Information family     Communicated AURELIA with patient/caregiver Date not available/Unable to determine     Reason For Admission lumbar stenosis with neurogenic claudication     People in Home grandchild(maritza);spouse     Do you expect to return to your current living situation? Yes     Do you have help at home or someone to help you manage your care at home? Yes     Who  are your caregiver(s) and their phone number(s)? [facility]     Prior to hospitilization cognitive status: Alert/Oriented     Current cognitive status: Alert/Oriented     Equipment Currently Used at Home power chair;rollator     Readmission within 30 days? No     Patient currently being followed by outpatient case management? No     Do you currently have service(s) that help you manage your care at home? Yes     Name and Contact number of agency [facility]     Is the pt/caregiver preference to resume services with current agency Yes     Do you take prescription medications? Yes     Do you have prescription coverage? Yes     Coverage [employer] Medicare     Do you have any problems affording any of your prescribed medications? No     Is the patient taking medications as prescribed? yes     Who is going to help you get home at discharge? Spouse     How do you get to doctors appointments? family or friend will provide;car, drives self     Are you on dialysis? No     Do you take coumadin? No     Discharge Plan A Home with family     DME Needed Upon Discharge  none     Discharge Plan discussed with: Spouse/sig other     Transition of Care Barriers None                      Pt's spouse stated pt has [facility] aide services 3x/week; unsure of hours.

## 2023-05-18 NOTE — PLAN OF CARE
Discussed poc with pt, pt verbalized understanding    Purposeful rounding every 2hours    VS wnl  Fall precautions in place, remains injury free  Pain and itching under control with PRN meds    IVFs  Accurate I&Os  Abx given as prescribed  Bed locked at lowest position  Call light within reach    Chart check complete  Will cont with POC

## 2023-05-18 NOTE — ASSESSMENT & PLAN NOTE
-status post lumbar fusion surgery 5/17/23.    -defer further management to Dr. Valencia.    -currently on Dilaudid PCA pump.

## 2023-05-18 NOTE — PT/OT/SLP PROGRESS
Occupational Therapy      Patient Name:  Abdullahi Urias   MRN:  [id number]    OT eval initiated via chart review. Patient not seen at this time secondary to pt does not have LSO brace present in room to safely participate in OT eval. Will follow-up at next available opportunity.    5/18/2023  Nancy Raya OT   [id number]

## 2023-05-18 NOTE — CONSULTS
[facility] Medicine  Consult Note    Patient Name: Abdullahi Urias  MRN: [id number]  Admission Date: 5/17/2023  Hospital Length of Stay: 1 days  Attending Physician: Brandon Valencia MD   Primary Care Provider: Reji Valentin MD           Patient information was obtained from patient, past medical records and ER records.     Consults  Subjective:     Principal Problem: Lumbar stenosis with neurogenic claudication    Chief Complaint: No chief complaint on file.       HPI: Mr. Urias is a 67-year-old  male with PMH significant for HTN, hyperlipidemia, CKD III, chronic lumbar radiculopathy, severe stenosis with neurogenic claudication, was admitted to Dr. Valencia's service, patient successfully underwent lumbar fusion surgery earlier today.  Hospital medicine consulted for routine medical management.  Patient denies CP, SOB, N/V.  Currently on Dilaudid PCA pump, alert, oriented x3.  In no acute distress.  No family at the bedside.  Laboratory workup done one month ago (preop), reveals creatinine 1.5, appears to be chronic.      Past Medical History:   Diagnosis Date    [facility]    Asthma     BPH (benign prostatic hyperplasia)     CAD (coronary artery disease)     Cardiomyopathy     CHF (congestive heart failure)     Chronic hoarseness     vocal cord surg    Chronic pain     CKD (chronic kidney disease) stage 3, GFR 30-59 ml/min     CVA (cerebral infarction)     8/2012 olol; reviewed ed note    Ex-smoker     GERD (gastroesophageal reflux disease)     Hepatitis C     treatedharvoni says cured, RNA NEG 6/2020    Hypertension     Pancreatitis     Prostate cancer     Prostate cancer     Prostate cancer     PVD (peripheral vascular disease)     Renal insufficiency     Substance abuse     cocaine, etoh , tob in past       Past Surgical History:   Procedure Laterality Date    AORTIC VALVE REPLACEMENT  05/19/2014    Tissue valve replacement     BACK SURGERY      CARDIAC CATHETERIZATION      COLONOSCOPY  2011    COLONOSCOPY N/A 2/24/2021    Procedure: COLONOSCOPY;  Surgeon: Faith Carrillo MD;  Location: [facility] ENDO;  Service: Endoscopy;  Laterality: N/A;    EPIDURAL STEROID INJECTION INTO CERVICAL SPINE N/A 6/21/2022    Procedure: C7-T1 IL PIEDAD;  Surgeon: Nehemiah Carmen MD;  Location: [facility] PAIN MGT;  Service: Pain Management;  Laterality: N/A;    ESOPHAGOGASTRODUODENOSCOPY N/A 2/24/2021    Procedure: ESOPHAGOGASTRODUODENOSCOPY (EGD);  Surgeon: Faith Carrillo MD;  Location: [facility] ENDO;  Service: Endoscopy;  Laterality: N/A;    FOOT SURGERY      LEFT HEART CATHETERIZATION Left 7/24/2020    Procedure: CATHETERIZATION, HEART, LEFT;  Surgeon: Pasha Martin MD;  Location: [facility] CATH LAB;  Service: Cardiology;  Laterality: Left;    PENILE PROSTHESIS IMPLANT      PENILE PROSTHESIS REVISION  04/30/2018    [facility]    radiation for prostate      TRANSFORAMINAL EPIDURAL INJECTION OF STEROID Right 10/20/2022    Procedure: Right  L4/5 + L5/S1 TF PIEDAD RN IV Sedation;  Surgeon: Nehemiah Carmen MD;  Location: [facility] PAIN MGT;  Service: Pain Management;  Laterality: Right;    UPPER GASTROINTESTINAL ENDOSCOPY  2011       Review of patient's allergies indicates:  No Known Allergies    Current Facility-Administered Medications on File Prior to Encounter   Medication    ondansetron injection 4 mg    sodium chloride 0.9% flush 10 mL     Current Outpatient Medications on File Prior to Encounter   Medication Sig    albuterol (PROVENTIL/VENTOLIN HFA) 90 mcg/actuation inhaler INHALE 2 PUFFS INTO THE LUNGS EVERY 6 HOURS AS NEEDED FOR COUGH    allopurinoL (ZYLOPRIM) 100 MG tablet Take 100 mg by mouth once daily. Takes 0.5 tab    ALPRAZolam (XANAX) 1 MG tablet Take 1 mg by mouth Daily. EVERY OTHER DAY    aluminum & magnesium hydroxide-simethicone (MYLANTA MAX STRENGTH) 400-400-40 mg/5 mL suspension TAKE 15ML BY MOUTH FOUR  TIMES A DAY AS NEEDED FOR STOMACH ACID    aspirin (ECOTRIN) 81 MG EC tablet Take 1 tablet (81 mg total) by mouth once daily.    atorvastatin (LIPITOR) 80 MG tablet TAKE ONE-HALF TABLET BY MOUTH EVERY DAY FOR CHOLESTEROL    carvediloL (COREG) 12.5 MG tablet Take 1 tablet (12.5 mg total) by mouth 2 (two) times daily.    clopidogreL (PLAVIX) 75 mg tablet Take 1 tablet (75 mg total) by mouth once daily.    esomeprazole (NEXIUM) 40 MG capsule 40 mg.    famotidine (PEPCID) 40 MG tablet TAKE ONE TABLET BY MOUTH AT BEDTIME FOR ACID REFLUX    fluticasone-umeclidin-vilanter (TRELEGY ELLIPTA) 200-62.5-25 mcg inhaler Inhale 1 puff into the lungs once daily.    gabapentin (NEURONTIN) 600 MG tablet Take 1 tablet (600 mg total) by mouth 3 (three) times daily.    losartan (COZAAR) 50 MG tablet Take 1 tablet (50 mg total) by mouth once daily.    pantoprazole (PROTONIX) 40 MG tablet Take 40 mg by mouth 2 (two) times daily.     sertraline (ZOLOFT) 100 MG tablet TAKE TWO TABLETS BY MOUTH EVERY DAY FOR MENTAL HEALTH DOSE INCREASED TO 200MG/DAY    diclofenac sodium (VOLTAREN) 1 % Gel APPLY 2 GRAMS TOPICALLY FOUR TIMES A DAY AS NEEDED FOR PAIN AND INFLAMMATION. USE ENCLOSED DOSING CARD.    diphenhydrAMINE (SOMINEX) 25 mg tablet Take 25 mg by mouth nightly as needed for Insomnia.    flunisolide 25 mcg, 0.025%, (NASALIDE) 25 mcg (0.025 %) Spry 2 sprays by Nasal route as needed.     hydrocortisone 2.5 % cream Apply topically 2 (two) times daily as needed. Apply to affected areas of the face and neck.    hydrocortisone-pramoxine (PROCTOFOAM-HS) rectal foam INSERT 1 APPLICATORFUL INTO RECTALLY TWICE A DAY FOR HEMORRHOIDS    hydrOXYzine HCL (ATARAX) 25 MG tablet Take 25 mg by mouth 3 (three) times daily as needed for Itching.    LIDOcaine (LIDODERM) 5 % APPLY 1 PATCH TOPICALLY EVERY DAY FOR PAIN. WEAR FOR 12 HOURS, THEN REMOVE. DO NOT APPLY NEW PATCH FOR AT LEAST 12 HOURS.    methyl salicylate-menthol 15-10% 15-10 % Crea  "Apply topically as needed.     multivitamin (ONE DAILY MULTIVITAMIN) per tablet Take 1 tablet by mouth once daily.    simethicone (MYLICON) 80 MG chewable tablet Take 80 mg by mouth every 6 (six) hours as needed for Flatulence.    sucralfate (CARAFATE) 100 mg/mL suspension Take 1 g by mouth 4 (four) times daily.     tiZANidine (ZANAFLEX) 4 MG tablet TAKE 1 TABLET(4 MG) BY MOUTH TWICE DAILY AS NEEDED FOR MUSCLE SPASMS. MAY CAUSE DROWSINESS    triamcinolone acetonide 0.1% (KENALOG) 0.1 % cream Apply topically 2 (two) times daily.     Family History       Problem Relation (Age of Onset)    Heart attack Sister, Brother    Heart disease Mother, Father          Tobacco Use    Smoking status: Former     Packs/day: 2.00     Years: 8.00     Pack years: 16.00     Types: Cigarettes     Quit date: 8/24/2012     Years since quitting: 10.7    Smokeless tobacco: Never   Substance and Sexual Activity    Alcohol use: Never     Comment: Sober since 08/24/2012    Drug use: Not Currently     Types: "Crack" cocaine, Marijuana     Comment: Quit in 2012    Sexual activity: Yes     Review of Systems   Constitutional: Negative.  Negative for chills and fever.   HENT: Negative.  Negative for congestion, rhinorrhea, sore throat and trouble swallowing.    Eyes: Negative.  Negative for visual disturbance.   Respiratory: Negative.  Negative for cough, shortness of breath and wheezing.    Cardiovascular: Negative.  Negative for chest pain and palpitations.   Gastrointestinal: Negative.  Negative for abdominal pain, diarrhea, nausea and vomiting.   Endocrine: Negative.    Genitourinary: Negative.  Negative for dysuria and flank pain.   Musculoskeletal:  Positive for back pain.   Skin: Negative.  Negative for rash.   Allergic/Immunologic: Negative.    Neurological: Negative.  Negative for speech difficulty, weakness, numbness and headaches.   Hematological: Negative.    Psychiatric/Behavioral: Negative.  Negative for hallucinations.  "   All other systems reviewed and are negative.  Objective:     Vital Signs (Most Recent):  Temp: 97.9 °F (36.6 °C) (05/17/23 2012)  Pulse: 85 (05/17/23 2012)  Resp: 18 (05/17/23 2025)  BP: 109/60 (05/17/23 2012)  SpO2: 97 % (05/17/23 2012) Vital Signs (24h Range):  Temp:  [97.6 °F (36.4 °C)-97.9 °F (36.6 °C)] 97.9 °F (36.6 °C)  Pulse:  [64-98] 85  Resp:  [5-26] 18  SpO2:  [96 %-100 %] 97 %  BP: (109-148)/(60-92) 109/60     Weight: 114.8 kg (253 lb)  Body mass index is 38.47 kg/m².     Physical Exam  Vitals and nursing note reviewed.   Constitutional:       General: He is awake. He is not in acute distress.     Appearance: He is obese. He is not ill-appearing.   HENT:      Head: Normocephalic and atraumatic.      Mouth/Throat:      Mouth: Mucous membranes are moist.   Eyes:      General: No scleral icterus.     Conjunctiva/sclera: Conjunctivae normal.   Cardiovascular:      Rate and Rhythm: Normal rate and regular rhythm.      Heart sounds: No murmur heard.  Pulmonary:      Effort: Pulmonary effort is normal. No respiratory distress.      Breath sounds: Normal breath sounds. No wheezing.   Abdominal:      Palpations: Abdomen is soft.      Tenderness: There is no abdominal tenderness.   Musculoskeletal:         General: No swelling.      Cervical back: Normal range of motion and neck supple.      Comments: Bandage lumbar spine region, with drain in place.   Skin:     General: Skin is warm.   Neurological:      General: No focal deficit present.      Mental Status: He is alert and oriented to person, place, and time. Mental status is at baseline.   Psychiatric:         Attention and Perception: Attention normal.         Speech: Speech normal.         Behavior: Behavior is cooperative.              Significant Labs: All pertinent labs within the past 24 hours have been reviewed.  Recent Lab Results       None            Significant Imaging: I have reviewed all pertinent imaging results/findings within the past 24  hours.  I have reviewed and interpreted all pertinent imaging results/findings within the past 24 hours.    Assessment/Plan:     * Lumbar stenosis with neurogenic claudication  -status post lumbar fusion surgery 5/17/23.    -defer further management to Dr. Valencia.    -currently on Dilaudid PCA pump.      Lumbar radiculopathy, chronic  -status post lumbar fusion surgery earlier today.      CKD (chronic kidney disease) stage 3, GFR 30-59 ml/min  -creatinine elevated 1.5, appears to be chronic.    -monitor      S/P AVR (aortic valve replacement) biopresthetic miller 25 mm in 2014          Hypertension  -continue losartan, Coreg  -monitor      VTE Risk Mitigation (From admission, onward)         Ordered     IP VTE HIGH RISK PATIENT  Once         05/17/23 1300     Place sequential compression device  Until discontinued         05/17/23 1300     Place sequential compression device  Until discontinued         05/17/23 0619                    Thank you for your consult.     Nathan Mendoza MD  Department of Hospital Medicine   [facility]

## 2023-05-18 NOTE — PLAN OF CARE
Discussed poc with pt, pt verbalized understanding    Purposeful rounding every 2hours    VS wnl  Cardiac monitoring in use, pt is NSR  Fall precautions in place, remains injury free  Pt denies c/o ____  Pain under control with PCA pump    IVFs  Accurate I&Os  Bed locked at lowest position  Call light within reach    Chart check complete  Will cont with POC

## 2023-05-18 NOTE — SUBJECTIVE & OBJECTIVE
Past Medical History:   Diagnosis Date    Aortic stenosis     [facility]    Asthma     BPH (benign prostatic hyperplasia)     CAD (coronary artery disease)     Cardiomyopathy     CHF (congestive heart failure)     Chronic hoarseness     vocal cord surg    Chronic pain     CKD (chronic kidney disease) stage 3, GFR 30-59 ml/min     CVA (cerebral infarction)     8/2012 olol; reviewed ed note    Ex-smoker     GERD (gastroesophageal reflux disease)     Hepatitis C     treatedharvoni says cured, RNA NEG 6/2020    Hypertension     Pancreatitis     Prostate cancer     Prostate cancer     Prostate cancer     PVD (peripheral vascular disease)     Renal insufficiency     Substance abuse     cocaine, etoh , tob in past       Past Surgical History:   Procedure Laterality Date    AORTIC VALVE REPLACEMENT  05/19/2014    Tissue valve replacement    BACK SURGERY      CARDIAC CATHETERIZATION      COLONOSCOPY  2011    COLONOSCOPY N/A 2/24/2021    Procedure: COLONOSCOPY;  Surgeon: Faith Carrillo MD;  Location: [facility] ENDO;  Service: Endoscopy;  Laterality: N/A;    EPIDURAL STEROID INJECTION INTO CERVICAL SPINE N/A 6/21/2022    Procedure: C7-T1 IL PIEDAD;  Surgeon: Nehemiah Carmen MD;  Location: [facility] PAIN MGT;  Service: Pain Management;  Laterality: N/A;    ESOPHAGOGASTRODUODENOSCOPY N/A 2/24/2021    Procedure: ESOPHAGOGASTRODUODENOSCOPY (EGD);  Surgeon: Faith Carrillo MD;  Location: [facility];  Service: Endoscopy;  Laterality: N/A;    FOOT SURGERY      LEFT HEART CATHETERIZATION Left 7/24/2020    Procedure: CATHETERIZATION, HEART, LEFT;  Surgeon: Pasha Martin MD;  Location: [facility] CATH LAB;  Service: Cardiology;  Laterality: Left;    PENILE PROSTHESIS IMPLANT      PENILE PROSTHESIS REVISION  04/30/2018    PROSTATECTOMY  09/2019    [facility]    radiation for prostate      TRANSFORAMINAL EPIDURAL INJECTION OF STEROID Right 10/20/2022    Procedure: Right  L4/5 + L5/S1 TF PIEDAD RN IV Sedation;  Surgeon: Nehemiah CHAN  MD Kermit;  Location: [facility] PAIN MGT;  Service: Pain Management;  Laterality: Right;    UPPER GASTROINTESTINAL ENDOSCOPY  2011       Review of patient's allergies indicates:  No Known Allergies    Current Facility-Administered Medications on File Prior to Encounter   Medication    ondansetron injection 4 mg    sodium chloride 0.9% flush 10 mL     Current Outpatient Medications on File Prior to Encounter   Medication Sig    albuterol (PROVENTIL/VENTOLIN HFA) 90 mcg/actuation inhaler INHALE 2 PUFFS INTO THE LUNGS EVERY 6 HOURS AS NEEDED FOR COUGH    allopurinoL (ZYLOPRIM) 100 MG tablet Take 100 mg by mouth once daily. Takes 0.5 tab    ALPRAZolam (XANAX) 1 MG tablet Take 1 mg by mouth Daily. EVERY OTHER DAY    aluminum & magnesium hydroxide-simethicone (MYLANTA MAX STRENGTH) 400-400-40 mg/5 mL suspension TAKE 15ML BY MOUTH FOUR TIMES A DAY AS NEEDED FOR STOMACH ACID    aspirin (ECOTRIN) 81 MG EC tablet Take 1 tablet (81 mg total) by mouth once daily.    atorvastatin (LIPITOR) 80 MG tablet TAKE ONE-HALF TABLET BY MOUTH EVERY DAY FOR CHOLESTEROL    carvediloL (COREG) 12.5 MG tablet Take 1 tablet (12.5 mg total) by mouth 2 (two) times daily.    clopidogreL (PLAVIX) 75 mg tablet Take 1 tablet (75 mg total) by mouth once daily.    esomeprazole (NEXIUM) 40 MG capsule 40 mg.    famotidine (PEPCID) 40 MG tablet TAKE ONE TABLET BY MOUTH AT BEDTIME FOR ACID REFLUX    fluticasone-umeclidin-vilanter (TRELEGY ELLIPTA) 200-62.5-25 mcg inhaler Inhale 1 puff into the lungs once daily.    gabapentin (NEURONTIN) 600 MG tablet Take 1 tablet (600 mg total) by mouth 3 (three) times daily.    losartan (COZAAR) 50 MG tablet Take 1 tablet (50 mg total) by mouth once daily.    pantoprazole (PROTONIX) 40 MG tablet Take 40 mg by mouth 2 (two) times daily.     sertraline (ZOLOFT) 100 MG tablet TAKE TWO TABLETS BY MOUTH EVERY DAY FOR MENTAL HEALTH DOSE INCREASED TO 200MG/DAY    diclofenac sodium (VOLTAREN) 1 % Gel APPLY 2 GRAMS TOPICALLY FOUR TIMES  "A DAY AS NEEDED FOR PAIN AND INFLAMMATION. USE ENCLOSED DOSING CARD.    diphenhydrAMINE (SOMINEX) 25 mg tablet Take 25 mg by mouth nightly as needed for Insomnia.    flunisolide 25 mcg, 0.025%, (NASALIDE) 25 mcg (0.025 %) Spry 2 sprays by Nasal route as needed.     hydrocortisone 2.5 % cream Apply topically 2 (two) times daily as needed. Apply to affected areas of the face and neck.    hydrocortisone-pramoxine (PROCTOFOAM-HS) rectal foam INSERT 1 APPLICATORFUL INTO RECTALLY TWICE A DAY FOR HEMORRHOIDS    hydrOXYzine HCL (ATARAX) 25 MG tablet Take 25 mg by mouth 3 (three) times daily as needed for Itching.    LIDOcaine (LIDODERM) 5 % APPLY 1 PATCH TOPICALLY EVERY DAY FOR PAIN. WEAR FOR 12 HOURS, THEN REMOVE. DO NOT APPLY NEW PATCH FOR AT LEAST 12 HOURS.    methyl salicylate-menthol 15-10% 15-10 % Crea Apply topically as needed.     multivitamin (ONE DAILY MULTIVITAMIN) per tablet Take 1 tablet by mouth once daily.    simethicone (MYLICON) 80 MG chewable tablet Take 80 mg by mouth every 6 (six) hours as needed for Flatulence.    sucralfate (CARAFATE) 100 mg/mL suspension Take 1 g by mouth 4 (four) times daily.     tiZANidine (ZANAFLEX) 4 MG tablet TAKE 1 TABLET(4 MG) BY MOUTH TWICE DAILY AS NEEDED FOR MUSCLE SPASMS. MAY CAUSE DROWSINESS    triamcinolone acetonide 0.1% (KENALOG) 0.1 % cream Apply topically 2 (two) times daily.     Family History       Problem Relation (Age of Onset)    Heart attack Sister, Brother    Heart disease Mother, Father          Tobacco Use    Smoking status: Former     Packs/day: 2.00     Years: 8.00     Pack years: 16.00     Types: Cigarettes     Quit date: 8/24/2012     Years since quitting: 10.7    Smokeless tobacco: Never   Substance and Sexual Activity    Alcohol use: Never     Comment: Sober since 08/24/2012    Drug use: Not Currently     Types: "Crack" cocaine, Marijuana     Comment: Quit in 2012    Sexual activity: Yes     Review of Systems   Constitutional: Negative.  Negative for " chills and fever.   HENT: Negative.  Negative for congestion, rhinorrhea, sore throat and trouble swallowing.    Eyes: Negative.  Negative for visual disturbance.   Respiratory: Negative.  Negative for cough, shortness of breath and wheezing.    Cardiovascular: Negative.  Negative for chest pain and palpitations.   Gastrointestinal: Negative.  Negative for abdominal pain, diarrhea, nausea and vomiting.   Endocrine: Negative.    Genitourinary: Negative.  Negative for dysuria and flank pain.   Musculoskeletal:  Positive for back pain.   Skin: Negative.  Negative for rash.   Allergic/Immunologic: Negative.    Neurological: Negative.  Negative for speech difficulty, weakness, numbness and headaches.   Hematological: Negative.    Psychiatric/Behavioral: Negative.  Negative for hallucinations.    All other systems reviewed and are negative.  Objective:     Vital Signs (Most Recent):  Temp: 97.9 °F (36.6 °C) (05/17/23 2012)  Pulse: 85 (05/17/23 2012)  Resp: 18 (05/17/23 2025)  BP: 109/60 (05/17/23 2012)  SpO2: 97 % (05/17/23 2012) Vital Signs (24h Range):  Temp:  [97.6 °F (36.4 °C)-97.9 °F (36.6 °C)] 97.9 °F (36.6 °C)  Pulse:  [64-98] 85  Resp:  [5-26] 18  SpO2:  [96 %-100 %] 97 %  BP: (109-148)/(60-92) 109/60     Weight: 114.8 kg (253 lb)  Body mass index is 38.47 kg/m².     Physical Exam  Vitals and nursing note reviewed.   Constitutional:       General: He is awake. He is not in acute distress.     Appearance: He is obese. He is not ill-appearing.   HENT:      Head: Normocephalic and atraumatic.      Mouth/Throat:      Mouth: Mucous membranes are moist.   Eyes:      General: No scleral icterus.     Conjunctiva/sclera: Conjunctivae normal.   Cardiovascular:      Rate and Rhythm: Normal rate and regular rhythm.      Heart sounds: No murmur heard.  Pulmonary:      Effort: Pulmonary effort is normal. No respiratory distress.      Breath sounds: Normal breath sounds. No wheezing.   Abdominal:      Palpations: Abdomen is  soft.      Tenderness: There is no abdominal tenderness.   Musculoskeletal:         General: No swelling.      Cervical back: Normal range of motion and neck supple.      Comments: Bandage lumbar spine region, with drain in place.   Skin:     General: Skin is warm.   Neurological:      General: No focal deficit present.      Mental Status: He is alert and oriented to person, place, and time. Mental status is at baseline.   Psychiatric:         Attention and Perception: Attention normal.         Speech: Speech normal.         Behavior: Behavior is cooperative.              Significant Labs: All pertinent labs within the past 24 hours have been reviewed.  Recent Lab Results       None            Significant Imaging: I have reviewed all pertinent imaging results/findings within the past 24 hours.  I have reviewed and interpreted all pertinent imaging results/findings within the past 24 hours.

## 2023-05-18 NOTE — PT/OT/SLP PROGRESS
Physical Therapy      Patient Name:  Abdullahi Urias   MRN:  [id number]    0827 P.T. CHART REVIEW COMPLETED . PT WITHOUT LSO AND WAITING FOR BRACE BEFORE EVAL AND TX. THANK YOU Margarita Dominguez, PT,5/18/2023

## 2023-05-18 NOTE — SUBJECTIVE & OBJECTIVE
Interval History:     No acute events overnight  HV drain in place with output approximately 650 cc overnight   Still complaining of 6 to 7/10 pain   Denied headache, dizziness, chest pain, shortness OO breath, bowel or bladder issues.    Needs to get LSO brace  To follow up with PT OT       Review of Systems    Constitutional: Negative.  Negative for chills and fever.   HENT: Negative.  Negative for congestion, rhinorrhea, sore throat and trouble swallowing.    Eyes: Negative.  Negative for visual disturbance.   Respiratory: Negative.  Negative for cough, shortness of breath and wheezing.    Cardiovascular: Negative.  Negative for chest pain and palpitations.   Gastrointestinal: Negative.  Negative for abdominal pain, diarrhea, nausea and vomiting.   Endocrine: Negative.    Genitourinary: Negative.  Negative for dysuria and flank pain.   Musculoskeletal:  Positive for back pain.   Skin: Negative.  Negative for rash.   Allergic/Immunologic: Negative.    Neurological: Negative.  Negative for speech difficulty, weakness, numbness and headaches.   Hematological: Negative.    Psychiatric/Behavioral: Negative.  Negative for hallucinations.        Objective:     Vital Signs (Most Recent):  Temp: 96.7 °F (35.9 °C) (05/18/23 1131)  Pulse: 95 (05/18/23 1131)  Resp: 18 (05/18/23 1131)  BP: 125/80 (05/18/23 1131)  SpO2: 97 % (05/18/23 1131) Vital Signs (24h Range):  Temp:  [96.7 °F (35.9 °C)-97.9 °F (36.6 °C)] 96.7 °F (35.9 °C)  Pulse:  [64-98] 95  Resp:  [5-26] 18  SpO2:  [96 %-100 %] 97 %  BP: ()/(60-90) 125/80     Weight: 115.6 kg (254 lb 13.6 oz)  Body mass index is 38.75 kg/m².    Intake/Output Summary (Last 24 hours) at 5/18/2023 1257  Last data filed at 5/18/2023 0909  Gross per 24 hour   Intake 666.95 ml   Output 1050 ml   Net -383.05 ml         Physical Exam      Constitutional:       General: He is awake. He is not in acute distress.     Appearance: He is obese. He is not ill-appearing.   HENT:      Head:  Normocephalic and atraumatic.      Mouth/Throat:      Mouth: Mucous membranes are moist.   Eyes:      General: No scleral icterus.     Conjunctiva/sclera: Conjunctivae normal.   Cardiovascular:      Rate and Rhythm: Normal rate and regular rhythm.      Heart sounds: No murmur heard.  Pulmonary:      Effort: Pulmonary effort is normal. No respiratory distress.      Breath sounds: Normal breath sounds. No wheezing.   Abdominal:      Palpations: Abdomen is soft.      Tenderness: There is no abdominal tenderness.   Musculoskeletal:         General: No swelling.      Cervical back: Normal range of motion and neck supple.      Comments: Bandage lumbar spine region, with drain in place. wound site appears intact, no erythema  Skin:     General: Skin is warm.   Neurological:      General: No focal deficit present.      Mental Status: He is alert and oriented to person, place, and time. Mental status is at baseline.   Psychiatric:         Attention and Perception: Attention normal.         Speech: Speech normal.         Behavior: Behavior is cooperative.           Significant Labs: All pertinent labs within the past 24 hours have been reviewed.  CBC:   Recent Labs   Lab 05/18/23  0600   WBC 7.67   HGB 10.2*   HCT 31.3*   *     CMP:   Recent Labs   Lab 05/18/23  0600      K 4.6      CO2 25      BUN 16   CREATININE 1.5*   CALCIUM 8.6*   ANIONGAP 6*       Significant Imaging:

## 2023-05-18 NOTE — PLAN OF CARE
SEE EVAL FOR DETAILS. PT DISPLAYS DEFICITS WITH ADL'S, SKILLS  FUNCTIONAL MOBILITY AND DECREASE TRANSFERS. RECOMMEND HH O.T VS OUT PATIENT THERAPY.

## 2023-05-19 DIAGNOSIS — J45.901 ASTHMATIC BRONCHITIS WITH ACUTE EXACERBATION, UNSPECIFIED ASTHMA SEVERITY, UNSPECIFIED WHETHER PERSISTENT: ICD-10-CM

## 2023-05-19 LAB
ANION GAP SERPL CALC-SCNC: 6 MMOL/L (ref 8–16)
BASOPHILS # BLD AUTO: 0.03 K/UL (ref 0–0.2)
BASOPHILS NFR BLD: 0.5 % (ref 0–1.9)
BUN SERPL-MCNC: 20 MG/DL (ref 8–23)
CALCIUM SERPL-MCNC: 8.5 MG/DL (ref 8.7–10.5)
CHLORIDE SERPL-SCNC: 106 MMOL/L (ref 95–110)
CO2 SERPL-SCNC: 24 MMOL/L (ref 23–29)
CREAT SERPL-MCNC: 1.7 MG/DL (ref 0.5–1.4)
DIFFERENTIAL METHOD: ABNORMAL
EOSINOPHIL # BLD AUTO: 0.2 K/UL (ref 0–0.5)
EOSINOPHIL NFR BLD: 3.5 % (ref 0–8)
ERYTHROCYTE [DISTWIDTH] IN BLOOD BY AUTOMATED COUNT: 13.6 % (ref 11.5–14.5)
EST. GFR  (NO RACE VARIABLE): 44 ML/MIN/1.73 M^2
GLUCOSE SERPL-MCNC: 104 MG/DL (ref 70–110)
HCT VFR BLD AUTO: 30.1 % (ref 40–54)
HGB BLD-MCNC: 9.8 G/DL (ref 14–18)
IMM GRANULOCYTES # BLD AUTO: 0.02 K/UL (ref 0–0.04)
IMM GRANULOCYTES NFR BLD AUTO: 0.4 % (ref 0–0.5)
LYMPHOCYTES # BLD AUTO: 1.1 K/UL (ref 1–4.8)
LYMPHOCYTES NFR BLD: 19.4 % (ref 18–48)
MCH RBC QN AUTO: 28.3 PG (ref 27–31)
MCHC RBC AUTO-ENTMCNC: 32.6 G/DL (ref 32–36)
MCV RBC AUTO: 87 FL (ref 82–98)
MONOCYTES # BLD AUTO: 0.6 K/UL (ref 0.3–1)
MONOCYTES NFR BLD: 9.9 % (ref 4–15)
NEUTROPHILS # BLD AUTO: 3.8 K/UL (ref 1.8–7.7)
NEUTROPHILS NFR BLD: 66.3 % (ref 38–73)
NRBC BLD-RTO: 0 /100 WBC
PLATELET # BLD AUTO: 107 K/UL (ref 150–450)
PMV BLD AUTO: 10.1 FL (ref 9.2–12.9)
POTASSIUM SERPL-SCNC: 4.7 MMOL/L (ref 3.5–5.1)
RBC # BLD AUTO: 3.46 M/UL (ref 4.6–6.2)
SODIUM SERPL-SCNC: 136 MMOL/L (ref 136–145)
WBC # BLD AUTO: 5.67 K/UL (ref 3.9–12.7)

## 2023-05-19 PROCEDURE — 80048 BASIC METABOLIC PNL TOTAL CA: CPT | Performed by: PHYSICIAN ASSISTANT

## 2023-05-19 PROCEDURE — 27000221 HC OXYGEN, UP TO 24 HOURS

## 2023-05-19 PROCEDURE — 94761 N-INVAS EAR/PLS OXIMETRY MLT: CPT

## 2023-05-19 PROCEDURE — 25000003 PHARM REV CODE 250

## 2023-05-19 PROCEDURE — 97110 THERAPEUTIC EXERCISES: CPT

## 2023-05-19 PROCEDURE — 63600175 PHARM REV CODE 636 W HCPCS: Performed by: PHYSICIAN ASSISTANT

## 2023-05-19 PROCEDURE — 94799 UNLISTED PULMONARY SVC/PX: CPT

## 2023-05-19 PROCEDURE — 11000001 HC ACUTE MED/SURG PRIVATE ROOM

## 2023-05-19 PROCEDURE — 97116 GAIT TRAINING THERAPY: CPT

## 2023-05-19 PROCEDURE — 99900035 HC TECH TIME PER 15 MIN (STAT)

## 2023-05-19 PROCEDURE — 85025 COMPLETE CBC W/AUTO DIFF WBC: CPT | Performed by: PHYSICIAN ASSISTANT

## 2023-05-19 PROCEDURE — 25000003 PHARM REV CODE 250: Performed by: PHYSICIAN ASSISTANT

## 2023-05-19 PROCEDURE — 36415 COLL VENOUS BLD VENIPUNCTURE: CPT | Performed by: PHYSICIAN ASSISTANT

## 2023-05-19 RX ORDER — FLUTICASONE FUROATE, UMECLIDINIUM BROMIDE AND VILANTEROL TRIFENATATE 200; 62.5; 25 UG/1; UG/1; UG/1
POWDER RESPIRATORY (INHALATION)
Qty: 60 EACH | Refills: 1 | Status: SHIPPED | OUTPATIENT
Start: 2023-05-19 | End: 2023-08-15

## 2023-05-19 RX ORDER — ELECTROLYTES/DEXTROSE
SOLUTION, ORAL ORAL EVERY 8 HOURS PRN
Status: DISCONTINUED | OUTPATIENT
Start: 2023-05-19 | End: 2023-05-20 | Stop reason: HOSPADM

## 2023-05-19 RX ADMIN — DIPHENHYDRAMINE HYDROCHLORIDE 25 MG: 2.5 LIQUID ORAL at 02:05

## 2023-05-19 RX ADMIN — ATORVASTATIN CALCIUM 80 MG: 40 TABLET, FILM COATED ORAL at 08:05

## 2023-05-19 RX ADMIN — CARVEDILOL 12.5 MG: 12.5 TABLET, FILM COATED ORAL at 05:05

## 2023-05-19 RX ADMIN — GABAPENTIN 600 MG: 300 CAPSULE ORAL at 08:05

## 2023-05-19 RX ADMIN — CARVEDILOL 12.5 MG: 12.5 TABLET, FILM COATED ORAL at 08:05

## 2023-05-19 RX ADMIN — GABAPENTIN 600 MG: 300 CAPSULE ORAL at 09:05

## 2023-05-19 RX ADMIN — FAMOTIDINE 40 MG: 20 TABLET, FILM COATED ORAL at 09:05

## 2023-05-19 RX ADMIN — HYDROMORPHONE HYDROCHLORIDE 2 MG: 2 INJECTION INTRAMUSCULAR; INTRAVENOUS; SUBCUTANEOUS at 02:05

## 2023-05-19 RX ADMIN — LOSARTAN POTASSIUM 50 MG: 50 TABLET, FILM COATED ORAL at 08:05

## 2023-05-19 RX ADMIN — OXYCODONE AND ACETAMINOPHEN 1 TABLET: 10; 325 TABLET ORAL at 03:05

## 2023-05-19 RX ADMIN — DIPHENHYDRAMINE HYDROCHLORIDE 25 MG: 2.5 LIQUID ORAL at 11:05

## 2023-05-19 RX ADMIN — THERA TABS 1 TABLET: TAB at 08:05

## 2023-05-19 RX ADMIN — SODIUM CHLORIDE: 9 INJECTION, SOLUTION INTRAVENOUS at 11:05

## 2023-05-19 RX ADMIN — OXYCODONE AND ACETAMINOPHEN 1 TABLET: 10; 325 TABLET ORAL at 09:05

## 2023-05-19 RX ADMIN — OXYCODONE AND ACETAMINOPHEN 1 TABLET: 10; 325 TABLET ORAL at 06:05

## 2023-05-19 RX ADMIN — DOCUSATE SODIUM 100 MG: 100 CAPSULE, LIQUID FILLED ORAL at 08:05

## 2023-05-19 NOTE — PLAN OF CARE
Pt remains free from falls/injuries this shift. Safety precautions maintained. Pain managed with pain medication. No BM today. Pain controlled. Patient complaining of itching and burning, Medication given. No s/s of acute distress noted. Will continue to monitor. Chart check completed.

## 2023-05-19 NOTE — PROGRESS NOTES
Progress Note  Neurospine    Admit Date: 5/17/2023  Post-operative Day: 2 Days Post-Op  Hospital Day: 3    SUBJECTIVE:     Follow-up For:  Procedure(s) (LRB):  FUSION, SPINE, LUMBAR, TLIF, USING COMPUTER-ASSISTED NAVIGATION (Bilateral)  Pt seen this morning. He was standing near his chair when I walked in.  No acute events overnight.  HV drain output recorded at 410 mL overnight  Reports 7/10 pain.  Pt denies any new N/T/ weakness  He does have LSO brace and is wearing it.  Denies HA, SOB, CP or any other symptoms at this time.      Scheduled Meds:   atorvastatin  80 mg Oral Daily    carvediloL  12.5 mg Oral BID WM    docusate sodium  100 mg Oral Daily    famotidine  40 mg Oral QHS    gabapentin  600 mg Oral BID    losartan  50 mg Oral Daily    multivitamin  1 tablet Oral Daily     Continuous Infusions:   sodium chloride 0.9% 75 mL/hr at 05/19/23 0713     PRN Meds:acetaminophen, albuterol sulfate, aluminum-magnesium hydroxide-simethicone, diphenhydrAMINE, HYDROmorphone, methocarbamoL, naloxone, ondansetron, oxyCODONE-acetaminophen, oxyCODONE-acetaminophen, oxyCODONE-acetaminophen, prochlorperazine, senna-docusate 8.6-50 mg, sodium chloride 0.9%    Review of patient's allergies indicates:  No Known Allergies    OBJECTIVE:     Vital Signs (Most Recent)  Temp: 97.4 °F (36.3 °C) (05/19/23 0725)  Pulse: 85 (05/19/23 0725)  Resp: 18 (05/19/23 0725)  BP: 125/88 (05/19/23 0725)  SpO2: 100 % (05/19/23 0725)    Vital Signs Range (Last 24H):  Temp:  [96.7 °F (35.9 °C)-98.5 °F (36.9 °C)]   Pulse:  []   Resp:  [12-18]   BP: (109-129)/(68-88)   SpO2:  [92 %-100 %]     I & O (Last 24H):  Intake/Output Summary (Last 24 hours) at 5/19/2023 0797  Last data filed at 5/19/2023 0713  Gross per 24 hour   Intake 1271.63 ml   Output 1235 ml   Net 36.63 ml       Physical Exam:  Neurosurgery Physical Exam  Vitals and labs reviewed  Moves all 4 ext well  Wound/Incision:clean, dry, intact, no drainage.   There is no swelling, erythema or  fluctuance.  HV drain removed.  New dressing placed. Staples still in tact.      Lines/Drains:       Peripheral IV - Single Lumen 05/17/23 0629 (Active)   Site Assessment Clean;Intact;Dry;No redness;No swelling 05/18/23 0300   Extremity Assessment Distal to IV No warmth;No swelling;No redness;No abnormal discoloration 05/18/23 0300   Line Status Saline locked 05/18/23 0300   Dressing Status Clean;Dry;Intact 05/18/23 0300   Dressing Intervention Integrity maintained 05/18/23 0300   Dressing Change Due 05/21/23 05/18/23 0300   Number of days: 1            Peripheral IV - Single Lumen 05/17/23 0648 20 G Anterior;Left Forearm (Active)   Site Assessment Clean;Dry;Intact;No redness;No swelling 05/18/23 0300   Extremity Assessment Distal to IV No warmth;No swelling;No redness;No abnormal discoloration 05/18/23 0300   Line Status Infusing 05/18/23 0300   Dressing Status Clean;Dry;Intact 05/18/23 0300   Dressing Intervention Integrity maintained 05/18/23 0300   Dressing Change Due 05/21/23 05/18/23 0300   Number of days: 1            Closed/Suction Drain 05/17/23 1222 Medial Back Accordion 10 Fr. (Active)   Site Description Unable to view 05/18/23 0300   Dressing Type Other (Comment) 05/18/23 0300   Dressing Status Clean;Dry;Intact 05/18/23 0300   Dressing Intervention Integrity maintained 05/18/23 0300   Drainage Bloody 05/18/23 0300   Status To bulb suction 05/18/23 0300   Output (mL) 240 mL 05/18/23 0300   Number of days: 0         Laboratory:  I have reviewed all pertinent lab results within the past 24 hours.  CBC:   Recent Labs   Lab 05/19/23  0529   WBC 5.67   RBC 3.46*   HGB 9.8*   HCT 30.1*   *   MCV 87   MCH 28.3   MCHC 32.6         Diagnostic Results:  Post-op films show good hardware position.  EXAMINATION:  XR LUMBAR SPINE AP AND LATERAL     CLINICAL HISTORY:  s/p tlif;  Radiculopathy, lumbar region     TECHNIQUE:  Three views of the lumbar spine were performed.     COMPARISON:  03/30/2023      FINDINGS:  Alignment: Alignment is maintained.     Vertebrae: Vertebral body heights are maintained.  No suspicious appearing lytic or blastic lesions.     Discs and facets: Disc heights are maintained. Facet joints are unremarkable.     Miscellaneous: Posterior lumbar fixation device with intervertebral disc spacers L4 through S1.  Skin staples noted.     Impression:     No acute abnormality.    ASSESSMENT/PLAN:     Assessment: patient neurologically stable.    Plan:   -discussed that he will work with PT/OT this morning.  -HV drain was removed, placed new dressing.  -LSO brace to be used with sitting up in bed and working with PT/OT.  -pt feels like he could use another night in the hospital, will consider discharging pt tomorrow.  -will be discharged with home health.        Will continue to monitor. Please update      Vanessa Castillo PA-C  Neurosurgery-[place]

## 2023-05-19 NOTE — PT/OT/SLP PROGRESS
"Physical Therapy  Treatment    Abdullahi Urias   MRN: [id number]   Admitting Diagnosis: Lumbar stenosis with neurogenic claudication    PT Received On: 05/19/23  PT Start Time: 0713     PT Stop Time: 0738    PT Total Time (min): 25 min       Billable Minutes:  Gait Training 15 and Therapeutic Exercise 10    Treatment Type: Treatment  PT/PTA: PT     Number of PTA visits since last PT visit: 0       General Precautions: Standard, fall  Orthopedic Precautions: spinal precautions  Braces: LSO  Respiratory Status: Nasal cannula, flow 3 L/min         Subjective:  Communicated with NURSE JONES AND Epic CHART REVIEW  prior to session.   PT AGREED TO TX     Pain/Comfort  Pain Rating 1: 5/10  Location 1: back  Pain Rating Post-Intervention 1: 5/10    Objective:   Patient found with: peripheral IV, telemetry, hemovac    Functional Mobility:  PT MET IN RM LOG ROLLED TO LEFT AND SEATED EOB WITH SBA AND USE OF RAILING. PT DONNED LSO WITH CGA. PT STOOD WITH RW AND GT TRAINED X 40' WITH CGA AND REPORTS OF FEELING FAINT. PT RETURNED TO RM T/F TO CHAIR WITH CGA. PT BP TAKE @ 160/90 AND O2 SATS 100% . PT STOOD WITH RW AND AFTER REPORTED FEELING BETTER FOR GT TRAINING X 240' WITH RW AND SBA. PT T/F TO CHAIR WITH SBA. PT EDUCATED ON RISK FOR FALLS DUE TO GENERALIZED WEAKNESS, EDUCATED ON "CALL DON'T FALL", ENCOURAGED TO CALL FOR ASSISTANCE WITH ALL NEEDS SUCH AS BED<>CHAIR TRANSFERS OR TRIPS TO BATHROOM.      Treatment and Education:   PT COMPLETE B LE TE X 10 REPS OF AP, TKE, AND MIP. PT ALSO RE-EDUCATED ON STERNAL PRECAUTIONS .      AM-PAC 6 CLICK MOBILITY  How much help from another person does this patient currently need?   1 = Unable, Total/Dependent Assistance  2 = A lot, Maximum/Moderate Assistance  3 = A little, Minimum/Contact Guard/Supervision  4 = None, Modified [place]/Independent    Turning over in bed (including adjusting bedclothes, sheets and blankets)?: 4  Sitting down on and standing up from a chair with arms (e.g., " wheelchair, bedside commode, etc.): 4  Moving from lying on back to sitting on the side of the bed?: 4  Moving to and from a bed to a chair (including a wheelchair)?: 3  Need to walk in hospital room?: 3  Climbing 3-5 steps with a railing?: 1  Basic Mobility Total Score: 19    AM-PAC Raw Score CMS G-Code Modifier Level of Impairment Assistance   6 % Total / Unable   7 - 9 CM 80 - 100% Maximal Assist   10 - 14 CL 60 - 80% Moderate Assist   15 - 19 CK 40 - 60% Moderate Assist   20 - 22 CJ 20 - 40% Minimal Assist   23 CI 1-20% SBA / CGA   24 CH 0% Independent/ Mod I     Patient left up in chair with call button in reach.    Assessment:  PT MAY TX WELL     Rehab identified problem list/impairments: weakness, impaired endurance, impaired self care skills, impaired functional mobility, pain, decreased lower extremity function, impaired balance, decreased ROM, gait instability    Rehab potential is good.    Activity tolerance: Good    Discharge recommendations: home health PT      Barriers to discharge:      Equipment recommendations: none     GOALS:   Multidisciplinary Problems       Physical Therapy Goals          Problem: Physical Therapy    Goal Priority Disciplines Outcome Goal Variances Interventions   Physical Therapy Goal     PT, PT/OT Ongoing, Progressing     Description: LT23  1. PT WILL COMPLETE BED MOBILITY [place]  2. PT WILL T/F TO CHAIR WITH RW MOD I  3. PT WILL GT TRAIN X 450' MOD I  4. PT WILL RECALL SPINAL PRECAUTIONS IND                       PLAN:    Patient to be seen 3 x/week to address the above listed problems via gait training, therapeutic activities, therapeutic exercises  Plan of Care expires: 23  Plan of Care reviewed with: patient         2023

## 2023-05-19 NOTE — PLAN OF CARE
AAOx4. Incisional dressing clean and intact with hemovac drain. Pain is being managed . Pt remained free from fall and injury. No sign of any distress noted. Safety precautions alert. Pt asked to call for assistance if needed. IV access clean dry and intact infusing Nacl @ 75ml/hr. Chart checked reviewed. VSS. Plan of care continued.

## 2023-05-20 VITALS
DIASTOLIC BLOOD PRESSURE: 85 MMHG | HEART RATE: 102 BPM | OXYGEN SATURATION: 98 % | TEMPERATURE: 98 F | HEIGHT: 68 IN | WEIGHT: 254.88 LBS | SYSTOLIC BLOOD PRESSURE: 141 MMHG | BODY MASS INDEX: 38.63 KG/M2 | RESPIRATION RATE: 19 BRPM

## 2023-05-20 PROCEDURE — 27000221 HC OXYGEN, UP TO 24 HOURS

## 2023-05-20 PROCEDURE — 25000003 PHARM REV CODE 250: Performed by: PHYSICIAN ASSISTANT

## 2023-05-20 PROCEDURE — 94761 N-INVAS EAR/PLS OXIMETRY MLT: CPT

## 2023-05-20 RX ORDER — METHOCARBAMOL 750 MG/1
750 TABLET, FILM COATED ORAL 3 TIMES DAILY
Qty: 30 TABLET | Refills: 0 | Status: SHIPPED | OUTPATIENT
Start: 2023-05-20 | End: 2023-05-30

## 2023-05-20 RX ORDER — POLYETHYLENE GLYCOL 3350 17 G/17G
17 POWDER, FOR SOLUTION ORAL DAILY
Qty: 10 PACKET | Refills: 0 | Status: SHIPPED | OUTPATIENT
Start: 2023-05-20 | End: 2023-05-23 | Stop reason: SDUPTHER

## 2023-05-20 RX ORDER — OXYCODONE AND ACETAMINOPHEN 10; 325 MG/1; MG/1
1 TABLET ORAL EVERY 8 HOURS PRN
Qty: 60 TABLET | Refills: 0 | Status: SHIPPED | OUTPATIENT
Start: 2023-05-20 | End: 2023-06-19 | Stop reason: SDUPTHER

## 2023-05-20 RX ADMIN — OXYCODONE AND ACETAMINOPHEN 1 TABLET: 10; 325 TABLET ORAL at 05:05

## 2023-05-20 RX ADMIN — THERA TABS 1 TABLET: TAB at 08:05

## 2023-05-20 RX ADMIN — CARVEDILOL 12.5 MG: 12.5 TABLET, FILM COATED ORAL at 08:05

## 2023-05-20 RX ADMIN — DOCUSATE SODIUM 100 MG: 100 CAPSULE, LIQUID FILLED ORAL at 08:05

## 2023-05-20 RX ADMIN — LOSARTAN POTASSIUM 50 MG: 50 TABLET, FILM COATED ORAL at 08:05

## 2023-05-20 RX ADMIN — GABAPENTIN 600 MG: 300 CAPSULE ORAL at 08:05

## 2023-05-20 RX ADMIN — ATORVASTATIN CALCIUM 80 MG: 40 TABLET, FILM COATED ORAL at 08:05

## 2023-05-20 NOTE — PLAN OF CARE
AAOx4. Pain is being managed on going. Pt remained free from fall and injury. No sign of any distress noted. Safety precautions alert. Pt asked to call for assistance if needed. IV access clean dry and intact saline locked. Chart checked reviewed. VSS. Plan of care continued.

## 2023-05-20 NOTE — PROGRESS NOTES
Doing well pain controlled   No complaints   Ambulates with therapy   R leg better     MAEW   Incision cdi     AP   D/C to home later today with HH outpatient       Brandon Valencia MD  Neurosurgery     Disclaimer: This note was prepared using a voice recognition system and is likely to have sound alike errors within the text.      Medications:  Continuous Infusions:  Scheduled Meds:   atorvastatin  80 mg Oral Daily    carvediloL  12.5 mg Oral BID WM    docusate sodium  100 mg Oral Daily    famotidine  40 mg Oral QHS    gabapentin  600 mg Oral BID    losartan  50 mg Oral Daily    multivitamin  1 tablet Oral Daily     PRN Meds:acetaminophen, albuterol sulfate, aluminum-magnesium hydroxide-simethicone, diphenhydrAMINE, hydrocortisone-aloe vera, HYDROmorphone, methocarbamoL, naloxone, ondansetron, oxyCODONE-acetaminophen, oxyCODONE-acetaminophen, oxyCODONE-acetaminophen, prochlorperazine, senna-docusate 8.6-50 mg, sodium chloride 0.9%     Review of Systems  Objective:     Weight: 115.6 kg (254 lb 13.6 oz)  Body mass index is 38.75 kg/m².  Vital Signs (Most Recent):  Temp: 98.4 °F (36.9 °C) (05/20/23 0737)  Pulse: 102 (05/20/23 0737)  Resp: 19 (05/20/23 0737)  BP: (!) 141/85 (05/20/23 0737)  SpO2: 98 % (05/20/23 0737) Vital Signs (24h Range):  Temp:  [98.2 °F (36.8 °C)-99.2 °F (37.3 °C)] 98.4 °F (36.9 °C)  Pulse:  [] 102  Resp:  [16-19] 19  SpO2:  [94 %-98 %] 98 %  BP: ()/(52-88) 141/85                Oxygen Concentration (%):  [28] 28                Physical Exam         Neurosurgery Physical Exam    Significant Labs:  Recent Labs   Lab 05/19/23 0529         K 4.7      CO2 24   BUN 20   CREATININE 1.7*   CALCIUM 8.5*     Recent Labs   Lab 05/19/23 0529   WBC 5.67   HGB 9.8*   HCT 30.1*   *     No results for input(s): LABPT, INR, APTT in the last 48 hours.  Microbiology Results (last 7 days)       ** No results found for the last 168 hours. **          None    Significant  Diagnostics:  I have reviewed all pertinent imaging results/findings within the past 24 hours.

## 2023-05-20 NOTE — PROGRESS NOTES
[facility] Medicine  Progress Note    Patient Name: Abdullahi Urias  MRN: [id number]  Patient Class: OP- Outpatient Recovery   Admission Date: 5/17/2023  Length of Stay: 1 days  Attending Physician: No att. providers found  Primary Care Provider: Reji Valentin MD        Subjective:     Principal Problem:Lumbar stenosis with neurogenic claudication        HPI:  Mr. Urias is a 67-year-old  male with PMH significant for HTN, hyperlipidemia, CKD III, chronic lumbar radiculopathy, severe stenosis with neurogenic claudication, was admitted to Dr. Valencai's service, patient successfully underwent lumbar fusion surgery earlier today.  Hospital medicine consulted for routine medical management.  Patient denies CP, SOB, N/V.  Currently on Dilaudid PCA pump, alert, oriented x3.  In no acute distress.  No family at the bedside.  Laboratory workup done one month ago (preop), reveals creatinine 1.5, appears to be chronic.      Overview/Hospital Course:  Mr. Urias is a 67-year-old  male with PMH significant for HTN, hyperlipidemia, CKD III, chronic lumbar radiculopathy, severe stenosis with neurogenic claudication, was admitted to Dr. Valencia's service, patient successfully underwent lumbar fusion surgery on 5/17;  Hospital medicine consulted for routine medical management.    Postop patient has been placed on PCA pump;   Neurosurgery primary on patient;              Interval History:     No acute events overnight  Pt still c/o pain and discomfort, neurosurgery plans to evaluate overnight and consider for discharge in a.m.       Review of Systems           Constitutional: Negative.  Negative for chills and fever.   HENT: Negative.  Negative for congestion, rhinorrhea, sore throat and trouble swallowing.    Eyes: Negative.  Negative for visual disturbance.   Respiratory: Negative.  Negative for cough, shortness of breath and wheezing.    Cardiovascular: Negative.  Negative for chest  pain and palpitations.   Gastrointestinal: Negative.  Negative for abdominal pain, diarrhea, nausea and vomiting.   Endocrine: Negative.    Genitourinary: Negative.  Negative for dysuria and flank pain.   Musculoskeletal:  Positive for back pain.   Skin: Negative.  Negative for rash.   Allergic/Immunologic: Negative.    Neurological: Negative.  Negative for speech difficulty, weakness, numbness and headaches.   Hematological: Negative.    Psychiatric/Behavioral: Negative.  Negative for hallucinations.      Objective:     Vital Signs (Most Recent):  Temp: 98.4 °F (36.9 °C) (05/20/23 0737)  Pulse: 102 (05/20/23 0737)  Resp: 19 (05/20/23 0737)  BP: (!) 141/85 (05/20/23 0737)  SpO2: 98 % (05/20/23 0824) Vital Signs (24h Range):  Temp:  [98.2 °F (36.8 °C)-99.2 °F (37.3 °C)] 98.4 °F (36.9 °C)  Pulse:  [] 102  Resp:  [16-19] 19  SpO2:  [94 %-98 %] 98 %  BP: ()/(52-85) 141/85     Weight: 115.6 kg (254 lb 13.6 oz)  Body mass index is 38.75 kg/m².    Intake/Output Summary (Last 24 hours) at 5/20/2023 1106  Last data filed at 5/20/2023 0530  Gross per 24 hour   Intake --   Output 700 ml   Net -700 ml         Physical Exam      Constitutional:       General: He is awake. He is not in acute distress.     Appearance: He is obese. He is not ill-appearing.   HENT:      Head: Normocephalic and atraumatic.      Mouth/Throat:      Mouth: Mucous membranes are moist.   Eyes:      General: No scleral icterus.     Conjunctiva/sclera: Conjunctivae normal.   Cardiovascular:      Rate and Rhythm: Normal rate and regular rhythm.      Heart sounds: No murmur heard.  Pulmonary:      Effort: Pulmonary effort is normal. No respiratory distress.      Breath sounds: Normal breath sounds. No wheezing.   Abdominal:      Palpations: Abdomen is soft.      Tenderness: There is no abdominal tenderness.   Musculoskeletal:         General: No swelling.      Cervical back: Normal range of motion and neck supple.      Comments: Bandage lumbar  spine region, with drain in place. wound site appears intact, no erythema  Skin:     General: Skin is warm.   Neurological:      General: No focal deficit present.      Mental Status: He is alert and oriented to person, place, and time. Mental status is at baseline.   Psychiatric:         Attention and Perception: Attention normal.         Speech: Speech normal.         Behavior: Behavior is cooperative.      Significant Labs: All pertinent labs within the past 24 hours have been reviewed.  CBC:   Recent Labs   Lab 05/19/23  0529   WBC 5.67   HGB 9.8*   HCT 30.1*   *     CMP:   Recent Labs   Lab 05/19/23  0529      K 4.7      CO2 24      BUN 20   CREATININE 1.7*   CALCIUM 8.5*   ANIONGAP 6*       Significant Imaging:            Assessment/Plan:      * Lumbar stenosis with neurogenic claudication  -status post lumbar fusion surgery 5/17/23.    -defer further management to Dr. Valencia.    -currently on Dilaudid PCA pump.      5/18    PCA pump discontinued, transition to p.o. pain medications   LSO brace  PT OT  Neurosurgery on board         Lumbar radiculopathy, chronic  -status post lumbar fusion surgery earlier today.      CKD (chronic kidney disease) stage 3, GFR 30-59 ml/min  -creatinine elevated 1.5, appears to be chronic.    -monitor      S/P AVR (aortic valve replacement) biopresthetic miller 25 mm in 2014          Hypertension  -continue losartan, Coreg  -monitor      VTE Risk Mitigation (From admission, onward)         Ordered     IP VTE HIGH RISK PATIENT  Once         05/17/23 1300     Place sequential compression device  Until discontinued         05/17/23 1300     Place sequential compression device  Until discontinued         05/17/23 0619                Discharge Planning   AURELIA: 5/20/2023     Code Status: Prior   Is the patient medically ready for discharge?:     Reason for patient still in hospital (select all that apply):  Monitor clinical improvement, follow up with  Neurosurgery  Discharge Plan A: Home Health   Discharge Delays: None known at this time              Yi Leija MD  Department of Hospital Medicine   '[facility]

## 2023-05-20 NOTE — PROGRESS NOTES
[facility] Medicine  Progress Note    Patient Name: Abdullahi Urias  MRN: [id number]  Patient Class: OP- Outpatient Recovery   Admission Date: 5/17/2023  Length of Stay: 1 days  Attending Physician: No att. providers found  Primary Care Provider: Reji Valentin MD        Subjective:     Principal Problem:Lumbar stenosis with neurogenic claudication        HPI:  Mr. Urias is a 67-year-old  male with PMH significant for HTN, hyperlipidemia, CKD III, chronic lumbar radiculopathy, severe stenosis with neurogenic claudication, was admitted to Dr. Valencia's service, patient successfully underwent lumbar fusion surgery earlier today.  Hospital medicine consulted for routine medical management.  Patient denies CP, SOB, N/V.  Currently on Dilaudid PCA pump, alert, oriented x3.  In no acute distress.  No family at the bedside.  Laboratory workup done one month ago (preop), reveals creatinine 1.5, appears to be chronic.      Overview/Hospital Course:  Mr. Urias is a 67-year-old  male with PMH significant for HTN, hyperlipidemia, CKD III, chronic lumbar radiculopathy, severe stenosis with neurogenic claudication, was admitted to Dr. Valencia's service, patient successfully underwent lumbar fusion surgery on 5/17;  Hospital medicine consulted for routine medical management.    Postop patient has been placed on PCA pump;   Neurosurgery primary on patient;    On 05/20, patient was evaluated at bedside, patient denied acute issues overnight, stated pain controlled on current regimen.    Neurosurgery evaluated and plan for discharge today on pain medications, muscle relaxants, stool softeners.    Patient was recommended to follow up outpatient with primary care, Neurosurgery compliance with medications upon discharge.              Interval History:     No acute events overnight    Review of Systems       Constitutional: Negative.  Negative for chills and fever.   HENT: Negative.  Negative  for congestion, rhinorrhea, sore throat and trouble swallowing.    Eyes: Negative.  Negative for visual disturbance.   Respiratory: Negative.  Negative for cough, shortness of breath and wheezing.    Cardiovascular: Negative.  Negative for chest pain and palpitations.   Gastrointestinal: Negative.  Negative for abdominal pain, diarrhea, nausea and vomiting.   Endocrine: Negative.    Genitourinary: Negative.  Negative for dysuria and flank pain.   Musculoskeletal:  Positive for back pain (improving)  Skin: Negative.  Negative for rash.   Allergic/Immunologic: Negative.    Neurological: Negative.  Negative for speech difficulty, weakness, numbness and headaches.   Hematological: Negative.    Psychiatric/Behavioral: Negative.  Negative for hallucinations.        Objective:     Vital Signs (Most Recent):  Temp: 98.4 °F (36.9 °C) (05/20/23 0737)  Pulse: 102 (05/20/23 0737)  Resp: 19 (05/20/23 0737)  BP: (!) 141/85 (05/20/23 0737)  SpO2: 98 % (05/20/23 0824) Vital Signs (24h Range):  Temp:  [98.2 °F (36.8 °C)-99.2 °F (37.3 °C)] 98.4 °F (36.9 °C)  Pulse:  [] 102  Resp:  [16-19] 19  SpO2:  [94 %-98 %] 98 %  BP: ()/(52-85) 141/85     Weight: 115.6 kg (254 lb 13.6 oz)  Body mass index is 38.75 kg/m².    Intake/Output Summary (Last 24 hours) at 5/20/2023 1109  Last data filed at 5/20/2023 0530  Gross per 24 hour   Intake --   Output 700 ml   Net -700 ml         Physical Exam      Constitutional:       General: He is awake. He is not in acute distress.     Appearance: He is obese. He is not ill-appearing.   HENT:      Head: Normocephalic and atraumatic.      Mouth/Throat:      Mouth: Mucous membranes are moist.   Eyes:      General: No scleral icterus.     Conjunctiva/sclera: Conjunctivae normal.   Cardiovascular:      Rate and Rhythm: Normal rate and regular rhythm.      Heart sounds: No murmur heard.  Pulmonary:      Effort: Pulmonary effort is normal. No respiratory distress.      Breath sounds: Normal breath  sounds. No wheezing.   Abdominal:      Palpations: Abdomen is soft.      Tenderness: There is no abdominal tenderness.   Musculoskeletal:         General: No swelling.      Cervical back: Normal range of motion and neck supple.      Comments: Bandage lumbar spine region, with drain in place. wound site appears intact, no erythema  Skin:     General: Skin is warm.   Neurological:      General: No focal deficit present.      Mental Status: He is alert and oriented to person, place, and time. Mental status is at baseline.   Psychiatric:         Attention and Perception: Attention normal.         Speech: Speech normal.         Behavior: Behavior is cooperative.   Significant Labs: All pertinent labs within the past 24 hours have been reviewed.  CBC:   Recent Labs   Lab 05/19/23  0529   WBC 5.67   HGB 9.8*   HCT 30.1*   *     CMP:   Recent Labs   Lab 05/19/23  0529      K 4.7      CO2 24      BUN 20   CREATININE 1.7*   CALCIUM 8.5*   ANIONGAP 6*       Significant Imaging:            Assessment/Plan:      * Lumbar stenosis with neurogenic claudication  -status post lumbar fusion surgery 5/17/23.    -defer further management to Dr. Valencia.    -currently on Dilaudid PCA pump.      5/18    PCA pump discontinued, transition to p.o. pain medications   LSO brace  PT OT  Neurosurgery on board         Lumbar radiculopathy, chronic  -status post lumbar fusion surgery earlier today.      CKD (chronic kidney disease) stage 3, GFR 30-59 ml/min  -creatinine elevated 1.5, appears to be chronic.    -monitor      S/P AVR (aortic valve replacement) biopresthetic miller 25 mm in 2014          Hypertension  -continue losartan, Coreg  -monitor      VTE Risk Mitigation (From admission, onward)         Ordered     IP VTE HIGH RISK PATIENT  Once         05/17/23 1300     Place sequential compression device  Until discontinued         05/17/23 1300     Place sequential compression device  Until discontinued          05/17/23 0619                Discharge Planning   AURELIA: 5/20/2023     Code Status: Prior   Is the patient medically ready for discharge?:     Reason for patient still in hospital (select all that apply):  Discharge today to home  Discharge Plan A: Home Health   Discharge Delays: None known at this time              Yi Leija MD  Department of Hospital Medicine   '[facility]

## 2023-05-20 NOTE — SUBJECTIVE & OBJECTIVE
Interval History:     No acute events overnight  Pt still c/o pain and discomfort, neurosurgery plans to evaluate overnight and consider for discharge in a.m.       Review of Systems           Constitutional: Negative.  Negative for chills and fever.   HENT: Negative.  Negative for congestion, rhinorrhea, sore throat and trouble swallowing.    Eyes: Negative.  Negative for visual disturbance.   Respiratory: Negative.  Negative for cough, shortness of breath and wheezing.    Cardiovascular: Negative.  Negative for chest pain and palpitations.   Gastrointestinal: Negative.  Negative for abdominal pain, diarrhea, nausea and vomiting.   Endocrine: Negative.    Genitourinary: Negative.  Negative for dysuria and flank pain.   Musculoskeletal:  Positive for back pain.   Skin: Negative.  Negative for rash.   Allergic/Immunologic: Negative.    Neurological: Negative.  Negative for speech difficulty, weakness, numbness and headaches.   Hematological: Negative.    Psychiatric/Behavioral: Negative.  Negative for hallucinations.      Objective:     Vital Signs (Most Recent):  Temp: 98.4 °F (36.9 °C) (05/20/23 0737)  Pulse: 102 (05/20/23 0737)  Resp: 19 (05/20/23 0737)  BP: (!) 141/85 (05/20/23 0737)  SpO2: 98 % (05/20/23 0824) Vital Signs (24h Range):  Temp:  [98.2 °F (36.8 °C)-99.2 °F (37.3 °C)] 98.4 °F (36.9 °C)  Pulse:  [] 102  Resp:  [16-19] 19  SpO2:  [94 %-98 %] 98 %  BP: ()/(52-85) 141/85     Weight: 115.6 kg (254 lb 13.6 oz)  Body mass index is 38.75 kg/m².    Intake/Output Summary (Last 24 hours) at 5/20/2023 1106  Last data filed at 5/20/2023 0530  Gross per 24 hour   Intake --   Output 700 ml   Net -700 ml         Physical Exam      Constitutional:       General: He is awake. He is not in acute distress.     Appearance: He is obese. He is not ill-appearing.   HENT:      Head: Normocephalic and atraumatic.      Mouth/Throat:      Mouth: Mucous membranes are moist.   Eyes:      General: No scleral icterus.      Conjunctiva/sclera: Conjunctivae normal.   Cardiovascular:      Rate and Rhythm: Normal rate and regular rhythm.      Heart sounds: No murmur heard.  Pulmonary:      Effort: Pulmonary effort is normal. No respiratory distress.      Breath sounds: Normal breath sounds. No wheezing.   Abdominal:      Palpations: Abdomen is soft.      Tenderness: There is no abdominal tenderness.   Musculoskeletal:         General: No swelling.      Cervical back: Normal range of motion and neck supple.      Comments: Bandage lumbar spine region, with drain in place. wound site appears intact, no erythema  Skin:     General: Skin is warm.   Neurological:      General: No focal deficit present.      Mental Status: He is alert and oriented to person, place, and time. Mental status is at baseline.   Psychiatric:         Attention and Perception: Attention normal.         Speech: Speech normal.         Behavior: Behavior is cooperative.      Significant Labs: All pertinent labs within the past 24 hours have been reviewed.  CBC:   Recent Labs   Lab 05/19/23  0529   WBC 5.67   HGB 9.8*   HCT 30.1*   *     CMP:   Recent Labs   Lab 05/19/23  0529      K 4.7      CO2 24      BUN 20   CREATININE 1.7*   CALCIUM 8.5*   ANIONGAP 6*       Significant Imaging:

## 2023-05-20 NOTE — SUBJECTIVE & OBJECTIVE
Interval History:     No acute events overnight    Review of Systems       Constitutional: Negative.  Negative for chills and fever.   HENT: Negative.  Negative for congestion, rhinorrhea, sore throat and trouble swallowing.    Eyes: Negative.  Negative for visual disturbance.   Respiratory: Negative.  Negative for cough, shortness of breath and wheezing.    Cardiovascular: Negative.  Negative for chest pain and palpitations.   Gastrointestinal: Negative.  Negative for abdominal pain, diarrhea, nausea and vomiting.   Endocrine: Negative.    Genitourinary: Negative.  Negative for dysuria and flank pain.   Musculoskeletal:  Positive for back pain (improving)  Skin: Negative.  Negative for rash.   Allergic/Immunologic: Negative.    Neurological: Negative.  Negative for speech difficulty, weakness, numbness and headaches.   Hematological: Negative.    Psychiatric/Behavioral: Negative.  Negative for hallucinations.        Objective:     Vital Signs (Most Recent):  Temp: 98.4 °F (36.9 °C) (05/20/23 0737)  Pulse: 102 (05/20/23 0737)  Resp: 19 (05/20/23 0737)  BP: (!) 141/85 (05/20/23 0737)  SpO2: 98 % (05/20/23 0824) Vital Signs (24h Range):  Temp:  [98.2 °F (36.8 °C)-99.2 °F (37.3 °C)] 98.4 °F (36.9 °C)  Pulse:  [] 102  Resp:  [16-19] 19  SpO2:  [94 %-98 %] 98 %  BP: ()/(52-85) 141/85     Weight: 115.6 kg (254 lb 13.6 oz)  Body mass index is 38.75 kg/m².    Intake/Output Summary (Last 24 hours) at 5/20/2023 1109  Last data filed at 5/20/2023 0530  Gross per 24 hour   Intake --   Output 700 ml   Net -700 ml         Physical Exam      Constitutional:       General: He is awake. He is not in acute distress.     Appearance: He is obese. He is not ill-appearing.   HENT:      Head: Normocephalic and atraumatic.      Mouth/Throat:      Mouth: Mucous membranes are moist.   Eyes:      General: No scleral icterus.     Conjunctiva/sclera: Conjunctivae normal.   Cardiovascular:      Rate and Rhythm: Normal rate and  regular rhythm.      Heart sounds: No murmur heard.  Pulmonary:      Effort: Pulmonary effort is normal. No respiratory distress.      Breath sounds: Normal breath sounds. No wheezing.   Abdominal:      Palpations: Abdomen is soft.      Tenderness: There is no abdominal tenderness.   Musculoskeletal:         General: No swelling.      Cervical back: Normal range of motion and neck supple.      Comments: Bandage lumbar spine region, with drain in place. wound site appears intact, no erythema  Skin:     General: Skin is warm.   Neurological:      General: No focal deficit present.      Mental Status: He is alert and oriented to person, place, and time. Mental status is at baseline.   Psychiatric:         Attention and Perception: Attention normal.         Speech: Speech normal.         Behavior: Behavior is cooperative.   Significant Labs: All pertinent labs within the past 24 hours have been reviewed.  CBC:   Recent Labs   Lab 05/19/23  0529   WBC 5.67   HGB 9.8*   HCT 30.1*   *     CMP:   Recent Labs   Lab 05/19/23  0529      K 4.7      CO2 24      BUN 20   CREATININE 1.7*   CALCIUM 8.5*   ANIONGAP 6*       Significant Imaging:

## 2023-05-20 NOTE — PLAN OF CARE
[facility]  Discharge Final Note    Primary Care Provider: Reji Valentin MD    Expected Discharge Date: 5/20/2023    Final Discharge Note (most recent)       Final Note - 05/20/23 1009          Final Note    Assessment Type Final Discharge Note     Anticipated Discharge Disposition Home-Health Care Svc        Post-Acute Status    Post-Acute Authorization Home Health     Home Health Status Set-up Complete/Auth obtained     Discharge Delays None known at this time                     Important Message from Medicare             Contact Info       Yazmin Farfan PA-C   Specialty: Neurosurgery    [address] [place] [address] [address] [id number]   Phone: [phone]       Next Steps: Follow up in 2 week(s)    Instructions: staple removal, For suture removal          Pt has no discharge needs at the time of chart review.

## 2023-05-20 NOTE — DISCHARGE SUMMARY
[facility]  Neurosurgery  Discharge Summary      Patient Name: Abdullahi Urias  MRN: [id number]  Admission Date: 5/17/2023  Hospital Length of Stay: 1 days  Discharge Date and Time:  05/20/2023 8:04 AM  Attending Physician: Brandon Valencia MD   Discharging Provider: Brandon Valencia MD  Primary Care Provider: Reji Valentin MD    HPI:   No notes on file    Procedure(s) (LRB):  FUSION, SPINE, LUMBAR, TLIF, USING COMPUTER-ASSISTED NAVIGATION (Bilateral)     Hospital Course: No notes on file    Goals of Care Treatment Preferences:  Code Status: Full Code      Consults:   Consults (From admission, onward)          Status Ordering Provider     Inpatient consult to Hospitalist  Once        Provider:  Nathan Mendoza MD    Acknowledged YAZMIN FARFAN     Inpatient consult to Social Work  Once        Provider:  (Not yet assigned)    YAZMIN Moody            Significant Diagnostic Studies: N/A      Pending Diagnostic Studies:       None          Final Active Diagnoses:    Diagnosis Date Noted POA    PRINCIPAL PROBLEM:  Lumbar stenosis with neurogenic claudication [M48.062] 05/17/2023 Yes    Synovial cyst of lumbar spine [M71.38] 05/17/2023 Yes    Lumbar radiculopathy, chronic [M54.16] 05/17/2023 Yes    S/P AVR (aortic valve replacement) biopresthetic miller 25 mm in 2014  [Z95.2] 06/30/2020 Not Applicable    CKD (chronic kidney disease) stage 3, GFR 30-59 ml/min [N18.30] 06/30/2020 Yes    Hypertension [I10]  Yes     Chronic      Problems Resolved During this Admission:      Discharged Condition: good     Disposition: Home-Health Care [facility]    Follow Up:   Follow-up Information       Yazmin Farfan PA-C Follow up in 2 week(s).    Specialty: Neurosurgery  Why: staple removal, For suture removal  Contact information:  [address] [place] [address] [address] [address] [phone]                           Patient Instructions:      Diet Adult Regular     Notify your health care provider if you  experience any of the following:  persistent nausea and vomiting or diarrhea     Notify your health care provider if you experience any of the following:  temperature >100.4     Notify your health care provider if you experience any of the following:  severe uncontrolled pain     Notify your health care provider if you experience any of the following:  redness, tenderness, or signs of infection (pain, swelling, redness, odor or green/yellow discharge around incision site)     Notify your health care provider if you experience any of the following:  difficulty breathing or increased cough     Notify your health care provider if you experience any of the following:  severe persistent headache     Notify your health care provider if you experience any of the following:  worsening rash     Change dressing (specify)   Order Comments: Change dressing daily   Wash incision with soap and water   cover incision after with dressing and thin layer of antibiotic ointment     Activity as tolerated   Order Comments: No lifting greater than 10 -15 lbs   LSO brace when out of bed     Medications:  Reconciled Home Medications:      Medication List        START taking these medications      methocarbamoL 750 MG Tab  Commonly known as: ROBAXIN  Take 1 tablet (750 mg total) by mouth 3 (three) times daily. for 10 days     oxyCODONE-acetaminophen  mg per tablet  Commonly known as: PERCOCET  Take 1 tablet by mouth every 8 (eight) hours as needed for Pain.     polyethylene glycol 17 gram Pwpk  Commonly known as: GLYCOLAX  Take 17 g by mouth once daily.            CHANGE how you take these medications      TRELEGY ELLIPTA 200-62.5-25 mcg inhaler  Generic drug: fluticasone-umeclidin-vilanter  INHALE 1 PUFF INTO THE LUNGS EVERY DAY  What changed: when to take this            CONTINUE taking these medications      albuterol 90 mcg/actuation inhaler  Commonly known as: PROVENTIL/VENTOLIN HFA  INHALE 2 PUFFS INTO THE LUNGS EVERY 6 HOURS AS  NEEDED FOR COUGH     allopurinoL 100 MG tablet  Commonly known as: ZYLOPRIM  Take 100 mg by mouth once daily. Takes 0.5 tab     ALPRAZolam 1 MG tablet  Commonly known as: XANAX  Take 1 mg by mouth Daily. EVERY OTHER DAY     aluminum & magnesium hydroxide-simethicone 400-400-40 mg/5 mL suspension  Commonly known as: MYLANTA MAX STRENGTH  TAKE 15ML BY MOUTH FOUR TIMES A DAY AS NEEDED FOR STOMACH ACID     aspirin 81 MG EC tablet  Commonly known as: ECOTRIN  Take 1 tablet (81 mg total) by mouth once daily.     atorvastatin 80 MG tablet  Commonly known as: LIPITOR  TAKE ONE-HALF TABLET BY MOUTH EVERY DAY FOR CHOLESTEROL     CARAFATE 100 mg/mL suspension  Generic drug: sucralfate  Take 1 g by mouth 4 (four) times daily.     carvediloL 12.5 MG tablet  Commonly known as: COREG  Take 1 tablet (12.5 mg total) by mouth 2 (two) times daily.     clopidogreL 75 mg tablet  Commonly known as: PLAVIX  Take 1 tablet (75 mg total) by mouth once daily.     diclofenac sodium 1 % Gel  Commonly known as: VOLTAREN  APPLY 2 GRAMS TOPICALLY FOUR TIMES A DAY AS NEEDED FOR PAIN AND INFLAMMATION. USE ENCLOSED DOSING CARD.     diphenhydrAMINE 25 mg tablet  Commonly known as: SOMINEX  Take 25 mg by mouth nightly as needed for Insomnia.     esomeprazole 40 MG capsule  Commonly known as: NEXIUM  40 mg.     famotidine 40 MG tablet  Commonly known as: PEPCID  TAKE ONE TABLET BY MOUTH AT BEDTIME FOR ACID REFLUX     flunisolide 25 mcg (0.025%) 25 mcg (0.025 %) Spry  Commonly known as: NASALIDE  2 sprays by Nasal route as needed.     gabapentin 600 MG tablet  Commonly known as: NEURONTIN  Take 1 tablet (600 mg total) by mouth 3 (three) times daily.     hydrocortisone 2.5 % cream  Apply topically 2 (two) times daily as needed. Apply to affected areas of the face and neck.     hydrocortisone-pramoxine rectal foam  Commonly known as: PROCTOFOAM-HS  INSERT 1 APPLICATORFUL INTO RECTALLY TWICE A DAY FOR HEMORRHOIDS     hydrOXYzine HCL 25 MG tablet  Commonly  known as: ATARAX  Take 25 mg by mouth 3 (three) times daily as needed for Itching.     LIDOcaine 5 %  Commonly known as: LIDODERM  APPLY 1 PATCH TOPICALLY EVERY DAY FOR PAIN. WEAR FOR 12 HOURS, THEN REMOVE. DO NOT APPLY NEW PATCH FOR AT LEAST 12 HOURS.     losartan 50 MG tablet  Commonly known as: COZAAR  Take 1 tablet (50 mg total) by mouth once daily.     methyl salicylate-menthol 15-10% 15-10 % Crea  Apply topically as needed.     ONE DAILY MULTIVITAMIN per tablet  Generic drug: multivitamin  Take 1 tablet by mouth once daily.     pantoprazole 40 MG tablet  Commonly known as: PROTONIX  Take 40 mg by mouth 2 (two) times daily.     sertraline 100 MG tablet  Commonly known as: ZOLOFT  TAKE TWO TABLETS BY MOUTH EVERY DAY FOR MENTAL HEALTH DOSE INCREASED TO 200MG/DAY     simethicone 80 MG chewable tablet  Commonly known as: MYLICON  Take 80 mg by mouth every 6 (six) hours as needed for Flatulence.     triamcinolone acetonide 0.1% 0.1 % cream  Commonly known as: KENALOG  Apply topically 2 (two) times daily.            STOP taking these medications      HYDROcodone-acetaminophen 7.5-325 mg per tablet  Commonly known as: NORCO     tiZANidine 4 MG tablet  Commonly known as: ZANAFLEX              Brandon Valencia MD  Neurosurgery  [facility]

## 2023-05-22 ENCOUNTER — OUTPATIENT CASE MANAGEMENT (OUTPATIENT)
Dept: ADMINISTRATIVE | Facility: OTHER | Age: 68
End: 2023-05-22
Payer: MEDICARE

## 2023-05-22 ENCOUNTER — PATIENT OUTREACH (OUTPATIENT)
Dept: ADMINISTRATIVE | Facility: OTHER | Age: 68
End: 2023-05-22
Payer: MEDICARE

## 2023-05-22 ENCOUNTER — PATIENT OUTREACH (OUTPATIENT)
Dept: ADMINISTRATIVE | Facility: HOSPITAL | Age: 68
End: 2023-05-22
Payer: MEDICARE

## 2023-05-22 ENCOUNTER — PATIENT MESSAGE (OUTPATIENT)
Dept: ADMINISTRATIVE | Facility: OTHER | Age: 68
End: 2023-05-22
Payer: MEDICARE

## 2023-05-22 NOTE — PROGRESS NOTES
This Community Health Worker (CHW) competed Social Determinant of Health (SDOH)  Questionnaire with patient via telephone today.  Notified OPCM ELIZABETH BARROSO RN, of completion and that patient inquired about a ramp and type of walk in shower.

## 2023-05-22 NOTE — PROGRESS NOTES
Hospital Follow up: Called and spoke to pt, confirmed appt 05/23/22 with Scarlet VALENCIA, pt wife requested PCP office send a script for a med (stool softener) that pt received from hospital discharge, pt wife could not tell me name of med? She was on another line on hold for Sunil, I attempted to explain to her she could have Sunil transfer the script or discuss at HFU visit once she knew the name of the med. Pt wife states she was going to discuss with Sunil.

## 2023-05-22 NOTE — PROGRESS NOTES
Outpatient Care Management  Plan of Care Follow Up Visit    Patient: Abdullahi Urias  MRN: [id number]  Date of Service: 05/22/2023  Completed by: Yung Ramos RN  Referral Date: 05/10/2023    Reason for Visit   Patient presents with    OPCM RN Follow Up Call       Brief Summary: I followed up with in-patient social work on Mr. Urias h/h and Pt orders. They stated [facility] and that [facility] by [facility] the [facility] will be [facility] to . I called Khadijah at the [phone]  to verify that they received the faxed orders for h/h PT and she stated that they did and are just waiting for approval. She stated to let Mr. Urias know that if he does not hear from them tomorrow or the next day to call her. I updated Mr. Urias on this information and he verbalized understanding.

## 2023-05-22 NOTE — PROGRESS NOTES
Outpatient Care Management  Plan of Care Follow Up Visit    Patient: Abdullahi Urias  MRN: [id number]  Date of Service: 05/22/2023  Completed by: Yung Ramos RN  Referral Date: 05/10/2023    Reason for Visit   Patient presents with    OPCM RN Follow Up Call       Brief Summary:Mr. Urias called today and stated that he was discharged home with h/h and PT orders but was not yet contacted by the company. His h/h choice is home health care [facility]. I called this company at [phone] and spoke to Eleni. She stated that there were no orders received for Mr. Urias. I sent a high priority message to Vanessa Castillo PA-C who originally wrote the h/h PT orders to fax them to [facility] at [phone]. Updated Mr. Urias on this and instructed him to call me if he does not hear from h/h by the end of today.

## 2023-05-23 ENCOUNTER — OFFICE VISIT (OUTPATIENT)
Dept: INTERNAL MEDICINE | Facility: CLINIC | Age: 68
End: 2023-05-23
Payer: MEDICARE

## 2023-05-23 VITALS
WEIGHT: 253.31 LBS | HEART RATE: 70 BPM | BODY MASS INDEX: 38.39 KG/M2 | OXYGEN SATURATION: 96 % | DIASTOLIC BLOOD PRESSURE: 88 MMHG | HEIGHT: 68 IN | SYSTOLIC BLOOD PRESSURE: 138 MMHG | TEMPERATURE: 99 F

## 2023-05-23 DIAGNOSIS — M48.062 LUMBAR STENOSIS WITH NEUROGENIC CLAUDICATION: ICD-10-CM

## 2023-05-23 DIAGNOSIS — Z98.1 S/P LUMBAR SPINAL FUSION: ICD-10-CM

## 2023-05-23 DIAGNOSIS — I10 PRIMARY HYPERTENSION: Chronic | ICD-10-CM

## 2023-05-23 DIAGNOSIS — L29.9 PRURITUS: Primary | ICD-10-CM

## 2023-05-23 DIAGNOSIS — K59.00 CONSTIPATION, UNSPECIFIED CONSTIPATION TYPE: ICD-10-CM

## 2023-05-23 PROCEDURE — 3008F PR BODY MASS INDEX (BMI) DOCUMENTED: ICD-10-PCS | Mod: CPTII,S$GLB,, | Performed by: PHYSICIAN ASSISTANT

## 2023-05-23 PROCEDURE — 3079F PR MOST RECENT DIASTOLIC BLOOD PRESSURE 80-89 MM HG: ICD-10-PCS | Mod: CPTII,S$GLB,, | Performed by: PHYSICIAN ASSISTANT

## 2023-05-23 PROCEDURE — 1125F PR PAIN SEVERITY QUANTIFIED, PAIN PRESENT: ICD-10-PCS | Mod: CPTII,S$GLB,, | Performed by: PHYSICIAN ASSISTANT

## 2023-05-23 PROCEDURE — 1101F PT FALLS ASSESS-DOCD LE1/YR: CPT | Mod: CPTII,S$GLB,, | Performed by: PHYSICIAN ASSISTANT

## 2023-05-23 PROCEDURE — 1159F MED LIST DOCD IN RCRD: CPT | Mod: CPTII,S$GLB,, | Performed by: PHYSICIAN ASSISTANT

## 2023-05-23 PROCEDURE — 1101F PR PT FALLS ASSESS DOC 0-1 FALLS W/OUT INJ PAST YR: ICD-10-PCS | Mod: CPTII,S$GLB,, | Performed by: PHYSICIAN ASSISTANT

## 2023-05-23 PROCEDURE — 1159F PR MEDICATION LIST DOCUMENTED IN MEDICAL RECORD: ICD-10-PCS | Mod: CPTII,S$GLB,, | Performed by: PHYSICIAN ASSISTANT

## 2023-05-23 PROCEDURE — 3075F PR MOST RECENT SYSTOLIC BLOOD PRESS GE 130-139MM HG: ICD-10-PCS | Mod: CPTII,S$GLB,, | Performed by: PHYSICIAN ASSISTANT

## 2023-05-23 PROCEDURE — 1111F DSCHRG MED/CURRENT MED MERGE: CPT | Mod: CPTII,S$GLB,, | Performed by: PHYSICIAN ASSISTANT

## 2023-05-23 PROCEDURE — 3288F FALL RISK ASSESSMENT DOCD: CPT | Mod: CPTII,S$GLB,, | Performed by: PHYSICIAN ASSISTANT

## 2023-05-23 PROCEDURE — 3008F BODY MASS INDEX DOCD: CPT | Mod: CPTII,S$GLB,, | Performed by: PHYSICIAN ASSISTANT

## 2023-05-23 PROCEDURE — 99214 PR OFFICE/OUTPT VISIT, EST, LEVL IV, 30-39 MIN: ICD-10-PCS | Mod: S$GLB,,, | Performed by: PHYSICIAN ASSISTANT

## 2023-05-23 PROCEDURE — 1160F PR REVIEW ALL MEDS BY PRESCRIBER/CLIN PHARMACIST DOCUMENTED: ICD-10-PCS | Mod: CPTII,S$GLB,, | Performed by: PHYSICIAN ASSISTANT

## 2023-05-23 PROCEDURE — 99999 PR PBB SHADOW E&M-EST. PATIENT-LVL V: ICD-10-PCS | Mod: PBBFAC,,, | Performed by: PHYSICIAN ASSISTANT

## 2023-05-23 PROCEDURE — 3075F SYST BP GE 130 - 139MM HG: CPT | Mod: CPTII,S$GLB,, | Performed by: PHYSICIAN ASSISTANT

## 2023-05-23 PROCEDURE — 99999 PR PBB SHADOW E&M-EST. PATIENT-LVL V: CPT | Mod: PBBFAC,,, | Performed by: PHYSICIAN ASSISTANT

## 2023-05-23 PROCEDURE — 3079F DIAST BP 80-89 MM HG: CPT | Mod: CPTII,S$GLB,, | Performed by: PHYSICIAN ASSISTANT

## 2023-05-23 PROCEDURE — 1160F RVW MEDS BY RX/DR IN RCRD: CPT | Mod: CPTII,S$GLB,, | Performed by: PHYSICIAN ASSISTANT

## 2023-05-23 PROCEDURE — 1125F AMNT PAIN NOTED PAIN PRSNT: CPT | Mod: CPTII,S$GLB,, | Performed by: PHYSICIAN ASSISTANT

## 2023-05-23 PROCEDURE — 1111F PR DISCHARGE MEDS RECONCILED W/ CURRENT OUTPATIENT MED LIST: ICD-10-PCS | Mod: CPTII,S$GLB,, | Performed by: PHYSICIAN ASSISTANT

## 2023-05-23 PROCEDURE — 99214 OFFICE O/P EST MOD 30 MIN: CPT | Mod: S$GLB,,, | Performed by: PHYSICIAN ASSISTANT

## 2023-05-23 PROCEDURE — 3288F PR FALLS RISK ASSESSMENT DOCUMENTED: ICD-10-PCS | Mod: CPTII,S$GLB,, | Performed by: PHYSICIAN ASSISTANT

## 2023-05-23 RX ORDER — POLYETHYLENE GLYCOL 3350 17 G/17G
17 POWDER, FOR SOLUTION ORAL DAILY
Qty: 10 PACKET | Refills: 1 | Status: SHIPPED | OUTPATIENT
Start: 2023-05-23 | End: 2023-05-30 | Stop reason: ALTCHOICE

## 2023-05-23 RX ORDER — POLYETHYLENE GLYCOL 3350 17 G/17G
17 POWDER, FOR SOLUTION ORAL DAILY
Qty: 10 PACKET | Refills: 1 | Status: SHIPPED | OUTPATIENT
Start: 2023-05-23 | End: 2023-05-23 | Stop reason: SDUPTHER

## 2023-05-23 RX ORDER — HYDROXYZINE HYDROCHLORIDE 25 MG/1
25 TABLET, FILM COATED ORAL 3 TIMES DAILY PRN
Qty: 30 TABLET | Refills: 0 | Status: SHIPPED | OUTPATIENT
Start: 2023-05-23 | End: 2023-06-02

## 2023-05-23 NOTE — PROGRESS NOTES
Subjective:      Patient ID: Abdullahi Urias is a 67 y.o. male.    Chief Complaint: Hospital Follow Up    HPI  Here today for a hospital follow up after surgery.   [facility] Has not come to the [facility] yet. Would like bandages changed today.   Symptoms improving.     No fever, chest pain, or worsening shortness of breath. No heart palpitations. No headache, nausea, vomiting.   Itching all over, taking percocet. No improvement of the itching with benadryl.     [facility]   Patient Active Problem List   Diagnosis    Aortic stenosis    ED (erectile dysfunction)    Asthmatic bronchitis    Hypertension    CAD (coronary artery disease)    BPH (benign prostatic hyperplasia)    Chronic back pain    Cardiomyopathy    BRBPR (bright red blood per rectum)    Class 2 severe obesity due to excess calories with serious comorbidity and body mass index (BMI) of 38.0 to 38.9 in adult    Old peripheral tear of lateral meniscus of right knee    Arthritis of right knee    Chondromalacia, right knee    Penetrating foreign body of skin of right knee    Chronic gout    CHEO on CPAP    History of CVA in adulthood    Prostate cancer    S/P AVR (aortic valve replacement) biopresthetic miller 25 mm in 2014     EKG abnormalities    CKD (chronic kidney disease) stage 3, GFR 30-59 ml/min    History of hepatitis C    Pain in both lower extremities    GERD (gastroesophageal reflux disease)    Personal history of colonic polyps    Syncope and collapse    Localized swelling of left foot    History of prostate cancer    PAD (peripheral artery disease)    Aorto-iliac atherosclerosis    Moderate persistent asthma without complication    Former tobacco use    Preop cardiovascular exam    Synovial cyst of lumbar spine    Lumbar radiculopathy, chronic    Lumbar stenosis with neurogenic claudication         Current Outpatient Medications:     albuterol (PROVENTIL/VENTOLIN HFA) 90 mcg/actuation inhaler,  INHALE 2 PUFFS INTO THE LUNGS EVERY 6 HOURS AS NEEDED FOR COUGH, Disp: 8.5 g, Rfl: 3    allopurinoL (ZYLOPRIM) 100 MG tablet, Take 100 mg by mouth once daily. Takes 0.5 tab, Disp: , Rfl:     ALPRAZolam (XANAX) 1 MG tablet, Take 1 mg by mouth Daily. EVERY OTHER DAY, Disp: , Rfl:     aluminum & magnesium hydroxide-simethicone (MYLANTA MAX STRENGTH) 400-400-40 mg/5 mL suspension, TAKE 15ML BY MOUTH FOUR TIMES A DAY AS NEEDED FOR STOMACH ACID, Disp: , Rfl:     aspirin (ECOTRIN) 81 MG EC tablet, Take 1 tablet (81 mg total) by mouth once daily., Disp: 90 tablet, Rfl: 3    atorvastatin (LIPITOR) 80 MG tablet, TAKE ONE-HALF TABLET BY MOUTH EVERY DAY FOR CHOLESTEROL, Disp: , Rfl:     carvediloL (COREG) 12.5 MG tablet, Take 1 tablet (12.5 mg total) by mouth 2 (two) times daily., Disp: 60 tablet, Rfl: 11    clopidogreL (PLAVIX) 75 mg tablet, Take 1 tablet (75 mg total) by mouth once daily., Disp: 30 tablet, Rfl: 11    diclofenac sodium (VOLTAREN) 1 % Gel, APPLY 2 GRAMS TOPICALLY FOUR TIMES A DAY AS NEEDED FOR PAIN AND INFLAMMATION. USE ENCLOSED DOSING CARD., Disp: , Rfl:     diphenhydrAMINE (SOMINEX) 25 mg tablet, Take 25 mg by mouth nightly as needed for Insomnia., Disp: , Rfl:     esomeprazole (NEXIUM) 40 MG capsule, 40 mg., Disp: , Rfl:     famotidine (PEPCID) 40 MG tablet, TAKE ONE TABLET BY MOUTH AT BEDTIME FOR ACID REFLUX, Disp: , Rfl:     flunisolide 25 mcg, 0.025%, (NASALIDE) 25 mcg (0.025 %) Spry, 2 sprays by Nasal route as needed. , Disp: , Rfl:     gabapentin (NEURONTIN) 600 MG tablet, Take 1 tablet (600 mg total) by mouth 3 (three) times daily., Disp: 90 tablet, Rfl: 11    hydrocortisone 2.5 % cream, Apply topically 2 (two) times daily as needed. Apply to affected areas of the face and neck., Disp: 30 g, Rfl: 2    hydrocortisone-pramoxine (PROCTOFOAM-HS) rectal foam, INSERT 1 APPLICATORFUL INTO RECTALLY TWICE A DAY FOR HEMORRHOIDS, Disp: , Rfl:     hydrOXYzine HCL (ATARAX) 25 MG tablet, Take 25 mg by mouth 3  (three) times daily as needed for Itching., Disp: , Rfl:     LIDOcaine (LIDODERM) 5 %, APPLY 1 PATCH TOPICALLY EVERY DAY FOR PAIN. WEAR FOR 12 HOURS, THEN REMOVE. DO NOT APPLY NEW PATCH FOR AT LEAST 12 HOURS., Disp: , Rfl:     losartan (COZAAR) 50 MG tablet, Take 1 tablet (50 mg total) by mouth once daily., Disp: 30 tablet, Rfl: 11    methocarbamoL (ROBAXIN) 750 MG Tab, Take 1 tablet (750 mg total) by mouth 3 (three) times daily. for 10 days, Disp: 30 tablet, Rfl: 0    methyl salicylate-menthol 15-10% 15-10 % Crea, Apply topically as needed. , Disp: , Rfl:     multivitamin (THERAGRAN) per tablet, Take 1 tablet by mouth once daily., Disp: , Rfl:     oxyCODONE-acetaminophen (PERCOCET)  mg per tablet, Take 1 tablet by mouth every 8 (eight) hours as needed for Pain., Disp: 60 tablet, Rfl: 0    pantoprazole (PROTONIX) 40 MG tablet, Take 40 mg by mouth 2 (two) times daily. , Disp: , Rfl:     sertraline (ZOLOFT) 100 MG tablet, TAKE TWO TABLETS BY MOUTH EVERY DAY FOR MENTAL HEALTH DOSE INCREASED TO 200MG/DAY, Disp: , Rfl:     simethicone (MYLICON) 80 MG chewable tablet, Take 80 mg by mouth every 6 (six) hours as needed for Flatulence., Disp: , Rfl:     sucralfate (CARAFATE) 100 mg/mL suspension, Take 1 g by mouth 4 (four) times daily. , Disp: , Rfl:     TRELEGY ELLIPTA 200-62.5-25 mcg inhaler, INHALE 1 PUFF INTO THE LUNGS EVERY DAY, Disp: 60 each, Rfl: 1    triamcinolone acetonide 0.1% (KENALOG) 0.1 % cream, Apply topically 2 (two) times daily., Disp: 80 g, Rfl: 1    hydrOXYzine HCL (ATARAX) 25 MG tablet, Take 1 tablet (25 mg total) by mouth 3 (three) times daily as needed for Itching., Disp: 30 tablet, Rfl: 0    polyethylene glycol (GLYCOLAX) 17 gram PwPk, Take 17 g by mouth once daily., Disp: 10 packet, Rfl: 1  No current facility-administered medications for this visit.    Facility-Administered Medications Ordered in Other Visits:     ondansetron injection 4 mg, 4 mg, Intravenous, Once PRN, Nehemiah Carmen MD     "sodium chloride 0.9% flush 10 mL, 10 mL, Intravenous, PRN, Wilda Horne MD    Review of Systems   Constitutional:  Negative for activity change, appetite change, chills, diaphoresis, fatigue, fever and unexpected weight change.   HENT: Negative.  Negative for congestion, hearing loss, postnasal drip, rhinorrhea, sore throat, trouble swallowing and voice change.    Eyes: Negative.  Negative for visual disturbance.   Respiratory: Negative.  Negative for cough, choking, chest tightness and shortness of breath.    Cardiovascular:  Negative for chest pain, palpitations and leg swelling.   Gastrointestinal:  Negative for abdominal distention, abdominal pain, blood in stool, constipation, diarrhea, nausea and vomiting.   Endocrine: Negative for cold intolerance, heat intolerance, polydipsia and polyuria.   Genitourinary: Negative.  Negative for difficulty urinating and frequency.   Musculoskeletal:  Positive for back pain and gait problem. Negative for arthralgias, joint swelling and myalgias.   Skin:  Positive for wound. Negative for color change, pallor and rash.   Neurological:  Negative for dizziness, tremors, weakness, light-headedness, numbness and headaches.   Hematological:  Negative for adenopathy.   Psychiatric/Behavioral:  Negative for behavioral problems, confusion, self-injury, sleep disturbance and suicidal ideas. The patient is not nervous/anxious.    Objective:   /88 (BP Location: Left arm, Patient Position: Sitting, BP Method: Large (Manual))   Pulse 70   Temp 98.9 °F (37.2 °C) (Tympanic)   Ht 5' 8" (1.727 m)   Wt 114.9 kg (253 lb 4.9 oz)   SpO2 96%   BMI 38.52 kg/m²     Physical Exam  Vitals and nursing note reviewed.   Constitutional:       General: He is not in acute distress.     Appearance: Normal appearance. He is well-developed. He is not ill-appearing, toxic-appearing or diaphoretic.   HENT:      Head: Normocephalic and atraumatic.   Cardiovascular:      Rate and Rhythm: Normal " rate and regular rhythm.      Heart sounds: Normal heart sounds. No murmur heard.    No friction rub. No gallop.   Pulmonary:      Effort: Pulmonary effort is normal. No respiratory distress.      Breath sounds: Normal breath sounds. No wheezing or rales.   Musculoskeletal:        Back:    Skin:     General: Skin is warm.      Findings: No rash.   Neurological:      Mental Status: He is alert and oriented to person, place, and time.   Psychiatric:         Mood and Affect: Mood normal.         Behavior: Behavior normal.         Thought Content: Thought content normal.         Judgment: Judgment normal.   Wound dressing changed toda    40+ minutes of total time spent on the encounter, which includes face to face time and non-face to face time preparing to see the patient (eg, review of tests), Obtaining and/or reviewing separately obtained history, documenting clinical information in the electronic or other health record, independently interpreting results (not separately reported) and communicating results to the patient/family/caregiver, or Care coordination (not separately reported).    Assessment:     1. Pruritus    2. S/P lumbar spinal fusion    3. Lumbar stenosis with neurogenic claudication    4. Constipation, unspecified constipation type    5. Primary hypertension      Plan:   Pruritus  -     hydrOXYzine HCL (ATARAX) 25 MG tablet; Take 1 tablet (25 mg total) by mouth 3 (three) times daily as needed for Itching.  Dispense: 30 tablet; Refill: 0    S/P lumbar spinal fusion    Lumbar stenosis with neurogenic claudication    Constipation, unspecified constipation type  -     Discontinue: polyethylene glycol (GLYCOLAX) 17 gram PwPk; Take 17 g by mouth once daily.  Dispense: 10 packet; Refill: 1  -     polyethylene glycol (GLYCOLAX) 17 gram PwPk; Take 17 g by mouth once daily.  Dispense: 10 packet; Refill: 1    Primary hypertension    -restart asa and plavix (per cardiology instructions)  -follow up with surgeon  next week as scheduled.   -wound healing well without any evidence of infection    Follow up if symptoms worsen or fail to improve.

## 2023-05-29 NOTE — PROGRESS NOTES
lumSubjective:      Patient ID: Abdullahi Urias is a 67 y.o. male.    Chief Complaint: Post-op Evaluation    HPI  The patient is here today for postop evaluation.  He is accompanied by his wife.   He currently has  services but PT is supposed to start this week.  C/o muscle spasms in lower back.   Pt was having R hip pain but as he started to move around more, the pain resolved.  No issues urinating.  He has intermittent constipation.  Here today for staple removal.      Date of Procedure: 5/17/2023    Procedure: Procedure(s) (LRB):  FUSION, SPINE, LUMBAR, TLIF, USING COMPUTER-ASSISTED NAVIGATION (Bilateral)    Surgeon(s) and Role:     * Brandon Valencia MD - Primary    Operative Findings (including complications, if any):   Scar tissue from prior disc dissection L5-S1 right side  Facet disease with spondylolisthesis L4-5   Lateral recess stenosis with synovial cyst L4-5 resected  Severe stenosis bilaterally L4-S1  Description of Technical Procedures:   1. Laminectomy with removal of abnormal facets and resection of extradural mass consistent with synovial cyst L4-5  2. Combined arthrodesis posterior interbody and posterolateral technique L4-5 L5-S1  3. Placement of Pedicle Screws Medtronic Solera bilaterally using stereotactic   navigation bilaterally L4-S1  4. Insertion of Medtronic expandable interbody cage L4-5 and L5-S1   5. Use of synthetic bone   6. Use of autologous  7. Use of stereotactic navigation     Review of Systems   Cardiovascular:  Negative for chest pain.   Respiratory:  Negative for shortness of breath.    Neurological:  Negative for dizziness and light-headedness.       Objective:            General    Constitutional: He is oriented to person, place, and time. He appears well-developed and well-nourished.   Cardiovascular:  Normal rate and regular rhythm.            Pulmonary/Chest: Effort normal.   Abdominal: Soft.   Neurological: He is alert and oriented to person, place, and time.    Psychiatric: He has a normal mood and affect. His behavior is normal.         Neurosurgery exam:    Vitals reviewed  MAEW  Incision CDI  No erythema, swelling or fluctuance  Staples removed w/out issue            Assessment:       Encounter Diagnoses   Name Primary?    Encounter for postoperative wound check     Status post lumbar spinal fusion Yes    Encounter for staple removal     Dorsalgia, unspecified           Plan:       Abdullahi was seen today for post-op evaluation.    Diagnoses and all orders for this visit:    Status post lumbar spinal fusion    Encounter for postoperative wound check    Encounter for staple removal    Dorsalgia, unspecified  -     X-Ray Lumbar Spine Ap And Lateral; Future  -     X-Ray Lumbar Spine Ap And Lateral; Future    Other orders  -     docusate sodium (COLACE) 100 MG capsule; Take 1 capsule (100 mg total) by mouth 2 (two) times daily.      The patient will complete x-rays on his way out today.   Continue LSO.  He has a current Rx for Percocet, should have 30 tablets left.   Rx sent for stool softener today.   He will follow-up with MD in 5 weeks with repeat imaging at that time.  Please reach out with any changes.            Yazmin Farfan PA-C

## 2023-05-30 ENCOUNTER — OFFICE VISIT (OUTPATIENT)
Dept: NEUROSURGERY | Facility: CLINIC | Age: 68
End: 2023-05-30
Payer: MEDICARE

## 2023-05-30 ENCOUNTER — HOSPITAL ENCOUNTER (OUTPATIENT)
Dept: RADIOLOGY | Facility: HOSPITAL | Age: 68
Discharge: HOME | End: 2023-05-30
Attending: PHYSICIAN ASSISTANT
Payer: MEDICARE

## 2023-05-30 VITALS
RESPIRATION RATE: 18 BRPM | HEIGHT: 68 IN | DIASTOLIC BLOOD PRESSURE: 73 MMHG | BODY MASS INDEX: 38.39 KG/M2 | HEART RATE: 91 BPM | WEIGHT: 253.31 LBS | SYSTOLIC BLOOD PRESSURE: 110 MMHG

## 2023-05-30 DIAGNOSIS — Z48.02 ENCOUNTER FOR STAPLE REMOVAL: ICD-10-CM

## 2023-05-30 DIAGNOSIS — M54.9 DORSALGIA, UNSPECIFIED: ICD-10-CM

## 2023-05-30 DIAGNOSIS — Z48.89 ENCOUNTER FOR POSTOPERATIVE WOUND CHECK: ICD-10-CM

## 2023-05-30 DIAGNOSIS — Z98.1 STATUS POST LUMBAR SPINAL FUSION: Primary | ICD-10-CM

## 2023-05-30 PROCEDURE — 99999 PR PBB SHADOW E&M-EST. PATIENT-LVL V: ICD-10-PCS | Mod: PBBFAC,,, | Performed by: PHYSICIAN ASSISTANT

## 2023-05-30 PROCEDURE — 1159F MED LIST DOCD IN RCRD: CPT | Mod: CPTII,S$GLB,, | Performed by: PHYSICIAN ASSISTANT

## 2023-05-30 PROCEDURE — 3288F PR FALLS RISK ASSESSMENT DOCUMENTED: ICD-10-PCS | Mod: CPTII,S$GLB,, | Performed by: PHYSICIAN ASSISTANT

## 2023-05-30 PROCEDURE — 1159F PR MEDICATION LIST DOCUMENTED IN MEDICAL RECORD: ICD-10-PCS | Mod: CPTII,S$GLB,, | Performed by: PHYSICIAN ASSISTANT

## 2023-05-30 PROCEDURE — 3078F DIAST BP <80 MM HG: CPT | Mod: CPTII,S$GLB,, | Performed by: PHYSICIAN ASSISTANT

## 2023-05-30 PROCEDURE — 1125F PR PAIN SEVERITY QUANTIFIED, PAIN PRESENT: ICD-10-PCS | Mod: CPTII,S$GLB,, | Performed by: PHYSICIAN ASSISTANT

## 2023-05-30 PROCEDURE — 3074F SYST BP LT 130 MM HG: CPT | Mod: CPTII,S$GLB,, | Performed by: PHYSICIAN ASSISTANT

## 2023-05-30 PROCEDURE — 1125F AMNT PAIN NOTED PAIN PRSNT: CPT | Mod: CPTII,S$GLB,, | Performed by: PHYSICIAN ASSISTANT

## 2023-05-30 PROCEDURE — 72100 X-RAY EXAM L-S SPINE 2/3 VWS: CPT | Mod: 26,,, | Performed by: RADIOLOGY

## 2023-05-30 PROCEDURE — 3288F FALL RISK ASSESSMENT DOCD: CPT | Mod: CPTII,S$GLB,, | Performed by: PHYSICIAN ASSISTANT

## 2023-05-30 PROCEDURE — 1101F PR PT FALLS ASSESS DOC 0-1 FALLS W/OUT INJ PAST YR: ICD-10-PCS | Mod: CPTII,S$GLB,, | Performed by: PHYSICIAN ASSISTANT

## 2023-05-30 PROCEDURE — 99024 POSTOP FOLLOW-UP VISIT: CPT | Mod: S$GLB,,, | Performed by: PHYSICIAN ASSISTANT

## 2023-05-30 PROCEDURE — 3078F PR MOST RECENT DIASTOLIC BLOOD PRESSURE < 80 MM HG: ICD-10-PCS | Mod: CPTII,S$GLB,, | Performed by: PHYSICIAN ASSISTANT

## 2023-05-30 PROCEDURE — 3008F BODY MASS INDEX DOCD: CPT | Mod: CPTII,S$GLB,, | Performed by: PHYSICIAN ASSISTANT

## 2023-05-30 PROCEDURE — 72100 XR LUMBAR SPINE AP AND LATERAL: ICD-10-PCS | Mod: 26,,, | Performed by: RADIOLOGY

## 2023-05-30 PROCEDURE — 99024 PR POST-OP FOLLOW-UP VISIT: ICD-10-PCS | Mod: S$GLB,,, | Performed by: PHYSICIAN ASSISTANT

## 2023-05-30 PROCEDURE — 72100 X-RAY EXAM L-S SPINE 2/3 VWS: CPT | Mod: TC

## 2023-05-30 PROCEDURE — 99999 PR PBB SHADOW E&M-EST. PATIENT-LVL V: CPT | Mod: PBBFAC,,, | Performed by: PHYSICIAN ASSISTANT

## 2023-05-30 PROCEDURE — 3008F PR BODY MASS INDEX (BMI) DOCUMENTED: ICD-10-PCS | Mod: CPTII,S$GLB,, | Performed by: PHYSICIAN ASSISTANT

## 2023-05-30 PROCEDURE — 1101F PT FALLS ASSESS-DOCD LE1/YR: CPT | Mod: CPTII,S$GLB,, | Performed by: PHYSICIAN ASSISTANT

## 2023-05-30 PROCEDURE — 3074F PR MOST RECENT SYSTOLIC BLOOD PRESSURE < 130 MM HG: ICD-10-PCS | Mod: CPTII,S$GLB,, | Performed by: PHYSICIAN ASSISTANT

## 2023-05-30 RX ORDER — DOCUSATE SODIUM 100 MG/1
100 CAPSULE, LIQUID FILLED ORAL 2 TIMES DAILY
Qty: 20 CAPSULE | Refills: 0 | Status: SHIPPED | OUTPATIENT
Start: 2023-05-30

## 2023-05-31 ENCOUNTER — PATIENT OUTREACH (OUTPATIENT)
Dept: PULMONOLOGY | Facility: CLINIC | Age: 68
End: 2023-05-31
Payer: MEDICARE

## 2023-05-31 NOTE — TELEPHONE ENCOUNTER
Chronic Disease Management  Called patient to complete Pulmonary Disease Management Questionnaire.        The patient had surgery on 5/17/23. He states he has increased mucus production and cough. Advised him to do the following and to seek medical attention if symptoms worsen. He stated understanding.      MORNING   Midday EVENING   Albuterol MDI Albuterol MDI Albuterol MDI   Incentive spirometry Incentive spirometry Incentive spirometry   Cough  Cough Cough   Trelegy

## 2023-06-05 ENCOUNTER — OUTPATIENT CASE MANAGEMENT (OUTPATIENT)
Dept: ADMINISTRATIVE | Facility: OTHER | Age: 68
End: 2023-06-05
Payer: MEDICARE

## 2023-06-05 ENCOUNTER — PATIENT MESSAGE (OUTPATIENT)
Dept: ADMINISTRATIVE | Facility: OTHER | Age: 68
End: 2023-06-05
Payer: MEDICARE

## 2023-06-13 ENCOUNTER — CLINICAL SUPPORT (OUTPATIENT)
Dept: PULMONOLOGY | Facility: CLINIC | Age: 68
End: 2023-06-13
Payer: MEDICARE

## 2023-06-13 VITALS
OXYGEN SATURATION: 97 % | BODY MASS INDEX: 37.72 KG/M2 | HEART RATE: 90 BPM | RESPIRATION RATE: 16 BRPM | HEIGHT: 68 IN | WEIGHT: 248.88 LBS

## 2023-06-13 DIAGNOSIS — J45.40 MODERATE PERSISTENT ASTHMA WITHOUT COMPLICATION: Primary | ICD-10-CM

## 2023-06-13 PROCEDURE — 99999 PR PBB SHADOW E&M-EST. PATIENT-LVL IV: CPT | Mod: PBBFAC,,,

## 2023-06-13 PROCEDURE — 99999 PR PBB SHADOW E&M-EST. PATIENT-LVL IV: ICD-10-PCS | Mod: PBBFAC,,,

## 2023-06-13 NOTE — PATIENT INSTRUCTIONS
Understanding Asthma         Asthma is a long-term (chronic) lung condition. It involves the airways (bronchial tubes). It happens when a trigger causes your airways to swell and become narrow. The muscles around your airways start to tighten. When your airways start to narrow, air can't move in and out of your lungs very well. Mucus also builds up along the airways. This makes it even harder to move air in and out of your lungs.  Experts are not exactly sure what causes asthma. It may be caused by a mix of inherited and environmental factors. People with asthma may have no symptoms until they are exposed to an allergen or trigger.  Healthy lungs  Inside your lungs there are branching airways made of stretchy tissue. Each airway is wrapped with bands of muscle. The airways are smaller as they go deeper into the lungs. The smallest airways end in clusters of tiny balloon-like air sacs (alveoli). These clusters are surrounded by blood vessels. When you breathe in (inhale), air enters the lungs. It travels down through the airways until it reaches the air sacs. When you breathe out (exhale), air travels up through the airways and out of the lungs. The airways make mucus that traps particles you breathe in. Normally, the mucus is then swept out of the lungs by tiny hairs (cilia) that line the airways. The mucus is swallowed or coughed up during your day.    What the lungs do  The air you inhale contains oxygen. When oxygen reaches the air sacs, it passes into the blood vessels around the sacs. Your blood then sends oxygen to all of your cells. As you exhale, carbon dioxide is removed in a similar way from the blood in the air sacs, and from your body.    When you have asthma  People with asthma have very sensitive airways. This means the airways react to certain things called triggers. Triggers can include pollen, dust, or smoke. Triggers cause inflammation. This makes the airways swell and become narrow. This is a  long-lasting (chronic) problem. Your airways may not always be narrow enough so that you notice breathing problems.  Symptoms of chronic inflammation include:   Coughing (chronic)   A feeling of tightness in your chest   Feeling short of breath   Wheezing (a whistling noise, especially when breathing out)   Low energy or feeling tired   In some people, over time chronic mild inflammation can lead to lasting (permanent) scarring of airways and loss of lung function.    Asthma flare-ups  When sensitive airways are irritated by a trigger, the muscles around the airways tighten. The lining of the airways swells. Thick, sticky mucus increases and partly clogs the airways. All of this makes it harder to breathe.  Symptoms of flare-ups may include:  Coughing, especially at night. You may not be able to sleep because of coughing.   Getting tired or out of breath easily   Wheezing   Chest tightness   Faster breathing when at rest   Flare-ups can be life-threatening. In a severe flare-up, the muscle tightening, swelling, and mucus are worse. Its very hard to breathe. Your body can't get enough oxygen and can't remove carbon dioxide. Waste gas is trapped in the alveoli. Gas exchange cant occur. The body is not getting enough oxygen. Without oxygen, body tissues, especially brain tissue, begin to get damaged. If this goes on for long, it can lead to severe brain damage or death.  Call 911 (or have someone call for you) if you have any of these symptoms and they are not relieved right away by taking your quick-relief medicine as prescribed:  Trouble breathing   Feeling too short of breath to talk or walk   Lips or fingers turning blue   Feeling lightheaded or dizzy, as though you are about to pass out   Peak flow less than 50% of your personal best, if you use a peak flow meter     Managing your asthma  Asthma is a long-term condition. So its important to work with your healthcare provider to manage it. If you have asthma,  you can prevent flare-ups. Develop an asthma action plan with your healthcare provider. It can help control your asthma and manage your symptoms. An asthma action plan also tells you and your family or friends what to do if your asthma flares up or gets worse.  Take your medicine as prescribed. Also learn about your asthma triggers. Knowing what causes your asthma to flare up in the first place can help you prevent future breathing problems.  If you smoke, get help to quit.   Asthma Trigger Checklist  Allergens, irritants, and other things may trigger your asthma. Check the box next to each of your triggers. After each trigger is a list of ways to avoid it.   Dust mites. Dust mites live in mattresses, bedding, carpets, curtains, and indoor dust.  To kill dust mites, wash bedding in hot water (130°F) each week.  Cover mattress and pillows with special dust-mite-proof cases.  Don't use upholstered furniture like sofas or chairs in the bedroom.  Use allergy-proof filters for air conditioners and furnaces. Replace or clean them as instructed.  If you can, replace carpeting with wood or tile harlan, especially in the bedroom.  Animals. Animals with fur or feathers shed dander (allergens).  It's best to choose a pet that doesn't have fur or feathers, such as a fish or a reptile.  If you have pets, keep them off your bed and out of your bedroom.  Wash your hands and clothes after handling pets.    Mold. Mold grows in damp places, such as bathrooms, basements, and closets.  Ask someone to clean damp areas in your home every week. Or try wearing a face mask while you clean.  Run an exhaust fan while bathing. Or leave a window open in the bathroom.  Repair water leaks in or around your home.  Have someone else cut grass or rake leaves, if possible.  Don't use vaporizers or humidifiers. They encourage mold growth.    Pollen. Pollen from trees, grasses, and weeds is a common allergen. (Flower pollens are generally not a  problem).  Try to learn what types of pollen affect you most. Pollen levels vary depending on the plant, the season, and the time of day.  If possible, use air conditioning instead of opening the windows in your home or car.  Have someone else do yard work, if possible.    Cockroaches. Roaches are found in many homes and produce allergens.  Keep your kitchen clean and dry. A leaky faucet or drain can attract roaches.  Remove garbage from your home daily.  Store food in tightly sealed containers. Wash dishes as soon as they are used.  Use bait stations or traps to control roaches. Avoid using chemical sprays.    Smoke. Smoke may be from cigarettes, cigars, pipes, incense or candles, barbecues or grills, and fireplaces.  Don't smoke. And don't let people smoke in your home or car.  When you travel, ask for nonsmoking rental cars and hotel rooms.  Avoid fireplaces and wood stoves. If you can't, sit away from them. Make sure the smoke is directed outside.  Don't burn incense or use candles.  Move away from smoky outdoor cooking grills.    Smog.  Smog is from car exhaust and other pollution.  Read or listen to local air-quality reports. These let you know when air quality is poor.  Stay indoors as much as you can on smoggy days. If possible, use air conditioning instead of opening the windows.  In your car, set air conditioning to recirculate air, so less pollution gets in.    Strong odors. These include air fresheners, deodorizers, and cleaning products; perfume, deodorant, and other beauty products; incense and candles; and insect sprays and other sprays.  Use scent-free products like deodorant or body lotion.  Avoid using cleaning products with bleach and ammonia. Make your own cleaning solution with white vinegar, baking soda, or mild dish soap.  Use exhaust fans while cooking. Or open a window, if possible.   Avoid perfumes, air fresheners, potpourri, and other scented products.          Other irritants. These  include dust, aerosol sprays, and powders.  Wear a face mask while doing tasks like sanding, dusting, sweeping, and yard work. Open doors and windows if working indoors.  Use pump spray bottles instead of aerosols.  Pour liquid  onto a rag or cloth instead of spraying them.    Weather. Weather conditions can trigger symptoms or make them worse.  Watch for very high or low temperatures, very humid conditions, or a lot of wind, as these conditions can make symptoms worse.  Limit outdoor activity during the type of weather that affects you.  Wear a scarf over your mouth and nose in cold weather.    Colds, flu, and sinus infections. Upper respiratory infections can trigger asthma.  Wash your hands often with soap and warm water or use a hand  containing alcohol.  Get a yearly flu shot. And ask if you should get a pneumonia vaccine.  Take care of your general health. Get plenty of sleep. And eat a variety of healthy foods.    Food additives. Food additives can trigger asthma flare-ups in some people.  Check food labels for sulfites or other similar ingredients. These are often found in foods such as wine, beer, and dried fruits.  Avoid foods that contain these additives.    Medicine. Aspirin, NSAIDS like ibuprofen and naproxen, and heart medicines like beta-blockers may be triggers.  Tell your health care provider if you think certain medicines trigger symptoms.   Be sure to read the labels on over-the-counter medicines. They may have ingredients that cause symptoms for you.   , Emotions. Laughing, crying, or feeling excited are triggers for some people.   To help you stay calm: Try breathing in slowly through your nose for a count of 2 seconds. Close your lips and breathe out for 4 seconds. Repeat.  Try to focus on a soothing image in your mind. This will help relax you and calm your breathing.  Remember to take your daily controller medicines. When you're upset or under stress, it's easy to forget.     Exercise. For some people, exercise can trigger symptoms. Don't let this keep you from being active.   If you have not been exercising regularly, start slow and work up gradually.  Take all of your medicines as prescribed.  If you use quick-relief medicine, make sure you have it with you when you exercise.  Stop if you have any symptoms. Make sure you talk with your provider about these symptoms.  © 5509-6454 [employer] [address] [facility], [place] [address]. All rights reserved. This information is not intended as a substitute for professional medical care. Always follow your healthcare professional's instructions.           [address]60  Shortness of Breath: Maximizing Your Energy    Fear of shortness of breath may stop you from being as active as you once were. You don't have to live this way. Managing your time and pacing yourself can help you conserve energy and do more. It's even OK if you're short of breath sometimes. You can learn to work through this without limiting your activities.  Manage your time  Shortness of breath can make everyday tasks take longer. This means there's less time to do the things you enjoy. You can help prevent this by managing your time. Try these tips:  Plan ahead so your tasks are spaced throughout the day. As you plan, keep in mind the times of day you tend to have the most energy.  Do only one thing at a time. Finish one task before starting another.  Gather everything you need before you start a task. This cuts out unneeded steps while you're working.  Think about what you really need to do. Be realistic about what you can get done in a day.      Balance activity and rest  When you're tired, your activities will take longer. Fatigue also makes you more likely to get an infection. Plan your day so that your tasks are spaced throughout the day. To have more energy:  Stop and rest when you need to. Don't wait until you're overtired.  Switch back and forth between hard  tasks and easy ones.  Give yourself plenty of time for each task, so you don't have to hurry.  Take 20- to 30-minute rest breaks after meals and throughout the day.  If an activity takes a lot of energy, break it into smaller parts. For instance, fold the laundry first. Then take a break before putting it away.  Try not to exert yourself in extreme cold or heat.       Find ways to conserve energy  Conserving your energy can help you stay active and breathe better. The way you use your body during a task can help you conserve energy. Think of ways to make things easier, and take your time to ease shortness of breath.  For some tasks, you can also use special aids designed to reduce the amount of energy needed. Here are some tips:  Sit whenever possible, and keep your arms close to your body. Use slow, smooth motions.  Sit to dress and undress, shave, brush your teeth, and comb your hair. Use a long-handled reacher to pull on socks and shoes, and long-handled items like shoe horns.  Sit on a bench to shower. Use warm water, not hot. (Steam can make it harder to breathe.) Dry off by wrapping yourself in a terrycloth robe.  Use energy-saving appliances, such as an electric can opener, a power toothbrush, and a .  Use a cart with wheels to move groceries, laundry, dishes, and other items around the house. Some carts have seats so you can rest when you need to.  Use lightweight, nonstick pots and pans to cook. Soak dirty dishes instead of scrubbing them. Air-dry dishes, or use a .  Mix, cook, serve, and store foods in the same dish.  Keep the things you use most at waist level, so you can get them without reaching or bending.  Use steps slowly, pausing at each step. If you have steps outside or in your home, think about adding ramps or stair lifts.  Think about ways that others can help you. You might get help from friends, family members, or home health aides.      Remember to breathe  Sometimes  people with an illness or condition that affects the lungs try to rush through tasks so they won't get short of breath. This uses more energy and can actually increase shortness of breath. Instead, slow down and pace your breathing. These tips may help:  Move slowly during tasks that take a lot of effort, such as climbing stairs or pushing a shopping cart.  Use pursed-lip and diaphragmatic breathing while you go about a task.  Breathe out (exhale) when you exert effort. For example, breathe out as you lift up a grocery bag. Once you're holding the bag, breathe in. Ask the  at the [employer]. to pack your grocery bags so they are light and easy to carry.  Focus on taking slow, deep breaths. If your breathing is shallow, you don't take in as much air.  Remember that it's OK to be short of breath. Just pace yourself and do pursed-lip breathing.      Talk with your healthcare provider about:  If you should use supplemental oxygen  If you need a referral to occupational and physical therapy. Therapists can help you with exercise and daily activities, and how to make things easier.      Pursed-lip breathing  This type of breathing helps you exhale better. Breathing this way during activity will help you reduce shortness of breath:  Relax your neck and shoulder muscles. Breathe in (inhale) slowly through your nose for 2 counts or more.  Pucker your lips as if you are going to blow out a candle. Breathe out slowly and gently through your lips for at least twice as long as you inhaled. Using an Inhaler with a Spacer  To control asthma, you need to use your medicines the right way. Some medicines are inhaled using a device called a metered-dose inhaler (MDI). Metered-dose inhalers deliver medicine with a fine spray. You may be asked to use a spacer (holding tube) with your inhaler. The spacer helps make sure all the medicine you need goes into your lungs.   Steps for using an inhaler with a spacer  Step 1:  Remove the  caps from the inhaler and spacer.  Shake the inhaler well and attach the spacer. If the inhaler is being used for the first time or has not been used for a while, prime it as directed by the product maker.  Step 2:  Breathe out normally.  Put the spacer between your teeth. Close your lips tightly around it.  Keep your chin up.  Step 3:  Spray 1 puff into the spacer by pressing down on the inhaler.  Then breathe in through your mouth as slowly and deeply as you can. This should take about 5-10 seconds. If you breathe too quickly, you may hear a whistling sound in certain spacers.  Step 4:  Take the spacer out of your mouth.  Hold your breath for a count of 10.  Then hold your lips together and slowly breathe out through your mouth  ACTION PLAN    GREEN ZONE  My sputum is clear/white/usual color and easily cleared.  My breathing is no harder than usual.  I can do my usual activities.  I can think clearly.   Take your usual medicines, including oxygen, as you are told to do so by your health care provider.   YELLOW ZONE  My sputum has change (color, thickness, amount).  I am more short of breath than usual.  I cough or wheeze more.  I weigh more and my legs/feet swell.  I cannot do my usual activities without resting.   Call your health care provider. You will probably be told to begin taking an antibiotic and prednisone. Have your pharmacy phone number available.   RED ZONE  I cannot cough out my sputum.  I am much more short of breath than normal.  I need to sit up to breathe  I cannot do my usual activities.  I am unable to speak more than one or two words at a time.  I am confused.   Call your health care provider. You may be asked to come in to be seen, told to go to the emergency room, or told to call [id number].

## 2023-06-15 ENCOUNTER — OFFICE VISIT (OUTPATIENT)
Dept: UROLOGY | Facility: CLINIC | Age: 68
End: 2023-06-15
Payer: MEDICARE

## 2023-06-15 ENCOUNTER — LAB VISIT (OUTPATIENT)
Dept: LAB | Facility: HOSPITAL | Age: 68
End: 2023-06-15
Attending: UROLOGY
Payer: MEDICARE

## 2023-06-15 VITALS
BODY MASS INDEX: 37.71 KG/M2 | RESPIRATION RATE: 18 BRPM | HEART RATE: 104 BPM | SYSTOLIC BLOOD PRESSURE: 107 MMHG | DIASTOLIC BLOOD PRESSURE: 74 MMHG | WEIGHT: 248 LBS

## 2023-06-15 DIAGNOSIS — C61 PROSTATE CANCER: ICD-10-CM

## 2023-06-15 DIAGNOSIS — C61 PROSTATE CANCER: Primary | ICD-10-CM

## 2023-06-15 PROCEDURE — 3008F PR BODY MASS INDEX (BMI) DOCUMENTED: ICD-10-PCS | Mod: CPTII,S$GLB,, | Performed by: UROLOGY

## 2023-06-15 PROCEDURE — 3288F PR FALLS RISK ASSESSMENT DOCUMENTED: ICD-10-PCS | Mod: CPTII,S$GLB,, | Performed by: UROLOGY

## 2023-06-15 PROCEDURE — 99214 OFFICE O/P EST MOD 30 MIN: CPT | Mod: S$GLB,,, | Performed by: UROLOGY

## 2023-06-15 PROCEDURE — 1101F PR PT FALLS ASSESS DOC 0-1 FALLS W/OUT INJ PAST YR: ICD-10-PCS | Mod: CPTII,S$GLB,, | Performed by: UROLOGY

## 2023-06-15 PROCEDURE — 1111F DSCHRG MED/CURRENT MED MERGE: CPT | Mod: CPTII,S$GLB,, | Performed by: UROLOGY

## 2023-06-15 PROCEDURE — 1111F PR DISCHARGE MEDS RECONCILED W/ CURRENT OUTPATIENT MED LIST: ICD-10-PCS | Mod: CPTII,S$GLB,, | Performed by: UROLOGY

## 2023-06-15 PROCEDURE — 1159F MED LIST DOCD IN RCRD: CPT | Mod: CPTII,S$GLB,, | Performed by: UROLOGY

## 2023-06-15 PROCEDURE — 99214 PR OFFICE/OUTPT VISIT, EST, LEVL IV, 30-39 MIN: ICD-10-PCS | Mod: S$GLB,,, | Performed by: UROLOGY

## 2023-06-15 PROCEDURE — 84153 ASSAY OF PSA TOTAL: CPT | Performed by: UROLOGY

## 2023-06-15 PROCEDURE — 1159F PR MEDICATION LIST DOCUMENTED IN MEDICAL RECORD: ICD-10-PCS | Mod: CPTII,S$GLB,, | Performed by: UROLOGY

## 2023-06-15 PROCEDURE — 3008F BODY MASS INDEX DOCD: CPT | Mod: CPTII,S$GLB,, | Performed by: UROLOGY

## 2023-06-15 PROCEDURE — 3074F PR MOST RECENT SYSTOLIC BLOOD PRESSURE < 130 MM HG: ICD-10-PCS | Mod: CPTII,S$GLB,, | Performed by: UROLOGY

## 2023-06-15 PROCEDURE — 3288F FALL RISK ASSESSMENT DOCD: CPT | Mod: CPTII,S$GLB,, | Performed by: UROLOGY

## 2023-06-15 PROCEDURE — 99999 PR PBB SHADOW E&M-EST. PATIENT-LVL II: CPT | Mod: PBBFAC,,, | Performed by: UROLOGY

## 2023-06-15 PROCEDURE — 3078F PR MOST RECENT DIASTOLIC BLOOD PRESSURE < 80 MM HG: ICD-10-PCS | Mod: CPTII,S$GLB,, | Performed by: UROLOGY

## 2023-06-15 PROCEDURE — 3074F SYST BP LT 130 MM HG: CPT | Mod: CPTII,S$GLB,, | Performed by: UROLOGY

## 2023-06-15 PROCEDURE — 1160F RVW MEDS BY RX/DR IN RCRD: CPT | Mod: CPTII,S$GLB,, | Performed by: UROLOGY

## 2023-06-15 PROCEDURE — 1160F PR REVIEW ALL MEDS BY PRESCRIBER/CLIN PHARMACIST DOCUMENTED: ICD-10-PCS | Mod: CPTII,S$GLB,, | Performed by: UROLOGY

## 2023-06-15 PROCEDURE — 3078F DIAST BP <80 MM HG: CPT | Mod: CPTII,S$GLB,, | Performed by: UROLOGY

## 2023-06-15 PROCEDURE — 99999 PR PBB SHADOW E&M-EST. PATIENT-LVL II: ICD-10-PCS | Mod: PBBFAC,,, | Performed by: UROLOGY

## 2023-06-15 PROCEDURE — 36415 COLL VENOUS BLD VENIPUNCTURE: CPT | Performed by: UROLOGY

## 2023-06-15 PROCEDURE — 1101F PT FALLS ASSESS-DOCD LE1/YR: CPT | Mod: CPTII,S$GLB,, | Performed by: UROLOGY

## 2023-06-15 NOTE — PROGRESS NOTES
Chief Complaint:  History of prostate cancer    HPI:   06/15/2023 - patient returns today for follow-up, no new issues in the interim, had back surgery with Dr. Valencia and has done very well since then, pain much improved, mobility much improved, had some urgency and urge incontinence right afterwards but this resolved fairly quickly as his mobility improved, denies any weak stream or incomplete emptying, denies gross hematuria, due for PSA today    11/03/2022 - returns for follow-up, PSA remains undetectable, notes some new stress incontinence when he stands from a seated or lying position, does not do pelvic floor exercises, also notes some urinary leakage when he achieves orgasm, as well as some postvoid dribbling, no gross hematuria or dysuria    04/28/2022 - returns today for follow-up, PSA remains undetectable, energy good, voiding well, denies GH or leakage    12/17/2021 - patient returns today for follow-up, PSA remains undetectable, his energy level has improved and his hot flashes have diminished as his ADT has worn off, voiding well, denies any gross hematuria    09/17/2021 - patient returns for follow-up, notes decreased urgency, is voiding well with a good stream and denies gross hematuria, nocturia x2 but is not bothersome, PSA undetectable, has some hot flashes which are bothersome but he can tolerate them    06/11/2021 - patient presents for follow-up, has completed radiation, did fine during radiation but notes fatigue possibly from radiation versus the Lupron, denies any gross hematuria or voiding difficulty, not having any leakage, denies UTIs  03/05/2021 - patient presents for follow-up, [facility] which [facility] GG 4+5= 9 prostate cancer with negative margins, patient's PSA never went to undetectable, Dr Garces thinks Axumin  PET followed by salvage radiation with ADT x 6 months is the best course of action  01/21/2021 - patient presents for follow-up, we  were unable to obtain his outside records due to a clerical error, however we have requested these and they should be faxed soon, he he continues to note dysuria and notes that his urine is very dark and concentrated, he denies any foul-smelling urine or gross hematuria, his follow-up PSA was 0.11, denies weak stream or incomplete emptying  12/10/2020 - 67 y.o. male that presents for history of prostate cancer, [facility] [place] [facility], patient believes he was told that he had aggressive prostate cancer, he believes thathis initial postop PSA was undetectable however he has had subsequent biochemical recurrence, then had a postprostatectomy MRI in mid November which was concerning for prostate cancer recurrence and was told that he would need radiation.  Thus the patient has decided to seek a 2nd opinion.  Patient notes that he initially had incontinence after his surgery, but this has resolved.  He currently wears no pads.  He had erectile dysfunction prior to his radical prostatectomy and had an IPP placed in 2018.  This continues to work well for him.  Patient does note some sciatic nerve pain on the back of his right leg for the past 6 months.  He notes chronic back pain since 2001 for which he gets epidural injections which works well for him.  Other significant medical history includes a stroke in 2012 and in open heart surgery in 2014.  He denies any gross hematuria but does confirm some dysuria. He denies a history of kidney stones.  He also denies a family history of prostate cancer.      PMH:  Past Medical History:   Diagnosis Date    Aortic stenosis     [facility]    Asthma     BPH (benign prostatic hyperplasia)     CAD (coronary artery disease)     Cardiomyopathy     CHF (congestive heart failure)     Chronic hoarseness     vocal cord surg    Chronic pain     CKD (chronic kidney disease) stage 3, GFR 30-59 ml/min     CVA (cerebral  infarction)     8/2012 olol; reviewed ed note    Ex-smoker     GERD (gastroesophageal reflux disease)     Hepatitis C     treatedharvoni says cured, RNA NEG 6/2020    Hypertension     Pancreatitis     Prostate cancer     Prostate cancer     Prostate cancer     PVD (peripheral vascular disease)     Renal insufficiency     Substance abuse     cocaine, etoh , tob in past       PSH:  Past Surgical History:   Procedure Laterality Date    AORTIC VALVE REPLACEMENT  05/19/2014    Tissue valve replacement    BACK SURGERY      CARDIAC CATHETERIZATION      COLONOSCOPY  2011    COLONOSCOPY N/A 2/24/2021    Procedure: COLONOSCOPY;  Surgeon: Faith Carrillo MD;  Location: [facility] ENDO;  Service: Endoscopy;  Laterality: N/A;    EPIDURAL STEROID INJECTION INTO CERVICAL SPINE N/A 6/21/2022    Procedure: C7-T1 IL PIEDAD;  Surgeon: Nehemiah Carmen MD;  Location: [facility] PAIN MGT;  Service: Pain Management;  Laterality: N/A;    ESOPHAGOGASTRODUODENOSCOPY N/A 2/24/2021    Procedure: ESOPHAGOGASTRODUODENOSCOPY (EGD);  Surgeon: Faith Carrillo MD;  Location: [facility];  Service: Endoscopy;  Laterality: N/A;    FOOT SURGERY      LEFT HEART CATHETERIZATION Left 7/24/2020    Procedure: CATHETERIZATION, HEART, LEFT;  Surgeon: Pasha Martin MD;  Location: [facility] CATH LAB;  Service: Cardiology;  Laterality: Left;    PENILE PROSTHESIS IMPLANT      PENILE PROSTHESIS REVISION  04/30/2018    PROSTATECTOMY  09/2019    [facility]    radiation for prostate      TRANSFORAMINAL EPIDURAL INJECTION OF STEROID Right 10/20/2022    Procedure: Right  L4/5 + L5/S1 TF PIEDAD RN IV Sedation;  Surgeon: Nehemiah Carmen MD;  Location: [facility] PAIN MGT;  Service: Pain Management;  Laterality: Right;    TRANSFORAMINAL LUMBAR INTERBODY FUSION (TLIF) USING COMPUTER-ASSISTED NAVIGATION Bilateral 5/17/2023    Procedure: FUSION, SPINE, LUMBAR, TLIF, USING COMPUTER-ASSISTED NAVIGATION;  Surgeon: Brandon Valencia MD;  Location: [facility];  Service: Neurosurgery;   "Laterality: Bilateral;  2 level lumbar fusion at L4-5 and L5-S1    UPPER GASTROINTESTINAL ENDOSCOPY  2011       Family History:  Family History   Problem Relation Age of Onset    Heart disease Mother     Heart disease Father     Heart attack Sister     Heart attack Brother     Colon cancer Neg Hx     Colon polyps Neg Hx     Liver cancer Neg Hx     Inflammatory bowel disease Neg Hx     Liver disease Neg Hx     Rectal cancer Neg Hx     Stomach cancer Neg Hx     Ulcerative colitis Neg Hx        Social History:  Social History     Tobacco Use    Smoking status: Former     Packs/day: 2.00     Years: 8.00     Pack years: 16.00     Types: Cigarettes     Quit date: 8/24/2012     Years since quitting: 10.8    Smokeless tobacco: Never   Substance Use Topics    Alcohol use: Never     Comment: Sober since 08/24/2012    Drug use: Not Currently     Types: "Crack" cocaine, Marijuana     Comment: Quit in 2012        Review of Systems:  General: No fever, chills  Skin: No rashes  Chest:  Denies cough and sputum production  Heart: Denies chest pain  Resp: Denies dyspnea  Abdomen: Denies diarrhea, abdominal pain, hematemesis, or blood in stool.  Musculoskeletal: No joint stiffness or swelling. Denies back pain.  : see HPI  Neuro: no dizziness or weakness    Allergies:  Patient has no known allergies.    Medications:    Current Outpatient Medications:     albuterol (PROVENTIL/VENTOLIN HFA) 90 mcg/actuation inhaler, INHALE 2 PUFFS INTO THE LUNGS EVERY 6 HOURS AS NEEDED FOR COUGH, Disp: 8.5 g, Rfl: 3    allopurinoL (ZYLOPRIM) 100 MG tablet, Take 100 mg by mouth once daily. Takes 0.5 tab, Disp: , Rfl:     ALPRAZolam (XANAX) 1 MG tablet, Take 1 mg by mouth Daily. EVERY OTHER DAY, Disp: , Rfl:     aluminum & magnesium hydroxide-simethicone (MYLANTA MAX STRENGTH) 400-400-40 mg/5 mL suspension, TAKE 15ML BY MOUTH FOUR TIMES A DAY AS NEEDED FOR STOMACH ACID, Disp: , Rfl:     aspirin (ECOTRIN) 81 MG EC tablet, Take 1 tablet (81 mg total) by " mouth once daily., Disp: 90 tablet, Rfl: 3    atorvastatin (LIPITOR) 80 MG tablet, TAKE ONE-HALF TABLET BY MOUTH EVERY DAY FOR CHOLESTEROL, Disp: , Rfl:     carvediloL (COREG) 12.5 MG tablet, Take 1 tablet (12.5 mg total) by mouth 2 (two) times daily., Disp: 60 tablet, Rfl: 11    clopidogreL (PLAVIX) 75 mg tablet, Take 1 tablet (75 mg total) by mouth once daily., Disp: 30 tablet, Rfl: 11    diclofenac sodium (VOLTAREN) 1 % Gel, APPLY 2 GRAMS TOPICALLY FOUR TIMES A DAY AS NEEDED FOR PAIN AND INFLAMMATION. USE ENCLOSED DOSING CARD., Disp: , Rfl:     diphenhydrAMINE (SOMINEX) 25 mg tablet, Take 25 mg by mouth nightly as needed for Insomnia., Disp: , Rfl:     docusate sodium (COLACE) 100 MG capsule, Take 1 capsule (100 mg total) by mouth 2 (two) times daily., Disp: 20 capsule, Rfl: 0    esomeprazole (NEXIUM) 40 MG capsule, 40 mg., Disp: , Rfl:     famotidine (PEPCID) 40 MG tablet, TAKE ONE TABLET BY MOUTH AT BEDTIME FOR ACID REFLUX, Disp: , Rfl:     flunisolide 25 mcg, 0.025%, (NASALIDE) 25 mcg (0.025 %) Spry, 2 sprays by Nasal route as needed. , Disp: , Rfl:     gabapentin (NEURONTIN) 600 MG tablet, Take 1 tablet (600 mg total) by mouth 3 (three) times daily., Disp: 90 tablet, Rfl: 11    hydrocortisone 2.5 % cream, Apply topically 2 (two) times daily as needed. Apply to affected areas of the face and neck., Disp: 30 g, Rfl: 2    hydrocortisone-pramoxine (PROCTOFOAM-HS) rectal foam, INSERT 1 APPLICATORFUL INTO RECTALLY TWICE A DAY FOR HEMORRHOIDS, Disp: , Rfl:     hydrOXYzine HCL (ATARAX) 25 MG tablet, Take 25 mg by mouth 3 (three) times daily as needed for Itching., Disp: , Rfl:     LIDOcaine (LIDODERM) 5 %, APPLY 1 PATCH TOPICALLY EVERY DAY FOR PAIN. WEAR FOR 12 HOURS, THEN REMOVE. DO NOT APPLY NEW PATCH FOR AT LEAST 12 HOURS., Disp: , Rfl:     losartan (COZAAR) 50 MG tablet, Take 1 tablet (50 mg total) by mouth once daily., Disp: 30 tablet, Rfl: 11    methyl salicylate-menthol 15-10% 15-10 % Crea, Apply topically as  needed. , Disp: , Rfl:     multivitamin (THERAGRAN) per tablet, Take 1 tablet by mouth once daily., Disp: , Rfl:     oxyCODONE-acetaminophen (PERCOCET)  mg per tablet, Take 1 tablet by mouth every 8 (eight) hours as needed for Pain., Disp: 60 tablet, Rfl: 0    pantoprazole (PROTONIX) 40 MG tablet, Take 40 mg by mouth 2 (two) times daily. , Disp: , Rfl:     sertraline (ZOLOFT) 100 MG tablet, TAKE TWO TABLETS BY MOUTH EVERY DAY FOR MENTAL HEALTH DOSE INCREASED TO 200MG/DAY, Disp: , Rfl:     simethicone (MYLICON) 80 MG chewable tablet, Take 80 mg by mouth every 6 (six) hours as needed for Flatulence., Disp: , Rfl:     sucralfate (CARAFATE) 100 mg/mL suspension, Take 1 g by mouth 4 (four) times daily. , Disp: , Rfl:     TRELEGY ELLIPTA 200-62.5-25 mcg inhaler, INHALE 1 PUFF INTO THE LUNGS EVERY DAY, Disp: 60 each, Rfl: 1    triamcinolone acetonide 0.1% (KENALOG) 0.1 % cream, Apply topically 2 (two) times daily., Disp: 80 g, Rfl: 1  No current facility-administered medications for this visit.    Facility-Administered Medications Ordered in Other Visits:     ondansetron injection 4 mg, 4 mg, Intravenous, Once PRN, Nehemiah Carmen MD    sodium chloride 0.9% flush 10 mL, 10 mL, Intravenous, PRN, Wilda Horne MD    Physical Exam:  Vitals:    06/15/23 1048   BP: 107/74   Pulse: 104   Resp: 18     General: awake, alert, cooperative  Head: NC/AT  Ears: external ears normal  Eyes: sclera normal  Lungs: normal inspiration, NAD  Heart: well-perfused  Skin: The skin is warm and dry  Ext: No c/c/e.  Neuro: grossly intact, AOx3    RADIOLOGY:  NM PET CT FLUCICLOVINE F18 03/24/2021     CLINICAL HISTORY:  Biochemical failure after prostatectomy in 2019, high risk;  Malignant neoplasm of prostate     TECHNIQUE:  Segmented attenuation corrected 3-D PET imaging was obtained from the skull base through the mid thighs utilizing 10.38 mCi F-18-fluciclovine.  Noncontrast CT imaging was performed for attenuation correction,  diagnosis, and anatomical fusion with PET.     COMPARISON:  None.     FINDINGS:  There is physiologic tracer uptake in the liver, pancreas, and bowel.     Patient is status post prostatectomy.  No abnormal hypermetabolism is demonstrated within the prostate bed.  No hypermetabolic regional lymphadenopathy in the pelvis. No hypermetabolic osseous lesions are identified.     Incidental: Penile prosthesis, aortic atherosclerosis, right renal cyst, median sternotomy/CABG changes, degenerative changes in the spine.     Impression:     Status post prostatectomy.  No evidence to indicate residual or recurrent malignancy.    LABS:  I personally reviewed the following lab values:  Lab Results   Component Value Date    WBC 5.67 05/19/2023    HGB 9.8 (L) 05/19/2023    HCT 30.1 (L) 05/19/2023     (L) 05/19/2023     05/19/2023    K 4.7 05/19/2023     05/19/2023    CREATININE 1.7 (H) 05/19/2023    BUN 20 05/19/2023    CO2 24 05/19/2023    TSH 3.370 06/30/2020    PSA 0.88 04/08/2014    INR 1.0 04/13/2023    GLUF 83 03/13/2007    HGBA1C 5.2 10/23/2007    CHOL 123 01/18/2022    TRIG 57 01/18/2022    HDL 39 (L) 01/18/2022    ALT 17 04/13/2023    AST 24 04/13/2023     Assessment/Plan:   Abdullahi Urias is a 67 y.o. male with:    History of GG 9 prostate cancer - status post radical prostatectomy in September of 2019 with biochemical recurrence now s/p XRT and ADT, PSA today, f/u 6 months with PSA    NATI - improved with PFPT    Leakage with orgasm - likely due to rhythmic contractions of the pelvic floor, options would be to wear an occlusive penile ring or a condom      Rommel Rodriguez MD  Urology

## 2023-06-16 ENCOUNTER — TELEPHONE (OUTPATIENT)
Dept: NEUROSURGERY | Facility: CLINIC | Age: 68
End: 2023-06-16
Payer: MEDICARE

## 2023-06-16 LAB — COMPLEXED PSA SERPL-MCNC: <0.01 NG/ML (ref 0–4)

## 2023-06-16 NOTE — TELEPHONE ENCOUNTER
----- Message from Aryan Carranza sent at 6/16/2023  4:35 PM CDT -----  Contact: ZAK  .Type:  RX Refill Request    Who Called: ZAK   Refill or New Rx:REFILL   RX Name and Strength:oxyCODONE-acetaminophen (PERCOCET)  mg per tablet  How is the patient currently taking it? (ex. 1XDay):AS PRESCRIBED   Is this a 30 day or 90 day RX: 30  Preferred Pharmacy with phone number:.  [id number] - [place] - [address] [facility] [address]  [address] [place] [address]  Phone: [phone] Fax: [phone]      Local or Mail Order:Local   Ordering Provider:Dr. Valencia   Would the patient rather a call back or a response via [facility]? Call   Best Call Back Number: .[phone]

## 2023-06-19 ENCOUNTER — TELEPHONE (OUTPATIENT)
Dept: NEUROSURGERY | Facility: CLINIC | Age: 68
End: 2023-06-19
Payer: OTHER GOVERNMENT

## 2023-06-19 RX ORDER — OXYCODONE AND ACETAMINOPHEN 10; 325 MG/1; MG/1
1 TABLET ORAL EVERY 8 HOURS PRN
Qty: 30 TABLET | Refills: 0 | Status: SHIPPED | OUTPATIENT
Start: 2023-06-19 | End: 2023-06-21 | Stop reason: SDUPTHER

## 2023-06-19 NOTE — TELEPHONE ENCOUNTER
Returned pt call pt states that he need a Rx refill, pt also wants to know if he can take a bath pt was advised that I will get with the providers and will call him back with an update.

## 2023-06-19 NOTE — TELEPHONE ENCOUNTER
Spoke with pt informed pt that his Rx was sent over, and I also informed him that it's ok for him to bath. Pramod paiz

## 2023-06-22 RX ORDER — OXYCODONE AND ACETAMINOPHEN 10; 325 MG/1; MG/1
1 TABLET ORAL EVERY 8 HOURS PRN
Qty: 30 TABLET | Refills: 0 | Status: ON HOLD | OUTPATIENT
Start: 2023-06-22 | End: 2023-07-10 | Stop reason: SDUPTHER

## 2023-06-28 ENCOUNTER — TELEPHONE (OUTPATIENT)
Dept: NEUROSURGERY | Facility: CLINIC | Age: 68
End: 2023-06-28
Payer: OTHER GOVERNMENT

## 2023-06-28 NOTE — TELEPHONE ENCOUNTER
I contacted the patient to reschedule his PO #2 TLIF SX 05/15/23 that was scheduled 07/07/23 with Dr. Valencia. I have informed the patient that Dr. Valencia will be out of the office for 2 weeks. The next available with Dr. Valencia is 07/27 at [facility]. The patient stated that he had D/C from physical therapy last week but is still experiencing pain and is unable to bend to put on socks and shoes. Per Physical Therapy, patient should not be bending or twisting until cleared by provider. He does have pain meds and muscle relaxers that were prescribed 06/22/23, which will not last until 07/27/23 office visit. He would like to know if it is okay to start walking and if it is to resume physical therapy for pain until the office visit. I have informed patient that this message will be sent to DENISSE Arce.

## 2023-06-29 ENCOUNTER — OUTPATIENT CASE MANAGEMENT (OUTPATIENT)
Dept: ADMINISTRATIVE | Facility: OTHER | Age: 68
End: 2023-06-29
Payer: OTHER GOVERNMENT

## 2023-06-30 ENCOUNTER — TELEPHONE (OUTPATIENT)
Dept: NEUROSURGERY | Facility: CLINIC | Age: 68
End: 2023-06-30
Payer: OTHER GOVERNMENT

## 2023-06-30 NOTE — TELEPHONE ENCOUNTER
----- Message from Yung Ramos RN sent at 6/29/2023 12:52 PM CDT -----  Regarding: appointment  Good afternoon,    I just spoke to Mr. Urias and he was wondering if there was any sooner appointments with Dr. Valencia for a follow up. He stated that his home PT stated that they suggest out patient PT but it would need to be ordered from Dr. Valencia. Thank you for your time and assistance!    Kind regards,    Yung Ramos RN OPCM

## 2023-07-02 ENCOUNTER — TELEPHONE (OUTPATIENT)
Dept: ADMINISTRATIVE | Facility: HOSPITAL | Age: 68
End: 2023-07-02
Payer: OTHER GOVERNMENT

## 2023-07-03 ENCOUNTER — HOSPITAL ENCOUNTER (INPATIENT)
Facility: HOSPITAL | Age: 68
LOS: 2 days | Discharge: HOME HEALTH | DRG: 857 | End: 2023-07-13
Attending: EMERGENCY MEDICINE | Admitting: HOSPITALIST
Payer: MEDICARE

## 2023-07-03 ENCOUNTER — OFFICE VISIT (OUTPATIENT)
Dept: NEUROSURGERY | Facility: CLINIC | Age: 68
End: 2023-07-03
Payer: MEDICARE

## 2023-07-03 VITALS
HEIGHT: 68 IN | WEIGHT: 253.5 LBS | SYSTOLIC BLOOD PRESSURE: 137 MMHG | BODY MASS INDEX: 38.42 KG/M2 | DIASTOLIC BLOOD PRESSURE: 75 MMHG | HEART RATE: 85 BPM

## 2023-07-03 DIAGNOSIS — R06.02 SHORTNESS OF BREATH: ICD-10-CM

## 2023-07-03 DIAGNOSIS — T81.49XA POST-OPERATIVE WOUND ABSCESS: Primary | ICD-10-CM

## 2023-07-03 DIAGNOSIS — Z48.89 ENCOUNTER FOR POSTOPERATIVE WOUND CHECK: ICD-10-CM

## 2023-07-03 DIAGNOSIS — M48.062 LUMBAR STENOSIS WITH NEUROGENIC CLAUDICATION: ICD-10-CM

## 2023-07-03 DIAGNOSIS — M54.16 LUMBAR RADICULOPATHY, CHRONIC: ICD-10-CM

## 2023-07-03 DIAGNOSIS — M79.606 LEG PAIN: ICD-10-CM

## 2023-07-03 DIAGNOSIS — R06.02 SOB (SHORTNESS OF BREATH): ICD-10-CM

## 2023-07-03 DIAGNOSIS — Z98.1 STATUS POST LUMBAR SPINAL FUSION: Primary | ICD-10-CM

## 2023-07-03 DIAGNOSIS — R07.9 CHEST PAIN: ICD-10-CM

## 2023-07-03 DIAGNOSIS — G89.18 POST-OP PAIN: ICD-10-CM

## 2023-07-03 DIAGNOSIS — M54.9 DORSALGIA, UNSPECIFIED: ICD-10-CM

## 2023-07-03 LAB
ALBUMIN SERPL BCP-MCNC: 3.6 G/DL (ref 3.5–5.2)
ALP SERPL-CCNC: 98 U/L (ref 55–135)
ALT SERPL W/O P-5'-P-CCNC: 10 U/L (ref 10–44)
ANION GAP SERPL CALC-SCNC: 9 MMOL/L (ref 8–16)
AST SERPL-CCNC: 17 U/L (ref 10–40)
BASOPHILS # BLD AUTO: 0.03 K/UL (ref 0–0.2)
BASOPHILS NFR BLD: 0.5 % (ref 0–1.9)
BILIRUB SERPL-MCNC: 0.3 MG/DL (ref 0.1–1)
BILIRUB UR QL STRIP: NEGATIVE
BNP SERPL-MCNC: 125 PG/ML (ref 0–99)
BUN SERPL-MCNC: 21 MG/DL (ref 8–23)
CALCIUM SERPL-MCNC: 9.3 MG/DL (ref 8.7–10.5)
CHLORIDE SERPL-SCNC: 109 MMOL/L (ref 95–110)
CK SERPL-CCNC: 216 U/L (ref 20–200)
CLARITY UR: CLEAR
CO2 SERPL-SCNC: 24 MMOL/L (ref 23–29)
COLOR UR: YELLOW
CREAT SERPL-MCNC: 1.6 MG/DL (ref 0.5–1.4)
DIFFERENTIAL METHOD: ABNORMAL
EOSINOPHIL # BLD AUTO: 0.2 K/UL (ref 0–0.5)
EOSINOPHIL NFR BLD: 2.8 % (ref 0–8)
ERYTHROCYTE [DISTWIDTH] IN BLOOD BY AUTOMATED COUNT: 14.4 % (ref 11.5–14.5)
EST. GFR  (NO RACE VARIABLE): 47 ML/MIN/1.73 M^2
GLUCOSE SERPL-MCNC: 86 MG/DL (ref 70–110)
GLUCOSE UR QL STRIP: NEGATIVE
HCT VFR BLD AUTO: 28.8 % (ref 40–54)
HGB BLD-MCNC: 9.3 G/DL (ref 14–18)
HGB UR QL STRIP: NEGATIVE
IMM GRANULOCYTES # BLD AUTO: 0.02 K/UL (ref 0–0.04)
IMM GRANULOCYTES NFR BLD AUTO: 0.3 % (ref 0–0.5)
KETONES UR QL STRIP: NEGATIVE
LEUKOCYTE ESTERASE UR QL STRIP: NEGATIVE
LYMPHOCYTES # BLD AUTO: 1.2 K/UL (ref 1–4.8)
LYMPHOCYTES NFR BLD: 19.3 % (ref 18–48)
MCH RBC QN AUTO: 26 PG (ref 27–31)
MCHC RBC AUTO-ENTMCNC: 32.3 G/DL (ref 32–36)
MCV RBC AUTO: 80 FL (ref 82–98)
MONOCYTES # BLD AUTO: 0.5 K/UL (ref 0.3–1)
MONOCYTES NFR BLD: 7.2 % (ref 4–15)
NEUTROPHILS # BLD AUTO: 4.4 K/UL (ref 1.8–7.7)
NEUTROPHILS NFR BLD: 69.9 % (ref 38–73)
NITRITE UR QL STRIP: NEGATIVE
NRBC BLD-RTO: 0 /100 WBC
PH UR STRIP: 7 [PH] (ref 5–8)
PLATELET # BLD AUTO: 235 K/UL (ref 150–450)
PMV BLD AUTO: 9.4 FL (ref 9.2–12.9)
POTASSIUM SERPL-SCNC: 4.5 MMOL/L (ref 3.5–5.1)
PROT SERPL-MCNC: 7.8 G/DL (ref 6–8.4)
PROT UR QL STRIP: NEGATIVE
RBC # BLD AUTO: 3.58 M/UL (ref 4.6–6.2)
SODIUM SERPL-SCNC: 142 MMOL/L (ref 136–145)
SP GR UR STRIP: 1.01 (ref 1–1.03)
TROPONIN I SERPL DL<=0.01 NG/ML-MCNC: 0.01 NG/ML (ref 0–0.03)
URN SPEC COLLECT METH UR: NORMAL
UROBILINOGEN UR STRIP-ACNC: NEGATIVE EU/DL
WBC # BLD AUTO: 6.36 K/UL (ref 3.9–12.7)

## 2023-07-03 PROCEDURE — 1159F PR MEDICATION LIST DOCUMENTED IN MEDICAL RECORD: ICD-10-PCS | Mod: HCNC,CPTII,S$GLB, | Performed by: PHYSICIAN ASSISTANT

## 2023-07-03 PROCEDURE — 96374 THER/PROPH/DIAG INJ IV PUSH: CPT | Mod: HCNC

## 2023-07-03 PROCEDURE — 3075F PR MOST RECENT SYSTOLIC BLOOD PRESS GE 130-139MM HG: ICD-10-PCS | Mod: HCNC,CPTII,S$GLB, | Performed by: PHYSICIAN ASSISTANT

## 2023-07-03 PROCEDURE — 1101F PT FALLS ASSESS-DOCD LE1/YR: CPT | Mod: HCNC,CPTII,S$GLB, | Performed by: PHYSICIAN ASSISTANT

## 2023-07-03 PROCEDURE — 3078F PR MOST RECENT DIASTOLIC BLOOD PRESSURE < 80 MM HG: ICD-10-PCS | Mod: HCNC,CPTII,S$GLB, | Performed by: PHYSICIAN ASSISTANT

## 2023-07-03 PROCEDURE — 99999 PR PBB SHADOW E&M-EST. PATIENT-LVL III: ICD-10-PCS | Mod: PBBFAC,HCNC,, | Performed by: PHYSICIAN ASSISTANT

## 2023-07-03 PROCEDURE — 84484 ASSAY OF TROPONIN QUANT: CPT | Mod: HCNC | Performed by: EMERGENCY MEDICINE

## 2023-07-03 PROCEDURE — 93010 ELECTROCARDIOGRAM REPORT: CPT | Mod: HCNC,,, | Performed by: INTERNAL MEDICINE

## 2023-07-03 PROCEDURE — 93005 ELECTROCARDIOGRAM TRACING: CPT | Mod: HCNC

## 2023-07-03 PROCEDURE — 3075F SYST BP GE 130 - 139MM HG: CPT | Mod: HCNC,CPTII,S$GLB, | Performed by: PHYSICIAN ASSISTANT

## 2023-07-03 PROCEDURE — 99024 PR POST-OP FOLLOW-UP VISIT: ICD-10-PCS | Mod: HCNC,S$GLB,, | Performed by: PHYSICIAN ASSISTANT

## 2023-07-03 PROCEDURE — 80053 COMPREHEN METABOLIC PANEL: CPT | Mod: HCNC | Performed by: EMERGENCY MEDICINE

## 2023-07-03 PROCEDURE — 63600175 PHARM REV CODE 636 W HCPCS: Mod: HCNC | Performed by: EMERGENCY MEDICINE

## 2023-07-03 PROCEDURE — 25500020 PHARM REV CODE 255: Mod: HCNC | Performed by: FAMILY MEDICINE

## 2023-07-03 PROCEDURE — 99285 EMERGENCY DEPT VISIT HI MDM: CPT | Mod: 25,HCNC

## 2023-07-03 PROCEDURE — 25000003 PHARM REV CODE 250: Mod: HCNC | Performed by: FAMILY MEDICINE

## 2023-07-03 PROCEDURE — 63600175 PHARM REV CODE 636 W HCPCS: Mod: HCNC | Performed by: FAMILY MEDICINE

## 2023-07-03 PROCEDURE — 96376 TX/PRO/DX INJ SAME DRUG ADON: CPT | Mod: HCNC

## 2023-07-03 PROCEDURE — 96375 TX/PRO/DX INJ NEW DRUG ADDON: CPT | Mod: HCNC

## 2023-07-03 PROCEDURE — 1101F PR PT FALLS ASSESS DOC 0-1 FALLS W/OUT INJ PAST YR: ICD-10-PCS | Mod: HCNC,CPTII,S$GLB, | Performed by: PHYSICIAN ASSISTANT

## 2023-07-03 PROCEDURE — 3288F FALL RISK ASSESSMENT DOCD: CPT | Mod: HCNC,CPTII,S$GLB, | Performed by: PHYSICIAN ASSISTANT

## 2023-07-03 PROCEDURE — 3008F BODY MASS INDEX DOCD: CPT | Mod: HCNC,CPTII,S$GLB, | Performed by: PHYSICIAN ASSISTANT

## 2023-07-03 PROCEDURE — 85025 COMPLETE CBC W/AUTO DIFF WBC: CPT | Mod: HCNC | Performed by: EMERGENCY MEDICINE

## 2023-07-03 PROCEDURE — 83880 ASSAY OF NATRIURETIC PEPTIDE: CPT | Mod: HCNC | Performed by: EMERGENCY MEDICINE

## 2023-07-03 PROCEDURE — 3288F PR FALLS RISK ASSESSMENT DOCUMENTED: ICD-10-PCS | Mod: HCNC,CPTII,S$GLB, | Performed by: PHYSICIAN ASSISTANT

## 2023-07-03 PROCEDURE — 1159F MED LIST DOCD IN RCRD: CPT | Mod: HCNC,CPTII,S$GLB, | Performed by: PHYSICIAN ASSISTANT

## 2023-07-03 PROCEDURE — 3078F DIAST BP <80 MM HG: CPT | Mod: HCNC,CPTII,S$GLB, | Performed by: PHYSICIAN ASSISTANT

## 2023-07-03 PROCEDURE — 93010 EKG 12-LEAD: ICD-10-PCS | Mod: HCNC,,, | Performed by: INTERNAL MEDICINE

## 2023-07-03 PROCEDURE — 3008F PR BODY MASS INDEX (BMI) DOCUMENTED: ICD-10-PCS | Mod: HCNC,CPTII,S$GLB, | Performed by: PHYSICIAN ASSISTANT

## 2023-07-03 PROCEDURE — A9585 GADOBUTROL INJECTION: HCPCS | Mod: HCNC | Performed by: FAMILY MEDICINE

## 2023-07-03 PROCEDURE — 99024 POSTOP FOLLOW-UP VISIT: CPT | Mod: HCNC,S$GLB,, | Performed by: PHYSICIAN ASSISTANT

## 2023-07-03 PROCEDURE — 99999 PR PBB SHADOW E&M-EST. PATIENT-LVL III: CPT | Mod: PBBFAC,HCNC,, | Performed by: PHYSICIAN ASSISTANT

## 2023-07-03 PROCEDURE — 81003 URINALYSIS AUTO W/O SCOPE: CPT | Mod: HCNC | Performed by: EMERGENCY MEDICINE

## 2023-07-03 PROCEDURE — 82550 ASSAY OF CK (CPK): CPT | Mod: HCNC | Performed by: EMERGENCY MEDICINE

## 2023-07-03 PROCEDURE — 1125F PR PAIN SEVERITY QUANTIFIED, PAIN PRESENT: ICD-10-PCS | Mod: HCNC,CPTII,S$GLB, | Performed by: PHYSICIAN ASSISTANT

## 2023-07-03 PROCEDURE — 1125F AMNT PAIN NOTED PAIN PRSNT: CPT | Mod: HCNC,CPTII,S$GLB, | Performed by: PHYSICIAN ASSISTANT

## 2023-07-03 RX ORDER — MORPHINE SULFATE 4 MG/ML
4 INJECTION, SOLUTION INTRAMUSCULAR; INTRAVENOUS
Status: COMPLETED | OUTPATIENT
Start: 2023-07-03 | End: 2023-07-03

## 2023-07-03 RX ORDER — MORPHINE SULFATE 4 MG/ML
6 INJECTION, SOLUTION INTRAMUSCULAR; INTRAVENOUS
Status: DISCONTINUED | OUTPATIENT
Start: 2023-07-03 | End: 2023-07-03

## 2023-07-03 RX ORDER — ONDANSETRON 2 MG/ML
4 INJECTION INTRAMUSCULAR; INTRAVENOUS
Status: COMPLETED | OUTPATIENT
Start: 2023-07-03 | End: 2023-07-03

## 2023-07-03 RX ORDER — GADOBUTROL 604.72 MG/ML
10 INJECTION INTRAVENOUS
Status: COMPLETED | OUTPATIENT
Start: 2023-07-03 | End: 2023-07-03

## 2023-07-03 RX ORDER — MORPHINE SULFATE 10 MG/ML
6 INJECTION INTRAMUSCULAR; INTRAVENOUS; SUBCUTANEOUS
Status: COMPLETED | OUTPATIENT
Start: 2023-07-03 | End: 2023-07-03

## 2023-07-03 RX ORDER — OXYCODONE AND ACETAMINOPHEN 10; 325 MG/1; MG/1
1 TABLET ORAL
Status: COMPLETED | OUTPATIENT
Start: 2023-07-03 | End: 2023-07-03

## 2023-07-03 RX ADMIN — MORPHINE SULFATE 4 MG: 4 INJECTION INTRAVENOUS at 01:07

## 2023-07-03 RX ADMIN — ONDANSETRON 4 MG: 2 INJECTION INTRAMUSCULAR; INTRAVENOUS at 11:07

## 2023-07-03 RX ADMIN — MORPHINE SULFATE 6 MG: 10 INJECTION INTRAVENOUS at 09:07

## 2023-07-03 RX ADMIN — OXYCODONE AND ACETAMINOPHEN 1 TABLET: 10; 325 TABLET ORAL at 04:07

## 2023-07-03 RX ADMIN — GADOBUTROL 10 ML: 604.72 INJECTION INTRAVENOUS at 10:07

## 2023-07-03 RX ADMIN — MORPHINE SULFATE 4 MG: 4 INJECTION INTRAVENOUS at 11:07
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
## 2023-07-03 NOTE — ED PROVIDER NOTES
SCRIBE #1 NOTE: I, Kesha Murray, am scribing for, and in the presence of, Raymon Snider MD. I have scribed the HPI, ROS, and PEx.     SCRIBE #2 NOTE: I, Cristiane Zamarripa, am scribing for, and in the presence of,  Geraldine Ceja MD. I have scribed the following portions of the note - Other sections scribed: MDM, ED Discussion.     SCRIBE #3 NOTE: I, Cher Kahn, am scribing for, and in the presence of,  Gracia Almanza DO. I have scribed the remaining portions of the note not scribed by Scribe #1 and Scribe #2.   History      Chief Complaint   Patient presents with    Shortness of Breath     Shortness of breath started x 2 days, hx of COPD, back surgery May, last night started with pain and numbness to right leg. Went to neurosurgery appointment and told to come to ER.       Review of patient's allergies indicates:  No Known Allergies     HPI   HPI    7/3/2023, 10:02 AM   History obtained from the patient and the pt's wife at bedside      History of Present Illness: Abdullahi Urias is a 67 y.o. male patient with a PMHx of aortic stenosis, CAD, COPD, asthma, CHF, cardiomegaly, CKD, CVA, hepatitis C, HTN, prostate cancer, and PVD who presents to the Emergency Department for SOB which onset gradually 2 days PTA. PTA, the pt was evaluated by Neurosurgery and was advised to come to the ER. Symptoms are constant and moderate in severity. No mitigating or exacerbating factors reported. Associated sxs include a fever. The pt also c/o RLE pain radiating from the lower back and RLE weakness. Pt states that his RLE pain and weakness onset last night. Patient denies any CP, N/V/D, diaphoresis, headaches, dysuria, and all other sxs at this time. No further complaints or concerns at this time.         Arrival mode: Personal vehicle    PCP: Reji Valentin MD       Past Medical History:  Past Medical History:   Diagnosis Date    Aortic stenosis     dr [facility]    Asthma     BPH (benign prostatic hyperplasia)      CAD (coronary artery disease)     Cardiomyopathy     CHF (congestive heart failure)     Chronic hoarseness     vocal cord surg    Chronic pain     CKD (chronic kidney disease) stage 3, GFR 30-59 ml/min     CVA (cerebral infarction)     8/2012 olol; reviewed ed note    Ex-smoker     GERD (gastroesophageal reflux disease)     Hepatitis C     treatedharvoni says cured, RNA NEG 6/2020    Hypertension     Pancreatitis     Prostate cancer     Prostate cancer     Prostate cancer     PVD (peripheral vascular disease)     Renal insufficiency     Substance abuse     cocaine, etoh , tob in past       Past Surgical History:  Past Surgical History:   Procedure Laterality Date    AORTIC VALVE REPLACEMENT  05/19/2014    Tissue valve replacement    BACK SURGERY      CARDIAC CATHETERIZATION      COLONOSCOPY  2011    COLONOSCOPY N/A 2/24/2021    Procedure: COLONOSCOPY;  Surgeon: Faith Carrillo MD;  Location: [facility] ENDO;  Service: Endoscopy;  Laterality: N/A;    EPIDURAL STEROID INJECTION INTO CERVICAL SPINE N/A 6/21/2022    Procedure: C7-T1 IL PIEDAD;  Surgeon: Nehemiah Carmen MD;  Location: [facility] PAIN MGT;  Service: Pain Management;  Laterality: N/A;    ESOPHAGOGASTRODUODENOSCOPY N/A 2/24/2021    Procedure: ESOPHAGOGASTRODUODENOSCOPY (EGD);  Surgeon: Faith Carrillo MD;  Location: [facility] ENDO;  Service: Endoscopy;  Laterality: N/A;    FOOT SURGERY      INSERTION N/A 7/5/2023    Procedure: INSERTION;  Surgeon: Brandon Valencia MD;  Location: [facility] OR;  Service: Neurosurgery;  Laterality: N/A;  Antibiotic Beads    LEFT HEART CATHETERIZATION Left 7/24/2020    Procedure: CATHETERIZATION, HEART, LEFT;  Surgeon: Pasha Martin MD;  Location: [facility] CATH LAB;  Service: Cardiology;  Laterality: Left;    PENILE PROSTHESIS IMPLANT      PENILE PROSTHESIS REVISION  04/30/2018    PROSTATECTOMY  09/2019    [facility]    radiation for prostate      REMOVAL OF HARDWARE FROM SPINE N/A 7/5/2023    Procedure: REMOVAL, HARDWARE, SPINE;  Surgeon:  "Brandon Valencia MD;  Location: [facility] OR;  Service: Neurosurgery;  Laterality: N/A;    REPLACEMENT N/A 7/5/2023    Procedure: REPLACEMENT;  Surgeon: Brandon Valencia MD;  Location: [facility] OR;  Service: Neurosurgery;  Laterality: N/A;  of [employer]. [employer]    TRANSFORAMINAL EPIDURAL INJECTION OF STEROID Right 10/20/2022    Procedure: Right  L4/5 + L5/S1 TF PIEDAD RN IV Sedation;  Surgeon: Nehemiah Carmen MD;  Location: [facility] PAIN MGT;  Service: Pain Management;  Laterality: Right;    TRANSFORAMINAL LUMBAR INTERBODY FUSION (TLIF) USING COMPUTER-ASSISTED NAVIGATION Bilateral 5/17/2023    Procedure: FUSION, SPINE, LUMBAR, TLIF, USING COMPUTER-ASSISTED NAVIGATION;  Surgeon: Brandon Valencia MD;  Location: [facility];  Service: Neurosurgery;  Laterality: Bilateral;  2 level lumbar fusion at L4-5 and L5-S1    UPPER GASTROINTESTINAL ENDOSCOPY  2011    WASHOUT N/A 7/5/2023    Procedure: WASHOUT;  Surgeon: Brandon Valencia MD;  Location: [facility];  Service: Neurosurgery;  Laterality: N/A;    WOUND EXPLORATION Bilateral 7/5/2023    Procedure: EXPLORATION, WOUND;  Surgeon: Brandon Valencia MD;  Location: [facility];  Service: Neurosurgery;  Laterality: Bilateral;         Family History:  Family History   Problem Relation Age of Onset    Heart disease Mother     Heart disease Father     Heart attack Sister     Heart attack Brother     Colon cancer Neg Hx     Colon polyps Neg Hx     Liver cancer Neg Hx     Inflammatory bowel disease Neg Hx     Liver disease Neg Hx     Rectal cancer Neg Hx     Stomach cancer Neg Hx     Ulcerative colitis Neg Hx        Social History:  Social History     Tobacco Use    Smoking status: Former     Packs/day: 2.00     Years: 8.00     Pack years: 16.00     Types: Cigarettes     Quit date: 8/24/2012     Years since quitting: 10.8    Smokeless tobacco: Never   Substance and Sexual Activity    Alcohol use: Never     Comment: Sober since 08/24/2012    Drug use: Not Currently     Types: "Crack" cocaine, " Marijuana     Comment: Quit in 2012    Sexual activity: Yes       ROS   Review of Systems   Constitutional:  Positive for fever. Negative for diaphoresis.   HENT:  Negative for sore throat.    Respiratory:  Positive for shortness of breath.    Cardiovascular:  Negative for chest pain.   Gastrointestinal:  Negative for diarrhea, nausea and vomiting.   Genitourinary:  Negative for dysuria.   Musculoskeletal:  Positive for back pain (lower back pain radiating down the RLE) and myalgias (RLE radiating from the back).   Skin:  Negative for rash.   Neurological:  Positive for weakness (RLE). Negative for headaches.   Hematological:  Does not bruise/bleed easily.   All other systems reviewed and are negative.    Physical Exam      Initial Vitals   BP Pulse Resp Temp SpO2   07/03/23 0953 07/03/23 0953 07/03/23 0953 07/03/23 1640 07/03/23 0953   135/82 84 20 98.9 °F (37.2 °C) 97 %      MAP       --                 Physical Exam  Nursing Notes and Vital Signs Reviewed.  Constitutional: Patient is in moderate distress. Well-developed and well-nourished.  Head: Atraumatic. Normocephalic.  Eyes: PERRL. EOM intact. Conjunctivae are not pale. No scleral icterus.  ENT: Mucous membranes are moist. Oropharynx is clear and symmetric.    Neck: Supple. Full ROM. No lymphadenopathy.  Cardiovascular: Regular rate. Regular rhythm. No murmurs, rubs, or gallops. Distal pulses are 2+ and symmetric.  Pulmonary/Chest: No respiratory distress. Clear to auscultation bilaterally. No wheezing or rales.  Abdominal: Soft and non-distended.  There is no tenderness.  No rebound, guarding, or rigidity.  Musculoskeletal: Moves all extremities. No obvious deformities. No edema.  Skin: There is a wound to the lower back that is clean, dry, and intact.  Neurological:  Alert, awake, and appropriate.  Normal speech.  No acute focal neurological deficits are appreciated.  Psychiatric: Normal affect. Good eye contact. Appropriate in content.    ED Course     Procedures  ED Vital Signs:  Vitals:    07/06/23 0100 07/06/23 0300 07/06/23 0432 07/06/23 0500   BP:   126/70    Pulse: 74 74 72 71   Resp:   18    Temp:   97.3 °F (36.3 °C)    TempSrc:   Oral    SpO2:   97%    Weight:       Height:        07/06/23 0537 07/06/23 0718 07/06/23 0737 07/06/23 0758   BP:  103/60     Pulse:  69 73 70   Resp: 18 16 18    Temp:  97.2 °F (36.2 °C)     TempSrc:  Oral     SpO2:  97% 98%    Weight:  113.8 kg (250 lb 14.1 oz)     Height:        07/06/23 0804 07/06/23 0936 07/06/23 1103 07/06/23 1120   BP:       Pulse:  78 73    Resp: 16   16   Temp:       TempSrc:       SpO2:       Weight:       Height:        07/06/23 1225 07/06/23 1313 07/06/23 1332   BP: 126/70     Pulse: 74 77    Resp: 18  18   Temp: 98.9 °F (37.2 °C)     TempSrc: Oral     SpO2: 100%     Weight:      Height:          Abnormal Lab Results:  Labs Reviewed   CBC W/ AUTO DIFFERENTIAL - Abnormal; Notable for the following components:       Result Value    RBC 3.58 (*)     Hemoglobin 9.3 (*)     Hematocrit 28.8 (*)     MCV 80 (*)     MCH 26.0 (*)     All other components within normal limits   COMPREHENSIVE METABOLIC PANEL - Abnormal; Notable for the following components:    Creatinine 1.6 (*)     eGFR 47 (*)     All other components within normal limits   B-TYPE NATRIURETIC PEPTIDE - Abnormal; Notable for the following components:     (*)     All other components within normal limits   CK - Abnormal; Notable for the following components:     (*)     All other components within normal limits   SEDIMENTATION RATE - Abnormal; Notable for the following components:    Sed Rate 55 (*)     All other components within normal limits   C-REACTIVE PROTEIN - Abnormal; Notable for the following components:    CRP 46.1 (*)     All other components within normal limits   URINALYSIS, REFLEX TO URINE CULTURE    Narrative:     Specimen Source->Urine   TROPONIN I        All Lab Results:  Results for orders placed or performed  during the hospital encounter of 07/03/23   Blood culture    Specimen: Blood   Result Value Ref Range    Blood Culture, Routine No Growth to date     Blood Culture, Routine No Growth to date    Blood culture    Specimen: Blood   Result Value Ref Range    Blood Culture, Routine No Growth to date     Blood Culture, Routine No Growth to date    Culture, Anaerobe    Specimen: Back; Abscess   Result Value Ref Range    Anaerobic Culture Culture in progress    Aerobic culture    Specimen: Back; Abscess   Result Value Ref Range    Aerobic Bacterial Culture No growth    AFB Culture & Smear    Specimen: Back; Abscess   Result Value Ref Range    AFB CULTURE STAIN No acid fast bacilli seen.    Gram stain    Specimen: Back; Abscess   Result Value Ref Range    Gram Stain Result Rare WBC's     Gram Stain Result No organisms seen    AFB Culture & Smear    Specimen: Back; Abscess   Result Value Ref Range    AFB CULTURE STAIN No acid fast bacilli seen.    Gram stain    Specimen: Back; Abscess   Result Value Ref Range    Gram Stain Result No WBC's     Gram Stain Result No organisms seen    Culture, Anaerobe    Specimen: Back; Abscess   Result Value Ref Range    Anaerobic Culture Culture in progress    Aerobic culture    Specimen: Back; Abscess   Result Value Ref Range    Aerobic Bacterial Culture No growth    Culture, Anaerobe    Specimen: Back; Abscess   Result Value Ref Range    Anaerobic Culture Culture in progress    AFB Culture & Smear    Specimen: Back; Abscess   Result Value Ref Range    AFB CULTURE STAIN No acid fast bacilli seen.    Gram stain    Specimen: Back; Abscess   Result Value Ref Range    Gram Stain Result No WBC's     Gram Stain Result No organisms seen    Aerobic culture    Specimen: Back; Abscess   Result Value Ref Range    Aerobic Bacterial Culture No growth    CBC Auto Differential   Result Value Ref Range    WBC 6.36 3.90 - 12.70 K/uL    RBC 3.58 (L) 4.60 - 6.20 M/uL    Hemoglobin 9.3 (L) 14.0 - 18.0 g/dL     Hematocrit 28.8 (L) 40.0 - 54.0 %    MCV 80 (L) 82 - 98 fL    MCH 26.0 (L) 27.0 - 31.0 pg    MCHC 32.3 32.0 - 36.0 g/dL    RDW 14.4 11.5 - 14.5 %    Platelets 235 150 - 450 K/uL    MPV 9.4 9.2 - 12.9 fL    Immature Granulocytes 0.3 0.0 - 0.5 %    Gran # (ANC) 4.4 1.8 - 7.7 K/uL    Immature Grans (Abs) 0.02 0.00 - 0.04 K/uL    Lymph # 1.2 1.0 - 4.8 K/uL    Mono # 0.5 0.3 - 1.0 K/uL    Eos # 0.2 0.0 - 0.5 K/uL    Baso # 0.03 0.00 - 0.20 K/uL    nRBC 0 0 /100 WBC    Gran % 69.9 38.0 - 73.0 %    Lymph % 19.3 18.0 - 48.0 %    Mono % 7.2 4.0 - 15.0 %    Eosinophil % 2.8 0.0 - 8.0 %    Basophil % 0.5 0.0 - 1.9 %    Differential Method Automated    Comprehensive Metabolic Panel   Result Value Ref Range    Sodium 142 136 - 145 mmol/L    Potassium 4.5 3.5 - 5.1 mmol/L    Chloride 109 95 - 110 mmol/L    CO2 24 23 - 29 mmol/L    Glucose 86 70 - 110 mg/dL    BUN 21 8 - 23 mg/dL    Creatinine 1.6 (H) 0.5 - 1.4 mg/dL    Calcium 9.3 8.7 - 10.5 mg/dL    Total Protein 7.8 6.0 - 8.4 g/dL    Albumin 3.6 3.5 - 5.2 g/dL    Total Bilirubin 0.3 0.1 - 1.0 mg/dL    Alkaline Phosphatase 98 55 - 135 U/L    AST 17 10 - 40 U/L    ALT 10 10 - 44 U/L    eGFR 47 (A) >60 mL/min/1.73 m^2    Anion Gap 9 8 - 16 mmol/L   Urinalysis, Reflex to Urine Culture Urine, Clean Catch    Specimen: Urine   Result Value Ref Range    Specimen UA Urine, Clean Catch     Color, UA Yellow Yellow, Straw, Kallie    Appearance, UA Clear Clear    pH, UA 7.0 5.0 - 8.0    Specific Gravity, UA 1.015 1.005 - 1.030    Protein, UA Negative Negative    Glucose, UA Negative Negative    Ketones, UA Negative Negative    Bilirubin (UA) Negative Negative    Occult Blood UA Negative Negative    Nitrite, UA Negative Negative    Urobilinogen, UA Negative <2.0 EU/dL    Leukocytes, UA Negative Negative   BNP   Result Value Ref Range     (H) 0 - 99 pg/mL   CK   Result Value Ref Range     (H) 20 - 200 U/L   Troponin I   Result Value Ref Range    Troponin I 0.009 0.000 - 0.026  ng/mL   Sedimentation rate   Result Value Ref Range    Sed Rate 55 (H) 0 - 10 mm/Hr   C-reactive protein   Result Value Ref Range    CRP 46.1 (H) 0.0 - 8.2 mg/L   Protime-INR   Result Value Ref Range    Prothrombin Time 11.6 9.0 - 12.5 sec    INR 1.1 0.8 - 1.2   APTT   Result Value Ref Range    aPTT 25.5 21.0 - 32.0 sec   Basic Metabolic Panel (BMP)   Result Value Ref Range    Sodium 137 136 - 145 mmol/L    Potassium 4.3 3.5 - 5.1 mmol/L    Chloride 104 95 - 110 mmol/L    CO2 23 23 - 29 mmol/L    Glucose 96 70 - 110 mg/dL    BUN 22 8 - 23 mg/dL    Creatinine 1.9 (H) 0.5 - 1.4 mg/dL    Calcium 9.1 8.7 - 10.5 mg/dL    Anion Gap 10 8 - 16 mmol/L    eGFR 38 (A) >60 mL/min/1.73 m^2   CBC with Automated Differential   Result Value Ref Range    WBC 5.86 3.90 - 12.70 K/uL    RBC 3.67 (L) 4.60 - 6.20 M/uL    Hemoglobin 9.6 (L) 14.0 - 18.0 g/dL    Hematocrit 29.5 (L) 40.0 - 54.0 %    MCV 80 (L) 82 - 98 fL    MCH 26.2 (L) 27.0 - 31.0 pg    MCHC 32.5 32.0 - 36.0 g/dL    RDW 14.2 11.5 - 14.5 %    Platelets 235 150 - 450 K/uL    MPV 9.7 9.2 - 12.9 fL    Immature Granulocytes 0.3 0.0 - 0.5 %    Gran # (ANC) 3.9 1.8 - 7.7 K/uL    Immature Grans (Abs) 0.02 0.00 - 0.04 K/uL    Lymph # 1.2 1.0 - 4.8 K/uL    Mono # 0.6 0.3 - 1.0 K/uL    Eos # 0.2 0.0 - 0.5 K/uL    Baso # 0.03 0.00 - 0.20 K/uL    nRBC 0 0 /100 WBC    Gran % 66.5 38.0 - 73.0 %    Lymph % 19.6 18.0 - 48.0 %    Mono % 10.4 4.0 - 15.0 %    Eosinophil % 2.7 0.0 - 8.0 %    Basophil % 0.5 0.0 - 1.9 %    Differential Method Automated    Basic Metabolic Panel (BMP)   Result Value Ref Range    Sodium 138 136 - 145 mmol/L    Potassium 4.8 3.5 - 5.1 mmol/L    Chloride 105 95 - 110 mmol/L    CO2 19 (L) 23 - 29 mmol/L    Glucose 138 (H) 70 - 110 mg/dL    BUN 22 8 - 23 mg/dL    Creatinine 1.6 (H) 0.5 - 1.4 mg/dL    Calcium 9.2 8.7 - 10.5 mg/dL    Anion Gap 14 8 - 16 mmol/L    eGFR 47 (A) >60 mL/min/1.73 m^2   CBC with Automated Differential   Result Value Ref Range    WBC 8.33 3.90 -  12.70 K/uL    RBC 3.22 (L) 4.60 - 6.20 M/uL    Hemoglobin 8.3 (L) 14.0 - 18.0 g/dL    Hematocrit 25.8 (L) 40.0 - 54.0 %    MCV 80 (L) 82 - 98 fL    MCH 25.8 (L) 27.0 - 31.0 pg    MCHC 32.2 32.0 - 36.0 g/dL    RDW 13.8 11.5 - 14.5 %    Platelets 206 150 - 450 K/uL    MPV 10.1 9.2 - 12.9 fL    Immature Granulocytes 0.4 0.0 - 0.5 %    Gran # (ANC) 7.5 1.8 - 7.7 K/uL    Immature Grans (Abs) 0.03 0.00 - 0.04 K/uL    Lymph # 0.6 (L) 1.0 - 4.8 K/uL    Mono # 0.2 (L) 0.3 - 1.0 K/uL    Eos # 0.0 0.0 - 0.5 K/uL    Baso # 0.01 0.00 - 0.20 K/uL    nRBC 0 0 /100 WBC    Gran % 89.9 (H) 38.0 - 73.0 %    Lymph % 6.7 (L) 18.0 - 48.0 %    Mono % 2.9 (L) 4.0 - 15.0 %    Eosinophil % 0.0 0.0 - 8.0 %    Basophil % 0.1 0.0 - 1.9 %    Differential Method Automated    Vancomycin, random   Result Value Ref Range    Vancomycin, Random 12.8 Not established ug/mL   Type And Screen Preop   Result Value Ref Range    Group & Rh B POS     Indirect Isidro NEG    Specimen to Pathology, Surgery Neurosurgery   Result Value Ref Range    Final Pathologic Diagnosis GROSS DIAGNOSIS:  Hardware, as described grossly     Gross       Surgery ID:  [id number]    Pathology ID:  [id number]  1. Received fresh labeled &quot;explanted hardware&quot; is a 6.9 cm length by 0.6 cm in diameter silver metallic amrit inscribed with &quot;[id number],&quot; and 3 silver metallic, circular pieces of medical hardware each measuring 0.9 cm in   circumference with no visible inscriptions.  No sections are submitted.  The specimen is submitted for gross examination only.      Grossed by: Juan C Daigle MS, PA(ASCP)      Disclaimer       Unless the case is a 'gross only' or additional testing only, the final diagnosis for each specimen is based on a microscopic examination of appropriate tissue sections.         Imaging Results:  Imaging Results              MRI Lumbar Spine W WO Cont (Final result)  Result time 07/04/23 00:13:50      Final result by Justus Sood MD (07/04/23  00:13:50)                   Impression:      Patient is status post posterior interbody lumbar fusion from L4 to S1.  Orthopedic hardware appears intact.  Bilateral laminectomies from L4-S1.    Large subcutaneous fluid collection involving the postoperative bed measuring approximately 11.6 x 28.9 may reflect seroma or abscess or meningocele Additional subcutaneous fluid collection in the region of the laminectomy is multiloculated measuring approximately 2.2 x 14 mm each.  This could relate to abscess, seroma, or meningocele.    Metallic interbody spacer at L5-S1 appears more anterior than L4-L5.  Correlate clinically.    Recommend clinical correlation and follow-up.      Electronically signed by: Justus Sood  Date:    07/04/2023  Time:    00:13               Narrative:    EXAMINATION:  MRI LUMBAR SPINE W WO CONTRAST    CLINICAL HISTORY:  10low back pain, infection suspected;    CONTRAST:  ML of Gadavist    TECHNIQUE:    Standard multiplanar without and with contrast MRI sequences of the lumbar spine performed with contrast.    COMPARISON:  Prior CT    FINDINGS:  Patient is status post posterior interbody lumbar fusion from L4 to S1.  Orthopedic hardware appears intact.  Bilateral laminectomies from L4-S1.  Metallic artifacts limit evaluation.  Metallic interbody spacer at L5-S1 and L4-L5.  Enhancing soft tissues in the region of laminectomy and postoperative bed may relate to postoperative change or edema    Large subcutaneous fluid collection involving the postoperative bed measuring approximately 11.6 x 28.9 may reflect seroma or abscess or meningocele   Additional subcutaneous fluid collection in the region of the laminectomy is multiloculated measuring approximately 2.2 x 14 mm each.  This could relate to abscess, seroma, or meningocele    The tip of the conus ends at the  level.    T12-L1: Normal.    L1-2: Normal.    L2-3: Normal.    L3-4: Normal.    L4-5: Evaluation of the neural foraminal and spinal canal  limited by metallic artifact    L5-S1:  Left lateral disc herniation remains.                                       X-Ray Chest AP Portable (Final result)  Result time 07/03/23 11:11:49      Final result by CHRISTIAN Allen Sr., MD (07/03/23 11:11:49)                   Impression:      1. The lungs are clear.  2. There is mild cardiomegaly.  3. There are surgical changes associated with a prior sternotomy.  .      Electronically signed by: Sancho Allen MD  Date:    07/03/2023  Time:    11:11               Narrative:    EXAMINATION:  XR CHEST AP PORTABLE    CLINICAL HISTORY:  sob;    COMPARISON:  04/13/2023    FINDINGS:  There are surgical changes associated with a prior sternotomy.  There is mild cardiomegaly.  The lungs are clear. There is no pneumothorax.  The costophrenic angles are sharp.                                       US Lower Extremity Veins Right (Final result)  Result time 07/03/23 11:02:31      Final result by Homer Faulkner MD (07/03/23 11:02:31)                   Impression:      No evidence of deep venous thrombosis in the right lower extremity.      Electronically signed by: Homer Faulkner  Date:    07/03/2023  Time:    11:02               Narrative:    EXAMINATION:  US LOWER EXTREMITY VEINS RIGHT    CLINICAL HISTORY:  Pain in leg, unspecified    TECHNIQUE:  Duplex and color flow Doppler evaluation and graded compression of the right lower extremity veins was performed.    COMPARISON:  None    FINDINGS:  Right thigh veins: The common femoral, femoral, popliteal, upper greater saphenous, and deep femoral veins are patent and free of thrombus. The veins are normally compressible and have normal phasic flow and augmentation response.    Right calf veins: The visualized calf veins are patent.    Contralateral CFV: The contralateral (left) common femoral vein is patent and free of thrombus.    Miscellaneous: None                                       CT Lumbar Spine Without Contrast  (Final result)  Result time 07/03/23 11:31:42      Final result by Chris Villalba MD (Timothy) (07/03/23 11:31:42)                   Impression:      Postoperative changes related to a posterior lumbar interbody fusion from L4-S1 with bilateral laminectomies.  Postoperative changes suspected involving the posterior paraspinal musculature.  No definite abnormal postoperative fluid collections.    All CT scans at this facility are performed  using dose modulation techniques as appropriate to performed exam including the following:  automated exposure control; adjustment of mA and/or kV according to the patients size (this includes techniques or standardized protocols for targeted exams where dose is matched to indication/reason for exam: i.e. extremities or head);  iterative reconstruction technique.      Electronically signed by: Chris Villalba MD  Date:    07/03/2023  Time:    11:31               Narrative:    EXAMINATION:  CT LUMBAR SPINE WITHOUT CONTRAST    CLINICAL HISTORY:  Low back pain, infection suspected;    TECHNIQUE:  Standard noncontrast CT scan of the lumbar spine.    COMPARISON:  Lumbar spine series 05/30/2023    FINDINGS:  Patient is status post posterior interbody lumbar fusion from L4 to S1.  Orthopedic hardware appears intact.  Bilateral laminectomies from L4-S1.  No definite intra spinal focal fluid collections.  There is indistinctness of the posterior paraspinal musculature at the operative levels which could reflect postoperative changes and edematous.  No definite fluid collections within the paraspinal musculature.  Other lumbar levels appear unremarkable.                                       CT Head Without Contrast (Final result)  Result time 07/03/23 10:50:09      Final result by CHRISTIAN Allen Sr., MD (07/03/23 10:50:09)                   Impression:      1. There is no evidence of an acute ischemic event. There is encephalomalacia in the right occipital lobe  2. There is no  intracranial hemorrhage.  All CT scans at this facility use dose modulation, iterative reconstruction, and/or weight base dosing when appropriate to reduce radiation dose when appropriate to reduce radiation dose to as low as reasonably achievable.      Electronically signed by: Sancho Allen MD  Date:    07/03/2023  Time:    10:50               Narrative:    EXAMINATION:  CT HEAD WITHOUT CONTRAST    CLINICAL HISTORY:  Neuro deficit, acute, stroke suspected;    TECHNIQUE:  Standard brain CT protocol without IV contrast was performed.    COMPARISON:  12/01/2020    FINDINGS:  There is no evidence of an acute ischemic event.  There is encephalomalacia in the right occipital lobe.  There is no intracranial hemorrhage.  There is no calvarial fracture.  The visualized portion of the paranasal sinuses is clear.                                     The EKG was ordered, reviewed, and independently interpreted by the ED provider.  Interpretation time: 9:52  Rate: 84 BPM  Rhythm: Sinus rhythm with occasional premature ventricular complexes  Interpretation: T wave abnormality, consider anterior ischemia. Prolonged QT. No STEMI.           The Emergency Provider reviewed the vital signs and test results, which are outlined above.    ED Discussion     4:00 PM: Dr. Snider transfers care of patient to Dr. Ceja pending radiology results.    4:15 PM: Dr. Ceja agrees with Dr. Snider's assessment and plan of care. Evaluated pt. Pt is resting comfortably and is in no acute distress.  D/w pt all pertinent results. D/w pt any concerns expressed at this time. Answered all questions. Pt expresses understanding at this time.    12:43 AM: Discussed pt's case with Dr. Jackson (Orthopedic Surgery) who recommends checking ESR and CRP.     2:00 AM: Dr. Ceja transfers care of patient to Dr. Almanza pending lab results.    4:28 AM:  Secure chat Dr. Jackson (orthopedic surgery/spine) x 2.    4:55 AM: Re-evaluated pt. I have discussed test results,  shared treatment plan, and the need for admission with patient and family at bedside. Pt and family express understanding at this time and agree with all information. All questions answered. Pt and family have no further questions or concerns at this time. Pt is ready for admit.      ED Course as of 07/06/23 1454   Tue Jul 04, 2023   [id number] 67-year-old male status post spinal fusion with Dr. Valencia on 5/17/23 presents from his follow up appointment with the surgeon reporting pain and numbness to right leg.  He also reports shortness of breath.  He does not have a fever, tachycardia, or hypoxia.  CBC negative for leukocytosis but does show a stable anemia.  CMP shows CKD at baseline.  Urine shows no signs of infection.  EKG shows PVCs with T-wave abnormalities in the anterior leads and prolonged QT, troponin is negative for ACS.  BNP only slightly elevated and chest x-ray shows no signs of pneumothorax, pneumonia or CHF.  CPK only slightly elevated and not consistent with rhabdomyolysis.  Venous ultrasound of the right lower extremity is negative for DVT.  CT head shows no intracranial hemorrhage.  CT lumbar spine does not reveal any abnormal fluid collections however MRI lumbar spine exhibits fluid collections concerning for seroma, abscess, or meningocele. Dr Jackson, on call ortho spine, recommends obtaining a CRP and sed rate and updating him with the results.  CRP and sed rate are elevated.  [NF]      ED Course User Index  [NF] Gracia Almanza, DO       ED Medication(s):  Medications   atorvastatin tablet 80 mg (80 mg Oral Given 7/5/23 2011)   carvediloL tablet 12.5 mg (12.5 mg Oral Given 7/6/23 0805)   gabapentin capsule 600 mg (600 mg Oral Given 7/6/23 0805)   budesonide nebulizer solution 0.5 mg (0.5 mg Nebulization Given 7/6/23 0737)   sodium chloride 0.9% flush 10 mL (has no administration in time range)   naloxone 0.4 mg/mL injection 0.02 mg (has no administration in time range)   glucose chewable tablet 16 g  (has no administration in time range)   glucose chewable tablet 24 g (has no administration in time range)   glucagon (human recombinant) injection 1 mg (has no administration in time range)   dextrose 10% bolus 125 mL 125 mL (has no administration in time range)   dextrose 10% bolus 250 mL 250 mL (has no administration in time range)   acetaminophen tablet 650 mg (has no administration in time range)   ondansetron injection 4 mg (4 mg Intravenous Given 7/4/23 1216)   melatonin tablet 6 mg (has no administration in time range)   albuterol-ipratropium 2.5 mg-0.5 mg/3 mL nebulizer solution 3 mL (has no administration in time range)   sertraline tablet 100 mg (100 mg Oral Given 7/6/23 0803)   aluminum-magnesium hydroxide-simethicone 200-200-20 mg/5 mL suspension 30 mL (30 mLs Oral Given 7/6/23 1120)   pantoprazole EC tablet 40 mg (40 mg Oral Given 7/6/23 0805)   oxyCODONE-acetaminophen 7.5-325 mg per tablet 1 tablet (1 tablet Oral Given 7/6/23 0804)   oxyCODONE-acetaminophen  mg per tablet 1 tablet (1 tablet Oral Given 7/6/23 1120)   HYDROmorphone injection 2 mg (2 mg Intravenous Given 7/6/23 1332)   methocarbamoL tablet 500 mg (500 mg Oral Given 7/6/23 1207)   vancomycin - pharmacy to dose (has no administration in time range)   prochlorperazine injection Soln 5 mg (has no administration in time range)   lactated ringers infusion (0 mL/hr Intravenous Stopped 7/6/23 0200)   ALPRAZolam tablet 0.5 mg (0.5 mg Oral Given 7/6/23 1127)   calcium carbonate 200 mg calcium (500 mg) chewable tablet 500 mg (500 mg Oral Given 7/6/23 1218)   dexAMETHasone injection 2 mg (has no administration in time range)   enoxaparin injection 40 mg (has no administration in time range)   morphine injection 4 mg (4 mg Intravenous Given 7/3/23 1125)   ondansetron injection 4 mg (4 mg Intravenous Given 7/3/23 1125)   morphine injection 4 mg (4 mg Intravenous Given 7/3/23 1322)   oxyCODONE-acetaminophen  mg per tablet 1 tablet (1 tablet  Oral Given 7/3/23 1629)   morphine injection 6 mg (6 mg Intravenous Given 7/3/23 2120)   gadobutroL (GADAVIST) injection 10 mL (10 mLs Intravenous Given 7/3/23 2242)   ketorolac injection 15 mg (15 mg Intravenous Given 7/4/23 0242)   tobramycin injection 1.2 g (1.2 g Other Given 7/5/23 0830)   vancomycin 2 g in dextrose 5 % 500 mL IVPB (2,000 mg Intravenous Given 7/5/23 0858)   vancomycin 1.75 g in 5 % dextrose 500 mL IVPB (0 mg Intravenous Stopped 7/6/23 0645)           Current Discharge Medication List            Medical Decision Making    Medical Decision Making:   Clinical Tests:   Lab Tests: Ordered and Reviewed  Radiological Study: Ordered and Reviewed  Medical Tests: Ordered and Reviewed         Scribe Attestation:   Scribe #1: I performed the above scribed service and the documentation accurately describes the services I performed. I attest to the accuracy of the note.    Attending:   Physician Attestation Statement for Scribe #1: I, Raymon Snider MD, personally performed the services described in this documentation, as scribed by Kesha Murray, in my presence, and it is both accurate and complete.       Scribe Attestation:   Scribe #2: I performed the above scribed service and the documentation accurately describes the services I performed. I attest to the accuracy of the note.  Scribe #3: I performed the above scribed service and the documentation accurately describes the services I performed. I attest to the accuracy of the note.    Attending Attestation:           Physician Attestation for Scribe:    Physician Attestation Statement for Scribe #2: I, Geraldine Ceja MD, reviewed documentation, as scribed by Cristiane Zamarripa in my presence, and it is both accurate and complete. I also acknowledge and confirm the content of the note done by Scribe #1.    Physician Attestation Statement for Scribe #3: I, Gracia Almanza DO, reviewed documentation, as scribed by Cher Kahn in my presence, and it is  both accurate and complete. I also acknowledge and confirm the content of the note done by Scribe #1 and Scribe #2. .    Clinical Impression       ICD-10-CM ICD-9-CM   1. Post-op pain  G89.18 338.18   2. Shortness of breath  R06.02 786.05   3. SOB (shortness of breath)  R06.02 786.05   4. Leg pain  M79.606 729.5   5. Chest pain  R07.9 786.50   6. Post-operative wound abscess  T81.49XA 998.59       Disposition:   Disposition: Placed in Observation  Condition: Fair       Gracia Almanza, DO  07/04/23 1114       Gracia Almanza, DO  07/06/23 1447       Gracia Almanza, DO  07/06/23 1454

## 2023-07-03 NOTE — ED NOTES
Pt back from MRI pt stated that he was unable to move over on the mri table. Stated that his pain was so bad he is unable to turn over.

## 2023-07-03 NOTE — PROGRESS NOTES
"Subjective:      Patient ID: Abdullahi Urias is a 67 y.o. male.    HPI  The patient is here today for postop assessment.  Yesterday he drove from his home near [place] to [place]. It was the first "long" distance drive since surgery in May.  He started having lower back and RLE pain after the drive.   Localizes pain from lower back, hip and down his RLE.  Also has new onset weakness in RLE.  Patient states he was doing so good prior to yesterday.    Only issue he has is burning on outside of L thigh; this started two weeks ago. Denies pain and weakness of LLE.  He does not report any recent falls.  Ambulates with a cane.    Patient denies HA, dizziness and chest pain.  He does have a h/o shortness of breath, asthma, CHEO and is experiencing shortness of breath at this time.  His wife reports low grade fevers- around 99.  Currently taking Plavix. Has not missed any doses.  Pt reports that he does not feel like he is fully emptying his bladder.  No acute bowel changes.      Last note:5/30/23  The patient is here today for postop evaluation.  He is accompanied by his wife.   He currently has HH services but PT is supposed to start this week.  C/o muscle spasms in lower back.   Pt was having R hip pain but as he started to move around more, the pain resolved.  No issues urinating.  He has intermittent constipation.  Here today for staple removal.        Date of Procedure: 5/17/2023    Procedure: Procedure(s) (LRB):  FUSION, SPINE, LUMBAR, TLIF, USING COMPUTER-ASSISTED NAVIGATION (Bilateral)    Surgeon(s) and Role:     * Brandon Valencia MD - Primary    Operative Findings (including complications, if any):   Scar tissue from prior disc dissection L5-S1 right side  Facet disease with spondylolisthesis L4-5   Lateral recess stenosis with synovial cyst L4-5 resected  Severe stenosis bilaterally L4-S1  Description of Technical Procedures:   1. Laminectomy with removal of abnormal facets and resection of extradural mass " consistent with synovial cyst L4-5  2. Combined arthrodesis posterior interbody and posterolateral technique L4-5 L5-S1  3. Placement of Pedicle Screws Medtronic Solera bilaterally using stereotactic   navigation bilaterally L4-S1  4. Insertion of Medtronic expandable interbody cage L4-5 and L5-S1   5. Use of synthetic bone   6. Use of autologous  7. Use of stereotactic navigation       Objective:     Body mass index is 38.55 kg/m².  Vitals:    07/03/23 0826   BP: 137/75   Pulse: 85        Back: incision - no obvious swelling or fluctuance         None  Paraspinal muscle spasms   None  Pain with flexion and extention   WNL limited Range of motion    Neg Right LE Straight leg raise     Motor   Right Right Left Left  Level Group   5 4+ 5 5 L2 Hip flexor (Psoas)   5 4+ 5 5 L3 Leg extension (Quads)   5 3 5 5 L4 Dorsiflexion & foot inversion (Tibialis Anterior)   5 3 5 5 L5 Great toe extension ( EHL)   5 3 5 5 S1 Foot eversion (Gastroc, PL & PB)     Sensation  NL Decreased (R/L/BL) Level Sensation    X  L2 Anterio-medial thigh   X  L3 Medial thigh around knee   X  L4 Medial foot   X  L5 Dorsum foot   X  S1 Lateral foot   Results for orders placed during the hospital encounter of 03/08/23    MRI Lumbar Spine Without Contrast    Narrative  EXAMINATION:  MRI LUMBAR SPINE WITHOUT CONTRAST    CLINICAL HISTORY:  Radiculopathy, lumbar regionLumbar radiculopathy, symptoms persist with conservative treatment;LUMBAR RADICULOPATHY;    TECHNIQUE:  MR Lumbar spine without contrast. Sagittal T1, T2, STIR. Axial T1, T2. Coronal T1.    COMPARISON:  Prior MRI of the lumbar spine from 01/12/2021.    FINDINGS:  Again demonstrated are postoperative findings from right hemilaminectomy at L5-S1.  Grade 1 degenerative spondylolisthesis at L3-L4 and L4-L5.  Vertebral body height is normal.  Fatty marrow replacement within L5 and S1 could relate to prior radiation therapy to the pelvis.  The conus medullaris terminates at the level of T12-L1.   No abnormal signal within the conus. Intervertebral disc levels are as follows:    T12-L1 disc: Normal.    L1-L2 disc : Disc space height loss with a circumferential disc bulge.  Annular fissure within the right foraminal region of the disc.  Anterior osteophytes.  Normal facet joints.  The dural canal measures 10 mm.  No significant foraminal stenosis.    L2-L3 disc: Normal.    L3-L4 disc: Minimal grade 1 spondylolisthesis with moderate to severe degenerative facet arthropathy.  Broad-based posterior disc bulge.  The dural canal measures 9 mm.  Minimal foraminal stenosis bilaterally.    L4-L5 disc: Grade 1 degenerative spondylolisthesis with severe degenerative facet hypertrophy bilaterally.  Marrow edema pattern within the left facet joint and left L5 pedicle.  Broad-based posterior disc bulge that encroaches into the floors of the exit foramina.  The dural canal measures 5 mm in AP dimension compared to prior 7 mm.  There is a centrally projecting synovial cyst from the right facet joint measuring 6 mm it causes severe right lateral recess stenosis and may compress the descending right L5 spinal nerve roots.  This is best demonstrated on axial T2 series 6, image 25 in sagittal STIR series 5, image 10.  This is new compared to prior.  Mild bilateral foraminal stenosis.    L5-S1 disc: Prior right-sided hemilaminectomy.  No definite granulation/scar tissue compresses the thecal sac or neural structures.  Severe disc space height loss most pronounced toward the right with vacuum disc phenomenon.  Edematous and fatty Modic endplate change.  Disc and osteophyte encroaches into the floor of the right exit foramen.  Moderate degenerative facet hypertrophy.  Severe right foraminal stenosis that has progressed in the interval.    Impression  1. Prior right hemilaminectomy at L5-S1 without signs of granulation or scar tissue compressing the thecal sac or the neural structures.  2. Development of severe spinal stenosis at  L4-L5 with a centrally projecting synovial cyst from the right facet joint that likely compresses the descending right L5 spinal nerve roots.  3. Progression of right-sided foraminal stenosis at L5-S1 which could affect the right L5 spinal nerve.    Results for orders placed during the hospital encounter of 05/30/23    X-Ray Lumbar Spine Ap And Lateral    FINDINGS:  There has been an interval laminectomy, discectomy, and posterior fusion at L4-L5 and L5-S1.  The hardware is unchanged in position compared to the postoperative images.  Mild spinal asymmetry convex toward the right at L3 with normal vertebral body height.  No evidence of acute fracture.  Mild disc space height loss at L1-L2 and L2-L3 with tiny adjacent osteophytes.  Normal-appearing facet joints.  Impression  1.  The postoperative spinal hardware appears unchanged in position compared to the postoperative views from 05/18/2023.  2.  No acute findings are evident.     Lab Results   Component Value Date    WBC 5.67 05/19/2023    HCT 30.1 (L) 05/19/2023     INDEPENDENT INTERPRETATION OF TEST:  Relevant imaging results reviewed and interpreted by me, discussed with the patient and / or family today.  Assessment:     1. Status post lumbar spinal fusion    2. Encounter for postoperative wound check    3. Lumbar radiculopathy, chronic    4. Dorsalgia, unspecified    5. Shortness of breath      Plan:     Status post lumbar spinal fusion    Encounter for postoperative wound check    Lumbar radiculopathy, chronic  -     US Lower Extremity Veins Bilateral; Future; Expected date: 07/03/2023    Dorsalgia, unspecified  -     MRI Lumbar Spine W WO Contrast; Future; Expected date: 07/03/2023    Shortness of breath  -     US Lower Extremity Veins Bilateral; Future; Expected date: 07/03/2023        Patient presenting with severe R sided lower back, hip and LE pain with weakness, also worsening shortness of breath.  I initially recommend outpatient MRI of lumbar spine  with ultrasound (YARIEL LE) - venous, however given his symptoms and physical exam, I advised patient and wife to report to the ER today. Wife is able to bring him next door via personal vehicle.  I called the ER to give a report of him coming this morning. Informed them that Dr. Valencia is out of town however I am available for my current patients.      Yazmin Farfan PA-C  [facility]

## 2023-07-04 PROBLEM — R06.02 SOB (SHORTNESS OF BREATH): Status: ACTIVE | Noted: 2023-07-04

## 2023-07-04 PROBLEM — T81.49XA POST-OPERATIVE WOUND ABSCESS: Status: ACTIVE | Noted: 2023-07-04

## 2023-07-04 LAB
ABO + RH BLD: NORMAL
APTT PPP: 25.5 SEC (ref 21–32)
BLD GP AB SCN CELLS X3 SERPL QL: NORMAL
CRP SERPL-MCNC: 46.1 MG/L (ref 0–8.2)
ERYTHROCYTE [SEDIMENTATION RATE] IN BLOOD BY WESTERGREN METHOD: 55 MM/HR (ref 0–10)
INR PPP: 1.1 (ref 0.8–1.2)
PROTHROMBIN TIME: 11.6 SEC (ref 9–12.5)

## 2023-07-04 PROCEDURE — 86900 BLOOD TYPING SEROLOGIC ABO: CPT | Mod: HCNC | Performed by: NEUROLOGICAL SURGERY

## 2023-07-04 PROCEDURE — 25000003 PHARM REV CODE 250: Mod: HCNC | Performed by: PHYSICIAN ASSISTANT

## 2023-07-04 PROCEDURE — 85610 PROTHROMBIN TIME: CPT | Mod: HCNC | Performed by: NEUROLOGICAL SURGERY

## 2023-07-04 PROCEDURE — 96375 TX/PRO/DX INJ NEW DRUG ADDON: CPT | Mod: HCNC

## 2023-07-04 PROCEDURE — 63600175 PHARM REV CODE 636 W HCPCS: Mod: HCNC | Performed by: PHYSICIAN ASSISTANT

## 2023-07-04 PROCEDURE — 99900035 HC TECH TIME PER 15 MIN (STAT): Mod: HCNC

## 2023-07-04 PROCEDURE — 63600175 PHARM REV CODE 636 W HCPCS: Mod: HCNC | Performed by: NURSE PRACTITIONER

## 2023-07-04 PROCEDURE — 86140 C-REACTIVE PROTEIN: CPT | Mod: HCNC | Performed by: FAMILY MEDICINE

## 2023-07-04 PROCEDURE — 96376 TX/PRO/DX INJ SAME DRUG ADON: CPT

## 2023-07-04 PROCEDURE — 87040 BLOOD CULTURE FOR BACTERIA: CPT | Mod: HCNC | Performed by: NURSE PRACTITIONER

## 2023-07-04 PROCEDURE — 85651 RBC SED RATE NONAUTOMATED: CPT | Mod: HCNC | Performed by: FAMILY MEDICINE

## 2023-07-04 PROCEDURE — 96375 TX/PRO/DX INJ NEW DRUG ADDON: CPT

## 2023-07-04 PROCEDURE — 25000003 PHARM REV CODE 250: Mod: HCNC | Performed by: FAMILY MEDICINE

## 2023-07-04 PROCEDURE — 94640 AIRWAY INHALATION TREATMENT: CPT | Mod: HCNC

## 2023-07-04 PROCEDURE — G0378 HOSPITAL OBSERVATION PER HR: HCPCS | Mod: HCNC

## 2023-07-04 PROCEDURE — 25000003 PHARM REV CODE 250: Mod: HCNC | Performed by: NURSE PRACTITIONER

## 2023-07-04 PROCEDURE — 25000242 PHARM REV CODE 250 ALT 637 W/ HCPCS: Mod: HCNC | Performed by: NURSE PRACTITIONER

## 2023-07-04 PROCEDURE — 63600175 PHARM REV CODE 636 W HCPCS: Mod: HCNC | Performed by: FAMILY MEDICINE

## 2023-07-04 PROCEDURE — 36415 COLL VENOUS BLD VENIPUNCTURE: CPT | Mod: HCNC | Performed by: NURSE PRACTITIONER

## 2023-07-04 PROCEDURE — 85730 THROMBOPLASTIN TIME PARTIAL: CPT | Mod: HCNC | Performed by: NEUROLOGICAL SURGERY

## 2023-07-04 PROCEDURE — 94761 N-INVAS EAR/PLS OXIMETRY MLT: CPT | Mod: HCNC

## 2023-07-04 PROCEDURE — 96372 THER/PROPH/DIAG INJ SC/IM: CPT | Performed by: NURSE PRACTITIONER

## 2023-07-04 RX ORDER — SERTRALINE HYDROCHLORIDE 50 MG/1
100 TABLET, FILM COATED ORAL DAILY
Status: DISCONTINUED | OUTPATIENT
Start: 2023-07-04 | End: 2023-07-13 | Stop reason: HOSPADM

## 2023-07-04 RX ORDER — CARVEDILOL 12.5 MG/1
12.5 TABLET ORAL 2 TIMES DAILY
Status: DISCONTINUED | OUTPATIENT
Start: 2023-07-04 | End: 2023-07-13 | Stop reason: HOSPADM

## 2023-07-04 RX ORDER — HYDROCODONE BITARTRATE AND ACETAMINOPHEN 5; 325 MG/1; MG/1
1 TABLET ORAL EVERY 4 HOURS PRN
Status: DISCONTINUED | OUTPATIENT
Start: 2023-07-04 | End: 2023-07-04

## 2023-07-04 RX ORDER — PANTOPRAZOLE SODIUM 40 MG/1
40 TABLET, DELAYED RELEASE ORAL DAILY
Status: DISCONTINUED | OUTPATIENT
Start: 2023-07-04 | End: 2023-07-06 | Stop reason: SDUPTHER

## 2023-07-04 RX ORDER — SODIUM CHLORIDE 0.9 % (FLUSH) 0.9 %
10 SYRINGE (ML) INJECTION EVERY 12 HOURS PRN
Status: DISCONTINUED | OUTPATIENT
Start: 2023-07-04 | End: 2023-07-13 | Stop reason: HOSPADM

## 2023-07-04 RX ORDER — OXYCODONE AND ACETAMINOPHEN 10; 325 MG/1; MG/1
1 TABLET ORAL EVERY 4 HOURS PRN
Status: DISCONTINUED | OUTPATIENT
Start: 2023-07-04 | End: 2023-07-04

## 2023-07-04 RX ORDER — HYDROMORPHONE HYDROCHLORIDE 1 MG/ML
2 INJECTION, SOLUTION INTRAMUSCULAR; INTRAVENOUS; SUBCUTANEOUS
Status: DISCONTINUED | OUTPATIENT
Start: 2023-07-04 | End: 2023-07-09

## 2023-07-04 RX ORDER — FAMOTIDINE 20 MG/1
20 TABLET, FILM COATED ORAL 2 TIMES DAILY
Status: DISCONTINUED | OUTPATIENT
Start: 2023-07-04 | End: 2023-07-04

## 2023-07-04 RX ORDER — IBUPROFEN 200 MG
24 TABLET ORAL
Status: DISCONTINUED | OUTPATIENT
Start: 2023-07-04 | End: 2023-07-13 | Stop reason: HOSPADM

## 2023-07-04 RX ORDER — GLUCAGON 1 MG
1 KIT INJECTION
Status: DISCONTINUED | OUTPATIENT
Start: 2023-07-04 | End: 2023-07-13 | Stop reason: HOSPADM

## 2023-07-04 RX ORDER — KETOROLAC TROMETHAMINE 30 MG/ML
15 INJECTION, SOLUTION INTRAMUSCULAR; INTRAVENOUS
Status: COMPLETED | OUTPATIENT
Start: 2023-07-04 | End: 2023-07-04

## 2023-07-04 RX ORDER — GABAPENTIN 300 MG/1
600 CAPSULE ORAL 3 TIMES DAILY
Status: DISCONTINUED | OUTPATIENT
Start: 2023-07-04 | End: 2023-07-13 | Stop reason: HOSPADM

## 2023-07-04 RX ORDER — MAG HYDROX/ALUMINUM HYD/SIMETH 200-200-20
30 SUSPENSION, ORAL (FINAL DOSE FORM) ORAL EVERY 6 HOURS PRN
Status: DISCONTINUED | OUTPATIENT
Start: 2023-07-04 | End: 2023-07-13 | Stop reason: HOSPADM

## 2023-07-04 RX ORDER — TALC
6 POWDER (GRAM) TOPICAL NIGHTLY PRN
Status: DISCONTINUED | OUTPATIENT
Start: 2023-07-04 | End: 2023-07-13 | Stop reason: HOSPADM

## 2023-07-04 RX ORDER — BUDESONIDE 0.5 MG/2ML
0.5 INHALANT ORAL EVERY 12 HOURS
Status: DISCONTINUED | OUTPATIENT
Start: 2023-07-04 | End: 2023-07-13 | Stop reason: HOSPADM

## 2023-07-04 RX ORDER — METHOCARBAMOL 500 MG/1
500 TABLET, FILM COATED ORAL 4 TIMES DAILY
Status: DISCONTINUED | OUTPATIENT
Start: 2023-07-04 | End: 2023-07-13 | Stop reason: HOSPADM

## 2023-07-04 RX ORDER — ACETAMINOPHEN 325 MG/1
650 TABLET ORAL EVERY 4 HOURS PRN
Status: DISCONTINUED | OUTPATIENT
Start: 2023-07-04 | End: 2023-07-13 | Stop reason: HOSPADM

## 2023-07-04 RX ORDER — OXYCODONE AND ACETAMINOPHEN 7.5; 325 MG/1; MG/1
1 TABLET ORAL EVERY 4 HOURS PRN
Status: DISCONTINUED | OUTPATIENT
Start: 2023-07-04 | End: 2023-07-09

## 2023-07-04 RX ORDER — LOSARTAN POTASSIUM 50 MG/1
50 TABLET ORAL DAILY
Status: DISCONTINUED | OUTPATIENT
Start: 2023-07-04 | End: 2023-07-04

## 2023-07-04 RX ORDER — ONDANSETRON 2 MG/ML
4 INJECTION INTRAMUSCULAR; INTRAVENOUS EVERY 8 HOURS PRN
Status: DISCONTINUED | OUTPATIENT
Start: 2023-07-04 | End: 2023-07-13 | Stop reason: HOSPADM

## 2023-07-04 RX ORDER — IPRATROPIUM BROMIDE AND ALBUTEROL SULFATE 2.5; .5 MG/3ML; MG/3ML
3 SOLUTION RESPIRATORY (INHALATION) EVERY 4 HOURS PRN
Status: DISCONTINUED | OUTPATIENT
Start: 2023-07-04 | End: 2023-07-13 | Stop reason: HOSPADM

## 2023-07-04 RX ORDER — ATORVASTATIN CALCIUM 40 MG/1
80 TABLET, FILM COATED ORAL NIGHTLY
Status: DISCONTINUED | OUTPATIENT
Start: 2023-07-04 | End: 2023-07-13 | Stop reason: HOSPADM

## 2023-07-04 RX ORDER — ENOXAPARIN SODIUM 100 MG/ML
40 INJECTION SUBCUTANEOUS EVERY 24 HOURS
Status: DISCONTINUED | OUTPATIENT
Start: 2023-07-04 | End: 2023-07-05

## 2023-07-04 RX ORDER — IBUPROFEN 200 MG
16 TABLET ORAL
Status: DISCONTINUED | OUTPATIENT
Start: 2023-07-04 | End: 2023-07-13 | Stop reason: HOSPADM

## 2023-07-04 RX ORDER — NALOXONE HCL 0.4 MG/ML
0.02 VIAL (ML) INJECTION
Status: DISCONTINUED | OUTPATIENT
Start: 2023-07-04 | End: 2023-07-13 | Stop reason: HOSPADM

## 2023-07-04 RX ORDER — OXYCODONE AND ACETAMINOPHEN 10; 325 MG/1; MG/1
1 TABLET ORAL EVERY 4 HOURS PRN
Status: DISCONTINUED | OUTPATIENT
Start: 2023-07-04 | End: 2023-07-08

## 2023-07-04 RX ORDER — MORPHINE SULFATE 2 MG/ML
2 INJECTION, SOLUTION INTRAMUSCULAR; INTRAVENOUS EVERY 4 HOURS PRN
Status: DISCONTINUED | OUTPATIENT
Start: 2023-07-04 | End: 2023-07-04

## 2023-07-04 RX ADMIN — GABAPENTIN 600 MG: 300 CAPSULE ORAL at 09:07

## 2023-07-04 RX ADMIN — SERTRALINE HYDROCHLORIDE 100 MG: 50 TABLET ORAL at 11:07

## 2023-07-04 RX ADMIN — KETOROLAC TROMETHAMINE 15 MG: 30 INJECTION, SOLUTION INTRAMUSCULAR; INTRAVENOUS at 02:07

## 2023-07-04 RX ADMIN — PANTOPRAZOLE SODIUM 40 MG: 40 TABLET, DELAYED RELEASE ORAL at 04:07

## 2023-07-04 RX ADMIN — MORPHINE SULFATE 2 MG: 2 INJECTION, SOLUTION INTRAMUSCULAR; INTRAVENOUS at 09:07

## 2023-07-04 RX ADMIN — ONDANSETRON 4 MG: 2 INJECTION INTRAMUSCULAR; INTRAVENOUS at 12:07

## 2023-07-04 RX ADMIN — FAMOTIDINE 20 MG: 20 TABLET, FILM COATED ORAL at 09:07

## 2023-07-04 RX ADMIN — METHOCARBAMOL 500 MG: 500 TABLET ORAL at 03:07

## 2023-07-04 RX ADMIN — CARVEDILOL 12.5 MG: 12.5 TABLET, FILM COATED ORAL at 09:07

## 2023-07-04 RX ADMIN — ATORVASTATIN CALCIUM 80 MG: 40 TABLET, FILM COATED ORAL at 08:07

## 2023-07-04 RX ADMIN — ALUMINA, MAGNESIA, AND SIMETHICONE ORAL SUSPENSION REGULAR STRENGTH 30 ML: 1200; 1200; 120 SUSPENSION ORAL at 12:07

## 2023-07-04 RX ADMIN — OXYCODONE AND ACETAMINOPHEN 1 TABLET: 10; 325 TABLET ORAL at 08:07

## 2023-07-04 RX ADMIN — GABAPENTIN 600 MG: 300 CAPSULE ORAL at 08:07

## 2023-07-04 RX ADMIN — BUDESONIDE INHALATION 0.5 MG: 0.5 SUSPENSION RESPIRATORY (INHALATION) at 07:07

## 2023-07-04 RX ADMIN — HYDROCODONE BITARTRATE AND ACETAMINOPHEN 1 TABLET: 5; 325 TABLET ORAL at 12:07

## 2023-07-04 RX ADMIN — ENOXAPARIN SODIUM 40 MG: 40 INJECTION SUBCUTANEOUS at 05:07

## 2023-07-04 RX ADMIN — HYDROMORPHONE HYDROCHLORIDE 2 MG: 1 INJECTION, SOLUTION INTRAMUSCULAR; INTRAVENOUS; SUBCUTANEOUS at 10:07

## 2023-07-04 RX ADMIN — GABAPENTIN 600 MG: 300 CAPSULE ORAL at 03:07

## 2023-07-04 RX ADMIN — METHOCARBAMOL 500 MG: 500 TABLET ORAL at 08:07

## 2023-07-04 RX ADMIN — HYDROMORPHONE HYDROCHLORIDE 2 MG: 1 INJECTION, SOLUTION INTRAMUSCULAR; INTRAVENOUS; SUBCUTANEOUS at 05:07

## 2023-07-04 RX ADMIN — CARVEDILOL 12.5 MG: 12.5 TABLET, FILM COATED ORAL at 08:07

## 2023-07-04 NOTE — SUBJECTIVE & OBJECTIVE
Past Medical History:   Diagnosis Date    Aortic stenosis     [facility]    Asthma     BPH (benign prostatic hyperplasia)     CAD (coronary artery disease)     Cardiomyopathy     CHF (congestive heart failure)     Chronic hoarseness     vocal cord surg    Chronic pain     CKD (chronic kidney disease) stage 3, GFR 30-59 ml/min     CVA (cerebral infarction)     8/2012 olol; reviewed ed note    Ex-smoker     GERD (gastroesophageal reflux disease)     Hepatitis C     treatedharvoni says cured, RNA NEG 6/2020    Hypertension     Pancreatitis     Prostate cancer     Prostate cancer     Prostate cancer     PVD (peripheral vascular disease)     Renal insufficiency     Substance abuse     cocaine, etoh , tob in past       Past Surgical History:   Procedure Laterality Date    AORTIC VALVE REPLACEMENT  05/19/2014    Tissue valve replacement    BACK SURGERY      CARDIAC CATHETERIZATION      COLONOSCOPY  2011    COLONOSCOPY N/A 2/24/2021    Procedure: COLONOSCOPY;  Surgeon: Faith Carrillo MD;  Location: [facility] ENDO;  Service: Endoscopy;  Laterality: N/A;    EPIDURAL STEROID INJECTION INTO CERVICAL SPINE N/A 6/21/2022    Procedure: C7-T1 IL PIEDAD;  Surgeon: Nehemiah Carmen MD;  Location: [facility] PAIN MGT;  Service: Pain Management;  Laterality: N/A;    ESOPHAGOGASTRODUODENOSCOPY N/A 2/24/2021    Procedure: ESOPHAGOGASTRODUODENOSCOPY (EGD);  Surgeon: Faith Carrillo MD;  Location: [facility];  Service: Endoscopy;  Laterality: N/A;    FOOT SURGERY      LEFT HEART CATHETERIZATION Left 7/24/2020    Procedure: CATHETERIZATION, HEART, LEFT;  Surgeon: Pasha Martin MD;  Location: [facility] CATH LAB;  Service: Cardiology;  Laterality: Left;    PENILE PROSTHESIS IMPLANT      PENILE PROSTHESIS REVISION  04/30/2018    PROSTATECTOMY  09/2019    [facility]    radiation for prostate      TRANSFORAMINAL EPIDURAL INJECTION OF STEROID Right 10/20/2022    Procedure: Right  L4/5 + L5/S1 TF PIEDAD RN IV Sedation;  Surgeon: Nehemiah CHAN  MD Kermit;  Location: [facility] PAIN MGT;  Service: Pain Management;  Laterality: Right;    TRANSFORAMINAL LUMBAR INTERBODY FUSION (TLIF) USING COMPUTER-ASSISTED NAVIGATION Bilateral 5/17/2023    Procedure: FUSION, SPINE, LUMBAR, TLIF, USING COMPUTER-ASSISTED NAVIGATION;  Surgeon: Brandon Valencia MD;  Location: [facility];  Service: Neurosurgery;  Laterality: Bilateral;  2 level lumbar fusion at L4-5 and L5-S1    UPPER GASTROINTESTINAL ENDOSCOPY  2011       Review of patient's allergies indicates:  No Known Allergies    Current Facility-Administered Medications on File Prior to Encounter   Medication    ondansetron injection 4 mg    sodium chloride 0.9% flush 10 mL     Current Outpatient Medications on File Prior to Encounter   Medication Sig    albuterol (PROVENTIL/VENTOLIN HFA) 90 mcg/actuation inhaler INHALE 2 PUFFS INTO THE LUNGS EVERY 6 HOURS AS NEEDED FOR COUGH    allopurinoL (ZYLOPRIM) 100 MG tablet Take 100 mg by mouth once daily. Takes 0.5 tab    ALPRAZolam (XANAX) 1 MG tablet Take 1 mg by mouth Daily. EVERY OTHER DAY    aluminum & magnesium hydroxide-simethicone (MYLANTA MAX STRENGTH) 400-400-40 mg/5 mL suspension TAKE 15ML BY MOUTH FOUR TIMES A DAY AS NEEDED FOR STOMACH ACID    aspirin (ECOTRIN) 81 MG EC tablet Take 1 tablet (81 mg total) by mouth once daily.    atorvastatin (LIPITOR) 80 MG tablet TAKE ONE-HALF TABLET BY MOUTH EVERY DAY FOR CHOLESTEROL    carvediloL (COREG) 12.5 MG tablet Take 1 tablet (12.5 mg total) by mouth 2 (two) times daily.    clopidogreL (PLAVIX) 75 mg tablet Take 1 tablet (75 mg total) by mouth once daily.    diclofenac sodium (VOLTAREN) 1 % Gel APPLY 2 GRAMS TOPICALLY FOUR TIMES A DAY AS NEEDED FOR PAIN AND INFLAMMATION. USE ENCLOSED DOSING CARD.    diphenhydrAMINE (SOMINEX) 25 mg tablet Take 25 mg by mouth nightly as needed for Insomnia.    docusate sodium (COLACE) 100 MG capsule Take 1 capsule (100 mg total) by mouth 2 (two) times daily.    esomeprazole (NEXIUM) 40 MG capsule 40 mg.     famotidine (PEPCID) 40 MG tablet TAKE ONE TABLET BY MOUTH AT BEDTIME FOR ACID REFLUX    flunisolide 25 mcg, 0.025%, (NASALIDE) 25 mcg (0.025 %) Spry 2 sprays by Nasal route as needed.     gabapentin (NEURONTIN) 600 MG tablet Take 1 tablet (600 mg total) by mouth 3 (three) times daily.    hydrocortisone 2.5 % cream Apply topically 2 (two) times daily as needed. Apply to affected areas of the face and neck.    hydrocortisone-pramoxine (PROCTOFOAM-HS) rectal foam INSERT 1 APPLICATORFUL INTO RECTALLY TWICE A DAY FOR HEMORRHOIDS    hydrOXYzine HCL (ATARAX) 25 MG tablet Take 25 mg by mouth 3 (three) times daily as needed for Itching.    LIDOcaine (LIDODERM) 5 % APPLY 1 PATCH TOPICALLY EVERY DAY FOR PAIN. WEAR FOR 12 HOURS, THEN REMOVE. DO NOT APPLY NEW PATCH FOR AT LEAST 12 HOURS.    losartan (COZAAR) 50 MG tablet Take 1 tablet (50 mg total) by mouth once daily.    methyl salicylate-menthol 15-10% 15-10 % Crea Apply topically as needed.     multivitamin (THERAGRAN) per tablet Take 1 tablet by mouth once daily.    oxyCODONE-acetaminophen (PERCOCET)  mg per tablet Take 1 tablet by mouth every 8 (eight) hours as needed for Pain.    pantoprazole (PROTONIX) 40 MG tablet Take 40 mg by mouth 2 (two) times daily.     sertraline (ZOLOFT) 100 MG tablet TAKE TWO TABLETS BY MOUTH EVERY DAY FOR MENTAL HEALTH DOSE INCREASED TO 200MG/DAY    simethicone (MYLICON) 80 MG chewable tablet Take 80 mg by mouth every 6 (six) hours as needed for Flatulence.    sucralfate (CARAFATE) 100 mg/mL suspension Take 1 g by mouth 4 (four) times daily.     TRELEGY ELLIPTA 200-62.5-25 mcg inhaler INHALE 1 PUFF INTO THE LUNGS EVERY DAY    triamcinolone acetonide 0.1% (KENALOG) 0.1 % cream Apply topically 2 (two) times daily.     Family History       Problem Relation (Age of Onset)    Heart attack Sister, Brother    Heart disease Mother, Father          Tobacco Use    Smoking status: Former     Packs/day: 2.00     Years: 8.00     Pack years: 16.00     " Types: Cigarettes     Quit date: 8/24/2012     Years since quitting: 10.8    Smokeless tobacco: Never   Substance and Sexual Activity    Alcohol use: Never     Comment: Sober since 08/24/2012    Drug use: Not Currently     Types: "Crack" cocaine, Marijuana     Comment: Quit in 2012    Sexual activity: Yes     Review of Systems   All other systems reviewed and are negative.  Objective:     Vital Signs (Most Recent):  Temp: 97.8 °F (36.6 °C) (07/04/23 1205)  Pulse: 88 (07/04/23 1205)  Resp: 18 (07/04/23 1241)  BP: 138/81 (07/04/23 1205)  SpO2: 97 % (07/04/23 1205) Vital Signs (24h Range):  Temp:  [97.8 °F (36.6 °C)-98.9 °F (37.2 °C)] 97.8 °F (36.6 °C)  Pulse:  [74-88] 88  Resp:  [16-30] 18  SpO2:  [95 %-100 %] 97 %  BP: (112-153)/(56-87) 138/81     Weight: 115 kg (253 lb 8.5 oz) (7/3/23)  Body mass index is 38.55 kg/m².     Physical Exam  Constitutional:       General: He is not in acute distress.     Appearance: Normal appearance. He is obese.   Cardiovascular:      Rate and Rhythm: Normal rate and regular rhythm.      Heart sounds: No murmur heard.  Pulmonary:      Effort: Pulmonary effort is normal. No respiratory distress.      Breath sounds: Normal breath sounds. No wheezing.   Abdominal:      General: There is no distension.      Palpations: Abdomen is soft.      Tenderness: There is no abdominal tenderness.   Skin:     Comments: Low back surgical dressing mild saturation, no breakdown  2 drains in place   Neurological:      General: No focal deficit present.      Mental Status: He is alert and oriented to person, place, and time.              Significant Labs: All pertinent labs within the past 24 hours have been reviewed.  CBC:   Recent Labs   Lab 07/05/23  0431   WBC 5.86   HGB 9.6*   HCT 29.5*        CMP:   Recent Labs   Lab 07/05/23  0431      K 4.3      CO2 23   GLU 96   BUN 22   CREATININE 1.9*   CALCIUM 9.1   ANIONGAP 10       Significant Imaging: I have reviewed all pertinent imaging " results/findings within the past 24 hours.    SURG FL Surgery Fluoro Usage   Final Result      X-Ray Lumbar Spine Ap And Lateral   Final Result      Fluoroscopic guidance utilized for lumbosacral fusion and discectomy.  Please see procedural dictation for further details.         Electronically signed by: Smith Adams   Date:    07/05/2023   Time:    11:39      MRI Lumbar Spine W WO Cont   Final Result      Patient is status post posterior interbody lumbar fusion from L4 to S1.  Orthopedic hardware appears intact.  Bilateral laminectomies from L4-S1.      Large subcutaneous fluid collection involving the postoperative bed measuring approximately 11.6 x 28.9 may reflect seroma or abscess or meningocele Additional subcutaneous fluid collection in the region of the laminectomy is multiloculated measuring approximately 2.2 x 14 mm each.  This could relate to abscess, seroma, or meningocele.      Metallic interbody spacer at L5-S1 appears more anterior than L4-L5.  Correlate clinically.      Recommend clinical correlation and follow-up.         Electronically signed by: Justus Sood   Date:    07/04/2023   Time:    00:13      X-Ray Chest AP Portable   Final Result      1. The lungs are clear.   2. There is mild cardiomegaly.   3. There are surgical changes associated with a prior sternotomy.   .         Electronically signed by: Sancho Allen MD   Date:    07/03/2023   Time:    11:11      US Lower Extremity Veins Right   Final Result      No evidence of deep venous thrombosis in the right lower extremity.         Electronically signed by: Homer Faulkner   Date:    07/03/2023   Time:    11:02      CT Lumbar Spine Without Contrast   Final Result      Postoperative changes related to a posterior lumbar interbody fusion from L4-S1 with bilateral laminectomies.  Postoperative changes suspected involving the posterior paraspinal musculature.  No definite abnormal postoperative fluid collections.      All CT scans at  this facility are performed  using dose modulation techniques as appropriate to performed exam including the following:  automated exposure control; adjustment of mA and/or kV according to the patients size (this includes techniques or standardized protocols for targeted exams where dose is matched to indication/reason for exam: i.e. extremities or head);  iterative reconstruction technique.         Electronically signed by: Chris Villalba MD   Date:    07/03/2023   Time:    11:31      CT Head Without Contrast   Final Result      1. There is no evidence of an acute ischemic event. There is encephalomalacia in the right occipital lobe   2. There is no intracranial hemorrhage.   All CT scans at this facility use dose modulation, iterative reconstruction, and/or weight base dosing when appropriate to reduce radiation dose when appropriate to reduce radiation dose to as low as reasonably achievable.         Electronically signed by: Sancho Allen MD   Date:    07/03/2023   Time:    10:50

## 2023-07-04 NOTE — SUBJECTIVE & OBJECTIVE
Past Medical History:   Diagnosis Date    Aortic stenosis     [facility]    Asthma     BPH (benign prostatic hyperplasia)     CAD (coronary artery disease)     Cardiomyopathy     CHF (congestive heart failure)     Chronic hoarseness     vocal cord surg    Chronic pain     CKD (chronic kidney disease) stage 3, GFR 30-59 ml/min     CVA (cerebral infarction)     8/2012 olol; reviewed ed note    Ex-smoker     GERD (gastroesophageal reflux disease)     Hepatitis C     treatedharvoni says cured, RNA NEG 6/2020    Hypertension     Pancreatitis     Prostate cancer     Prostate cancer     Prostate cancer     PVD (peripheral vascular disease)     Renal insufficiency     Substance abuse     cocaine, etoh , tob in past       Past Surgical History:   Procedure Laterality Date    AORTIC VALVE REPLACEMENT  05/19/2014    Tissue valve replacement    BACK SURGERY      CARDIAC CATHETERIZATION      COLONOSCOPY  2011    COLONOSCOPY N/A 2/24/2021    Procedure: COLONOSCOPY;  Surgeon: Faith Carrillo MD;  Location: [facility] ENDO;  Service: Endoscopy;  Laterality: N/A;    EPIDURAL STEROID INJECTION INTO CERVICAL SPINE N/A 6/21/2022    Procedure: C7-T1 IL PIEDAD;  Surgeon: Nehemiah Carmen MD;  Location: [facility] PAIN MGT;  Service: Pain Management;  Laterality: N/A;    ESOPHAGOGASTRODUODENOSCOPY N/A 2/24/2021    Procedure: ESOPHAGOGASTRODUODENOSCOPY (EGD);  Surgeon: Faith Carrillo MD;  Location: [facility];  Service: Endoscopy;  Laterality: N/A;    FOOT SURGERY      LEFT HEART CATHETERIZATION Left 7/24/2020    Procedure: CATHETERIZATION, HEART, LEFT;  Surgeon: Pasha Martin MD;  Location: [facility] CATH LAB;  Service: Cardiology;  Laterality: Left;    PENILE PROSTHESIS IMPLANT      PENILE PROSTHESIS REVISION  04/30/2018    PROSTATECTOMY  09/2019    [facility]    radiation for prostate      TRANSFORAMINAL EPIDURAL INJECTION OF STEROID Right 10/20/2022    Procedure: Right  L4/5 + L5/S1 TF PIEDAD RN IV Sedation;  Surgeon: Nehemiah CHAN  MD Kermit;  Location: [facility] PAIN MGT;  Service: Pain Management;  Laterality: Right;    TRANSFORAMINAL LUMBAR INTERBODY FUSION (TLIF) USING COMPUTER-ASSISTED NAVIGATION Bilateral 5/17/2023    Procedure: FUSION, SPINE, LUMBAR, TLIF, USING COMPUTER-ASSISTED NAVIGATION;  Surgeon: Brandon Valencia MD;  Location: [facility] OR;  Service: Neurosurgery;  Laterality: Bilateral;  2 level lumbar fusion at L4-5 and L5-S1    UPPER GASTROINTESTINAL ENDOSCOPY  2011       Review of patient's allergies indicates:  No Known Allergies    Current Facility-Administered Medications on File Prior to Encounter   Medication    ondansetron injection 4 mg    sodium chloride 0.9% flush 10 mL     Current Outpatient Medications on File Prior to Encounter   Medication Sig    albuterol (PROVENTIL/VENTOLIN HFA) 90 mcg/actuation inhaler INHALE 2 PUFFS INTO THE LUNGS EVERY 6 HOURS AS NEEDED FOR COUGH    allopurinoL (ZYLOPRIM) 100 MG tablet Take 100 mg by mouth once daily. Takes 0.5 tab    ALPRAZolam (XANAX) 1 MG tablet Take 1 mg by mouth Daily. EVERY OTHER DAY    aluminum & magnesium hydroxide-simethicone (MYLANTA MAX STRENGTH) 400-400-40 mg/5 mL suspension TAKE 15ML BY MOUTH FOUR TIMES A DAY AS NEEDED FOR STOMACH ACID    aspirin (ECOTRIN) 81 MG EC tablet Take 1 tablet (81 mg total) by mouth once daily.    atorvastatin (LIPITOR) 80 MG tablet TAKE ONE-HALF TABLET BY MOUTH EVERY DAY FOR CHOLESTEROL    carvediloL (COREG) 12.5 MG tablet Take 1 tablet (12.5 mg total) by mouth 2 (two) times daily.    clopidogreL (PLAVIX) 75 mg tablet Take 1 tablet (75 mg total) by mouth once daily.    diclofenac sodium (VOLTAREN) 1 % Gel APPLY 2 GRAMS TOPICALLY FOUR TIMES A DAY AS NEEDED FOR PAIN AND INFLAMMATION. USE ENCLOSED DOSING CARD.    diphenhydrAMINE (SOMINEX) 25 mg tablet Take 25 mg by mouth nightly as needed for Insomnia.    docusate sodium (COLACE) 100 MG capsule Take 1 capsule (100 mg total) by mouth 2 (two) times daily.    esomeprazole (NEXIUM) 40 MG capsule 40 mg.     famotidine (PEPCID) 40 MG tablet TAKE ONE TABLET BY MOUTH AT BEDTIME FOR ACID REFLUX    flunisolide 25 mcg, 0.025%, (NASALIDE) 25 mcg (0.025 %) Spry 2 sprays by Nasal route as needed.     gabapentin (NEURONTIN) 600 MG tablet Take 1 tablet (600 mg total) by mouth 3 (three) times daily.    hydrocortisone 2.5 % cream Apply topically 2 (two) times daily as needed. Apply to affected areas of the face and neck.    hydrocortisone-pramoxine (PROCTOFOAM-HS) rectal foam INSERT 1 APPLICATORFUL INTO RECTALLY TWICE A DAY FOR HEMORRHOIDS    hydrOXYzine HCL (ATARAX) 25 MG tablet Take 25 mg by mouth 3 (three) times daily as needed for Itching.    LIDOcaine (LIDODERM) 5 % APPLY 1 PATCH TOPICALLY EVERY DAY FOR PAIN. WEAR FOR 12 HOURS, THEN REMOVE. DO NOT APPLY NEW PATCH FOR AT LEAST 12 HOURS.    losartan (COZAAR) 50 MG tablet Take 1 tablet (50 mg total) by mouth once daily.    methyl salicylate-menthol 15-10% 15-10 % Crea Apply topically as needed.     multivitamin (THERAGRAN) per tablet Take 1 tablet by mouth once daily.    oxyCODONE-acetaminophen (PERCOCET)  mg per tablet Take 1 tablet by mouth every 8 (eight) hours as needed for Pain.    pantoprazole (PROTONIX) 40 MG tablet Take 40 mg by mouth 2 (two) times daily.     sertraline (ZOLOFT) 100 MG tablet TAKE TWO TABLETS BY MOUTH EVERY DAY FOR MENTAL HEALTH DOSE INCREASED TO 200MG/DAY    simethicone (MYLICON) 80 MG chewable tablet Take 80 mg by mouth every 6 (six) hours as needed for Flatulence.    sucralfate (CARAFATE) 100 mg/mL suspension Take 1 g by mouth 4 (four) times daily.     TRELEGY ELLIPTA 200-62.5-25 mcg inhaler INHALE 1 PUFF INTO THE LUNGS EVERY DAY    triamcinolone acetonide 0.1% (KENALOG) 0.1 % cream Apply topically 2 (two) times daily.     Family History       Problem Relation (Age of Onset)    Heart attack Sister, Brother    Heart disease Mother, Father          Tobacco Use    Smoking status: Former     Packs/day: 2.00     Years: 8.00     Pack years: 16.00     " Types: Cigarettes     Quit date: 8/24/2012     Years since quitting: 10.8    Smokeless tobacco: Never   Substance and Sexual Activity    Alcohol use: Never     Comment: Sober since 08/24/2012    Drug use: Not Currently     Types: "Crack" cocaine, Marijuana     Comment: Quit in 2012    Sexual activity: Yes     Review of Systems   Respiratory:  Positive for shortness of breath.    Musculoskeletal:  Positive for back pain and gait problem.        Weakness RLE   Objective:     Vital Signs (Most Recent):  Temp: 98.9 °F (37.2 °C) (07/04/23 0802)  Pulse: 76 (07/04/23 0802)  Resp: 20 (07/04/23 0932)  BP: (!) 153/86 (07/04/23 0802)  SpO2: 95 % (07/04/23 0802) Vital Signs (24h Range):  Temp:  [98.7 °F (37.1 °C)-98.9 °F (37.2 °C)] 98.9 °F (37.2 °C)  Pulse:  [74-87] 76  Resp:  [16-30] 20  SpO2:  [95 %-100 %] 95 %  BP: (112-154)/(56-92) 153/86     Weight: 115 kg (253 lb 8.5 oz) (7/3/23)  Body mass index is 38.55 kg/m².     Physical Exam  Vitals and nursing note reviewed.   HENT:      Head: Normocephalic.   Cardiovascular:      Rate and Rhythm: Normal rate and regular rhythm.   Pulmonary:      Effort: Pulmonary effort is normal.      Breath sounds: Normal breath sounds. No wheezing.   Abdominal:      General: Bowel sounds are normal. There is no distension.      Palpations: Abdomen is soft.      Tenderness: There is no abdominal tenderness.   Musculoskeletal:      Comments: Exam limited by pain, patient is unable to moved his RLE, no dorisflexion/plantarflexion, unable to wiggle toes; sensation is intact    Skin:     General: Skin is warm and dry.      Comments: Unable to view lumbar spine incision at this time d/t patient pain level   Neurological:      Mental Status: He is alert and oriented to person, place, and time.      Significant Labs: All pertinent labs within the past 24 hours have been reviewed.    Significant Imaging: I have reviewed all pertinent imaging results/findings within the past 24 hours.  "

## 2023-07-04 NOTE — PT/OT/SLP PROGRESS
"Physical Therapy      Patient Name:  Abdullahi Urias   MRN:  [id number]    PT EVAL initiated via chart review. Presented to pt's room at 0957. Pt refused to participate in EVAL, all OOB activity, and in bed TherEx due to pt reported severe pain in his leg and that it is "immobile", the patient reports experiencing chills, nurse aware. Pt educated on importance of PT intervention in his recovery. Will continue efforts.    Cheryl Rivas, PT, DPT  7/4/2023   0957    "

## 2023-07-04 NOTE — ASSESSMENT & PLAN NOTE
- holding asa/plavix at this time d/t likely procedure tomorrow, will restart when ok with neurosurgery  - continue statin

## 2023-07-04 NOTE — ASSESSMENT & PLAN NOTE
Patient with recent lumbar fusion L4-S1 (5/17/23)  Presents with RLE weakness, pain  MRI revealed two fluid collections l-spine, abscess versus seroma versus meningocele   US RLE negative for DVT  Blood cultures pending    S/p washout and antibiotic bead placement today (7/5/23)  Operative cultures obtained  ID consulted  Continue vancomycin  Multimodal pain control  Hold ASA/Plavix  Neurosurgery following

## 2023-07-04 NOTE — ED NOTES
Pt transported to floor per RN with all personal belongings and cell phone sent with pt and wife. Stable, no distress. Bedside handoff to Suze CLIFTON

## 2023-07-04 NOTE — H&P
[facility]  History & Physical    Patient Name: Abdullahi Urias  MRN: [id number]  Patient Class: OP- Observation  Admission Date: 7/3/2023  Attending Physician: Nickie Glaser MD   Primary Care Provider: Reji Valentin MD         Patient information was obtained from patient, past medical records and ER records.     Subjective:     Principal Problem:Post-operative wound abscess    Chief Complaint:   Chief Complaint   Patient presents with    Shortness of Breath     Shortness of breath started x 2 days, hx of COPD, back surgery May, last night started with pain and numbness to right leg. Went to neurosurgery appointment and told to come to ER.        HPI: Mr. Urias is a 67-year-old  male with PMH significant for aortic stenosis with bioprosthetic aortic valve replacement, diastolic CHF, CKD stage 3, HTN, prostate cancer and recent lumbar spine surgery on 5/17 with Dr. Valencia who was sent from neurosurgery office with complaints of RLE pain, weakness and oozing from surgical incision site.  Per patient and wife, he was doing fine till two days ago when he went on a long car ride to [place] and he started having lower back pain.  Yesterday morning he was able to shower but then was unable to get dressed by himself d/t the pain and new onset right lower extremity weakness.  Complains of pain from right hip all the way down leg.  Symtoms are constant and moderate in severity, no relieving or exacerbating factors.  Pain 101/10.  Associated symptoms are right lower extremity weakness and occassional dyspnea.  Patient denies any CP, leg swelling, fever/chills, urinary or fecal incontinence.   Lab work remarkable for Sed Rate 55, Creatinine 1.6.  MRI showed Large subcutaneous fluid collection involving the postoperative bed measuring approximately 11.6 x 28.9 may reflect seroma or abscess or meningocele Additional subcutaneous fluid collection in the region of the  laminectomy is multiloculated measuring approximately 2.2 x 14 mm each.  This could relate to abscess, seroma, or meningocele.   Case was discussed with neurosurgery, ERIK Gillette who discussed with Dr. Valencia.  will admit patient to observation for post op fluid collection, r/o abscess.  Blood cultures ordered, holding Abx at this time as patient is febrile and normal WBC.  Per neurosurgery recs will hold ASA/plavix at this time, likely washout tomorrow morning.     Code Status Full  Surrogate Decision Maker wife Vinita      Past Medical History:   Diagnosis Date    Aortic stenosis     dr [facility]    Asthma     BPH (benign prostatic hyperplasia)     CAD (coronary artery disease)     Cardiomyopathy     CHF (congestive heart failure)     Chronic hoarseness     vocal cord surg    Chronic pain     CKD (chronic kidney disease) stage 3, GFR 30-59 ml/min     CVA (cerebral infarction)     8/2012 olol; reviewed ed note    Ex-smoker     GERD (gastroesophageal reflux disease)     Hepatitis C     treatedharvoni says cured, RNA NEG 6/2020    Hypertension     Pancreatitis     Prostate cancer     Prostate cancer     Prostate cancer     PVD (peripheral vascular disease)     Renal insufficiency     Substance abuse     cocaine, etoh , tob in past       Past Surgical History:   Procedure Laterality Date    AORTIC VALVE REPLACEMENT  05/19/2014    Tissue valve replacement    BACK SURGERY      CARDIAC CATHETERIZATION      COLONOSCOPY  2011    COLONOSCOPY N/A 2/24/2021    Procedure: COLONOSCOPY;  Surgeon: Faith Carrillo MD;  Location: [facility] ENDO;  Service: Endoscopy;  Laterality: N/A;    EPIDURAL STEROID INJECTION INTO CERVICAL SPINE N/A 6/21/2022    Procedure: C7-T1 IL PIEDAD;  Surgeon: Nehemiah Carmen MD;  Location: [facility] MGT;  Service: Pain Management;  Laterality: N/A;    ESOPHAGOGASTRODUODENOSCOPY N/A 2/24/2021    Procedure: ESOPHAGOGASTRODUODENOSCOPY (EGD);  Surgeon: Faith Carrillo MD;  Location: [facility]  ENDO;  Service: Endoscopy;  Laterality: N/A;    FOOT SURGERY      LEFT HEART CATHETERIZATION Left 7/24/2020    Procedure: CATHETERIZATION, HEART, LEFT;  Surgeon: Pasha Martin MD;  Location: [facility] [place] CATH LAB;  Service: Cardiology;  Laterality: Left;    PENILE PROSTHESIS IMPLANT      PENILE PROSTHESIS REVISION  04/30/2018    PROSTATECTOMY  09/2019    [facility]    radiation for prostate      TRANSFORAMINAL EPIDURAL INJECTION OF STEROID Right 10/20/2022    Procedure: Right  L4/5 + L5/S1 TF PIEDAD RN IV Sedation;  Surgeon: Nehemiah Carmen MD;  Location: [facility] PAIN MGT;  Service: Pain Management;  Laterality: Right;    TRANSFORAMINAL LUMBAR INTERBODY FUSION (TLIF) USING COMPUTER-ASSISTED NAVIGATION Bilateral 5/17/2023    Procedure: FUSION, SPINE, LUMBAR, TLIF, USING COMPUTER-ASSISTED NAVIGATION;  Surgeon: Brandon Valencia MD;  Location: [facility] [place] OR;  Service: Neurosurgery;  Laterality: Bilateral;  2 level lumbar fusion at L4-5 and L5-S1    UPPER GASTROINTESTINAL ENDOSCOPY  2011       Review of patient's allergies indicates:  No Known Allergies    Current Facility-Administered Medications on File Prior to Encounter   Medication    ondansetron injection 4 mg    sodium chloride 0.9% flush 10 mL     Current Outpatient Medications on File Prior to Encounter   Medication Sig    albuterol (PROVENTIL/VENTOLIN HFA) 90 mcg/actuation inhaler INHALE 2 PUFFS INTO THE LUNGS EVERY 6 HOURS AS NEEDED FOR COUGH    allopurinoL (ZYLOPRIM) 100 MG tablet Take 100 mg by mouth once daily. Takes 0.5 tab    ALPRAZolam (XANAX) 1 MG tablet Take 1 mg by mouth Daily. EVERY OTHER DAY    aluminum & magnesium hydroxide-simethicone (MYLANTA MAX STRENGTH) 400-400-40 mg/5 mL suspension TAKE 15ML BY MOUTH FOUR TIMES A DAY AS NEEDED FOR STOMACH ACID    aspirin (ECOTRIN) 81 MG EC tablet Take 1 tablet (81 mg total) by mouth once daily.    atorvastatin (LIPITOR) 80 MG tablet TAKE ONE-HALF TABLET BY MOUTH EVERY DAY FOR CHOLESTEROL    carvediloL (COREG) 12.5 MG  tablet Take 1 tablet (12.5 mg total) by mouth 2 (two) times daily.    clopidogreL (PLAVIX) 75 mg tablet Take 1 tablet (75 mg total) by mouth once daily.    diclofenac sodium (VOLTAREN) 1 % Gel APPLY 2 GRAMS TOPICALLY FOUR TIMES A DAY AS NEEDED FOR PAIN AND INFLAMMATION. USE ENCLOSED DOSING CARD.    diphenhydrAMINE (SOMINEX) 25 mg tablet Take 25 mg by mouth nightly as needed for Insomnia.    docusate sodium (COLACE) 100 MG capsule Take 1 capsule (100 mg total) by mouth 2 (two) times daily.    esomeprazole (NEXIUM) 40 MG capsule 40 mg.    famotidine (PEPCID) 40 MG tablet TAKE ONE TABLET BY MOUTH AT BEDTIME FOR ACID REFLUX    flunisolide 25 mcg, 0.025%, (NASALIDE) 25 mcg (0.025 %) Spry 2 sprays by Nasal route as needed.     gabapentin (NEURONTIN) 600 MG tablet Take 1 tablet (600 mg total) by mouth 3 (three) times daily.    hydrocortisone 2.5 % cream Apply topically 2 (two) times daily as needed. Apply to affected areas of the face and neck.    hydrocortisone-pramoxine (PROCTOFOAM-HS) rectal foam INSERT 1 APPLICATORFUL INTO RECTALLY TWICE A DAY FOR HEMORRHOIDS    hydrOXYzine HCL (ATARAX) 25 MG tablet Take 25 mg by mouth 3 (three) times daily as needed for Itching.    LIDOcaine (LIDODERM) 5 % APPLY 1 PATCH TOPICALLY EVERY DAY FOR PAIN. WEAR FOR 12 HOURS, THEN REMOVE. DO NOT APPLY NEW PATCH FOR AT LEAST 12 HOURS.    losartan (COZAAR) 50 MG tablet Take 1 tablet (50 mg total) by mouth once daily.    methyl salicylate-menthol 15-10% 15-10 % Crea Apply topically as needed.     multivitamin (THERAGRAN) per tablet Take 1 tablet by mouth once daily.    oxyCODONE-acetaminophen (PERCOCET)  mg per tablet Take 1 tablet by mouth every 8 (eight) hours as needed for Pain.    pantoprazole (PROTONIX) 40 MG tablet Take 40 mg by mouth 2 (two) times daily.     sertraline (ZOLOFT) 100 MG tablet TAKE TWO TABLETS BY MOUTH EVERY DAY FOR MENTAL HEALTH DOSE INCREASED TO 200MG/DAY    simethicone (MYLICON) 80 MG chewable tablet Take 80 mg by  "mouth every 6 (six) hours as needed for Flatulence.    sucralfate (CARAFATE) 100 mg/mL suspension Take 1 g by mouth 4 (four) times daily.     TRELEGY ELLIPTA 200-62.5-25 mcg inhaler INHALE 1 PUFF INTO THE LUNGS EVERY DAY    triamcinolone acetonide 0.1% (KENALOG) 0.1 % cream Apply topically 2 (two) times daily.     Family History       Problem Relation (Age of Onset)    Heart attack Sister, Brother    Heart disease Mother, Father          Tobacco Use    Smoking status: Former     Packs/day: 2.00     Years: 8.00     Pack years: 16.00     Types: Cigarettes     Quit date: 8/24/2012     Years since quitting: 10.8    Smokeless tobacco: Never   Substance and Sexual Activity    Alcohol use: Never     Comment: Sober since 08/24/2012    Drug use: Not Currently     Types: "Crack" cocaine, Marijuana     Comment: Quit in 2012    Sexual activity: Yes     Review of Systems   Respiratory:  Positive for shortness of breath.    Musculoskeletal:  Positive for back pain and gait problem.        Weakness RLE   Objective:     Vital Signs (Most Recent):  Temp: 98.9 °F (37.2 °C) (07/04/23 0802)  Pulse: 76 (07/04/23 0802)  Resp: 20 (07/04/23 0932)  BP: (!) 153/86 (07/04/23 0802)  SpO2: 95 % (07/04/23 0802) Vital Signs (24h Range):  Temp:  [98.7 °F (37.1 °C)-98.9 °F (37.2 °C)] 98.9 °F (37.2 °C)  Pulse:  [74-87] 76  Resp:  [16-30] 20  SpO2:  [95 %-100 %] 95 %  BP: (112-154)/(56-92) 153/86     Weight: 115 kg (253 lb 8.5 oz) (7/3/23)  Body mass index is 38.55 kg/m².     Physical Exam  Vitals and nursing note reviewed.   HENT:      Head: Normocephalic.   Cardiovascular:      Rate and Rhythm: Normal rate and regular rhythm.   Pulmonary:      Effort: Pulmonary effort is normal.      Breath sounds: Normal breath sounds. No wheezing.   Abdominal:      General: Bowel sounds are normal. There is no distension.      Palpations: Abdomen is soft.      Tenderness: There is no abdominal tenderness.   Musculoskeletal:      Comments: Exam limited by pain, " patient is unable to moved his RLE, no dorisflexion/plantarflexion, unable to wiggle toes; sensation is intact    Skin:     General: Skin is warm and dry.      Comments: Unable to view lumbar spine incision at this time d/t patient pain level   Neurological:      Mental Status: He is alert and oriented to person, place, and time.      Significant Labs: All pertinent labs within the past 24 hours have been reviewed.    Significant Imaging: I have reviewed all pertinent imaging results/findings within the past 24 hours.    Assessment/Plan:     * Post-operative wound abscess  - s/p Lumbar fusion L4-S1  - presents with RLE weakness and pain   - MRI showed two fluid collections l-spine, abscess versus seroma versus meningocele   - US RLE negative for DVT  - WBC WNL, afebrile, will hold off on abx at this time  - blood cultures collected  - discussed case with Yazmin Farfan who discussed with Dr. Valencia, recommends NPO 0000 for washout tomorrow, holding home ASA/plavix      Moderate persistent asthma without complication  - presented with dyspnea to ED, US RLE negative for DVT, o2 sats stable on room air, not tachycardiac, low concern for PE  - no wheezing on exam   - will start Pulmicort nebs, wife may bring home trelegy inhaler  - duonebs as needed       GERD (gastroesophageal reflux disease)  - PPI       CKD (chronic kidney disease) stage 3, GFR 30-59 ml/min  - creatinine ranges from 1.4-1.7 over the last year, today 1.6  - continue to avoid nephrotoxic agents and renal dose meds as able       CHEO on CPAP  - continue cpap nightly       Cardiomyopathy  - Echo from 2022  Showed   The left ventricle is normal in size with normal systolic function.  The estimated ejection fraction is 65%.  Grade I left ventricular diastolic dysfunction.  - euvolemic at exam  - continue BB  - monitor fluid status closely, strict I/O     CAD (coronary artery disease)  - holding asa/plavix at this time d/t likely procedure tomorrow, will  restart when ok with neurosurgery  - continue statin     Hypertension  - bp controled, continue BB, holding home ARB at this time          VTE Risk Mitigation (From admission, onward)           Ordered     enoxaparin injection 40 mg  Daily         07/04/23 0900     IP VTE HIGH RISK PATIENT  Once         07/04/23 0900     Place sequential compression device  Until discontinued         07/04/23 0900                         On 07/04/2023, patient should be placed in hospital observation services under my care in collaboration with Dr. Glaser.      Nighat Castañeda NP  Department of Hospital Medicine  [facility] - Telemetry ([facility])

## 2023-07-04 NOTE — CARE UPDATE
Patient case reviewed and imaging seen   Discussed with Yazmin Farfan PA-C along with pt   I am out of town but agree with proceeding with wound washout and exploration.  I will not be in town until later this evening   Pt NPO, will plan for washout in AM unless symptoms worsen or change   Discussed with on call spine Dr Jackson who is available for any acute/worsening change in symptoms or condition     Thank you for the referral   Please call with any questions    Brandon Valencia MD  Neurosurgery

## 2023-07-04 NOTE — PROGRESS NOTES
Pharmacist Renal Dose Adjustment Note    Abdullahi Urias is a 67 y.o. male being treated with the medication enoxaparin.     Patient Data:    Vital Signs (Most Recent):  Temp: 98.9 °F (37.2 °C) (07/04/23 0802)  Pulse: 76 (07/04/23 0802)  Resp: 20 (07/04/23 0802)  BP: (!) 153/86 (07/04/23 0802)  SpO2: 95 % (07/04/23 0802) Vital Signs (72h Range):  Temp:  [98.7 °F (37.1 °C)-98.9 °F (37.2 °C)]   Pulse:  [74-87]   Resp:  [16-30]   BP: (112-154)/(56-92)   SpO2:  [95 %-100 %]      Recent Labs   Lab 07/03/23  1123   CREATININE 1.6*     Serum creatinine: 1.6 mg/dL (H) 07/03/23 1123  Estimated creatinine clearance: 55.1 mL/min (A)    Enoxaparin 30 mg subq every 24 hours will be changed to enoxaparin 40 mg subq every 24 hours per renal dose protocol for CrCl > 30 ml/min.     Thank you,  Pharmacist's Name: Elvis Bernal

## 2023-07-04 NOTE — ASSESSMENT & PLAN NOTE
- s/p Lumbar fusion L4-S1  - presents with RLE weakness and pain   - MRI showed two fluid collections l-spine, abscess versus seroma versus meningocele  - US RLE negative for DVT  - WBC WNL, afebrile, will hold off on abx at this time  - blood cultures collected  - discussed case with Neurosurgery, recommends NPO 0000 for washout tomorrow, holding home ASA/plavix

## 2023-07-04 NOTE — ED NOTES
Dr Almanza and I spoke with pt and wife about plan, admission. Questions answered. Wife has been very upset about how long they have been in the ER

## 2023-07-04 NOTE — ASSESSMENT & PLAN NOTE
- presented with dyspnea to ED, US RLE negative for DVT, o2 sats stable on room air, not tachycardiac, low concern for PE  - no wheezing on exam   - will start Pulmicort nebs, wife may bring home trelegy inhaler  - duonebs as needed

## 2023-07-04 NOTE — ASSESSMENT & PLAN NOTE
- creatinine ranges from 1.4-1.7 over the last year, today 1.6  - continue to avoid nephrotoxic agents and renal dose meds as able

## 2023-07-04 NOTE — HPI
Mr. Urias is a 67-year-old  male with PMH significant for aortic stenosis with bioprosthetic aortic valve replacement, diastolic CHF, CKD stage 3, HTN, prostate cancer and recent lumbar spine surgery on 5/17 with Dr. Valencia who was sent from neurosurgery office with complaints of RLE pain, weakness and oozing from surgical incision site.  Per patient and wife, he was doing fine till two days ago when he went on a long car ride to [email or web address] and he started having lower back pain.  Yesterday morning he was able to shower but then was unable to get dressed by himself d/t the pain and new onset right lower extremity weakness.  Complains of pain from right hip all the way down leg.  Symtoms are constant and moderate in severity, no relieving or exacerbating factors.  Pain 101/10.  Associated symptoms are right lower extremity weakness and occassional dyspnea.  Patient denies any CP, leg swelling, fever/chills, urinary or fecal incontinence.   Lab work remarkable for Sed Rate 55, Creatinine 1.6.  MRI showed Large subcutaneous fluid collection involving the postoperative bed measuring approximately 11.6 x 28.9 may reflect seroma or abscess or meningocele Additional subcutaneous fluid collection in the region of the laminectomy is multiloculated measuring approximately 2.2 x 14 mm each.  This could relate to abscess, seroma, or meningocele.   Case was discussed with neurosurgery, ERIK Gillette who discussed with Dr. Valencia.  will admit patient to observation for post op fluid collection, r/o abscess.  Blood cultures ordered, holding Abx at this time as patient is febrile and normal WBC.  Per neurosurgery recs will hold ASA/plavix at this time, likely washout tomorrow morning.     Code Status Full  Surrogate Decision Maker wife Vinita

## 2023-07-04 NOTE — PLAN OF CARE
Pt in severe pain from abscess. Pain med given and pt educated on possible draining of abscess in am  Problem: Adult Inpatient Plan of Care  Goal: Plan of Care Review  Outcome: Ongoing, Progressing  Goal: Patient-Specific Goal (Individualized)  Outcome: Ongoing, Progressing  Goal: Absence of Hospital-Acquired Illness or Injury  Outcome: Ongoing, Progressing  Goal: Optimal Comfort and Wellbeing  Outcome: Ongoing, Progressing  Goal: Readiness for Transition of Care  Outcome: Ongoing, Progressing     Problem: Infection  Goal: Absence of Infection Signs and Symptoms  Outcome: Ongoing, Progressing

## 2023-07-04 NOTE — ASSESSMENT & PLAN NOTE
- Echo from 2022  Showed    The left ventricle is normal in size with normal systolic function.   The estimated ejection fraction is 65%.   Grade I left ventricular diastolic dysfunction.  - euvolemic at exam  - continue BB  - monitor fluid status closely, strict I/O

## 2023-07-04 NOTE — PT/OT/SLP PROGRESS
Occupational Therapy      Patient Name:  Abdullahi Urias   MRN:  [id number]    Eval initiated via chart review and interview. Pt adamantly refused due 10 / 10 pain R LE. WILL ATTEMPT COMPLETE EVAL ON LATER DATE.  Radha Marino, OT  [id number]  7/4/2023

## 2023-07-05 ENCOUNTER — ANESTHESIA EVENT (OUTPATIENT)
Dept: SURGERY | Facility: HOSPITAL | Age: 68
DRG: 857 | End: 2023-07-05
Payer: MEDICARE

## 2023-07-05 ENCOUNTER — ANESTHESIA (OUTPATIENT)
Dept: SURGERY | Facility: HOSPITAL | Age: 68
DRG: 857 | End: 2023-07-05
Payer: MEDICARE

## 2023-07-05 PROBLEM — T81.49XA POST-OPERATIVE WOUND ABSCESS: Chronic | Status: ACTIVE | Noted: 2023-07-04

## 2023-07-05 LAB
ANION GAP SERPL CALC-SCNC: 10 MMOL/L (ref 8–16)
BASOPHILS # BLD AUTO: 0.03 K/UL (ref 0–0.2)
BASOPHILS NFR BLD: 0.5 % (ref 0–1.9)
BUN SERPL-MCNC: 22 MG/DL (ref 8–23)
CALCIUM SERPL-MCNC: 9.1 MG/DL (ref 8.7–10.5)
CHLORIDE SERPL-SCNC: 104 MMOL/L (ref 95–110)
CO2 SERPL-SCNC: 23 MMOL/L (ref 23–29)
CREAT SERPL-MCNC: 1.9 MG/DL (ref 0.5–1.4)
DIFFERENTIAL METHOD: ABNORMAL
EOSINOPHIL # BLD AUTO: 0.2 K/UL (ref 0–0.5)
EOSINOPHIL NFR BLD: 2.7 % (ref 0–8)
ERYTHROCYTE [DISTWIDTH] IN BLOOD BY AUTOMATED COUNT: 14.2 % (ref 11.5–14.5)
EST. GFR  (NO RACE VARIABLE): 38 ML/MIN/1.73 M^2
FINAL PATHOLOGIC DIAGNOSIS: NORMAL
GLUCOSE SERPL-MCNC: 96 MG/DL (ref 70–110)
GRAM STN SPEC: NORMAL
GROSS: NORMAL
HCT VFR BLD AUTO: 29.5 % (ref 40–54)
HGB BLD-MCNC: 9.6 G/DL (ref 14–18)
IMM GRANULOCYTES # BLD AUTO: 0.02 K/UL (ref 0–0.04)
IMM GRANULOCYTES NFR BLD AUTO: 0.3 % (ref 0–0.5)
LYMPHOCYTES # BLD AUTO: 1.2 K/UL (ref 1–4.8)
LYMPHOCYTES NFR BLD: 19.6 % (ref 18–48)
Lab: NORMAL
MCH RBC QN AUTO: 26.2 PG (ref 27–31)
MCHC RBC AUTO-ENTMCNC: 32.5 G/DL (ref 32–36)
MCV RBC AUTO: 80 FL (ref 82–98)
MONOCYTES # BLD AUTO: 0.6 K/UL (ref 0.3–1)
MONOCYTES NFR BLD: 10.4 % (ref 4–15)
NEUTROPHILS # BLD AUTO: 3.9 K/UL (ref 1.8–7.7)
NEUTROPHILS NFR BLD: 66.5 % (ref 38–73)
NRBC BLD-RTO: 0 /100 WBC
PLATELET # BLD AUTO: 235 K/UL (ref 150–450)
PMV BLD AUTO: 9.7 FL (ref 9.2–12.9)
POTASSIUM SERPL-SCNC: 4.3 MMOL/L (ref 3.5–5.1)
RBC # BLD AUTO: 3.67 M/UL (ref 4.6–6.2)
SODIUM SERPL-SCNC: 137 MMOL/L (ref 136–145)
WBC # BLD AUTO: 5.86 K/UL (ref 3.9–12.7)

## 2023-07-05 PROCEDURE — 36000709 HC OR TIME LEV III EA ADD 15 MIN: Mod: HCNC | Performed by: NEUROLOGICAL SURGERY

## 2023-07-05 PROCEDURE — 87206 SMEAR FLUORESCENT/ACID STAI: CPT | Mod: HCNC | Performed by: NEUROLOGICAL SURGERY

## 2023-07-05 PROCEDURE — 22849 REINSERT SPINAL FIXATION: CPT | Mod: AS,78,HCNC, | Performed by: PHYSICIAN ASSISTANT

## 2023-07-05 PROCEDURE — 88300 PR  SURG PATH,GROSS,LEVEL I: ICD-10-PCS | Mod: 26,HCNC,, | Performed by: PATHOLOGY

## 2023-07-05 PROCEDURE — 25000003 PHARM REV CODE 250: Mod: HCNC

## 2023-07-05 PROCEDURE — 99024 PR POST-OP FOLLOW-UP VISIT: ICD-10-PCS | Mod: HCNC,,, | Performed by: NEUROLOGICAL SURGERY

## 2023-07-05 PROCEDURE — 63600175 PHARM REV CODE 636 W HCPCS: Mod: HCNC | Performed by: PHYSICIAN ASSISTANT

## 2023-07-05 PROCEDURE — C1729 CATH, DRAINAGE: HCPCS | Mod: HCNC | Performed by: NEUROLOGICAL SURGERY

## 2023-07-05 PROCEDURE — 88300 SURGICAL PATH GROSS: CPT | Mod: 26,HCNC,, | Performed by: PATHOLOGY

## 2023-07-05 PROCEDURE — 71000033 HC RECOVERY, INTIAL HOUR: Mod: HCNC | Performed by: NEUROLOGICAL SURGERY

## 2023-07-05 PROCEDURE — 25000242 PHARM REV CODE 250 ALT 637 W/ HCPCS: Mod: HCNC | Performed by: PHYSICIAN ASSISTANT

## 2023-07-05 PROCEDURE — 96374 THER/PROPH/DIAG INJ IV PUSH: CPT | Mod: 59

## 2023-07-05 PROCEDURE — 25000003 PHARM REV CODE 250: Mod: HCNC | Performed by: HOSPITALIST

## 2023-07-05 PROCEDURE — 36000708 HC OR TIME LEV III 1ST 15 MIN: Mod: HCNC | Performed by: NEUROLOGICAL SURGERY

## 2023-07-05 PROCEDURE — 86335 IMMUNFIX E-PHORSIS/URINE/CSF: CPT | Mod: HCNC | Performed by: NEUROLOGICAL SURGERY

## 2023-07-05 PROCEDURE — 99900035 HC TECH TIME PER 15 MIN (STAT): Mod: HCNC

## 2023-07-05 PROCEDURE — 87070 CULTURE OTHR SPECIMN AEROBIC: CPT | Mod: 59,HCNC | Performed by: NEUROLOGICAL SURGERY

## 2023-07-05 PROCEDURE — 37000009 HC ANESTHESIA EA ADD 15 MINS: Mod: HCNC | Performed by: NEUROLOGICAL SURGERY

## 2023-07-05 PROCEDURE — 63600175 PHARM REV CODE 636 W HCPCS: Mod: HCNC | Performed by: STUDENT IN AN ORGANIZED HEALTH CARE EDUCATION/TRAINING PROGRAM

## 2023-07-05 PROCEDURE — 25000003 PHARM REV CODE 250: Mod: HCNC | Performed by: PHYSICIAN ASSISTANT

## 2023-07-05 PROCEDURE — 87205 SMEAR GRAM STAIN: CPT | Mod: 59,HCNC | Performed by: NEUROLOGICAL SURGERY

## 2023-07-05 PROCEDURE — 22849 PR REINSERT SPINAL FIXATION: ICD-10-PCS | Mod: 78,HCNC,, | Performed by: NEUROLOGICAL SURGERY

## 2023-07-05 PROCEDURE — 71000039 HC RECOVERY, EACH ADD'L HOUR: Mod: HCNC | Performed by: NEUROLOGICAL SURGERY

## 2023-07-05 PROCEDURE — 36415 COLL VENOUS BLD VENIPUNCTURE: CPT | Mod: HCNC | Performed by: NURSE PRACTITIONER

## 2023-07-05 PROCEDURE — 63600175 PHARM REV CODE 636 W HCPCS: Mod: HCNC | Performed by: HOSPITALIST

## 2023-07-05 PROCEDURE — 97116 GAIT TRAINING THERAPY: CPT | Mod: HCNC

## 2023-07-05 PROCEDURE — 87116 MYCOBACTERIA CULTURE: CPT | Mod: HCNC | Performed by: NEUROLOGICAL SURGERY

## 2023-07-05 PROCEDURE — C1713 ANCHOR/SCREW BN/BN,TIS/BN: HCPCS | Mod: HCNC | Performed by: NEUROLOGICAL SURGERY

## 2023-07-05 PROCEDURE — 25000003 PHARM REV CODE 250: Mod: HCNC | Performed by: STUDENT IN AN ORGANIZED HEALTH CARE EDUCATION/TRAINING PROGRAM

## 2023-07-05 PROCEDURE — 22849 REINSERT SPINAL FIXATION: CPT | Mod: 78,HCNC,, | Performed by: NEUROLOGICAL SURGERY

## 2023-07-05 PROCEDURE — 85025 COMPLETE CBC W/AUTO DIFF WBC: CPT | Mod: HCNC | Performed by: NURSE PRACTITIONER

## 2023-07-05 PROCEDURE — 99024 POSTOP FOLLOW-UP VISIT: CPT | Mod: HCNC,,, | Performed by: NEUROLOGICAL SURGERY

## 2023-07-05 PROCEDURE — 22849 PR REINSERT SPINAL FIXATION: ICD-10-PCS | Mod: AS,78,HCNC, | Performed by: PHYSICIAN ASSISTANT

## 2023-07-05 PROCEDURE — 88300 SURGICAL PATH GROSS: CPT | Mod: HCNC | Performed by: PATHOLOGY

## 2023-07-05 PROCEDURE — 63600175 PHARM REV CODE 636 W HCPCS: Mod: HCNC | Performed by: NEUROLOGICAL SURGERY

## 2023-07-05 PROCEDURE — 87075 CULTR BACTERIA EXCEPT BLOOD: CPT | Mod: 59,HCNC | Performed by: NEUROLOGICAL SURGERY

## 2023-07-05 PROCEDURE — 94640 AIRWAY INHALATION TREATMENT: CPT | Mod: HCNC

## 2023-07-05 PROCEDURE — 87102 FUNGUS ISOLATION CULTURE: CPT | Mod: 59,HCNC | Performed by: NEUROLOGICAL SURGERY

## 2023-07-05 PROCEDURE — 96376 TX/PRO/DX INJ SAME DRUG ADON: CPT

## 2023-07-05 PROCEDURE — 27201423 OPTIME MED/SURG SUP & DEVICES STERILE SUPPLY: Mod: HCNC | Performed by: NEUROLOGICAL SURGERY

## 2023-07-05 PROCEDURE — 63600175 PHARM REV CODE 636 W HCPCS: Mod: HCNC

## 2023-07-05 PROCEDURE — G0378 HOSPITAL OBSERVATION PER HR: HCPCS | Mod: HCNC

## 2023-07-05 PROCEDURE — 94799 UNLISTED PULMONARY SVC/PX: CPT | Mod: HCNC

## 2023-07-05 PROCEDURE — 37000008 HC ANESTHESIA 1ST 15 MINUTES: Mod: HCNC | Performed by: NEUROLOGICAL SURGERY

## 2023-07-05 PROCEDURE — 80048 BASIC METABOLIC PNL TOTAL CA: CPT | Mod: HCNC | Performed by: NURSE PRACTITIONER

## 2023-07-05 PROCEDURE — 97162 PT EVAL MOD COMPLEX 30 MIN: CPT | Mod: HCNC

## 2023-07-05 DEVICE — STIMULAN® RAPID CURE PROVIDED STERILE FOR SINGLE PATIENT USE. STIMULAN® RAPID CURE CONTAINS CALCIUM SULFATE POWDER AND MIXING SOLUTION IN PRE-MEASURED QUANTITIES SO THAT WHEN MIXED TOGETHER IN A STERILE MIXING BOWL, THE RESULTANT PASTE IS TO BE DIGITALLY PACKED INTO OPEN BONE VOID/GAP TO SET INSITU OR PLACED INTO THE MOULD PROVIDED, THE MIXTURE SETS TO FORM BEADS. THE BIODEGRADABLE, RADIOPAQUE BEADS ARE RESORBED IN APPROXIMATELY 30 – 60 DAYS WHEN USED IN ACCORDANCE WITH THE DEVICE LABELLING. STIMULAN® RAPID CURE IS MANUFACTURED FROM SYNTHETIC IMPLANT GRADE CALCIUM SULFATE DIHYDRATE(CASO4.2H2O) THAT RESORBS AND IS REPLACED WITH BONE DURING THE HEALING PROCESS. ALSO, AS THE BONE VOID FILLER BEADS ARE BIODEGRADABLE AND BIOCOMPATIBLE, THEY MAY BE USED AT AN INFECTED SITE.
Type: IMPLANTABLE DEVICE | Site: SPINE LUMBAR | Status: FUNCTIONAL
Brand: STIMULAN® RAPID CURE

## 2023-07-05 DEVICE — SET SCREW HORIZON SOLERA 5.5-6: Type: IMPLANTABLE DEVICE | Site: SPINE LUMBAR | Status: FUNCTIONAL

## 2023-07-05 DEVICE — IMPLANTABLE DEVICE: Type: IMPLANTABLE DEVICE | Site: SPINE LUMBAR | Status: FUNCTIONAL

## 2023-07-05 RX ORDER — DEXAMETHASONE SODIUM PHOSPHATE 4 MG/ML
4 INJECTION, SOLUTION INTRA-ARTICULAR; INTRALESIONAL; INTRAMUSCULAR; INTRAVENOUS; SOFT TISSUE EVERY 6 HOURS
Status: DISCONTINUED | OUTPATIENT
Start: 2023-07-05 | End: 2023-07-06

## 2023-07-05 RX ORDER — MAGNESIUM SULFATE HEPTAHYDRATE 40 MG/ML
INJECTION, SOLUTION INTRAVENOUS
Status: DISCONTINUED | OUTPATIENT
Start: 2023-07-05 | End: 2023-07-05

## 2023-07-05 RX ORDER — PROPOFOL 10 MG/ML
VIAL (ML) INTRAVENOUS
Status: DISCONTINUED | OUTPATIENT
Start: 2023-07-05 | End: 2023-07-05

## 2023-07-05 RX ORDER — MIDAZOLAM HYDROCHLORIDE 1 MG/ML
INJECTION INTRAMUSCULAR; INTRAVENOUS
Status: DISCONTINUED | OUTPATIENT
Start: 2023-07-05 | End: 2023-07-05

## 2023-07-05 RX ORDER — ACETAMINOPHEN 10 MG/ML
INJECTION, SOLUTION INTRAVENOUS
Status: DISCONTINUED | OUTPATIENT
Start: 2023-07-05 | End: 2023-07-05

## 2023-07-05 RX ORDER — ONDANSETRON 2 MG/ML
INJECTION INTRAMUSCULAR; INTRAVENOUS
Status: DISCONTINUED | OUTPATIENT
Start: 2023-07-05 | End: 2023-07-05

## 2023-07-05 RX ORDER — ONDANSETRON 2 MG/ML
4 INJECTION INTRAMUSCULAR; INTRAVENOUS DAILY PRN
Status: DISCONTINUED | OUTPATIENT
Start: 2023-07-05 | End: 2023-07-05 | Stop reason: HOSPADM

## 2023-07-05 RX ORDER — SODIUM CHLORIDE, SODIUM LACTATE, POTASSIUM CHLORIDE, CALCIUM CHLORIDE 600; 310; 30; 20 MG/100ML; MG/100ML; MG/100ML; MG/100ML
INJECTION, SOLUTION INTRAVENOUS CONTINUOUS
Status: ACTIVE | OUTPATIENT
Start: 2023-07-05 | End: 2023-07-06

## 2023-07-05 RX ORDER — VANCOMYCIN HYDROCHLORIDE 1 G/20ML
INJECTION, POWDER, LYOPHILIZED, FOR SOLUTION INTRAVENOUS
Status: DISCONTINUED | OUTPATIENT
Start: 2023-07-05 | End: 2023-07-05 | Stop reason: HOSPADM

## 2023-07-05 RX ORDER — LIDOCAINE HYDROCHLORIDE 10 MG/ML
INJECTION, SOLUTION EPIDURAL; INFILTRATION; INTRACAUDAL; PERINEURAL
Status: DISCONTINUED | OUTPATIENT
Start: 2023-07-05 | End: 2023-07-05

## 2023-07-05 RX ORDER — OXYCODONE AND ACETAMINOPHEN 5; 325 MG/1; MG/1
1 TABLET ORAL
Status: DISCONTINUED | OUTPATIENT
Start: 2023-07-05 | End: 2023-07-05 | Stop reason: HOSPADM

## 2023-07-05 RX ORDER — TOBRAMYCIN 1.2 G/30ML
1.2 INJECTION, POWDER, LYOPHILIZED, FOR SOLUTION INTRAVENOUS ONCE
Status: COMPLETED | OUTPATIENT
Start: 2023-07-05 | End: 2023-07-05

## 2023-07-05 RX ORDER — ALPRAZOLAM 0.5 MG/1
0.5 TABLET ORAL 2 TIMES DAILY PRN
Status: DISCONTINUED | OUTPATIENT
Start: 2023-07-05 | End: 2023-07-13 | Stop reason: HOSPADM

## 2023-07-05 RX ORDER — DEXAMETHASONE SODIUM PHOSPHATE 4 MG/ML
INJECTION, SOLUTION INTRA-ARTICULAR; INTRALESIONAL; INTRAMUSCULAR; INTRAVENOUS; SOFT TISSUE
Status: DISCONTINUED | OUTPATIENT
Start: 2023-07-05 | End: 2023-07-05

## 2023-07-05 RX ORDER — FENTANYL CITRATE 50 UG/ML
INJECTION, SOLUTION INTRAMUSCULAR; INTRAVENOUS
Status: DISCONTINUED | OUTPATIENT
Start: 2023-07-05 | End: 2023-07-05

## 2023-07-05 RX ORDER — KETAMINE HCL IN 0.9 % NACL 50 MG/5 ML
SYRINGE (ML) INTRAVENOUS
Status: DISCONTINUED | OUTPATIENT
Start: 2023-07-05 | End: 2023-07-05

## 2023-07-05 RX ORDER — PHENYLEPHRINE HYDROCHLORIDE 10 MG/ML
INJECTION INTRAVENOUS CONTINUOUS PRN
Status: DISCONTINUED | OUTPATIENT
Start: 2023-07-05 | End: 2023-07-05

## 2023-07-05 RX ORDER — PROCHLORPERAZINE EDISYLATE 5 MG/ML
5 INJECTION INTRAMUSCULAR; INTRAVENOUS EVERY 6 HOURS PRN
Status: DISCONTINUED | OUTPATIENT
Start: 2023-07-05 | End: 2023-07-13 | Stop reason: HOSPADM

## 2023-07-05 RX ORDER — HYDROMORPHONE HYDROCHLORIDE 2 MG/ML
0.2 INJECTION, SOLUTION INTRAMUSCULAR; INTRAVENOUS; SUBCUTANEOUS EVERY 5 MIN PRN
Status: DISCONTINUED | OUTPATIENT
Start: 2023-07-05 | End: 2023-07-05 | Stop reason: HOSPADM

## 2023-07-05 RX ORDER — ROCURONIUM BROMIDE 10 MG/ML
INJECTION, SOLUTION INTRAVENOUS
Status: DISCONTINUED | OUTPATIENT
Start: 2023-07-05 | End: 2023-07-05

## 2023-07-05 RX ADMIN — FENTANYL CITRATE 25 MCG: 50 INJECTION, SOLUTION INTRAMUSCULAR; INTRAVENOUS at 08:07

## 2023-07-05 RX ADMIN — HYDROMORPHONE HYDROCHLORIDE 2 MG: 1 INJECTION, SOLUTION INTRAMUSCULAR; INTRAVENOUS; SUBCUTANEOUS at 06:07

## 2023-07-05 RX ADMIN — DEXAMETHASONE SODIUM PHOSPHATE 4 MG: 4 INJECTION, SOLUTION INTRA-ARTICULAR; INTRALESIONAL; INTRAMUSCULAR; INTRAVENOUS; SOFT TISSUE at 08:07

## 2023-07-05 RX ADMIN — ACETAMINOPHEN 1000 MG: 10 INJECTION, SOLUTION INTRAVENOUS at 10:07

## 2023-07-05 RX ADMIN — SODIUM CHLORIDE, POTASSIUM CHLORIDE, SODIUM LACTATE AND CALCIUM CHLORIDE: 600; 310; 30; 20 INJECTION, SOLUTION INTRAVENOUS at 02:07

## 2023-07-05 RX ADMIN — GLYCOPYRROLATE 0.2 MG: 0.2 INJECTION, SOLUTION INTRAMUSCULAR; INTRAVENOUS at 08:07

## 2023-07-05 RX ADMIN — ROCURONIUM BROMIDE 20 MG: 10 SOLUTION INTRAVENOUS at 10:07

## 2023-07-05 RX ADMIN — MIDAZOLAM HYDROCHLORIDE 2 MG: 1 INJECTION, SOLUTION INTRAMUSCULAR; INTRAVENOUS at 07:07

## 2023-07-05 RX ADMIN — PANTOPRAZOLE SODIUM 40 MG: 40 TABLET, DELAYED RELEASE ORAL at 01:07

## 2023-07-05 RX ADMIN — PHENYLEPHRINE HYDROCHLORIDE 200 MCG: 10 INJECTION INTRAVENOUS at 07:07

## 2023-07-05 RX ADMIN — FENTANYL CITRATE 50 MCG: 50 INJECTION, SOLUTION INTRAMUSCULAR; INTRAVENOUS at 09:07

## 2023-07-05 RX ADMIN — GABAPENTIN 600 MG: 300 CAPSULE ORAL at 08:07

## 2023-07-05 RX ADMIN — ALUMINA, MAGNESIA, AND SIMETHICONE ORAL SUSPENSION REGULAR STRENGTH 30 ML: 1200; 1200; 120 SUSPENSION ORAL at 08:07

## 2023-07-05 RX ADMIN — PROPOFOL 100 MG: 10 INJECTION, EMULSION INTRAVENOUS at 07:07

## 2023-07-05 RX ADMIN — OXYCODONE AND ACETAMINOPHEN 1 TABLET: 10; 325 TABLET ORAL at 10:07

## 2023-07-05 RX ADMIN — BUDESONIDE INHALATION 0.5 MG: 0.5 SUSPENSION RESPIRATORY (INHALATION) at 07:07

## 2023-07-05 RX ADMIN — FENTANYL CITRATE 25 MCG: 50 INJECTION, SOLUTION INTRAMUSCULAR; INTRAVENOUS at 07:07

## 2023-07-05 RX ADMIN — ATORVASTATIN CALCIUM 80 MG: 40 TABLET, FILM COATED ORAL at 08:07

## 2023-07-05 RX ADMIN — OXYCODONE HYDROCHLORIDE AND ACETAMINOPHEN 1 TABLET: 5; 325 TABLET ORAL at 12:07

## 2023-07-05 RX ADMIN — SODIUM CHLORIDE: 0.9 INJECTION, SOLUTION INTRAVENOUS at 07:07

## 2023-07-05 RX ADMIN — GABAPENTIN 600 MG: 300 CAPSULE ORAL at 01:07

## 2023-07-05 RX ADMIN — HYDROMORPHONE HYDROCHLORIDE 0.2 MG: 2 INJECTION INTRAMUSCULAR; INTRAVENOUS; SUBCUTANEOUS at 12:07

## 2023-07-05 RX ADMIN — DEXAMETHASONE SODIUM PHOSPHATE 4 MG: 4 INJECTION INTRA-ARTICULAR; INTRALESIONAL; INTRAMUSCULAR; INTRAVENOUS; SOFT TISSUE at 02:07

## 2023-07-05 RX ADMIN — MAGNESIUM SULFATE HEPTAHYDRATE 1 G: 2 INJECTION, SOLUTION INTRAVENOUS at 08:07

## 2023-07-05 RX ADMIN — LIDOCAINE HYDROCHLORIDE 100 MG: 10 SOLUTION INTRAVENOUS at 07:07

## 2023-07-05 RX ADMIN — ROCURONIUM BROMIDE 30 MG: 10 SOLUTION INTRAVENOUS at 09:07

## 2023-07-05 RX ADMIN — SUGAMMADEX 200 MG: 100 INJECTION, SOLUTION INTRAVENOUS at 11:07

## 2023-07-05 RX ADMIN — Medication 25 MG: at 09:07

## 2023-07-05 RX ADMIN — DEXAMETHASONE SODIUM PHOSPHATE 4 MG: 4 INJECTION INTRA-ARTICULAR; INTRALESIONAL; INTRAMUSCULAR; INTRAVENOUS; SOFT TISSUE at 06:07

## 2023-07-05 RX ADMIN — OXYCODONE AND ACETAMINOPHEN 1 TABLET: 10; 325 TABLET ORAL at 12:07

## 2023-07-05 RX ADMIN — HYDROMORPHONE HYDROCHLORIDE 2 MG: 1 INJECTION, SOLUTION INTRAMUSCULAR; INTRAVENOUS; SUBCUTANEOUS at 03:07

## 2023-07-05 RX ADMIN — SERTRALINE HYDROCHLORIDE 100 MG: 50 TABLET ORAL at 01:07

## 2023-07-05 RX ADMIN — CARVEDILOL 12.5 MG: 12.5 TABLET, FILM COATED ORAL at 08:07

## 2023-07-05 RX ADMIN — METHOCARBAMOL 500 MG: 500 TABLET ORAL at 01:07

## 2023-07-05 RX ADMIN — OXYCODONE AND ACETAMINOPHEN 1 TABLET: 10; 325 TABLET ORAL at 05:07

## 2023-07-05 RX ADMIN — VANCOMYCIN HYDROCHLORIDE 2000 MG: 1 INJECTION, POWDER, LYOPHILIZED, FOR SOLUTION INTRAVENOUS at 08:07

## 2023-07-05 RX ADMIN — CARVEDILOL 12.5 MG: 12.5 TABLET, FILM COATED ORAL at 01:07

## 2023-07-05 RX ADMIN — METHOCARBAMOL 500 MG: 500 TABLET ORAL at 06:07

## 2023-07-05 RX ADMIN — Medication 25 MG: at 08:07

## 2023-07-05 RX ADMIN — ROCURONIUM BROMIDE 20 MG: 10 SOLUTION INTRAVENOUS at 08:07

## 2023-07-05 RX ADMIN — METHOCARBAMOL 500 MG: 500 TABLET ORAL at 10:07

## 2023-07-05 RX ADMIN — PHENYLEPHRINE HYDROCHLORIDE 0.5 MCG/KG/MIN: 10 INJECTION INTRAVENOUS at 08:07

## 2023-07-05 RX ADMIN — ONDANSETRON 4 MG: 2 INJECTION INTRAMUSCULAR; INTRAVENOUS at 11:07

## 2023-07-05 RX ADMIN — ROCURONIUM BROMIDE 50 MG: 10 SOLUTION INTRAVENOUS at 07:07

## 2023-07-05 RX ADMIN — PHENYLEPHRINE HYDROCHLORIDE 100 MCG: 10 INJECTION INTRAVENOUS at 09:07

## 2023-07-05 RX ADMIN — TOBRAMYCIN 1.2 G: 1.2 INJECTION, POWDER, LYOPHILIZED, FOR SOLUTION INTRAVENOUS at 08:07

## 2023-07-05 RX ADMIN — PHENYLEPHRINE HYDROCHLORIDE 100 MCG: 10 INJECTION INTRAVENOUS at 07:07

## 2023-07-05 RX ADMIN — PHENYLEPHRINE HYDROCHLORIDE 100 MCG: 10 INJECTION INTRAVENOUS at 08:07

## 2023-07-05 NOTE — ANESTHESIA POSTPROCEDURE EVALUATION
Anesthesia Post Evaluation    Patient: Abdullahi Urias    Procedure(s) Performed: Procedure(s) (LRB):  EXPLORATION, WOUND (Bilateral)  WASHOUT (N/A)  REMOVAL, HARDWARE, SPINE (N/A)  INSERTION (N/A)  REPLACEMENT (N/A)    Final Anesthesia Type: general      Patient location during evaluation: PACU  Patient participation: Yes- Able to Participate  Level of consciousness: awake  Post-procedure vital signs: reviewed and stable  Pain management: adequate  Airway patency: patent    PONV status at discharge: No PONV  Anesthetic complications: no      Cardiovascular status: hemodynamically stable  Respiratory status: unassisted  Hydration status: euvolemic  Follow-up not needed.          Vitals Value Taken Time   /78 07/05/23 1320   Temp 36.6 °C (97.8 °F) 07/05/23 1320   Pulse 79 07/05/23 1320   Resp 16 07/05/23 1320   SpO2 93 % 07/05/23 1320         Event Time   Out of Recovery 07/05/2023 13:07:55         Pain/Shoshana Score: Pain Rating Prior to Med Admin: 5 (7/5/2023 12:55 PM)  Pain Rating Post Med Admin: 6 (7/5/2023  6:30 AM)  Shoshana Score: 8 (7/5/2023 12:30 PM)

## 2023-07-05 NOTE — PLAN OF CARE
[facility] Periop Services [facility]*.  Abdullahi Urias is a 67 y.o.male admitted on 7/3/2023 for Post-operative wound abscess   Code Status: Full Code MRN: [id number]   Review of patient's allergies indicates:  No Known Allergies  Past Medical History:   Diagnosis Date    Aortic stenosis     [facility]    Asthma     BPH (benign prostatic hyperplasia)     CAD (coronary artery disease)     Cardiomyopathy     CHF (congestive heart failure)     Chronic hoarseness     vocal cord surg    Chronic pain     CKD (chronic kidney disease) stage 3, GFR 30-59 ml/min     CVA (cerebral infarction)     8/2012 olol; reviewed ed note    Ex-smoker     GERD (gastroesophageal reflux disease)     Hepatitis C     treatedharvoni says cured, RNA NEG 6/2020    Hypertension     Pancreatitis     Prostate cancer     Prostate cancer     Prostate cancer     PVD (peripheral vascular disease)     Renal insufficiency     Substance abuse     cocaine, etoh , tob in past      PRN meds    acetaminophen, 650 mg, Q4H PRN  albuterol-ipratropium, 3 mL, Q4H PRN  aluminum-magnesium hydroxide-simethicone, 30 mL, Q6H PRN  dextrose 10%, 12.5 g, PRN  dextrose 10%, 25 g, PRN  glucagon (human recombinant), 1 mg, PRN  glucose, 16 g, PRN  glucose, 24 g, PRN  HYDROmorphone, 0.2 mg, Q5 Min PRN  HYDROmorphone, 2 mg, Q3H PRN  melatonin, 6 mg, Nightly PRN  naloxone, 0.02 mg, PRN  ondansetron, 4 mg, Q8H PRN  ondansetron, 4 mg, Daily PRN  oxyCODONE-acetaminophen, 1 tablet, Q4H PRN  oxyCODONE-acetaminophen, 1 tablet, Q3H PRN  oxyCODONE-acetaminophen, 1 tablet, Q4H PRN  sodium chloride 0.9%, 10 mL, Q12H PRN  vancomycin - pharmacy to dose, , pharmacy to manage frequency      Pt received from PACU.  Pt reoriented to room call light is within reach. Meds given per updated MAR. IV fluids initiated. Bleeding to the wound dressing was noted after pt left PACU. Dr. Valencia was notified and said to monitor.   No falls were reported on shift and hourly rounding is complete. Cardiac monitoring  continues. Chart check completed. Will continue plan of care.               Lead Monitored: Lead II Rhythm: normal sinus rhythm    Cardiac/Telemetry Box Number: [id number]    Last Bowel Movement: 07/02/23  Diet NPO     Homero Score: 21  Fall Risk Score: 9  Accucheck []   Freq?      Lines/Drains/Airways       Drain  Duration                  Closed/Suction Drain 07/05/23 1012 Back Accordion 10 Fr. <1 day         Closed/Suction Drain 07/05/23 1013 Back Accordion 10 Fr. <1 day         Urethral Catheter 07/05/23 0815 Non-latex;Silicone;Straight-tip 16 Fr. <1 day              Peripheral Intravenous Line  Duration                  Peripheral IV - Single Lumen 07/03/23 1125 20 G Right Antecubital 2 days

## 2023-07-05 NOTE — PROGRESS NOTES
Pharmacokinetic Initial Assessment: IV Vancomycin    Assessment/Plan:    Initiate intravenous vancomycin with loading dose of 2000 mg once with subsequent doses when random concentrations are less than 20 mcg/mL  Desired empiric serum trough concentration is 15 to 20 mcg/mL  Draw vancomycin random level on 7/6 at 0300.  Pharmacy will continue to follow and monitor vancomycin.      Please contact pharmacy at extension [phone] with any questions regarding this assessment.     Thank you for the consult,   Cristal Eaton [facility]       Patient brief summary:  Abdullahi Urias is a 67 y.o. male initiated on antimicrobial therapy with IV Vancomycin for treatment of suspected  back abscess    Drug Allergies:   Review of patient's allergies indicates:  No Known Allergies    Actual Body Weight:   115 kg    Renal Function:   Estimated Creatinine Clearance: 46.4 mL/min (A) (based on SCr of 1.9 mg/dL (H)).,     Dialysis Method (if applicable):  N/A    CBC (last 72 hours):    Microbiologic Results:  Microbiology Results (last 7 days)       Procedure Component Value Units Date/Time    Aerobic culture [[id number]] Collected: 07/05/23 0959    Order Status: Sent Specimen: Abscess from Back Updated: 07/05/23 1009    AFB Culture & Smear [[id number]] Collected: 07/05/23 0959    Order Status: Sent Specimen: Abscess from Back Updated: 07/05/23 1009    Fungus culture [[id number]] Collected: 07/05/23 0959    Order Status: Sent Specimen: Abscess from Back Updated: 07/05/23 1008    Culture, Anaerobe [[id number]] Collected: 07/05/23 0959    Order Status: Sent Specimen: Abscess from Back Updated: 07/05/23 1008    Gram stain [[id number]] Collected: 07/05/23 0959    Order Status: Sent Specimen: Abscess from Back Updated: 07/05/23 1007    Gram stain [[id number]] Collected: 07/05/23 0855    Order Status: Sent Specimen: Abscess from Back Updated: 07/05/23 0906    Aerobic culture [[id number]] Collected: 07/05/23 0855    Order Status: Sent Specimen: Abscess from  Back Updated: 07/05/23 0906    Culture, Anaerobe [[id number]] Collected: 07/05/23 0855    Order Status: Sent Specimen: Abscess from Back Updated: 07/05/23 0905    AFB Culture & Smear [[id number]] Collected: 07/05/23 0855    Order Status: Sent Specimen: Abscess from Back Updated: 07/05/23 0905    Fungus culture [[id number]] Collected: 07/05/23 0851    Order Status: Sent Specimen: Abscess from Back Updated: 07/05/23 0905    Fungus culture [[id number]] Collected: 07/05/23 0855    Order Status: Sent Specimen: Abscess from Back Updated: 07/05/23 0904    Gram stain [[id number]] Collected: 07/05/23 0851    Order Status: Sent Specimen: Abscess from Back Updated: 07/05/23 0904    Aerobic culture [[id number]] Collected: 07/05/23 0851    Order Status: Sent Specimen: Abscess from Back Updated: 07/05/23 0904    AFB Culture & Smear [[id number]] Collected: 07/05/23 0851    Order Status: Sent Specimen: Abscess from Back Updated: 07/05/23 0904    Culture, Anaerobe [[id number]] Collected: 07/05/23 0851    Order Status: Sent Specimen: Abscess from Back Updated: 07/05/23 0903    Blood culture [[id number]] Collected: 07/04/23 1112    Order Status: Completed Specimen: Blood Updated: 07/05/23 0315     Blood Culture, Routine No Growth to date    Blood culture [[id number]] Collected: 07/04/23 1111    Order Status: Completed Specimen: Blood Updated: 07/05/23 0315     Blood Culture, Routine No Growth to date

## 2023-07-05 NOTE — ASSESSMENT & PLAN NOTE
Holding asa/plavix at this time  S/p washout today, restart when okay per Neurosurgery  Continue statin

## 2023-07-05 NOTE — ANESTHESIA PROCEDURE NOTES
Intubation    Date/Time: 7/5/2023 7:51 AM  Performed by: Isael Ramirez CRNA  Authorized by: Elvis José MD     Intubation:     Induction:  Intravenous    Intubated:  Postinduction    Mask Ventilation:  Easy mask    Attempts:  1    Attempted By:  CRNA    Method of Intubation:  Direct    Blade:  Jade 4    Laryngeal View Grade: Grade I - full view of cords      Difficult Airway Encountered?: No      Complications:  None    Airway Device:  Oral endotracheal tube    Airway Device Size:  7.5    Style/Cuff Inflation:  Cuffed (inflated to minimal occlusive pressure)    Tube secured:  22    Secured at:  The lips    Placement Verified By:  Capnometry    Complicating Factors:  None    Findings Post-Intubation:  BS equal bilateral

## 2023-07-05 NOTE — ANESTHESIA PREPROCEDURE EVALUATION
07/05/2023  Abdullahi Urias is a 67 y.o., male.    Patient Active Problem List   Diagnosis    Aortic stenosis    ED (erectile dysfunction)    Asthmatic bronchitis    Hypertension    CAD (coronary artery disease)    BPH (benign prostatic hyperplasia)    Chronic back pain    Cardiomyopathy    BRBPR (bright red blood per rectum)    Class 2 severe obesity due to excess calories with serious comorbidity and body mass index (BMI) of 38.0 to 38.9 in adult    Old peripheral tear of lateral meniscus of right knee    Arthritis of right knee    Chondromalacia, right knee    Penetrating foreign body of skin of right knee    Chronic gout    CHEO on CPAP    History of CVA in adulthood    Prostate cancer    S/P AVR (aortic valve replacement) biopresthetic miller 25 mm in 2014     EKG abnormalities    CKD (chronic kidney disease) stage 3, GFR 30-59 ml/min    History of hepatitis C    Pain in both lower extremities    GERD (gastroesophageal reflux disease)    Personal history of colonic polyps    Syncope and collapse    Localized swelling of left foot    History of prostate cancer    PAD (peripheral artery disease)    Aorto-iliac atherosclerosis    Moderate persistent asthma without complication    Former tobacco use    Preop cardiovascular exam    Synovial cyst of lumbar spine    Lumbar radiculopathy, chronic    Lumbar stenosis with neurogenic claudication    Post-operative wound abscess    SOB (shortness of breath)     Past Medical History:   Diagnosis Date    [facility]    Asthma     BPH (benign prostatic hyperplasia)     CAD (coronary artery disease)     Cardiomyopathy     CHF (congestive heart failure)     Chronic hoarseness     vocal cord surg    Chronic pain     CKD (chronic kidney disease) stage 3, GFR 30-59 ml/min     CVA (cerebral  infarction)     8/2012 olol; reviewed ed note    Ex-smoker     GERD (gastroesophageal reflux disease)     Hepatitis C     treatedharvoni says cured, RNA NEG 6/2020    Hypertension     Pancreatitis     Prostate cancer     Prostate cancer     Prostate cancer     PVD (peripheral vascular disease)     Renal insufficiency     Substance abuse     cocaine, etoh , tob in past       Past Surgical History:   Procedure Laterality Date    AORTIC VALVE REPLACEMENT  05/19/2014    Tissue valve replacement    BACK SURGERY      CARDIAC CATHETERIZATION      COLONOSCOPY  2011    COLONOSCOPY N/A 2/24/2021    Procedure: COLONOSCOPY;  Surgeon: Faith Carrillo MD;  Location: [facility] ENDO;  Service: Endoscopy;  Laterality: N/A;    EPIDURAL STEROID INJECTION INTO CERVICAL SPINE N/A 6/21/2022    Procedure: C7-T1 IL PIEDAD;  Surgeon: Nehemiah Carmen MD;  Location: [facility] PAIN MGT;  Service: Pain Management;  Laterality: N/A;    ESOPHAGOGASTRODUODENOSCOPY N/A 2/24/2021    Procedure: ESOPHAGOGASTRODUODENOSCOPY (EGD);  Surgeon: Faith Carrillo MD;  [facility];  Service: Endoscopy;  Laterality: N/A;    FOOT SURGERY      LEFT HEART CATHETERIZATION Left 7/24/2020    Procedure: CATHETERIZATION, HEART, LEFT;  Surgeon: Pasha Martin MD;  Location: [facility] CATH LAB;  Service: Cardiology;  Laterality: Left;    PENILE PROSTHESIS IMPLANT      PENILE PROSTHESIS REVISION  04/30/2018    [facility]    radiation for prostate      TRANSFORAMINAL EPIDURAL INJECTION OF STEROID Right 10/20/2022    Procedure: Right  L4/5 + L5/S1 TF PIEDAD RN IV Sedation;  Surgeon: Nehemiah Carmen MD;  Location: [facility] PAIN MGT;  Service: Pain Management;  Laterality: Right;    TRANSFORAMINAL LUMBAR INTERBODY FUSION (TLIF) USING COMPUTER-ASSISTED NAVIGATION Bilateral 5/17/2023    Procedure: FUSION, SPINE, LUMBAR, TLIF, USING COMPUTER-ASSISTED NAVIGATION;  Surgeon: Brandon Valencia MD;  Location: [facility] OR;  Service:  Neurosurgery;  Laterality: Bilateral;  2 level lumbar fusion at L4-5 and L5-S1    UPPER GASTROINTESTINAL ENDOSCOPY  2011       Pre-op Assessment    I have reviewed the Patient Summary Reports.    I have reviewed the NPO Status.   I have reviewed the Medications.     Review of Systems  Anesthesia Hx:  No problems with previous Anesthesia  History of prior surgery of interest to airway management or planning: Previous anesthesia: General see Anesthesia plan with general anesthesia.   Denies Personal Hx of Anesthesia complications.   Social:  Former Smoker Hx of sub abuse (cocaine)   Hematology/Oncology:  Hematology Normal      Hematology Comments: H/H 9.6/29.5   --  Cancer in past history:  Oncology Comments: Hx prostate Ca.  S/P prostatectomy/radiation     Cardiovascular:   Hypertension CAD   CHF PVD ECG has been reviewed. S/p AVR    Seen and evaluated by Cards: Elevated periop risk of CV events for non-high risk procedure    EKG (7/3/23):  Sinus rhythm with occasional Premature ventricular complexes   T wave abnormality, consider anterior ischemia   Prolonged QT   Abnormal ECG   When compared with ECG of 24-APR-2023 16:17,   Premature ventricular complexes are now Present   Confirmed by ERIC JACOB MD ([id number]) on 7/3/2023 1:55:32 PM    Pulmonary:   Asthma Sleep Apnea, CPAP    Renal/:   Chronic Renal Disease, CKD BPH    Hepatic/GI:   GERD Hepatitis, C Hx of hep C   Musculoskeletal:   S/p TLIF in May 2023 - now with Abscess Spine Disorders: lumbar    Neurological:   CVA    Endocrine:  Endocrine Normal BMI 39 Obesity / BMI > 30      Physical Exam  General: Well nourished, Cooperative, Alert and Oriented    Airway:  Mallampati: III   Mouth Opening: Normal  TM Distance: Normal  Tongue: Normal  Neck ROM: Normal ROM    Dental:  Partial Dentures  Partials removed; few teeth left, but denies any loose       Anesthesia Plan  Type of Anesthesia, risks & benefits discussed:    Anesthesia Type: Gen ETT  Intra-op Monitoring  Plan: Standard ASA Monitors  Post Op Pain Control Plan: multimodal analgesia and IV/PO Opioids PRN  Induction:  IV  Airway Plan: Direct, Post-Induction  Informed Consent: Informed consent signed with the Patient and all parties understand the risks and agree with anesthesia plan.  All questions answered.   ASA Score: 3  Day of Surgery Review of History & Physical: H&P Update referred to the surgeon/provider.  Anesthesia Plan Notes: **O2 Sat 92% upstairs on RA -- AVOID ANY SEDATION until in OR    Intubation:     Induction:  Intravenous    Intubated:  Postinduction    Mask Ventilation:  Easy with oral airway    Attempts:  1    Attempted By:  Student    Method of Intubation:  Direct    Blade:  Jade 3    Laryngeal View Grade: Grade IIA - cords partially seen      Difficult Airway Encountered?: No      Complications:  None    Airway Device:  Oral endotracheal tube    Airway Device Size:  7.5    Style/Cuff Inflation:  Cuffed (inflated to minimal occlusive pressure)    Tube secured:  2    Secured at:  The lips    Placement Verified By:  Capnometry    Complicating Factors:  None    Findings Post-Intubation:  BS equal bilateral       Ready For Surgery From Anesthesia Perspective.     .        Chemistry        Component Value Date/Time     07/05/2023 0431    K 4.3 07/05/2023 0431     07/05/2023 0431    CO2 23 07/05/2023 0431    BUN 22 07/05/2023 0431    CREATININE 1.9 (H) 07/05/2023 0431    GLU 96 07/05/2023 0431        Component Value Date/Time    CALCIUM 9.1 07/05/2023 0431    ALKPHOS 98 07/03/2023 1123    AST 17 07/03/2023 1123    ALT 10 07/03/2023 1123    BILITOT 0.3 07/03/2023 1123    ESTGFRAFRICA 51.1 (A) 06/02/2022 1246    EGFRNONAA 44.2 (A) 06/02/2022 1246        Lab Results   Component Value Date    WBC 5.86 07/05/2023    HGB 9.6 (L) 07/05/2023    HCT 29.5 (L) 07/05/2023    MCV 80 (L) 07/05/2023     07/05/2023       Normal sinus rhythm   T wave abnormality, consider anterior ischemia    Abnormal ECG   When compared with ECG of 24-JAN-2023 17:09,   No significant change was found   Confirmed by DARIA ASH MD ([id number]) on 4/25/2023 10:44:14 AM     Echo 9/12/22:   The left ventricle is normal in size with normal systolic function.   The estimated ejection fraction is 65%.   Grade I left ventricular diastolic dysfunction.   Normal right ventricular size with normal right ventricular systolic function.   There is a bioprosthetic aortic valve present. There is mild paravalvular aortic insufficiency present.   The aortic valve mean gradient is 14 mmHg with a dimensionless index of 0.57.   Mild tricuspid regurgitation.   The estimated PA systolic pressure is 33 mmHg.     Nuc Stress 7/9/20:    The study shows normal myocardial perfusion.    The perfusion scan is free of evidence from myocardial ischemia or injury.    Gated perfusion images showed an ejection fraction of 60% at rest and 55% post stress.    The EKG portion of this study is negative for ischemia.    The patient reported no chest pain during the stress test.    Arrhythmias during stress: occasional PVCs.    Heart Cath 7/24/20:   Non-obstructive CAD.    Left Anterior Descending   Mid LAD lesion is 20% stenosed.      Right Coronary Artery   Mid RCA lesion is 20% stenosed.

## 2023-07-05 NOTE — ASSESSMENT & PLAN NOTE
Cr slightly above baseline this AM (1.9)  Cr ranges from 1.4-1.7 over the last year  Will start short course of post-op IV fluids  Strict I/O's  Avoid nephrotoxins  AM labs

## 2023-07-05 NOTE — PROGRESS NOTES
[facility] Medicine  Progress Note    Patient Name: Abdullahi Urias  MRN: [id number]  Patient Class: OP- Observation   Admission Date: 7/3/2023  Length of Stay: 0 days  Attending Physician: Lino Khan MD  Primary Care Provider: Reji Valentin MD        Subjective:     Principal Problem:Post-operative wound abscess        HPI:  Mr. Urias is a 67-year-old  male with PMH significant for aortic stenosis with bioprosthetic aortic valve replacement, diastolic CHF, CKD stage 3, HTN, prostate cancer and recent lumbar spine surgery on 5/17 with Dr. Valencia who was sent from neurosurgery office with complaints of RLE pain, weakness and oozing from surgical incision site.  Per patient and wife, he was doing fine till two days ago when he went on a long car ride to [employer] and he started having lower back pain.  Yesterday morning he was able to shower but then was unable to get dressed by himself d/t the pain and new onset right lower extremity weakness.  Complains of pain from right hip all the way down leg.  Symtoms are constant and moderate in severity, no relieving or exacerbating factors.  Pain 101/10.  Associated symptoms are right lower extremity weakness and occassional dyspnea.  Patient denies any CP, leg swelling, fever/chills, urinary or fecal incontinence.   Lab work remarkable for Sed Rate 55, Creatinine 1.6.  MRI showed Large subcutaneous fluid collection involving the postoperative bed measuring approximately 11.6 x 28.9 may reflect seroma or abscess or meningocele Additional subcutaneous fluid collection in the region of the laminectomy is multiloculated measuring approximately 2.2 x 14 mm each.  This could relate to abscess, seroma, or meningocele.   Case was discussed with neurosurgery, ERIK Gillette who discussed with Dr. Valencia.  will admit patient to observation for post op fluid collection, r/o abscess.  Blood cultures ordered, holding Abx at this time as  patient is febrile and normal WBC.  Per neurosurgery recs will hold ASA/plavix at this time, likely washout tomorrow morning.     Code Status Full  Surrogate Decision Maker wife Vinita      Overview/Hospital Course:      7/5/23  Patient examined post-op this afternoon, wife at bedside  Reports pain in low back, controlled at this time  Discussed with RN at bedside; surgical incision dressing slightly saturated, 2 drain in place      Past Medical History:   Diagnosis Date    [facility]    Asthma     BPH (benign prostatic hyperplasia)     CAD (coronary artery disease)     Cardiomyopathy     CHF (congestive heart failure)     Chronic hoarseness     vocal cord surg    Chronic pain     CKD (chronic kidney disease) stage 3, GFR 30-59 ml/min     CVA (cerebral infarction)     8/2012 olol; reviewed ed note    Ex-smoker     GERD (gastroesophageal reflux disease)     Hepatitis C     treatedharvoni says cured, RNA NEG 6/2020    Hypertension     Pancreatitis     Prostate cancer     Prostate cancer     Prostate cancer     PVD (peripheral vascular disease)     Renal insufficiency     Substance abuse     cocaine, etoh , tob in past       Past Surgical History:   Procedure Laterality Date    AORTIC VALVE REPLACEMENT  05/19/2014    Tissue valve replacement    BACK SURGERY      CARDIAC CATHETERIZATION      COLONOSCOPY  2011    COLONOSCOPY N/A 2/24/2021    Procedure: COLONOSCOPY;  Surgeon: Faith Carrillo MD;  Location[facility];  Service: Endoscopy;  Laterality: N/A;    EPIDURAL STEROID INJECTION INTO CERVICAL SPINE N/A 6/21/2022    Procedure: C7-T1 IL PIEDAD;  Surgeon: Nehemiah Carmen MD;  Location: [facility] PAIN MGT;  Service: Pain Management;  Laterality: N/A;    ESOPHAGOGASTRODUODENOSCOPY N/A 2/24/2021    Procedure: ESOPHAGOGASTRODUODENOSCOPY (EGD);  Surgeon: Faith Carrillo MD;  Location: [facility] ENDO;  Service: Endoscopy;  Laterality: N/A;    FOOT SURGERY      LEFT  HEART CATHETERIZATION Left 7/24/2020    Procedure: CATHETERIZATION, HEART, LEFT;  Surgeon: Pasha Martin MD;  Location: [facility] CATH LAB;  Service: Cardiology;  Laterality: Left;    PENILE PROSTHESIS IMPLANT      PENILE PROSTHESIS REVISION  04/30/2018    [facility]    radiation for prostate      TRANSFORAMINAL EPIDURAL INJECTION OF STEROID Right 10/20/2022    Procedure: Right  L4/5 + L5/S1 TF PIEDAD RN IV Sedation;  Surgeon: Nehemiah Carmen MD;  Location: [facility] PAIN MGT;  Service: Pain Management;  Laterality: Right;    TRANSFORAMINAL LUMBAR INTERBODY FUSION (TLIF) USING COMPUTER-ASSISTED NAVIGATION Bilateral 5/17/2023    Procedure: FUSION, SPINE, LUMBAR, TLIF, USING COMPUTER-ASSISTED NAVIGATION;  Surgeon: Brandon Valencia MD;  Location: [facility] OR;  Service: Neurosurgery;  Laterality: Bilateral;  2 level lumbar fusion at L4-5 and L5-S1    UPPER GASTROINTESTINAL ENDOSCOPY  2011       Review of patient's allergies indicates:  No Known Allergies    Current Facility-Administered Medications on File Prior to Encounter   Medication    ondansetron injection 4 mg    sodium chloride 0.9% flush 10 mL     Current Outpatient Medications on File Prior to Encounter   Medication Sig    albuterol (PROVENTIL/VENTOLIN HFA) 90 mcg/actuation inhaler INHALE 2 PUFFS INTO THE LUNGS EVERY 6 HOURS AS NEEDED FOR COUGH    allopurinoL (ZYLOPRIM) 100 MG tablet Take 100 mg by mouth once daily. Takes 0.5 tab    ALPRAZolam (XANAX) 1 MG tablet Take 1 mg by mouth Daily. EVERY OTHER DAY    aluminum & magnesium hydroxide-simethicone (MYLANTA MAX STRENGTH) 400-400-40 mg/5 mL suspension TAKE 15ML BY MOUTH FOUR TIMES A DAY AS NEEDED FOR STOMACH ACID    aspirin (ECOTRIN) 81 MG EC tablet Take 1 tablet (81 mg total) by mouth once daily.    atorvastatin (LIPITOR) 80 MG tablet TAKE ONE-HALF TABLET BY MOUTH EVERY DAY FOR CHOLESTEROL    carvediloL (COREG) 12.5 MG tablet Take 1 tablet (12.5 mg total) by mouth 2 (two)  times daily.    clopidogreL (PLAVIX) 75 mg tablet Take 1 tablet (75 mg total) by mouth once daily.    diclofenac sodium (VOLTAREN) 1 % Gel APPLY 2 GRAMS TOPICALLY FOUR TIMES A DAY AS NEEDED FOR PAIN AND INFLAMMATION. USE ENCLOSED DOSING CARD.    diphenhydrAMINE (SOMINEX) 25 mg tablet Take 25 mg by mouth nightly as needed for Insomnia.    docusate sodium (COLACE) 100 MG capsule Take 1 capsule (100 mg total) by mouth 2 (two) times daily.    esomeprazole (NEXIUM) 40 MG capsule 40 mg.    famotidine (PEPCID) 40 MG tablet TAKE ONE TABLET BY MOUTH AT BEDTIME FOR ACID REFLUX    flunisolide 25 mcg, 0.025%, (NASALIDE) 25 mcg (0.025 %) Spry 2 sprays by Nasal route as needed.     gabapentin (NEURONTIN) 600 MG tablet Take 1 tablet (600 mg total) by mouth 3 (three) times daily.    hydrocortisone 2.5 % cream Apply topically 2 (two) times daily as needed. Apply to affected areas of the face and neck.    hydrocortisone-pramoxine (PROCTOFOAM-HS) rectal foam INSERT 1 APPLICATORFUL INTO RECTALLY TWICE A DAY FOR HEMORRHOIDS    hydrOXYzine HCL (ATARAX) 25 MG tablet Take 25 mg by mouth 3 (three) times daily as needed for Itching.    LIDOcaine (LIDODERM) 5 % APPLY 1 PATCH TOPICALLY EVERY DAY FOR PAIN. WEAR FOR 12 HOURS, THEN REMOVE. DO NOT APPLY NEW PATCH FOR AT LEAST 12 HOURS.    losartan (COZAAR) 50 MG tablet Take 1 tablet (50 mg total) by mouth once daily.    methyl salicylate-menthol 15-10% 15-10 % Crea Apply topically as needed.     multivitamin (THERAGRAN) per tablet Take 1 tablet by mouth once daily.    oxyCODONE-acetaminophen (PERCOCET)  mg per tablet Take 1 tablet by mouth every 8 (eight) hours as needed for Pain.    pantoprazole (PROTONIX) 40 MG tablet Take 40 mg by mouth 2 (two) times daily.     sertraline (ZOLOFT) 100 MG tablet TAKE TWO TABLETS BY MOUTH EVERY DAY FOR MENTAL HEALTH DOSE INCREASED TO 200MG/DAY    simethicone (MYLICON) 80 MG chewable tablet Take 80 mg by mouth every 6 (six) hours as needed  "for Flatulence.    sucralfate (CARAFATE) 100 mg/mL suspension Take 1 g by mouth 4 (four) times daily.     TRELEGY ELLIPTA 200-62.5-25 mcg inhaler INHALE 1 PUFF INTO THE LUNGS EVERY DAY    triamcinolone acetonide 0.1% (KENALOG) 0.1 % cream Apply topically 2 (two) times daily.     Family History       Problem Relation (Age of Onset)    Heart attack Sister, Brother    Heart disease Mother, Father          Tobacco Use    Smoking status: Former     Packs/day: 2.00     Years: 8.00     Pack years: 16.00     Types: Cigarettes     Quit date: 8/24/2012     Years since quitting: 10.8    Smokeless tobacco: Never   Substance and Sexual Activity    Alcohol use: Never     Comment: Sober since 08/24/2012    Drug use: Not Currently     Types: "Crack" cocaine, Marijuana     Comment: Quit in 2012    Sexual activity: Yes     Review of Systems   All other systems reviewed and are negative.  Objective:     Vital Signs (Most Recent):  Temp: 97.8 °F (36.6 °C) (07/04/23 1205)  Pulse: 88 (07/04/23 1205)  Resp: 18 (07/04/23 1241)  BP: 138/81 (07/04/23 1205)  SpO2: 97 % (07/04/23 1205) Vital Signs (24h Range):  Temp:  [97.8 °F (36.6 °C)-98.9 °F (37.2 °C)] 97.8 °F (36.6 °C)  Pulse:  [74-88] 88  Resp:  [16-30] 18  SpO2:  [95 %-100 %] 97 %  BP: (112-153)/(56-87) 138/81     Weight: 115 kg (253 lb 8.5 oz) (7/3/23)  Body mass index is 38.55 kg/m².     Physical Exam  Constitutional:       General: He is not in acute distress.     Appearance: Normal appearance. He is obese.   Cardiovascular:      Rate and Rhythm: Normal rate and regular rhythm.      Heart sounds: No murmur heard.  Pulmonary:      Effort: Pulmonary effort is normal. No respiratory distress.      Breath sounds: Normal breath sounds. No wheezing.   Abdominal:      General: There is no distension.      Palpations: Abdomen is soft.      Tenderness: There is no abdominal tenderness.   Skin:     Comments: Low back surgical dressing mild saturation, no breakdown  2 drains in place "   Neurological:      General: No focal deficit present.      Mental Status: He is alert and oriented to person, place, and time.              Significant Labs: All pertinent labs within the past 24 hours have been reviewed.  CBC:   Recent Labs   Lab 07/05/23 0431   WBC 5.86   HGB 9.6*   HCT 29.5*        CMP:   Recent Labs   Lab 07/05/23 0431      K 4.3      CO2 23   GLU 96   BUN 22   CREATININE 1.9*   CALCIUM 9.1   ANIONGAP 10       Significant Imaging: I have reviewed all pertinent imaging results/findings within the past 24 hours.    SURG FL Surgery Fluoro Usage   Final Result      X-Ray Lumbar Spine Ap And Lateral   Final Result      Fluoroscopic guidance utilized for lumbosacral fusion and discectomy.  Please see procedural dictation for further details.         Electronically signed by: Smith Adams   Date:    07/05/2023   Time:    11:39      MRI Lumbar Spine W WO Cont   Final Result      Patient is status post posterior interbody lumbar fusion from L4 to S1.  Orthopedic hardware appears intact.  Bilateral laminectomies from L4-S1.      Large subcutaneous fluid collection involving the postoperative bed measuring approximately 11.6 x 28.9 may reflect seroma or abscess or meningocele Additional subcutaneous fluid collection in the region of the laminectomy is multiloculated measuring approximately 2.2 x 14 mm each.  This could relate to abscess, seroma, or meningocele.      Metallic interbody spacer at L5-S1 appears more anterior than L4-L5.  Correlate clinically.      Recommend clinical correlation and follow-up.         Electronically signed by: Justus Sood   Date:    07/04/2023   Time:    00:13      X-Ray Chest AP Portable   Final Result      1. The lungs are clear.   2. There is mild cardiomegaly.   3. There are surgical changes associated with a prior sternotomy.   .         Electronically signed by: Sancho Allen MD   Date:    07/03/2023   Time:    11:11       Lower Extremity  Veins Right   Final Result      No evidence of deep venous thrombosis in the right lower extremity.         Electronically signed by: Homer Faulkner   Date:    07/03/2023   Time:    11:02      CT Lumbar Spine Without Contrast   Final Result      Postoperative changes related to a posterior lumbar interbody fusion from L4-S1 with bilateral laminectomies.  Postoperative changes suspected involving the posterior paraspinal musculature.  No definite abnormal postoperative fluid collections.      All CT scans at this facility are performed  using dose modulation techniques as appropriate to performed exam including the following:  automated exposure control; adjustment of mA and/or kV according to the patients size (this includes techniques or standardized protocols for targeted exams where dose is matched to indication/reason for exam: i.e. extremities or head);  iterative reconstruction technique.         Electronically signed by: Chris Villalba MD   Date:    07/03/2023   Time:    11:31      CT Head Without Contrast   Final Result      1. There is no evidence of an acute ischemic event. There is encephalomalacia in the right occipital lobe   2. There is no intracranial hemorrhage.   All CT scans at this facility use dose modulation, iterative reconstruction, and/or weight base dosing when appropriate to reduce radiation dose when appropriate to reduce radiation dose to as low as reasonably achievable.         Electronically signed by: Sancho Allen MD   Date:    07/03/2023   Time:    10:50              Assessment/Plan:      * Post-operative wound abscess  Patient with recent lumbar fusion L4-S1 (5/17/23)  Presents with RLE weakness, pain  MRI revealed two fluid collections l-spine, abscess versus seroma versus meningocele   US RLE negative for DVT  Blood cultures pending    S/p washout and antibiotic bead placement today (7/5/23)  Operative cultures obtained  ID consulted  Continue vancomycin  Multimodal pain  control  Hold ASA/Plavix  Neurosurgery following    CKD (chronic kidney disease) stage 3, GFR 30-59 ml/min  Cr slightly above baseline this AM (1.9)  Cr ranges from 1.4-1.7 over the last year  Will start short course of post-op IV fluids  Strict I/O's  Avoid nephrotoxins  AM labs    Cardiomyopathy  Cuvolemic at exam  Continue BB  Monitor fluid status closely, strict I/O     Echo    Result Date: 9/12/2022  · The left ventricle is normal in size with normal systolic function.  · The estimated ejection fraction is 65%.  · Grade I left ventricular diastolic dysfunction.  · Normal right ventricular size with normal right ventricular systolic   function.  · There is a bioprosthetic aortic valve present. There is mild   paravalvular aortic insufficiency present.  · The aortic valve mean gradient is 14 mmHg with a dimensionless index of   0.57.  · Mild tricuspid regurgitation.  · The estimated PA systolic pressure is 33 mmHg.           S/P AVR (aortic valve replacement) biopresthetic miller 25 mm in 2014         CAD (coronary artery disease)  Holding asa/plavix at this time  S/p washout today, restart when okay per Neurosurgery  Continue statin    Hypertension  BP controled, continue BB (carvedilol)  Holding home ARB at this time (losartan)    GERD (gastroesophageal reflux disease)  PPI     Moderate persistent asthma without complication  Presented with dyspnea to ED, US RLE negative for DVT, o2 sats stable on room air, not tachycardiac, low concern for PE  No wheezing on exam   Continue Pulmicort, wife may bring home trelegy inhaler  Duonebs PRN    CHEO on CPAP  Continue cpap nightly     Class 2 severe obesity due to excess calories with serious comorbidity and body mass index (BMI) of 38.0 to 38.9 in adult  Body mass index is 37.48 kg/m². Morbid obesity complicates all aspects of disease management from diagnostic modalities to treatment. Weight loss encouraged and health benefits explained to patient.       VTE Risk  Mitigation (From admission, onward)         Ordered     IP VTE HIGH RISK PATIENT  Once         07/05/23 1302     Place sequential compression device  Until discontinued         07/05/23 1302     Place sequential compression device  Until discontinued         07/05/23 1137     Place sequential compression device  Until discontinued         07/04/23 0900                Discharge Planning   AURELIA:      Code Status: Full Code   Is the patient medically ready for discharge?:     Reason for patient still in hospital (select all that apply): Patient trending condition, Laboratory test, Treatment, Consult recommendations and PT / OT recommendations                     Lino Khan MD  Department of Hospital Medicine   [facility] - Telemetry ([facility])

## 2023-07-05 NOTE — ASSESSMENT & PLAN NOTE
Body mass index is 37.48 kg/m². Morbid obesity complicates all aspects of disease management from diagnostic modalities to treatment. Weight loss encouraged and health benefits explained to patient.

## 2023-07-05 NOTE — PT/OT/SLP PROGRESS
Occupational Therapy      Patient Name:  Abdullahi Urias   MRN:  [id number]    OT eval orders received. Chart review completed. Patient to OR for wound washout. Will follow up post operatively.    Whitney Reyna OT    7/5/2023  [id number]

## 2023-07-05 NOTE — ASSESSMENT & PLAN NOTE
Cuvolemic at exam  Continue BB  Monitor fluid status closely, strict I/O     Echo    Result Date: 9/12/2022  · The left ventricle is normal in size with normal systolic function.  · The estimated ejection fraction is 65%.  · Grade I left ventricular diastolic dysfunction.  · Normal right ventricular size with normal right ventricular systolic   function.  · There is a bioprosthetic aortic valve present. There is mild   paravalvular aortic insufficiency present.  · The aortic valve mean gradient is 14 mmHg with a dimensionless index of   0.57.  · Mild tricuspid regurgitation.  · The estimated PA systolic pressure is 33 mmHg.

## 2023-07-05 NOTE — ASSESSMENT & PLAN NOTE
Presented with dyspnea to ED, US RLE negative for DVT, o2 sats stable on room air, not tachycardiac, low concern for PE  No wheezing on exam   Continue Pulmicort, wife may bring home trelegy inhaler  Duonebs PRN

## 2023-07-05 NOTE — TRANSFER OF CARE
Anesthesia Transfer of Care Note    Patient: Abdullahi Urias    Procedure(s) Performed: Procedure(s) (LRB):  EXPLORATION, WOUND (Bilateral)  WASHOUT (N/A)  REMOVAL, HARDWARE, SPINE (N/A)  INSERTION (N/A)  REPLACEMENT (N/A)    Patient location: PACU    Anesthesia Type: general    Transport from OR: Transported from OR on room air with adequate spontaneous ventilation    Post pain: adequate analgesia    Post assessment: no apparent anesthetic complications and tolerated procedure well    Post vital signs: stable    Level of consciousness: awake    Nausea/Vomiting: no nausea/vomiting    Complications: none    Transfer of care protocol was followed      Last vitals:   Visit Vitals  /63   Pulse 78   Temp 36.6 °C (97.9 °F)   Resp 17   Wt 115 kg (253 lb 8.5 oz)   SpO2 (!) 92%   BMI 38.55 kg/m²

## 2023-07-05 NOTE — PT/OT/SLP EVAL
"Physical Therapy Evaluation    Patient Name:  Abdullahi Urias   MRN:  [id number]    Recommendations:     Discharge Recommendations: home health PT (with A as needed)   Discharge Equipment Recommendations: walker, rolling   Barriers to discharge: None    Assessment:     Abdullahi Urias is a 67 y.o. male admitted with a medical diagnosis of Post-operative wound abscess.  He presents with the following impairments/functional limitations: weakness, impaired endurance, impaired functional mobility, gait instability, impaired balance, pain, decreased safety awareness, decreased lower extremity function, decreased coordination.    Rehab Prognosis: Good; patient would benefit from acute skilled PT services to address these deficits and reach maximum level of function.    Recent Surgery: Procedure(s) (LRB):  EXPLORATION, WOUND (Bilateral)  WASHOUT (N/A)  REMOVAL, HARDWARE, SPINE (N/A)  INSERTION (N/A)  REPLACEMENT (N/A) Day of Surgery    Plan:     During this hospitalization, patient to be seen 3 x/week to address the identified rehab impairments via gait training, therapeutic activities, therapeutic exercises and progress toward the following goals:    Plan of Care Expires:  07/19/23    Subjective     Chief Complaint: Pt is feeling much better today.  Patient/Family Comments/goals: none stated  Pain/Comfort:  Pain Rating 1: 7/10  Location - Side 1: Bilateral  Location - Orientation 1: generalized  Location 1: back  Pain Addressed 1: Reposition, Distraction, Cessation of Activity  Pain Rating Post-Intervention 1: 7/10  Pain Rating 2: 2/10 ("Soreness")  Location - Side 2: Right  Location - Orientation 2: generalized  Location 2: hip  Pain Addressed 2: Reposition, Distraction, Cessation of Activity  Pain Rating Post-Intervention 2: 2/10    Patients cultural, spiritual, Sabianism conflicts given the current situation: no    Living Environment:  Pt reports living with his wife in a 1 story home, threshold to enter, wife able to provide " assistance.  Prior to admission, patient required assistance with bathing and dressing, household ambulation using SPC or Rolator, driving, retired.  Equipment used at home: cane, straight, rollator.  DME owned (not currently used): none.  Upon discharge, patient will have assistance from WIFE.    Objective:     Communicated with nurse Agosto prior to session.  Patient found left sidelying with peripheral IV, hemovac, telemetry, bed alarm  upon PT entry to room.    General Precautions: Standard, fall  Orthopedic Precautions:spinal precautions (Lumbar)   Braces: N/A  Respiratory Status: Room air    Exams:  Cognitive Exam:  Patient is oriented to Person, Place, Time, and Situation  Sensation:    -       Intact  Skin Integrity/Edema:      -       Skin integrity: Visible skin intact  RLE ROM: WFL  RLE Strength: Grossly 4/5  LLE ROM: WFL  LLE Strength: Grossly 4/5    Functional Mobility:  Bed Mobility:     Rolling Right: stand by assistance  Scooting: stand by assistance  Supine to Sit: stand by assistance, educated on log roll technique  Transfers:     Sit to Stand:  contact guard assistance with rolling walker  Bed to Chair: contact guard assistance with  rolling walker  using  Step Transfer  Gait: Ambulated 220ft CGA using RW, verbal and tactile cuing for RW management, pt veered to R, able to correct with cuing, dizziness upon standing that improved with time  Balance: Demonstrated good sitting balance, fair dynamic balance during gait.    AM-PAC 6 CLICK MOBILITY  Total Score:18     Treatment & Education:  Pt educated on role of PT in acute care and POC. Educated on lumbar spinal precautions due to previous surgery. Educated on importance of OOB activities and HEP (hip flex, LAQ, ham curls, ankle pumps) in order to maintain/regain strength.  Educated on proper use of RW for safety and to reduce risk of falling. Educated on increased risk of falling due to weakness, instructed to utilize call bell for assistance for all  transfers. Pt agreeable to all requests.    Patient left up in chair with all lines intact, call button in reach, chair alarm on, nurse notified, and family present.    GOALS:   Multidisciplinary Problems       Physical Therapy Goals          Problem: Physical Therapy    Goal Priority Disciplines Outcome Goal Variances Interventions   Physical Therapy Goal     PT, PT/OT      Description: Goals to be met by 7/19/23.  Pt will complete bed mobility MOD I.  Pt will complete sit to stand MOD I.  Pt will ambulate 200ft MOD I using RW.                       History:     Past Medical History:   Diagnosis Date    Aortic stenosis     [facility]    Asthma     BPH (benign prostatic hyperplasia)     CAD (coronary artery disease)     Cardiomyopathy     CHF (congestive heart failure)     Chronic hoarseness     vocal cord surg    Chronic pain     CKD (chronic kidney disease) stage 3, GFR 30-59 ml/min     CVA (cerebral infarction)     8/2012 olol; reviewed ed note    Ex-smoker     GERD (gastroesophageal reflux disease)     Hepatitis C     treatedharvoni says cured, RNA NEG 6/2020    Hypertension     Pancreatitis     Prostate cancer     Prostate cancer     Prostate cancer     PVD (peripheral vascular disease)     Renal insufficiency     Substance abuse     cocaine, etoh , tob in past     Past Surgical History:   Procedure Laterality Date    AORTIC VALVE REPLACEMENT  05/19/2014    Tissue valve replacement    BACK SURGERY      CARDIAC CATHETERIZATION      COLONOSCOPY  2011    COLONOSCOPY N/A 2/24/2021    Procedure: COLONOSCOPY;  Surgeon: Faith Carrillo MD;  Location: [facility];  Service: Endoscopy;  Laterality: N/A;    EPIDURAL STEROID INJECTION INTO CERVICAL SPINE N/A 6/21/2022    Procedure: C7-T1 IL PIEDAD;  Surgeon: Nehemiah Carmen MD;  Location: [facility];  Service: Pain Management;  Laterality: N/A;    ESOPHAGOGASTRODUODENOSCOPY N/A 2/24/2021    Procedure: ESOPHAGOGASTRODUODENOSCOPY (EGD);  Surgeon: Faith MUNGUIA  MD Gerardo;  Location: [facility] ENDO;  Service: Endoscopy;  Laterality: N/A;    FOOT SURGERY      LEFT HEART CATHETERIZATION Left 7/24/2020    Procedure: CATHETERIZATION, HEART, LEFT;  Surgeon: Pasha Martin MD;  Location: [facility] CATH LAB;  Service: Cardiology;  Laterality: Left;    PENILE PROSTHESIS IMPLANT      PENILE PROSTHESIS REVISION  04/30/2018    PROSTATECTOMY  09/2019    [facility]    radiation for prostate      TRANSFORAMINAL EPIDURAL INJECTION OF STEROID Right 10/20/2022    Procedure: Right  L4/5 + L5/S1 TF PIEDAD RN IV Sedation;  Surgeon: Nehemiah Carmen MD;  Location: [facility] PAIN MGT;  Service: Pain Management;  Laterality: Right;    TRANSFORAMINAL LUMBAR INTERBODY FUSION (TLIF) USING COMPUTER-ASSISTED NAVIGATION Bilateral 5/17/2023    Procedure: FUSION, SPINE, LUMBAR, TLIF, USING COMPUTER-ASSISTED NAVIGATION;  Surgeon: Brandon Valencia MD;  Location: [facility] OR;  Service: Neurosurgery;  Laterality: Bilateral;  2 level lumbar fusion at L4-5 and L5-S1    UPPER GASTROINTESTINAL ENDOSCOPY  2011     Time Tracking:     PT Received On: 07/05/23  PT Start Time: 1600     PT Stop Time: 1625  PT Total Time (min): 25 min     Billable Minutes: Evaluation 10min and Gait Training 15min      07/05/2023

## 2023-07-05 NOTE — HOSPITAL COURSE
"Over the course of his hospital stay from 7/5/23 to 7/13/23, Mr. Urias's postoperative pain was managed effectively. He also experienced some indigestion and constipation, which were addressed. Blood cultures were ordered and antibiotics were withheld initially due to absence of fever and normal WBC count. The patient was started on vancomycin with Neurosurgery and Infectious Disease teams involved in his management.    On 7/10/23, his lumbar hemovac drain was removed and a long-term plan was established for IV antibiotics, with a PICC line ordered for this purpose. He was prescribed IV daptomycin at 700mg daily and IV Zosyn 4.5gram every 8 hours, to be continued for 6 weeks. Weekly monitoring of his CPK, ESR, CMP, and CBC was planned, along with follow-ups in the ID clinic.    As of 7/11/23, approval for [facility] was [facility], as coordinated by case management. On 7/12/23, IV Zosyn was switched to IV Cefepime BID dosing. The patient was discharged on 7/13/23 with home health and plans for outpatient parenteral antibiotic therapy. Infusion provider is [facility]. Wife taught and meds delivered the evening prior to discharge. Patient and family acknowledged understanding and agreed the aforementioned plan.    /80 (BP Location: Right arm, Patient Position: Sitting)   Pulse 86   Temp 98.8 °F (37.1 °C) (Oral)   Resp 18   Ht 5' 8" (1.727 m)   Wt 114.9 kg (253 lb 4.9 oz)   SpO2 99%   BMI 38.52 kg/m²     Physical Exam  Constitutional:       General: He is not in acute distress.     Appearance: Normal appearance. He is obese.   Cardiovascular:      Rate and Rhythm: Normal rate and regular rhythm.      Heart sounds: No murmur heard.  Pulmonary:      Effort: Pulmonary effort is normal. No respiratory distress.      Breath sounds: Normal breath sounds. No wheezing.   Abdominal:      General: There is no distension.      Palpations: Abdomen is soft.      Tenderness: There is no " abdominal tenderness.   Musculoskeletal:      Comments: LUE PICC line in place   Neurological:      Mental Status: He is alert.

## 2023-07-05 NOTE — PT/OT/SLP PROGRESS
Physical Therapy      Patient Name:  Abdullahi Urias   MRN:  [id number]    PT eval orders received. Chart review completed. Patient to OR for wound washout. Will follow up post operatively.    Geraldine Mcclellan, PT  7/5/2023  [id number]

## 2023-07-05 NOTE — PLAN OF CARE
PT EVAL complete. Pt required SBA for bed mobility, ambulated 200ft CGA using RW, verbal cuing to keep a straight path. Recommending HHPT with A as needed.

## 2023-07-05 NOTE — NURSING TRANSFER
Nursing Transfer Note      7/5/2023     Reason patient is being transferred: observation    Transfer From: PACU    Transfer via stretcher    Transfer with cardiac monitoring    Transported by Koby     Chart send with patient: Yes    Patient reassessed at: 07/05/2023, 1311 (date, time)    Upon arrival to floor: cardiac monitor applied, patient oriented to room, call bell in reach, and bed in lowest position

## 2023-07-06 LAB
ANION GAP SERPL CALC-SCNC: 14 MMOL/L (ref 8–16)
B2 TRANSFERRIN FLD QL: NEGATIVE
BASOPHILS # BLD AUTO: 0.01 K/UL (ref 0–0.2)
BASOPHILS NFR BLD: 0.1 % (ref 0–1.9)
BUN SERPL-MCNC: 22 MG/DL (ref 8–23)
CALCIUM SERPL-MCNC: 9.2 MG/DL (ref 8.7–10.5)
CHLORIDE SERPL-SCNC: 105 MMOL/L (ref 95–110)
CO2 SERPL-SCNC: 19 MMOL/L (ref 23–29)
CREAT SERPL-MCNC: 1.6 MG/DL (ref 0.5–1.4)
DIFFERENTIAL METHOD: ABNORMAL
EOSINOPHIL # BLD AUTO: 0 K/UL (ref 0–0.5)
EOSINOPHIL NFR BLD: 0 % (ref 0–8)
ERYTHROCYTE [DISTWIDTH] IN BLOOD BY AUTOMATED COUNT: 13.8 % (ref 11.5–14.5)
EST. GFR  (NO RACE VARIABLE): 47 ML/MIN/1.73 M^2
GLUCOSE SERPL-MCNC: 138 MG/DL (ref 70–110)
HCT VFR BLD AUTO: 25.8 % (ref 40–54)
HGB BLD-MCNC: 8.3 G/DL (ref 14–18)
IMM GRANULOCYTES # BLD AUTO: 0.03 K/UL (ref 0–0.04)
IMM GRANULOCYTES NFR BLD AUTO: 0.4 % (ref 0–0.5)
LYMPHOCYTES # BLD AUTO: 0.6 K/UL (ref 1–4.8)
LYMPHOCYTES NFR BLD: 6.7 % (ref 18–48)
MCH RBC QN AUTO: 25.8 PG (ref 27–31)
MCHC RBC AUTO-ENTMCNC: 32.2 G/DL (ref 32–36)
MCV RBC AUTO: 80 FL (ref 82–98)
MONOCYTES # BLD AUTO: 0.2 K/UL (ref 0.3–1)
MONOCYTES NFR BLD: 2.9 % (ref 4–15)
NEUTROPHILS # BLD AUTO: 7.5 K/UL (ref 1.8–7.7)
NEUTROPHILS NFR BLD: 89.9 % (ref 38–73)
NRBC BLD-RTO: 0 /100 WBC
PLATELET # BLD AUTO: 206 K/UL (ref 150–450)
PMV BLD AUTO: 10.1 FL (ref 9.2–12.9)
POTASSIUM SERPL-SCNC: 4.8 MMOL/L (ref 3.5–5.1)
RBC # BLD AUTO: 3.22 M/UL (ref 4.6–6.2)
SODIUM SERPL-SCNC: 138 MMOL/L (ref 136–145)
VANCOMYCIN SERPL-MCNC: 12.8 UG/ML
WBC # BLD AUTO: 8.33 K/UL (ref 3.9–12.7)

## 2023-07-06 PROCEDURE — G0378 HOSPITAL OBSERVATION PER HR: HCPCS | Mod: HCNC

## 2023-07-06 PROCEDURE — 63600175 PHARM REV CODE 636 W HCPCS: Mod: HCNC

## 2023-07-06 PROCEDURE — 97530 THERAPEUTIC ACTIVITIES: CPT | Mod: HCNC

## 2023-07-06 PROCEDURE — 25000003 PHARM REV CODE 250: Mod: HCNC | Performed by: HOSPITALIST

## 2023-07-06 PROCEDURE — 25000003 PHARM REV CODE 250: Mod: HCNC | Performed by: PHYSICIAN ASSISTANT

## 2023-07-06 PROCEDURE — 99900035 HC TECH TIME PER 15 MIN (STAT): Mod: HCNC

## 2023-07-06 PROCEDURE — 36415 COLL VENOUS BLD VENIPUNCTURE: CPT | Mod: HCNC | Performed by: HOSPITALIST

## 2023-07-06 PROCEDURE — 96376 TX/PRO/DX INJ SAME DRUG ADON: CPT

## 2023-07-06 PROCEDURE — 97166 OT EVAL MOD COMPLEX 45 MIN: CPT | Mod: HCNC

## 2023-07-06 PROCEDURE — 94761 N-INVAS EAR/PLS OXIMETRY MLT: CPT | Mod: HCNC

## 2023-07-06 PROCEDURE — 94799 UNLISTED PULMONARY SVC/PX: CPT | Mod: HCNC

## 2023-07-06 PROCEDURE — 63600175 PHARM REV CODE 636 W HCPCS: Mod: HCNC | Performed by: NEUROLOGICAL SURGERY

## 2023-07-06 PROCEDURE — 36415 COLL VENOUS BLD VENIPUNCTURE: CPT | Mod: HCNC | Performed by: PHYSICIAN ASSISTANT

## 2023-07-06 PROCEDURE — 80202 ASSAY OF VANCOMYCIN: CPT | Mod: HCNC | Performed by: HOSPITALIST

## 2023-07-06 PROCEDURE — 80048 BASIC METABOLIC PNL TOTAL CA: CPT | Mod: HCNC | Performed by: PHYSICIAN ASSISTANT

## 2023-07-06 PROCEDURE — 94640 AIRWAY INHALATION TREATMENT: CPT | Mod: HCNC

## 2023-07-06 PROCEDURE — 25000242 PHARM REV CODE 250 ALT 637 W/ HCPCS: Mod: HCNC | Performed by: PHYSICIAN ASSISTANT

## 2023-07-06 PROCEDURE — 97116 GAIT TRAINING THERAPY: CPT | Mod: HCNC

## 2023-07-06 PROCEDURE — 63600175 PHARM REV CODE 636 W HCPCS: Mod: HCNC | Performed by: PHYSICIAN ASSISTANT

## 2023-07-06 PROCEDURE — 85025 COMPLETE CBC W/AUTO DIFF WBC: CPT | Mod: HCNC | Performed by: PHYSICIAN ASSISTANT

## 2023-07-06 RX ORDER — CALCIUM CARBONATE 200(500)MG
500 TABLET,CHEWABLE ORAL 3 TIMES DAILY PRN
Status: DISCONTINUED | OUTPATIENT
Start: 2023-07-06 | End: 2023-07-13 | Stop reason: HOSPADM

## 2023-07-06 RX ORDER — ENOXAPARIN SODIUM 100 MG/ML
40 INJECTION SUBCUTANEOUS EVERY 24 HOURS
Status: DISCONTINUED | OUTPATIENT
Start: 2023-07-07 | End: 2023-07-13 | Stop reason: HOSPADM

## 2023-07-06 RX ORDER — DEXAMETHASONE SODIUM PHOSPHATE 4 MG/ML
2 INJECTION, SOLUTION INTRA-ARTICULAR; INTRALESIONAL; INTRAMUSCULAR; INTRAVENOUS; SOFT TISSUE EVERY 12 HOURS
Status: DISCONTINUED | OUTPATIENT
Start: 2023-07-06 | End: 2023-07-08

## 2023-07-06 RX ORDER — PANTOPRAZOLE SODIUM 40 MG/1
40 TABLET, DELAYED RELEASE ORAL 2 TIMES DAILY
Status: DISCONTINUED | OUTPATIENT
Start: 2023-07-06 | End: 2023-07-13 | Stop reason: HOSPADM

## 2023-07-06 RX ADMIN — METHOCARBAMOL 500 MG: 500 TABLET ORAL at 08:07

## 2023-07-06 RX ADMIN — DEXAMETHASONE SODIUM PHOSPHATE 4 MG: 4 INJECTION INTRA-ARTICULAR; INTRALESIONAL; INTRAMUSCULAR; INTRAVENOUS; SOFT TISSUE at 12:07

## 2023-07-06 RX ADMIN — OXYCODONE AND ACETAMINOPHEN 1 TABLET: 10; 325 TABLET ORAL at 11:07

## 2023-07-06 RX ADMIN — GABAPENTIN 600 MG: 300 CAPSULE ORAL at 03:07

## 2023-07-06 RX ADMIN — BUDESONIDE INHALATION 0.5 MG: 0.5 SUSPENSION RESPIRATORY (INHALATION) at 08:07

## 2023-07-06 RX ADMIN — ALUMINA, MAGNESIA, AND SIMETHICONE ORAL SUSPENSION REGULAR STRENGTH 30 ML: 1200; 1200; 120 SUSPENSION ORAL at 03:07

## 2023-07-06 RX ADMIN — ALPRAZOLAM 0.5 MG: 0.5 TABLET ORAL at 11:07

## 2023-07-06 RX ADMIN — ALPRAZOLAM 0.5 MG: 0.5 TABLET ORAL at 12:07

## 2023-07-06 RX ADMIN — BUDESONIDE INHALATION 0.5 MG: 0.5 SUSPENSION RESPIRATORY (INHALATION) at 07:07

## 2023-07-06 RX ADMIN — PANTOPRAZOLE SODIUM 40 MG: 40 TABLET, DELAYED RELEASE ORAL at 08:07

## 2023-07-06 RX ADMIN — METHOCARBAMOL 500 MG: 500 TABLET ORAL at 04:07

## 2023-07-06 RX ADMIN — CARVEDILOL 12.5 MG: 12.5 TABLET, FILM COATED ORAL at 08:07

## 2023-07-06 RX ADMIN — GABAPENTIN 600 MG: 300 CAPSULE ORAL at 08:07

## 2023-07-06 RX ADMIN — HYDROMORPHONE HYDROCHLORIDE 2 MG: 1 INJECTION, SOLUTION INTRAMUSCULAR; INTRAVENOUS; SUBCUTANEOUS at 01:07

## 2023-07-06 RX ADMIN — OXYCODONE AND ACETAMINOPHEN 1 TABLET: 10; 325 TABLET ORAL at 05:07

## 2023-07-06 RX ADMIN — ALUMINA, MAGNESIA, AND SIMETHICONE ORAL SUSPENSION REGULAR STRENGTH 30 ML: 1200; 1200; 120 SUSPENSION ORAL at 11:07

## 2023-07-06 RX ADMIN — ATORVASTATIN CALCIUM 80 MG: 40 TABLET, FILM COATED ORAL at 08:07

## 2023-07-06 RX ADMIN — CALCIUM CARBONATE (ANTACID) CHEW TAB 500 MG 500 MG: 500 CHEW TAB at 12:07

## 2023-07-06 RX ADMIN — HYDROMORPHONE HYDROCHLORIDE 2 MG: 1 INJECTION, SOLUTION INTRAMUSCULAR; INTRAVENOUS; SUBCUTANEOUS at 11:07

## 2023-07-06 RX ADMIN — OXYCODONE AND ACETAMINOPHEN 1 TABLET: 10; 325 TABLET ORAL at 08:07

## 2023-07-06 RX ADMIN — OXYCODONE AND ACETAMINOPHEN 1 TABLET: 7.5; 325 TABLET ORAL at 08:07

## 2023-07-06 RX ADMIN — OXYCODONE AND ACETAMINOPHEN 1 TABLET: 10; 325 TABLET ORAL at 03:07

## 2023-07-06 RX ADMIN — HYDROMORPHONE HYDROCHLORIDE 2 MG: 1 INJECTION, SOLUTION INTRAMUSCULAR; INTRAVENOUS; SUBCUTANEOUS at 07:07

## 2023-07-06 RX ADMIN — DEXAMETHASONE SODIUM PHOSPHATE 4 MG: 4 INJECTION INTRA-ARTICULAR; INTRALESIONAL; INTRAMUSCULAR; INTRAVENOUS; SOFT TISSUE at 05:07

## 2023-07-06 RX ADMIN — DEXAMETHASONE SODIUM PHOSPHATE 2 MG: 4 INJECTION INTRA-ARTICULAR; INTRALESIONAL; INTRAMUSCULAR; INTRAVENOUS; SOFT TISSUE at 08:07

## 2023-07-06 RX ADMIN — SERTRALINE HYDROCHLORIDE 100 MG: 50 TABLET ORAL at 08:07

## 2023-07-06 RX ADMIN — METHOCARBAMOL 500 MG: 500 TABLET ORAL at 12:07

## 2023-07-06 NOTE — PROGRESS NOTES
[facility] - Telemetry ([facility])  Neurosurgery  Progress Note    Subjective:     Interval History:   The patient was seen earlier this morning.   He reports he is doing better-moving RLE more and has decreased leg pain.  No acute events overnight.  Denies HA, chest pain, dizziness.  Has been able to urinate several times.  + Flatus  Two hemovac drains are in place. 105 mL output  Has been uncomfortable due to acid reflux. Reports vomiting twice.  Pt does have pain at surgical site , which is expected.    History of Present Illness: See H&P.    Post-Op Info:  Procedure(s) (LRB):  EXPLORATION, WOUND (Bilateral)  WASHOUT (N/A)  REMOVAL, HARDWARE, SPINE (N/A)  INSERTION (N/A)  REPLACEMENT (N/A)   1 Day Post-Op      Medications:  Continuous Infusions:  Scheduled Meds:   atorvastatin  80 mg Oral QHS    budesonide  0.5 mg Nebulization Q12H    carvediloL  12.5 mg Oral BID    dexAMETHasone  2 mg Intravenous Q12H    gabapentin  600 mg Oral TID    methocarbamoL  500 mg Oral QID    pantoprazole  40 mg Oral Daily    sertraline  100 mg Oral Daily     PRN Meds:acetaminophen, albuterol-ipratropium, ALPRAZolam, aluminum-magnesium hydroxide-simethicone, calcium carbonate, dextrose 10%, dextrose 10%, glucagon (human recombinant), glucose, glucose, HYDROmorphone, melatonin, naloxone, ondansetron, oxyCODONE-acetaminophen, oxyCODONE-acetaminophen, prochlorperazine, sodium chloride 0.9%, Pharmacy to dose Vancomycin consult **AND** vancomycin - pharmacy to dose     Review of Systems  Objective:     Weight: 113.8 kg (250 lb 14.1 oz)  Body mass index is 38.15 kg/m².  Vital Signs (Most Recent):  Temp: 98.9 °F (37.2 °C) (07/06/23 1225)  Pulse: 77 (07/06/23 1313)  Resp: 18 (07/06/23 1332)  BP: 126/70 (07/06/23 1225)  SpO2: 100 % (07/06/23 1225) Vital Signs (24h Range):  Temp:  [97.2 °F (36.2 °C)-98.9 °F (37.2 °C)] 98.9 °F (37.2 °C)  Pulse:  [69-88] 77  Resp:  [16-18] 18  SpO2:  [94 %-100 %] 100 %  BP: (103-131)/(60-94) 126/70                       Closed/Suction Drain 07/05/23 1012 Right Back Accordion 10 Fr. (Active)   Site Description Unable to view 07/06/23 1313   Dressing Type CHG impregnated dressing/sponge;Transparent (Tegaderm);Gauze 07/06/23 1313   Dressing Status Clean;Dry;Intact 07/06/23 1313   Dressing Intervention Integrity maintained 07/06/23 1313   Drainage Sanguineous 07/06/23 1313   Status Other (Comment) 07/06/23 1313            Closed/Suction Drain 07/05/23 1013 Left Back Accordion 10 Fr. (Active)   Site Description Unable to view 07/06/23 1313   Dressing Type CHG impregnated dressing/sponge;Transparent (Tegaderm);Gauze 07/06/23 1313   Dressing Status Clean;Dry;Intact 07/06/23 1313   Dressing Intervention Integrity maintained 07/06/23 1313   Drainage Sanguineous 07/06/23 1313   Status Other (Comment) 07/06/23 1313   Output (mL) 70 mL 07/05/23 2015       Neurosurgery Physical Exam  Vitals and labs reviewed  Moves all 4 ext  -- moving RLE better on exam, able to perform DF/PF, hip flexion and leg extension  Incision/Dressing CDI  HV drains intact    Significant Labs:  Recent Labs   Lab 07/05/23  0431 07/06/23  0259   GLU 96 138*    138   K 4.3 4.8    105   CO2 23 19*   BUN 22 22   CREATININE 1.9* 1.6*   CALCIUM 9.1 9.2     Recent Labs   Lab 07/05/23  0431 07/06/23  0259   WBC 5.86 8.33   HGB 9.6* 8.3*   HCT 29.5* 25.8*    206     Recent Labs   Lab 07/04/23  1625   INR 1.1   APTT 25.5     Microbiology Results (last 7 days)       Procedure Component Value Units Date/Time    AFB Culture & Smear [[id number]] Collected: 07/05/23 0851    Order Status: Completed Specimen: Abscess from Back Updated: 07/06/23 1150     AFB CULTURE STAIN No acid fast bacilli seen.    Narrative:      Superficial Culture of Lumbar Spine    AFB Culture & Smear [[id number]] Collected: 07/05/23 0855    Order Status: Completed Specimen: Abscess from Back Updated: 07/06/23 1150     AFB CULTURE STAIN No acid fast bacilli seen.    Narrative:      Deep Culture of  Lumbar Spine    AFB Culture & Smear [[id number]] Collected: 07/05/23 0959    Order Status: Completed Specimen: Abscess from Back Updated: 07/06/23 1150     AFB CULTURE STAIN No acid fast bacilli seen.    Narrative:      Deep #2 culture lumbar spine    Culture, Anaerobe [[id number]] Collected: 07/05/23 0959    Order Status: Completed Specimen: Abscess from Back Updated: 07/06/23 0831     Anaerobic Culture Culture in progress    Narrative:      Deep #2 culture lumbar spine    Culture, Anaerobe [[id number]] Collected: 07/05/23 0851    Order Status: Completed Specimen: Abscess from Back Updated: 07/06/23 0831     Anaerobic Culture Culture in progress    Narrative:      Superficial Culture of Lumbar Spine    Culture, Anaerobe [[id number]] Collected: 07/05/23 0855    Order Status: Completed Specimen: Abscess from Back Updated: 07/06/23 0831     Anaerobic Culture Culture in progress    Narrative:      Deep Culture of Lumbar Spine    Aerobic culture [[id number]] Collected: 07/05/23 0851    Order Status: Completed Specimen: Abscess from Back Updated: 07/06/23 0650     Aerobic Bacterial Culture No growth    Narrative:      Superficial Culture of Lumbar Spine    Aerobic culture [[id number]] Collected: 07/05/23 0855    Order Status: Completed Specimen: Abscess from Back Updated: 07/06/23 0650     Aerobic Bacterial Culture No growth    Narrative:      Deep Culture of Lumbar Spine    Aerobic culture [[id number]] Collected: 07/05/23 0959    Order Status: Completed Specimen: Abscess from Back Updated: 07/06/23 0650     Aerobic Bacterial Culture No growth    Narrative:      Deep #2 culture lumbar spine    Blood culture [[id number]] Collected: 07/04/23 1112    Order Status: Completed Specimen: Blood Updated: 07/05/23 2212     Blood Culture, Routine No Growth to date      No Growth to date    Blood culture [[id number]] Collected: 07/04/23 1111    Order Status: Completed Specimen: Blood Updated: 07/05/23 2212     Blood Culture, Routine No  Growth to date      No Growth to date    Gram stain [[id number]] Collected: 07/05/23 0855    Order Status: Completed Specimen: Abscess from Back Updated: 07/05/23 1643     Gram Stain Result No WBC's      No organisms seen    Narrative:      Deep Culture of Lumbar Spine    Gram stain [[id number]] Collected: 07/05/23 0851    Order Status: Completed Specimen: Abscess from Back Updated: 07/05/23 1640     Gram Stain Result Rare WBC's      No organisms seen    Narrative:      Superficial Culture of Lumbar Spine    Gram stain [[id number]] Collected: 07/05/23 0959    Order Status: Completed Specimen: Abscess from Back Updated: 07/05/23 1639     Gram Stain Result No WBC's      No organisms seen    Narrative:      Deep #2 culture lumbar spine    Fungus culture [[id number]] Collected: 07/05/23 0959    Order Status: Sent Specimen: Abscess from Back Updated: 07/05/23 1517    Fungus culture [[id number]] Collected: 07/05/23 0851    Order Status: Sent Specimen: Abscess from Back Updated: 07/05/23 1515    Fungus culture [[id number]] Collected: 07/05/23 0855    Order Status: Sent Specimen: Abscess from Back Updated: 07/05/23 1514          All pertinent labs from the last 24 hours have been reviewed.  Significant Diagnostics:  I have reviewed all pertinent imaging results/findings within the past 24 hours.    Assessment/Plan:     Active Diagnoses:    Diagnosis Date Noted POA    PRINCIPAL PROBLEM:  Post-operative wound abscess [T81.49XA] 07/04/2023 Yes     Chronic    Moderate persistent asthma without complication [J45.40] 03/07/2023 Yes    PAD (peripheral artery disease) [I73.9]  Yes    GERD (gastroesophageal reflux disease) [K21.9] 02/23/2021 Yes    CKD (chronic kidney disease) stage 3, GFR 30-59 ml/min [N18.30] 06/30/2020 Yes    S/P AVR (aortic valve replacement) biopresthetic miller 25 mm in 2014  [Z95.2] 06/30/2020 Not Applicable    CHEO on CPAP [G47.33, Z99.89] 09/14/2018 Not Applicable    Class 2 severe obesity due to excess calories  with serious comorbidity and body mass index (BMI) of 38.0 to 38.9 in adult [E66.01, Z68.38] 10/18/2016 Not Applicable    CAD (coronary artery disease) [I25.10]  Yes     Chronic    Cardiomyopathy [I42.9]  Yes    Hypertension [I10]  Yes     Chronic      Problems Resolved During this Admission:     POD #1  - Ambulate several times daily  - Participate with PT/OT    -Await final cultures and beta-2 transferrin results. Micro studies reveal NGTD so far.  Will wean steroids (Dexamethasone 2 mg Q12 H).  Restart Lovenox tomorrow.  Will continue to monitor.   Please call with any changes.    Yazmin Farfan PA-C  Neurosurgery  [facility] - Telemetry ([facility]

## 2023-07-06 NOTE — PROGRESS NOTES
[facility] Medicine  Progress Note    Patient Name: Abdullahi Urias  MRN: [id number]  Patient Class: OP- Observation   Admission Date: 7/3/2023  Length of Stay: 0 days  Attending Physician: Lino Khan MD  Primary Care Provider: Reji Valentin MD        Subjective:     Principal Problem:Post-operative wound abscess        HPI:  Mr. Urias is a 67-year-old  male with PMH significant for aortic stenosis with bioprosthetic aortic valve replacement, diastolic CHF, CKD stage 3, HTN, prostate cancer and recent lumbar spine surgery on 5/17 with Dr. Valencia who was sent from neurosurgery office with complaints of RLE pain, weakness and oozing from surgical incision site.  Per patient and wife, he was doing fine till two days ago when he went on a long car ride to [employer] and he started having lower back pain.  Yesterday morning he was able to shower but then was unable to get dressed by himself d/t the pain and new onset right lower extremity weakness.  Complains of pain from right hip all the way down leg.  Symtoms are constant and moderate in severity, no relieving or exacerbating factors.  Pain 101/10.  Associated symptoms are right lower extremity weakness and occassional dyspnea.  Patient denies any CP, leg swelling, fever/chills, urinary or fecal incontinence.   Lab work remarkable for Sed Rate 55, Creatinine 1.6.  MRI showed Large subcutaneous fluid collection involving the postoperative bed measuring approximately 11.6 x 28.9 may reflect seroma or abscess or meningocele Additional subcutaneous fluid collection in the region of the laminectomy is multiloculated measuring approximately 2.2 x 14 mm each.  This could relate to abscess, seroma, or meningocele.   Case was discussed with neurosurgery, ERIK Gillette who discussed with Dr. Valencia.  will admit patient to observation for post op fluid collection, r/o abscess.  Blood cultures ordered, holding Abx at this time as  patient is febrile and normal WBC.  Per neurosurgery recs will hold ASA/plavix at this time, likely washout tomorrow morning.     Code Status Full  Surrogate Decision Maker wife Vinita      Overview/Hospital Course:      7/5/23  Patient examined post-op this afternoon, wife at bedside  Reports pain in low back, controlled at this time  Discussed with RN at bedside; surgical incision dressing slightly saturated, 2 drain in place    7/6/23  NAEON, patient reports indigestion, pain controlled  Cultures pending, continue vancomycin  NSGY following, appreciate reccs        Review of Systems   All other systems reviewed and are negative.  Objective:     Vital Signs (Most Recent):  Temp: 97.6 °F (36.4 °C) (07/06/23 1701)  Pulse: 76 (07/06/23 1705)  Resp: 18 (07/06/23 1701)  BP: 130/62 (07/06/23 1701)  SpO2: 97 % (07/06/23 1701) Vital Signs (24h Range):  Temp:  [97.2 °F (36.2 °C)-98.9 °F (37.2 °C)] 97.6 °F (36.4 °C)  Pulse:  [69-88] 76  Resp:  [16-18] 18  SpO2:  [97 %-100 %] 97 %  BP: (103-131)/(60-94) 130/62     Weight: 113.8 kg (250 lb 14.1 oz)  Body mass index is 38.15 kg/m².    Intake/Output Summary (Last 24 hours) at 7/6/2023 1725  Last data filed at 7/6/2023 1357  Gross per 24 hour   Intake 281.51 ml   Output 605 ml   Net -323.49 ml         Physical Exam  Constitutional:       General: He is not in acute distress.     Appearance: Normal appearance. He is obese.   Cardiovascular:      Rate and Rhythm: Normal rate and regular rhythm.      Heart sounds: No murmur heard.  Pulmonary:      Effort: Pulmonary effort is normal. No respiratory distress.      Breath sounds: Normal breath sounds. No wheezing.   Abdominal:      General: There is no distension.      Palpations: Abdomen is soft.      Tenderness: There is no abdominal tenderness.   Musculoskeletal:      Comments: Low back surgical dressing  2 x hemovac drains in place   Neurological:      Mental Status: He is alert.           Significant Labs: All pertinent labs  within the past 24 hours have been reviewed.  CBC:   Recent Labs   Lab 07/05/23  0431 07/06/23 0259   WBC 5.86 8.33   HGB 9.6* 8.3*   HCT 29.5* 25.8*    206     CMP:   Recent Labs   Lab 07/05/23  0431 07/06/23 0259    138   K 4.3 4.8    105   CO2 23 19*   GLU 96 138*   BUN 22 22   CREATININE 1.9* 1.6*   CALCIUM 9.1 9.2   ANIONGAP 10 14       Significant Imaging: I have reviewed all pertinent imaging results/findings within the past 24 hours.    SURG FL Surgery Fluoro Usage   Final Result      X-Ray Lumbar Spine Ap And Lateral   Final Result      Fluoroscopic guidance utilized for lumbosacral fusion and discectomy.  Please see procedural dictation for further details.         Electronically signed by: Smith Adams   Date:    07/05/2023   Time:    11:39      MRI Lumbar Spine W WO Cont   Final Result      Patient is status post posterior interbody lumbar fusion from L4 to S1.  Orthopedic hardware appears intact.  Bilateral laminectomies from L4-S1.      Large subcutaneous fluid collection involving the postoperative bed measuring approximately 11.6 x 28.9 may reflect seroma or abscess or meningocele Additional subcutaneous fluid collection in the region of the laminectomy is multiloculated measuring approximately 2.2 x 14 mm each.  This could relate to abscess, seroma, or meningocele.      Metallic interbody spacer at L5-S1 appears more anterior than L4-L5.  Correlate clinically.      Recommend clinical correlation and follow-up.         Electronically signed by: Justus Sood   Date:    07/04/2023   Time:    00:13      X-Ray Chest AP Portable   Final Result      1. The lungs are clear.   2. There is mild cardiomegaly.   3. There are surgical changes associated with a prior sternotomy.   .         Electronically signed by: Sancho Allen MD   Date:    07/03/2023   Time:    11:11      US Lower Extremity Veins Right   Final Result      No evidence of deep venous thrombosis in the right lower  extremity.         Electronically signed by: Homer Faulkner   Date:    07/03/2023   Time:    11:02      CT Lumbar Spine Without Contrast   Final Result      Postoperative changes related to a posterior lumbar interbody fusion from L4-S1 with bilateral laminectomies.  Postoperative changes suspected involving the posterior paraspinal musculature.  No definite abnormal postoperative fluid collections.      All CT scans at this facility are performed  using dose modulation techniques as appropriate to performed exam including the following:  automated exposure control; adjustment of mA and/or kV according to the patients size (this includes techniques or standardized protocols for targeted exams where dose is matched to indication/reason for exam: i.e. extremities or head);  iterative reconstruction technique.         Electronically signed by: Chris Villalba MD   Date:    07/03/2023   Time:    11:31      CT Head Without Contrast   Final Result      1. There is no evidence of an acute ischemic event. There is encephalomalacia in the right occipital lobe   2. There is no intracranial hemorrhage.   All CT scans at this facility use dose modulation, iterative reconstruction, and/or weight base dosing when appropriate to reduce radiation dose when appropriate to reduce radiation dose to as low as reasonably achievable.         Electronically signed by: Sancho Allen MD   Date:    07/03/2023   Time:    10:50              Assessment/Plan:      * Post-operative wound abscess  Patient with recent lumbar fusion L4-S1 (5/17/23)  Presents with RLE weakness, pain  MRI revealed two fluid collections l-spine, abscess versus seroma versus meningocele   US RLE negative for DVT  Blood cultures pending    S/p washout and antibiotic bead placement today (7/5/23)  Operative cultures obtained  ID consulted  Continue vancomycin  Multimodal pain control  Hold ASA/Plavix  Neurosurgery following    CKD (chronic kidney disease) stage 3,  GFR 30-59 ml/min  Cr slightly above baseline this AM (1.9)  Cr ranges from 1.4-1.7 over the last year  Will start short course of post-op IV fluids  Strict I/O's  Avoid nephrotoxins  AM labs    Cardiomyopathy  Cuvolemic at exam  Continue BB  Monitor fluid status closely, strict I/O     Echo    Result Date: 9/12/2022  · The left ventricle is normal in size with normal systolic function.  · The estimated ejection fraction is 65%.  · Grade I left ventricular diastolic dysfunction.  · Normal right ventricular size with normal right ventricular systolic   function.  · There is a bioprosthetic aortic valve present. There is mild   paravalvular aortic insufficiency present.  · The aortic valve mean gradient is 14 mmHg with a dimensionless index of   0.57.  · Mild tricuspid regurgitation.  · The estimated PA systolic pressure is 33 mmHg.           S/P AVR (aortic valve replacement) biopresthetic miller 25 mm in 2014         CAD (coronary artery disease)  Holding asa/plavix at this time  S/p washout today, restart when okay per Neurosurgery  Continue statin    Hypertension  BP controled, continue BB (carvedilol)  Holding home ARB at this time (losartan)    GERD (gastroesophageal reflux disease)  PPI     Moderate persistent asthma without complication  Presented with dyspnea to ED, US RLE negative for DVT, o2 sats stable on room air, not tachycardiac, low concern for PE  No wheezing on exam   Continue Pulmicort, wife may bring home trelegy inhaler  Duonebs PRN    CHEO on CPAP  Continue cpap nightly     Class 2 severe obesity due to excess calories with serious comorbidity and body mass index (BMI) of 38.0 to 38.9 in adult  Body mass index is 37.48 kg/m². Morbid obesity complicates all aspects of disease management from diagnostic modalities to treatment. Weight loss encouraged and health benefits explained to patient.       VTE Risk Mitigation (From admission, onward)         Ordered     enoxaparin injection 40 mg  Every 24  hours         07/06/23 1412     IP VTE HIGH RISK PATIENT  Once         07/05/23 1302     Place sequential compression device  Until discontinued         07/05/23 1302     Place sequential compression device  Until discontinued         07/05/23 1137     Place sequential compression device  Until discontinued         07/04/23 0900                Discharge Planning   AURELIA:      Code Status: Full Code   Is the patient medically ready for discharge?:     Reason for patient still in hospital (select all that apply): Patient trending condition, Laboratory test, Treatment and Consult recommendations  Discharge Plan A: Home Health                  Lino Khan MD  Department of Hospital Medicine   [facility] - Telemetry ([facility])

## 2023-07-06 NOTE — SUBJECTIVE & OBJECTIVE
Review of Systems   All other systems reviewed and are negative.  Objective:     Vital Signs (Most Recent):  Temp: 97.6 °F (36.4 °C) (07/06/23 1701)  Pulse: 76 (07/06/23 1705)  Resp: 18 (07/06/23 1701)  BP: 130/62 (07/06/23 1701)  SpO2: 97 % (07/06/23 1701) Vital Signs (24h Range):  Temp:  [97.2 °F (36.2 °C)-98.9 °F (37.2 °C)] 97.6 °F (36.4 °C)  Pulse:  [69-88] 76  Resp:  [16-18] 18  SpO2:  [97 %-100 %] 97 %  BP: (103-131)/(60-94) 130/62     Weight: 113.8 kg (250 lb 14.1 oz)  Body mass index is 38.15 kg/m².    Intake/Output Summary (Last 24 hours) at 7/6/2023 1725  Last data filed at 7/6/2023 1357  Gross per 24 hour   Intake 281.51 ml   Output 605 ml   Net -323.49 ml         Physical Exam  Constitutional:       General: He is not in acute distress.     Appearance: Normal appearance. He is obese.   Cardiovascular:      Rate and Rhythm: Normal rate and regular rhythm.      Heart sounds: No murmur heard.  Pulmonary:      Effort: Pulmonary effort is normal. No respiratory distress.      Breath sounds: Normal breath sounds. No wheezing.   Abdominal:      General: There is no distension.      Palpations: Abdomen is soft.      Tenderness: There is no abdominal tenderness.   Musculoskeletal:      Comments: Low back surgical dressing  2 x hemovac drains in place   Neurological:      Mental Status: He is alert.           Significant Labs: All pertinent labs within the past 24 hours have been reviewed.  CBC:   Recent Labs   Lab 07/05/23 0431 07/06/23 0259   WBC 5.86 8.33   HGB 9.6* 8.3*   HCT 29.5* 25.8*    206     CMP:   Recent Labs   Lab 07/05/23 0431 07/06/23 0259    138   K 4.3 4.8    105   CO2 23 19*   GLU 96 138*   BUN 22 22   CREATININE 1.9* 1.6*   CALCIUM 9.1 9.2   ANIONGAP 10 14       Significant Imaging: I have reviewed all pertinent imaging results/findings within the past 24 hours.    SURG FL Surgery Fluoro Usage   Final Result      X-Ray Lumbar Spine Ap And Lateral   Final Result       Fluoroscopic guidance utilized for lumbosacral fusion and discectomy.  Please see procedural dictation for further details.         Electronically signed by: Smith Adams   Date:    07/05/2023   Time:    11:39      MRI Lumbar Spine W WO Cont   Final Result      Patient is status post posterior interbody lumbar fusion from L4 to S1.  Orthopedic hardware appears intact.  Bilateral laminectomies from L4-S1.      Large subcutaneous fluid collection involving the postoperative bed measuring approximately 11.6 x 28.9 may reflect seroma or abscess or meningocele Additional subcutaneous fluid collection in the region of the laminectomy is multiloculated measuring approximately 2.2 x 14 mm each.  This could relate to abscess, seroma, or meningocele.      Metallic interbody spacer at L5-S1 appears more anterior than L4-L5.  Correlate clinically.      Recommend clinical correlation and follow-up.         Electronically signed by: Justus Sood   Date:    07/04/2023   Time:    00:13      X-Ray Chest AP Portable   Final Result      1. The lungs are clear.   2. There is mild cardiomegaly.   3. There are surgical changes associated with a prior sternotomy.   .         Electronically signed by: Sancho Allen MD   Date:    07/03/2023   Time:    11:11      US Lower Extremity Veins Right   Final Result      No evidence of deep venous thrombosis in the right lower extremity.         Electronically signed by: Homer Faulkner   Date:    07/03/2023   Time:    11:02      CT Lumbar Spine Without Contrast   Final Result      Postoperative changes related to a posterior lumbar interbody fusion from L4-S1 with bilateral laminectomies.  Postoperative changes suspected involving the posterior paraspinal musculature.  No definite abnormal postoperative fluid collections.      All CT scans at this facility are performed  using dose modulation techniques as appropriate to performed exam including the following:  automated exposure control;  adjustment of mA and/or kV according to the patients size (this includes techniques or standardized protocols for targeted exams where dose is matched to indication/reason for exam: i.e. extremities or head);  iterative reconstruction technique.         Electronically signed by: Chris Villalba MD   Date:    07/03/2023   Time:    11:31      CT Head Without Contrast   Final Result      1. There is no evidence of an acute ischemic event. There is encephalomalacia in the right occipital lobe   2. There is no intracranial hemorrhage.   All CT scans at this facility use dose modulation, iterative reconstruction, and/or weight base dosing when appropriate to reduce radiation dose when appropriate to reduce radiation dose to as low as reasonably achievable.         Electronically signed by: Sancho Allen MD   Date:    07/03/2023   Time:    10:50

## 2023-07-06 NOTE — OP NOTE
[facility])  Neurosurgery  Operative Note    SUMMARY      Date of Procedure: 7/5/2023     Procedure: Procedure(s) (LRB):  EXPLORATION, WOUND (Bilateral)  WASHOUT (N/A)  REMOVAL, HARDWARE, SPINE (N/A)  INSERTION (N/A)  REPLACEMENT (N/A)     Surgeon(s) and Role:     * Rita Valencia MD - Primary    Assistant: Yazmin Farfan PA-C    Pre-Operative Diagnosis: Post-operative wound abscess [T81.49XA]    Post-Operative Diagnosis: Post-Op Diagnosis Codes:     * Post-operative wound abscess [T81.49XA]    Anesthesia: General    Operative Findings (including complications, if any): postoperative fluid collection sent for culture and analysis.  Removal and replacement of set screws right sided L4 L5 S1       Estimated Blood Loss (EBL): * No values recorded between 7/5/2023  8:27 AM and 7/5/2023 11:28 AM *           Specimens:   Specimen (24h ago, onward)       Start     Ordered    07/05/23 1025  Specimen to Pathology, Surgery Neurosurgery  Once        Comments: Pre-op Diagnosis: Post-operative wound abscess [T81.49XA]Procedure(s):EXPLORATION, WOUNDWASHOUTREMOVAL, HARDWARE, SPINE Number of specimens: 1Name of specimens: 1. Explanted Hardware - PERM     References:    Click here for ordering Quick Tip   Question Answer Comment   Procedure Type: Neurosurgery    Specimen Class: Routine/Screening    Which provider would you like to cc? RITA VALENCIA    Release to patient Immediate        07/05/23 1028                     Implants:   Implant Name Type Inv. Item Serial No.  Lot No. LRB No. Used Action   KIT GRAFT BONE/SUB STIM 10ML - SN/A  KIT GRAFT BONE/SUB STIM 10ML N/A [employer] [id number]  1 Implanted   KIT GRAFT BONE/SUB STIM 10ML - SN/A  KIT GRAFT BONE/SUB STIM 10ML N/A [employer] [id number]  1 Implanted   SET SCREW HORIZON SOLERA 5.5-6 - [id number]  SET SCREW HORIZON SOLERA 5.5-6  [employer] [facility]  N/A 3 Explanted   60mm Sandoval    [employer]  N/A 1 Explanted   SET SCREW HORIZON SOLERA 5.5-6 - SN/A   SET SCREW HORIZON SOLERA 5.5-6 N/A [employer] [place] N/A N/A 3 Implanted   55 mm Denisha   N/A [employer] [address] N/A N/A 1 Implanted   DENISHA CD HORIZON SOLERA 5.5-6X60 - SN/A  DENISHA CD HORIZON SOLERA 5.5-6X60 N/A [employer] [place] N/A N/A 1 Implanted and Explanted              Condition: Good    Disposition: PACU - hemodynamically stable.    Attestation: I was present and scrubbed for the entire procedure.  \  Patient is a 67-year-old gentleman who proximally 2 months prior underwent L4-S1 TLIF.  He was doing well and recently discharged from home health physical therapy.  Patient states that 3 days prior he developed acute onset of right-sided lower extremity weakness after a long car ride home.   Had associated numbness and tingling on the right distribution as well.  Associated symptoms include pain 10/10 down the right.  Prior to this he was not complaining of any pain.  MRI was completed showing fluid collection consistent with possible seroma, abscess, and pseudomeningocele.  He had an elevated ESR and CRP.    Due to the patient's acute onset of weakness with fluid collection offered to take him back to perform wound washout culture of the fluid and inspection of posterior hardware.    The patient was informed of all benefits and potential risk of the operation including but not limited to:  Pain, infection, bleeding, coma, paralysis, death.  Cerebrospinal fluid leak, failure of any instrumentation, the need for additional procedures in the future. No guarantee was given that this procedure would alleviate all of the symptoms.       Description of procedure  The patient was transferred to the operating room antibiotics were held until cultures were taken of the operative fluid.    The Anesthesia Service sedated and intubated the patient.  The eyes were taped shut after ointment was applied to prevent corneal abrasion.  A Deana Hugger was placed over the upper body to maintain control of the core body temperature.  Dyer catheter  was inserted.  The patient was turned prone on the Robert table.  All pressure points were carefully padded.  The electrophysiology monitoring team inserted needles in their proper locations.     Operative Technique:  The patient was prepped and draped in the standard sterile fashion.    The skin was subsequently opened sharply with a #10 blade.  Dissection was carried down superiorly and inferiorly in the midline to expose the supraspinous ligament and the lamina.      On initial dissection there was superficial fluid which did not appear grossly infected that was culture and swab for further analysis.  Further dissection was taken below the fascia.  Below the fascia there was fluid collection which was again cultured and sent for further analysis.  The instrumentation was exposed bilaterally.  Because the patient was having severe right-sided pain the set screws were removed on the pedicle screws L4-L5 and S1.  The amrit was then removed along with the pedicle screws.  Using a ball-tip Feeler there were no breach of the medial pedicle wall.  The screws were then replaced without incident.  Placement was confirmed with fluoroscopy.  The dura was exposed.  The foramen bilaterally at L4-5 as well as L5-S1 was explored.  I made sure there was no stenosis or further disc or bone material causing foraminal stenosis on the right side.  At this point careful inspection was done to look for any signs of dural violation.  Valsalva was performed and there is no evidence of CSF leakage from the dura or foramen bilaterally.    At this point a Pulsavac filled with vancomycin was used to irrigate the surgical cavity.  Again I looked for any signs of CSF leakage and there was none identified.  At this point the fluid collection was either considered to be postoperative seroma versus possible infection.  Antibiotics were given intraoperatively after cultures were obtained.  The dosage was based on the pharmacy recommendation  secondary to the patient's underlying kidney disease with elevated creatinine.    Antibiotic beads made with vancomycin and tobramycin were placed to surgical wound.  The amrit on the right side was placed in between the pedicle screws of L4-L5 and S1.  Set screws were then used to secure the amrit and they were locked into their final position.  Final x-rays were obtained.    At this point the wound was closed using a Stratafix the fascial layer was approximated in a running fashion.  Vicryl sutures were used to tack the superficial layer to the fascial layer to close any potential dead space.  Hemovac drains were placed super fascial early as well as subfascial.  These were secured using 2-0 Vicryl. se   The subcuticular layer was closed with a 2 0 Vicryl in an inverted fashion.  The skin was then approximated with interrupted Prolene sutures.  The incision was dressed in a clean and dry dressing.     All sponge counts, needle counts and instrument counts were correct at the end of the case x 2.  The patient tolerated the procedure well without any complication and was transferred in a stable condition to the recovery room.    Brandon Valencia MD  Neurosurgery     Disclaimer: This note was prepared using a voice recognition system and is likely to have sound alike errors within the text.

## 2023-07-06 NOTE — PLAN OF CARE
[facility] - Tele[facility] (Hospital)  Initial Discharge Assessment       Primary Care Provider: Reji Valentin MD    Admission Diagnosis: Shortness of breath [R06.02]  Cellulitis [L03.90]  Leg pain [M79.606]  SOB (shortness of breath) [R06.02]  Post-op pain [G89.18]    Admission Date: 7/3/2023  Expected Discharge Date:     Transition of Care Barriers: None    Payor: HUMANA MANAGED MEDICARE / Plan: HUMANA TOTAL CARE ADVANTAGE / Product Type: Medicare Advantage /     Extended Emergency Contact Information  Primary Emergency Contact: Vinita Urias   [place]  Mobile Phone: [phone]  Relation: Spouse  Secondary Emergency Contact: Amanda Pickett  Mobile Phone: [phone]  Relation: Grandchild    Discharge Plan A: Home Health         [facility] - [place] - [address]  [address]  [place] [address]  Phone: [phone] Fax: [phone]    [id number] - [place] - [address]  [address]  [place] [address]  Phone: [phone] Fax: [phone]    [id number]  [place], [place] - [address] [facility] [address]  [address] [place] [address]  Phone: [phone] Fax: [phone]      Initial Assessment (most recent)       Adult Discharge Assessment - 07/06/23 1557          Discharge Assessment    Assessment Type Discharge Planning Assessment     Confirmed/corrected address, phone number and insurance Yes     Confirmed Demographics Correct on Facesheet     Source of Information patient     When was your last doctors appointment? 04/13/23     Communicated AURELIA with patient/caregiver Date not available/Unable to determine     Reason For Admission wound infection     People in Home spouse     Facility Arrived From: Neurosurgeon's office     Do you expect to return to your current living situation? Yes     Do you have help at home or someone to help  you manage your care at home? Yes     Who are your caregiver(s) and their phone number(s)? spouse     Prior to hospitilization cognitive status: Alert/Oriented     Current cognitive status: Alert/Oriented     Walking or Climbing Stairs ambulation difficulty, requires equipment     Mobility Management uses rollator     Dressing/Bathing bathing difficulty, assistance 1 person     Dressing/Bathing Management [facility] aid from [facility] that assists patient     Home Layout Able to live on 1st floor     Equipment Currently Used at Home rollator;CPAP;other (see comments)   Electric Scooter    Readmission within 30 days? No     Patient currently being followed by outpatient case management? Yes     If yes, name of outpatient case management following: [facility] outpatient case management     Do you currently have service(s) that help you manage your care at home? Yes     Name and Contact number of agency [facility]     Is the pt/caregiver preference to resume services with current agency Yes     Do you take prescription medications? Yes     Do you have prescription coverage? Yes     Coverage [employer] and the [facility]     Do you have any problems affording any of your prescribed medications? No     Is the patient taking medications as prescribed? yes     Who is going to help you get home at discharge? spouse     How do you get to doctors appointments? family or friend will provide     Are you on dialysis? No     Do you take coumadin? No     Discharge Plan A Home Health     DME Needed Upon Discharge  none     Discharge Plan discussed with: Patient     Transition of Care Barriers None                   Met with patient for discharge assessment.  Patient lives with his spouse, Vinita who can be his help at home.  Patient is moderately independent with ADL's with assistive equipment.  [facility] is providing a nurse and therapist currently.  Also the [facility] has approved for patient to receive 9 hours of care giver services.   Discharge plan is to resume home health.

## 2023-07-06 NOTE — CHAPLAIN
Initial visit with patient.  Visited with patient to assess for spiritual and emotional needs.  Patient took time to share his story with me and explained what was happening with his health.  Patient also mentioned that he is struggling with his anxiety and depression due to past events in his life.  Patient asked for prayer and I took time to do this before leaving.  Spiritual care remains available as needed.    Chaplain John Mcadams M.Div., B[facility]

## 2023-07-06 NOTE — PROGRESS NOTES
Interval History:  With prior history of having TLIF L4-S1 2 months ago presented to clinic with new onset of right-sided weakness pain and N/T .  Patient was not have any fevers, chills, HA, NV, or pain on the left.  Imaging releaved fluid collection to operative bed.   Unsure of stenosis due to metal artifac    He had normal WBC, afebrile, but elevated ESR and CRP     Prior to this the patient was doing great without these symptoms     Given the acute onset of weakness with fluid collection   I discussed that I would prefer to open the incision to drain the fluid. Inspect for any s/s of infection or CSF leak     Other tx options included percutaneous biopsy with IR and watchful waiting.    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  Antibiotics were held prior to procedure to obtain cultures of fluid to operative site      The patient was informed of all benefits and potential risk of the operation including but not limited to:  Pain, infection, bleeding, coma, paralysis, death.  Cerebrospinal fluid leak, failure of any instrumentation, the need for additional procedures in the future. No guarantee was given that this procedure would alleviate all of the symptoms.    Thank you for the referral   Please call with any questions    Brandon Valencia MD  Neurosurgery     Disclaimer: This note was prepared using a voice recognition system and is likely to have sound alike errors within the text.\          Medications:  Continuous Infusions:  Scheduled Meds:   atorvastatin  80 mg Oral QHS    budesonide  0.5 mg Nebulization Q12H    carvediloL  12.5 mg Oral BID    dexAMETHasone  4 mg Intravenous  Q6H    gabapentin  600 mg Oral TID    methocarbamoL  500 mg Oral QID    pantoprazole  40 mg Oral Daily    sertraline  100 mg Oral Daily     PRN Meds:acetaminophen, albuterol-ipratropium, ALPRAZolam, aluminum-magnesium hydroxide-simethicone, dextrose 10%, dextrose 10%, glucagon (human recombinant), glucose, glucose, HYDROmorphone, melatonin, naloxone, ondansetron, oxyCODONE-acetaminophen, oxyCODONE-acetaminophen, prochlorperazine, sodium chloride 0.9%, Pharmacy to dose Vancomycin consult **AND** vancomycin - pharmacy to dose     Review of Systems   Constitutional:  Negative for activity change, appetite change and chills.   HENT:  Negative for hearing loss, sore throat and tinnitus.    Eyes:  Negative for pain, discharge and itching.   Cardiovascular:  Negative for chest pain.   Gastrointestinal:  Negative for abdominal pain.   Endocrine: Negative for cold intolerance and heat intolerance.   Genitourinary:  Negative for difficulty urinating and dysuria.   Musculoskeletal:  Positive for back pain and gait problem.   Allergic/Immunologic: Negative for environmental allergies.   Neurological:  Positive for weakness. Negative for dizziness, tremors, light-headedness and headaches.   Hematological:  Negative for adenopathy.   Psychiatric/Behavioral:  Negative for agitation, behavioral problems and confusion.    Objective:     Weight: 113.8 kg (250 lb 14.1 oz)  Body mass index is 38.15 kg/m².  Vital Signs (Most Recent):  Temp: 97.2 °F (36.2 °C) (07/06/23 0718)  Pulse: 78 (07/06/23 0936)  Resp: 16 (07/06/23 0804)  BP: 103/60 (07/06/23 0718)  SpO2: 98 % (07/06/23 0737) Vital Signs (24h Range):  Temp:  [97.2 °F (36.2 °C)-98.2 °F (36.8 °C)] 97.2 °F (36.2 °C)  Pulse:  [69-89] 78  Resp:  [11-21] 16  SpO2:  [93 %-100 %] 98 %  BP: ()/(57-94) 103/60                Oxygen Concentration (%):  [98] 98             Closed/Suction Drain 07/05/23 1012 Right Back Accordion 10 Fr. (Active)   Site Description Unable to view 07/06/23  0936   Dressing Type CHG impregnated dressing/sponge;Transparent (Tegaderm);Gauze 07/06/23 0936   Dressing Status Clean;Dry;Intact 07/06/23 0936   Dressing Intervention Integrity maintained 07/06/23 0936   Drainage Sanguineous 07/06/23 0936   Status Other (Comment) 07/06/23 0936            Closed/Suction Drain 07/05/23 1013 Left Back Accordion 10 Fr. (Active)   Site Description Unable to view 07/06/23 0936   Dressing Type CHG impregnated dressing/sponge;Transparent (Tegaderm);Gauze 07/06/23 0936   Dressing Status Clean;Dry;Intact 07/06/23 0936   Dressing Intervention Integrity maintained 07/06/23 0936   Drainage Sanguineous 07/06/23 0936   Status Other (Comment) 07/06/23 0936   Output (mL) 70 mL 07/05/23 2015          Physical Exam  Vitals and nursing note reviewed.   Constitutional:       General: He is not in acute distress.     Appearance: He is well-nourished. He is not diaphoretic.   Eyes:      Extraocular Movements: EOM normal.      Pupils: Pupils are equal, round, and reactive to light.   Cardiovascular:      Rate and Rhythm: Normal rate and regular rhythm.   Neurological:      Mental Status: He is alert and oriented to person, place, and time.   Psychiatric:         Mood and Affect: Mood and affect normal.            Physical Exam:  Nursing note and vitals reviewed.    Constitutional: He appears well-nourished. He is not diaphoretic. No distress.     Eyes: Pupils are equal, round, and reactive to light. EOM are normal.     Cardiovascular: Normal rate and regular rhythm.     Psych/Behavior: He is alert. He is oriented to person, place, and time. He has a normal mood and affect.     Musculoskeletal:        Back: Range of motion is limited. There is tenderness. Muscle strength is 5/5.       Right Lower Extremities: Range of motion is limited. There is tenderness. Tenderness is located 4+ throughout. Muscle strength is 4/5. Tone is normal.        Left Lower Extremities: Range of motion is full. There is  tenderness. Muscle strength is 5/5. Tone is normal.     Neurological:        Sensory: There is no sensory deficit in the trunk. There is sensory deficit in the extremities. Sensory deficit is located R L5 to light touch.        Cranial nerves: Cranial nerve(s) II, III, IV, V, VI, VII, VIII, IX, X, XI and XII are intact.     Significant Labs:  Recent Labs   Lab 07/05/23  0431 07/06/23  0259   GLU 96 138*    138   K 4.3 4.8    105   CO2 23 19*   BUN 22 22   CREATININE 1.9* 1.6*   CALCIUM 9.1 9.2     Recent Labs   Lab 07/05/23  0431 07/06/23  0259   WBC 5.86 8.33   HGB 9.6* 8.3*   HCT 29.5* 25.8*    206     Recent Labs   Lab 07/04/23  1625   INR 1.1   APTT 25.5     Microbiology Results (last 7 days)       Procedure Component Value Units Date/Time    Culture, Anaerobe [[id number]] Collected: 07/05/23 0959    Order Status: Completed Specimen: Abscess from Back Updated: 07/06/23 0831     Anaerobic Culture Culture in progress    Narrative:      Deep #2 culture lumbar spine    Culture, Anaerobe [[id number]] Collected: 07/05/23 0851    Order Status: Completed Specimen: Abscess from Back Updated: 07/06/23 0831     Anaerobic Culture Culture in progress    Narrative:      Superficial Culture of Lumbar Spine    Culture, Anaerobe [[id number]] Collected: 07/05/23 0855    Order Status: Completed Specimen: Abscess from Back Updated: 07/06/23 0831     Anaerobic Culture Culture in progress    Narrative:      Deep Culture of Lumbar Spine    Aerobic culture [[id number]] Collected: 07/05/23 0851    Order Status: Completed Specimen: Abscess from Back Updated: 07/06/23 0650     Aerobic Bacterial Culture No growth    Narrative:      Superficial Culture of Lumbar Spine    Aerobic culture [[id number]] Collected: 07/05/23 0855    Order Status: Completed Specimen: Abscess from Back Updated: 07/06/23 0650     Aerobic Bacterial Culture No growth    Narrative:      Deep Culture of Lumbar Spine    Aerobic culture [[id number]]  Collected: 07/05/23 0959    Order Status: Completed Specimen: Abscess from Back Updated: 07/06/23 0650     Aerobic Bacterial Culture No growth    Narrative:      Deep #2 culture lumbar spine    Blood culture [[id number]] Collected: 07/04/23 1112    Order Status: Completed Specimen: Blood Updated: 07/05/23 2212     Blood Culture, Routine No Growth to date      No Growth to date    Blood culture [[id number]] Collected: 07/04/23 1111    Order Status: Completed Specimen: Blood Updated: 07/05/23 2212     Blood Culture, Routine No Growth to date      No Growth to date    Gram stain [[id number]] Collected: 07/05/23 0855    Order Status: Completed Specimen: Abscess from Back Updated: 07/05/23 1643     Gram Stain Result No WBC's      No organisms seen    Narrative:      Deep Culture of Lumbar Spine    Gram stain [[id number]] Collected: 07/05/23 0851    Order Status: Completed Specimen: Abscess from Back Updated: 07/05/23 1640     Gram Stain Result Rare WBC's      No organisms seen    Narrative:      Superficial Culture of Lumbar Spine    Gram stain [[id number]] Collected: 07/05/23 0959    Order Status: Completed Specimen: Abscess from Back Updated: 07/05/23 1639     Gram Stain Result No WBC's      No organisms seen    Narrative:      Deep #2 culture lumbar spine    Fungus culture [[id number]] Collected: 07/05/23 0959    Order Status: Sent Specimen: Abscess from Back Updated: 07/05/23 1517    AFB Culture & Smear [[id number]] Collected: 07/05/23 0959    Order Status: Sent Specimen: Abscess from Back Updated: 07/05/23 1517    AFB Culture & Smear [[id number]] Collected: 07/05/23 0851    Order Status: Sent Specimen: Abscess from Back Updated: 07/05/23 1515    Fungus culture [[id number]] Collected: 07/05/23 0851    Order Status: Sent Specimen: Abscess from Back Updated: 07/05/23 1515    AFB Culture & Smear [[id number]] Collected: 07/05/23 0855    Order Status: Sent Specimen: Abscess from Back Updated: 07/05/23 1514    Fungus culture  [[id number]] Collected: 07/05/23 0855    Order Status: Sent Specimen: Abscess from Back Updated: 07/05/23 151          All pertinent labs from the last 24 hours have been reviewed.    Significant Diagnostics:  MRI and CT lumbar reviewed please see HPI

## 2023-07-06 NOTE — PT/OT/SLP EVAL
"Occupational Therapy Evaluation and Treatment    Name: Abdullahi Urias  MRN: [id number]  Admitting Diagnosis: Post-operative wound abscess  Recent Surgery: Procedure(s) (LRB):  EXPLORATION, WOUND (Bilateral)  WASHOUT (N/A)  REMOVAL, HARDWARE, SPINE (N/A)  INSERTION (N/A)  REPLACEMENT (N/A) 1 Day Post-Op    Recommendations:     Discharge Recommendations: home health OT  Level of Assistance Recommended: 24 hours light assistance  Discharge Equipment Recommendations: walker, rolling  Barriers to discharge: None    Assessment:     Abdullahi Urias is a 67 y.o. male with a medical diagnosis of Post-operative wound abscess. He presents with performance deficits affecting function including weakness, impaired endurance, impaired self care skills, impaired functional mobility, impaired balance, impaired cardiopulmonary response to activity, pain, orthopedic precautions.    Rehab Prognosis: Good; patient would benefit from acute OT services to address these deficits and reach maximum level of function.      Plan:     Patient to be seen 2 x/week to address the above listed problems via self-care/home management, therapeutic activities, therapeutic exercises  Plan of Care Expires: 07/20/23  Plan of Care Reviewed with: patient    Subjective     Chief Complaint: Reported "I am already feeling much better."  Patient Comments/Goals: get better  Pain/Comfort:  Pain Rating 1: 3/10  Location 1: back  Pain Addressed 1:  (activity pacing)    Social History:  Living Environment: Patient lives with their spouse in a single story home with number of outside stair(s): threshold to enter.  Prior Level of Function: Patient reports needing assistance with bathing/dressing since surgery. Ambulating with SPC vs 4WW mod I.  Roles and Routines: Patient was driving and not working prior to admission.  Equipment Used at Home: cane, straight, rollator  DME owned (not currently used): none  Assistance Upon Discharge: family    Objective:     Communicated with " nurseTriny, prior to session. Patient found supine with peripheral IV, telemetry, hemovac upon OT entry to room.    General Precautions: Standard, fall   Orthopedic Precautions: spinal precautions   Braces:  (LSO not present in room- strict spinal precautions throughout)    Respiratory Status: Room air    Cognitive and Psychosocial Function:  Oriented to: Person, Place, Time, Situation  Follows Commands/Attention: follows one-step commands  Communication: clear/fluent  Memory: No Deficits noted  Safety Awareness/Insight to Disability: intact  Mood/Affect/Coping Skills/Emotional Control: Appropriate to situation    Hearing: Intact    Vision: No deficits noted      Physical Exam:  Postural examination/scapula alignment:    -       No postural abnormalities identified  Skin integrity: Visible skin intact  Hand dominance: Right        Left UE Right UE   UE Edema None noted None noted   UE ROM WFL, except 90* shdr flex 2/2 spinal precautions WFL, except 90* shdr flex 2/2 spinal precautions   UE Strength Grossly 4+/5 elbows distally Grossly 4+/5 elbows distally    Strength moderate composite grasp moderate composite grasp   Sensation LUE  INTACT:  WFL RUE  INTACT:  WFL   Fine Motor Coordination:  LUE  INTACT:  WFL RUE  INTACT:    WFL   Gross Motor Coordination: LUE  INTACT:  WFL RUE  INTACT:  WFL     Occupational Performance    Gait belt applied - Yes    Bed Mobility:   Supine to sit from right side of bed with stand by assistance via log rolling    Functional Mobility/Transfers  Static Sitting EOB: modified independence  Dynamic Sitting EOB: supervision  Static Standing Balance: stand by assistance  Dynamic Standing Balance: contact guard assistance  Sit <> Stand Transfer with contact guard assistance with rolling walker  Bed <> Chair Transfer using Step Transfer technique with contact guard assistance with rolling walker  Functionality Mobility: Patient completed x420ft functional mobility with RW and CGA to  increase dynamic standing balance and activity tolerance needed for ADL completion.    Activities of Daily Living:  Grooming: set up assistance  Upper Body Dressing: set up assistance    AMPAC 6 Click ADL:  AMPAC Total Score: 16    Treatment & Education:  Therapist provided facilitation and instruction of proper body mechanics, energy conservation, and fall prevention strategies during tasks listed above.  Patient educated on role of OT, POC, and goals for therapy.  Spinal precautions taught & reviewed via teach back method. Patient able to recall 3/3 correctly.  Patient educated on importance of OOB activities with staff member assistance and sitting OOB majority of the day.  Encouraged completion of B UE AROM therex, within spinal precautions, throughout the day to increase functional strength and activity tolerance needed for ADL completion.    Patient left up in chair with all lines intact, call button in reach, and chair alarm on.    GOALS:   Multidisciplinary Problems       Occupational Therapy Goals          Problem: Occupational Therapy    Goal Priority Disciplines Outcome Interventions   Occupational Therapy Goal     OT, PT/OT     Description: Goals to be met by: 7/20/23     Patient will increase functional independence with ADLs by performing:    Toileting from toilet with Modified [place] for hygiene and clothing management.   Toilet transfer to toilet with Modified [place].  Demonstrates 100% functional compliance with spinal precautions.                         History:     Past Medical History:   Diagnosis Date    Aortic stenosis     [facility]    Asthma     BPH (benign prostatic hyperplasia)     CAD (coronary artery disease)     Cardiomyopathy     CHF (congestive heart failure)     Chronic hoarseness     vocal cord surg    Chronic pain     CKD (chronic kidney disease) stage 3, GFR 30-59 ml/min     CVA (cerebral infarction)     8/2012 olol; reviewed ed note    Ex-smoker     GERD  (gastroesophageal reflux disease)     Hepatitis C     treatedharvoni says cured, RNA NEG 6/2020    Hypertension     Pancreatitis     Prostate cancer     Prostate cancer     Prostate cancer     PVD (peripheral vascular disease)     Renal insufficiency     Substance abuse     cocaine, etoh , tob in past         Past Surgical History:   Procedure Laterality Date    AORTIC VALVE REPLACEMENT  05/19/2014    Tissue valve replacement    BACK SURGERY      CARDIAC CATHETERIZATION      COLONOSCOPY  2011    COLONOSCOPY N/A 2/24/2021    Procedure: COLONOSCOPY;  Surgeon: Faith Carrillo MD;  Location: [facility] ENDO;  Service: Endoscopy;  Laterality: N/A;    EPIDURAL STEROID INJECTION INTO CERVICAL SPINE N/A 6/21/2022    Procedure: C7-T1 IL PIEDAD;  Surgeon: Nehemiah Carmen MD;  Location: [facility] PAIN MGT;  Service: Pain Management;  Laterality: N/A;    ESOPHAGOGASTRODUODENOSCOPY N/A 2/24/2021    Procedure: ESOPHAGOGASTRODUODENOSCOPY (EGD);  Surgeon: Faith Carrillo MD;  Location: [facility] ENDO;  Service: Endoscopy;  Laterality: N/A;    FOOT SURGERY      INSERTION N/A 7/5/2023    Procedure: INSERTION;  Surgeon: Brandon Valenica MD;  Location: [facility] OR;  Service: Neurosurgery;  Laterality: N/A;  Antibiotic Beads    LEFT HEART CATHETERIZATION Left 7/24/2020    Procedure: CATHETERIZATION, HEART, LEFT;  Surgeon: Pasha Martin MD;  Location: [facility] CATH LAB;  Service: Cardiology;  Laterality: Left;    PENILE PROSTHESIS IMPLANT      PENILE PROSTHESIS REVISION  04/30/2018    PROSTATECTOMY  09/[facility]    [facility]    rad[facility] for prostate      REMOVAL OF HARDWARE FROM SPINE N/A 7/5/2023    Procedure: REMOVAL, HARDWARE, SPINE;  Surgeon: Brandon Valencia MD;  Location: [facility] OR;  Service: Neurosurgery;  Laterality: N/A;    REPLACEMENT N/A 7/5/2023    Procedure: REPLACEMENT;  Surgeon: Brandon Valencia MD;  Location: [facility] OR;  Service: Neurosurgery;  Laterality: N/A;  of [employer]. [employer]    TRANSFORAMINAL EPIDURAL INJECTION OF  STEROID Right 10/20/2022    Procedure: Right  L4/5 + L5/S1 TF PIEDAD RN IV Sedation;  Surgeon: Nehemiah Carmen MD;  Location[facility];  Service: Pain Management;  Laterality: Right;    TRANSFORAMINAL LUMBAR INTERBODY FUSION (TLIF) USING COMPUTER-ASSISTED NAVIGATION Bilateral 5/17/2023    Procedure: FUSION, SPINE, LUMBAR, TLIF, USING COMPUTER-ASSISTED NAVIGATION;  Surgeon: Brandon Valencia MD;  Location: [facility];  Service: Neurosurgery;  Laterality: Bilateral;  2 level lumbar fusion at L4-5 and L5-S1    UPPER GASTROINTESTINAL ENDOSCOPY  2011    WASHOUT N/A 7/5/2023    Procedure: WASHOUT;  Surgeon: Brandon Valencia MD;  Location: [facility] OR;  Service: Neurosurgery;  Laterality: N/A;    WOUND EXPLORATION Bilateral 7/5/2023    Procedure: EXPLORATION, WOUND;  Surgeon: Brandon Valencia MD;  Location: [facility];  Service: Neurosurgery;  Laterality: Bilateral;       Time Tracking:     OT Date of Treatment: 07/06/23  OT Start Time: 0805  OT Stop Time: 0830  OT Total Time (min): 25 min    Billable Minutes: Evaluation 15 and Therapeutic Activity 10    7/6/2023

## 2023-07-06 NOTE — PLAN OF CARE
A231/A231 EMILIO Urias is a 67 y.o.male admitted on 7/3/2023 for Post-operative wound abscess   Code Status: Full Code MRN: [id number]   Review of patient's allergies indicates:  No Known Allergies  Past Medical History:   Diagnosis Date    Aortic stenosis     [facility]    Asthma     BPH (benign prostatic hyperplasia)     CAD (coronary artery disease)     Cardiomyopathy     CHF (congestive heart failure)     Chronic hoarseness     vocal cord surg    Chronic pain     CKD (chronic kidney disease) stage 3, GFR 30-59 ml/min     CVA (cerebral infarction)     8/2012 olol; reviewed ed note    Ex-smoker     GERD (gastroesophageal reflux disease)     Hepatitis C     treatedharvoni says cured, RNA NEG 6/2020    Hypertension     Pancreatitis     Prostate cancer     Prostate cancer     Prostate cancer     PVD (peripheral vascular disease)     Renal insufficiency     Substance abuse     cocaine, etoh , tob in past      PRN meds    acetaminophen, 650 mg, Q4H PRN  albuterol-ipratropium, 3 mL, Q4H PRN  ALPRAZolam, 0.5 mg, BID PRN  aluminum-magnesium hydroxide-simethicone, 30 mL, Q6H PRN  dextrose 10%, 12.5 g, PRN  dextrose 10%, 25 g, PRN  glucagon (human recombinant), 1 mg, PRN  glucose, 16 g, PRN  glucose, 24 g, PRN  HYDROmorphone, 2 mg, Q3H PRN  melatonin, 6 mg, Nightly PRN  naloxone, 0.02 mg, PRN  ondansetron, 4 mg, Q8H PRN  oxyCODONE-acetaminophen, 1 tablet, Q4H PRN  oxyCODONE-acetaminophen, 1 tablet, Q4H PRN  prochlorperazine, 5 mg, Q6H PRN  sodium chloride 0.9%, 10 mL, Q12H PRN  vancomycin - pharmacy to dose, , pharmacy to manage frequency      Pt  walked with PT/OT.  Hemovac care completed. Pt continues to have increased gastric reflux. ERIK Farfan  was notified. No falls were reported on shift and hourly rounding is complete. Cardiac monitoring, pain management, and PT/OT continues. Chart check completed. Will continue plan of care.      Orientation: oriented x 4        Lead Monitored: Lead II Rhythm: normal  sinus rhythm    Cardiac/Telemetry Box Number: 8668  VTE Required Core Measure: (SCDs) Sequential compression device initiated/maintained Last Bowel Movement: 07/03/23  Diet Adult Regular (IDDSI Level 7)  Voiding Characteristics: voids spontaneously without difficulty  Homero Score: 20  Fall Risk Score: 13  Accucheck []   Freq?      Lines/Drains/Airways       Drain  Duration                  Closed/Suction Drain 07/05/23 1012 Right Back Accordion 10 Fr. 1 day         Closed/Suction Drain 07/05/23 1013 Left Back Accordion 10 Fr. 1 day              Peripheral Intravenous Line  Duration                  Peripheral IV - Single Lumen 07/05/23 0700 18 G Distal;Left;Posterior Forearm 1 day

## 2023-07-06 NOTE — PLAN OF CARE
OT chad completed. Sup>sit SBA, sit>stand CGA, functional mobility x420ft with RW and CGA, step>pivot to bedside chair with RW and CGA. Recommending HHOT at d/c.

## 2023-07-06 NOTE — PROGRESS NOTES
Pharmacokinetic Assessment Follow Up: IV Vancomycin    Vancomycin serum concentration assessment(s):    The random level was drawn correctly and can be used to guide therapy at this time. The measurement is below the desired definitive target range of 15 to 20 mcg/mL.    Vancomycin Regimen Plan:    Continue pulse dosing regimen with a one time dose of vancomycin 1750 mg.    Re-dose when the random level is less than 20 mcg/mL, next level to be drawn at 0600 on 07/07    Drug levels (last 3 results):  Recent Labs   Lab Result Units 07/06/23  0259   Vancomycin, Random ug/mL 12.8       Pharmacy will continue to follow and monitor vancomycin.    Please contact pharmacy at extension [phone] for questions regarding this assessment.    Thank you for the consult,   Kimberly Gupta       Patient brief summary:  Abdullahi Urias is a 67 y.o. male initiated on antimicrobial therapy with IV Vancomycin for treatment of  back abscess    The patient's current regimen is pulse dosing    Drug Allergies:   Review of patient's allergies indicates:  No Known Allergies    Actual Body Weight:   111.8 kg    Renal Function:   Estimated Creatinine Clearance: 45.8 mL/min (A) (based on SCr of 1.9 mg/dL (H)).,     Dialysis Method (if applicable):  N/A    CBC (last 72 hours):  Recent Labs   Lab Result Units 07/03/23  1123 07/05/23  0431   WBC K/uL 6.36 5.86   Hemoglobin g/dL 9.3* 9.6*   Hematocrit % 28.8* 29.5*   Platelets K/uL 235 235   Gran % % 69.9 66.5   Lymph % % 19.3 19.6   Mono % % 7.2 10.4   Eosinophil % % 2.8 2.7   Basophil % % 0.5 0.5   Differential Method  Automated Automated       Metabolic Panel (last 72 hours):  Recent Labs   Lab Result Units 07/03/23  1123 07/03/23  1133 07/05/23  0431   Sodium mmol/L 142  --  137   Potassium mmol/L 4.5  --  4.3   Chloride mmol/L 109  --  104   CO2 mmol/L 24  --  23   Glucose mg/dL 86  --  96   Glucose, UA   --  Negative  --    BUN mg/dL 21  --  22   Creatinine mg/dL 1.6*  --  1.9*   Albumin g/dL 3.6  --    --    Total Bilirubin mg/dL 0.3  --   --    Alkaline Phosphatase U/L 98  --   --    AST U/L 17  --   --    ALT U/L 10  --   --        Vancomycin Administrations:  vancomycin given in the last 96 hours                     vancomycin injection (g) 1 g Given 07/05/23 1010     1 g Given  0836     1 g Given  0820    vancomycin 2 g in dextrose 5 % 500 mL IVPB (mg) 2,000 mg Given 07/05/23 0858                    Microbiologic Results:  Microbiology Results (last 7 days)       Procedure Component Value Units Date/Time    Blood culture [[id number]] Collected: 07/04/23 1112    Order Status: Completed Specimen: Blood Updated: 07/05/23 2212     Blood Culture, Routine No Growth to date      No Growth to date    Blood culture [[id number]] Collected: 07/04/23 1111    Order Status: Completed Specimen: Blood Updated: 07/05/23 2212     Blood Culture, Routine No Growth to date      No Growth to date    Gram stain [[id number]] Collected: 07/05/23 0855    Order Status: Completed Specimen: Abscess from Back Updated: 07/05/23 1643     Gram Stain Result No WBC's      No organisms seen    Narrative:      Deep Culture of Lumbar Spine    Gram stain [[id number]] Collected: 07/05/23 0851    Order Status: Completed Specimen: Abscess from Back Updated: 07/05/23 1640     Gram Stain Result Rare WBC's      No organisms seen    Narrative:      Superficial Culture of Lumbar Spine    Gram stain [[id number]] Collected: 07/05/23 0959    Order Status: Completed Specimen: Abscess from Back Updated: 07/05/23 1639     Gram Stain Result No WBC's      No organisms seen    Narrative:      Deep #2 culture lumbar spine    Fungus culture [[id number]] Collected: 07/05/23 0959    Order Status: Sent Specimen: Abscess from Back Updated: 07/05/23 1517    Culture, Anaerobe [[id number]] Collected: 07/05/23 0959    Order Status: Sent Specimen: Abscess from Back Updated: 07/05/23 1517    AFB Culture & Smear [[id number]] Collected: 07/05/23 0959    Order Status: Sent Specimen:  Abscess from Back Updated: 07/05/23 1517    Aerobic culture [[id number]] Collected: 07/05/23 0959    Order Status: Sent Specimen: Abscess from Back Updated: 07/05/23 1517    Culture, Anaerobe [[id number]] Collected: 07/05/23 0851    Order Status: Sent Specimen: Abscess from Back Updated: 07/05/23 1515    Aerobic culture [[id number]] Collected: 07/05/23 0851    Order Status: Sent Specimen: Abscess from Back Updated: 07/05/23 1515    AFB Culture & Smear [[id number]] Collected: 07/05/23 0851    Order Status: Sent Specimen: Abscess from Back Updated: 07/05/23 1515    Fungus culture [[id number]] Collected: 07/05/23 0851    Order Status: Sent Specimen: Abscess from Back Updated: 07/05/23 1515    AFB Culture & Smear [[id number]] Collected: 07/05/23 0855    Order Status: Sent Specimen: Abscess from Back Updated: 07/05/23 1514    Fungus culture [[id number]] Collected: 07/05/23 0855    Order Status: Sent Specimen: Abscess from Back Updated: 07/05/23 1514    Culture, Anaerobe [[id number]] Collected: 07/05/23 0855    Order Status: Sent Specimen: Abscess from Back Updated: 07/05/23 1514    Aerobic culture [[id number]] Collected: 07/05/23 0855    Order Status: Sent Specimen: Abscess from Back Updated: 07/05/23 1514

## 2023-07-06 NOTE — PT/OT/SLP PROGRESS
Physical Therapy Treatment    Patient Name:  Abdullahi Urias   MRN:  [id number]    Recommendations:     Discharge Recommendations: home health PT  Discharge Equipment Recommendations: walker, rolling  Barriers to discharge: None    Assessment:     Abdullahi Urias is a 67 y.o. male admitted with a medical diagnosis of Post-operative wound abscess.  He presents with the following impairments/functional limitations: weakness, impaired endurance, impaired functional mobility, impaired balance, pain, decreased safety awareness, gait instability, decreased lower extremity function, decreased coordination.    Rehab Prognosis: Good; patient would benefit from acute skilled PT services to address these deficits and reach maximum level of function.    Recent Surgery: Procedure(s) (LRB):  EXPLORATION, WOUND (Bilateral)  WASHOUT (N/A)  REMOVAL, HARDWARE, SPINE (N/A)  INSERTION (N/A)  REPLACEMENT (N/A) 1 Day Post-Op    Plan:     During this hospitalization, patient to be seen 3 x/week to address the identified rehab impairments via gait training, therapeutic activities, therapeutic exercises and progress toward the following goals:    Plan of Care Expires:  07/19/23    Subjective     Chief Complaint: NONE, EAGER TO WALK  Patient/Family Comments/goals:   Pain/Comfort:  Pain Rating 1: 3/10  Location 1: back  Pain Addressed 1: Reposition, Distraction      Objective:     Communicated with NURSE COHEN prior to session.  Patient found supine with telemetry, peripheral IV, hemovac, BED ALARM upon PT entry to room.     General Precautions: Standard, fall  Orthopedic Precautions: spinal precautions  Braces: N/A (NO LSO BRACE PRESENT)  Respiratory Status: Room air     Functional Mobility:  Bed Mobility:     Rolling Left:  stand by assistance  Scooting: stand by assistance  Supine to Sit: stand by assistance-EDUCATION ON LOG ROLLING  Transfers:     Sit to Stand:  contact guard assistance with rolling walker  Bed to Chair: contact guard assistance  "with  rolling walker  using  Step Transfer  Gait: PT ' WITH RW AND CGA, NO LOB OR SOB ON ROOM AIR  Balance: GOOD SITTING BALANCE, FAIR+ DYNAMIC BALANCE DURING GAIT    AM-PAC 6 CLICK MOBILITY  Turning over in bed (including adjusting bedclothes, sheets and blankets)?: 4  Sitting down on and standing up from a chair with arms (e.g., wheelchair, bedside commode, etc.): 3  Moving from lying on back to sitting on the side of the bed?: 4  Moving to and from a bed to a chair (including a wheelchair)?: 3  Need to walk in hospital room?: 3  Climbing 3-5 steps with a railing?: 1  Basic Mobility Total Score: 18     Treatment & Education:  PT EDUCATION:  - ROLE OF P.T. AND POC IN ACUTE CARE HOSPITAL SETTING  - RW USE AND SAFETY DURING TF'S AND GAIT  - REVIEW SPINAL PRECAUTIONS, LOG ROLLING FOR BED MOBILITY  - ENCOURAGED TO INCREASE TIME OOB IN CHAIR TO TOLERANCE   - TO CONTINUE THERAPUETIC EXERCISES THROUGHOUT THE DAY TO INCREASE ACTIVITY TOLERANCE AND DECREASE RISK FOR PNEUMONIA AND BLOOD CLOTS: HIP FLEX/EXT, HIP ABD/ADD, QUAD SET, HEEL SLIDE, AP  - RISK FOR FALLS DUE TO GENERALIZED WEAKNESS, EDUCATED ON "CALL DON'T FALL", ENCOURAGED TO CALL FOR ASSISTANCE WITH ALL NEEDS SUCH AS BED<>CHAIR TRANSFERS OR TRIPS TO BATHROOM, PT AGREEABLE TO SAFETY PRECAUTIONS    Patient left up in chair with all lines intact, call button in reach, chair alarm on, and NURSE notified..    GOALS:   Multidisciplinary Problems       Physical Therapy Goals          Problem: Physical Therapy    Goal Priority Disciplines Outcome Goal Variances Interventions   Physical Therapy Goal     PT, PT/OT Ongoing, Progressing     Description: Goals to be met by 7/19/23.  Pt will complete bed mobility MOD I.  Pt will complete sit to stand MOD I.  Pt will ambulate 200ft MOD I using RW.                         Time Tracking:     PT Received On: 07/06/23  PT Start Time: 0800     PT Stop Time: 0825  PT Total Time (min): 25 min     Billable Minutes: Gait Training " 10 and Therapeutic Activity 15    Treatment Type: Treatment  PT/PTA: PT     Number of PTA visits since last PT visit: 0     07/06/2023

## 2023-07-06 NOTE — PLAN OF CARE
Problem: Adult Inpatient Plan of Care  Goal: Plan of Care Review  Outcome: Ongoing, Progressing  Goal: Absence of Hospital-Acquired Illness or Injury  Outcome: Ongoing, Progressing  Goal: Readiness for Transition of Care  Outcome: Ongoing, Progressing     Problem: Infection  Goal: Absence of Infection Signs and Symptoms  Outcome: Ongoing, Progressing     Problem: Skin Injury Risk Increased  Goal: Skin Health and Integrity  Outcome: Ongoing, Progressing     Problem: Pain Acute  Goal: Acceptable Pain Control and Functional Ability  Outcome: Ongoing, Progressing

## 2023-07-07 LAB
ANION GAP SERPL CALC-SCNC: 7 MMOL/L (ref 8–16)
BASOPHILS # BLD AUTO: 0.01 K/UL (ref 0–0.2)
BASOPHILS NFR BLD: 0.1 % (ref 0–1.9)
BUN SERPL-MCNC: 26 MG/DL (ref 8–23)
CALCIUM SERPL-MCNC: 9.5 MG/DL (ref 8.7–10.5)
CHLORIDE SERPL-SCNC: 104 MMOL/L (ref 95–110)
CO2 SERPL-SCNC: 24 MMOL/L (ref 23–29)
CREAT SERPL-MCNC: 1.6 MG/DL (ref 0.5–1.4)
DIFFERENTIAL METHOD: ABNORMAL
EOSINOPHIL # BLD AUTO: 0 K/UL (ref 0–0.5)
EOSINOPHIL NFR BLD: 0 % (ref 0–8)
ERYTHROCYTE [DISTWIDTH] IN BLOOD BY AUTOMATED COUNT: 13.9 % (ref 11.5–14.5)
EST. GFR  (NO RACE VARIABLE): 47 ML/MIN/1.73 M^2
GLUCOSE SERPL-MCNC: 121 MG/DL (ref 70–110)
HCT VFR BLD AUTO: 23.1 % (ref 40–54)
HGB BLD-MCNC: 7.5 G/DL (ref 14–18)
IMM GRANULOCYTES # BLD AUTO: 0.04 K/UL (ref 0–0.04)
IMM GRANULOCYTES NFR BLD AUTO: 0.4 % (ref 0–0.5)
LYMPHOCYTES # BLD AUTO: 0.6 K/UL (ref 1–4.8)
LYMPHOCYTES NFR BLD: 5.6 % (ref 18–48)
MCH RBC QN AUTO: 25.6 PG (ref 27–31)
MCHC RBC AUTO-ENTMCNC: 32.5 G/DL (ref 32–36)
MCV RBC AUTO: 79 FL (ref 82–98)
MONOCYTES # BLD AUTO: 0.6 K/UL (ref 0.3–1)
MONOCYTES NFR BLD: 5 % (ref 4–15)
NEUTROPHILS # BLD AUTO: 10.1 K/UL (ref 1.8–7.7)
NEUTROPHILS NFR BLD: 88.9 % (ref 38–73)
NRBC BLD-RTO: 0 /100 WBC
PLATELET # BLD AUTO: 218 K/UL (ref 150–450)
PMV BLD AUTO: 10.5 FL (ref 9.2–12.9)
POTASSIUM SERPL-SCNC: 5.1 MMOL/L (ref 3.5–5.1)
RBC # BLD AUTO: 2.93 M/UL (ref 4.6–6.2)
SODIUM SERPL-SCNC: 135 MMOL/L (ref 136–145)
VANCOMYCIN SERPL-MCNC: 14.9 UG/ML
WBC # BLD AUTO: 11.31 K/UL (ref 3.9–12.7)

## 2023-07-07 PROCEDURE — 96372 THER/PROPH/DIAG INJ SC/IM: CPT | Performed by: PHYSICIAN ASSISTANT

## 2023-07-07 PROCEDURE — 94761 N-INVAS EAR/PLS OXIMETRY MLT: CPT | Mod: HCNC

## 2023-07-07 PROCEDURE — 36415 COLL VENOUS BLD VENIPUNCTURE: CPT | Mod: HCNC | Performed by: HOSPITALIST

## 2023-07-07 PROCEDURE — 63600175 PHARM REV CODE 636 W HCPCS: Mod: HCNC | Performed by: HOSPITALIST

## 2023-07-07 PROCEDURE — 63600175 PHARM REV CODE 636 W HCPCS: Mod: HCNC | Performed by: PHYSICIAN ASSISTANT

## 2023-07-07 PROCEDURE — 63600175 PHARM REV CODE 636 W HCPCS: Mod: HCNC | Performed by: NEUROLOGICAL SURGERY

## 2023-07-07 PROCEDURE — 96376 TX/PRO/DX INJ SAME DRUG ADON: CPT

## 2023-07-07 PROCEDURE — 25000003 PHARM REV CODE 250: Mod: HCNC | Performed by: INTERNAL MEDICINE

## 2023-07-07 PROCEDURE — 97530 THERAPEUTIC ACTIVITIES: CPT | Mod: HCNC,CQ

## 2023-07-07 PROCEDURE — 25000003 PHARM REV CODE 250: Mod: HCNC | Performed by: PHYSICIAN ASSISTANT

## 2023-07-07 PROCEDURE — 94640 AIRWAY INHALATION TREATMENT: CPT | Mod: HCNC,XB

## 2023-07-07 PROCEDURE — 63600175 PHARM REV CODE 636 W HCPCS: Mod: HCNC | Performed by: INTERNAL MEDICINE

## 2023-07-07 PROCEDURE — 25000003 PHARM REV CODE 250: Mod: HCNC | Performed by: HOSPITALIST

## 2023-07-07 PROCEDURE — 80048 BASIC METABOLIC PNL TOTAL CA: CPT | Mod: HCNC | Performed by: PHYSICIAN ASSISTANT

## 2023-07-07 PROCEDURE — 25000242 PHARM REV CODE 250 ALT 637 W/ HCPCS: Mod: HCNC | Performed by: PHYSICIAN ASSISTANT

## 2023-07-07 PROCEDURE — 94799 UNLISTED PULMONARY SVC/PX: CPT | Mod: HCNC

## 2023-07-07 PROCEDURE — 25000003 PHARM REV CODE 250: Mod: HCNC | Performed by: NURSE PRACTITIONER

## 2023-07-07 PROCEDURE — 99900035 HC TECH TIME PER 15 MIN (STAT): Mod: HCNC

## 2023-07-07 PROCEDURE — 85025 COMPLETE CBC W/AUTO DIFF WBC: CPT | Mod: HCNC | Performed by: PHYSICIAN ASSISTANT

## 2023-07-07 PROCEDURE — 97116 GAIT TRAINING THERAPY: CPT | Mod: HCNC,CQ

## 2023-07-07 PROCEDURE — 80202 ASSAY OF VANCOMYCIN: CPT | Mod: HCNC | Performed by: HOSPITALIST

## 2023-07-07 PROCEDURE — G0378 HOSPITAL OBSERVATION PER HR: HCPCS | Mod: HCNC

## 2023-07-07 RX ORDER — SODIUM CHLORIDE 0.9 % (FLUSH) 0.9 %
3 SYRINGE (ML) INJECTION
Status: DISCONTINUED | OUTPATIENT
Start: 2023-07-07 | End: 2023-07-13 | Stop reason: HOSPADM

## 2023-07-07 RX ORDER — SODIUM CHLORIDE 0.9 % (FLUSH) 0.9 %
.1-1 SYRINGE (ML) INJECTION
Status: DISCONTINUED | OUTPATIENT
Start: 2023-07-07 | End: 2023-07-13 | Stop reason: HOSPADM

## 2023-07-07 RX ORDER — DOCUSATE SODIUM 100 MG/1
100 CAPSULE, LIQUID FILLED ORAL DAILY
Status: DISCONTINUED | OUTPATIENT
Start: 2023-07-07 | End: 2023-07-13 | Stop reason: HOSPADM

## 2023-07-07 RX ORDER — SUCRALFATE 1 G/10ML
1 SUSPENSION ORAL ONCE
Status: COMPLETED | OUTPATIENT
Start: 2023-07-07 | End: 2023-07-07

## 2023-07-07 RX ORDER — SODIUM CHLORIDE 9 MG/ML
INJECTION, SOLUTION INTRAVENOUS CONTINUOUS
Status: DISCONTINUED | OUTPATIENT
Start: 2023-07-07 | End: 2023-07-13 | Stop reason: HOSPADM

## 2023-07-07 RX ADMIN — BUDESONIDE INHALATION 0.5 MG: 0.5 SUSPENSION RESPIRATORY (INHALATION) at 09:07

## 2023-07-07 RX ADMIN — ENOXAPARIN SODIUM 40 MG: 40 INJECTION SUBCUTANEOUS at 04:07

## 2023-07-07 RX ADMIN — GABAPENTIN 600 MG: 300 CAPSULE ORAL at 03:07

## 2023-07-07 RX ADMIN — METHOCARBAMOL 500 MG: 500 TABLET ORAL at 09:07

## 2023-07-07 RX ADMIN — PANTOPRAZOLE SODIUM 40 MG: 40 TABLET, DELAYED RELEASE ORAL at 10:07

## 2023-07-07 RX ADMIN — GABAPENTIN 600 MG: 300 CAPSULE ORAL at 09:07

## 2023-07-07 RX ADMIN — GABAPENTIN 600 MG: 300 CAPSULE ORAL at 10:07

## 2023-07-07 RX ADMIN — DEXAMETHASONE SODIUM PHOSPHATE 2 MG: 4 INJECTION INTRA-ARTICULAR; INTRALESIONAL; INTRAMUSCULAR; INTRAVENOUS; SOFT TISSUE at 09:07

## 2023-07-07 RX ADMIN — HYDROMORPHONE HYDROCHLORIDE 2 MG: 1 INJECTION, SOLUTION INTRAMUSCULAR; INTRAVENOUS; SUBCUTANEOUS at 04:07

## 2023-07-07 RX ADMIN — ALUMINA, MAGNESIA, AND SIMETHICONE ORAL SUSPENSION REGULAR STRENGTH 30 ML: 1200; 1200; 120 SUSPENSION ORAL at 03:07

## 2023-07-07 RX ADMIN — CALCIUM CARBONATE (ANTACID) CHEW TAB 500 MG 500 MG: 500 CHEW TAB at 08:07

## 2023-07-07 RX ADMIN — METHOCARBAMOL 500 MG: 500 TABLET ORAL at 01:07

## 2023-07-07 RX ADMIN — VANCOMYCIN HYDROCHLORIDE 1500 MG: 1.5 INJECTION, POWDER, LYOPHILIZED, FOR SOLUTION INTRAVENOUS at 08:07

## 2023-07-07 RX ADMIN — OXYCODONE AND ACETAMINOPHEN 1 TABLET: 10; 325 TABLET ORAL at 01:07

## 2023-07-07 RX ADMIN — OXYCODONE AND ACETAMINOPHEN 1 TABLET: 10; 325 TABLET ORAL at 09:07

## 2023-07-07 RX ADMIN — ALPRAZOLAM 0.5 MG: 0.5 TABLET ORAL at 03:07

## 2023-07-07 RX ADMIN — HYDROMORPHONE HYDROCHLORIDE 2 MG: 1 INJECTION, SOLUTION INTRAMUSCULAR; INTRAVENOUS; SUBCUTANEOUS at 08:07

## 2023-07-07 RX ADMIN — SERTRALINE HYDROCHLORIDE 100 MG: 50 TABLET ORAL at 10:07

## 2023-07-07 RX ADMIN — ATORVASTATIN CALCIUM 80 MG: 40 TABLET, FILM COATED ORAL at 09:07

## 2023-07-07 RX ADMIN — METHOCARBAMOL 500 MG: 500 TABLET ORAL at 04:07

## 2023-07-07 RX ADMIN — SUCRALFATE 1 G: 1 SUSPENSION ORAL at 04:07

## 2023-07-07 RX ADMIN — CEFEPIME 2 G: 2 INJECTION, POWDER, FOR SOLUTION INTRAVENOUS at 06:07

## 2023-07-07 RX ADMIN — HYDROMORPHONE HYDROCHLORIDE 2 MG: 1 INJECTION, SOLUTION INTRAMUSCULAR; INTRAVENOUS; SUBCUTANEOUS at 06:07

## 2023-07-07 RX ADMIN — DOCUSATE SODIUM 100 MG: 100 CAPSULE, LIQUID FILLED ORAL at 09:07

## 2023-07-07 RX ADMIN — DEXAMETHASONE SODIUM PHOSPHATE 2 MG: 4 INJECTION INTRA-ARTICULAR; INTRALESIONAL; INTRAMUSCULAR; INTRAVENOUS; SOFT TISSUE at 10:07

## 2023-07-07 RX ADMIN — PANTOPRAZOLE SODIUM 40 MG: 40 TABLET, DELAYED RELEASE ORAL at 09:07

## 2023-07-07 RX ADMIN — CARVEDILOL 12.5 MG: 12.5 TABLET, FILM COATED ORAL at 10:07

## 2023-07-07 RX ADMIN — BUDESONIDE INHALATION 0.5 MG: 0.5 SUSPENSION RESPIRATORY (INHALATION) at 08:07

## 2023-07-07 RX ADMIN — CARVEDILOL 12.5 MG: 12.5 TABLET, FILM COATED ORAL at 09:07

## 2023-07-07 RX ADMIN — SODIUM CHLORIDE: 9 INJECTION, SOLUTION INTRAVENOUS at 08:07

## 2023-07-07 NOTE — PT/OT/SLP PROGRESS
Physical Therapy      Patient Name:  Abdullahi Urias   MRN:  [id number]    07:40 a.m.  Patient receiving a bath/ being changed at this time. Will follow up at next available opportunity.

## 2023-07-07 NOTE — CARE UPDATE
MRI showed Large subcutaneous fluid collection involving the postoperative bed measuring approximately 11.6 x 28.9 may reflect seroma or abscess or meningocele Additional subcutaneous fluid collection in the region of the laminectomy is multiloculated measuring approximately 2.2 x 14 mm each.  This could relate to abscess, seroma, or meningocele.      Plan - will follow cultures to guide regime  Will add cefepime  Continue Vanco  Full note to follow

## 2023-07-07 NOTE — NURSING
"Pt called nurse's station to report "my tubes came out".  Patient had gotten out of bed and accidentally pulled the superficial hemovac drain out completely.  The deeper drain was just disconnected.  50ml of fluid emptied from deep drain and reconnected.  Dressing applied to site where superficial drain was.  "

## 2023-07-07 NOTE — NURSING
Pt wound started bleeding and saturated the patient's gown and bed sheets. Pressure dressing applied and periwound area cleaned. bleeding seems controled as of now.     Dr. Valencia and Dr. Khan notified.

## 2023-07-07 NOTE — SUBJECTIVE & OBJECTIVE
Review of Systems   All other systems reviewed and are negative.  Objective:     Vital Signs (Most Recent):  Temp: 97 °F (36.1 °C) (07/07/23 1136)  Pulse: 71 (07/07/23 1136)  Resp: 18 (07/07/23 1315)  BP: 122/75 (07/07/23 1136)  SpO2: 99 % (07/07/23 1136) Vital Signs (24h Range):  Temp:  [97 °F (36.1 °C)-98 °F (36.7 °C)] 97 °F (36.1 °C)  Pulse:  [67-81] 71  Resp:  [15-20] 18  SpO2:  [96 %-99 %] 99 %  BP: (112-130)/(58-75) 122/75     Weight: 113.8 kg (250 lb 14.1 oz)  Body mass index is 38.15 kg/m².    Intake/Output Summary (Last 24 hours) at 7/7/2023 1554  Last data filed at 7/7/2023 1132  Gross per 24 hour   Intake --   Output 1500 ml   Net -1500 ml         Physical Exam  Constitutional:       General: He is not in acute distress.     Appearance: Normal appearance. He is obese.   Cardiovascular:      Rate and Rhythm: Normal rate and regular rhythm.      Heart sounds: No murmur heard.  Pulmonary:      Effort: Pulmonary effort is normal. No respiratory distress.      Breath sounds: Normal breath sounds. No wheezing.   Abdominal:      General: There is no distension.      Palpations: Abdomen is soft.      Tenderness: There is no abdominal tenderness.   Musculoskeletal:      Comments: Low back surgical dressing  Hemovac drains in place    Neurological:      Mental Status: He is alert.           Significant Labs: All pertinent labs within the past 24 hours have been reviewed.  BMP:   Recent Labs   Lab 07/07/23 0428   *   *   K 5.1      CO2 24   BUN 26*   CREATININE 1.6*   CALCIUM 9.5     CBC:   Recent Labs   Lab 07/06/23  0259 07/07/23 0428   WBC 8.33 11.31   HGB 8.3* 7.5*   HCT 25.8* 23.1*    218       Significant Imaging: I have reviewed all pertinent imaging results/findings within the past 24 hours.    SURG FL Surgery Fluoro Usage   Final Result      X-Ray Lumbar Spine Ap And Lateral   Final Result      Fluoroscopic guidance utilized for lumbosacral fusion and discectomy.  Please see  procedural dictation for further details.         Electronically signed by: Smith dAams   Date:    07/05/2023   Time:    11:39      MRI Lumbar Spine W WO Cont   Final Result      Patient is status post posterior interbody lumbar fusion from L4 to S1.  Orthopedic hardware appears intact.  Bilateral laminectomies from L4-S1.      Large subcutaneous fluid collection involving the postoperative bed measuring approximately 11.6 x 28.9 may reflect seroma or abscess or meningocele Additional subcutaneous fluid collection in the region of the laminectomy is multiloculated measuring approximately 2.2 x 14 mm each.  This could relate to abscess, seroma, or meningocele.      Metallic interbody spacer at L5-S1 appears more anterior than L4-L5.  Correlate clinically.      Recommend clinical correlation and follow-up.         Electronically signed by: Justus Sood   Date:    07/04/2023   Time:    00:13      X-Ray Chest AP Portable   Final Result      1. The lungs are clear.   2. There is mild cardiomegaly.   3. There are surgical changes associated with a prior sternotomy.   .         Electronically signed by: Sancho Allen MD   Date:    07/03/2023   Time:    11:11      US Lower Extremity Veins Right   Final Result      No evidence of deep venous thrombosis in the right lower extremity.         Electronically signed by: Homer Faulkner   Date:    07/03/2023   Time:    11:02      CT Lumbar Spine Without Contrast   Final Result      Postoperative changes related to a posterior lumbar interbody fusion from L4-S1 with bilateral laminectomies.  Postoperative changes suspected involving the posterior paraspinal musculature.  No definite abnormal postoperative fluid collections.      All CT scans at this facility are performed  using dose modulation techniques as appropriate to performed exam including the following:  automated exposure control; adjustment of mA and/or kV according to the patients size (this includes techniques  or standardized protocols for targeted exams where dose is matched to indication/reason for exam: i.e. extremities or head);  iterative reconstruction technique.         Electronically signed by: Chris Villalba MD   Date:    07/03/2023   Time:    11:31      CT Head Without Contrast   Final Result      1. There is no evidence of an acute ischemic event. There is encephalomalacia in the right occipital lobe   2. There is no intracranial hemorrhage.   All CT scans at this facility use dose modulation, iterative reconstruction, and/or weight base dosing when appropriate to reduce radiation dose when appropriate to reduce radiation dose to as low as reasonably achievable.         Electronically signed by: Sancho Allen MD   Date:    07/03/2023   Time:    10:50

## 2023-07-07 NOTE — PT/OT/SLP PROGRESS
Physical Therapy  Treatment    Abdullahi Urias   MRN: [id number]   Admitting Diagnosis: Post-operative wound abscess    PT Received On: 07/07/23  PT Start Time: 1125     PT Stop Time: 1150    PT Total Time (min): 25 min       Billable Minutes:  Gait Training 10 and Therapeutic Activity 15    Treatment Type: Treatment  PT/PTA: PTA     Number of PTA visits since last PT visit: 1       General Precautions: Standard, fall  Orthopedic Precautions: spinal precautions  Braces:  (LSO AT HOME; CALLED WIFE TO BRING)  Respiratory Status: Room air    Spiritual, Cultural Beliefs, Yazdanism Practices, Values that Affect Care: no    Subjective:  Communicated with patient's nurse, Bernardo, and completed Epic chart review prior to session.  Patient agreed to PT session.     Pain/Comfort  Pain Rating 1: 5/10 (DRAIN SITE)  Location - Side 1: Right  Location - Orientation 1: lower  Location 1: back  Pain Addressed 1: Other (see comments) (ACTIVITY PACING)  Pain Rating Post-Intervention 1: 5/10    Objective:   Patient found with: peripheral IV, telemetry, hemovac    Reviewed spinal precautions and re enforced importance of compliance.     Log roll towards R side: SBA (VC for sequencing of task and compliance with spinal precautions)    Supine > sit EOB: SBA (VC for sequencing of task and compliance with spinal precautions)    Forward scoot towards EOB: SBA    STS from EOB > RW: CGA (VC for hand placement)    400ft w/ RW CGA (intermittent VC for safety w/ RW mgmt especially during turns)    Stand pivot T/F to chair w/ RW: CGA    Completed x15 reps AROM TE to BLE: LAQ, Hip Flex, AP   Intermittent cues given as needed to maintain correct form during repetitions     Educated patient on importance of increased tolerance to upright position and direct impact on CV endurance and strength. Patient encouraged to sit up in chair/ EOB, for a minimum of 2 consecutive hours, 3x per day. Encouraged patient to perform AROM TE to BLE throughout the day  within all available planes of motion. Re enforced importance of utilizing call light to meet needs in room and not attempt to get up without staff assistance. Patient verbalized understanding and agreed to comply.      AM-PAC 6 CLICK MOBILITY  How much help from another person does this patient currently need?   1 = Unable, Total/Dependent Assistance  2 = A lot, Maximum/Moderate Assistance  3 = A little, Minimum/Contact Guard/Supervision  4 = None, Modified [place]/Independent    Turning over in bed (including adjusting bedclothes, sheets and blankets)?: 4  Sitting down on and standing up from a chair with arms (e.g., wheelchair, bedside commode, etc.): 3  Moving from lying on back to sitting on the side of the bed?: 4  Moving to and from a bed to a chair (including a wheelchair)?: 3  Need to walk in hospital room?: 3  Climbing 3-5 steps with a railing?: 1  Basic Mobility Total Score: 18    AM-PAC Raw Score CMS G-Code Modifier Level of Impairment Assistance   6 % Total / Unable   7 - 9 CM 80 - 100% Maximal Assist   10 - 14 CL 60 - 80% Moderate Assist   15 - 19 CK 40 - 60% Moderate Assist   20 - 22 CJ 20 - 40% Minimal Assist   23 CI 1-20% SBA / CGA   24 CH 0% Independent/ Mod I     Patient left up in chair with call button in reach.    Assessment:  Abdullahi Urias is a 67 y.o. male with a medical diagnosis of Post-operative wound abscess and presents with overall decline in functional mobility. Patient would continue to benefit from skilled PT to address functional limitations listed below in order to return to PLOF/decrease caregiver burden. Patient was able to increase overall gait distance during today's session and demonstrated very few unsafe behaviors throughout. Patient is progressing well towards goals established within PT POC.    Rehab identified problem list/impairments: weakness, impaired endurance, impaired sensation, impaired self care skills, impaired functional mobility, impaired balance,  impaired cardiopulmonary response to activity, orthopedic precautions, decreased safety awareness    Rehab potential is fair.    Activity tolerance: Fair    Discharge recommendations: home health PT      Barriers to discharge:      Equipment recommendations: walker, rolling     GOALS:   Multidisciplinary Problems       Physical Therapy Goals          Problem: Physical Therapy    Goal Priority Disciplines Outcome Goal Variances Interventions   Physical Therapy Goal     PT, PT/OT Ongoing, Progressing     Description: Goals to be met by 7/19/23.  Pt will complete bed mobility MOD I.  Pt will complete sit to stand MOD I.  Pt will ambulate 200ft MOD I using RW.                         PLAN:    Patient to be seen 3 x/week to address the above listed problems via gait training, therapeutic activities, therapeutic exercises  Plan of Care expires: 07/19/23  Plan of Care reviewed with: patient         07/07/2023

## 2023-07-07 NOTE — PLAN OF CARE
Pt resting up in bed . Hemovac drain in place. Dressing intact . Hob elavated and patient has been medicated for pain . All orders acknowledged and carried out . Chart check completed . Will continue to monitor .

## 2023-07-07 NOTE — PLAN OF CARE
[facility] worksheet with clinicals and home health orders [facility] to the [facility].       07/07/23 8674   Post-Acute Status   Post-Acute Authorization Home Health   Home Health Status Referrals Sent   Discharge Plan   Discharge Plan A Home Health

## 2023-07-07 NOTE — PLAN OF CARE
CM summoned to the patient's room requesting assistance with getting handicap adjustments to his bath at home.  Advised Mr. Urias that CM on handle discharge plans from the hospital.  He will need to get with Neurosurgeons office or his PCP to get [facility] the [facility].  He insists CM needs to speak with SERGO Kumar with the [facility].  Left message for Cristal.

## 2023-07-07 NOTE — PROGRESS NOTES
Interval History  Doing well   Pain improved   Ambulated with therapy   Denies weakness N/T   Has expected back pain   Senies EARL DZ BV N/V or any other symptoms   Micro NGTD    Incision cdi   MAEW   Dressing replaced   HV with good seal   No drainage       AP   Awaiting cultures   Continue to wean decadron   Continue abx   H/h rec on d/c per PT recs   LSO brace patient did not bring his from home states wife will bring       Thank you for the referral   Please call with any questions    Brandon Valencia MD  Neurosurgery     Disclaimer: This note was prepared using a voice recognition system and is likely to have sound alike errors within the text.    Medications:  Continuous Infusions:   sodium chloride 0.9% 25 mL/hr at 07/07/23 0807     Scheduled Meds:   atorvastatin  80 mg Oral QHS    budesonide  0.5 mg Nebulization Q12H    carvediloL  12.5 mg Oral BID    ceFEPime (MAXIPIME) IVPB  2 g Intravenous Q12H    dexAMETHasone  2 mg Intravenous Q12H    enoxparin  40 mg Subcutaneous Q24H (prophylaxis, 1700)    gabapentin  600 mg Oral TID    methocarbamoL  500 mg Oral QID    pantoprazole  40 mg Oral BID    sertraline  100 mg Oral Daily    vancomycin (VANCOCIN) IV (PEDS and ADULTS)  1,500 mg Intravenous Once     PRN Meds:acetaminophen, albuterol-ipratropium, ALPRAZolam, aluminum-magnesium hydroxide-simethicone, calcium carbonate, dextrose 10%, dextrose 10%, glucagon (human recombinant), glucose, glucose, HYDROmorphone, melatonin, naloxone, ondansetron, oxyCODONE-acetaminophen, oxyCODONE-acetaminophen, prochlorperazine, sodium chloride 0.9%, sodium chloride 0.9%, sodium chloride 0.9%, Pharmacy to dose Vancomycin consult **AND** vancomycin - pharmacy to dose     Review of Systems  Objective:     Weight: 113.8 kg (250 lb 14.1 oz)  Body mass index is 38.15 kg/m².  Vital Signs (Most Recent):  Temp: 97.8 °F (36.6 °C) (07/07/23 0732)  Pulse: 78 (07/07/23 0732)  Resp: 16 (07/07/23 0732)  BP: (!) 119/59 (07/07/23 0732)  SpO2: 96 %  (07/07/23 0732) Vital Signs (24h Range):  Temp:  [97.3 °F (36.3 °C)-98.9 °F (37.2 °C)] 97.8 °F (36.6 °C)  Pulse:  [68-81] 78  Resp:  [15-18] 16  SpO2:  [96 %-100 %] 96 %  BP: (112-130)/(58-70) 119/59                              Closed/Suction Drain 07/05/23 1012 Right Back Accordion 10 Fr. (Active)   Site Description Unable to view 07/06/23 2041   Dressing Type CHG impregnated dressing/sponge 07/06/23 2041   Dressing Status Clean;Dry;Intact 07/06/23 2041   Dressing Intervention Integrity maintained 07/06/23 2041   Drainage Sanguineous 07/06/23 2041   Status Other (Comment) 07/06/23 1705   Output (mL) 5 mL 07/06/23 1357          Physical Exam  Vitals and nursing note reviewed.   Constitutional:       General: He is not in acute distress.     Appearance: He is well-developed and well-nourished. He is not diaphoretic.   Eyes:      Extraocular Movements: EOM normal.      Pupils: Pupils are equal, round, and reactive to light.   Cardiovascular:      Rate and Rhythm: Normal rate and regular rhythm.   Abdominal:      Palpations: Abdomen is soft.   Neurological:      Mental Status: He is alert and oriented to person, place, and time.   Psychiatric:         Mood and Affect: Mood and affect normal.            Physical Exam:  Nursing note and vitals reviewed.    Constitutional: He appears well-developed and well-nourished. He is not diaphoretic. No distress.     Eyes: Pupils are equal, round, and reactive to light. EOM are normal.     Cardiovascular: Normal rate and regular rhythm.     Abdominal: Soft.     Skin: Skin displays no rash on trunk.     Psych/Behavior: He is alert. He is oriented to person, place, and time. He has a normal mood and affect.     Musculoskeletal:        Neck: Range of motion is full. Muscle strength is 5/5. Tone is normal.        Back: Range of motion is full. There is tenderness. Muscle strength is 5/5. Tone is normal.        Right Upper Extremities: There is no tenderness. Tone is normal.         Left Upper Extremities: There is no tenderness. Tone is normal.       Right Lower Extremities: Range of motion is full. There is no tenderness. Muscle strength is 5/5. Tone is normal.        Left Lower Extremities: There is no tenderness. Muscle strength is 5/5. Tone is normal.     Neurological:        Sensory: There is no sensory deficit in the trunk. There is no sensory deficit in the extremities.        DTRs: DTRs are normal.        Cranial nerves: Cranial nerve(s) II, III, IV, V, VI, VII, VIII, IX, X, XI and XII are intact.     Significant Labs:  Recent Labs   Lab 07/06/23  0259 07/07/23  0428   * 121*    135*   K 4.8 5.1    104   CO2 19* 24   BUN 22 26*   CREATININE 1.6* 1.6*   CALCIUM 9.2 9.5     Recent Labs   Lab 07/06/23 0259 07/07/23 0428   WBC 8.33 11.31   HGB 8.3* 7.5*   HCT 25.8* 23.1*    218     No results for input(s): LABPT, INR, APTT in the last 48 hours.  Microbiology Results (last 7 days)       Procedure Component Value Units Date/Time    Fungus culture [[id number]] Collected: 07/05/23 0959    Order Status: Completed Specimen: Abscess from Back Updated: 07/07/23 0714     Fungus (Mycology) Culture Culture in progress    Narrative:      Deep #2 culture lumbar spine    Fungus culture [[id number]] Collected: 07/05/23 0851    Order Status: Completed Specimen: Abscess from Back Updated: 07/07/23 0714     Fungus (Mycology) Culture Culture in progress    Narrative:      Superficial Culture of Lumbar Spine    Fungus culture [[id number]] Collected: 07/05/23 0855    Order Status: Completed Specimen: Abscess from Back Updated: 07/07/23 0714     Fungus (Mycology) Culture Culture in progress    Narrative:      Deep Culture of Lumbar Spine    Blood culture [[id number]] Collected: 07/04/23 1112    Order Status: Completed Specimen: Blood Updated: 07/06/23 2212     Blood Culture, Routine No Growth to date      No Growth to date      No Growth to date    Blood culture [[id number]] Collected:  07/04/23 1111    Order Status: Completed Specimen: Blood Updated: 07/06/23 2212     Blood Culture, Routine No Growth to date      No Growth to date      No Growth to date    AFB Culture & Smear [[id number]] Collected: 07/05/23 0959    Order Status: Completed Specimen: Abscess from Back Updated: 07/06/23 2127     AFB Culture & Smear Culture in progress     AFB CULTURE STAIN No acid fast bacilli seen.    Narrative:      Deep #2 culture lumbar spine    AFB Culture & Smear [[id number]] Collected: 07/05/23 0851    Order Status: Completed Specimen: Abscess from Back Updated: 07/06/23 2127     AFB Culture & Smear Culture in progress     AFB CULTURE STAIN No acid fast bacilli seen.    Narrative:      Superficial Culture of Lumbar Spine    AFB Culture & Smear [[id number]] Collected: 07/05/23 0855    Order Status: Completed Specimen: Abscess from Back Updated: 07/06/23 2127     AFB Culture & Smear Culture in progress     AFB CULTURE STAIN No acid fast bacilli seen.    Narrative:      Deep Culture of Lumbar Spine    Culture, Anaerobe [[id number]] Collected: 07/05/23 0959    Order Status: Completed Specimen: Abscess from Back Updated: 07/06/23 0831     Anaerobic Culture Culture in progress    Narrative:      Deep #2 culture lumbar spine    Culture, Anaerobe [[id number]] Collected: 07/05/23 0851    Order Status: Completed Specimen: Abscess from Back Updated: 07/06/23 0831     Anaerobic Culture Culture in progress    Narrative:      Superficial Culture of Lumbar Spine    Culture, Anaerobe [[id number]] Collected: 07/05/23 0855    Order Status: Completed Specimen: Abscess from Back Updated: 07/06/23 0831     Anaerobic Culture Culture in progress    Narrative:      Deep Culture of Lumbar Spine    Aerobic culture [[id number]] Collected: 07/05/23 0851    Order Status: Completed Specimen: Abscess from Back Updated: 07/06/23 0650     Aerobic Bacterial Culture No growth    Narrative:      Superficial Culture of Lumbar Spine    Aerobic culture  [[id number]] Collected: 07/05/23 0855    Order Status: Completed Specimen: Abscess from Back Updated: 07/06/23 0650     Aerobic Bacterial Culture No growth    Narrative:      Deep Culture of Lumbar Spine    Aerobic culture [[id number]] Collected: 07/05/23 0959    Order Status: Completed Specimen: Abscess from Back Updated: 07/06/23 0650     Aerobic Bacterial Culture No growth    Narrative:      Deep #2 culture lumbar spine    Gram stain [[id number]] Collected: 07/05/23 0855    Order Status: Completed Specimen: Abscess from Back Updated: 07/05/23 1643     Gram Stain Result No WBC's      No organisms seen    Narrative:      Deep Culture of Lumbar Spine    Gram stain [[id number]] Collected: 07/05/23 0851    Order Status: Completed Specimen: Abscess from Back Updated: 07/05/23 1640     Gram Stain Result Rare WBC's      No organisms seen    Narrative:      Superficial Culture of Lumbar Spine    Gram stain [[id number]] Collected: 07/05/23 0959    Order Status: Completed Specimen: Abscess from Back Updated: 07/05/23 1639     Gram Stain Result No WBC's      No organisms seen    Narrative:      Deep #2 culture lumbar spine

## 2023-07-07 NOTE — PROGRESS NOTES
[facility]  Progress Note    Patient Name: Abdullahi Urias  MRN: [id number]  Patient Class: OP- Observation   Admission Date: 7/3/2023  Length of Stay: 0 days  Attending Physician: Lino Khan MD  Primary Care Provider: Reji Valentin MD        Subjective:     Principal Problem:Post-operative wound abscess        HPI:  Mr. Urias is a 67-year-old  male with PMH significant for aortic stenosis with bioprosthetic aortic valve replacement, diastolic CHF, CKD stage 3, HTN, prostate cancer and recent lumbar spine surgery on 5/17 with Dr. Valencia who was sent from neurosurgery office with complaints of RLE pain, weakness and oozing from surgical incision site.  Per patient and wife, he was doing fine till two days ago when he went on a long car ride to [place] and he started having lower back pain.  Yesterday morning he was able to shower but then was unable to get dressed by himself d/t the pain and new onset right lower extremity weakness.  Complains of pain from right hip all the way down leg.  Symtoms are constant and moderate in severity, no relieving or exacerbating factors.  Pain 101/10.  Associated symptoms are right lower extremity weakness and occassional dyspnea.  Patient denies any CP, leg swelling, fever/chills, urinary or fecal incontinence.   Lab work remarkable for Sed Rate 55, Creatinine 1.6.  MRI showed Large subcutaneous fluid collection involving the postoperative bed measuring approximately 11.6 x 28.9 may reflect seroma or abscess or meningocele Additional subcutaneous fluid collection in the region of the laminectomy is multiloculated measuring approximately 2.2 x 14 mm each.  This could relate to abscess, seroma, or meningocele.   Case was discussed with neurosurgery, ERIK Gillette who discussed with Dr. Valencia.  will admit patient to observation for post op fluid collection, r/o abscess.  Blood cultures ordered, holding Abx at this time as  patient is febrile and normal WBC.  Per neurosurgery recs will hold ASA/plavix at this time, likely washout tomorrow morning.     Code Status Full  Surrogate Decision Maker wife Vinita      Overview/Hospital Course:      7/5/23  Patient examined post-op this afternoon, wife at bedside  Reports pain in low back, controlled at this time  Discussed with RN at bedside; surgical incision dressing slightly saturated, 2 drain in place    7/6/23  NAEON, patient reports indigestion, pain controlled  Cultures pending, continue vancomycin  NSGY following, appreciate reccs    7/7/23  NAEON, pain controlled, reports indigestion  No relief with Tums, one time dose of sucralfate, monitor response        Review of Systems   All other systems reviewed and are negative.  Objective:     Vital Signs (Most Recent):  Temp: 97 °F (36.1 °C) (07/07/23 1136)  Pulse: 71 (07/07/23 1136)  Resp: 18 (07/07/23 1315)  BP: 122/75 (07/07/23 1136)  SpO2: 99 % (07/07/23 1136) Vital Signs (24h Range):  Temp:  [97 °F (36.1 °C)-98 °F (36.7 °C)] 97 °F (36.1 °C)  Pulse:  [67-81] 71  Resp:  [15-20] 18  SpO2:  [96 %-99 %] 99 %  BP: (112-130)/(58-75) 122/75     Weight: 113.8 kg (250 lb 14.1 oz)  Body mass index is 38.15 kg/m².    Intake/Output Summary (Last 24 hours) at 7/7/2023 1554  Last data filed at 7/7/2023 1132  Gross per 24 hour   Intake --   Output 1500 ml   Net -1500 ml         Physical Exam  Constitutional:       General: He is not in acute distress.     Appearance: Normal appearance. He is obese.   Cardiovascular:      Rate and Rhythm: Normal rate and regular rhythm.      Heart sounds: No murmur heard.  Pulmonary:      Effort: Pulmonary effort is normal. No respiratory distress.      Breath sounds: Normal breath sounds. No wheezing.   Abdominal:      General: There is no distension.      Palpations: Abdomen is soft.      Tenderness: There is no abdominal tenderness.   Musculoskeletal:      Comments: Low back surgical dressing  Hemovac drains in place     Neurological:      Mental Status: He is alert.           Significant Labs: All pertinent labs within the past 24 hours have been reviewed.  BMP:   Recent Labs   Lab 07/07/23 0428   *   *   K 5.1      CO2 24   BUN 26*   CREATININE 1.6*   CALCIUM 9.5     CBC:   Recent Labs   Lab 07/06/23  0259 07/07/23 0428   WBC 8.33 11.31   HGB 8.3* 7.5*   HCT 25.8* 23.1*    218       Significant Imaging: I have reviewed all pertinent imaging results/findings within the past 24 hours.    SURG FL Surgery Fluoro Usage   Final Result      X-Ray Lumbar Spine Ap And Lateral   Final Result      Fluoroscopic guidance utilized for lumbosacral fusion and discectomy.  Please see procedural dictation for further details.         Electronically signed by: Smith Adams   Date:    07/05/2023   Time:    11:39      MRI Lumbar Spine W WO Cont   Final Result      Patient is status post posterior interbody lumbar fusion from L4 to S1.  Orthopedic hardware appears intact.  Bilateral laminectomies from L4-S1.      Large subcutaneous fluid collection involving the postoperative bed measuring approximately 11.6 x 28.9 may reflect seroma or abscess or meningocele Additional subcutaneous fluid collection in the region of the laminectomy is multiloculated measuring approximately 2.2 x 14 mm each.  This could relate to abscess, seroma, or meningocele.      Metallic interbody spacer at L5-S1 appears more anterior than L4-L5.  Correlate clinically.      Recommend clinical correlation and follow-up.         Electronically signed by: Justus Sood   Date:    07/04/2023   Time:    00:13      X-Ray Chest AP Portable   Final Result      1. The lungs are clear.   2. There is mild cardiomegaly.   3. There are surgical changes associated with a prior sternotomy.   .         Electronically signed by: Sancho Allen MD   Date:    07/03/2023   Time:    11:11      US Lower Extremity Veins Right   Final Result      No evidence of deep  venous thrombosis in the right lower extremity.         Electronically signed by: Homer Faulkner   Date:    07/03/2023   Time:    11:02      CT Lumbar Spine Without Contrast   Final Result      Postoperative changes related to a posterior lumbar interbody fusion from L4-S1 with bilateral laminectomies.  Postoperative changes suspected involving the posterior paraspinal musculature.  No definite abnormal postoperative fluid collections.      All CT scans at this facility are performed  using dose modulation techniques as appropriate to performed exam including the following:  automated exposure control; adjustment of mA and/or kV according to the patients size (this includes techniques or standardized protocols for targeted exams where dose is matched to indication/reason for exam: i.e. extremities or head);  iterative reconstruction technique.         Electronically signed by: Chris Villalba MD   Date:    07/03/2023   Time:    11:31      CT Head Without Contrast   Final Result      1. There is no evidence of an acute ischemic event. There is encephalomalacia in the right occipital lobe   2. There is no intracranial hemorrhage.   All CT scans at this facility use dose modulation, iterative reconstruction, and/or weight base dosing when appropriate to reduce radiation dose when appropriate to reduce radiation dose to as low as reasonably achievable.         Electronically signed by: Sancho Allen MD   Date:    07/03/2023   Time:    10:50              Assessment/Plan:      * Post-operative wound abscess  Patient with recent lumbar fusion L4-S1 (5/17/23)  Presents with RLE weakness, pain  MRI revealed two fluid collections l-spine, abscess versus seroma versus meningocele   US RLE negative for DVT  Blood cultures pending    S/p washout and antibiotic bead placement today (7/5/23)  Operative cultures obtained  ID consulted  Continue vancomycin  Multimodal pain control  Hold ASA/Plavix  Neurosurgery following    CKD  (chronic kidney disease) stage 3, GFR 30-59 ml/min  Cr slightly above baseline this AM (1.9)  Cr ranges from 1.4-1.7 over the last year  Will start short course of post-op IV fluids  Strict I/O's  Avoid nephrotoxins  AM labs    Cardiomyopathy  Cuvolemic at exam  Continue BB  Monitor fluid status closely, strict I/O     Echo    Result Date: 9/12/2022  · The left ventricle is normal in size with normal systolic function.  · The estimated ejection fraction is 65%.  · Grade I left ventricular diastolic dysfunction.  · Normal right ventricular size with normal right ventricular systolic   function.  · There is a bioprosthetic aortic valve present. There is mild   paravalvular aortic insufficiency present.  · The aortic valve mean gradient is 14 mmHg with a dimensionless index of   0.57.  · Mild tricuspid regurgitation.  · The estimated PA systolic pressure is 33 mmHg.           S/P AVR (aortic valve replacement) biopresthetic miller 25 mm in 2014         CAD (coronary artery disease)  Holding asa/plavix at this time  S/p washout today, restart when okay per Neurosurgery  Continue statin    Hypertension  BP controled, continue BB (carvedilol)  Holding home ARB at this time (losartan)    GERD (gastroesophageal reflux disease)  PPI     Moderate persistent asthma without complication  Presented with dyspnea to ED, US RLE negative for DVT, o2 sats stable on room air, not tachycardiac, low concern for PE  No wheezing on exam   Continue Pulmicort, wife may bring home trelegy inhaler  Duonebs PRN    CHEO on CPAP  Continue cpap nightly     Class 2 severe obesity due to excess calories with serious comorbidity and body mass index (BMI) of 38.0 to 38.9 in adult  Body mass index is 37.48 kg/m². Morbid obesity complicates all aspects of disease management from diagnostic modalities to treatment. Weight loss encouraged and health benefits explained to patient.       VTE Risk Mitigation (From admission, onward)         Ordered      enoxaparin injection 40 mg  Every 24 hours         07/06/23 1412     IP VTE HIGH RISK PATIENT  Once         07/05/23 1302     Place sequential compression device  Until discontinued         07/05/23 1302     Place sequential compression device  Until discontinued         07/05/23 1137     Place sequential compression device  Until discontinued         07/04/23 0900                Discharge Planning   AURELIA:      Code Status: Full Code   Is the patient medically ready for discharge?:     Reason for patient still in hospital (select all that apply): Patient trending condition, Laboratory test, Treatment, Consult recommendations and Pending disposition  Discharge Plan A: Home Health                  Lino Khan MD  Department of Hospital Medicine   [facility] [facility] ([facility])

## 2023-07-07 NOTE — PROGRESS NOTES
Pharmacokinetic Assessment Follow Up: IV Vancomycin    Vancomycin serum concentration assessment(s):    The random level was drawn correctly and can be used to guide therapy at this time. The measurement is within the desired definitive target range of 10 to 15 mcg/mL.    Vancomycin Regimen Plan:    Continue pulse dosing regimen with a one time dose of vancomycin 1500 mg.    Re-dose when the random level is less than 15 mcg/mL, next level to be drawn at 0600 on 07/08    Drug levels (last 3 results):  Recent Labs   Lab Result Units 07/06/23 0259 07/07/23 0428   Vancomycin, Random ug/mL 12.8 14.9       Pharmacy will continue to follow and monitor vancomycin.    Please contact pharmacy at extension [phone] for questions regarding this assessment.    Thank you for the consult,   Kimberly Gupta       Patient brief summary:  Abdullahi Urias is a 67 y.o. male initiated on antimicrobial therapy with IV Vancomycin for treatment of skin & soft tissue infection    The patient's current regimen is pulse dosing.    Drug Allergies:   Review of patient's allergies indicates:  No Known Allergies    Actual Body Weight:   113.8 kg    Renal Function:   Estimated Creatinine Clearance: 54.9 mL/min (A) (based on SCr of 1.6 mg/dL (H)).,     Dialysis Method (if applicable):  N/A    CBC (last 72 hours):  Recent Labs   Lab Result Units 07/05/23  0431 07/06/23 0259 07/07/23  0428   WBC K/uL 5.86 8.33 11.31   Hemoglobin g/dL 9.6* 8.3* 7.5*   Hematocrit % 29.5* 25.8* 23.1*   Platelets K/uL 235 206 218   Gran % % 66.5 89.9* 88.9*   Lymph % % 19.6 6.7* 5.6*   Mono % % 10.4 2.9* 5.0   Eosinophil % % 2.7 0.0 0.0   Basophil % % 0.5 0.1 0.1   Differential Method  Automated Automated Automated       Metabolic Panel (last 72 hours):  Recent Labs   Lab Result Units 07/05/23  0431 07/06/23 0259 07/07/23  0428   Sodium mmol/L 137 138 135*   Potassium mmol/L 4.3 4.8 5.1   Chloride mmol/L 104 105 104   CO2 mmol/L 23 19* 24   Glucose mg/dL 96 138* 121*   BUN  mg/dL 22 22 26*   Creatinine mg/dL 1.9* 1.6* 1.6*       Vancomycin Administrations:  vancomycin given in the last 96 hours                     vancomycin 1.75 g in 5 % dextrose 500 mL IVPB (mg) 1,750 mg New Bag 07/06/23 0445    vancomycin injection (g) 1 g Given 07/05/23 1010     1 g Given  0836     1 g Given  0820    vancomycin 2 g in dextrose 5 % 500 mL IVPB (mg) 2,000 mg Given 07/05/23 0858                    Microbiologic Results:  Microbiology Results (last 7 days)       Procedure Component Value Units Date/Time    Blood culture [[id number]] Collected: 07/04/23 1112    Order Status: Completed Specimen: Blood Updated: 07/06/23 2212     Blood Culture, Routine No Growth to date      No Growth to date      No Growth to date    Blood culture [[id number]] Collected: 07/04/23 1111    Order Status: Completed Specimen: Blood Updated: 07/06/23 2212     Blood Culture, Routine No Growth to date      No Growth to date      No Growth to date    AFB Culture & Smear [[id number]] Collected: 07/05/23 0959    Order Status: Completed Specimen: Abscess from Back Updated: 07/06/23 2127     AFB Culture & Smear Culture in progress     AFB CULTURE STAIN No acid fast bacilli seen.    Narrative:      Deep #2 culture lumbar spine    AFB Culture & Smear [[id number]] Collected: 07/05/23 0851    Order Status: Completed Specimen: Abscess from Back Updated: 07/06/23 2127     AFB Culture & Smear Culture in progress     AFB CULTURE STAIN No acid fast bacilli seen.    Narrative:      Superficial Culture of Lumbar Spine    AFB Culture & Smear [[id number]] Collected: 07/05/23 0855    Order Status: Completed Specimen: Abscess from Back Updated: 07/06/23 2127     AFB Culture & Smear Culture in progress     AFB CULTURE STAIN No acid fast bacilli seen.    Narrative:      Deep Culture of Lumbar Spine    Culture, Anaerobe [[id number]] Collected: 07/05/23 0959    Order Status: Completed Specimen: Abscess from Back Updated: 07/06/23 0831     Anaerobic Culture  Culture in progress    Narrative:      Deep #2 culture lumbar spine    Culture, Anaerobe [[id number]] Collected: 07/05/23 0851    Order Status: Completed Specimen: Abscess from Back Updated: 07/06/23 0831     Anaerobic Culture Culture in progress    Narrative:      Superficial Culture of Lumbar Spine    Culture, Anaerobe [[id number]] Collected: 07/05/23 0855    Order Status: Completed Specimen: Abscess from Back Updated: 07/06/23 0831     Anaerobic Culture Culture in progress    Narrative:      Deep Culture of Lumbar Spine    Aerobic culture [[id number]] Collected: 07/05/23 0851    Order Status: Completed Specimen: Abscess from Back Updated: 07/06/23 0650     Aerobic Bacterial Culture No growth    Narrative:      Superficial Culture of Lumbar Spine    Aerobic culture [[id number]] Collected: 07/05/23 0855    Order Status: Completed Specimen: Abscess from Back Updated: 07/06/23 0650     Aerobic Bacterial Culture No growth    Narrative:      Deep Culture of Lumbar Spine    Aerobic culture [[id number]] Collected: 07/05/23 0959    Order Status: Completed Specimen: Abscess from Back Updated: 07/06/23 0650     Aerobic Bacterial Culture No growth    Narrative:      Deep #2 culture lumbar spine    Gram stain [[id number]] Collected: 07/05/23 0855    Order Status: Completed Specimen: Abscess from Back Updated: 07/05/23 1643     Gram Stain Result No WBC's      No organisms seen    Narrative:      Deep Culture of Lumbar Spine    Gram stain [[id number]] Collected: 07/05/23 0851    Order Status: Completed Specimen: Abscess from Back Updated: 07/05/23 1640     Gram Stain Result Rare WBC's      No organisms seen    Narrative:      Superficial Culture of Lumbar Spine    Gram stain [[id number]] Collected: 07/05/23 0959    Order Status: Completed Specimen: Abscess from Back Updated: 07/05/23 1639     Gram Stain Result No WBC's      No organisms seen    Narrative:      Deep #2 culture lumbar spine    Fungus culture [[id number]] Collected:  07/05/23 0959    Order Status: Sent Specimen: Abscess from Back Updated: 07/05/23 1517    Fungus culture [[id number]] Collected: 07/05/23 0851    Order Status: Sent Specimen: Abscess from Back Updated: 07/05/23 1515    Fungus culture [[id number]] Collected: 07/05/23 0855    Order Status: Sent Specimen: Abscess from Back Updated: 07/05/23 1514

## 2023-07-08 PROBLEM — D62 ABLA (ACUTE BLOOD LOSS ANEMIA): Status: ACTIVE | Noted: 2023-07-08

## 2023-07-08 LAB
ANION GAP SERPL CALC-SCNC: 7 MMOL/L (ref 8–16)
BACTERIA SPEC AEROBE CULT: NO GROWTH
BASOPHILS # BLD AUTO: 0 K/UL (ref 0–0.2)
BASOPHILS NFR BLD: 0 % (ref 0–1.9)
BUN SERPL-MCNC: 27 MG/DL (ref 8–23)
CALCIUM SERPL-MCNC: 9.2 MG/DL (ref 8.7–10.5)
CHLORIDE SERPL-SCNC: 104 MMOL/L (ref 95–110)
CO2 SERPL-SCNC: 25 MMOL/L (ref 23–29)
CREAT SERPL-MCNC: 1.5 MG/DL (ref 0.5–1.4)
DIFFERENTIAL METHOD: ABNORMAL
EOSINOPHIL # BLD AUTO: 0 K/UL (ref 0–0.5)
EOSINOPHIL NFR BLD: 0.1 % (ref 0–8)
ERYTHROCYTE [DISTWIDTH] IN BLOOD BY AUTOMATED COUNT: 14.1 % (ref 11.5–14.5)
EST. GFR  (NO RACE VARIABLE): 51 ML/MIN/1.73 M^2
GLUCOSE SERPL-MCNC: 122 MG/DL (ref 70–110)
HCT VFR BLD AUTO: 23.5 % (ref 40–54)
HGB BLD-MCNC: 7.5 G/DL (ref 14–18)
IMM GRANULOCYTES # BLD AUTO: 0.08 K/UL (ref 0–0.04)
IMM GRANULOCYTES NFR BLD AUTO: 1 % (ref 0–0.5)
LYMPHOCYTES # BLD AUTO: 0.9 K/UL (ref 1–4.8)
LYMPHOCYTES NFR BLD: 11.3 % (ref 18–48)
MCH RBC QN AUTO: 25.5 PG (ref 27–31)
MCHC RBC AUTO-ENTMCNC: 31.9 G/DL (ref 32–36)
MCV RBC AUTO: 80 FL (ref 82–98)
MONOCYTES # BLD AUTO: 0.4 K/UL (ref 0.3–1)
MONOCYTES NFR BLD: 4.6 % (ref 4–15)
NEUTROPHILS # BLD AUTO: 6.9 K/UL (ref 1.8–7.7)
NEUTROPHILS NFR BLD: 83 % (ref 38–73)
NRBC BLD-RTO: 0 /100 WBC
PLATELET # BLD AUTO: 227 K/UL (ref 150–450)
PMV BLD AUTO: 10.3 FL (ref 9.2–12.9)
POTASSIUM SERPL-SCNC: 4.9 MMOL/L (ref 3.5–5.1)
RBC # BLD AUTO: 2.94 M/UL (ref 4.6–6.2)
SODIUM SERPL-SCNC: 136 MMOL/L (ref 136–145)
VANCOMYCIN SERPL-MCNC: 15.4 UG/ML
WBC # BLD AUTO: 8.25 K/UL (ref 3.9–12.7)

## 2023-07-08 PROCEDURE — 63600175 PHARM REV CODE 636 W HCPCS: Mod: HCNC | Performed by: HOSPITALIST

## 2023-07-08 PROCEDURE — 25000003 PHARM REV CODE 250: Mod: HCNC | Performed by: PHYSICIAN ASSISTANT

## 2023-07-08 PROCEDURE — 25000003 PHARM REV CODE 250: Mod: HCNC | Performed by: INTERNAL MEDICINE

## 2023-07-08 PROCEDURE — 97530 THERAPEUTIC ACTIVITIES: CPT | Mod: HCNC

## 2023-07-08 PROCEDURE — 36415 COLL VENOUS BLD VENIPUNCTURE: CPT | Mod: HCNC | Performed by: HOSPITALIST

## 2023-07-08 PROCEDURE — 25000242 PHARM REV CODE 250 ALT 637 W/ HCPCS: Mod: HCNC | Performed by: PHYSICIAN ASSISTANT

## 2023-07-08 PROCEDURE — 25000003 PHARM REV CODE 250: Mod: HCNC | Performed by: NEUROLOGICAL SURGERY

## 2023-07-08 PROCEDURE — 94761 N-INVAS EAR/PLS OXIMETRY MLT: CPT | Mod: HCNC

## 2023-07-08 PROCEDURE — 80202 ASSAY OF VANCOMYCIN: CPT | Mod: HCNC | Performed by: HOSPITALIST

## 2023-07-08 PROCEDURE — 63600175 PHARM REV CODE 636 W HCPCS: Mod: HCNC | Performed by: INTERNAL MEDICINE

## 2023-07-08 PROCEDURE — 25000003 PHARM REV CODE 250: Mod: HCNC | Performed by: NURSE PRACTITIONER

## 2023-07-08 PROCEDURE — 99900035 HC TECH TIME PER 15 MIN (STAT): Mod: HCNC

## 2023-07-08 PROCEDURE — 96372 THER/PROPH/DIAG INJ SC/IM: CPT | Performed by: PHYSICIAN ASSISTANT

## 2023-07-08 PROCEDURE — 94640 AIRWAY INHALATION TREATMENT: CPT | Mod: HCNC

## 2023-07-08 PROCEDURE — G0378 HOSPITAL OBSERVATION PER HR: HCPCS | Mod: HCNC

## 2023-07-08 PROCEDURE — 85025 COMPLETE CBC W/AUTO DIFF WBC: CPT | Mod: HCNC | Performed by: HOSPITALIST

## 2023-07-08 PROCEDURE — 25000003 PHARM REV CODE 250: Mod: HCNC | Performed by: HOSPITALIST

## 2023-07-08 PROCEDURE — 97116 GAIT TRAINING THERAPY: CPT | Mod: HCNC

## 2023-07-08 PROCEDURE — 80048 BASIC METABOLIC PNL TOTAL CA: CPT | Mod: HCNC | Performed by: HOSPITALIST

## 2023-07-08 PROCEDURE — 63600175 PHARM REV CODE 636 W HCPCS: Mod: HCNC | Performed by: PHYSICIAN ASSISTANT

## 2023-07-08 RX ORDER — POLYETHYLENE GLYCOL 3350 17 G/17G
17 POWDER, FOR SOLUTION ORAL DAILY
Status: DISCONTINUED | OUTPATIENT
Start: 2023-07-08 | End: 2023-07-13 | Stop reason: HOSPADM

## 2023-07-08 RX ADMIN — GABAPENTIN 600 MG: 300 CAPSULE ORAL at 08:07

## 2023-07-08 RX ADMIN — CARVEDILOL 12.5 MG: 12.5 TABLET, FILM COATED ORAL at 08:07

## 2023-07-08 RX ADMIN — ALPRAZOLAM 0.5 MG: 0.5 TABLET ORAL at 08:07

## 2023-07-08 RX ADMIN — BUDESONIDE INHALATION 0.5 MG: 0.5 SUSPENSION RESPIRATORY (INHALATION) at 07:07

## 2023-07-08 RX ADMIN — GABAPENTIN 600 MG: 300 CAPSULE ORAL at 04:07

## 2023-07-08 RX ADMIN — VANCOMYCIN HYDROCHLORIDE 1250 MG: 1.25 INJECTION, POWDER, LYOPHILIZED, FOR SOLUTION INTRAVENOUS at 08:07

## 2023-07-08 RX ADMIN — ATORVASTATIN CALCIUM 80 MG: 40 TABLET, FILM COATED ORAL at 08:07

## 2023-07-08 RX ADMIN — HYDROMORPHONE HYDROCHLORIDE 2 MG: 1 INJECTION, SOLUTION INTRAMUSCULAR; INTRAVENOUS; SUBCUTANEOUS at 08:07

## 2023-07-08 RX ADMIN — PANTOPRAZOLE SODIUM 40 MG: 40 TABLET, DELAYED RELEASE ORAL at 08:07

## 2023-07-08 RX ADMIN — SERTRALINE HYDROCHLORIDE 100 MG: 50 TABLET ORAL at 08:07

## 2023-07-08 RX ADMIN — ENOXAPARIN SODIUM 40 MG: 40 INJECTION SUBCUTANEOUS at 04:07

## 2023-07-08 RX ADMIN — METHOCARBAMOL 500 MG: 500 TABLET ORAL at 08:07

## 2023-07-08 RX ADMIN — BUDESONIDE INHALATION 0.5 MG: 0.5 SUSPENSION RESPIRATORY (INHALATION) at 08:07

## 2023-07-08 RX ADMIN — HYDROMORPHONE HYDROCHLORIDE 2 MG: 1 INJECTION, SOLUTION INTRAMUSCULAR; INTRAVENOUS; SUBCUTANEOUS at 09:07

## 2023-07-08 RX ADMIN — CEFEPIME 2 G: 2 INJECTION, POWDER, FOR SOLUTION INTRAVENOUS at 06:07

## 2023-07-08 RX ADMIN — HYDROMORPHONE HYDROCHLORIDE 2 MG: 1 INJECTION, SOLUTION INTRAMUSCULAR; INTRAVENOUS; SUBCUTANEOUS at 04:07

## 2023-07-08 RX ADMIN — ALPRAZOLAM 0.5 MG: 0.5 TABLET ORAL at 12:07

## 2023-07-08 RX ADMIN — POLYETHYLENE GLYCOL 3350 17 G: 17 POWDER, FOR SOLUTION ORAL at 08:07

## 2023-07-08 RX ADMIN — OXYCODONE AND ACETAMINOPHEN 1 TABLET: 10; 325 TABLET ORAL at 12:07

## 2023-07-08 RX ADMIN — METHOCARBAMOL 500 MG: 500 TABLET ORAL at 04:07

## 2023-07-08 RX ADMIN — CEFEPIME 2 G: 2 INJECTION, POWDER, FOR SOLUTION INTRAVENOUS at 05:07

## 2023-07-08 RX ADMIN — METHOCARBAMOL 500 MG: 500 TABLET ORAL at 12:07

## 2023-07-08 RX ADMIN — DOCUSATE SODIUM 100 MG: 100 CAPSULE, LIQUID FILLED ORAL at 08:07

## 2023-07-08 RX ADMIN — OXYCODONE AND ACETAMINOPHEN 1 TABLET: 7.5; 325 TABLET ORAL at 11:07

## 2023-07-08 NOTE — PROGRESS NOTES
[facility] Medicine  Progress Note    Patient Name: Abdullahi Urias  MRN: [id number]  Patient Class: OP- Observation   Admission Date: 7/3/2023  Length of Stay: 0 days  Attending Physician: Lino Khan MD  Primary Care Provider: Reji Valentin MD        Subjective:     Principal Problem:Post-operative wound abscess        HPI:  Mr. Urias is a 67-year-old  male with PMH significant for aortic stenosis with bioprosthetic aortic valve replacement, diastolic CHF, CKD stage 3, HTN, prostate cancer and recent lumbar spine surgery on 5/17 with Dr. Valencia who was sent from neurosurgery office with complaints of RLE pain, weakness and oozing from surgical incision site.  Per patient and wife, he was doing fine till two days ago when he went on a long car ride to [employer] and he started having lower back pain.  Yesterday morning he was able to shower but then was unable to get dressed by himself d/t the pain and new onset right lower extremity weakness.  Complains of pain from right hip all the way down leg.  Symtoms are constant and moderate in severity, no relieving or exacerbating factors.  Pain 101/10.  Associated symptoms are right lower extremity weakness and occassional dyspnea.  Patient denies any CP, leg swelling, fever/chills, urinary or fecal incontinence.   Lab work remarkable for Sed Rate 55, Creatinine 1.6.  MRI showed Large subcutaneous fluid collection involving the postoperative bed measuring approximately 11.6 x 28.9 may reflect seroma or abscess or meningocele Additional subcutaneous fluid collection in the region of the laminectomy is multiloculated measuring approximately 2.2 x 14 mm each.  This could relate to abscess, seroma, or meningocele.   Case was discussed with neurosurgery, ERIK Gillette who discussed with Dr. Valencia.  will admit patient to observation for post op fluid collection, r/o abscess.  Blood cultures ordered, holding Abx at this time as  patient is febrile and normal WBC.  Per neurosurgery recs will hold ASA/plavix at this time, likely washout tomorrow morning.     Code Status Full  Surrogate Decision Maker wife Vinita      Overview/Hospital Course:      7/5/23  Patient examined post-op this afternoon, wife at bedside  Reports pain in low back, controlled at this time  Discussed with RN at bedside; surgical incision dressing slightly saturated, 2 drain in place    7/6/23  NAEON, patient reports indigestion, pain controlled  Cultures pending, continue vancomycin  NSGY following, appreciate reccs    7/7/23  NAEON, pain controlled, reports indigestion  No relief with Tums, one time dose of sucralfate, monitor response    7/8/23  NAEON, pain controlled, no new issues        Review of Systems   All other systems reviewed and are negative.  Objective:     Vital Signs (Most Recent):  Temp: 97.6 °F (36.4 °C) (07/08/23 1059)  Pulse: 71 (07/08/23 1059)  Resp: 18 (07/08/23 1109)  BP: 122/69 (07/08/23 1059)  SpO2: 98 % (07/08/23 1059) Vital Signs (24h Range):  Temp:  [96.8 °F (36 °C)-98.6 °F (37 °C)] 97.6 °F (36.4 °C)  Pulse:  [62-76] 71  Resp:  [16-19] 18  SpO2:  [96 %-100 %] 98 %  BP: (119-131)/(66-81) 122/69     Weight: 114 kg (251 lb 5.2 oz)  Body mass index is 38.21 kg/m².    Intake/Output Summary (Last 24 hours) at 7/8/2023 1211  Last data filed at 7/8/2023 0925  Gross per 24 hour   Intake 120 ml   Output 1105 ml   Net -985 ml         Physical Exam  Constitutional:       General: He is not in acute distress.     Appearance: Normal appearance.   Cardiovascular:      Rate and Rhythm: Normal rate and regular rhythm.      Heart sounds: No murmur heard.  Pulmonary:      Effort: Pulmonary effort is normal. No respiratory distress.      Breath sounds: Normal breath sounds. No wheezing.   Abdominal:      General: There is no distension.      Palpations: Abdomen is soft.      Tenderness: There is no abdominal tenderness.   Musculoskeletal:      Comments: Low back  surgical dressing c/d/I  Drain in place   Neurological:      Mental Status: He is alert.           Significant Labs: All pertinent labs within the past 24 hours have been reviewed.  CBC:   Recent Labs   Lab 07/07/23 0428 07/08/23 0619   WBC 11.31 8.25   HGB 7.5* 7.5*   HCT 23.1* 23.5*    227     CMP:   Recent Labs   Lab 07/07/23 0428 07/08/23 0619   * 136   K 5.1 4.9    104   CO2 24 25   * 122*   BUN 26* 27*   CREATININE 1.6* 1.5*   CALCIUM 9.5 9.2   ANIONGAP 7* 7*       Significant Imaging: I have reviewed all pertinent imaging results/findings within the past 24 hours.    SURG FL Surgery Fluoro Usage   Final Result      X-Ray Lumbar Spine Ap And Lateral   Final Result      Fluoroscopic guidance utilized for lumbosacral fusion and discectomy.  Please see procedural dictation for further details.         Electronically signed by: Smith Adams   Date:    07/05/2023   Time:    11:39      MRI Lumbar Spine W WO Cont   Final Result      Patient is status post posterior interbody lumbar fusion from L4 to S1.  Orthopedic hardware appears intact.  Bilateral laminectomies from L4-S1.      Large subcutaneous fluid collection involving the postoperative bed measuring approximately 11.6 x 28.9 may reflect seroma or abscess or meningocele Additional subcutaneous fluid collection in the region of the laminectomy is multiloculated measuring approximately 2.2 x 14 mm each.  This could relate to abscess, seroma, or meningocele.      Metallic interbody spacer at L5-S1 appears more anterior than L4-L5.  Correlate clinically.      Recommend clinical correlation and follow-up.         Electronically signed by: Justus Sood   Date:    07/04/2023   Time:    00:13      X-Ray Chest AP Portable   Final Result      1. The lungs are clear.   2. There is mild cardiomegaly.   3. There are surgical changes associated with a prior sternotomy.   .         Electronically signed by: Sancho Allen MD    Date:    07/03/2023   Time:    11:11      US Lower Extremity Veins Right   Final Result      No evidence of deep venous thrombosis in the right lower extremity.         Electronically signed by: Homer Faulkner   Date:    07/03/2023   Time:    11:02      CT Lumbar Spine Without Contrast   Final Result      Postoperative changes related to a posterior lumbar interbody fusion from L4-S1 with bilateral laminectomies.  Postoperative changes suspected involving the posterior paraspinal musculature.  No definite abnormal postoperative fluid collections.      All CT scans at this facility are performed  using dose modulation techniques as appropriate to performed exam including the following:  automated exposure control; adjustment of mA and/or kV according to the patients size (this includes techniques or standardized protocols for targeted exams where dose is matched to indication/reason for exam: i.e. extremities or head);  iterative reconstruction technique.         Electronically signed by: Chris Villalba MD   Date:    07/03/2023   Time:    11:31      CT Head Without Contrast   Final Result      1. There is no evidence of an acute ischemic event. There is encephalomalacia in the right occipital lobe   2. There is no intracranial hemorrhage.   All CT scans at this facility use dose modulation, iterative reconstruction, and/or weight base dosing when appropriate to reduce radiation dose when appropriate to reduce radiation dose to as low as reasonably achievable.         Electronically signed by: Sancho Allen MD   Date:    07/03/2023   Time:    10:50              Assessment/Plan:      * Post-operative wound abscess  Patient with recent lumbar fusion L4-S1 (5/17/23)  Presents with RLE weakness, pain  MRI revealed two fluid collections l-spine, abscess versus seroma versus meningocele   US RLE negative for DVT  Blood cultures pending    S/p washout and antibiotic bead placement today (7/5/23), operative cultures  pending  ID following, continue vancomycin  Holding ASA/Plavix, defer to NSGY when to resume  Multimodal pain control    ABLA (acute blood loss anemia)  Presented with chronic anemia  Hg trending down; post-op and dilutional  Monitor Hemovac drain ouptut  Transfuse if Hg < 7 or symptomatic    CKD (chronic kidney disease) stage 3, GFR 30-59 ml/min  Cr ranges from 1.4-1.7 over the last year  Strict I/O's  Avoid nephrotoxins  AM labs    Cardiomyopathy  Euvolemic at exam  Continue BB  Monitor fluid status closely, strict I/O     Echo    Result Date: 9/12/2022  · The left ventricle is normal in size with normal systolic function.  · The estimated ejection fraction is 65%.  · Grade I left ventricular diastolic dysfunction.  · Normal right ventricular size with normal right ventricular systolic   function.  · There is a bioprosthetic aortic valve present. There is mild   paravalvular aortic insufficiency present.  · The aortic valve mean gradient is 14 mmHg with a dimensionless index of   0.57.  · Mild tricuspid regurgitation.  · The estimated PA systolic pressure is 33 mmHg.           CAD (coronary artery disease)  Holding asa/plavix at this time  S/p lumbar abscess washout  Restart when okay per Neurosurgery  Continue statin    Hypertension  BP controled, continue BB (carvedilol)  Holding home ARB at this time (losartan)    GERD (gastroesophageal reflux disease)  PPI     Moderate persistent asthma without complication  Presented with dyspnea to ED, US RLE negative for DVT, o2 sats stable on room air, not tachycardiac, low concern for PE  No wheezing on exam   Continue Pulmicort, wife may bring home trelegy inhaler  Duonebs PRN    CHEO on CPAP  Continue cpap nightly     S/P AVR (aortic valve replacement) biopresthetic miller 25 mm in 2014         Class 2 severe obesity due to excess calories with serious comorbidity and body mass index (BMI) of 38.0 to 38.9 in adult  Body mass index is 38.21 kg/m². Morbid obesity  complicates all aspects of disease management from diagnostic modalities to treatment. Weight loss encouraged and health benefits explained to patient.     VTE Risk Mitigation (From admission, onward)         Ordered     enoxaparin injection 40 mg  Every 24 hours         07/06/23 1412     IP VTE HIGH RISK PATIENT  Once         07/05/23 1302     Place sequential compression device  Until discontinued         07/05/23 1302     Place sequential compression device  Until discontinued         07/05/23 1137     Place sequential compression device  Until discontinued         07/04/23 0900                Discharge Planning   AURELIA:      Code Status: Full Code   Is the patient medically ready for discharge?:     Reason for patient still in hospital (select all that apply): Patient trending condition, Laboratory test, Treatment and Consult recommendations  Discharge Plan A: Home Health                  Lino Khan MD  Department of Hospital Medicine   [facility])

## 2023-07-08 NOTE — ASSESSMENT & PLAN NOTE
Presented with chronic anemia  Hg trending down; post-op and dilutional  Monitor Hemovac drain ouptut  Transfuse if Hg < 7 or symptomatic

## 2023-07-08 NOTE — ASSESSMENT & PLAN NOTE
Holding asa/plavix at this time  S/p lumbar abscess washout  Restart when okay per Neurosurgery  Continue statin

## 2023-07-08 NOTE — PROGRESS NOTES
[facility])  Neurosurgery  Progress Note    Subjective:     History of Present Illness: No notes on file    Post-Op Info:  Procedure(s) (LRB):  EXPLORATION, WOUND (Bilateral)  WASHOUT (N/A)  REMOVAL, HARDWARE, SPINE (N/A)  INSERTION (N/A)  REPLACEMENT (N/A)   3 Days Post-Op   POD # 3   No complaints and resting comfortably   On Lovenox   dulcolax  No growth to date from cultures   Continue abx   No c/o pain   No weakness or N/T   Incision cleaned and dressing replaced   He is leaking around drain site which was re enforced   Midline incision CDI       PLAN   Continue on abx and HV drain until cultures finalized        Brandon Valencia MD  Neurosurgery  [facility])

## 2023-07-08 NOTE — ASSESSMENT & PLAN NOTE
Body mass index is 38.21 kg/m². Morbid obesity complicates all aspects of disease management from diagnostic modalities to treatment. Weight loss encouraged and health benefits explained to patient.

## 2023-07-08 NOTE — ASSESSMENT & PLAN NOTE
Euvolemic at exam  Continue BB  Monitor fluid status closely, strict I/O     Echo    Result Date: 9/12/2022  · The left ventricle is normal in size with normal systolic function.  · The estimated ejection fraction is 65%.  · Grade I left ventricular diastolic dysfunction.  · Normal right ventricular size with normal right ventricular systolic   function.  · There is a bioprosthetic aortic valve present. There is mild   paravalvular aortic insufficiency present.  · The aortic valve mean gradient is 14 mmHg with a dimensionless index of   0.57.  · Mild tricuspid regurgitation.  · The estimated PA systolic pressure is 33 mmHg.

## 2023-07-08 NOTE — CONSULTS
Pharmacokinetic Assessment Follow Up: IV Vancomycin    Vancomycin serum concentration assessment(s):    The random level was drawn correctly and can be used to guide therapy at this time. The measurement is above the desired definitive target range of 10 to 15 mcg/mL.    Vancomycin Regimen Plan:    Re-dose when the random level is less than 20 mcg/mL, next level to be drawn at 0845 on 7/9    Drug levels (last 3 results):  Recent Labs   Lab Result Units 07/06/23 0259 07/07/23 0428 07/08/23 0619   Vancomycin, Random ug/mL 12.8 14.9 15.4       Pharmacy will continue to follow and monitor vancomycin.    Please contact pharmacy at extension [phone] for questions regarding this assessment.    Thank you for the consult,   Sweta Lopez, PharmD 7/8/2023 11:16 AM         Patient brief summary:  Abdullahi Urias is a 67 y.o. male initiated on antimicrobial therapy with IV Vancomycin for treatment of skin & soft tissue infection    The patient's currently being pulse dosed based on renal function and drug levels.    Drug Allergies:   Review of patient's allergies indicates:  No Known Allergies    Actual Body Weight:   114 kg    Renal Function:   Estimated Creatinine Clearance: 58.5 mL/min (A) (based on SCr of 1.5 mg/dL (H)).,     Dialysis Method (if applicable):  N/A    CBC (last 72 hours):  Recent Labs   Lab Result Units 07/06/23 0259 07/07/23 0428 07/08/23 0619   WBC K/uL 8.33 11.31 8.25   Hemoglobin g/dL 8.3* 7.5* 7.5*   Hematocrit % 25.8* 23.1* 23.5*   Platelets K/uL 206 218 227   Gran % % 89.9* 88.9* 83.0*   Lymph % % 6.7* 5.6* 11.3*   Mono % % 2.9* 5.0 4.6   Eosinophil % % 0.0 0.0 0.1   Basophil % % 0.1 0.1 0.0   Differential Method  Automated Automated Automated       Metabolic Panel (last 72 hours):  Recent Labs   Lab Result Units 07/06/23 0259 07/07/23 0428 07/08/23 0619   Sodium mmol/L 138 135* 136   Potassium mmol/L 4.8 5.1 4.9   Chloride mmol/L 105 104 104   CO2 mmol/L 19* 24 25   Glucose mg/dL 138* 121*  122*   BUN mg/dL 22 26* 27*   Creatinine mg/dL 1.6* 1.6* 1.5*       Vancomycin Administrations:  vancomycin given in the last 96 hours                     vancomycin 1,250 mg in dextrose 5 % (D5W) 250 mL IVPB (Vial-Mate) (mg) 1,250 mg New Bag 07/08/23 0850    vancomycin 1,500 mg in dextrose 5 % (D5W) 250 mL IVPB (Vial-Mate) (mg) 1,500 mg New Bag 07/07/23 0809    vancomycin 1.75 g in 5 % dextrose 500 mL IVPB (mg) 1,750 mg New Bag 07/06/23 0445    vancomycin injection (g) 1 g Given 07/05/23 1010     1 g Given  0836     1 g Given  0820    vancomycin 2 g in dextrose 5 % 500 mL IVPB (mg) 2,000 mg Given 07/05/23 0858                    Microbiologic Results:  Microbiology Results (last 7 days)       Procedure Component Value Units Date/Time    Aerobic culture [[id number]] Collected: 07/05/23 0851    Order Status: Completed Specimen: Abscess from Back Updated: 07/08/23 0944     Aerobic Bacterial Culture No growth    Narrative:      Superficial Culture of Lumbar Spine    Aerobic culture [[id number]] Collected: 07/05/23 0959    Order Status: Completed Specimen: Abscess from Back Updated: 07/08/23 0944     Aerobic Bacterial Culture No growth    Narrative:      Deep #2 culture lumbar spine    Aerobic culture [[id number]] Collected: 07/05/23 0855    Order Status: Completed Specimen: Abscess from Back Updated: 07/08/23 0944     Aerobic Bacterial Culture No growth    Narrative:      Deep Culture of Lumbar Spine    Blood culture [[id number]] Collected: 07/04/23 1112    Order Status: Completed Specimen: Blood Updated: 07/07/23 2212     Blood Culture, Routine No Growth to date      No Growth to date      No Growth to date      No Growth to date    Blood culture [[id number]] Collected: 07/04/23 1111    Order Status: Completed Specimen: Blood Updated: 07/07/23 2212     Blood Culture, Routine No Growth to date      No Growth to date      No Growth to date      No Growth to date    Culture, Anaerobe [[id number]] Collected: 07/05/23 0959     Order Status: Completed Specimen: Abscess from Back Updated: 07/07/23 0831     Anaerobic Culture Culture in progress    Narrative:      Deep #2 culture lumbar spine    Culture, Anaerobe [[id number]] Collected: 07/05/23 0855    Order Status: Completed Specimen: Abscess from Back Updated: 07/07/23 0830     Anaerobic Culture Culture in progress    Narrative:      Deep Culture of Lumbar Spine    Culture, Anaerobe [[id number]] Collected: 07/05/23 0851    Order Status: Completed Specimen: Abscess from Back Updated: 07/07/23 0830     Anaerobic Culture Culture in progress    Narrative:      Superficial Culture of Lumbar Spine    Fungus culture [[id number]] Collected: 07/05/23 0959    Order Status: Completed Specimen: Abscess from Back Updated: 07/07/23 0714     Fungus (Mycology) Culture Culture in progress    Narrative:      Deep #2 culture lumbar spine    Fungus culture [[id number]] Collected: 07/05/23 0851    Order Status: Completed Specimen: Abscess from Back Updated: 07/07/23 0714     Fungus (Mycology) Culture Culture in progress    Narrative:      Superficial Culture of Lumbar Spine    Fungus culture [[id number]] Collected: 07/05/23 0855    Order Status: Completed Specimen: Abscess from Back Updated: 07/07/23 0714     Fungus (Mycology) Culture Culture in progress    Narrative:      Deep Culture of Lumbar Spine    AFB Culture & Smear [[id number]] Collected: 07/05/23 0959    Order Status: Completed Specimen: Abscess from Back Updated: 07/06/23 2127     AFB Culture & Smear Culture in progress     AFB CULTURE STAIN No acid fast bacilli seen.    Narrative:      Deep #2 culture lumbar spine    AFB Culture & Smear [[id number]] Collected: 07/05/23 0851    Order Status: Completed Specimen: Abscess from Back Updated: 07/06/23 2127     AFB Culture & Smear Culture in progress     AFB CULTURE STAIN No acid fast bacilli seen.    Narrative:      Superficial Culture of Lumbar Spine    AFB Culture & Smear [[id number]] Collected: 07/05/23 0855     Order Status: Completed Specimen: Abscess from Back Updated: 07/06/23 2127     AFB Culture & Smear Culture in progress     AFB CULTURE STAIN No acid fast bacilli seen.    Narrative:      Deep Culture of Lumbar Spine    Gram stain [[id number]] Collected: 07/05/23 0855    Order Status: Completed Specimen: Abscess from Back Updated: 07/05/23 1643     Gram Stain Result No WBC's      No organisms seen    Narrative:      Deep Culture of Lumbar Spine    Gram stain [[id number]] Collected: 07/05/23 0851    Order Status: Completed Specimen: Abscess from Back Updated: 07/05/23 1640     Gram Stain Result Rare WBC's      No organisms seen    Narrative:      Superficial Culture of Lumbar Spine    Gram stain [[id number]] Collected: 07/05/23 0959    Order Status: Completed Specimen: Abscess from Back Updated: 07/05/23 1639     Gram Stain Result No WBC's      No organisms seen    Narrative:      Deep #2 culture lumbar spine

## 2023-07-08 NOTE — ASSESSMENT & PLAN NOTE
Patient with recent lumbar fusion L4-S1 (5/17/23)  Presents with RLE weakness, pain  MRI revealed two fluid collections l-spine, abscess versus seroma versus meningocele   US RLE negative for DVT  Blood cultures pending    S/p washout and antibiotic bead placement today (7/5/23), operative cultures pending  ID following, continue vancomycin  Holding ASA/Plavix, defer to NSGY when to resume  Multimodal pain control

## 2023-07-08 NOTE — PT/OT/SLP PROGRESS
Physical Therapy Treatment    Patient Name:  Abdullahi Urias   MRN:  [id number]    Recommendations:     Discharge Recommendations: home health PT  Discharge Equipment Recommendations: walker, rolling  Barriers to discharge: None    Assessment:     Abdullahi Urias is a 67 y.o. male admitted with a medical diagnosis of Post-operative wound abscess.  He presents with the following impairments/functional limitations: impaired endurance, weakness, impaired functional mobility, gait instability, pain, impaired balance, decreased lower extremity function, decreased coordination.    Rehab Prognosis: Good; patient would benefit from acute skilled PT services to address these deficits and reach maximum level of function.    Recent Surgery: Procedure(s) (LRB):  EXPLORATION, WOUND (Bilateral)  WASHOUT (N/A)  REMOVAL, HARDWARE, SPINE (N/A)  INSERTION (N/A)  REPLACEMENT (N/A) 3 Days Post-Op    Plan:     During this hospitalization, patient to be seen 3 x/week to address the identified rehab impairments via gait training, therapeutic activities, therapeutic exercises and progress toward the following goals:    Plan of Care Expires:  07/19/23    Subjective     Chief Complaint: NONE, EAGER TO WALK  Patient/Family Comments/goals:   Pain/Comfort:  Pain Rating 1: 7/10  Location - Orientation 1: lower  Location 1: back  Pain Addressed 1: Reposition, Distraction      Objective:     Communicated with NURSE CHAUDHARI prior to session.  Patient found supine with telemetry, peripheral IV, hemovac upon PT entry to room.     General Precautions: Standard, fall  Orthopedic Precautions: spinal precautions  Braces: N/A  Respiratory Status: Room air     Functional Mobility:  Bed Mobility:     Rolling Left:  supervision  Scooting: supervision  Supine to Sit: supervision  Transfers:     Sit to Stand:  supervision with rolling walker  Bed to Chair: supervision with  rolling walker  using  Step Transfer  Gait: PT ' WITH RW AND SPV, NO LOB OR SOB ON ROOM  "AIR  Balance: GOOD SITTING BALANCE, FAIR+ DYNAMIC BALANCE DURING GAIT  PT PERFORMED TOILETING IN BATHROOM-STANDING TO URINATE MARYBEL, STOOD TO WASH/DRY HANDS AT SINK WITH SPV    AM-PAC 6 CLICK MOBILITY  Turning over in bed (including adjusting bedclothes, sheets and blankets)?: 4  Sitting down on and standing up from a chair with arms (e.g., wheelchair, bedside commode, etc.): 4  Moving from lying on back to sitting on the side of the bed?: 4  Moving to and from a bed to a chair (including a wheelchair)?: 4  Need to walk in hospital room?: 4  Climbing 3-5 steps with a railing?: 1  Basic Mobility Total Score: 21     Treatment & Education:  PT EDUCATION:  - ROLE OF P.T. AND POC IN ACUTE CARE HOSPITAL SETTING  - RW USE AND SAFETY DURING TF'S AND GAIT, REVIEW SPINAL PRECAUTIONS  - ENCOURAGED TO INCREASE TIME OOB IN CHAIR TO TOLERANCE   - TO CONTINUE THERAPUETIC EXERCISES THROUGHOUT THE DAY TO INCREASE ACTIVITY TOLERANCE AND DECREASE RISK FOR PNEUMONIA AND BLOOD CLOTS: HIP FLEX/EXT, HIP ABD/ADD, QUAD SET, HEEL SLIDE, AP  - RISK FOR FALLS DUE TO GENERALIZED WEAKNESS, EDUCATED ON "CALL DON'T FALL", ENCOURAGED TO CALL FOR ASSISTANCE WITH ALL NEEDS SUCH AS BED<>CHAIR TRANSFERS OR TRIPS TO BATHROOM, PT AGREEABLE TO SAFETY PRECAUTIONS    Patient left up in chair with all lines intact, call button in reach, and NURSE notified..    GOALS:   Multidisciplinary Problems       Physical Therapy Goals          Problem: Physical Therapy    Goal Priority Disciplines Outcome Goal Variances Interventions   Physical Therapy Goal     PT, PT/OT Ongoing, Progressing     Description: Goals to be met by 7/19/23.  Pt will complete bed mobility MOD I.  Pt will complete sit to stand MOD I.  Pt will ambulate 200ft MOD I using RW.                         Time Tracking:     PT Received On: 07/08/23  PT Start Time: 1020     PT Stop Time: 1045  PT Total Time (min): 25 min     Billable Minutes: Gait Training 10 and Therapeutic Activity 15    Treatment " Type: Treatment  PT/PTA: PT     Number of PTA visits since last PT visit: 0     07/08/2023

## 2023-07-08 NOTE — SUBJECTIVE & OBJECTIVE
Review of Systems   All other systems reviewed and are negative.  Objective:     Vital Signs (Most Recent):  Temp: 97.6 °F (36.4 °C) (07/08/23 1059)  Pulse: 71 (07/08/23 1059)  Resp: 18 (07/08/23 1109)  BP: 122/69 (07/08/23 1059)  SpO2: 98 % (07/08/23 1059) Vital Signs (24h Range):  Temp:  [96.8 °F (36 °C)-98.6 °F (37 °C)] 97.6 °F (36.4 °C)  Pulse:  [62-76] 71  Resp:  [16-19] 18  SpO2:  [96 %-100 %] 98 %  BP: (119-131)/(66-81) 122/69     Weight: 114 kg (251 lb 5.2 oz)  Body mass index is 38.21 kg/m².    Intake/Output Summary (Last 24 hours) at 7/8/2023 1211  Last data filed at 7/8/2023 0925  Gross per 24 hour   Intake 120 ml   Output 1105 ml   Net -985 ml         Physical Exam  Constitutional:       General: He is not in acute distress.     Appearance: Normal appearance.   Cardiovascular:      Rate and Rhythm: Normal rate and regular rhythm.      Heart sounds: No murmur heard.  Pulmonary:      Effort: Pulmonary effort is normal. No respiratory distress.      Breath sounds: Normal breath sounds. No wheezing.   Abdominal:      General: There is no distension.      Palpations: Abdomen is soft.      Tenderness: There is no abdominal tenderness.   Musculoskeletal:      Comments: Low back surgical dressing c/d/I  Drain in place   Neurological:      Mental Status: He is alert.           Significant Labs: All pertinent labs within the past 24 hours have been reviewed.  CBC:   Recent Labs   Lab 07/07/23  0428 07/08/23  0619   WBC 11.31 8.25   HGB 7.5* 7.5*   HCT 23.1* 23.5*    227     CMP:   Recent Labs   Lab 07/07/23  0428 07/08/23  0619   * 136   K 5.1 4.9    104   CO2 24 25   * 122*   BUN 26* 27*   CREATININE 1.6* 1.5*   CALCIUM 9.5 9.2   ANIONGAP 7* 7*       Significant Imaging: I have reviewed all pertinent imaging results/findings within the past 24 hours.    SURG FL Surgery Fluoro Usage   Final Result      X-Ray Lumbar Spine Ap And Lateral   Final Result      Fluoroscopic guidance utilized  for lumbosacral fusion and discectomy.  Please see procedural dictation for further details.         Electronically signed by: Smith Adams   Date:    07/05/2023   Time:    11:39      MRI Lumbar Spine W WO Cont   Final Result      Patient is status post posterior interbody lumbar fusion from L4 to S1.  Orthopedic hardware appears intact.  Bilateral laminectomies from L4-S1.      Large subcutaneous fluid collection involving the postoperative bed measuring approximately 11.6 x 28.9 may reflect seroma or abscess or meningocele Additional subcutaneous fluid collection in the region of the laminectomy is multiloculated measuring approximately 2.2 x 14 mm each.  This could relate to abscess, seroma, or meningocele.      Metallic interbody spacer at L5-S1 appears more anterior than L4-L5.  Correlate clinically.      Recommend clinical correlation and follow-up.         Electronically signed by: Justus Sood   Date:    07/04/2023   Time:    00:13      X-Ray Chest AP Portable   Final Result      1. The lungs are clear.   2. There is mild cardiomegaly.   3. There are surgical changes associated with a prior sternotomy.   .         Electronically signed by: Sancho Allen MD   Date:    07/03/2023   Time:    11:11      US Lower Extremity Veins Right   Final Result      No evidence of deep venous thrombosis in the right lower extremity.         Electronically signed by: Homer Faulkner   Date:    07/03/2023   Time:    11:02      CT Lumbar Spine Without Contrast   Final Result      Postoperative changes related to a posterior lumbar interbody fusion from L4-S1 with bilateral laminectomies.  Postoperative changes suspected involving the posterior paraspinal musculature.  No definite abnormal postoperative fluid collections.      All CT scans at this facility are performed  using dose modulation techniques as appropriate to performed exam including the following:  automated exposure control; adjustment of mA and/or kV  according to the patients size (this includes techniques or standardized protocols for targeted exams where dose is matched to indication/reason for exam: i.e. extremities or head);  iterative reconstruction technique.         Electronically signed by: Chris Villalba MD   Date:    07/03/2023   Time:    11:31      CT Head Without Contrast   Final Result      1. There is no evidence of an acute ischemic event. There is encephalomalacia in the right occipital lobe   2. There is no intracranial hemorrhage.   All CT scans at this facility use dose modulation, iterative reconstruction, and/or weight base dosing when appropriate to reduce radiation dose when appropriate to reduce radiation dose to as low as reasonably achievable.         Electronically signed by: Sancho Allen MD   Date:    07/03/2023   Time:    10:50

## 2023-07-09 LAB
ANION GAP SERPL CALC-SCNC: 9 MMOL/L (ref 8–16)
BACTERIA BLD CULT: NORMAL
BACTERIA BLD CULT: NORMAL
BASOPHILS # BLD AUTO: 0.01 K/UL (ref 0–0.2)
BASOPHILS NFR BLD: 0.2 % (ref 0–1.9)
BUN SERPL-MCNC: 25 MG/DL (ref 8–23)
CALCIUM SERPL-MCNC: 9.3 MG/DL (ref 8.7–10.5)
CHLORIDE SERPL-SCNC: 105 MMOL/L (ref 95–110)
CO2 SERPL-SCNC: 25 MMOL/L (ref 23–29)
CREAT SERPL-MCNC: 1.7 MG/DL (ref 0.5–1.4)
DIFFERENTIAL METHOD: ABNORMAL
EOSINOPHIL # BLD AUTO: 0.2 K/UL (ref 0–0.5)
EOSINOPHIL NFR BLD: 3.2 % (ref 0–8)
ERYTHROCYTE [DISTWIDTH] IN BLOOD BY AUTOMATED COUNT: 14.1 % (ref 11.5–14.5)
EST. GFR  (NO RACE VARIABLE): 44 ML/MIN/1.73 M^2
GLUCOSE SERPL-MCNC: 107 MG/DL (ref 70–110)
HCT VFR BLD AUTO: 26 % (ref 40–54)
HGB BLD-MCNC: 8.3 G/DL (ref 14–18)
IMM GRANULOCYTES # BLD AUTO: 0.16 K/UL (ref 0–0.04)
IMM GRANULOCYTES NFR BLD AUTO: 2.7 % (ref 0–0.5)
LYMPHOCYTES # BLD AUTO: 1.3 K/UL (ref 1–4.8)
LYMPHOCYTES NFR BLD: 21.8 % (ref 18–48)
MCH RBC QN AUTO: 25.4 PG (ref 27–31)
MCHC RBC AUTO-ENTMCNC: 31.9 G/DL (ref 32–36)
MCV RBC AUTO: 80 FL (ref 82–98)
MONOCYTES # BLD AUTO: 0.5 K/UL (ref 0.3–1)
MONOCYTES NFR BLD: 8.4 % (ref 4–15)
NEUTROPHILS # BLD AUTO: 3.8 K/UL (ref 1.8–7.7)
NEUTROPHILS NFR BLD: 63.7 % (ref 38–73)
NRBC BLD-RTO: 1 /100 WBC
PLATELET # BLD AUTO: 238 K/UL (ref 150–450)
PMV BLD AUTO: 10 FL (ref 9.2–12.9)
POTASSIUM SERPL-SCNC: 4.5 MMOL/L (ref 3.5–5.1)
RBC # BLD AUTO: 3.27 M/UL (ref 4.6–6.2)
SODIUM SERPL-SCNC: 139 MMOL/L (ref 136–145)
VANCOMYCIN SERPL-MCNC: 15.5 UG/ML
WBC # BLD AUTO: 5.96 K/UL (ref 3.9–12.7)

## 2023-07-09 PROCEDURE — 63600175 PHARM REV CODE 636 W HCPCS: Mod: HCNC | Performed by: INTERNAL MEDICINE

## 2023-07-09 PROCEDURE — 63600175 PHARM REV CODE 636 W HCPCS: Mod: HCNC | Performed by: HOSPITALIST

## 2023-07-09 PROCEDURE — 96372 THER/PROPH/DIAG INJ SC/IM: CPT | Performed by: PHYSICIAN ASSISTANT

## 2023-07-09 PROCEDURE — 94761 N-INVAS EAR/PLS OXIMETRY MLT: CPT | Mod: HCNC

## 2023-07-09 PROCEDURE — 36415 COLL VENOUS BLD VENIPUNCTURE: CPT | Mod: HCNC | Performed by: HOSPITALIST

## 2023-07-09 PROCEDURE — 94799 UNLISTED PULMONARY SVC/PX: CPT | Mod: HCNC

## 2023-07-09 PROCEDURE — 25000003 PHARM REV CODE 250: Mod: HCNC | Performed by: INTERNAL MEDICINE

## 2023-07-09 PROCEDURE — 25000003 PHARM REV CODE 250: Mod: HCNC | Performed by: PHYSICIAN ASSISTANT

## 2023-07-09 PROCEDURE — 80202 ASSAY OF VANCOMYCIN: CPT | Mod: HCNC | Performed by: HOSPITALIST

## 2023-07-09 PROCEDURE — G0378 HOSPITAL OBSERVATION PER HR: HCPCS | Mod: HCNC

## 2023-07-09 PROCEDURE — 25000242 PHARM REV CODE 250 ALT 637 W/ HCPCS: Mod: HCNC | Performed by: PHYSICIAN ASSISTANT

## 2023-07-09 PROCEDURE — 63600175 PHARM REV CODE 636 W HCPCS: Mod: HCNC | Performed by: PHYSICIAN ASSISTANT

## 2023-07-09 PROCEDURE — 25000003 PHARM REV CODE 250: Mod: HCNC | Performed by: HOSPITALIST

## 2023-07-09 PROCEDURE — 94640 AIRWAY INHALATION TREATMENT: CPT | Mod: HCNC

## 2023-07-09 PROCEDURE — 99900035 HC TECH TIME PER 15 MIN (STAT): Mod: HCNC

## 2023-07-09 PROCEDURE — 25000003 PHARM REV CODE 250: Mod: HCNC | Performed by: NEUROLOGICAL SURGERY

## 2023-07-09 PROCEDURE — 80048 BASIC METABOLIC PNL TOTAL CA: CPT | Mod: HCNC | Performed by: HOSPITALIST

## 2023-07-09 PROCEDURE — 85025 COMPLETE CBC W/AUTO DIFF WBC: CPT | Mod: HCNC | Performed by: HOSPITALIST

## 2023-07-09 PROCEDURE — 25000003 PHARM REV CODE 250: Mod: HCNC | Performed by: NURSE PRACTITIONER

## 2023-07-09 RX ORDER — LACTULOSE 10 G/15ML
15 SOLUTION ORAL DAILY
Status: COMPLETED | OUTPATIENT
Start: 2023-07-09 | End: 2023-07-10

## 2023-07-09 RX ORDER — OXYCODONE AND ACETAMINOPHEN 10; 325 MG/1; MG/1
1 TABLET ORAL EVERY 6 HOURS PRN
Status: DISCONTINUED | OUTPATIENT
Start: 2023-07-09 | End: 2023-07-10

## 2023-07-09 RX ORDER — MORPHINE SULFATE 2 MG/ML
2 INJECTION, SOLUTION INTRAMUSCULAR; INTRAVENOUS EVERY 8 HOURS PRN
Status: DISCONTINUED | OUTPATIENT
Start: 2023-07-09 | End: 2023-07-10

## 2023-07-09 RX ADMIN — GABAPENTIN 600 MG: 300 CAPSULE ORAL at 08:07

## 2023-07-09 RX ADMIN — ALPRAZOLAM 0.5 MG: 0.5 TABLET ORAL at 10:07

## 2023-07-09 RX ADMIN — LACTULOSE 15 G: 20 SOLUTION ORAL at 09:07

## 2023-07-09 RX ADMIN — SERTRALINE HYDROCHLORIDE 100 MG: 50 TABLET ORAL at 09:07

## 2023-07-09 RX ADMIN — GABAPENTIN 600 MG: 300 CAPSULE ORAL at 09:07

## 2023-07-09 RX ADMIN — PANTOPRAZOLE SODIUM 40 MG: 40 TABLET, DELAYED RELEASE ORAL at 08:07

## 2023-07-09 RX ADMIN — CARVEDILOL 12.5 MG: 12.5 TABLET, FILM COATED ORAL at 08:07

## 2023-07-09 RX ADMIN — OXYCODONE AND ACETAMINOPHEN 1 TABLET: 7.5; 325 TABLET ORAL at 05:07

## 2023-07-09 RX ADMIN — DOCUSATE SODIUM 100 MG: 100 CAPSULE, LIQUID FILLED ORAL at 09:07

## 2023-07-09 RX ADMIN — CARVEDILOL 12.5 MG: 12.5 TABLET, FILM COATED ORAL at 09:07

## 2023-07-09 RX ADMIN — METHOCARBAMOL 500 MG: 500 TABLET ORAL at 01:07

## 2023-07-09 RX ADMIN — PANTOPRAZOLE SODIUM 40 MG: 40 TABLET, DELAYED RELEASE ORAL at 09:07

## 2023-07-09 RX ADMIN — OXYCODONE AND ACETAMINOPHEN 1 TABLET: 7.5; 325 TABLET ORAL at 09:07

## 2023-07-09 RX ADMIN — MORPHINE SULFATE 2 MG: 2 INJECTION, SOLUTION INTRAMUSCULAR; INTRAVENOUS at 05:07

## 2023-07-09 RX ADMIN — VANCOMYCIN HYDROCHLORIDE 1000 MG: 1 INJECTION, POWDER, LYOPHILIZED, FOR SOLUTION INTRAVENOUS at 10:07

## 2023-07-09 RX ADMIN — METHOCARBAMOL 500 MG: 500 TABLET ORAL at 05:07

## 2023-07-09 RX ADMIN — HYDROMORPHONE HYDROCHLORIDE 2 MG: 1 INJECTION, SOLUTION INTRAMUSCULAR; INTRAVENOUS; SUBCUTANEOUS at 02:07

## 2023-07-09 RX ADMIN — CEFEPIME 2 G: 2 INJECTION, POWDER, FOR SOLUTION INTRAVENOUS at 05:07

## 2023-07-09 RX ADMIN — BUDESONIDE INHALATION 0.5 MG: 0.5 SUSPENSION RESPIRATORY (INHALATION) at 07:07

## 2023-07-09 RX ADMIN — METHOCARBAMOL 500 MG: 500 TABLET ORAL at 08:07

## 2023-07-09 RX ADMIN — ENOXAPARIN SODIUM 40 MG: 40 INJECTION SUBCUTANEOUS at 05:07

## 2023-07-09 RX ADMIN — OXYCODONE AND ACETAMINOPHEN 1 TABLET: 10; 325 TABLET ORAL at 08:07

## 2023-07-09 RX ADMIN — GABAPENTIN 600 MG: 300 CAPSULE ORAL at 03:07

## 2023-07-09 RX ADMIN — POLYETHYLENE GLYCOL 3350 17 G: 17 POWDER, FOR SOLUTION ORAL at 09:07

## 2023-07-09 RX ADMIN — CEFEPIME 2 G: 2 INJECTION, POWDER, FOR SOLUTION INTRAVENOUS at 06:07

## 2023-07-09 RX ADMIN — METHOCARBAMOL 500 MG: 500 TABLET ORAL at 09:07

## 2023-07-09 RX ADMIN — ATORVASTATIN CALCIUM 80 MG: 40 TABLET, FILM COATED ORAL at 08:07

## 2023-07-09 NOTE — SUBJECTIVE & OBJECTIVE
Review of Systems   All other systems reviewed and are negative.  Objective:     Vital Signs (Most Recent):  Temp: 97.9 °F (36.6 °C) (07/09/23 1621)  Pulse: 74 (07/09/23 1745)  Resp: 19 (07/09/23 1754)  BP: 124/77 (07/09/23 1745)  SpO2: 98 % (07/09/23 1745) Vital Signs (24h Range):  Temp:  [97.4 °F (36.3 °C)-98 °F (36.7 °C)] 97.9 °F (36.6 °C)  Pulse:  [65-82] 74  Resp:  [15-20] 19  SpO2:  [96 %-99 %] 98 %  BP: (100-140)/(69-97) 124/77     Weight: 114 kg (251 lb 5.2 oz)  Body mass index is 38.21 kg/m².    Intake/Output Summary (Last 24 hours) at 7/9/2023 1803  Last data filed at 7/9/2023 1426  Gross per 24 hour   Intake 478.64 ml   Output 1325 ml   Net -846.36 ml         Physical Exam  Constitutional:       General: He is not in acute distress.     Appearance: Normal appearance.   Cardiovascular:      Rate and Rhythm: Normal rate and regular rhythm.      Heart sounds: No murmur heard.  Pulmonary:      Effort: Pulmonary effort is normal. No respiratory distress.      Breath sounds: Normal breath sounds. No wheezing.   Abdominal:      General: There is no distension.      Palpations: Abdomen is soft.      Tenderness: There is no abdominal tenderness.   Musculoskeletal:      Comments: Low back dressing c/d/I  Hemovac drain in place   Neurological:      Mental Status: He is alert.           Significant Labs: All pertinent labs within the past 24 hours have been reviewed.  CBC:   Recent Labs   Lab 07/08/23  0619 07/09/23  0537   WBC 8.25 5.96   HGB 7.5* 8.3*   HCT 23.5* 26.0*    238     CMP:   Recent Labs   Lab 07/08/23  0619 07/09/23  0537    139   K 4.9 4.5    105   CO2 25 25   * 107   BUN 27* 25*   CREATININE 1.5* 1.7*   CALCIUM 9.2 9.3   ANIONGAP 7* 9       Significant Imaging: I have reviewed all pertinent imaging results/findings within the past 24 hours.

## 2023-07-09 NOTE — CONSULTS
Pharmacokinetic Assessment Follow Up: IV Vancomycin    Vancomycin serum concentration assessment(s):    The random level was drawn correctly and can be used to guide therapy at this time. The measurement is above the desired definitive target range of 10 to 15 mcg/mL.    Vancomycin Regimen Plan:    Re-dose when the random level is less than 20 mcg/mL, next level to be drawn at 7/10 on 1030..    Drug levels (last 3 results):  Recent Labs   Lab Result Units 07/07/23 0428 07/08/23 0619 07/09/23  0834   Vancomycin, Random ug/mL 14.9 15.4 15.5       Pharmacy will continue to follow and monitor vancomycin.    Please contact pharmacy at extension [phone] for questions regarding this assessment.    Thank you for the consult,   Sweta Lopez, PharmD 7/9/2023 10:36 AM         Patient brief summary:  Abdullahi Urias is a 67 y.o. male initiated on antimicrobial therapy with IV Vancomycin for treatment of skin & soft tissue infection    The patient's currently being pulse dosed based on renal function and drug levels.     Drug Allergies:   Review of patient's allergies indicates:  No Known Allergies    Actual Body Weight:   114 kg    Renal Function:   Estimated Creatinine Clearance: 51.6 mL/min (A) (based on SCr of 1.7 mg/dL (H)).,     Dialysis Method (if applicable):  N/A    CBC (last 72 hours):  Recent Labs   Lab Result Units 07/07/23 0428 07/08/23 0619 07/09/23  0537   WBC K/uL 11.31 8.25 5.96   Hemoglobin g/dL 7.5* 7.5* 8.3*   Hematocrit % 23.1* 23.5* 26.0*   Platelets K/uL 218 227 238   Gran % % 88.9* 83.0* 63.7   Lymph % % 5.6* 11.3* 21.8   Mono % % 5.0 4.6 8.4   Eosinophil % % 0.0 0.1 3.2   Basophil % % 0.1 0.0 0.2   Differential Method  Automated Automated Automated       Metabolic Panel (last 72 hours):  Recent Labs   Lab Result Units 07/07/23 0428 07/08/23 0619 07/09/23  0537   Sodium mmol/L 135* 136 139   Potassium mmol/L 5.1 4.9 4.5   Chloride mmol/L 104 104 105   CO2 mmol/L 24 25 25   Glucose mg/dL 121* 122*  107   BUN mg/dL 26* 27* 25*   Creatinine mg/dL 1.6* 1.5* 1.7*       Vancomycin Administrations:  vancomycin given in the last 96 hours                     vancomycin (VANCOCIN) 1,000 mg in dextrose 5 % (D5W) 250 mL IVPB (Vial-Mate) (mg) 1,000 mg New Bag 07/09/23 1029    vancomycin 1,250 mg in dextrose 5 % (D5W) 250 mL IVPB (Vial-Mate) (mg) 1,250 mg New Bag 07/08/23 0850    vancomycin 1,500 mg in dextrose 5 % (D5W) 250 mL IVPB (Vial-Mate) (mg) 1,500 mg New Bag 07/07/23 0809    vancomycin 1.75 g in 5 % dextrose 500 mL IVPB (mg) 1,750 mg New Bag 07/06/23 0445                    Microbiologic Results:  Microbiology Results (last 7 days)       Procedure Component Value Units Date/Time    Blood culture [[id number]] Collected: 07/04/23 1112    Order Status: Completed Specimen: Blood Updated: 07/08/23 2212     Blood Culture, Routine No Growth to date      No Growth to date      No Growth to date      No Growth to date      No Growth to date    Blood culture [[id number]] Collected: 07/04/23 1111    Order Status: Completed Specimen: Blood Updated: 07/08/23 2212     Blood Culture, Routine No Growth to date      No Growth to date      No Growth to date      No Growth to date      No Growth to date    Aerobic culture [[id number]] Collected: 07/05/23 0851    Order Status: Completed Specimen: Abscess from Back Updated: 07/08/23 0944     Aerobic Bacterial Culture No growth    Narrative:      Superficial Culture of Lumbar Spine    Aerobic culture [[id number]] Collected: 07/05/23 0959    Order Status: Completed Specimen: Abscess from Back Updated: 07/08/23 0944     Aerobic Bacterial Culture No growth    Narrative:      Deep #2 culture lumbar spine    Aerobic culture [[id number]] Collected: 07/05/23 0855    Order Status: Completed Specimen: Abscess from Back Updated: 07/08/23 0944     Aerobic Bacterial Culture No growth    Narrative:      Deep Culture of Lumbar Spine    Culture, Anaerobe [[id number]] Collected: 07/05/23 0959    Order  Status: Completed Specimen: Abscess from Back Updated: 07/07/23 0831     Anaerobic Culture Culture in progress    Narrative:      Deep #2 culture lumbar spine    Culture, Anaerobe [[id number]] Collected: 07/05/23 0855    Order Status: Completed Specimen: Abscess from Back Updated: 07/07/23 0830     Anaerobic Culture Culture in progress    Narrative:      Deep Culture of Lumbar Spine    Culture, Anaerobe [[id number]] Collected: 07/05/23 0851    Order Status: Completed Specimen: Abscess from Back Updated: 07/07/23 0830     Anaerobic Culture Culture in progress    Narrative:      Superficial Culture of Lumbar Spine    Fungus culture [[id number]] Collected: 07/05/23 0959    Order Status: Completed Specimen: Abscess from Back Updated: 07/07/23 0714     Fungus (Mycology) Culture Culture in progress    Narrative:      Deep #2 culture lumbar spine    Fungus culture [[id number]] Collected: 07/05/23 0851    Order Status: Completed Specimen: Abscess from Back Updated: 07/07/23 0714     Fungus (Mycology) Culture Culture in progress    Narrative:      Superficial Culture of Lumbar Spine    Fungus culture [[id number]] Collected: 07/05/23 0855    Order Status: Completed Specimen: Abscess from Back Updated: 07/07/23 0714     Fungus (Mycology) Culture Culture in progress    Narrative:      Deep Culture of Lumbar Spine    AFB Culture & Smear [[id number]] Collected: 07/05/23 0959    Order Status: Completed Specimen: Abscess from Back Updated: 07/06/23 2127     AFB Culture & Smear Culture in progress     AFB CULTURE STAIN No acid fast bacilli seen.    Narrative:      Deep #2 culture lumbar spine    AFB Culture & Smear [[id number]] Collected: 07/05/23 0851    Order Status: Completed Specimen: Abscess from Back Updated: 07/06/23 2127     AFB Culture & Smear Culture in progress     AFB CULTURE STAIN No acid fast bacilli seen.    Narrative:      Superficial Culture of Lumbar Spine    AFB Culture & Smear [[id number]] Collected: 07/05/23 0855     Order Status: Completed Specimen: Abscess from Back Updated: 07/06/23 2127     AFB Culture & Smear Culture in progress     AFB CULTURE STAIN No acid fast bacilli seen.    Narrative:      Deep Culture of Lumbar Spine    Gram stain [[id number]] Collected: 07/05/23 0855    Order Status: Completed Specimen: Abscess from Back Updated: 07/05/23 1643     Gram Stain Result No WBC's      No organisms seen    Narrative:      Deep Culture of Lumbar Spine    Gram stain [[id number]] Collected: 07/05/23 0851    Order Status: Completed Specimen: Abscess from Back Updated: 07/05/23 1640     Gram Stain Result Rare WBC's      No organisms seen    Narrative:      Superficial Culture of Lumbar Spine    Gram stain [[id number]] Collected: 07/05/23 0959    Order Status: Completed Specimen: Abscess from Back Updated: 07/05/23 1639     Gram Stain Result No WBC's      No organisms seen    Narrative:      Deep #2 culture lumbar spine

## 2023-07-09 NOTE — PROGRESS NOTES
[facility] Medicine  Progress Note    Patient Name: Abdullahi Urias  MRN: [id number]  Patient Class: OP- Observation   Admission Date: 7/3/2023  Length of Stay: 0 days  Attending Physician: Lino Khan MD  Primary Care Provider: Reji Valentin MD        Subjective:     Principal Problem:Post-operative wound abscess        HPI:  Mr. Urias is a 67-year-old  male with PMH significant for aortic stenosis with bioprosthetic aortic valve replacement, diastolic CHF, CKD stage 3, HTN, prostate cancer and recent lumbar spine surgery on 5/17 with Dr. Valencia who was sent from neurosurgery office with complaints of RLE pain, weakness and oozing from surgical incision site.  Per patient and wife, he was doing fine till two days ago when he went on a long car ride to [employer] and he started having lower back pain.  Yesterday morning he was able to shower but then was unable to get dressed by himself d/t the pain and new onset right lower extremity weakness.  Complains of pain from right hip all the way down leg.  Symtoms are constant and moderate in severity, no relieving or exacerbating factors.  Pain 101/10.  Associated symptoms are right lower extremity weakness and occassional dyspnea.  Patient denies any CP, leg swelling, fever/chills, urinary or fecal incontinence.   Lab work remarkable for Sed Rate 55, Creatinine 1.6.  MRI showed Large subcutaneous fluid collection involving the postoperative bed measuring approximately 11.6 x 28.9 may reflect seroma or abscess or meningocele Additional subcutaneous fluid collection in the region of the laminectomy is multiloculated measuring approximately 2.2 x 14 mm each.  This could relate to abscess, seroma, or meningocele.   Case was discussed with neurosurgery, ERIK Gillette who discussed with Dr. Valencia.  will admit patient to observation for post op fluid collection, r/o abscess.  Blood cultures ordered, holding Abx at this time as  patient is febrile and normal WBC.  Per neurosurgery recs will hold ASA/plavix at this time, likely washout tomorrow morning.     Code Status Full  Surrogate Decision Maker wife Vinita      Overview/Hospital Course:      7/5/23  Patient examined post-op this afternoon, wife at bedside  Reports pain in low back, controlled at this time  Discussed with RN at bedside; surgical incision dressing slightly saturated, 2 drain in place    7/6/23  NAEON, patient reports indigestion, pain controlled  Cultures pending, continue vancomycin  NSGY following, appreciate reccs    7/7/23  NAEON, pain controlled, reports indigestion  No relief with Tums, one time dose of sucralfate, monitor response    7/8/23  NAEON, pain controlled, no new issues    7/8/23  NAEON, reports constipation, on bowel regimen        Review of Systems   All other systems reviewed and are negative.  Objective:     Vital Signs (Most Recent):  Temp: 97.9 °F (36.6 °C) (07/09/23 1621)  Pulse: 74 (07/09/23 1745)  Resp: 19 (07/09/23 1754)  BP: 124/77 (07/09/23 1745)  SpO2: 98 % (07/09/23 1745) Vital Signs (24h Range):  Temp:  [97.4 °F (36.3 °C)-98 °F (36.7 °C)] 97.9 °F (36.6 °C)  Pulse:  [65-82] 74  Resp:  [15-20] 19  SpO2:  [96 %-99 %] 98 %  BP: (100-140)/(69-97) 124/77     Weight: 114 kg (251 lb 5.2 oz)  Body mass index is 38.21 kg/m².    Intake/Output Summary (Last 24 hours) at 7/9/2023 1803  Last data filed at 7/9/2023 1426  Gross per 24 hour   Intake 478.64 ml   Output 1325 ml   Net -846.36 ml         Physical Exam  Constitutional:       General: He is not in acute distress.     Appearance: Normal appearance.   Cardiovascular:      Rate and Rhythm: Normal rate and regular rhythm.      Heart sounds: No murmur heard.  Pulmonary:      Effort: Pulmonary effort is normal. No respiratory distress.      Breath sounds: Normal breath sounds. No wheezing.   Abdominal:      General: There is no distension.      Palpations: Abdomen is soft.      Tenderness: There is no  abdominal tenderness.   Musculoskeletal:      Comments: Low back dressing c/d/I  Hemovac drain in place   Neurological:      Mental Status: He is alert.           Significant Labs: All pertinent labs within the past 24 hours have been reviewed.  CBC:   Recent Labs   Lab 07/08/23  0619 07/09/23  0537   WBC 8.25 5.96   HGB 7.5* 8.3*   HCT 23.5* 26.0*    238     CMP:   Recent Labs   Lab 07/08/23  0619 07/09/23  0537    139   K 4.9 4.5    105   CO2 25 25   * 107   BUN 27* 25*   CREATININE 1.5* 1.7*   CALCIUM 9.2 9.3   ANIONGAP 7* 9       Significant Imaging: I have reviewed all pertinent imaging results/findings within the past 24 hours.      Assessment/Plan:      * Post-operative wound abscess  Patient with recent lumbar fusion L4-S1 (5/17/23)  Presents with RLE weakness, pain  MRI revealed two fluid collections l-spine, abscess versus seroma versus meningocele   US RLE negative for DVT  Blood cultures pending    S/p washout and antibiotic bead placement today (7/5/23), operative cultures pending  ID following, continue vancomycin  Holding ASA/Plavix, defer to NSGY when to resume  Multimodal pain control    ABLA (acute blood loss anemia)  Presented with chronic anemia  Hg trending down; post-op and dilutional  Monitor Hemovac drain ouptut  Transfuse if Hg < 7 or symptomatic    CKD (chronic kidney disease) stage 3, GFR 30-59 ml/min  Cr ranges from 1.4-1.7 over the last year  Strict I/O's  Avoid nephrotoxins  AM labs    Cardiomyopathy  Euvolemic at exam  Continue BB  Monitor fluid status closely, strict I/O     Echo    Result Date: 9/12/2022  · The left ventricle is normal in size with normal systolic function.  · The estimated ejection fraction is 65%.  · Grade I left ventricular diastolic dysfunction.  · Normal right ventricular size with normal right ventricular systolic   function.  · There is a bioprosthetic aortic valve present. There is mild   paravalvular aortic insufficiency present.  ·  The aortic valve mean gradient is 14 mmHg with a dimensionless index of   0.57.  · Mild tricuspid regurgitation.  · The estimated PA systolic pressure is 33 mmHg.           CAD (coronary artery disease)  Holding asa/plavix at this time  S/p lumbar abscess washout  Restart when okay per Neurosurgery  Continue statin    Hypertension  BP controled, continue BB (carvedilol)  Holding home ARB at this time (losartan)    GERD (gastroesophageal reflux disease)  PPI     Moderate persistent asthma without complication  Presented with dyspnea to ED, US RLE negative for DVT, o2 sats stable on room air, not tachycardiac, low concern for PE  No wheezing on exam   Continue Pulmicort, wife may bring home trelegy inhaler  Duonebs PRN    CHEO on CPAP  Continue cpap nightly     S/P AVR (aortic valve replacement) biopresthetic miller 25 mm in 2014         Class 2 severe obesity due to excess calories with serious comorbidity and body mass index (BMI) of 38.0 to 38.9 in adult  Body mass index is 38.21 kg/m². Morbid obesity complicates all aspects of disease management from diagnostic modalities to treatment. Weight loss encouraged and health benefits explained to patient.       VTE Risk Mitigation (From admission, onward)         Ordered     enoxaparin injection 40 mg  Every 24 hours         07/06/23 1412     IP VTE HIGH RISK PATIENT  Once         07/05/23 1302     Place sequential compression device  Until discontinued         07/05/23 1302     Place sequential compression device  Until discontinued         07/05/23 1137     Place sequential compression device  Until discontinued         07/04/23 0900                Discharge Planning   AURELIA:      Code Status: Full Code   Is the patient medically ready for discharge?:     Reason for patient still in hospital (select all that apply): Patient trending condition, Laboratory test and Consult recommendations  Discharge Plan A: Home Health                  Lino Khan MD  Department of  [facility] Medicine   '[facility] - Telemetry ([facility])

## 2023-07-09 NOTE — PROGRESS NOTES
[facility] ([facility])  Neurosurgery  Progress Note    Subjective:     History of Present Illness: No notes on file    Post-Op Info:  Procedure(s) (LRB):  EXPLORATION, WOUND (Bilateral)  WASHOUT (N/A)  REMOVAL, HARDWARE, SPINE (N/A)  INSERTION (N/A)  REPLACEMENT (N/A)   3 Days Post-Op   POD # 4  No complaints and resting comfortably   On Lovenox   Dulcolax/miralax- no BM since admit   No growth to date from cultures   Continue abx   No c/o pain   No weakness or N/T   Incision cleaned and dressing replaced   Dressing  noted  in serosanguinous fluid   Dressing removed I could not express any drainage from incision  Dressing replaced    Midline incision CDI   HV drain without any output yesterday - left in place      PLAN   Continue on abx and HV drain until cultures finalized  Will no BM since admit   On Lovenox   Continue PT - rec HH         Brandon Valencia MD  Neurosurgery  Critical access hospital [facility] ([facility])

## 2023-07-09 NOTE — PLAN OF CARE
Pt VSS, incision site clean and dry, MD changed his dressing this morning, pain med given as scheduled, ambulatory with 1 person assisstance, unsteady on his feet, c/o of leg pain, Iv abxs given, continue to m  Problem: Adult Inpatient Plan of Care  Goal: Plan of Care Review  Outcome: Ongoing, Progressing  Goal: Patient-Specific Goal (Individualized)  Outcome: Ongoing, Progressing  Goal: Absence of Hospital-Acquired Illness or Injury  Outcome: Ongoing, Progressing  Goal: Optimal Comfort and Wellbeing  Outcome: Ongoing, Progressing  Goal: Readiness for Transition of Care  Outcome: Ongoing, Progressing     Problem: Infection  Goal: Absence of Infection Signs and Symptoms  Outcome: Ongoing, Progressing     Problem: Skin Injury Risk Increased  Goal: Skin Health and Integrity  Outcome: Ongoing, Progressing     Problem: Pain Acute  Goal: Acceptable Pain Control and Functional Ability  Outcome: Ongoing, Progressing     Problem: Fatigue  Goal: Improved Activity Tolerance  Outcome: Ongoing, Progressing   onitor.

## 2023-07-10 ENCOUNTER — TELEPHONE (OUTPATIENT)
Dept: NEUROSURGERY | Facility: CLINIC | Age: 68
End: 2023-07-10
Payer: OTHER GOVERNMENT

## 2023-07-10 LAB
BACTERIA SPEC ANAEROBE CULT: NORMAL
CREAT SERPL-MCNC: 1.5 MG/DL (ref 0.5–1.4)
EST. GFR  (NO RACE VARIABLE): 51 ML/MIN/1.73 M^2
VANCOMYCIN SERPL-MCNC: 12.8 UG/ML

## 2023-07-10 PROCEDURE — 25000003 PHARM REV CODE 250: Mod: HCNC | Performed by: PHYSICIAN ASSISTANT

## 2023-07-10 PROCEDURE — 25000003 PHARM REV CODE 250: Mod: HCNC | Performed by: HOSPITALIST

## 2023-07-10 PROCEDURE — 94761 N-INVAS EAR/PLS OXIMETRY MLT: CPT | Mod: HCNC

## 2023-07-10 PROCEDURE — 97530 THERAPEUTIC ACTIVITIES: CPT | Mod: HCNC

## 2023-07-10 PROCEDURE — 99223 PR INITIAL HOSPITAL CARE,LEVL III: ICD-10-PCS | Mod: NSCH,HCNC,, | Performed by: INTERNAL MEDICINE

## 2023-07-10 PROCEDURE — 63600175 PHARM REV CODE 636 W HCPCS: Mod: HCNC | Performed by: INTERNAL MEDICINE

## 2023-07-10 PROCEDURE — 99900035 HC TECH TIME PER 15 MIN (STAT): Mod: HCNC

## 2023-07-10 PROCEDURE — 82565 ASSAY OF CREATININE: CPT | Mod: HCNC | Performed by: HOSPITALIST

## 2023-07-10 PROCEDURE — 80202 ASSAY OF VANCOMYCIN: CPT | Mod: HCNC | Performed by: HOSPITALIST

## 2023-07-10 PROCEDURE — 25000242 PHARM REV CODE 250 ALT 637 W/ HCPCS: Mod: HCNC | Performed by: PHYSICIAN ASSISTANT

## 2023-07-10 PROCEDURE — G0378 HOSPITAL OBSERVATION PER HR: HCPCS | Mod: HCNC

## 2023-07-10 PROCEDURE — C1751 CATH, INF, PER/CENT/MIDLINE: HCPCS | Mod: HCNC

## 2023-07-10 PROCEDURE — 25000003 PHARM REV CODE 250: Mod: HCNC | Performed by: NURSE PRACTITIONER

## 2023-07-10 PROCEDURE — 94799 UNLISTED PULMONARY SVC/PX: CPT | Mod: HCNC

## 2023-07-10 PROCEDURE — 25000003 PHARM REV CODE 250: Mod: HCNC | Performed by: NEUROLOGICAL SURGERY

## 2023-07-10 PROCEDURE — 63600175 PHARM REV CODE 636 W HCPCS: Mod: HCNC | Performed by: HOSPITALIST

## 2023-07-10 PROCEDURE — 36573 INSJ PICC RS&I 5 YR+: CPT | Mod: HCNC

## 2023-07-10 PROCEDURE — 94640 AIRWAY INHALATION TREATMENT: CPT | Mod: HCNC

## 2023-07-10 PROCEDURE — 99223 1ST HOSP IP/OBS HIGH 75: CPT | Mod: NSCH,HCNC,, | Performed by: INTERNAL MEDICINE

## 2023-07-10 PROCEDURE — 25000003 PHARM REV CODE 250: Mod: HCNC | Performed by: INTERNAL MEDICINE

## 2023-07-10 PROCEDURE — 97116 GAIT TRAINING THERAPY: CPT | Mod: HCNC

## 2023-07-10 PROCEDURE — 96372 THER/PROPH/DIAG INJ SC/IM: CPT | Performed by: PHYSICIAN ASSISTANT

## 2023-07-10 PROCEDURE — 63600175 PHARM REV CODE 636 W HCPCS: Mod: HCNC | Performed by: PHYSICIAN ASSISTANT

## 2023-07-10 RX ORDER — OXYCODONE AND ACETAMINOPHEN 10; 325 MG/1; MG/1
1 TABLET ORAL EVERY 4 HOURS PRN
Status: DISCONTINUED | OUTPATIENT
Start: 2023-07-10 | End: 2023-07-13 | Stop reason: HOSPADM

## 2023-07-10 RX ORDER — MORPHINE SULFATE 2 MG/ML
2 INJECTION, SOLUTION INTRAMUSCULAR; INTRAVENOUS EVERY 4 HOURS PRN
Status: DISCONTINUED | OUTPATIENT
Start: 2023-07-10 | End: 2023-07-10

## 2023-07-10 RX ORDER — OXYCODONE AND ACETAMINOPHEN 10; 325 MG/1; MG/1
1 TABLET ORAL EVERY 8 HOURS PRN
Qty: 21 TABLET | Refills: 0 | Status: SHIPPED | OUTPATIENT
Start: 2023-07-10 | End: 2023-07-17

## 2023-07-10 RX ORDER — METHOCARBAMOL 500 MG/1
500 TABLET, FILM COATED ORAL 3 TIMES DAILY
Qty: 21 TABLET | Refills: 0 | Status: SHIPPED | OUTPATIENT
Start: 2023-07-10 | End: 2023-07-17

## 2023-07-10 RX ORDER — MORPHINE SULFATE 2 MG/ML
2 INJECTION, SOLUTION INTRAMUSCULAR; INTRAVENOUS EVERY 4 HOURS PRN
Status: DISCONTINUED | OUTPATIENT
Start: 2023-07-10 | End: 2023-07-11

## 2023-07-10 RX ORDER — KETOROLAC TROMETHAMINE 30 MG/ML
15 INJECTION, SOLUTION INTRAMUSCULAR; INTRAVENOUS ONCE
Status: DISCONTINUED | OUTPATIENT
Start: 2023-07-10 | End: 2023-07-10

## 2023-07-10 RX ORDER — CLOPIDOGREL BISULFATE 75 MG/1
75 TABLET ORAL DAILY
Qty: 30 TABLET | Refills: 11 | Status: SHIPPED | OUTPATIENT
Start: 2023-07-15 | End: 2023-12-04 | Stop reason: SDUPTHER

## 2023-07-10 RX ADMIN — OXYCODONE AND ACETAMINOPHEN 1 TABLET: 10; 325 TABLET ORAL at 08:07

## 2023-07-10 RX ADMIN — PIPERACILLIN SODIUM AND TAZOBACTAM SODIUM 4.5 G: 4; .5 INJECTION, POWDER, FOR SOLUTION INTRAVENOUS at 05:07

## 2023-07-10 RX ADMIN — METHOCARBAMOL 500 MG: 500 TABLET ORAL at 08:07

## 2023-07-10 RX ADMIN — GABAPENTIN 600 MG: 300 CAPSULE ORAL at 08:07

## 2023-07-10 RX ADMIN — ALUMINA, MAGNESIA, AND SIMETHICONE ORAL SUSPENSION REGULAR STRENGTH 30 ML: 1200; 1200; 120 SUSPENSION ORAL at 01:07

## 2023-07-10 RX ADMIN — LACTULOSE 15 G: 20 SOLUTION ORAL at 08:07

## 2023-07-10 RX ADMIN — SERTRALINE HYDROCHLORIDE 100 MG: 50 TABLET ORAL at 08:07

## 2023-07-10 RX ADMIN — PANTOPRAZOLE SODIUM 40 MG: 40 TABLET, DELAYED RELEASE ORAL at 08:07

## 2023-07-10 RX ADMIN — BUDESONIDE INHALATION 0.5 MG: 0.5 SUSPENSION RESPIRATORY (INHALATION) at 07:07

## 2023-07-10 RX ADMIN — GABAPENTIN 600 MG: 300 CAPSULE ORAL at 03:07

## 2023-07-10 RX ADMIN — CARVEDILOL 12.5 MG: 12.5 TABLET, FILM COATED ORAL at 08:07

## 2023-07-10 RX ADMIN — BUDESONIDE INHALATION 0.5 MG: 0.5 SUSPENSION RESPIRATORY (INHALATION) at 08:07

## 2023-07-10 RX ADMIN — DOCUSATE SODIUM 100 MG: 100 CAPSULE, LIQUID FILLED ORAL at 09:07

## 2023-07-10 RX ADMIN — POLYETHYLENE GLYCOL 3350 17 G: 17 POWDER, FOR SOLUTION ORAL at 08:07

## 2023-07-10 RX ADMIN — CEFEPIME 2 G: 2 INJECTION, POWDER, FOR SOLUTION INTRAVENOUS at 05:07

## 2023-07-10 RX ADMIN — MORPHINE SULFATE 2 MG: 2 INJECTION, SOLUTION INTRAMUSCULAR; INTRAVENOUS at 05:07

## 2023-07-10 RX ADMIN — OXYCODONE AND ACETAMINOPHEN 1 TABLET: 10; 325 TABLET ORAL at 05:07

## 2023-07-10 RX ADMIN — ATORVASTATIN CALCIUM 80 MG: 40 TABLET, FILM COATED ORAL at 08:07

## 2023-07-10 RX ADMIN — MORPHINE SULFATE 2 MG: 2 INJECTION, SOLUTION INTRAMUSCULAR; INTRAVENOUS at 02:07

## 2023-07-10 RX ADMIN — ENOXAPARIN SODIUM 40 MG: 40 INJECTION SUBCUTANEOUS at 05:07

## 2023-07-10 RX ADMIN — VANCOMYCIN HYDROCHLORIDE 1000 MG: 1 INJECTION, POWDER, LYOPHILIZED, FOR SOLUTION INTRAVENOUS at 01:07

## 2023-07-10 RX ADMIN — METHOCARBAMOL 500 MG: 500 TABLET ORAL at 05:07

## 2023-07-10 RX ADMIN — OXYCODONE AND ACETAMINOPHEN 1 TABLET: 10; 325 TABLET ORAL at 01:07

## 2023-07-10 RX ADMIN — METHOCARBAMOL 500 MG: 500 TABLET ORAL at 01:07

## 2023-07-10 RX ADMIN — MORPHINE SULFATE 2 MG: 2 INJECTION, SOLUTION INTRAMUSCULAR; INTRAVENOUS at 11:07

## 2023-07-10 RX ADMIN — SODIUM CHLORIDE 30 ML/HR: 9 INJECTION, SOLUTION INTRAVENOUS at 05:07

## 2023-07-10 NOTE — PT/OT/SLP PROGRESS
Physical Therapy Treatment    Patient Name:  Abdullahi Urias   MRN:  [id number]    Recommendations:     Discharge Recommendations: home health PT  Discharge Equipment Recommendations: walker, rolling  Barriers to discharge: None    Assessment:     Abdullahi Urias is a 67 y.o. male admitted with a medical diagnosis of Post-operative wound abscess.  He presents with the following impairments/functional limitations: impaired endurance, impaired functional mobility, gait instability, decreased safety awareness, decreased coordination.    Rehab Prognosis: Good; patient would benefit from acute skilled PT services to address these deficits and reach maximum level of function.    Recent Surgery: Procedure(s) (LRB):  EXPLORATION, WOUND (Bilateral)  WASHOUT (N/A)  REMOVAL, HARDWARE, SPINE (N/A)  INSERTION (N/A)  REPLACEMENT (N/A) 5 Days Post-Op    Plan:     During this hospitalization, patient to be seen 3 x/week to address the identified rehab impairments via gait training, therapeutic activities, therapeutic exercises and progress toward the following goals:    Plan of Care Expires:  07/19/23    Subjective     Chief Complaint: NONE, EAGER TO WALK  Patient/Family Comments/goals:   Pain/Comfort:  Pain Rating 1: 3/10  Location 1: back  Pain Addressed 1: Reposition, Distraction      Objective:     Communicated with NURSE COHEN prior to session.  Patient found supine with telemetry, peripheral IV upon PT entry to room.     General Precautions: Standard, fall  Orthopedic Precautions: spinal precautions  Braces: LSO (MOY FOR DONNING BRACE IN STANDING)  Respiratory Status: Room air     Functional Mobility:  Bed Mobility:     Rolling Left:  supervision  Scooting: supervision  Supine to Sit: supervision  Transfers:     Sit to Stand:  stand by assistance with no AD  Bed to Chair: stand by assistance with  rolling walker  using  Step Transfer  Gait: PT ' WITH RW AND SBA, NO LOB OR SOB ON ROOM AIR  Balance: GOOD SITTING AND STANDING  "BALANCE    AM-PAC 6 CLICK MOBILITY  Turning over in bed (including adjusting bedclothes, sheets and blankets)?: 4  Sitting down on and standing up from a chair with arms (e.g., wheelchair, bedside commode, etc.): 4  Moving from lying on back to sitting on the side of the bed?: 4  Moving to and from a bed to a chair (including a wheelchair)?: 4  Need to walk in hospital room?: 4  Climbing 3-5 steps with a railing?: 1  Basic Mobility Total Score: 21     Treatment & Education:  PT EDUCATION:  - ROLE OF P.T. AND POC IN ACUTE CARE HOSPITAL SETTING  - RW USE AND SAFETY DURING TF'S AND GAIT  - ENCOURAGED TO INCREASE TIME OOB IN CHAIR TO TOLERANCE   - TO CONTINUE THERAPUETIC EXERCISES THROUGHOUT THE DAY TO INCREASE ACTIVITY TOLERANCE AND DECREASE RISK FOR PNEUMONIA AND BLOOD CLOTS: HIP FLEX/EXT, HIP ABD/ADD, QUAD SET, HEEL SLIDE, AP  - RISK FOR FALLS DUE TO GENERALIZED WEAKNESS, EDUCATED ON "CALL DON'T FALL", ENCOURAGED TO CALL FOR ASSISTANCE WITH ALL NEEDS SUCH AS BED<>CHAIR TRANSFERS OR TRIPS TO BATHROOM, PT AGREEABLE TO SAFETY PRECAUTIONS    Patient left up in chair with all lines intact, call button in reach, and NURSE notified..    GOALS:   Multidisciplinary Problems       Physical Therapy Goals          Problem: Physical Therapy    Goal Priority Disciplines Outcome Goal Variances Interventions   Physical Therapy Goal     PT, PT/OT Ongoing, Progressing     Description: Goals to be met by 7/19/23.  Pt will complete bed mobility MOD I.  Pt will complete sit to stand MOD I.  Pt will ambulate 200ft MOD I using RW.                         Time Tracking:     PT Received On: 07/10/23  PT Start Time: 0905     PT Stop Time: 0930  PT Total Time (min): 25 min     Billable Minutes: Gait Training 10 and Therapeutic Activity 15    Treatment Type: Treatment  PT/PTA: PT     Number of PTA visits since last PT visit: 0     07/10/2023  "

## 2023-07-10 NOTE — CONSULTS
Pharmacokinetic Assessment Follow Up: IV Vancomycin    Vancomycin serum concentration assessment(s):    The random level was drawn correctly and can be used to guide therapy at this time. The measurement is within the desired definitive target range of 10 to 15 mcg/mL.    Vancomycin Regimen Plan:    Change regimen to Vancomycin 1000 mg IV every 24 hours with next serum trough concentration measured at 1200 prior to 3rd new dose on 7/12/23.    Drug levels (last 3 results):  Recent Labs   Lab Result Units 07/08/23  0619 07/09/23  0834 07/10/23  1043   Vancomycin, Random ug/mL 15.4 15.5 12.8       Pharmacy will continue to follow and monitor vancomycin.    Please contact pharmacy at extension [phone] for questions regarding this assessment.    Thank you for the consult,   Chelita Najera PharmD       Patient brief summary:  Abdullahi Urias is a 67 y.o. male initiated on antimicrobial therapy with IV Vancomycin for treatment of skin & soft tissue infection    The patient's current regimen is pulse dosing (dosing by level) when random level is less than 20 mcg/mL.    Drug Allergies:   Review of patient's allergies indicates:  No Known Allergies    Actual Body Weight:   114 kg    Renal Function:   Estimated Creatinine Clearance: 58.5 mL/min (A) (based on SCr of 1.5 mg/dL (H)).,     Dialysis Method (if applicable):  N/A    CBC (last 72 hours):  Recent Labs   Lab Result Units 07/08/23 0619 07/09/23  0537   WBC K/uL 8.25 5.96   Hemoglobin g/dL 7.5* 8.3*   Hematocrit % 23.5* 26.0*   Platelets K/uL 227 238   Gran % % 83.0* 63.7   Lymph % % 11.3* 21.8   Mono % % 4.6 8.4   Eosinophil % % 0.1 3.2   Basophil % % 0.0 0.2   Differential Method  Automated Automated       Metabolic Panel (last 72 hours):  Recent Labs   Lab Result Units 07/08/23 0619 07/09/23  0537 07/10/23  1043   Sodium mmol/L 136 139  --    Potassium mmol/L 4.9 4.5  --    Chloride mmol/L 104 105  --    CO2 mmol/L 25 25  --    Glucose mg/dL 122* 107  --    BUN mg/dL 27*  25*  --    Creatinine mg/dL 1.5* 1.7* 1.5*       Vancomycin Administrations:  vancomycin given in the last 96 hours                     vancomycin (VANCOCIN) 1,000 mg in dextrose 5 % (D5W) 250 mL IVPB (Vial-Mate) (mg) 1,000 mg New Bag 07/09/23 1029    vancomycin 1,250 mg in dextrose 5 % (D5W) 250 mL IVPB (Vial-Mate) (mg) 1,250 mg New Bag 07/08/23 0850    vancomycin 1,500 mg in dextrose 5 % (D5W) 250 mL IVPB (Vial-Mate) (mg) 1,500 mg New Bag 07/07/23 0809                    Microbiologic Results:  Microbiology Results (last 7 days)       Procedure Component Value Units Date/Time    Culture, Anaerobe [[id number]] Collected: 07/05/23 0959    Order Status: Completed Specimen: Abscess from Back Updated: 07/10/23 0706     Anaerobic Culture No anaerobes isolated    Narrative:      Deep #2 culture lumbar spine    Culture, Anaerobe [[id number]] Collected: 07/05/23 0855    Order Status: Completed Specimen: Abscess from Back Updated: 07/10/23 0706     Anaerobic Culture Culture in progress    Narrative:      Deep Culture of Lumbar Spine    Culture, Anaerobe [[id number]] Collected: 07/05/23 0851    Order Status: Completed Specimen: Abscess from Back Updated: 07/10/23 0706     Anaerobic Culture Culture in progress    Narrative:      Superficial Culture of Lumbar Spine    Blood culture [[id number]] Collected: 07/04/23 1111    Order Status: Completed Specimen: Blood Updated: 07/09/23 2212     Blood Culture, Routine No growth after 5 days.    Blood culture [[id number]] Collected: 07/04/23 1112    Order Status: Completed Specimen: Blood Updated: 07/09/23 2212     Blood Culture, Routine No growth after 5 days.    Aerobic culture [[id number]] Collected: 07/05/23 0851    Order Status: Completed Specimen: Abscess from Back Updated: 07/08/23 0944     Aerobic Bacterial Culture No growth    Narrative:      Superficial Culture of Lumbar Spine    Aerobic culture [[id number]] Collected: 07/05/23 0959    Order Status: Completed Specimen: Abscess from  Back Updated: 07/08/23 0944     Aerobic Bacterial Culture No growth    Narrative:      Deep #2 culture lumbar spine    Aerobic culture [[id number]] Collected: 07/05/23 0855    Order Status: Completed Specimen: Abscess from Back Updated: 07/08/23 0944     Aerobic Bacterial Culture No growth    Narrative:      Deep Culture of Lumbar Spine    Fungus culture [[id number]] Collected: 07/05/23 0959    Order Status: Completed Specimen: Abscess from Back Updated: 07/07/23 0714     Fungus (Mycology) Culture Culture in progress    Narrative:      Deep #2 culture lumbar spine    Fungus culture [[id number]] Collected: 07/05/23 0851    Order Status: Completed Specimen: Abscess from Back Updated: 07/07/23 0714     Fungus (Mycology) Culture Culture in progress    Narrative:      Superficial Culture of Lumbar Spine    Fungus culture [[id number]] Collected: 07/05/23 0855    Order Status: Completed Specimen: Abscess from Back Updated: 07/07/23 0714     Fungus (Mycology) Culture Culture in progress    Narrative:      Deep Culture of Lumbar Spine    AFB Culture & Smear [[id number]] Collected: 07/05/23 0959    Order Status: Completed Specimen: Abscess from Back Updated: 07/06/23 2127     AFB Culture & Smear Culture in progress     AFB CULTURE STAIN No acid fast bacilli seen.    Narrative:      Deep #2 culture lumbar spine    AFB Culture & Smear [[id number]] Collected: 07/05/23 0851    Order Status: Completed Specimen: Abscess from Back Updated: 07/06/23 2127     AFB Culture & Smear Culture in progress     AFB CULTURE STAIN No acid fast bacilli seen.    Narrative:      Superficial Culture of Lumbar Spine    AFB Culture & Smear [[id number]] Collected: 07/05/23 0855    Order Status: Completed Specimen: Abscess from Back Updated: 07/06/23 2127     AFB Culture & Smear Culture in progress     AFB CULTURE STAIN No acid fast bacilli seen.    Narrative:      Deep Culture of Lumbar Spine    Gram stain [[id number]] Collected: 07/05/23 0855    Order Status:  Completed Specimen: Abscess from Back Updated: 07/05/23 1643     Gram Stain Result No WBC's      No organisms seen    Narrative:      Deep Culture of Lumbar Spine    Gram stain [[id number]] Collected: 07/05/23 0851    Order Status: Completed Specimen: Abscess from Back Updated: 07/05/23 1640     Gram Stain Result Rare WBC's      No organisms seen    Narrative:      Superficial Culture of Lumbar Spine    Gram stain [[id number]] Collected: 07/05/23 0959    Order Status: Completed Specimen: Abscess from Back Updated: 07/05/23 1639     Gram Stain Result No WBC's      No organisms seen    Narrative:      Deep #2 culture lumbar spine          Thank you for allowing us to participate in this patient's care.     Chelita Najera, RiriD 07/10/2023 11:42 AM

## 2023-07-10 NOTE — PROGRESS NOTES
[facility] Tele[facility] ([facility])  Neurosurgery  Progress Note    Subjective:     Interval History:   The patient was seen this morning by Dr. Valencia and CHRISTIAN.  No acute events overnight.  No new complaints.  Reports 2 BM recently.  NGTD from cultures except for fungal and AFB- these are pending.    History of Present Illness: See H&P.    Post-Op Info:  Procedure(s) (LRB):  EXPLORATION, WOUND (Bilateral)  WASHOUT (N/A)  REMOVAL, HARDWARE, SPINE (N/A)  INSERTION (N/A)  REPLACEMENT (N/A)   5 Days Post-Op      Medications:  Continuous Infusions:   sodium chloride 0.9% 30 mL/hr (07/10/23 0555)     Scheduled Meds:   atorvastatin  80 mg Oral QHS    budesonide  0.5 mg Nebulization Q12H    carvediloL  12.5 mg Oral BID    ceFEPime (MAXIPIME) IVPB  2 g Intravenous Q12H    docusate sodium  100 mg Oral Daily    enoxparin  40 mg Subcutaneous Q24H (prophylaxis, 1700)    gabapentin  600 mg Oral TID    methocarbamoL  500 mg Oral QID    pantoprazole  40 mg Oral BID    polyethylene glycol  17 g Oral Daily    sertraline  100 mg Oral Daily     PRN Meds:acetaminophen, albuterol-ipratropium, ALPRAZolam, aluminum-magnesium hydroxide-simethicone, calcium carbonate, dextrose 10%, dextrose 10%, glucagon (human recombinant), glucose, glucose, melatonin, morphine, naloxone, ondansetron, oxyCODONE-acetaminophen, prochlorperazine, sodium chloride 0.9%, sodium chloride 0.9%, sodium chloride 0.9%, Pharmacy to dose Vancomycin consult **AND** vancomycin - pharmacy to dose     Review of Systems  Objective:     Weight: 114 kg (251 lb 5.2 oz)  Body mass index is 38.21 kg/m².  Vital Signs (Most Recent):  Temp: 98 °F (36.7 °C) (07/10/23 0734)  Pulse: 73 (07/10/23 0748)  Resp: 16 (07/10/23 0748)  BP: 100/61 (07/10/23 0734)  SpO2: 98 % (07/10/23 0748) Vital Signs (24h Range):  Temp:  [97.5 °F (36.4 °C)-98.2 °F (36.8 °C)] 98 °F (36.7 °C)  Pulse:  [65-86] 73  Resp:  [16-19] 16  SpO2:  [97 %-99 %] 98 %  BP: (100-159)/(58-97) 100/61              Oxygen Concentration  (%):  [98] 98         Closed/Suction Drain 07/05/23 1012 Right Back Accordion 10 Fr. (Active)   Site Description Healing 07/09/23 1720   Dressing Type CHG impregnated dressing/sponge 07/09/23 1915   Dressing Status Clean;Dry;Intact 07/09/23 1915   Dressing Intervention Integrity maintained 07/09/23 1915   Drainage Sanguineous 07/09/23 0500   Status Other (Comment) 07/06/23 1705   Output (mL) 5 mL 07/08/23 1855       Neurosurgery Physical Exam  Vitals and labs reviewed  Moves all 4 ext well  Incision CDI  Sutures intact  HV drain removed  Incision cleaned thoroughly  New dressing placed    Significant Labs:  Recent Labs   Lab 07/09/23 0537         K 4.5      CO2 25   BUN 25*   CREATININE 1.7*   CALCIUM 9.3     Recent Labs   Lab 07/09/23  0537   WBC 5.96   HGB 8.3*   HCT 26.0*        No results for input(s): LABPT, INR, APTT in the last 48 hours.  Microbiology Results (last 7 days)       Procedure Component Value Units Date/Time    Culture, Anaerobe [[id number]] Collected: 07/05/23 0959    Order Status: Completed Specimen: Abscess from Back Updated: 07/10/23 0706     Anaerobic Culture No anaerobes isolated    Narrative:      Deep #2 culture lumbar spine    Culture, Anaerobe [[id number]] Collected: 07/05/23 0855    Order Status: Completed Specimen: Abscess from Back Updated: 07/10/23 0706     Anaerobic Culture Culture in progress    Narrative:      Deep Culture of Lumbar Spine    Culture, Anaerobe [[id number]] Collected: 07/05/23 0851    Order Status: Completed Specimen: Abscess from Back Updated: 07/10/23 0706     Anaerobic Culture Culture in progress    Narrative:      Superficial Culture of Lumbar Spine    Blood culture [[id number]] Collected: 07/04/23 1111    Order Status: Completed Specimen: Blood Updated: 07/09/23 2212     Blood Culture, Routine No growth after 5 days.    Blood culture [[id number]] Collected: 07/04/23 1112    Order Status: Completed Specimen: Blood Updated: 07/09/23 2212      Blood Culture, Routine No growth after 5 days.    Aerobic culture [[id number]] Collected: 07/05/23 0851    Order Status: Completed Specimen: Abscess from Back Updated: 07/08/23 0944     Aerobic Bacterial Culture No growth    Narrative:      Superficial Culture of Lumbar Spine    Aerobic culture [[id number]] Collected: 07/05/23 0959    Order Status: Completed Specimen: Abscess from Back Updated: 07/08/23 0944     Aerobic Bacterial Culture No growth    Narrative:      Deep #2 culture lumbar spine    Aerobic culture [[id number]] Collected: 07/05/23 0855    Order Status: Completed Specimen: Abscess from Back Updated: 07/08/23 0944     Aerobic Bacterial Culture No growth    Narrative:      Deep Culture of Lumbar Spine    Fungus culture [[id number]] Collected: 07/05/23 0959    Order Status: Completed Specimen: Abscess from Back Updated: 07/07/23 0714     Fungus (Mycology) Culture Culture in progress    Narrative:      Deep #2 culture lumbar spine    Fungus culture [[id number]] Collected: 07/05/23 0851    Order Status: Completed Specimen: Abscess from Back Updated: 07/07/23 0714     Fungus (Mycology) Culture Culture in progress    Narrative:      Superficial Culture of Lumbar Spine    Fungus culture [[id number]] Collected: 07/05/23 0855    Order Status: Completed Specimen: Abscess from Back Updated: 07/07/23 0714     Fungus (Mycology) Culture Culture in progress    Narrative:      Deep Culture of Lumbar Spine    AFB Culture & Smear [[id number]] Collected: 07/05/23 0959    Order Status: Completed Specimen: Abscess from Back Updated: 07/06/23 2127     AFB Culture & Smear Culture in progress     AFB CULTURE STAIN No acid fast bacilli seen.    Narrative:      Deep #2 culture lumbar spine    AFB Culture & Smear [[id number]] Collected: 07/05/23 0851    Order Status: Completed Specimen: Abscess from Back Updated: 07/06/23 2127     AFB Culture & Smear Culture in progress     AFB CULTURE STAIN No acid fast bacilli seen.    Narrative:       Superficial Culture of Lumbar Spine    AFB Culture & Smear [[id number]] Collected: 07/05/23 0855    Order Status: Completed Specimen: Abscess from Back Updated: 07/06/23 2127     AFB Culture & Smear Culture in progress     AFB CULTURE STAIN No acid fast bacilli seen.    Narrative:      Deep Culture of Lumbar Spine    Gram stain [[id number]] Collected: 07/05/23 0855    Order Status: Completed Specimen: Abscess from Back Updated: 07/05/23 1643     Gram Stain Result No WBC's      No organisms seen    Narrative:      Deep Culture of Lumbar Spine    Gram stain [[id number]] Collected: 07/05/23 0851    Order Status: Completed Specimen: Abscess from Back Updated: 07/05/23 1640     Gram Stain Result Rare WBC's      No organisms seen    Narrative:      Superficial Culture of Lumbar Spine    Gram stain [[id number]] Collected: 07/05/23 0959    Order Status: Completed Specimen: Abscess from Back Updated: 07/05/23 1639     Gram Stain Result No WBC's      No organisms seen    Narrative:      Deep #2 culture lumbar spine          All pertinent labs from the last 24 hours have been reviewed.  Significant Diagnostics:  I have reviewed all pertinent imaging results/findings within the past 24 hours.    Assessment/Plan:     Active Diagnoses:    Diagnosis Date Noted POA    PRINCIPAL PROBLEM:  Post-operative wound abscess [T81.49XA] 07/04/2023 Yes     Chronic    ABLA (acute blood loss anemia) [D62] 07/08/2023 No    Moderate persistent asthma without complication [J45.40] 03/07/2023 Yes    GERD (gastroesophageal reflux disease) [K21.9] 02/23/2021 Yes    CKD (chronic kidney disease) stage 3, GFR 30-59 ml/min [N18.30] 06/30/2020 Yes    S/P AVR (aortic valve replacement) biopresthetic miller 25 mm in 2014  [Z95.2] 06/30/2020 Not Applicable    CHEO on CPAP [G47.33, Z99.89] 09/14/2018 Not Applicable    Class 2 severe obesity due to excess calories with serious comorbidity and body mass index (BMI) of 38.0 to 38.9 in adult [E66.01, Z68.38]  10/18/2016 Not Applicable    CAD (coronary artery disease) [I25.10]  Yes     Chronic    Cardiomyopathy [I42.9]  Yes    Hypertension [I10]  Yes     Chronic      Problems Resolved During this Admission:     POD #5  Will reach out to [facility] regarding discharge recs and Abx coverage.  Pt will return home later today.  Will schedule for a wound check on Friday in clinic.  Dr. Valencia recommends Lovenox for next 5 days and then resuming Plavix thereafter.  Please call with any changes.    Yazmin Farfan PA-C  Neurosurgery  [facility] ([facility])

## 2023-07-10 NOTE — PROGRESS NOTES
Pharmacokinetic Assessment Sign Off: IV Vancomycin     Therapy with Vancomycin complete and/or consult discontinued by provider.  Pharmacy will sign off, please re-consult as needed.     Thank you for allowing us to participate in this patient's care.      Chelita Najera PharmD 07/10/2023 2:11 PM

## 2023-07-10 NOTE — TELEPHONE ENCOUNTER
I spoke with patient, who has been scheduled for wound check follow up per DENISSE Arce on 07/14/23 @ 12PM. Patient confirmed appointment date and time. Patient verbalized understanding.

## 2023-07-10 NOTE — DISCHARGE SUMMARY
[facility] Hospital Medicine  Discharge Summary      Patient Name: Abdullahi Urias  MRN: [id number]  EFREN: [id number]  Patient Class: OP- Observation  Admission Date: 7/3/2023  Hospital Length of Stay: 0 days  Discharge Date and Time: No discharge date for patient encounter.  Attending Physician: Lino Khan MD   Discharging Provider: Lino Khan MD  Primary Care Provider: Reji Valentin MD    Primary Care Team: Networked reference to record PCT     HPI:   Mr. Urias is a 67-year-old  male with PMH significant for aortic stenosis with bioprosthetic aortic valve replacement, diastolic CHF, CKD stage 3, HTN, prostate cancer and recent lumbar spine surgery on 5/17 with Dr. Valencia who was sent from neurosurgery office with complaints of RLE pain, weakness and oozing from surgical incision site.  Per patient and wife, he was doing fine till two days ago when he went on a long car ride to [employer] and he started having lower back pain.  Yesterday morning he was able to shower but then was unable to get dressed by himself d/t the pain and new onset right lower extremity weakness.  Complains of pain from right hip all the way down leg.  Symtoms are constant and moderate in severity, no relieving or exacerbating factors.  Pain 101/10.  Associated symptoms are right lower extremity weakness and occassional dyspnea.  Patient denies any CP, leg swelling, fever/chills, urinary or fecal incontinence.   Lab work remarkable for Sed Rate 55, Creatinine 1.6.  MRI showed Large subcutaneous fluid collection involving the postoperative bed measuring approximately 11.6 x 28.9 may reflect seroma or abscess or meningocele Additional subcutaneous fluid collection in the region of the laminectomy is multiloculated measuring approximately 2.2 x 14 mm each.  This could relate to abscess, seroma, or meningocele.   Case was discussed with neurosurgery, ERIK Gillette who discussed with Dr. Valencia.  aditi  admit patient to observation for post op fluid collection, r/o abscess.  Blood cultures ordered, holding Abx at this time as patient is febrile and normal WBC.  Per neurosurgery recs will hold ASA/plavix at this time, likely washout tomorrow morning.     Code Status Full  Surrogate Decision Maker wife V[place]a      Procedure(s) (LRB):  EXPLORATION, WOUND (Bilateral)  WASHOUT (N/A)  REMOVAL, HARDWARE, SPINE (N/A)  INSERTION (N/A)  REPLACEMENT (N/A)      Hospital Course:       7/5/23  Patient examined post-op this afternoon, wife at bedside  Reports pain in low back, controlled at this time  Discussed with RN at bedside; surgical incision dressing slightly saturated, 2 drain in place    7/6/23  NAEON, patient reports indigestion, pain controlled  Cultures pending, continue vancomycin  NSGY following, appreciate reccs    7/7/23  NAEON, pain controlled, reports indigestion  No relief with Tums, one time dose of sucralfate, monitor response    7/8/23  NAEON, pain controlled, no new issues    7/9/23  NAEON, reports constipation, on bowel regimen    7/10/23  NAEON, pain controlled  Lumbar hemovac drain pulled today per Neurosurgery  Discussed with Dr. Keith, plan for long term IV antibiotics, PICC line ordered  IV daptomycin at 700mg daily, IV Zosyn 4.5gram every 8 hours, x 6 weeks, EOC-08/18/23  Weekly cpk, esr,cmp,cbc, F/U in ID clinic  Stable for hospital discharge       Goals of Care Treatment Preferences:  Code Status: Full Code      Consults:   Consults (From admission, onward)        Status Ordering Provider     Inpatient consult to Social Work  Once        Provider:  (Not yet assigned)    Ordered OWENS, ANABEL     Inpatient consult to PICC team (NIAS)  Once        Provider:  (Not yet assigned)    Acknowledged OWENS, ANABEL     Inpatient consult to Social Work  Once        Provider:  (Not yet assigned)    Completed OWENS, ANABEL     Inpatient consult to Infectious Diseases  Once        Provider:  Rommel Keith MD,  PAULO    Acknowledged YAZMIN FARFAN     Inpatient consult to Neurosurgery  Once        Provider:  Yazmin Farfan PA-C    Acknowledged YAZMIN FARFAN          No new Assessment & Plan notes have been filed under this hospital service since the last note was generated.  Service: Hospital Medicine    Final Active Diagnoses:    Diagnosis Date Noted POA    PRINCIPAL PROBLEM:  Post-operative wound abscess [T81.49XA] 07/04/2023 Yes     Chronic    ABLA (acute blood loss anemia) [D62] 07/08/2023 No    CKD (chronic kidney disease) stage 3, GFR 30-59 ml/min [N18.30] 06/30/2020 Yes    Cardiomyopathy [I42.9]  Yes    CAD (coronary artery disease) [I25.10]  Yes     Chronic    Hypertension [I10]  Yes     Chronic    GERD (gastroesophageal reflux disease) [K21.9] 02/23/2021 Yes    Moderate persistent asthma without complication [J45.40] 03/07/2023 Yes    CHEO on CPAP [G47.33, Z99.89] 09/14/2018 Not Applicable    S/P AVR (aortic valve replacement) biopresthetic miller 25 mm in 2014  [Z95.2] 06/30/2020 Not Applicable    Class 2 severe obesity due to excess calories with serious comorbidity and body mass index (BMI) of 38.0 to 38.9 in adult [E66.01, Z68.38] 10/18/2016 Not Applicable      Problems Resolved During this Admission:       Discharged Condition: stable    [place] [facility] [place]   Follow-up Information     Brandon Valencia MD Follow up on 7/14/2023.    Specialty: Neurosurgery  Why: Hospital discharge follow up  Contact information:  [address] [facility] [address] [phone]             Reji Valentin MD. Schedule an appointment as soon as possible for a visit in 1 month(s).    Specialty: Family Medicine  Why: Hospital discharge follow up  Contact information:  [address]  [place] [address] [phone]                       Patient Instructions:      Notify your health care provider if you experience any of the following:  temperature >100.4      Notify your health care provider if you experience any of the following:  persistent nausea and vomiting or diarrhea     Notify your health care provider if you experience any of the following:  severe uncontrolled pain     Notify your health care provider if you experience any of the following:  redness, tenderness, or signs of infection (pain, swelling, redness, odor or green/yellow discharge around incision site)     Notify your health care provider if you experience any of the following:  difficulty breathing or increased cough     Notify your health care provider if you experience any of the following:  severe persistent headache     Notify your health care provider if you experience any of the following:  persistent dizziness, light-headedness, or visual disturbances     Notify your health care provider if you experience any of the following:  increased confusion or weakness     Activity as tolerated       Significant Diagnostic Studies: Labs: All labs within the past 24 hours have been reviewed    Pending Diagnostic Studies:     None         Medications:  Reconciled Home Medications:      Medication List      START taking these medications    methocarbamoL 500 MG Tab  Commonly known as: ROBAXIN  Take 1 tablet (500 mg total) by mouth 3 (three) times daily. for 7 days        CHANGE how you take these medications    clopidogreL 75 mg tablet  Commonly known as: PLAVIX  Take 1 tablet (75 mg total) by mouth once daily.  Start taking on: July 15, 2023  What changed: These instructions start on July 15, 2023. If you are unsure what to do until then, ask your doctor or other care provider.        CONTINUE taking these medications    albuterol 90 mcg/actuation inhaler  Commonly known as: PROVENTIL/VENTOLIN HFA  INHALE 2 PUFFS INTO THE LUNGS EVERY 6 HOURS AS NEEDED FOR COUGH     allopurinoL 100 MG tablet  Commonly known as: ZYLOPRIM  Take 100 mg by mouth once daily. Takes 0.5 tab     ALPRAZolam 1 MG tablet  Commonly  known as: XANAX  Take 1 mg by mouth Daily. EVERY OTHER DAY     aluminum & magnesium hydroxide-simethicone 400-400-40 mg/5 mL suspension  Commonly known as: MYLANTA MAX STRENGTH  TAKE 15ML BY MOUTH FOUR TIMES A DAY AS NEEDED FOR STOMACH ACID     aspirin 81 MG EC tablet  Commonly known as: ECOTRIN  Take 1 tablet (81 mg total) by mouth once daily.     atorvastatin 80 MG tablet  Commonly known as: LIPITOR  TAKE ONE-HALF TABLET BY MOUTH EVERY DAY FOR CHOLESTEROL     CARAFATE 100 mg/mL suspension  Generic drug: sucralfate  Take 1 g by mouth 4 (four) times daily.     carvediloL 12.5 MG tablet  Commonly known as: COREG  Take 1 tablet (12.5 mg total) by mouth 2 (two) times daily.     diclofenac sodium 1 % Gel  Commonly known as: VOLTAREN  APPLY 2 GRAMS TOPICALLY FOUR TIMES A DAY AS NEEDED FOR PAIN AND INFLAMMATION. USE ENCLOSED DOSING CARD.     diphenhydrAMINE 25 mg tablet  Commonly known as: SOMINEX  Take 25 mg by mouth nightly as needed for Insomnia.     docusate sodium 100 MG capsule  Commonly known as: COLACE  Take 1 capsule (100 mg total) by mouth 2 (two) times daily.     esomeprazole 40 MG capsule  Commonly known as: NEXIUM  40 mg.     famotidine 40 MG tablet  Commonly known as: PEPCID  TAKE ONE TABLET BY MOUTH AT BEDTIME FOR ACID REFLUX     flunisolide 25 mcg (0.025%) 25 mcg (0.025 %) Spry  Commonly known as: NASALIDE  2 sprays by Nasal route as needed.     gabapentin 600 MG tablet  Commonly known as: NEURONTIN  Take 1 tablet (600 mg total) by mouth 3 (three) times daily.     hydrocortisone 2.5 % cream  Apply topically 2 (two) times daily as needed. Apply to affected areas of the face and neck.     hydrocortisone-pramoxine rectal foam  Commonly known as: PROCTOFOAM-HS  INSERT 1 APPLICATORFUL INTO RECTALLY TWICE A DAY FOR HEMORRHOIDS     hydrOXYzine HCL 25 MG tablet  Commonly known as: ATARAX  Take 25 mg by mouth 3 (three) times daily as needed for Itching.     LIDOcaine 5 %  Commonly known as: LIDODERM  APPLY 1 PATCH  TOPICALLY EVERY DAY FOR PAIN. WEAR FOR 12 HOURS, THEN REMOVE. DO NOT APPLY NEW PATCH FOR AT LEAST 12 HOURS.     losartan 50 MG tablet  Commonly known as: COZAAR  Take 1 tablet (50 mg total) by mouth once daily.     methyl salicylate-menthol 15-10% 15-10 % Crea  Apply topically as needed.     multivitamin per tablet  Commonly known as: THERAGRAN  Take 1 tablet by mouth once daily.     oxyCODONE-acetaminophen  mg per tablet  Commonly known as: PERCOCET  Take 1 tablet by mouth every 8 (eight) hours as needed for Pain.     pantoprazole 40 MG tablet  Commonly known as: PROTONIX  Take 40 mg by mouth 2 (two) times daily.     sertraline 100 MG tablet  Commonly known as: ZOLOFT  TAKE TWO TABLETS BY MOUTH EVERY DAY FOR MENTAL HEALTH DOSE INCREASED TO 200MG/DAY     simethicone 80 MG chewable tablet  Commonly known as: MYLICON  Take 80 mg by mouth every 6 (six) hours as needed for Flatulence.     TRELEGY ELLIPTA 200-62.5-25 mcg inhaler  Generic drug: fluticasone-umeclidin-vilanter  INHALE 1 PUFF INTO THE LUNGS EVERY DAY     triamcinolone acetonide 0.1% 0.1 % cream  Commonly known as: KENALOG  Apply topically 2 (two) times daily.            Indwelling Lines/Drains at time of discharge:   Lines/Drains/Airways     None                 Time spent on the discharge of patient: 45 minutes         Lino Khan MD  Department of Hospital Medicine  '[place] - Tele[facility] ([facility])

## 2023-07-10 NOTE — PLAN OF CARE
A231/A231 EMILIO Urias is a 67 y.o.male admitted on 7/3/2023 for Post-operative wound abscess   Code Status: Full Code MRN: [id number]   Review of patient's allergies indicates:  No Known Allergies  Past Medical History:   Diagnosis Date    [facility]    Asthma     BPH (benign prostatic hyperplasia)     CAD (coronary artery disease)     Cardiomyopathy     CHF (congestive heart failure)     Chronic hoarseness     vocal cord surg    Chronic pain     CKD (chronic kidney disease) stage 3, GFR 30-59 ml/min     CVA (cerebral infarction)     8/2012 olol; reviewed ed note    Ex-smoker     GERD (gastroesophageal reflux disease)     Hepatitis C     treatedharvoni says cured, RNA NEG 6/2020    Hypertension     Pancreatitis     Prostate cancer     Prostate cancer     Prostate cancer     PVD (peripheral vascular disease)     Renal insufficiency     Substance abuse     cocaine, etoh , tob in past      PRN meds    acetaminophen, 650 mg, Q4H PRN  albuterol-ipratropium, 3 mL, Q4H PRN  ALPRAZolam, 0.5 mg, BID PRN  aluminum-magnesium hydroxide-simethicone, 30 mL, Q6H PRN  calcium carbonate, 500 mg, TID PRN  dextrose 10%, 12.5 g, PRN  dextrose 10%, 25 g, PRN  glucagon (human recombinant), 1 mg, PRN  glucose, 16 g, PRN  glucose, 24 g, PRN  melatonin, 6 mg, Nightly PRN  morphine, 2 mg, Q8H PRN  naloxone, 0.02 mg, PRN  ondansetron, 4 mg, Q8H PRN  oxyCODONE-acetaminophen, 1 tablet, Q6H PRN  prochlorperazine, 5 mg, Q6H PRN  sodium chloride 0.9%, 0.1-10 mL, PRN  sodium chloride 0.9%, 10 mL, Q12H PRN  sodium chloride 0.9%, 3 mL, PRN      A231/A231 EMILIO Urias is a 67 y.o.male admitted on 7/3/2023 for Post-operative wound abscess   Code Status: Full Code MRN: [id number]   Review of patient's allergies indicates:  No Known Allergies  Past Medical History:   Diagnosis Date    Aortic stenosis     [facility] emilie [facility]    Asthma     BPH (benign prostatic hyperplasia)     CAD (coronary artery disease)      Cardiomyopathy     CHF (congestive heart failure)     Chronic hoarseness     vocal cord surg    Chronic pain     CKD (chronic kidney disease) stage 3, GFR 30-59 ml/min     CVA (cerebral infarction)     8/2012 olol; reviewed ed note    Ex-smoker     GERD (gastroesophageal reflux disease)     Hepatitis C     treatedgabrielavoni says cured, RNA NEG 6/2020    Hypertension     Pancreatitis     Prostate cancer     Prostate cancer     Prostate cancer     PVD (peripheral vascular disease)     Renal insufficiency     Substance abuse     cocaine, etoh , tob in past      PRN meds    acetaminophen, 650 mg, Q4H PRN  albuterol-ipratropium, 3 mL, Q4H PRN  ALPRAZolam, 0.5 mg, BID PRN  aluminum-magnesium hydroxide-simethicone, 30 mL, Q6H PRN  calcium carbonate, 500 mg, TID PRN  dextrose 10%, 12.5 g, PRN  dextrose 10%, 25 g, PRN  glucagon (human recombinant), 1 mg, PRN  glucose, 16 g, PRN  glucose, 24 g, PRN  melatonin, 6 mg, Nightly PRN  morphine, 2 mg, Q4H PRN  naloxone, 0.02 mg, PRN  ondansetron, 4 mg, Q8H PRN  oxyCODONE-acetaminophen, 1 tablet, Q4H PRN  prochlorperazine, 5 mg, Q6H PRN  sodium chloride 0.9%, 0.1-10 mL, PRN  sodium chloride 0.9%, 10 mL, Q12H PRN  sodium chloride 0.9%, 3 mL, PRN      Pt worked with PT/OT. Pt pending discharge once PICC is placed and IV abx ordered for home use. Cardiac monitor discontinued. Pain management continues. No falls reported on shift and hourly rounding is complete.  Chart check completed. Will continue plan of care.      Orientation: oriented x 4        Lead Monitored: Lead II Rhythm: normal sinus rhythm    Cardiac/Telemetry Box Number: [id number]  VTE Required Core Measure: Pharmacological prophylaxis initiated/maintained, (SCDs) Sequential compression device initiated/maintained Last Bowel Movement: 07/09/23  Diet Adult Regular (IDDSI Level 7)  Voiding Characteristics: voids spontaneously without difficulty  Homero Score: 22  Fall Risk Score: 9  Accucheck []   Freq?      Lines/Drains/Airways        Peripheral Intravenous Line  Duration                  Peripheral IV - Single Lumen 07/09/23 0645 22 G Anterior;Left Forearm 1 day                            Orientation: oriented x 4        Lead Monitored: Lead II Rhythm: normal sinus rhythm    Cardiac/Telemetry Box Number: [id number]  VTE Required Core Measure: Pharmacological prophylaxis initiated/maintained, (SCDs) Sequential compression device initiated/maintained Last Bowel Movement: 07/09/23  Diet Adult Regular (IDDSI Level 7)  Voiding Characteristics: voids spontaneously without difficulty  Homero Score: 22  Fall Risk Score: 9  Accucheck []   Freq?      Lines/Drains/Airways       Peripheral Intravenous Line  Duration                  Peripheral IV - Single Lumen 07/09/23 0645 22 G Anterior;Left Forearm 1 day

## 2023-07-10 NOTE — CARE UPDATE
Discharge plan -  IV daptomycin at 700mg daily   IV Zosyn 4.5gram every 8 hours  Will treat for 6 weeks   EOC-08/18/23  Weekly cpk, esr,cmp,cbc  Will see in iD clinic

## 2023-07-10 NOTE — PT/OT/SLP PROGRESS
"Occupational Therapy   Treatment    Name: Abdullahi Urias  MRN: [id number]  Admitting Diagnosis:  Post-operative wound abscess  5 Days Post-Op    Recommendations:     Discharge Recommendations: home health OT  Discharge Equipment Recommendations:  walker, rolling  Barriers to discharge:  None    Assessment:     Abdullahi Urias is a 67 y.o. male with a medical diagnosis of Post-operative wound abscess.  He presents with the following performance deficits affecting function are weakness, impaired endurance, impaired self care skills, impaired functional mobility, impaired balance, pain, impaired cardiopulmonary response to activity, orthopedic precautions.     Rehab Prognosis:  Good; patient would benefit from acute skilled OT services to address these deficits and reach maximum level of function.       Plan:     Patient to be seen 2 x/week to address the above listed problems via self-care/home management, therapeutic activities, therapeutic exercises  Plan of Care Expires: 07/20/23  Plan of Care Reviewed with: patient    Subjective     Chief Complaint: Reported "I am doing great. I should go home today."  Patient/Family Comments/goals: none reported  Pain/Comfort:  Pain Rating 1: 3/10  Location 1: back  Pain Addressed 1:  (activity pacing)    Objective:     Communicated with: NurseTriny, prior to session.  Patient found supine with peripheral IV, telemetry upon OT entry to room.    General Precautions: Standard, fall    Orthopedic Precautions:spinal precautions  Braces: LSO  Respiratory Status: Room air     Occupational Performance:     Bed Mobility:    Patient completed Supine to Sit with modified independence     Functional Mobility/Transfers:  Patient completed Sit <> Stand Transfer with modified independence  with  rolling walker   Patient completed Bed <> Chair Transfer using Step Transfer technique with modified independence with rolling walker  Functional Mobility: Patient completed x600ft functional mobility with " RW and SPV to increase dynamic standing balance and activity tolerance needed for ADL completion.  Increased time for completion with intermittent self initiated standing rest breaks.    Activities of Daily Living:  Upper Body Dressing: setup donned LSO while in standing with setup for item retrieval and v/c for appropriate placement.    [facility] 6 Click ADL: 19    Treatment & Education:  Patient tolerated intervention well overall. Continued improvements in standing balance and activity pacing. Provided with v/c for appropriate RW management and education on rationale for recommendation of use. Encouraged completion of B UE AROM therex, within spinal precautions, throughout the day to increase functional strength and activity tolerance needed for ADL completion.Patient stated understanding, in agreement with POC, and in agreement to call for A to mobilize in room.    Patient left up in chair with all lines intact, call button in reach, and chair alarm on    GOALS:   Multidisciplinary Problems       Occupational Therapy Goals          Problem: Occupational Therapy    Goal Priority Disciplines Outcome Interventions   Occupational Therapy Goal     OT, PT/OT Ongoing, Progressing    Description: Goals to be met by: 7/20/23     Patient will increase functional independence with ADLs by performing:    Toileting from toilet with Modified [place] for hygiene and clothing management.   Toilet transfer to toilet with Modified [place].  Demonstrates 100% functional compliance with spinal precautions.                         Time Tracking:     OT Date of Treatment: 07/10/23  OT Start Time: 0915  OT Stop Time: 0940  OT Total Time (min): 25 min    Billable Minutes:Therapeutic Activity 25    7/10/2023

## 2023-07-10 NOTE — PLAN OF CARE
Ongoing (interventions implemented as appropriate)  Pt is alert and oriented.  VSS  Pt able to make needs known.  Pt remained afebrile throughout this shift.   Pt remained free of falls this shift.   Prn pain medication given.  Plan of care reviewed. Patient verbalizes understanding.   Pt moving/turing independent. Frequent weight shifting encouraged.  Patient normal sinus rhythm on monitor.   Bed low, side rails up x 2, wheels locked, call light in reach.   Hourly rounding completed.   Will continue to observe.    Chronic illness

## 2023-07-11 PROCEDURE — 94799 UNLISTED PULMONARY SVC/PX: CPT | Mod: HCNC

## 2023-07-11 PROCEDURE — 25000003 PHARM REV CODE 250: Mod: HCNC | Performed by: NURSE PRACTITIONER

## 2023-07-11 PROCEDURE — 99900035 HC TECH TIME PER 15 MIN (STAT): Mod: HCNC

## 2023-07-11 PROCEDURE — 99233 PR SUBSEQUENT HOSPITAL CARE,LEVL III: ICD-10-PCS | Mod: NSCH,HCNC,95, | Performed by: INTERNAL MEDICINE

## 2023-07-11 PROCEDURE — 25000003 PHARM REV CODE 250: Mod: HCNC | Performed by: HOSPITALIST

## 2023-07-11 PROCEDURE — 63600175 PHARM REV CODE 636 W HCPCS: Mod: HCNC | Performed by: INTERNAL MEDICINE

## 2023-07-11 PROCEDURE — 25000003 PHARM REV CODE 250: Mod: HCNC | Performed by: PHYSICIAN ASSISTANT

## 2023-07-11 PROCEDURE — 97116 GAIT TRAINING THERAPY: CPT | Mod: HCNC

## 2023-07-11 PROCEDURE — 25000003 PHARM REV CODE 250: Mod: HCNC | Performed by: INTERNAL MEDICINE

## 2023-07-11 PROCEDURE — 63600175 PHARM REV CODE 636 W HCPCS: Mod: HCNC | Performed by: PHYSICIAN ASSISTANT

## 2023-07-11 PROCEDURE — 25000242 PHARM REV CODE 250 ALT 637 W/ HCPCS: Mod: HCNC | Performed by: PHYSICIAN ASSISTANT

## 2023-07-11 PROCEDURE — 94761 N-INVAS EAR/PLS OXIMETRY MLT: CPT | Mod: HCNC

## 2023-07-11 PROCEDURE — 63600175 PHARM REV CODE 636 W HCPCS: Mod: HCNC | Performed by: HOSPITALIST

## 2023-07-11 PROCEDURE — 25000003 PHARM REV CODE 250: Mod: HCNC | Performed by: NEUROLOGICAL SURGERY

## 2023-07-11 PROCEDURE — 21400001 HC TELEMETRY ROOM: Mod: HCNC

## 2023-07-11 PROCEDURE — 94640 AIRWAY INHALATION TREATMENT: CPT | Mod: HCNC

## 2023-07-11 PROCEDURE — 99233 SBSQ HOSP IP/OBS HIGH 50: CPT | Mod: NSCH,HCNC,95, | Performed by: INTERNAL MEDICINE

## 2023-07-11 RX ADMIN — OXYCODONE AND ACETAMINOPHEN 1 TABLET: 10; 325 TABLET ORAL at 05:07

## 2023-07-11 RX ADMIN — PANTOPRAZOLE SODIUM 40 MG: 40 TABLET, DELAYED RELEASE ORAL at 09:07

## 2023-07-11 RX ADMIN — METHOCARBAMOL 500 MG: 500 TABLET ORAL at 08:07

## 2023-07-11 RX ADMIN — POLYETHYLENE GLYCOL 3350 17 G: 17 POWDER, FOR SOLUTION ORAL at 08:07

## 2023-07-11 RX ADMIN — DOCUSATE SODIUM 100 MG: 100 CAPSULE, LIQUID FILLED ORAL at 08:07

## 2023-07-11 RX ADMIN — ALPRAZOLAM 0.5 MG: 0.5 TABLET ORAL at 08:07

## 2023-07-11 RX ADMIN — OXYCODONE AND ACETAMINOPHEN 1 TABLET: 10; 325 TABLET ORAL at 08:07

## 2023-07-11 RX ADMIN — METHOCARBAMOL 500 MG: 500 TABLET ORAL at 09:07

## 2023-07-11 RX ADMIN — ATORVASTATIN CALCIUM 80 MG: 40 TABLET, FILM COATED ORAL at 09:07

## 2023-07-11 RX ADMIN — SERTRALINE HYDROCHLORIDE 100 MG: 50 TABLET ORAL at 08:07

## 2023-07-11 RX ADMIN — METHOCARBAMOL 500 MG: 500 TABLET ORAL at 04:07

## 2023-07-11 RX ADMIN — BUDESONIDE INHALATION 0.5 MG: 0.5 SUSPENSION RESPIRATORY (INHALATION) at 08:07

## 2023-07-11 RX ADMIN — GABAPENTIN 600 MG: 300 CAPSULE ORAL at 04:07

## 2023-07-11 RX ADMIN — GABAPENTIN 600 MG: 300 CAPSULE ORAL at 08:07

## 2023-07-11 RX ADMIN — OXYCODONE AND ACETAMINOPHEN 1 TABLET: 10; 325 TABLET ORAL at 01:07

## 2023-07-11 RX ADMIN — METHOCARBAMOL 500 MG: 500 TABLET ORAL at 01:07

## 2023-07-11 RX ADMIN — PANTOPRAZOLE SODIUM 40 MG: 40 TABLET, DELAYED RELEASE ORAL at 08:07

## 2023-07-11 RX ADMIN — DAPTOMYCIN 700 MG: 350 INJECTION, POWDER, LYOPHILIZED, FOR SOLUTION INTRAVENOUS at 06:07

## 2023-07-11 RX ADMIN — PIPERACILLIN SODIUM AND TAZOBACTAM SODIUM 4.5 G: 4; .5 INJECTION, POWDER, FOR SOLUTION INTRAVENOUS at 06:07

## 2023-07-11 RX ADMIN — PIPERACILLIN SODIUM AND TAZOBACTAM SODIUM 4.5 G: 4; .5 INJECTION, POWDER, FOR SOLUTION INTRAVENOUS at 02:07

## 2023-07-11 RX ADMIN — CARVEDILOL 12.5 MG: 12.5 TABLET, FILM COATED ORAL at 09:07

## 2023-07-11 RX ADMIN — MORPHINE SULFATE 2 MG: 2 INJECTION, SOLUTION INTRAMUSCULAR; INTRAVENOUS at 02:07

## 2023-07-11 RX ADMIN — ENOXAPARIN SODIUM 40 MG: 40 INJECTION SUBCUTANEOUS at 04:07

## 2023-07-11 RX ADMIN — GABAPENTIN 600 MG: 300 CAPSULE ORAL at 09:07

## 2023-07-11 NOTE — PLAN OF CARE
A231/A231 EMILIO Urias is a 67 y.o.male admitted on 7/3/2023 for Post-operative wound abscess   Code Status: Full Code MRN: [id number]   Review of patient's allergies indicates:  No Known Allergies  Past Medical History:   Diagnosis Date    Aortic stenosis     [facility]    Asthma     BPH (benign prostatic hyperplasia)     CAD (coronary artery disease)     Cardiomyopathy     CHF (congestive heart failure)     Chronic hoarseness     vocal cord surg    Chronic pain     CKD (chronic kidney disease) stage 3, GFR 30-59 ml/min     CVA (cerebral infarction)     8/2012 olol; reviewed ed note    Ex-smoker     GERD (gastroesophageal reflux disease)     Hepatitis C     treatedharvoni says cured, RNA NEG 6/2020    Hypertension     Pancreatitis     Prostate cancer     Prostate cancer     Prostate cancer     PVD (peripheral vascular disease)     Renal insufficiency     Substance abuse     cocaine, etoh , tob in past      PRN meds    acetaminophen, 650 mg, Q4H PRN  albuterol-ipratropium, 3 mL, Q4H PRN  ALPRAZolam, 0.5 mg, BID PRN  aluminum-magnesium hydroxide-simethicone, 30 mL, Q6H PRN  calcium carbonate, 500 mg, TID PRN  dextrose 10%, 12.5 g, PRN  dextrose 10%, 25 g, PRN  glucagon (human recombinant), 1 mg, PRN  glucose, 16 g, PRN  glucose, 24 g, PRN  melatonin, 6 mg, Nightly PRN  naloxone, 0.02 mg, PRN  ondansetron, 4 mg, Q8H PRN  oxyCODONE-acetaminophen, 1 tablet, Q4H PRN  prochlorperazine, 5 mg, Q6H PRN  sodium chloride 0.9%, 0.1-10 mL, PRN  sodium chloride 0.9%, 10 mL, Q12H PRN  sodium chloride 0.9%, 3 mL, PRN      Pt educated on medication timing. After pt confirmed understanding, pt PICC did not flush without pain. Dr. Khan notified and CXR ordered.  No falls were reported on shift and hourly rounding is complete. IV abx and lab monitoring continues. Chart check completed. Will continue plan of care.      Orientation: oriented x 4        Lead Monitored: Lead II Rhythm: normal sinus rhythm    Cardiac/Telemetry  Box Number: [id number]  VTE Required Core Measure: Pharmacological prophylaxis initiated/maintained Last Bowel Movement: 07/10/23  Diet Adult Regular (IDDSI Level 7)  Voiding Characteristics: voids spontaneously without difficulty  Homero Score: 23  Fall Risk Score: 9  Accucheck []   Freq?      Lines/Drains/Airways       Peripherally Inserted Central Catheter Line  Duration             PICC Double Lumen 07/10/23 1945 right basilic <1 day

## 2023-07-11 NOTE — PROGRESS NOTES
[facility] Medicine  Progress Note    Patient Name: Abdullahi Urias  MRN: [id number]  Patient Class: OP- Observation   Admission Date: 7/3/2023  Length of Stay: 0 days  Attending Physician: Lino Khan MD  Primary Care Provider: Reji Valentin MD        Subjective:     Principal Problem:Post-operative wound abscess        HPI:  Mr. Urias is a 67-year-old  male with PMH significant for aortic stenosis with bioprosthetic aortic valve replacement, diastolic CHF, CKD stage 3, HTN, prostate cancer and recent lumbar spine surgery on 5/17 with Dr. Valencia who was sent from neurosurgery office with complaints of RLE pain, weakness and oozing from surgical incision site.  Per patient and wife, he was doing fine till two days ago when he went on a long car ride to [place] and he started having lower back pain.  Yesterday morning he was able to shower but then was unable to get dressed by himself d/t the pain and new onset right lower extremity weakness.  Complains of pain from right hip all the way down leg.  Symtoms are constant and moderate in severity, no relieving or exacerbating factors.  Pain 101/10.  Associated symptoms are right lower extremity weakness and occassional dyspnea.  Patient denies any CP, leg swelling, fever/chills, urinary or fecal incontinence.   Lab work remarkable for Sed Rate 55, Creatinine 1.6.  MRI showed Large subcutaneous fluid collection involving the postoperative bed measuring approximately 11.6 x 28.9 may reflect seroma or abscess or meningocele Additional subcutaneous fluid collection in the region of the laminectomy is multiloculated measuring approximately 2.2 x 14 mm each.  This could relate to abscess, seroma, or meningocele.   Case was discussed with neurosurgery, ERIK Gillette who discussed with Dr. Valencia.  will admit patient to observation for post op fluid collection, r/o abscess.  Blood cultures ordered, holding Abx at this time as  patient is febrile and normal WBC.  Per neurosurgery recs will hold ASA/plavix at this time, likely washout tomorrow morning.     Code Status Full  Surrogate Decision Maker wife Vinita      Overview/Hospital Course:      7/5/23  Patient examined post-op this afternoon, wife at bedside  Reports pain in low back, controlled at this time  Discussed with RN at bedside; surgical incision dressing slightly saturated, 2 drain in place    7/6/23  NAEON, patient reports indigestion, pain controlled  Cultures pending, continue vancomycin  NSGY following, appreciate reccs    7/7/23  NAEON, pain controlled, reports indigestion  No relief with Tums, one time dose of sucralfate, monitor response    7/8/23  NAEON, pain controlled, no new issues    7/9/23  NAEON, reports constipation, on bowel regimen    7/10/23  NAEON, pain controlled  Lumbar hemovac drain pulled today per Neurosurgery  Discussed with Dr. Keith, plan for long term IV antibiotics, PICC line ordered  IV daptomycin at 700mg daily, IV Zosyn 4.5gram every 8 hours, x 6 weeks, EOC-08/18/23  Weekly cpk, esr,cmp,cbc, F/U in ID clinic  Pending home health and IV antibiotic arraignments   Stable for hospital discharge        Review of Systems   All other systems reviewed and are negative.  Objective:     Vital Signs (Most Recent):  Temp: 97.8 °F (36.6 °C) (07/11/23 0724)  Pulse: 65 (07/11/23 0801)  Resp: 18 (07/11/23 0801)  BP: (!) 101/59 (07/11/23 0724)  SpO2: 99 % (07/11/23 0801) Vital Signs (24h Range):  Temp:  [97.8 °F (36.6 °C)-98.7 °F (37.1 °C)] 97.8 °F (36.6 °C)  Pulse:  [65-99] 65  Resp:  [16-18] 18  SpO2:  [95 %-99 %] 99 %  BP: ()/(55-79) 101/59     Weight: 114.9 kg (253 lb 4.9 oz)  Body mass index is 38.52 kg/m².  No intake or output data in the 24 hours ending 07/11/23 0804      Physical Exam  Constitutional:       General: He is not in acute distress.     Appearance: Normal appearance.   Cardiovascular:      Rate and Rhythm: Normal rate and regular rhythm.       Heart sounds: No murmur heard.  Pulmonary:      Effort: Pulmonary effort is normal. No respiratory distress.      Breath sounds: Normal breath sounds. No wheezing.   Abdominal:      General: There is no distension.      Palpations: Abdomen is soft.      Tenderness: There is no abdominal tenderness.   Neurological:      Mental Status: He is alert.           Significant Labs: All pertinent labs within the past 24 hours have been reviewed.  CMP:   Recent Labs   Lab 07/10/23  1043   CREATININE 1.5*       Significant Imaging: I have reviewed all pertinent imaging results/findings within the past 24 hours.      Assessment/Plan:      * Post-operative wound abscess  Patient with recent lumbar fusion L4-S1 (5/17/23)  Presents with RLE weakness, pain  MRI revealed two fluid collections l-spine, abscess versus seroma versus meningocele   US RLE negative for DVT  Blood cultures pending    S/p washout and antibiotic bead placement today (7/5/23), operative cultures pending  ID following, continue vancomycin  Holding ASA/Plavix, defer to NSGY when to resume  Multimodal pain control    ABLA (acute blood loss anemia)  Presented with chronic anemia  Hg trending down; post-op and dilutional  Monitor Hemovac drain ouptut  Transfuse if Hg < 7 or symptomatic    CKD (chronic kidney disease) stage 3, GFR 30-59 ml/min  Cr ranges from 1.4-1.7 over the last year  Strict I/O's  Avoid nephrotoxins  AM labs    Cardiomyopathy  Euvolemic at exam  Continue BB  Monitor fluid status closely, strict I/O     Echo    Result Date: 9/12/2022  · The left ventricle is normal in size with normal systolic function.  · The estimated ejection fraction is 65%.  · Grade I left ventricular diastolic dysfunction.  · Normal right ventricular size with normal right ventricular systolic   function.  · There is a bioprosthetic aortic valve present. There is mild   paravalvular aortic insufficiency present.  · The aortic valve mean gradient is 14 mmHg with a  dimensionless index of   0.57.  · Mild tricuspid regurgitation.  · The estimated PA systolic pressure is 33 mmHg.           CAD (coronary artery disease)  Holding asa/plavix at this time  S/p lumbar abscess washout  Restart when okay per Neurosurgery  Continue statin    Hypertension  BP controled, continue BB (carvedilol)  Holding home ARB at this time (losartan)    GERD (gastroesophageal reflux disease)  PPI     Moderate persistent asthma without complication  Presented with dyspnea to ED, US RLE negative for DVT, o2 sats stable on room air, not tachycardiac, low concern for PE  No wheezing on exam   Continue Pulmicort, wife may bring home trelegy inhaler  Duonebs PRN    CHEO on CPAP  Continue cpap nightly     S/P AVR (aortic valve replacement) biopresthetic miller 25 mm in 2014         Class 2 severe obesity due to excess calories with serious comorbidity and body mass index (BMI) of 38.0 to 38.9 in adult  Body mass index is 38.21 kg/m². Morbid obesity complicates all aspects of disease management from diagnostic modalities to treatment. Weight loss encouraged and health benefits explained to patient.       VTE Risk Mitigation (From admission, onward)         Ordered     enoxaparin injection 40 mg  Every 24 hours         07/06/23 1412     IP VTE HIGH RISK PATIENT  Once         07/05/23 1302     Place sequential compression device  Until discontinued         07/05/23 1302     Place sequential compression device  Until discontinued         07/05/23 1137     Place sequential compression device  Until discontinued         07/04/23 0900                Discharge Planning   AURELIA: 7/11/2023     Code Status: Full Code   Is the patient medically ready for discharge?:     Reason for patient still in hospital (select all that apply): Pending disposition  Discharge Plan A: Home Health                  Lino Khan MD  Department of Hospital Medicine   [facility] Tele[facility] ([facility])

## 2023-07-11 NOTE — SUBJECTIVE & OBJECTIVE
Review of Systems   All other systems reviewed and are negative.  Objective:     Vital Signs (Most Recent):  Temp: 97.8 °F (36.6 °C) (07/11/23 0724)  Pulse: 65 (07/11/23 0801)  Resp: 18 (07/11/23 0801)  BP: (!) 101/59 (07/11/23 0724)  SpO2: 99 % (07/11/23 0801) Vital Signs (24h Range):  Temp:  [97.8 °F (36.6 °C)-98.7 °F (37.1 °C)] 97.8 °F (36.6 °C)  Pulse:  [65-99] 65  Resp:  [16-18] 18  SpO2:  [95 %-99 %] 99 %  BP: ()/(55-79) 101/59     Weight: 114.9 kg (253 lb 4.9 oz)  Body mass index is 38.52 kg/m².  No intake or output data in the 24 hours ending 07/11/23 0804      Physical Exam  Constitutional:       General: He is not in acute distress.     Appearance: Normal appearance.   Cardiovascular:      Rate and Rhythm: Normal rate and regular rhythm.      Heart sounds: No murmur heard.  Pulmonary:      Effort: Pulmonary effort is normal. No respiratory distress.      Breath sounds: Normal breath sounds. No wheezing.   Abdominal:      General: There is no distension.      Palpations: Abdomen is soft.      Tenderness: There is no abdominal tenderness.   Neurological:      Mental Status: He is alert.           Significant Labs: All pertinent labs within the past 24 hours have been reviewed.  CMP:   Recent Labs   Lab 07/10/23  1043   CREATININE 1.5*       Significant Imaging: I have reviewed all pertinent imaging results/findings within the past 24 hours.

## 2023-07-11 NOTE — SUBJECTIVE & OBJECTIVE
Review of Systems   All other systems reviewed and are negative.  Objective:     Vital Signs (Most Recent):  Temp: 98.9 °F (37.2 °C) (07/11/23 1248)  Pulse: 90 (07/11/23 1248)  Resp: 16 (07/11/23 1328)  BP: 138/73 (07/11/23 1248)  SpO2: 98 % (07/11/23 1248) Vital Signs (24h Range):  Temp:  [97.8 °F (36.6 °C)-98.9 °F (37.2 °C)] 98.9 °F (37.2 °C)  Pulse:  [65-99] 90  Resp:  [16-18] 16  SpO2:  [95 %-99 %] 98 %  BP: ()/(55-77) 138/73     Weight: 114.9 kg (253 lb 4.9 oz)  Body mass index is 38.52 kg/m².  No intake or output data in the 24 hours ending 07/11/23 1356      Physical Exam  Constitutional:       General: He is not in acute distress.     Appearance: Normal appearance. He is obese.   Cardiovascular:      Rate and Rhythm: Normal rate and regular rhythm.      Heart sounds: No murmur heard.  Pulmonary:      Effort: Pulmonary effort is normal. No respiratory distress.      Breath sounds: Normal breath sounds. No wheezing.   Abdominal:      General: There is no distension.      Palpations: Abdomen is soft.      Tenderness: There is no abdominal tenderness.   Musculoskeletal:      Comments: RUE PICC line in place   Neurological:      Mental Status: He is alert.           Significant Labs: All pertinent labs within the past 24 hours have been reviewed.    Significant Imaging: I have reviewed all pertinent imaging results/findings within the past 24 hours.

## 2023-07-11 NOTE — ASSESSMENT & PLAN NOTE
Cardiology follow up , will need to treat the lumbar collection to reduce any risk of bacteremia that can affect the valve

## 2023-07-11 NOTE — CONSULTS
[facility])  Infectious Disease  Consult Note    Patient Name: Abdullahi Urias  MRN: [id number]  Admission Date: 7/3/2023  Hospital Length of Stay: 0 days  Attending Physician: Lino Khan MD  Primary Care Provider: Reji Valentin MD     Isolation Status: No active isolations    Patient information was obtained from patient, past medical records and ER records.      Consults  Assessment/Plan:     Cardiac/Vascular  S/P AVR (aortic valve replacement) biopresthetic miller 25 mm in 2014   Cardiology follow up , will need to treat the lumbar collection to reduce any risk of bacteremia that can affect the valve     Hypertension  BP control as per primary team    Renal/  CKD (chronic kidney disease) stage 3, GFR 30-59 ml/min  Will avoid nephrotoxic meds    ID  * Post-operative wound abscess  All cultures are neg till date -  - will plan to treat for 6 weeks but might need to stop early       IV daptomycin at 700mg daily   IV Zosyn 4.5gram every 8 hours  Will treat for 6 weeks   EOC-08/18/23  Weekly cpk, esr,cmp,cbc  Will see in iD clinic         Thank you for your consult. I will follow-up with patient. Please contact us if you have any additional questions.    Rommel Keith MD, [facility]  Infectious Disease  [facility])    Subjective:     Principal Problem: Post-operative wound abscess    HPI:    67-year-old man with PMH significant for aortic stenosis with bioprosthetic aortic valve replacement, diastolic CHF, CKD stage 3, HTN, prostate cancer .  He had recent  lumbar spine surgery on 5/17 with Dr. Valencia   He presented with  right lower extremity weakness/pain   .  MRI showed Large subcutaneous fluid collection involving the postoperative bed measuring approximately 11.6 x 28.9 may reflect seroma or abscess or meningocele Additional subcutaneous fluid collection in the region of the laminectomy is multiloculated measuring approximately 2.2 x 14 mm each.  This could relate to abscess,  seroma, or meningocele.     He was seen by NS and had on 07/05- I and D done with    postoperative fluid collection sent for culture and analysis.  Removal and replacement of set screws right sided L4 L5 S1 .  All cultures are neg till date .  CBC -  Component      Latest Ref Rng & Units 7/9/2023 7/8/2023 7/7/2023   WBC      3.90 - 12.70 K/uL 5.96 8.25 11.31   Esr -55  CRP- 46        Past Medical History:   Diagnosis Date    [facility]    Asthma     BPH (benign prostatic hyperplasia)     CAD (coronary artery disease)     Cardiomyopathy     CHF (congestive heart failure)     Chronic hoarseness     vocal cord surg    Chronic pain     CKD (chronic kidney disease) stage 3, GFR 30-59 ml/min     CVA (cerebral infarction)     8/2012 olol; reviewed ed note    Ex-smoker     GERD (gastroesophageal reflux disease)     Hepatitis C     treatedharvoni says cured, RNA NEG 6/2020    Hypertension     Pancreatitis     Prostate cancer     Prostate cancer     Prostate cancer     PVD (peripheral vascular disease)     Renal insufficiency     Substance abuse     cocaine, etoh , tob in past       Past Surgical History:   Procedure Laterality Date    AORTIC VALVE REPLACEMENT  05/19/2014    Tissue valve replacement    BACK SURGERY      CARDIAC CATHETERIZATION      COLONOSCOPY  2011    COLONOSCOPY N/A 2/24/2021    Procedure: COLONOSCOPY;  Surgeon: Faith Carrillo MD;  Location: [facility];  Service: Endoscopy;  Laterality: N/A;    EPIDURAL STEROID INJECTION INTO CERVICAL SPINE N/A 6/21/2022    Procedure: C7-T1 IL PIEDAD;  Surgeon: Nehemiah Carmen MD;  Location: [facility] PAIN MGT;  Service: Pain Management;  Laterality: N/A;    ESOPHAGOGASTRODUODENOSCOPY N/A 2/24/2021    Procedure: ESOPHAGOGASTRODUODENOSCOPY (EGD);  Surgeon: Faith Carrillo MD;  Location: [facility];  Service: Endoscopy;  Laterality: N/A;    FOOT SURGERY      INSERTION N/A 7/5/2023    Procedure: INSERTION;  Surgeon:  Brandon Valencia MD;  Location: [facility] OR;  Service: Neurosurgery;  Laterality: N/A;  Antibiotic Beads    LEFT HEART CATHETERIZATION Left 7/24/2020    Procedure: CATHETERIZATION, HEART, LEFT;  Surgeon: Pasha Martin MD;  Location: [facility] CATH LAB;  Service: Cardiology;  Laterality: Left;    PENILE PROSTHESIS IMPLANT      PENILE PROSTHESIS REVISION  04/30/2018    [facility]    radiation for prostate      REMOVAL OF HARDWARE FROM SPINE N/A 7/5/2023    Procedure: REMOVAL, HARDWARE, SPINE;  Surgeon: Brandon Valencia MD;  Location: [facility] OR;  Service: Neurosurgery;  Laterality: N/A;    REPLACEMENT N/A 7/5/2023    Procedure: REPLACEMENT;  Surgeon: Brandon Valencia MD;  Location: [facility] OR;  Service: Neurosurgery;  Laterality: N/A;  of Sandoval and Screws. [employer]    TRANSFORAMINAL EPIDURAL INJECTION OF STEROID Right 10/20/2022    Procedure: Right  L4/5 + L5/S1 TF PIEDAD RN IV Sedation;  Surgeon: Nehemiah Carmen MD;  Location: [facility] PAIN MGT;  Service: Pain Management;  Laterality: Right;    TRANSFORAMINAL LUMBAR INTERBODY FUSION (TLIF) USING COMPUTER-ASSISTED NAVIGATION Bilateral 5/17/2023    Procedure: FUSION, SPINE, LUMBAR, TLIF, USING COMPUTER-ASSISTED NAVIGATION;  Surgeon: Brandon Valencia MD;  Location: [facility] OR;  Service: Neurosurgery;  Laterality: Bilateral;  2 level lumbar fusion at L4-5 and L5-S1    UPPER GASTROINTESTINAL ENDOSCOPY  2011    WASHOUT N/A 7/5/2023    Procedure: WASHOUT;  Surgeon: Brandon Valencia MD;  Location: [facility] OR;  Service: Neurosurgery;  Laterality: N/A;    WOUND EXPLORATION Bilateral 7/5/2023    Procedure: EXPLORATION, WOUND;  Surgeon: Brandon Valencia MD;  Location: [facility] OR;  Service: Neurosurgery;  Laterality: Bilateral;       Review of patient's allergies indicates:  No Known Allergies    Medications:  Medications Prior to Admission   Medication Sig    carvediloL (COREG) 12.5 MG tablet Take 1 tablet (12.5 mg total) by mouth 2 (two) times daily.     albuterol (PROVENTIL/VENTOLIN HFA) 90 mcg/actuation inhaler INHALE 2 PUFFS INTO THE LUNGS EVERY 6 HOURS AS NEEDED FOR COUGH    allopurinoL (ZYLOPRIM) 100 MG tablet Take 100 mg by mouth once daily. Takes 0.5 tab    ALPRAZolam (XANAX) 1 MG tablet Take 1 mg by mouth Daily. EVERY OTHER DAY    aluminum & magnesium hydroxide-simethicone (MYLANTA MAX STRENGTH) 400-400-40 mg/5 mL suspension TAKE 15ML BY MOUTH FOUR TIMES A DAY AS NEEDED FOR STOMACH ACID    aspirin (ECOTRIN) 81 MG EC tablet Take 1 tablet (81 mg total) by mouth once daily.    atorvastatin (LIPITOR) 80 MG tablet TAKE ONE-HALF TABLET BY MOUTH EVERY DAY FOR CHOLESTEROL    diclofenac sodium (VOLTAREN) 1 % Gel APPLY 2 GRAMS TOPICALLY FOUR TIMES A DAY AS NEEDED FOR PAIN AND INFLAMMATION. USE ENCLOSED DOSING CARD.    diphenhydrAMINE (SOMINEX) 25 mg tablet Take 25 mg by mouth nightly as needed for Insomnia.    docusate sodium (COLACE) 100 MG capsule Take 1 capsule (100 mg total) by mouth 2 (two) times daily.    esomeprazole (NEXIUM) 40 MG capsule 40 mg.    famotidine (PEPCID) 40 MG tablet TAKE ONE TABLET BY MOUTH AT BEDTIME FOR ACID REFLUX    flunisolide 25 mcg, 0.025%, (NASALIDE) 25 mcg (0.025 %) Spry 2 sprays by Nasal route as needed.     gabapentin (NEURONTIN) 600 MG tablet Take 1 tablet (600 mg total) by mouth 3 (three) times daily.    hydrocortisone 2.5 % cream Apply topically 2 (two) times daily as needed. Apply to affected areas of the face and neck.    hydrocortisone-pramoxine (PROCTOFOAM-HS) rectal foam INSERT 1 APPLICATORFUL INTO RECTALLY TWICE A DAY FOR HEMORRHOIDS    hydrOXYzine HCL (ATARAX) 25 MG tablet Take 25 mg by mouth 3 (three) times daily as needed for Itching.    LIDOcaine (LIDODERM) 5 % APPLY 1 PATCH TOPICALLY EVERY DAY FOR PAIN. WEAR FOR 12 HOURS, THEN REMOVE. DO NOT APPLY NEW PATCH FOR AT LEAST 12 HOURS.    losartan (COZAAR) 50 MG tablet Take 1 tablet (50 mg total) by mouth once daily.    methyl salicylate-menthol 15-10% 15-10  % Crea Apply topically as needed.     multivitamin (THERAGRAN) per tablet Take 1 tablet by mouth once daily.    pantoprazole (PROTONIX) 40 MG tablet Take 40 mg by mouth 2 (two) times daily.     sertraline (ZOLOFT) 100 MG tablet TAKE TWO TABLETS BY MOUTH EVERY DAY FOR MENTAL HEALTH DOSE INCREASED TO 200MG/DAY    simethicone (MYLICON) 80 MG chewable tablet Take 80 mg by mouth every 6 (six) hours as needed for Flatulence.    sucralfate (CARAFATE) 100 mg/mL suspension Take 1 g by mouth 4 (four) times daily.     TRELEGY ELLIPTA 200-62.5-25 mcg inhaler INHALE 1 PUFF INTO THE LUNGS EVERY DAY    triamcinolone acetonide 0.1% (KENALOG) 0.1 % cream Apply topically 2 (two) times daily.     Antibiotics (From admission, onward)      Start     Stop Route Frequency Ordered    07/11/23 1330  DAPTOmycin (CUBICIN) 700 mg in sodium chloride 0.9% SolP 50 mL IVPB         -- IV Every 24 hours (non-standard times) 07/10/23 1401    07/10/23 1800  piperacillin-tazobactam (ZOSYN) 4.5 g in dextrose 5 % in water (D5W) 5 % 100 mL IVPB (MB+)         -- IV Every 8 hours (non-standard times) 07/10/23 1401          Antifungals (From admission, onward)      None          Antivirals (From admission, onward)      None             Immunization History   Administered Date(s) Administered    COVID-19, MRNA, LN-S, PF (Pfizer) (Purple Cap) 01/17/2021, 02/07/2021, 08/23/2021, 03/24/2022    Dtap, Unspecified Formulation 08/16/2012    Influenza 10/10/2012, 10/09/2013, 10/23/2014, 10/14/2015, 10/01/2017    Influenza (FLUAD) - Quadrivalent - Adjuvanted - PF *Preferred* (65+) 10/20/2021, 10/07/2022    Influenza - Quadrivalent 10/12/2016, 11/08/2017    Influenza - Quadrivalent - PF *Preferred* (6 months and older) 10/02/2020    Influenza - Trivalent (ADULT) 10/13/2018    Influenza Split 12/20/2010    Pneumococcal 08/01/2012    Pneumococcal Conjugate - 13 Valent 01/21/2016    Pneumococcal Polysaccharide - 23 Valent 07/14/2017, 10/19/2022    Td  "(ADULT) 10/19/2022    Tdap 10/09/2006, 08/16/2012    Zoster 02/05/2016       Family History       Problem Relation (Age of Onset)    Heart attack Sister, Brother    Heart disease Mother, Father          Social History     Socioeconomic History    Marital status:    Tobacco Use    Smoking status: Former     Packs/day: 2.00     Years: 8.00     Pack years: 16.00     Types: Cigarettes     Quit date: 8/24/2012     Years since quitting: 10.8    Smokeless tobacco: Never   Substance and Sexual Activity    Alcohol use: Never     Comment: Sober since 08/24/2012    Drug use: Not Currently     Types: "Crack" cocaine, Marijuana     Comment: Quit in 2012    Sexual activity: Yes   Social History Narrative    No pets in household, wife and daughter smokers. Former [email or web address].    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     Social Determinants of Health     Financial Resource Strain: Low Risk     Difficulty of Paying Living Expenses: Not very hard   Food Insecurity: Food Insecurity Present    Worried About Running Out of Food in the Last Year: Sometimes true    Ran Out of Food in the Last Year: Sometimes true   Transportation Needs: No Transportation Needs    Lack of Transportation (Medical): No    Lack of Transportation (Non-Medical): No   Physical Activity: Inactive    Days of Exercise per Week: 0 days    Minutes of Exercise per Session: 0 min   Stress: Stress Concern Present    Feeling of Stress : Very much   Social Connections: Moderately Isolated    Frequency of Communication with Friends and Family: Once a week    Frequency of Social Gatherings with Friends and Family: Never    Attends Anabaptism Services: More than 4 times per year    Active Member of Clubs or Organizations: No    Attends Club or Organization Meetings: Never    Marital Status:    Housing Stability: Low Risk     Unable to Pay for Housing in the Last Year: No    Number of Places Lived in the Last Year: 1 "    Unstable Housing in the Last Year: No     Review of Systems   Constitutional:  Positive for activity change and appetite change. Negative for chills, diaphoresis and fatigue.   HENT:  Negative for congestion.    Respiratory:  Negative for apnea and chest tightness.    Gastrointestinal:  Negative for abdominal distention and abdominal pain.   Objective:     Vital Signs (Most Recent):  Temp: 98.2 °F (36.8 °C) (07/10/23 2349)  Pulse: 85 (07/10/23 2349)  Resp: 18 (07/11/23 0252)  BP: 122/77 (07/10/23 2349)  SpO2: 99 % (07/10/23 2349) Vital Signs (24h Range):  Temp:  [98 °F (36.7 °C)-98.7 °F (37.1 °C)] 98.2 °F (36.8 °C)  Pulse:  [73-99] 85  Resp:  [16-18] 18  SpO2:  [97 %-99 %] 99 %  BP: (100-159)/(57-79) 122/77     Weight: 114 kg (251 lb 5.2 oz)  Body mass index is 38.21 kg/m².    Estimated Creatinine Clearance: 58.5 mL/min (A) (based on SCr of 1.5 mg/dL (H)).     Physical Exam  Vitals and nursing note reviewed.   HENT:      Head: Normocephalic.   Eyes:      Pupils: Pupils are equal, round, and reactive to light.   Cardiovascular:      Rate and Rhythm: Normal rate.   Abdominal:      General: Abdomen is flat.   Musculoskeletal:      Cervical back: Normal range of motion.   Neurological:      Mental Status: He is alert and oriented to person, place, and time.      Comments: Braces noted        Significant Labs: Blood Culture:   Recent Labs   Lab 01/14/23 2036 01/14/23 2040 07/04/23  1111 07/04/23  1112   LABBLOO No growth after 5 days. No growth after 5 days. No growth after 5 days. No growth after 5 days.     BMP:   Recent Labs   Lab 07/09/23  0537 07/10/23  1043     --      --    K 4.5  --      --    CO2 25  --    BUN 25*  --    CREATININE 1.7* 1.5*   CALCIUM 9.3  --      CBC:   Recent Labs   Lab 07/09/23 0537   WBC 5.96   HGB 8.3*   HCT 26.0*        CMP:   Recent Labs   Lab 07/09/23  0537 07/10/23  1043     --    K 4.5  --      --    CO2 25  --      --    BUN 25*  --     CREATININE 1.7* 1.5*   CALCIUM 9.3  --    ANIONGAP 9  --      Wound Culture:   Recent Labs   Lab 07/05/23  0851 07/05/23  0855 07/05/23  0959   LABAERO No growth No growth No growth     All pertinent labs within the past 24 hours have been reviewed.    Significant Imaging: I have reviewed all pertinent imaging results/findings within the past 24 hours.

## 2023-07-11 NOTE — PROGRESS NOTES
[facility] Medicine  Progress Note    Patient Name: Abdullahi Urias  MRN: [id number]  Patient Class: IP- Inpatient   Admission Date: 7/3/2023  Length of Stay: 0 days  Attending Physician: Lino Khan MD  Primary Care Provider: Reji Valentin MD        Subjective:     Principal Problem:Post-operative wound abscess        HPI:  Mr. Urias is a 67-year-old  male with PMH significant for aortic stenosis with bioprosthetic aortic valve replacement, diastolic CHF, CKD stage 3, HTN, prostate cancer and recent lumbar spine surgery on 5/17 with Dr. Valencia who was sent from neurosurgery office with complaints of RLE pain, weakness and oozing from surgical incision site.  Per patient and wife, he was doing fine till two days ago when he went on a long car ride to [employer] and he started having lower back pain.  Yesterday morning he was able to shower but then was unable to get dressed by himself d/t the pain and new onset right lower extremity weakness.  Complains of pain from right hip all the way down leg.  Symtoms are constant and moderate in severity, no relieving or exacerbating factors.  Pain 101/10.  Associated symptoms are right lower extremity weakness and occassional dyspnea.  Patient denies any CP, leg swelling, fever/chills, urinary or fecal incontinence.   Lab work remarkable for Sed Rate 55, Creatinine 1.6.  MRI showed Large subcutaneous fluid collection involving the postoperative bed measuring approximately 11.6 x 28.9 may reflect seroma or abscess or meningocele Additional subcutaneous fluid collection in the region of the laminectomy is multiloculated measuring approximately 2.2 x 14 mm each.  This could relate to abscess, seroma, or meningocele.   Case was discussed with neurosurgery, ERIK Gillette who discussed with Dr. Valencia.  will admit patient to observation for post op fluid collection, r/o abscess.  Blood cultures ordered, holding Abx at this time as  patient is febrile and normal WBC.  Per neurosurgery recs will hold ASA/plavix at this time, likely washout tomorrow morning.     Code Status Full  Surrogate Decision Maker wife Vinita      Overview/Hospital Course:      7/5/23  Patient examined post-op this afternoon, wife at bedside  Reports pain in low back, controlled at this time  Discussed with RN at bedside; surgical incision dressing slightly saturated, 2 drain in place    7/6/23  NAEON, patient reports indigestion, pain controlled  Cultures pending, continue vancomycin  NSGY following, appreciate reccs    7/7/23  NAEON, pain controlled, reports indigestion  No relief with Tums, one time dose of sucralfate, monitor response    7/8/23  NAEON, pain controlled, no new issues    7/9/23  NAEON, reports constipation, on bowel regimen    7/10/23  NAEON, pain controlled  Lumbar hemovac drain pulled today per Neurosurgery  Discussed with Dr. Keith, plan for long term IV antibiotics, PICC line ordered  IV daptomycin at 700mg daily, IV Zosyn 4.5gram every 8 hours, x 6 weeks, EOC-08/18/23  Weekly cpk, esr,cmp,cbc, F/U in ID clinic  Pending home health and IV antibiotic arraignments   Stable for hospital discharge    7/11/23  [facility], may take several days  Patient otherwise stable, having BMs, pain controlled        Review of Systems   All other systems reviewed and are negative.  Objective:     Vital Signs (Most Recent):  Temp: 98.9 °F (37.2 °C) (07/11/23 1248)  Pulse: 90 (07/11/23 1248)  Resp: 16 (07/11/23 1328)  BP: 138/73 (07/11/23 1248)  SpO2: 98 % (07/11/23 1248) Vital Signs (24h Range):  Temp:  [97.8 °F (36.6 °C)-98.9 °F (37.2 °C)] 98.9 °F (37.2 °C)  Pulse:  [65-99] 90  Resp:  [16-18] 16  SpO2:  [95 %-99 %] 98 %  BP: ()/(55-77) 138/73     Weight: 114.9 kg (253 lb 4.9 oz)  Body mass index is 38.52 kg/m².  No intake or output data in the 24 hours ending 07/11/23 1356      Physical Exam  Constitutional:        General: He is not in acute distress.     Appearance: Normal appearance. He is obese.   Cardiovascular:      Rate and Rhythm: Normal rate and regular rhythm.      Heart sounds: No murmur heard.  Pulmonary:      Effort: Pulmonary effort is normal. No respiratory distress.      Breath sounds: Normal breath sounds. No wheezing.   Abdominal:      General: There is no distension.      Palpations: Abdomen is soft.      Tenderness: There is no abdominal tenderness.   Musculoskeletal:      Comments: RUE PICC line in place   Neurological:      Mental Status: He is alert.           Significant Labs: All pertinent labs within the past 24 hours have been reviewed.    Significant Imaging: I have reviewed all pertinent imaging results/findings within the past 24 hours.      Assessment/Plan:      * Post-operative wound abscess  Patient with recent lumbar fusion L4-S1 (5/17/23)  Presents with RLE weakness, pain  MRI revealed two fluid collections l-spine, abscess versus seroma versus meningocele   US RLE negative for DVT  Blood cultures pending    S/p washout and antibiotic bead placement today (7/5/23), operative cultures pending  ID following, continue vancomycin  Holding ASA/Plavix, defer to NSGY when to resume  Multimodal pain control    ABLA (acute blood loss anemia)  Presented with chronic anemia  Hg trending down; post-op and dilutional  Monitor Hemovac drain ouptut  Transfuse if Hg < 7 or symptomatic    CKD (chronic kidney disease) stage 3, GFR 30-59 ml/min  Cr ranges from 1.4-1.7 over the last year  Strict I/O's  Avoid nephrotoxins  AM labs    Cardiomyopathy  Euvolemic at exam  Continue BB  Monitor fluid status closely, strict I/O     Echo    Result Date: 9/12/2022  · The left ventricle is normal in size with normal systolic function.  · The estimated ejection fraction is 65%.  · Grade I left ventricular diastolic dysfunction.  · Normal right ventricular size with normal right ventricular systolic   function.  · There  is a bioprosthetic aortic valve present. There is mild   paravalvular aortic insufficiency present.  · The aortic valve mean gradient is 14 mmHg with a dimensionless index of   0.57.  · Mild tricuspid regurgitation.  · The estimated PA systolic pressure is 33 mmHg.           CAD (coronary artery disease)  Holding asa/plavix at this time  S/p lumbar abscess washout  Restart when okay per Neurosurgery  Continue statin    Hypertension  BP controled, continue BB (carvedilol)  Holding home ARB at this time (losartan)    GERD (gastroesophageal reflux disease)  PPI     Moderate persistent asthma without complication  Presented with dyspnea to ED, US RLE negative for DVT, o2 sats stable on room air, not tachycardiac, low concern for PE  No wheezing on exam   Continue Pulmicort, wife may bring home trelegy inhaler  Duonebs PRN    CHEO on CPAP  Continue cpap nightly     S/P AVR (aortic valve replacement) biopresthetic miller 25 mm in 2014         Class 2 severe obesity due to excess calories with serious comorbidity and body mass index (BMI) of 38.0 to 38.9 in adult  Body mass index is 38.21 kg/m². Morbid obesity complicates all aspects of disease management from diagnostic modalities to treatment. Weight loss encouraged and health benefits explained to patient.       VTE Risk Mitigation (From admission, onward)         Ordered     enoxaparin injection 40 mg  Every 24 hours         07/06/23 1412     IP VTE HIGH RISK PATIENT  Once         07/05/23 1302     Place sequential compression device  Until discontinued         07/05/23 1302     Place sequential compression device  Until discontinued         07/05/23 1137     Place sequential compression device  Until discontinued         07/04/23 0900                Discharge Planning   AURELIA: 7/11/2023     Code Status: Full Code   Is the patient medically ready for discharge?:     Reason for patient still in hospital (select all that apply): Pending disposition  Discharge Plan A: Home  Health                  Lino Khan MD  Department of Hospital Medicine   '[facility] - Tele[facility] ([facility])

## 2023-07-11 NOTE — PLAN OF CARE
Ongoing (interventions implemented as appropriate)  Pt is alert and oriented.   VSS  Pt able to make needs known.  Pt remained afebrile throughout this shift.   Pt remained free of falls this shift.   Prn pain medication given.  Plan of care reviewed. Patient verbalizes understanding.   Pt moving/turing independent. Frequent weight shifting encouraged.  Bed low, side rails up x 2, wheels locked, call light in reach.   Hourly rounding completed.   Will continue to observe.

## 2023-07-11 NOTE — PT/OT/SLP PROGRESS
"Physical Therapy  Treatment    Abdullahi Urias   MRN: [id number]   Admitting Diagnosis: Post-operative wound abscess       PT Start Time: 1135     PT Stop Time: 1200    PT Total Time (min): 25 min       Billable Minutes:  Gait Training 25    Treatment Type: Treatment  PT/PTA: PT     Number of PTA visits since last PT visit: 0       General Precautions: Standard, fall  Orthopedic Precautions: spinal precautions  Braces: LSO (MOY FOR DONNING BRACE IN STANDING)  Respiratory Status: Room air    Spiritual, Cultural Beliefs, Baptist Practices, Values that Affect Care: no    Subjective:  Communicated with nursing (Triny) and performed chart review via epic system prior to session.  Pt agreeable to gait activity in hallway "I was just about to go for a walk"    Pain/Comfort  Pain Rating 1: 0/10  Pain Rating Post-Intervention 1: 0/10    Objective:   Patient found with: peripheral IV, telemetry    Functional Mobility:  Bed Mobility:    Pt sitting in chair upon PT arrival    Transfers:   Sit<>stand SBA, stand pivot SBA    Gait:    Gait activity 650ft SBA with no AD, well paced, intermittent standing rest breaks, wide SHAHRAM, decreased foot clearance, spontaneous slight knee buckle but no LOB      Balance:   Static Stand: FAIR+: Takes MINIMAL challenges from all directions  Dynamic stand: FAIR+: Needs CLOSE SUPERVISION during gait and is able to right self with minor LOB       Treatment and Education:  Educated pt on benefits of consistent participation in PT services to meet functional goals. Educated pt on importance of gait activity to promote endurance and overall activity tolerance. Educated pt on call don't fall policy and use of call button to alert nursing staff of needs (including to assist with returning back to bed). Pt expressed understanding.       AM-PAC 6 CLICK MOBILITY  How much help from another person does this patient currently need?   1 = Unable, Total/Dependent Assistance  2 = A lot, Maximum/Moderate Assistance  3 " = A little, Minimum/Contact Guard/Supervision  4 = None, Modified Barren/Independent    Turning over in bed (including adjusting bedclothes, sheets and blankets)?:  (sitting up in chair upon PT arrival)  Sitting down on and standing up from a chair with arms (e.g., wheelchair, bedside commode, etc.): 3  Moving from lying on back to sitting on the side of the bed?: 3  Moving to and from a bed to a chair (including a wheelchair)?: 3  Need to walk in hospital room?: 3  Climbing 3-5 steps with a railing?: 1 (NT)    AM-PAC Raw Score CMS G-Code Modifier Level of Impairment Assistance   6 % Total / Unable   7 - 9 CM 80 - 100% Maximal Assist   10 - 14 CL 60 - 80% Moderate Assist   15 - 19 CK 40 - 60% Moderate Assist   20 - 22 CJ 20 - 40% Minimal Assist   23 CI 1-20% SBA / CGA   24 CH 0% Independent/ Mod I     Patient left sitting edge of bed with all lines intact, call button in reach, nursing notified, and tray table present.    Assessment:  Abdullahi Urias is a 67 y.o. male with a medical diagnosis of Post-operative wound abscess and presents with the impairments listed below which negatively impact functional status. Pt able to tolerate increased gait distances with no LOB but overall pt would benefit from continued PT services in order to progress toward baseline.    Rehab identified problem list/impairments: weakness, impaired endurance, decreased safety awareness    Rehab potential is good.    Activity tolerance: Fair    Discharge recommendations: home health PT      Barriers to discharge:      Equipment recommendations: walker, rolling     GOALS:   Multidisciplinary Problems       Physical Therapy Goals          Problem: Physical Therapy    Goal Priority Disciplines Outcome Goal Variances Interventions   Physical Therapy Goal     PT, PT/OT Ongoing, Progressing     Description: Goals to be met by 7/19/23.  Pt will complete bed mobility MOD I.  Pt will complete sit to stand MOD I.  Pt will ambulate 200ft  MOD I using RW.                         PLAN:    Patient to be seen 3 x/week to address the above listed problems via neuromuscular re-education, therapeutic exercises, therapeutic activities, gait training  Plan of Care expires: 07/19/23  Plan of Care reviewed with: patient         07/11/2023

## 2023-07-11 NOTE — SUBJECTIVE & OBJECTIVE
Past Medical History:   Diagnosis Date    Aortic stenosis     [facility]    Asthma     BPH (benign prostatic hyperplasia)     CAD (coronary artery disease)     Cardiomyopathy     CHF (congestive heart failure)     Chronic hoarseness     vocal cord surg    Chronic pain     CKD (chronic kidney disease) stage 3, GFR 30-59 ml/min     CVA (cerebral infarction)     8/2012 olol; reviewed ed note    Ex-smoker     GERD (gastroesophageal reflux disease)     Hepatitis C     treatedharvoni says cured, RNA NEG 6/2020    Hypertension     Pancreatitis     Prostate cancer     Prostate cancer     Prostate cancer     PVD (peripheral vascular disease)     Renal insufficiency     Substance abuse     cocaine, etoh , tob in past       Past Surgical History:   Procedure Laterality Date    AORTIC VALVE REPLACEMENT  05/19/2014    Tissue valve replacement    BACK SURGERY      CARDIAC CATHETERIZATION      COLONOSCOPY  2011    COLONOSCOPY N/A 2/24/2021    Procedure: COLONOSCOPY;  Surgeon: Faith Carrillo MD;  Location: [facility] ENDO;  Service: Endoscopy;  Laterality: N/A;    EPIDURAL STEROID INJECTION INTO CERVICAL SPINE N/A 6/21/2022    Procedure: C7-T1 IL PIEDAD;  Surgeon: Nehemiah Carmen MD;  Location: [facility] PAIN MGT;  Service: Pain Management;  Laterality: N/A;    ESOPHAGOGASTRODUODENOSCOPY N/A 2/24/2021    Procedure: ESOPHAGOGASTRODUODENOSCOPY (EGD);  Surgeon: Faith Carrillo MD;  Location: [facility];  Service: Endoscopy;  Laterality: N/A;    FOOT SURGERY      INSERTION N/A 7/5/2023    Procedure: INSERTION;  Surgeon: Brandon Valencia MD;  Location: [facility] OR;  Service: Neurosurgery;  Laterality: N/A;  Antibiotic Beads    LEFT HEART CATHETERIZATION Left 7/24/2020    Procedure: CATHETERIZATION, HEART, LEFT;  Surgeon: Pasha Martin MD;  Location: [facility] CATH LAB;  Service: Cardiology;  Laterality: Left;    PENILE PROSTHESIS IMPLANT      PENILE PROSTHESIS REVISION  04/30/2018    PROSTATECTOMY  09/2019    [facility]    radiation  for prostate      REMOVAL OF HARDWARE FROM SPINE N/A 7/5/2023    Procedure: REMOVAL, HARDWARE, SPINE;  Surgeon: Brandon Valencia MD;  Location: [facility] [place] OR;  Service: Neurosurgery;  Laterality: N/A;    REPLACEMENT N/A 7/5/2023    Procedure: REPLACEMENT;  Surgeon: Brandon Valencia MD;  Location: [facility] [place] OR;  Service: Neurosurgery;  Laterality: N/A;  of Sandoval and Screws. [employer]    TRANSFORAMINAL EPIDURAL INJECTION OF STEROID Right 10/20/2022    Procedure: Right  L4/5 + L5/S1 TF PIEDAD RN IV Sedation;  Surgeon: Nehemiah Carmen MD;  Location: [facility] PAIN MGT;  Service: Pain Management;  Laterality: Right;    TRANSFORAMINAL LUMBAR INTERBODY FUSION (TLIF) USING COMPUTER-ASSISTED NAVIGATION Bilateral 5/17/2023    Procedure: FUSION, SPINE, LUMBAR, TLIF, USING COMPUTER-ASSISTED NAVIGATION;  Surgeon: Brandon Valencia MD;  Location: [facility] [place] OR;  Service: Neurosurgery;  Laterality: Bilateral;  2 level lumbar fusion at L4-5 and L5-S1    UPPER GASTROINTESTINAL ENDOSCOPY  2011    WASHOUT N/A 7/5/2023    Procedure: WASHOUT;  Surgeon: Brandon Valencia MD;  Location: [facility] [place] OR;  Service: Neurosurgery;  Laterality: N/A;    WOUND EXPLORATION Bilateral 7/5/2023    Procedure: EXPLORATION, WOUND;  Surgeon: Brandon Valencia MD;  Location: [facility] [place] OR;  Service: Neurosurgery;  Laterality: Bilateral;       Review of patient's allergies indicates:  No Known Allergies    Medications:  Medications Prior to Admission   Medication Sig    carvediloL (COREG) 12.5 MG tablet Take 1 tablet (12.5 mg total) by mouth 2 (two) times daily.    albuterol (PROVENTIL/VENTOLIN HFA) 90 mcg/actuation inhaler INHALE 2 PUFFS INTO THE LUNGS EVERY 6 HOURS AS NEEDED FOR COUGH    allopurinoL (ZYLOPRIM) 100 MG tablet Take 100 mg by mouth once daily. Takes 0.5 tab    ALPRAZolam (XANAX) 1 MG tablet Take 1 mg by mouth Daily. EVERY OTHER DAY    aluminum & magnesium hydroxide-simethicone (MYLANTA MAX STRENGTH) 400-400-40 mg/5 mL suspension TAKE 15ML BY MOUTH FOUR TIMES A DAY AS NEEDED  FOR STOMACH ACID    aspirin (ECOTRIN) 81 MG EC tablet Take 1 tablet (81 mg total) by mouth once daily.    atorvastatin (LIPITOR) 80 MG tablet TAKE ONE-HALF TABLET BY MOUTH EVERY DAY FOR CHOLESTEROL    diclofenac sodium (VOLTAREN) 1 % Gel APPLY 2 GRAMS TOPICALLY FOUR TIMES A DAY AS NEEDED FOR PAIN AND INFLAMMATION. USE ENCLOSED DOSING CARD.    diphenhydrAMINE (SOMINEX) 25 mg tablet Take 25 mg by mouth nightly as needed for Insomnia.    docusate sodium (COLACE) 100 MG capsule Take 1 capsule (100 mg total) by mouth 2 (two) times daily.    esomeprazole (NEXIUM) 40 MG capsule 40 mg.    famotidine (PEPCID) 40 MG tablet TAKE ONE TABLET BY MOUTH AT BEDTIME FOR ACID REFLUX    flunisolide 25 mcg, 0.025%, (NASALIDE) 25 mcg (0.025 %) Spry 2 sprays by Nasal route as needed.     gabapentin (NEURONTIN) 600 MG tablet Take 1 tablet (600 mg total) by mouth 3 (three) times daily.    hydrocortisone 2.5 % cream Apply topically 2 (two) times daily as needed. Apply to affected areas of the face and neck.    hydrocortisone-pramoxine (PROCTOFOAM-HS) rectal foam INSERT 1 APPLICATORFUL INTO RECTALLY TWICE A DAY FOR HEMORRHOIDS    hydrOXYzine HCL (ATARAX) 25 MG tablet Take 25 mg by mouth 3 (three) times daily as needed for Itching.    LIDOcaine (LIDODERM) 5 % APPLY 1 PATCH TOPICALLY EVERY DAY FOR PAIN. WEAR FOR 12 HOURS, THEN REMOVE. DO NOT APPLY NEW PATCH FOR AT LEAST 12 HOURS.    losartan (COZAAR) 50 MG tablet Take 1 tablet (50 mg total) by mouth once daily.    methyl salicylate-menthol 15-10% 15-10 % Crea Apply topically as needed.     multivitamin (THERAGRAN) per tablet Take 1 tablet by mouth once daily.    pantoprazole (PROTONIX) 40 MG tablet Take 40 mg by mouth 2 (two) times daily.     sertraline (ZOLOFT) 100 MG tablet TAKE TWO TABLETS BY MOUTH EVERY DAY FOR MENTAL HEALTH DOSE INCREASED TO 200MG/DAY    simethicone (MYLICON) 80 MG chewable tablet Take 80 mg by mouth every 6 (six) hours as needed for Flatulence.    sucralfate (CARAFATE)  100 mg/mL suspension Take 1 g by mouth 4 (four) times daily.     TRELEGY ELLIPTA 200-62.5-25 mcg inhaler INHALE 1 PUFF INTO THE LUNGS EVERY DAY    triamcinolone acetonide 0.1% (KENALOG) 0.1 % cream Apply topically 2 (two) times daily.     Antibiotics (From admission, onward)      Start     Stop Route Frequency Ordered    07/11/23 1330  DAPTOmycin (CUBICIN) 700 mg in sodium chloride 0.9% SolP 50 mL IVPB         -- IV Every 24 hours (non-standard times) 07/10/23 1401    07/10/23 1800  piperacillin-tazobactam (ZOSYN) 4.5 g in dextrose 5 % in water (D5W) 5 % 100 mL IVPB (MB+)         -- IV Every 8 hours (non-standard times) 07/10/23 1401          Antifungals (From admission, onward)      None          Antivirals (From admission, onward)      None             Immunization History   Administered Date(s) Administered    COVID-19, MRNA, LN-S, PF (Pfizer) (Purple Cap) 01/17/2021, 02/07/2021, 08/23/2021, 03/24/2022    Dtap, Unspecified Formulation 08/16/2012    Influenza 10/10/2012, 10/09/2013, 10/23/2014, 10/14/2015, 10/01/2017    Influenza (FLUAD) - Quadrivalent - Adjuvanted - PF *Preferred* (65+) 10/20/2021, 10/07/2022    Influenza - Quadrivalent 10/12/2016, 11/08/2017    Influenza - Quadrivalent - PF *Preferred* (6 months and older) 10/02/2020    Influenza - Trivalent (ADULT) 10/13/2018    Influenza Split 12/20/2010    Pneumococcal 08/01/2012    Pneumococcal Conjugate - 13 Valent 01/21/2016    Pneumococcal Polysaccharide - 23 Valent 07/14/2017, 10/19/2022    Td (ADULT) 10/19/2022    Tdap 10/09/2006, 08/16/2012    Zoster 02/05/2016       Family History       Problem Relation (Age of Onset)    Heart attack Sister, Brother    Heart disease Mother, Father          Social History     Socioeconomic History    Marital status:    Tobacco Use    Smoking status: Former     Packs/day: 2.00     Years: 8.00     Pack years: 16.00     Types: Cigarettes     Quit date: 8/24/2012     Years since quitting: 10.8    Smokeless tobacco:  "Never   Substance and Sexual Activity    Alcohol use: Never     Comment: Sober since 08/24/2012    Drug use: Not Currently     Types: "Crack" cocaine, Marijuana     Comment: Quit in 2012    Sexual activity: Yes   Social History Narrative    No pets in household, wife and daughter smokers. [employer].    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     Social Determinants of Health     Financial Resource Strain: Low Risk     Difficulty of Paying Living Expenses: Not very hard   Food Insecurity: Food Insecurity Present    Worried About Running Out of Food in the Last Year: Sometimes true    Ran Out of Food in the Last Year: Sometimes true   Transportation Needs: No Transportation Needs    Lack of Transportation (Medical): No    Lack of Transportation (Non-Medical): No   Physical Activity: Inactive    Days of Exercise per Week: 0 days    Minutes of Exercise per Session: 0 min   Stress: Stress Concern Present    Feeling of Stress : Very much   Social Connections: Moderately Isolated    Frequency of Communication with Friends and Family: Once a week    Frequency of Social Gatherings with Friends and Family: Never    Attends Rastafari Services: More than 4 times per year    Active Member of Clubs or Organizations: No    Attends Club or Organization Meetings: Never    Marital Status:    Housing Stability: Low Risk     Unable to Pay for Housing in the Last Year: No    Number of Places Lived in the Last Year: 1    Unstable Housing in the Last Year: No     Review of Systems   Constitutional:  Positive for activity change and appetite change. Negative for chills, diaphoresis and fatigue.   HENT:  Negative for congestion.    Respiratory:  Negative for apnea and chest tightness.    Gastrointestinal:  Negative for abdominal distention and abdominal pain.   Objective:     Vital Signs (Most Recent):  Temp: 98.2 °F (36.8 °C) (07/10/23 2349)  Pulse: 85 (07/10/23 2349)  Resp: 18 (07/11/23 0252)  BP: 122/77 " (07/10/23 2349)  SpO2: 99 % (07/10/23 2349) Vital Signs (24h Range):  Temp:  [98 °F (36.7 °C)-98.7 °F (37.1 °C)] 98.2 °F (36.8 °C)  Pulse:  [73-99] 85  Resp:  [16-18] 18  SpO2:  [97 %-99 %] 99 %  BP: (100-159)/(57-79) 122/77     Weight: 114 kg (251 lb 5.2 oz)  Body mass index is 38.21 kg/m².    Estimated Creatinine Clearance: 58.5 mL/min (A) (based on SCr of 1.5 mg/dL (H)).     Physical Exam  Vitals and nursing note reviewed.   HENT:      Head: Normocephalic.   Eyes:      Pupils: Pupils are equal, round, and reactive to light.   Cardiovascular:      Rate and Rhythm: Normal rate.   Abdominal:      General: Abdomen is flat.   Musculoskeletal:      Cervical back: Normal range of motion.   Neurological:      Mental Status: He is alert and oriented to person, place, and time.      Comments: Braces noted        Significant Labs: Blood Culture:   Recent Labs   Lab 01/14/23 2036 01/14/23  2040 07/04/23  1111 07/04/23  1112   LABBLOO No growth after 5 days. No growth after 5 days. No growth after 5 days. No growth after 5 days.     BMP:   Recent Labs   Lab 07/09/23 0537 07/10/23  1043     --      --    K 4.5  --      --    CO2 25  --    BUN 25*  --    CREATININE 1.7* 1.5*   CALCIUM 9.3  --      CBC:   Recent Labs   Lab 07/09/23  0537   WBC 5.96   HGB 8.3*   HCT 26.0*        CMP:   Recent Labs   Lab 07/09/23  0537 07/10/23  1043     --    K 4.5  --      --    CO2 25  --      --    BUN 25*  --    CREATININE 1.7* 1.5*   CALCIUM 9.3  --    ANIONGAP 9  --      Wound Culture:   Recent Labs   Lab 07/05/23  0851 07/05/23  0855 07/05/23  0959   LABAERO No growth No growth No growth     All pertinent labs within the past 24 hours have been reviewed.    Significant Imaging: I have reviewed all pertinent imaging results/findings within the past 24 hours.

## 2023-07-11 NOTE — ASSESSMENT & PLAN NOTE
All cultures are neg till date -  - will plan to treat for 6 weeks but might need to stop early       IV daptomycin at 700mg daily   IV Zosyn 4.5gram every 8 hours  Will treat for 6 weeks   EOC-08/18/23  Weekly cpk, esr,cmp,cbc  Will see in iD clinic

## 2023-07-11 NOTE — HPI
67-year-old man with PMH significant for aortic stenosis with bioprosthetic aortic valve replacement, diastolic CHF, CKD stage 3, HTN, prostate cancer .  He had recent  lumbar spine surgery on 5/17 with Dr. Valencia   He presented with  right lower extremity weakness/pain   .  MRI showed Large subcutaneous fluid collection involving the postoperative bed measuring approximately 11.6 x 28.9 may reflect seroma or abscess or meningocele Additional subcutaneous fluid collection in the region of the laminectomy is multiloculated measuring approximately 2.2 x 14 mm each.  This could relate to abscess, seroma, or meningocele.     He was seen by NS and had on 07/05- I and D done with    postoperative fluid collection sent for culture and analysis.  Removal and replacement of set screws right sided L4 L5 S1 .  All cultures are neg till date .  CBC -  Component      Latest Ref Rng & Units 7/9/2023 7/8/2023 7/7/2023   WBC      3.90 - 12.70 K/uL 5.96 8.25 11.31   Esr -55  CRP- 46

## 2023-07-11 NOTE — PLAN OF CARE
[facility] and [facility] and sent via e-mail to SERGO Lucio.       07/11/23 0933   Post-Acute Status   Post-Acute Authorization IV Infusion   IV Infusion Status Referral(s) sent   Discharge Plan   Discharge Plan A [facility]

## 2023-07-11 NOTE — PROGRESS NOTES
[facility] - Telemetry ([facility])  Neurosurgery  Progress Note    Subjective:     Interval History:   The patient was seen this morning.  No acute events overnight.  Patient denies new complaints.  Pain is currently controlled.  ID recs IV daptomycin at 700mg daily, IV Zosyn 4.5gram every 8 hours  For 6 weeks.  Had PICC placed in [facility] yesterday evening.  Currently waiting on insurance to approve Abx.    History of Present Illness: See H&P.    Post-Op Info:  Procedure(s) (LRB):  EXPLORATION, WOUND (Bilateral)  WASHOUT (N/A)  REMOVAL, HARDWARE, SPINE (N/A)  INSERTION (N/A)  REPLACEMENT (N/A)   6 Days Post-Op      Medications:  Continuous Infusions:   sodium chloride 0.9% 30 mL/hr (07/10/23 0555)     Scheduled Meds:   atorvastatin  80 mg Oral QHS    budesonide  0.5 mg Nebulization Q12H    carvediloL  12.5 mg Oral BID    DAPTOmycin (CUBICIN) IV (PEDS and ADULTS)  700 mg Intravenous Q24H    docusate sodium  100 mg Oral Daily    enoxparin  40 mg Subcutaneous Q24H (prophylaxis, 1700)    gabapentin  600 mg Oral TID    methocarbamoL  500 mg Oral QID    pantoprazole  40 mg Oral BID    piperacillin-tazobactam (Zosyn) IV (PEDS and ADULTS) (extended infusion is not appropriate)  4.5 g Intravenous Q8H    polyethylene glycol  17 g Oral Daily    sertraline  100 mg Oral Daily     PRN Meds:acetaminophen, albuterol-ipratropium, ALPRAZolam, aluminum-magnesium hydroxide-simethicone, calcium carbonate, dextrose 10%, dextrose 10%, glucagon (human recombinant), glucose, glucose, melatonin, morphine, naloxone, ondansetron, oxyCODONE-acetaminophen, prochlorperazine, sodium chloride 0.9%, sodium chloride 0.9%, sodium chloride 0.9%     Review of Systems  Objective:     Weight: 114.9 kg (253 lb 4.9 oz)  Body mass index is 38.52 kg/m².  Vital Signs (Most Recent):  Temp: 97.8 °F (36.6 °C) (07/11/23 0724)  Pulse: 65 (07/11/23 0801)  Resp: 18 (07/11/23 0801)  BP: (!) 101/59 (07/11/23 0724)  SpO2: 99 % (07/11/23 0801) Vital Signs (24h Range):  Temp:   [97.8 °F (36.6 °C)-98.7 °F (37.1 °C)] 97.8 °F (36.6 °C)  Pulse:  [65-99] 65  Resp:  [16-18] 18  SpO2:  [95 %-99 %] 99 %  BP: ()/(55-79) 101/59            Oxygen Concentration (%):  [98] 98      Neurosurgery Physical Exam    Vitals and labs reviewed  Moves all 4 ext well  Incision / dressing CDI      Significant Labs:  Recent Labs   Lab 07/10/23  1043   CREATININE 1.5*     No results for input(s): WBC, HGB, HCT, PLT in the last 48 hours.  No results for input(s): LABPT, INR, APTT in the last 48 hours.  Microbiology Results (last 7 days)       Procedure Component Value Units Date/Time    Culture, Anaerobe [[id number]] Collected: 07/05/23 0959    Order Status: Completed Specimen: Abscess from Back Updated: 07/10/23 0706     Anaerobic Culture No anaerobes isolated    Narrative:      Deep #2 culture lumbar spine    Culture, Anaerobe [[id number]] Collected: 07/05/23 0855    Order Status: Completed Specimen: Abscess from Back Updated: 07/10/23 0706     Anaerobic Culture Culture in progress    Narrative:      Deep Culture of Lumbar Spine    Culture, Anaerobe [[id number]] Collected: 07/05/23 0851    Order Status: Completed Specimen: Abscess from Back Updated: 07/10/23 0706     Anaerobic Culture Culture in progress    Narrative:      Superficial Culture of Lumbar Spine    Blood culture [[id number]] Collected: 07/04/23 1111    Order Status: Completed Specimen: Blood Updated: 07/09/23 2212     Blood Culture, Routine No growth after 5 days.    Blood culture [[id number]] Collected: 07/04/23 1112    Order Status: Completed Specimen: Blood Updated: 07/09/23 2212     Blood Culture, Routine No growth after 5 days.    Aerobic culture [[id number]] Collected: 07/05/23 0851    Order Status: Completed Specimen: Abscess from Back Updated: 07/08/23 0944     Aerobic Bacterial Culture No growth    Narrative:      Superficial Culture of Lumbar Spine    Aerobic culture [[id number]] Collected: 07/05/23 0959    Order Status: Completed Specimen:  Abscess from Back Updated: 07/08/23 0944     Aerobic Bacterial Culture No growth    Narrative:      Deep #2 culture lumbar spine    Aerobic culture [[id number]] Collected: 07/05/23 0855    Order Status: Completed Specimen: Abscess from Back Updated: 07/08/23 0944     Aerobic Bacterial Culture No growth    Narrative:      Deep Culture of Lumbar Spine    Fungus culture [[id number]] Collected: 07/05/23 0959    Order Status: Completed Specimen: Abscess from Back Updated: 07/07/23 0714     Fungus (Mycology) Culture Culture in progress    Narrative:      Deep #2 culture lumbar spine    Fungus culture [[id number]] Collected: 07/05/23 0851    Order Status: Completed Specimen: Abscess from Back Updated: 07/07/23 0714     Fungus (Mycology) Culture Culture in progress    Narrative:      Superficial Culture of Lumbar Spine    Fungus culture [[id number]] Collected: 07/05/23 0855    Order Status: Completed Specimen: Abscess from Back Updated: 07/07/23 0714     Fungus (Mycology) Culture Culture in progress    Narrative:      Deep Culture of Lumbar Spine    AFB Culture & Smear [[id number]] Collected: 07/05/23 0959    Order Status: Completed Specimen: Abscess from Back Updated: 07/06/23 2127     AFB Culture & Smear Culture in progress     AFB CULTURE STAIN No acid fast bacilli seen.    Narrative:      Deep #2 culture lumbar spine    AFB Culture & Smear [[id number]] Collected: 07/05/23 0851    Order Status: Completed Specimen: Abscess from Back Updated: 07/06/23 2127     AFB Culture & Smear Culture in progress     AFB CULTURE STAIN No acid fast bacilli seen.    Narrative:      Superficial Culture of Lumbar Spine    AFB Culture & Smear [[id number]] Collected: 07/05/23 0855    Order Status: Completed Specimen: Abscess from Back Updated: 07/06/23 2127     AFB Culture & Smear Culture in progress     AFB CULTURE STAIN No acid fast bacilli seen.    Narrative:      Deep Culture of Lumbar Spine    Gram stain [[id number]] Collected: 07/05/23 0855     Order Status: Completed Specimen: Abscess from Back Updated: 07/05/23 1643     Gram Stain Result No WBC's      No organisms seen    Narrative:      Deep Culture of Lumbar Spine    Gram stain [[id number]] Collected: 07/05/23 0851    Order Status: Completed Specimen: Abscess from Back Updated: 07/05/23 1640     Gram Stain Result Rare WBC's      No organisms seen    Narrative:      Superficial Culture of Lumbar Spine    Gram stain [[id number]] Collected: 07/05/23 0959    Order Status: Completed Specimen: Abscess from Back Updated: 07/05/23 1639     Gram Stain Result No WBC's      No organisms seen    Narrative:      Deep #2 culture lumbar spine          All pertinent labs from the last 24 hours have been reviewed.  Significant Diagnostics:  I have reviewed all pertinent imaging results/findings within the past 24 hours.    Assessment/Plan:     Active Diagnoses:    Diagnosis Date Noted POA    PRINCIPAL PROBLEM:  Post-operative wound abscess [T81.49XA] 07/04/2023 Yes     Chronic    ABLA (acute blood loss anemia) [D62] 07/08/2023 No    Moderate persistent asthma without complication [J45.40] 03/07/2023 Yes    GERD (gastroesophageal reflux disease) [K21.9] 02/23/2021 Yes    CKD (chronic kidney disease) stage 3, GFR 30-59 ml/min [N18.30] 06/30/2020 Yes    S/P AVR (aortic valve replacement) biopresthetic miller 25 mm in 2014  [Z95.2] 06/30/2020 Not Applicable    CHEO on CPAP [G47.33, Z99.89] 09/14/2018 Not Applicable    Class 2 severe obesity due to excess calories with serious comorbidity and body mass index (BMI) of 38.0 to 38.9 in adult [E66.01, Z68.38] 10/18/2016 Not Applicable    CAD (coronary artery disease) [I25.10]  Yes     Chronic    Cardiomyopathy [I42.9]  Yes    Hypertension [I10]  Yes     Chronic      Problems Resolved During this Admission:     POD #6    Awaiting insurance auth for IV Abx  --IV daptomycin at 700mg daily, IV Zosyn 4.5gram every 8 hours for 6 weeks  PT/OT  Continue Lovenox and then restart Plavix in  4 days.  Will continue to monitor while in the hospital.  He will follow up in clinic next week for a wound check.  Please reach out with any changes.    Yazmin Farfan PA-C  Neurosurgery  [facility] - Telemetry ([facility])

## 2023-07-11 NOTE — PROCEDURES
"Abdullahi Urias is a 67 y.o. male patient.    Temp: 98 °F (36.7 °C) (07/10/23 1952)  Pulse: 88 (07/10/23 2015)  Resp: 18 (07/10/23 2015)  BP: 127/74 (07/10/23 1952)  SpO2: 97 % (07/10/23 2015)  Weight: 114 kg (251 lb 5.2 oz) (07/10/23 0734)  Height: 5' 8" (172.7 cm) (07/05/23 1320)    PICC  Date/Time: 7/10/2023 7:45 PM  Performed by: Parth Walls RN  Consent Done: Yes  Time out: Immediately prior to procedure a time out was called to verify the correct patient, procedure, equipment, support staff and site/side marked as required  Indications: med administration  Anesthesia: local infiltration  Local anesthetic: lidocaine 1% without epinephrine  Anesthetic Total (mL): 2  Preparation: skin prepped with chlorhexidine (without alcohol)  Skin prep agent dried: skin prep agent completely dried prior to procedure  Sterile barriers: all five maximum sterile barriers used - cap, mask, sterile gown, sterile gloves, and large sterile sheet  Hand hygiene: hand hygiene performed prior to central venous catheter insertion  Location details: right basilic  Catheter type: double lumen  Catheter size: 4 Fr  Catheter Length: 37cm    Ultrasound guidance: yes  Vessel Caliber: medium and patent, compressibility normal  Needle advanced into vessel with real time Ultrasound guidance.  Guidewire confirmed in vessel.  Sterile sheath used.  Number of attempts: 1  Post-procedure: blood return through all ports, chlorhexidine patch and sterile dressing applied  Estimated blood loss (mL): 0  Specimens: No  Implants: No  Assessment: placement verified by x-ray, free fluid flow, no pneumothorax on x-ray and successful placement      Parth Walls RN  7/10/2023    "

## 2023-07-11 NOTE — PLAN OF CARE
Patient unable to understand dosing and appropriate frequency to be taught to self administer IV antibiotics.  Spoke with wife.  She prefers that nurses come to the home to administer.  Advised he needs one antibiotic daily and the other is 3 time per day.  [facility] not [facility] sending a nurse out every 8 hours to administer.  Wife works.  She may be able to give morning and evening dose if a nurse could come daily to give the middle of the day dose while she is at work.  Also discussed SNF or LTAC placement for the duration of the treatment.  Spoke with the patient and advised of above.    [facility] with SERGO Staton with [facility].  No answer yet on if a nurse could come daily to give IV antibiotics.  Discussed LTAC vs SNF.  She request a [facility] on [facility] worksheet to be sent with clinicals to request LTAC.  [facility] decision [facility].    [facility]AC with clinicals [facility].       07/11/23 9149   Post-Acute Status   Post-Acute Authorization Placement   Post-Acute Placement Status Referrals Sent   Discharge Plan   Discharge Plan A Long-term acute care facility (LTAC)     Spoke with John at [facility] that [facility].  Daptomycin is $1,842.15 weekly and Zosyn is $145.05 weekly.  They would decline the patient with these antibiotics.

## 2023-07-12 LAB
BACTERIA SPEC ANAEROBE CULT: NORMAL
BACTERIA SPEC ANAEROBE CULT: NORMAL
CRP SERPL-MCNC: 50 MG/L (ref 0–8.2)
ERYTHROCYTE [SEDIMENTATION RATE] IN BLOOD BY WESTERGREN METHOD: 70 MM/HR (ref 0–10)

## 2023-07-12 PROCEDURE — 25000003 PHARM REV CODE 250: Mod: HCNC | Performed by: PHYSICIAN ASSISTANT

## 2023-07-12 PROCEDURE — 36573 INSJ PICC RS&I 5 YR+: CPT | Mod: HCNC

## 2023-07-12 PROCEDURE — 25000003 PHARM REV CODE 250: Mod: HCNC | Performed by: NURSE PRACTITIONER

## 2023-07-12 PROCEDURE — 25000242 PHARM REV CODE 250 ALT 637 W/ HCPCS: Mod: HCNC | Performed by: PHYSICIAN ASSISTANT

## 2023-07-12 PROCEDURE — 94761 N-INVAS EAR/PLS OXIMETRY MLT: CPT | Mod: HCNC

## 2023-07-12 PROCEDURE — 97530 THERAPEUTIC ACTIVITIES: CPT | Mod: HCNC

## 2023-07-12 PROCEDURE — 94640 AIRWAY INHALATION TREATMENT: CPT | Mod: HCNC

## 2023-07-12 PROCEDURE — 25000003 PHARM REV CODE 250: Mod: HCNC | Performed by: INTERNAL MEDICINE

## 2023-07-12 PROCEDURE — 25000003 PHARM REV CODE 250: Mod: HCNC | Performed by: HOSPITALIST

## 2023-07-12 PROCEDURE — C1751 CATH, INF, PER/CENT/MIDLINE: HCPCS | Mod: HCNC

## 2023-07-12 PROCEDURE — 63600175 PHARM REV CODE 636 W HCPCS: Mod: HCNC | Performed by: PHYSICIAN ASSISTANT

## 2023-07-12 PROCEDURE — 85651 RBC SED RATE NONAUTOMATED: CPT | Mod: HCNC | Performed by: INTERNAL MEDICINE

## 2023-07-12 PROCEDURE — 21400001 HC TELEMETRY ROOM: Mod: HCNC

## 2023-07-12 PROCEDURE — 99233 SBSQ HOSP IP/OBS HIGH 50: CPT | Mod: NSCH,HCNC,95, | Performed by: INTERNAL MEDICINE

## 2023-07-12 PROCEDURE — 63600175 PHARM REV CODE 636 W HCPCS: Mod: HCNC | Performed by: INTERNAL MEDICINE

## 2023-07-12 PROCEDURE — 86140 C-REACTIVE PROTEIN: CPT | Mod: HCNC | Performed by: INTERNAL MEDICINE

## 2023-07-12 PROCEDURE — 99900035 HC TECH TIME PER 15 MIN (STAT): Mod: HCNC

## 2023-07-12 PROCEDURE — 99233 PR SUBSEQUENT HOSPITAL CARE,LEVL III: ICD-10-PCS | Mod: NSCH,HCNC,95, | Performed by: INTERNAL MEDICINE

## 2023-07-12 PROCEDURE — 63600175 PHARM REV CODE 636 W HCPCS: Mod: JZ,JG,HCNC | Performed by: STUDENT IN AN ORGANIZED HEALTH CARE EDUCATION/TRAINING PROGRAM

## 2023-07-12 PROCEDURE — 25000003 PHARM REV CODE 250: Mod: HCNC | Performed by: NEUROLOGICAL SURGERY

## 2023-07-12 PROCEDURE — 97116 GAIT TRAINING THERAPY: CPT | Mod: HCNC

## 2023-07-12 RX ADMIN — PANTOPRAZOLE SODIUM 40 MG: 40 TABLET, DELAYED RELEASE ORAL at 08:07

## 2023-07-12 RX ADMIN — METHOCARBAMOL 500 MG: 500 TABLET ORAL at 01:07

## 2023-07-12 RX ADMIN — BUDESONIDE INHALATION 0.5 MG: 0.5 SUSPENSION RESPIRATORY (INHALATION) at 07:07

## 2023-07-12 RX ADMIN — OXYCODONE AND ACETAMINOPHEN 1 TABLET: 10; 325 TABLET ORAL at 02:07

## 2023-07-12 RX ADMIN — SODIUM CHLORIDE: 9 INJECTION, SOLUTION INTRAVENOUS at 02:07

## 2023-07-12 RX ADMIN — CEFEPIME 1 G: 1 INJECTION, POWDER, FOR SOLUTION INTRAMUSCULAR; INTRAVENOUS at 02:07

## 2023-07-12 RX ADMIN — SERTRALINE HYDROCHLORIDE 100 MG: 50 TABLET ORAL at 08:07

## 2023-07-12 RX ADMIN — OXYCODONE AND ACETAMINOPHEN 1 TABLET: 10; 325 TABLET ORAL at 09:07

## 2023-07-12 RX ADMIN — CARVEDILOL 12.5 MG: 12.5 TABLET, FILM COATED ORAL at 08:07

## 2023-07-12 RX ADMIN — ALTEPLASE 2 MG: 2.2 INJECTION, POWDER, LYOPHILIZED, FOR SOLUTION INTRAVENOUS at 10:07

## 2023-07-12 RX ADMIN — METHOCARBAMOL 500 MG: 500 TABLET ORAL at 08:07

## 2023-07-12 RX ADMIN — METHOCARBAMOL 500 MG: 500 TABLET ORAL at 05:07

## 2023-07-12 RX ADMIN — DOCUSATE SODIUM 100 MG: 100 CAPSULE, LIQUID FILLED ORAL at 08:07

## 2023-07-12 RX ADMIN — GABAPENTIN 600 MG: 300 CAPSULE ORAL at 02:07

## 2023-07-12 RX ADMIN — OXYCODONE AND ACETAMINOPHEN 1 TABLET: 10; 325 TABLET ORAL at 04:07

## 2023-07-12 RX ADMIN — DAPTOMYCIN 700 MG: 350 INJECTION, POWDER, LYOPHILIZED, FOR SOLUTION INTRAVENOUS at 03:07

## 2023-07-12 RX ADMIN — ATORVASTATIN CALCIUM 80 MG: 40 TABLET, FILM COATED ORAL at 08:07

## 2023-07-12 RX ADMIN — PIPERACILLIN SODIUM AND TAZOBACTAM SODIUM 4.5 G: 4; .5 INJECTION, POWDER, FOR SOLUTION INTRAVENOUS at 01:07

## 2023-07-12 RX ADMIN — POLYETHYLENE GLYCOL 3350 17 G: 17 POWDER, FOR SOLUTION ORAL at 08:07

## 2023-07-12 RX ADMIN — OXYCODONE AND ACETAMINOPHEN 1 TABLET: 10; 325 TABLET ORAL at 08:07

## 2023-07-12 RX ADMIN — SODIUM CHLORIDE: 9 INJECTION, SOLUTION INTRAVENOUS at 08:07

## 2023-07-12 RX ADMIN — GABAPENTIN 600 MG: 300 CAPSULE ORAL at 08:07

## 2023-07-12 RX ADMIN — ENOXAPARIN SODIUM 40 MG: 40 INJECTION SUBCUTANEOUS at 05:07

## 2023-07-12 NOTE — PROGRESS NOTES
[facility] - Telemetry ([facility])  Neurosurgery  Progress Note    Subjective:     Interval History:   patient doing great   Incision cdi   Dressing clean   PICC In place     Pain is currently controlled.  ID recs IV daptomycin at 700mg daily, IV Zosyn 4.5gram every 8 hours  For 6 weeks.  Had PICC placed in [facility] yesterday evening.  Currently waiting on insurance to approve Abx.    History of Present Illness: See H&P.    Post-Op Info:  Procedure(s) (LRB):  EXPLORATION, WOUND (Bilateral)  WASHOUT (N/A)  REMOVAL, HARDWARE, SPINE (N/A)  INSERTION (N/A)  REPLACEMENT (N/A)   7 Days Post-Op      Medications:  Continuous Infusions:   sodium chloride 0.9% 30 mL/hr (07/10/23 0555)     Scheduled Meds:   atorvastatin  80 mg Oral QHS    budesonide  0.5 mg Nebulization Q12H    carvediloL  12.5 mg Oral BID    ceFEPime (MAXIPIME) IVPB  1 g Intravenous Q12H    DAPTOmycin (CUBICIN) IV (PEDS and ADULTS)  700 mg Intravenous Q24H    docusate sodium  100 mg Oral Daily    enoxparin  40 mg Subcutaneous Q24H (prophylaxis, 1700)    gabapentin  600 mg Oral TID    methocarbamoL  500 mg Oral QID    pantoprazole  40 mg Oral BID    polyethylene glycol  17 g Oral Daily    sertraline  100 mg Oral Daily     PRN Meds:acetaminophen, albuterol-ipratropium, ALPRAZolam, aluminum-magnesium hydroxide-simethicone, calcium carbonate, dextrose 10%, dextrose 10%, glucagon (human recombinant), glucose, glucose, melatonin, naloxone, ondansetron, oxyCODONE-acetaminophen, prochlorperazine, sodium chloride 0.9%, sodium chloride 0.9%, sodium chloride 0.9%     Review of Systems  Objective:     Weight: 114.9 kg (253 lb 4.9 oz)  Body mass index is 38.52 kg/m².  Vital Signs (Most Recent):  Temp: 97.6 °F (36.4 °C) (07/12/23 0737)  Pulse: 85 (07/12/23 0757)  Resp: 18 (07/12/23 0757)  BP: 116/74 (07/12/23 0737)  SpO2: 96 % (07/12/23 0757) Vital Signs (24h Range):  Temp:  [97.6 °F (36.4 °C)-98.9 °F (37.2 °C)] 97.6 °F (36.4 °C)  Pulse:  [82-92] 85  Resp:  [16-22] 18  SpO2:  [96  %-100 %] 96 %  BP: (102-138)/(54-74) 116/74                   Vitals and labs reviewed  Moves all 4 ext well  Incision / dressing CDI      Significant Labs:  Recent Labs   Lab 07/10/23  1043   CREATININE 1.5*       No results for input(s): WBC, HGB, HCT, PLT in the last 48 hours.  No results for input(s): LABPT, INR, APTT in the last 48 hours.  Microbiology Results (last 7 days)       Procedure Component Value Units Date/Time    Culture, Anaerobe [[id number]] Collected: 07/05/23 0851    Order Status: Completed Specimen: Abscess from Back Updated: 07/12/23 0717     Anaerobic Culture No anaerobes isolated    Narrative:      Superficial Culture of Lumbar Spine    Culture, Anaerobe [[id number]] Collected: 07/05/23 0855    Order Status: Completed Specimen: Abscess from Back Updated: 07/12/23 0717     Anaerobic Culture No anaerobes isolated    Narrative:      Deep Culture of Lumbar Spine    Culture, Anaerobe [[id number]] Collected: 07/05/23 0959    Order Status: Completed Specimen: Abscess from Back Updated: 07/10/23 0706     Anaerobic Culture No anaerobes isolated    Narrative:      Deep #2 culture lumbar spine    Blood culture [[id number]] Collected: 07/04/23 1111    Order Status: Completed Specimen: Blood Updated: 07/09/23 2212     Blood Culture, Routine No growth after 5 days.    Blood culture [[id number]] Collected: 07/04/23 1112    Order Status: Completed Specimen: Blood Updated: 07/09/23 2212     Blood Culture, Routine No growth after 5 days.    Aerobic culture [[id number]] Collected: 07/05/23 0851    Order Status: Completed Specimen: Abscess from Back Updated: 07/08/23 0944     Aerobic Bacterial Culture No growth    Narrative:      Superficial Culture of Lumbar Spine    Aerobic culture [[id number]] Collected: 07/05/23 0959    Order Status: Completed Specimen: Abscess from Back Updated: 07/08/23 0944     Aerobic Bacterial Culture No growth    Narrative:      Deep #2 culture lumbar spine    Aerobic culture [[id number]]  Collected: 07/05/23 0855    Order Status: Completed Specimen: Abscess from Back Updated: 07/08/23 0944     Aerobic Bacterial Culture No growth    Narrative:      Deep Culture of Lumbar Spine    Fungus culture [[id number]] Collected: 07/05/23 0959    Order Status: Completed Specimen: Abscess from Back Updated: 07/07/23 0714     Fungus (Mycology) Culture Culture in progress    Narrative:      Deep #2 culture lumbar spine    Fungus culture [[id number]] Collected: 07/05/23 0851    Order Status: Completed Specimen: Abscess from Back Updated: 07/07/23 0714     Fungus (Mycology) Culture Culture in progress    Narrative:      Superficial Culture of Lumbar Spine    Fungus culture [[id number]] Collected: 07/05/23 0855    Order Status: Completed Specimen: Abscess from Back Updated: 07/07/23 0714     Fungus (Mycology) Culture Culture in progress    Narrative:      Deep Culture of Lumbar Spine    AFB Culture & Smear [[id number]] Collected: 07/05/23 0959    Order Status: Completed Specimen: Abscess from Back Updated: 07/06/23 2127     AFB Culture & Smear Culture in progress     AFB CULTURE STAIN No acid fast bacilli seen.    Narrative:      Deep #2 culture lumbar spine    AFB Culture & Smear [[id number]] Collected: 07/05/23 0851    Order Status: Completed Specimen: Abscess from Back Updated: 07/06/23 2127     AFB Culture & Smear Culture in progress     AFB CULTURE STAIN No acid fast bacilli seen.    Narrative:      Superficial Culture of Lumbar Spine    AFB Culture & Smear [[id number]] Collected: 07/05/23 0855    Order Status: Completed Specimen: Abscess from Back Updated: 07/06/23 2127     AFB Culture & Smear Culture in progress     AFB CULTURE STAIN No acid fast bacilli seen.    Narrative:      Deep Culture of Lumbar Spine    Gram stain [[id number]] Collected: 07/05/23 0855    Order Status: Completed Specimen: Abscess from Back Updated: 07/05/23 1643     Gram Stain Result No WBC's      No organisms seen    Narrative:      Deep Culture  of Lumbar Spine    Gram stain [[id number]] Collected: 07/05/23 0851    Order Status: Completed Specimen: Abscess from Back Updated: 07/05/23 1640     Gram Stain Result Rare WBC's      No organisms seen    Narrative:      Superficial Culture of Lumbar Spine    Gram stain [[id number]] Collected: 07/05/23 0959    Order Status: Completed Specimen: Abscess from Back Updated: 07/05/23 1639     Gram Stain Result No WBC's      No organisms seen    Narrative:      Deep #2 culture lumbar spine          All pertinent labs from the last 24 hours have been reviewed.  Significant Diagnostics:  I have reviewed all pertinent imaging results/findings within the past 24 hours.    Assessment/Plan:     Active Diagnoses:    Diagnosis Date Noted POA    PRINCIPAL PROBLEM:  Post-operative wound abscess [T81.49XA] 07/04/2023 Yes     Chronic    ABLA (acute blood loss anemia) [D62] 07/08/2023 No    Moderate persistent asthma without complication [J45.40] 03/07/2023 Yes    GERD (gastroesophageal reflux disease) [K21.9] 02/23/2021 Yes    CKD (chronic kidney disease) stage 3, GFR 30-59 ml/min [N18.30] 06/30/2020 Yes    S/P AVR (aortic valve replacement) biopresthetic miller 25 mm in 2014  [Z95.2] 06/30/2020 Not Applicable    CHEO on CPAP [G47.33, Z99.89] 09/14/2018 Not Applicable    Class 2 severe obesity due to excess calories with serious comorbidity and body mass index (BMI) of 38.0 to 38.9 in adult [E66.01, Z68.38] 10/18/2016 Not Applicable    CAD (coronary artery disease) [I25.10]  Yes     Chronic    Cardiomyopathy [I42.9]  Yes    Hypertension [I10]  Yes     Chronic      Problems Resolved During this Admission:     POD #6    Awaiting insurance auth for IV Abx  --IV daptomycin at 700mg daily, IV Zosyn 4.5gram every 8 hours for 6 weeks  PT/OT  Continue Lovenox and then restart Plavix in 4 days.  Will continue to monitor while in the hospital.    Brandon Valencia MD  Neurosurgery  [place] - Telemetry ([facility])

## 2023-07-12 NOTE — PROCEDURES
"Abdullahi Urias is a 67 y.o. male patient.    Temp: 98 °F (36.7 °C) (07/12/23 1137)  Pulse: 82 (07/12/23 1137)  Resp: 18 (07/12/23 1137)  BP: 113/60 (07/12/23 1137)  SpO2: 100 % (07/12/23 1137)  Weight: 114.9 kg (253 lb 4.9 oz) (07/11/23 0724)  Height: 5' 8" (172.7 cm) (07/05/23 1320)    PICC  Date/Time: 7/12/2023 1:45 PM  Performed by: Parth Walls RN  Consent Done: Yes  Time out: Immediately prior to procedure a time out was called to verify the correct patient, procedure, equipment, support staff and site/side marked as required  Indications: med administration  Anesthesia: local infiltration  Local anesthetic: lidocaine 1% without epinephrine  Anesthetic Total (mL): 2  Preparation: skin prepped with chlorhexidine (without alcohol)  Skin prep agent dried: skin prep agent completely dried prior to procedure  Sterile barriers: all five maximum sterile barriers used - cap, mask, sterile gown, sterile gloves, and large sterile sheet  Hand hygiene: hand hygiene performed prior to central venous catheter insertion  Location details: left brachial  Catheter type: double lumen  Catheter size: 4 Fr  Catheter Length: 42cm    Ultrasound guidance: yes  Vessel Caliber: medium and patent, compressibility normal  Needle advanced into vessel with real time Ultrasound guidance.  Guidewire confirmed in vessel.  Sterile sheath used.  Number of attempts: 1  Post-procedure: blood return through all ports, chlorhexidine patch and sterile dressing applied  Specimens: No  Implants: No  Assessment: placement verified by x-ray, successful placement, free fluid flow and no pneumothorax on x-ray        Parth Walls rN  7/12/2023    "

## 2023-07-12 NOTE — PLAN OF CARE
Patient on plan of care. Instructed patient to use call light for assistance, call light in reach. Hourly rounding performed. SERGO PICC removed, MARIA ELENA PICC inserted. IV antibiotics infused. Vitals q4 hours. Education provided, questions answered/encouraged. Chart check complete. Patient not monitored via telemetry.

## 2023-07-12 NOTE — SUBJECTIVE & OBJECTIVE
Interval History:   67 year old man with post op lumbar fluid collection.    MRI showed Large subcutaneous fluid collection involving the postoperative bed measuring approximately 11.6 x 28.9 may reflect seroma or abscess or meningocele Additional subcutaneous fluid collection in the region of the laminectomy is multiloculated measuring approximately 2.2 x 14 mm each.  This could relate to abscess, seroma, or meningocele.     He was seen by NS and had on 07/05- I and D done with    postoperative fluid collection sent for culture and analysis.  Removal and replacement of set screws right sided L4 L5 S1 .  All cultures are neg till date .  Review of Systems   Constitutional:  Negative for activity change, appetite change, chills and diaphoresis.   HENT:  Negative for congestion.    Respiratory:  Negative for apnea and chest tightness.    Neurological:  Negative for dizziness and facial asymmetry.   Objective:     Vital Signs (Most Recent):  Temp: 98.1 °F (36.7 °C) (07/11/23 2353)  Pulse: 82 (07/12/23 0100)  Resp: 18 (07/12/23 0100)  BP: 112/67 (07/11/23 2353)  SpO2: 98 % (07/12/23 0100) Vital Signs (24h Range):  Temp:  [97.8 °F (36.6 °C)-98.9 °F (37.2 °C)] 98.1 °F (36.7 °C)  Pulse:  [65-92] 82  Resp:  [16-20] 18  SpO2:  [95 %-100 %] 98 %  BP: ()/(55-73) 112/67     Weight: 114.9 kg (253 lb 4.9 oz)  Body mass index is 38.52 kg/m².    Estimated Creatinine Clearance: 58.8 mL/min (A) (based on SCr of 1.5 mg/dL (H)).     Physical Exam  Vitals and nursing note reviewed.   HENT:      Head: Normocephalic.   Cardiovascular:      Rate and Rhythm: Normal rate.   Musculoskeletal:         General: Normal range of motion.      Cervical back: Normal range of motion.      Comments: Has abd braces   Skin:     General: Skin is warm.   Neurological:      Mental Status: He is alert.        Significant Labs: Blood Culture:   Recent Labs   Lab 01/14/23 2036 01/14/23 2040 07/04/23  1111 07/04/23  1112   LABBLOO No growth after 5  days. No growth after 5 days. No growth after 5 days. No growth after 5 days.     BMP:   Recent Labs   Lab 07/10/23  1043   CREATININE 1.5*     CBC: No results for input(s): WBC, HGB, HCT, PLT in the last 48 hours.  All pertinent labs within the past 24 hours have been reviewed.    Significant Imaging: I have reviewed all pertinent imaging results/findings within the past 24 hours.

## 2023-07-12 NOTE — PLAN OF CARE
TX COMPLETED: facilitated transfers with independence, ambulated 650 ft independently with no AD. Pt has no further acute PT needs

## 2023-07-12 NOTE — PT/OT/SLP PROGRESS
Physical Therapy  Treatment and D/C note    Abdullahi Urias   MRN: [id number]   Admitting Diagnosis: Post-operative wound abscess       PT Start Time: 1135     PT Stop Time: 1205    PT Total Time (min): 30 min       Billable Minutes:  Gait Training 20 and Therapeutic Activity 10    Treatment Type: Evaluation  PT/PTA: PT     Number of PTA visits since last PT visit: 0       General Precautions: Standard, fall  Orthopedic Precautions: spinal precautions  Braces: LSO  Respiratory Status: Room air    Spiritual, Cultural Beliefs, [employer] Practices, Values that Affect Care: no    Subjective:  Communicated with nursing and performed chart review via epic system prior to session.  Pt agreeable to PT services.    Pain/Comfort  Pain Rating 1: 0/10  Pain Rating Post-Intervention 1: 0/10    Objective:   Patient found with: peripheral IV, PICC line    Functional Mobility:  Transfers: performed independently with no AD    Gait:   Ambulated 650ft independently with no AD, well paced, wide SHAHRAM, no LOB. Educated pt on importance of donning LSO brace as ordered by MD and adjusting to comfort (not too tight) to decrease risk of compression/pain     Treatment and Education:  Educated pt on benefits of consistent participation in PT services to meet functional goals. Re-educated pt on spinal precautions, proper fit and positioning of LSO brace. Educated pt on call don't fall policy and use of call button to alert nursing staff of needs (including to assist with returning back to bed and ambulating in hallways). Pt expressed understanding.       AM-PAC 6 CLICK MOBILITY  How much help from another person does this patient currently need?   1 = Unable, Total/Dependent Assistance  2 = A lot, Maximum/Moderate Assistance  3 = A little, Minimum/Contact Guard/Supervision  4 = None, Modified [place]/Independent    Turning over in bed (including adjusting bedclothes, sheets and blankets)?: 4  Sitting down on and standing up from a chair with  arms (e.g., wheelchair, bedside commode, etc.): 4  Moving from lying on back to sitting on the side of the bed?: 4  Moving to and from a bed to a chair (including a wheelchair)?: 4  Need to walk in hospital room?: 4  Climbing 3-5 steps with a railing?: 1  Basic Mobility Total Score: 21    AM-PAC Raw Score CMS G-Code Modifier Level of Impairment Assistance   6 % Total / Unable   7 - 9 CM 80 - 100% Maximal Assist   10 - 14 CL 60 - 80% Moderate Assist   15 - 19 CK 40 - 60% Moderate Assist   20 - 22 CJ 20 - 40% Minimal Assist   23 CI 1-20% SBA / CGA   24 CH 0% Independent/ Mod I     Patient left up in chair with all lines intact, call button in reach, and nursing notified.    Assessment:  Abdullahi Urias is a 67 y.o. male with a medical diagnosis of Post-operative wound abscess and presents following spinal surgery to address post-op abscess. Pt now demonstrating ability to ambulate in hallways independently with no LOB. PT has no further acute PT needs at this time.    Rehab identified problem list/impairments: weakness, impaired endurance, decreased safety awareness      Discharge recommendations: home health PT      Barriers to discharge:      Equipment recommendations: walker, rolling     GOALS:   Multidisciplinary Problems       Physical Therapy Goals          Problem: Physical Therapy    Goal Priority Disciplines Outcome Goal Variances Interventions   Physical Therapy Goal     PT, PT/OT Ongoing, Progressing     Description: Goals to be met by 7/19/23.  Pt will complete bed mobility MOD I.  Pt will complete sit to stand MOD I.  Pt will ambulate 200ft MOD I using RW.                         PLAN:    Patient to be seen 3 x/week to address the above listed problems via neuromuscular re-education, therapeutic exercises, therapeutic activities, gait training  Plan of Care expires: 07/12/23  Plan of Care reviewed with: patient         07/12/2023

## 2023-07-12 NOTE — PT/OT/SLP PROGRESS
Occupational Therapy   Treatment & Discharge    Name: Abdullahi Urias  MRN: [id number]  Admitting Diagnosis:  Post-operative wound abscess  7 Days Post-Op    Recommendations:     Discharge Recommendations: home health OT  Discharge Equipment Recommendations:  shower chair  Barriers to discharge:  None    Assessment:     Abdullahi Urias is a 67 y.o. male with a medical diagnosis of Post-operative wound abscess. Pt has met all acute OT goals at this time. Pt functioning at high level and does not require skilled acute OT services.    Plan:   D/C OT services.  Plan of Care Expires: 07/12/23  Plan of Care Reviewed with: patient    Subjective     Chief Complaint: none reported  Patient/Family Comments/goals: return home  Pain/Comfort:  Pain Rating 1: 0/10    Objective:     Communicated with: Nurse and epic chart review prior to session.  Patient found up in chair with telemetry, PICC line upon OT entry to room.    General Precautions: Standard, fall    Orthopedic Precautions:spinal precautions  Braces: LSO  Respiratory Status: Room air    Functional Mobility/Transfers:  Patient completed Sit <> Stand Transfer with independence  with  no assistive device   Functional Mobility: Patient completed x800ft functional mobility with (I) and no AD to increase dynamic standing balance and activity tolerance needed for ADL completion.   Stand pivot t/f to chair with (I) and no AD.    Activities of Daily Living:  Grooming: setup A for item retrieval. Oral care and face washing.  Upper Body Dressing: minimum assistance adam LSO in standing, pt with difficulty reaching around back to position brace.    [facility] 6 Click ADL: 20    Treatment & Education:  Reviewed spinal precautions with pt, functional implications, and importance of compliance. Reviewed how to adam LSO and importance of wearing brace when OOB. Reviewed role of OT in acute setting and benefits of participation. Educated on techniques to use to increase independence and decrease  fall risk with functional transfers. Educated on importance of OOB activity and calling for A to transfer back to bed and ambulate. Encouraged completion of B UE AROM therex and ambulation  throughout the day to tolerance to increase functional strength and activity tolerance. Educated patient on importance of increased tolerance to upright position and direct impact on CV endurance and strength. Patient encouraged to sit up in chair for a minimum of 2 consecutive hours per day.  Patient stated understanding and in agreement with POC.     Patient left up in chair with all lines intact and call button in reach    GOALS:   Multidisciplinary Problems       Occupational Therapy Goals       Not on file              Multidisciplinary Problems (Resolved)          Problem: Occupational Therapy    Goal Priority Disciplines Outcome Interventions   Occupational Therapy Goal   (Resolved)     OT, PT/OT Met    Description: Goals to be met by: 7/20/23     Patient will increase functional independence with ADLs by performing:    Toileting from toilet with Modified [place] for hygiene and clothing management.   Toilet transfer to toilet with Modified [place].  Demonstrates 100% functional compliance with spinal precautions.                         Time Tracking:     OT Date of Treatment: 07/12/23  OT Start Time: 1135  OT Stop Time: 1200  OT Total Time (min): 25 min    Billable Minutes:Therapeutic Activity 25    OT/BELINDA: OT      Nancy Raya OT     7/12/2023

## 2023-07-12 NOTE — PROGRESS NOTES
[facility])  Infectious Disease  Progress Note    Patient Name: Abdullahi Urias  MRN: [id number]  Admission Date: 7/3/2023  Length of Stay: 1 days  Attending Physician: Lino Khan MD  Primary Care Provider: Reji Valentin MD    Isolation Status: No active isolations  Assessment/Plan:      Cardiac/Vascular  S/P AVR (aortic valve replacement) biopresthetic miller 25 mm in 2014   Cardiology follow up , will need to treat the lumbar collection to reduce any risk of bacteremia that can affect the valve     Hypertension  BP control as per primary team    ID  * Post-operative wound abscess  All cultures are neg till date -  - will plan to treat for 6 weeks but might need to stop early       IV daptomycin at 700mg daily   IV Zosyn 4.5gram every 8 hours  Will treat for 6 weeks   EOC-08/18/23  Weekly cpk, esr,cmp,cbc  Will see in iD clinic     07/11- will switch to Cefepime for bid dosing   Cultures -no anaerobes  Continue Daptomycin   EOC 08 /18/23  Follow ESR,CRP in AM    Oncology  ABLA (acute blood loss anemia)  Will transfuse as needed     Endocrine  Class 2 severe obesity due to excess calories with serious comorbidity and body mass index (BMI) of 38.0 to 38.9 in adult  Out patient follow up for weight loss regime     Other  CHEO on CPAP  CPAP as tolerated         Anticipated Disposition:     Thank you for your consult. I will follow-up with patient. Please contact us if you have any additional questions.    Rommel Keith MD, [facility]  Infectious Disease  [facility])    Subjective:     Principal Problem:Post-operative wound abscess    HPI:    67-year-old man with PMH significant for aortic stenosis with bioprosthetic aortic valve replacement, diastolic CHF, CKD stage 3, HTN, prostate cancer .  He had recent  lumbar spine surgery on 5/17 with Dr. Valencia   He presented with  right lower extremity weakness/pain   .  MRI showed Large subcutaneous fluid collection involving the postoperative  bed measuring approximately 11.6 x 28.9 may reflect seroma or abscess or meningocele Additional subcutaneous fluid collection in the region of the laminectomy is multiloculated measuring approximately 2.2 x 14 mm each.  This could relate to abscess, seroma, or meningocele.     He was seen by NS and had on 07/05- I and D done with    postoperative fluid collection sent for culture and analysis.  Removal and replacement of set screws right sided L4 L5 S1 .  All cultures are neg till date .  CBC -  Component      Latest Ref Rng & Units 7/9/2023 7/8/2023 7/7/2023   WBC      3.90 - 12.70 K/uL 5.96 8.25 11.31   Esr -55  CRP- 46      Interval History:   67 year old man with post op lumbar fluid collection.    MRI showed Large subcutaneous fluid collection involving the postoperative bed measuring approximately 11.6 x 28.9 may reflect seroma or abscess or meningocele Additional subcutaneous fluid collection in the region of the laminectomy is multiloculated measuring approximately 2.2 x 14 mm each.  This could relate to abscess, seroma, or meningocele.     He was seen by NS and had on 07/05- I and D done with    postoperative fluid collection sent for culture and analysis.  Removal and replacement of set screws right sided L4 L5 S1 .  All cultures are neg till date .  Review of Systems   Constitutional:  Negative for activity change, appetite change, chills and diaphoresis.   HENT:  Negative for congestion.    Respiratory:  Negative for apnea and chest tightness.    Neurological:  Negative for dizziness and facial asymmetry.   Objective:     Vital Signs (Most Recent):  Temp: 98.1 °F (36.7 °C) (07/11/23 2353)  Pulse: 82 (07/12/23 0100)  Resp: 18 (07/12/23 0100)  BP: 112/67 (07/11/23 2353)  SpO2: 98 % (07/12/23 0100) Vital Signs (24h Range):  Temp:  [97.8 °F (36.6 °C)-98.9 °F (37.2 °C)] 98.1 °F (36.7 °C)  Pulse:  [65-92] 82  Resp:  [16-20] 18  SpO2:  [95 %-100 %] 98 %  BP: ()/(55-73) 112/67     Weight: 114.9 kg (253 lb  4.9 oz)  Body mass index is 38.52 kg/m².    Estimated Creatinine Clearance: 58.8 mL/min (A) (based on SCr of 1.5 mg/dL (H)).     Physical Exam  Vitals and nursing note reviewed.   HENT:      Head: Normocephalic.   Cardiovascular:      Rate and Rhythm: Normal rate.   Musculoskeletal:         General: Normal range of motion.      Cervical back: Normal range of motion.      Comments: Has abd braces   Skin:     General: Skin is warm.   Neurological:      Mental Status: He is alert.        Significant Labs: Blood Culture:   Recent Labs   Lab 01/14/23  2036 01/14/23  2040 07/04/23  1111 07/04/23  1112   LABBLOO No growth after 5 days. No growth after 5 days. No growth after 5 days. No growth after 5 days.     BMP:   Recent Labs   Lab 07/10/23  1043   CREATININE 1.5*     CBC: No results for input(s): WBC, HGB, HCT, PLT in the last 48 hours.  All pertinent labs within the past 24 hours have been reviewed.    Significant Imaging: I have reviewed all pertinent imaging results/findings within the past 24 hours.

## 2023-07-12 NOTE — PLAN OF CARE
Spoke with wife, V[place]a.  Advised MD changed IV antibiotics and patient will only need them infused 2 x per day.  She is willing to give at home if she feels comfortable after being taught.    Need new home health orders. Secure chat to Dr. Adam.      [facility]am [facility]       07/12/23 0305   Post-Acute Status   Post-Acute Authorization IV Infusion;Home Health   Home Health Status Referrals Sent   IV Infusion Status Referral(s) sent   Discharge Plan   Discharge Plan A Home Health

## 2023-07-12 NOTE — PROGRESS NOTES
[facility] Medicine  Progress Note     Patient Name: Abdullahi Urias  MRN: [id number]  Patient Class: IP- Inpatient     Admission Date: 7/3/2023  Length of Stay: 0 days  Attending Physician: Lino Khan MD  Primary Care Provider: Reji Valentin MD           Subjective:      Principal Problem:Post-operative wound abscess           HPI:  Mr. Urias is a 67-year-old  male with PMH significant for aortic stenosis with bioprosthetic aortic valve replacement, diastolic CHF, CKD stage 3, HTN, prostate cancer and recent lumbar spine surgery on 5/17 with Dr. Valencia who was sent from neurosurgery office with complaints of RLE pain, weakness and oozing from surgical incision site.  Per patient and wife, he was doing fine till two days ago when he went on a long car ride to [place] and he started having lower back pain.  Yesterday morning he was able to shower but then was unable to get dressed by himself d/t the pain and new onset right lower extremity weakness.  Complains of pain from right hip all the way down leg.  Symtoms are constant and moderate in severity, no relieving or exacerbating factors.  Pain 101/10.  Associated symptoms are right lower extremity weakness and occassional dyspnea.  Patient denies any CP, leg swelling, fever/chills, urinary or fecal incontinence.   Lab work remarkable for Sed Rate 55, Creatinine 1.6.  MRI showed Large subcutaneous fluid collection involving the postoperative bed measuring approximately 11.6 x 28.9 may reflect seroma or abscess or meningocele Additional subcutaneous fluid collection in the region of the laminectomy is multiloculated measuring approximately 2.2 x 14 mm each.  This could relate to abscess, seroma, or meningocele.   Case was discussed with neurosurgery, ERIK Gillette who discussed with Dr. Valencia.  will admit patient to observation for post op fluid collection, r/o abscess.  Blood cultures ordered, holding Abx at this  "time as patient is febrile and normal WBC.  Per neurosurgery recs will hold ASA/plavix at this time, likely washout tomorrow morning.      Code Status Full  Surrogate Decision Maker wife Vinita        Overview/Hospital Course:        7/5/23  Patient examined post-op this afternoon, wife at bedside  Reports pain in low back, controlled at this time  Discussed with RN at bedside; surgical incision dressing slightly saturated, 2 drain in place     7/6/23  NAEON, patient reports indigestion, pain controlled  Cultures pending, continue vancomycin  NSGY following, appreciate reccs     7/7/23  NAEON, pain controlled, reports indigestion  No relief with Tums, one time dose of sucralfate, monitor response     7/8/23  NAEON, pain controlled, no new issues     7/9/23  NAEON, reports constipation, on bowel regimen     7/10/23  NAEON, pain controlled  Lumbar hemovac drain pulled today per Neurosurgery  Discussed with Dr. Keith, plan for long term IV antibiotics, PICC line ordered  IV daptomycin at 700mg daily, IV Zosyn 4.5gram every 8 hours, x 6 weeks, EOC-08/18/23  Weekly cpk, esr,cmp,cbc, F/U in ID clinic  Pending home health and IV antibiotic arraignments   Stable for hospital discharge     7/11/23  P[facility] per case management, may take several days  Patient otherwise stable, having BMs, pain controlled    Interval Hx      Objective  BP (!) 102/54 (BP Location: Left arm, Patient Position: Lying)   Pulse 82   Temp 97.7 °F (36.5 °C) (Oral)   Resp 18   Ht 5' 8" (1.727 m)   Wt 114.9 kg (253 lb 4.9 oz)   SpO2 97%   BMI 38.52 kg/m²     Intake/Output Summary (Last 24 hours) at 7/12/2023 0709  Last data filed at 7/11/2023 1923  Gross per 24 hour   Intake 61.28 ml   Output --   Net 61.28 ml       Physical Exam  Constitutional:       General: He is not in acute distress.     Appearance: Normal appearance. He is obese.   Cardiovascular:      Rate and Rhythm: Normal rate and regular rhythm.     "  Heart sounds: No murmur heard.  Pulmonary:      Effort: Pulmonary effort is normal. No respiratory distress.      Breath sounds: Normal breath sounds. No wheezing.   Abdominal:      General: There is no distension.      Palpations: Abdomen is soft.      Tenderness: There is no abdominal tenderness.   Musculoskeletal:      Comments: RUE PICC line in place   Neurological:      Mental Status: He is alert.      BMP:   Recent Labs   Lab 07/10/23  1043   CREATININE 1.5*     CBC:   No results for input(s): WBC, HGB, HCT, PLT in the last 48 hours.  CMP:   Recent Labs   Lab 07/10/23  1043   CREATININE 1.5*     Cardiac Markers: No results for input(s): CKMB, MYOGLOBIN, BNP, TROPISTAT in the last 48 hours.  Coagulation: No results for input(s): PT, INR, APTT in the last 48 hours.  Lactic Acid: No results for input(s): LACTATE in the last 48 hours.  Magnesium: No results for input(s): MG in the last 48 hours.  Troponin: No results for input(s): TROPONINI, TROPONINIHS in the last 48 hours.  TSH:   No results for input(s): TSH in the last 4320 hours.  Urine Studies:   No results for input(s): COLORU, APPEARANCEUA, PHUR, SPECGRAV, PROTEINUA, GLUCUA, KETONESU, BILIRUBINUA, OCCULTUA, NITRITE, UROBILINOGEN, LEUKOCYTESUR, RBCUA, WBCUA, BACTERIA, SQUAMEPITHEL, HYALINECASTS in the last 48 hours.    Invalid input(s): WRIGHTSUR    Imaging Results              MRI Lumbar Spine W WO Cont (Final result)  Result time 07/04/23 00:13:50      Final result by Justus Sood MD (07/04/23 00:13:50)                   Impression:      Patient is status post posterior interbody lumbar fusion from L4 to S1.  Orthopedic hardware appears intact.  Bilateral laminectomies from L4-S1.    Large subcutaneous fluid collection involving the postoperative bed measuring approximately 11.6 x 28.9 may reflect seroma or abscess or meningocele Additional subcutaneous fluid collection in the region of the laminectomy is multiloculated measuring approximately 2.2 x 14  mm each.  This could relate to abscess, seroma, or meningocele.    Metallic interbody spacer at L5-S1 appears more anterior than L4-L5.  Correlate clinically.    Recommend clinical correlation and follow-up.      Electronically signed by: Justus Arreguini  Date:    07/04/2023  Time:    00:13               Narrative:    EXAMINATION:  MRI LUMBAR SPINE W WO CONTRAST    CLINICAL HISTORY:  10low back pain, infection suspected;    CONTRAST:  ML of Gadavist    TECHNIQUE:    Standard multiplanar without and with contrast MRI sequences of the lumbar spine performed with contrast.    COMPARISON:  Prior CT    FINDINGS:  Patient is status post posterior interbody lumbar fusion from L4 to S1.  Orthopedic hardware appears intact.  Bilateral laminectomies from L4-S1.  Metallic artifacts limit evaluation.  Metallic interbody spacer at L5-S1 and L4-L5.  Enhancing soft tissues in the region of laminectomy and postoperative bed may relate to postoperative change or edema    Large subcutaneous fluid collection involving the postoperative bed measuring approximately 11.6 x 28.9 may reflect seroma or abscess or meningocele   Additional subcutaneous fluid collection in the region of the laminectomy is multiloculated measuring approximately 2.2 x 14 mm each.  This could relate to abscess, seroma, or meningocele    The tip of the conus ends at the  level.    T12-L1: Normal.    L1-2: Normal.    L2-3: Normal.    L3-4: Normal.    L4-5: Evaluation of the neural foraminal and spinal canal limited by metallic artifact    L5-S1:  Left lateral disc herniation remains.                                       X-Ray Chest AP Portable (Final result)  Result time 07/03/23 11:11:49      Final result by CHRISTIAN Allen Sr., MD (07/03/23 11:11:49)                   Impression:      1. The lungs are clear.  2. There is mild cardiomegaly.  3. There are surgical changes associated with a prior sternotomy.  .      Electronically signed by: aSncho Allen  MD  Date:    07/03/2023  Time:    11:11               Narrative:    EXAMINATION:  XR CHEST AP PORTABLE    CLINICAL HISTORY:  sob;    COMPARISON:  04/13/2023    FINDINGS:  There are surgical changes associated with a prior sternotomy.  There is mild cardiomegaly.  The lungs are clear. There is no pneumothorax.  The costophrenic angles are sharp.                                       US Lower Extremity Veins Right (Final result)  Result time 07/03/23 11:02:31      Final result by Homer Faulkner MD (07/03/23 11:02:31)                   Impression:      No evidence of deep venous thrombosis in the right lower extremity.      Electronically signed by: Homer Faulkner  Date:    07/03/2023  Time:    11:02               Narrative:    EXAMINATION:  US LOWER EXTREMITY VEINS RIGHT    CLINICAL HISTORY:  Pain in leg, unspecified    TECHNIQUE:  Duplex and color flow Doppler evaluation and graded compression of the right lower extremity veins was performed.    COMPARISON:  None    FINDINGS:  Right thigh veins: The common femoral, femoral, popliteal, upper greater saphenous, and deep femoral veins are patent and free of thrombus. The veins are normally compressible and have normal phasic flow and augmentation response.    Right calf veins: The visualized calf veins are patent.    Contralateral CFV: The contralateral (left) common femoral vein is patent and free of thrombus.    Miscellaneous: None                                       CT Lumbar Spine Without Contrast (Final result)  Result time 07/03/23 11:31:42      Final result by Chris Villalba MD (Timothy) (07/03/23 11:31:42)                   Impression:      Postoperative changes related to a posterior lumbar interbody fusion from L4-S1 with bilateral laminectomies.  Postoperative changes suspected involving the posterior paraspinal musculature.  No definite abnormal postoperative fluid collections.    All CT scans at this facility are performed  using dose  modulation techniques as appropriate to performed exam including the following:  automated exposure control; adjustment of mA and/or kV according to the patients size (this includes techniques or standardized protocols for targeted exams where dose is matched to indication/reason for exam: i.e. extremities or head);  iterative reconstruction technique.      Electronically signed by: Chris Villalba MD  Date:    07/03/2023  Time:    11:31               Narrative:    EXAMINATION:  CT LUMBAR SPINE WITHOUT CONTRAST    CLINICAL HISTORY:  Low back pain, infection suspected;    TECHNIQUE:  Standard noncontrast CT scan of the lumbar spine.    COMPARISON:  Lumbar spine series 05/30/2023    FINDINGS:  Patient is status post posterior interbody lumbar fusion from L4 to S1.  Orthopedic hardware appears intact.  Bilateral laminectomies from L4-S1.  No definite intra spinal focal fluid collections.  There is indistinctness of the posterior paraspinal musculature at the operative levels which could reflect postoperative changes and edematous.  No definite fluid collections within the paraspinal musculature.  Other lumbar levels appear unremarkable.                                       CT Head Without Contrast (Final result)  Result time 07/03/23 10:50:09      Final result by CHRISTIAN Allen Sr., MD (07/03/23 10:50:09)                   Impression:      1. There is no evidence of an acute ischemic event. There is encephalomalacia in the right occipital lobe  2. There is no intracranial hemorrhage.  All CT scans at this facility use dose modulation, iterative reconstruction, and/or weight base dosing when appropriate to reduce radiation dose when appropriate to reduce radiation dose to as low as reasonably achievable.      Electronically signed by: Sancho Allen MD  Date:    07/03/2023  Time:    10:50               Narrative:    EXAMINATION:  CT HEAD WITHOUT CONTRAST    CLINICAL HISTORY:  Neuro deficit, acute, stroke  suspected;    TECHNIQUE:  Standard brain CT protocol without IV contrast was performed.    COMPARISON:  12/01/2020    FINDINGS:  There is no evidence of an acute ischemic event.  There is encephalomalacia in the right occipital lobe.  There is no intracranial hemorrhage.  There is no calvarial fracture.  The visualized portion of the paranasal sinuses is clear.                                    Assessment/Plan:      * Post-operative wound abscess  Patient with recent lumbar fusion L4-S1 (5/17/23)  Presents with RLE weakness, pain  MRI revealed two fluid collections l-spine, abscess versus seroma versus meningocele   US RLE negative for DVT  Blood cultures pending     S/p washout and antibiotic bead placement today (7/5/23), operative cultures pending  ID following, continue vancomycin  Holding ASA/Plavix, defer to NSGY when to resume  Multimodal pain control    07/12/2023  --Pending approval of OPAT  --Plan per ID:  IV daptomycin at 700mg daily   IV Zosyn changed to IV Cefepime BID dosing  Will treat for 6 weeks   EOC-08/18/23  Weekly cpk, esr,cmp,cbc  Will see in iD clinic      ABLA (acute blood loss anemia)  Presented with chronic anemia  Hg trending down; post-op and dilutional  Monitor Hemovac drain ouptut  Transfuse if Hg < 7 or symptomatic     CKD (chronic kidney disease) stage 3, GFR 30-59 ml/min  Cr ranges from 1.4-1.7 over the last year  Strict I/O's  Avoid nephrotoxins  AM labs     Cardiomyopathy  Euvolemic at exam  Continue BB  Monitor fluid status closely, strict I/O      Echo     Result Date: 9/12/2022  · The left ventricle is normal in size with normal systolic function.  · The estimated ejection fraction is 65%.  · Grade I left ventricular diastolic dysfunction.  · Normal right ventricular size with normal right ventricular systolic   function.  · There is a bioprosthetic aortic valve present. There is mild   paravalvular aortic insufficiency present.  · The aortic valve mean gradient is 14 mmHg with  a dimensionless index of   0.57.  · Mild tricuspid regurgitation.  · The estimated PA systolic pressure is 33 mmHg.      CAD (coronary artery disease)  Holding asa/plavix at this time  S/p lumbar abscess washout  Restart when okay per Neurosurgery  Continue statin     Hypertension  BP controled, continue BB (carvedilol)  Holding home ARB at this time (losartan)     GERD (gastroesophageal reflux disease)  PPI      Moderate persistent asthma without complication  Presented with dyspnea to ED, US RLE negative for DVT, o2 sats stable on room air, not tachycardiac, low concern for PE  No wheezing on exam   Continue Pulmicort, wife may bring home trelegy inhaler  Duonebs PRN     CHEO on CPAP  Continue cpap nightly      S/P AVR (aortic valve replacement) biopresthetic miller 25 mm in 2014            Class 2 severe obesity due to excess calories with serious comorbidity and body mass index (BMI) of 38.0 to 38.9 in adult  Body mass index is 38.21 kg/m². Morbid obesity complicates all aspects of disease management from diagnostic modalities to treatment. Weight loss encouraged and health benefits explained to patient.                    VTE Risk Mitigation (From admission, onward)           Ordered       enoxaparin injection 40 mg  Every 24 hours         07/06/23 1412       IP VTE HIGH RISK PATIENT  Once         07/05/23 1302       Place sequential compression device  Until discontinued         07/05/23 1302       Place sequential compression device  Until discontinued         07/05/23 1137       Place sequential compression device  Until discontinued         07/04/23 0900                    Discharge Planning   AURELIA: 7/11/2023     Code Status: Full Code   Is the patient medically ready for discharge?:     Reason for patient still in hospital (select all that apply): Pending disposition  Discharge Plan A: Home Health      Disposition: pending set up / approval of GLYNN Adam MD  Department of Hospital  Medicine   [facility] - Telemetry ([facility])

## 2023-07-12 NOTE — ASSESSMENT & PLAN NOTE
All cultures are neg till date -  - will plan to treat for 6 weeks but might need to stop early       IV daptomycin at 700mg daily   IV Zosyn 4.5gram every 8 hours  Will treat for 6 weeks   EOC-08/18/23  Weekly cpk, esr,cmp,cbc  Will see in iD clinic     07/11- will switch to Cefepime for bid dosing   Cultures -no anaerobes  Continue Daptomycin   EOC 08 /18/23  Follow ESR,CRP in AM

## 2023-07-12 NOTE — PLAN OF CARE
Pt has met all OT goals at this time. (I) with all mobility.  Pt does not require skilled acute OT services at this time. Discharge from OT. Recommends HHOT.

## 2023-07-12 NOTE — PLAN OF CARE
[facility] approval [facility] for [facility] antibiotics  Infusion provider is [facility].  Spoke to Bob.  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  Advised needed to teach spouse tonight as she works during the day.  Bob feels he can get the wife taught and meds delivered this evening.  Message to Dr. Adam.         07/12/23 4400   Post-Acute Status   Post-Acute Authorization Home Health;IV Infusion   Post-Acute Placement Status Set-up Complete/Auth obtained   Home Health Status Set-up Complete/Auth obtained   Discharge Plan   Discharge Plan A Home Health

## 2023-07-13 ENCOUNTER — TELEPHONE (OUTPATIENT)
Dept: NEUROSURGERY | Facility: CLINIC | Age: 68
End: 2023-07-13
Payer: OTHER GOVERNMENT

## 2023-07-13 VITALS
WEIGHT: 253.31 LBS | TEMPERATURE: 98 F | OXYGEN SATURATION: 98 % | HEART RATE: 82 BPM | RESPIRATION RATE: 20 BRPM | DIASTOLIC BLOOD PRESSURE: 70 MMHG | SYSTOLIC BLOOD PRESSURE: 114 MMHG | HEIGHT: 68 IN | BODY MASS INDEX: 38.39 KG/M2

## 2023-07-13 PROCEDURE — 25000003 PHARM REV CODE 250: Mod: HCNC | Performed by: INTERNAL MEDICINE

## 2023-07-13 PROCEDURE — 63600175 PHARM REV CODE 636 W HCPCS: Mod: HCNC | Performed by: INTERNAL MEDICINE

## 2023-07-13 PROCEDURE — 25000003 PHARM REV CODE 250: Mod: HCNC | Performed by: PHYSICIAN ASSISTANT

## 2023-07-13 PROCEDURE — 25000003 PHARM REV CODE 250: Mod: HCNC | Performed by: NEUROLOGICAL SURGERY

## 2023-07-13 PROCEDURE — 94640 AIRWAY INHALATION TREATMENT: CPT | Mod: HCNC

## 2023-07-13 PROCEDURE — 25000003 PHARM REV CODE 250: Mod: HCNC | Performed by: HOSPITALIST

## 2023-07-13 PROCEDURE — 25000003 PHARM REV CODE 250: Mod: HCNC | Performed by: NURSE PRACTITIONER

## 2023-07-13 PROCEDURE — 25000242 PHARM REV CODE 250 ALT 637 W/ HCPCS: Mod: HCNC | Performed by: PHYSICIAN ASSISTANT

## 2023-07-13 RX ORDER — CEFEPIME HYDROCHLORIDE 1 G/50ML
1 INJECTION, SOLUTION INTRAVENOUS EVERY 12 HOURS
Qty: 3600 ML | Refills: 0
Start: 2023-07-13 | End: 2023-08-18

## 2023-07-13 RX ADMIN — OXYCODONE AND ACETAMINOPHEN 1 TABLET: 10; 325 TABLET ORAL at 02:07

## 2023-07-13 RX ADMIN — DOCUSATE SODIUM 100 MG: 100 CAPSULE, LIQUID FILLED ORAL at 08:07

## 2023-07-13 RX ADMIN — BUDESONIDE INHALATION 0.5 MG: 0.5 SUSPENSION RESPIRATORY (INHALATION) at 07:07

## 2023-07-13 RX ADMIN — CARVEDILOL 12.5 MG: 12.5 TABLET, FILM COATED ORAL at 08:07

## 2023-07-13 RX ADMIN — CEFEPIME 1 G: 1 INJECTION, POWDER, FOR SOLUTION INTRAMUSCULAR; INTRAVENOUS at 02:07

## 2023-07-13 RX ADMIN — SERTRALINE HYDROCHLORIDE 100 MG: 50 TABLET ORAL at 08:07

## 2023-07-13 RX ADMIN — METHOCARBAMOL 500 MG: 500 TABLET ORAL at 01:07

## 2023-07-13 RX ADMIN — GABAPENTIN 600 MG: 300 CAPSULE ORAL at 02:07

## 2023-07-13 RX ADMIN — DAPTOMYCIN 700 MG: 350 INJECTION, POWDER, LYOPHILIZED, FOR SOLUTION INTRAVENOUS at 02:07

## 2023-07-13 RX ADMIN — METHOCARBAMOL 500 MG: 500 TABLET ORAL at 08:07

## 2023-07-13 RX ADMIN — POLYETHYLENE GLYCOL 3350 17 G: 17 POWDER, FOR SOLUTION ORAL at 08:07

## 2023-07-13 RX ADMIN — GABAPENTIN 600 MG: 300 CAPSULE ORAL at 08:07

## 2023-07-13 RX ADMIN — OXYCODONE AND ACETAMINOPHEN 1 TABLET: 10; 325 TABLET ORAL at 06:07

## 2023-07-13 RX ADMIN — SODIUM CHLORIDE: 9 INJECTION, SOLUTION INTRAVENOUS at 02:07

## 2023-07-13 RX ADMIN — PANTOPRAZOLE SODIUM 40 MG: 40 TABLET, DELAYED RELEASE ORAL at 08:07

## 2023-07-13 NOTE — SUBJECTIVE & OBJECTIVE
Interval History:   67 year old man with post op lumbar fluid collection.    MRI showed Large subcutaneous fluid collection involving the postoperative bed measuring approximately 11.6 x 28.9 may reflect seroma or abscess or meningocele Additional subcutaneous fluid collection in the region of the laminectomy is multiloculated measuring approximately 2.2 x 14 mm each.  This could relate to abscess, seroma, or meningocele.     He was seen by NS and had on 07/05- I and D done with    postoperative fluid collection sent for culture and analysis.  Removal and replacement of set screws right sided L4 L5 S1 .  All cultures are neg till date .    07/12- feels better ,not in acute distress  Review of Systems   Constitutional:  Negative for activity change, appetite change, chills and diaphoresis.   HENT:  Negative for congestion.    Respiratory:  Negative for apnea and chest tightness.    Neurological:  Negative for dizziness and facial asymmetry.   Objective:     Vital Signs (Most Recent):  Temp: 98 °F (36.7 °C) (07/12/23 2347)  Pulse: 87 (07/12/23 2347)  Resp: 18 (07/12/23 2347)  BP: 107/65 (07/12/23 2347)  SpO2: 99 % (07/12/23 2347) Vital Signs (24h Range):  Temp:  [97.6 °F (36.4 °C)-98.3 °F (36.8 °C)] 98 °F (36.7 °C)  Pulse:  [] 87  Resp:  [16-22] 18  SpO2:  [96 %-100 %] 99 %  BP: (102-134)/(54-84) 107/65     Weight: 114.9 kg (253 lb 4.9 oz)  Body mass index is 38.52 kg/m².    Estimated Creatinine Clearance: 58.8 mL/min (A) (based on SCr of 1.5 mg/dL (H)).     Physical Exam  Vitals and nursing note reviewed.   HENT:      Head: Normocephalic.   Cardiovascular:      Rate and Rhythm: Normal rate.   Musculoskeletal:         General: Normal range of motion.      Cervical back: Normal range of motion.      Comments: Has abd braces   Skin:     General: Skin is warm.   Neurological:      Mental Status: He is alert.        Significant Labs: Blood Culture:   Recent Labs   Lab 01/14/23 2036 01/14/23 2040 07/04/23  1111  07/04/23  1112   LABBLOO No growth after 5 days. No growth after 5 days. No growth after 5 days. No growth after 5 days.       BMP:   No results for input(s): GLU, NA, K, CL, CO2, BUN, CREATININE, CALCIUM, MG in the last 48 hours.    CBC: No results for input(s): WBC, HGB, HCT, PLT in the last 48 hours.  All pertinent labs within the past 24 hours have been reviewed.    Significant Imaging: I have reviewed all pertinent imaging results/findings within the past 24 hours.

## 2023-07-13 NOTE — PROGRESS NOTES
[facility])  Infectious Disease  Progress Note    Patient Name: Abdullahi Urias  MRN: [id number]  Admission Date: 7/3/2023  Length of Stay: 2 days  Attending Physician: Brandon Adam MD  Primary Care Provider: Reji Valentin MD    Isolation Status: No active isolations  Assessment/Plan:      Cardiac/Vascular  S/P AVR (aortic valve replacement) biopresthetic miller 25 mm in 2014   Cardiology follow up , will need to treat the lumbar collection to reduce any risk of bacteremia that can affect the valve     Hypertension  BP control as per primary team    ID  * Post-operative wound abscess  All cultures are neg till date -  - will plan to treat for 6 weeks but might need to stop early       IV daptomycin at 700mg daily   IV Zosyn 4.5gram every 8 hours  Will treat for 6 weeks   EOC-08/18/23  Weekly cpk, esr,cmp,cbc  Will see in iD clinic     07/11- will switch to Cefepime for bid dosing   Cultures -no anaerobes  Continue Daptomycin   EOC 08 /18/23  Follow ESR,CRP in AM    07/12- [facility]- [facility]    Oncology  ABLA (acute blood loss anemia)  Will transfuse as needed     Other  CHEO on CPAP  CPAP as tolerated         Anticipated Disposition:     Thank you for your consult. I will follow-up with patient. Please contact us if you have any additional questions.    Rommel Keith MD, [facility]  Infectious Disease  [facility] ([facility])    Subjective:     Principal Problem:Post-operative wound abscess    HPI:    67-year-old man with PMH significant for aortic stenosis with bioprosthetic aortic valve replacement, diastolic CHF, CKD stage 3, HTN, prostate cancer .  He had recent  lumbar spine surgery on 5/17 with Dr. Valencia   He presented with  right lower extremity weakness/pain   .  MRI showed Large subcutaneous fluid collection involving the postoperative bed measuring approximately 11.6 x 28.9 may reflect seroma or abscess or meningocele Additional subcutaneous  fluid collection in the region of the laminectomy is multiloculated measuring approximately 2.2 x 14 mm each.  This could relate to abscess, seroma, or meningocele.     He was seen by NS and had on 07/05- I and D done with    postoperative fluid collection sent for culture and analysis.  Removal and replacement of set screws right sided L4 L5 S1 .  All cultures are neg till date .  CBC -  Component      Latest Ref Rng & Units 7/9/2023 7/8/2023 7/7/2023   WBC      3.90 - 12.70 K/uL 5.96 8.25 11.31   Esr -55  CRP- 46      Interval History:   67 year old man with post op lumbar fluid collection.    MRI showed Large subcutaneous fluid collection involving the postoperative bed measuring approximately 11.6 x 28.9 may reflect seroma or abscess or meningocele Additional subcutaneous fluid collection in the region of the laminectomy is multiloculated measuring approximately 2.2 x 14 mm each.  This could relate to abscess, seroma, or meningocele.     He was seen by NS and had on 07/05- I and D done with    postoperative fluid collection sent for culture and analysis.  Removal and replacement of set screws right sided L4 L5 S1 .  All cultures are neg till date .    07/12- feels better ,not in acute distress  Review of Systems   Constitutional:  Negative for activity change, appetite change, chills and diaphoresis.   HENT:  Negative for congestion.    Respiratory:  Negative for apnea and chest tightness.    Neurological:  Negative for dizziness and facial asymmetry.   Objective:     Vital Signs (Most Recent):  Temp: 98 °F (36.7 °C) (07/12/23 2347)  Pulse: 87 (07/12/23 2347)  Resp: 18 (07/12/23 2347)  BP: 107/65 (07/12/23 2347)  SpO2: 99 % (07/12/23 2347) Vital Signs (24h Range):  Temp:  [97.6 °F (36.4 °C)-98.3 °F (36.8 °C)] 98 °F (36.7 °C)  Pulse:  [] 87  Resp:  [16-22] 18  SpO2:  [96 %-100 %] 99 %  BP: (102-134)/(54-84) 107/65     Weight: 114.9 kg (253 lb 4.9 oz)  Body mass index is 38.52 kg/m².    Estimated Creatinine  Clearance: 58.8 mL/min (A) (based on SCr of 1.5 mg/dL (H)).     Physical Exam  Vitals and nursing note reviewed.   HENT:      Head: Normocephalic.   Cardiovascular:      Rate and Rhythm: Normal rate.   Musculoskeletal:         General: Normal range of motion.      Cervical back: Normal range of motion.      Comments: Has abd braces   Skin:     General: Skin is warm.   Neurological:      Mental Status: He is alert.        Significant Labs: Blood Culture:   Recent Labs   Lab 01/14/23 2036 01/14/23 2040 07/04/23  1111 07/04/23  1112   LABBLOO No growth after 5 days. No growth after 5 days. No growth after 5 days. No growth after 5 days.       BMP:   No results for input(s): GLU, NA, K, CL, CO2, BUN, CREATININE, CALCIUM, MG in the last 48 hours.    CBC: No results for input(s): WBC, HGB, HCT, PLT in the last 48 hours.  All pertinent labs within the past 24 hours have been reviewed.    Significant Imaging: I have reviewed all pertinent imaging results/findings within the past 24 hours.

## 2023-07-13 NOTE — PLAN OF CARE
Spoke with Bob from [facility]  He taught the wife and patient to administer IV antibiotic yesterday evening.  He is meeting the wife here at the hospital at 3pm to review teaching again.  Patient will get his Cefepime and Dapto dose this afternoon and discharge home once review of IV teaching is complete.

## 2023-07-13 NOTE — DISCHARGE SUMMARY
'[facility] ([facility] Medicine  Discharge Summary      Patient Name: Abdullahi Urias  MRN: [id number]  [facility]: [id number]  Patient Class: IP- Inpatient  Admission Date: 7/3/2023  Hospital Length of Stay: 10 days  Discharge Date and Time:  07/13/2023 6:59 AM  Attending Physician: Brandon Adam MD   Discharging Provider: Brandon Adam MD  Primary Care Provider: Reji Valentin MD    Primary Care Team: Networked reference to record PCT     HPI:   Mr. Urias is a 67-year-old  male with PMH significant for aortic stenosis with bioprosthetic aortic valve replacement, diastolic CHF, CKD stage 3, HTN, prostate cancer and recent lumbar spine surgery on 5/17 with Dr. Valencia who was sent from neurosurgery office with complaints of RLE pain, weakness and oozing from surgical incision site.  Per patient and wife, he was doing fine till two days ago when he went on a long car ride to [place] and he started having lower back pain.  Yesterday morning he was able to shower but then was unable to get dressed by himself d/t the pain and new onset right lower extremity weakness.  Complains of pain from right hip all the way down leg.  Symtoms are constant and moderate in severity, no relieving or exacerbating factors.  Pain 101/10.  Associated symptoms are right lower extremity weakness and occassional dyspnea.  Patient denies any CP, leg swelling, fever/chills, urinary or fecal incontinence.   Lab work remarkable for Sed Rate 55, Creatinine 1.6.  MRI showed Large subcutaneous fluid collection involving the postoperative bed measuring approximately 11.6 x 28.9 may reflect seroma or abscess or meningocele Additional subcutaneous fluid collection in the region of the laminectomy is multiloculated measuring approximately 2.2 x 14 mm each.  This could relate to abscess, seroma, or meningocele.   Case was discussed with neurosurgery, ERIK Gillette who discussed with Dr. Valencia.  will admit patient  "to observation for post op fluid collection, r/o abscess.  Blood cultures ordered, holding Abx at this time as patient is febrile and normal WBC.  Per neurosurgery recs will hold ASA/plavix at this time, likely washout tomorrow morning.     Code Status Full  Surrogate Decision Maker ab Talamantes      Procedure(s) (LRB):  EXPLORATION, WOUND (Bilateral)  WASHOUT (N/A)  REMOVAL, HARDWARE, SPINE (N/A)  INSERTION (N/A)  REPLACEMENT (N/A)      Hospital Course:   Over the course of his hospital stay from 7/5/23 to 7/13/23, Mr. Urias's postoperative pain was managed effectively. He also experienced some indigestion and constipation, which were addressed. Blood cultures were ordered and antibiotics were withheld initially due to absence of fever and normal WBC count. The patient was started on vancomycin with Neurosurgery and Infectious Disease teams involved in his management.    On 7/10/23, his lumbar hemovac drain was removed and a long-term plan was established for IV antibiotics, with a PICC line ordered for this purpose. He was prescribed IV daptomycin at 700mg daily and IV Zosyn 4.5gram every 8 hours, to be continued for 6 weeks. Weekly monitoring of his CPK, ESR, CMP, and CBC was planned, along with follow-ups in the ID clinic.    As of 7/11/23, approval for outpatient [facility] was [facility], as coordinated by case management. On 7/12/23, IV Zosyn was switched to IV Cefepime BID dosing. The patient was discharged on 7/13/23 with home health and plans for outpatient parenteral antibiotic therapy. Infusion provider is [facility]. Wife taught and meds delivered the evening prior to discharge. Patient and family acknowledged understanding and agreed the aforementioned plan.    /80 (BP Location: Right arm, Patient Position: Sitting)   Pulse 86   Temp 98.8 °F (37.1 °C) (Oral)   Resp 18   Ht 5' 8" (1.727 m)   Wt 114.9 kg (253 lb 4.9 oz)   SpO2 99%   BMI 38.52 kg/m²     Physical " Exam  Constitutional:       General: He is not in acute distress.     Appearance: Normal appearance. He is obese.   Cardiovascular:      Rate and Rhythm: Normal rate and regular rhythm.      Heart sounds: No murmur heard.  Pulmonary:      Effort: Pulmonary effort is normal. No respiratory distress.      Breath sounds: Normal breath sounds. No wheezing.   Abdominal:      General: There is no distension.      Palpations: Abdomen is soft.      Tenderness: There is no abdominal tenderness.   Musculoskeletal:      Comments: LUE PICC line in place   Neurological:      Mental Status: He is alert.       Goals of Care Treatment Preferences:  Code Status: Full Code      Consults:   Consults (From admission, onward)        Status Ordering Provider     Inpatient consult to PICC team ([facility])  Once        Provider:  (Not yet assigned)    Acknowledged OWENS, ANABEL     Inpatient consult to Social Work  Once        Provider:  (Not yet assigned)    Completed OWENS, ANABEL     Inpatient consult to Social Work  Once        Provider:  (Not yet assigned)    Completed OWENS, ANABEL     Inpatient consult to PICC team ([facility])  Once        Provider:  (Not yet assigned)    Acknowledged OWENS, ANABEL     Inpatient consult to Social Work  Once        Provider:  (Not yet assigned)    Completed OWENS, ANABEL     Inpatient consult to Infectious Diseases  Once        Provider:  Rommel Keith MD, [facility]    Acknowledged ELLIOTT FARFAN     Inpatient consult to Neurosurgery  Once        Provider:  Elliott Farfan PA-C    Acknowledged ELLIOTT FARFAN          No new Assessment & Plan notes have been filed under this hospital service since the last note was generated.  Service: Hospital Medicine    Final Active Diagnoses:    Diagnosis Date Noted POA    PRINCIPAL PROBLEM:  Post-operative wound abscess [T81.49XA] 07/04/2023 Yes     Chronic    ABLA (acute blood loss anemia) [D62] 07/08/2023 No    Moderate persistent asthma without  complication [J45.40] 03/07/2023 Yes    GERD (gastroesophageal reflux disease) [K21.9] 02/23/2021 Yes    CKD (chronic kidney disease) stage 3, GFR 30-59 ml/min [N18.30] 06/30/2020 Yes    S/P AVR (aortic valve replacement) biopresthetic miller 25 mm in 2014  [Z95.2] 06/30/2020 Not Applicable    CHEO on CPAP [G47.33, Z99.89] 09/14/2018 Not Applicable    Class 2 severe obesity due to excess calories with serious comorbidity and body mass index (BMI) of 38.0 to 38.9 in adult [E66.01, Z68.38] 10/18/2016 Not Applicable    CAD (coronary artery disease) [I25.10]  Yes     Chronic    Cardiomyopathy [I42.9]  Yes    Hypertension [I10]  Yes     Chronic      Problems Resolved During this Admission:       Discharged Condition: good    Disposition: Home-Health Care [facility]    Follow Up:   Follow-up Information     Brandon Valencia MD Follow up on 7/14/2023.    Specialty: Neurosurgery  Why: Hospital discharge follow up  Contact information:  [address] [facility] [address]  [phone]             Reji Valentin MD. Schedule an appointment as soon as possible for a visit in 1 month(s).    Specialty: Family Medicine  Why: Hospital discharge follow up  Contact information:  [address]  [place] [address] [phone]             Rommel Keith MD, FIDSA Follow up in 2 week(s).    Specialties: Infectious Diseases, Hospitalist  Contact information:  [address] [facility] [address]  [phone]                       Patient Instructions:      Ambulatory referral/consult to Home Health   Standing Status: Future   Referral Priority: Routine Referral Type: Home Health   Referral Reason: Specialty Services Required   Requested Specialty: Home Health Services   Number of Visits Requested: 1     Diet diabetic     Notify your health care provider if you experience any of the following:  temperature >100.4     Notify your health care provider if you experience any of the following:  persistent  nausea and vomiting or diarrhea     Notify your health care provider if you experience any of the following:  severe uncontrolled pain     Notify your health care provider if you experience any of the following:  redness, tenderness, or signs of infection (pain, swelling, redness, odor or green/yellow discharge around incision site)     Notify your health care provider if you experience any of the following:  difficulty breathing or increased cough     Notify your health care provider if you experience any of the following:  severe persistent headache     Notify your health care provider if you experience any of the following:  persistent dizziness, light-headedness, or visual disturbances     Notify your health care provider if you experience any of the following:  increased confusion or weakness     Activity as tolerated       Significant Diagnostic Studies:  Imaging Results          MRI Lumbar Spine W WO Cont (Final result)  Result time 07/04/23 00:13:50    Final result by Justus Sood MD (07/04/23 00:13:50)                 Impression:      Patient is status post posterior interbody lumbar fusion from L4 to S1.  Orthopedic hardware appears intact.  Bilateral laminectomies from L4-S1.    Large subcutaneous fluid collection involving the postoperative bed measuring approximately 11.6 x 28.9 may reflect seroma or abscess or meningocele Additional subcutaneous fluid collection in the region of the laminectomy is multiloculated measuring approximately 2.2 x 14 mm each.  This could relate to abscess, seroma, or meningocele.    Metallic interbody spacer at L5-S1 appears more anterior than L4-L5.  Correlate clinically.    Recommend clinical correlation and follow-up.      Electronically signed by: Justus Sood  Date:    07/04/2023  Time:    00:13             Narrative:    EXAMINATION:  MRI LUMBAR SPINE W WO CONTRAST    CLINICAL HISTORY:  10low back pain, infection suspected;    CONTRAST:  ML of  Gadavist    TECHNIQUE:    Standard multiplanar without and with contrast MRI sequences of the lumbar spine performed with contrast.    COMPARISON:  Prior CT    FINDINGS:  Patient is status post posterior interbody lumbar fusion from L4 to S1.  Orthopedic hardware appears intact.  Bilateral laminectomies from L4-S1.  Metallic artifacts limit evaluation.  Metallic interbody spacer at L5-S1 and L4-L5.  Enhancing soft tissues in the region of laminectomy and postoperative bed may relate to postoperative change or edema    Large subcutaneous fluid collection involving the postoperative bed measuring approximately 11.6 x 28.9 may reflect seroma or abscess or meningocele   Additional subcutaneous fluid collection in the region of the laminectomy is multiloculated measuring approximately 2.2 x 14 mm each.  This could relate to abscess, seroma, or meningocele    The tip of the conus ends at the  level.    T12-L1: Normal.    L1-2: Normal.    L2-3: Normal.    L3-4: Normal.    L4-5: Evaluation of the neural foraminal and spinal canal limited by metallic artifact    L5-S1:  Left lateral disc herniation remains.                               X-Ray Chest AP Portable (Final result)  Result time 07/03/23 11:11:49    Final result by CHRISTIAN Allen Sr., MD (07/03/23 11:11:49)                 Impression:      1. The lungs are clear.  2. There is mild cardiomegaly.  3. There are surgical changes associated with a prior sternotomy.  .      Electronically signed by: Sancho Allen MD  Date:    07/03/2023  Time:    11:11             Narrative:    EXAMINATION:  XR CHEST AP PORTABLE    CLINICAL HISTORY:  sob;    COMPARISON:  04/13/2023    FINDINGS:  There are surgical changes associated with a prior sternotomy.  There is mild cardiomegaly.  The lungs are clear. There is no pneumothorax.  The costophrenic angles are sharp.                               US Lower Extremity Veins Right (Final result)  Result time 07/03/23 11:02:31    Final  result by Homer Faulkner MD (07/03/23 11:02:31)                 Impression:      No evidence of deep venous thrombosis in the right lower extremity.      Electronically signed by: Homer Faulkner  Date:    07/03/2023  Time:    11:02             Narrative:    EXAMINATION:  US LOWER EXTREMITY VEINS RIGHT    CLINICAL HISTORY:  Pain in leg, unspecified    TECHNIQUE:  Duplex and color flow Doppler evaluation and graded compression of the right lower extremity veins was performed.    COMPARISON:  None    FINDINGS:  Right thigh veins: The common femoral, femoral, popliteal, upper greater saphenous, and deep femoral veins are patent and free of thrombus. The veins are normally compressible and have normal phasic flow and augmentation response.    Right calf veins: The visualized calf veins are patent.    Contralateral CFV: The contralateral (left) common femoral vein is patent and free of thrombus.    Miscellaneous: None                               CT Lumbar Spine Without Contrast (Final result)  Result time 07/03/23 11:31:42    Final result by Chris Villalba MD (Timothy) (07/03/23 11:31:42)                 Impression:      Postoperative changes related to a posterior lumbar interbody fusion from L4-S1 with bilateral laminectomies.  Postoperative changes suspected involving the posterior paraspinal musculature.  No definite abnormal postoperative fluid collections.    All CT scans at this facility are performed  using dose modulation techniques as appropriate to performed exam including the following:  automated exposure control; adjustment of mA and/or kV according to the patients size (this includes techniques or standardized protocols for targeted exams where dose is matched to indication/reason for exam: i.e. extremities or head);  iterative reconstruction technique.      Electronically signed by: Chris Villalba MD  Date:    07/03/2023  Time:    11:31             Narrative:    EXAMINATION:  CT LUMBAR SPINE  WITHOUT CONTRAST    CLINICAL HISTORY:  Low back pain, infection suspected;    TECHNIQUE:  Standard noncontrast CT scan of the lumbar spine.    COMPARISON:  Lumbar spine series 05/30/2023    FINDINGS:  Patient is status post posterior interbody lumbar fusion from L4 to S1.  Orthopedic hardware appears intact.  Bilateral laminectomies from L4-S1.  No definite intra spinal focal fluid collections.  There is indistinctness of the posterior paraspinal musculature at the operative levels which could reflect postoperative changes and edematous.  No definite fluid collections within the paraspinal musculature.  Other lumbar levels appear unremarkable.                               CT Head Without Contrast (Final result)  Result time 07/03/23 10:50:09    Final result by CHRISTIAN Allen Sr., MD (07/03/23 10:50:09)                 Impression:      1. There is no evidence of an acute ischemic event. There is encephalomalacia in the right occipital lobe  2. There is no intracranial hemorrhage.  All CT scans at this facility use dose modulation, iterative reconstruction, and/or weight base dosing when appropriate to reduce radiation dose when appropriate to reduce radiation dose to as low as reasonably achievable.      Electronically signed by: Sancho Allen MD  Date:    07/03/2023  Time:    10:50             Narrative:    EXAMINATION:  CT HEAD WITHOUT CONTRAST    CLINICAL HISTORY:  Neuro deficit, acute, stroke suspected;    TECHNIQUE:  Standard brain CT protocol without IV contrast was performed.    COMPARISON:  12/01/2020    FINDINGS:  There is no evidence of an acute ischemic event.  There is encephalomalacia in the right occipital lobe.  There is no intracranial hemorrhage.  There is no calvarial fracture.  The visualized portion of the paranasal sinuses is clear.                                  Pending Diagnostic Studies:     None         Medications:  Reconciled Home Medications:      Medication List      START taking  these medications    cefepime in dextrose 5 % 1 gram/50 mL Pgbk  Commonly known as: MAXIPIME  Inject 50 mLs (1 g total) into the vein every 12 (twelve) hours.     methocarbamoL 500 MG Tab  Commonly known as: ROBAXIN  Take 1 tablet (500 mg total) by mouth 3 (three) times daily. for 7 days        CHANGE how you take these medications    clopidogreL 75 mg tablet  Commonly known as: PLAVIX  Take 1 tablet (75 mg total) by mouth once daily.  Start taking on: July 15, 2023  What changed: These instructions start on July 15, 2023. If you are unsure what to do until then, ask your doctor or other care provider.        CONTINUE taking these medications    albuterol 90 mcg/actuation inhaler  Commonly known as: PROVENTIL/VENTOLIN HFA  INHALE 2 PUFFS INTO THE LUNGS EVERY 6 HOURS AS NEEDED FOR COUGH     allopurinoL 100 MG tablet  Commonly known as: ZYLOPRIM  Take 100 mg by mouth once daily. Takes 0.5 tab     ALPRAZolam 1 MG tablet  Commonly known as: XANAX  Take 1 mg by mouth Daily. EVERY OTHER DAY     aluminum & magnesium hydroxide-simethicone 400-400-40 mg/5 mL suspension  Commonly known as: MYLANTA MAX STRENGTH  TAKE 15ML BY MOUTH FOUR TIMES A DAY AS NEEDED FOR STOMACH ACID     aspirin 81 MG EC tablet  Commonly known as: ECOTRIN  Take 1 tablet (81 mg total) by mouth once daily.     atorvastatin 80 MG tablet  Commonly known as: LIPITOR  TAKE ONE-HALF TABLET BY MOUTH EVERY DAY FOR CHOLESTEROL     CARAFATE 100 mg/mL suspension  Generic drug: sucralfate  Take 1 g by mouth 4 (four) times daily.     carvediloL 12.5 MG tablet  Commonly known as: COREG  Take 1 tablet (12.5 mg total) by mouth 2 (two) times daily.     diclofenac sodium 1 % Gel  Commonly known as: VOLTAREN  APPLY 2 GRAMS TOPICALLY FOUR TIMES A DAY AS NEEDED FOR PAIN AND INFLAMMATION. USE ENCLOSED DOSING CARD.     diphenhydrAMINE 25 mg tablet  Commonly known as: SOMINEX  Take 25 mg by mouth nightly as needed for Insomnia.     docusate sodium 100 MG capsule  Commonly known  as: COLACE  Take 1 capsule (100 mg total) by mouth 2 (two) times daily.     esomeprazole 40 MG capsule  Commonly known as: NEXIUM  40 mg.     famotidine 40 MG tablet  Commonly known as: PEPCID  TAKE ONE TABLET BY MOUTH AT BEDTIME FOR ACID REFLUX     flunisolide 25 mcg (0.025%) 25 mcg (0.025 %) Spry  Commonly known as: NASALIDE  2 sprays by Nasal route as needed.     gabapentin 600 MG tablet  Commonly known as: NEURONTIN  Take 1 tablet (600 mg total) by mouth 3 (three) times daily.     hydrocortisone 2.5 % cream  Apply topically 2 (two) times daily as needed. Apply to affected areas of the face and neck.     hydrocortisone-pramoxine rectal foam  Commonly known as: PROCTOFOAM-HS  INSERT 1 APPLICATORFUL INTO RECTALLY TWICE A DAY FOR HEMORRHOIDS     hydrOXYzine HCL 25 MG tablet  Commonly known as: ATARAX  Take 25 mg by mouth 3 (three) times daily as needed for Itching.     LIDOcaine 5 %  Commonly known as: LIDODERM  APPLY 1 PATCH TOPICALLY EVERY DAY FOR PAIN. WEAR FOR 12 HOURS, THEN REMOVE. DO NOT APPLY NEW PATCH FOR AT LEAST 12 HOURS.     losartan 50 MG tablet  Commonly known as: COZAAR  Take 1 tablet (50 mg total) by mouth once daily.     methyl salicylate-menthol 15-10% 15-10 % Crea  Apply topically as needed.     multivitamin per tablet  Commonly known as: THERAGRAN  Take 1 tablet by mouth once daily.     oxyCODONE-acetaminophen  mg per tablet  Commonly known as: PERCOCET  Take 1 tablet by mouth every 8 (eight) hours as needed for Pain.     pantoprazole 40 MG tablet  Commonly known as: PROTONIX  Take 40 mg by mouth 2 (two) times daily.     sertraline 100 MG tablet  Commonly known as: ZOLOFT  TAKE TWO TABLETS BY MOUTH EVERY DAY FOR MENTAL HEALTH DOSE INCREASED TO 200MG/DAY     simethicone 80 MG chewable tablet  Commonly known as: MYLICON  Take 80 mg by mouth every 6 (six) hours as needed for Flatulence.     TRELEGY ELLIPTA 200-62.5-25 mcg inhaler  Generic drug: fluticasone-umeclidin-vilanter  INHALE 1 PUFF INTO  THE LUNGS EVERY DAY     triamcinolone acetonide 0.1% 0.1 % cream  Commonly known as: KENALOG  Apply topically 2 (two) times daily.            Indwelling Lines/Drains at time of discharge:   Lines/Drains/Airways     Peripherally Inserted Central Catheter Line  Duration           PICC Double Lumen 07/12/23 1345 left brachial <1 day                Time spent on the discharge of patient: 35 minutes  of time spent on discharge including examining patient, providing discharge instructions, arranging follow-up and documentation.           Brandon Adam MD  Department of Hospital Medicine  '[facility] ([facility])

## 2023-07-13 NOTE — PLAN OF CARE
Patient discharged with personal belongings. Discharge education and medications reviewed with patient. Discharge medications delivered to beside.   Discharge instructions gone over with patient and wife. PICC line in placed, verified by xray, for long term IV antibiotics. Home infusion taught by [facility].     Problem: Adult Inpatient Plan of Care  Goal: Plan of Care Review  Outcome: Met  Goal: Patient-Specific Goal (Individualized)  Outcome: Met  Goal: Absence of Hospital-Acquired Illness or Injury  Outcome: Met  Goal: Optimal Comfort and Wellbeing  Outcome: Met  Goal: Readiness for Transition of Care  Outcome: Met     Problem: Infection  Goal: Absence of Infection Signs and Symptoms  Outcome: Met     Problem: Skin Injury Risk Increased  Goal: Skin Health and Integrity  Outcome: Met     Problem: Pain Acute  Goal: Acceptable Pain Control and Functional Ability  Outcome: Met     Problem: Fatigue  Goal: Improved Activity Tolerance  Outcome: Met     Problem: Fall Injury Risk  Goal: Absence of Fall and Fall-Related Injury  Outcome: Met     Problem: Activity and Energy Impairment (Anxiety Signs/Symptoms)  Goal: Optimized Energy Level (Anxiety Signs/Symptoms)  Outcome: Met     Problem: Cognitive Impairment (Anxiety Signs/Symptoms)  Goal: Optimized Cognitive Function (Anxiety Signs/Symptoms)  Outcome: Met     Problem: Mood Impairment (Anxiety Signs/Symptoms)  Goal: Improved Mood Symptoms (Anxiety Signs/Symptoms)  Outcome: Met     Problem: Sleep Impairment (Anxiety Signs/Symptoms)  Goal: Improved Sleep (Anxiety Signs/Symptoms)  Outcome: Met     Problem: Social, Occupational or Functional Impairment (Anxiety Signs/Symptoms)  Goal: Enhanced Social, Occupational or Functional Skills (Anxiety Signs/Symptoms)  Outcome: Met     Problem: Somatic Disturbance (Anxiety Signs/Symptoms)  Goal: Improved Somatic Symptoms (Anxiety Signs/Symptoms)  Outcome: Met

## 2023-07-13 NOTE — PROGRESS NOTES
[facility] - Telemetry ([facility])  Neurosurgery  Progress Note    Subjective:     Interval History:   The patient was seen this morning sitting up in the bed.  No acute events overnight.  Yesterday Abx were switched to IV Cefepime BID.   [facility] through [facility].  He is doing well, no new complaints.  While being admitted he has worked with PT/OT. Yesterday he amb 800' with no AD.    History of Present Illness: See H&P.    Post-Op Info:  Procedure(s) (LRB):  EXPLORATION, WOUND (Bilateral)  WASHOUT (N/A)  REMOVAL, HARDWARE, SPINE (N/A)  INSERTION (N/A)  REPLACEMENT (N/A)   8 Days Post-Op      Medications:  Continuous Infusions:   sodium chloride 0.9% 5 mL/hr at 07/12/23 1539     Scheduled Meds:   atorvastatin  80 mg Oral QHS    budesonide  0.5 mg Nebulization Q12H    carvediloL  12.5 mg Oral BID    ceFEPime (MAXIPIME) IVPB  1 g Intravenous Q12H    DAPTOmycin (CUBICIN) IV (PEDS and ADULTS)  700 mg Intravenous Q24H    docusate sodium  100 mg Oral Daily    enoxparin  40 mg Subcutaneous Q24H (prophylaxis, 1700)    gabapentin  600 mg Oral TID    methocarbamoL  500 mg Oral QID    pantoprazole  40 mg Oral BID    polyethylene glycol  17 g Oral Daily    sertraline  100 mg Oral Daily     PRN Meds:acetaminophen, albuterol-ipratropium, ALPRAZolam, aluminum-magnesium hydroxide-simethicone, calcium carbonate, dextrose 10%, dextrose 10%, glucagon (human recombinant), glucose, glucose, melatonin, naloxone, ondansetron, oxyCODONE-acetaminophen, prochlorperazine, sodium chloride 0.9%, sodium chloride 0.9%, sodium chloride 0.9%     Review of Systems  Objective:     Weight: 114.9 kg (253 lb 4.9 oz)  Body mass index is 38.52 kg/m².  Vital Signs (Most Recent):  Temp: 97.7 °F (36.5 °C) (07/13/23 0717)  Pulse: 87 (07/13/23 0752)  Resp: 18 (07/13/23 0752)  BP: 135/80 (07/13/23 0717)  SpO2: 98 % (07/13/23 0752) Vital Signs (24h Range):  Temp:  [97.7 °F (36.5 °C)-98.8 °F (37.1 °C)] 97.7 °F (36.5 °C)  Pulse:  []  87  Resp:  [16-20] 18  SpO2:  [97 %-100 %] 98 %  BP: (107-135)/(60-84) 135/80              Oxygen Concentration (%):  [96] 96        Neurosurgery Physical Exam  Vitals and labs reviewed  MAEW  Incision/Dressing CDI  PICC in place      Significant Labs:  No results for input(s): GLU, NA, K, CL, CO2, BUN, CREATININE, CALCIUM, MG in the last 48 hours.  No results for input(s): WBC, HGB, HCT, PLT in the last 48 hours.  No results for input(s): LABPT, INR, APTT in the last 48 hours.  Microbiology Results (last 7 days)       Procedure Component Value Units Date/Time    Culture, Anaerobe [[id number]] Collected: 07/05/23 0851    Order Status: Completed Specimen: Abscess from Back Updated: 07/12/23 0717     Anaerobic Culture No anaerobes isolated    Narrative:      Superficial Culture of Lumbar Spine    Culture, Anaerobe [[id number]] Collected: 07/05/23 0855    Order Status: Completed Specimen: Abscess from Back Updated: 07/12/23 0717     Anaerobic Culture No anaerobes isolated    Narrative:      Deep Culture of Lumbar Spine    Culture, Anaerobe [[id number]] Collected: 07/05/23 0959    Order Status: Completed Specimen: Abscess from Back Updated: 07/10/23 0706     Anaerobic Culture No anaerobes isolated    Narrative:      Deep #2 culture lumbar spine    Blood culture [[id number]] Collected: 07/04/23 1111    Order Status: Completed Specimen: Blood Updated: 07/09/23 2212     Blood Culture, Routine No growth after 5 days.    Blood culture [[id number]] Collected: 07/04/23 1112    Order Status: Completed Specimen: Blood Updated: 07/09/23 2212     Blood Culture, Routine No growth after 5 days.    Aerobic culture [[id number]] Collected: 07/05/23 0851    Order Status: Completed Specimen: Abscess from Back Updated: 07/08/23 0944     Aerobic Bacterial Culture No growth    Narrative:      Superficial Culture of Lumbar Spine    Aerobic culture [[id number]] Collected: 07/05/23 0959    Order Status: Completed Specimen: Abscess from Back Updated:  07/08/23 0944     Aerobic Bacterial Culture No growth    Narrative:      Deep #2 culture lumbar spine    Aerobic culture [[id number]] Collected: 07/05/23 0855    Order Status: Completed Specimen: Abscess from Back Updated: 07/08/23 0944     Aerobic Bacterial Culture No growth    Narrative:      Deep Culture of Lumbar Spine    Fungus culture [[id number]] Collected: 07/05/23 0959    Order Status: Completed Specimen: Abscess from Back Updated: 07/07/23 0714     Fungus (Mycology) Culture Culture in progress    Narrative:      Deep #2 culture lumbar spine    Fungus culture [[id number]] Collected: 07/05/23 0851    Order Status: Completed Specimen: Abscess from Back Updated: 07/07/23 0714     Fungus (Mycology) Culture Culture in progress    Narrative:      Superficial Culture of Lumbar Spine    Fungus culture [[id number]] Collected: 07/05/23 0855    Order Status: Completed Specimen: Abscess from Back Updated: 07/07/23 0714     Fungus (Mycology) Culture Culture in progress    Narrative:      Deep Culture of Lumbar Spine    AFB Culture & Smear [[id number]] Collected: 07/05/23 0959    Order Status: Completed Specimen: Abscess from Back Updated: 07/06/23 2127     AFB Culture & Smear Culture in progress     AFB CULTURE STAIN No acid fast bacilli seen.    Narrative:      Deep #2 culture lumbar spine    AFB Culture & Smear [[id number]] Collected: 07/05/23 0851    Order Status: Completed Specimen: Abscess from Back Updated: 07/06/23 2127     AFB Culture & Smear Culture in progress     AFB CULTURE STAIN No acid fast bacilli seen.    Narrative:      Superficial Culture of Lumbar Spine    AFB Culture & Smear [[id number]] Collected: 07/05/23 0855    Order Status: Completed Specimen: Abscess from Back Updated: 07/06/23 2127     AFB Culture & Smear Culture in progress     AFB CULTURE STAIN No acid fast bacilli seen.    Narrative:      Deep Culture of Lumbar Spine          All pertinent labs from the last 24 hours have been  reviewed.  Significant Diagnostics:  I have reviewed all pertinent imaging results/findings within the past 24 hours.    Assessment/Plan:     Active Diagnoses:    Diagnosis Date Noted POA    PRINCIPAL PROBLEM:  Post-operative wound abscess [T81.49XA] 07/04/2023 Yes     Chronic    ABLA (acute blood loss anemia) [D62] 07/08/2023 No    Moderate persistent asthma without complication [J45.40] 03/07/2023 Yes    GERD (gastroesophageal reflux disease) [K21.9] 02/23/2021 Yes    CKD (chronic kidney disease) stage 3, GFR 30-59 ml/min [N18.30] 06/30/2020 Yes    S/P AVR (aortic valve replacement) biopresthetic miller 25 mm in 2014  [Z95.2] 06/30/2020 Not Applicable    CHEO on CPAP [G47.33, Z99.89] 09/14/2018 Not Applicable    Class 2 severe obesity due to excess calories with serious comorbidity and body mass index (BMI) of 38.0 to 38.9 in adult [E66.01, Z68.38] 10/18/2016 Not Applicable    CAD (coronary artery disease) [I25.10]  Yes     Chronic    Cardiomyopathy [I42.9]  Yes    Hypertension [I10]  Yes     Chronic      Problems Resolved During this Admission:     POD #8    Patient is doing well and will be discharged to home today.  Restart Plavix in 2 days.  He will have Home IV Abx with [facility].  Follow up next week in clinic for wound check, July 21st for suture removal.  Please reach out with any changes.    Yazmin Farfan PA-C  Neurosurgery  [facility] [facility] - Telemetry ([facility])

## 2023-07-13 NOTE — ASSESSMENT & PLAN NOTE
All cultures are neg till date -  - will plan to treat for 6 weeks but might need to stop early       IV daptomycin at 700mg daily   IV Zosyn 4.5gram every 8 hours  Will treat for 6 weeks   EOC-08/18/23  Weekly cpk, esr,cmp,cbc  Will see in iD clinic     07/11- will switch to Cefepime for bid dosing   Cultures -no anaerobes  Continue Daptomycin   EOC 08 /18/23  Follow ESR,CRP in AM    07/12- [facility]

## 2023-07-13 NOTE — TELEPHONE ENCOUNTER
Per DENISSE Arce reschedule this appointment to next Friday, 07/21/23 @ 9 AM. [facility] Patient confirmed appointment date and time.

## 2023-07-13 NOTE — PLAN OF CARE
Pt A&O X 4, free of falls for the shift, PICC in place flushed with out difficulty. Pain management  PRN.

## 2023-07-14 ENCOUNTER — PATIENT OUTREACH (OUTPATIENT)
Dept: ADMINISTRATIVE | Facility: CLINIC | Age: 68
End: 2023-07-14
Payer: OTHER GOVERNMENT

## 2023-07-14 NOTE — PROGRESS NOTES
C3 nurse spoke with Abdullahi Urias for a TCC post hospital discharge follow up call. The patient does not have a scheduled HOSFU appointment with Reji Valentin MD within 5-7 days post hospital discharge date 07/13/2023. C3 nurse was unable to schedule HOSFU appointment in [facility].    Message sent to PCP staff requesting they contact patient and schedule follow up appointment.     NP home referral placed.

## 2023-07-15 DIAGNOSIS — R05.3 CHRONIC COUGH: ICD-10-CM

## 2023-07-17 RX ORDER — ALBUTEROL SULFATE 90 UG/1
AEROSOL, METERED RESPIRATORY (INHALATION)
Qty: 8.5 G | Refills: 3 | Status: SHIPPED | OUTPATIENT
Start: 2023-07-17

## 2023-07-21 ENCOUNTER — OFFICE VISIT (OUTPATIENT)
Dept: NEUROSURGERY | Facility: CLINIC | Age: 68
End: 2023-07-21
Payer: MEDICARE

## 2023-07-21 VITALS
HEART RATE: 97 BPM | BODY MASS INDEX: 37.83 KG/M2 | SYSTOLIC BLOOD PRESSURE: 116 MMHG | DIASTOLIC BLOOD PRESSURE: 71 MMHG | WEIGHT: 248.81 LBS

## 2023-07-21 DIAGNOSIS — Z48.02 ENCOUNTER FOR REMOVAL OF SUTURES: ICD-10-CM

## 2023-07-21 DIAGNOSIS — Z98.890 STATUS POST LUMBAR SURGERY: ICD-10-CM

## 2023-07-21 DIAGNOSIS — Z48.89 ENCOUNTER FOR POSTOPERATIVE WOUND CHECK: Primary | ICD-10-CM

## 2023-07-21 PROCEDURE — 99024 POSTOP FOLLOW-UP VISIT: CPT | Mod: HCNC,S$GLB,, | Performed by: PHYSICIAN ASSISTANT

## 2023-07-21 PROCEDURE — 3074F SYST BP LT 130 MM HG: CPT | Mod: HCNC,CPTII,S$GLB, | Performed by: PHYSICIAN ASSISTANT

## 2023-07-21 PROCEDURE — 3008F PR BODY MASS INDEX (BMI) DOCUMENTED: ICD-10-PCS | Mod: HCNC,CPTII,S$GLB, | Performed by: PHYSICIAN ASSISTANT

## 2023-07-21 PROCEDURE — 3078F DIAST BP <80 MM HG: CPT | Mod: HCNC,CPTII,S$GLB, | Performed by: PHYSICIAN ASSISTANT

## 2023-07-21 PROCEDURE — 99999 PR PBB SHADOW E&M-EST. PATIENT-LVL III: CPT | Mod: PBBFAC,HCNC,, | Performed by: PHYSICIAN ASSISTANT

## 2023-07-21 PROCEDURE — 1101F PR PT FALLS ASSESS DOC 0-1 FALLS W/OUT INJ PAST YR: ICD-10-PCS | Mod: HCNC,CPTII,S$GLB, | Performed by: PHYSICIAN ASSISTANT

## 2023-07-21 PROCEDURE — 3074F PR MOST RECENT SYSTOLIC BLOOD PRESSURE < 130 MM HG: ICD-10-PCS | Mod: HCNC,CPTII,S$GLB, | Performed by: PHYSICIAN ASSISTANT

## 2023-07-21 PROCEDURE — 1159F PR MEDICATION LIST DOCUMENTED IN MEDICAL RECORD: ICD-10-PCS | Mod: HCNC,CPTII,S$GLB, | Performed by: PHYSICIAN ASSISTANT

## 2023-07-21 PROCEDURE — 1125F AMNT PAIN NOTED PAIN PRSNT: CPT | Mod: HCNC,CPTII,S$GLB, | Performed by: PHYSICIAN ASSISTANT

## 2023-07-21 PROCEDURE — 3288F PR FALLS RISK ASSESSMENT DOCUMENTED: ICD-10-PCS | Mod: HCNC,CPTII,S$GLB, | Performed by: PHYSICIAN ASSISTANT

## 2023-07-21 PROCEDURE — 99024 PR POST-OP FOLLOW-UP VISIT: ICD-10-PCS | Mod: HCNC,S$GLB,, | Performed by: PHYSICIAN ASSISTANT

## 2023-07-21 PROCEDURE — 1101F PT FALLS ASSESS-DOCD LE1/YR: CPT | Mod: HCNC,CPTII,S$GLB, | Performed by: PHYSICIAN ASSISTANT

## 2023-07-21 PROCEDURE — 3078F PR MOST RECENT DIASTOLIC BLOOD PRESSURE < 80 MM HG: ICD-10-PCS | Mod: HCNC,CPTII,S$GLB, | Performed by: PHYSICIAN ASSISTANT

## 2023-07-21 PROCEDURE — 99999 PR PBB SHADOW E&M-EST. PATIENT-LVL III: ICD-10-PCS | Mod: PBBFAC,HCNC,, | Performed by: PHYSICIAN ASSISTANT

## 2023-07-21 PROCEDURE — 1159F MED LIST DOCD IN RCRD: CPT | Mod: HCNC,CPTII,S$GLB, | Performed by: PHYSICIAN ASSISTANT

## 2023-07-21 PROCEDURE — 3008F BODY MASS INDEX DOCD: CPT | Mod: HCNC,CPTII,S$GLB, | Performed by: PHYSICIAN ASSISTANT

## 2023-07-21 PROCEDURE — 1125F PR PAIN SEVERITY QUANTIFIED, PAIN PRESENT: ICD-10-PCS | Mod: HCNC,CPTII,S$GLB, | Performed by: PHYSICIAN ASSISTANT

## 2023-07-21 PROCEDURE — 3288F FALL RISK ASSESSMENT DOCD: CPT | Mod: HCNC,CPTII,S$GLB, | Performed by: PHYSICIAN ASSISTANT

## 2023-07-21 RX ORDER — HYDROXYZINE HYDROCHLORIDE 25 MG/1
25 TABLET, FILM COATED ORAL 3 TIMES DAILY PRN
Qty: 30 TABLET | Refills: 0 | Status: SHIPPED | OUTPATIENT
Start: 2023-07-21 | End: 2023-08-02 | Stop reason: SDUPTHER

## 2023-07-21 NOTE — PROGRESS NOTES
Subjective:      Patient ID: Abdullahi Urias is a 67 y.o. male.    Chief Complaint: Follow-up (Patient is here today for wound check on the lower back. Pain is a 6/10. Patient c/o itching, aching, numbness and burning sensation in the lower back area. Still using antibiotic BID )    HPI  The patient is here today for postop assessment.  Patient is accompanied by his wife today.  He is doing well. No increased pain or new complaints.  Denies HA, chest pain, worsening shortness of breath.  Every now and then he has leg pain but significantly improved since last time.  Denies fevers, wound drainage.  PICC in place.  NGTD on micro studies. Fungus cx still in process.   - On cefepime BID.    Date of Procedure: 7/5/2023    Procedure: Procedure(s) (LRB):  EXPLORATION, WOUND (Bilateral)  WASHOUT (N/A)  REMOVAL, HARDWARE, SPINE (N/A)  INSERTION (N/A)  REPLACEMENT (N/A)     Operative Findings (including complications, if any): postoperative fluid collection sent for culture and analysis.  Removal and replacement of set screws right sided L4 L5 S1      Objective:            General    Constitutional: He is oriented to person, place, and time. He appears well-developed and well-nourished.   Cardiovascular:  Normal rate and regular rhythm.            Pulmonary/Chest: Effort normal.   Abdominal: Soft.   Neurological: He is alert and oriented to person, place, and time.   Psychiatric: He has a normal mood and affect. His behavior is normal.                     Neurosurgery exam:  Vitals reviewed  MAEW  Incision CDI  Sutures removed  There is mild swelling to incision area  No erythema or drainage          Assessment:       Encounter Diagnoses   Name Primary?    Encounter for postoperative wound check Yes    Encounter for removal of sutures     Status post lumbar surgery           Plan:       Abdullahi was seen today for follow-up.    Diagnoses and all orders for this visit:    Encounter for postoperative wound check    Encounter for  removal of sutures    Status post lumbar surgery    Other orders  -     hydrOXYzine HCL (ATARAX) 25 MG tablet; Take 1 tablet (25 mg total) by mouth 3 (three) times daily as needed for Itching.        Sutures were removed day. Pt advised to avoid submerging incision in water (no baths, use of hot tub or pool at this time).  He is able to shower and get incision wet.   Recommended pt purchase an Abdominal binder to help with postop swelling.  Pt will return for a 2 week follow up.  Please reach out with any changes.  Rx requested for Atarax today.          Yazmin Farfan PA-C

## 2023-07-27 ENCOUNTER — TELEPHONE (OUTPATIENT)
Dept: NEUROSURGERY | Facility: CLINIC | Age: 68
End: 2023-07-27
Payer: MEDICARE

## 2023-07-27 ENCOUNTER — OUTPATIENT CASE MANAGEMENT (OUTPATIENT)
Dept: ADMINISTRATIVE | Facility: OTHER | Age: 68
End: 2023-07-27
Payer: MEDICARE

## 2023-07-27 NOTE — TELEPHONE ENCOUNTER
Spoke with patient, who has been informed that message will be sent to the provider and check pharmacy for prescription. Pt verbalized understanding.

## 2023-07-27 NOTE — TELEPHONE ENCOUNTER
----- Message from Leeroy Boyce sent at 7/26/2023  2:10 PM CDT -----  Name of Who is Calling:ZAK REAGAN [[id number]]        What is the request in detail: Pt called in regards to getting a prescription called in for pain medication. Please advise       Can the clinic reply by [facility]:NO        What Number to Call Back [facility] not [facility]Telephone Information:  Mobile          [phone]

## 2023-07-27 NOTE — PROGRESS NOTES
Outpatient Care Management  Plan of Care Follow Up Visit    Patient: Abdullahi Urias  MRN: [id number]  Date of Service: 07/27/2023  Completed by: Yung Ramos RN  Referral Date: 05/10/2023    Reason for Visit   Patient presents with    OPCM RN Follow Up Call       Brief Summary: Phone contact made with Mr. Urias and we discussed his pain care plan. He stated that he is out of pain medication. I did not see a prescription on his list, but I did contact Yazmin VALENCIA with neurosurgery to address. I also informed her that Mr. Urias was requesting an assistive device to assist with putting his socks on. I also sent him a link for the device on [employer] if he would like to purchase. Plan to follow up in two weeks

## 2023-07-31 ENCOUNTER — TELEPHONE (OUTPATIENT)
Dept: NEUROSURGERY | Facility: CLINIC | Age: 68
End: 2023-07-31
Payer: MEDICARE

## 2023-07-31 NOTE — TELEPHONE ENCOUNTER
----- Message from Yung Ramos RN sent at 7/31/2023 12:44 PM CDT -----  Regarding: pain  Good afternoon,      Mr. Urias states that he has called the office several times for a refill on his Oxycodone 10mg that he takes as needed every 8 hours. I also sent a message to the staff last week about this. He states his pain is a 9/10 today. Could someone update him on his plan of care, either that the medication can be refilled, or that it can't and give him suggestions on what to do. Thank you for your time and assistance!    Kind regards,    Yung Ramos RN [facility]

## 2023-07-31 NOTE — TELEPHONE ENCOUNTER
----- Message from Brit Tolentino sent at 7/28/2023  2:58 PM CDT -----  Contact: Abdullahi  Type:  RX Refill Request    Who Called: Abdullahi  Refill or New Rx:refill  RX Name and Strength: oxycodone 10 mg  How is the patient currently taking it? (ex. 1XDay): as prescribed  Is this a 30 day or 90 day RX: 90  Preferred Pharmacy with phone number:  [id number] - [place] - [address] AT [facility]  [address] [address]  Phone: [phone] Fax: [phone]  Local or Mail Order: Local  Ordering Provider: Dr. Valencia  Would the patient rather a call back or a response via [facility]? call  Best Call Back Number:[phone]   Additional Information: Patient reports having one pill left and request an immediate refill.

## 2023-08-01 RX ORDER — OXYCODONE AND ACETAMINOPHEN 10; 325 MG/1; MG/1
1 TABLET ORAL EVERY 8 HOURS PRN
Qty: 30 TABLET | Refills: 0 | Status: SHIPPED | OUTPATIENT
Start: 2023-08-01 | End: 2023-08-04 | Stop reason: SDUPTHER

## 2023-08-02 RX ORDER — OXYCODONE AND ACETAMINOPHEN 10; 325 MG/1; MG/1
1 TABLET ORAL EVERY 8 HOURS PRN
Qty: 30 TABLET | Refills: 0 | Status: CANCELLED | OUTPATIENT
Start: 2023-08-02

## 2023-08-02 NOTE — TELEPHONE ENCOUNTER
Patient notified that Oxycodone has been sent to his pharm. Hydroxyzine is not due to be filled. Patient verbalized understanding

## 2023-08-04 ENCOUNTER — TELEPHONE (OUTPATIENT)
Dept: NEUROSURGERY | Facility: CLINIC | Age: 68
End: 2023-08-04
Payer: MEDICARE

## 2023-08-04 ENCOUNTER — OFFICE VISIT (OUTPATIENT)
Dept: NEUROSURGERY | Facility: CLINIC | Age: 68
End: 2023-08-04
Payer: MEDICARE

## 2023-08-04 VITALS
WEIGHT: 247.13 LBS | HEART RATE: 83 BPM | RESPIRATION RATE: 16 BRPM | DIASTOLIC BLOOD PRESSURE: 81 MMHG | BODY MASS INDEX: 37.46 KG/M2 | HEIGHT: 68 IN | SYSTOLIC BLOOD PRESSURE: 122 MMHG

## 2023-08-04 DIAGNOSIS — Z48.89 ENCOUNTER FOR POSTOPERATIVE WOUND CHECK: ICD-10-CM

## 2023-08-04 DIAGNOSIS — Z98.890 STATUS POST LUMBAR SURGERY: Primary | ICD-10-CM

## 2023-08-04 PROCEDURE — 3074F PR MOST RECENT SYSTOLIC BLOOD PRESSURE < 130 MM HG: ICD-10-PCS | Mod: HCNC,CPTII,S$GLB, | Performed by: PHYSICIAN ASSISTANT

## 2023-08-04 PROCEDURE — 3288F FALL RISK ASSESSMENT DOCD: CPT | Mod: HCNC,CPTII,S$GLB, | Performed by: PHYSICIAN ASSISTANT

## 2023-08-04 PROCEDURE — 99024 POSTOP FOLLOW-UP VISIT: CPT | Mod: HCNC,S$GLB,, | Performed by: PHYSICIAN ASSISTANT

## 2023-08-04 PROCEDURE — 1101F PR PT FALLS ASSESS DOC 0-1 FALLS W/OUT INJ PAST YR: ICD-10-PCS | Mod: HCNC,CPTII,S$GLB, | Performed by: PHYSICIAN ASSISTANT

## 2023-08-04 PROCEDURE — 1125F PR PAIN SEVERITY QUANTIFIED, PAIN PRESENT: ICD-10-PCS | Mod: HCNC,CPTII,S$GLB, | Performed by: PHYSICIAN ASSISTANT

## 2023-08-04 PROCEDURE — 1101F PT FALLS ASSESS-DOCD LE1/YR: CPT | Mod: HCNC,CPTII,S$GLB, | Performed by: PHYSICIAN ASSISTANT

## 2023-08-04 PROCEDURE — 3079F PR MOST RECENT DIASTOLIC BLOOD PRESSURE 80-89 MM HG: ICD-10-PCS | Mod: HCNC,CPTII,S$GLB, | Performed by: PHYSICIAN ASSISTANT

## 2023-08-04 PROCEDURE — 3074F SYST BP LT 130 MM HG: CPT | Mod: HCNC,CPTII,S$GLB, | Performed by: PHYSICIAN ASSISTANT

## 2023-08-04 PROCEDURE — 99999 PR PBB SHADOW E&M-EST. PATIENT-LVL III: CPT | Mod: PBBFAC,HCNC,, | Performed by: PHYSICIAN ASSISTANT

## 2023-08-04 PROCEDURE — 3008F BODY MASS INDEX DOCD: CPT | Mod: HCNC,CPTII,S$GLB, | Performed by: PHYSICIAN ASSISTANT

## 2023-08-04 PROCEDURE — 3008F PR BODY MASS INDEX (BMI) DOCUMENTED: ICD-10-PCS | Mod: HCNC,CPTII,S$GLB, | Performed by: PHYSICIAN ASSISTANT

## 2023-08-04 PROCEDURE — 3288F PR FALLS RISK ASSESSMENT DOCUMENTED: ICD-10-PCS | Mod: HCNC,CPTII,S$GLB, | Performed by: PHYSICIAN ASSISTANT

## 2023-08-04 PROCEDURE — 1159F MED LIST DOCD IN RCRD: CPT | Mod: HCNC,CPTII,S$GLB, | Performed by: PHYSICIAN ASSISTANT

## 2023-08-04 PROCEDURE — 3079F DIAST BP 80-89 MM HG: CPT | Mod: HCNC,CPTII,S$GLB, | Performed by: PHYSICIAN ASSISTANT

## 2023-08-04 PROCEDURE — 99024 PR POST-OP FOLLOW-UP VISIT: ICD-10-PCS | Mod: HCNC,S$GLB,, | Performed by: PHYSICIAN ASSISTANT

## 2023-08-04 PROCEDURE — 1159F PR MEDICATION LIST DOCUMENTED IN MEDICAL RECORD: ICD-10-PCS | Mod: HCNC,CPTII,S$GLB, | Performed by: PHYSICIAN ASSISTANT

## 2023-08-04 PROCEDURE — 99999 PR PBB SHADOW E&M-EST. PATIENT-LVL III: ICD-10-PCS | Mod: PBBFAC,HCNC,, | Performed by: PHYSICIAN ASSISTANT

## 2023-08-04 PROCEDURE — 1125F AMNT PAIN NOTED PAIN PRSNT: CPT | Mod: HCNC,CPTII,S$GLB, | Performed by: PHYSICIAN ASSISTANT

## 2023-08-04 RX ORDER — OXYCODONE AND ACETAMINOPHEN 10; 325 MG/1; MG/1
1 TABLET ORAL EVERY 8 HOURS PRN
Qty: 30 TABLET | Refills: 0 | Status: SHIPPED | OUTPATIENT
Start: 2023-08-04 | End: 2023-08-16 | Stop reason: SDUPTHER

## 2023-08-04 NOTE — TELEPHONE ENCOUNTER
----- Message from Calista Littlejohn sent at 8/3/2023  9:08 AM CDT -----  Contact: Abdullahi  .Type:  RX Refill Request    Who Called: Abdullahi   Refill or New Rx:New   RX Name and Strength:oxyCODONE-acetaminophen (PERCOCET)  mg per tablet  How is the patient currently taking it? (ex. 1XDay):as prescribed   Is this a 30 day or 90 day RX:30  Preferred Pharmacy with phone number:.  [facility]  [address] [place] [id number]  [phone]  Local or Mail Order:local   Ordering Provider:Yazmin FLORENTINO)   Would the patient rather a call back or a response via [facility]? call  Best Call Back Number:.[phone]   Additional Information: patient stated [facility] is out of medication   Thanks  LR

## 2023-08-04 NOTE — PROGRESS NOTES
Subjective:      Patient ID: Abdullahi Urias is a 67 y.o. male.    Chief Complaint: Back Pain (Pt rates pain 7/10. Pt describes pain as a aching and burning sensation. Pain has been going on for a while now. Pain is reduced with pain. )    HPI  The patient is here today for postop assessment.  Patient still c/o pain and burning in R buttock and hip, however reports it is better.  Denies fevers, positional HA, chest pain, worsening shortness of breath, bladder/bowel changes .  No recent falls.  He has no issues with LLE.  Currently has HH and completes labs weekly. Has not followed up with ID yet.   Dressing is changed weekly. Denies drainage(wound).  Has PT/OT at home twice weekly.   Amb with cane.   PICC to LUE.    Date of Procedure: 7/5/2023    Procedure: Procedure(s) (LRB):  EXPLORATION, WOUND (Bilateral)  WASHOUT (N/A)  REMOVAL, HARDWARE, SPINE (N/A)  INSERTION (N/A)  REPLACEMENT (N/A)     Operative Findings (including complications, if any): postoperative fluid collection sent for culture and analysis.  Removal and replacement of set screws right sided L4 L5 S1         Objective:            Ortho/SPM Exam  General     Constitutional: He is oriented to person, place, and time. He appears well-developed and well-nourished.   Cardiovascular:  Normal rate and regular rhythm.            Pulmonary/Chest: Effort normal.   Abdominal: Soft.   Neurological: He is alert and oriented to person, place, and time.   Psychiatric: He has a normal mood and affect. His behavior is normal.                              Neurosurgery exam:  Vitals reviewed  MAEW  Incision CDI  There is mild swelling to incision area  No erythema or drainage  Abdominal binder issued to help with swelling            Lab/Micro-  NGTD  On Cefepime BID      Assessment:       Encounter Diagnoses   Name Primary?    Status post lumbar surgery Yes    Encounter for postoperative wound check           Plan:       Abdullahi was seen today for back pain.    Diagnoses  and all orders for this visit:    Status post lumbar surgery  -     Ambulatory referral/consult to Infectious Disease; Future    Encounter for postoperative wound check  -     Ambulatory referral/consult to Infectious Disease; Future    Other orders  -     oxyCODONE-acetaminophen (PERCOCET)  mg per tablet; Take 1 tablet by mouth every 8 (eight) hours as needed for Pain.        Patient was also evaluated by Dr. Valencia today.  Abdominal binder issued today.  He will follow up in 4 weeks with Dr. Valencia.  Referral placed with Dr. Keith (he is a hospital follow up).  Rx given for percocet.  Please reach out with any changes.        Yazmin Farfan PA-C

## 2023-08-09 LAB
FUNGUS SPEC CULT: NORMAL

## 2023-08-09 RX ORDER — HYDROXYZINE HYDROCHLORIDE 25 MG/1
25 TABLET, FILM COATED ORAL 3 TIMES DAILY PRN
Qty: 30 TABLET | Refills: 0 | Status: SHIPPED | OUTPATIENT
Start: 2023-08-09

## 2023-08-11 ENCOUNTER — PATIENT MESSAGE (OUTPATIENT)
Dept: INFECTIOUS DISEASES | Facility: CLINIC | Age: 68
End: 2023-08-11

## 2023-08-11 ENCOUNTER — OFFICE VISIT (OUTPATIENT)
Dept: INFECTIOUS DISEASES | Facility: CLINIC | Age: 68
End: 2023-08-11
Payer: MEDICARE

## 2023-08-11 VITALS
DIASTOLIC BLOOD PRESSURE: 82 MMHG | HEIGHT: 68 IN | SYSTOLIC BLOOD PRESSURE: 122 MMHG | BODY MASS INDEX: 37.82 KG/M2 | HEART RATE: 84 BPM | WEIGHT: 249.56 LBS

## 2023-08-11 DIAGNOSIS — Z98.890 STATUS POST LUMBAR SURGERY: ICD-10-CM

## 2023-08-11 DIAGNOSIS — T81.49XA POST-OPERATIVE WOUND ABSCESS: Chronic | ICD-10-CM

## 2023-08-11 DIAGNOSIS — I10 PRIMARY HYPERTENSION: Chronic | ICD-10-CM

## 2023-08-11 DIAGNOSIS — Z95.2 S/P AVR (AORTIC VALVE REPLACEMENT): ICD-10-CM

## 2023-08-11 DIAGNOSIS — Z48.89 ENCOUNTER FOR POSTOPERATIVE WOUND CHECK: ICD-10-CM

## 2023-08-11 PROCEDURE — 3079F DIAST BP 80-89 MM HG: CPT | Mod: HCNC,CPTII,S$GLB, | Performed by: STUDENT IN AN ORGANIZED HEALTH CARE EDUCATION/TRAINING PROGRAM

## 2023-08-11 PROCEDURE — 3008F PR BODY MASS INDEX (BMI) DOCUMENTED: ICD-10-PCS | Mod: HCNC,CPTII,S$GLB, | Performed by: STUDENT IN AN ORGANIZED HEALTH CARE EDUCATION/TRAINING PROGRAM

## 2023-08-11 PROCEDURE — 1159F PR MEDICATION LIST DOCUMENTED IN MEDICAL RECORD: ICD-10-PCS | Mod: HCNC,CPTII,S$GLB, | Performed by: STUDENT IN AN ORGANIZED HEALTH CARE EDUCATION/TRAINING PROGRAM

## 2023-08-11 PROCEDURE — 99999 PR PBB SHADOW E&M-EST. PATIENT-LVL V: CPT | Mod: PBBFAC,HCNC,, | Performed by: STUDENT IN AN ORGANIZED HEALTH CARE EDUCATION/TRAINING PROGRAM

## 2023-08-11 PROCEDURE — 1125F AMNT PAIN NOTED PAIN PRSNT: CPT | Mod: HCNC,CPTII,S$GLB, | Performed by: STUDENT IN AN ORGANIZED HEALTH CARE EDUCATION/TRAINING PROGRAM

## 2023-08-11 PROCEDURE — 99999 PR PBB SHADOW E&M-EST. PATIENT-LVL V: ICD-10-PCS | Mod: PBBFAC,HCNC,, | Performed by: STUDENT IN AN ORGANIZED HEALTH CARE EDUCATION/TRAINING PROGRAM

## 2023-08-11 PROCEDURE — 3288F FALL RISK ASSESSMENT DOCD: CPT | Mod: HCNC,CPTII,S$GLB, | Performed by: STUDENT IN AN ORGANIZED HEALTH CARE EDUCATION/TRAINING PROGRAM

## 2023-08-11 PROCEDURE — 3074F PR MOST RECENT SYSTOLIC BLOOD PRESSURE < 130 MM HG: ICD-10-PCS | Mod: HCNC,CPTII,S$GLB, | Performed by: STUDENT IN AN ORGANIZED HEALTH CARE EDUCATION/TRAINING PROGRAM

## 2023-08-11 PROCEDURE — 1159F MED LIST DOCD IN RCRD: CPT | Mod: HCNC,CPTII,S$GLB, | Performed by: STUDENT IN AN ORGANIZED HEALTH CARE EDUCATION/TRAINING PROGRAM

## 2023-08-11 PROCEDURE — 3074F SYST BP LT 130 MM HG: CPT | Mod: HCNC,CPTII,S$GLB, | Performed by: STUDENT IN AN ORGANIZED HEALTH CARE EDUCATION/TRAINING PROGRAM

## 2023-08-11 PROCEDURE — 3008F BODY MASS INDEX DOCD: CPT | Mod: HCNC,CPTII,S$GLB, | Performed by: STUDENT IN AN ORGANIZED HEALTH CARE EDUCATION/TRAINING PROGRAM

## 2023-08-11 PROCEDURE — 1101F PR PT FALLS ASSESS DOC 0-1 FALLS W/OUT INJ PAST YR: ICD-10-PCS | Mod: HCNC,CPTII,S$GLB, | Performed by: STUDENT IN AN ORGANIZED HEALTH CARE EDUCATION/TRAINING PROGRAM

## 2023-08-11 PROCEDURE — 3288F PR FALLS RISK ASSESSMENT DOCUMENTED: ICD-10-PCS | Mod: HCNC,CPTII,S$GLB, | Performed by: STUDENT IN AN ORGANIZED HEALTH CARE EDUCATION/TRAINING PROGRAM

## 2023-08-11 PROCEDURE — 99215 PR OFFICE/OUTPT VISIT, EST, LEVL V, 40-54 MIN: ICD-10-PCS | Mod: HCNC,S$GLB,, | Performed by: STUDENT IN AN ORGANIZED HEALTH CARE EDUCATION/TRAINING PROGRAM

## 2023-08-11 PROCEDURE — 1125F PR PAIN SEVERITY QUANTIFIED, PAIN PRESENT: ICD-10-PCS | Mod: HCNC,CPTII,S$GLB, | Performed by: STUDENT IN AN ORGANIZED HEALTH CARE EDUCATION/TRAINING PROGRAM

## 2023-08-11 PROCEDURE — 1101F PT FALLS ASSESS-DOCD LE1/YR: CPT | Mod: HCNC,CPTII,S$GLB, | Performed by: STUDENT IN AN ORGANIZED HEALTH CARE EDUCATION/TRAINING PROGRAM

## 2023-08-11 PROCEDURE — 3079F PR MOST RECENT DIASTOLIC BLOOD PRESSURE 80-89 MM HG: ICD-10-PCS | Mod: HCNC,CPTII,S$GLB, | Performed by: STUDENT IN AN ORGANIZED HEALTH CARE EDUCATION/TRAINING PROGRAM

## 2023-08-11 PROCEDURE — 99215 OFFICE O/P EST HI 40 MIN: CPT | Mod: HCNC,S$GLB,, | Performed by: STUDENT IN AN ORGANIZED HEALTH CARE EDUCATION/TRAINING PROGRAM

## 2023-08-11 NOTE — ASSESSMENT & PLAN NOTE
--Following with [facility] for OPAT   --Continue empiric IV cefepime and daptomycin for at least 1 more week to complete 6 weeks total   --Anticipated stop date 8/18/23   --Weekly CBC, CMP, and CK   --Recommend surveillance MRI lumbar spine with/without contrast one week after stopping antibiotics to ensure resolution of abscess   --Follow up with Infectious Diseases in 1 week   --Continue regular follow up with NSGY

## 2023-08-11 NOTE — PROGRESS NOTES
Infectious Disease Clinic Note    Patient Name: Abdullahi Urias  YOB: 1955    PRESENTING HISTORY       History of Present Illness:  Mr. Abdullahi Urias is a 67 y.o. male w/ significant PMHx of aortic stenosis with bioprosthetic aortic valve replacement, diastolic CHF, CKD stage 3, HTN, and prostate cancer who presents for hospital follow up after being admitted last month and diagnosed with a lumbar abscess. He had recent lumbar spine surgery on 5/17/23 with NSGY during which they performed laminectomy and placement of screws. He then presented on 7/3 from NSGY office with complaints of RLE pain, weakness and oozing from surgical incision site. MRI showed a large subcutaneous fluid collection involving the postoperative bed measuring approximately 11.6 x 28.9 which may reflect seroma or abscess or meningocele Additional subcutaneous fluid collection in the region of the laminectomy is multiloculated measuring approximately 2.2 x 14 mm each. This could relate to abscess, seroma, or meningocele. He was seen by NSGY and on 7/5 underwent I and D with postoperative fluid collection sent for culture and analysis. Per op note, Because the patient was having severe right-sided pain the set screws were removed on the pedicle screws L4-L5 and S1. The amrit was then removed along with the pedicle screws. The screws were then replaced without incident. Gram stain from abscess showed no organisms and cultures have remained no growth.     Patient last seen by NSGY in clinic on 8/4. Given script for pain med and an abdominal binder. Scheduled to see them again in 4 weeks.     Today patient reports he has been doing PT 3x weekly. Still feels a knot in lower back. Wife and patient concerned about dark discoloration around wound site. No associated itching or pain. Also mentions a small blister below PICC where tape was placed. Feels swelling in lower back may be infection but feels more like soft tissue swelling.        Review of Systems:  Constitutional: no fever or chills  Eyes: no visual changes  ENT: no nasal congestion or sore throat  Respiratory: no cough or shortness of breath, baseline need for RT   Cardiovascular: no chest pain  Gastrointestinal: no nausea or vomiting, no abdominal pain, no constipation, no diarrhea  Genitourinary: no hematuria or dysuria  Musculoskeletal: feels knot in lower back, swelling, notable discoloration around surgical site, muscle aches due to PT   Skin: discoloration of lower back; small scab beneath PICC site   Neurological: no headaches, numbness, or paresthesias    The following portions of the patient's history were reviewed and updated as appropriate: allergies, current medications, past family history, past medical history, past social history, past surgical history, and problem list.    PAST HISTORY:     Immunization History   Administered Date(s) Administered    COVID-19, MRNA, LN-S, PF (Pfizer) (Purple Cap) 01/17/2021, 02/07/2021, 08/23/2021, 03/24/2022    COVID-19, mRNA, LNP-S, bivalent booster, PF (PFIZER OMICRON) 12/06/2022    Dtap, Unspecified Formulation 08/16/2012    Influenza 10/10/2012, 10/09/2013, 10/23/2014, 10/14/2015, 10/01/2017    Influenza (FLUAD) - Quadrivalent - Adjuvanted - PF *Preferred* (65+) 10/20/2021, 10/07/2022    Influenza - Quadrivalent 10/12/2016, 11/08/2017    Influenza - Quadrivalent - PF *Preferred* (6 months and older) 10/02/2020    Influenza - Trivalent (ADULT) 10/13/2018    Influenza Split 12/20/2010    Pneumococcal 08/01/2012    Pneumococcal Conjugate - 13 Valent 01/21/2016    Pneumococcal Polysaccharide - 23 Valent 07/14/2017, 10/19/2022    Td (ADULT) 10/19/2022    Tdap 10/09/2006, 08/16/2012    Zoster 02/05/2016       Past Medical History:   Diagnosis Date    Aortic stenosis     [facility]    Asthma     BPH (benign prostatic hyperplasia)     CAD (coronary artery disease)     Cardiomyopathy     CHF (congestive heart failure)     Chronic  hoarseness     vocal cord surg    Chronic pain     CKD (chronic kidney disease) stage 3, GFR 30-59 ml/min     CVA (cerebral infarction)     8/2012 olol; reviewed ed note    Ex-smoker     GERD (gastroesophageal reflux disease)     Hepatitis C     treatedharvoni says cured, RNA NEG 6/2020    Hypertension     Pancreatitis     Prostate cancer     Prostate cancer     Prostate cancer     PVD (peripheral vascular disease)     Renal insufficiency     Substance abuse     cocaine, etoh , tob in past       Past Surgical History:   Procedure Laterality Date    AORTIC VALVE REPLACEMENT  05/19/2014    Tissue valve replacement    BACK SURGERY      CARDIAC CATHETERIZATION      COLONOSCOPY  2011    COLONOSCOPY N/A 2/24/2021    Procedure: COLONOSCOPY;  Surgeon: Faith Carrillo MD;  Location: [facility] ENDO;  Service: Endoscopy;  Laterality: N/A;    EPIDURAL STEROID INJECTION INTO CERVICAL SPINE N/A 6/21/2022    Procedure: C7-T1 IL PIEDAD;  Surgeon: Nehemiah Carmen MD;  Location: [facility] PAIN MGT;  Service: Pain Management;  Laterality: N/A;    ESOPHAGOGASTRODUODENOSCOPY N/A 2/24/2021    Procedure: ESOPHAGOGASTRODUODENOSCOPY (EGD);  Surgeon: Faith Carrillo MD;  Location: [facility] ENDO;  Service: Endoscopy;  Laterality: N/A;    FOOT SURGERY      INSERTION N/A 7/5/2023    Procedure: INSERTION;  Surgeon: Brandon Valencia MD;  Location: [facility] OR;  Service: Neurosurgery;  Laterality: N/A;  Antibiotic Beads    LEFT HEART CATHETERIZATION Left 7/24/2020    Procedure: CATHETERIZATION, HEART, LEFT;  Surgeon: Pasha Martin MD;  Location: [facility] CATH LAB;  Service: Cardiology;  Laterality: Left;    PENILE PROSTHESIS IMPLANT      PENILE PROSTHESIS REVISION  04/30/2018    [facility]OSTATECTOMY  09/2019    [facility]    rad[facility] for prostate      REMOVAL OF HARDWARE FROM SPINE N/A 7/5/2023    Procedure: REMOVAL, HARDWARE, SPINE;  Surgeon: Brandon Valencia MD;  Location: [facility] OR;  Service: Neurosurgery;  Laterality: N/A;    REPLACEMENT N/A 7/5/2023     "Procedure: REPLACEMENT;  Surgeon: Brandon Valencia MD;  Location: [facility] OR;  Service: Neurosurgery;  Laterality: N/A;  of Sandoval and Screws. [employer]    TRANSFORAMINAL EPIDURAL INJECTION OF STEROID Right 10/20/2022    Procedure: Right  L4/5 + L5/S1 TF PIEDAD RN IV Sedation;  Surgeon: Nehemiah Carmen MD;  Location: [facility] ERT;  Service: Pain Management;  Laterality: Right;    TRANSFORAMINAL LUMBAR INTERBODY FUSION (TLIF) USING COMPUTER-ASSISTED NAVIGATION Bilateral 5/17/2023    Procedure: FUSION, SPINE, LUMBAR, TLIF, USING COMPUTER-ASSISTED NAVIGATION;  Surgeon: Brandon Valencia MD;  Location: [facility] OR;  Service: Neurosurgery;  Laterality: Bilateral;  2 level lumbar fusion at L4-5 and L5-S1    UPPER GASTROINTESTINAL ENDOSCOPY  2011    WASHOUT N/A 7/5/2023    Procedure: WASHOUT;  Surgeon: Brandon Valencia MD;  Location: [facility] OR;  Service: Neurosurgery;  Laterality: N/A;    WOUND EXPLORATION Bilateral 7/5/2023    Procedure: EXPLORATION, WOUND;  Surgeon: Brandon Valencia MD;  Location: [facility] OR;  Service: Neurosurgery;  Laterality: Bilateral;       Family History   Problem Relation Age of Onset    Heart disease Mother     Heart disease Father     Heart attack Sister     Heart attack Brother     Colon cancer Neg Hx     Colon polyps Neg Hx     Liver cancer Neg Hx     Inflammatory bowel disease Neg Hx     Liver disease Neg Hx     Rectal cancer Neg Hx     Stomach cancer Neg Hx     Ulcerative colitis Neg Hx        Social History     Socioeconomic History    Marital status:    Tobacco Use    Smoking status: Former     Current packs/day: 0.00     Average packs/day: 2.0 packs/day for 8.0 years (16.0 ttl pk-yrs)     Types: Cigarettes     Start date: 8/24/2004     Quit date: 8/24/2012     Years since quitting: 10.9    Smokeless tobacco: Never   Substance and Sexual Activity    Alcohol use: Never     Comment: Sober since 08/24/2012    Drug use: Not Currently     Types: "Crack" cocaine, Marijuana     Comment: Quit in 2012 "    Sexual activity: Yes   Social History Narrative    No pets in household, wife and daughter smokers. [employer].    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     Social Determinants of Health     Financial Resource Strain: Low Risk  (5/22/2023)    Overall Financial Resource Strain (CARDIA)     Difficulty of Paying Living Expenses: Not very hard   Food Insecurity: Food Insecurity Present (5/22/2023)    Hunger Vital Sign     Worried About Running Out of Food in the Last Year: Sometimes true     Ran Out of Food in the Last Year: Sometimes true   Transportation Needs: No Transportation Needs (5/22/2023)    PRAPARE - Transportation     Lack of Transportation (Medical): No     Lack of Transportation (Non-Medical): No   Physical Activity: Inactive (5/22/2023)    Exercise Vital Sign     Days of Exercise per Week: 0 days     Minutes of Exercise per Session: 0 min   Stress: Stress Concern Present (5/22/2023)    [place] [employer] - Occupational Stress Questionnaire     Feeling of Stress : Very much   Social Connections: Moderately Isolated (5/22/2023)    Social Connection and Isolation Panel [NHANES]     Frequency of Communication with Friends and Family: Once a week     Frequency of Social Gatherings with Friends and Family: Never     Attends Scientology Services: More than 4 times per year     Active Member of Clubs or Organizations: No     Attends Club or Organization Meetings: Never     Marital Status:    Housing Stability: Low Risk  (5/22/2023)    Housing Stability Vital Sign     Unable to Pay for Housing in the Last Year: No     Number of Places Lived in the Last Year: 1     Unstable Housing in the Last Year: No       MEDICATIONS & ALLERGIES:     Current Outpatient Medications on File Prior to Visit   Medication Sig    albuterol (PROVENTIL/VENTOLIN HFA) 90 mcg/actuation inhaler INHALE 2 PUFFS INTO THE LUNGS EVERY 6 HOURS AS NEEDED FOR COUGH    allopurinoL (ZYLOPRIM)  100 MG tablet Take 100 mg by mouth once daily. Takes 0.5 tab    ALPRAZolam (XANAX) 1 MG tablet Take 1 mg by mouth Daily. EVERY OTHER DAY    aluminum & magnesium hydroxide-simethicone (MYLANTA MAX STRENGTH) 400-400-40 mg/5 mL suspension TAKE 15ML BY MOUTH FOUR TIMES A DAY AS NEEDED FOR STOMACH ACID    aspirin (ECOTRIN) 81 MG EC tablet Take 1 tablet (81 mg total) by mouth once daily.    atorvastatin (LIPITOR) 80 MG tablet TAKE ONE-HALF TABLET BY MOUTH EVERY DAY FOR CHOLESTEROL    carvediloL (COREG) 12.5 MG tablet Take 1 tablet (12.5 mg total) by mouth 2 (two) times daily.    clopidogreL (PLAVIX) 75 mg tablet Take 1 tablet (75 mg total) by mouth once daily.    diclofenac sodium (VOLTAREN) 1 % Gel APPLY 2 GRAMS TOPICALLY FOUR TIMES A DAY AS NEEDED FOR PAIN AND INFLAMMATION. USE ENCLOSED DOSING CARD.    diphenhydrAMINE (SOMINEX) 25 mg tablet Take 25 mg by mouth nightly as needed for Insomnia.    docusate sodium (COLACE) 100 MG capsule Take 1 capsule (100 mg total) by mouth 2 (two) times daily.    esomeprazole (NEXIUM) 40 MG capsule 40 mg.    famotidine (PEPCID) 40 MG tablet TAKE ONE TABLET BY MOUTH AT BEDTIME FOR ACID REFLUX    flunisolide 25 mcg, 0.025%, (NASALIDE) 25 mcg (0.025 %) Spry 2 sprays by Nasal route as needed.     gabapentin (NEURONTIN) 600 MG tablet Take 1 tablet (600 mg total) by mouth 3 (three) times daily.    hydrocortisone 2.5 % cream Apply topically 2 (two) times daily as needed. Apply to affected areas of the face and neck.    hydrocortisone-pramoxine (PROCTOFOAM-HS) rectal foam INSERT 1 APPLICATORFUL INTO RECTALLY TWICE A DAY FOR HEMORRHOIDS    hydrOXYzine HCL (ATARAX) 25 MG tablet Take 1 tablet (25 mg total) by mouth 3 (three) times daily as needed for Itching.    LIDOcaine (LIDODERM) 5 % APPLY 1 PATCH TOPICALLY EVERY DAY FOR PAIN. WEAR FOR 12 HOURS, THEN REMOVE. DO NOT APPLY NEW PATCH FOR AT LEAST 12 HOURS.    losartan (COZAAR) 50 MG tablet Take 1 tablet (50 mg total) by mouth once daily.    methyl  "salicylate-menthol 15-10% 15-10 % Crea Apply topically as needed.     multivitamin (THERAGRAN) per tablet Take 1 tablet by mouth once daily.    oxyCODONE-acetaminophen (PERCOCET)  mg per tablet Take 1 tablet by mouth every 8 (eight) hours as needed for Pain.    pantoprazole (PROTONIX) 40 MG tablet Take 40 mg by mouth 2 (two) times daily.     sertraline (ZOLOFT) 100 MG tablet TAKE TWO TABLETS BY MOUTH EVERY DAY FOR MENTAL HEALTH DOSE INCREASED TO 200MG/DAY    simethicone (MYLICON) 80 MG chewable tablet Take 80 mg by mouth every 6 (six) hours as needed for Flatulence.    sucralfate (CARAFATE) 100 mg/mL suspension Take 1 g by mouth 4 (four) times daily.     TRELEGY ELLIPTA 200-62.5-25 mcg inhaler INHALE 1 PUFF INTO THE LUNGS EVERY DAY    triamcinolone acetonide 0.1% (KENALOG) 0.1 % cream Apply topically 2 (two) times daily.    cefepime in dextrose 5 % (MAXIPIME) 1 gram/50 mL PgBk Inject 50 mLs (1 g total) into the vein every 12 (twelve) hours.     Current Facility-Administered Medications on File Prior to Visit   Medication    ondansetron injection 4 mg    sodium chloride 0.9% flush 10 mL       Review of patient's allergies indicates:  No Known Allergies    OBJECTIVE:   Vital Signs:  Vitals:    08/11/23 1100   BP: 122/82   Pulse: 84   Weight: 113.2 kg (249 lb 9 oz)   Height: 5' 8" (1.727 m)       No results found for this or any previous visit (from the past 24 hour(s)).      Physical Exam:   General:  Well developed, well nourished, no acute distress  HEENT:  Normocephalic, atraumatic  CVS:  RRR, S1 and S2 normal, no murmurs, rubs, gallops  Resp:  Lungs clear to auscultation, no wheezes, rales, rhonchi  GI:  Abdomen soft, non-tender, non-distended  MSK:  No muscle atrophy, peripheral edema, full range of motion; soft tissue edema beneath surgical site, no fluctuance   Skin:  discoloration noted about the surgical site along lower back; no overt rash   Psych:  Alert and oriented to person, place, and " time          ASSESSMENT:     Hypertension  Continue current medications. Follows with PCP.     S/P AVR (aortic valve replacement) biopresthetic miller 25 mm in 2014   Will benefit from active clinical surveillance for bacteremia. Needs cardiology follow up.     Post-operative wound abscess  --Following with [facility] for [facility]   --Continue empiric IV cefepime and daptomycin for at least 1 more week to complete 6 weeks total   --Anticipated stop date 8/18/23   --Weekly CBC, CMP, and CK   --Will obtain MRI lumbar spine with/without contrast next week to ensure resolution of abscess and determine if OPAT should be extended   --Follow up with Infectious Diseases in 1 week   --Continue regular follow up with NSGY     PLAN:     Abdullahi was seen today for wound infection.    Diagnoses and all orders for this visit:    Status post lumbar surgery  -     Ambulatory referral/consult to Infectious Disease    Encounter for postoperative wound check  -     Ambulatory referral/consult to Infectious Disease    Primary hypertension    S/P AVR (aortic valve replacement) biopresthetic miller 25 mm in 2014     Post-operative wound abscess          The total time for evaluation and management services performed on 8/11/23 was greater than 45 minutes.        Kar Lemus, DO   Infectious Diseases

## 2023-08-14 ENCOUNTER — PATIENT OUTREACH (OUTPATIENT)
Dept: ADMINISTRATIVE | Facility: HOSPITAL | Age: 68
End: 2023-08-14
Payer: MEDICARE

## 2023-08-14 DIAGNOSIS — Z98.890 STATUS POST LUMBAR SURGERY: Primary | ICD-10-CM

## 2023-08-14 DIAGNOSIS — T81.49XA POST-OPERATIVE WOUND ABSCESS: ICD-10-CM

## 2023-08-15 ENCOUNTER — LAB VISIT (OUTPATIENT)
Dept: LAB | Facility: HOSPITAL | Age: 68
End: 2023-08-15
Attending: STUDENT IN AN ORGANIZED HEALTH CARE EDUCATION/TRAINING PROGRAM
Payer: MEDICARE

## 2023-08-15 ENCOUNTER — OFFICE VISIT (OUTPATIENT)
Dept: INTERNAL MEDICINE | Facility: CLINIC | Age: 68
End: 2023-08-15
Payer: MEDICARE

## 2023-08-15 VITALS
OXYGEN SATURATION: 97 % | TEMPERATURE: 98 F | DIASTOLIC BLOOD PRESSURE: 70 MMHG | HEART RATE: 95 BPM | SYSTOLIC BLOOD PRESSURE: 110 MMHG | HEIGHT: 69 IN | WEIGHT: 247.81 LBS | BODY MASS INDEX: 36.7 KG/M2

## 2023-08-15 DIAGNOSIS — T81.49XA POST-OPERATIVE WOUND ABSCESS: Chronic | ICD-10-CM

## 2023-08-15 DIAGNOSIS — T81.49XA POST-OPERATIVE WOUND ABSCESS: Primary | Chronic | ICD-10-CM

## 2023-08-15 DIAGNOSIS — J45.901 ASTHMATIC BRONCHITIS WITH ACUTE EXACERBATION, UNSPECIFIED ASTHMA SEVERITY, UNSPECIFIED WHETHER PERSISTENT: ICD-10-CM

## 2023-08-15 DIAGNOSIS — I10 PRIMARY HYPERTENSION: Chronic | ICD-10-CM

## 2023-08-15 PROCEDURE — 3074F SYST BP LT 130 MM HG: CPT | Mod: HCNC,CPTII,S$GLB, | Performed by: FAMILY MEDICINE

## 2023-08-15 PROCEDURE — 99999 PR PBB SHADOW E&M-EST. PATIENT-LVL V: ICD-10-PCS | Mod: PBBFAC,HCNC,, | Performed by: FAMILY MEDICINE

## 2023-08-15 PROCEDURE — 1125F AMNT PAIN NOTED PAIN PRSNT: CPT | Mod: HCNC,CPTII,S$GLB, | Performed by: FAMILY MEDICINE

## 2023-08-15 PROCEDURE — 36415 COLL VENOUS BLD VENIPUNCTURE: CPT | Mod: HCNC | Performed by: STUDENT IN AN ORGANIZED HEALTH CARE EDUCATION/TRAINING PROGRAM

## 2023-08-15 PROCEDURE — 3074F PR MOST RECENT SYSTOLIC BLOOD PRESSURE < 130 MM HG: ICD-10-PCS | Mod: HCNC,CPTII,S$GLB, | Performed by: FAMILY MEDICINE

## 2023-08-15 PROCEDURE — 1101F PR PT FALLS ASSESS DOC 0-1 FALLS W/OUT INJ PAST YR: ICD-10-PCS | Mod: HCNC,CPTII,S$GLB, | Performed by: FAMILY MEDICINE

## 2023-08-15 PROCEDURE — 1101F PT FALLS ASSESS-DOCD LE1/YR: CPT | Mod: HCNC,CPTII,S$GLB, | Performed by: FAMILY MEDICINE

## 2023-08-15 PROCEDURE — 99213 OFFICE O/P EST LOW 20 MIN: CPT | Mod: HCNC,S$GLB,, | Performed by: FAMILY MEDICINE

## 2023-08-15 PROCEDURE — 3078F PR MOST RECENT DIASTOLIC BLOOD PRESSURE < 80 MM HG: ICD-10-PCS | Mod: HCNC,CPTII,S$GLB, | Performed by: FAMILY MEDICINE

## 2023-08-15 PROCEDURE — 3288F PR FALLS RISK ASSESSMENT DOCUMENTED: ICD-10-PCS | Mod: HCNC,CPTII,S$GLB, | Performed by: FAMILY MEDICINE

## 2023-08-15 PROCEDURE — 1125F PR PAIN SEVERITY QUANTIFIED, PAIN PRESENT: ICD-10-PCS | Mod: HCNC,CPTII,S$GLB, | Performed by: FAMILY MEDICINE

## 2023-08-15 PROCEDURE — 3008F BODY MASS INDEX DOCD: CPT | Mod: HCNC,CPTII,S$GLB, | Performed by: FAMILY MEDICINE

## 2023-08-15 PROCEDURE — 1159F MED LIST DOCD IN RCRD: CPT | Mod: HCNC,CPTII,S$GLB, | Performed by: FAMILY MEDICINE

## 2023-08-15 PROCEDURE — 1159F PR MEDICATION LIST DOCUMENTED IN MEDICAL RECORD: ICD-10-PCS | Mod: HCNC,CPTII,S$GLB, | Performed by: FAMILY MEDICINE

## 2023-08-15 PROCEDURE — 82550 ASSAY OF CK (CPK): CPT | Mod: HCNC | Performed by: STUDENT IN AN ORGANIZED HEALTH CARE EDUCATION/TRAINING PROGRAM

## 2023-08-15 PROCEDURE — 3288F FALL RISK ASSESSMENT DOCD: CPT | Mod: HCNC,CPTII,S$GLB, | Performed by: FAMILY MEDICINE

## 2023-08-15 PROCEDURE — 99999 PR PBB SHADOW E&M-EST. PATIENT-LVL V: CPT | Mod: PBBFAC,HCNC,, | Performed by: FAMILY MEDICINE

## 2023-08-15 PROCEDURE — 99213 PR OFFICE/OUTPT VISIT, EST, LEVL III, 20-29 MIN: ICD-10-PCS | Mod: HCNC,S$GLB,, | Performed by: FAMILY MEDICINE

## 2023-08-15 PROCEDURE — 3078F DIAST BP <80 MM HG: CPT | Mod: HCNC,CPTII,S$GLB, | Performed by: FAMILY MEDICINE

## 2023-08-15 PROCEDURE — 3008F PR BODY MASS INDEX (BMI) DOCUMENTED: ICD-10-PCS | Mod: HCNC,CPTII,S$GLB, | Performed by: FAMILY MEDICINE

## 2023-08-15 RX ORDER — FLUTICASONE FUROATE, UMECLIDINIUM BROMIDE AND VILANTEROL TRIFENATATE 200; 62.5; 25 UG/1; UG/1; UG/1
POWDER RESPIRATORY (INHALATION)
Qty: 60 EACH | Refills: 3 | Status: SHIPPED | OUTPATIENT
Start: 2023-08-15 | End: 2023-12-14

## 2023-08-15 NOTE — PROGRESS NOTES
Subjective:      Patient ID: Abdullahi Urias is a 67 y.o. male.    Chief Complaint: Hospital Follow Up    HPI here with wife discussed recent infection had lumbar surgery postoperative infection had to go back in remove hardware and has been on IV antibiotics seeing Dr. Lemus Infectious Disease with likely discontinuation of antibiotics this Friday CPK being monitored etc. plans to resume statin after completion of antibiotics otherwise doing okay  Past Medical History:   Diagnosis Date    Aortic stenosis     [facility]    Asthma     BPH (benign prostatic hyperplasia)     CAD (coronary artery disease)     Cardiomyopathy     CHF (congestive heart failure)     Chronic hoarseness     vocal cord surg    Chronic pain     CKD (chronic kidney disease) stage 3, GFR 30-59 ml/min     CVA (cerebral infarction)     8/2012 olol; reviewed ed note    Ex-smoker     GERD (gastroesophageal reflux disease)     Hepatitis C     treatedharvoni says cured, RNA NEG 6/2020    Hypertension     Pancreatitis     Prostate cancer     Prostate cancer     Prostate cancer     PVD (peripheral vascular disease)     Renal insufficiency     Substance abuse     cocaine, etoh , tob in past      Past Surgical History:   Procedure Laterality Date    AORTIC VALVE REPLACEMENT  05/19/2014    Tissue valve replacement    BACK SURGERY      CARDIAC CATHETERIZATION      COLONOSCOPY  2011    COLONOSCOPY N/A 2/24/2021    Procedure: COLONOSCOPY;  Surgeon: Faith Carrillo MD;  Location: [facility];  Service: Endoscopy;  Laterality: N/A;    EPIDURAL STEROID INJECTION INTO CERVICAL SPINE N/A 6/21/2022    Procedure: C7-T1 IL PIEDAD;  Surgeon: Nehemiah Carmen MD;  Location: [facility] PAIN T;  Service: Pain Management;  Laterality: N/A;    ESOPHAGOGASTRODUODENOSCOPY N/A 2/24/2021    Procedure: ESOPHAGOGASTRODUODENOSCOPY (EGD);  Surgeon: Faith Carrillo MD;  Location: [facility] ENDO;  Service: Endoscopy;  Laterality: N/A;    FOOT SURGERY      INSERTION N/A 7/5/2023     Procedure: INSERTION;  Surgeon: Brandon Valencia MD;  Location: [facility] OR;  Service: Neurosurgery;  Laterality: N/A;  Antibiotic Beads    LEFT HEART CATHETERIZATION Left 7/24/2020    Procedure: CATHETERIZATION, HEART, LEFT;  Surgeon: Pasha Martin MD;  Location: [facility] CATH LAB;  Service: Cardiology;  Laterality: Left;    PENILE PROSTHESIS IMPLANT      PENILE PROSTHESIS REVISION  04/30/2018    PROSTATECTOMY  09/2019    [facility]    radiation for prostate      REMOVAL OF HARDWARE FROM SPINE N/A 7/5/2023    Procedure: REMOVAL, HARDWARE, SPINE;  Surgeon: Brandon Valencia MD;  Location: [facility] OR;  Service: Neurosurgery;  Laterality: N/A;    REPLACEMENT N/A 7/5/2023    Procedure: REPLACEMENT;  Surgeon: Brandon Valencia MD;  Location: [facility] OR;  Service: Neurosurgery;  Laterality: N/A;  of Sandoval and Screws. [employer]tr[employer]    TRANSFORAMINAL EPIDURAL INJECTION OF STEROID Right 10/20/2022    Procedure: Right  L4/5 + L5/S1 TF PIEDAD RN IV Sedation;  Surgeon: Nehemiah Carmen MD;  Location: [facility] PAIN MGT;  Service: Pain Management;  Laterality: Right;    TRANSFORAMINAL LUMBAR INTERBODY FUSION (TLIF) USING COMPUTER-ASSISTED NAVIGATION Bilateral 5/17/2023    Procedure: FUSION, SPINE, LUMBAR, TLIF, USING COMPUTER-ASSISTED NAVIGATION;  Surgeon: Brandon Valencia MD;  Location: [facility] OR;  Service: Neurosurgery;  Laterality: Bilateral;  2 level lumbar fusion at L4-5 and L5-S1    UPPER GASTROINTESTINAL ENDOSCOPY  2011    WASHOUT N/A 7/5/2023    Procedure: WASHOUT;  Surgeon: Brandon Valencia MD;  Location: [facility] OR;  Service: Neurosurgery;  Laterality: N/A;    WOUND EXPLORATION Bilateral 7/5/2023    Procedure: EXPLORATION, WOUND;  Surgeon: Brandon Valencia MD;  Location: [facility] OR;  Service: Neurosurgery;  Laterality: Bilateral;      Social History     Socioeconomic History    Marital status:    Tobacco Use    Smoking status: Former     Current packs/day: 0.00     Average packs/day: 2.0 packs/day for 8.0 years (16.0 ttl pk-yrs)      "Types: Cigarettes     Start date: 8/24/2004     Quit date: 8/24/2012     Years since quitting: 10.9    Smokeless tobacco: Never   Substance and Sexual Activity    Alcohol use: Never     Comment: Sober since 08/24/2012    Drug use: Not Currently     Types: "Crack" cocaine, Marijuana     Comment: Quit in 2012    Sexual activity: Yes   Social History Narrative    No pets in household, wife and daughter smokers. Former [employer].    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     Social Determinants of Health     Financial Resource Strain: Low Risk  (5/22/2023)    Overall Financial Resource Strain (CARDIA)     Difficulty of Paying Living Expenses: Not very hard   Food Insecurity: Food Insecurity Present (5/22/2023)    Hunger Vital Sign     Worried About Running Out of Food in the Last Year: Sometimes true     Ran Out of Food in the Last Year: Sometimes true   Transportation Needs: No Transportation Needs (5/22/2023)    PRAPARE - Transportation     Lack of Transportation (Medical): No     Lack of Transportation (Non-Medical): No   Physical Activity: Inactive (5/22/2023)    Exercise Vital Sign     Days of Exercise per Week: 0 days     Minutes of Exercise per Session: 0 min   Stress: Stress Concern Present (5/22/2023)    [place] of [employer]ational [employer] - Occupational Stress Questionnaire     Feeling of Stress : Very much   Social Connections: Moderately Isolated (5/22/2023)    Social Connection and Isolation Panel [NHANES]     Frequency of Communication with Friends and Family: Once a week     Frequency of Social Gatherings with Friends and Family: Never     Attends Denominational Services: More than 4 times per year     Active Member of Clubs or Organizations: No     Attends Club or Organization Meetings: Never     Marital Status:    Housing Stability: Low Risk  (5/22/2023)    Housing Stability Vital Sign     Unable to Pay for Housing in the Last Year: No     Number of Places Lived in the Last " Year: 1     Unstable Housing in the Last Year: No      Family History   Problem Relation Age of Onset    Heart disease Mother     Heart disease Father     Heart attack Sister     Heart attack Brother     Colon cancer Neg Hx     Colon polyps Neg Hx     Liver cancer Neg Hx     Inflammatory bowel disease Neg Hx     Liver disease Neg Hx     Rectal cancer Neg Hx     Stomach cancer Neg Hx     Ulcerative colitis Neg Hx       Review of Systems        Objective:     Physical Examgen nad  cvrrr  Assessment:         ICD-10-CM ICD-9-CM   1. Post-operative wound abscess  T81.49XA 998.59   2. Primary hypertension  I10 401.9      Plan:        1. Post-operative wound abscess    2. Primary hypertension         Follow up neurosurgeon this fall infectious Disease this Friday doctor this Friday    Resume statin when off antibiotics and okay with ID    Follow-up January as planned

## 2023-08-16 ENCOUNTER — LAB VISIT (OUTPATIENT)
Dept: LAB | Facility: HOSPITAL | Age: 68
End: 2023-08-16
Attending: STUDENT IN AN ORGANIZED HEALTH CARE EDUCATION/TRAINING PROGRAM
Payer: MEDICARE

## 2023-08-16 DIAGNOSIS — T81.49XA POSTOPERATIVE SUBPHRENIC ABSCESS: Primary | ICD-10-CM

## 2023-08-16 DIAGNOSIS — J30.9 ALLERGIC RHINITIS, UNSPECIFIED SEASONALITY, UNSPECIFIED TRIGGER: ICD-10-CM

## 2023-08-16 LAB — CK SERPL-CCNC: 460 U/L (ref 20–200)

## 2023-08-16 PROCEDURE — 36415 COLL VENOUS BLD VENIPUNCTURE: CPT | Mod: HCNC,PN | Performed by: STUDENT IN AN ORGANIZED HEALTH CARE EDUCATION/TRAINING PROGRAM

## 2023-08-16 PROCEDURE — 86140 C-REACTIVE PROTEIN: CPT | Mod: HCNC | Performed by: STUDENT IN AN ORGANIZED HEALTH CARE EDUCATION/TRAINING PROGRAM

## 2023-08-16 PROCEDURE — 85025 COMPLETE CBC W/AUTO DIFF WBC: CPT | Mod: HCNC | Performed by: STUDENT IN AN ORGANIZED HEALTH CARE EDUCATION/TRAINING PROGRAM

## 2023-08-16 PROCEDURE — 82550 ASSAY OF CK (CPK): CPT | Mod: HCNC | Performed by: STUDENT IN AN ORGANIZED HEALTH CARE EDUCATION/TRAINING PROGRAM

## 2023-08-16 PROCEDURE — 80053 COMPREHEN METABOLIC PANEL: CPT | Mod: HCNC | Performed by: STUDENT IN AN ORGANIZED HEALTH CARE EDUCATION/TRAINING PROGRAM

## 2023-08-17 ENCOUNTER — PATIENT OUTREACH (OUTPATIENT)
Dept: PULMONOLOGY | Facility: CLINIC | Age: 68
End: 2023-08-17
Payer: MEDICARE

## 2023-08-17 LAB
ALBUMIN SERPL BCP-MCNC: 3.7 G/DL (ref 3.5–5.2)
ALP SERPL-CCNC: 123 U/L (ref 55–135)
ALT SERPL W/O P-5'-P-CCNC: 20 U/L (ref 10–44)
ANION GAP SERPL CALC-SCNC: 10 MMOL/L (ref 8–16)
AST SERPL-CCNC: 28 U/L (ref 10–40)
BASOPHILS # BLD AUTO: 0.03 K/UL (ref 0–0.2)
BASOPHILS NFR BLD: 0.7 % (ref 0–1.9)
BILIRUB SERPL-MCNC: 0.2 MG/DL (ref 0.1–1)
BUN SERPL-MCNC: 22 MG/DL (ref 8–23)
CALCIUM SERPL-MCNC: 9.4 MG/DL (ref 8.7–10.5)
CHLORIDE SERPL-SCNC: 110 MMOL/L (ref 95–110)
CK SERPL-CCNC: 401 U/L (ref 20–200)
CO2 SERPL-SCNC: 21 MMOL/L (ref 23–29)
CREAT SERPL-MCNC: 1.3 MG/DL (ref 0.5–1.4)
CRP SERPL-MCNC: 14.9 MG/L (ref 0–8.2)
DIFFERENTIAL METHOD: ABNORMAL
EOSINOPHIL # BLD AUTO: 0.2 K/UL (ref 0–0.5)
EOSINOPHIL NFR BLD: 4.6 % (ref 0–8)
ERYTHROCYTE [DISTWIDTH] IN BLOOD BY AUTOMATED COUNT: 15.8 % (ref 11.5–14.5)
EST. GFR  (NO RACE VARIABLE): >60 ML/MIN/1.73 M^2
GLUCOSE SERPL-MCNC: 64 MG/DL (ref 70–110)
HCT VFR BLD AUTO: 29 % (ref 40–54)
HGB BLD-MCNC: 8.6 G/DL (ref 14–18)
IMM GRANULOCYTES # BLD AUTO: 0.01 K/UL (ref 0–0.04)
IMM GRANULOCYTES NFR BLD AUTO: 0.2 % (ref 0–0.5)
LYMPHOCYTES # BLD AUTO: 1.2 K/UL (ref 1–4.8)
LYMPHOCYTES NFR BLD: 27.9 % (ref 18–48)
MCH RBC QN AUTO: 22.9 PG (ref 27–31)
MCHC RBC AUTO-ENTMCNC: 29.7 G/DL (ref 32–36)
MCV RBC AUTO: 77 FL (ref 82–98)
MONOCYTES # BLD AUTO: 0.5 K/UL (ref 0.3–1)
MONOCYTES NFR BLD: 10.9 % (ref 4–15)
NEUTROPHILS # BLD AUTO: 2.4 K/UL (ref 1.8–7.7)
NEUTROPHILS NFR BLD: 55.7 % (ref 38–73)
NRBC BLD-RTO: 0 /100 WBC
PLATELET # BLD AUTO: 266 K/UL (ref 150–450)
PMV BLD AUTO: 10.5 FL (ref 9.2–12.9)
POTASSIUM SERPL-SCNC: 4.6 MMOL/L (ref 3.5–5.1)
PROT SERPL-MCNC: 8.2 G/DL (ref 6–8.4)
RBC # BLD AUTO: 3.76 M/UL (ref 4.6–6.2)
SODIUM SERPL-SCNC: 141 MMOL/L (ref 136–145)
WBC # BLD AUTO: 4.33 K/UL (ref 3.9–12.7)

## 2023-08-17 RX ORDER — OXYCODONE AND ACETAMINOPHEN 10; 325 MG/1; MG/1
1 TABLET ORAL EVERY 8 HOURS PRN
Qty: 30 TABLET | Refills: 0 | Status: SHIPPED | OUTPATIENT
Start: 2023-08-17 | End: 2023-08-25 | Stop reason: SDUPTHER

## 2023-08-17 RX ORDER — FLUTICASONE PROPIONATE 50 MCG
1 SPRAY, SUSPENSION (ML) NASAL DAILY
Qty: 16 G | Refills: 0 | Status: SHIPPED | OUTPATIENT
Start: 2023-08-17 | End: 2023-10-09 | Stop reason: SDUPTHER

## 2023-08-17 NOTE — TELEPHONE ENCOUNTER
Chronic Disease Management  Called patient to complete Pulmonary Disease Management Questionnaire.    Patient reports experiencing an increase exertional dyspnea. Patient is hoarse as well. Patient is requesting sooner appointment with DENISSE Garcia.    Patient takes respiratory medications as prescribed. Message sent to provider staff to request visit be scheduled. Instructed patient to seek medical attention if symptoms worsen. Understanding was verbalized.     Time  spent with patient:  Minutes

## 2023-08-18 ENCOUNTER — OFFICE VISIT (OUTPATIENT)
Dept: INFECTIOUS DISEASES | Facility: CLINIC | Age: 68
End: 2023-08-18
Payer: MEDICARE

## 2023-08-18 ENCOUNTER — INFUSION (OUTPATIENT)
Dept: INFUSION THERAPY | Facility: HOSPITAL | Age: 68
End: 2023-08-18
Attending: FAMILY MEDICINE
Payer: MEDICARE

## 2023-08-18 ENCOUNTER — OFFICE VISIT (OUTPATIENT)
Dept: PULMONOLOGY | Facility: CLINIC | Age: 68
End: 2023-08-18
Payer: MEDICARE

## 2023-08-18 VITALS
SYSTOLIC BLOOD PRESSURE: 130 MMHG | DIASTOLIC BLOOD PRESSURE: 90 MMHG | OXYGEN SATURATION: 97 % | HEART RATE: 75 BPM | HEIGHT: 69 IN | RESPIRATION RATE: 18 BRPM | WEIGHT: 246.94 LBS | BODY MASS INDEX: 36.57 KG/M2

## 2023-08-18 VITALS
HEART RATE: 68 BPM | WEIGHT: 247.81 LBS | HEIGHT: 69 IN | BODY MASS INDEX: 36.7 KG/M2 | SYSTOLIC BLOOD PRESSURE: 130 MMHG | DIASTOLIC BLOOD PRESSURE: 90 MMHG

## 2023-08-18 VITALS
OXYGEN SATURATION: 98 % | DIASTOLIC BLOOD PRESSURE: 73 MMHG | SYSTOLIC BLOOD PRESSURE: 128 MMHG | HEART RATE: 78 BPM | RESPIRATION RATE: 18 BRPM | TEMPERATURE: 98 F

## 2023-08-18 DIAGNOSIS — J45.40 MODERATE PERSISTENT ASTHMA WITHOUT COMPLICATION: ICD-10-CM

## 2023-08-18 DIAGNOSIS — R06.02 SOB (SHORTNESS OF BREATH): ICD-10-CM

## 2023-08-18 DIAGNOSIS — T81.49XA POST-OPERATIVE WOUND ABSCESS: Chronic | ICD-10-CM

## 2023-08-18 DIAGNOSIS — R06.09 DYSPNEA ON EXERTION: Primary | ICD-10-CM

## 2023-08-18 PROCEDURE — 3075F SYST BP GE 130 - 139MM HG: CPT | Mod: HCNC,CPTII,S$GLB, | Performed by: STUDENT IN AN ORGANIZED HEALTH CARE EDUCATION/TRAINING PROGRAM

## 2023-08-18 PROCEDURE — 1159F PR MEDICATION LIST DOCUMENTED IN MEDICAL RECORD: ICD-10-PCS | Mod: HCNC,CPTII,S$GLB, | Performed by: HOSPITALIST

## 2023-08-18 PROCEDURE — 1125F PR PAIN SEVERITY QUANTIFIED, PAIN PRESENT: ICD-10-PCS | Mod: HCNC,CPTII,S$GLB, | Performed by: HOSPITALIST

## 2023-08-18 PROCEDURE — 99214 OFFICE O/P EST MOD 30 MIN: CPT | Mod: HCNC,S$GLB,, | Performed by: HOSPITALIST

## 2023-08-18 PROCEDURE — 99214 PR OFFICE/OUTPT VISIT, EST, LEVL IV, 30-39 MIN: ICD-10-PCS | Mod: HCNC,S$GLB,, | Performed by: HOSPITALIST

## 2023-08-18 PROCEDURE — 3080F PR MOST RECENT DIASTOLIC BLOOD PRESSURE >= 90 MM HG: ICD-10-PCS | Mod: HCNC,CPTII,S$GLB, | Performed by: HOSPITALIST

## 2023-08-18 PROCEDURE — 3288F FALL RISK ASSESSMENT DOCD: CPT | Mod: HCNC,CPTII,S$GLB, | Performed by: STUDENT IN AN ORGANIZED HEALTH CARE EDUCATION/TRAINING PROGRAM

## 2023-08-18 PROCEDURE — 3075F PR MOST RECENT SYSTOLIC BLOOD PRESS GE 130-139MM HG: ICD-10-PCS | Mod: HCNC,CPTII,S$GLB, | Performed by: STUDENT IN AN ORGANIZED HEALTH CARE EDUCATION/TRAINING PROGRAM

## 2023-08-18 PROCEDURE — 3008F PR BODY MASS INDEX (BMI) DOCUMENTED: ICD-10-PCS | Mod: HCNC,CPTII,S$GLB, | Performed by: STUDENT IN AN ORGANIZED HEALTH CARE EDUCATION/TRAINING PROGRAM

## 2023-08-18 PROCEDURE — 3080F DIAST BP >= 90 MM HG: CPT | Mod: HCNC,CPTII,S$GLB, | Performed by: STUDENT IN AN ORGANIZED HEALTH CARE EDUCATION/TRAINING PROGRAM

## 2023-08-18 PROCEDURE — 99213 OFFICE O/P EST LOW 20 MIN: CPT | Mod: HCNC,S$GLB,, | Performed by: STUDENT IN AN ORGANIZED HEALTH CARE EDUCATION/TRAINING PROGRAM

## 2023-08-18 PROCEDURE — 1101F PR PT FALLS ASSESS DOC 0-1 FALLS W/OUT INJ PAST YR: ICD-10-PCS | Mod: HCNC,CPTII,S$GLB, | Performed by: STUDENT IN AN ORGANIZED HEALTH CARE EDUCATION/TRAINING PROGRAM

## 2023-08-18 PROCEDURE — 99999 PR PBB SHADOW E&M-EST. PATIENT-LVL IV: ICD-10-PCS | Mod: PBBFAC,HCNC,, | Performed by: STUDENT IN AN ORGANIZED HEALTH CARE EDUCATION/TRAINING PROGRAM

## 2023-08-18 PROCEDURE — 99213 PR OFFICE/OUTPT VISIT, EST, LEVL III, 20-29 MIN: ICD-10-PCS | Mod: HCNC,S$GLB,, | Performed by: STUDENT IN AN ORGANIZED HEALTH CARE EDUCATION/TRAINING PROGRAM

## 2023-08-18 PROCEDURE — 1159F MED LIST DOCD IN RCRD: CPT | Mod: HCNC,CPTII,S$GLB, | Performed by: HOSPITALIST

## 2023-08-18 PROCEDURE — 3008F PR BODY MASS INDEX (BMI) DOCUMENTED: ICD-10-PCS | Mod: HCNC,CPTII,S$GLB, | Performed by: HOSPITALIST

## 2023-08-18 PROCEDURE — 1160F PR REVIEW ALL MEDS BY PRESCRIBER/CLIN PHARMACIST DOCUMENTED: ICD-10-PCS | Mod: HCNC,CPTII,S$GLB, | Performed by: HOSPITALIST

## 2023-08-18 PROCEDURE — 1159F PR MEDICATION LIST DOCUMENTED IN MEDICAL RECORD: ICD-10-PCS | Mod: HCNC,CPTII,S$GLB, | Performed by: STUDENT IN AN ORGANIZED HEALTH CARE EDUCATION/TRAINING PROGRAM

## 2023-08-18 PROCEDURE — 3008F BODY MASS INDEX DOCD: CPT | Mod: HCNC,CPTII,S$GLB, | Performed by: STUDENT IN AN ORGANIZED HEALTH CARE EDUCATION/TRAINING PROGRAM

## 2023-08-18 PROCEDURE — 1101F PT FALLS ASSESS-DOCD LE1/YR: CPT | Mod: HCNC,CPTII,S$GLB, | Performed by: HOSPITALIST

## 2023-08-18 PROCEDURE — 99999 PR PBB SHADOW E&M-EST. PATIENT-LVL V: CPT | Mod: PBBFAC,HCNC,, | Performed by: HOSPITALIST

## 2023-08-18 PROCEDURE — 1125F AMNT PAIN NOTED PAIN PRSNT: CPT | Mod: HCNC,CPTII,S$GLB, | Performed by: STUDENT IN AN ORGANIZED HEALTH CARE EDUCATION/TRAINING PROGRAM

## 2023-08-18 PROCEDURE — 3080F DIAST BP >= 90 MM HG: CPT | Mod: HCNC,CPTII,S$GLB, | Performed by: HOSPITALIST

## 2023-08-18 PROCEDURE — 1125F PR PAIN SEVERITY QUANTIFIED, PAIN PRESENT: ICD-10-PCS | Mod: HCNC,CPTII,S$GLB, | Performed by: STUDENT IN AN ORGANIZED HEALTH CARE EDUCATION/TRAINING PROGRAM

## 2023-08-18 PROCEDURE — 1101F PT FALLS ASSESS-DOCD LE1/YR: CPT | Mod: HCNC,CPTII,S$GLB, | Performed by: STUDENT IN AN ORGANIZED HEALTH CARE EDUCATION/TRAINING PROGRAM

## 2023-08-18 PROCEDURE — 99999 PR PBB SHADOW E&M-EST. PATIENT-LVL IV: CPT | Mod: PBBFAC,HCNC,, | Performed by: STUDENT IN AN ORGANIZED HEALTH CARE EDUCATION/TRAINING PROGRAM

## 2023-08-18 PROCEDURE — 1160F RVW MEDS BY RX/DR IN RCRD: CPT | Mod: HCNC,CPTII,S$GLB, | Performed by: HOSPITALIST

## 2023-08-18 PROCEDURE — 3080F PR MOST RECENT DIASTOLIC BLOOD PRESSURE >= 90 MM HG: ICD-10-PCS | Mod: HCNC,CPTII,S$GLB, | Performed by: STUDENT IN AN ORGANIZED HEALTH CARE EDUCATION/TRAINING PROGRAM

## 2023-08-18 PROCEDURE — 99999 PR PBB SHADOW E&M-EST. PATIENT-LVL V: ICD-10-PCS | Mod: PBBFAC,HCNC,, | Performed by: HOSPITALIST

## 2023-08-18 PROCEDURE — 1101F PR PT FALLS ASSESS DOC 0-1 FALLS W/OUT INJ PAST YR: ICD-10-PCS | Mod: HCNC,CPTII,S$GLB, | Performed by: HOSPITALIST

## 2023-08-18 PROCEDURE — 1159F MED LIST DOCD IN RCRD: CPT | Mod: HCNC,CPTII,S$GLB, | Performed by: STUDENT IN AN ORGANIZED HEALTH CARE EDUCATION/TRAINING PROGRAM

## 2023-08-18 PROCEDURE — 3288F PR FALLS RISK ASSESSMENT DOCUMENTED: ICD-10-PCS | Mod: HCNC,CPTII,S$GLB, | Performed by: STUDENT IN AN ORGANIZED HEALTH CARE EDUCATION/TRAINING PROGRAM

## 2023-08-18 PROCEDURE — 3288F PR FALLS RISK ASSESSMENT DOCUMENTED: ICD-10-PCS | Mod: HCNC,CPTII,S$GLB, | Performed by: HOSPITALIST

## 2023-08-18 PROCEDURE — 3288F FALL RISK ASSESSMENT DOCD: CPT | Mod: HCNC,CPTII,S$GLB, | Performed by: HOSPITALIST

## 2023-08-18 PROCEDURE — 1125F AMNT PAIN NOTED PAIN PRSNT: CPT | Mod: HCNC,CPTII,S$GLB, | Performed by: HOSPITALIST

## 2023-08-18 PROCEDURE — 3008F BODY MASS INDEX DOCD: CPT | Mod: HCNC,CPTII,S$GLB, | Performed by: HOSPITALIST

## 2023-08-18 PROCEDURE — 3075F PR MOST RECENT SYSTOLIC BLOOD PRESS GE 130-139MM HG: ICD-10-PCS | Mod: HCNC,CPTII,S$GLB, | Performed by: HOSPITALIST

## 2023-08-18 PROCEDURE — 3075F SYST BP GE 130 - 139MM HG: CPT | Mod: HCNC,CPTII,S$GLB, | Performed by: HOSPITALIST

## 2023-08-18 RX ORDER — ALBUTEROL SULFATE 0.83 MG/ML
2.5 SOLUTION RESPIRATORY (INHALATION) EVERY 6 HOURS PRN
Qty: 180 ML | Refills: 1 | Status: SHIPPED | OUTPATIENT
Start: 2023-08-18 | End: 2024-08-17

## 2023-08-18 RX ORDER — IPRATROPIUM BROMIDE 21 UG/1
2 SPRAY, METERED NASAL 2 TIMES DAILY
Qty: 30 ML | Refills: 0 | Status: SHIPPED | OUTPATIENT
Start: 2023-08-18

## 2023-08-18 NOTE — ASSESSMENT & PLAN NOTE
--Followed with [facility] for [facility]   --Has completed empiric IV cefepime and daptomycin for total of 6 weeks   --Anticipated stop date today 8/18/23   --Weekly CBC, CMP, and CK within range that suggests inflammation has ceased since beginning treatment    --Will discontinue antibiotics today and remove PICC at infusion suite   --Recommend surveillance MRI lumbar spine with/without contrast after stopping antibiotics to ensure resolution of abscess; scheduled for 8/28/23   --Follow up with Infectious Diseases as needed   --Continue regular follow up with NSGY

## 2023-08-18 NOTE — ASSESSMENT & PLAN NOTE
- +dyspnea, congestion, cough  - less likely clot given congestive symptoms, no calf swelling, intermittent symptoms, but has been immobile, recent infection  - SpO2 97% in clinic  - has been on IV Cefepime and Dapto for the past 6 weeks, less likely bacterial pneumonia  - suspect mild viral illness/allergic response, not wheezing on exam, intermittent symptoms. Avoiding steroids for now given recent surgery, but low threshold to start if worsens  - sent rx for nebulizer and solution- to do AM and PM as well as Atrovent nasal spray for congestion  - further eval with CXR, BNP, D-dimer, pt also requested COVID test  - informed him that we do not perform them in clinic, advised pt to do his home test or utilize  or [facility] for [facility] prior to getting labs and imaging here

## 2023-08-18 NOTE — PROGRESS NOTES
Subjective:      Patient ID: Abdullahi Urias is a 67 y.o. male.    Chief Complaint: Asthma, dyspnea    Interval History 8/18/2023:    Mr. Urias presents to Pulmonary clinic as an acute care visit with increasing dyspnea. Feeling more out of breath with exertion, coughing a little more over the past several weeks. Also with congestion over the past week, productive last night and this morning- clear/yellow. No sore throat. No fever. No thrush. No calf swelling or pain.     Per chart review- since I last saw patient he had his TLIF 5/17/23 with Dr. Valencia which was complicated by post-op infection, treated with hardware removal and IV antibiotics. Completed 6 weeks of IV abx today- 8/18/23, to have PICC removed.    Pulmonary Interventions:   Albuterol- Using 2-3/day, helps  Trelegy- once daily  Flonase- using every day for the past week or so    Interval History 4/18/23:     Mr. Urias is seen in follow up of asthma as well as Pulmonary pre-op assessment for TLIF, I last saw him in March, at that time he was changed to Trelegy from Advair for persistent asthma symptoms (using albuterol 3-4 times daily)    Interim Testing:  Complete PFT 4/2023- Normal spirometry, loop consistent with restrictive pattern, bronchodilator portion not done as he used inhaler prior to exam  6MW- Remained on room air, [place]o 1-3, walked 89% predicted  ABG - 7.36/41/85/23, done on room air  CXR 4/13- No acute findings, cardiomegaly, no effusions, no pneumo, no focal consolidation     Pulmonary interventions:   Albuterol- not having to use really since starting Trelegy  Trelegy- has seen a big improvement  PDM- Benefited from technique education    HPI 3/7/23:  67 year old male with history of AS s/p bioprosthetic AVR, dCHF, CKD3, HTN, CVA, CHEO ([facility]), former tobacco user, prostate cancer 2020 s/p prostatectomy s/p radiation, in remission who was referred to Pulmonary clinic by Dick Baez MD for evaluation of  asthma. Mr. Urias was  admitted to the hospital 1/25-1/26 for treatment of asthma exacerbation. He was treated with IV steroids, breathing treatments, empiric antibiotics and oxygen as needed. CXR clear, SpO2 98% on room air on day of discharge. Patient reports several months of wheezing. Has been using Advair diskus twice daily and Albuterol inhaler 2-3/day. He did notice that symptoms improved when he was on a cruise last week, and worsened when back in [place].      Quit smoking 2012 (with stroke), about 30 pack years     Pertinent Work Up:  CXR 1/24/2023- Clear chest, no pneumo or effusion. Cardiomegaly.  CTA Chest 8/25/2022: Negative for PE, clear lungs  Eosinophils borderline/mildly elevated 1/2023     Pulmonary Interventions:  Albuterol HFA, using 2-3x/day  Fluticasone- salmeterol 250-50mcg twice daily, been on since end of January       Review of Systems   Constitutional:  Negative for fever.   HENT:  Positive for postnasal drip, voice change and congestion. Negative for sore throat.    Respiratory:  Positive for cough, sputum production and dyspnea on extertion. Negative for wheezing.      Objective:     Physical Exam   Constitutional: He is oriented to person, place, and time. He appears well-developed and well-nourished. He is obese.   Cardiovascular: Normal rate and regular rhythm.   Pulmonary/Chest:   Tight breath sounds bilaterally   Musculoskeletal:         General: No edema.      Comments: Calves equal, no unilateral swelling, no edema   Neurological: He is alert and oriented to person, place, and time.   Skin: Skin is warm and dry.   Psychiatric: He has a normal mood and affect.     Personal Diagnostic Review  As Above  No flowsheet data found.     Assessment:     No diagnosis found.     Outpatient Encounter Medications as of 8/18/2023   Medication Sig Dispense Refill    albuterol (PROVENTIL/VENTOLIN HFA) 90 mcg/actuation inhaler INHALE 2 PUFFS INTO THE LUNGS EVERY 6 HOURS AS NEEDED FOR COUGH 8.5 g 3    allopurinoL  (ZYLOPRIM) 100 MG tablet Take 100 mg by mouth once daily. Takes 0.5 tab      ALPRAZolam (XANAX) 1 MG tablet Take 1 mg by mouth Daily. EVERY OTHER DAY      aluminum & magnesium hydroxide-simethicone (MYLANTA MAX STRENGTH) 400-400-40 mg/5 mL suspension TAKE 15ML BY MOUTH FOUR TIMES A DAY AS NEEDED FOR STOMACH ACID      aspirin (ECOTRIN) 81 MG EC tablet Take 1 tablet (81 mg total) by mouth once daily. 90 tablet 3    atorvastatin (LIPITOR) 80 MG tablet TAKE ONE-HALF TABLET BY MOUTH EVERY DAY FOR CHOLESTEROL      carvediloL (COREG) 12.5 MG tablet Take 1 tablet (12.5 mg total) by mouth 2 (two) times daily. 60 tablet 11    cefepime in dextrose 5 % (MAXIPIME) 1 gram/50 mL PgBk Inject 50 mLs (1 g total) into the vein every 12 (twelve) hours. 3600 mL 0    clopidogreL (PLAVIX) 75 mg tablet Take 1 tablet (75 mg total) by mouth once daily. 30 tablet 11    diclofenac sodium (VOLTAREN) 1 % Gel APPLY 2 GRAMS TOPICALLY FOUR TIMES A DAY AS NEEDED FOR PAIN AND INFLAMMATION. USE ENCLOSED DOSING CARD.      diphenhydrAMINE (SOMINEX) 25 mg tablet Take 25 mg by mouth nightly as needed for Insomnia.      docusate sodium (COLACE) 100 MG capsule Take 1 capsule (100 mg total) by mouth 2 (two) times daily. 20 capsule 0    esomeprazole (NEXIUM) 40 MG capsule 40 mg.      famotidine (PEPCID) 40 MG tablet TAKE ONE TABLET BY MOUTH AT BEDTIME FOR ACID REFLUX      flunisolide 25 mcg, 0.025%, (NASALIDE) 25 mcg (0.025 %) Spry 2 sprays by Nasal route as needed.       fluticasone propionate (FLONASE) 50 mcg/actuation nasal spray 1 spray (50 mcg total) by Each Nostril route once daily. 16 g 0    fluticasone-umeclidin-vilanter (TRELEGY ELLIPTA) 200-62.5-25 mcg inhaler INHALE 1 PUFF INTO THE LUNGS EVERY DAY 60 each 3    gabapentin (NEURONTIN) 600 MG tablet Take 1 tablet (600 mg total) by mouth 3 (three) times daily. 90 tablet 11    hydrocortisone 2.5 % cream Apply topically 2 (two) times daily as needed. Apply to affected areas of the face and neck. 30 g 2     hydrocortisone-pramoxine (PROCTOFOAM-HS) rectal foam INSERT 1 APPLICATORFUL INTO RECTALLY TWICE A DAY FOR HEMORRHOIDS      hydrOXYzine HCL (ATARAX) 25 MG tablet Take 1 tablet (25 mg total) by mouth 3 (three) times daily as needed for Itching. 30 tablet 0    LIDOcaine (LIDODERM) 5 % APPLY 1 PATCH TOPICALLY EVERY DAY FOR PAIN. WEAR FOR 12 HOURS, THEN REMOVE. DO NOT APPLY NEW PATCH FOR AT LEAST 12 HOURS.      losartan (COZAAR) 50 MG tablet Take 1 tablet (50 mg total) by mouth once daily. 30 tablet 11    methyl salicylate-menthol 15-10% 15-10 % Crea Apply topically as needed.       multivitamin (THERAGRAN) per tablet Take 1 tablet by mouth once daily.      oxyCODONE-acetaminophen (PERCOCET)  mg per tablet Take 1 tablet by mouth every 8 (eight) hours as needed for Pain. 30 tablet 0    pantoprazole (PROTONIX) 40 MG tablet Take 40 mg by mouth 2 (two) times daily.       sertraline (ZOLOFT) 100 MG tablet TAKE TWO TABLETS BY MOUTH EVERY DAY FOR MENTAL HEALTH DOSE INCREASED TO 200MG/DAY      simethicone (MYLICON) 80 MG chewable tablet Take 80 mg by mouth every 6 (six) hours as needed for Flatulence.      sucralfate (CARAFATE) 100 mg/mL suspension Take 1 g by mouth 4 (four) times daily.       triamcinolone acetonide 0.1% (KENALOG) 0.1 % cream Apply topically 2 (two) times daily. 80 g 1     Facility-Administered Encounter Medications as of 8/18/2023   Medication Dose Route Frequency Provider Last Rate Last Admin    ondansetron injection 4 mg  4 mg Intravenous Once PRN Nehemiah Carmen MD        sodium chloride 0.9% flush 10 mL  10 mL Intravenous PRN Wilda Horne MD         No orders of the defined types were placed in this encounter.        Plan:     Problem List Items Addressed This Visit          Pulmonary    Moderate persistent asthma without complication    Relevant Medications    albuterol (PROVENTIL) 2.5 mg /3 mL (0.083 %) nebulizer solution    Other Relevant Orders    NEBULIZER FOR HOME USE    NEBULIZER  KIT (SUPPLIES) FOR HOME USE    COVID-19 Routine Screening    SOB (shortness of breath)     - +dyspnea, congestion, cough  - less likely clot given congestive symptoms, no calf swelling, intermittent symptoms, but has been immobile, recent infection  - SpO2 97% in clinic  - has been on IV Cefepime and Dapto for the past 6 weeks, less likely bacterial pneumonia  - suspect mild viral illness/allergic response, not wheezing on exam, intermittent symptoms. Avoiding steroids for now given recent surgery, but low threshold to start if worsens  - sent rx for nebulizer and solution- to do AM and PM as well as Atrovent nasal spray for congestion  - further eval with CXR, BNP, D-dimer, pt also requested COVID test  - informed him that we do not perform them in clinic, advised pt to do his home test or [facility] prior to getting labs and imaging here          Other Visit Diagnoses       Dyspnea on exertion    -  Primary    Relevant Medications    ipratropium (ATROVENT) 21 mcg (0.03 %) nasal spray    Other Relevant Orders    D-DIMER, QUANTITATIVE    X-Ray Chest PA And Lateral    B-TYPE NATRIURETIC PEPTIDE          Plan discussed with patient and he expressed understanding, all questions answered.

## 2023-08-18 NOTE — PROGRESS NOTES
Infectious Disease Clinic Note    Patient Name: Abdullahi Urias  YOB: 1955    PRESENTING HISTORY       History of Present Illness:  Mr. Abdullahi Urias is a 67 y.o. male w/ significant PMHx of aortic stenosis with bioprosthetic aortic valve replacement, diastolic CHF, CKD stage 3, HTN, and prostate cancer who presents for hospital follow up after being admitted last month and diagnosed with a lumbar abscess. He had recent lumbar spine surgery on 5/17/23 with NSGY during which they performed laminectomy and placement of screws. He then presented on 7/3 from NSGY office with complaints of RLE pain, weakness and oozing from surgical incision site. MRI showed a large subcutaneous fluid collection involving the postoperative bed measuring approximately 11.6 x 28.9 which may reflect seroma or abscess or meningocele Additional subcutaneous fluid collection in the region of the laminectomy is multiloculated measuring approximately 2.2 x 14 mm each. This could relate to abscess, seroma, or meningocele. He was seen by NSGY and on 7/5 underwent I and D with postoperative fluid collection sent for culture and analysis. Per op note, Because the patient was having severe right-sided pain the set screws were removed on the pedicle screws L4-L5 and S1. The amrit was then removed along with the pedicle screws. The screws were then replaced without incident. Gram stain from abscess showed no organisms and cultures have remained no growth.      Patient last seen by NSGY in clinic on 8/4. Given script for pain med and an abdominal binder. Scheduled to see them again in 4 weeks.      Today patient reports he is still doing PT exercises but needs to go to outpatient facility to continue. [facility] Swelling has gone down on lower back, predominantly on left side of spine. Skin breakdown around PICC has healed. No leaking or pain at PICC site. Will be seeing NSGY in October. Currently taking 2 pain  pills daily. Agreeable to PICC removal today. Will go for MRI of his spine at end of this month.     Review of Systems:  Constitutional: no fever or chills  Eyes: no visual changes  ENT: no nasal congestion or sore throat  Respiratory: no cough or shortness of breath  Cardiovascular: no chest pain  Gastrointestinal: no nausea or vomiting, no abdominal pain, no constipation, no diarrhea  Genitourinary: no hematuria or dysuria  Musculoskeletal: no arthralgias or myalgias; uses cane to move around; strength is gradually returning; performs PT at home; mild swelling at post op site    Skin: no rash  Neurological: no headaches, numbness, or paresthesias    The following portions of the patient's history were reviewed and updated as appropriate: allergies, current medications, past family history, past medical history, past social history, past surgical history, and problem list.    PAST HISTORY:     Immunization History   Administered Date(s) Administered    COVID-19, MRNA, LN-S, PF (Pfizer) (Purple Cap) 01/17/2021, 02/07/2021, 08/23/2021, 03/24/2022    COVID-19, mRNA, LNP-S, bivalent booster, PF (PFIZER OMICRON) 12/06/2022    Dtap, Unspecified Formulation 08/16/2012    Influenza 10/10/2012, 10/09/2013, 10/23/2014, 10/14/2015, 10/01/2017    Influenza (FLUAD) - Quadrivalent - Adjuvanted - PF *Preferred* (65+) 10/20/2021, 10/07/2022    Influenza - Quadrivalent 10/12/2016, 11/08/2017    Influenza - Quadrivalent - PF *Preferred* (6 months and older) 10/02/2020    Influenza - Trivalent (ADULT) 10/13/2018    Influenza Split 12/20/2010    Pneumococcal 08/01/2012    Pneumococcal Conjugate - 13 Valent 01/21/2016    Pneumococcal Polysaccharide - 23 Valent 07/14/2017, 10/19/2022    Td (ADULT) 10/19/2022    Tdap 10/09/2006, 08/16/2012    Zoster 02/05/2016       Past Medical History:   Diagnosis Date    Aortic stenosis     [facility]    Asthma     BPH (benign prostatic hyperplasia)     CAD (coronary artery disease)      Cardiomyopathy     CHF (congestive heart failure)     Chronic hoarseness     vocal cord surg    Chronic pain     CKD (chronic kidney disease) stage 3, GFR 30-59 ml/min     CVA (cerebral infarction)     8/2012 olol; reviewed ed note    Ex-smoker     GERD (gastroesophageal reflux disease)     Hepatitis C     treatedharvoni says cured, RNA NEG 6/2020    Hypertension     Pancreatitis     Prostate cancer     Prostate cancer     Prostate cancer     PVD (peripheral vascular disease)     Renal insufficiency     Substance abuse     cocaine, etoh , tob in past       Past Surgical History:   Procedure Laterality Date    AORTIC VALVE REPLACEMENT  05/19/2014    Tissue valve replacement    BACK SURGERY      CARDIAC CATHETERIZATION      COLONOSCOPY  2011    COLONOSCOPY N/A 2/24/2021    Procedure: COLONOSCOPY;  Surgeon: Faith Carrillo MD;  Location: [facility] ENDO;  Service: Endoscopy;  Laterality: N/A;    EPIDURAL STEROID INJECTION INTO CERVICAL SPINE N/A 6/21/2022    Procedure: C7-T1 IL PIEDAD;  Surgeon: Nehemiah Carmen MD;  Location: [facility] PAIN MGT;  Service: Pain Management;  Laterality: N/A;    ESOPHAGOGASTRODUODENOSCOPY N/A 2/24/2021    Procedure: ESOPHAGOGASTRODUODENOSCOPY (EGD);  Surgeon: Faith Carrillo MD;  Location: [facility];  Service: Endoscopy;  Laterality: N/A;    FOOT SURGERY      INSERTION N/A 7/5/2023    Procedure: INSERTION;  Surgeon: Brandon Valencia MD;  Location: [facility] OR;  Service: Neurosurgery;  Laterality: N/A;  Antibiotic Beads    LEFT HEART CATHETERIZATION Left 7/24/2020    Procedure: CATHETERIZATION, HEART, LEFT;  Surgeon: Pasha Martin MD;  Location: [facility] CATH LAB;  Service: Cardiology;  Laterality: Left;    PENILE PROSTHESIS IMPLANT      PENILE PROSTHESIS REVISION  04/30/2018    PROSTATECTOMY  09/2019    [facility]    rad[facility] for prostate      REMOVAL OF HARDWARE FROM SPINE N/A 7/5/2023    Procedure: REMOVAL, HARDWARE, SPINE;  Surgeon: Brandon Valencia MD;  Location: [facility];  Service:  Neurosurgery;  Laterality: N/A;    REPLACEMENT N/A 7/5/2023    Procedure: REPLACEMENT;  Surgeon: Brandon Valencia MD;  Location: [facility] OR;  Service: Neurosurgery;  Laterality: N/A;  of Sandoval and Screws. [employer]    TRANSFORAMINAL EPIDURAL INJECTION OF STEROID Right 10/20/2022    Procedure: Right  L4/5 + L5/S1 TF PIEDAD RN IV Sedation;  Surgeon: Nehemiah aCrmen MD;  Location: [facility] PAIN MGT;  Service: Pain Management;  Laterality: Right;    TRANSFORAMINAL LUMBAR INTERBODY FUSION (TLIF) USING COMPUTER-ASSISTED NAVIGATION Bilateral 5/17/2023    Procedure: FUSION, SPINE, LUMBAR, TLIF, USING COMPUTER-ASSISTED NAVIGATION;  Surgeon: Brandon Valencia MD;  Location: [facility] OR;  Service: Neurosurgery;  Laterality: Bilateral;  2 level lumbar fusion at L4-5 and L5-S1    UPPER GASTROINTESTINAL ENDOSCOPY  2011    WASHOUT N/A 7/5/2023    Procedure: WASHOUT;  Surgeon: Brandon Valencia MD;  Location: [facility] OR;  Service: Neurosurgery;  Laterality: N/A;    WOUND EXPLORATION Bilateral 7/5/2023    Procedure: EXPLORATION, WOUND;  Surgeon: Brandon Valencia MD;  Location: [facility];  Service: Neurosurgery;  Laterality: Bilateral;       Family History   Problem Relation Age of Onset    Heart disease Mother     Heart disease Father     Heart attack Sister     Heart attack Brother     Colon cancer Neg Hx     Colon polyps Neg Hx     Liver cancer Neg Hx     Inflammatory bowel disease Neg Hx     Liver disease Neg Hx     Rectal cancer Neg Hx     Stomach cancer Neg Hx     Ulcerative colitis Neg Hx        Social History     Socioeconomic History    Marital status:    Tobacco Use    Smoking status: Former     Current packs/day: 0.00     Average packs/day: 2.0 packs/day for 8.0 years (16.0 ttl pk-yrs)     Types: Cigarettes     Start date: 8/24/2004     Quit date: 8/24/2012     Years since quitting: 10.9    Smokeless tobacco: Never   Substance and Sexual Activity    Alcohol use: Never     Comment: Sober since 08/24/2012    Drug use: Not Currently     " Types: "Crack" cocaine, Marijuana     Comment: Quit in 2012    Sexual activity: Yes   Social History Narrative    No pets in household, wife and daughter smokers. [employer].    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     Social Determinants of Health     Financial Resource Strain: Low Risk  (5/22/2023)    Overall Financial Resource Strain (CARDIA)     Difficulty of Paying Living Expenses: Not very hard   Food Insecurity: Food Insecurity Present (5/22/2023)    Hunger Vital Sign     Worried About Running Out of Food in the Last Year: Sometimes true     Ran Out of Food in the Last Year: Sometimes true   Transportation Needs: No Transportation Needs (5/22/2023)    PRAPARE - Transportation     Lack of Transportation (Medical): No     Lack of Transportation (Non-Medical): No   Physical Activity: Inactive (5/22/2023)    Exercise Vital Sign     Days of Exercise per Week: 0 days     Minutes of Exercise per Session: 0 min   Stress: Stress Concern Present (5/22/2023)    [employer] [place] [employer] - Occupational Stress Questionnaire     Feeling of Stress : Very much   Social Connections: Moderately Isolated (5/22/2023)    Social Connection and Isolation Panel [NHANES]     Frequency of Communication with Friends and Family: Once a week     Frequency of Social Gatherings with Friends and Family: Never     Attends [employer] Services: More than 4 times per year     Active Member of Clubs or Organizations: No     Attends Club or Organization Meetings: Never     Marital Status:    Housing Stability: Low Risk  (5/22/2023)    Housing Stability Vital Sign     Unable to Pay for Housing in the Last Year: No     Number of Places Lived in the Last Year: 1     Unstable Housing in the Last Year: No       MEDICATIONS & ALLERGIES:     Current Outpatient Medications on File Prior to Visit   Medication Sig    albuterol (PROVENTIL/VENTOLIN HFA) 90 mcg/actuation inhaler INHALE 2 PUFFS INTO THE LUNGS " EVERY 6 HOURS AS NEEDED FOR COUGH    allopurinoL (ZYLOPRIM) 100 MG tablet Take 100 mg by mouth once daily. Takes 0.5 tab    ALPRAZolam (XANAX) 1 MG tablet Take 1 mg by mouth Daily. EVERY OTHER DAY    aluminum & magnesium hydroxide-simethicone (MYLANTA MAX STRENGTH) 400-400-40 mg/5 mL suspension TAKE 15ML BY MOUTH FOUR TIMES A DAY AS NEEDED FOR STOMACH ACID    aspirin (ECOTRIN) 81 MG EC tablet Take 1 tablet (81 mg total) by mouth once daily.    atorvastatin (LIPITOR) 80 MG tablet TAKE ONE-HALF TABLET BY MOUTH EVERY DAY FOR CHOLESTEROL    carvediloL (COREG) 12.5 MG tablet Take 1 tablet (12.5 mg total) by mouth 2 (two) times daily.    clopidogreL (PLAVIX) 75 mg tablet Take 1 tablet (75 mg total) by mouth once daily.    diclofenac sodium (VOLTAREN) 1 % Gel APPLY 2 GRAMS TOPICALLY FOUR TIMES A DAY AS NEEDED FOR PAIN AND INFLAMMATION. USE ENCLOSED DOSING CARD.    diphenhydrAMINE (SOMINEX) 25 mg tablet Take 25 mg by mouth nightly as needed for Insomnia.    docusate sodium (COLACE) 100 MG capsule Take 1 capsule (100 mg total) by mouth 2 (two) times daily.    esomeprazole (NEXIUM) 40 MG capsule 40 mg.    famotidine (PEPCID) 40 MG tablet TAKE ONE TABLET BY MOUTH AT BEDTIME FOR ACID REFLUX    flunisolide 25 mcg, 0.025%, (NASALIDE) 25 mcg (0.025 %) Spry 2 sprays by Nasal route as needed.     fluticasone propionate (FLONASE) 50 mcg/actuation nasal spray 1 spray (50 mcg total) by Each Nostril route once daily.    fluticasone-umeclidin-vilanter (TRELEGY ELLIPTA) 200-62.5-25 mcg inhaler INHALE 1 PUFF INTO THE LUNGS EVERY DAY    gabapentin (NEURONTIN) 600 MG tablet Take 1 tablet (600 mg total) by mouth 3 (three) times daily.    hydrocortisone 2.5 % cream Apply topically 2 (two) times daily as needed. Apply to affected areas of the face and neck.    hydrocortisone-pramoxine (PROCTOFOAM-HS) rectal foam INSERT 1 APPLICATORFUL INTO RECTALLY TWICE A DAY FOR HEMORRHOIDS    hydrOXYzine HCL (ATARAX) 25 MG tablet Take 1 tablet (25 mg total)  "by mouth 3 (three) times daily as needed for Itching.    LIDOcaine (LIDODERM) 5 % APPLY 1 PATCH TOPICALLY EVERY DAY FOR PAIN. WEAR FOR 12 HOURS, THEN REMOVE. DO NOT APPLY NEW PATCH FOR AT LEAST 12 HOURS.    losartan (COZAAR) 50 MG tablet Take 1 tablet (50 mg total) by mouth once daily.    methyl salicylate-menthol 15-10% 15-10 % Crea Apply topically as needed.     multivitamin (THERAGRAN) per tablet Take 1 tablet by mouth once daily.    oxyCODONE-acetaminophen (PERCOCET)  mg per tablet Take 1 tablet by mouth every 8 (eight) hours as needed for Pain.    pantoprazole (PROTONIX) 40 MG tablet Take 40 mg by mouth 2 (two) times daily.     sertraline (ZOLOFT) 100 MG tablet TAKE TWO TABLETS BY MOUTH EVERY DAY FOR MENTAL HEALTH DOSE INCREASED TO 200MG/DAY    simethicone (MYLICON) 80 MG chewable tablet Take 80 mg by mouth every 6 (six) hours as needed for Flatulence.    sucralfate (CARAFATE) 100 mg/mL suspension Take 1 g by mouth 4 (four) times daily.     triamcinolone acetonide 0.1% (KENALOG) 0.1 % cream Apply topically 2 (two) times daily.    cefepime in dextrose 5 % (MAXIPIME) 1 gram/50 mL PgBk Inject 50 mLs (1 g total) into the vein every 12 (twelve) hours.     Current Facility-Administered Medications on File Prior to Visit   Medication    ondansetron injection 4 mg    sodium chloride 0.9% flush 10 mL       Review of patient's allergies indicates:  No Known Allergies    OBJECTIVE:   Vital Signs:  Vitals:    08/18/23 1054   BP: (!) 130/90   Pulse: 68   Weight: 112.4 kg (247 lb 12.8 oz)   Height: 5' 9" (1.753 m)       No results found for this or any previous visit (from the past 24 hour(s)).      Physical Exam:   General:  Well developed, well nourished, no acute distress  HEENT:  Normocephalic, atraumatic  CVS:  RRR, S1 and S2 normal, no murmurs, rubs, gallops  Resp:  Lungs clear to auscultation, no wheezes, rales, rhonchi  MSK:  No muscle atrophy, peripheral edema, full range of motion; edema at lower lumbar spine " has markedly improved since last week   Skin:  No rashes, ulcers, erythema  Psych:  Alert and oriented to person, place, and time    ASSESSMENT:     Post-operative wound abscess  --Followed with [facility] for [facility]   --Has completed empiric IV cefepime and daptomycin for total of 6 weeks   --Anticipated stop date today 8/18/23   --Weekly CBC, CMP, and CK within range that suggests inflammation has ceased since beginning treatment    --Will discontinue antibiotics today and remove PICC at infusion suite   --Recommend surveillance MRI lumbar spine with/without contrast after stopping antibiotics to ensure resolution of abscess; scheduled for 8/28/23   --Follow up with Infectious Diseases as needed   --Continue regular follow up with NSGY     PLAN:     Abdullahi was seen today for wound infection.    Diagnoses and all orders for this visit:    Post-operative wound abscess      The total time for evaluation and management services performed on 8/18/23 was greater than 30 minutes.        Kar Lemus, DO   Infectious Diseases

## 2023-08-19 ENCOUNTER — OFFICE VISIT (OUTPATIENT)
Dept: URGENT CARE | Facility: CLINIC | Age: 68
End: 2023-08-19
Payer: MEDICARE

## 2023-08-19 VITALS
OXYGEN SATURATION: 99 % | BODY MASS INDEX: 36.83 KG/M2 | TEMPERATURE: 97 F | WEIGHT: 248.69 LBS | DIASTOLIC BLOOD PRESSURE: 69 MMHG | HEIGHT: 69 IN | SYSTOLIC BLOOD PRESSURE: 114 MMHG | RESPIRATION RATE: 20 BRPM | HEART RATE: 71 BPM

## 2023-08-19 DIAGNOSIS — R05.9 COUGH, UNSPECIFIED TYPE: Primary | ICD-10-CM

## 2023-08-19 DIAGNOSIS — Z20.822 EXPOSURE TO COVID-19 VIRUS: ICD-10-CM

## 2023-08-19 LAB
CTP QC/QA: YES
SARS-COV-2 AG RESP QL IA.RAPID: NEGATIVE

## 2023-08-19 PROCEDURE — 99213 OFFICE O/P EST LOW 20 MIN: CPT | Mod: S$GLB,,, | Performed by: NURSE PRACTITIONER

## 2023-08-19 PROCEDURE — 87811 SARS CORONAVIRUS 2 ANTIGEN POCT, MANUAL READ: ICD-10-PCS | Mod: QW,S$GLB,, | Performed by: NURSE PRACTITIONER

## 2023-08-19 PROCEDURE — 87811 SARS-COV-2 COVID19 W/OPTIC: CPT | Mod: QW,S$GLB,, | Performed by: NURSE PRACTITIONER

## 2023-08-19 PROCEDURE — 99213 PR OFFICE/OUTPT VISIT, EST, LEVL III, 20-29 MIN: ICD-10-PCS | Mod: S$GLB,,, | Performed by: NURSE PRACTITIONER

## 2023-08-19 NOTE — PATIENT INSTRUCTIONS
Patient Instructions   Continue with asthma meds    Allergen reduction avoidance     Any  SOB/cough not relieved with rescue inhaler/nebulizer, sudden weakness, fevers or chills  proceed to local ER for hospital care     Follow up with [facility] or PCP/Pulmonologist as scheduled

## 2023-08-19 NOTE — PROGRESS NOTES
"Subjective:      Patient ID: Abdullahi Urias is a 67 y.o. male.    Vitals:  height is 5' 9.21" (1.758 m) and weight is 112.8 kg (248 lb 10.9 oz). His tympanic temperature is 96.8 °F (36 °C). His blood pressure is 114/69 and his pulse is 71. His respiration is 20 and oxygen saturation is 99%.     Chief Complaint: Cough    Presents with cough and shortness of breath. States symptoms has been present for 2 weeks. No relief. No medication taken; Patient had picc line removed yesterday after completion of home abx infusion therapy for spinal abscess;  Patient  seen by pulmonologist recently and advised for covid testing;     Cough  This is a new problem. The current episode started 1 to 4 weeks ago. The problem has been gradually worsening. The problem occurs constantly. Associated symptoms include headaches and nasal congestion. Pertinent negatives include no chest pain, chills, ear congestion, ear pain, fever, heartburn, hemoptysis, myalgias, postnasal drip, rash, rhinorrhea, sore throat, shortness of breath, sweats, weight loss or wheezing. Nothing aggravates the symptoms. He has tried nothing for the symptoms. The treatment provided no relief. There is no history of asthma, bronchiectasis, bronchitis, COPD, emphysema, environmental allergies or pneumonia.       Constitution: Negative for chills and fever.   HENT:  Negative for ear pain, postnasal drip and sore throat.    Cardiovascular:  Negative for chest pain.   Respiratory:  Positive for cough. Negative for bloody sputum, shortness of breath and wheezing.    Gastrointestinal:  Negative for heartburn.   Musculoskeletal:  Negative for muscle ache.   Skin:  Negative for rash.   Allergic/Immunologic: Negative for environmental allergies.   Neurological:  Positive for headaches.      Objective:     Vitals:    08/19/23 1104   BP: 114/69   Pulse: 71   Resp: 20   Temp: 96.8 °F (36 °C)     Physical Exam   Constitutional: He is oriented to person, place, and time. He appears " well-developed. He is cooperative.  Non-toxic appearance. He does not appear ill. No distress. obesity  HENT:   Head: Normocephalic and atraumatic.   Ears:   Right Ear: Hearing, tympanic membrane, external ear and ear canal normal.   Left Ear: Hearing, tympanic membrane, external ear and ear canal normal.   Nose: Rhinorrhea and congestion present. No mucosal edema or nasal deformity. No epistaxis. Right sinus exhibits no maxillary sinus tenderness and no frontal sinus tenderness. Left sinus exhibits no maxillary sinus tenderness and no frontal sinus tenderness.   Mouth/Throat: Uvula is midline, oropharynx is clear and moist and mucous membranes are normal. Mucous membranes are moist. No trismus in the jaw. Normal dentition. No uvula swelling. No oropharyngeal exudate, posterior oropharyngeal edema or posterior oropharyngeal erythema.   Eyes: Conjunctivae and lids are normal. Pupils are equal, round, and reactive to light. No scleral icterus. Extraocular movement intact   Neck: Trachea normal and phonation normal. Neck supple. No edema present. No erythema present. No neck rigidity present.   Cardiovascular: Normal rate, regular rhythm, normal heart sounds and normal pulses.   Pulmonary/Chest: Effort normal and breath sounds normal. No respiratory distress. He has no decreased breath sounds. He has no rhonchi.   Abdominal: Normal appearance.   Musculoskeletal: Normal range of motion.         General: No deformity. Normal range of motion.   Neurological: He is alert and oriented to person, place, and time. He exhibits normal muscle tone. Coordination normal.   Skin: Skin is warm, dry, intact, not diaphoretic and not pale.   Psychiatric: His speech is normal and behavior is normal. Judgment and thought content normal.   Nursing note and vitals reviewed.      Assessment:     1. Cough, unspecified type    2. Exposure to COVID-19 virus      Results for orders placed or performed in visit on 08/19/23   SARS Coronavirus 2  Antigen, POCT Manual Read   Result Value Ref Range    SARS Coronavirus 2 Antigen Negative Negative     Acceptable Yes        Plan:   Patient stable for discharge and home management of condition      Cough, unspecified type  -     SARS Coronavirus 2 Antigen, POCT Manual Read    Exposure to COVID-19 virus  -     SARS Coronavirus 2 Antigen, POCT Manual Read    Reviewed medical hx and noted back surgery and spinal abscess complications; Due to extended and recent d/c of IV broad spectrum abx therapy  low concern for acute bacterial respiratory illness.     Discussed with patient that congestions symptoms either related to environmental triggers or viral illness;          Patient Instructions     Patient Instructions   Continue with asthma meds    Allergen reduction avoidance     Any  SOB/cough not relieved with rescue inhaler/nebulizer, sudden weakness, fevers or chills  proceed to local ER for hospital care     Follow up with [facility] or PCP/Pulmonologist as scheduled

## 2023-08-21 ENCOUNTER — TELEPHONE (OUTPATIENT)
Dept: PULMONOLOGY | Facility: CLINIC | Age: 68
End: 2023-08-21
Payer: MEDICARE

## 2023-08-21 NOTE — TELEPHONE ENCOUNTER
----- Message from Radha Givens MA sent at 8/21/2023 10:20 AM CDT -----  Contact: Abdullahi @ [phone]  The patient calling to reschedule xray and lab appointment to Wednesday if possible. Please give him a call back at [phone].

## 2023-08-22 ENCOUNTER — TELEPHONE (OUTPATIENT)
Dept: URGENT CARE | Facility: CLINIC | Age: 68
End: 2023-08-22
Payer: MEDICARE

## 2023-08-23 ENCOUNTER — HOSPITAL ENCOUNTER (OUTPATIENT)
Dept: RADIOLOGY | Facility: HOSPITAL | Age: 68
Discharge: HOME | End: 2023-08-23
Attending: HOSPITALIST
Payer: MEDICARE

## 2023-08-23 DIAGNOSIS — R06.09 DYSPNEA ON EXERTION: ICD-10-CM

## 2023-08-23 LAB
ACID FAST MOD KINY STN SPEC: NORMAL
MYCOBACTERIUM SPEC QL CULT: NORMAL

## 2023-08-23 PROCEDURE — 71046 XR CHEST PA AND LATERAL: ICD-10-PCS | Mod: 26,,, | Performed by: RADIOLOGY

## 2023-08-23 PROCEDURE — 71046 X-RAY EXAM CHEST 2 VIEWS: CPT | Mod: TC

## 2023-08-23 PROCEDURE — 71046 X-RAY EXAM CHEST 2 VIEWS: CPT | Mod: 26,,, | Performed by: RADIOLOGY

## 2023-08-23 NOTE — TELEPHONE ENCOUNTER
Informed Candace S. With registration that request will be sent to the provider to cancelled RX [facility]. Patient verbalized understanding.

## 2023-08-24 ENCOUNTER — LAB VISIT (OUTPATIENT)
Dept: LAB | Facility: HOSPITAL | Age: 68
End: 2023-08-24
Attending: HOSPITALIST
Payer: MEDICARE

## 2023-08-24 DIAGNOSIS — R06.09 DYSPNEA ON EXERTION: ICD-10-CM

## 2023-08-24 DIAGNOSIS — R06.09 DYSPNEA ON EXERTION: Primary | ICD-10-CM

## 2023-08-24 LAB
ANION GAP SERPL CALC-SCNC: 6 MMOL/L (ref 8–16)
BUN SERPL-MCNC: 17 MG/DL (ref 8–23)
CALCIUM SERPL-MCNC: 9.3 MG/DL (ref 8.7–10.5)
CHLORIDE SERPL-SCNC: 108 MMOL/L (ref 95–110)
CO2 SERPL-SCNC: 24 MMOL/L (ref 23–29)
CREAT SERPL-MCNC: 1.6 MG/DL (ref 0.5–1.4)
EST. GFR  (NO RACE VARIABLE): 46.9 ML/MIN/1.73 M^2
GLUCOSE SERPL-MCNC: 84 MG/DL (ref 70–110)
POTASSIUM SERPL-SCNC: 4.3 MMOL/L (ref 3.5–5.1)
SODIUM SERPL-SCNC: 138 MMOL/L (ref 136–145)

## 2023-08-24 PROCEDURE — 36415 COLL VENOUS BLD VENIPUNCTURE: CPT | Performed by: HOSPITALIST

## 2023-08-24 PROCEDURE — 80048 BASIC METABOLIC PNL TOTAL CA: CPT | Performed by: HOSPITALIST

## 2023-08-25 ENCOUNTER — OUTPATIENT CASE MANAGEMENT (OUTPATIENT)
Dept: ADMINISTRATIVE | Facility: OTHER | Age: 68
End: 2023-08-25
Payer: MEDICARE

## 2023-08-25 DIAGNOSIS — R06.09 DYSPNEA ON EXERTION: Primary | ICD-10-CM

## 2023-08-25 NOTE — TELEPHONE ENCOUNTER
----- Message from Madiha Tsang sent at 8/25/2023  4:20 PM CDT -----  Contact: Salvador  .Type:  RX Refill Request    Who Called: Abdullahi  Refill or New Rx:Refill  RX Name and Strength:oxyCODONE-acetaminophen (PERCOCET)  mg per tablet  How is the patient currently taking it? (ex. 1XDay):As prescribed  Is this a 30 day or 90 day RX:30  Preferred Pharmacy with phone number:    [id number] - [place] - [address] AT [facility] [address] [facility]  [address] [place] [address]  Phone: [phone] Fax: [phone]      Local or Mail Order:[facility]  Ordering Provider:Nighat Farfan  Would the patient rather a call back or a response via [facility]? Call   Best Call Back Number:[phone]   Additional Information:

## 2023-08-28 ENCOUNTER — HOSPITAL ENCOUNTER (OUTPATIENT)
Dept: RADIOLOGY | Facility: HOSPITAL | Age: 68
Discharge: HOME | End: 2023-08-28
Attending: PHYSICIAN ASSISTANT
Payer: MEDICARE

## 2023-08-28 ENCOUNTER — TELEPHONE (OUTPATIENT)
Dept: NEUROSURGERY | Facility: CLINIC | Age: 68
End: 2023-08-28
Payer: MEDICARE

## 2023-08-28 DIAGNOSIS — M54.9 DORSALGIA, UNSPECIFIED: ICD-10-CM

## 2023-08-28 PROCEDURE — 25500020 PHARM REV CODE 255: Performed by: PHYSICIAN ASSISTANT

## 2023-08-28 PROCEDURE — 72158 MRI LUMBAR SPINE W WO CONTRAST: ICD-10-PCS | Mod: 26,,, | Performed by: RADIOLOGY

## 2023-08-28 PROCEDURE — 72158 MRI LUMBAR SPINE W/O & W/DYE: CPT | Mod: 26,,, | Performed by: RADIOLOGY

## 2023-08-28 PROCEDURE — 72158 MRI LUMBAR SPINE W/O & W/DYE: CPT | Mod: TC

## 2023-08-28 PROCEDURE — A9585 GADOBUTROL INJECTION: HCPCS | Performed by: PHYSICIAN ASSISTANT

## 2023-08-28 RX ORDER — OXYCODONE AND ACETAMINOPHEN 10; 325 MG/1; MG/1
1 TABLET ORAL EVERY 8 HOURS PRN
Qty: 40 TABLET | Refills: 0 | Status: SHIPPED | OUTPATIENT
Start: 2023-08-28 | End: 2023-08-28 | Stop reason: SDUPTHER

## 2023-08-28 RX ORDER — OXYCODONE AND ACETAMINOPHEN 10; 325 MG/1; MG/1
1 TABLET ORAL EVERY 8 HOURS PRN
Qty: 40 TABLET | Refills: 0 | Status: SHIPPED | OUTPATIENT
Start: 2023-08-28 | End: 2023-10-04 | Stop reason: SDUPTHER

## 2023-08-28 RX ORDER — GADOBUTROL 604.72 MG/ML
10 INJECTION INTRAVENOUS
Status: COMPLETED | OUTPATIENT
Start: 2023-08-28 | End: 2023-08-28

## 2023-08-28 RX ORDER — OXYCODONE AND ACETAMINOPHEN 10; 325 MG/1; MG/1
1 TABLET ORAL EVERY 8 HOURS PRN
Qty: 40 TABLET | Refills: 0 | OUTPATIENT
Start: 2023-08-28

## 2023-08-28 RX ADMIN — GADOBUTROL 10 ML: 604.72 INJECTION INTRAVENOUS at 04:08

## 2023-08-28 NOTE — TELEPHONE ENCOUNTER
Spoke with patient, who is requesting written RX. I have informed the patient that this message will be given to the provider. If this prescription is approved he will be contacted.     Patient states that he has a MRI today at 3 PM at [facility].Informed him that I will relay message to the provider

## 2023-08-28 NOTE — TELEPHONE ENCOUNTER
----- Message from Patsy Abarca sent at 8/28/2023  9:16 AM CDT -----  Contact: Geraldine with [facility] phone [phone]  Calling to request a written prescription for Rx oxyCODONE-acetaminophen (PERCOCET)  mg per tablet. [facility] not have it in stock. Please advise. Thanks.

## 2023-08-28 NOTE — TELEPHONE ENCOUNTER
Pt has been informed written RX has been completed and is at the  for pick. Pt verbalized understanding.

## 2023-08-29 ENCOUNTER — TELEPHONE (OUTPATIENT)
Dept: NEUROSURGERY | Facility: CLINIC | Age: 68
End: 2023-08-29
Payer: MEDICARE

## 2023-08-29 NOTE — TELEPHONE ENCOUNTER
Patient calling about pain rx . He has been informed that the prescription is at the [facility] 4th floor. Patient verbalized understanding.

## 2023-08-30 ENCOUNTER — HOSPITAL ENCOUNTER (OUTPATIENT)
Dept: RADIOLOGY | Facility: HOSPITAL | Age: 68
Discharge: HOME | End: 2023-08-30
Attending: HOSPITALIST
Payer: MEDICARE

## 2023-08-30 DIAGNOSIS — R06.09 DYSPNEA ON EXERTION: ICD-10-CM

## 2023-08-30 PROCEDURE — 78582 NM LUNG VENTILATION AND PERFUSION IMAGING: ICD-10-PCS | Mod: 26,,, | Performed by: RADIOLOGY

## 2023-08-30 PROCEDURE — A9567 TECHNETIUM TC-99M AEROSOL: HCPCS

## 2023-08-30 PROCEDURE — 71046 X-RAY EXAM CHEST 2 VIEWS: CPT | Mod: TC

## 2023-08-30 PROCEDURE — 71046 X-RAY EXAM CHEST 2 VIEWS: CPT | Mod: 26,,, | Performed by: RADIOLOGY

## 2023-08-30 PROCEDURE — 71046 XR CHEST PA AND LATERAL: ICD-10-PCS | Mod: 26,,, | Performed by: RADIOLOGY

## 2023-08-30 PROCEDURE — 78582 LUNG VENTILAT&PERFUS IMAGING: CPT | Mod: 26,,, | Performed by: RADIOLOGY

## 2023-09-01 ENCOUNTER — NURSE TRIAGE (OUTPATIENT)
Dept: ADMINISTRATIVE | Facility: CLINIC | Age: 68
End: 2023-09-01
Payer: MEDICARE

## 2023-09-01 ENCOUNTER — TELEPHONE (OUTPATIENT)
Dept: NEUROSURGERY | Facility: CLINIC | Age: 68
End: 2023-09-01
Payer: MEDICARE

## 2023-09-01 NOTE — TELEPHONE ENCOUNTER
----- Message from Arlen Tai sent at 9/1/2023 12:13 PM CDT -----  Name of Who is Calling:pt          What is the request in detail: Pt would like to speak in regards to fluid that he has building up. This is causing numbness to the pts legs and feet on right side. Please assist with this matter      Can the clinic reply by [facility]: yes        What Number to Call Back if not in [facility]: [phone] (home)

## 2023-09-01 NOTE — TELEPHONE ENCOUNTER
Spoke with pt. Pt is scheduled on 09/11 @ 9:30 to discuss results. Message has been forwarded to Yazmin for advise about fluid buildup.

## 2023-09-01 NOTE — TELEPHONE ENCOUNTER
Spoke with pt. Pt is scheduled on 09/11 @ 9:30 to discuss MRI results with provider. Pt wants to know what he can do about his fluid buildup in his back until f/u. Please advise.

## 2023-09-01 NOTE — TELEPHONE ENCOUNTER
Patient states he had a cyst removed on 5/17/23 from L4-L5. Patient states on 7/05/23 he had an additional surgery that removed the hardware from his back and he was placed on a 6 week regimen of antibiotics. Patient states his MRI completed on 8/28/23 has resulted in My Chart and it indicates he has fluid buildup in his back. Patient is requesting a call back from Dr. Brandon Valencia or Yazmin Farfan PA-C.    Patient is requesting a message be sent to Dr. Valencia or Ana M Farfan PA-C for discussion of plan of care to address MRI results.     Patient advised that case encounter will be sent as High Priority to the staff of Dr. Brandon Valencia and Ana Maria Farfan.   Patient states understanding at this time.       Reason for Disposition   Nursing judgment    Additional Information   Negative: New-onset or worsening symptoms, see that protocol (e.g., diarrhea, runny nose, sore throat)   Negative: Medicine question not related to refill or renewal   Negative: Requesting a renewal or refill of a medicine patient is currently taking   Negative: Questions or concerns about high blood pressure   Negative: Nursing judgment   Negative: Nursing judgment    Protocols used: Information Only Call - No Triage-A-[place]

## 2023-09-06 ENCOUNTER — TELEPHONE (OUTPATIENT)
Dept: NEUROSURGERY | Facility: CLINIC | Age: 68
End: 2023-09-06
Payer: MEDICARE

## 2023-09-06 NOTE — TELEPHONE ENCOUNTER
Called patient to discuss MRI findings  MRI shows fluid collection posteriorly  ESR and CRP trending inferiorly  Patient has no other complaints  He has back pain which is controlled with p.o. medication  Denies any headache, denies any nausea vomiting, blurry vision, weakness in arms and legs  Discuss possibly tapping the posterior fluid to send for culture to make sure there is nothing infectious  Patient understands and will keep his scheduled follow-up to discuss further

## 2023-09-07 ENCOUNTER — OFFICE VISIT (OUTPATIENT)
Dept: HOME HEALTH SERVICES | Facility: CLINIC | Age: 68
End: 2023-09-07
Payer: MEDICARE

## 2023-09-07 ENCOUNTER — TELEPHONE (OUTPATIENT)
Dept: INFECTIOUS DISEASES | Facility: CLINIC | Age: 68
End: 2023-09-07

## 2023-09-07 VITALS
DIASTOLIC BLOOD PRESSURE: 85 MMHG | OXYGEN SATURATION: 98 % | TEMPERATURE: 98 F | BODY MASS INDEX: 35.84 KG/M2 | WEIGHT: 242 LBS | HEART RATE: 81 BPM | HEIGHT: 69 IN | SYSTOLIC BLOOD PRESSURE: 124 MMHG

## 2023-09-07 DIAGNOSIS — M54.9 CHRONIC BACK PAIN, UNSPECIFIED BACK LOCATION, UNSPECIFIED BACK PAIN LATERALITY: ICD-10-CM

## 2023-09-07 DIAGNOSIS — Z87.891 FORMER TOBACCO USE: ICD-10-CM

## 2023-09-07 DIAGNOSIS — I35.0 AORTIC VALVE STENOSIS, ETIOLOGY OF CARDIAC VALVE DISEASE UNSPECIFIED: ICD-10-CM

## 2023-09-07 DIAGNOSIS — K21.9 GASTROESOPHAGEAL REFLUX DISEASE, UNSPECIFIED WHETHER ESOPHAGITIS PRESENT: ICD-10-CM

## 2023-09-07 DIAGNOSIS — I42.9 CARDIOMYOPATHY, UNSPECIFIED TYPE: ICD-10-CM

## 2023-09-07 DIAGNOSIS — R26.9 ABNORMALITY OF GAIT AND MOBILITY: ICD-10-CM

## 2023-09-07 DIAGNOSIS — Z85.46 HISTORY OF PROSTATE CANCER: ICD-10-CM

## 2023-09-07 DIAGNOSIS — Z00.00 ENCOUNTER FOR PREVENTIVE HEALTH EXAMINATION: Primary | ICD-10-CM

## 2023-09-07 DIAGNOSIS — I10 PRIMARY HYPERTENSION: Chronic | ICD-10-CM

## 2023-09-07 DIAGNOSIS — I70.0 AORTO-ILIAC ATHEROSCLEROSIS: ICD-10-CM

## 2023-09-07 DIAGNOSIS — T81.49XA POST-OPERATIVE WOUND ABSCESS: Chronic | ICD-10-CM

## 2023-09-07 DIAGNOSIS — F33.1 MODERATE EPISODE OF RECURRENT MAJOR DEPRESSIVE DISORDER: ICD-10-CM

## 2023-09-07 DIAGNOSIS — Z95.2 S/P AVR (AORTIC VALVE REPLACEMENT): ICD-10-CM

## 2023-09-07 DIAGNOSIS — M54.16 LUMBAR RADICULOPATHY, CHRONIC: ICD-10-CM

## 2023-09-07 DIAGNOSIS — I25.10 CORONARY ARTERY DISEASE INVOLVING NATIVE CORONARY ARTERY OF NATIVE HEART WITHOUT ANGINA PECTORIS: Chronic | ICD-10-CM

## 2023-09-07 DIAGNOSIS — N40.0 BENIGN PROSTATIC HYPERPLASIA, UNSPECIFIED WHETHER LOWER URINARY TRACT SYMPTOMS PRESENT: Chronic | ICD-10-CM

## 2023-09-07 DIAGNOSIS — G47.33 OSA ON CPAP: ICD-10-CM

## 2023-09-07 DIAGNOSIS — G89.29 CHRONIC BACK PAIN, UNSPECIFIED BACK LOCATION, UNSPECIFIED BACK PAIN LATERALITY: ICD-10-CM

## 2023-09-07 DIAGNOSIS — I73.9 PAD (PERIPHERAL ARTERY DISEASE): ICD-10-CM

## 2023-09-07 DIAGNOSIS — N25.81 SECONDARY HYPERPARATHYROIDISM OF RENAL ORIGIN: ICD-10-CM

## 2023-09-07 DIAGNOSIS — N18.30 STAGE 3 CHRONIC KIDNEY DISEASE, UNSPECIFIED WHETHER STAGE 3A OR 3B CKD: ICD-10-CM

## 2023-09-07 DIAGNOSIS — I70.8 AORTO-ILIAC ATHEROSCLEROSIS: ICD-10-CM

## 2023-09-07 DIAGNOSIS — E66.01 CLASS 2 SEVERE OBESITY DUE TO EXCESS CALORIES WITH SERIOUS COMORBIDITY AND BODY MASS INDEX (BMI) OF 38.0 TO 38.9 IN ADULT: ICD-10-CM

## 2023-09-07 DIAGNOSIS — J45.40 MODERATE PERSISTENT ASTHMA WITHOUT COMPLICATION: ICD-10-CM

## 2023-09-07 DIAGNOSIS — Z86.19 HISTORY OF HEPATITIS C: ICD-10-CM

## 2023-09-07 PROBLEM — E11.22 TYPE 2 DIABETES MELLITUS WITH STAGE 3 CHRONIC KIDNEY DISEASE, WITHOUT LONG-TERM CURRENT USE OF INSULIN, UNSPECIFIED WHETHER STAGE 3A OR 3B CKD: Status: ACTIVE | Noted: 2023-09-07

## 2023-09-07 PROBLEM — R55 SYNCOPE AND COLLAPSE: Status: RESOLVED | Noted: 2021-07-22 | Resolved: 2023-09-07

## 2023-09-07 PROBLEM — Z01.810 PREOP CARDIOVASCULAR EXAM: Status: RESOLVED | Noted: 2023-04-18 | Resolved: 2023-09-07

## 2023-09-07 PROCEDURE — 3008F BODY MASS INDEX DOCD: CPT | Mod: CPTII,S$GLB,, | Performed by: NURSE PRACTITIONER

## 2023-09-07 PROCEDURE — 3079F DIAST BP 80-89 MM HG: CPT | Mod: CPTII,S$GLB,, | Performed by: NURSE PRACTITIONER

## 2023-09-07 PROCEDURE — 1170F FXNL STATUS ASSESSED: CPT | Mod: CPTII,S$GLB,, | Performed by: NURSE PRACTITIONER

## 2023-09-07 PROCEDURE — 1160F RVW MEDS BY RX/DR IN RCRD: CPT | Mod: CPTII,S$GLB,, | Performed by: NURSE PRACTITIONER

## 2023-09-07 PROCEDURE — 1170F PR FUNCTIONAL STATUS ASSESSED: ICD-10-PCS | Mod: CPTII,S$GLB,, | Performed by: NURSE PRACTITIONER

## 2023-09-07 PROCEDURE — 1160F PR REVIEW ALL MEDS BY PRESCRIBER/CLIN PHARMACIST DOCUMENTED: ICD-10-PCS | Mod: CPTII,S$GLB,, | Performed by: NURSE PRACTITIONER

## 2023-09-07 PROCEDURE — G0439 PR MEDICARE ANNUAL WELLNESS SUBSEQUENT VISIT: ICD-10-PCS | Mod: S$GLB,,, | Performed by: NURSE PRACTITIONER

## 2023-09-07 PROCEDURE — 3074F SYST BP LT 130 MM HG: CPT | Mod: CPTII,S$GLB,, | Performed by: NURSE PRACTITIONER

## 2023-09-07 PROCEDURE — 1159F MED LIST DOCD IN RCRD: CPT | Mod: CPTII,S$GLB,, | Performed by: NURSE PRACTITIONER

## 2023-09-07 PROCEDURE — 3074F PR MOST RECENT SYSTOLIC BLOOD PRESSURE < 130 MM HG: ICD-10-PCS | Mod: CPTII,S$GLB,, | Performed by: NURSE PRACTITIONER

## 2023-09-07 PROCEDURE — 1125F AMNT PAIN NOTED PAIN PRSNT: CPT | Mod: CPTII,S$GLB,, | Performed by: NURSE PRACTITIONER

## 2023-09-07 PROCEDURE — 1101F PR PT FALLS ASSESS DOC 0-1 FALLS W/OUT INJ PAST YR: ICD-10-PCS | Mod: CPTII,S$GLB,, | Performed by: NURSE PRACTITIONER

## 2023-09-07 PROCEDURE — 3079F PR MOST RECENT DIASTOLIC BLOOD PRESSURE 80-89 MM HG: ICD-10-PCS | Mod: CPTII,S$GLB,, | Performed by: NURSE PRACTITIONER

## 2023-09-07 PROCEDURE — 3008F PR BODY MASS INDEX (BMI) DOCUMENTED: ICD-10-PCS | Mod: CPTII,S$GLB,, | Performed by: NURSE PRACTITIONER

## 2023-09-07 PROCEDURE — 1101F PT FALLS ASSESS-DOCD LE1/YR: CPT | Mod: CPTII,S$GLB,, | Performed by: NURSE PRACTITIONER

## 2023-09-07 PROCEDURE — 3288F PR FALLS RISK ASSESSMENT DOCUMENTED: ICD-10-PCS | Mod: CPTII,S$GLB,, | Performed by: NURSE PRACTITIONER

## 2023-09-07 PROCEDURE — 1125F PR PAIN SEVERITY QUANTIFIED, PAIN PRESENT: ICD-10-PCS | Mod: CPTII,S$GLB,, | Performed by: NURSE PRACTITIONER

## 2023-09-07 PROCEDURE — 1159F PR MEDICATION LIST DOCUMENTED IN MEDICAL RECORD: ICD-10-PCS | Mod: CPTII,S$GLB,, | Performed by: NURSE PRACTITIONER

## 2023-09-07 PROCEDURE — G0439 PPPS, SUBSEQ VISIT: HCPCS | Mod: S$GLB,,, | Performed by: NURSE PRACTITIONER

## 2023-09-07 PROCEDURE — 3288F FALL RISK ASSESSMENT DOCD: CPT | Mod: CPTII,S$GLB,, | Performed by: NURSE PRACTITIONER

## 2023-09-07 RX ORDER — TIZANIDINE 4 MG/1
1 TABLET ORAL 2 TIMES DAILY PRN
COMMUNITY

## 2023-09-07 RX ORDER — ESOMEPRAZOLE MAGNESIUM 40 MG/1
40 CAPSULE, DELAYED RELEASE ORAL
COMMUNITY

## 2023-09-07 NOTE — PATIENT INSTRUCTIONS
Counseling and Referral of Other Preventative  (Italic type indicates deductible and co-insurance are waived)    Patient Name: Abdullahi Urias  Today's Date: 9/7/2023    Health Maintenance       Date Due Completion Date    Hemoglobin A1c (Diabetic Prevention Screening) 07/13/2020 7/13/2017    Shingles Vaccine (2 of 2) 06/14/2023 4/19/2023    Influenza Vaccine (1) 09/01/2023 10/7/2022    COVID-19 Vaccine (6 - Pfizer series) 10/30/2023 (Originally 4/6/2023) 12/6/2022    High Dose Statin 08/19/2024 8/19/2023    Lipid Panel 01/18/2027 1/18/2022    Colorectal Cancer Screening 02/24/2028 2/24/2021    TETANUS VACCINE 10/19/2032 10/19/2022        No orders of the defined types were placed in this encounter.      The following information is provided to all patients.  This information is to help you find resources for any of the problems found today that may be affecting your health:                Living healthy guide: www.[facility].[place].gov      Understanding Diabetes: www.diabetes.org      Eating healthy: www.cdc.gov/healthyweight      CDC home safety checklist: www.cdc.gov/steadi/patient.html      Agency on Aging: www.goea.[place].gov      Alcoholics anonymous (AA): www.aa.org      Physical Activity: www.kashif.nih.gov/pl4mirr      Tobacco use: www.quitwithusla.org

## 2023-09-07 NOTE — Clinical Note
Medicare awv complete. Health maintenance:  shingles 2nd vaccine and flu vaccine due-encouraged pt to obtain at a pharmacy.

## 2023-09-07 NOTE — PROGRESS NOTES
"Abdullahi Urias presented for Medicare AWV today. The following components were reviewed and updated:    Medical history  Family History  Social history  Allergies and Current Medications  Health Risk Assessment  Health Maintenance  Care Team    **See Completed Assessments for Annual Wellness visit with in the encounter summary    The following assessments were completed:  Depression Screening  Cognitive function Screening      Timed Get Up Test  Whisper Test    Vitals:    09/07/23 0950   BP: 124/85   Pulse: 81   Temp: 97.9 °F (36.6 °C)   TempSrc: Temporal   SpO2: 98%   Weight: 109.8 kg (242 lb)   Height: 5' 9" (1.753 m)     Body mass index is 35.74 kg/m².   ]    Physical Exam  Vitals reviewed.   Constitutional:       Appearance: Normal appearance. He is obese.   HENT:      Head: Normocephalic and atraumatic.   Cardiovascular:      Rate and Rhythm: Normal rate and regular rhythm.      Pulses:           Dorsalis pedis pulses are 1+ on the right side and 1+ on the left side.        Posterior tibial pulses are 1+ on the right side and 1+ on the left side.      Heart sounds: Normal heart sounds.   Pulmonary:      Effort: Pulmonary effort is normal.      Breath sounds: Normal breath sounds.   Musculoskeletal:         General: Normal range of motion.      Cervical back: Normal range of motion and neck supple.   Skin:     General: Skin is warm and dry.      Comments: Healed incision to the midline lower back with slight swelling. No drainage or redness.    Neurological:      Mental Status: He is alert and oriented to person, place, and time.      Motor: Weakness (legs and worse on the right) present.      Gait: Gait abnormal.   Psychiatric:         Mood and Affect: Mood normal.         Behavior: Behavior normal.          Outpatient Medications Marked as Taking for the 9/7/23 encounter (Office Visit) with Orin Browning FNP   Medication Sig Dispense Refill    albuterol (PROVENTIL/VENTOLIN HFA) 90 mcg/actuation inhaler " INHALE 2 PUFFS INTO THE LUNGS EVERY 6 HOURS AS NEEDED FOR COUGH 8.5 g 3    allopurinoL (ZYLOPRIM) 100 MG tablet Take 100 mg by mouth once daily. Takes 0.5 tab      ALPRAZolam (XANAX) 1 MG tablet Take 1 mg by mouth Daily. EVERY OTHER DAY      aluminum & magnesium hydroxide-simethicone (MYLANTA MAX STRENGTH) 400-400-40 mg/5 mL suspension TAKE 15ML BY MOUTH FOUR TIMES A DAY AS NEEDED FOR STOMACH ACID      aspirin (ECOTRIN) 81 MG EC tablet Take 1 tablet (81 mg total) by mouth once daily. 90 tablet 3    atorvastatin (LIPITOR) 80 MG tablet TAKE ONE-HALF TABLET BY MOUTH EVERY DAY FOR CHOLESTEROL      carvediloL (COREG) 12.5 MG tablet Take 1 tablet (12.5 mg total) by mouth 2 (two) times daily. 60 tablet 11    clopidogreL (PLAVIX) 75 mg tablet Take 1 tablet (75 mg total) by mouth once daily. 30 tablet 11    docusate sodium (COLACE) 100 MG capsule Take 1 capsule (100 mg total) by mouth 2 (two) times daily. 20 capsule 0    esomeprazole (NEXIUM) 40 MG capsule Take 40 mg by mouth before breakfast.      famotidine (PEPCID) 40 MG tablet TAKE ONE TABLET BY MOUTH AT BEDTIME FOR ACID REFLUX      fluticasone propionate (FLONASE) 50 mcg/actuation nasal spray 1 spray (50 mcg total) by Each Nostril route once daily. 16 g 0    fluticasone-umeclidin-vilanter (TRELEGY ELLIPTA) 200-62.5-25 mcg inhaler INHALE 1 PUFF INTO THE LUNGS EVERY DAY 60 each 3    gabapentin (NEURONTIN) 600 MG tablet Take 1 tablet (600 mg total) by mouth 3 (three) times daily. 90 tablet 11    hydrocortisone 2.5 % cream Apply topically 2 (two) times daily as needed. Apply to affected areas of the face and neck. 30 g 2    hydrOXYzine HCL (ATARAX) 25 MG tablet Take 1 tablet (25 mg total) by mouth 3 (three) times daily as needed for Itching. 30 tablet 0    losartan (COZAAR) 50 MG tablet Take 1 tablet (50 mg total) by mouth once daily. 30 tablet 11    multivitamin (THERAGRAN) per tablet Take 1 tablet by mouth once daily.      oxyCODONE-acetaminophen (PERCOCET)  mg per  tablet Take 1 tablet by mouth every 8 (eight) hours as needed for Pain. 40 tablet 0    sertraline (ZOLOFT) 100 MG tablet TAKE TWO TABLETS BY MOUTH EVERY DAY FOR MENTAL HEALTH DOSE INCREASED TO 200MG/DAY      tiZANidine (ZANAFLEX) 4 MG tablet Take 1 capsule by mouth 2 (two) times daily as needed (muscle spasms).      [DISCONTINUED] triamcinolone acetonide 0.1% (KENALOG) 0.1 % cream Apply topically 2 (two) times daily. 80 g 1        Diagnoses and health risks identified today and associated recommendations/orders:  1. Encounter for preventive health examination  Medicare awv complete. Health maintenance:  shingles 2nd vaccine and flu vaccine due-encouraged pt to obtain at a pharmacy.     2. Aorto-iliac atherosclerosis  Chronic and stable. Continue current management. See med list above. Follow up with PCP.     3. Stage 3 chronic kidney disease, unspecified whether stage 3a or 3b CKD   Latest Reference Range & Units 08/25/22 21:47 01/14/23 20:35 01/24/23 17:29 01/25/23 05:54 01/26/23 08:34 04/13/23 15:51 05/18/23 06:00 05/19/23 05:29 07/03/23 11:23 07/05/23 04:31 07/06/23 02:59 07/07/23 04:28 07/08/23 06:19 07/09/23 05:37 07/10/23 10:43 08/16/23 09:15 08/24/23 14:43   eGFR >60 mL/min/1.73 m^2 44 ! 44 ! 44 ! 47 ! 55 ! 50.7 ! 51 ! 44 ! 47 ! 38 ! 47 ! 47 ! 51 ! 44 ! 51 ! >60.0 46.9 !   !: Data is abnormal  Chronic and stable. Continue current management. Recommend avoid nsaids and other nephrotoxic medications. Follow up with PCP/nephrologist.      4. Moderate persistent asthma without complication  Chronic and stable. Continue current management. See med list above. Follow up with PCP.     5. Moderate episode of recurrent major depressive disorder  Chronic and stable. Continue current management. On zoloft. See med list above. Follow up with PCP.     6. Cardiomyopathy, unspecified type  Chronic and stable. Continue current management. See med list above. Follow up with cardiology.      8. Secondary hyperparathyroidism of  renal origin  Lab Results   Component Value Date    .2 (H) 06/02/2022    CALCIUM 9.3 08/24/2023    PHOS 3.4 06/02/2022    Chronic and stable. Continue current management. See med list above. Follow up with PCP.     9. PAD (peripheral artery disease)  Chronic and stable on statin medication. Continue current. F/u with pcp.      10. Class 2 severe obesity due to excess calories with serious comorbidity and body mass index (BMI) of 38.0 to 38.9 in adult  Recommend diet and exercise to lose weight. Follow up with your PCP as planned to discuss adjustments to your treatment plan.      11. History of prostate cancer  S/p treatment. F/u with urology.     12. Lumbar radiculopathy, chronic  Patient on chronic opioids. Oxycodone.   Risk factors reviewed for any potential opioid use disorder   Pain evaluated during visit.  Current treatment plan documented.  Will refer to specialist, as appropriate.       13. Coronary artery disease involving native coronary artery of native heart without angina pectoris  Chronic and stable. Continue current management. See med list above. Follow up with cardiology.      14. Primary hypertension  Chronic and stable on BP medication.  Continue current management.  See med list above. Recommend low sodium diet. Follow up with PCP.       15. S/P AVR (aortic valve replacement) biopresthetic miller 25 mm in 2014   Chronic and stable. Continue current management. See med list above. Follow up with cardiology.     17. Benign prostatic hyperplasia, unspecified whether lower urinary tract symptoms present  Chronic and stable. Continue current management. See med list above. Follow up with PCP.     18. Post-operative wound abscess  Message sent to ID Dr. Lemus regarding mri on 8/28/23 results. He states he has discussed with neurosurgery obtaining drainage and culture if able at neurosurgery visit on mon 9/11/23.     19. History of hepatitis C  S/p treatment.     20. Gastroesophageal reflux  disease, unspecified whether esophagitis present  Chronic and stable. Continue current management. See med list above. Follow up with PCP.     21. Chronic back pain, unspecified back location, unspecified back pain laterality  Patient on chronic opioids.   Risk factors reviewed for any potential opioid use disorder   Pain evaluated during visit.  Current treatment plan documented.  Will refer to specialist, as appropriate.       22. CHEO on CPAP  Chronic . Discussed pt using cpap every night. Follow up with pulmonology.     23. Former tobacco use  No acute issues. Total  of 8 pack year smoking history.     24. Abnormality of gait and mobility  Chronic. Fall precautions recommended and discussed. Follow up with PCP.         Latest Reference Range & Units 10/23/07 10:09   Hemoglobin A1C External 4.5 - 6.2 % 5.2   No diabetes. Upcoming hgba1c ordered.         Provided Abdullahi with a 5-10 year written screening schedule and personal prevention plan. Recommendations were developed using the USPSTF age appropriate recommendations. Education, counseling, and referrals were provided as needed.  After Visit Summary printed and given to patient which includes a list of additional screenings\tests needed.    Follow up in about 1 year (around 9/7/2024) for annual wellness visit.      ANSHU Madrid    I offered to discuss advanced care planning, including how to pick a person who would make decisions for you if you were unable to make them for yourself, called a health care power of , and what kind of decisions you might make such as use of life sustaining treatments such as ventilators and tube feeding when faced with a life limiting illness recorded on a living will that they will need to know. (How you want to be cared for as you near the end of your natural life)     X  Patient is unwilling to engage in a discussion regarding advance directives at this time.

## 2023-09-07 NOTE — TELEPHONE ENCOUNTER
----- Message from ANSHU Madrid sent at 9/7/2023 10:48 AM CDT -----  Hi,   I'm seeing pt for medicare annual wellness visit at home. Mr. Urias had an mri on 8/28/23. It showed Increase in size of large posterior subcutaneous fluid collection.  Possible postoperative hematoma, seroma, or abscess. No fever or chills. He has an appointment with neurosurgery on Monday 9/11/23 and he states they may try to drain it.   Thank you,   Rose Marie DILLON, NP

## 2023-09-08 ENCOUNTER — PATIENT MESSAGE (OUTPATIENT)
Dept: INTERNAL MEDICINE | Facility: CLINIC | Age: 68
End: 2023-09-08
Payer: MEDICARE

## 2023-09-11 ENCOUNTER — OFFICE VISIT (OUTPATIENT)
Dept: NEUROSURGERY | Facility: CLINIC | Age: 68
End: 2023-09-11
Payer: MEDICARE

## 2023-09-11 ENCOUNTER — PATIENT MESSAGE (OUTPATIENT)
Dept: NEUROSURGERY | Facility: CLINIC | Age: 68
End: 2023-09-11

## 2023-09-11 VITALS
BODY MASS INDEX: 36.63 KG/M2 | SYSTOLIC BLOOD PRESSURE: 100 MMHG | DIASTOLIC BLOOD PRESSURE: 65 MMHG | HEART RATE: 80 BPM | WEIGHT: 248 LBS

## 2023-09-11 DIAGNOSIS — Z98.890 STATUS POST LUMBAR SURGERY: ICD-10-CM

## 2023-09-11 DIAGNOSIS — M54.16 LUMBAR RADICULOPATHY, CHRONIC: ICD-10-CM

## 2023-09-11 DIAGNOSIS — T88.8XXD FLUID COLLECTION AT SURGICAL SITE, SUBSEQUENT ENCOUNTER: Primary | ICD-10-CM

## 2023-09-11 DIAGNOSIS — Z48.89 ENCOUNTER FOR POSTOPERATIVE WOUND CHECK: ICD-10-CM

## 2023-09-11 DIAGNOSIS — Z98.890 STATUS POST LUMBAR SURGERY: Primary | ICD-10-CM

## 2023-09-11 DIAGNOSIS — T88.8XXD FLUID COLLECTION AT SURGICAL SITE, SUBSEQUENT ENCOUNTER: ICD-10-CM

## 2023-09-11 PROCEDURE — 99024 PR POST-OP FOLLOW-UP VISIT: ICD-10-PCS | Mod: HCNC,S$GLB,, | Performed by: PHYSICIAN ASSISTANT

## 2023-09-11 PROCEDURE — 3078F DIAST BP <80 MM HG: CPT | Mod: HCNC,CPTII,S$GLB, | Performed by: PHYSICIAN ASSISTANT

## 2023-09-11 PROCEDURE — 3288F PR FALLS RISK ASSESSMENT DOCUMENTED: ICD-10-PCS | Mod: HCNC,CPTII,S$GLB, | Performed by: PHYSICIAN ASSISTANT

## 2023-09-11 PROCEDURE — 1101F PR PT FALLS ASSESS DOC 0-1 FALLS W/OUT INJ PAST YR: ICD-10-PCS | Mod: HCNC,CPTII,S$GLB, | Performed by: PHYSICIAN ASSISTANT

## 2023-09-11 PROCEDURE — 99999 PR PBB SHADOW E&M-EST. PATIENT-LVL IV: ICD-10-PCS | Mod: PBBFAC,HCNC,, | Performed by: PHYSICIAN ASSISTANT

## 2023-09-11 PROCEDURE — 1125F AMNT PAIN NOTED PAIN PRSNT: CPT | Mod: HCNC,CPTII,S$GLB, | Performed by: PHYSICIAN ASSISTANT

## 2023-09-11 PROCEDURE — 3008F PR BODY MASS INDEX (BMI) DOCUMENTED: ICD-10-PCS | Mod: HCNC,CPTII,S$GLB, | Performed by: PHYSICIAN ASSISTANT

## 2023-09-11 PROCEDURE — 99999 PR PBB SHADOW E&M-EST. PATIENT-LVL IV: CPT | Mod: PBBFAC,HCNC,, | Performed by: PHYSICIAN ASSISTANT

## 2023-09-11 PROCEDURE — 1159F MED LIST DOCD IN RCRD: CPT | Mod: HCNC,CPTII,S$GLB, | Performed by: PHYSICIAN ASSISTANT

## 2023-09-11 PROCEDURE — 1159F PR MEDICATION LIST DOCUMENTED IN MEDICAL RECORD: ICD-10-PCS | Mod: HCNC,CPTII,S$GLB, | Performed by: PHYSICIAN ASSISTANT

## 2023-09-11 PROCEDURE — 3078F PR MOST RECENT DIASTOLIC BLOOD PRESSURE < 80 MM HG: ICD-10-PCS | Mod: HCNC,CPTII,S$GLB, | Performed by: PHYSICIAN ASSISTANT

## 2023-09-11 PROCEDURE — 3074F PR MOST RECENT SYSTOLIC BLOOD PRESSURE < 130 MM HG: ICD-10-PCS | Mod: HCNC,CPTII,S$GLB, | Performed by: PHYSICIAN ASSISTANT

## 2023-09-11 PROCEDURE — 1101F PT FALLS ASSESS-DOCD LE1/YR: CPT | Mod: HCNC,CPTII,S$GLB, | Performed by: PHYSICIAN ASSISTANT

## 2023-09-11 PROCEDURE — 3074F SYST BP LT 130 MM HG: CPT | Mod: HCNC,CPTII,S$GLB, | Performed by: PHYSICIAN ASSISTANT

## 2023-09-11 PROCEDURE — 1125F PR PAIN SEVERITY QUANTIFIED, PAIN PRESENT: ICD-10-PCS | Mod: HCNC,CPTII,S$GLB, | Performed by: PHYSICIAN ASSISTANT

## 2023-09-11 PROCEDURE — 3008F BODY MASS INDEX DOCD: CPT | Mod: HCNC,CPTII,S$GLB, | Performed by: PHYSICIAN ASSISTANT

## 2023-09-11 PROCEDURE — 99024 POSTOP FOLLOW-UP VISIT: CPT | Mod: HCNC,S$GLB,, | Performed by: PHYSICIAN ASSISTANT

## 2023-09-11 PROCEDURE — 3288F FALL RISK ASSESSMENT DOCD: CPT | Mod: HCNC,CPTII,S$GLB, | Performed by: PHYSICIAN ASSISTANT

## 2023-09-11 NOTE — H&P (VIEW-ONLY)
Subjective:      Patient ID: Abdullahi Urias is a 67 y.o. male.    HPI  The patient is here today for postop assessment.  The patient does have pain and numbness radiating down RLE- from lower back down back of his leg to his foot.  He does not have any issues with LLE.  Takes Percocet and dane relaxer as needed.  Rates his pain 8/10 today.  No longer using assistive device.    Prev note: 8/4/23  The patient is here today for postop assessment.  Patient still c/o pain and burning in R buttock and hip, however reports it is better.  Denies fevers, positional HA, chest pain, worsening shortness of breath, bladder/bowel changes .  No recent falls.  He has no issues with LLE.  Currently has HH and completes labs weekly. Has not followed up with ID yet.   Dressing is changed weekly. Denies drainage(wound).  Has PT/OT at home twice weekly.   Amb with cane.   PICC to LUE.     Date of Procedure: 7/5/2023    Procedure: Procedure(s) (LRB):  EXPLORATION, WOUND (Bilateral)  WASHOUT (N/A)  REMOVAL, HARDWARE, SPINE (N/A)  INSERTION (N/A)  REPLACEMENT (N/A)     Operative Findings (including complications, if any): postoperative fluid collection sent for culture and analysis.  Removal and replacement of set screws right sided L4 L5 S1    Objective:     Body mass index is 36.63 kg/m².  Vitals:    09/11/23 0938   BP: 100/65   Pulse: 80        Incision- no pain on exam. Mild swelling. No erythema.    Back:  None  Paraspinal muscle spasms   None  Pain with flexion and extention   WNL  Range of motion    Neg right Straight leg raise     Motor   Right Right Left Left  Level Group   5  5  L2 Hip flexor (Psoas)   5  5  L3 Leg extension (Quads)   5  5  L4 Dorsiflexion & foot inversion (Tibialis Anterior)   5  5  L5 Great toe extension ( EHL)   5  5  S1 Foot eversion (Gastroc, PL & PB)     Sensation  NL Decreased (R/L/BL) Level Sensation    X  L2 Anterio-medial thigh   X  L3 Medial thigh around knee   X  L4 Medial foot   X  L5 Dorsum foot   X   S1 Lateral foot         MRI Lumbar-  FINDINGS:  Note is again made of postoperative changes of posterior pedicle screw fusion and laminectomy from L4-S1.  Interbody cages in place at L4-5 and L5-S1.  Soft tissue thickening and mild patchy enhancement in the operative bed has decreased compared to prior consistent with resolving postoperative edema or reactive change. There is a large oval thinly septated collection of fluid again identified in the overlying posterior subcutaneous tissues extending from L2-3 superiorly and to the mid sacral level distally and slightly beyond the imaging field of view.  Collection has increased in size currently measuring 14.4 cm craniocaudal and 8.8 x 4.6 cm transverse.  Contiguous fluid is seen to partially encompass the right-sided pedicle screws at L5 and S1.     Alignment: Stable.  Minimal grade 1 retrolisthesis at L5-S1.     Vertebrae: No fracture or marrow replacement process.     Discs: Unremarkable T12-L1 to L3-4.     Cord: Normal.  Conus terminates at L1.     Degenerative findings:     T12-L1: No significant neural foraminal narrowing or spinal canal stenosis.     L1-L2: Minimal annular disc bulge and right greater than left facet arthropathy.  No significant neural foraminal narrowing or spinal canal stenosis.     L2-L3: Minimal annular disc bulge and bilateral facet arthropathy.  No significant neural foraminal narrowing or spinal canal stenosis.     L3-L4: Minimal annular disc bulge and slight flattening of the ventral thecal sac.  Bilateral facet arthropathy.  No significant neural foraminal narrowing or spinal canal stenosis.     L4-L5: Limited evaluation due to metallic susceptibility artifact.  Disc bulge and facet arthropathy with resultant bilateral inferior foraminal compromise.  No significant spinal canal stenosis.     L5-S1: Limited evaluation due to metallic susceptibility artifact.  Posterior marginal osteophyte and facet hypertrophy with suspected right  greater than left foraminal compromise.  No significant spinal canal stenosis.     Paraspinal muscles & soft tissues: Unremarkable.  No abnormal enhancement.     Impression:     1. Postoperative changes of posterior fusion from L4-S1.  2. Increase in size of large posterior subcutaneous fluid collection.  Possible postoperative hematoma, seroma, or abscess.  3. Multilevel lumbar spondylosis and degenerative disc disease as detailed.          Lab Results   Component Value Date    WBC 4.33 08/16/2023    HCT 29.0 (L) 08/16/2023     INDEPENDENT INTERPRETATION OF TEST:  Relevant imaging results reviewed and interpreted by me, discussed with the patient and / or family today.  Assessment:     1. Status post lumbar surgery    2. Encounter for postoperative wound check    3. Lumbar radiculopathy, chronic    4. Fluid collection at surgical site, subsequent encounter      Plan:     Status post lumbar surgery    Encounter for postoperative wound check    Lumbar radiculopathy, chronic    Fluid collection at surgical site, subsequent encounter        Discussed MRI findings of lumbar spine, including pathology and treatment options.  Patient made aware of posterior lumbar subcutaneous fluid collection.    Will request assistance from IR for drainage and aspiration with cultures.  ID is also following patient and is in agreement with this plan.    Order placed for US guided aspiration. IR will contact patient this week, possibly plan procedure for tomorrow.  Patient made aware.  Will continue to monitor.    Yazmin Farfan PA-C  [facility]

## 2023-09-11 NOTE — PROGRESS NOTES
Subjective:      Patient ID: Abdullahi Urias is a 67 y.o. male.    HPI  The patient is here today for postop assessment.  The patient does have pain and numbness radiating down RLE- from lower back down back of his leg to his foot.  He does not have any issues with LLE.  Takes Percocet and dane relaxer as needed.  Rates his pain 8/10 today.  No longer using assistive device.    Prev note: 8/4/23  The patient is here today for postop assessment.  Patient still c/o pain and burning in R buttock and hip, however reports it is better.  Denies fevers, positional HA, chest pain, worsening shortness of breath, bladder/bowel changes .  No recent falls.  He has no issues with LLE.  Currently has HH and completes labs weekly. Has not followed up with ID yet.   Dressing is changed weekly. Denies drainage(wound).  Has PT/OT at home twice weekly.   Amb with cane.   PICC to LUE.     Date of Procedure: 7/5/2023    Procedure: Procedure(s) (LRB):  EXPLORATION, WOUND (Bilateral)  WASHOUT (N/A)  REMOVAL, HARDWARE, SPINE (N/A)  INSERTION (N/A)  REPLACEMENT (N/A)     Operative Findings (including complications, if any): postoperative fluid collection sent for culture and analysis.  Removal and replacement of set screws right sided L4 L5 S1    Objective:     Body mass index is 36.63 kg/m².  Vitals:    09/11/23 0938   BP: 100/65   Pulse: 80        Incision- no pain on exam. Mild swelling. No erythema.    Back:  None  Paraspinal muscle spasms   None  Pain with flexion and extention   WNL  Range of motion    Neg right Straight leg raise     Motor   Right Right Left Left  Level Group   5  5  L2 Hip flexor (Psoas)   5  5  L3 Leg extension (Quads)   5  5  L4 Dorsiflexion & foot inversion (Tibialis Anterior)   5  5  L5 Great toe extension ( EHL)   5  5  S1 Foot eversion (Gastroc, PL & PB)     Sensation  NL Decreased (R/L/BL) Level Sensation    X  L2 Anterio-medial thigh   X  L3 Medial thigh around knee   X  L4 Medial foot   X  L5 Dorsum foot   X   S1 Lateral foot         MRI Lumbar-  FINDINGS:  Note is again made of postoperative changes of posterior pedicle screw fusion and laminectomy from L4-S1.  Interbody cages in place at L4-5 and L5-S1.  Soft tissue thickening and mild patchy enhancement in the operative bed has decreased compared to prior consistent with resolving postoperative edema or reactive change. There is a large oval thinly septated collection of fluid again identified in the overlying posterior subcutaneous tissues extending from L2-3 superiorly and to the mid sacral level distally and slightly beyond the imaging field of view.  Collection has increased in size currently measuring 14.4 cm craniocaudal and 8.8 x 4.6 cm transverse.  Contiguous fluid is seen to partially encompass the right-sided pedicle screws at L5 and S1.     Alignment: Stable.  Minimal grade 1 retrolisthesis at L5-S1.     Vertebrae: No fracture or marrow replacement process.     Discs: Unremarkable T12-L1 to L3-4.     Cord: Normal.  Conus terminates at L1.     Degenerative findings:     T12-L1: No significant neural foraminal narrowing or spinal canal stenosis.     L1-L2: Minimal annular disc bulge and right greater than left facet arthropathy.  No significant neural foraminal narrowing or spinal canal stenosis.     L2-L3: Minimal annular disc bulge and bilateral facet arthropathy.  No significant neural foraminal narrowing or spinal canal stenosis.     L3-L4: Minimal annular disc bulge and slight flattening of the ventral thecal sac.  Bilateral facet arthropathy.  No significant neural foraminal narrowing or spinal canal stenosis.     L4-L5: Limited evaluation due to metallic susceptibility artifact.  Disc bulge and facet arthropathy with resultant bilateral inferior foraminal compromise.  No significant spinal canal stenosis.     L5-S1: Limited evaluation due to metallic susceptibility artifact.  Posterior marginal osteophyte and facet hypertrophy with suspected right  greater than left foraminal compromise.  No significant spinal canal stenosis.     Paraspinal muscles & soft tissues: Unremarkable.  No abnormal enhancement.     Impression:     1. Postoperative changes of posterior fusion from L4-S1.  2. Increase in size of large posterior subcutaneous fluid collection.  Possible postoperative hematoma, seroma, or abscess.  3. Multilevel lumbar spondylosis and degenerative disc disease as detailed.          Lab Results   Component Value Date    WBC 4.33 08/16/2023    HCT 29.0 (L) 08/16/2023     INDEPENDENT INTERPRETATION OF TEST:  Relevant imaging results reviewed and interpreted by me, discussed with the patient and / or family today.  Assessment:     1. Status post lumbar surgery    2. Encounter for postoperative wound check    3. Lumbar radiculopathy, chronic    4. Fluid collection at surgical site, subsequent encounter      Plan:     Status post lumbar surgery    Encounter for postoperative wound check    Lumbar radiculopathy, chronic    Fluid collection at surgical site, subsequent encounter        Discussed MRI findings of lumbar spine, including pathology and treatment options.  Patient made aware of posterior lumbar subcutaneous fluid collection.    Will request assistance from IR for drainage and aspiration with cultures.  ID is also following patient and is in agreement with this plan.    Order placed for US guided aspiration. IR will contact patient this week, possibly plan procedure for tomorrow.  Patient made aware.  Will continue to monitor.    Yazmin Farfan PA-C  [facility]

## 2023-09-20 ENCOUNTER — TELEPHONE (OUTPATIENT)
Dept: RADIOLOGY | Facility: HOSPITAL | Age: 68
End: 2023-09-20
Payer: MEDICARE

## 2023-09-21 ENCOUNTER — TELEPHONE (OUTPATIENT)
Dept: RADIOLOGY | Facility: HOSPITAL | Age: 68
End: 2023-09-21
Payer: MEDICARE

## 2023-09-21 NOTE — TELEPHONE ENCOUNTER
Interventional Radiology:    Spoke to pt and got him scheduled for 10/2 @ 9:30am.     Informed pt to be NPO after midnight, show up to [facility] on [address] at 8am, either have a ride with them or have a phone number for the person driving them home so that we can get in contact with them to keep them updated. Answered all questions that the pt had and pt verbalized understanding of all discussed.

## 2023-09-29 ENCOUNTER — TELEPHONE (OUTPATIENT)
Dept: RADIOLOGY | Facility: HOSPITAL | Age: 68
End: 2023-09-29
Payer: MEDICARE

## 2023-09-29 NOTE — TELEPHONE ENCOUNTER
Pre-procedure phone call made at this time. Informed pt to be NPO after midnight, show up to [facility] on [address] at 0900, either have a ride with them or have a phone number for the person driving them home so that we can get in contact with them to keep them updated. Pt last took ASA and plavix 9/29. Pt instructed to stop ASA and plavix as of today. ERIK Wood reviewed case and approved to continue with procedure. Pt denies use of other blood thinners, fish oils, and NSAIDS. Answered all questions that the pt had and pt verbalized understanding of all discussed.

## 2023-10-02 ENCOUNTER — HOSPITAL ENCOUNTER (OUTPATIENT)
Dept: RADIOLOGY | Facility: HOSPITAL | Age: 68
Discharge: HOME | End: 2023-10-02
Attending: PHYSICIAN ASSISTANT
Payer: MEDICARE

## 2023-10-02 DIAGNOSIS — M96.843 POSTPROCEDURAL SEROMA OF A MUSCULOSKELETAL STRUCTURE FOLLOWING OTHER PROCEDURE: Primary | ICD-10-CM

## 2023-10-02 DIAGNOSIS — Z98.890 STATUS POST LUMBAR SURGERY: ICD-10-CM

## 2023-10-02 DIAGNOSIS — M96.1 POSTLAMINECTOMY SYNDROME OF LUMBAR REGION: ICD-10-CM

## 2023-10-02 DIAGNOSIS — T88.8XXD FLUID COLLECTION AT SURGICAL SITE, SUBSEQUENT ENCOUNTER: ICD-10-CM

## 2023-10-02 LAB
GRAM STN SPEC: NORMAL
GRAM STN SPEC: NORMAL

## 2023-10-02 PROCEDURE — 75989 US GUIDED ABSCESS CYST ASPIRATION: ICD-10-PCS | Mod: 26,HCNC,, | Performed by: RADIOLOGY

## 2023-10-02 PROCEDURE — 86335 IMMUNFIX E-PHORSIS/URINE/CSF: CPT | Mod: HCNC | Performed by: PHYSICIAN ASSISTANT

## 2023-10-02 PROCEDURE — 87205 SMEAR GRAM STAIN: CPT | Mod: HCNC | Performed by: PHYSICIAN ASSISTANT

## 2023-10-02 PROCEDURE — 87070 CULTURE OTHR SPECIMN AEROBIC: CPT | Mod: HCNC | Performed by: PHYSICIAN ASSISTANT

## 2023-10-02 PROCEDURE — 75989 ABSCESS DRAINAGE UNDER X-RAY: CPT | Mod: TC,HCNC

## 2023-10-02 PROCEDURE — 75989 ABSCESS DRAINAGE UNDER X-RAY: CPT | Mod: 26,HCNC,, | Performed by: RADIOLOGY

## 2023-10-02 PROCEDURE — 87075 CULTR BACTERIA EXCEPT BLOOD: CPT | Mod: HCNC | Performed by: PHYSICIAN ASSISTANT

## 2023-10-02 NOTE — DISCHARGE SUMMARY
[facility] ([facility])  Discharge Note  Short Stay    US Guided Abscess Cyst Aspiration      OUTCOME: Patient tolerated treatment/procedure well without complication and is now ready for discharge.    DISPOSITION: Home or Self Care    FINAL DIAGNOSIS:  <principal problem not specified>    FOLLOWUP: In clinic    DISCHARGE INSTRUCTIONS:  No discharge procedures on file.     TIME SPENT ON DISCHARGE: 15 minutes    Pre Op Diagnosis: Lower back fluid collection subcutaneous     Post Op Diagnosis: same     Procedure:  aspiration     Procedure performed by: Karrie ALMAGUER, Gibson SALCEDO     Written Informed Consent Obtained: Yes     Specimen Removed:  yes     Estimated Blood Loss:  minimal     Findings: Local anesthesia     Sedation:  no     The patient tolerated the procedure well and there were no complications.      Disposition:  F/U in clinic or with ordering physician    Discharge instructions:  Light activity for 24 hours.  Remove band aid in 24 hours.  No baths (showers are appropriate).      Sterile technique was performed in the lower back, lidocaine was used as a local anesthetic.  60 ccs of serous fluid removed.  Pt tolerated the procedure well without immediate complications.  Please see radiologist report for details. F/u with PCP and/or ordering physician.

## 2023-10-04 LAB — B2 TRANSFERRIN FLD QL: NEGATIVE

## 2023-10-05 NOTE — TELEPHONE ENCOUNTER
Last office visit: 9/11/23  SX: 05/17/23 , TLIF, 07/05/23- Exploration ,wound.  Sunil Jennings ( Pharm Verified.

## 2023-10-06 LAB — BACTERIA SPEC AEROBE CULT: NO GROWTH

## 2023-10-09 ENCOUNTER — PATIENT OUTREACH (OUTPATIENT)
Dept: PULMONOLOGY | Facility: CLINIC | Age: 68
End: 2023-10-09
Payer: MEDICARE

## 2023-10-09 DIAGNOSIS — J30.9 ALLERGIC RHINITIS, UNSPECIFIED SEASONALITY, UNSPECIFIED TRIGGER: ICD-10-CM

## 2023-10-09 RX ORDER — OXYCODONE AND ACETAMINOPHEN 10; 325 MG/1; MG/1
1 TABLET ORAL EVERY 8 HOURS PRN
Qty: 40 TABLET | Refills: 0 | Status: SHIPPED | OUTPATIENT
Start: 2023-10-09 | End: 2023-12-06 | Stop reason: SDUPTHER

## 2023-10-10 LAB — BACTERIA SPEC ANAEROBE CULT: NORMAL

## 2023-10-10 RX ORDER — FLUTICASONE PROPIONATE 50 MCG
1 SPRAY, SUSPENSION (ML) NASAL DAILY
Qty: 16 G | Refills: 0 | Status: SHIPPED | OUTPATIENT
Start: 2023-10-10 | End: 2023-12-17

## 2023-10-12 ENCOUNTER — OFFICE VISIT (OUTPATIENT)
Dept: NEUROSURGERY | Facility: CLINIC | Age: 68
End: 2023-10-12
Payer: MEDICARE

## 2023-10-12 VITALS
SYSTOLIC BLOOD PRESSURE: 121 MMHG | HEART RATE: 79 BPM | BODY MASS INDEX: 36.9 KG/M2 | DIASTOLIC BLOOD PRESSURE: 79 MMHG | WEIGHT: 249.88 LBS

## 2023-10-12 DIAGNOSIS — Z98.890 STATUS POST LUMBAR SURGERY: Primary | ICD-10-CM

## 2023-10-12 DIAGNOSIS — M54.16 LUMBAR RADICULOPATHY, CHRONIC: ICD-10-CM

## 2023-10-12 PROCEDURE — 99212 OFFICE O/P EST SF 10 MIN: CPT | Mod: PBBFAC,HCNC,PO | Performed by: NEUROLOGICAL SURGERY

## 2023-10-12 PROCEDURE — 99999 PR PBB SHADOW E&M-EST. PATIENT-LVL II: CPT | Mod: PBBFAC,HCNC,, | Performed by: NEUROLOGICAL SURGERY

## 2023-10-12 PROCEDURE — 99213 OFFICE O/P EST LOW 20 MIN: CPT | Mod: HCNC,S$GLB,, | Performed by: NEUROLOGICAL SURGERY

## 2023-10-12 PROCEDURE — 99213 PR OFFICE/OUTPT VISIT, EST, LEVL III, 20-29 MIN: ICD-10-PCS | Mod: HCNC,S$GLB,, | Performed by: NEUROLOGICAL SURGERY

## 2023-10-12 PROCEDURE — 99999 PR PBB SHADOW E&M-EST. PATIENT-LVL II: ICD-10-PCS | Mod: PBBFAC,HCNC,, | Performed by: NEUROLOGICAL SURGERY

## 2023-10-12 NOTE — PROGRESS NOTES
Subjective:      Patient ID: Abdullahi Urias is a 68 y.o. male.    HPI  Patient is postop TLIF with posterior fluid collection requiring washout.  Subsequent MRI revealed reaccumulation of fluid.    Fluid sent for aerobic anaerobic and Gram stain all of which return no growth or organisms identified    Since last visit patient complaints soreness to his back he is wanting to participate with therapy  He has some pain in his right hip however no radicular symptoms  He ambulates with a cane for stability purposes    Denies any fever, headache, redness swelling or drainage from incision            Date of Procedure: 7/5/2023    Procedure: Procedure(s) (LRB):  EXPLORATION, WOUND (Bilateral)  WASHOUT (N/A)  REMOVAL, HARDWARE, SPINE (N/A)  INSERTION (N/A)  REPLACEMENT (N/A)     Operative Findings (including complications, if any): postoperative fluid collection sent for culture and analysis.  Removal and replacement of set screws right sided L4 L5 S1    Objective:     Body mass index is 36.9 kg/m².  Vitals:    10/12/23 1029   BP: 121/79   Pulse: 79        Incision- no pain on exam. Mild swelling. No erythema.    Back:  None  Paraspinal muscle spasms   None  Pain with flexion and extention   WNL  Range of motion    Neg right Straight leg raise     Motor   Right Right Left Left  Level Group   5  5  L2 Hip flexor (Psoas)   5  5  L3 Leg extension (Quads)   5  5  L4 Dorsiflexion & foot inversion (Tibialis Anterior)   5  5  L5 Great toe extension ( EHL)   5  5  S1 Foot eversion (Gastroc, PL & PB)     Sensation  NL Decreased (R/L/BL) Level Sensation    X  L2 Anterio-medial thigh   X  L3 Medial thigh around knee   X  L4 Medial foot   X  L5 Dorsum foot   X  S1 Lateral foot         Lab Results   Component Value Date    WBC 4.33 08/16/2023    HCT 29.0 (L) 08/16/2023     Assessment:     1. Status post lumbar surgery    2. Lumbar radiculopathy, chronic      Plan:     Status post lumbar surgery  -     X-Ray Lumbar Spine AP And Lateral;  Future; Expected date: 10/12/2023    Lumbar radiculopathy, chronic  -     X-Ray Lumbar Spine AP And Lateral; Future; Expected date: 10/12/2023    Patient doing well status post fusion subsequent fluid collection requiring washout.  Reaccumulation of fluid IR aspiration on 10/2 with no growth or organisms identified     Wants to start outpatient physical therapy  We will get x-rays on his way out today  Follow up 3 mo with XR     Thank you for the referral   Please call with any questions    Brandon Valencia MD  Neurosurgery     Disclaimer: This note was prepared using a voice recognition system and is likely to have sound alike errors within the text.

## 2023-10-17 ENCOUNTER — TELEPHONE (OUTPATIENT)
Dept: PULMONOLOGY | Facility: CLINIC | Age: 68
End: 2023-10-17
Payer: MEDICARE

## 2023-10-17 ENCOUNTER — CLINICAL SUPPORT (OUTPATIENT)
Dept: REHABILITATION | Facility: HOSPITAL | Age: 68
End: 2023-10-17
Attending: NEUROLOGICAL SURGERY
Payer: MEDICARE

## 2023-10-17 DIAGNOSIS — Z98.890 STATUS POST LUMBAR SURGERY: ICD-10-CM

## 2023-10-17 DIAGNOSIS — M53.86 DECREASED ROM OF LUMBAR SPINE: ICD-10-CM

## 2023-10-17 DIAGNOSIS — G89.29 CHRONIC BILATERAL LOW BACK PAIN WITHOUT SCIATICA: ICD-10-CM

## 2023-10-17 DIAGNOSIS — M54.50 CHRONIC BILATERAL LOW BACK PAIN WITHOUT SCIATICA: ICD-10-CM

## 2023-10-17 DIAGNOSIS — M54.16 LUMBAR RADICULOPATHY, CHRONIC: ICD-10-CM

## 2023-10-17 PROCEDURE — 97161 PT EVAL LOW COMPLEX 20 MIN: CPT | Mod: HCNC,PN

## 2023-10-17 PROCEDURE — 97112 NEUROMUSCULAR REEDUCATION: CPT | Mod: HCNC,PN

## 2023-10-18 ENCOUNTER — OFFICE VISIT (OUTPATIENT)
Dept: PULMONOLOGY | Facility: CLINIC | Age: 68
End: 2023-10-18
Payer: MEDICARE

## 2023-10-18 ENCOUNTER — CLINICAL SUPPORT (OUTPATIENT)
Dept: PULMONOLOGY | Facility: CLINIC | Age: 68
End: 2023-10-18
Payer: MEDICARE

## 2023-10-18 VITALS
HEART RATE: 85 BPM | BODY MASS INDEX: 37.16 KG/M2 | WEIGHT: 250.88 LBS | RESPIRATION RATE: 16 BRPM | OXYGEN SATURATION: 96 % | HEIGHT: 69 IN

## 2023-10-18 VITALS
HEART RATE: 85 BPM | DIASTOLIC BLOOD PRESSURE: 82 MMHG | HEIGHT: 69 IN | WEIGHT: 250.88 LBS | BODY MASS INDEX: 37.16 KG/M2 | RESPIRATION RATE: 16 BRPM | OXYGEN SATURATION: 96 % | SYSTOLIC BLOOD PRESSURE: 122 MMHG

## 2023-10-18 DIAGNOSIS — R06.02 SOB (SHORTNESS OF BREATH): ICD-10-CM

## 2023-10-18 DIAGNOSIS — J45.901 ASTHMATIC BRONCHITIS WITH ACUTE EXACERBATION, UNSPECIFIED ASTHMA SEVERITY, UNSPECIFIED WHETHER PERSISTENT: ICD-10-CM

## 2023-10-18 DIAGNOSIS — J45.901 ASTHMATIC BRONCHITIS WITH ACUTE EXACERBATION, UNSPECIFIED ASTHMA SEVERITY, UNSPECIFIED WHETHER PERSISTENT: Primary | ICD-10-CM

## 2023-10-18 DIAGNOSIS — J45.40 MODERATE PERSISTENT ASTHMA WITHOUT COMPLICATION: ICD-10-CM

## 2023-10-18 DIAGNOSIS — G47.33 OSA ON CPAP: ICD-10-CM

## 2023-10-18 DIAGNOSIS — Z87.891 FORMER TOBACCO USE: ICD-10-CM

## 2023-10-18 PROBLEM — M53.86 DECREASED ROM OF LUMBAR SPINE: Status: ACTIVE | Noted: 2023-10-18

## 2023-10-18 PROBLEM — G89.29 CHRONIC BILATERAL LOW BACK PAIN WITHOUT SCIATICA: Status: ACTIVE | Noted: 2023-10-18

## 2023-10-18 PROBLEM — M54.50 CHRONIC BILATERAL LOW BACK PAIN WITHOUT SCIATICA: Status: ACTIVE | Noted: 2023-10-18

## 2023-10-18 PROCEDURE — 99999 PR PBB SHADOW E&M-EST. PATIENT-LVL II: CPT | Mod: PBBFAC,HCNC,,

## 2023-10-18 PROCEDURE — 99999 PR PBB SHADOW E&M-EST. PATIENT-LVL III: ICD-10-PCS | Mod: PBBFAC,HCNC,, | Performed by: HOSPITALIST

## 2023-10-18 PROCEDURE — 99999 PR PBB SHADOW E&M-EST. PATIENT-LVL II: ICD-10-PCS | Mod: PBBFAC,HCNC,,

## 2023-10-18 PROCEDURE — 99214 OFFICE O/P EST MOD 30 MIN: CPT | Mod: HCNC,S$GLB,, | Performed by: HOSPITALIST

## 2023-10-18 PROCEDURE — 99213 OFFICE O/P EST LOW 20 MIN: CPT | Mod: PBBFAC,HCNC | Performed by: HOSPITALIST

## 2023-10-18 PROCEDURE — 99214 PR OFFICE/OUTPT VISIT, EST, LEVL IV, 30-39 MIN: ICD-10-PCS | Mod: HCNC,S$GLB,, | Performed by: HOSPITALIST

## 2023-10-18 PROCEDURE — 99999 PR PBB SHADOW E&M-EST. PATIENT-LVL III: CPT | Mod: PBBFAC,HCNC,, | Performed by: HOSPITALIST

## 2023-10-18 RX ORDER — ZOLPIDEM TARTRATE 12.5 MG/1
1 TABLET, FILM COATED, EXTENDED RELEASE ORAL NIGHTLY PRN
COMMUNITY
Start: 2023-08-03

## 2023-10-18 NOTE — PROGRESS NOTES
Subjective:      Patient ID: Abdullahi Urias is a 68 y.o. male.    Chief Complaint: Asthma, dyspnea    Interval Hx 10/18/23:    Mr. Urias presents to Pulmonary clinic for follow up dyspnea. He was last seen 8/2023 following an acute care visit for dyspnea. At that visit he was prescribed a nebulizer with albuterol solution and atrovent nasal spray. Further eval done with CXR, BNP and  D-dimer given his recent surgery. BNP WNL, D-dimer elevated at 4.19. VQ scan normal.    Mr. Urias states that his dyspnea is a little better, but certainly still present. He has recently started with outpatient physical therapy and has been walking around his house lot. He is motivated to try and lose weight.     Interim Testing  VQ scan- Normal  BNP- WNL  D-dimer 4  CXR- No acute findings    Pulmonary Interventions:   Albuterol HFA- Only uses every 2 weeks or so  Albuterol nebulized- hasn't used it  Trelegy 200- using daily  Atrovent Nasal spray    Interval History 8/18/2023:     Mr. Urias presents to Pulmonary clinic as an acute care visit with increasing dyspnea. Feeling more out of breath with exertion, coughing a little more over the past several weeks. Also with congestion over the past week, productive last night and this morning- clear/yellow. No sore throat. No fever. No thrush. No calf swelling or pain.      Per chart review- since I last saw patient he had his TLIF 5/17/23 with Dr. Valencia which was complicated by post-op infection, treated with hardware removal and IV antibiotics. Completed 6 weeks of IV abx today- 8/18/23, to have PICC removed.     Pulmonary Interventions:   Albuterol- Using 2-3/day, helps  Trelegy- once daily  Flonase- using every day for the past week or so     Interval History 4/18/23:     Mr. Urias is seen in follow up of asthma as well as Pulmonary pre-op assessment for TLIF, I last saw him in March, at that time he was changed to Trelegy from Advair for persistent asthma symptoms (using albuterol 3-4  times daily)     Interim Testing:  Complete PFT 4/2023- Normal spirometry, loop consistent with restrictive pattern, bronchodilator portion not done as he used inhaler prior to exam  6MW- Remained on room air, [place] 1-3, walked 89% predicted  ABG - 7.36/41/85/23, done on room air  CXR 4/13- No acute findings, cardiomegaly, no effusions, no pneumo, no focal consolidation     Pulmonary interventions:   Albuterol- not having to use really since starting [facility]- has seen a big improvement  PDM- Benefited from technique education     HPI 3/7/23:  67 year old male with history of AS s/p bioprosthetic AVR, dCHF, CKD3, HTN, CVA, CHEO ([facility]AP [facility]), former tobacco user, prostate cancer 2020 s/p prostatectomy s/p radiation, in remission who was referred to Pulmonary clinic by Dick Baez MD for evaluation of  asthma. Mr. Urias was admitted to the hospital 1/25-1/26 for treatment of asthma exacerbation. He was treated with IV steroids, breathing treatments, empiric antibiotics and oxygen as needed. CXR clear, SpO2 98% on room air on day of discharge. Patient reports several months of wheezing. Has been using Advair diskus twice daily and Albuterol inhaler 2-3/day. He did notice that symptoms improved when he was on a cruise last week, and worsened when back in [place].      Quit smoking 2012 (with stroke), about 30 pack years     Pertinent Work Up:  CXR 1/24/2023- Clear chest, no pneumo or effusion. Cardiomegaly.  CTA Chest 8/25/2022: Negative for PE, clear lungs  Eosinophils borderline/mildly elevated 1/2023     Pulmonary Interventions:  Albuterol HFA, using 2-3x/day  Fluticasone- salmeterol 250-50mcg twice daily, been on since end of January    Review of Systems   Respiratory:  Positive for shortness of breath and dyspnea on extertion. Negative for cough, sputum production and wheezing.      Objective:     Physical Exam   Constitutional: He is oriented to person, place, and time. He appears well-developed  "and well-nourished. He is obese.   Cardiovascular: Normal rate and regular rhythm.   Pulmonary/Chest:   Clear breath sounds bilaterally with normal effort at rest, on room air   Musculoskeletal:         General: No edema.   Neurological: He is alert and oriented to person, place, and time.   Skin: Skin is warm and dry.   Psychiatric: He has a normal mood and affect.     Personal Diagnostic Review        10/12/2023    10:29 AM 9/11/2023     9:38 AM 9/7/2023     9:50 AM 8/19/2023    11:04 AM 8/18/2023     1:43 PM 8/18/2023     1:11 PM 8/18/2023    11:58 AM   Pulmonary Function Tests   SpO2   98 % 99 % 98 % 98 % 97 %   Height   5' 9" (1.753 m) 5' 9.21" (1.758 m)   5' 9" (1.753 m)   Weight 113.3 kg (249 lb 14.3 oz) 112.5 kg (248 lb 0.3 oz) 109.8 kg (242 lb) 112.8 kg (248 lb 10.9 oz)   112 kg (246 lb 14.6 oz)   BMI (Calculated)   35.7 36.5   36.4        Assessment:     No diagnosis found.     Outpatient Encounter Medications as of 10/18/2023   Medication Sig Dispense Refill    albuterol (PROVENTIL) 2.5 mg /3 mL (0.083 %) nebulizer solution Take 3 mLs (2.5 mg total) by nebulization every 6 (six) hours as needed for Wheezing. Rescue (Patient not taking: Reported on 9/7/2023) 180 mL 1    albuterol (PROVENTIL/VENTOLIN HFA) 90 mcg/actuation inhaler INHALE 2 PUFFS INTO THE LUNGS EVERY 6 HOURS AS NEEDED FOR COUGH 8.5 g 3    allopurinoL (ZYLOPRIM) 100 MG tablet Take 100 mg by mouth once daily. Takes 0.5 tab      ALPRAZolam (XANAX) 1 MG tablet Take 1 mg by mouth Daily. EVERY OTHER DAY      aluminum & magnesium hydroxide-simethicone (MYLANTA MAX STRENGTH) 400-400-40 mg/5 mL suspension TAKE 15ML BY MOUTH FOUR TIMES A DAY AS NEEDED FOR STOMACH ACID      aspirin (ECOTRIN) 81 MG EC tablet Take 1 tablet (81 mg total) by mouth once daily. 90 tablet 3    atorvastatin (LIPITOR) 80 MG tablet TAKE ONE-HALF TABLET BY MOUTH EVERY DAY FOR CHOLESTEROL      carvediloL (COREG) 12.5 MG tablet Take 1 tablet (12.5 mg total) by mouth 2 (two) times " daily. 60 tablet 11    clopidogreL (PLAVIX) 75 mg tablet Take 1 tablet (75 mg total) by mouth once daily. 30 tablet 11    docusate sodium (COLACE) 100 MG capsule Take 1 capsule (100 mg total) by mouth 2 (two) times daily. 20 capsule 0    esomeprazole (NEXIUM) 40 MG capsule Take 40 mg by mouth before breakfast.      famotidine (PEPCID) 40 MG tablet TAKE ONE TABLET BY MOUTH AT BEDTIME FOR ACID REFLUX      fluticasone propionate (FLONASE) 50 mcg/actuation nasal spray 1 spray (50 mcg total) by Each Nostril route once daily. 16 g 0    fluticasone-umeclidin-vilanter (TRELEGY ELLIPTA) 200-62.5-25 mcg inhaler INHALE 1 PUFF INTO THE LUNGS EVERY DAY 60 each 3    gabapentin (NEURONTIN) 600 MG tablet Take 1 tablet (600 mg total) by mouth 3 (three) times daily. 90 tablet 11    hydrocortisone 2.5 % cream Apply topically 2 (two) times daily as needed. Apply to affected areas of the face and neck. 30 g 2    hydrOXYzine HCL (ATARAX) 25 MG tablet Take 1 tablet (25 mg total) by mouth 3 (three) times daily as needed for Itching. 30 tablet 0    ipratropium (ATROVENT) 21 mcg (0.03 %) nasal spray 2 sprays by Each Nostril route 2 (two) times daily. (Patient not taking: Reported on 9/7/2023) 30 mL 0    LIDOcaine (LIDODERM) 5 % APPLY 1 PATCH TOPICALLY EVERY DAY FOR PAIN. WEAR FOR 12 HOURS, THEN REMOVE. DO NOT APPLY NEW PATCH FOR AT LEAST 12 HOURS.      losartan (COZAAR) 50 MG tablet Take 1 tablet (50 mg total) by mouth once daily. 30 tablet 11    multivitamin (THERAGRAN) per tablet Take 1 tablet by mouth once daily.      oxyCODONE-acetaminophen (PERCOCET)  mg per tablet Take 1 tablet by mouth every 8 (eight) hours as needed for Pain. 40 tablet 0    sertraline (ZOLOFT) 100 MG tablet TAKE TWO TABLETS BY MOUTH EVERY DAY FOR MENTAL HEALTH DOSE INCREASED TO 200MG/DAY      simethicone (MYLICON) 80 MG chewable tablet Take 80 mg by mouth every 6 (six) hours as needed for Flatulence.      sucralfate (CARAFATE) 100 mg/mL suspension Take 1 g by  mouth 4 (four) times daily.       tiZANidine (ZANAFLEX) 4 MG tablet Take 1 capsule by mouth 2 (two) times daily as needed (muscle spasms).      [DISCONTINUED] fluticasone propionate (FLONASE) 50 mcg/actuation nasal spray 1 spray (50 mcg total) by Each Nostril route once daily. 16 g 0    [DISCONTINUED] oxyCODONE-acetaminophen (PERCOCET)  mg per tablet Take 1 tablet by mouth every 8 (eight) hours as needed for Pain. 40 tablet 0     Facility-Administered Encounter Medications as of 10/18/2023   Medication Dose Route Frequency Provider Last Rate Last Admin    ondansetron injection 4 mg  4 mg Intravenous Once PRN Nehemiah Carmen MD        sodium chloride 0.9% flush 10 mL  10 mL Intravenous PRN Wilda Horne MD         No orders of the defined types were placed in this encounter.      Plan:     Problem List Items Addressed This Visit          Pulmonary    Asthmatic bronchitis    Moderate persistent asthma without complication     - continues to benefit from Trelegy use  - albuterol as needed  - encouraged patient to use nebulizer so that he can gauge his response to it         SOB (shortness of breath)     - dyspnea multifactorial with decreased conditioning, weight gain, diastolic dysfunction, reactive airways  - encouraged weight loss and increasing routine activity            Other    CHEO on CPAP     - [facility]         Former tobacco use     - Quit 2012, 30 pack years  - CTA 8/2022 negative for nodules  - LDCT with next visit         Relevant Orders    CT Chest Lung Screening Low Dose     Plan discussed with pt and he expressed understanding, all questions answered. RTC 3 months with LDCT.

## 2023-10-18 NOTE — ASSESSMENT & PLAN NOTE
- dyspnea multifactorial with decreased conditioning, weight gain, diastolic dysfunction, reactive airways  - encouraged weight loss and increasing routine activity

## 2023-10-18 NOTE — PLAN OF CARE
[facility]   Physical Therapy Initial Evaluation        Date: 10/17/2023     Name: Abdullahi Urias  Clinic Number: [id number]  Therapy Diagnosis:   Encounter Diagnoses   Name Primary?    Status post lumbar surgery     Lumbar radiculopathy, chronic     Chronic bilateral low back pain without sciatica     Decreased ROM of lumbar spine       Physician: Brandon Valencia MD      Physician Orders: PT Eval and Treat  Medical Diagnosis from Referral: s/p lumbar surgery  Evaluation Date: 10/17/2023  Authorization Period Expiration: 10/12/2025  Plan of Care Expiration: 12/16/2023    Progress Update: 11/16/2023   Visit # / Visits authorized: 1 / 1   FOTO  Number Date Score    1 10/17/2023 26%   2     3     4          PRECAUTIONS: Standard Precautions and Orthopedic: s/p lumbar sugery     Time In: 1100  Time Out: 1145  Total Appointment Time (timed & untimed codes): 45 minutes    SUBJECTIVE     Date of onset: 07/05/2023  Chief Complaint: low back pain    History of current condition - Abdullahi is a 68 y.o. male who presents to physical therapy reporting post surgical infection with wound washout and pick line 6 months.  He has to use a sock aid to put on socks and slips on shoes.    Falls: [x] No  [] Yes:     Imaging: [x] Xray [x] MRI [] CT: Reviewed    Prior Therapy:  [] No  [x] Yes:  Home health  Social History: Pt lives with their spouse   Stairs: [x] No  [] Yes:   Occupation: None  Prior Level of Function: Independent with all activities of daily living  Current Level of Function: amb with cane with slow careful pace    Pain:  Current 9/10, worst 10/10, best 7 /10   Location: [] Right [] Left [x] Bilateral: low back  Description: ache  Aggravating Factors: Bending and walking/standing  Easing Factors: activity avoidance, rest    Pts goals: Pt reported goals are to improve pain and function    _______________________________________________________  Medical History:   Past Medical History:    Diagnosis Date    Aortic stenosis     [facility]    Asthma     BPH (benign prostatic hyperplasia)     CAD (coronary artery disease)     Cardiomyopathy     CHF (congestive heart failure)     Chronic hoarseness     vocal cord surg    Chronic pain     CKD (chronic kidney disease) stage 3, GFR 30-59 ml/min     CVA (cerebral infarction)     8/2012 olol; reviewed ed note    Ex-smoker     GERD (gastroesophageal reflux disease)     Hepatitis C     treatedharvoni says cured, RNA NEG 6/2020    Hypertension     Pancreatitis     Prostate cancer     Prostate cancer     Prostate cancer     PVD (peripheral vascular disease)     Renal insufficiency     Substance abuse     cocaine, etoh , tob in past       Surgical History:   Abdullahi Urias  has a past surgical history that includes Foot surgery; Back surgery; Penile prosthesis implant; Cardiac catheterization; Upper gastrointestinal endoscopy (2011); Colonoscopy (2011); Aortic valve replacement (05/19/2014); Penile prosthesis revision (04/30/2018); Prostatectomy (09/2019); Left heart catheterization (Left, 7/24/2020); Esophagogastroduodenoscopy (N/A, 2/24/2021); Colonoscopy (N/A, 2/24/2021); radiation for prostate; Epidural steroid injection into cervical spine (N/A, 6/21/2022); Transforaminal epidural injection of steroid (Right, 10/20/2022); Transforaminal lumbar interbody fusion (TLIF) using computer-assisted navigation (Bilateral, 5/17/2023); Wound exploration (Bilateral, 7/5/2023); washout (N/A, 7/5/2023); Removal of hardware from spine (N/A, 7/5/2023); insertion (N/A, 7/5/2023); and replacement (N/A, 7/5/2023).    Medications:   Abdullahi has a current medication list which includes the following prescription(s): albuterol, albuterol, allopurinol, alprazolam, aluminum & magnesium hydroxide-simethicone, aspirin, atorvastatin, carvedilol, clopidogrel, docusate sodium, esomeprazole, famotidine, fluticasone propionate, trelegy ellipta, gabapentin, hydrocortisone,  hydroxyzine hcl, ipratropium, lidocaine, losartan, multivitamin, oxycodone-acetaminophen, sertraline, simethicone, carafate, tizanidine, and zolpidem, and the following Facility-Administered Medications: ondansetron and sodium chloride 0.9%.    Allergies:   Review of patient's allergies indicates:  No Known Allergies       OBJECTIVE     Posture:  Pt presents with postural abnormalities which include:    [x] Forward Head   [] Increased Lumbar Lordosis   [x] Rounded Shoulder   [] Flat Back Posture   [] Increased Thoracic Kyphosis [] Inominate Rotation   [] Increased Trunk Sway  [] Genu Recurvatum   [] Increased Trunk Rotation  [] Pes Planus   [] Other:     Sensation:  Sensation is [x] Intact [] Impaired-- to light touch    Palpation: Increased tone and tenderness noted with palpation to: low back    LUMBAR-   Lumbar   Range of Motion Right  (spine) Left Function   & Pain Goal   Lumbar Flexion To knees  []Functional  [x]Dysfunctional  [x]Painful  []Non-Painful 50º  Functional   Nonpainful   Lumbar Extension  20%  []Functional  []Dysfunctional  []Painful  []Non-Painful 20º  Functional   Nonpainful   Lumbar Side Bending  30% 30% []Functional  []Dysfunctional  []Painful  []Non-Painful 20º  Functional   Nonpainful         LE STRENGTH -   Lower Extremity  Strength RIGHT   Goal   Hip Flexion  []1  []2  []3  [x]4  []5                []+ []- 5/5 B    Hip Extension  []1  []2  []3  [x]4  []5                []+ []- 5/5 B   Hip Abduction  []1  []2  []3  [x]4  []5                []+ []- 5/5 B   Hip Internal rotaiton  []1  []2  []3  [x]4  []5                []+ []- 5/5 B   Hip External rotation  []1  []2  []3  [x]4  []5                []+ []- 5/5 B   Knee Flexion []1  []2  []3  [x]4  []5                []+ []- 5/5 B   Knee Extension []1  []2  []3  [x]4  []5                []+ []- 5/5 B   Ankle Dorsiflexion []1  []2  []3  [x]4  []5                []+ []- 5/5 B   Ankle Plantarflexion []1  []2  []3  [x]4  []5                []+ []- 5/5  B   Ankle Inversion []1  []2  []3  [x]4  []5                []+ []- 5/5 B   Ankle Eversion []1  []2  []3  [x]4  []5                []+ []- 5/5 B   Big Toe Extension []1  []2  []3  [x]4  []5                []+ []- 5/5 B     Lower Extremity  Strength LEFT   Goal   Hip Flexion  []1  []2  []3  [x]4  []5                []+ []- 5/5 B    Hip Extension  []1  []2  []3  [x]4  []5                []+ []- 5/5 B   Hip Abduction  []1  []2  []3  [x]4  []5                []+ []- 5/5 B   Hip Internal rotaiton  []1  []2  []3  [x]4  []5                []+ []- 5/5 B   Hip External rotation  []1  []2  []3  [x]4  []5                []+ []- 5/5 B   Knee Flexion []1  []2  []3  [x]4  []5                []+ []- 5/5 B   Knee Extension []1  []2  []3  [x]4  []5                []+ []- 5/5 B   Ankle Dorsiflexion []1  []2  []3  [x]4  []5                []+ []- 5/5 B   Ankle Plantarflexion []1  []2  []3  [x]4  []5                []+ []- 5/5 B   Ankle Inversion []1  []2  []3  [x]4  []5                []+ []- 5/5 B   Ankle Eversion []1  []2  []3  [x]4  []5                []+ []- 5/5 B   Big Toe Extension []1  []2  []3  [x]4  []5                []+ []- 5/5 B      FUNCTION:     Intake Outcome Measure for FOTO Lumbar Survey    Therapist reviewed FOTO scores for Abdullahi Urias on 10/17/2023.   FOTO documents entered into [email or web address] - see Media section.    Intake Score: 26%         TREATMENT     Total Treatment time separate from Evaluation: (23) minutes    Abdullahi received the following interventions:     Neuromuscular re-education activities to improve: Kinesthetic, Sense, Posture, and core facilitation for 23 minutes. The following activities were included:    Activity   Details    Single knee to chest x     Posterior pelvic tilt x     Bridges x     Supine marches x     Standing hip extension x                  PATIENT EDUCATION AND HOME EXERCISES     Education/Self-Care provided:  (included in treatment) minutes   Patient educated on the impairments noted  above and the effects of physical therapy intervention to improve overall condition and Quality of Life  Patient was educated on all the above exercise prior/during/after for proper posture, positioning, and execution for safe performance with home exercise program.     Written Home Exercises Provided: yes. Prefers: [x] Printed [] Electronic  Exercises were reviewed and Abdullahi was able to demonstrate them prior to the end of the session.  Abdullahi demonstrated good understanding of the education provided. See EMR under Patient Instructions for exercises provided during therapy sessions.      ASSESSMENT     Abdullahi is a 68 y.o. male referred to outpatient Physical Therapy with a medical diagnosis of   Encounter Diagnoses   Name Primary?    Status post lumbar surgery     Lumbar radiculopathy, chronic     Chronic bilateral low back pain without sciatica     Decreased ROM of lumbar spine    .    Physical exam supports low back painm impairments including: decreased range of motion, decreased muscular strength, decreased endurance, decreased muscular length/flexibility, impaired joint mobility, and impaired functional mobility.     The above impairments will be addressed through manual therapy techniques, therapeutic exercises, functional training, and modalities as necessary. Patient was treated and educated on exercises for home program, progression of therapy, and benefits of therapy to achieve full functional mobility.       Pt prognosis is Good.   Pt will benefit from skilled outpatient Physical Therapy to address the deficits stated above and in the chart below, provide pt/family education, and to maximize pt's level of independence.     Plan of care discussed with patient: Yes  Pt's spiritual, cultural and educational needs considered and patient is agreeable to the plan of care and goals as stated below:     Anticipated Barriers for therapy: none    Medical Necessity is demonstrated by the following:  "    History  Co-morbidities and personal factors that may impact the plan of care [x] LOW: no personal factors / co-morbidities  [] MODERATE: 1-2 personal factors / co-morbidities  [] HIGH: 3+ personal factors / co-morbidities    Moderate / High Support Documentation:   Co-morbidities affecting plan of care:   Past Medical History:   Diagnosis Date    Aortic stenosis     [facility]    Asthma     BPH (benign prostatic hyperplasia)     CAD (coronary artery disease)     Cardiomyopathy     CHF (congestive heart failure)     Chronic hoarseness     vocal cord surg    Chronic pain     CKD (chronic kidney disease) stage 3, GFR 30-59 ml/min     CVA (cerebral infarction)     8/2012 olol; reviewed ed note    Ex-smoker     GERD (gastroesophageal reflux disease)     Hepatitis C     treatedharvoni says cured, RNA NEG 6/2020    Hypertension     Pancreatitis     Prostate cancer     Prostate cancer     Prostate cancer     PVD (peripheral vascular disease)     Renal insufficiency     Substance abuse     cocaine, etoh , tob in past       Personal Factors:   no deficits     Examination  Body Structures and Functions, activity limitations and participation restrictions that may impact the plan of care [x] LOW: addressing 1-2 elements  [] MODERATE: 3+ elements  [] HIGH: 4+ elements (please support below)    Moderate / High Support Documentation:     Body Regions: back  trunk    Body Systems:  ROM  strength  transitions    Participation Restrictions:   See above in "Current Level of Function"     Activity limitations:   Mobility: lifting and carrying objects  walking    Self care: washing oneself (bathing, drying, washing hands)  dressing  looking after one's health    Domestic Life: shopping  cooking  doing house work (cleaning house, washing dishes, laundry)  assisting others    Community and Social Life: community life, recreation and leisure      Clinical Presentation [x] LOW: stable  [] MODERATE: Evolving  [] HIGH: " Unstable     Decision Making/ Complexity Score: low         SHORT TERM GOALS:  4 weeks Progress Date Met   Recent signs and systems trend is improving in order to progress towards Long term goals.  [] Met  [] Not Met  [] Progressing    Patient will be independent with Home Exercise Program  in order to further progress and return to maximal function. [] Met  [] Not Met  [] Progressing    Pain rating at Worst: 5 /10 in order to progress towards increased independence with activity. [] Met  [] Not Met  [] Progressing    Patient will be able to correct postural deviations in sitting and standing, to decrease pain and promote postural awareness for injury prevention.  [] Met  [] Not Met  [] Progressing    Patient will improve functional outcome (FOTO) score: by 5% to increase self-worth & perceived functional ability towards long term goals [] Met  [] Not Met  [] Progressing      LONG TERM GOALS: 12 weeks  Progress Date Met   Patient will return to normal activites of daily living, recreational, and work related activities with less pain and limitation.  [] Met  [] Not Met  [] Progressing    Patient will improve range of motion  to stated goals in order to return to maximal functional potential.  [] Met  [] Not Met  [] Progressing    Patient will improve Strength to stated goals of appropriate musculature in order to improve functional independence.  [] Met  [] Not Met  [] Progressing    Pain Rating at Best: 1/10 to improve Quality of Life.  [] Met  [] Not Met  [] Progressing    Patient will meet predicted functional outcome (FOTO) score: 40% to increase self-worth & perceived functional ability. [] Met  [] Not Met  [] Progressing    Patient will have met/partially met personal goal of: improve pain and function in standing, walking, and lifting [] Met  [] Not Met  [] Progressing          PLAN   Plan of care Certification: 10/17/2023 to 12/16/2023    Outpatient Physical Therapy 2 times weekly for 12 weeks to include any  combination of the following interventions: virtual visits, dry needling, modalities, electrical stimulation (IFC, Pre-Mod, Attended with Functional Dry Needling), Manual Therapy, Moist Heat/ Ice, Neuromuscular Re-ed, Patient Education, Self Care, Therapeutic Exercise, Functional Training, and Therapeutic Activites     Thank you for this referral.    Rommel Figueroa, PT

## 2023-10-18 NOTE — ASSESSMENT & PLAN NOTE
- continues to benefit from Trelegy use  - albuterol as needed  - encouraged patient to use nebulizer so that he can gauge his response to it

## 2023-10-18 NOTE — PROGRESS NOTES
Pulmonary Disease Management  [facility]  Final Follow-up Visit   Chronic Care Management    Abdullahi Urias  was seen 10/18/2023  10:00 AM in the Pulmonary Disease Management Clinic for evaluation, education, reinforcement of self management techniques and exacerbation action plan.    Gabriel Ramos    Past Medical History:   Diagnosis Date    Aortic stenosis     [facility]    Asthma     BPH (benign prostatic hyperplasia)     CAD (coronary artery disease)     Cardiomyopathy     CHF (congestive heart failure)     Chronic hoarseness     vocal cord surg    Chronic pain     CKD (chronic kidney disease) stage 3, GFR 30-59 ml/min     CVA (cerebral infarction)     8/2012 olol; reviewed ed note    Ex-smoker     GERD (gastroesophageal reflux disease)     Hepatitis C     treatedharvoni says cured, RNA NEG 6/2020    Hypertension     Pancreatitis     Prostate cancer     Prostate cancer     Prostate cancer     PVD (peripheral vascular disease)     Renal insufficiency     Substance abuse     cocaine, etoh , tob in past       Patient's Medications   New Prescriptions    No medications on file   Previous Medications    ALBUTEROL (PROVENTIL) 2.5 MG /3 ML (0.083 %) NEBULIZER SOLUTION    Take 3 mLs (2.5 mg total) by nebulization every 6 (six) hours as needed for Wheezing. Rescue    ALBUTEROL (PROVENTIL/VENTOLIN HFA) 90 MCG/ACTUATION INHALER    INHALE 2 PUFFS INTO THE LUNGS EVERY 6 HOURS AS NEEDED FOR COUGH    ALLOPURINOL (ZYLOPRIM) 100 MG TABLET    Take 100 mg by mouth once daily. Takes 0.5 tab    ALPRAZOLAM (XANAX) 1 MG TABLET    Take 1 mg by mouth Daily. EVERY OTHER DAY    ALUMINUM & MAGNESIUM HYDROXIDE-SIMETHICONE (MYLANTA MAX STRENGTH) 400-400-40 MG/5 ML SUSPENSION    TAKE 15ML BY MOUTH FOUR TIMES A DAY AS NEEDED FOR STOMACH ACID    ASPIRIN (ECOTRIN) 81 MG EC TABLET    Take 1 tablet (81 mg total) by mouth once daily.    ATORVASTATIN (LIPITOR) 80 MG TABLET    TAKE ONE-HALF TABLET BY MOUTH EVERY DAY FOR  CHOLESTEROL    CARVEDILOL (COREG) 12.5 MG TABLET    Take 1 tablet (12.5 mg total) by mouth 2 (two) times daily.    CLOPIDOGREL (PLAVIX) 75 MG TABLET    Take 1 tablet (75 mg total) by mouth once daily.    DOCUSATE SODIUM (COLACE) 100 MG CAPSULE    Take 1 capsule (100 mg total) by mouth 2 (two) times daily.    ESOMEPRAZOLE (NEXIUM) 40 MG CAPSULE    Take 40 mg by mouth before breakfast.    FAMOTIDINE (PEPCID) 40 MG TABLET    TAKE ONE TABLET BY MOUTH AT BEDTIME FOR ACID REFLUX    FLUTICASONE PROPIONATE (FLONASE) 50 MCG/ACTUATION NASAL SPRAY    1 spray (50 mcg total) by Each Nostril route once daily.    FLUTICASONE-UMECLIDIN-VILANTER (TRELEGY ELLIPTA) 200-62.5-25 MCG INHALER    INHALE 1 PUFF INTO THE LUNGS EVERY DAY    GABAPENTIN (NEURONTIN) 600 MG TABLET    Take 1 tablet (600 mg total) by mouth 3 (three) times daily.    HYDROCORTISONE 2.5 % CREAM    Apply topically 2 (two) times daily as needed. Apply to affected areas of the face and neck.    HYDROXYZINE HCL (ATARAX) 25 MG TABLET    Take 1 tablet (25 mg total) by mouth 3 (three) times daily as needed for Itching.    IPRATROPIUM (ATROVENT) 21 MCG (0.03 %) NASAL SPRAY    2 sprays by Each Nostril route 2 (two) times daily.    LIDOCAINE (LIDODERM) 5 %    APPLY 1 PATCH TOPICALLY EVERY DAY FOR PAIN. WEAR FOR 12 HOURS, THEN REMOVE. DO NOT APPLY NEW PATCH FOR AT LEAST 12 HOURS.    LOSARTAN (COZAAR) 50 MG TABLET    Take 1 tablet (50 mg total) by mouth once daily.    MULTIVITAMIN (THERAGRAN) PER TABLET    Take 1 tablet by mouth once daily.    OXYCODONE-ACETAMINOPHEN (PERCOCET)  MG PER TABLET    Take 1 tablet by mouth every 8 (eight) hours as needed for Pain.    SERTRALINE (ZOLOFT) 100 MG TABLET    TAKE TWO TABLETS BY MOUTH EVERY DAY FOR MENTAL HEALTH DOSE INCREASED TO 200MG/DAY    SIMETHICONE (MYLICON) 80 MG CHEWABLE TABLET    Take 80 mg by mouth every 6 (six) hours as needed for Flatulence.    SUCRALFATE (CARAFATE) 100 MG/ML SUSPENSION    Take 1 g by mouth 4 (four) times  daily.     TIZANIDINE (ZANAFLEX) 4 MG TABLET    Take 1 capsule by mouth 2 (two) times daily as needed (muscle spasms).    ZOLPIDEM (AMBIEN CR) 12.5 MG CR TABLET    Take 1 tablet by mouth nightly as needed.   Modified Medications    No medications on file   Discontinued Medications    No medications on file             Educational assessment:   [x]            Good  []            Fair  []            Poor    Readiness to learn:   [x]            Good  []            Fair  []            Poor    Vision Status:   [x]            Good  []            Fair  []            Poor    Reading Ability:  [x]            Good  []            Fair  []            Poor    Knowledge of condition:   [x]            Good  []            Fair  []            Poor    Language Barriers:   []            Good  []            Fair  []            Poor  [x]            None    Cognitive/ Physical Barriers:   []            Good  []            Fair  []            Poor  [x]            None    Learning best by:                       [x]            Seeing  []            Hearing  []            Reading                         [x]            Doing    Describe any barrier /Limitation or financial implications of care choices identified     []            Financial  []            Emotional  []            Education  []            Vision/Hearing  []            Physical  [x]            None  []                TOPIC /CONTENT FOR TODAY:    [x]            MDI with or without spacer  [x]            Dry powder inhaler  []            Acapella   []           Peak Flow meter  [x]           Action plan  [x]            Nebulizer use  []            Oxygen use safety  [x]            Breathing and cough techniques  [x]            Energy conservation  [x]            Infection prevention  [x]           CPAP        Learner:    [x]            Patient   []            Caregiver    Method:    [x]            Verbal explanation  [x]            Audio visual    [x]            Literature  [x]             Teach back      Evaluation:    [x]            Teach back  [x]            Demonstrate  [x]            Follow up phone call    []            2 weeks     [x]            4 weeks   [x]            PRN    Additional comments:   Patient was seen today to review respiratory medication purpose and proper technique for use of inhalers. Patient practiced proper technique using MDI with valved holding chamber (spacer) and DPI inhalers. Patient demonstrated understanding. Literature was given to patient.     Discussed therapeutic goals for positive airway pressure therapy(CPAP or BiPAP): Ideal is usage 100% of nights for 6 - 8 hours per night. Minimum usage is 70% of night for at least 4 hours per night used. Reviewed CPAP habituation plan with patient. Patient expressed understanding.      Discussed complications of untreated sleep apnea with patient, including but not limited to high blood pressure, heart attack, stroke, obesity, diabetes and worsening heart failure. Patient voiced understanding.       Asthma trigger checklist was verbally reviewed and literature given to patient.     Infection prevention was discussed. Patient was reminded to get influenza vaccine. Hand hygiene and cleaning of respiratory equipment was also discussed. Patient verbalized understanding.      Asthma action plan was reviewed and literature was given to patient. Patient verbalized understanding.

## 2023-10-20 ENCOUNTER — CLINICAL SUPPORT (OUTPATIENT)
Dept: REHABILITATION | Facility: HOSPITAL | Age: 68
End: 2023-10-20
Payer: MEDICARE

## 2023-10-20 DIAGNOSIS — M54.50 CHRONIC BILATERAL LOW BACK PAIN WITHOUT SCIATICA: Primary | ICD-10-CM

## 2023-10-20 DIAGNOSIS — M53.86 DECREASED ROM OF LUMBAR SPINE: ICD-10-CM

## 2023-10-20 DIAGNOSIS — G89.29 CHRONIC BILATERAL LOW BACK PAIN WITHOUT SCIATICA: Primary | ICD-10-CM

## 2023-10-20 PROCEDURE — 97110 THERAPEUTIC EXERCISES: CPT | Mod: HCNC,PN

## 2023-10-20 PROCEDURE — 97112 NEUROMUSCULAR REEDUCATION: CPT | Mod: HCNC,PN

## 2023-10-21 NOTE — PROGRESS NOTES
[facility]   Physical Therapy Treatment Note      Name: Abdullahi Urias  Clinic Number: [id number]    Therapy Diagnosis:   Encounter Diagnoses   Name Primary?    Chronic bilateral low back pain without sciatica Yes    Decreased ROM of lumbar spine      Physician: Brandon Valencia MD    Visit Date: 10/20/2023    Physician Orders: PT Eval and Treat  Medical Diagnosis from Referral: s/p lumbar surgery  Evaluation Date: 10/17/2023  Authorization Period Expiration: 10/12/2025  Plan of Care Expiration: 12/16/2023                          Progress Update: 11/16/2023            Visit # / Visits authorized: 1 / 1          FOTO  Number Date Score    1 10/17/2023 26%   2       3       4             PRECAUTIONS: Standard Precautions and Orthopedic: s/p lumbar sugery      Time In: 1350  Time Out: 1445  Total Appointment Time (timed & untimed codes): 55 minutes    PTA Visit #: 0/5       Subjective     Patient reports: low back pain and spasms.  He was compliant with home exercise program.  Response to previous treatment: N/A  Functional change: N/A    Pain: 8/10  Location: bilateral low back      Objective      Objective Measures updated at progress report unless specified.     Treatment     Abdullahi received the treatments listed below:      therapeutic exercises to develop strength and ROM for 23 minutes including:   Activity   Details    NuStep x     Single Knee to Chest x     Matrix knee extension x     Matrix knee flexion x                  neuromuscular re-education activities to improve: Balance, Sense, Proprioception, and Posture for 25 minutes. The following activities were included:   Activity   Details    Sit to stand   18 inches    Supported hip extension  Over plinth    Step ups  8 inch    PPT      Supine marches with resistance  GTB    Supine clamshells  RTB                   therapeutic activities to improve functional performance for ---  minutes, including:    Activity   Details                                            Patient Education and Home Exercises       Education provided:   - Log roll transfers for supine to sit  - Home program    Written Home Exercises Provided: Patient instructed to cont prior HEP. Exercises were reviewed and Abdullahi was able to demonstrate them prior to the end of the session.  Abdullahi demonstrated good  understanding of the education provided. See Electronic Medical Record under Patient Instructions for exercises provided during therapy sessions    Assessment     The patient ambulated into clinic with standard cane.  He has stiffness and spasms in the lumbar spine.  He was instructed in log roll technique to reduce lumbar strain.  He was instructed in and performed core and lower extremity strengthening.    Abdullahi Is progressing well towards his goals.   Patient prognosis is Good.     Patient will continue to benefit from skilled outpatient physical therapy to address the deficits listed in the problem list box on initial evaluation, provide pt/family education and to maximize pt's level of independence in the home and community environment.     Patient's spiritual, cultural and educational needs considered and pt agreeable to plan of care and goals.     Anticipated barriers to physical therapy: None    SHORT TERM GOALS:  4 weeks Progress Date Met   Recent signs and systems trend is improving in order to progress towards Long term goals.  [] Met  [] Not Met  [] Progressing     Patient will be independent with Home Exercise Program  in order to further progress and return to maximal function. [] Met  [] Not Met  [] Progressing     Pain rating at Worst: 5 /10 in order to progress towards increased independence with activity. [] Met  [] Not Met  [] Progressing     Patient will be able to correct postural deviations in sitting and standing, to decrease pain and promote postural awareness for injury prevention.  [] Met  [] Not Met  [] Progressing     Patient will improve  functional outcome (FOTO) score: by 5% to increase self-worth & perceived functional ability towards long term goals [] Met  [] Not Met  [] Progressing        LONG TERM GOALS: 12 weeks  Progress Date Met   Patient will return to normal activites of daily living, recreational, and work related activities with less pain and limitation.  [] Met  [] Not Met  [] Progressing     Patient will improve range of motion  to stated goals in order to return to maximal functional potential.  [] Met  [] Not Met  [] Progressing     Patient will improve Strength to stated goals of appropriate musculature in order to improve functional independence.  [] Met  [] Not Met  [] Progressing     Pain Rating at Best: 1/10 to improve Quality of Life.  [] Met  [] Not Met  [] Progressing     Patient will meet predicted functional outcome (FOTO) score: 40% to increase self-worth & perceived functional ability. [] Met  [] Not Met  [] Progressing     Patient will have met/partially met personal goal of: improve pain and function in standing, walking, and lifting [] Met  [] Not Met  [] Progressing              PLAN   Plan of care Certification: 10/17/2023 to 12/16/2023     Outpatient Physical Therapy 2 times weekly for 12 weeks to include any combination of the following interventions: virtual visits, dry needling, modalities, electrical stimulation (IFC, Pre-Mod, Attended with Functional Dry Needling), Manual Therapy, Moist Heat/ Ice, Neuromuscular Re-ed, Patient Education, Self Care, Therapeutic Exercise, Functional Training, and Therapeutic Activites     Rommel Figueroa, PT

## 2023-10-24 ENCOUNTER — DOCUMENTATION ONLY (OUTPATIENT)
Dept: REHABILITATION | Facility: HOSPITAL | Age: 68
End: 2023-10-24
Payer: MEDICARE

## 2023-10-24 ENCOUNTER — CLINICAL SUPPORT (OUTPATIENT)
Dept: REHABILITATION | Facility: HOSPITAL | Age: 68
End: 2023-10-24
Payer: MEDICARE

## 2023-10-24 DIAGNOSIS — M54.50 CHRONIC BILATERAL LOW BACK PAIN WITHOUT SCIATICA: Primary | ICD-10-CM

## 2023-10-24 DIAGNOSIS — M53.86 DECREASED ROM OF LUMBAR SPINE: ICD-10-CM

## 2023-10-24 DIAGNOSIS — G89.29 CHRONIC BILATERAL LOW BACK PAIN WITHOUT SCIATICA: Primary | ICD-10-CM

## 2023-10-24 PROCEDURE — 97110 THERAPEUTIC EXERCISES: CPT | Mod: HCNC,PN,CQ

## 2023-10-24 PROCEDURE — 97112 NEUROMUSCULAR REEDUCATION: CPT | Mod: HCNC,PN,CQ

## 2023-10-24 PROCEDURE — 97140 MANUAL THERAPY 1/> REGIONS: CPT | Mod: HCNC,PN,CQ

## 2023-10-24 NOTE — PROGRESS NOTES
[facility]   Physical Therapy Treatment Note      Name: Abdullahi Urias  Clinic Number: [id number]    Therapy Diagnosis:   Encounter Diagnoses   Name Primary?    Chronic bilateral low back pain without sciatica Yes    Decreased ROM of lumbar spine        Physician: Brandon Valencia MD    Visit Date: 10/24/2023    Physician Orders: PT Eval and Treat  Medical Diagnosis from Referral: s/p lumbar surgery  Evaluation Date: 10/17/2023  Authorization Period Expiration: 12/31/2023  Plan of Care Expiration: 12/16/2023                          Progress Update: 11/16/2023            Visit # / Visits authorized: 2 / 20          FOTO  Number Date Score    1 10/17/2023 26%   2       3       4             PRECAUTIONS: Standard Precautions and Orthopedic: s/p lumbar sugery      Time In: 1030  Time Out: 1120  Total Appointment Time (timed & untimed codes): 40 minutes (+10 minutes with tech)    PTA Visit #: 1/5       Subjective     Patient reports: low back pain and right sciatic nerve pain/ numbness in right foot.  He was compliant with home exercise program.  Response to previous treatment: N/A  Functional change: N/A    Pain: 8/10   Location: bilateral low back      Objective      Objective Measures updated at progress report unless specified.     Treatment     Abdullahi received the treatments listed below:      Manual therapy including Soft tissue mobilization to lumbar paraspinals x 10 minutes.    therapeutic exercises to develop strength and ROM for 25 minutes including:   Activity   Details    NuStep x     Single Knee to Chest x     Matrix knee extension x 20# 3x10    Matrix knee flexion x 40# 3x10    Pulldowns x     Rows x      neuromuscular re-education activities to improve: Balance, Sense, Proprioception, and Posture for 15 minutes. The following activities were included:   Activity   Details    Sit to stand  x 18 inches 2x10    Supported hip extension x Over plinth    Step ups x 8 inch 2x10    PPT  x     Supine marches with resistance x GTB    Supine clamshells  RTB                   therapeutic activities to improve functional performance for ---  minutes, including:    Activity   Details                                           Patient Education and Home Exercises       Education provided:   - Log roll transfers for supine to sit  - Home program    Written Home Exercises Provided: Patient instructed to cont prior HEP. Exercises were reviewed and Abdullahi was able to demonstrate them prior to the end of the session.  Abdullahi demonstrated good  understanding of the education provided. See Electronic Medical Record under Patient Instructions for exercises provided during therapy sessions    Assessment     The patient ambulated into clinic with standard cane reporting increased pain in right piriformis. Soft tissue mobilization performed to address pain with good tolerance. Patient continues to have limitations with transitions on mat secondary to pain requiring education on log rolling. Patient performed more exercises for extremity stability without adverse symptoms noted    Abdullahi Is progressing well towards his goals.   Patient prognosis is Good.     Patient will continue to benefit from skilled outpatient physical therapy to address the deficits listed in the problem list box on initial evaluation, provide pt/family education and to maximize pt's level of independence in the home and community environment.     Patient's spiritual, cultural and educational needs considered and pt agreeable to plan of care and goals.     Anticipated barriers to physical therapy: None    SHORT TERM GOALS:  4 weeks Progress Date Met   Recent signs and systems trend is improving in order to progress towards Long term goals.  [] Met  [] Not Met  [] Progressing     Patient will be independent with Home Exercise Program  in order to further progress and return to maximal function. [] Met  [] Not Met  [] Progressing     Pain rating  at Worst: 5 /10 in order to progress towards increased independence with activity. [] Met  [] Not Met  [] Progressing     Patient will be able to correct postural deviations in sitting and standing, to decrease pain and promote postural awareness for injury prevention.  [] Met  [] Not Met  [] Progressing     Patient will improve functional outcome (FOTO) score: by 5% to increase self-worth & perceived functional ability towards long term goals [] Met  [] Not Met  [] Progressing        LONG TERM GOALS: 12 weeks  Progress Date Met   Patient will return to normal activites of daily living, recreational, and work related activities with less pain and limitation.  [] Met  [] Not Met  [] Progressing     Patient will improve range of motion  to stated goals in order to return to maximal functional potential.  [] Met  [] Not Met  [] Progressing     Patient will improve Strength to stated goals of appropriate musculature in order to improve functional independence.  [] Met  [] Not Met  [] Progressing     Pain Rating at Best: 1/10 to improve Quality of Life.  [] Met  [] Not Met  [] Progressing     Patient will meet predicted functional outcome (FOTO) score: 40% to increase self-worth & perceived functional ability. [] Met  [] Not Met  [] Progressing     Patient will have met/partially met personal goal of: improve pain and function in standing, walking, and lifting [] Met  [] Not Met  [] Progressing              PLAN   Plan of care Certification: 10/17/2023 to 12/16/2023     Outpatient Physical Therapy 2 times weekly for 12 weeks to include any combination of the following interventions: virtual visits, dry needling, modalities, electrical stimulation (IFC, Pre-Mod, Attended with Functional Dry Needling), Manual Therapy, Moist Heat/ Ice, Neuromuscular Re-ed, Patient Education, Self Care, Therapeutic Exercise, Functional Training, and Therapeutic Activites     Cristel Glez PTA

## 2023-10-24 NOTE — PROGRESS NOTES
PT/PTA met face to face to discuss pt's treatment plan and progress towards established goals. Pt will be seen by a physical therapist minimally every 6th visit or every 30 days.    Cristel Glez PTA

## 2023-10-27 ENCOUNTER — CLINICAL SUPPORT (OUTPATIENT)
Dept: REHABILITATION | Facility: HOSPITAL | Age: 68
End: 2023-10-27
Payer: MEDICARE

## 2023-10-27 ENCOUNTER — OFFICE VISIT (OUTPATIENT)
Dept: CARDIOLOGY | Facility: CLINIC | Age: 68
End: 2023-10-27
Payer: MEDICARE

## 2023-10-27 VITALS
HEART RATE: 84 BPM | BODY MASS INDEX: 36.8 KG/M2 | HEIGHT: 69 IN | DIASTOLIC BLOOD PRESSURE: 76 MMHG | OXYGEN SATURATION: 96 % | WEIGHT: 248.44 LBS | SYSTOLIC BLOOD PRESSURE: 118 MMHG

## 2023-10-27 DIAGNOSIS — T81.49XA POST-OPERATIVE WOUND ABSCESS: Chronic | ICD-10-CM

## 2023-10-27 DIAGNOSIS — I70.8 AORTO-ILIAC ATHEROSCLEROSIS: ICD-10-CM

## 2023-10-27 DIAGNOSIS — I10 PRIMARY HYPERTENSION: Chronic | ICD-10-CM

## 2023-10-27 DIAGNOSIS — Z95.2 S/P AVR (AORTIC VALVE REPLACEMENT): Primary | ICD-10-CM

## 2023-10-27 DIAGNOSIS — J45.40 MODERATE PERSISTENT ASTHMA WITHOUT COMPLICATION: ICD-10-CM

## 2023-10-27 DIAGNOSIS — Z86.73 HISTORY OF CVA IN ADULTHOOD: ICD-10-CM

## 2023-10-27 DIAGNOSIS — I70.0 AORTO-ILIAC ATHEROSCLEROSIS: ICD-10-CM

## 2023-10-27 DIAGNOSIS — Z87.891 FORMER TOBACCO USE: ICD-10-CM

## 2023-10-27 DIAGNOSIS — M54.50 CHRONIC BILATERAL LOW BACK PAIN WITHOUT SCIATICA: Primary | ICD-10-CM

## 2023-10-27 DIAGNOSIS — M53.86 DECREASED ROM OF LUMBAR SPINE: ICD-10-CM

## 2023-10-27 DIAGNOSIS — I25.10 CORONARY ARTERY DISEASE INVOLVING NATIVE CORONARY ARTERY OF NATIVE HEART WITHOUT ANGINA PECTORIS: Chronic | ICD-10-CM

## 2023-10-27 DIAGNOSIS — G89.29 CHRONIC BILATERAL LOW BACK PAIN WITHOUT SCIATICA: Primary | ICD-10-CM

## 2023-10-27 DIAGNOSIS — I42.9 CARDIOMYOPATHY, UNSPECIFIED TYPE: ICD-10-CM

## 2023-10-27 DIAGNOSIS — I73.9 PAD (PERIPHERAL ARTERY DISEASE): ICD-10-CM

## 2023-10-27 PROCEDURE — 99214 PR OFFICE/OUTPT VISIT, EST, LEVL IV, 30-39 MIN: ICD-10-PCS | Mod: HCNC,S$GLB,, | Performed by: INTERNAL MEDICINE

## 2023-10-27 PROCEDURE — 99999 PR PBB SHADOW E&M-EST. PATIENT-LVL V: CPT | Mod: PBBFAC,HCNC,, | Performed by: INTERNAL MEDICINE

## 2023-10-27 PROCEDURE — 97110 THERAPEUTIC EXERCISES: CPT | Mod: HCNC,PN

## 2023-10-27 PROCEDURE — 99214 OFFICE O/P EST MOD 30 MIN: CPT | Mod: HCNC,S$GLB,, | Performed by: INTERNAL MEDICINE

## 2023-10-27 PROCEDURE — 99999 PR PBB SHADOW E&M-EST. PATIENT-LVL V: ICD-10-PCS | Mod: PBBFAC,HCNC,, | Performed by: INTERNAL MEDICINE

## 2023-10-27 PROCEDURE — 97112 NEUROMUSCULAR REEDUCATION: CPT | Mod: HCNC,PN

## 2023-10-27 PROCEDURE — 97530 THERAPEUTIC ACTIVITIES: CPT | Mod: HCNC,PN

## 2023-10-27 PROCEDURE — 99215 OFFICE O/P EST HI 40 MIN: CPT | Mod: PBBFAC,HCNC | Performed by: INTERNAL MEDICINE

## 2023-10-27 NOTE — PROGRESS NOTES
Subjective:   Patient ID:  Abdullahi Urias is a 68 y.o. male who presents for follow up of Shortness of Breath      65 yo male, came in for 6 m f/u  PMH s/p bioprosthetic AVR at [facility] in 2014 Salazar [id number] 25 mm pericardial tissue valve, nonobstructive CAD s/p C in  nonobstructive, 20% midLAD and RCA by Dr. quiroz, h/o stroke in 2012, HTN, HLD CHEO on CPAP remote h/o cocaine in 2012, quit drinking in 2012 and no smoking. H/o prostate cancer in 2018,   PAD on plavix allergic to pletal and  leg pain from neuropathy.  MPI in 2012 small apical infarct  ekg in 2018 NSR LVH with 2nd stt change  BNP in 2016 was 37  ECHO in  normal EF, perivalvular AI and s/p AVR mean G 16 mmHG and peak V 2.85    07/2021 visit  Limited exercise due to hip pain. C/o arm numbness. Sweating at rest.    Finished XRT for prostate cancer and [facility] [place] in [facility] of [facility]  BIRD when climbing the stairs over 6 months, and episode of syncope after got out the bed. Woke up quickly  V/Q scan in  low risk for PE and d dimer 1.8     visit  H/O COVID 19 infection on 12/30/2021 and had AB infusion [facility].   Hands and legs numbness for few months. Occasional chest discomfort and ingestion. Lower back pain.   Some calf pain and left foot swelling. The burning sensation worse below the knee. Some mild numbness and pain at thigh, L> R. occred at rest and with exertion.      visit  eval by Dr Kamara at vascular medicine clinic in  and r/o PAD related leg pain. Had uncomplicated CA travel.   Leg pain controlled and f/u at pain clinic  Plan to have dental work. No chest pain. SOB chronic stable. A lot of GERD.   On plavix for PAD and allergic to pletal     visit  The leg pain controlled now, and f/u at pain clinic on steroid injection  Some GERD burning. No SOB palpitation dizziness and leg swelling. Some exercise aerobic daily and gym work   No smoking/drinking  now. LDL 70    04/23 visit  Plan to have lumbar spinal procedure done by Dr. Valencia.  H/o nonobstructive CAD and s/p bioprosthetic AVR in 2014  Repeat echo on  EF nl, s/p AVR functioning well  SOB improved after trilicity rx  No chest pain dizziness palpitation and leg swelling. No active bleeding   Ekg today NSR TWI on alli inferior leads    Interval history  05/23 s/p lumbar fusion procedure and developed wound abscess in 07/23 Rx of Abx for 6 weeks  F/u pulm for SOB on triligy Rx          Past Medical History:   Diagnosis Date    Aortic stenosis     dr ph[facility]    Asthma     BPH (benign prostatic hyperplasia)     CAD (coronary artery disease)     Cardiomyopathy     CHF (congestive heart failure)     Chronic hoarseness     vocal cord surg    Chronic pain     CKD (chronic kidney disease) stage 3, GFR 30-59 ml/min     CVA (cerebral infarction)     8/2012 olol; reviewed ed note    Ex-smoker     GERD (gastroesophageal reflux disease)     Hepatitis C     treatedharvoni says cured, RNA NEG 6/2020    Hypertension     Pancreatitis     Prostate cancer     Prostate cancer     Prostate cancer     PVD (peripheral vascular disease)     Renal insufficiency     Substance abuse     cocaine, etoh , tob in past       Past Surgical History:   Procedure Laterality Date    AORTIC VALVE REPLACEMENT  05/19/2014    Tissue valve replacement    BACK SURGERY      CARDIAC CATHETERIZATION      COLONOSCOPY  2011    COLONOSCOPY N/A 2/24/2021    Procedure: COLONOSCOPY;  Surgeon: Faith Carrillo MD;  Location: [facility];  Service: Endoscopy;  Laterality: N/A;    EPIDURAL STEROID INJECTION INTO CERVICAL SPINE N/A 6/21/2022    Procedure: C7-T1 IL PIEDAD;  Surgeon: Nehemiah Carmen MD;  Location: [facility] PAIN MGT;  Service: Pain Management;  Laterality: N/A;    ESOPHAGOGASTRODUODENOSCOPY N/A 2/24/2021    Procedure: ESOPHAGOGASTRODUODENOSCOPY (EGD);  Surgeon: Faith Carrillo MD;  Location: [facility];  Service: Endoscopy;   Laterality: N/A;    FOOT SURGERY      INSERTION N/A 7/5/2023    Procedure: INSERTION;  Surgeon: Brandon Valencia MD;  Location: [facility] OR;  Service: Neurosurgery;  Laterality: N/A;  Antibiotic Beads    LEFT HEART CATHETERIZATION Left 7/24/2020    Procedure: CATHETERIZATION, HEART, LEFT;  Surgeon: Pasha Martin MD;  Location: [facility] CATH LAB;  Service: Cardiology;  Laterality: Left;    PENILE PROSTHESIS IMPLANT      PENILE PROSTHESIS REVISION  04/30/2018    PROSTATECTOMY  09/2019    [facility]    radiation for prostate      REMOVAL OF HARDWARE FROM SPINE N/A 7/5/2023    Procedure: REMOVAL, HARDWARE, SPINE;  Surgeon: Brandon Valencia MD;  Location: [facility] OR;  Service: Neurosurgery;  Laterality: N/A;    REPLACEMENT N/A 7/5/2023    Procedure: REPLACEMENT;  Surgeon: Brandon Valencia MD;  Location: [facility] OR;  Service: Neurosurgery;  Laterality: N/A;  of Sandoval and Screws. [employer]    TRANSFORAMINAL EPIDURAL INJECTION OF STEROID Right 10/20/2022    Procedure: Right  L4/5 + L5/S1 TF PIEDAD RN IV Sedation;  Surgeon: Nehemiah Carmen MD;  Location: [facility] PAIN MGT;  Service: Pain Management;  Laterality: Right;    TRANSFORAMINAL LUMBAR INTERBODY FUSION (TLIF) USING COMPUTER-ASSISTED NAVIGATION Bilateral 5/17/2023    Procedure: FUSION, SPINE, LUMBAR, TLIF, USING COMPUTER-ASSISTED NAVIGATION;  Surgeon: Brandon Valencia MD;  Location: [facility] OR;  Service: Neurosurgery;  Laterality: Bilateral;  2 level lumbar fusion at L4-5 and L5-S1    UPPER GASTROINTESTINAL ENDOSCOPY  2011    WASHOUT N/A 7/5/2023    Procedure: WASHOUT;  Surgeon: Brandon Valencia MD;  Location: [facility] OR;  Service: Neurosurgery;  Laterality: N/A;    WOUND EXPLORATION Bilateral 7/5/2023    Procedure: EXPLORATION, WOUND;  Surgeon: Brandon Valencia MD;  Location: [facility] OR;  Service: Neurosurgery;  Laterality: Bilateral;       Social History     Tobacco Use    Smoking status: Former     Current packs/day: 0.00     Average packs/day: 1 pack/day for 8.0 years (8.0 ttl  "pk-yrs)     Types: Cigarettes     Start date: 2004     Quit date: 2012     Years since quittin.1    Smokeless tobacco: Never   Substance Use Topics    Alcohol use: Not Currently     Comment: Sober since 2012    Drug use: Not Currently     Types: "Crack" cocaine, Marijuana     Comment: Quit in        Family History   Problem Relation Age of Onset    Heart disease Mother     Heart disease Father     Heart attack Sister     Heart attack Brother     Colon cancer Neg Hx     Colon polyps Neg Hx     Liver cancer Neg Hx     Inflammatory bowel disease Neg Hx     Liver disease Neg Hx     Rectal cancer Neg Hx     Stomach cancer Neg Hx     Ulcerative colitis Neg Hx          ROS    Objective:   Physical Exam  HENT:      Head: Normocephalic.   Eyes:      Pupils: Pupils are equal, round, and reactive to light.   Neck:      Thyroid: No thyromegaly.      Vascular: Normal carotid pulses. No carotid bruit or JVD.   Cardiovascular:      Rate and Rhythm: Normal rate and regular rhythm. No extrasystoles are present.     Chest Wall: PMI is not displaced.      Pulses: Normal pulses.      Heart sounds: Murmur (ESM + on RUSB ) heard.      No gallop. No S3 sounds.   Pulmonary:      Effort: No respiratory distress.      Breath sounds: Normal breath sounds. No stridor.   Abdominal:      General: Bowel sounds are normal.      Palpations: Abdomen is soft.      Tenderness: There is no abdominal tenderness. There is no rebound.   Musculoskeletal:         General: Normal range of motion.   Skin:     Findings: No rash.   Neurological:      Mental Status: He is alert and oriented to person, place, and time.   Psychiatric:         Behavior: Behavior normal.         Lab Results   Component Value Date    CHOL 123 2022    CHOL 134 10/22/2021    CHOL 185 2021     Lab Results   Component Value Date    HDL 39 (L) 2022    HDL 44 10/22/2021    HDL 38 (L) 2021     Lab Results   Component Value Date    LDLCALC " 72.6 01/18/2022    LDLCALC 78.8 10/22/2021    LDLCALC 127.2 01/05/2021     Lab Results   Component Value Date    TRIG 57 01/18/2022    TRIG 56 10/22/2021    TRIG 99 01/05/2021     Lab Results   Component Value Date    CHOLHDL 31.7 01/18/2022    CHOLHDL 32.8 10/22/2021    CHOLHDL 20.5 01/05/2021       Chemistry        Component Value Date/Time     08/24/2023 1443    K 4.3 08/24/2023 1443     08/24/2023 1443    CO2 24 08/24/2023 1443    BUN 17 08/24/2023 1443    CREATININE 1.6 (H) 08/24/2023 1443    GLU 84 08/24/2023 1443        Component Value Date/Time    CALCIUM 9.3 08/24/2023 1443    ALKPHOS 123 08/16/2023 0915    AST 28 08/16/2023 0915    ALT 20 08/16/2023 0915    BILITOT 0.2 08/16/2023 0915    ESTGFRAFRICA 51.1 (A) 06/02/2022 1246    EGFRNONAA 44.2 (A) 06/02/2022 1246          Lab Results   Component Value Date    HGBA1C 5.2 10/23/2007     Lab Results   Component Value Date    TSH 3.370 06/30/2020     Lab Results   Component Value Date    INR 1.1 07/04/2023    INR 1.0 04/13/2023    INR 1.0 01/24/2023     Lab Results   Component Value Date    WBC 4.33 08/16/2023    HGB 8.6 (L) 08/16/2023    HCT 29.0 (L) 08/16/2023    MCV 77 (L) 08/16/2023     08/16/2023     BMP  Sodium   Date Value Ref Range Status   08/24/2023 138 136 - 145 mmol/L Final     Potassium   Date Value Ref Range Status   08/24/2023 4.3 3.5 - 5.1 mmol/L Final     Chloride   Date Value Ref Range Status   08/24/2023 108 95 - 110 mmol/L Final     CO2   Date Value Ref Range Status   08/24/2023 24 23 - 29 mmol/L Final     BUN   Date Value Ref Range Status   08/24/2023 17 8 - 23 mg/dL Final     Creatinine   Date Value Ref Range Status   08/24/2023 1.6 (H) 0.5 - 1.4 mg/dL Final     Calcium   Date Value Ref Range Status   08/24/2023 9.3 8.7 - 10.5 mg/dL Final     Anion Gap   Date Value Ref Range Status   08/24/2023 6 (L) 8 - 16 mmol/L Final     eGFR if    Date Value Ref Range Status   06/02/2022 51.1 (A) >60 mL/min/1.73 m^2  Final     eGFR if non    Date Value Ref Range Status   06/02/2022 44.2 (A) >60 mL/min/1.73 m^2 Final     Comment:     Calculation used to obtain the estimated glomerular filtration  rate (eGFR) is the CKD-EPI equation.        BNP  @LABRCNTIP(BNP,BNPTRIAGEBLO)@  @LABRCNTIP(troponini)@  CrCl cannot be calculated (Patient's most recent lab result is older than the maximum 7 days allowed.).  No results found in the last 24 hours.  No results found in the last 24 hours.  No results found in the last 24 hours.    Assessment:      1. S/P AVR (aortic valve replacement) biopresthetic miller 25 mm in 2014     2. History of CVA in adulthood    3. Coronary artery disease involving native coronary artery of native heart without angina pectoris    4. PAD (peripheral artery disease)    5. Aorto-iliac atherosclerosis    6. Primary hypertension    7. Cardiomyopathy, unspecified type    8. Former tobacco use    9. Moderate persistent asthma without complication    10. Post-operative wound abscess      Sob  Wound infection  S/p AVR  Plan:   Repeat echo for s/p AVR and recent post lumbar spinal procedure wound infectiomn  Continue ASA Plavix (for PAD and allergic to pletal) Lipitor Coreg and Losartan    Counseled DASH  Check Lipid profile with PCP in 6 months  Recommend heart-healthy diet, weight control and regular exercise.  Wilbur. Risk modification.   I have reviewed all pertinent labs and cardiac studies independently. Plans and recommendations have been formulated under my direct supervision. All questions answered and patient voiced understanding.   If symptoms persist go to the ED  RTC in 6 months

## 2023-10-27 NOTE — PROGRESS NOTES
[facility]   Physical Therapy Treatment Note      Name: Abdullahi Urias  Clinic Number: [id number]    Therapy Diagnosis:   Encounter Diagnoses   Name Primary?    Chronic bilateral low back pain without sciatica Yes    Decreased ROM of lumbar spine        Physician: Brandon Valencia MD    Visit Date: 10/27/2023    Physician Orders: PT Eval and Treat  Medical Diagnosis from Referral: s/p lumbar surgery  Evaluation Date: 10/17/2023  Authorization Period Expiration: 12/31/2023  Plan of Care Expiration: 12/16/2023                          Progress Update: 11/16/2023            Visit # / Visits authorized: 2 / 20          FOTO  Number Date Score    1 10/17/2023 26%   2       3       4             PRECAUTIONS: Standard Precautions and Orthopedic: s/p lumbar sugery      Time In: 1130  Time Out: 1225  Total Appointment Time (timed & untimed codes): 55 minutes    PTA Visit #: 1/5       Subjective     Patient reports: increased pain in mornings when getting OOB  He was compliant with home exercise program.  Response to previous treatment: N/A  Functional change: N/A    Pain: 6/10   Location: bilateral low back      Objective      Objective Measures updated at progress report unless specified.     Treatment     Abdullahi received the treatments listed below:      therapeutic exercises to develop strength and ROM for 25 minutes including:   Activity   Details    NuStep x     Single Knee to Chest x     Matrix knee extension x 20# 3x10    Matrix knee flexion x 40# 3x10    Pulldowns x     Rows x      neuromuscular re-education activities to improve: Balance, Sense, Proprioception, and Posture for 15 minutes. The following activities were included:   Activity   Details    Sit to stand  x 18 inches 2x10    Supported hip extension x Over plinth    Step ups x 8 inch 2x10    PPT x     Supine marches with resistance x GTB    Supine clamshells  RTB                   therapeutic activities to improve functional  performance for 15 minutes, including:    Activity   Details    [place] carry 15# x 160# (B)    Ball carry x 10# 160#    Sled Push x 50 feet x 2                         Patient Education and Home Exercises       Education provided:   - Home program    Written Home Exercises Provided: Patient instructed to cont prior HEP. Exercises were reviewed and Abdullahi was able to demonstrate them prior to the end of the session.  Abdullahi demonstrated good  understanding of the education provided. See Electronic Medical Record under Patient Instructions for exercises provided during therapy sessions    Assessment     The patient was instructed in and performed lower extremity strengthening and core stabilization activity to prepare for lifting activity    Abdullahi Is progressing well towards his goals.   Patient prognosis is Good.     Patient will continue to benefit from skilled outpatient physical therapy to address the deficits listed in the problem list box on initial evaluation, provide pt/family education and to maximize pt's level of independence in the home and community environment.     Patient's spiritual, cultural and educational needs considered and pt agreeable to plan of care and goals.     Anticipated barriers to physical therapy: None    SHORT TERM GOALS:  4 weeks Progress Date Met   Recent signs and systems trend is improving in order to progress towards Long term goals.  [] Met  [] Not Met  [] Progressing     Patient will be independent with Home Exercise Program  in order to further progress and return to maximal function. [] Met  [] Not Met  [] Progressing     Pain rating at Worst: 5 /10 in order to progress towards increased independence with activity. [] Met  [] Not Met  [] Progressing     Patient will be able to correct postural deviations in sitting and standing, to decrease pain and promote postural awareness for injury prevention.  [] Met  [] Not Met  [] Progressing     Patient will improve functional  outcome (FOTO) score: by 5% to increase self-worth & perceived functional ability towards long term goals [] Met  [] Not Met  [] Progressing        LONG TERM GOALS: 12 weeks  Progress Date Met   Patient will return to normal activites of daily living, recreational, and work related activities with less pain and limitation.  [] Met  [] Not Met  [] Progressing     Patient will improve range of motion  to stated goals in order to return to maximal functional potential.  [] Met  [] Not Met  [] Progressing     Patient will improve Strength to stated goals of appropriate musculature in order to improve functional independence.  [] Met  [] Not Met  [] Progressing     Pain Rating at Best: 1/10 to improve Quality of Life.  [] Met  [] Not Met  [] Progressing     Patient will meet predicted functional outcome (FOTO) score: 40% to increase self-worth & perceived functional ability. [] Met  [] Not Met  [] Progressing     Patient will have met/partially met personal goal of: improve pain and function in standing, walking, and lifting [] Met  [] Not Met  [] Progressing              PLAN   Plan of care Certification: 10/17/2023 to 12/16/2023     Outpatient Physical Therapy 2 times weekly for 12 weeks to include any combination of the following interventions: virtual visits, dry needling, modalities, electrical stimulation (IFC, Pre-Mod, Attended with Functional Dry Needling), Manual Therapy, Moist Heat/ Ice, Neuromuscular Re-ed, Patient Education, Self Care, Therapeutic Exercise, Functional Training, and Therapeutic Activites     Rommel Figueroa, PT

## 2023-10-31 ENCOUNTER — CLINICAL SUPPORT (OUTPATIENT)
Dept: REHABILITATION | Facility: HOSPITAL | Age: 68
End: 2023-10-31
Payer: MEDICARE

## 2023-10-31 DIAGNOSIS — M53.86 DECREASED ROM OF LUMBAR SPINE: ICD-10-CM

## 2023-10-31 DIAGNOSIS — G89.29 CHRONIC BILATERAL LOW BACK PAIN WITHOUT SCIATICA: Primary | ICD-10-CM

## 2023-10-31 DIAGNOSIS — M54.50 CHRONIC BILATERAL LOW BACK PAIN WITHOUT SCIATICA: Primary | ICD-10-CM

## 2023-10-31 PROCEDURE — 97112 NEUROMUSCULAR REEDUCATION: CPT | Mod: HCNC,PN

## 2023-10-31 PROCEDURE — 97530 THERAPEUTIC ACTIVITIES: CPT | Mod: HCNC,PN

## 2023-10-31 PROCEDURE — 97110 THERAPEUTIC EXERCISES: CPT | Mod: HCNC,PN

## 2023-10-31 NOTE — PROGRESS NOTES
[facility]   Physical Therapy Treatment Note      Name: Abdullahi Urias  Clinic Number: [id number]    Therapy Diagnosis:   No diagnosis found.      Physician: Brandon Valencia MD    Visit Date: 10/31/2023    Physician Orders: PT Eval and Treat  Medical Diagnosis from Referral: s/p lumbar surgery  Evaluation Date: 10/17/2023  Authorization Period Expiration: 12/31/2023  Plan of Care Expiration: 12/16/2023                          Progress Update: 11/16/2023            Visit # / Visits authorized: 4/ 20          FOTO  Number Date Score    1 10/17/2023 26%   2       3       4             PRECAUTIONS: Standard Precautions and Orthopedic: s/p lumbar sugery      Time In: 1010  Time Out: 1105  Total Appointment Time (timed & untimed codes): 55 minutes    PTA Visit #: 1/5       Subjective     Patient reports: moving better  He was compliant with home exercise program.  Response to previous treatment: N/A  Functional change: N/A    Pain: 6/10   Location: bilateral low back      Objective      Objective Measures updated at progress report unless specified.     Treatment     Abdullahi received the treatments listed below:      therapeutic exercises to develop strength and ROM for 25 minutes including:   Activity   Details    NuStep x     Single Knee to Chest x     Matrix knee extension x 20# 3x10    Matrix knee flexion x 40# 3x10    Pulldowns x     Rows x      neuromuscular re-education activities to improve: Balance, Sense, Proprioception, and Posture for 15 minutes. The following activities were included:   Activity   Details    Sit to stand  x 18 inches 2x10    Supported hip extension x Over plinth    Step ups x 8 inch 2x10    PPT x     Supine marches with resistance x GTB    Supine clamshells  RTB                   therapeutic activities to improve functional performance for 15 minutes, including:    Activity   Details    [place] carry 15# x 160# (B)    Ball carry x 10# 160#    Sled Push x 50 feet x  2                         Patient Education and Home Exercises       Education provided:   - Home program    Written Home Exercises Provided: Patient instructed to cont prior HEP. Exercises were reviewed and Abdullahi was able to demonstrate them prior to the end of the session.  Abdullahi demonstrated good  understanding of the education provided. See Electronic Medical Record under Patient Instructions for exercises provided during therapy sessions    Assessment     The patient was instructed in and performed lower extremity strengthening and core stabilization activity to prepare for lifting activity    Abdullahi Is progressing well towards his goals.   Patient prognosis is Good.     Patient will continue to benefit from skilled outpatient physical therapy to address the deficits listed in the problem list box on initial evaluation, provide pt/family education and to maximize pt's level of independence in the home and community environment.     Patient's spiritual, cultural and educational needs considered and pt agreeable to plan of care and goals.     Anticipated barriers to physical therapy: None    SHORT TERM GOALS:  4 weeks Progress Date Met   Recent signs and systems trend is improving in order to progress towards Long term goals.  [] Met  [] Not Met  [] Progressing     Patient will be independent with Home Exercise Program  in order to further progress and return to maximal function. [] Met  [] Not Met  [] Progressing     Pain rating at Worst: 5 /10 in order to progress towards increased independence with activity. [] Met  [] Not Met  [] Progressing     Patient will be able to correct postural deviations in sitting and standing, to decrease pain and promote postural awareness for injury prevention.  [] Met  [] Not Met  [] Progressing     Patient will improve functional outcome (FOTO) score: by 5% to increase self-worth & perceived functional ability towards long term goals [] Met  [] Not Met  [] Progressing         LONG TERM GOALS: 12 weeks  Progress Date Met   Patient will return to normal activites of daily living, recreational, and work related activities with less pain and limitation.  [] Met  [] Not Met  [] Progressing     Patient will improve range of motion  to stated goals in order to return to maximal functional potential.  [] Met  [] Not Met  [] Progressing     Patient will improve Strength to stated goals of appropriate musculature in order to improve functional independence.  [] Met  [] Not Met  [] Progressing     Pain Rating at Best: 1/10 to improve Quality of Life.  [] Met  [] Not Met  [] Progressing     Patient will meet predicted functional outcome (FOTO) score: 40% to increase self-worth & perceived functional ability. [] Met  [] Not Met  [] Progressing     Patient will have met/partially met personal goal of: improve pain and function in standing, walking, and lifting [] Met  [] Not Met  [] Progressing              PLAN   Plan of care Certification: 10/17/2023 to 12/16/2023     Outpatient Physical Therapy 2 times weekly for 12 weeks to include any combination of the following interventions: virtual visits, dry needling, modalities, electrical stimulation (IFC, Pre-Mod, Attended with Functional Dry Needling), Manual Therapy, Moist Heat/ Ice, Neuromuscular Re-ed, Patient Education, Self Care, Therapeutic Exercise, Functional Training, and Therapeutic Activites     Rommel Figueroa, PT

## 2023-11-02 ENCOUNTER — CLINICAL SUPPORT (OUTPATIENT)
Dept: REHABILITATION | Facility: HOSPITAL | Age: 68
End: 2023-11-02
Payer: MEDICARE

## 2023-11-02 ENCOUNTER — HOSPITAL ENCOUNTER (OUTPATIENT)
Dept: CARDIOLOGY | Facility: HOSPITAL | Age: 68
Discharge: HOME | End: 2023-11-02
Attending: INTERNAL MEDICINE
Payer: MEDICARE

## 2023-11-02 VITALS
WEIGHT: 248 LBS | BODY MASS INDEX: 36.73 KG/M2 | HEIGHT: 69 IN | SYSTOLIC BLOOD PRESSURE: 118 MMHG | DIASTOLIC BLOOD PRESSURE: 76 MMHG

## 2023-11-02 DIAGNOSIS — M54.50 CHRONIC BILATERAL LOW BACK PAIN WITHOUT SCIATICA: Primary | ICD-10-CM

## 2023-11-02 DIAGNOSIS — M53.86 DECREASED ROM OF LUMBAR SPINE: ICD-10-CM

## 2023-11-02 DIAGNOSIS — G89.29 CHRONIC BILATERAL LOW BACK PAIN WITHOUT SCIATICA: Primary | ICD-10-CM

## 2023-11-02 DIAGNOSIS — Z95.2 S/P AVR (AORTIC VALVE REPLACEMENT): ICD-10-CM

## 2023-11-02 LAB
AORTIC ROOT ANNULUS: 3.04 CM
ASCENDING AORTA: 3.38 CM
AV INDEX (PROSTH): 0.62
AV MEAN GRADIENT: 10 MMHG
AV PEAK GRADIENT: 19 MMHG
AV VALVE AREA BY VELOCITY RATIO: 1.72 CM²
AV VALVE AREA: 1.9 CM²
AV VELOCITY RATIO: 0.56
BSA FOR ECHO PROCEDURE: 2.34 M2
CV ECHO LV RWT: 0.5 CM
DOP CALC AO PEAK VEL: 2.2 M/S
DOP CALC AO VTI: 40.2 CM
DOP CALC LVOT AREA: 3.1 CM2
DOP CALC LVOT DIAMETER: 1.98 CM
DOP CALC LVOT PEAK VEL: 1.23 M/S
DOP CALC LVOT STROKE VOLUME: 76.32 CM3
DOP CALC RVOT PEAK VEL: 0.7 M/S
DOP CALC RVOT VTI: 15.9 CM
DOP CALCLVOT PEAK VEL VTI: 24.8 CM
E WAVE DECELERATION TIME: 256.39 MSEC
E/A RATIO: 1.19
E/E' RATIO: 16.83 M/S
ECHO LV POSTERIOR WALL: 1.15 CM (ref 0.6–1.1)
EJECTION FRACTION: 60 %
FRACTIONAL SHORTENING: 34 % (ref 28–44)
INTERVENTRICULAR SEPTUM: 1.42 CM (ref 0.6–1.1)
IVRT: 94.2 MSEC
LA MAJOR: 5.72 CM
LA MINOR: 5.24 CM
LA WIDTH: 3.9 CM
LEFT ATRIUM SIZE: 3.51 CM
LEFT ATRIUM VOLUME INDEX MOD: 28.4 ML/M2
LEFT ATRIUM VOLUME INDEX: 28.2 ML/M2
LEFT ATRIUM VOLUME MOD: 64.09 CM3
LEFT ATRIUM VOLUME: 63.64 CM3
LEFT INTERNAL DIMENSION IN SYSTOLE: 3.02 CM (ref 2.1–4)
LEFT VENTRICLE DIASTOLIC VOLUME INDEX: 42.29 ML/M2
LEFT VENTRICLE DIASTOLIC VOLUME: 95.57 ML
LEFT VENTRICLE MASS INDEX: 99 G/M2
LEFT VENTRICLE SYSTOLIC VOLUME INDEX: 15.7 ML/M2
LEFT VENTRICLE SYSTOLIC VOLUME: 35.46 ML
LEFT VENTRICULAR INTERNAL DIMENSION IN DIASTOLE: 4.56 CM (ref 3.5–6)
LEFT VENTRICULAR MASS: 223.28 G
LV LATERAL E/E' RATIO: 16.83 M/S
LV SEPTAL E/E' RATIO: 16.83 M/S
LVOT MG: 3.32 MMHG
LVOT MV: 0.84 CM/S
MV PEAK A VEL: 0.85 M/S
MV PEAK E VEL: 1.01 M/S
MV STENOSIS PRESSURE HALF TIME: 74.35 MS
MV VALVE AREA P 1/2 METHOD: 2.96 CM2
PISA TR MAX VEL: 2.71 M/S
PV MEAN GRADIENT: 1 MMHG
PV MV: 0.66 M/S
PV PEAK GRADIENT: 4 MMHG
PV PEAK VELOCITY: 1.05 M/S
RA MAJOR: 5.95 CM
RA PRESSURE ESTIMATED: 3 MMHG
RA WIDTH: 4.65 CM
RIGHT VENTRICULAR END-DIASTOLIC DIMENSION: 4.15 CM
RV TB RVSP: 6 MMHG
SINUS: 2.64 CM
STJ: 2.51 CM
TDI LATERAL: 0.06 M/S
TDI SEPTAL: 0.06 M/S
TDI: 0.06 M/S
TR MAX PG: 29 MMHG
TRICUSPID ANNULAR PLANE SYSTOLIC EXCURSION: 1.3 CM
TV REST PULMONARY ARTERY PRESSURE: 32 MMHG
Z-SCORE OF LEFT VENTRICULAR DIMENSION IN END DIASTOLE: -5.98
Z-SCORE OF LEFT VENTRICULAR DIMENSION IN END SYSTOLE: -4.01

## 2023-11-02 PROCEDURE — 97112 NEUROMUSCULAR REEDUCATION: CPT | Mod: HCNC,PN

## 2023-11-02 PROCEDURE — 97110 THERAPEUTIC EXERCISES: CPT | Mod: HCNC,PN

## 2023-11-02 PROCEDURE — 93306 TTE W/DOPPLER COMPLETE: CPT | Mod: 26,HCNC,, | Performed by: STUDENT IN AN ORGANIZED HEALTH CARE EDUCATION/TRAINING PROGRAM

## 2023-11-02 PROCEDURE — 97530 THERAPEUTIC ACTIVITIES: CPT | Mod: HCNC,PN

## 2023-11-02 PROCEDURE — 93306 TTE W/DOPPLER COMPLETE: CPT | Mod: HCNC

## 2023-11-02 PROCEDURE — 93306 ECHO (CUPID ONLY): ICD-10-PCS | Mod: 26,HCNC,, | Performed by: STUDENT IN AN ORGANIZED HEALTH CARE EDUCATION/TRAINING PROGRAM

## 2023-11-03 ENCOUNTER — TELEPHONE (OUTPATIENT)
Dept: CARDIOLOGY | Facility: CLINIC | Age: 68
End: 2023-11-03
Payer: MEDICARE

## 2023-11-03 NOTE — TELEPHONE ENCOUNTER
Called pt to go over test results. Pt understood. Phone call ended well.      ----- Message from Jeffery Mckeon MD sent at 11/3/2023  8:09 AM CDT -----  Echo showed prosthetic Aortic valve functioning well

## 2023-11-04 NOTE — PROGRESS NOTES
[facility]   Physical Therapy Treatment Note      Name: Abdullahi Urias  Clinic Number: [id number]    Therapy Diagnosis:   Encounter Diagnoses   Name Primary?    Chronic bilateral low back pain without sciatica Yes    Decreased ROM of lumbar spine          Physician: Brandon Valencia MD    Visit Date: 11/2/2023    Physician Orders: PT Eval and Treat  Medical Diagnosis from Referral: s/p lumbar surgery  Evaluation Date: 10/17/2023  Authorization Period Expiration: 12/31/2023  Plan of Care Expiration: 12/16/2023                          Progress Update: 11/16/2023            Visit # / Visits authorized: 5/20          FOTO  Number Date Score    1 10/17/2023 26%   2       3       4             PRECAUTIONS: Standard Precautions and Orthopedic: s/p lumbar sugery      Time In: 1030  Time Out: 1123  Total Appointment Time (timed & untimed codes): 55 minutes    PTA Visit #: 0/5      Subjective     Patient reports: moving better  He was compliant with home exercise program.  Response to previous treatment: N/A  Functional change: N/A    Pain: 6/10   Location: bilateral low back      Objective      Objective Measures updated at progress report unless specified.     Treatment     Abdullahi received the treatments listed below:      therapeutic exercises to develop strength and ROM for 25 minutes including:   Activity   Details    NuStep x     Single Knee to Chest x     Matrix knee extension x 20# 3x10    Matrix knee flexion x 40# 3x10    Pulldowns x     Rows x      neuromuscular re-education activities to improve: Balance, Sense, Proprioception, and Posture for 15 minutes. The following activities were included:   Activity   Details    Sit to stand  x 18 inches 2x10    Supported hip extension x Over plinth    Step ups x 8 inch 2x10    PPT x     Supine marches with resistance x GTB    Supine clamshells  RTB                   therapeutic activities to improve functional performance for 15 minutes,  including:    Activity   Details    [place] carry 15# x 160# (B)    Ball carry x 10# 160#    Sled Push x 50 feet x 2                         Patient Education and Home Exercises       Education provided:   - Home program    Written Home Exercises Provided: Patient instructed to cont prior HEP. Exercises were reviewed and Abdullahi was able to demonstrate them prior to the end of the session.  Abdullahi demonstrated good  understanding of the education provided. See Electronic Medical Record under Patient Instructions for exercises provided during therapy sessions    Assessment     The patient was instructed in and performed lower extremity strengthening and core stabilization activity to prepare for lifting activity    Abdullahi Is progressing well towards his goals.   Patient prognosis is Good.     Patient will continue to benefit from skilled outpatient physical therapy to address the deficits listed in the problem list box on initial evaluation, provide pt/family education and to maximize pt's level of independence in the home and community environment.     Patient's spiritual, cultural and educational needs considered and pt agreeable to plan of care and goals.     Anticipated barriers to physical therapy: None    SHORT TERM GOALS:  4 weeks Progress Date Met   Recent signs and systems trend is improving in order to progress towards Long term goals.  [] Met  [] Not Met  [] Progressing     Patient will be independent with Home Exercise Program  in order to further progress and return to maximal function. [] Met  [] Not Met  [] Progressing     Pain rating at Worst: 5 /10 in order to progress towards increased independence with activity. [] Met  [] Not Met  [] Progressing     Patient will be able to correct postural deviations in sitting and standing, to decrease pain and promote postural awareness for injury prevention.  [] Met  [] Not Met  [] Progressing     Patient will improve functional outcome (FOTO) score: by 5% to  increase self-worth & perceived functional ability towards long term goals [] Met  [] Not Met  [] Progressing        LONG TERM GOALS: 12 weeks  Progress Date Met   Patient will return to normal activites of daily living, recreational, and work related activities with less pain and limitation.  [] Met  [] Not Met  [] Progressing     Patient will improve range of motion  to stated goals in order to return to maximal functional potential.  [] Met  [] Not Met  [] Progressing     Patient will improve Strength to stated goals of appropriate musculature in order to improve functional independence.  [] Met  [] Not Met  [] Progressing     Pain Rating at Best: 1/10 to improve Quality of Life.  [] Met  [] Not Met  [] Progressing     Patient will meet predicted functional outcome (FOTO) score: 40% to increase self-worth & perceived functional ability. [] Met  [] Not Met  [] Progressing     Patient will have met/partially met personal goal of: improve pain and function in standing, walking, and lifting [] Met  [] Not Met  [] Progressing              PLAN   Plan of care Certification: 10/17/2023 to 12/16/2023     Outpatient Physical Therapy 2 times weekly for 12 weeks to include any combination of the following interventions: virtual visits, dry needling, modalities, electrical stimulation (IFC, Pre-Mod, Attended with Functional Dry Needling), Manual Therapy, Moist Heat/ Ice, Neuromuscular Re-ed, Patient Education, Self Care, Therapeutic Exercise, Functional Training, and Therapeutic Activites     Rommel Figueroa, PT

## 2023-11-06 ENCOUNTER — CLINICAL SUPPORT (OUTPATIENT)
Dept: REHABILITATION | Facility: HOSPITAL | Age: 68
End: 2023-11-06
Payer: MEDICARE

## 2023-11-06 DIAGNOSIS — M53.86 DECREASED ROM OF LUMBAR SPINE: ICD-10-CM

## 2023-11-06 DIAGNOSIS — M54.50 CHRONIC BILATERAL LOW BACK PAIN WITHOUT SCIATICA: Primary | ICD-10-CM

## 2023-11-06 DIAGNOSIS — G89.29 CHRONIC BILATERAL LOW BACK PAIN WITHOUT SCIATICA: Primary | ICD-10-CM

## 2023-11-06 PROCEDURE — 97110 THERAPEUTIC EXERCISES: CPT | Mod: HCNC,PN

## 2023-11-06 PROCEDURE — 97112 NEUROMUSCULAR REEDUCATION: CPT | Mod: HCNC,PN

## 2023-11-06 NOTE — PROGRESS NOTES
[facility]   Physical Therapy Treatment Note      Name: Abdullahi Urias  Clinic Number: [id number]    Therapy Diagnosis:   Encounter Diagnoses   Name Primary?    Chronic bilateral low back pain without sciatica Yes    Decreased ROM of lumbar spine      Physician: Brandon Valencia MD    Visit Date: 11/6/2023    Physician Orders: PT Eval and Treat  Medical Diagnosis from Referral: s/p lumbar surgery  Evaluation Date: 10/17/2023  Authorization Period Expiration: 12/31/2023  Plan of Care Expiration: 12/16/2023                          Progress Update: 11/16/2023            Visit # / Visits authorized: 6/20          FOTO  Number Date Score    1 10/17/2023 26%   2       3       4             PRECAUTIONS: Standard Precautions and Orthopedic: s/p lumbar sugery      Time In: 1050  Time Out: 1145  Total Appointment Time (timed & untimed codes): 55 minutes    PTA Visit #: 0/5      Subjective     Patient reports: moving better  He was compliant with home exercise program.  Response to previous treatment: N/A  Functional change: N/A    Pain: 6/10   Location: bilateral low back      Objective      Objective Measures updated at progress report unless specified.     Treatment     Abdullahi received the treatments listed below:      therapeutic exercises to develop strength and ROM for 25 minutes including:   Activity   Details    NuStep x     Single Knee to Chest x     Matrix knee extension x 20# 3x10    Matrix knee flexion x 40# 3x10    Pulldowns x     Rows x      neuromuscular re-education activities to improve: Balance, Sense, Proprioception, and Posture for 15 minutes. The following activities were included:   Activity   Details    Sit to stand  x 18 inches 2x10    Supported hip extension x Over plinth    Step ups x 8 inch 2x10    PPT x     Supine marches with resistance x GTB    Supine clamshells  RTB                   therapeutic activities to improve functional performance for 15 minutes, including:     Activity   Details    [place] carry 15# x 160# (B)    Ball carry x 10# 160#    Sled Push x 50 feet x 2                         Patient Education and Home Exercises       Education provided:   - Home program    Written Home Exercises Provided: Patient instructed to cont prior HEP. Exercises were reviewed and Abdullahi was able to demonstrate them prior to the end of the session.  Abdullahi demonstrated good  understanding of the education provided. See Electronic Medical Record under Patient Instructions for exercises provided during therapy sessions    Assessment     The patient was instructed in and performed lower extremity strengthening and core stabilization activity to prepare for lifting activity    Abdullahi Is progressing well towards his goals.   Patient prognosis is Good.     Patient will continue to benefit from skilled outpatient physical therapy to address the deficits listed in the problem list box on initial evaluation, provide pt/family education and to maximize pt's level of independence in the home and community environment.     Patient's spiritual, cultural and educational needs considered and pt agreeable to plan of care and goals.     Anticipated barriers to physical therapy: None    SHORT TERM GOALS:  4 weeks Progress Date Met   Recent signs and systems trend is improving in order to progress towards Long term goals.  [] Met  [] Not Met  [] Progressing     Patient will be independent with Home Exercise Program  in order to further progress and return to maximal function. [] Met  [] Not Met  [] Progressing     Pain rating at Worst: 5 /10 in order to progress towards increased independence with activity. [] Met  [] Not Met  [] Progressing     Patient will be able to correct postural deviations in sitting and standing, to decrease pain and promote postural awareness for injury prevention.  [] Met  [] Not Met  [] Progressing     Patient will improve functional outcome (FOTO) score: by 5% to increase  self-worth & perceived functional ability towards long term goals [] Met  [] Not Met  [] Progressing        LONG TERM GOALS: 12 weeks  Progress Date Met   Patient will return to normal activites of daily living, recreational, and work related activities with less pain and limitation.  [] Met  [] Not Met  [] Progressing     Patient will improve range of motion  to stated goals in order to return to maximal functional potential.  [] Met  [] Not Met  [] Progressing     Patient will improve Strength to stated goals of appropriate musculature in order to improve functional independence.  [] Met  [] Not Met  [] Progressing     Pain Rating at Best: 1/10 to improve Quality of Life.  [] Met  [] Not Met  [] Progressing     Patient will meet predicted functional outcome (FOTO) score: 40% to increase self-worth & perceived functional ability. [] Met  [] Not Met  [] Progressing     Patient will have met/partially met personal goal of: improve pain and function in standing, walking, and lifting [] Met  [] Not Met  [] Progressing              PLAN   Plan of care Certification: 10/17/2023 to 12/16/2023     Outpatient Physical Therapy 2 times weekly for 12 weeks to include any combination of the following interventions: virtual visits, dry needling, modalities, electrical stimulation (IFC, Pre-Mod, Attended with Functional Dry Needling), Manual Therapy, Moist Heat/ Ice, Neuromuscular Re-ed, Patient Education, Self Care, Therapeutic Exercise, Functional Training, and Therapeutic Activites     Rommel Figueroa, PT

## 2023-11-10 ENCOUNTER — CLINICAL SUPPORT (OUTPATIENT)
Dept: REHABILITATION | Facility: HOSPITAL | Age: 68
End: 2023-11-10
Payer: MEDICARE

## 2023-11-10 DIAGNOSIS — G89.29 CHRONIC BILATERAL LOW BACK PAIN WITHOUT SCIATICA: Primary | ICD-10-CM

## 2023-11-10 DIAGNOSIS — M53.86 DECREASED ROM OF LUMBAR SPINE: ICD-10-CM

## 2023-11-10 DIAGNOSIS — M54.50 CHRONIC BILATERAL LOW BACK PAIN WITHOUT SCIATICA: Primary | ICD-10-CM

## 2023-11-10 PROCEDURE — 97112 NEUROMUSCULAR REEDUCATION: CPT | Mod: HCNC,PN

## 2023-11-10 PROCEDURE — 97110 THERAPEUTIC EXERCISES: CPT | Mod: HCNC,PN

## 2023-11-10 PROCEDURE — 97530 THERAPEUTIC ACTIVITIES: CPT | Mod: HCNC,PN

## 2023-11-10 NOTE — PROGRESS NOTES
[facility]   Physical Therapy Treatment Note      Name: Abdullahi Urias  Clinic Number: [id number]    Therapy Diagnosis:   Encounter Diagnoses   Name Primary?    Chronic bilateral low back pain without sciatica Yes    Decreased ROM of lumbar spine        Physician: Brandon Valencia MD    Visit Date: 11/10/2023    Physician Orders: PT Eval and Treat  Medical Diagnosis from Referral: s/p lumbar surgery  Evaluation Date: 10/17/2023  Authorization Period Expiration: 12/31/2023  Plan of Care Expiration: 12/16/2023                          Progress Update: 11/16/2023            Visit # / Visits authorized: 7/20          FOTO  Number Date Score    1 10/17/2023 26%   2       3       4             PRECAUTIONS: Standard Precautions and Orthopedic: s/p lumbar sugery      Time In: 1105 am  Time Out: 1205 pm  Total Appointment Time (timed & untimed codes): 60 minutes    PTA Visit #: 0/5      Subjective     Patient reports: moving better  He was compliant with home exercise program.  Response to previous treatment: N/A  Functional change: N/A    Pain: 5/10   Location: bilateral low back and right hip    Objective      Objective Measures updated at progress report unless specified.     Treatment     Abdullahi received the treatments listed below:      therapeutic exercises to develop strength and ROM for 15 minutes including:   Activity   Details    Recumbent bike x Level 5 x 5 minutes    Prone flat, prone prop on forearms x Decreased pain; centralized pain    Prone press ups (50% extension) x Decreased pain;centralized pain    Quadruped cat-cow + child's pose x     Prone hip extension x     Single Knee to Chest      Matrix knee extension  20# 3x10    Matrix knee flexion  40# 3x10    Pulldowns      Rows       neuromuscular re-education activities to improve: Balance, Sense, Proprioception, and Posture for 25 minutes. The following activities were included:   Activity   Details    Sit to stand   18 inches  "2x10    Supported hip extension x Over plinth    Step ups  8 inch 2x10    Posterior pelvic tilt with marching x Cues needed to activate TA    Supine marches with resistance  GTB    Supine clamshells  RTB    Quadruped bird dog x     Standing hip CARs (supported by forearms on high table) x With tactile cues to perform hip extension                         therapeutic activities to improve functional performance for 10 minutes, including:    Activity   Details    [place] carry 15#  160# (B)    Ball carry  10# 160#    Sled Push  50 feet x 2    Dead lift hip hinge activity x With dowel and without dowel in standing (will practice at home with his cane)                   Patient Education and Home Exercises       Education provided:   - Home program    Written Home Exercises Provided: Patient instructed to cont prior HEP. Exercises were reviewed and Abdullahi was able to demonstrate them prior to the end of the session.  Abdullahi demonstrated good  understanding of the education provided. See Electronic Medical Record under Patient Instructions for exercises provided during therapy sessions    Assessment     Abdullahi came to therapy with reports of 5/10 pain in left hip. When in the prone position, pain centralized and eventually pain was 0/10. He was able to work on core strengthening in supine, standing and quadruped positions with cues to tilt pelvis posteriorly and activate the TA muscle. This training was carried over to lifting objects from the floor. He had difficulty bending to reach the floor without rolling his back so he was provided with a dowel to use to assure he was not flexing his spine when bending. He left the session with 0/10 pain and having a new goal of returning to "aerobics videos", specifically Stormy Knight's, one mile walking video.    Abdullahi Is progressing well towards his goals.   Patient prognosis is Good.     Patient will continue to benefit from skilled outpatient physical therapy to address " the deficits listed in the problem list box on initial evaluation, provide pt/family education and to maximize pt's level of independence in the home and community environment.     Patient's spiritual, cultural and educational needs considered and pt agreeable to plan of care and goals.     Anticipated barriers to physical therapy: None    SHORT TERM GOALS:  4 weeks Progress Date Met   Recent signs and systems trend is improving in order to progress towards Long term goals.  [] Met  [] Not Met  [] Progressing     Patient will be independent with Home Exercise Program  in order to further progress and return to maximal function. [] Met  [] Not Met  [] Progressing     Pain rating at Worst: 5 /10 in order to progress towards increased independence with activity. [] Met  [] Not Met  [] Progressing     Patient will be able to correct postural deviations in sitting and standing, to decrease pain and promote postural awareness for injury prevention.  [] Met  [] Not Met  [] Progressing     Patient will improve functional outcome (FOTO) score: by 5% to increase self-worth & perceived functional ability towards long term goals [] Met  [] Not Met  [] Progressing        LONG TERM GOALS: 12 weeks  Progress Date Met   Patient will return to normal activites of daily living, recreational, and work related activities with less pain and limitation.  [] Met  [] Not Met  [] Progressing     Patient will improve range of motion  to stated goals in order to return to maximal functional potential.  [] Met  [] Not Met  [] Progressing     Patient will improve Strength to stated goals of appropriate musculature in order to improve functional independence.  [] Met  [] Not Met  [] Progressing     Pain Rating at Best: 1/10 to improve Quality of Life.  [] Met  [] Not Met  [] Progressing     Patient will meet predicted functional outcome (FOTO) score: 40% to increase self-worth & perceived functional ability. [] Met  [] Not Met  [] Progressing      Patient will have met/partially met personal goal of: improve pain and function in standing, walking, and lifting [] Met  [] Not Met  [] Progressing              PLAN   Plan of care Certification: 10/17/2023 to 12/16/2023     Outpatient Physical Therapy 2 times weekly for 12 weeks to include any combination of the following interventions: virtual visits, dry needling, modalities, electrical stimulation (IFC, Pre-Mod, Attended with Functional Dry Needling), Manual Therapy, Moist Heat/ Ice, Neuromuscular Re-ed, Patient Education, Self Care, Therapeutic Exercise, Functional Training, and Therapeutic Activites     Trina Nielsen, PT

## 2023-11-14 ENCOUNTER — CLINICAL SUPPORT (OUTPATIENT)
Dept: REHABILITATION | Facility: HOSPITAL | Age: 68
End: 2023-11-14
Payer: MEDICARE

## 2023-11-14 DIAGNOSIS — M53.86 DECREASED ROM OF LUMBAR SPINE: ICD-10-CM

## 2023-11-14 DIAGNOSIS — G89.29 CHRONIC BILATERAL LOW BACK PAIN WITHOUT SCIATICA: Primary | ICD-10-CM

## 2023-11-14 DIAGNOSIS — M54.50 CHRONIC BILATERAL LOW BACK PAIN WITHOUT SCIATICA: Primary | ICD-10-CM

## 2023-11-14 PROCEDURE — 97112 NEUROMUSCULAR REEDUCATION: CPT | Mod: HCNC,PN

## 2023-11-14 PROCEDURE — 97110 THERAPEUTIC EXERCISES: CPT | Mod: HCNC,PN

## 2023-11-14 PROCEDURE — 97530 THERAPEUTIC ACTIVITIES: CPT | Mod: HCNC,PN

## 2023-11-17 NOTE — PROGRESS NOTES
[facility]   Physical Therapy Treatment Note      Name: Abdullahi Urias  Clinic Number: [id number]    Therapy Diagnosis:   Encounter Diagnoses   Name Primary?    Chronic bilateral low back pain without sciatica Yes    Decreased ROM of lumbar spine      Physician: Brandon Valencia MD    Visit Date: 11/14/2023    Physician Orders: PT Eval and Treat  Medical Diagnosis from Referral: s/p lumbar surgery  Evaluation Date: 10/17/2023  Authorization Period Expiration: 12/31/2023  Plan of Care Expiration: 12/16/2023                          Progress Update: 11/16/2023            Visit # / Visits authorized: 8/20          FOTO  Number Date Score    1 10/17/2023 26%   2       3       4             PRECAUTIONS: Standard Precautions and Orthopedic: s/p lumbar sugery      Time In: 1000  Time Out: 1153  Total Appointment Time (timed & untimed codes): 53 minutes    PTA Visit #: 0/5      Subjective     Patient reports: moving better  He was compliant with home exercise program.  Response to previous treatment: N/A  Functional change: N/A    Pain: 6/10   Location: bilateral low back      Objective      Objective Measures updated at progress report unless specified.     Treatment     Abdullahi received the treatments listed below:      therapeutic exercises to develop strength and ROM for 23 minutes including:   Activity   Details    NuStep x     Single Knee to Chest x     Matrix knee extension x 20# 3x10    Matrix knee flexion x 40# 3x10    Pulldowns x     Rows x      neuromuscular re-education activities to improve: Balance, Sense, Proprioception, and Posture for 15 minutes. The following activities were included:   Activity   Details    Sit to stand  x 18 inches 2x10    Supported hip extension x Over plinth    Step ups x 8 inch 2x10    PPT x     Supine marches with resistance x GTB    Supine clamshells  RTB                   therapeutic activities to improve functional performance for 15 minutes, including:     Activity   Details    [place] carry 15# x 160# (B)    Ball carry x 10# 160#    Sled Push x 50 feet x 2                         Patient Education and Home Exercises       Education provided:   - Home program    Written Home Exercises Provided: Patient instructed to cont prior HEP. Exercises were reviewed and Abdullahi was able to demonstrate them prior to the end of the session.  Abdullahi demonstrated good  understanding of the education provided. See Electronic Medical Record under Patient Instructions for exercises provided during therapy sessions    Assessment     The patient was instructed in and performed lower extremity strengthening and core stabilization activity to prepare for lifting activity    Abdullahi Is progressing well towards his goals.   Patient prognosis is Good.     Patient will continue to benefit from skilled outpatient physical therapy to address the deficits listed in the problem list box on initial evaluation, provide pt/family education and to maximize pt's level of independence in the home and community environment.     Patient's spiritual, cultural and educational needs considered and pt agreeable to plan of care and goals.     Anticipated barriers to physical therapy: None    SHORT TERM GOALS:  4 weeks Progress Date Met   Recent signs and systems trend is improving in order to progress towards Long term goals.  [] Met  [] Not Met  [] Progressing     Patient will be independent with Home Exercise Program  in order to further progress and return to maximal function. [] Met  [] Not Met  [] Progressing     Pain rating at Worst: 5 /10 in order to progress towards increased independence with activity. [] Met  [] Not Met  [] Progressing     Patient will be able to correct postural deviations in sitting and standing, to decrease pain and promote postural awareness for injury prevention.  [] Met  [] Not Met  [] Progressing     Patient will improve functional outcome (FOTO) score: by 5% to increase  self-worth & perceived functional ability towards long term goals [] Met  [] Not Met  [] Progressing        LONG TERM GOALS: 12 weeks  Progress Date Met   Patient will return to normal activites of daily living, recreational, and work related activities with less pain and limitation.  [] Met  [] Not Met  [] Progressing     Patient will improve range of motion  to stated goals in order to return to maximal functional potential.  [] Met  [] Not Met  [] Progressing     Patient will improve Strength to stated goals of appropriate musculature in order to improve functional independence.  [] Met  [] Not Met  [] Progressing     Pain Rating at Best: 1/10 to improve Quality of Life.  [] Met  [] Not Met  [] Progressing     Patient will meet predicted functional outcome (FOTO) score: 40% to increase self-worth & perceived functional ability. [] Met  [] Not Met  [] Progressing     Patient will have met/partially met personal goal of: improve pain and function in standing, walking, and lifting [] Met  [] Not Met  [] Progressing              PLAN   Plan of care Certification: 10/17/2023 to 12/16/2023     Outpatient Physical Therapy 2 times weekly for 12 weeks to include any combination of the following interventions: virtual visits, dry needling, modalities, electrical stimulation (IFC, Pre-Mod, Attended with Functional Dry Needling), Manual Therapy, Moist Heat/ Ice, Neuromuscular Re-ed, Patient Education, Self Care, Therapeutic Exercise, Functional Training, and Therapeutic Activites     Rommel Figueroa, PT

## 2023-11-21 ENCOUNTER — HOSPITAL ENCOUNTER (OUTPATIENT)
Dept: RADIOLOGY | Facility: HOSPITAL | Age: 68
Discharge: HOME | End: 2023-11-21
Attending: HOSPITALIST
Payer: MEDICARE

## 2023-11-21 ENCOUNTER — OFFICE VISIT (OUTPATIENT)
Dept: PULMONOLOGY | Facility: CLINIC | Age: 68
End: 2023-11-21
Payer: MEDICARE

## 2023-11-21 ENCOUNTER — OFFICE VISIT (OUTPATIENT)
Dept: URGENT CARE | Facility: CLINIC | Age: 68
End: 2023-11-21
Payer: MEDICARE

## 2023-11-21 ENCOUNTER — PATIENT OUTREACH (OUTPATIENT)
Dept: PULMONOLOGY | Facility: CLINIC | Age: 68
End: 2023-11-21
Payer: MEDICARE

## 2023-11-21 VITALS
BODY MASS INDEX: 36.58 KG/M2 | DIASTOLIC BLOOD PRESSURE: 66 MMHG | TEMPERATURE: 98 F | WEIGHT: 247 LBS | HEART RATE: 77 BPM | RESPIRATION RATE: 18 BRPM | HEIGHT: 69 IN | SYSTOLIC BLOOD PRESSURE: 122 MMHG | OXYGEN SATURATION: 100 %

## 2023-11-21 VITALS
OXYGEN SATURATION: 98 % | SYSTOLIC BLOOD PRESSURE: 120 MMHG | BODY MASS INDEX: 36.67 KG/M2 | WEIGHT: 247.56 LBS | DIASTOLIC BLOOD PRESSURE: 78 MMHG | RESPIRATION RATE: 19 BRPM | HEART RATE: 85 BPM | HEIGHT: 69 IN

## 2023-11-21 DIAGNOSIS — J45.901 ASTHMATIC BRONCHITIS WITH ACUTE EXACERBATION, UNSPECIFIED ASTHMA SEVERITY, UNSPECIFIED WHETHER PERSISTENT: Primary | ICD-10-CM

## 2023-11-21 DIAGNOSIS — R06.2 WHEEZING: ICD-10-CM

## 2023-11-21 DIAGNOSIS — R05.9 COUGH, UNSPECIFIED TYPE: ICD-10-CM

## 2023-11-21 DIAGNOSIS — J45.40 MODERATE PERSISTENT ASTHMA WITHOUT COMPLICATION: ICD-10-CM

## 2023-11-21 DIAGNOSIS — J45.40 MODERATE PERSISTENT ASTHMA WITHOUT COMPLICATION: Primary | ICD-10-CM

## 2023-11-21 LAB
CTP QC/QA: YES
CTP QC/QA: YES
POC MOLECULAR INFLUENZA A AGN: NEGATIVE
POC MOLECULAR INFLUENZA B AGN: NEGATIVE
SARS-COV-2 AG RESP QL IA.RAPID: NEGATIVE

## 2023-11-21 PROCEDURE — 99213 OFFICE O/P EST LOW 20 MIN: CPT | Mod: HCNC,S$GLB,, | Performed by: HOSPITALIST

## 2023-11-21 PROCEDURE — 87502 INFLUENZA DNA AMP PROBE: CPT | Mod: QW,S$GLB,, | Performed by: FAMILY MEDICINE

## 2023-11-21 PROCEDURE — 71046 X-RAY EXAM CHEST 2 VIEWS: CPT | Mod: TC,HCNC

## 2023-11-21 PROCEDURE — 71046 XR CHEST PA AND LATERAL: ICD-10-PCS | Mod: 26,HCNC,, | Performed by: RADIOLOGY

## 2023-11-21 PROCEDURE — 99214 OFFICE O/P EST MOD 30 MIN: CPT | Mod: 25,S$GLB,, | Performed by: FAMILY MEDICINE

## 2023-11-21 PROCEDURE — 71046 X-RAY EXAM CHEST 2 VIEWS: CPT | Mod: 26,HCNC,, | Performed by: RADIOLOGY

## 2023-11-21 PROCEDURE — 87502 POCT INFLUENZA A/B MOLECULAR: ICD-10-PCS | Mod: QW,S$GLB,, | Performed by: FAMILY MEDICINE

## 2023-11-21 PROCEDURE — 96372 THER/PROPH/DIAG INJ SC/IM: CPT | Mod: 59,S$GLB,, | Performed by: FAMILY MEDICINE

## 2023-11-21 PROCEDURE — 87811 SARS-COV-2 COVID19 W/OPTIC: CPT | Mod: QW,S$GLB,, | Performed by: FAMILY MEDICINE

## 2023-11-21 PROCEDURE — 99214 PR OFFICE/OUTPT VISIT, EST, LEVL IV, 30-39 MIN: ICD-10-PCS | Mod: 25,S$GLB,, | Performed by: FAMILY MEDICINE

## 2023-11-21 PROCEDURE — 99999 PR PBB SHADOW E&M-EST. PATIENT-LVL V: CPT | Mod: PBBFAC,HCNC,, | Performed by: HOSPITALIST

## 2023-11-21 PROCEDURE — 99999 PR PBB SHADOW E&M-EST. PATIENT-LVL V: ICD-10-PCS | Mod: PBBFAC,HCNC,, | Performed by: HOSPITALIST

## 2023-11-21 PROCEDURE — 87811 SARS CORONAVIRUS 2 ANTIGEN POCT, MANUAL READ: ICD-10-PCS | Mod: QW,S$GLB,, | Performed by: FAMILY MEDICINE

## 2023-11-21 PROCEDURE — 99213 PR OFFICE/OUTPT VISIT, EST, LEVL III, 20-29 MIN: ICD-10-PCS | Mod: HCNC,S$GLB,, | Performed by: HOSPITALIST

## 2023-11-21 PROCEDURE — 94640 PR INHAL RX, AIRWAY OBST/DX SPUTUM INDUCT: ICD-10-PCS | Mod: S$GLB,,, | Performed by: FAMILY MEDICINE

## 2023-11-21 PROCEDURE — 94640 AIRWAY INHALATION TREATMENT: CPT | Mod: S$GLB,,, | Performed by: FAMILY MEDICINE

## 2023-11-21 PROCEDURE — 99215 OFFICE O/P EST HI 40 MIN: CPT | Mod: PBBFAC,25,HCNC | Performed by: HOSPITALIST

## 2023-11-21 PROCEDURE — 96372 PR INJECTION,THERAP/PROPH/DIAG2ST, IM OR SUBCUT: ICD-10-PCS | Mod: 59,S$GLB,, | Performed by: FAMILY MEDICINE

## 2023-11-21 RX ORDER — GUAIFENESIN 600 MG/1
1200 TABLET, EXTENDED RELEASE ORAL 2 TIMES DAILY
Qty: 20 TABLET | Refills: 0 | Status: SHIPPED | OUTPATIENT
Start: 2023-11-21

## 2023-11-21 RX ORDER — GUAIFENESIN 100 MG/5ML
200 SOLUTION ORAL 3 TIMES DAILY PRN
Qty: 180 ML | Refills: 0 | Status: SHIPPED | OUTPATIENT
Start: 2023-11-21 | End: 2023-11-21

## 2023-11-21 RX ORDER — DEXAMETHASONE SODIUM PHOSPHATE 100 MG/10ML
10 INJECTION INTRAMUSCULAR; INTRAVENOUS
Status: COMPLETED | OUTPATIENT
Start: 2023-11-21 | End: 2023-11-21

## 2023-11-21 RX ORDER — ALBUTEROL SULFATE 0.83 MG/ML
2.5 SOLUTION RESPIRATORY (INHALATION)
Status: COMPLETED | OUTPATIENT
Start: 2023-11-21 | End: 2023-11-21

## 2023-11-21 RX ORDER — IPRATROPIUM BROMIDE 0.5 MG/2.5ML
0.5 SOLUTION RESPIRATORY (INHALATION)
Status: COMPLETED | OUTPATIENT
Start: 2023-11-21 | End: 2023-11-21

## 2023-11-21 RX ORDER — AZITHROMYCIN 250 MG/1
TABLET, FILM COATED ORAL
Qty: 6 TABLET | Refills: 0 | Status: SHIPPED | OUTPATIENT
Start: 2023-11-21 | End: 2023-11-26

## 2023-11-21 RX ORDER — METHYLPREDNISOLONE 4 MG/1
TABLET ORAL
Qty: 21 EACH | Refills: 0 | Status: SHIPPED | OUTPATIENT
Start: 2023-11-21 | End: 2023-12-12

## 2023-11-21 RX ADMIN — DEXAMETHASONE SODIUM PHOSPHATE 10 MG: 100 INJECTION INTRAMUSCULAR; INTRAVENOUS at 03:11

## 2023-11-21 RX ADMIN — ALBUTEROL SULFATE 2.5 MG: 0.83 SOLUTION RESPIRATORY (INHALATION) at 03:11

## 2023-11-21 RX ADMIN — IPRATROPIUM BROMIDE 0.5 MG: 0.5 SOLUTION RESPIRATORY (INHALATION) at 03:11

## 2023-11-21 NOTE — TELEPHONE ENCOUNTER
Chronic Disease Management  Called patient to complete Pulmonary Disease Management Questionnaire.    Patient reports an increase in shortness of breath and increased frequency of use of rescue inhaler. Patient is scheduled to see the pulmonary provider on today. Patient confirmed appointment.     Time  spent with patient:  Minutes

## 2023-11-21 NOTE — PATIENT INSTRUCTIONS
Thank you for allowing our team to take care of you today.  Your diagnosis is asthmatic bronchitis with exacerbation.  Covid and Flu testing are negative today.  You have already had a chest xray and evaluation from your pulmonology office.  Decadron steroid injection given today.  Continue with the steroid Medrol pack tomorrow.  Take the Zithromax that was prescribed starting today.  Duoneb nebulizer treatment given today. Continue with using your nebulizer treatments every day right now as prescribed until your symptoms are better.  Adding Guaifenesin pills to help to thin mucous/cough.  Stay hydrated.  Rest.  Followup pulmonary.  ER if worsens.  Followup here as needed.

## 2023-11-21 NOTE — ASSESSMENT & PLAN NOTE
- with exacerbation  - not wheezing on exam  - vitals reviewed- normal HR, blood pressure, RR 19 with SpO2 98%  - CXR clear  - Zpack and steroids  - ED precautions discussed

## 2023-11-21 NOTE — PROGRESS NOTES
"Subjective:      Patient ID: Abdullahi Urias is a 68 y.o. male.    Vitals:  height is 5' 9" (1.753 m) and weight is 112 kg (247 lb). His temperature is 98.4 °F (36.9 °C). His blood pressure is 122/66 and his pulse is 77. His respiration is 18 and oxygen saturation is 100%.     Chief Complaint: No chief complaint on file.    67 yo male presents today with dry bad cough and chest congestion, wheezing, headache. Back is sore from coughing so much. He has a hx of sciatica and had surgery on his lower back.. States that he was around a lot of people 3 to 4 days ago and is now not feeling well. He is using Mucinex DM and Halls cough drops. He is asking to get tested for FLU and COVID. He saw his pulmonology team who have ordered a chest xray and written for an antibiotic and steroid which he has picked up.     Cough  This is a new problem. The current episode started in the past 7 days. The problem has been unchanged. The cough is Non-productive. Associated symptoms include headaches, shortness of breath and wheezing. Pertinent negatives include no chest pain, chills, ear congestion, ear pain, fever, heartburn, hemoptysis, myalgias, nasal congestion, postnasal drip, rash, rhinorrhea, sore throat, sweats or weight loss. Nothing aggravates the symptoms. He has tried nothing for the symptoms. The treatment provided no relief. There is no history of asthma, bronchiectasis, bronchitis, COPD, emphysema, environmental allergies or pneumonia.       Constitution: Negative for chills and fever.   HENT:  Positive for congestion. Negative for ear pain, postnasal drip and sore throat.    Cardiovascular:  Negative for chest pain.   Respiratory:  Positive for cough, shortness of breath and wheezing. Negative for bloody sputum.    Gastrointestinal:  Negative for heartburn.   Musculoskeletal:  Positive for back pain and history of spine disorder. Negative for muscle ache.        Back sore from coughing. Hx sciatica and back surgery.   Skin: "  Negative for rash.   Allergic/Immunologic: Positive for sneezing. Negative for environmental allergies.   Neurological:  Positive for headaches.      Objective:     Physical Exam   Constitutional: He is oriented to person, place, and time. No distress.   HENT:   Head: Normocephalic.   Ears:   Right Ear: Tympanic membrane, external ear and ear canal normal.   Left Ear: Tympanic membrane, external ear and ear canal normal.   Nose: Congestion present. No purulent discharge or sinus tenderness. No epistaxis.   Mouth/Throat: Mucous membranes are moist. No oropharyngeal exudate or posterior oropharyngeal erythema. Oropharynx is clear.   Eyes: Conjunctivae are normal. Pupils are equal, round, and reactive to light.   Neck: Neck supple.   Cardiovascular: Normal rate, regular rhythm, normal heart sounds and normal pulses.   Pulmonary/Chest: Effort normal. No respiratory distress. He has wheezes. He has no rhonchi. He has no rales.         Comments: Nonstop coughing. Tight airflow. Wheezing bilaterally and throughout.     Abdominal: Normal appearance. He exhibits no distension. Soft. There is no abdominal tenderness.   Musculoskeletal:      Right lower leg: No edema.      Left lower leg: No edema.      Comments: No calf tenderness. Lorena's negative bilaterally.   Neurological: no focal deficit. He is alert and oriented to person, place, and time.   Skin:         Comments: Appropriate turgor for age. No acute focal rashes appreciated.    Psychiatric: His behavior is normal. Mood, judgment and thought content normal.   Nursing note and vitals reviewed.      Assessment:     1. Asthmatic bronchitis with acute exacerbation, unspecified asthma severity, unspecified whether persistent    2. Cough, unspecified type    3. Wheezing        Plan:       Asthmatic bronchitis with acute exacerbation, unspecified asthma severity, unspecified whether persistent  -     ipratropium 0.02 % nebulizer solution 0.5 mg  -     albuterol nebulizer  solution 2.5 mg  -     dexAMETHasone injection 10 mg    Cough, unspecified type  -     POCT Influenza A/B MOLECULAR  -     SARS Coronavirus 2 Antigen, POCT Manual Read  -     ipratropium 0.02 % nebulizer solution 0.5 mg  -     albuterol nebulizer solution 2.5 mg  -     dexAMETHasone injection 10 mg    Wheezing    Other orders  -     guaiFENesin (MUCINEX) 600 mg 12 hr tablet; Take 2 tablets (1,200 mg total) by mouth 2 (two) times daily.  Dispense: 20 tablet; Refill: 0      Results for orders placed or performed in visit on 11/21/23   POCT Influenza A/B MOLECULAR   Result Value Ref Range    POC Molecular Influenza A Ag Negative Negative, Not Reported    POC Molecular Influenza B Ag Negative Negative, Not Reported     Acceptable Yes    SARS Coronavirus 2 Antigen, POCT Manual Read   Result Value Ref Range    SARS Coronavirus 2 Antigen Negative Negative     Acceptable Yes        SUMMARY: See hpi. Patient has seen his pulmonologist team. Cxray with no acute process. He was prescribed per patient who has picked up his prescription Zpak and Medrol which hasn't been started yet. He came to be seen as well as be tested for Covid and Flu, both of which were negative today. Duoneb given via nebulization today with great response. Patient was able to talk without coughing. He will continue his nebs at home. Also given Dexamethasone injection today with no adverse reaction while still in the office. Continue the Medrol tomorrow. Start the Zpak today. Added Guaifenesin pills. Followup with pulmonology. ER if worsens prior. Followup here as needed. Handout on bronchitis given as well.     Patient Instructions   Thank you for allowing our team to take care of you today.  Your diagnosis is asthmatic bronchitis with exacerbation.  Covid and Flu testing are negative today.  You have already had a chest xray and evaluation from your pulmonology office.  Decadron steroid injection given today.  Continue with  the steroid Medrol pack tomorrow.  Take the Zithromax that was prescribed starting today.  Duoneb nebulizer treatment given today. Continue with using your nebulizer treatments every day right now as prescribed until your symptoms are better.  Adding Guaifenesin pills to help to thin mucous/cough.  Stay hydrated.  Rest.  Followup pulmonary.  ER if worsens.  Followup here as needed.

## 2023-11-24 ENCOUNTER — TELEPHONE (OUTPATIENT)
Dept: URGENT CARE | Facility: CLINIC | Age: 68
End: 2023-11-24
Payer: MEDICARE

## 2023-11-30 ENCOUNTER — CLINICAL SUPPORT (OUTPATIENT)
Dept: REHABILITATION | Facility: HOSPITAL | Age: 68
End: 2023-11-30
Payer: MEDICARE

## 2023-11-30 DIAGNOSIS — G89.29 CHRONIC BILATERAL LOW BACK PAIN WITHOUT SCIATICA: Primary | ICD-10-CM

## 2023-11-30 DIAGNOSIS — M53.86 DECREASED ROM OF LUMBAR SPINE: ICD-10-CM

## 2023-11-30 DIAGNOSIS — M54.50 CHRONIC BILATERAL LOW BACK PAIN WITHOUT SCIATICA: Primary | ICD-10-CM

## 2023-11-30 PROCEDURE — 97110 THERAPEUTIC EXERCISES: CPT | Mod: HCNC,PN

## 2023-11-30 PROCEDURE — 97112 NEUROMUSCULAR REEDUCATION: CPT | Mod: HCNC,PN

## 2023-12-01 NOTE — PROGRESS NOTES
[facility]   Physical Therapy Treatment Note      Name: Abdullahi Urias  Clinic Number: [id number]    Therapy Diagnosis:   Encounter Diagnoses   Name Primary?    Chronic bilateral low back pain without sciatica Yes    Decreased ROM of lumbar spine      Physician: Brandon Valencia MD    Visit Date: 11/30/2023    Physician Orders: PT Eval and Treat  Medical Diagnosis from Referral: s/p lumbar surgery  Evaluation Date: 10/17/2023  Authorization Period Expiration: 12/31/2023  Plan of Care Expiration: 12/16/2023                          Progress Update: 12/16/2023            Visit # / Visits authorized: 9/20          FOTO  Number Date Score    1 10/17/2023 26%   2       3       4             PRECAUTIONS: Standard Precautions and Orthopedic: s/p lumbar sugery      Time In: 1100  Time Out: 1140  Total Appointment Time (timed & untimed codes): 40 minutes    PTA Visit #: 0/5      Subjective     Patient reports: he has been ill with bronchitis.  C/o shortness of breath  He was compliant with home exercise program.  Response to previous treatment: N/A  Functional change: N/A    Pain: 6/10   Location: bilateral low back      Objective      Objective Measures updated at progress report unless specified.     Treatment     Abdullahi received the treatments listed below:      therapeutic exercises to develop strength and ROM for 23 minutes including:   Activity   Details    NuStep x     Single Knee to Chest x     Matrix knee extension x 20# 3x10    Matrix knee flexion x 40# 3x10    Pulldowns x     Rows x      neuromuscular re-education activities to improve: Balance, Sense, Proprioception, and Posture for 17 minutes. The following activities were included:   Activity   Details    Sit to stand  x 18 inches 2x10    Supported hip extension x Over plinth    Step ups x 8 inch 2x10    PPT x     Supine marches with resistance x GTB    Supine clamshells  RTB                   therapeutic activities to improve  functional performance for --- minutes, including:    Activity   Details    [place] carry 15# x 160# (B)    Ball carry x 10# 160#    Sled Push x 50 feet x 2                         Patient Education and Home Exercises       Education provided:   - Home program    Written Home Exercises Provided: Patient instructed to cont prior HEP. Exercises were reviewed and Abdullahi was able to demonstrate them prior to the end of the session.  Abdullahi demonstrated good  understanding of the education provided. See Electronic Medical Record under Patient Instructions for exercises provided during therapy sessions    Assessment     The patient has returned after illness with decreased tolerance to pain.  He restarted activity but could not complete full session    Abdullahi Is progressing well towards his goals.   Patient prognosis is Good.     Patient will continue to benefit from skilled outpatient physical therapy to address the deficits listed in the problem list box on initial evaluation, provide pt/family education and to maximize pt's level of independence in the home and community environment.     Patient's spiritual, cultural and educational needs considered and pt agreeable to plan of care and goals.     Anticipated barriers to physical therapy: None    SHORT TERM GOALS:  4 weeks Progress Date Met   Recent signs and systems trend is improving in order to progress towards Long term goals.  [] Met  [] Not Met  [] Progressing     Patient will be independent with Home Exercise Program  in order to further progress and return to maximal function. [] Met  [] Not Met  [] Progressing     Pain rating at Worst: 5 /10 in order to progress towards increased independence with activity. [] Met  [] Not Met  [] Progressing     Patient will be able to correct postural deviations in sitting and standing, to decrease pain and promote postural awareness for injury prevention.  [] Met  [] Not Met  [] Progressing     Patient will improve  functional outcome (FOTO) score: by 5% to increase self-worth & perceived functional ability towards long term goals [] Met  [] Not Met  [] Progressing        LONG TERM GOALS: 12 weeks  Progress Date Met   Patient will return to normal activites of daily living, recreational, and work related activities with less pain and limitation.  [] Met  [] Not Met  [] Progressing     Patient will improve range of motion  to stated goals in order to return to maximal functional potential.  [] Met  [] Not Met  [] Progressing     Patient will improve Strength to stated goals of appropriate musculature in order to improve functional independence.  [] Met  [] Not Met  [] Progressing     Pain Rating at Best: 1/10 to improve Quality of Life.  [] Met  [] Not Met  [] Progressing     Patient will meet predicted functional outcome (FOTO) score: 40% to increase self-worth & perceived functional ability. [] Met  [] Not Met  [] Progressing     Patient will have met/partially met personal goal of: improve pain and function in standing, walking, and lifting [] Met  [] Not Met  [] Progressing              PLAN   Plan of care Certification: 10/17/2023 to 12/16/2023     Outpatient Physical Therapy 2 times weekly for 12 weeks to include any combination of the following interventions: virtual visits, dry needling, modalities, electrical stimulation (IFC, Pre-Mod, Attended with Functional Dry Needling), Manual Therapy, Moist Heat/ Ice, Neuromuscular Re-ed, Patient Education, Self Care, Therapeutic Exercise, Functional Training, and Therapeutic Activites     Rommel Figueroa, PT

## 2023-12-04 ENCOUNTER — TELEPHONE (OUTPATIENT)
Dept: PAIN MEDICINE | Facility: CLINIC | Age: 68
End: 2023-12-04
Payer: MEDICARE

## 2023-12-04 RX ORDER — CLOPIDOGREL BISULFATE 75 MG/1
75 TABLET ORAL DAILY
Qty: 30 TABLET | Refills: 11 | Status: SHIPPED | OUTPATIENT
Start: 2023-12-04 | End: 2024-12-03

## 2023-12-04 NOTE — TELEPHONE ENCOUNTER
Spoke with pt in regards to sending refill into pharmacy.           ----- Message from Dolly Garcia sent at 12/4/2023 12:58 PM CST -----  Contact: Abdullahi Benavidez states he needs the dr to update [facility]  [address] [facility] [place]. It says he has refills, but he does not. Please call him back at [phone].    Thanks  TS

## 2023-12-04 NOTE — TELEPHONE ENCOUNTER
----- Message from Dolly Garcia sent at 12/4/2023 12:55 PM CST -----  Contact: Abdullahi Benavidez called to schedule an appt for neck pain. Please call him back at [phone].    Thanks  TS

## 2023-12-05 ENCOUNTER — CLINICAL SUPPORT (OUTPATIENT)
Dept: REHABILITATION | Facility: HOSPITAL | Age: 68
End: 2023-12-05
Payer: MEDICARE

## 2023-12-05 DIAGNOSIS — G89.29 CHRONIC BILATERAL LOW BACK PAIN WITHOUT SCIATICA: Primary | ICD-10-CM

## 2023-12-05 DIAGNOSIS — M54.50 CHRONIC BILATERAL LOW BACK PAIN WITHOUT SCIATICA: Primary | ICD-10-CM

## 2023-12-05 DIAGNOSIS — M53.86 DECREASED ROM OF LUMBAR SPINE: ICD-10-CM

## 2023-12-05 PROCEDURE — 97530 THERAPEUTIC ACTIVITIES: CPT | Mod: HCNC,PN

## 2023-12-05 PROCEDURE — 97110 THERAPEUTIC EXERCISES: CPT | Mod: HCNC,PN

## 2023-12-05 PROCEDURE — 97112 NEUROMUSCULAR REEDUCATION: CPT | Mod: HCNC,PN

## 2023-12-05 NOTE — PROGRESS NOTES
[facility]   Physical Therapy Treatment Note      Name: Abdullahi Urias  Clinic Number: [id number]    Therapy Diagnosis:   Encounter Diagnoses   Name Primary?    Chronic bilateral low back pain without sciatica Yes    Decreased ROM of lumbar spine        Physician: Brandon Valencia MD    Visit Date: 12/5/2023    Physician Orders: PT Eval and Treat  Medical Diagnosis from Referral: s/p lumbar surgery  Evaluation Date: 10/17/2023  Authorization Period Expiration: 12/31/2023  Plan of Care Expiration: 12/16/2023                          Progress Update: 12/16/2023            Visit # / Visits authorized:10 /20          FOTO  Number Date Score    1 10/17/2023 26%   2       3       4             PRECAUTIONS: Standard Precautions and Orthopedic: s/p lumbar sugery      Time In: 1010  Time Out: 1056  Total Appointment Time (timed & untimed codes): 46 minutes    PTA Visit #: 0/5      Subjective     Patient reports: feeling better  He was compliant with home exercise program.  Response to previous treatment: N/A  Functional change: N/A    Pain: 6/10   Location: bilateral low back      Objective      Objective Measures updated at progress report unless specified.     Treatment     Abdullahi received the treatments listed below:      therapeutic exercises to develop strength and ROM for 23 minutes including:   Activity   Details    NuStep x     Single Knee to Chest x     Matrix knee extension x 20# 3x10    Matrix knee flexion x 40# 3x10    Pulldowns x     Rows x      neuromuscular re-education activities to improve: Balance, Sense, Proprioception, and Posture for 15 minutes. The following activities were included:   Activity   Details    Sit to stand  x 18 inches 2x10    Supported hip extension x Over plinth    Step ups x 8 inch 2x10    PPT x     Supine marches with resistance x GTB    Supine clamshells  RTB                   therapeutic activities to improve functional performance for 8 minutes, including:     Activity   Details    [place] carry 15# x 160# (B)    Ball carry x 10# 160#    Sled Push x 50 feet x 2                         Patient Education and Home Exercises       Education provided:   - Home program    Written Home Exercises Provided: Patient instructed to cont prior HEP. Exercises were reviewed and Abdullahi was able to demonstrate them prior to the end of the session.  Abdullahi demonstrated good  understanding of the education provided. See Electronic Medical Record under Patient Instructions for exercises provided during therapy sessions    Assessment     The patient has returned after illness with decreased tolerance to pain.  He restarted activity but could not complete full session    Abdullahi Is progressing well towards his goals.   Patient prognosis is Good.     Patient will continue to benefit from skilled outpatient physical therapy to address the deficits listed in the problem list box on initial evaluation, provide pt/family education and to maximize pt's level of independence in the home and community environment.     Patient's spiritual, cultural and educational needs considered and pt agreeable to plan of care and goals.     Anticipated barriers to physical therapy: None    SHORT TERM GOALS:  4 weeks Progress Date Met   Recent signs and systems trend is improving in order to progress towards Long term goals.  [] Met  [] Not Met  [] Progressing     Patient will be independent with Home Exercise Program  in order to further progress and return to maximal function. [] Met  [] Not Met  [] Progressing     Pain rating at Worst: 5 /10 in order to progress towards increased independence with activity. [] Met  [] Not Met  [] Progressing     Patient will be able to correct postural deviations in sitting and standing, to decrease pain and promote postural awareness for injury prevention.  [] Met  [] Not Met  [] Progressing     Patient will improve functional outcome (FOTO) score: by 5% to increase  self-worth & perceived functional ability towards long term goals [] Met  [] Not Met  [] Progressing        LONG TERM GOALS: 12 weeks  Progress Date Met   Patient will return to normal activites of daily living, recreational, and work related activities with less pain and limitation.  [] Met  [] Not Met  [] Progressing     Patient will improve range of motion  to stated goals in order to return to maximal functional potential.  [] Met  [] Not Met  [] Progressing     Patient will improve Strength to stated goals of appropriate musculature in order to improve functional independence.  [] Met  [] Not Met  [] Progressing     Pain Rating at Best: 1/10 to improve Quality of Life.  [] Met  [] Not Met  [] Progressing     Patient will meet predicted functional outcome (FOTO) score: 40% to increase self-worth & perceived functional ability. [] Met  [] Not Met  [] Progressing     Patient will have met/partially met personal goal of: improve pain and function in standing, walking, and lifting [] Met  [] Not Met  [] Progressing              PLAN   Plan of care Certification: 10/17/2023 to 12/16/2023     Outpatient Physical Therapy 2 times weekly for 12 weeks to include any combination of the following interventions: virtual visits, dry needling, modalities, electrical stimulation (IFC, Pre-Mod, Attended with Functional Dry Needling), Manual Therapy, Moist Heat/ Ice, Neuromuscular Re-ed, Patient Education, Self Care, Therapeutic Exercise, Functional Training, and Therapeutic Activites     Rommel Figueroa, PT

## 2023-12-06 RX ORDER — OXYCODONE AND ACETAMINOPHEN 10; 325 MG/1; MG/1
1 TABLET ORAL EVERY 8 HOURS PRN
Qty: 40 TABLET | Refills: 0 | Status: SHIPPED | OUTPATIENT
Start: 2023-12-06 | End: 2024-02-26 | Stop reason: SDUPTHER

## 2023-12-06 NOTE — TELEPHONE ENCOUNTER
Can you refill?oxyCODONE-acetaminophen (PERCOCET)  mg per tablet     Last Visit:03/06/2023  Next Visit:02/02/2024  Last refill:10/09/2023  How pt patient is currently taking medication requested: Sig - Route: Take 1 tablet by mouth every 8 (eight) hours as needed for Pain. - Oral   Pharmacy:   [id number] [place] [place] [facility] [address] [facility]

## 2023-12-07 ENCOUNTER — CLINICAL SUPPORT (OUTPATIENT)
Dept: REHABILITATION | Facility: HOSPITAL | Age: 68
End: 2023-12-07
Payer: MEDICARE

## 2023-12-07 DIAGNOSIS — M54.50 CHRONIC BILATERAL LOW BACK PAIN WITHOUT SCIATICA: Primary | ICD-10-CM

## 2023-12-07 DIAGNOSIS — G89.29 CHRONIC BILATERAL LOW BACK PAIN WITHOUT SCIATICA: Primary | ICD-10-CM

## 2023-12-07 DIAGNOSIS — M53.86 DECREASED ROM OF LUMBAR SPINE: ICD-10-CM

## 2023-12-07 PROCEDURE — 97530 THERAPEUTIC ACTIVITIES: CPT | Mod: HCNC,PN

## 2023-12-07 PROCEDURE — 97110 THERAPEUTIC EXERCISES: CPT | Mod: HCNC,PN

## 2023-12-07 NOTE — PROGRESS NOTES
[facility]   Physical Therapy Treatment Note      Name: Abdullahi Urias  Clinic Number: [id number]    Therapy Diagnosis:   Encounter Diagnoses   Name Primary?    Chronic bilateral low back pain without sciatica Yes    Decreased ROM of lumbar spine          Physician: Brandon Valencia MD    Visit Date: 12/7/2023    Physician Orders: PT Eval and Treat  Medical Diagnosis from Referral: s/p lumbar surgery  Evaluation Date: 10/17/2023  Authorization Period Expiration: 12/31/2023  Plan of Care Expiration: 12/16/2023                          Progress Update: 12/16/2023            Visit # / Visits authorized:11 /20          FOTO  Number Date Score    1 10/17/2023 26%   2       3       4             PRECAUTIONS: Standard Precautions and Orthopedic: s/p lumbar sugery      Time In: 1100  Time Out: 1145  Total Appointment Time (timed & untimed codes): 44 minutes    PTA Visit #: 0/5      Subjective     Patient reports: feeling better  He was compliant with home exercise program.  Response to previous treatment: N/A  Functional change: N/A    Pain: 6/10   Location: bilateral low back      Objective      Objective Measures updated at progress report unless specified.     Treatment     Abdullahi received the treatments listed below:      therapeutic exercises to develop strength and ROM for 23 minutes including:   Activity   Details    NuStep x     Single Knee to Chest x     Matrix knee extension x 20# 3x10    Matrix knee flexion x 40# 3x10    Pulldowns x     Rows x      neuromuscular re-education activities to improve: Balance, Sense, Proprioception, and Posture for 14 minutes. The following activities were included:   Activity   Details    Sit to stand  x 18 inches 2x10    Supported hip extension x Over plinth    Step ups x 8 inch 2x10    PPT x     Supine marches with resistance x GTB    Supine clamshells  RTB                   therapeutic activities to improve functional performance for 8 minutes,  including:    Activity   Details    [place] carry 15# x 160# (B)    Ball carry x 10# 160#    Sled Push x 50 feet x 2                         Patient Education and Home Exercises       Education provided:   - Home program    Written Home Exercises Provided: Patient instructed to cont prior HEP. Exercises were reviewed and Abdullahi was able to demonstrate them prior to the end of the session.  Abdullahi demonstrated good  understanding of the education provided. See Electronic Medical Record under Patient Instructions for exercises provided during therapy sessions    Assessment     The patient has returned after illness with decreased tolerance to pain.  He restarted activity but could not complete full session    Abdullahi Is progressing well towards his goals.   Patient prognosis is Good.     Patient will continue to benefit from skilled outpatient physical therapy to address the deficits listed in the problem list box on initial evaluation, provide pt/family education and to maximize pt's level of independence in the home and community environment.     Patient's spiritual, cultural and educational needs considered and pt agreeable to plan of care and goals.     Anticipated barriers to physical therapy: None    SHORT TERM GOALS:  4 weeks Progress Date Met   Recent signs and systems trend is improving in order to progress towards Long term goals.  [] Met  [] Not Met  [] Progressing     Patient will be independent with Home Exercise Program  in order to further progress and return to maximal function. [] Met  [] Not Met  [] Progressing     Pain rating at Worst: 5 /10 in order to progress towards increased independence with activity. [] Met  [] Not Met  [] Progressing     Patient will be able to correct postural deviations in sitting and standing, to decrease pain and promote postural awareness for injury prevention.  [] Met  [] Not Met  [] Progressing     Patient will improve functional outcome (FOTO) score: by 5% to  increase self-worth & perceived functional ability towards long term goals [] Met  [] Not Met  [] Progressing        LONG TERM GOALS: 12 weeks  Progress Date Met   Patient will return to normal activites of daily living, recreational, and work related activities with less pain and limitation.  [] Met  [] Not Met  [] Progressing     Patient will improve range of motion  to stated goals in order to return to maximal functional potential.  [] Met  [] Not Met  [] Progressing     Patient will improve Strength to stated goals of appropriate musculature in order to improve functional independence.  [] Met  [] Not Met  [] Progressing     Pain Rating at Best: 1/10 to improve Quality of Life.  [] Met  [] Not Met  [] Progressing     Patient will meet predicted functional outcome (FOTO) score: 40% to increase self-worth & perceived functional ability. [] Met  [] Not Met  [] Progressing     Patient will have met/partially met personal goal of: improve pain and function in standing, walking, and lifting [] Met  [] Not Met  [] Progressing              PLAN   Plan of care Certification: 10/17/2023 to 12/16/2023     Outpatient Physical Therapy 2 times weekly for 12 weeks to include any combination of the following interventions: virtual visits, dry needling, modalities, electrical stimulation (IFC, Pre-Mod, Attended with Functional Dry Needling), Manual Therapy, Moist Heat/ Ice, Neuromuscular Re-ed, Patient Education, Self Care, Therapeutic Exercise, Functional Training, and Therapeutic Activites     Rommel Figueroa, PT

## 2023-12-12 ENCOUNTER — CLINICAL SUPPORT (OUTPATIENT)
Dept: REHABILITATION | Facility: HOSPITAL | Age: 68
End: 2023-12-12
Payer: MEDICARE

## 2023-12-12 DIAGNOSIS — G89.29 CHRONIC BILATERAL LOW BACK PAIN WITHOUT SCIATICA: Primary | ICD-10-CM

## 2023-12-12 DIAGNOSIS — M54.50 CHRONIC BILATERAL LOW BACK PAIN WITHOUT SCIATICA: Primary | ICD-10-CM

## 2023-12-12 DIAGNOSIS — M53.86 DECREASED ROM OF LUMBAR SPINE: ICD-10-CM

## 2023-12-12 PROCEDURE — 97530 THERAPEUTIC ACTIVITIES: CPT | Mod: HCNC,PN

## 2023-12-12 PROCEDURE — 97110 THERAPEUTIC EXERCISES: CPT | Mod: HCNC,PN

## 2023-12-12 PROCEDURE — 97112 NEUROMUSCULAR REEDUCATION: CPT | Mod: HCNC,PN

## 2023-12-12 NOTE — PROGRESS NOTES
[facility]   Physical Therapy Treatment Note      Name: Abdullahi Urias  Clinic Number: [id number]    Therapy Diagnosis:   No diagnosis found.        Physician: Brandon Valencia MD    Visit Date: 12/12/2023    Physician Orders: PT Eval and Treat  Medical Diagnosis from Referral: s/p lumbar surgery  Evaluation Date: 10/17/2023  Authorization Period Expiration: 12/31/2023  Plan of Care Expiration: 12/16/2023                          Progress Update: 12/16/2023            Visit # / Visits authorized:    12 /20          FOTO  Number Date Score    1 10/17/2023 26%   2       3       4             PRECAUTIONS: Standard Precautions and Orthopedic: s/p lumbar sugery      Time In: 1030  Time Out: 1125  Total Appointment Time (timed & untimed codes): 55 minutes    PTA Visit #: 0/5      Subjective     Patient reports: feeling better  He was compliant with home exercise program.  Response to previous treatment: N/A  Functional change: N/A    Pain: 6/10   Location: bilateral low back      Objective      Objective Measures updated at progress report unless specified.     Treatment     Abdullahi received the treatments listed below:      therapeutic exercises to develop strength and ROM for 23 minutes including:   Activity   Details    NuStep x     Single Knee to Chest x     Matrix knee extension x 20# 3x10    Matrix knee flexion x 40# 3x10    Pulldowns x     Rows x      neuromuscular re-education activities to improve: Balance, Sense, Proprioception, and Posture for 20 minutes. The following activities were included:   Activity   Details    Sit to stand  x 18 inches 2x10    Supported hip extension x Over plinth    Step ups x 8 inch 2x10    PPT x     Supine marches with resistance x GTB    Supine clamshells  RTB                   therapeutic activities to improve functional performance for 10 minutes, including:    Activity   Details    [place] carry 15# x 160# (B)    Ball carry x 10# 160#    Sled Push x 50  feet x 2                         Patient Education and Home Exercises       Education provided:   - Home program    Written Home Exercises Provided: Patient instructed to cont prior HEP. Exercises were reviewed and Abdullahi was able to demonstrate them prior to the end of the session.  Abdullahi demonstrated good  understanding of the education provided. See Electronic Medical Record under Patient Instructions for exercises provided during therapy sessions    Assessment     The patient has returned after illness with decreased tolerance to pain.  He restarted activity but could not complete full session    Abdullahi Is progressing well towards his goals.   Patient prognosis is Good.     Patient will continue to benefit from skilled outpatient physical therapy to address the deficits listed in the problem list box on initial evaluation, provide pt/family education and to maximize pt's level of independence in the home and community environment.     Patient's spiritual, cultural and educational needs considered and pt agreeable to plan of care and goals.     Anticipated barriers to physical therapy: None    SHORT TERM GOALS:  4 weeks Progress Date Met   Recent signs and systems trend is improving in order to progress towards Long term goals.  [] Met  [] Not Met  [] Progressing     Patient will be independent with Home Exercise Program  in order to further progress and return to maximal function. [] Met  [] Not Met  [] Progressing     Pain rating at Worst: 5 /10 in order to progress towards increased independence with activity. [] Met  [] Not Met  [] Progressing     Patient will be able to correct postural deviations in sitting and standing, to decrease pain and promote postural awareness for injury prevention.  [] Met  [] Not Met  [] Progressing     Patient will improve functional outcome (FOTO) score: by 5% to increase self-worth & perceived functional ability towards long term goals [] Met  [] Not Met  [] Progressing         LONG TERM GOALS: 12 weeks  Progress Date Met   Patient will return to normal activites of daily living, recreational, and work related activities with less pain and limitation.  [] Met  [] Not Met  [] Progressing     Patient will improve range of motion  to stated goals in order to return to maximal functional potential.  [] Met  [] Not Met  [] Progressing     Patient will improve Strength to stated goals of appropriate musculature in order to improve functional independence.  [] Met  [] Not Met  [] Progressing     Pain Rating at Best: 1/10 to improve Quality of Life.  [] Met  [] Not Met  [] Progressing     Patient will meet predicted functional outcome (FOTO) score: 40% to increase self-worth & perceived functional ability. [] Met  [] Not Met  [] Progressing     Patient will have met/partially met personal goal of: improve pain and function in standing, walking, and lifting [] Met  [] Not Met  [] Progressing              PLAN   Plan of care Certification: 10/17/2023 to 12/16/2023     Outpatient Physical Therapy 2 times weekly for 12 weeks to include any combination of the following interventions: virtual visits, dry needling, modalities, electrical stimulation (IFC, Pre-Mod, Attended with Functional Dry Needling), Manual Therapy, Moist Heat/ Ice, Neuromuscular Re-ed, Patient Education, Self Care, Therapeutic Exercise, Functional Training, and Therapeutic Activites     Rommel Figueroa, PT

## 2023-12-14 ENCOUNTER — CLINICAL SUPPORT (OUTPATIENT)
Dept: REHABILITATION | Facility: HOSPITAL | Age: 68
End: 2023-12-14
Payer: MEDICARE

## 2023-12-14 DIAGNOSIS — M54.50 CHRONIC BILATERAL LOW BACK PAIN WITHOUT SCIATICA: Primary | ICD-10-CM

## 2023-12-14 DIAGNOSIS — M53.86 DECREASED ROM OF LUMBAR SPINE: ICD-10-CM

## 2023-12-14 DIAGNOSIS — J45.901 ASTHMATIC BRONCHITIS WITH ACUTE EXACERBATION, UNSPECIFIED ASTHMA SEVERITY, UNSPECIFIED WHETHER PERSISTENT: ICD-10-CM

## 2023-12-14 DIAGNOSIS — G89.29 CHRONIC BILATERAL LOW BACK PAIN WITHOUT SCIATICA: Primary | ICD-10-CM

## 2023-12-14 PROCEDURE — 97110 THERAPEUTIC EXERCISES: CPT | Mod: HCNC,PN

## 2023-12-14 PROCEDURE — 97112 NEUROMUSCULAR REEDUCATION: CPT | Mod: HCNC,PN

## 2023-12-14 PROCEDURE — 97530 THERAPEUTIC ACTIVITIES: CPT | Mod: HCNC,PN

## 2023-12-14 RX ORDER — FLUTICASONE FUROATE, UMECLIDINIUM BROMIDE AND VILANTEROL TRIFENATATE 200; 62.5; 25 UG/1; UG/1; UG/1
POWDER RESPIRATORY (INHALATION)
Qty: 60 EACH | Refills: 3 | Status: SHIPPED | OUTPATIENT
Start: 2023-12-14 | End: 2024-01-18 | Stop reason: SDUPTHER

## 2023-12-14 NOTE — PROGRESS NOTES
History  Chief Complaint   Patient presents with    Headache     headache since last night, chills, yellow and bloody mucus from nose, sore throat, also having body aches     Patient is a 77-year-old female presenting to the emergency department with a 2 day complaint of sinus pain, bloody/purulent nasal discharge, sore throat, frontal headache, generalized malaise/fatigue, chills, and cough  Patient reports cough is nonproductive  She reports no lightheadedness or dizziness  Denies any chest pain or tightness  Denies any abdominal pain, nausea, vomiting or diarrhea  Denies any fevers or chills  She reports that she has not received the flu shot yet this year  Reports that many of her family members are also sick with similar symptoms  Patient reports her chief complaint is frontal headache and sinus pain  Otherwise denies any other symptoms  Reports no other concerns  Prior to Admission Medications   Prescriptions Last Dose Informant Patient Reported? Taking?    Multiple Vitamins-Minerals (WOMENS 50+ MULTI VITAMIN/MIN PO)  Self Yes No   Sig: Take 1 tablet by mouth as needed     ibuprofen (MOTRIN) 800 mg tablet  Self Yes No   Sig: Take 600 mg by mouth as needed for mild pain     meclizine (ANTIVERT) 25 mg tablet   No No   Sig: Take 1 tablet by mouth 3 (three) times a day as needed for dizziness   naproxen (NAPROSYN) 250 mg tablet  Self Yes No   Sig: Take 250 mg by mouth as needed for mild pain      Facility-Administered Medications: None       Past Medical History:   Diagnosis Date    Arthritis     Chronic pain disorder     sciatic    GERD (gastroesophageal reflux disease)     Kidney stone     Osteoarthritis     Osteoporosis        Past Surgical History:   Procedure Laterality Date    CHOLECYSTECTOMY      KNEE ARTHROSCOPY Left     ID COLONOSCOPY FLX DX W/COLLJ SPEC WHEN PFRMD N/A 11/2/2017    Procedure: COLONOSCOPY with polypectomy x2;  Surgeon: Arjun Dejesus MD;  Location: [facility]; [facility]   Physical Therapy Treatment Note      Name: Abdullahi Urias  Clinic Number: [id number]    Therapy Diagnosis:   Encounter Diagnoses   Name Primary?    Chronic bilateral low back pain without sciatica Yes    Decreased ROM of lumbar spine      Physician: Brandon Valencia MD    Visit Date: 12/14/2023    Physician Orders: PT Eval and Treat  Medical Diagnosis from Referral: s/p lumbar surgery  Evaluation Date: 10/17/2023  Authorization Period Expiration: 12/31/2023  Plan of Care Expiration: 12/16/2023                          Progress Update: 12/16/2023            Visit # / Visits authorized:12 /20       FOTO NEEDED  FOTO  Number Date Score    1 10/17/2023 26%   2       3       4             PRECAUTIONS: Standard Precautions and Orthopedic: s/p lumbar sugery      Time In: 1018  Time Out: 1115  Total Appointment Time (timed & untimed codes): 55 minutes    PTA Visit #: 0/5      Subjective     Patient reports: feeling better  He was compliant with home exercise program.  Response to previous treatment: N/A  Functional change: N/A    Pain: 5/10   Location: bilateral low back      Objective      Objective Measures updated at progress report unless specified.     Treatment     Abdullahi received the treatments listed below:      therapeutic exercises to develop strength and ROM for 25 minutes including:   Activity   Details    NuStep x 5 minutes level 1.5    Matrix knee extension x 25# 3x10    Matrix knee flexion x 40# 3x10    Pulldowns x 40# 3x10    Rows x      neuromuscular re-education activities to improve: Balance, Sense, Proprioception, and Posture for 20 minutes. The following activities were included:   Activity   Details    Sit to stand  x 18 inches 2x10    Supported hip extension x Over plinth    Step ups x 8 inch 2x10    PPT x     Supine marches with resistance x GTB    Supine clamshells x RTB                   therapeutic activities to improve functional performance for10 minutes,  Service: Gastroenterology    TUBAL LIGATION         Family History   Problem Relation Age of Onset    Mental illness Father     Prostate cancer Father     Mental illness Daughter      I have reviewed and agree with the history as documented  Social History   Substance Use Topics    Smoking status: Never Smoker    Smokeless tobacco: Never Used    Alcohol use No        Review of Systems   Constitutional: Positive for chills and fatigue  Negative for activity change, appetite change and fever  HENT: Positive for congestion, postnasal drip, rhinorrhea (Purulent/bloody, per patient), sinus pain, sinus pressure and sore throat  Negative for ear discharge, ear pain, facial swelling, nosebleeds, tinnitus, trouble swallowing and voice change  Eyes: Negative for pain, discharge, redness and itching  Respiratory: Positive for cough  Negative for chest tightness, shortness of breath and wheezing  Cardiovascular: Negative for chest pain and palpitations  Gastrointestinal: Negative for abdominal pain, diarrhea, nausea and vomiting  Musculoskeletal: Negative for arthralgias, joint swelling, neck pain and neck stiffness  Skin: Negative for rash  Allergic/Immunologic: Negative for immunocompromised state  Neurological: Negative for dizziness, light-headedness, numbness and headaches  Hematological: Negative for adenopathy  Physical Exam  Physical Exam   Constitutional: She is oriented to person, place, and time  She appears well-developed and well-nourished  Non-toxic appearance  She does not have a sickly appearance  She does not appear ill  No distress  HENT:   Right Ear: Ear canal normal  Tympanic membrane is perforated  Tympanic membrane is not injected and not erythematous  Left Ear: Ear canal normal  Tympanic membrane is scarred and retracted  Tympanic membrane is not injected and not erythematous  Nose: Mucosal edema present  No rhinorrhea or sinus tenderness  No epistaxis   Right including:    Activity   Details    [place] carry 15# x 160# (B)    Sled Push x 50 feet x 2                         Patient Education and Home Exercises       Education provided:   - Home program    Written Home Exercises Provided: Patient instructed to cont prior HEP. Exercises were reviewed and Abdullahi was able to demonstrate them prior to the end of the session.  Abdullahi demonstrated good  understanding of the education provided. See Electronic Medical Record under Patient Instructions for exercises provided during therapy sessions    Assessment     The patient has returned after illness with decreased tolerance to pain.  He restarted activity but could not complete full session    Abdullahi Is progressing well towards his goals.   Patient prognosis is Good.     Patient will continue to benefit from skilled outpatient physical therapy to address the deficits listed in the problem list box on initial evaluation, provide pt/family education and to maximize pt's level of independence in the home and community environment.     Patient's spiritual, cultural and educational needs considered and pt agreeable to plan of care and goals.     Anticipated barriers to physical therapy: None    SHORT TERM GOALS:  4 weeks Progress Date Met   Recent signs and systems trend is improving in order to progress towards Long term goals.  [x] Met  [] Not Met  [] Progressing     Patient will be independent with Home Exercise Program  in order to further progress and return to maximal function. [x] Met  [] Not Met  [] Progressing     Pain rating at Worst: 5 /10 in order to progress towards increased independence with activity. [x] Met  [] Not Met  [] Progressing     Patient will be able to correct postural deviations in sitting and standing, to decrease pain and promote postural awareness for injury prevention.  [x] Met  [] Not Met  [] Progressing     Patient will improve functional outcome (FOTO) score: by 5% to increase self-worth &  perceived functional ability towards long term goals [] Met  [] Not Met  [] Progressing        LONG TERM GOALS: 12 weeks  Progress Date Met   Patient will return to normal activites of daily living, recreational, and work related activities with less pain and limitation.  [] Met  [] Not Met  [] Progressing     Patient will improve range of motion  to stated goals in order to return to maximal functional potential.  [] Met  [] Not Met  [] Progressing     Patient will improve Strength to stated goals of appropriate musculature in order to improve functional independence.  [] Met  [] Not Met  [] Progressing     Pain Rating at Best: 1/10 to improve Quality of Life.  [] Met  [] Not Met  [] Progressing     Patient will meet predicted functional outcome (FOTO) score: 40% to increase self-worth & perceived functional ability. [] Met  [] Not Met  [] Progressing     Patient will have met/partially met personal goal of: improve pain and function in standing, walking, and lifting [] Met  [] Not Met  [] Progressing              PLAN   Plan of care Certification: 10/17/2023 to 12/16/2023     Outpatient Physical Therapy 2 times weekly for 12 weeks to include any combination of the following interventions: virtual visits, dry needling, modalities, electrical stimulation (IFC, Pre-Mod, Attended with Functional Dry Needling), Manual Therapy, Moist Heat/ Ice, Neuromuscular Re-ed, Patient Education, Self Care, Therapeutic Exercise, Functional Training, and Therapeutic Activites     Rommel Figueroa, PT      sinus exhibits maxillary sinus tenderness and frontal sinus tenderness  Left sinus exhibits maxillary sinus tenderness and frontal sinus tenderness  Mouth/Throat: Uvula is midline and mucous membranes are normal  No trismus in the jaw  Posterior oropharyngeal erythema (With postnasal drip ) present  No oropharyngeal exudate, posterior oropharyngeal edema or tonsillar abscesses  Tonsils are 2+ on the right  Tonsils are 2+ on the left  No tonsillar exudate  Eyes: Conjunctivae are normal    Neck: Normal range of motion  Neck supple  Cardiovascular: Regular rhythm  Tachycardia present  Pulmonary/Chest: Effort normal and breath sounds normal  No respiratory distress  She exhibits no tenderness  Abdominal: Soft  She exhibits no distension  There is no tenderness  Lymphadenopathy:     She has no cervical adenopathy  Neurological: She is alert and oriented to person, place, and time  Skin: Skin is warm and dry  No rash noted  Psychiatric: She has a normal mood and affect  Nursing note and vitals reviewed        Vital Signs  ED Triage Vitals [12/08/18 0902]   Temperature Pulse Respirations Blood Pressure SpO2   97 6 °F (36 4 °C) (!) 111 16 148/77 98 %      Temp Source Heart Rate Source Patient Position - Orthostatic VS BP Location FiO2 (%)   Tympanic Monitor Sitting Left arm --      Pain Score       --           Vitals:    12/08/18 0902   BP: 148/77   Pulse: (!) 111   Patient Position - Orthostatic VS: Sitting       Visual Acuity      ED Medications  Medications - No data to display    Diagnostic Studies  Results Reviewed     Procedure Component Value Units Date/Time    Rapid Flu-Viral RNA amplification [facility] HEART ONLY) [[id number]]  (Normal) Collected:  12/08/18 0931    Lab Status:  Final result Specimen:  Nares from Nose Updated:  12/08/18 1027     INFLUENZA A AMPLIFIED RNA Not Detected     INFLUENZA B AMPLIFIED Not Detected                 XR chest 2 views   ED Interpretation by Luis E Mendoza BINA (12/08 0932)   No acute pulmonary disease is seen  Final Result by Kemar Vizcarra MD (12/08 7310)      No acute cardiopulmonary disease  Workstation performed: [id number]                    Procedures  Procedures       Phone Contacts  ED Phone Contact    ED Course                               MDM  Number of Diagnoses or Management Options  Acute non-recurrent sinusitis, unspecified location: new and requires workup  Diagnosis management comments: Rapid flu negative  Based on symptoms, possible bacterial sinusitis, will provide prescription for Augmentin as well as loratadine for symptom relief  Patient instructed to follow up with her primary care provider in 2-3 days for re-evaluation  Instructed return to the ED with any worsening symptoms or emergent concerns  Amount and/or Complexity of Data Reviewed  Clinical lab tests: ordered and reviewed    Patient Progress  Patient progress: stable    CritCare Time    Disposition  Final diagnoses:   Acute non-recurrent sinusitis, unspecified location     Time reflects when diagnosis was documented in both MDM as applicable and the Disposition within this note     Time User Action Codes Description Comment    12/8/2018 10:28 AM Rosella Homans Add [J01 90] Acute non-recurrent sinusitis, unspecified location       ED Disposition     ED Disposition Condition Comment    Discharge  Davion Minors discharge to home/self care  Condition at discharge: Stable        Follow-up Information     Follow up With Specialties Details Why Contact Blaise Fernandez MD Family Medicine In 3 days For re-evaluation/recheck, as discussed   [address]  [phone]      [facility] Emergency Department Emergency Medicine Go to As needed, If symptoms worsen [address] [address] [phone]          Patient's Medications   Discharge Prescriptions    AMOXICILLIN-CLAVULANATE (AUGMENTIN) 875-125 MG PER TABLET    Take 1 tablet by mouth every 12 (twelve) hours for 7 days       Start Date: 12/8/2018 End Date: 12/15/2018       Order Dose: 1 tablet       Quantity: 14 tablet    Refills: 0    LORATADINE (CLARITIN) 10 MG TABLET    Take 1 tablet (10 mg total) by mouth daily for 30 days       Start Date: 12/8/2018 End Date: 1/7/2019       Order Dose: 10 mg       Quantity: 30 tablet    Refills: 0     No discharge procedures on file      ED Provider  Electronically Signed by           BINA Whelan  12/08/18 6658

## 2023-12-17 ENCOUNTER — OFFICE VISIT (OUTPATIENT)
Dept: URGENT CARE | Facility: CLINIC | Age: 68
End: 2023-12-17
Payer: MEDICARE

## 2023-12-17 VITALS
DIASTOLIC BLOOD PRESSURE: 66 MMHG | HEART RATE: 86 BPM | SYSTOLIC BLOOD PRESSURE: 111 MMHG | TEMPERATURE: 98 F | RESPIRATION RATE: 20 BRPM | WEIGHT: 247 LBS | HEIGHT: 69 IN | BODY MASS INDEX: 36.58 KG/M2 | OXYGEN SATURATION: 96 %

## 2023-12-17 DIAGNOSIS — M54.2 NECK PAIN: Primary | ICD-10-CM

## 2023-12-17 PROCEDURE — 99214 PR OFFICE/OUTPT VISIT, EST, LEVL IV, 30-39 MIN: ICD-10-PCS | Mod: S$GLB,,, | Performed by: NURSE PRACTITIONER

## 2023-12-17 PROCEDURE — 99214 OFFICE O/P EST MOD 30 MIN: CPT | Mod: S$GLB,,, | Performed by: NURSE PRACTITIONER

## 2023-12-17 RX ORDER — METHOCARBAMOL 500 MG/1
500 TABLET, FILM COATED ORAL 4 TIMES DAILY
COMMUNITY
End: 2023-12-17 | Stop reason: ALTCHOICE

## 2023-12-17 RX ORDER — ORPHENADRINE CITRATE 100 MG/1
100 TABLET, EXTENDED RELEASE ORAL 2 TIMES DAILY
Qty: 10 TABLET | Refills: 0 | Status: SHIPPED | OUTPATIENT
Start: 2023-12-17 | End: 2023-12-22

## 2023-12-17 NOTE — PATIENT INSTRUCTIONS
Alternate heat and ice for first 48 hours then  apply heat. You may do gently stretching if tolerable.    Moist warm compresss to area several times daily.  May use a heating pad on LOW to provide heat over a towel which was dampended with warm water. DO NOT FALL ASLEEP WITH HEATING PAD ON.  Do not stay in one position to long. Do not sleep on a high pillow, the goal is to keep your neck and spine aligned.  If sleeping on your side, place pillow between knees to keep spine in better alinement.    Take the muscle relaxer as prescribed. It may cause drowsiness so do not operate a vehicle or machinery while taking this medication.     Apply over the counter lidocaine patches to the area for improved pain relief.     Take arthritis strength Tylenol three times a day for pain in addition to the anti-inflammatory pain medication prescribed.     If you were not prescribed an anti-inflammatory medication, and if you do not have any history of stomach/intestinal ulcers, or kidney disease, or are not taking a blood thinner such as Coumadin, Plavix, Pradaxa, Eloquis, or Xaralta for example, it is OK to take over the counter Ibuprofen or Advil or Motrin or Aleve as directed.  Do not take these medications on an empty stomach.    If you were prescribed a narcotic medication, do not drive or operate heavy equipment or machinery while taking these medications.    Please arrange follow up with your primary medical clinic as soon as possible. You must understand that you've received an Urgent Care treatment only and that you may be released before all of your medical problems are known or treated. You, the patient, will arrange for follow up as instructed. If your symptoms worsen or fail to improve you should go to the Emergency Room.    WE CANNOT RULE OUT ALL POSSIBLE CAUSES OF YOUR SYMPTOMS IN THE URGENT CARE SETTING PLEASE GO TO THE ER IF YOU FEELS YOUR CONDITION IS WORSENING OR YOU WOULD LIKE EMERGENT EVALUATION.    If you  become weak in any of your extremities, are not able to  items with your hands, loose sensation or control of any extremity, develop a headache that won't improve with medication or a fever greater than 101.5, please go to the Emergency Department immediately.

## 2023-12-17 NOTE — PROGRESS NOTES
"Subjective:      Patient ID: Abdullahi Urias is a 68 y.o. male.    Vitals:  height is 5' 9" (1.753 m) and weight is 112 kg (247 lb). His tympanic temperature is 98.2 °F (36.8 °C). His blood pressure is 111/66 and his pulse is 86. His respiration is 20 and oxygen saturation is 96%.     Chief Complaint: Neck Pain    Patient is a 68-year-old male with a history of chronic back pain who presents for evaluation of neck pain.  Onset 3 days ago.  He states he was treated with methocarbamol with minimal relief.  He has not currently taking any medication for symptoms.  He denies any trauma or injury.  He states pain is intermittent and muscular in nature.  Denies any fever, chills, headache, rash, wound, erythema, swelling, weakness, numbness, tingling.  No other concerns voiced    Neck Pain   This is a new problem. The current episode started 1 to 4 weeks ago. The problem occurs constantly. The pain is associated with nothing. The quality of the pain is described as cramping and aching. The pain is at a severity of 10/10. The pain is severe. The symptoms are aggravated by bending and position. The pain is Same all the time. Stiffness is present All day. Pertinent negatives include no fever, headaches or numbness. He has tried muscle relaxants for the symptoms. The treatment provided no relief.       Constitution: Negative for chills, sweating and fever.   Neck: Positive for neck pain.   Musculoskeletal:  Positive for muscle ache and history of spine disorder. Negative for back pain (neck/cervical).   Skin:  Negative for rash, wound, erythema and bruising.   Neurological:  Negative for dizziness, light-headedness, facial drooping, speech difficulty, coordination disturbances, loss of balance, headaches, disorientation, altered mental status, loss of consciousness, numbness and tingling.   Psychiatric/Behavioral:  Negative for altered mental status and disorientation.       Objective:     Physical Exam   Constitutional: He is " oriented to person, place, and time. He appears well-developed. He is cooperative.   HENT:   Head: Normocephalic and atraumatic.   Ears:   Right Ear: Hearing, tympanic membrane, external ear and ear canal normal.   Left Ear: Hearing, tympanic membrane, external ear and ear canal normal.   Nose: Nose normal. No mucosal edema or nasal deformity. No epistaxis. Right sinus exhibits no maxillary sinus tenderness and no frontal sinus tenderness. Left sinus exhibits no maxillary sinus tenderness and no frontal sinus tenderness.   Mouth/Throat: Uvula is midline, oropharynx is clear and moist and mucous membranes are normal. No trismus in the jaw. Normal dentition. No uvula swelling.   Eyes: Conjunctivae and lids are normal.   Neck: Trachea normal and phonation normal. Neck supple. No neck rigidity present.   Cardiovascular: Normal rate, regular rhythm, normal heart sounds and normal pulses.   Pulmonary/Chest: Effort normal and breath sounds normal.   Abdominal: Normal appearance and bowel sounds are normal. Soft.   Musculoskeletal: Normal range of motion.         General: Normal range of motion.      Cervical back: He exhibits tenderness.   Lymphadenopathy:     He has no cervical adenopathy.   Neurological: He is alert and oriented to person, place, and time. He exhibits normal muscle tone.   Skin: Skin is warm, dry and intact. No erythema   Psychiatric: His speech is normal and behavior is normal. Judgment and thought content normal.   Nursing note and vitals reviewed.      Assessment:     1. Neck pain        Plan:       Neck pain    Other orders  -     orphenadrine (NORFLEX) 100 mg tablet; Take 1 tablet (100 mg total) by mouth 2 (two) times daily. for 5 days  Dispense: 10 tablet; Refill: 0          Medical Decision Making:   Initial Assessment:   Nontoxic appearing 69 yo male c/o neck pain.  After complete evaluation, including thorough history and physical exam, the patient's symptoms are most likely due to muscular   injury. There are no concerning features on physical exam to suggest fracture (no trauma, no bony tenderness to palpation), cauda equina (no bowel or urinary incontinence/retention, no saddle anesthesia, no distal weakness), AAA, viscus perforation, osteomyelitis or epidural abscess (no IVDU, vertebral tenderness), renal colic, pyelonephritis (afebrile, no CVAT, no urinary symptoms). Vital signs do not suggest sepsis. Imaging is negative for acute fracture or concerning findings. Patient is without signs of acute distress and vital signs are stable. Patient is discharged with prescription for muscle relaxant, anti-inflammatory pain relief, and recommendations for supportive care. Return/ER precautions were given.     Patient is noted to have an increased BMI and is clinically obese. Health risks related to obesity include: HTN, CAD, DM, CVA, CHEO, as well as joint pain and arthritis. In addition obesity causes reduced lung volumes and airway calibre, and increased airway hyperresponsiveness. Obesity is a risk factor for, and exacerbates, respiratory tract infections, pulmonary vascular disease and ventilatory failure.         Patient Instructions   Alternate heat and ice for first 48 hours then  apply heat. You may do gently stretching if tolerable.    Moist warm compresss to area several times daily.  May use a heating pad on LOW to provide heat over a towel which was dampended with warm water. DO NOT FALL ASLEEP WITH HEATING PAD ON.  Do not stay in one position to long. Do not sleep on a high pillow, the goal is to keep your neck and spine aligned.  If sleeping on your side, place pillow between knees to keep spine in better alinement.    Take the muscle relaxer as prescribed. It may cause drowsiness so do not operate a vehicle or machinery while taking this medication.     Apply over the counter lidocaine patches to the area for improved pain relief.     Take arthritis strength Tylenol three times a day for pain  in addition to the anti-inflammatory pain medication prescribed.     If you were not prescribed an anti-inflammatory medication, and if you do not have any history of stomach/intestinal ulcers, or kidney disease, or are not taking a blood thinner such as Coumadin, Plavix, Pradaxa, Eloquis, or Xaralta for example, it is OK to take over the counter Ibuprofen or Advil or Motrin or Aleve as directed.  Do not take these medications on an empty stomach.    If you were prescribed a narcotic medication, do not drive or operate heavy equipment or machinery while taking these medications.    Please arrange follow up with your primary medical clinic as soon as possible. You must understand that you've received an Urgent Care treatment only and that you may be released before all of your medical problems are known or treated. You, the patient, will arrange for follow up as instructed. If your symptoms worsen or fail to improve you should go to the Emergency Room.    WE CANNOT RULE OUT ALL POSSIBLE CAUSES OF YOUR SYMPTOMS IN THE URGENT CARE SETTING PLEASE GO TO THE ER IF YOU FEELS YOUR CONDITION IS WORSENING OR YOU WOULD LIKE EMERGENT EVALUATION.    If you become weak in any of your extremities, are not able to  items with your hands, loose sensation or control of any extremity, develop a headache that won't improve with medication or a fever greater than 101.5, please go to the Emergency Department immediately.

## 2023-12-20 NOTE — PROGRESS NOTES
December 20, 2023      Re: James Jaramillo  [address] [facility] [id number]      This is to certify that James Jaramillo has been under my care. Please excuse James from work today 12/20/2023 due to medical appointment.        SIGNATURE:___________________________________________          Genie Dwyer MD  [facility]  [address]  [address]  [facility] [address]  Phone: [phone]  Fax: [phone]   Subjective:      Patient ID: Abdullahi Urias is a 68 y.o. male.    Chief Complaint: Asthma, dyspnea    Interval Hx 11/21/23:    Mr. Urias presents to Pulmonary clinic for increased use of rescue inhaler. He reports worsening shortness of breath and wheezing since last Thursday. No fever. Productive cough, was clear, now phlegm is yellow. Nebulizer helps, uses three times per day. No Le swelling. No wheezing on exam. No known sick contacts.     Interim Work Up:  CXR today- No acute change    Pulmonary Interventions:   Albuterol HFA- as needed  Albuterol nebulized- TID  Trelegy 200- compliant  Mucinex    Interval Hx 10/18/23:     Mr. Urias presents to Pulmonary clinic for follow up dyspnea. He was last seen 8/2023 following an acute care visit for dyspnea. At that visit he was prescribed a nebulizer with albuterol solution and atrovent nasal spray. Further eval done with CXR, BNP and  D-dimer given his recent surgery. BNP WNL, D-dimer elevated at 4.19. VQ scan normal.     Mr. Urias states that his dyspnea is a little better, but certainly still present. He has recently started with outpatient physical therapy and has been walking around his house lot. He is motivated to try and lose weight.      Interim Testing  VQ scan- Normal  BNP- WNL  D-dimer 4  CXR- No acute findings     Pulmonary Interventions:   Albuterol HFA- Only uses every 2 weeks or so  Albuterol nebulized- hasn't used it  Trelegy 200- using daily  Atrovent Nasal spray     Interval History 8/18/2023:     Mr. Urias presents to Pulmonary clinic as an acute care visit with increasing dyspnea. Feeling more out of breath with exertion, coughing a little more over the past several weeks. Also with congestion over the past week, productive last night and this morning- clear/yellow. No sore throat. No fever. No thrush. No calf swelling or pain.      Per chart review- since I last saw patient he had his TLIF 5/17/23 with Dr. Valencia which was complicated by post-op  infection, treated with hardware removal and IV antibiotics. Completed 6 weeks of IV abx today- 8/18/23, to have PICC removed.     Pulmonary Interventions:   Albuterol- Using 2-3/day, helps  Trelegy- once daily  Flonase- using every day for the past week or so     Interval History 4/18/23:     Mr. Urias is seen in follow up of asthma as well as Pulmonary pre-op assessment for TLIF, I last saw him in March, at that time he was changed to Trelegy from Advair for persistent asthma symptoms (using albuterol 3-4 times daily)     Interim Testing:  Complete PFT 4/2023- Normal spirometry, loop consistent with restrictive pattern, bronchodilator portion not done as he used inhaler prior to exam  6MW- Remained on room air, [place] 1-3, walked 89% predicted  ABG - 7.36/41/85/23, done on room air  CXR 4/13- No acute findings, cardiomegaly, no effusions, no pneumo, no focal consolidation     Pulmonary interventions:   Albuterol- not having to use really since starting Trelegy  Trelegy- has seen a big improvement  PDM- Benefited from technique education     HPI 3/7/23:  67 year old male with history of AS s/p bioprosthetic AVR, dCHF, CKD3, HTN, CVA, CHEO ([facility]), former tobacco user, prostate cancer 2020 s/p prostatectomy s/p radiation, in remission who was referred to Pulmonary clinic by Dick Baez MD for evaluation of  asthma. Mr. Urias was admitted to the hospital 1/25-1/26 for treatment of asthma exacerbation. He was treated with IV steroids, breathing treatments, empiric antibiotics and oxygen as needed. CXR clear, SpO2 98% on room air on day of discharge. Patient reports several months of wheezing. Has been using Advair diskus twice daily and Albuterol inhaler 2-3/day. He did notice that symptoms improved when he was on a cruise last week, and worsened when back in [place].      Quit smoking 2012 (with stroke), about 30 pack years     Pertinent Work Up:  CXR 1/24/2023- Clear chest, no pneumo or effusion.  "Cardiomegaly.  CTA Chest 8/25/2022: Negative for PE, clear lungs  Eosinophils borderline/mildly elevated 1/2023     Pulmonary Interventions:  Albuterol HFA, using 2-3x/day  Fluticasone- salmeterol 250-50mcg twice daily, been on since end of January    Review of Systems   Respiratory:  Positive for cough, sputum production, wheezing and dyspnea on extertion.      Objective:     Physical Exam   Constitutional: He is oriented to person, place, and time. He appears well-developed and well-nourished. He is obese.   Cardiovascular: Normal rate and regular rhythm.   Pulmonary/Chest:   No wheezing on exam, more shallow breaths with slight increase in rate   Musculoskeletal:         General: No edema.   Neurological: He is alert and oriented to person, place, and time.   Skin: Skin is warm and dry.     Personal Diagnostic Review  As Above      11/2/2023    12:26 PM 10/27/2023    10:51 AM 10/18/2023    10:04 AM 10/18/2023     9:40 AM 10/12/2023    10:29 AM 9/11/2023     9:38 AM 9/7/2023     9:50 AM   Pulmonary Function Tests   SpO2  96 % 96 % 96 %   98 %   Height 5' 9" (1.753 m) 5' 9" (1.753 m) 5' 9" (1.753 m) 5' 9" (1.753 m)   5' 9" (1.753 m)   Weight 112.5 kg (248 lb) 112.7 kg (248 lb 7.3 oz) 113.8 kg (250 lb 14.1 oz) 113.8 kg (250 lb 14.1 oz) 113.3 kg (249 lb 14.3 oz) 112.5 kg (248 lb 0.3 oz) 109.8 kg (242 lb)   BMI (Calculated) 36.6 36.7 37 37   35.7        Assessment:     No diagnosis found.     Outpatient Encounter Medications as of 11/21/2023   Medication Sig Dispense Refill    albuterol (PROVENTIL) 2.5 mg /3 mL (0.083 %) nebulizer solution Take 3 mLs (2.5 mg total) by nebulization every 6 (six) hours as needed for Wheezing. Rescue 180 mL 1    albuterol (PROVENTIL/VENTOLIN HFA) 90 mcg/actuation inhaler INHALE 2 PUFFS INTO THE LUNGS EVERY 6 HOURS AS NEEDED FOR COUGH 8.5 g 3    allopurinoL (ZYLOPRIM) 100 MG tablet Take 100 mg by mouth once daily. Takes 0.5 tab      ALPRAZolam (XANAX) 1 MG tablet Take 1 mg by mouth Daily. " EVERY OTHER DAY      aluminum & magnesium hydroxide-simethicone (MYLANTA MAX STRENGTH) 400-400-40 mg/5 mL suspension TAKE 15ML BY MOUTH FOUR TIMES A DAY AS NEEDED FOR STOMACH ACID      aspirin (ECOTRIN) 81 MG EC tablet Take 1 tablet (81 mg total) by mouth once daily. 90 tablet 3    atorvastatin (LIPITOR) 80 MG tablet TAKE ONE-HALF TABLET BY MOUTH EVERY DAY FOR CHOLESTEROL      carvediloL (COREG) 12.5 MG tablet Take 1 tablet (12.5 mg total) by mouth 2 (two) times daily. 60 tablet 11    clopidogreL (PLAVIX) 75 mg tablet Take 1 tablet (75 mg total) by mouth once daily. 30 tablet 11    docusate sodium (COLACE) 100 MG capsule Take 1 capsule (100 mg total) by mouth 2 (two) times daily. (Patient not taking: Reported on 10/27/2023) 20 capsule 0    esomeprazole (NEXIUM) 40 MG capsule Take 40 mg by mouth before breakfast.      famotidine (PEPCID) 40 MG tablet TAKE ONE TABLET BY MOUTH AT BEDTIME FOR ACID REFLUX      fluticasone propionate (FLONASE) 50 mcg/actuation nasal spray 1 spray (50 mcg total) by Each Nostril route once daily. 16 g 0    fluticasone-umeclidin-vilanter (TRELEGY ELLIPTA) 200-62.5-25 mcg inhaler INHALE 1 PUFF INTO THE LUNGS EVERY DAY 60 each 3    gabapentin (NEURONTIN) 600 MG tablet Take 1 tablet (600 mg total) by mouth 3 (three) times daily. 90 tablet 11    hydrocortisone 2.5 % cream Apply topically 2 (two) times daily as needed. Apply to affected areas of the face and neck. (Patient not taking: Reported on 10/27/2023) 30 g 2    hydrOXYzine HCL (ATARAX) 25 MG tablet Take 1 tablet (25 mg total) by mouth 3 (three) times daily as needed for Itching. 30 tablet 0    ipratropium (ATROVENT) 21 mcg (0.03 %) nasal spray 2 sprays by Each Nostril route 2 (two) times daily. 30 mL 0    LIDOcaine (LIDODERM) 5 % APPLY 1 PATCH TOPICALLY EVERY DAY FOR PAIN. WEAR FOR 12 HOURS, THEN REMOVE. DO NOT APPLY NEW PATCH FOR AT LEAST 12 HOURS.      losartan (COZAAR) 50 MG tablet Take 1 tablet (50 mg total) by mouth once daily. 30  tablet 11    multivitamin (THERAGRAN) per tablet Take 1 tablet by mouth once daily.      oxyCODONE-acetaminophen (PERCOCET)  mg per tablet Take 1 tablet by mouth every 8 (eight) hours as needed for Pain. 40 tablet 0    sertraline (ZOLOFT) 100 MG tablet TAKE TWO TABLETS BY MOUTH EVERY DAY FOR MENTAL HEALTH DOSE INCREASED TO 200MG/DAY      simethicone (MYLICON) 80 MG chewable tablet Take 80 mg by mouth every 6 (six) hours as needed for Flatulence.      sucralfate (CARAFATE) 100 mg/mL suspension Take 1 g by mouth 4 (four) times daily.       tiZANidine (ZANAFLEX) 4 MG tablet Take 1 capsule by mouth 2 (two) times daily as needed (muscle spasms).      zolpidem (AMBIEN CR) 12.5 MG CR tablet Take 1 tablet by mouth nightly as needed.       Facility-Administered Encounter Medications as of 11/21/2023   Medication Dose Route Frequency Provider Last Rate Last Admin    ondansetron injection 4 mg  4 mg Intravenous Once PRN Nehemiah Carmen MD        sodium chloride 0.9% flush 10 mL  10 mL Intravenous PRN Wilda Horne MD         No orders of the defined types were placed in this encounter.        Plan:     Problem List Items Addressed This Visit          Pulmonary    Asthmatic bronchitis - Primary    Relevant Medications    methylPREDNISolone (MEDROL DOSEPACK) 4 mg tablet    azithromycin (Z-BETTIE) 250 MG tablet    guaiFENesin 100 mg/5 ml (ROBITUSSIN) 100 mg/5 mL syrup    Moderate persistent asthma without complication     - with exacerbation  - not wheezing on exam  - vitals reviewed- normal HR, blood pressure, RR 19 with SpO2 98%  - CXR clear  - Zpack and steroids  - ED precautions discussed            Plan discussed with pt and he expressed understanding. We discussed ER precautions.

## 2023-12-21 ENCOUNTER — LAB VISIT (OUTPATIENT)
Dept: LAB | Facility: HOSPITAL | Age: 68
End: 2023-12-21
Attending: UROLOGY
Payer: MEDICARE

## 2023-12-21 ENCOUNTER — OFFICE VISIT (OUTPATIENT)
Dept: UROLOGY | Facility: CLINIC | Age: 68
End: 2023-12-21
Payer: MEDICARE

## 2023-12-21 VITALS
SYSTOLIC BLOOD PRESSURE: 121 MMHG | BODY MASS INDEX: 36.81 KG/M2 | DIASTOLIC BLOOD PRESSURE: 77 MMHG | RESPIRATION RATE: 16 BRPM | WEIGHT: 248.56 LBS | HEIGHT: 69 IN | HEART RATE: 87 BPM

## 2023-12-21 DIAGNOSIS — C61 PROSTATE CANCER: ICD-10-CM

## 2023-12-21 DIAGNOSIS — C61 PROSTATE CANCER: Primary | ICD-10-CM

## 2023-12-21 LAB
BILIRUB UR QL STRIP: NEGATIVE
GLUCOSE UR QL STRIP: NEGATIVE
KETONES UR QL STRIP: NEGATIVE
LEUKOCYTE ESTERASE UR QL STRIP: NEGATIVE
PH, POC UA: 6.5
POC BLOOD, URINE: NEGATIVE
POC NITRATES, URINE: NEGATIVE
PROT UR QL STRIP: NEGATIVE
SP GR UR STRIP: 1.01 (ref 1–1.03)
UROBILINOGEN UR STRIP-ACNC: 0.2 (ref 0.3–2.2)

## 2023-12-21 PROCEDURE — 81003 URINALYSIS AUTO W/O SCOPE: CPT | Mod: PBBFAC,HCNC | Performed by: UROLOGY

## 2023-12-21 PROCEDURE — 36415 COLL VENOUS BLD VENIPUNCTURE: CPT | Mod: HCNC | Performed by: UROLOGY

## 2023-12-21 PROCEDURE — 84153 ASSAY OF PSA TOTAL: CPT | Mod: HCNC | Performed by: UROLOGY

## 2023-12-21 PROCEDURE — 99214 OFFICE O/P EST MOD 30 MIN: CPT | Mod: HCNC,S$GLB,, | Performed by: UROLOGY

## 2023-12-21 PROCEDURE — 99999 PR PBB SHADOW E&M-EST. PATIENT-LVL III: CPT | Mod: PBBFAC,HCNC,, | Performed by: UROLOGY

## 2023-12-21 PROCEDURE — 99213 OFFICE O/P EST LOW 20 MIN: CPT | Mod: PBBFAC,HCNC | Performed by: UROLOGY

## 2023-12-21 PROCEDURE — 99999 PR PBB SHADOW E&M-EST. PATIENT-LVL III: ICD-10-PCS | Mod: PBBFAC,HCNC,, | Performed by: UROLOGY

## 2023-12-21 PROCEDURE — 81003 POCT URINALYSIS, DIPSTICK, AUTOMATED, W/O SCOPE: ICD-10-PCS | Mod: QW,HCNC,S$GLB, | Performed by: UROLOGY

## 2023-12-21 NOTE — PROGRESS NOTES
Chief Complaint:  History of prostate cancer    HPI:   12/21/2023 - patient returns today for follow-up, no new issues in the interim, unfortunately had a postop infection from his surgical wound and needed to be in the hospital for 14 days and another six weeks of a antibiotics through a PICC line, feeling great now, no gross hematuria or dysuria, due for PSA    06/15/2023 - patient returns today for follow-up, no new issues in the interim, had back surgery with Dr. Valencia and has done very well since then, pain much improved, mobility much improved, had some urgency and urge incontinence right afterwards but this resolved fairly quickly as his mobility improved, denies any weak stream or incomplete emptying, denies gross hematuria, due for PSA today    11/03/2022 - returns for follow-up, PSA remains undetectable, notes some new stress incontinence when he stands from a seated or lying position, does not do pelvic floor exercises, also notes some urinary leakage when he achieves orgasm, as well as some postvoid dribbling, no gross hematuria or dysuria    04/28/2022 - returns today for follow-up, PSA remains undetectable, energy good, voiding well, denies GH or leakage    12/17/2021 - patient returns today for follow-up, PSA remains undetectable, his energy level has improved and his hot flashes have diminished as his ADT has worn off, voiding well, denies any gross hematuria    09/17/2021 - patient returns for follow-up, notes decreased urgency, is voiding well with a good stream and denies gross hematuria, nocturia x2 but is not bothersome, PSA undetectable, has some hot flashes which are bothersome but he can tolerate them    06/11/2021 - patient presents for follow-up, has completed radiation, did fine during radiation but notes fatigue possibly from radiation versus the Lupron, denies any gross hematuria or voiding difficulty, not having any leakage, denies UTIs  03/05/2021 - patient presents for follow-up,  [facility] which showed GG 4+5= 9 prostate cancer with negative margins, patient's PSA never went to undetectable, Dr Garces thinks Axumin  PET followed by salvage radiation with ADT x 6 months is the best course of action  01/21/2021 - patient presents for follow-up, we were unable to obtain his outside records due to a clerical error, however we have requested these and they should be faxed soon, he he continues to note dysuria and notes that his urine is very dark and concentrated, he denies any foul-smelling urine or gross hematuria, his follow-up PSA was 0.11, denies weak stream or incomplete emptying  12/10/2020 - 68 y.o. male that presents for history of prostate cancer, [facility] [place] [facility], patient believes he was told that he had aggressive prostate cancer, he believes thathis initial postop PSA was undetectable however he has had subsequent biochemical recurrence, then had a postprostatectomy MRI in mid November which was concerning for prostate cancer recurrence and was told that he would need radiation.  Thus the patient has decided to seek a 2nd opinion.  Patient notes that he initially had incontinence after his surgery, but this has resolved.  He currently wears no pads.  He had erectile dysfunction prior to his radical prostatectomy and had an IPP placed in 2018.  This continues to work well for him.  Patient does note some sciatic nerve pain on the back of his right leg for the past 6 months.  He notes chronic back pain since 2001 for which he gets epidural injections which works well for him.  Other significant medical history includes a stroke in 2012 and in open heart surgery in 2014.  He denies any gross hematuria but does confirm some dysuria. He denies a history of kidney stones.  He also denies a family history of prostate cancer.      PMH:  Past Medical History:   Diagnosis Date    Aortic  stenosis     dr [facility]    Asthma     BPH (benign prostatic hyperplasia)     CAD (coronary artery disease)     Cardiomyopathy     CHF (congestive heart failure)     Chronic hoarseness     vocal cord surg    Chronic pain     CKD (chronic kidney disease) stage 3, GFR 30-59 ml/min     CVA (cerebral infarction)     8/2012 olol; reviewed ed note    Ex-smoker     GERD (gastroesophageal reflux disease)     Hepatitis C     treatedharvoni says cured, RNA NEG 6/2020    Hypertension     Pancreatitis     Prostate cancer     Prostate cancer     Prostate cancer     PVD (peripheral vascular disease)     Renal insufficiency     Substance abuse     cocaine, etoh , tob in past       PSH:  Past Surgical History:   Procedure Laterality Date    AORTIC VALVE REPLACEMENT  05/19/2014    Tissue valve replacement    BACK SURGERY      CARDIAC CATHETERIZATION      COLONOSCOPY  2011    COLONOSCOPY N/A 2/24/2021    Procedure: COLONOSCOPY;  Surgeon: Faith Carrillo MD;  Location: [facility] ENDO;  Service: Endoscopy;  Laterality: N/A;    EPIDURAL STEROID INJECTION INTO CERVICAL SPINE N/A 6/21/2022    Procedure: C7-T1 IL PIEDAD;  Surgeon: Nehemiah Carmen MD;  Location: [facility] PAIN MGT;  Service: Pain Management;  Laterality: N/A;    ESOPHAGOGASTRODUODENOSCOPY N/A 2/24/2021    Procedure: ESOPHAGOGASTRODUODENOSCOPY (EGD);  Surgeon: Faith Carrillo MD;  Location: [facility];  Service: Endoscopy;  Laterality: N/A;    FOOT SURGERY      INSERTION N/A 7/5/2023    Procedure: INSERTION;  Surgeon: Brandon Valencia MD;  Location: [facility] OR;  Service: Neurosurgery;  Laterality: N/A;  Antibiotic Beads    LEFT HEART CATHETERIZATION Left 7/24/2020    Procedure: CATHETERIZATION, HEART, LEFT;  Surgeon: Pasha Martin MD;  Location: [facility] CATH LAB;  Service: Cardiology;  Laterality: Left;    PENILE PROSTHESIS IMPLANT      PENILE PROSTHESIS REVISION  04/30/2018    PROSTATECTOMY  09/2019    [facility]    radiation for prostate      REMOVAL OF HARDWARE FROM  SPINE N/A 2023    Procedure: REMOVAL, HARDWARE, SPINE;  Surgeon: Brandon Valencia MD;  Location: [facility] OR;  Service: Neurosurgery;  Laterality: N/A;    REPLACEMENT N/A 2023    Procedure: REPLACEMENT;  Surgeon: Brandon Valencia MD;  Location: [facility] OR;  Service: Neurosurgery;  Laterality: N/A;  of [employer]    TRANSFORAMINAL EPIDURAL INJECTION OF STEROID Right 10/20/2022    Procedure: Right  L4/5 + L5/S1 TF PIEDAD RN IV Sedation;  Surgeon: Nehemiah Carmen MD;  Location: [facility] PAIN MGT;  Service: Pain Management;  Laterality: Right;    TRANSFORAMINAL LUMBAR INTERBODY FUSION (TLIF) USING COMPUTER-ASSISTED NAVIGATION Bilateral 2023    Procedure: FUSION, SPINE, LUMBAR, TLIF, USING COMPUTER-ASSISTED NAVIGATION;  Surgeon: Brandon Valencia MD;  Location: [facility] OR;  Service: Neurosurgery;  Laterality: Bilateral;  2 level lumbar fusion at L4-5 and L5-S1    UPPER GASTROINTESTINAL ENDOSCOPY      WASHOUT N/A 2023    Procedure: WASHOUT;  Surgeon: Brandon Valencia MD;  Location: [facility] OR;  Service: Neurosurgery;  Laterality: N/A;    WOUND EXPLORATION Bilateral 2023    Procedure: EXPLORATION, WOUND;  Surgeon: Brandon Valencia MD;  Location: [facility] OR;  Service: Neurosurgery;  Laterality: Bilateral;       Family History:  Family History   Problem Relation Age of Onset    Heart disease Mother     Heart disease Father     Heart attack Sister     Heart attack Brother     Colon cancer Neg Hx     Colon polyps Neg Hx     Liver cancer Neg Hx     Inflammatory bowel disease Neg Hx     Liver disease Neg Hx     Rectal cancer Neg Hx     Stomach cancer Neg Hx     Ulcerative colitis Neg Hx        Social History:  Social History     Tobacco Use    Smoking status: Former     Current packs/day: 0.00     Average packs/day: 1 pack/day for 8.0 years (8.0 ttl pk-yrs)     Types: Cigarettes     Start date: 2004     Quit date: 2012     Years since quittin.3    Smokeless tobacco: Never   Substance Use  "Topics    Alcohol use: Not Currently     Comment: Sober since 08/24/2012    Drug use: Not Currently     Types: "Crack" cocaine, Marijuana     Comment: Quit in 2012        Review of Systems:  General: No fever, chills  Skin: No rashes  Chest:  Denies cough and sputum production  Heart: Denies chest pain  Resp: Denies dyspnea  Abdomen: Denies diarrhea, abdominal pain, hematemesis, or blood in stool.  Musculoskeletal: No joint stiffness or swelling. Denies back pain.  : see HPI  Neuro: no dizziness or weakness    Allergies:  Patient has no known allergies.    Medications:    Current Outpatient Medications:     albuterol (PROVENTIL) 2.5 mg /3 mL (0.083 %) nebulizer solution, Take 3 mLs (2.5 mg total) by nebulization every 6 (six) hours as needed for Wheezing. Rescue, Disp: 180 mL, Rfl: 1    albuterol (PROVENTIL/VENTOLIN HFA) 90 mcg/actuation inhaler, INHALE 2 PUFFS INTO THE LUNGS EVERY 6 HOURS AS NEEDED FOR COUGH, Disp: 8.5 g, Rfl: 3    allopurinoL (ZYLOPRIM) 100 MG tablet, Take 100 mg by mouth once daily. Takes 0.5 tab, Disp: , Rfl:     ALPRAZolam (XANAX) 1 MG tablet, Take 1 mg by mouth Daily. EVERY OTHER DAY, Disp: , Rfl:     aluminum & magnesium hydroxide-simethicone (MYLANTA MAX STRENGTH) 400-400-40 mg/5 mL suspension, TAKE 15ML BY MOUTH FOUR TIMES A DAY AS NEEDED FOR STOMACH ACID, Disp: , Rfl:     aspirin (ECOTRIN) 81 MG EC tablet, Take 1 tablet (81 mg total) by mouth once daily., Disp: 90 tablet, Rfl: 3    atorvastatin (LIPITOR) 80 MG tablet, TAKE ONE-HALF TABLET BY MOUTH EVERY DAY FOR CHOLESTEROL, Disp: , Rfl:     carvediloL (COREG) 12.5 MG tablet, Take 1 tablet (12.5 mg total) by mouth 2 (two) times daily., Disp: 60 tablet, Rfl: 11    clopidogreL (PLAVIX) 75 mg tablet, Take 1 tablet (75 mg total) by mouth once daily., Disp: 30 tablet, Rfl: 11    docusate sodium (COLACE) 100 MG capsule, Take 1 capsule (100 mg total) by mouth 2 (two) times daily., Disp: 20 capsule, Rfl: 0    esomeprazole (NEXIUM) 40 MG capsule, " Take 40 mg by mouth before breakfast., Disp: , Rfl:     famotidine (PEPCID) 40 MG tablet, TAKE ONE TABLET BY MOUTH AT BEDTIME FOR ACID REFLUX, Disp: , Rfl:     gabapentin (NEURONTIN) 600 MG tablet, Take 1 tablet (600 mg total) by mouth 3 (three) times daily., Disp: 90 tablet, Rfl: 11    guaiFENesin (MUCINEX) 600 mg 12 hr tablet, Take 2 tablets (1,200 mg total) by mouth 2 (two) times daily., Disp: 20 tablet, Rfl: 0    hydrocortisone 2.5 % cream, Apply topically 2 (two) times daily as needed. Apply to affected areas of the face and neck., Disp: 30 g, Rfl: 2    hydrOXYzine HCL (ATARAX) 25 MG tablet, Take 1 tablet (25 mg total) by mouth 3 (three) times daily as needed for Itching., Disp: 30 tablet, Rfl: 0    ipratropium (ATROVENT) 21 mcg (0.03 %) nasal spray, 2 sprays by Each Nostril route 2 (two) times daily., Disp: 30 mL, Rfl: 0    LIDOcaine (LIDODERM) 5 %, APPLY 1 PATCH TOPICALLY EVERY DAY FOR PAIN. WEAR FOR 12 HOURS, THEN REMOVE. DO NOT APPLY NEW PATCH FOR AT LEAST 12 HOURS., Disp: , Rfl:     losartan (COZAAR) 50 MG tablet, Take 1 tablet (50 mg total) by mouth once daily., Disp: 30 tablet, Rfl: 11    multivitamin (THERAGRAN) per tablet, Take 1 tablet by mouth once daily., Disp: , Rfl:     orphenadrine (NORFLEX) 100 mg tablet, Take 1 tablet (100 mg total) by mouth 2 (two) times daily. for 5 days, Disp: 10 tablet, Rfl: 0    oxyCODONE-acetaminophen (PERCOCET)  mg per tablet, Take 1 tablet by mouth every 8 (eight) hours as needed for Pain., Disp: 40 tablet, Rfl: 0    sertraline (ZOLOFT) 100 MG tablet, TAKE TWO TABLETS BY MOUTH EVERY DAY FOR MENTAL HEALTH DOSE INCREASED TO 200MG/DAY, Disp: , Rfl:     simethicone (MYLICON) 80 MG chewable tablet, Take 80 mg by mouth every 6 (six) hours as needed for Flatulence., Disp: , Rfl:     sucralfate (CARAFATE) 100 mg/mL suspension, Take 1 g by mouth 4 (four) times daily. , Disp: , Rfl:     tiZANidine (ZANAFLEX) 4 MG tablet, Take 1 capsule by mouth 2 (two) times daily as needed  (muscle spasms)., Disp: , Rfl:     TRELEGY ELLIPTA 200-62.5-25 mcg inhaler, INHALE 1 PUFF INTO THE LUNGS EVERY DAY, Disp: 60 each, Rfl: 3    zolpidem (AMBIEN CR) 12.5 MG CR tablet, Take 1 tablet by mouth nightly as needed., Disp: , Rfl:   No current facility-administered medications for this visit.    Facility-Administered Medications Ordered in Other Visits:     ondansetron injection 4 mg, 4 mg, Intravenous, Once PRN, Nehemiah Carmen MD    sodium chloride 0.9% flush 10 mL, 10 mL, Intravenous, PRN, Wilda Horne MD    Physical Exam:  Vitals:    12/21/23 1134   BP: 121/77   Pulse: 87   Resp: 16     General: awake, alert, cooperative  Head: NC/AT  Ears: external ears normal  Eyes: sclera normal  Lungs: normal inspiration, NAD  Heart: well-perfused  Skin: The skin is warm and dry  Ext: No c/c/e.  Neuro: grossly intact, AOx3    RADIOLOGY:  NM PET CT FLUCICLOVINE F18 03/24/2021     CLINICAL HISTORY:  Biochemical failure after prostatectomy in 2019, high risk;  Malignant neoplasm of prostate     TECHNIQUE:  Segmented attenuation corrected 3-D PET imaging was obtained from the skull base through the mid thighs utilizing 10.38 mCi F-18-fluciclovine.  Noncontrast CT imaging was performed for attenuation correction, diagnosis, and anatomical fusion with PET.     COMPARISON:  None.     FINDINGS:  There is physiologic tracer uptake in the liver, pancreas, and bowel.     Patient is status post prostatectomy.  No abnormal hypermetabolism is demonstrated within the prostate bed.  No hypermetabolic regional lymphadenopathy in the pelvis. No hypermetabolic osseous lesions are identified.     Incidental: Penile prosthesis, aortic atherosclerosis, right renal cyst, median sternotomy/CABG changes, degenerative changes in the spine.     Impression:     Status post prostatectomy.  No evidence to indicate residual or recurrent malignancy.    LABS:  I personally reviewed the following lab values:  Lab Results   Component Value  Date    WBC 4.33 08/16/2023    HGB 8.6 (L) 08/16/2023    HCT 29.0 (L) 08/16/2023     08/16/2023     08/24/2023    K 4.3 08/24/2023     08/24/2023    CREATININE 1.6 (H) 08/24/2023    BUN 17 08/24/2023    CO2 24 08/24/2023    TSH 3.370 06/30/2020    PSA 0.88 04/08/2014    INR 1.1 07/04/2023    GLUF 83 03/13/2007    HGBA1C 5.2 10/23/2007    CHOL 123 01/18/2022    TRIG 57 01/18/2022    HDL 39 (L) 01/18/2022    ALT 20 08/16/2023    AST 28 08/16/2023     Assessment/Plan:   Abdullahi Urias is a 68 y.o. male with:    History of GG 9 prostate cancer - status post radical prostatectomy in September of 2019 with biochemical recurrence now s/p XRT and ADT, PSA today, f/u 6 months with PSA    NATI - improved with PFPT    Leakage with orgasm - likely due to rhythmic contractions of the pelvic floor, options would be to wear an occlusive penile ring or a condom      Rommel Rodriguez MD  Urology

## 2023-12-22 LAB — COMPLEXED PSA SERPL-MCNC: <0.01 NG/ML (ref 0–4)

## 2023-12-29 ENCOUNTER — HOSPITAL ENCOUNTER (OUTPATIENT)
Dept: RADIOLOGY | Facility: HOSPITAL | Age: 68
Discharge: HOME | End: 2023-12-29
Attending: FAMILY MEDICINE
Payer: OTHER GOVERNMENT

## 2023-12-29 DIAGNOSIS — Z01.89 ENCOUNTER FOR OTHER SPECIFIED SPECIAL EXAMINATIONS: ICD-10-CM

## 2023-12-29 PROCEDURE — 76775 US EXAM ABDO BACK WALL LIM: CPT | Mod: TC

## 2023-12-29 PROCEDURE — 76775 US ABDOMINAL AORTA: ICD-10-PCS | Mod: 26,,, | Performed by: RADIOLOGY

## 2023-12-29 PROCEDURE — 76775 US EXAM ABDO BACK WALL LIM: CPT | Mod: 26,,, | Performed by: RADIOLOGY

## 2024-01-04 ENCOUNTER — HOSPITAL ENCOUNTER (OUTPATIENT)
Dept: RADIOLOGY | Facility: HOSPITAL | Age: 69
Discharge: HOME | End: 2024-01-04
Attending: NEUROLOGICAL SURGERY
Payer: MEDICARE

## 2024-01-04 ENCOUNTER — OFFICE VISIT (OUTPATIENT)
Dept: NEUROSURGERY | Facility: CLINIC | Age: 69
End: 2024-01-04
Attending: NEUROLOGICAL SURGERY
Payer: MEDICARE

## 2024-01-04 VITALS
WEIGHT: 247.38 LBS | BODY MASS INDEX: 36.53 KG/M2 | SYSTOLIC BLOOD PRESSURE: 148 MMHG | HEART RATE: 78 BPM | DIASTOLIC BLOOD PRESSURE: 90 MMHG

## 2024-01-04 DIAGNOSIS — M54.16 LUMBAR RADICULOPATHY, CHRONIC: ICD-10-CM

## 2024-01-04 DIAGNOSIS — M51.36 DEGENERATIVE DISC DISEASE, LUMBAR: ICD-10-CM

## 2024-01-04 DIAGNOSIS — Z98.890 STATUS POST LUMBAR SURGERY: ICD-10-CM

## 2024-01-04 DIAGNOSIS — M48.062 LUMBAR STENOSIS WITH NEUROGENIC CLAUDICATION: Primary | ICD-10-CM

## 2024-01-04 PROCEDURE — 72100 X-RAY EXAM L-S SPINE 2/3 VWS: CPT | Mod: 26,HCNC,, | Performed by: RADIOLOGY

## 2024-01-04 PROCEDURE — 1101F PT FALLS ASSESS-DOCD LE1/YR: CPT | Mod: HCNC,CPTII,S$GLB, | Performed by: NEUROLOGICAL SURGERY

## 2024-01-04 PROCEDURE — 3077F SYST BP >= 140 MM HG: CPT | Mod: HCNC,CPTII,S$GLB, | Performed by: NEUROLOGICAL SURGERY

## 2024-01-04 PROCEDURE — 3288F FALL RISK ASSESSMENT DOCD: CPT | Mod: HCNC,CPTII,S$GLB, | Performed by: NEUROLOGICAL SURGERY

## 2024-01-04 PROCEDURE — 99213 OFFICE O/P EST LOW 20 MIN: CPT | Mod: HCNC,S$GLB,, | Performed by: NEUROLOGICAL SURGERY

## 2024-01-04 PROCEDURE — 99999 PR PBB SHADOW E&M-EST. PATIENT-LVL IV: CPT | Mod: PBBFAC,HCNC,, | Performed by: NEUROLOGICAL SURGERY

## 2024-01-04 PROCEDURE — 72100 X-RAY EXAM L-S SPINE 2/3 VWS: CPT | Mod: TC,HCNC

## 2024-01-04 PROCEDURE — 3080F DIAST BP >= 90 MM HG: CPT | Mod: HCNC,CPTII,S$GLB, | Performed by: NEUROLOGICAL SURGERY

## 2024-01-04 PROCEDURE — 1125F AMNT PAIN NOTED PAIN PRSNT: CPT | Mod: HCNC,CPTII,S$GLB, | Performed by: NEUROLOGICAL SURGERY

## 2024-01-04 PROCEDURE — 3008F BODY MASS INDEX DOCD: CPT | Mod: HCNC,CPTII,S$GLB, | Performed by: NEUROLOGICAL SURGERY

## 2024-01-04 PROCEDURE — 1159F MED LIST DOCD IN RCRD: CPT | Mod: HCNC,CPTII,S$GLB, | Performed by: NEUROLOGICAL SURGERY

## 2024-01-04 NOTE — PROGRESS NOTES
Subjective:      Patient ID: Abdullahi Urias is a 68 y.o. male.    Patient is post op TLIF. He is feeling great and doing well. He no longer needs PT. He reports strength has returned. He is able to perform ADLS and strength training. States pain resolved.             Previous HX:  Patient is postop TLIF with posterior fluid collection requiring washout.  Subsequent MRI revealed reaccumulation of fluid.    Fluid sent for aerobic anaerobic and Gram stain all of which return no growth or organisms identified    Since last visit patient complaints soreness to his back he is wanting to participate with therapy  He has some pain in his right hip however no radicular symptoms  He ambulates with a cane for stability purposes    Denies any fever, headache, redness swelling or drainage from incision            Date of Procedure: 7/5/2023    Procedure: Procedure(s) (LRB):  EXPLORATION, WOUND (Bilateral)  WASHOUT (N/A)  REMOVAL, HARDWARE, SPINE (N/A)  INSERTION (N/A)  REPLACEMENT (N/A)     Operative Findings (including complications, if any): postoperative fluid collection sent for culture and analysis.  Removal and replacement of set screws right sided L4 L5 S1    Objective:     Body mass index is 36.53 kg/m².  Vitals:    01/04/24 1145   BP: (!) 148/90   Pulse: 78        Incision- no pain on exam. No swelling. No erythema.    Back:  None  Paraspinal muscle spasms   None  Pain with flexion and extention   WNL  Range of motion    Neg right Straight leg raise     Motor   Right Right Left Left  Level Group   5  5  L2 Hip flexor (Psoas)   5  5  L3 Leg extension (Quads)   5  5  L4 Dorsiflexion & foot inversion (Tibialis Anterior)   5  5  L5 Great toe extension ( EHL)   5  5  S1 Foot eversion (Gastroc, PL & PB)     Sensation  NL Decreased (R/L/BL) Level Sensation    X  L2 Anterio-medial thigh   X  L3 Medial thigh around knee   X  L4 Medial foot   X  L5 Dorsum foot   X  S1 Lateral foot         Lab Results   Component Value Date    WBC  4.33 08/16/2023    HCT 29.0 (L) 08/16/2023     Assessment:     1. Lumbar stenosis with neurogenic claudication    2. Degenerative disc disease, lumbar    3. Status post lumbar surgery    4. Lumbar radiculopathy, chronic      Plan:     Lumbar stenosis with neurogenic claudication  -     X-Ray Lumbar Spine 2 Or 3 Views; Future; Expected date: 01/04/2024    Degenerative disc disease, lumbar    Status post lumbar surgery    Lumbar radiculopathy, chronic    Patient doing well status post fusion subsequent fluid collection requiring washout.  Reaccumulation of fluid IR aspiration on 10/2 with no growth or organisms identified.    Doing excellent. No new issues or concerns today. I independently reviewed his X rays. Instrumentation in good position.    Xrays:     Follow up 3 months with X rays    Thank you for the referral   Please call with any questions    Brandon Valencia MD  Neurosurgery     Disclaimer: This note was prepared using a voice recognition system and is likely to have sound alike errors within the text.

## 2024-01-18 ENCOUNTER — OFFICE VISIT (OUTPATIENT)
Dept: PULMONOLOGY | Facility: CLINIC | Age: 69
End: 2024-01-18
Payer: MEDICARE

## 2024-01-18 ENCOUNTER — HOSPITAL ENCOUNTER (OUTPATIENT)
Dept: RADIOLOGY | Facility: HOSPITAL | Age: 69
Discharge: HOME | End: 2024-01-18
Attending: HOSPITALIST
Payer: MEDICARE

## 2024-01-18 VITALS
SYSTOLIC BLOOD PRESSURE: 110 MMHG | OXYGEN SATURATION: 99 % | HEIGHT: 69 IN | RESPIRATION RATE: 21 BRPM | WEIGHT: 244.25 LBS | DIASTOLIC BLOOD PRESSURE: 68 MMHG | HEART RATE: 85 BPM | BODY MASS INDEX: 36.18 KG/M2

## 2024-01-18 DIAGNOSIS — Z87.891 FORMER TOBACCO USE: ICD-10-CM

## 2024-01-18 DIAGNOSIS — J45.901 ASTHMATIC BRONCHITIS WITH ACUTE EXACERBATION, UNSPECIFIED ASTHMA SEVERITY, UNSPECIFIED WHETHER PERSISTENT: ICD-10-CM

## 2024-01-18 DIAGNOSIS — J45.40 MODERATE PERSISTENT ASTHMA WITHOUT COMPLICATION: ICD-10-CM

## 2024-01-18 DIAGNOSIS — R91.1 SOLITARY PULMONARY NODULE: Primary | ICD-10-CM

## 2024-01-18 DIAGNOSIS — G47.33 OSA ON CPAP: ICD-10-CM

## 2024-01-18 PROCEDURE — 99999 PR PBB SHADOW E&M-EST. PATIENT-LVL III: CPT | Mod: PBBFAC,,, | Performed by: HOSPITALIST

## 2024-01-18 PROCEDURE — 71271 CT THORAX LUNG CANCER SCR C-: CPT | Mod: 26,HCNC,, | Performed by: RADIOLOGY

## 2024-01-18 PROCEDURE — 71271 CT THORAX LUNG CANCER SCR C-: CPT | Mod: TC,HCNC

## 2024-01-18 PROCEDURE — 99213 OFFICE O/P EST LOW 20 MIN: CPT | Mod: PBBFAC,25 | Performed by: HOSPITALIST

## 2024-01-18 PROCEDURE — 99213 OFFICE O/P EST LOW 20 MIN: CPT | Mod: S$GLB,,, | Performed by: HOSPITALIST

## 2024-01-18 RX ORDER — FLUTICASONE FUROATE, UMECLIDINIUM BROMIDE AND VILANTEROL TRIFENATATE 200; 62.5; 25 UG/1; UG/1; UG/1
1 POWDER RESPIRATORY (INHALATION) DAILY
Qty: 60 EACH | Refills: 5 | Status: SHIPPED | OUTPATIENT
Start: 2024-01-18 | End: 2024-04-18 | Stop reason: SDUPTHER

## 2024-01-18 NOTE — ASSESSMENT & PLAN NOTE
- quit 2012, 30 pack years  - LDCT 1/18- lung-rads 3 with 8mm ground glass rounded opacity, new from 8/2022  - repeat LDCT in 6 months

## 2024-01-18 NOTE — PROGRESS NOTES
Subjective:      Patient ID: Abdullahi Urias is a 68 y.o. male.    Chief Complaint: Asthma, dyspnea    Interval Hx 1/18/24:    Mr. Urias presents to Pulmonary clinic for follow up asthma. He was last seen 11/21/23- at that visit reported increased TANIA use and productive cough. He was treated for exacerbation with medrol dose pack and z pack. Feeling much better as far as mobility, been going to the gym everyday. Breathing much improved, not having to use TANIA since he finished the steroids.     Interim Work Up:  LDCT 1/18/24- Lung-rads 3, compared to CTA 8/25/22- 8mm LLL groundglass focus, stable 4mm left fissural nodule    Pulmonary Interventions:  Albuterol HFA- last used in November.  Albuterol nebulized- last did one in November  Trelegy 200- daily    Interval Hx 11/21/23:     Mr. Urias presents to Pulmonary clinic for increased use of rescue inhaler. He reports worsening shortness of breath and wheezing since last Thursday. No fever. Productive cough, was clear, now phlegm is yellow. Nebulizer helps, uses three times per day. No Le swelling. No wheezing on exam. No known sick contacts.      Interim Work Up:  CXR today- No acute change     Pulmonary Interventions:   Albuterol HFA- as needed  Albuterol nebulized- TID  Trelegy 200- compliant  Mucinex     Interval Hx 10/18/23:     Mr. Urias presents to Pulmonary clinic for follow up dyspnea. He was last seen 8/2023 following an acute care visit for dyspnea. At that visit he was prescribed a nebulizer with albuterol solution and atrovent nasal spray. Further eval done with CXR, BNP and  D-dimer given his recent surgery. BNP WNL, D-dimer elevated at 4.19. VQ scan normal.     Mr. Urias states that his dyspnea is a little better, but certainly still present. He has recently started with outpatient physical therapy and has been walking around his house lot. He is motivated to try and lose weight.      Interim Testing  VQ scan- Normal  BNP- WNL  D-dimer 4  CXR- No acute  findings     Pulmonary Interventions:   Albuterol HFA- Only uses every 2 weeks or so  Albuterol nebulized- hasn't used it  Trelegy 200- using daily  Atrovent Nasal spray     Interval History 8/18/2023:     Mr. Urias presents to Pulmonary clinic as an acute care visit with increasing dyspnea. Feeling more out of breath with exertion, coughing a little more over the past several weeks. Also with congestion over the past week, productive last night and this morning- clear/yellow. No sore throat. No fever. No thrush. No calf swelling or pain.      Per chart review- since I last saw patient he had his TLIF 5/17/23 with Dr. Valencia which was complicated by post-op infection, treated with hardware removal and IV antibiotics. Completed 6 weeks of IV abx today- 8/18/23, to have PICC removed.     Pulmonary Interventions:   Albuterol- Using 2-3/day, helps  Trelegy- once daily  Flonase- using every day for the past week or so     Interval History 4/18/23:     Mr. Urias is seen in follow up of asthma as well as Pulmonary pre-op assessment for TLIF, I last saw him in March, at that time he was changed to Trelegy from Advair for persistent asthma symptoms (using albuterol 3-4 times daily)     Interim Testing:  Complete PFT 4/2023- Normal spirometry, loop consistent with restrictive pattern, bronchodilator portion not done as he used inhaler prior to exam  6MW- Remained on room air, [place]o 1-3, walked 89% predicted  ABG - 7.36/41/85/23, done on room air  CXR 4/13- No acute findings, cardiomegaly, no effusions, no pneumo, no focal consolidation     Pulmonary interventions:   Albuterol- not having to use really since starting Trelegy  Trelegy- has seen a big improvement  PDM- Benefited from technique education     HPI 3/7/23:  67 year old male with history of AS s/p bioprosthetic AVR, dCHF, CKD3, HTN, CVA, CHEO ([facility]), former tobacco user, prostate cancer 2020 s/p prostatectomy s/p radiation, in remission who was referred  "to Pulmonary clinic by Dick Baez MD for evaluation of  asthma. Mr. Urias was admitted to the hospital 1/25-1/26 for treatment of asthma exacerbation. He was treated with IV steroids, breathing treatments, empiric antibiotics and oxygen as needed. CXR clear, SpO2 98% on room air on day of discharge. Patient reports several months of wheezing. Has been using Advair diskus twice daily and Albuterol inhaler 2-3/day. He did notice that symptoms improved when he was on a cruise last week, and worsened when back in [place].      Quit smoking 2012 (with stroke), about 30 pack years     Pertinent Work Up:  CXR 1/24/2023- Clear chest, no pneumo or effusion. Cardiomegaly.  CTA Chest 8/25/2022: Negative for PE, clear lungs  Eosinophils borderline/mildly elevated 1/2023     Pulmonary Interventions:  Albuterol HFA, using 2-3x/day  Fluticasone- salmeterol 250-50mcg twice daily, been on since end of January    Review of Systems   Respiratory:  Positive for dyspnea on extertion. Negative for sputum production and wheezing.      Objective:     Physical Exam   Constitutional: He is oriented to person, place, and time. He appears well-developed and well-nourished. He is obese.   Cardiovascular: Normal rate and regular rhythm.   Pulmonary/Chest: Normal expansion, effort normal and breath sounds normal.   Musculoskeletal:         General: No edema.   Neurological: He is alert and oriented to person, place, and time.   Skin: Skin is warm and dry.     Personal Diagnostic Review  As Above      1/4/2024    11:45 AM 12/21/2023    11:34 AM 12/17/2023     3:14 PM 11/21/2023     3:07 PM 11/21/2023     1:09 PM 11/2/2023    12:26 PM 10/27/2023    10:51 AM   Pulmonary Function Tests   SpO2   96 % 100 % 98 %  96 %   Height  5' 9" (1.753 m) 5' 9" (1.753 m) 5' 9" (1.753 m) 5' 9" (1.753 m) 5' 9" (1.753 m) 5' 9" (1.753 m)   Weight 112.2 kg (247 lb 5.7 oz) 112.8 kg (248 lb 9.1 oz) 112 kg (247 lb) 112 kg (247 lb) 112.3 kg (247 lb 9.2 oz) 112.5 kg (248 " lb) 112.7 kg (248 lb 7.3 oz)   BMI (Calculated)  36.7 36.5 36.5 36.5 36.6 36.7        Assessment:     No diagnosis found.     Outpatient Encounter Medications as of 1/18/2024   Medication Sig Dispense Refill    albuterol (PROVENTIL) 2.5 mg /3 mL (0.083 %) nebulizer solution Take 3 mLs (2.5 mg total) by nebulization every 6 (six) hours as needed for Wheezing. Rescue 180 mL 1    albuterol (PROVENTIL/VENTOLIN HFA) 90 mcg/actuation inhaler INHALE 2 PUFFS INTO THE LUNGS EVERY 6 HOURS AS NEEDED FOR COUGH 8.5 g 3    allopurinoL (ZYLOPRIM) 100 MG tablet Take 100 mg by mouth once daily. Takes 0.5 tab      ALPRAZolam (XANAX) 1 MG tablet Take 1 mg by mouth Daily. EVERY OTHER DAY      aluminum & magnesium hydroxide-simethicone (MYLANTA MAX STRENGTH) 400-400-40 mg/5 mL suspension TAKE 15ML BY MOUTH FOUR TIMES A DAY AS NEEDED FOR STOMACH ACID      aspirin (ECOTRIN) 81 MG EC tablet Take 1 tablet (81 mg total) by mouth once daily. 90 tablet 3    atorvastatin (LIPITOR) 80 MG tablet TAKE ONE-HALF TABLET BY MOUTH EVERY DAY FOR CHOLESTEROL      carvediloL (COREG) 12.5 MG tablet Take 1 tablet (12.5 mg total) by mouth 2 (two) times daily. 60 tablet 11    clopidogreL (PLAVIX) 75 mg tablet Take 1 tablet (75 mg total) by mouth once daily. 30 tablet 11    docusate sodium (COLACE) 100 MG capsule Take 1 capsule (100 mg total) by mouth 2 (two) times daily. 20 capsule 0    esomeprazole (NEXIUM) 40 MG capsule Take 40 mg by mouth before breakfast.      famotidine (PEPCID) 40 MG tablet TAKE ONE TABLET BY MOUTH AT BEDTIME FOR ACID REFLUX      gabapentin (NEURONTIN) 600 MG tablet Take 1 tablet (600 mg total) by mouth 3 (three) times daily. 90 tablet 11    guaiFENesin (MUCINEX) 600 mg 12 hr tablet Take 2 tablets (1,200 mg total) by mouth 2 (two) times daily. 20 tablet 0    hydrocortisone 2.5 % cream Apply topically 2 (two) times daily as needed. Apply to affected areas of the face and neck. 30 g 2    hydrOXYzine HCL (ATARAX) 25 MG tablet Take 1 tablet  (25 mg total) by mouth 3 (three) times daily as needed for Itching. 30 tablet 0    ipratropium (ATROVENT) 21 mcg (0.03 %) nasal spray 2 sprays by Each Nostril route 2 (two) times daily. 30 mL 0    LIDOcaine (LIDODERM) 5 % APPLY 1 PATCH TOPICALLY EVERY DAY FOR PAIN. WEAR FOR 12 HOURS, THEN REMOVE. DO NOT APPLY NEW PATCH FOR AT LEAST 12 HOURS.      losartan (COZAAR) 50 MG tablet Take 1 tablet (50 mg total) by mouth once daily. 30 tablet 11    multivitamin (THERAGRAN) per tablet Take 1 tablet by mouth once daily.      [] orphenadrine (NORFLEX) 100 mg tablet Take 1 tablet (100 mg total) by mouth 2 (two) times daily. for 5 days 10 tablet 0    oxyCODONE-acetaminophen (PERCOCET)  mg per tablet Take 1 tablet by mouth every 8 (eight) hours as needed for Pain. 40 tablet 0    sertraline (ZOLOFT) 100 MG tablet TAKE TWO TABLETS BY MOUTH EVERY DAY FOR MENTAL HEALTH DOSE INCREASED TO 200MG/DAY      simethicone (MYLICON) 80 MG chewable tablet Take 80 mg by mouth every 6 (six) hours as needed for Flatulence.      sucralfate (CARAFATE) 100 mg/mL suspension Take 1 g by mouth 4 (four) times daily.       tiZANidine (ZANAFLEX) 4 MG tablet Take 1 capsule by mouth 2 (two) times daily as needed (muscle spasms).      TRELEGY ELLIPTA 200-62.5-25 mcg inhaler INHALE 1 PUFF INTO THE LUNGS EVERY DAY 60 each 3    zolpidem (AMBIEN CR) 12.5 MG CR tablet Take 1 tablet by mouth nightly as needed.       Facility-Administered Encounter Medications as of 2024   Medication Dose Route Frequency Provider Last Rate Last Admin    ondansetron injection 4 mg  4 mg Intravenous Once PRN Nehemiah Carmen MD        sodium chloride 0.9% flush 10 mL  10 mL Intravenous PRN Wilda Horne MD         No orders of the defined types were placed in this encounter.        Plan:     Problem List Items Addressed This Visit          Pulmonary    Asthmatic bronchitis    Relevant Medications    fluticasone-umeclidin-vilanter (TRELEGY ELLIPTA)  200-62.5-25 mcg inhaler    Moderate persistent asthma without complication     - doing much better than prior visit  - continue trelegy, albuterol as needed            Other    CHEO on CPAP     - [facility]         Former tobacco use     - quit 2012, 30 pack years  - LDCT 1/18- lung-rads 3 with 8mm ground glass rounded opacity, new from 8/2022  - repeat LDCT in 6 months          Other Visit Diagnoses       Solitary pulmonary nodule    -  Primary    Relevant Orders    CT Chest Lung Screening Low Dose          Plan discussed with pt and he expressed understanding, all questions answered. RTC 6 months with LDCT.

## 2024-01-22 ENCOUNTER — OFFICE VISIT (OUTPATIENT)
Dept: OPHTHALMOLOGY | Facility: CLINIC | Age: 69
End: 2024-01-22
Payer: MEDICARE

## 2024-01-22 ENCOUNTER — LAB VISIT (OUTPATIENT)
Dept: LAB | Facility: HOSPITAL | Age: 69
End: 2024-01-22
Attending: FAMILY MEDICINE
Payer: MEDICARE

## 2024-01-22 DIAGNOSIS — Z00.00 ROUTINE HEALTH MAINTENANCE: ICD-10-CM

## 2024-01-22 DIAGNOSIS — I10 HYPERTENSION, UNSPECIFIED TYPE: Chronic | ICD-10-CM

## 2024-01-22 DIAGNOSIS — H52.4 HYPEROPIA WITH PRESBYOPIA OF BOTH EYES: ICD-10-CM

## 2024-01-22 DIAGNOSIS — H04.129 DRY EYE: ICD-10-CM

## 2024-01-22 DIAGNOSIS — M1A.00X0 IDIOPATHIC CHRONIC GOUT WITHOUT TOPHUS, UNSPECIFIED SITE: ICD-10-CM

## 2024-01-22 DIAGNOSIS — H52.03 HYPEROPIA WITH PRESBYOPIA OF BOTH EYES: ICD-10-CM

## 2024-01-22 DIAGNOSIS — H25.813 COMBINED FORM OF AGE-RELATED CATARACT, BOTH EYES: ICD-10-CM

## 2024-01-22 DIAGNOSIS — R79.9 ABNORMAL FINDING OF BLOOD CHEMISTRY, UNSPECIFIED: ICD-10-CM

## 2024-01-22 DIAGNOSIS — Z86.73 HISTORY OF CVA IN ADULTHOOD: Primary | ICD-10-CM

## 2024-01-22 LAB
ALBUMIN SERPL BCP-MCNC: 3.8 G/DL (ref 3.5–5.2)
ALP SERPL-CCNC: 97 U/L (ref 55–135)
ALT SERPL W/O P-5'-P-CCNC: 19 U/L (ref 10–44)
ANION GAP SERPL CALC-SCNC: 7 MMOL/L (ref 8–16)
AST SERPL-CCNC: 22 U/L (ref 10–40)
BASOPHILS # BLD AUTO: 0.01 K/UL (ref 0–0.2)
BASOPHILS NFR BLD: 0.3 % (ref 0–1.9)
BILIRUB SERPL-MCNC: 0.4 MG/DL (ref 0.1–1)
BUN SERPL-MCNC: 11 MG/DL (ref 8–23)
CALCIUM SERPL-MCNC: 9.5 MG/DL (ref 8.7–10.5)
CHLORIDE SERPL-SCNC: 112 MMOL/L (ref 95–110)
CHOLEST SERPL-MCNC: 137 MG/DL (ref 120–199)
CHOLEST/HDLC SERPL: 3.1 {RATIO} (ref 2–5)
CO2 SERPL-SCNC: 22 MMOL/L (ref 23–29)
CREAT SERPL-MCNC: 1.6 MG/DL (ref 0.5–1.4)
DIFFERENTIAL METHOD BLD: ABNORMAL
EOSINOPHIL # BLD AUTO: 0.2 K/UL (ref 0–0.5)
EOSINOPHIL NFR BLD: 5.3 % (ref 0–8)
ERYTHROCYTE [DISTWIDTH] IN BLOOD BY AUTOMATED COUNT: 20.6 % (ref 11.5–14.5)
EST. GFR  (NO RACE VARIABLE): 46.6 ML/MIN/1.73 M^2
ESTIMATED AVG GLUCOSE: 120 MG/DL (ref 68–131)
GLUCOSE SERPL-MCNC: 90 MG/DL (ref 70–110)
HBA1C MFR BLD: 5.8 % (ref 4–5.6)
HCT VFR BLD AUTO: 34.2 % (ref 40–54)
HDLC SERPL-MCNC: 44 MG/DL (ref 40–75)
HDLC SERPL: 32.1 % (ref 20–50)
HGB BLD-MCNC: 10.4 G/DL (ref 14–18)
IMM GRANULOCYTES # BLD AUTO: 0.01 K/UL (ref 0–0.04)
IMM GRANULOCYTES NFR BLD AUTO: 0.3 % (ref 0–0.5)
LDLC SERPL CALC-MCNC: 79.6 MG/DL (ref 63–159)
LYMPHOCYTES # BLD AUTO: 1.1 K/UL (ref 1–4.8)
LYMPHOCYTES NFR BLD: 28.6 % (ref 18–48)
MCH RBC QN AUTO: 22.1 PG (ref 27–31)
MCHC RBC AUTO-ENTMCNC: 30.4 G/DL (ref 32–36)
MCV RBC AUTO: 73 FL (ref 82–98)
MONOCYTES # BLD AUTO: 0.4 K/UL (ref 0.3–1)
MONOCYTES NFR BLD: 10.3 % (ref 4–15)
NEUTROPHILS # BLD AUTO: 2.2 K/UL (ref 1.8–7.7)
NEUTROPHILS NFR BLD: 55.2 % (ref 38–73)
NONHDLC SERPL-MCNC: 93 MG/DL
NRBC BLD-RTO: 0 /100 WBC
PLATELET # BLD AUTO: 235 K/UL (ref 150–450)
PMV BLD AUTO: 10.4 FL (ref 9.2–12.9)
POTASSIUM SERPL-SCNC: 5 MMOL/L (ref 3.5–5.1)
PROT SERPL-MCNC: 7.5 G/DL (ref 6–8.4)
RBC # BLD AUTO: 4.7 M/UL (ref 4.6–6.2)
SODIUM SERPL-SCNC: 141 MMOL/L (ref 136–145)
TRIGL SERPL-MCNC: 67 MG/DL (ref 30–150)
URATE SERPL-MCNC: 5.8 MG/DL (ref 3.4–7)
WBC # BLD AUTO: 3.99 K/UL (ref 3.9–12.7)

## 2024-01-22 PROCEDURE — 84550 ASSAY OF BLOOD/URIC ACID: CPT | Performed by: FAMILY MEDICINE

## 2024-01-22 PROCEDURE — 80061 LIPID PANEL: CPT | Performed by: FAMILY MEDICINE

## 2024-01-22 PROCEDURE — 1159F MED LIST DOCD IN RCRD: CPT | Mod: CPTII,S$GLB,, | Performed by: OPTOMETRIST

## 2024-01-22 PROCEDURE — 83036 HEMOGLOBIN GLYCOSYLATED A1C: CPT | Performed by: FAMILY MEDICINE

## 2024-01-22 PROCEDURE — 92014 COMPRE OPH EXAM EST PT 1/>: CPT | Mod: S$GLB,,, | Performed by: OPTOMETRIST

## 2024-01-22 PROCEDURE — 99999 PR PBB SHADOW E&M-EST. PATIENT-LVL III: CPT | Mod: PBBFAC,HCNC,, | Performed by: OPTOMETRIST

## 2024-01-22 PROCEDURE — 36415 COLL VENOUS BLD VENIPUNCTURE: CPT | Performed by: FAMILY MEDICINE

## 2024-01-22 PROCEDURE — 85025 COMPLETE CBC W/AUTO DIFF WBC: CPT | Performed by: FAMILY MEDICINE

## 2024-01-22 PROCEDURE — 80053 COMPREHEN METABOLIC PANEL: CPT | Performed by: FAMILY MEDICINE

## 2024-01-22 NOTE — PROGRESS NOTES
HPI     Annual Exam            Comments: Np to DKT  Patient here today for yearly eye exam          Comments    Vision changes since last eye exam?: Yes at distance and near  Wears PAL glasses full-time     Any eye pain today: No    Other ocular symptoms: No    Interested in contact lens fitting today? No                      Last edited by Aidee Ash, PCT on 1/22/2024  9:01 AM.            Assessment /Plan     For exam results, see Encounter Report.    History of CVA in adulthood  Observe at this time.     Combined form of age-related cataract, both eyes  Cataracts are not visually significant and not affecting activities of daily living. Annual observation is recommended at this time. Patient to call or return to clinic with any significant change in vision prior to next visit.    Dry eye  There were signs of mild dry eye on today's examination. Discussed warm compresses with lid hygiene twice daily. Recommend preservative-free artificial tears 3-4 times daily. Patient to return to clinic if no improvement in symptoms with current treatment.     Hyperopia with presbyopia of both eyes  Eyeglass Final Rx       Eyeglass Final Rx         Sphere Cylinder Axis Add    Right +0.50   +2.50    Left +0.25 +0.25 040 +2.50      Type: PAL with Transition    Expiration Date: 1/22/2025                        RTC 1 yr for dilated eye exam or sooner if any changes to vision.   Discussed above and answered questions.

## 2024-01-30 ENCOUNTER — OFFICE VISIT (OUTPATIENT)
Dept: INTERNAL MEDICINE | Facility: CLINIC | Age: 69
End: 2024-01-30
Payer: MEDICARE

## 2024-01-30 VITALS
SYSTOLIC BLOOD PRESSURE: 120 MMHG | WEIGHT: 247.38 LBS | TEMPERATURE: 97 F | HEIGHT: 69 IN | DIASTOLIC BLOOD PRESSURE: 80 MMHG | HEART RATE: 88 BPM | BODY MASS INDEX: 36.64 KG/M2 | OXYGEN SATURATION: 98 %

## 2024-01-30 DIAGNOSIS — N18.30 STAGE 3 CHRONIC KIDNEY DISEASE, UNSPECIFIED WHETHER STAGE 3A OR 3B CKD: ICD-10-CM

## 2024-01-30 DIAGNOSIS — F33.1 MODERATE EPISODE OF RECURRENT MAJOR DEPRESSIVE DISORDER: ICD-10-CM

## 2024-01-30 DIAGNOSIS — I10 PRIMARY HYPERTENSION: Chronic | ICD-10-CM

## 2024-01-30 DIAGNOSIS — R73.03 PREDIABETES: Primary | ICD-10-CM

## 2024-01-30 DIAGNOSIS — Z86.73 HISTORY OF CVA IN ADULTHOOD: ICD-10-CM

## 2024-01-30 DIAGNOSIS — F14.21 COCAINE DEPENDENCE IN REMISSION: ICD-10-CM

## 2024-01-30 DIAGNOSIS — I35.0 AORTIC VALVE STENOSIS, ETIOLOGY OF CARDIAC VALVE DISEASE UNSPECIFIED: ICD-10-CM

## 2024-01-30 DIAGNOSIS — M1A.00X0 IDIOPATHIC CHRONIC GOUT WITHOUT TOPHUS, UNSPECIFIED SITE: ICD-10-CM

## 2024-01-30 DIAGNOSIS — E66.01 CLASS 2 SEVERE OBESITY DUE TO EXCESS CALORIES WITH SERIOUS COMORBIDITY AND BODY MASS INDEX (BMI) OF 38.0 TO 38.9 IN ADULT: ICD-10-CM

## 2024-01-30 DIAGNOSIS — N18.32 STAGE 3B CHRONIC KIDNEY DISEASE: ICD-10-CM

## 2024-01-30 DIAGNOSIS — I42.9 CARDIOMYOPATHY, UNSPECIFIED TYPE: ICD-10-CM

## 2024-01-30 DIAGNOSIS — N25.81 SECONDARY HYPERPARATHYROIDISM OF RENAL ORIGIN: ICD-10-CM

## 2024-01-30 DIAGNOSIS — I70.8 AORTO-ILIAC ATHEROSCLEROSIS: ICD-10-CM

## 2024-01-30 DIAGNOSIS — J45.40 MODERATE PERSISTENT ASTHMA WITHOUT COMPLICATION: ICD-10-CM

## 2024-01-30 DIAGNOSIS — I70.0 AORTO-ILIAC ATHEROSCLEROSIS: ICD-10-CM

## 2024-01-30 DIAGNOSIS — C61 PROSTATE CANCER: ICD-10-CM

## 2024-01-30 DIAGNOSIS — I25.10 CORONARY ARTERY DISEASE INVOLVING NATIVE CORONARY ARTERY OF NATIVE HEART WITHOUT ANGINA PECTORIS: Chronic | ICD-10-CM

## 2024-01-30 PROCEDURE — 3008F BODY MASS INDEX DOCD: CPT | Mod: CPTII,S$GLB,, | Performed by: FAMILY MEDICINE

## 2024-01-30 PROCEDURE — 3074F SYST BP LT 130 MM HG: CPT | Mod: CPTII,S$GLB,, | Performed by: FAMILY MEDICINE

## 2024-01-30 PROCEDURE — 99999 PR PBB SHADOW E&M-EST. PATIENT-LVL V: CPT | Mod: PBBFAC,,, | Performed by: FAMILY MEDICINE

## 2024-01-30 PROCEDURE — 99397 PER PM REEVAL EST PAT 65+ YR: CPT | Mod: S$GLB,,, | Performed by: FAMILY MEDICINE

## 2024-01-30 PROCEDURE — 1126F AMNT PAIN NOTED NONE PRSNT: CPT | Mod: CPTII,S$GLB,, | Performed by: FAMILY MEDICINE

## 2024-01-30 PROCEDURE — 3288F FALL RISK ASSESSMENT DOCD: CPT | Mod: CPTII,S$GLB,, | Performed by: FAMILY MEDICINE

## 2024-01-30 PROCEDURE — 3079F DIAST BP 80-89 MM HG: CPT | Mod: CPTII,S$GLB,, | Performed by: FAMILY MEDICINE

## 2024-01-30 PROCEDURE — 1101F PT FALLS ASSESS-DOCD LE1/YR: CPT | Mod: CPTII,S$GLB,, | Performed by: FAMILY MEDICINE

## 2024-01-30 PROCEDURE — 1159F MED LIST DOCD IN RCRD: CPT | Mod: CPTII,S$GLB,, | Performed by: FAMILY MEDICINE

## 2024-01-30 PROCEDURE — 3044F HG A1C LEVEL LT 7.0%: CPT | Mod: CPTII,S$GLB,, | Performed by: FAMILY MEDICINE

## 2024-01-30 NOTE — PROGRESS NOTES
Subjective:       Patient ID: Abdullahi Urias is a . male.    Chief Complaint: here for physical examination and issues  below      HPI  prediab d/wd      Cad prev [facility] card     [facility];had [facility]d done 2020 per him was incomplete and [facility] said [facility] but hasnt been done    Asthma  see above    Hep c hx says was cured w yenny;hep C rna negative summer 2020    Prost ca now sees [facility] urol ;[facility]    Gout:  No recent flares. Tolerating medication     Cri d/wd nephrol;    Cad/pvd recent dx via dr yovani Radford foot burning . hx gabapentin  Past Medical History:   Diagnosis Date    Aortic stenosis     [facility]    Asthma     BPH (benign prostatic hyperplasia)     CAD (coronary artery disease)     Cardiomyopathy     CHF (congestive heart failure)     Chronic hoarseness     vocal cord surg    Chronic pain     CKD (chronic kidney disease) stage 3, GFR 30-59 ml/min     CVA (cerebral infarction)     8/2012 olol; reviewed ed note    Ex-smoker     GERD (gastroesophageal reflux disease)     Hepatitis C     treatedharvoni says cured, RNA NEG 6/2020    Hypertension     Pancreatitis     Prostate cancer     Prostate cancer     Prostate cancer     PVD (peripheral vascular disease)     Renal insufficiency     Substance abuse     cocaine, etoh , tob in past     Past Surgical History:   Procedure Laterality Date    AORTIC VALVE REPLACEMENT  05/19/2014    Tissue valve replacement    BACK SURGERY      CARDIAC CATHETERIZATION      COLONOSCOPY  2011    COLONOSCOPY N/A 2/24/2021    Procedure: COLONOSCOPY;  Surgeon: Faith Carrillo MD;  Location: [facility] ENDO;  Service: Endoscopy;  Laterality: N/A;    EPIDURAL STEROID INJECTION INTO CERVICAL SPINE N/A 6/21/2022    Procedure: C7-T1 IL PIEDAD;  Surgeon: Nehemiah Carmen MD;  Location: [facility] PAIN MGT;  Service: Pain Management;  Laterality: N/A;    ESOPHAGOGASTRODUODENOSCOPY N/A 2/24/2021    Procedure: ESOPHAGOGASTRODUODENOSCOPY  (EGD);  Surgeon: Faith Carrillo MD;  Location: [facility] ENDO;  Service: Endoscopy;  Laterality: N/A;    FOOT SURGERY      INSERTION N/A 7/5/2023    Procedure: INSERTION;  Surgeon: Brandon Valencia MD;  Location: [facility] OR;  Service: Neurosurgery;  Laterality: N/A;  Antibiotic Beads    LEFT HEART CATHETERIZATION Left 7/24/2020    Procedure: CATHETERIZATION, HEART, LEFT;  Surgeon: Pasha Martin MD;  Location: [facility] CATH LAB;  Service: Cardiology;  Laterality: Left;    PENILE PROSTHESIS IMPLANT      PENILE PROSTHESIS REVISION  04/30/2018    PROSTATECTOMY  09/[facility]    [facility]    radiation for prostate      REMOVAL OF HARDWARE FROM SPINE N/A 7/5/2023    Procedure: REMOVAL, HARDWARE, SPINE;  Surgeon: Brandon Valencia MD;  Location: [facility] OR;  Service: Neurosurgery;  Laterality: N/A;    REPLACEMENT N/A 7/5/2023    Procedure: REPLACEMENT;  Surgeon: Brandon Valencia MD;  Location: [facility] OR;  Service: Neurosurgery;  Laterality: N/A;  of [employer]. [employer]    TRANSFORAMINAL EPIDURAL INJECTION OF STEROID Right 10/20/2022    Procedure: Right  L4/5 + L5/S1 TF PIEDAD RN IV Sedation;  Surgeon: Nehemiah Carmen MD;  Location: [facility] PAIN MGT;  Service: Pain Management;  Laterality: Right;    TRANSFORAMINAL LUMBAR INTERBODY FUSION (TLIF) USING COMPUTER-ASSISTED NAVIGATION Bilateral 5/17/2023    Procedure: FUSION, SPINE, LUMBAR, TLIF, USING COMPUTER-ASSISTED NAVIGATION;  Surgeon: Brandon Valencia MD;  Location: [facility] OR;  Service: Neurosurgery;  Laterality: Bilateral;  2 level lumbar fusion at L4-5 and L5-S1    UPPER GASTROINTESTINAL ENDOSCOPY  2011    WASHOUT N/A 7/5/2023    Procedure: WASHOUT;  Surgeon: Brandon Valencia MD;  Location: [facility] OR;  Service: Neurosurgery;  Laterality: N/A;    WOUND EXPLORATION Bilateral 7/5/2023    Procedure: EXPLORATION, WOUND;  Surgeon: Brandon Valencia MD;  Location: [facility] OR;  Service: Neurosurgery;  Laterality: Bilateral;     Family History   Problem Relation Age of Onset    Heart disease  "Mother     Heart disease Father     Heart attack Sister     Heart attack Brother     Colon cancer Neg Hx     Colon polyps Neg Hx     Liver cancer Neg Hx     Inflammatory bowel disease Neg Hx     Liver disease Neg Hx     Rectal cancer Neg Hx     Stomach cancer Neg Hx     Ulcerative colitis Neg Hx      Social History     Socioeconomic History    Marital status:    Tobacco Use    Smoking status: Former     Current packs/day: 0.00     Average packs/day: 1 pack/day for 8.0 years (8.0 ttl pk-yrs)     Types: Cigarettes     Start date: 2004     Quit date: 2012     Years since quittin.4    Smokeless tobacco: Never   Substance and Sexual Activity    Alcohol use: Not Currently     Comment: Sober since 2012    Drug use: Not Currently     Types: "Crack" cocaine, Marijuana     Comment: Quit in     Sexual activity: Yes   Social History Narrative    No pets in household, wife and daughter smokers. Former [employer].    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; as of  retired     Social Determinants of Health     Financial Resource Strain: Low Risk  (2023)    Overall Financial Resource Strain (CARDIA)     Difficulty of Paying Living Expenses: Not very hard   Food Insecurity: Food Insecurity Present (2023)    Hunger Vital Sign     Worried About Running Out of Food in the Last Year: Sometimes true     Ran Out of Food in the Last Year: Sometimes true   Transportation Needs: No Transportation Needs (2023)    PRAPARE - Transportation     Lack of Transportation (Medical): No     Lack of Transportation (Non-Medical): No   Physical Activity: Insufficiently Active (2023)    Exercise Vital Sign     Days of Exercise per Week: 3 days     Minutes of Exercise per Session: 20 min   Stress: Stress Concern Present (2023)    [employer] [place] [employer] - Occupational Stress Questionnaire     Feeling of Stress : Very much   Social Connections: Moderately Isolated (2023)    Social " Connection and Isolation Panel [NHANES]     Frequency of Communication with Friends and Family: Once a week     Frequency of Social Gatherings with Friends and Family: Never     Attends [employer] Services: More than 4 times per year     Active Member of Clubs or Organizations: No     Attends Club or Organization Meetings: Never     Marital Status:    Housing Stability: Low Risk  (9/7/2023)    Housing Stability Vital Sign     Unable to Pay for Housing in the Last Year: No     Number of Places Lived in the Last Year: 1     Unstable Housing in the Last Year: No         Cardiovascular: no chest pain  Chest: no shortness of breath  Abd: no abd pain  Remainder review of systems negative    Objective:      Physical Exam  gen nad a and o x 3  bilat ac tm clear  Neck no bruit  cvrrr  Chestwheeze    Assessment:     Phys exam  Prost ca hx      2. History of CVA in adulthood    3. Coronary artery disease, angina presence unspecified, unspecified vessel or lesion type, unspecified whether native or transplanted heart    4. Chronic gout involving toe without tophus, unspecified cause, unspecified laterality      Asthma  gerd  Cri   Lung nod  Plan:   F/u , urol, radonc, pain mgmt and nephrol when due  F/u card due      Has plan abril ldct via pulm in 6m      hx;[facility]         Lab and F/u 12months    Follow up with Dr Delatorre (nephrologist)  Prediabetes  -     Hemoglobin A1C; Future; Expected date: 01/30/2025    Moderate persistent asthma without complication    Stage 3 chronic kidney disease, unspecified whether stage 3a or 3b CKD    Prostate cancer    History of CVA in adulthood    Idiopathic chronic gout without tophus, unspecified site  -     Comprehensive Metabolic Panel; Future; Expected date: 01/29/2025  -     CBC Auto Differential; Future; Expected date: 01/30/2025  -     Uric Acid; Future; Expected date: 01/30/2025    Coronary artery disease involving native coronary artery of native heart without angina  pectoris    Primary hypertension  -     Lipid Panel; Future; Expected date: 01/29/2025    Aortic valve stenosis, etiology of cardiac valve disease unspecified    Cocaine dependence in remission    Class 2 severe obesity due to excess calories with serious comorbidity and body mass index (BMI) of 38.0 to 38.9 in adult    Moderate episode of recurrent major depressive disorder    Secondary hyperparathyroidism of renal origin    Cardiomyopathy, unspecified type    Aorto-iliac atherosclerosis    Stage 3b chronic kidney disease

## 2024-01-30 NOTE — PATIENT INSTRUCTIONS
Please obtain the RSV vaccine via your pharmacy  Dr Baez is the surgeon you saw for hernia January 2023  Follow up with Dr Delatorre (nephrologist)

## 2024-02-02 ENCOUNTER — OFFICE VISIT (OUTPATIENT)
Dept: PAIN MEDICINE | Facility: CLINIC | Age: 69
End: 2024-02-02
Payer: MEDICARE

## 2024-02-02 VITALS
HEART RATE: 85 BPM | BODY MASS INDEX: 36.37 KG/M2 | DIASTOLIC BLOOD PRESSURE: 65 MMHG | RESPIRATION RATE: 18 BRPM | WEIGHT: 245.56 LBS | SYSTOLIC BLOOD PRESSURE: 130 MMHG | HEIGHT: 69 IN

## 2024-02-02 DIAGNOSIS — M54.2 CERVICALGIA: Primary | ICD-10-CM

## 2024-02-02 DIAGNOSIS — M54.12 CERVICAL RADICULOPATHY: ICD-10-CM

## 2024-02-02 PROCEDURE — 3044F HG A1C LEVEL LT 7.0%: CPT | Mod: HCNC,CPTII,S$GLB, | Performed by: PHYSICIAN ASSISTANT

## 2024-02-02 PROCEDURE — 1101F PT FALLS ASSESS-DOCD LE1/YR: CPT | Mod: HCNC,CPTII,S$GLB, | Performed by: PHYSICIAN ASSISTANT

## 2024-02-02 PROCEDURE — 1125F AMNT PAIN NOTED PAIN PRSNT: CPT | Mod: HCNC,CPTII,S$GLB, | Performed by: PHYSICIAN ASSISTANT

## 2024-02-02 PROCEDURE — 99999 PR PBB SHADOW E&M-EST. PATIENT-LVL V: CPT | Mod: PBBFAC,HCNC,, | Performed by: PHYSICIAN ASSISTANT

## 2024-02-02 PROCEDURE — 1159F MED LIST DOCD IN RCRD: CPT | Mod: HCNC,CPTII,S$GLB, | Performed by: PHYSICIAN ASSISTANT

## 2024-02-02 PROCEDURE — 3288F FALL RISK ASSESSMENT DOCD: CPT | Mod: HCNC,CPTII,S$GLB, | Performed by: PHYSICIAN ASSISTANT

## 2024-02-02 PROCEDURE — 3075F SYST BP GE 130 - 139MM HG: CPT | Mod: HCNC,CPTII,S$GLB, | Performed by: PHYSICIAN ASSISTANT

## 2024-02-02 PROCEDURE — 99213 OFFICE O/P EST LOW 20 MIN: CPT | Mod: HCNC,S$GLB,, | Performed by: PHYSICIAN ASSISTANT

## 2024-02-02 PROCEDURE — 3078F DIAST BP <80 MM HG: CPT | Mod: HCNC,CPTII,S$GLB, | Performed by: PHYSICIAN ASSISTANT

## 2024-02-02 PROCEDURE — 3008F BODY MASS INDEX DOCD: CPT | Mod: HCNC,CPTII,S$GLB, | Performed by: PHYSICIAN ASSISTANT

## 2024-02-02 NOTE — PROGRESS NOTES
Established Patient Chronic Pain Note (Follow up visit)    Chief Complaint:   Chief Complaint   Patient presents with    Neck Pain     Patient has pain in the neck that radiates into the right shoulder.  Pain level 7/10.         SUBJECTIVE:      Interval History (2/2/2024):  Patient presents today for follow-up visit.  Patient was last seen on 1/9/23.  Patient reports pain as 7/10 today.  Patients is here today discuss neck pain with radicular symptoms.   Has tingling in R shoulder>L shoulder and will radiate up to his neck but other times it will radiate up to face or down arm. This has been ongoing for one month now. Although he does not notice pain radiating down his arms, he has discomfort in both sides of his neck.   He will c/p lower back pain if he is standing or bending for a long period of time.     Since his last office visit, he underwent a TLIF at L4-S1 with Dr. Valencia on 5/17/23 followed by a wound exploration on 7/5/23. He is doing well at this time (in regards to his lower back).      Interval History (1/9/2023): Abdullahi Urias presents today for follow-up visit.  he underwent right L4/5 +L5/S1 TF PIEDAD on 10/20/22.  The patient reports that he is/was better following the procedure.  he reports 40% pain relief.  The changes lasted for a few weeks before the pain insidiously returned.  Patient reports pain as 9/10 today. Also has history of vasculogenic claudication. Recently went on a cruise, needed a wheelchair to get on the ship. Continues to report low back pain with radiation into his right lower extremity along L4/5-S1 distribution with associated numbness in the right foot.      Interval History (10/4/2022): Abdullahi Urias presents today for follow-up visit.  he underwent C7/T1 IL PIEDAD on 06/21/2022.  The patient reports that he is/was better following the procedure.  he reports 60% pain relief.  The changes lasted 6 weeks before neck stiffness and arm numbness returned. Patient reports pain as 8/10  today.    Today he reports his primary complaint is low back pain with radiation into right leg into the posterior and lateral aspect along L4-S1 distribution. Reports back pain is worse than neck pain at this time. Also has history of vasculogenic claudication. Patient reports pain is causing him to be depressed and anxious. Pain interferes with daily activities. [facility]. Recently went on a cruise, needed to use a wheelchair due to weakness and pain.    MRI lumbar spine  FINDINGS:  Visualized distal cord demonstrates normal signal.     No marrow replacement process.  No fracture.  No listhesis.     There are degenerative changes of the lower lumbar spine with mild disc space narrowing, most severe at L5-S1 with marginal spurring with facet arthropathy     L1-L2: There is posterior disc bulge with indentation of the thecal sac.  No spinal canal or neural foraminal encroachment.     L2-L3: Mild facet arthropathy.  Mild posterior disc bulge.  No spinal canal or neural foraminal encroachment.     L3-L4: Bilateral ligamentum flavum and facet arthropathy.  Mild posterior disc bulge with indentation of thecal sac.  No spinal canal or neural foraminal encroachment.     L4-L5: Ligamentum flavum and facet arthropathy with broad-based disc bulge.  No significant spinal canal stenosis.  There is crowding of the lateral recesses with narrowing along the inferior right greater than left neural foramen.     L5-S1: Posterior disc osteophyte complex with facet arthropathy.  Appearance of possible prior right hemilaminectomy changes..  There is severe narrowing along the right neural foramen  with mild to moderate narrowing along the left neural foramen.  No significant spinal canal stenosis.     Impression:     Discogenic degenerative changes most severe at L5-S1 as above.    Interval HPI  Abdullahi Urias is a 68 y.o. male who presents to the clinic for a follow-up.  He reports that his back  pain is much improved ever since undergoing bilateral piriformis trigger point injections.  He wants to focus more on his neck and upper extremity pain currently.  Since the last visit, Abdullahi Urias states the pain has been worsening. Current pain intensity is 8/10.      Patient denies night fever/night sweats, urinary incontinence, bowel incontinence, significant weight loss, significant motor weakness and loss of sensations.    Pain Disability Index Review:      2/2/2024     1:36 PM 1/9/2023    11:46 AM 5/11/2022     1:52 PM   Last 3 PDI Scores   Pain Disability Index (PDI) 49 [phone] 58     Interval HPI 01/27/2022:  Abdullahi Urias is a 66 y.o. male who presents to the clinic for the evaluation of lower back pain.  He is self referred.  He has past medical history of CVA, substance abuse, asthma, CHF, hypertension, CAD, cardiomyopathy, renal insufficiency, CKD stage 3, H/O hepatitis-C, H/O prostate cancer, multiple other medical comorbidities as listed in his chart.  The pain started several years ago and symptoms have been worsening.The pain is located in the lumbosacral area and radiates to the bilateral lower extremities extending posteriorly to the bilateral feet.  Of note, patient recently had cardiovascular studies done on his lower extremities which did demonstrate vasculogenic claudication.  The pain is described as Aching, burning, tingling and is rated as 8/10. The pain is rated with a score of  6/10 on the BEST day and a score of 10/10 on the WORST day.  Symptoms interfere with daily activity. The pain is exacerbated by activities.  The pain is mitigated by medications.        Non-Pharmacologic Treatments:  Physical Therapy/Home Exercise: yes  Ice/Heat:yes  TENS: yes  Acupuncture: no  Massage: yes  Chiropractic: no    Other: Surgery-   5/17/23: TLIF L4-S1 with Dr. Valencia  7/5/23: Wound exploration with Dr. Valencia        Pain Medications:  - Opioids: Norco, Tylenol 3  - Adjuvant Medications: Ambien,  alprazolam, Flexeril, Voltaren gel, gabapentin, lidocaine patch, Zoloft  - Anti-Coagulants: Aspirin, Pletal      Pain Procedures:   -previous lumbar PIEDAD  -previous lumbar MBB/RFA  -previous lumbar laminectomy in 2001 at L5-S1  -01/27/2022:  Bilateral piriformis trigger point injections, significant relief that is still ongoing  -right L4/5 +L5/S1 TF PIEDAD on 10/20/22 with 40% relief        Imaging (Reviewed on 2/2/2024):    MRI lumbar spine 01/12/2021:  Visualized distal cord demonstrates normal signal.     No marrow replacement process.  No fracture.  No listhesis.     There are degenerative changes of the lower lumbar spine with mild disc space narrowing, most severe at L5-S1 with marginal spurring with facet arthropathy     L1-L2: There is posterior disc bulge with indentation of the thecal sac.  No spinal canal or neural foraminal encroachment.     L2-L3: Mild facet arthropathy.  Mild posterior disc bulge.  No spinal canal or neural foraminal encroachment.     L3-L4: Bilateral ligamentum flavum and facet arthropathy.  Mild posterior disc bulge with indentation of thecal sac.  No spinal canal or neural foraminal encroachment.     L4-L5: Ligamentum flavum and facet arthropathy with broad-based disc bulge.  No significant spinal canal stenosis.  There is crowding of the lateral recesses with narrowing along the inferior right greater than left neural foramen.     L5-S1: Posterior disc osteophyte complex with facet arthropathy.  Appearance of possible prior right hemilaminectomy changes..  There is severe narrowing along the right neural foramen  with mild to moderate narrowing along the left neural foramen.  No significant spinal canal stenosis        CT cervical spine 12/01/2020:  Vertebral body heights maintained.  2 mm anterolisthesis of C3 on C4.  Disc height loss and osteophyte changes at C6-7 and C7-T1.  Multilevel facet degenerative findings most prevalent at the right C2-3 and left C3-4 levels.     Neck soft  tissues are unremarkable.     C2-3: No CT evidence of spinal canal stenosis.  Mild right neural foraminal narrowing.     C3-4: No CT evidence of significant spinal canal stenosis.  Left greater than right facet and uncovertebral degenerative changes results in mild right and severe left neural foraminal narrowing.     C4-5: Posterior disc osteophyte complex present with mild spinal canal stenosis.  Mild to moderate right neural foraminal narrowing secondary to facet and uncovertebral degenerative findings.     C5-6: Posterior disc osteophyte complex present asymmetric to the right with mild spinal canal stenosis.  Severe right neural foraminal narrowing secondary to facet and uncovertebral degenerative findings.     C6-7: Posterior disc osteophyte complex present causing at least mild spinal canal stenosis.  Left greater than right facet and uncovertebral degenerative findings with mild-to-moderate right and moderate to severe left neural foraminal narrowing.     C7-T1: Posterior disc osteophyte complex with spinal canal stenosis suspected.  Mild left neural foraminal narrowing secondary to uncovertebral degenerative findings.        PMHx,PSHx, Social history, and Family history:  I have reviewed the patient's medical, surgical, social, and family history in detail and updated the computerized patient record.      Review of patient's allergies indicates:  No Known Allergies      Current Outpatient Medications   Medication Sig    albuterol (PROVENTIL) 2.5 mg /3 mL (0.083 %) nebulizer solution Take 3 mLs (2.5 mg total) by nebulization every 6 (six) hours as needed for Wheezing. Rescue    albuterol (PROVENTIL/VENTOLIN HFA) 90 mcg/actuation inhaler INHALE 2 PUFFS INTO THE LUNGS EVERY 6 HOURS AS NEEDED FOR COUGH    allopurinoL (ZYLOPRIM) 100 MG tablet Take 100 mg by mouth once daily. Takes 0.5 tab    ALPRAZolam (XANAX) 1 MG tablet Take 1 mg by mouth Daily. EVERY OTHER DAY    aluminum & magnesium hydroxide-simethicone  (MYLANTA MAX STRENGTH) 400-400-40 mg/5 mL suspension TAKE 15ML BY MOUTH FOUR TIMES A DAY AS NEEDED FOR STOMACH ACID    aspirin (ECOTRIN) 81 MG EC tablet Take 1 tablet (81 mg total) by mouth once daily.    atorvastatin (LIPITOR) 80 MG tablet TAKE ONE-HALF TABLET BY MOUTH EVERY DAY FOR CHOLESTEROL    carvediloL (COREG) 12.5 MG tablet Take 1 tablet (12.5 mg total) by mouth 2 (two) times daily.    clopidogreL (PLAVIX) 75 mg tablet Take 1 tablet (75 mg total) by mouth once daily.    docusate sodium (COLACE) 100 MG capsule Take 1 capsule (100 mg total) by mouth 2 (two) times daily.    esomeprazole (NEXIUM) 40 MG capsule Take 40 mg by mouth before breakfast.    famotidine (PEPCID) 40 MG tablet TAKE ONE TABLET BY MOUTH AT BEDTIME FOR ACID REFLUX    fluticasone-umeclidin-vilanter (TRELEGY ELLIPTA) 200-62.5-25 mcg inhaler Inhale 1 puff into the lungs once daily.    guaiFENesin (MUCINEX) 600 mg 12 hr tablet Take 2 tablets (1,200 mg total) by mouth 2 (two) times daily.    hydrocortisone 2.5 % cream Apply topically 2 (two) times daily as needed. Apply to affected areas of the face and neck.    hydrOXYzine HCL (ATARAX) 25 MG tablet Take 1 tablet (25 mg total) by mouth 3 (three) times daily as needed for Itching.    ipratropium (ATROVENT) 21 mcg (0.03 %) nasal spray 2 sprays by Each Nostril route 2 (two) times daily.    LIDOcaine (LIDODERM) 5 % APPLY 1 PATCH TOPICALLY EVERY DAY FOR PAIN. WEAR FOR 12 HOURS, THEN REMOVE. DO NOT APPLY NEW PATCH FOR AT LEAST 12 HOURS.    losartan (COZAAR) 50 MG tablet Take 1 tablet (50 mg total) by mouth once daily.    multivitamin (THERAGRAN) per tablet Take 1 tablet by mouth once daily.    oxyCODONE-acetaminophen (PERCOCET)  mg per tablet Take 1 tablet by mouth every 8 (eight) hours as needed for Pain.    sertraline (ZOLOFT) 100 MG tablet TAKE TWO TABLETS BY MOUTH EVERY DAY FOR MENTAL HEALTH DOSE INCREASED TO 200MG/DAY    simethicone (MYLICON) 80 MG chewable tablet Take 80 mg by mouth every 6  "(six) hours as needed for Flatulence.    sucralfate (CARAFATE) 100 mg/mL suspension Take 1 g by mouth 4 (four) times daily.     tiZANidine (ZANAFLEX) 4 MG tablet Take 1 capsule by mouth 2 (two) times daily as needed (muscle spasms).    zolpidem (AMBIEN CR) 12.5 MG CR tablet Take 1 tablet by mouth nightly as needed.    gabapentin (NEURONTIN) 600 MG tablet Take 1 tablet (600 mg total) by mouth 3 (three) times daily.     No current facility-administered medications for this visit.     Facility-Administered Medications Ordered in Other Visits   Medication    ondansetron injection 4 mg    sodium chloride 0.9% flush 10 mL         REVIEW OF SYSTEMS:    GENERAL:  No weight loss, malaise or fevers.  HEENT:   No recent changes in vision or hearing  NECK:  Negative for lumps, no difficulty with swallowing.  RESPIRATORY:  Negative for cough, wheezing or shortness of breath, patient denies any recent URI.  CARDIOVASCULAR:  Negative for chest pain, leg swelling or palpitations.  GI:  Negative for abdominal discomfort, blood in stools or black stools or change in bowel habits.  MUSCULOSKELETAL:  See HPI.  SKIN:  Negative for lesions, rash, and itching.  PSYCH:  No mood disorder or recent psychosocial stressors.  Patients sleep is not disturbed secondary to pain.  HEMATOLOGY/LYMPHOLOGY:  Negative for prolonged bleeding, bruising easily or swollen nodes.  Patient is currently taking anti-coagulants - ASA and Pletal  NEURO:   No history of headaches, syncope, paralysis, seizures or tremors.  All other reviewed and negative other than HPI.       OBJECTIVE:    /65   Pulse 85   Resp 18   Ht 5' 9" (1.753 m)   Wt 111.4 kg (245 lb 9.5 oz)   BMI 36.27 kg/m²     PHYSICAL EXAMINATION:    GENERAL: Well appearing, in no acute distress, alert and oriented x3.  PSYCH:  Mood and affect appropriate.  SKIN: Skin color, texture, turgor normal, no rashes or lesions.  HEAD/FACE:  Normocephalic, atraumatic. Cranial nerves grossly intact. "   NECK:  Tenderness palpation over the bilateral cervical paraspinous muscles.  Spurling's positive  bilaterally.  Limited range of motion secondary to pain in all planes.    PULM: No evidence of respiratory difficulty, symmetric chest rise.  GI:  Soft and non-tender.  BACK:  Surgical scarring evident.  Incision(s) CDI. No pain / TTP lumbar spine    EXTREMITIES: Peripheral joint ROM is full and pain free without obvious instability or laxity in all four extremities. No deformities, edema, or skin discoloration. Good capillary refill.    NEURO: Bilateral upper and lower extremity coordination and muscle stretch reflexes are physiologic and symmetric.  Plantar response are downgoing. No clonus.  No loss of sensation is noted.  GAIT:  Slow, antalgic.      LABS:  Lab Results   Component Value Date    WBC 3.99 01/22/2024    HGB 10.4 (L) 01/22/2024    HCT 34.2 (L) 01/22/2024    MCV 73 (L) 01/22/2024     01/22/2024       CMP  Sodium   Date Value Ref Range Status   01/22/2024 141 136 - 145 mmol/L Final     Potassium   Date Value Ref Range Status   01/22/2024 5.0 3.5 - 5.1 mmol/L Final     Chloride   Date Value Ref Range Status   01/22/2024 112 (H) 95 - 110 mmol/L Final     CO2   Date Value Ref Range Status   01/22/2024 22 (L) 23 - 29 mmol/L Final     Glucose   Date Value Ref Range Status   01/22/2024 90 70 - 110 mg/dL Final     BUN   Date Value Ref Range Status   01/22/2024 11 8 - 23 mg/dL Final     Creatinine   Date Value Ref Range Status   01/22/2024 1.6 (H) 0.5 - 1.4 mg/dL Final     Calcium   Date Value Ref Range Status   01/22/2024 9.5 8.7 - 10.5 mg/dL Final     Total Protein   Date Value Ref Range Status   01/22/2024 7.5 6.0 - 8.4 g/dL Final     Albumin   Date Value Ref Range Status   01/22/2024 3.8 3.5 - 5.2 g/dL Final     Total Bilirubin   Date Value Ref Range Status   01/22/2024 0.4 0.1 - 1.0 mg/dL Final     Comment:     For infants and newborns, interpretation of results should be based  on gestational age,  weight and in agreement with clinical  observations.    Premature Infant recommended reference ranges:  Up to 24 hours.............<8.0 mg/dL  Up to 48 hours............<12.0 mg/dL  3-5 days..................<15.0 mg/dL  6-29 days.................<15.0 mg/dL       Alkaline Phosphatase   Date Value Ref Range Status   01/22/2024 97 55 - 135 U/L Final     AST   Date Value Ref Range Status   01/22/2024 22 10 - 40 U/L Final     ALT   Date Value Ref Range Status   01/22/2024 19 10 - 44 U/L Final     Anion Gap   Date Value Ref Range Status   01/22/2024 7 (L) 8 - 16 mmol/L Final     eGFR if    Date Value Ref Range Status   06/02/2022 51.1 (A) >60 mL/min/1.73 m^2 Final     eGFR if non    Date Value Ref Range Status   06/02/2022 44.2 (A) >60 mL/min/1.73 m^2 Final     Comment:     Calculation used to obtain the estimated glomerular filtration  rate (eGFR) is the CKD-EPI equation.          Lab Results   Component Value Date    HGBA1C 5.8 (H) 01/22/2024             ASSESSMENT: 68 y.o. year old male with neck pain, consistent with     1. Cervicalgia  MRI Cervical Spine Without Contrast      2. Cervical radiculopathy  MRI Cervical Spine Without Contrast            PLAN:   - Interventions:  None at this time. Will order MRI of cervical spine to determine recommendations for injections, Cervical MBB vs PIEDAD.      S/p right L4/5 +L5/S1 TF PIEDAD on 10/20/22 with 40% relief  S/p C7-T1 IL PIEDAD for diagnostic and therapeutic purposes with 60% relief    - Anticoagulation use: yes Aspirin and Plavix.       - Medications: I have stressed the importance of physical activity and a home exercise plan to help with pain and improve health. Patient can continue with medications for now since they are providing benefits, using them appropriately, and without side effects.       [facility] Tizanidine [facility] BID [facility].  Continue Gabapentin 600 mg BID per [facility].     report:  Reviewed and consistent with medication use as  prescribed.      - Therapy:  Advised patient continue with home exercises as tolerated     - Imaging: Reviewed available imaging with patient and answered any questions they had regarding study. MRI cervical spine ordered for further evaluation.     - Consults/Referrals:  None at this time      - Follow up visit: return to clinic after MRI to determine future plan of care. Patient is going on a cruise from 2/3/24 - 2/10/24 ([facility], [facility]). Requests to have MRI and follow up after trip.       - Counseled patient regarding the importance of activity modification and physical therapy     - This condition does not require this patient to take time off of work, and the primary goal of our Pain Management services is to improve the patient's functional capacity.     - Patient Questions: Answered all of the patient's questions regarding diagnosis, therapy, and treatment       The above plan and management options were discussed at length with patient. Patient is in agreement with the above and verbalized understanding.      Yazmin Farfan PA-C  Interventional Pain Management  [facility]

## 2024-02-15 NOTE — TELEPHONE ENCOUNTER
Pt contacted through Covid Symptom tracking for an escalation of symptoms. States CV+ since 8/26. States has congestion. Asking about quarantine and isolation. No recent fever. Asking if he needs to retest. Pt  advised retesting is not recommended as  pts can test positive up to 90 days. Advised to follow CDC protocols for isolation. Protocols reviewed with pt. Pt has no additional concerns or questions at this time. Advised to call back with concerns or worsening symptoms. Verbalized understanding.     Reason for Disposition   Information only question and nurse able to answer    Additional Information   Negative: Nursing judgment   Negative: Nursing judgment   Negative: Nursing judgment   Negative: Nursing judgment    Protocols used: Information Only Call - No Triage-A-[place]     Stable.

## 2024-02-20 NOTE — PROGRESS NOTES
Established Patient Chronic Pain Note (Follow up visit)    Chief Complaint:   Chief Complaint   Patient presents with    Low-back Pain    Neck Pain         SUBJECTIVE:    Interval History (2/26/2024):  Patient Abdullahi Urias presents today for follow-up visit.  Patient is seen seen today for review of his cervical MRI.  He has neck pain which radiates down into the bilateral shoulders with a tingling sensation.  He rates his pain today a 5/10 but states it will go up to a 9/10 especially with flexion of the neck.  He is currently taking gabapentin 600 mg twice a day.  He does take oxycodone sparingly if needed and is requesting a refill.    He also has chronic lumbar radiculopathy and has been seeing Dr. Wilkinson, he reports this is stable currently.    Interval History (2/26/2024):  Patient presents today for follow-up visit.  Patient was last seen on 1/9/23.  Patient reports pain as 7/10 today.  Patients is here today discuss neck pain with radicular symptoms.   Has tingling in R shoulder>L shoulder and will radiate up to his neck but other times it will radiate up to face or down arm. This has been ongoing for one month now. Although he does not notice pain radiating down his arms, he has discomfort in both sides of his neck.   He will c/p lower back pain if he is standing or bending for a long period of time.     Since his last office visit, he underwent a TLIF at L4-S1 with Dr. Valencia on 5/17/23 followed by a wound exploration on 7/5/23. He is doing well at this time (in regards to his lower back).      Interval History (1/9/2023): Abdullahi Urias presents today for follow-up visit.  he underwent right L4/5 +L5/S1 TF PIEDAD on 10/20/22.  The patient reports that he is/was better following the procedure.  he reports 40% pain relief.  The changes lasted for a few weeks before the pain insidiously returned.  Patient reports pain as 9/10 today. Also has history of vasculogenic claudication. Recently went on a cruise, needed  a wheelchair to get on the ship. Continues to report low back pain with radiation into his right lower extremity along L4/5-S1 distribution with associated numbness in the right foot.      Interval History (10/4/2022): Abdullahi Urias presents today for follow-up visit.  he underwent C7/T1 IL PIEDAD on 06/21/2022.  The patient reports that he is/was better following the procedure.  he reports 60% pain relief.  The changes lasted 6 weeks before neck stiffness and arm numbness returned. Patient reports pain as 8/10 today.    Today he reports his primary complaint is low back pain with radiation into right leg into the posterior and lateral aspect along L4-S1 distribution. Reports back pain is worse than neck pain at this time. Also has history of vasculogenic claudication. Patient reports pain is causing him to be depressed and anxious. Pain interferes with daily activities. [facility]. Recently went on a cruise, needed to use a wheelchair due to weakness and pain.    MRI lumbar spine  FINDINGS:  Visualized distal cord demonstrates normal signal.     No marrow replacement process.  No fracture.  No listhesis.     There are degenerative changes of the lower lumbar spine with mild disc space narrowing, most severe at L5-S1 with marginal spurring with facet arthropathy     L1-L2: There is posterior disc bulge with indentation of the thecal sac.  No spinal canal or neural foraminal encroachment.     L2-L3: Mild facet arthropathy.  Mild posterior disc bulge.  No spinal canal or neural foraminal encroachment.     L3-L4: Bilateral ligamentum flavum and facet arthropathy.  Mild posterior disc bulge with indentation of thecal sac.  No spinal canal or neural foraminal encroachment.     L4-L5: Ligamentum flavum and facet arthropathy with broad-based disc bulge.  No significant spinal canal stenosis.  There is crowding of the lateral recesses with narrowing along the inferior right greater than  left neural foramen.     L5-S1: Posterior disc osteophyte complex with facet arthropathy.  Appearance of possible prior right hemilaminectomy changes..  There is severe narrowing along the right neural foramen  with mild to moderate narrowing along the left neural foramen.  No significant spinal canal stenosis.     Impression:     Discogenic degenerative changes most severe at L5-S1 as above.    Interval HPI  Abdullahi Urias is a 68 y.o. male who presents to the clinic for a follow-up.  He reports that his back pain is much improved ever since undergoing bilateral piriformis trigger point injections.  He wants to focus more on his neck and upper extremity pain currently.  Since the last visit, Abdullahi Urias states the pain has been worsening. Current pain intensity is 8/10.      Patient denies night fever/night sweats, urinary incontinence, bowel incontinence, significant weight loss, significant motor weakness and loss of sensations.    Pain Disability Index Review:      2/26/2024     9:47 AM 2/2/2024     1:36 PM 1/9/2023    11:46 AM   Last 3 PDI Scores   Pain Disability Index (PDI) 25 [phone] 57     Interval HPI 01/27/2022:  Abdullahi Urias is a 66 y.o. male who presents to the clinic for the evaluation of lower back pain.  He is self referred.  He has past medical history of CVA, substance abuse, asthma, CHF, hypertension, CAD, cardiomyopathy, renal insufficiency, CKD stage 3, H/O hepatitis-C, H/O prostate cancer, multiple other medical comorbidities as listed in his chart.  The pain started several years ago and symptoms have been worsening.The pain is located in the lumbosacral area and radiates to the bilateral lower extremities extending posteriorly to the bilateral feet.  Of note, patient recently had cardiovascular studies done on his lower extremities which did demonstrate vasculogenic claudication.  The pain is described as Aching, burning, tingling and is rated as 8/10. The pain is rated with a score of  6/10 on  the BEST day and a score of 10/10 on the WORST day.  Symptoms interfere with daily activity. The pain is exacerbated by activities.  The pain is mitigated by medications.        Non-Pharmacologic Treatments:  Physical Therapy/Home Exercise: yes  Ice/Heat:yes  TENS: yes  Acupuncture: no  Massage: yes  Chiropractic: no    Other: Surgery-   5/17/23: TLIF L4-S1 with Dr. Valencia  7/5/23: Wound exploration with Dr. Valencia        Pain Medications:  - Opioids: Norco, Tylenol 3  - Adjuvant Medications: Ambien, alprazolam, Flexeril, Voltaren gel, gabapentin, lidocaine patch, Zoloft  - Anti-Coagulants: Aspirin, Pletal      Pain Procedures:   -previous lumbar PIEDAD  -previous lumbar MBB/RFA  -previous lumbar laminectomy in 2001 at L5-S1  -01/27/2022:  Bilateral piriformis trigger point injections, significant relief that is still ongoing  -right L4/5 +L5/S1 TF PIEDAD on 10/20/22 with 40% relief        Imaging (Reviewed on 2/26/2024):  2/23/2024 cervical MRI  FINDINGS:  The cervical cord reveals normal signal and morphology.     There is straightening/reversal of the normal cervical lordosis.  There is mild to moderate multilevel degenerative disc disease as detailed below.     C1-2: Moderate C1-2 arthrosis.     C2-C3: Mild disc bulge.  Moderate to severe right and mild left facet arthrosis with moderate right foraminal stenosis.     C3-C4: Mild disc bulge.  Bilateral uncovertebral joint spurring with mild foraminal stenosis.     C4-C5: Mild disc bulge.  Bilateral uncovertebral joint spurring with moderate right and mild left foraminal stenosis.     C5-C6: Moderate disc degeneration with disc bulging osteophyte with ventral sac impression.  Mild central stenosis.  Uncovertebral joint spurring with moderate to severe right foraminal stenosis.     C6-C7: Moderate degenerative disc disease with disc bulging osteophyte with mild ventral sac impression.  Uncovertebral joint spurring with mild foraminal stenosis.     C7-T1: Moderate disc  degeneration with disc bulging osteophyte eccentric to the left with mild left lateral recess stenosis.  Uncovertebral joint spurring with moderate left and mild right foraminal stenosis.     Impression:     No acute abnormality.  Reversal of the normal cervical lordosis.     C5-6, C6-7 and C7-T1 degenerative disc disease as above with C5-6 central canal stenosis.  Foraminal stenosis as above.  MRI lumbar spine 01/12/2021:  Visualized distal cord demonstrates normal signal.     No marrow replacement process.  No fracture.  No listhesis.     There are degenerative changes of the lower lumbar spine with mild disc space narrowing, most severe at L5-S1 with marginal spurring with facet arthropathy     L1-L2: There is posterior disc bulge with indentation of the thecal sac.  No spinal canal or neural foraminal encroachment.     L2-L3: Mild facet arthropathy.  Mild posterior disc bulge.  No spinal canal or neural foraminal encroachment.     L3-L4: Bilateral ligamentum flavum and facet arthropathy.  Mild posterior disc bulge with indentation of thecal sac.  No spinal canal or neural foraminal encroachment.     L4-L5: Ligamentum flavum and facet arthropathy with broad-based disc bulge.  No significant spinal canal stenosis.  There is crowding of the lateral recesses with narrowing along the inferior right greater than left neural foramen.     L5-S1: Posterior disc osteophyte complex with facet arthropathy.  Appearance of possible prior right hemilaminectomy changes..  There is severe narrowing along the right neural foramen  with mild to moderate narrowing along the left neural foramen.  No significant spinal canal stenosis        CT cervical spine 12/01/2020:  Vertebral body heights maintained.  2 mm anterolisthesis of C3 on C4.  Disc height loss and osteophyte changes at C6-7 and C7-T1.  Multilevel facet degenerative findings most prevalent at the right C2-3 and left C3-4 levels.     Neck soft tissues are unremarkable.      C2-3: No CT evidence of spinal canal stenosis.  Mild right neural foraminal narrowing.     C3-4: No CT evidence of significant spinal canal stenosis.  Left greater than right facet and uncovertebral degenerative changes results in mild right and severe left neural foraminal narrowing.     C4-5: Posterior disc osteophyte complex present with mild spinal canal stenosis.  Mild to moderate right neural foraminal narrowing secondary to facet and uncovertebral degenerative findings.     C5-6: Posterior disc osteophyte complex present asymmetric to the right with mild spinal canal stenosis.  Severe right neural foraminal narrowing secondary to facet and uncovertebral degenerative findings.     C6-7: Posterior disc osteophyte complex present causing at least mild spinal canal stenosis.  Left greater than right facet and uncovertebral degenerative findings with mild-to-moderate right and moderate to severe left neural foraminal narrowing.     C7-T1: Posterior disc osteophyte complex with spinal canal stenosis suspected.  Mild left neural foraminal narrowing secondary to uncovertebral degenerative findings.        PMHx,PSHx, Social history, and Family history:  I have reviewed the patient's medical, surgical, social, and family history in detail and updated the computerized patient record.      Review of patient's allergies indicates:  No Known Allergies      Current Outpatient Medications   Medication Sig    albuterol (PROVENTIL) 2.5 mg /3 mL (0.083 %) nebulizer solution Take 3 mLs (2.5 mg total) by nebulization every 6 (six) hours as needed for Wheezing. Rescue    albuterol (PROVENTIL/VENTOLIN HFA) 90 mcg/actuation inhaler INHALE 2 PUFFS INTO THE LUNGS EVERY 6 HOURS AS NEEDED FOR COUGH    allopurinoL (ZYLOPRIM) 100 MG tablet Take 100 mg by mouth once daily. Takes 0.5 tab    ALPRAZolam (XANAX) 1 MG tablet Take 1 mg by mouth Daily. EVERY OTHER DAY    aluminum & magnesium hydroxide-simethicone (MYLANTA MAX STRENGTH)  400-400-40 mg/5 mL suspension TAKE 15ML BY MOUTH FOUR TIMES A DAY AS NEEDED FOR STOMACH ACID    aspirin (ECOTRIN) 81 MG EC tablet Take 1 tablet (81 mg total) by mouth once daily.    atorvastatin (LIPITOR) 80 MG tablet TAKE ONE-HALF TABLET BY MOUTH EVERY DAY FOR CHOLESTEROL    carvediloL (COREG) 12.5 MG tablet Take 1 tablet (12.5 mg total) by mouth 2 (two) times daily.    clopidogreL (PLAVIX) 75 mg tablet Take 1 tablet (75 mg total) by mouth once daily.    docusate sodium (COLACE) 100 MG capsule Take 1 capsule (100 mg total) by mouth 2 (two) times daily.    esomeprazole (NEXIUM) 40 MG capsule Take 40 mg by mouth before breakfast.    famotidine (PEPCID) 40 MG tablet TAKE ONE TABLET BY MOUTH AT BEDTIME FOR ACID REFLUX    fluticasone-umeclidin-vilanter (TRELEGY ELLIPTA) 200-62.5-25 mcg inhaler Inhale 1 puff into the lungs once daily.    gabapentin (NEURONTIN) 600 MG tablet Take 1 tablet (600 mg total) by mouth 3 (three) times daily.    guaiFENesin (MUCINEX) 600 mg 12 hr tablet Take 2 tablets (1,200 mg total) by mouth 2 (two) times daily.    hydrocortisone 2.5 % cream Apply topically 2 (two) times daily as needed. Apply to affected areas of the face and neck.    hydrOXYzine HCL (ATARAX) 25 MG tablet Take 1 tablet (25 mg total) by mouth 3 (three) times daily as needed for Itching.    ipratropium (ATROVENT) 21 mcg (0.03 %) nasal spray 2 sprays by Each Nostril route 2 (two) times daily.    LIDOcaine (LIDODERM) 5 % APPLY 1 PATCH TOPICALLY EVERY DAY FOR PAIN. WEAR FOR 12 HOURS, THEN REMOVE. DO NOT APPLY NEW PATCH FOR AT LEAST 12 HOURS.    losartan (COZAAR) 50 MG tablet Take 1 tablet (50 mg total) by mouth once daily.    multivitamin (THERAGRAN) per tablet Take 1 tablet by mouth once daily.    oxyCODONE-acetaminophen (PERCOCET)  mg per tablet Take 1 tablet by mouth every 8 (eight) hours as needed for Pain.    sertraline (ZOLOFT) 100 MG tablet TAKE TWO TABLETS BY MOUTH EVERY DAY FOR MENTAL HEALTH DOSE INCREASED TO  "200MG/DAY    simethicone (MYLICON) 80 MG chewable tablet Take 80 mg by mouth every 6 (six) hours as needed for Flatulence.    sucralfate (CARAFATE) 100 mg/mL suspension Take 1 g by mouth 4 (four) times daily.     zolpidem (AMBIEN CR) 12.5 MG CR tablet Take 1 tablet by mouth nightly as needed.    tiZANidine (ZANAFLEX) 4 MG tablet Take 1 capsule by mouth 2 (two) times daily as needed (muscle spasms).     No current facility-administered medications for this visit.     Facility-Administered Medications Ordered in Other Visits   Medication    ondansetron injection 4 mg    sodium chloride 0.9% flush 10 mL         REVIEW OF SYSTEMS:    GENERAL:  No weight loss, malaise or fevers.  HEENT:   No recent changes in vision or hearing  NECK:  Negative for lumps, no difficulty with swallowing.  RESPIRATORY:  Negative for cough, wheezing or shortness of breath, patient denies any recent URI.  CARDIOVASCULAR:  Negative for chest pain, leg swelling or palpitations.  GI:  Negative for abdominal discomfort, blood in stools or black stools or change in bowel habits.  MUSCULOSKELETAL:  See HPI.  SKIN:  Negative for lesions, rash, and itching.  PSYCH:  No mood disorder or recent psychosocial stressors.  Patients sleep is not disturbed secondary to pain.  HEMATOLOGY/LYMPHOLOGY:  Negative for prolonged bleeding, bruising easily or swollen nodes.  Patient is currently taking anti-coagulants - ASA and Pletal  NEURO:   No history of headaches, syncope, paralysis, seizures or tremors.  All other reviewed and negative other than HPI.       OBJECTIVE:    /65   Pulse 85   Ht 5' 9" (1.753 m)   Wt 111 kg (244 lb 11.4 oz)   BMI 36.14 kg/m²     PHYSICAL EXAMINATION:    GENERAL: Well appearing, in no acute distress, alert and oriented x3.  PSYCH:  Mood and affect appropriate.  SKIN: Skin color, texture, turgor normal, no rashes or lesions.  HEAD/FACE:  Normocephalic, atraumatic. Cranial nerves grossly intact.   NECK:  Tenderness palpation " over the bilateral cervical paraspinous muscles.  Spurling's positive  bilaterally.  Limited range of motion secondary to pain in all planes.    PULM: No evidence of respiratory difficulty, symmetric chest rise.  GI:  Soft and non-tender.  BACK:  Surgical scarring evident.  Incision(s) CDI. No pain / TTP lumbar spine    EXTREMITIES: Peripheral joint ROM is full and pain free without obvious instability or laxity in all four extremities. No deformities, edema, or skin discoloration. Good capillary refill.    NEURO: Bilateral upper and lower extremity coordination and muscle stretch reflexes are physiologic and symmetric.  Plantar response are downgoing. No clonus.  No loss of sensation is noted.  GAIT:  Slow, antalgic.  General: alert and oriented, in no apparent distress  Gait: normal gait.  Skin: no rashes, no discoloration, no obvious lesions  HEENT: normocephalic, atraumatic. Pupils equal and round.  Cardiovascular: no significant peripheral edema present.  Respiratory: without use of accessory muscles of respiration.        Neuro - Upper Extremities:  - BUE Strength:R/L: D: 5/5; B: 5/5; T: 5/5; WF: 5/5; WE: 5/5; IO: 5/5  - Extremity Reflexes: Brisk and symmetric throughout  - Sensory: Sensation to light touch intact bilaterally    Psych:  Mood and affect is appropriate      LABS:  Lab Results   Component Value Date    WBC 3.99 01/22/2024    HGB 10.4 (L) 01/22/2024    HCT 34.2 (L) 01/22/2024    MCV 73 (L) 01/22/2024     01/22/2024       CMP  Sodium   Date Value Ref Range Status   01/22/2024 141 136 - 145 mmol/L Final     Potassium   Date Value Ref Range Status   01/22/2024 5.0 3.5 - 5.1 mmol/L Final     Chloride   Date Value Ref Range Status   01/22/2024 112 (H) 95 - 110 mmol/L Final     CO2   Date Value Ref Range Status   01/22/2024 22 (L) 23 - 29 mmol/L Final     Glucose   Date Value Ref Range Status   01/22/2024 90 70 - 110 mg/dL Final     BUN   Date Value Ref Range Status   01/22/2024 11 8 - 23 mg/dL  Final     Creatinine   Date Value Ref Range Status   01/22/2024 1.6 (H) 0.5 - 1.4 mg/dL Final     Calcium   Date Value Ref Range Status   01/22/2024 9.5 8.7 - 10.5 mg/dL Final     Total Protein   Date Value Ref Range Status   01/22/2024 7.5 6.0 - 8.4 g/dL Final     Albumin   Date Value Ref Range Status   01/22/2024 3.8 3.5 - 5.2 g/dL Final     Total Bilirubin   Date Value Ref Range Status   01/22/2024 0.4 0.1 - 1.0 mg/dL Final     Comment:     For infants and newborns, interpretation of results should be based  on gestational age, weight and in agreement with clinical  observations.    Premature Infant recommended reference ranges:  Up to 24 hours.............<8.0 mg/dL  Up to 48 hours............<12.0 mg/dL  3-5 days..................<15.0 mg/dL  6-29 days.................<15.0 mg/dL       Alkaline Phosphatase   Date Value Ref Range Status   01/22/2024 97 55 - 135 U/L Final     AST   Date Value Ref Range Status   01/22/2024 22 10 - 40 U/L Final     ALT   Date Value Ref Range Status   01/22/2024 19 10 - 44 U/L Final     Anion Gap   Date Value Ref Range Status   01/22/2024 7 (L) 8 - 16 mmol/L Final     eGFR if    Date Value Ref Range Status   06/02/2022 51.1 (A) >60 mL/min/1.73 m^2 Final     eGFR if non    Date Value Ref Range Status   06/02/2022 44.2 (A) >60 mL/min/1.73 m^2 Final     Comment:     Calculation used to obtain the estimated glomerular filtration  rate (eGFR) is the CKD-EPI equation.          Lab Results   Component Value Date    HGBA1C 5.8 (H) 01/22/2024             ASSESSMENT: 68 y.o. year old male with neck pain, consistent with     1. Cervical radiculopathy  Case Request-RAD/Other Procedure Area: C7/T1 IL PIEDAD      2. Lumbar radiculopathy        3. DDD (degenerative disc disease), cervical                PLAN:   - Interventions:  Schedule C7-T1 IL PIEDAD  Explained the risks and benefits of the procedure in detail with the patient today in clinic along with alternative  treatment options, and the patient elected to pursue the intervention.    Pt on plavix and aspirin. Will get Permission to hold x 5 days from cardiologist Dr. Mckeon      S/p right L4/5 +L5/S1 TF PIEDAD on 10/20/22 with 40% relief  S/p C7-T1 IL PIEDAD for diagnostic and therapeutic purposes with 60% relief    - Anticoagulation use: yes Aspirin and Plavix.       - Medications: I have stressed the importance of physical activity and a home exercise plan to help with pain and improve health. Patient can continue with medications for now since they are providing benefits, using them appropriately, and without side effects.       Continue Tizanidine 4 [facility] BID [facility].  Continue Gabapentin 600 mg BID per [facility].  Will request short duration oxycodone refill with Dr. Carmen. Patient takes sparingly, prn   report:  Reviewed and consistent with medication use as prescribed.      - Therapy:  Advised patient continue with home exercises as tolerated     - Imaging: Reviewed MRI cervical spine     - Consults/Referrals:  None at this time      - Follow up visit: 4 weeks after procedure       - Counseled patient regarding the importance of activity modification and physical therapy     - This condition does not require this patient to take time off of work, and the primary goal of our Pain Management services is to improve the patient's functional capacity.     - Patient Questions: Answered all of the patient's questions regarding diagnosis, therapy, and treatment       The above plan and management options were discussed at length with patient. Patient is in agreement with the above and verbalized understanding.      Nighat Mcmahon NP  Interventional Pain Management  [facility]

## 2024-02-23 ENCOUNTER — HOSPITAL ENCOUNTER (OUTPATIENT)
Dept: RADIOLOGY | Facility: HOSPITAL | Age: 69
Discharge: HOME | End: 2024-02-23
Attending: PHYSICIAN ASSISTANT
Payer: MEDICARE

## 2024-02-23 DIAGNOSIS — M54.2 CERVICALGIA: ICD-10-CM

## 2024-02-23 DIAGNOSIS — M54.12 CERVICAL RADICULOPATHY: ICD-10-CM

## 2024-02-23 PROCEDURE — 72141 MRI NECK SPINE W/O DYE: CPT | Mod: 26,,, | Performed by: RADIOLOGY

## 2024-02-23 PROCEDURE — 72141 MRI NECK SPINE W/O DYE: CPT | Mod: TC

## 2024-02-26 ENCOUNTER — OFFICE VISIT (OUTPATIENT)
Dept: PAIN MEDICINE | Facility: CLINIC | Age: 69
End: 2024-02-26
Payer: MEDICARE

## 2024-02-26 VITALS
DIASTOLIC BLOOD PRESSURE: 65 MMHG | WEIGHT: 244.69 LBS | HEIGHT: 69 IN | BODY MASS INDEX: 36.24 KG/M2 | HEART RATE: 85 BPM | SYSTOLIC BLOOD PRESSURE: 130 MMHG

## 2024-02-26 DIAGNOSIS — M54.16 LUMBAR RADICULOPATHY: ICD-10-CM

## 2024-02-26 DIAGNOSIS — M54.12 CERVICAL RADICULOPATHY: Primary | ICD-10-CM

## 2024-02-26 DIAGNOSIS — Z79.01 ANTICOAGULATED: Primary | ICD-10-CM

## 2024-02-26 DIAGNOSIS — M50.30 DDD (DEGENERATIVE DISC DISEASE), CERVICAL: ICD-10-CM

## 2024-02-26 PROCEDURE — 3075F SYST BP GE 130 - 139MM HG: CPT | Mod: HCNC,CPTII,S$GLB, | Performed by: NURSE PRACTITIONER

## 2024-02-26 PROCEDURE — 3008F BODY MASS INDEX DOCD: CPT | Mod: HCNC,CPTII,S$GLB, | Performed by: NURSE PRACTITIONER

## 2024-02-26 PROCEDURE — 1125F AMNT PAIN NOTED PAIN PRSNT: CPT | Mod: HCNC,CPTII,S$GLB, | Performed by: NURSE PRACTITIONER

## 2024-02-26 PROCEDURE — 3078F DIAST BP <80 MM HG: CPT | Mod: HCNC,CPTII,S$GLB, | Performed by: NURSE PRACTITIONER

## 2024-02-26 PROCEDURE — 1159F MED LIST DOCD IN RCRD: CPT | Mod: HCNC,CPTII,S$GLB, | Performed by: NURSE PRACTITIONER

## 2024-02-26 PROCEDURE — 99999 PR PBB SHADOW E&M-EST. PATIENT-LVL IV: CPT | Mod: PBBFAC,HCNC,, | Performed by: NURSE PRACTITIONER

## 2024-02-26 PROCEDURE — 3044F HG A1C LEVEL LT 7.0%: CPT | Mod: HCNC,CPTII,S$GLB, | Performed by: NURSE PRACTITIONER

## 2024-02-26 PROCEDURE — 3288F FALL RISK ASSESSMENT DOCD: CPT | Mod: HCNC,CPTII,S$GLB, | Performed by: NURSE PRACTITIONER

## 2024-02-26 PROCEDURE — 1101F PT FALLS ASSESS-DOCD LE1/YR: CPT | Mod: HCNC,CPTII,S$GLB, | Performed by: NURSE PRACTITIONER

## 2024-02-26 PROCEDURE — 99214 OFFICE O/P EST MOD 30 MIN: CPT | Mod: HCNC,S$GLB,, | Performed by: NURSE PRACTITIONER

## 2024-02-27 ENCOUNTER — E-CONSULT (OUTPATIENT)
Dept: CARDIOLOGY | Facility: CLINIC | Age: 69
End: 2024-02-27
Payer: MEDICARE

## 2024-02-27 DIAGNOSIS — Z01.810 PREOP CARDIOVASCULAR EXAM: Primary | ICD-10-CM

## 2024-02-27 PROCEDURE — 99451 NTRPROF PH1/NTRNET/EHR 5/>: CPT | Mod: S$GLB,,, | Performed by: INTERNAL MEDICINE

## 2024-02-27 RX ORDER — OXYCODONE AND ACETAMINOPHEN 10; 325 MG/1; MG/1
1 TABLET ORAL EVERY 8 HOURS PRN
Qty: 21 TABLET | Refills: 0 | Status: SHIPPED | OUTPATIENT
Start: 2024-02-27 | End: 2024-06-04

## 2024-02-28 NOTE — CONSULTS
[facility]  Response for E-Consult     Patient Name: Abdullahi Urias  MRN: [id number]  Primary Care Provider: Reji Valentin MD   Requesting Provider: Nehemiah Carmen MD  E-Consult to Cardiology  Consult performed by: Jeffery Mckeon MD  Consult ordered by: Nehemiah Carmen MD           69 YO MALE, E consult for preop clearance of CERVICAL PIEDAD  The chart reviewed.  PMH S/P AVR, PAD     Plan  Elevated periop risk of CV events for non-high risk procedure.  Ok to proceed the scheduled procedure without further cardiac study.  OK to hold ASA AND PLAVIX 5 DAYS before the procedure and resume postop once hemodynamically stable      Total time of Consultation: 10 minute    I did not speak to the requesting provider verbally about this.     *This eConsult is based on the clinical data available to me and is furnished without benefit of a physical examination. The eConsult will need to be interpreted in light of any clinical issues or changes in patient status not available to me at the time of filing this eConsults. Significant changes in patient condition or level of acuity should result in immediate formal consultation and reevaluation. Please alert me if you have further questions.    Thank you for this eConsult referral.     Jeffery Mckeon MD  [facility] [address]

## 2024-02-29 ENCOUNTER — PATIENT MESSAGE (OUTPATIENT)
Dept: PAIN MEDICINE | Facility: CLINIC | Age: 69
End: 2024-02-29
Payer: MEDICARE

## 2024-03-05 NOTE — PRE-PROCEDURE INSTRUCTIONS
Spoke with patient regarding procedure scheduled on 3.12    Arrival time 0945     Has patient been sick with fever or on antibiotics within the last 7 days? No     Does the patient have any open wounds, sores or rashes? No     Does the patient have any recent fractures? no     Has patient received a vaccination within the last 7 days? No     Received the COVID vaccination? yes     Has the patient stopped all medications as directed? hold asa and plavix 5 days prior to procedure. Cardiac clearance obtained from dr pina on 2.27     Does patient have a pacemaker, defibrillator, or implantable stimulator? No     Does the patient have a ride to and from procedure and someone reliable to remain with patient?  wife     Is the patient diabetic? no     Does the patient have sleep apnea? Or use O2 at home? elda on cpap     Is the patient receiving sedation? yes     Is the patient instructed to remain NPO beginning at midnight the night before their procedure? yes     Procedure location confirmed with patient? Yes     Covid- Denies signs/symptoms. Instructed to notify PAT/MD if any changes.

## 2024-03-12 ENCOUNTER — HOSPITAL ENCOUNTER (OUTPATIENT)
Facility: HOSPITAL | Age: 69
Discharge: HOME | End: 2024-03-12
Attending: PHYSICAL MEDICINE & REHABILITATION | Admitting: PHYSICAL MEDICINE & REHABILITATION
Payer: MEDICARE

## 2024-03-12 VITALS
SYSTOLIC BLOOD PRESSURE: 142 MMHG | HEART RATE: 67 BPM | TEMPERATURE: 98 F | HEIGHT: 69 IN | OXYGEN SATURATION: 97 % | RESPIRATION RATE: 18 BRPM | DIASTOLIC BLOOD PRESSURE: 84 MMHG | BODY MASS INDEX: 35.06 KG/M2 | WEIGHT: 236.69 LBS

## 2024-03-12 DIAGNOSIS — M54.12 CERVICAL RADICULOPATHY: Primary | ICD-10-CM

## 2024-03-12 PROCEDURE — 62321 NJX INTERLAMINAR CRV/THRC: CPT | Mod: HCNC,,, | Performed by: PHYSICAL MEDICINE & REHABILITATION

## 2024-03-12 PROCEDURE — 25500020 PHARM REV CODE 255: Mod: HCNC | Performed by: PHYSICAL MEDICINE & REHABILITATION

## 2024-03-12 PROCEDURE — 62321 NJX INTERLAMINAR CRV/THRC: CPT | Mod: HCNC | Performed by: PHYSICAL MEDICINE & REHABILITATION

## 2024-03-12 PROCEDURE — 63600175 PHARM REV CODE 636 W HCPCS: Mod: HCNC | Performed by: PHYSICAL MEDICINE & REHABILITATION

## 2024-03-12 RX ORDER — MIDAZOLAM HYDROCHLORIDE 1 MG/ML
INJECTION, SOLUTION INTRAMUSCULAR; INTRAVENOUS
Status: DISCONTINUED | OUTPATIENT
Start: 2024-03-12 | End: 2024-03-12 | Stop reason: HOSPADM

## 2024-03-12 RX ORDER — BUPIVACAINE HYDROCHLORIDE 2.5 MG/ML
INJECTION, SOLUTION EPIDURAL; INFILTRATION; INTRACAUDAL
Status: DISCONTINUED | OUTPATIENT
Start: 2024-03-12 | End: 2024-03-12 | Stop reason: HOSPADM

## 2024-03-12 RX ORDER — DEXAMETHASONE SODIUM PHOSPHATE 10 MG/ML
INJECTION INTRAMUSCULAR; INTRAVENOUS
Status: DISCONTINUED | OUTPATIENT
Start: 2024-03-12 | End: 2024-03-12 | Stop reason: HOSPADM

## 2024-03-12 RX ORDER — ONDANSETRON HYDROCHLORIDE 2 MG/ML
4 INJECTION, SOLUTION INTRAVENOUS ONCE AS NEEDED
Status: ACTIVE | OUTPATIENT
Start: 2024-03-12 | End: 2035-08-09

## 2024-03-12 RX ORDER — FENTANYL CITRATE 50 UG/ML
INJECTION, SOLUTION INTRAMUSCULAR; INTRAVENOUS
Status: DISCONTINUED | OUTPATIENT
Start: 2024-03-12 | End: 2024-03-12 | Stop reason: HOSPADM

## 2024-03-12 NOTE — DISCHARGE SUMMARY
Discharge Note  Short Stay      SUMMARY     Admit Date: 3/12/2024    Attending Physician: Nehemiah Carmen MD        Discharge Physician: Nehemiah Carmen MD        Discharge Date: 3/12/2024 2:58 PM    Procedure(s) (LRB):  C7/T1 IL PIEDAD (N/A)    Final Diagnosis: Cervical radiculopathy [M54.12]    Disposition: Home or self care    Patient Instructions:   Discharge Medication List as of 3/12/2024  9:53 AM        CONTINUE these medications which have NOT CHANGED    Details   albuterol (PROVENTIL) 2.5 mg /3 mL (0.083 %) nebulizer solution Take 3 mLs (2.5 mg total) by nebulization every 6 (six) hours as needed for Wheezing. Rescue, Starting Fri 8/18/2023, Until Sat 8/17/2024 at 2359, Normal      albuterol (PROVENTIL/VENTOLIN HFA) 90 mcg/actuation inhaler INHALE 2 PUFFS INTO THE LUNGS EVERY 6 HOURS AS NEEDED FOR COUGH, Normal      allopurinoL (ZYLOPRIM) 100 MG tablet Take 100 mg by mouth once daily. Takes 0.5 tab, Historical Med      ALPRAZolam (XANAX) 1 MG tablet Take 1 mg by mouth Daily. EVERY OTHER DAY, Historical Med      aluminum & magnesium hydroxide-simethicone (MYLANTA MAX STRENGTH) 400-400-40 mg/5 mL suspension TAKE 15ML BY MOUTH FOUR TIMES A DAY AS NEEDED FOR STOMACH ACID, Historical Med      aspirin (ECOTRIN) 81 MG EC tablet Take 1 tablet (81 mg total) by mouth once daily., Starting Tue 7/19/2022, Normal      atorvastatin (LIPITOR) 80 MG tablet TAKE ONE-HALF TABLET BY MOUTH EVERY DAY FOR CHOLESTEROL, Historical Med      carvediloL (COREG) 12.5 MG tablet Take 1 tablet (12.5 mg total) by mouth 2 (two) times daily., Starting Fri 5/28/2021, Normal      clopidogreL (PLAVIX) 75 mg tablet Take 1 tablet (75 mg total) by mouth once daily., Starting Mon 12/4/2023, Until Tue 12/3/2024, Normal      docusate sodium (COLACE) 100 MG capsule Take 1 capsule (100 mg total) by mouth 2 (two) times daily., Starting Tue 5/30/2023, Normal      esomeprazole (NEXIUM) 40 MG capsule Take 40 mg by mouth before breakfast., Historical Med       famotidine (PEPCID) 40 MG tablet TAKE ONE TABLET BY MOUTH AT BEDTIME FOR ACID REFLUX, Historical Med      fluticasone-umeclidin-vilanter (TRELEGY ELLIPTA) 200-62.5-25 mcg inhaler Inhale 1 puff into the lungs once daily., Starting Thu 1/18/2024, Normal      gabapentin (NEURONTIN) 600 MG tablet Take 1 tablet (600 mg total) by mouth 3 (three) times daily., Starting Mon 1/9/2023, Until Mon 2/26/2024, Normal      guaiFENesin (MUCINEX) 600 mg 12 hr tablet Take 2 tablets (1,200 mg total) by mouth 2 (two) times daily., Starting Tue 11/21/2023, Normal      hydrocortisone 2.5 % cream Apply topically 2 (two) times daily as needed. Apply to affected areas of the face and neck., Starting Thu 3/26/2020, Print      hydrOXYzine HCL (ATARAX) 25 MG tablet Take 1 tablet (25 mg total) by mouth 3 (three) times daily as needed for Itching., Starting Wed 8/9/2023, Normal      ipratropium (ATROVENT) 21 mcg (0.03 %) nasal spray 2 sprays by Each Nostril route 2 (two) times daily., Starting Fri 8/18/2023, Normal      LIDOcaine (LIDODERM) 5 % APPLY 1 PATCH TOPICALLY EVERY DAY FOR PAIN. WEAR FOR 12 HOURS, THEN REMOVE. DO NOT APPLY NEW PATCH FOR AT LEAST 12 HOURS., Historical Med      losartan (COZAAR) 50 MG tablet Take 1 tablet (50 mg total) by mouth once daily., Starting Thu 7/22/2021, Print      multivitamin (THERAGRAN) per tablet Take 1 tablet by mouth once daily., Historical Med      oxyCODONE-acetaminophen (PERCOCET)  mg per tablet Take 1 tablet by mouth every 8 (eight) hours as needed for Pain., Starting Tue 2/27/2024, Normal      sertraline (ZOLOFT) 100 MG tablet TAKE TWO TABLETS BY MOUTH EVERY DAY FOR MENTAL HEALTH DOSE INCREASED TO 200MG/DAY, Historical Med      simethicone (MYLICON) 80 MG chewable tablet Take 80 mg by mouth every 6 (six) hours as needed for Flatulence., Historical Med      sucralfate (CARAFATE) 100 mg/mL suspension Take 1 g by mouth 4 (four) times daily. , Historical Med      tiZANidine (ZANAFLEX) 4 MG  tablet Take 1 capsule by mouth 2 (two) times daily as needed (muscle spasms)., Historical Med      zolpidem (AMBIEN CR) 12.5 MG CR tablet Take 1 tablet by mouth nightly as needed., Starting Thu 8/3/2023, Historical Med                 Discharge Diagnosis: Cervical radiculopathy [M54.12]  Condition on Discharge: Stable with no complications to procedure   Diet on Discharge: Same as before.  Activity: as per instruction sheet.  Discharge to: Home with a responsible adult.  Follow up: 2-4 weeks       Please call the office at [phone] if you experience any weakness or loss of sensation, fever > 101.5, pain uncontrolled with oral medications, persistent nausea/vomiting/or diarrhea, redness or drainage from the incisions, or any other worrisome concerns. If physician on call was not reached or could not communicate with our office for any reason please go to the nearest emergency department

## 2024-03-12 NOTE — OP NOTE
Cervical Interlaminar Epidural Steroid Injection under Fluoroscopic Guidance.   Time-out taken to identify patient and procedure prior to starting the procedure.     Date of Procedure: 03/12/2024    PROCEDURE: Cervical Interlaminar epidural steroid injection C7/T1 under fluoroscopic guidance.     Pre-Op diagnosis: Cervical radiculopathy [M54.12]    Post-Op diagnosis: Cervical radiculopathy [M54.12]    PHYSICIAN: Nehemiah Carmen MD    ASSISTANTS: None     SEDATION: Conscious sedation provided by M.D    The patient was monitored with continuous pulse oximetry, EKG, and intermittent blood pressure monitors, immediately prior to administration of sedation.  The patient was hemodynamically stable throughout the entire process was responsive to voice, and breathing spontaneously.  Supplemental O2 was provided at 2L/min via nasal cannula.  Patient was comfortable for the duration of the procedure.     There was a total of 2mg IV Midazolam and 50mcg Fentanyl titrated for the procedure    Total sedation time was >10minutes and <20minutes      MEDICATIONS INJECTED: Preservative-free Decadron 10 mg with 1mL of sterile 0.25%Bupivicaine and 3ml of preservative free normal saline.     LOCAL ANESTHETIC INJECTED: Xylocaine 1% 3mL    ESTIMATED BLOOD LOSS: none.     COMPLICATIONS: none.     TECHNIQUE: With the patient laying in a prone position, the area was prepped and draped in the usual sterile fashion using ChloraPrep and a fenestrated drape. Local anesthetic was given using a 27-gauge needle by raising a wheal and going down to the hub of the needle over the C7/T1 interlaminar space.  The interlaminar space was then approached with a 3.5 inch 18-gauge Touhy needle was introduced under fluoroscopic guidance in the AP and Lateral view. Once the Ligamentum flavum was encountered loss of resistance to saline was used to enter the epidural space. With positive loss of resistance and negative CSF or Blood, 2mL contrast dye Omnipaque  (300mg/ml) was injected to confirm placement and there was no vascular runoff. The medication was then injected slowly. The patient tolerated the procedure well.       The patient was monitored after the procedure.   They were given post-procedure and discharge instructions to follow at home.  The patient was discharged in a stable condition.

## 2024-03-12 NOTE — H&P
HPI  Patient presenting for Procedure(s) (LRB):  C7/T1 IL PIEDAD (N/A)       No health changes since previous encounter    Past Medical History:   Diagnosis Date    Aortic stenosis     dr [facility]    Asthma     BPH (benign prostatic hyperplasia)     CAD (coronary artery disease)     Cardiomyopathy     CHF (congestive heart failure)     Chronic hoarseness     vocal cord surg    Chronic pain     CKD (chronic kidney disease) stage 3, GFR 30-59 ml/min     CVA (cerebral infarction)     8/2012 olol; reviewed ed note    Ex-smoker     GERD (gastroesophageal reflux disease)     Hepatitis C     treatedharvoni says cured, RNA NEG 6/2020    Hypertension     Pancreatitis     Prostate cancer     Prostate cancer     Prostate cancer     PVD (peripheral vascular disease)     Renal insufficiency     Substance abuse     cocaine, etoh , tob in past     Past Surgical History:   Procedure Laterality Date    AORTIC VALVE REPLACEMENT  05/19/2014    Tissue valve replacement    BACK SURGERY      CARDIAC CATHETERIZATION      COLONOSCOPY  2011    COLONOSCOPY N/A 2/24/2021    Procedure: COLONOSCOPY;  Surgeon: Faith Carrillo MD;  Location: [facility] [place] ENDO;  Service: Endoscopy;  Laterality: N/A;    EPIDURAL STEROID INJECTION INTO CERVICAL SPINE N/A 6/21/2022    Procedure: C7-T1 IL PIEDAD;  Surgeon: Nehemiah Carmen MD;  Location: [facility] PAIN MGT;  Service: Pain Management;  Laterality: N/A;    ESOPHAGOGASTRODUODENOSCOPY N/A 2/24/2021    Procedure: ESOPHAGOGASTRODUODENOSCOPY (EGD);  Surgeon: Faith Carrillo MD;  Location: [facility] [place] ENDO;  Service: Endoscopy;  Laterality: N/A;    FOOT SURGERY      INSERTION N/A 7/5/2023    Procedure: INSERTION;  Surgeon: Brandon Valencia MD;  Location: [facility] [place] OR;  Service: Neurosurgery;  Laterality: N/A;  Antibiotic Beads    LEFT HEART CATHETERIZATION Left 7/24/2020    Procedure: CATHETERIZATION, HEART, LEFT;  Surgeon: Pasha Martin MD;  Location: [facility] [place] CATH LAB;  Service: Cardiology;  Laterality: Left;     PENILE PROSTHESIS IMPLANT      PENILE PROSTHESIS REVISION  04/30/2018    PROSTATECTOMY  09/2019    [facility]    radiation for prostate      REMOVAL OF HARDWARE FROM SPINE N/A 7/5/2023    Procedure: REMOVAL, HARDWARE, SPINE;  Surgeon: Brandon Valencia MD;  Location: [facility] OR;  Service: Neurosurgery;  Laterality: N/A;    REPLACEMENT N/A 7/5/2023    Procedure: REPLACEMENT;  Surgeon: Brandon Valencia MD;  Location: [facility] OR;  Service: Neurosurgery;  Laterality: N/A;  of [employer]. [employer]    TRANSFORAMINAL EPIDURAL INJECTION OF STEROID Right 10/20/2022    Procedure: Right  L4/5 + L5/S1 TF PIEDAD RN IV Sedation;  Surgeon: Nehemiah Carmen MD;  Location: [facility] MGT;  Service: Pain Management;  Laterality: Right;    TRANSFORAMINAL LUMBAR INTERBODY FUSION (TLIF) USING COMPUTER-ASSISTED NAVIGATION Bilateral 5/17/2023    Procedure: FUSION, SPINE, LUMBAR, TLIF, USING COMPUTER-ASSISTED NAVIGATION;  Surgeon: Brandon Valencia MD;  Location: [facility] OR;  Service: Neurosurgery;  Laterality: Bilateral;  2 level lumbar fusion at L4-5 and L5-S1    UPPER GASTROINTESTINAL ENDOSCOPY  2011    WASHOUT N/A 7/5/2023    Procedure: WASHOUT;  Surgeon: Brandon Valencia MD;  Location: [facility] OR;  Service: Neurosurgery;  Laterality: N/A;    WOUND EXPLORATION Bilateral 7/5/2023    Procedure: EXPLORATION, WOUND;  Surgeon: Brandon Valencia MD;  Location: [facility] OR;  Service: Neurosurgery;  Laterality: Bilateral;     Review of patient's allergies indicates:  No Known Allergies     Current Facility-Administered Medications on File Prior to Encounter   Medication Dose Route Frequency Provider Last Rate Last Admin    ondansetron injection 4 mg  4 mg Intravenous Once PRN Nehemiah Carmen MD        sodium chloride 0.9% flush 10 mL  10 mL Intravenous PRN Wilda oHrne MD         Current Outpatient Medications on File Prior to Encounter   Medication Sig Dispense Refill    albuterol (PROVENTIL) 2.5 mg /3 mL (0.083 %) nebulizer solution Take 3  mLs (2.5 mg total) by nebulization every 6 (six) hours as needed for Wheezing. Rescue 180 mL 1    albuterol (PROVENTIL/VENTOLIN HFA) 90 mcg/actuation inhaler INHALE 2 PUFFS INTO THE LUNGS EVERY 6 HOURS AS NEEDED FOR COUGH 8.5 g 3    allopurinoL (ZYLOPRIM) 100 MG tablet Take 100 mg by mouth once daily. Takes 0.5 tab      ALPRAZolam (XANAX) 1 MG tablet Take 1 mg by mouth Daily. EVERY OTHER DAY      aluminum & magnesium hydroxide-simethicone (MYLANTA MAX STRENGTH) 400-400-40 mg/5 mL suspension TAKE 15ML BY MOUTH FOUR TIMES A DAY AS NEEDED FOR STOMACH ACID      atorvastatin (LIPITOR) 80 MG tablet TAKE ONE-HALF TABLET BY MOUTH EVERY DAY FOR CHOLESTEROL      carvediloL (COREG) 12.5 MG tablet Take 1 tablet (12.5 mg total) by mouth 2 (two) times daily. 60 tablet 11    docusate sodium (COLACE) 100 MG capsule Take 1 capsule (100 mg total) by mouth 2 (two) times daily. 20 capsule 0    esomeprazole (NEXIUM) 40 MG capsule Take 40 mg by mouth before breakfast.      famotidine (PEPCID) 40 MG tablet TAKE ONE TABLET BY MOUTH AT BEDTIME FOR ACID REFLUX      fluticasone-umeclidin-vilanter (TRELEGY ELLIPTA) 200-62.5-25 mcg inhaler Inhale 1 puff into the lungs once daily. 60 each 5    guaiFENesin (MUCINEX) 600 mg 12 hr tablet Take 2 tablets (1,200 mg total) by mouth 2 (two) times daily. 20 tablet 0    hydrocortisone 2.5 % cream Apply topically 2 (two) times daily as needed. Apply to affected areas of the face and neck. 30 g 2    hydrOXYzine HCL (ATARAX) 25 MG tablet Take 1 tablet (25 mg total) by mouth 3 (three) times daily as needed for Itching. 30 tablet 0    ipratropium (ATROVENT) 21 mcg (0.03 %) nasal spray 2 sprays by Each Nostril route 2 (two) times daily. 30 mL 0    LIDOcaine (LIDODERM) 5 % APPLY 1 PATCH TOPICALLY EVERY DAY FOR PAIN. WEAR FOR 12 HOURS, THEN REMOVE. DO NOT APPLY NEW PATCH FOR AT LEAST 12 HOURS.      losartan (COZAAR) 50 MG tablet Take 1 tablet (50 mg total) by mouth once daily. 30 tablet 11    multivitamin  "(THERAGRAN) per tablet Take 1 tablet by mouth once daily.      sertraline (ZOLOFT) 100 MG tablet TAKE TWO TABLETS BY MOUTH EVERY DAY FOR MENTAL HEALTH DOSE INCREASED TO 200MG/DAY      simethicone (MYLICON) 80 MG chewable tablet Take 80 mg by mouth every 6 (six) hours as needed for Flatulence.      sucralfate (CARAFATE) 100 mg/mL suspension Take 1 g by mouth 4 (four) times daily.       tiZANidine (ZANAFLEX) 4 MG tablet Take 1 capsule by mouth 2 (two) times daily as needed (muscle spasms).      zolpidem (AMBIEN CR) 12.5 MG CR tablet Take 1 tablet by mouth nightly as needed.      aspirin (ECOTRIN) 81 MG EC tablet Take 1 tablet (81 mg total) by mouth once daily. 90 tablet 3    clopidogreL (PLAVIX) 75 mg tablet Take 1 tablet (75 mg total) by mouth once daily. 30 tablet 11    gabapentin (NEURONTIN) 600 MG tablet Take 1 tablet (600 mg total) by mouth 3 (three) times daily. 90 tablet 11        PMHx, PSHx, Allergies, Medications reviewed in epic    ROS negative except pain complaints in HPI    OBJECTIVE:    /85 (BP Location: Right arm, Patient Position: Sitting)   Pulse 84   Temp 98.1 °F (36.7 °C) (Temporal)   Resp 18   Ht 5' 9" (1.753 m)   Wt 107.3 kg (236 lb 10.6 oz)   SpO2 99%   BMI 34.95 kg/m²     PHYSICAL EXAMINATION:    GENERAL: Well appearing, in no acute distress, alert and oriented x3.  PSYCH:  Mood and affect appropriate.  SKIN: Skin color, texture, turgor normal, no rashes or lesions which will impact the procedure.  CV: RRR with palpation of the radial artery.  PULM: No evidence of respiratory difficulty, symmetric chest rise. Clear to auscultation.  NEURO: Cranial nerves grossly intact.    Plan:    Proceed with procedure as planned Procedure(s) (LRB):  C7/T1 IL PIEDAD (N/A)    Nehemiah Carmen MD  03/12/2024            "

## 2024-03-12 NOTE — DISCHARGE INSTRUCTIONS

## 2024-03-14 ENCOUNTER — OFFICE VISIT (OUTPATIENT)
Dept: GASTROENTEROLOGY | Facility: CLINIC | Age: 69
End: 2024-03-14
Payer: OTHER GOVERNMENT

## 2024-03-14 VITALS
WEIGHT: 243.19 LBS | BODY MASS INDEX: 36.02 KG/M2 | HEIGHT: 69 IN | SYSTOLIC BLOOD PRESSURE: 108 MMHG | DIASTOLIC BLOOD PRESSURE: 75 MMHG | HEART RATE: 73 BPM

## 2024-03-14 DIAGNOSIS — K21.9 GASTROESOPHAGEAL REFLUX DISEASE WITHOUT ESOPHAGITIS: Primary | ICD-10-CM

## 2024-03-14 DIAGNOSIS — Z86.010 HISTORY OF COLON POLYPS: ICD-10-CM

## 2024-03-14 PROCEDURE — 99215 OFFICE O/P EST HI 40 MIN: CPT | Mod: PBBFAC | Performed by: NURSE PRACTITIONER

## 2024-03-14 PROCEDURE — 99999 PR PBB SHADOW E&M-EST. PATIENT-LVL V: CPT | Mod: PBBFAC,,, | Performed by: NURSE PRACTITIONER

## 2024-03-14 PROCEDURE — 99213 OFFICE O/P EST LOW 20 MIN: CPT | Mod: S$PBB,,, | Performed by: NURSE PRACTITIONER

## 2024-03-14 NOTE — PATIENT INSTRUCTIONS
Last colonoscopy in 2021. Does have history of colon polyps.   Polyp removed on this colonoscopy was colon tissue and not a true polyp. Because of his hx of previous colon polyps, repeat colonoscopy recommended in 7 years which will be 2/2028.

## 2024-03-14 NOTE — PROGRESS NOTES
Clinic Follow Up:  [facility] Follow Up Note    Reason for Follow Up:  The primary encounter diagnosis was Gastroesophageal reflux disease without esophagitis. A diagnosis of History of colon polyps was also pertinent to this visit.    PCP: Reji Valentin   [address] [facility] / [place] [address]    HPI:  This is a 68 y.o. male here for follow up of colon cancer screening.   Prior colonoscopy?: yes  Date of last colonoscopy: 2021  History of colon polyps: yes  Recall: 7 years   Family history of colon cancer: no  Abdominal pain, hematochezia, melena, nausea, vomiting, or weight loss: yes. Has chronic GERD. On Nexium and Pepcid. Had EGD in 2021- gastritis.     Review of Systems   Constitutional:  Negative for activity change and appetite change.        As per interval history above   Respiratory:  Negative for cough and shortness of breath.    Cardiovascular:  Negative for chest pain.   Skin:  Negative for color change and rash.       Medical History:  Past Medical History:   Diagnosis Date    Aortic stenosis     [facility]    Asthma     BPH (benign prostatic hyperplasia)     CAD (coronary artery disease)     Cardiomyopathy     CHF (congestive heart failure)     Chronic hoarseness     vocal cord surg    Chronic pain     CKD (chronic kidney disease) stage 3, GFR 30-59 ml/min     CVA (cerebral infarction)     8/2012 olol; reviewed ed note    Ex-smoker     GERD (gastroesophageal reflux disease)     Hepatitis C     treatedharvoni says cured, RNA NEG 6/2020    Hypertension     Pancreatitis     Prostate cancer     Prostate cancer     Prostate cancer     PVD (peripheral vascular disease)     Renal insufficiency     Substance abuse     cocaine, etoh , tob in past       Surgical History:   Past Surgical History:   Procedure Laterality Date    AORTIC VALVE REPLACEMENT  05/19/2014    Tissue valve replacement    BACK SURGERY      CARDIAC CATHETERIZATION      COLONOSCOPY  2011    COLONOSCOPY N/A  2/24/2021    Procedure: COLONOSCOPY;  Surgeon: Faith Carrillo MD;  Location: [facility] ENDO;  Service: Endoscopy;  Laterality: N/A;    EPIDURAL STEROID INJECTION INTO CERVICAL SPINE N/A 6/21/2022    Procedure: C7-T1 IL PIEDAD;  Surgeon: Nehemiah Carmen MD;  Location: [facility] PAIN MGT;  Service: Pain Management;  Laterality: N/A;    EPIDURAL STEROID INJECTION INTO CERVICAL SPINE N/A 3/12/2024    Procedure: C7/T1 IL PIEDAD;  Surgeon: Nehemiah Carmen MD;  Location: [facility] PAIN MGT;  Service: Pain Management;  Laterality: N/A;    ESOPHAGOGASTRODUODENOSCOPY N/A 2/24/2021    Procedure: ESOPHAGOGASTRODUODENOSCOPY (EGD);  Surgeon: Faith Carrillo MD;  Location: [facility] ENDO;  Service: Endoscopy;  Laterality: N/A;    FOOT SURGERY      INSERTION N/A 7/5/2023    Procedure: INSERTION;  Surgeon: Brandon Valencia MD;  Location: [facility] OR;  Service: Neurosurgery;  Laterality: N/A;  Antibiotic Beads    LEFT HEART CATHETERIZATION Left 7/24/2020    Procedure: CATHETERIZATION, HEART, LEFT;  Surgeon: Pasha Martin MD;  Location: [facility] CATH LAB;  Service: Cardiology;  Laterality: Left;    PENILE PROSTHESIS IMPLANT      PENILE PROSTHESIS REVISION  04/30/2018    PROSTATECTOMY  09/2019    [facility]    radiation for prostate      REMOVAL OF HARDWARE FROM SPINE N/A 7/5/2023    Procedure: REMOVAL, HARDWARE, SPINE;  Surgeon: Brandon Valencia MD;  Location: [facility] OR;  Service: Neurosurgery;  Laterality: N/A;    REPLACEMENT N/A 7/5/2023    Procedure: REPLACEMENT;  Surgeon: Brandon Valencia MD;  Location: [facility] OR;  Service: Neurosurgery;  Laterality: N/A;  of [employer]. [employer]    TRANSFORAMINAL EPIDURAL INJECTION OF STEROID Right 10/20/2022    Procedure: Right  L4/5 + L5/S1 TF PIEDAD RN IV Sedation;  Surgeon: Nehemiah Carmen MD;  Location: [facility] PAIN MGT;  Service: Pain Management;  Laterality: Right;    TRANSFORAMINAL LUMBAR INTERBODY FUSION (TLIF) USING COMPUTER-ASSISTED NAVIGATION Bilateral 5/17/2023    Procedure: FUSION, SPINE, LUMBAR,  "TLIF, USING COMPUTER-ASSISTED NAVIGATION;  Surgeon: Brandon Valencia MD;  Location: [facility] OR;  Service: Neurosurgery;  Laterality: Bilateral;  2 level lumbar fusion at L4-5 and L5-S1    UPPER GASTROINTESTINAL ENDOSCOPY      WASHOUT N/A 2023    Procedure: WASHOUT;  Surgeon: Brandon Valencia MD;  Location: [facility] OR;  Service: Neurosurgery;  Laterality: N/A;    WOUND EXPLORATION Bilateral 2023    Procedure: EXPLORATION, WOUND;  Surgeon: Brandon Valencia MD;  Location: [facility] OR;  Service: Neurosurgery;  Laterality: Bilateral;       Family History:   Family History   Problem Relation Age of Onset    Heart disease Mother     Heart disease Father     Heart attack Sister     Heart attack Brother     Colon cancer Neg Hx     Colon polyps Neg Hx     Liver cancer Neg Hx     Inflammatory bowel disease Neg Hx     Liver disease Neg Hx     Rectal cancer Neg Hx     Stomach cancer Neg Hx     Ulcerative colitis Neg Hx        Social History:   Social History     Tobacco Use    Smoking status: Former     Current packs/day: 0.00     Average packs/day: 1 pack/day for 8.0 years (8.0 ttl pk-yrs)     Types: Cigarettes     Start date: 2004     Quit date: 2012     Years since quittin.5    Smokeless tobacco: Never   Substance Use Topics    Alcohol use: Not Currently     Comment: Sober since 2012    Drug use: Not Currently     Types: "Crack" cocaine, Marijuana     Comment: Quit in        Allergies: Review of patient's allergies indicates:  No Known Allergies    Home Medications:  Current Outpatient Medications on File Prior to Visit   Medication Sig Dispense Refill    albuterol (PROVENTIL) 2.5 mg /3 mL (0.083 %) nebulizer solution Take 3 mLs (2.5 mg total) by nebulization every 6 (six) hours as needed for Wheezing. Rescue 180 mL 1    albuterol (PROVENTIL/VENTOLIN HFA) 90 mcg/actuation inhaler INHALE 2 PUFFS INTO THE LUNGS EVERY 6 HOURS AS NEEDED FOR COUGH 8.5 g 3    allopurinoL (ZYLOPRIM) 100 MG tablet Take " 100 mg by mouth once daily. Takes 0.5 tab      ALPRAZolam (XANAX) 1 MG tablet Take 1 mg by mouth Daily. EVERY OTHER DAY      aluminum & magnesium hydroxide-simethicone (MYLANTA MAX STRENGTH) 400-400-40 mg/5 mL suspension TAKE 15ML BY MOUTH FOUR TIMES A DAY AS NEEDED FOR STOMACH ACID      aspirin (ECOTRIN) 81 MG EC tablet Take 1 tablet (81 mg total) by mouth once daily. 90 tablet 3    atorvastatin (LIPITOR) 80 MG tablet TAKE ONE-HALF TABLET BY MOUTH EVERY DAY FOR CHOLESTEROL      carvediloL (COREG) 12.5 MG tablet Take 1 tablet (12.5 mg total) by mouth 2 (two) times daily. 60 tablet 11    clopidogreL (PLAVIX) 75 mg tablet Take 1 tablet (75 mg total) by mouth once daily. 30 tablet 11    docusate sodium (COLACE) 100 MG capsule Take 1 capsule (100 mg total) by mouth 2 (two) times daily. 20 capsule 0    esomeprazole (NEXIUM) 40 MG capsule Take 40 mg by mouth before breakfast.      famotidine (PEPCID) 40 MG tablet TAKE ONE TABLET BY MOUTH AT BEDTIME FOR ACID REFLUX      fluticasone-umeclidin-vilanter (TRELEGY ELLIPTA) 200-62.5-25 mcg inhaler Inhale 1 puff into the lungs once daily. 60 each 5    guaiFENesin (MUCINEX) 600 mg 12 hr tablet Take 2 tablets (1,200 mg total) by mouth 2 (two) times daily. 20 tablet 0    hydrocortisone 2.5 % cream Apply topically 2 (two) times daily as needed. Apply to affected areas of the face and neck. 30 g 2    hydrOXYzine HCL (ATARAX) 25 MG tablet Take 1 tablet (25 mg total) by mouth 3 (three) times daily as needed for Itching. 30 tablet 0    ipratropium (ATROVENT) 21 mcg (0.03 %) nasal spray 2 sprays by Each Nostril route 2 (two) times daily. 30 mL 0    LIDOcaine (LIDODERM) 5 % APPLY 1 PATCH TOPICALLY EVERY DAY FOR PAIN. WEAR FOR 12 HOURS, THEN REMOVE. DO NOT APPLY NEW PATCH FOR AT LEAST 12 HOURS.      losartan (COZAAR) 50 MG tablet Take 1 tablet (50 mg total) by mouth once daily. 30 tablet 11    multivitamin (THERAGRAN) per tablet Take 1 tablet by mouth once daily.       "oxyCODONE-acetaminophen (PERCOCET)  mg per tablet Take 1 tablet by mouth every 8 (eight) hours as needed for Pain. 21 tablet 0    sertraline (ZOLOFT) 100 MG tablet TAKE TWO TABLETS BY MOUTH EVERY DAY FOR MENTAL HEALTH DOSE INCREASED TO 200MG/DAY      simethicone (MYLICON) 80 MG chewable tablet Take 80 mg by mouth every 6 (six) hours as needed for Flatulence.      sucralfate (CARAFATE) 100 mg/mL suspension Take 1 g by mouth 4 (four) times daily.       tiZANidine (ZANAFLEX) 4 MG tablet Take 1 capsule by mouth 2 (two) times daily as needed (muscle spasms).      zolpidem (AMBIEN CR) 12.5 MG CR tablet Take 1 tablet by mouth nightly as needed.      gabapentin (NEURONTIN) 600 MG tablet Take 1 tablet (600 mg total) by mouth 3 (three) times daily. 90 tablet 11     Current Facility-Administered Medications on File Prior to Visit   Medication Dose Route Frequency Provider Last Rate Last Admin    ondansetron injection 4 mg  4 mg Intravenous Once PRN Nehemiah Carmen MD        ondansetron injection 4 mg  4 mg Intravenous Once PRN Nehemiah Carmen MD        sodium chloride 0.9% flush 10 mL  10 mL Intravenous PRN Wilda Horne MD           /75 (BP Location: Left arm, Patient Position: Sitting, BP Method: Large (Automatic))   Pulse 73   Ht 5' 9" (1.753 m)   Wt 110.3 kg (243 lb 2.7 oz)   BMI 35.91 kg/m²   Body mass index is 35.91 kg/m².  Physical Exam    Labs: Pertinent labs reviewed.  CRC Screening: up to date    Assessment:   1. Gastroesophageal reflux disease without esophagitis    2. History of colon polyps        Recommendations:   - continue GERD medications  - not due for screening colonoscopy until 2028.     Gastroesophageal reflux disease without esophagitis    History of colon polyps    Return to Clinic:  Follow up if symptoms worsen or fail to improve.    Thank you for the opportunity to participate in the care of this patient.  LEYDA Pineda        "

## 2024-04-01 NOTE — PROGRESS NOTES
Established Patient Chronic Pain Note (Follow up visit)    Chief Complaint:   Chief Complaint   Patient presents with    Low-back Pain    Hip Pain     right         SUBJECTIVE:    Interval History (4/9/2024):  Patient Abdullahi Urias presents today for follow-up visit.  Patient was last seen on  3/12/2024 C7/T1 IL PIEDAD with 95% relief.  Neck pain is doing much better and he is no longer having any numbness or tingling in the neck.  He rates his pain today a 4/10.  He does still have some lower back pain which does not radiate.  He continues to follow up with  following his laminectomy.  Patient denies night fever/night sweats, urinary incontinence, bowel incontinence, significant weight loss and significant motor weakness.   Patient denies any other complaints or concerns at this time.      Interval History (2/26/2024):  Patient Abdullahi Urias presents today for follow-up visit.  Patient is seen seen today for review of his cervical MRI.  He has neck pain which radiates down into the bilateral shoulders with a tingling sensation.  He rates his pain today a 5/10 but states it will go up to a 9/10 especially with flexion of the neck.  He is currently taking gabapentin 600 mg twice a day.  He does take oxycodone sparingly if needed and is requesting a refill.    He also has chronic lumbar radiculopathy and has been seeing Dr. Wilkinson, he reports this is stable currently.    Interval History (3/6/2023):  Patient presents today for follow-up visit.  Patient was last seen on 1/9/23.  Patient reports pain as 7/10 today.  Patients is here today discuss neck pain with radicular symptoms.   Has tingling in R shoulder>L shoulder and will radiate up to his neck but other times it will radiate up to face or down arm. This has been ongoing for one month now. Although he does not notice pain radiating down his arms, he has discomfort in both sides of his neck.   He will c/p lower back pain if he is standing or bending for a  long period of time.     Since his last office visit, he underwent a TLIF at L4-S1 with Dr. Valencia on 5/17/23 followed by a wound exploration on 7/5/23. He is doing well at this time (in regards to his lower back).      Interval History (1/9/2023): Abdullahi Urias presents today for follow-up visit.  he underwent right L4/5 +L5/S1 TF PIEDAD on 10/20/22.  The patient reports that he is/was better following the procedure.  he reports 40% pain relief.  The changes lasted for a few weeks before the pain insidiously returned.  Patient reports pain as 9/10 today. Also has history of vasculogenic claudication. Recently went on a cruise, needed a wheelchair to get on the ship. Continues to report low back pain with radiation into his right lower extremity along L4/5-S1 distribution with associated numbness in the right foot.      Interval History (10/4/2022): Abdullahi Urias presents today for follow-up visit.  he underwent C7/T1 IL PIEDAD on 06/21/2022.  The patient reports that he is/was better following the procedure.  he reports 60% pain relief.  The changes lasted 6 weeks before neck stiffness and arm numbness returned. Patient reports pain as 8/10 today.    Today he reports his primary complaint is low back pain with radiation into right leg into the posterior and lateral aspect along L4-S1 distribution. Reports back pain is worse than neck pain at this time. Also has history of vasculogenic claudication. Patient reports pain is causing him to be depressed and anxious. Pain interferes with daily activities. [facility]ator [facility]. Recently went on a cruise, needed to use a wheelchair due to weakness and pain.    MRI lumbar spine  FINDINGS:  Visualized distal cord demonstrates normal signal.     No marrow replacement process.  No fracture.  No listhesis.     There are degenerative changes of the lower lumbar spine with mild disc space narrowing, most severe at L5-S1 with marginal spurring with facet  arthropathy     L1-L2: There is posterior disc bulge with indentation of the thecal sac.  No spinal canal or neural foraminal encroachment.     L2-L3: Mild facet arthropathy.  Mild posterior disc bulge.  No spinal canal or neural foraminal encroachment.     L3-L4: Bilateral ligamentum flavum and facet arthropathy.  Mild posterior disc bulge with indentation of thecal sac.  No spinal canal or neural foraminal encroachment.     L4-L5: Ligamentum flavum and facet arthropathy with broad-based disc bulge.  No significant spinal canal stenosis.  There is crowding of the lateral recesses with narrowing along the inferior right greater than left neural foramen.     L5-S1: Posterior disc osteophyte complex with facet arthropathy.  Appearance of possible prior right hemilaminectomy changes..  There is severe narrowing along the right neural foramen  with mild to moderate narrowing along the left neural foramen.  No significant spinal canal stenosis.     Impression:     Discogenic degenerative changes most severe at L5-S1 as above.    Interval HPI  Abdullahi Urias is a 68 y.o. male who presents to the clinic for a follow-up.  He reports that his back pain is much improved ever since undergoing bilateral piriformis trigger point injections.  He wants to focus more on his neck and upper extremity pain currently.  Since the last visit, Abdullahi Urias states the pain has been worsening. Current pain intensity is 8/10.      Patient denies night fever/night sweats, urinary incontinence, bowel incontinence, significant weight loss, significant motor weakness and loss of sensations.    Pain Disability Index Review:      4/9/2024    10:26 AM 2/26/2024     9:47 AM 2/2/2024     1:36 PM   Last 3 PDI Scores   Pain Disability Index (PDI) 28 25 49     Interval HPI 01/27/2022:  Abdullahi Urias is a 66 y.o. male who presents to the clinic for the evaluation of lower back pain.  He is self referred.  He has past medical history of CVA, substance  abuse, asthma, CHF, hypertension, CAD, cardiomyopathy, renal insufficiency, CKD stage 3, H/O hepatitis-C, H/O prostate cancer, multiple other medical comorbidities as listed in his chart.  The pain started several years ago and symptoms have been worsening.The pain is located in the lumbosacral area and radiates to the bilateral lower extremities extending posteriorly to the bilateral feet.  Of note, patient recently had cardiovascular studies done on his lower extremities which did demonstrate vasculogenic claudication.  The pain is described as Aching, burning, tingling and is rated as 8/10. The pain is rated with a score of  6/10 on the BEST day and a score of 10/10 on the WORST day.  Symptoms interfere with daily activity. The pain is exacerbated by activities.  The pain is mitigated by medications.        Non-Pharmacologic Treatments:  Physical Therapy/Home Exercise: yes  Ice/Heat:yes  TENS: yes  Acupuncture: no  Massage: yes  Chiropractic: no    Other: Surgery-   5/17/23: TLIF L4-S1 with Dr. Valencia  7/5/23: Wound exploration with Dr. Valencia        Pain Medications:  - Opioids: Norco, Tylenol 3  - Adjuvant Medications: Ambien, alprazolam, Flexeril, Voltaren gel, gabapentin, lidocaine patch, Zoloft  - Anti-Coagulants: Aspirin, Pletal      Pain Procedures:   -previous lumbar PIEDAD  -previous lumbar MBB/RFA  -previous lumbar laminectomy in 2001 at L5-S1  -01/27/2022:  Bilateral piriformis trigger point injections, significant relief that is still ongoing  -right L4/5 +L5/S1 TF PIEDAD on 10/20/22 with 40% relief   3/12/2024 C7/T1 IL PIEDAD 95% relief     Imaging (Reviewed on 4/9/2024):  2/23/2024 cervical MRI  FINDINGS:  The cervical cord reveals normal signal and morphology.     There is straightening/reversal of the normal cervical lordosis.  There is mild to moderate multilevel degenerative disc disease as detailed below.     C1-2: Moderate C1-2 arthrosis.     C2-C3: Mild disc bulge.  Moderate to severe right and mild  left facet arthrosis with moderate right foraminal stenosis.     C3-C4: Mild disc bulge.  Bilateral uncovertebral joint spurring with mild foraminal stenosis.     C4-C5: Mild disc bulge.  Bilateral uncovertebral joint spurring with moderate right and mild left foraminal stenosis.     C5-C6: Moderate disc degeneration with disc bulging osteophyte with ventral sac impression.  Mild central stenosis.  Uncovertebral joint spurring with moderate to severe right foraminal stenosis.     C6-C7: Moderate degenerative disc disease with disc bulging osteophyte with mild ventral sac impression.  Uncovertebral joint spurring with mild foraminal stenosis.     C7-T1: Moderate disc degeneration with disc bulging osteophyte eccentric to the left with mild left lateral recess stenosis.  Uncovertebral joint spurring with moderate left and mild right foraminal stenosis.     Impression:     No acute abnormality.  Reversal of the normal cervical lordosis.     C5-6, C6-7 and C7-T1 degenerative disc disease as above with C5-6 central canal stenosis.  Foraminal stenosis as above.  MRI lumbar spine 01/12/2021:  Visualized distal cord demonstrates normal signal.     No marrow replacement process.  No fracture.  No listhesis.     There are degenerative changes of the lower lumbar spine with mild disc space narrowing, most severe at L5-S1 with marginal spurring with facet arthropathy     L1-L2: There is posterior disc bulge with indentation of the thecal sac.  No spinal canal or neural foraminal encroachment.     L2-L3: Mild facet arthropathy.  Mild posterior disc bulge.  No spinal canal or neural foraminal encroachment.     L3-L4: Bilateral ligamentum flavum and facet arthropathy.  Mild posterior disc bulge with indentation of thecal sac.  No spinal canal or neural foraminal encroachment.     L4-L5: Ligamentum flavum and facet arthropathy with broad-based disc bulge.  No significant spinal canal stenosis.  There is crowding of the lateral  recesses with narrowing along the inferior right greater than left neural foramen.     L5-S1: Posterior disc osteophyte complex with facet arthropathy.  Appearance of possible prior right hemilaminectomy changes..  There is severe narrowing along the right neural foramen  with mild to moderate narrowing along the left neural foramen.  No significant spinal canal stenosis        CT cervical spine 12/01/2020:  Vertebral body heights maintained.  2 mm anterolisthesis of C3 on C4.  Disc height loss and osteophyte changes at C6-7 and C7-T1.  Multilevel facet degenerative findings most prevalent at the right C2-3 and left C3-4 levels.     Neck soft tissues are unremarkable.     C2-3: No CT evidence of spinal canal stenosis.  Mild right neural foraminal narrowing.     C3-4: No CT evidence of significant spinal canal stenosis.  Left greater than right facet and uncovertebral degenerative changes results in mild right and severe left neural foraminal narrowing.     C4-5: Posterior disc osteophyte complex present with mild spinal canal stenosis.  Mild to moderate right neural foraminal narrowing secondary to facet and uncovertebral degenerative findings.     C5-6: Posterior disc osteophyte complex present asymmetric to the right with mild spinal canal stenosis.  Severe right neural foraminal narrowing secondary to facet and uncovertebral degenerative findings.     C6-7: Posterior disc osteophyte complex present causing at least mild spinal canal stenosis.  Left greater than right facet and uncovertebral degenerative findings with mild-to-moderate right and moderate to severe left neural foraminal narrowing.     C7-T1: Posterior disc osteophyte complex with spinal canal stenosis suspected.  Mild left neural foraminal narrowing secondary to uncovertebral degenerative findings.        PMHx,PSHx, Social history, and Family history:  I have reviewed the patient's medical, surgical, social, and family history in detail and updated  the computerized patient record.      Review of patient's allergies indicates:  No Known Allergies      Current Outpatient Medications   Medication Sig    albuterol (PROVENTIL) 2.5 mg /3 mL (0.083 %) nebulizer solution Take 3 mLs (2.5 mg total) by nebulization every 6 (six) hours as needed for Wheezing. Rescue    albuterol (PROVENTIL/VENTOLIN HFA) 90 mcg/actuation inhaler INHALE 2 PUFFS INTO THE LUNGS EVERY 6 HOURS AS NEEDED FOR COUGH    allopurinoL (ZYLOPRIM) 100 MG tablet Take 100 mg by mouth once daily. Takes 0.5 tab    ALPRAZolam (XANAX) 1 MG tablet Take 1 mg by mouth Daily. EVERY OTHER DAY    aluminum & magnesium hydroxide-simethicone (MYLANTA MAX STRENGTH) 400-400-40 mg/5 mL suspension TAKE 15ML BY MOUTH FOUR TIMES A DAY AS NEEDED FOR STOMACH ACID    aspirin (ECOTRIN) 81 MG EC tablet Take 1 tablet (81 mg total) by mouth once daily.    atorvastatin (LIPITOR) 80 MG tablet TAKE ONE-HALF TABLET BY MOUTH EVERY DAY FOR CHOLESTEROL    carvediloL (COREG) 12.5 MG tablet Take 1 tablet (12.5 mg total) by mouth 2 (two) times daily.    clopidogreL (PLAVIX) 75 mg tablet Take 1 tablet (75 mg total) by mouth once daily.    docusate sodium (COLACE) 100 MG capsule Take 1 capsule (100 mg total) by mouth 2 (two) times daily.    esomeprazole (NEXIUM) 40 MG capsule Take 40 mg by mouth before breakfast.    famotidine (PEPCID) 40 MG tablet TAKE ONE TABLET BY MOUTH AT BEDTIME FOR ACID REFLUX    fluticasone-umeclidin-vilanter (TRELEGY ELLIPTA) 200-62.5-25 mcg inhaler Inhale 1 puff into the lungs once daily.    guaiFENesin (MUCINEX) 600 mg 12 hr tablet Take 2 tablets (1,200 mg total) by mouth 2 (two) times daily.    hydrocortisone 2.5 % cream Apply topically 2 (two) times daily as needed. Apply to affected areas of the face and neck.    hydrOXYzine HCL (ATARAX) 25 MG tablet Take 1 tablet (25 mg total) by mouth 3 (three) times daily as needed for Itching.    ipratropium (ATROVENT) 21 mcg (0.03 %) nasal spray 2 sprays by Each Nostril  route 2 (two) times daily.    LIDOcaine (LIDODERM) 5 % APPLY 1 PATCH TOPICALLY EVERY DAY FOR PAIN. WEAR FOR 12 HOURS, THEN REMOVE. DO NOT APPLY NEW PATCH FOR AT LEAST 12 HOURS.    losartan (COZAAR) 50 MG tablet Take 1 tablet (50 mg total) by mouth once daily.    multivitamin (THERAGRAN) per tablet Take 1 tablet by mouth once daily.    oxyCODONE-acetaminophen (PERCOCET)  mg per tablet Take 1 tablet by mouth every 8 (eight) hours as needed for Pain.    sertraline (ZOLOFT) 100 MG tablet TAKE TWO TABLETS BY MOUTH EVERY DAY FOR MENTAL HEALTH DOSE INCREASED TO 200MG/DAY    simethicone (MYLICON) 80 MG chewable tablet Take 80 mg by mouth every 6 (six) hours as needed for Flatulence.    sucralfate (CARAFATE) 100 mg/mL suspension Take 1 g by mouth 4 (four) times daily.     tiZANidine (ZANAFLEX) 4 MG tablet Take 1 capsule by mouth 2 (two) times daily as needed (muscle spasms).    zolpidem (AMBIEN CR) 12.5 MG CR tablet Take 1 tablet by mouth nightly as needed.    gabapentin (NEURONTIN) 600 MG tablet Take 1 tablet (600 mg total) by mouth 3 (three) times daily.     No current facility-administered medications for this visit.     Facility-Administered Medications Ordered in Other Visits   Medication    ondansetron injection 4 mg    ondansetron injection 4 mg    sodium chloride 0.9% flush 10 mL         REVIEW OF SYSTEMS:    GENERAL:  No weight loss, malaise or fevers.  HEENT:   No recent changes in vision or hearing  NECK:  Negative for lumps, no difficulty with swallowing.  RESPIRATORY:  Negative for cough, wheezing or shortness of breath, patient denies any recent URI.  CARDIOVASCULAR:  Negative for chest pain, leg swelling or palpitations.  GI:  Negative for abdominal discomfort, blood in stools or black stools or change in bowel habits.  MUSCULOSKELETAL:  See HPI.  SKIN:  Negative for lesions, rash, and itching.  PSYCH:  No mood disorder or recent psychosocial stressors.  Patients sleep is not disturbed secondary to  "pain.  HEMATOLOGY/LYMPHOLOGY:  Negative for prolonged bleeding, bruising easily or swollen nodes.  Patient is currently taking anti-coagulants - ASA and Pletal  NEURO:   No history of headaches, syncope, paralysis, seizures or tremors.  All other reviewed and negative other than HPI.       OBJECTIVE:    /86   Pulse 76   Ht 5' 9" (1.753 m)   Wt 106.3 kg (234 lb 3.8 oz)   BMI 34.59 kg/m²     PHYSICAL EXAMINATION:    GENERAL: Well appearing, in no acute distress, alert and oriented x3.  PSYCH:  Mood and affect appropriate.  SKIN: Skin color, texture, turgor normal, no rashes or lesions.  HEAD/FACE:  Normocephalic, atraumatic. Cranial nerves grossly intact.   NECK:  Tenderness palpation over the bilateral cervical paraspinous muscles.  Spurling's positive  bilaterally.  Limited range of motion secondary to pain in all planes.    PULM: No evidence of respiratory difficulty, symmetric chest rise.  GI:  Soft and non-tender.  BACK:  Surgical scarring evident.  Incision(s) CDI. No pain / TTP lumbar spine    EXTREMITIES: Peripheral joint ROM is full and pain free without obvious instability or laxity in all four extremities. No deformities, edema, or skin discoloration. Good capillary refill.    NEURO: Bilateral upper and lower extremity coordination and muscle stretch reflexes are physiologic and symmetric.  Plantar response are downgoing. No clonus.  No loss of sensation is noted.  GAIT:  Slow, antalgic.  General: alert and oriented, in no apparent distress  Gait: normal gait.  Skin: no rashes, no discoloration, no obvious lesions  HEENT: normocephalic, atraumatic. Pupils equal and round.  Cardiovascular: no significant peripheral edema present.  Respiratory: without use of accessory muscles of respiration.        Neuro - Upper Extremities:  - BUE Strength:R/L: D: 5/5; B: 5/5; T: 5/5; WF: 5/5; WE: 5/5; IO: 5/5  - Extremity Reflexes: Brisk and symmetric throughout  - Sensory: Sensation to light touch intact " bilaterally    Psych:  Mood and affect is appropriate      LABS:  Lab Results   Component Value Date    WBC 3.99 01/22/2024    HGB 10.4 (L) 01/22/2024    HCT 34.2 (L) 01/22/2024    MCV 73 (L) 01/22/2024     01/22/2024       CMP  Sodium   Date Value Ref Range Status   01/22/2024 141 136 - 145 mmol/L Final     Potassium   Date Value Ref Range Status   01/22/2024 5.0 3.5 - 5.1 mmol/L Final     Chloride   Date Value Ref Range Status   01/22/2024 112 (H) 95 - 110 mmol/L Final     CO2   Date Value Ref Range Status   01/22/2024 22 (L) 23 - 29 mmol/L Final     Glucose   Date Value Ref Range Status   01/22/2024 90 70 - 110 mg/dL Final     BUN   Date Value Ref Range Status   01/22/2024 11 8 - 23 mg/dL Final     Creatinine   Date Value Ref Range Status   01/22/2024 1.6 (H) 0.5 - 1.4 mg/dL Final     Calcium   Date Value Ref Range Status   01/22/2024 9.5 8.7 - 10.5 mg/dL Final     Total Protein   Date Value Ref Range Status   01/22/2024 7.5 6.0 - 8.4 g/dL Final     Albumin   Date Value Ref Range Status   01/22/2024 3.8 3.5 - 5.2 g/dL Final     Total Bilirubin   Date Value Ref Range Status   01/22/2024 0.4 0.1 - 1.0 mg/dL Final     Comment:     For infants and newborns, interpretation of results should be based  on gestational age, weight and in agreement with clinical  observations.    Premature Infant recommended reference ranges:  Up to 24 hours.............<8.0 mg/dL  Up to 48 hours............<12.0 mg/dL  3-5 days..................<15.0 mg/dL  6-29 days.................<15.0 mg/dL       Alkaline Phosphatase   Date Value Ref Range Status   01/22/2024 97 55 - 135 U/L Final     AST   Date Value Ref Range Status   01/22/2024 22 10 - 40 U/L Final     ALT   Date Value Ref Range Status   01/22/2024 19 10 - 44 U/L Final     Anion Gap   Date Value Ref Range Status   01/22/2024 7 (L) 8 - 16 mmol/L Final     eGFR if    Date Value Ref Range Status   06/02/2022 51.1 (A) >60 mL/min/1.73 m^2 Final     eGFR if non     Date Value Ref Range Status   06/02/2022 44.2 (A) >60 mL/min/1.73 m^2 Final     Comment:     Calculation used to obtain the estimated glomerular filtration  rate (eGFR) is the CKD-EPI equation.          Lab Results   Component Value Date    HGBA1C 5.8 (H) 01/22/2024             ASSESSMENT: 68 y.o. year old male with neck pain, consistent with     1. Cervical radiculopathy        2. Postlaminectomy syndrome of lumbar region                  PLAN:   - Interventions:  Repeat Cervical PIEDAD when needed    S/p C7/T1 IL PIEDAD 95% relief 3/12/2024  S/p right L4/5 +L5/S1 TF PIEDAD on 10/20/22 with 40% relief  S/p C7-T1 IL PIEDAD for diagnostic and therapeutic purposes with 60% relief    - Anticoagulation use: yes Aspirin and Plavix.       - Medications: I have stressed the importance of physical activity and a home exercise plan to help with pain and improve health. Patient can continue with medications for now since they are providing benefits, using them appropriately, and without side effects.       Continue Tizanidine 4 [facility] BID [facility].  Continue Gabapentin 600 mg BID per [facility].     report:  Reviewed and consistent with medication use as prescribed.      - Therapy:  Advised patient continue with home exercises as tolerated     - Imaging: Reviewed MRI cervical spine     - Consults/Referrals:  Continue follow up with Dr. Valencia      - Follow up visit: 3 months- Dr. Carmen       - Counseled patient regarding the importance of activity modification and physical therapy     - This condition does not require this patient to take time off of work, and the primary goal of our Pain Management services is to improve the patient's functional capacity.     - Patient Questions: Answered all of the patient's questions regarding diagnosis, therapy, and treatment       The above plan and management options were discussed at length with patient. Patient is in agreement with the above and verbalized understanding.      Nighat  Salome NP  Interventional Pain Management  [facility]

## 2024-04-09 ENCOUNTER — OFFICE VISIT (OUTPATIENT)
Dept: PAIN MEDICINE | Facility: CLINIC | Age: 69
End: 2024-04-09
Payer: MEDICARE

## 2024-04-09 VITALS
HEIGHT: 69 IN | BODY MASS INDEX: 34.69 KG/M2 | WEIGHT: 234.25 LBS | SYSTOLIC BLOOD PRESSURE: 130 MMHG | HEART RATE: 76 BPM | DIASTOLIC BLOOD PRESSURE: 86 MMHG

## 2024-04-09 DIAGNOSIS — M54.12 CERVICAL RADICULOPATHY: Primary | ICD-10-CM

## 2024-04-09 DIAGNOSIS — M96.1 POSTLAMINECTOMY SYNDROME OF LUMBAR REGION: ICD-10-CM

## 2024-04-09 PROCEDURE — 3075F SYST BP GE 130 - 139MM HG: CPT | Mod: HCNC,CPTII,S$GLB, | Performed by: NURSE PRACTITIONER

## 2024-04-09 PROCEDURE — 3288F FALL RISK ASSESSMENT DOCD: CPT | Mod: HCNC,CPTII,S$GLB, | Performed by: NURSE PRACTITIONER

## 2024-04-09 PROCEDURE — 3044F HG A1C LEVEL LT 7.0%: CPT | Mod: HCNC,CPTII,S$GLB, | Performed by: NURSE PRACTITIONER

## 2024-04-09 PROCEDURE — 99213 OFFICE O/P EST LOW 20 MIN: CPT | Mod: HCNC,S$GLB,, | Performed by: NURSE PRACTITIONER

## 2024-04-09 PROCEDURE — 3008F BODY MASS INDEX DOCD: CPT | Mod: HCNC,CPTII,S$GLB, | Performed by: NURSE PRACTITIONER

## 2024-04-09 PROCEDURE — 1159F MED LIST DOCD IN RCRD: CPT | Mod: HCNC,CPTII,S$GLB, | Performed by: NURSE PRACTITIONER

## 2024-04-09 PROCEDURE — 1125F AMNT PAIN NOTED PAIN PRSNT: CPT | Mod: HCNC,CPTII,S$GLB, | Performed by: NURSE PRACTITIONER

## 2024-04-09 PROCEDURE — 1101F PT FALLS ASSESS-DOCD LE1/YR: CPT | Mod: HCNC,CPTII,S$GLB, | Performed by: NURSE PRACTITIONER

## 2024-04-09 PROCEDURE — 3079F DIAST BP 80-89 MM HG: CPT | Mod: HCNC,CPTII,S$GLB, | Performed by: NURSE PRACTITIONER

## 2024-04-09 PROCEDURE — 99999 PR PBB SHADOW E&M-EST. PATIENT-LVL V: CPT | Mod: PBBFAC,HCNC,, | Performed by: NURSE PRACTITIONER

## 2024-04-18 DIAGNOSIS — J45.901 ASTHMATIC BRONCHITIS WITH ACUTE EXACERBATION, UNSPECIFIED ASTHMA SEVERITY, UNSPECIFIED WHETHER PERSISTENT: ICD-10-CM

## 2024-04-18 RX ORDER — FLUTICASONE FUROATE, UMECLIDINIUM BROMIDE AND VILANTEROL TRIFENATATE 200; 62.5; 25 UG/1; UG/1; UG/1
1 POWDER RESPIRATORY (INHALATION) DAILY
Qty: 60 EACH | Refills: 5 | Status: SHIPPED | OUTPATIENT
Start: 2024-04-18

## 2024-04-23 ENCOUNTER — HOSPITAL ENCOUNTER (OUTPATIENT)
Dept: RADIOLOGY | Facility: HOSPITAL | Age: 69
Discharge: HOME | End: 2024-04-23
Attending: PHYSICIAN ASSISTANT
Payer: MEDICARE

## 2024-04-23 DIAGNOSIS — R06.02 SHORTNESS OF BREATH: ICD-10-CM

## 2024-04-23 DIAGNOSIS — M54.16 LUMBAR RADICULOPATHY, CHRONIC: ICD-10-CM

## 2024-04-23 PROCEDURE — 93970 EXTREMITY STUDY: CPT | Mod: TC

## 2024-04-23 PROCEDURE — 93970 EXTREMITY STUDY: CPT | Mod: 26,,, | Performed by: RADIOLOGY

## 2024-05-02 DIAGNOSIS — Z95.2 S/P AVR (AORTIC VALVE REPLACEMENT): Primary | ICD-10-CM

## 2024-05-02 DIAGNOSIS — I73.9 PAD (PERIPHERAL ARTERY DISEASE): ICD-10-CM

## 2024-05-03 ENCOUNTER — HOSPITAL ENCOUNTER (OUTPATIENT)
Dept: CARDIOLOGY | Facility: HOSPITAL | Age: 69
Discharge: HOME | End: 2024-05-03
Attending: INTERNAL MEDICINE
Payer: MEDICARE

## 2024-05-03 ENCOUNTER — OFFICE VISIT (OUTPATIENT)
Dept: CARDIOLOGY | Facility: CLINIC | Age: 69
End: 2024-05-03
Payer: MEDICARE

## 2024-05-03 VITALS
OXYGEN SATURATION: 98 % | HEIGHT: 69 IN | RESPIRATION RATE: 16 BRPM | HEART RATE: 82 BPM | BODY MASS INDEX: 35 KG/M2 | SYSTOLIC BLOOD PRESSURE: 109 MMHG | DIASTOLIC BLOOD PRESSURE: 74 MMHG | WEIGHT: 236.31 LBS

## 2024-05-03 DIAGNOSIS — I25.10 CORONARY ARTERY DISEASE INVOLVING NATIVE CORONARY ARTERY OF NATIVE HEART WITHOUT ANGINA PECTORIS: Chronic | ICD-10-CM

## 2024-05-03 DIAGNOSIS — I73.9 PAD (PERIPHERAL ARTERY DISEASE): ICD-10-CM

## 2024-05-03 DIAGNOSIS — N18.32 STAGE 3B CHRONIC KIDNEY DISEASE: ICD-10-CM

## 2024-05-03 DIAGNOSIS — I70.0 AORTO-ILIAC ATHEROSCLEROSIS: ICD-10-CM

## 2024-05-03 DIAGNOSIS — Z95.2 S/P AVR (AORTIC VALVE REPLACEMENT): ICD-10-CM

## 2024-05-03 DIAGNOSIS — I70.8 AORTO-ILIAC ATHEROSCLEROSIS: ICD-10-CM

## 2024-05-03 DIAGNOSIS — I50.32 CHRONIC DIASTOLIC HEART FAILURE: Primary | ICD-10-CM

## 2024-05-03 DIAGNOSIS — I10 PRIMARY HYPERTENSION: Chronic | ICD-10-CM

## 2024-05-03 DIAGNOSIS — I35.0 NONRHEUMATIC AORTIC VALVE STENOSIS: ICD-10-CM

## 2024-05-03 DIAGNOSIS — I42.8 OTHER CARDIOMYOPATHY: ICD-10-CM

## 2024-05-03 PROCEDURE — 3044F HG A1C LEVEL LT 7.0%: CPT | Mod: HCNC,CPTII,S$GLB, | Performed by: INTERNAL MEDICINE

## 2024-05-03 PROCEDURE — 3008F BODY MASS INDEX DOCD: CPT | Mod: HCNC,CPTII,S$GLB, | Performed by: INTERNAL MEDICINE

## 2024-05-03 PROCEDURE — 99999 PR PBB SHADOW E&M-EST. PATIENT-LVL V: CPT | Mod: PBBFAC,HCNC,, | Performed by: INTERNAL MEDICINE

## 2024-05-03 PROCEDURE — 1160F RVW MEDS BY RX/DR IN RCRD: CPT | Mod: HCNC,CPTII,S$GLB, | Performed by: INTERNAL MEDICINE

## 2024-05-03 PROCEDURE — 3078F DIAST BP <80 MM HG: CPT | Mod: HCNC,CPTII,S$GLB, | Performed by: INTERNAL MEDICINE

## 2024-05-03 PROCEDURE — 3288F FALL RISK ASSESSMENT DOCD: CPT | Mod: HCNC,CPTII,S$GLB, | Performed by: INTERNAL MEDICINE

## 2024-05-03 PROCEDURE — 1159F MED LIST DOCD IN RCRD: CPT | Mod: HCNC,CPTII,S$GLB, | Performed by: INTERNAL MEDICINE

## 2024-05-03 PROCEDURE — 93005 ELECTROCARDIOGRAM TRACING: CPT | Mod: HCNC

## 2024-05-03 PROCEDURE — 3074F SYST BP LT 130 MM HG: CPT | Mod: HCNC,CPTII,S$GLB, | Performed by: INTERNAL MEDICINE

## 2024-05-03 PROCEDURE — 99214 OFFICE O/P EST MOD 30 MIN: CPT | Mod: HCNC,25,S$GLB, | Performed by: INTERNAL MEDICINE

## 2024-05-03 PROCEDURE — 1101F PT FALLS ASSESS-DOCD LE1/YR: CPT | Mod: HCNC,CPTII,S$GLB, | Performed by: INTERNAL MEDICINE

## 2024-05-03 PROCEDURE — 93010 ELECTROCARDIOGRAM REPORT: CPT | Mod: HCNC,,, | Performed by: INTERNAL MEDICINE

## 2024-05-03 NOTE — PROGRESS NOTES
Subjective:   Patient ID:  Abdullahi Urias is a 68 y.o. male who presents for follow up of No chief complaint on file.      65 yo male, came in for 6 m f/u  PMH s/p bioprosthetic AVR at [facility] in 2014 Salazar [id number] 25 mm pericardial tissue valve, nonobstructive CAD s/p LHC in  nonobstructive, 20% midLAD and RCA by Dr. quiroz, PAD, h/o stroke in 2012, HTN, HLD CHEO on CPAP remote h/o cocaine in 2012, quit drinking in 2012 and no smoking. H/o prostate cancer in 2018,   PAD on plavix allergic to pletal and  leg pain from neuropathy.    MPI in 2012 small apical infarct  ekg in 2018 NSR LVH with 2nd stt change  BNP in 2016 was 37  ECHO in  normal EF, perivalvular AI and s/p AVR mean G 16 mmHG and peak V 2.85    07/2021 visit  Limited exercise due to hip pain. C/o arm numbness. Sweating at rest.    Fin[facility] XRT for prostate cancer and [facility] [place] [facility]  BIRD when climbing the stairs over 6 months, and episode of syncope after got out the bed. Woke up quickly  V/Q scan in  low risk for PE and d dimer 1.8     visit  H/O COVID 19 infection on 12/30/2021 and had AB infusion at [facility].   Hands and legs numbness for few months. Occasional chest discomfort and ingestion. Lower back pain.   Some calf pain and left foot swelling. The burning sensation worse below the knee. Some mild numbness and pain at thigh, L> R. occred at rest and with exertion.      visit  eval by Dr Kamara at vascular medicine clinic in  and r/o PAD related leg pain. Had uncomplicated CA travel.   Leg pain controlled and f/u at pain clinic  Plan to have dental work. No chest pain. SOB chronic stable. A lot of GERD.   On plavix for PAD and allergic to pletal     visit  The leg pain controlled now, and f/u at pain clinic on steroid injection  Some GERD burning. No SOB palpitation dizziness and leg swelling. Some exercise aerobic daily and gym work   No  smoking/drinking now. LDL 70    04/23 visit  Plan to have lumbar spinal procedure done by Dr. Valencia.  H/o nonobstructive CAD and s/p bioprosthetic AVR in 2014  Repeat echo on  EF nl, s/p AVR functioning well  SOB improved after trilicity rx  No chest pain dizziness palpitation and leg swelling. No active bleeding   Ekg today NSR TWI on alli inferior leads    10/23 visit  05/23 s/p lumbar fusion procedure and developed wound abscess in 07/23 Rx of Abx for 6 weeks  F/u pulm for SOB on triligy Rx    Interval history  11/23 echo showed EF nml. S/p tissue AVR. Well seated.  04/24 LE venous US showed no DVT  No chest pain dizziness faint orthopnea. Does gym exercise.  Bottom of the fee pain at exertion  LDL 80. BP A1c controlled  Occasional hemorrhoid bleeding [facility]  EKG reviewed by myself today reveals NSR nonspecific STT change                Past Medical History:   Diagnosis Date    [facility]    Asthma     BPH (benign prostatic hyperplasia)     CAD (coronary artery disease)     Cardiomyopathy     CHF (congestive heart failure)     Chronic hoarseness     vocal cord surg    Chronic pain     CKD (chronic kidney disease) stage 3, GFR 30-59 ml/min     CVA (cerebral infarction)     8/2012 olol; reviewed ed note    Ex-smoker     GERD (gastroesophageal reflux disease)     Hepatitis C     treatedharvoni says cured, RNA NEG 6/2020    Hypertension     Pancreatitis     Prostate cancer     Prostate cancer     Prostate cancer     PVD (peripheral vascular disease)     Renal insufficiency     Substance abuse     cocaine, etoh , tob in past       Past Surgical History:   Procedure Laterality Date    AORTIC VALVE REPLACEMENT  05/19/2014    Tissue valve replacement    BACK SURGERY      CARDIAC CATHETERIZATION      COLONOSCOPY  2011    COLONOSCOPY N/A 2/24/2021    Procedure: COLONOSCOPY;  Surgeon: Faith Carrillo MD;  Location: [facility] ENDO;  Service: Endoscopy;   Laterality: N/A;    EPIDURAL STEROID INJECTION INTO CERVICAL SPINE N/A 6/21/2022    Procedure: C7-T1 IL PIEDAD;  Surgeon: Nehemiah Carmen MD;  Location: [facility] PAIN MGT;  Service: Pain Management;  Laterality: N/A;    EPIDURAL STEROID INJECTION INTO CERVICAL SPINE N/A 3/12/2024    Procedure: C7/T1 IL PIEDAD;  Surgeon: Nehemiah Carmen MD;  Location: [facility] PAIN MGT;  Service: Pain Management;  Laterality: N/A;    ESOPHAGOGASTRODUODENOSCOPY N/A 2/24/2021    Procedure: ESOPHAGOGASTRODUODENOSCOPY (EGD);  Surgeon: Faith Carrillo MD;  Location: [facility] ENDO;  Service: Endoscopy;  Laterality: N/A;    FOOT SURGERY      INSERTION N/A 7/5/2023    Procedure: INSERTION;  Surgeon: Brandon Valencia MD;  Location: [facility] OR;  Service: Neurosurgery;  Laterality: N/A;  Antibiotic Beads    LEFT HEART CATHETERIZATION Left 7/24/2020    Procedure: CATHETERIZATION, HEART, LEFT;  Surgeon: Pasha Martin MD;  Location: [facility] CATH LAB;  Service: Cardiology;  Laterality: Left;    PENILE PROSTHESIS IMPLANT      PENILE PROSTHESIS REVISION  04/30/2018    PROSTATECT[facility]Y  09/[facility]    [facility]    radiation for prostate      REMOVAL OF HARDWARE FROM SPINE N/A 7/5/2023    Procedure: REMOVAL, HARDWARE, SPINE;  Surgeon: Brandon Valencia MD;  Location: [facility] OR;  Service: Neurosurgery;  Laterality: N/A;    REPLACEMENT N/A 7/5/2023    Procedure: REPLACEMENT;  Surgeon: Brandon Valencia MD;  Location: [facility] OR;  Service: Neurosurgery;  Laterality: N/A;  of Sandoval and Screws. [employer]    TRANSFORAMINAL EPIDURAL INJECTION OF STEROID Right 10/20/2022    Procedure: Right  L4/5 + L5/S1 TF PIEDAD RN IV Sedation;  Surgeon: Nehemiah Carmen MD;  Location: [facility] PAIN MGT;  Service: Pain Management;  Laterality: Right;    TRANSFORAMINAL LUMBAR INTERBODY FUSION (TLIF) USING COMPUTER-ASSISTED NAVIGATION Bilateral 5/17/2023    Procedure: FUSION, SPINE, LUMBAR, TLIF, USING COMPUTER-ASSISTED NAVIGATION;  Surgeon: Brandon Valencia MD;  Location: [facility] OR;   "Service: Neurosurgery;  Laterality: Bilateral;  2 level lumbar fusion at L4-5 and L5-S1    UPPER GASTROINTESTINAL ENDOSCOPY      WASHOUT N/A 2023    Procedure: WASHOUT;  Surgeon: Brandon Valencia MD;  Location: [facility] OR;  Service: Neurosurgery;  Laterality: N/A;    WOUND EXPLORATION Bilateral 2023    Procedure: EXPLORATION, WOUND;  Surgeon: Brandon Valencia MD;  Location: [facility] OR;  Service: Neurosurgery;  Laterality: Bilateral;       Social History     Tobacco Use    Smoking status: Former     Current packs/day: 0.00     Average packs/day: 1 pack/day for 8.0 years (8.0 ttl pk-yrs)     Types: Cigarettes     Start date: 2004     Quit date: 2012     Years since quittin.6    Smokeless tobacco: Never   Substance Use Topics    Alcohol use: Not Currently     Comment: Sober since 2012    Drug use: Not Currently     Types: "Crack" cocaine, Marijuana     Comment: Quit in        Family History   Problem Relation Name Age of Onset    Heart disease Mother      Heart disease Father      Heart attack Sister      Heart attack Brother      Colon cancer Neg Hx      Colon polyps Neg Hx      Liver cancer Neg Hx      Inflammatory bowel disease Neg Hx      Liver disease Neg Hx      Rectal cancer Neg Hx      Stomach cancer Neg Hx      Ulcerative colitis Neg Hx           ROS    Objective:   Physical Exam  HENT:      Head: Normocephalic.   Eyes:      Pupils: Pupils are equal, round, and reactive to light.   Neck:      Thyroid: No thyromegaly.      Vascular: Normal carotid pulses. No carotid bruit or JVD.   Cardiovascular:      Rate and Rhythm: Normal rate and regular rhythm. No extrasystoles are present.     Chest Wall: PMI is not displaced.      Pulses: Normal pulses.      Heart sounds: Murmur (ESM + on RUSB ) heard.      No gallop. No S3 sounds.   Pulmonary:      Effort: No respiratory distress.      Breath sounds: Normal breath sounds. No stridor.   Abdominal:      General: Bowel " sounds are normal.      Palpations: Abdomen is soft.      Tenderness: There is no abdominal tenderness. There is no rebound.   Musculoskeletal:         General: Normal range of motion.   Skin:     Findings: No rash.   Neurological:      Mental Status: He is alert and oriented to person, place, and time.   Psychiatric:         Behavior: Behavior normal.       Lab Results   Component Value Date    CHOL 137 01/22/2024    CHOL 123 01/18/2022    CHOL 134 10/22/2021     Lab Results   Component Value Date    HDL 44 01/22/2024    HDL 39 (L) 01/18/2022    HDL 44 10/22/2021     Lab Results   Component Value Date    LDLCALC 79.6 01/22/2024    LDLCALC 72.6 01/18/2022    LDLCALC 78.8 10/22/2021     Lab Results   Component Value Date    TRIG 67 01/22/2024    TRIG 57 01/18/2022    TRIG 56 10/22/2021     Lab Results   Component Value Date    CHOLHDL 32.1 01/22/2024    CHOLHDL 31.7 01/18/2022    CHOLHDL 32.8 10/22/2021       Chemistry        Component Value Date/Time     01/22/2024 1010    K 5.0 01/22/2024 1010     (H) 01/22/2024 1010    CO2 22 (L) 01/22/2024 1010    BUN 11 01/22/2024 1010    CREATININE 1.6 (H) 01/22/2024 1010    GLU 90 01/22/2024 1010        Component Value Date/Time    CALCIUM 9.5 01/22/2024 1010    ALKPHOS 97 01/22/2024 1010    AST 22 01/22/2024 1010    ALT 19 01/22/2024 1010    BILITOT 0.4 01/22/2024 1010    ESTGFRAFRICA 51.1 (A) 06/02/2022 1246    EGFRNONAA 44.2 (A) 06/02/2022 1246          Lab Results   Component Value Date    HGBA1C 5.8 (H) 01/22/2024     Lab Results   Component Value Date    TSH 3.370 06/30/2020     Lab Results   Component Value Date    INR 1.1 07/04/2023    INR 1.0 04/13/2023    INR 1.0 01/24/2023     Lab Results   Component Value Date    WBC 3.99 01/22/2024    HGB 10.4 (L) 01/22/2024    HCT 34.2 (L) 01/22/2024    MCV 73 (L) 01/22/2024     01/22/2024     BMP  Sodium   Date Value Ref Range Status   01/22/2024 141 136 - 145 mmol/L Final     Potassium   Date Value Ref Range  Status   01/22/2024 5.0 3.5 - 5.1 mmol/L Final     Chloride   Date Value Ref Range Status   01/22/2024 112 (H) 95 - 110 mmol/L Final     CO2   Date Value Ref Range Status   01/22/2024 22 (L) 23 - 29 mmol/L Final     BUN   Date Value Ref Range Status   01/22/2024 11 8 - 23 mg/dL Final     Creatinine   Date Value Ref Range Status   01/22/2024 1.6 (H) 0.5 - 1.4 mg/dL Final     Calcium   Date Value Ref Range Status   01/22/2024 9.5 8.7 - 10.5 mg/dL Final     Anion Gap   Date Value Ref Range Status   01/22/2024 7 (L) 8 - 16 mmol/L Final     eGFR if    Date Value Ref Range Status   06/02/2022 51.1 (A) >60 mL/min/1.73 m^2 Final     eGFR if non    Date Value Ref Range Status   06/02/2022 44.2 (A) >60 mL/min/1.73 m^2 Final     Comment:     Calculation used to obtain the estimated glomerular filtration  rate (eGFR) is the CKD-EPI equation.        BNP  @LABRCNTIP(BNP,BNPTRIAGEBLO)@  @LABRCNTIP(troponini)@  CrCl cannot be calculated (Patient's most recent lab result is older than the maximum 7 days allowed.).  No results found in the last 24 hours.  No results found in the last 24 hours.  No results found in the last 24 hours.    Assessment:      1. Chronic diastolic heart failure    2. Nonrheumatic aortic valve stenosis    3. Aorto-iliac atherosclerosis    4. Coronary artery disease involving native coronary artery of native heart without angina pectoris    5. Other cardiomyopathy    6. Primary hypertension    7. S/P AVR (aortic valve replacement) biopresthetic miller 25 mm in 2014     8. Stage 3b chronic kidney disease        Plan:   Continue asa Plavix statin coreg losartan    Counseled DASH  Check Lipid profile with PCP in 6 months  Recommend heart-healthy diet, weight control and regular exercise.  Wilbur. Risk modification.   I have reviewed all pertinent labs and cardiac studies independently. Plans and recommendations have been formulated under my direct supervision. All questions  answered and patient voiced understanding.   If symptoms persist go to the ED  RTC in 6 months

## 2024-05-04 LAB
OHS QRS DURATION: 106 MS
OHS QTC CALCULATION: 457 MS

## 2024-05-08 ENCOUNTER — HOSPITAL ENCOUNTER (OUTPATIENT)
Dept: RADIOLOGY | Facility: HOSPITAL | Age: 69
Discharge: HOME | End: 2024-05-08
Attending: NEUROLOGICAL SURGERY
Payer: MEDICARE

## 2024-05-08 DIAGNOSIS — M48.062 LUMBAR STENOSIS WITH NEUROGENIC CLAUDICATION: ICD-10-CM

## 2024-05-08 PROCEDURE — 72100 X-RAY EXAM L-S SPINE 2/3 VWS: CPT | Mod: 26,HCNC,, | Performed by: RADIOLOGY

## 2024-05-08 PROCEDURE — 72100 X-RAY EXAM L-S SPINE 2/3 VWS: CPT | Mod: TC,HCNC

## 2024-05-23 ENCOUNTER — HOSPITAL ENCOUNTER (OUTPATIENT)
Dept: PREADMISSION TESTING | Facility: HOSPITAL | Age: 69
Discharge: HOME | End: 2024-05-23
Attending: PHYSICIAN ASSISTANT
Payer: MEDICARE

## 2024-05-23 ENCOUNTER — OFFICE VISIT (OUTPATIENT)
Dept: INTERNAL MEDICINE | Facility: CLINIC | Age: 69
End: 2024-05-23
Payer: MEDICARE

## 2024-05-23 VITALS
WEIGHT: 237.44 LBS | HEIGHT: 69 IN | BODY MASS INDEX: 35.17 KG/M2 | OXYGEN SATURATION: 97 % | TEMPERATURE: 97 F | HEART RATE: 78 BPM | SYSTOLIC BLOOD PRESSURE: 110 MMHG | DIASTOLIC BLOOD PRESSURE: 78 MMHG

## 2024-05-23 DIAGNOSIS — K92.1 HEMATOCHEZIA: ICD-10-CM

## 2024-05-23 DIAGNOSIS — K43.2 INCISIONAL HERNIA, WITHOUT OBSTRUCTION OR GANGRENE: ICD-10-CM

## 2024-05-23 DIAGNOSIS — K21.9 GASTROESOPHAGEAL REFLUX DISEASE, UNSPECIFIED WHETHER ESOPHAGITIS PRESENT: ICD-10-CM

## 2024-05-23 DIAGNOSIS — K92.1 HEMATOCHEZIA: Primary | ICD-10-CM

## 2024-05-23 DIAGNOSIS — D64.9 ACUTE ANEMIA: Primary | ICD-10-CM

## 2024-05-23 DIAGNOSIS — R61 NIGHT SWEAT: ICD-10-CM

## 2024-05-23 DIAGNOSIS — R53.83 FATIGUE, UNSPECIFIED TYPE: ICD-10-CM

## 2024-05-23 PROCEDURE — 1159F MED LIST DOCD IN RCRD: CPT | Mod: CPTII,S$GLB,, | Performed by: PHYSICIAN ASSISTANT

## 2024-05-23 PROCEDURE — G2211 COMPLEX E/M VISIT ADD ON: HCPCS | Mod: S$GLB,,, | Performed by: PHYSICIAN ASSISTANT

## 2024-05-23 PROCEDURE — 1101F PT FALLS ASSESS-DOCD LE1/YR: CPT | Mod: CPTII,S$GLB,, | Performed by: PHYSICIAN ASSISTANT

## 2024-05-23 PROCEDURE — 3044F HG A1C LEVEL LT 7.0%: CPT | Mod: CPTII,S$GLB,, | Performed by: PHYSICIAN ASSISTANT

## 2024-05-23 PROCEDURE — 99999 PR PBB SHADOW E&M-EST. PATIENT-LVL V: CPT | Mod: PBBFAC,HCNC,, | Performed by: PHYSICIAN ASSISTANT

## 2024-05-23 PROCEDURE — 3074F SYST BP LT 130 MM HG: CPT | Mod: CPTII,S$GLB,, | Performed by: PHYSICIAN ASSISTANT

## 2024-05-23 PROCEDURE — 99214 OFFICE O/P EST MOD 30 MIN: CPT | Mod: S$GLB,,, | Performed by: PHYSICIAN ASSISTANT

## 2024-05-23 PROCEDURE — 3008F BODY MASS INDEX DOCD: CPT | Mod: CPTII,S$GLB,, | Performed by: PHYSICIAN ASSISTANT

## 2024-05-23 PROCEDURE — 3288F FALL RISK ASSESSMENT DOCD: CPT | Mod: CPTII,S$GLB,, | Performed by: PHYSICIAN ASSISTANT

## 2024-05-23 PROCEDURE — 1160F RVW MEDS BY RX/DR IN RCRD: CPT | Mod: CPTII,S$GLB,, | Performed by: PHYSICIAN ASSISTANT

## 2024-05-23 PROCEDURE — 3078F DIAST BP <80 MM HG: CPT | Mod: CPTII,S$GLB,, | Performed by: PHYSICIAN ASSISTANT

## 2024-05-23 RX ORDER — POLYETHYLENE GLYCOL 3350, SODIUM SULFATE ANHYDROUS, SODIUM BICARBONATE, SODIUM CHLORIDE, POTASSIUM CHLORIDE 236; 22.74; 6.74; 5.86; 2.97 G/4L; G/4L; G/4L; G/4L; G/4L
4 POWDER, FOR SOLUTION ORAL ONCE
Qty: 4000 ML | Refills: 0 | Status: SHIPPED | OUTPATIENT
Start: 2024-05-23 | End: 2024-05-23

## 2024-05-23 NOTE — PROGRESS NOTES
Subjective:      Patient ID: Abdullahi Urias is a 68 y.o. male.    Chief Complaint: Follow-up (Pt present today to address low iron )    HPI  [facility] and he was told that his iron was very low. Pt reports fatigue recently. Excessive daytime sleepiness for 3-4 weeks. Using cpap at night. Some shortness of breath. Craves cold water.   He does have some BRB in stool on occasion and sometimes a lot of blood but pt assuming it is coming from known hemorrhoids. Occasional dark in stool but attributed that to dark berries that he eats.   Weight stable.   Admits to night sweats.   Worsening GERD. Takes nexium twice daily.     Hernia above his umbilicus which has been there for a while but getting more painful recently. Previous prostate surgery incisional hernia. Intermittently causes significant pain.   Wt Readings from Last 3 Encounters:   05/23/24 1019 107.7 kg (237 lb 7 oz)   05/03/24 1022 107.2 kg (236 lb 5.3 oz)   04/09/24 1025 106.3 kg (234 lb 3.8 oz)      Patient Active Problem List   Diagnosis    Aortic stenosis    ED (erectile dysfunction)    Asthmatic bronchitis    Hypertension    CAD (coronary artery disease)    BPH (benign prostatic hyperplasia)    Chronic back pain    Cardiomyopathy    Class 2 severe obesity due to excess calories with serious comorbidity and body mass index (BMI) of 38.0 to 38.9 in adult    Old peripheral tear of lateral meniscus of right knee    Arthritis of right knee    Chondromalacia, right knee    Penetrating foreign body of skin of right knee    Chronic gout    CHEO on CPAP    History of CVA in adulthood    Prostate cancer    S/P AVR (aortic valve replacement) biopresthetic miller 25 mm in 2014     EKG abnormalities    Stage 3b chronic kidney disease    History of hepatitis C    Pain in both lower extremities    GERD (gastroesophageal reflux disease)    Personal history of colonic polyps    Localized swelling of left foot     History of prostate cancer    PAD (peripheral artery disease)    Aorto-iliac atherosclerosis    Moderate persistent asthma without complication    Former tobacco use    Synovial cyst of lumbar spine    Lumbar radiculopathy, chronic    Lumbar stenosis with neurogenic claudication    Post-operative wound abscess    SOB (shortness of breath)    ABLA (acute blood loss anemia)    Secondary hyperparathyroidism of renal origin    Moderate episode of recurrent major depressive disorder    Chronic bilateral low back pain without sciatica    Decreased ROM of lumbar spine    Prediabetes    Cocaine dependence in remission    Preop cardiovascular exam    Chronic diastolic heart failure         Current Outpatient Medications:     albuterol (PROVENTIL) 2.5 mg /3 mL (0.083 %) nebulizer solution, Take 3 mLs (2.5 mg total) by nebulization every 6 (six) hours as needed for Wheezing. Rescue, Disp: 180 mL, Rfl: 1    albuterol (PROVENTIL/VENTOLIN HFA) 90 mcg/actuation inhaler, INHALE 2 PUFFS INTO THE LUNGS EVERY 6 HOURS AS NEEDED FOR COUGH, Disp: 8.5 g, Rfl: 3    allopurinoL (ZYLOPRIM) 100 MG tablet, Take 100 mg by mouth once daily. Takes 0.5 tab, Disp: , Rfl:     ALPRAZolam (XANAX) 1 MG tablet, Take 1 mg by mouth Daily. EVERY OTHER DAY, Disp: , Rfl:     aluminum & magnesium hydroxide-simethicone (MYLANTA MAX STRENGTH) 400-400-40 mg/5 mL suspension, TAKE 15ML BY MOUTH FOUR TIMES A DAY AS NEEDED FOR STOMACH ACID, Disp: , Rfl:     aspirin (ECOTRIN) 81 MG EC tablet, Take 1 tablet (81 mg total) by mouth once daily., Disp: 90 tablet, Rfl: 3    atorvastatin (LIPITOR) 80 MG tablet, TAKE ONE-HALF TABLET BY MOUTH EVERY DAY FOR CHOLESTEROL, Disp: , Rfl:     carvediloL (COREG) 12.5 MG tablet, Take 1 tablet (12.5 mg total) by mouth 2 (two) times daily., Disp: 60 tablet, Rfl: 11    clopidogreL (PLAVIX) 75 mg tablet, Take 1 tablet (75 mg total) by mouth once daily., Disp: 30 tablet, Rfl: 11    docusate sodium (COLACE) 100 MG capsule, Take 1 capsule  (100 mg total) by mouth 2 (two) times daily., Disp: 20 capsule, Rfl: 0    esomeprazole (NEXIUM) 40 MG capsule, Take 40 mg by mouth before breakfast., Disp: , Rfl:     famotidine (PEPCID) 40 MG tablet, TAKE ONE TABLET BY MOUTH AT BEDTIME FOR ACID REFLUX, Disp: , Rfl:     fluticasone-umeclidin-vilanter (TRELEGY ELLIPTA) 200-62.5-25 mcg inhaler, Inhale 1 puff into the lungs once daily., Disp: 60 each, Rfl: 5    gabapentin (NEURONTIN) 600 MG tablet, Take 1 tablet (600 mg total) by mouth 3 (three) times daily., Disp: 90 tablet, Rfl: 11    guaiFENesin (MUCINEX) 600 mg 12 hr tablet, Take 2 tablets (1,200 mg total) by mouth 2 (two) times daily., Disp: 20 tablet, Rfl: 0    hydrocortisone 2.5 % cream, Apply topically 2 (two) times daily as needed. Apply to affected areas of the face and neck., Disp: 30 g, Rfl: 2    hydrOXYzine HCL (ATARAX) 25 MG tablet, Take 1 tablet (25 mg total) by mouth 3 (three) times daily as needed for Itching., Disp: 30 tablet, Rfl: 0    ipratropium (ATROVENT) 21 mcg (0.03 %) nasal spray, 2 sprays by Each Nostril route 2 (two) times daily., Disp: 30 mL, Rfl: 0    LIDOcaine (LIDODERM) 5 %, APPLY 1 PATCH TOPICALLY EVERY DAY FOR PAIN. WEAR FOR 12 HOURS, THEN REMOVE. DO NOT APPLY NEW PATCH FOR AT LEAST 12 HOURS., Disp: , Rfl:     losartan (COZAAR) 50 MG tablet, Take 1 tablet (50 mg total) by mouth once daily., Disp: 30 tablet, Rfl: 11    multivitamin (THERAGRAN) per tablet, Take 1 tablet by mouth once daily., Disp: , Rfl:     oxyCODONE-acetaminophen (PERCOCET)  mg per tablet, Take 1 tablet by mouth every 8 (eight) hours as needed for Pain., Disp: 21 tablet, Rfl: 0    sertraline (ZOLOFT) 100 MG tablet, TAKE TWO TABLETS BY MOUTH EVERY DAY FOR MENTAL HEALTH DOSE INCREASED TO 200MG/DAY, Disp: , Rfl:     simethicone (MYLICON) 80 MG chewable tablet, Take 80 mg by mouth every 6 (six) hours as needed for Flatulence., Disp: , Rfl:     sucralfate (CARAFATE) 100 mg/mL suspension, Take 1 g by mouth 4 (four)  times daily. , Disp: , Rfl:     tiZANidine (ZANAFLEX) 4 MG tablet, Take 1 capsule by mouth 2 (two) times daily as needed (muscle spasms)., Disp: , Rfl:     zolpidem (AMBIEN CR) 12.5 MG CR tablet, Take 1 tablet by mouth nightly as needed., Disp: , Rfl:   No current facility-administered medications for this visit.    Facility-Administered Medications Ordered in Other Visits:     ondansetron injection 4 mg, 4 mg, Intravenous, Once PRN, Nehemiah Carmen MD    ondansetron injection 4 mg, 4 mg, Intravenous, Once PRN, Nehemiah Carmen MD    sodium chloride 0.9% flush 10 mL, 10 mL, Intravenous, PRN, Wilda Horne MD    Review of Systems   Constitutional:  Positive for fatigue. Negative for activity change, appetite change, chills, diaphoresis, fever and unexpected weight change.   HENT: Negative.  Negative for congestion, hearing loss, postnasal drip, rhinorrhea, sore throat, trouble swallowing and voice change.    Eyes: Negative.  Negative for visual disturbance.   Respiratory: Negative.  Negative for cough, choking, chest tightness and shortness of breath.    Cardiovascular:  Negative for chest pain, palpitations and leg swelling.   Gastrointestinal:  Positive for abdominal distention, abdominal pain, anal bleeding, blood in stool and nausea. Negative for constipation, diarrhea, rectal pain and vomiting.   Endocrine: Negative for cold intolerance, heat intolerance, polydipsia and polyuria.   Genitourinary: Negative.  Negative for difficulty urinating and frequency.   Musculoskeletal:  Negative for arthralgias, back pain, gait problem, joint swelling and myalgias.   Skin:  Negative for color change, pallor, rash and wound.   Neurological:  Negative for dizziness, tremors, weakness, light-headedness, numbness and headaches.   Hematological:  Negative for adenopathy.   Psychiatric/Behavioral:  Positive for sleep disturbance (excessive daytime sleepiness). Negative for behavioral problems, confusion, self-injury  "and suicidal ideas. The patient is not nervous/anxious.      Objective:   /78 (BP Location: Left arm, Patient Position: Sitting, BP Method: Medium (Manual))   Pulse 78   Temp 97.3 °F (36.3 °C) (Tympanic)   Ht 5' 9" (1.753 m)   Wt 107.7 kg (237 lb 7 oz)   SpO2 97%   BMI 35.06 kg/m²     Physical Exam  Vitals and nursing note reviewed.   Constitutional:       General: He is not in acute distress.     Appearance: Normal appearance. He is well-developed. He is not ill-appearing, toxic-appearing or diaphoretic.   HENT:      Head: Normocephalic and atraumatic.   Cardiovascular:      Rate and Rhythm: Normal rate and regular rhythm.      Heart sounds: Normal heart sounds. No murmur heard.     No friction rub. No gallop.   Pulmonary:      Effort: Pulmonary effort is normal. No respiratory distress.      Breath sounds: Normal breath sounds. No wheezing or rales.   Abdominal:      General: Bowel sounds are increased. There is distension.      Palpations: Abdomen is rigid.      Tenderness: There is abdominal tenderness.      Hernia: A hernia is present. Hernia is present in the ventral area.       Skin:     General: Skin is warm.      Findings: No rash.   Neurological:      Mental Status: He is alert and oriented to person, place, and time.   Psychiatric:         Mood and Affect: Mood normal.         Behavior: Behavior normal.         Thought Content: Thought content normal.         Judgment: Judgment normal.         Assessment:     1. Acute anemia    2. Hematochezia    3. Night sweat    4. Fatigue, unspecified type    5. Gastroesophageal reflux disease, unspecified whether esophagitis present    6. Incisional hernia, without obstruction or gangrene      Plan:   Acute anemia  -     CBC Auto Differential; Future; Expected date: 05/23/2024  -     Comprehensive Metabolic Panel; Future; Expected date: 05/23/2024  -     Iron and TIBC; Future; Expected date: 05/23/2024  -     Ferritin; Future; Expected date: 05/23/2024  -  "    Urinalysis; Future; Expected date: 05/23/2024  -     C-REACTIVE PROTEIN; Future; Expected date: 05/23/2024  -     Sedimentation rate; Future; Expected date: 05/23/2024    Hematochezia  -     CBC Auto Differential; Future; Expected date: 05/23/2024  -     Comprehensive Metabolic Panel; Future; Expected date: 05/23/2024  -     Iron and TIBC; Future; Expected date: 05/23/2024  -     Ferritin; Future; Expected date: 05/23/2024  -     C-REACTIVE PROTEIN; Future; Expected date: 05/23/2024  -     Sedimentation rate; Future; Expected date: 05/23/2024  -     Ambulatory referral/consult to Endo Procedure ; Future; Expected date: 05/24/2024  -EGD+colonoscopy    Night sweat  -     Urinalysis; Future; Expected date: 05/23/2024    Fatigue, unspecified type  -     TSH; Future; Expected date: 05/23/2024  -     Urinalysis; Future; Expected date: 05/23/2024    Gastroesophageal reflux disease, unspecified whether esophagitis present  -     Ambulatory referral/consult to Endo Procedure ; Future; Expected date: 05/24/2024  -refractory to nexium BID    Incisional hernia, without obstruction or gangrene  -     US Abdomen Limited; Future; Expected date: 05/23/2024  -     Ambulatory referral/consult to General Surgery; Future; Expected date: 05/30/2024  -diagnosis and treatment discussed. Would like referral to see gen surg here at [facility].       Follow up in about 4 weeks (around 6/20/2024), or if symptoms worsen or fail to improve.

## 2024-05-24 ENCOUNTER — HOSPITAL ENCOUNTER (OUTPATIENT)
Dept: RADIOLOGY | Facility: HOSPITAL | Age: 69
Discharge: HOME | End: 2024-05-24
Attending: PHYSICIAN ASSISTANT
Payer: MEDICARE

## 2024-05-24 DIAGNOSIS — E61.1 IRON DEFICIENCY: Primary | ICD-10-CM

## 2024-05-24 DIAGNOSIS — K43.2 INCISIONAL HERNIA, WITHOUT OBSTRUCTION OR GANGRENE: ICD-10-CM

## 2024-05-24 PROCEDURE — 76705 ECHO EXAM OF ABDOMEN: CPT | Mod: 26,HCNC,, | Performed by: RADIOLOGY

## 2024-05-24 PROCEDURE — 76705 ECHO EXAM OF ABDOMEN: CPT | Mod: TC,HCNC

## 2024-05-24 RX ORDER — FERROUS SULFATE 325(65) MG
325 TABLET ORAL
Qty: 90 TABLET | Refills: 1 | Status: SHIPPED | OUTPATIENT
Start: 2024-05-24 | End: 2024-11-20

## 2024-05-30 ENCOUNTER — NURSE TRIAGE (OUTPATIENT)
Dept: ADMINISTRATIVE | Facility: CLINIC | Age: 69
End: 2024-05-30
Payer: MEDICARE

## 2024-05-30 NOTE — TELEPHONE ENCOUNTER
"Pt reports "boil" x's 3 or 4 days at the very top of buttcrack. Pt reports swelling and hardness. No fever or drainage.     Dispo is to see in office today. No appts available, not appropriate for VV. Recommended UC as a good source of care.  Care advice given. Pt v/u.   Reason for Disposition   Boil > 2 inches across (> 5 cm; larger than a golf ball or ping pong ball)    Additional Information   Negative: Widespread rash and bright red, sunburn-like and too weak to stand   Negative: Sounds like a life-threatening emergency to the triager   Negative: Widespread red rash   Negative: Black (necrotic), dark purple, or blisters develop in area of wound   Negative: Patient sounds very sick or weak to the triager   Negative: SEVERE pain (e.g., excruciating)   Negative: Red streak from area of infection   Negative: Fever > 100.4 F (38.0 C)    Protocols used: Boil (Skin Abscess)-A-OH    "

## 2024-05-31 ENCOUNTER — HOSPITAL ENCOUNTER (EMERGENCY)
Facility: HOSPITAL | Age: 69
Discharge: ANOTHER HOSPITAL | End: 2024-06-01
Attending: FAMILY MEDICINE
Payer: MEDICARE

## 2024-05-31 ENCOUNTER — TELEPHONE (OUTPATIENT)
Dept: INTERNAL MEDICINE | Facility: CLINIC | Age: 69
End: 2024-05-31
Payer: MEDICARE

## 2024-05-31 DIAGNOSIS — Z76.89 ENCOUNTER FOR INCISION AND DRAINAGE PROCEDURE: ICD-10-CM

## 2024-05-31 DIAGNOSIS — M54.40 ACUTE LEFT-SIDED LOW BACK PAIN WITH SCIATICA, SCIATICA LATERALITY UNSPECIFIED: ICD-10-CM

## 2024-05-31 DIAGNOSIS — R20.2 PARESTHESIA: ICD-10-CM

## 2024-05-31 DIAGNOSIS — M46.20 SPINAL ABSCESS: Primary | ICD-10-CM

## 2024-05-31 LAB
ALBUMIN SERPL BCP-MCNC: 3.8 G/DL (ref 3.5–5.2)
ALP SERPL-CCNC: 109 U/L (ref 55–135)
ALT SERPL W/O P-5'-P-CCNC: 17 U/L (ref 10–44)
ANION GAP SERPL CALC-SCNC: 13 MMOL/L (ref 8–16)
AST SERPL-CCNC: 20 U/L (ref 10–40)
BASOPHILS # BLD AUTO: 0.03 K/UL (ref 0–0.2)
BASOPHILS NFR BLD: 0.4 % (ref 0–1.9)
BILIRUB SERPL-MCNC: 0.3 MG/DL (ref 0.1–1)
BUN SERPL-MCNC: 29 MG/DL (ref 8–23)
CALCIUM SERPL-MCNC: 9 MG/DL (ref 8.7–10.5)
CHLORIDE SERPL-SCNC: 106 MMOL/L (ref 95–110)
CO2 SERPL-SCNC: 21 MMOL/L (ref 23–29)
CREAT SERPL-MCNC: 2 MG/DL (ref 0.5–1.4)
CRP SERPL-MCNC: 22.8 MG/L (ref 0–8.2)
DIFFERENTIAL METHOD BLD: ABNORMAL
EOSINOPHIL # BLD AUTO: 0.1 K/UL (ref 0–0.5)
EOSINOPHIL NFR BLD: 1.7 % (ref 0–8)
ERYTHROCYTE [DISTWIDTH] IN BLOOD BY AUTOMATED COUNT: 19.4 % (ref 11.5–14.5)
EST. GFR  (NO RACE VARIABLE): 36 ML/MIN/1.73 M^2
GLUCOSE SERPL-MCNC: 103 MG/DL (ref 70–110)
HCT VFR BLD AUTO: 34.6 % (ref 40–54)
HGB BLD-MCNC: 11 G/DL (ref 14–18)
IMM GRANULOCYTES # BLD AUTO: 0.02 K/UL (ref 0–0.04)
IMM GRANULOCYTES NFR BLD AUTO: 0.3 % (ref 0–0.5)
LACTATE SERPL-SCNC: 3.1 MMOL/L (ref 0.5–2.2)
LYMPHOCYTES # BLD AUTO: 1.3 K/UL (ref 1–4.8)
LYMPHOCYTES NFR BLD: 18.7 % (ref 18–48)
MCH RBC QN AUTO: 23.6 PG (ref 27–31)
MCHC RBC AUTO-ENTMCNC: 31.8 G/DL (ref 32–36)
MCV RBC AUTO: 74 FL (ref 82–98)
MONOCYTES # BLD AUTO: 0.6 K/UL (ref 0.3–1)
MONOCYTES NFR BLD: 8.1 % (ref 4–15)
NEUTROPHILS # BLD AUTO: 4.9 K/UL (ref 1.8–7.7)
NEUTROPHILS NFR BLD: 70.8 % (ref 38–73)
NRBC BLD-RTO: 0 /100 WBC
PLATELET # BLD AUTO: 183 K/UL (ref 150–450)
PMV BLD AUTO: 10 FL (ref 9.2–12.9)
POTASSIUM SERPL-SCNC: 4.2 MMOL/L (ref 3.5–5.1)
PROT SERPL-MCNC: 7.5 G/DL (ref 6–8.4)
RBC # BLD AUTO: 4.66 M/UL (ref 4.6–6.2)
SODIUM SERPL-SCNC: 140 MMOL/L (ref 136–145)
WBC # BLD AUTO: 6.91 K/UL (ref 3.9–12.7)

## 2024-05-31 PROCEDURE — 63600175 PHARM REV CODE 636 W HCPCS: Performed by: REGISTERED NURSE

## 2024-05-31 PROCEDURE — 85025 COMPLETE CBC W/AUTO DIFF WBC: CPT | Performed by: REGISTERED NURSE

## 2024-05-31 PROCEDURE — 83605 ASSAY OF LACTIC ACID: CPT | Performed by: REGISTERED NURSE

## 2024-05-31 PROCEDURE — 96365 THER/PROPH/DIAG IV INF INIT: CPT | Mod: 59

## 2024-05-31 PROCEDURE — 63600175 PHARM REV CODE 636 W HCPCS: Performed by: FAMILY MEDICINE

## 2024-05-31 PROCEDURE — 25000003 PHARM REV CODE 250: Performed by: REGISTERED NURSE

## 2024-05-31 PROCEDURE — 86140 C-REACTIVE PROTEIN: CPT | Performed by: REGISTERED NURSE

## 2024-05-31 PROCEDURE — A9585 GADOBUTROL INJECTION: HCPCS | Performed by: REGISTERED NURSE

## 2024-05-31 PROCEDURE — 96367 TX/PROPH/DG ADDL SEQ IV INF: CPT

## 2024-05-31 PROCEDURE — 87040 BLOOD CULTURE FOR BACTERIA: CPT | Mod: 59 | Performed by: REGISTERED NURSE

## 2024-05-31 PROCEDURE — 96375 TX/PRO/DX INJ NEW DRUG ADDON: CPT

## 2024-05-31 PROCEDURE — 25500020 PHARM REV CODE 255: Performed by: REGISTERED NURSE

## 2024-05-31 PROCEDURE — 80053 COMPREHEN METABOLIC PANEL: CPT | Performed by: REGISTERED NURSE

## 2024-05-31 PROCEDURE — 99285 EMERGENCY DEPT VISIT HI MDM: CPT | Mod: 25

## 2024-05-31 PROCEDURE — 10060 I&D ABSCESS SIMPLE/SINGLE: CPT

## 2024-05-31 RX ORDER — ONDANSETRON HYDROCHLORIDE 2 MG/ML
4 INJECTION, SOLUTION INTRAVENOUS
Status: COMPLETED | OUTPATIENT
Start: 2024-05-31 | End: 2024-05-31

## 2024-05-31 RX ORDER — LIDOCAINE HYDROCHLORIDE 10 MG/ML
10 INJECTION, SOLUTION EPIDURAL; INFILTRATION; INTRACAUDAL; PERINEURAL
Status: COMPLETED | OUTPATIENT
Start: 2024-05-31 | End: 2024-05-31

## 2024-05-31 RX ORDER — KETOROLAC TROMETHAMINE 30 MG/ML
30 INJECTION, SOLUTION INTRAMUSCULAR; INTRAVENOUS
Status: COMPLETED | OUTPATIENT
Start: 2024-05-31 | End: 2024-05-31

## 2024-05-31 RX ORDER — GADOBUTROL 604.72 MG/ML
10 INJECTION INTRAVENOUS
Status: COMPLETED | OUTPATIENT
Start: 2024-05-31 | End: 2024-05-31

## 2024-05-31 RX ORDER — MORPHINE SULFATE 4 MG/ML
4 INJECTION, SOLUTION INTRAMUSCULAR; INTRAVENOUS
Status: COMPLETED | OUTPATIENT
Start: 2024-05-31 | End: 2024-05-31

## 2024-05-31 RX ADMIN — KETOROLAC TROMETHAMINE 30 MG: 30 INJECTION, SOLUTION INTRAMUSCULAR at 10:05

## 2024-05-31 RX ADMIN — VANCOMYCIN HYDROCHLORIDE 2000 MG: 500 INJECTION, POWDER, LYOPHILIZED, FOR SOLUTION INTRAVENOUS at 11:05

## 2024-05-31 RX ADMIN — MORPHINE SULFATE 4 MG: 4 INJECTION INTRAVENOUS at 08:05

## 2024-05-31 RX ADMIN — PIPERACILLIN SODIUM AND TAZOBACTAM SODIUM 4.5 G: 4; .5 INJECTION, POWDER, FOR SOLUTION INTRAVENOUS at 10:05

## 2024-05-31 RX ADMIN — GADOBUTROL 10 ML: 604.72 INJECTION INTRAVENOUS at 09:05

## 2024-05-31 RX ADMIN — ONDANSETRON 4 MG: 2 INJECTION INTRAMUSCULAR; INTRAVENOUS at 08:05

## 2024-05-31 RX ADMIN — LIDOCAINE HYDROCHLORIDE 100 MG: 10 INJECTION, SOLUTION EPIDURAL; INFILTRATION; INTRACAUDAL at 06:05

## 2024-05-31 NOTE — TELEPHONE ENCOUNTER
Spoke with the patient concerning having an abscess. The patient was informed to visit an urgent care clinic or the ER. The patient was informed that Dr Valentin is not in clinic and their is no open appointment slots for today with another provider. The patient stated understanding,

## 2024-05-31 NOTE — ED PROVIDER NOTES
Encounter Date: 5/31/2024       History     Chief Complaint   Patient presents with    Abscess     Abscess between buttocks x 6 days.       68-year-old male presents emergency department with complaints of  swelling to the left buttocks that began 6 days ago. Patient reports placing warm compresses to area several times a day but has had no relief in symptoms.     Associated symptoms include low back pain.  History of lumbar surgery with infection.  Patient states hardware was change in July of last year.  Patient denies any recent history of fever, chest pain, shortness of breath, nausea/vomiting or any other symptoms.    The history is provided by the patient.     Review of patient's allergies indicates:  No Known Allergies  Past Medical History:   Diagnosis Date    Aortic stenosis     [facility] ph[facility]    Asthma     BPH (benign prostatic hyperplasia)     CAD (coronary artery disease)     Cardiomyopathy     CHF (congestive heart failure)     Chronic hoarseness     vocal cord surg    Chronic pain     CKD (chronic kidney disease) stage 3, GFR 30-59 ml/min     CVA (cerebral infarction)     8/2012 olol; reviewed ed note    Ex-smoker     GERD (gastroesophageal reflux disease)     Hepatitis C     treatedharvoni says cured, RNA NEG 6/2020    Hypertension     Pancreatitis     Prostate cancer     Prostate cancer     Prostate cancer     PVD (peripheral vascular disease)     Renal insufficiency     Substance abuse     cocaine, etoh , tob in past     Past Surgical History:   Procedure Laterality Date    AORTIC VALVE REPLACEMENT  05/19/2014    Tissue valve replacement    BACK SURGERY      CARDIAC CATHETERIZATION      COLONOSCOPY  2011    COLONOSCOPY N/A 2/24/2021    Procedure: COLONOSCOPY;  Surgeon: Faith Carrillo MD;  Location: [facility];  Service: Endoscopy;  Laterality: N/A;    EPIDURAL STEROID INJECTION INTO CERVICAL SPINE N/A 6/21/2022    Procedure: C7-T1 IL PIEDAD;  Surgeon: Nehemiah Carmen MD;  Location: [facility]  PAIN MGT;  Service: Pain Management;  Laterality: N/A;    EPIDURAL STEROID INJECTION INTO CERVICAL SPINE N/A 3/12/2024    Procedure: C7/T1 IL PIEDAD;  Surgeon: Nehemiah Carmen MD;  Location: [facility] PAIN MGT;  Service: Pain Management;  Laterality: N/A;    ESOPHAGOGASTRODUODENOSCOPY N/A 2/24/2021    Procedure: ESOPHAGOGASTRODUODENOSCOPY (EGD);  Surgeon: Faith Carrillo MD;  Location: [facility] ENDO;  Service: Endoscopy;  Laterality: N/A;    FOOT SURGERY      INSERTION N/A 7/5/2023    Procedure: INSERTION;  Surgeon: Brandon Valencia MD;  Location: [facility] OR;  Service: Neurosurgery;  Laterality: N/A;  Antibiotic Beads    LEFT HEART CATHETERIZATION Left 7/24/2020    Procedure: CATHETERIZATION, HEART, LEFT;  Surgeon: Pasha Martin MD;  Location: [facility] CATH LAB;  Service: Cardiology;  Laterality: Left;    PENILE PROSTHESIS IMPLANT      PENILE PROSTHESIS REVISION  04/30/2018    PROSTATECTOMY  09/2019    [facility]    radiation for prostate      REMOVAL OF HARDWARE FROM SPINE N/A 7/5/2023    Procedure: REMOVAL, HARDWARE, SPINE;  Surgeon: Brandon Valencia MD;  Location: [facility] OR;  Service: Neurosurgery;  Laterality: N/A;    REPLACEMENT N/A 7/5/2023    Procedure: REPLACEMENT;  Surgeon: Brandon Valencia MD;  Location: [facility] OR;  Service: Neurosurgery;  Laterality: N/A;  of Sandoval and Screws. Keoghstronic    TRANSFORAMINAL EPIDURAL INJECTION OF STEROID Right 10/20/2022    Procedure: Right  L4/5 + L5/S1 TF PIEDAD RN IV Sedation;  Surgeon: Nehemiah Carmen MD;  Location: [facility] PAIN MGT;  Service: Pain Management;  Laterality: Right;    TRANSFORAMINAL LUMBAR INTERBODY FUSION (TLIF) USING COMPUTER-ASSISTED NAVIGATION Bilateral 5/17/2023    Procedure: FUSION, SPINE, LUMBAR, TLIF, USING COMPUTER-ASSISTED NAVIGATION;  Surgeon: Brandon Valencia MD;  Location: [facility] OR;  Service: Neurosurgery;  Laterality: Bilateral;  2 level lumbar fusion at L4-5 and L5-S1    UPPER GASTROINTESTINAL ENDOSCOPY  2011    WASHOUT N/A 7/5/2023    Procedure: WASHOUT;  " Surgeon: Brandon Valencia MD;  Location: [facility] OR;  Service: Neurosurgery;  Laterality: N/A;    WOUND EXPLORATION Bilateral 2023    Procedure: EXPLORATION, WOUND;  Surgeon: Brandon Valencia MD;  Location: [facility] OR;  Service: Neurosurgery;  Laterality: Bilateral;     Family History   Problem Relation Name Age of Onset    Heart disease Mother      Heart disease Father      Heart attack Sister      Heart attack Brother      Colon cancer Neg Hx      Colon polyps Neg Hx      Liver cancer Neg Hx      Inflammatory bowel disease Neg Hx      Liver disease Neg Hx      Rectal cancer Neg Hx      Stomach cancer Neg Hx      Ulcerative colitis Neg Hx       Social History     Tobacco Use    Smoking status: Former     Current packs/day: 0.00     Average packs/day: 1 pack/day for 8.0 years (8.0 ttl pk-yrs)     Types: Cigarettes     Start date: 2004     Quit date: 2012     Years since quittin.7    Smokeless tobacco: Never   Substance Use Topics    Alcohol use: Not Currently     Comment: Sober since 2012    Drug use: Not Currently     Types: "Crack" cocaine, Marijuana     Comment: Quit in      Review of Systems   Constitutional:  Negative for fever.   HENT:  Negative for sore throat.    Respiratory:  Negative for shortness of breath.    Cardiovascular:  Negative for chest pain.   Gastrointestinal:  Negative for nausea.   Genitourinary:  Negative for dysuria.   Musculoskeletal:  Positive for back pain.          +Swelling to the left buttocks   Skin:  Negative for rash.   Neurological:  Negative for weakness.   Hematological:  Does not bruise/bleed easily.   All other systems reviewed and are negative.      Physical Exam     Initial Vitals [24 1804]   BP Pulse Resp Temp SpO2   127/72 96 16 98 °F (36.7 °C) 96 %      MAP       --         Physical Exam    Constitutional: He appears well-developed and well-nourished. No distress.   HENT:   Head: Normocephalic and atraumatic.   Nose: Nose normal. "   Mouth/Throat: Uvula is midline and oropharynx is clear and moist.   Eyes: Conjunctivae and EOM are normal. Pupils are equal, round, and reactive to light.   Neck: Neck supple.   Normal range of motion.  Cardiovascular:  Normal rate and regular rhythm.           Pulmonary/Chest: Effort normal and breath sounds normal. No respiratory distress. He has no decreased breath sounds. He has no wheezes. He has no rales.   Abdominal: Abdomen is soft. Bowel sounds are normal. There is no abdominal tenderness.   Musculoskeletal:         General: Normal range of motion.      Cervical back: Normal range of motion and neck supple.      Lumbar back: Tenderness present.        Back:      Neurological: He is alert and oriented to person, place, and time. He has normal strength. GCS eye subscore is 4. GCS verbal subscore is 5. GCS motor subscore is 6.   Skin: Skin is warm and dry. Capillary refill takes less than 2 seconds. No rash noted.         There is erythema with swelling and induration noted to the superior left buttocks, no fluctuance, no drainage   Psychiatric: He has a normal mood and affect. His speech is normal and behavior is normal.         ED Course   I & D - Incision and Drainage    Date/Time: 5/31/2024 6:57 PM  Location procedure was performed: [facility] EMERGENCY DEPARTMENT    Performed by: Phani Eckert Jr., FNP  Authorized by: Phani Eckert Jr., FNP  Consent Done: Yes  Consent: Verbal consent obtained.  Consent given by: patient  Type: abscess  Body area: lower extremity (L buttocks)  Anesthesia: local infiltration    Anesthesia:  Local Anesthetic: lidocaine 1% without epinephrine  Scalpel size: 11  Incision type: single straight  Incision depth: dermal  Complexity: simple  Drainage: bloody  Drainage amount: scant  Wound treatment: incision, deloculation and expression of material  Complications: No  Patient tolerance: Patient tolerated the procedure well with no immediate complications    Incision  depth: dermal        Labs Reviewed   CBC W/ AUTO DIFFERENTIAL - Abnormal; Notable for the following components:       Result Value    Hemoglobin 11.0 (*)     Hematocrit 34.6 (*)     MCV 74 (*)     MCH 23.6 (*)     MCHC 31.8 (*)     RDW 19.4 (*)     All other components within normal limits   COMPREHENSIVE METABOLIC PANEL - Abnormal; Notable for the following components:    CO2 21 (*)     BUN 29 (*)     Creatinine 2.0 (*)     eGFR 36 (*)     All other components within normal limits   LACTIC ACID, PLASMA - Abnormal; Notable for the following components:    Lactate (Lactic Acid) 3.1 (*)     All other components within normal limits   C-REACTIVE PROTEIN - Abnormal; Notable for the following components:    CRP 22.8 (*)     All other components within normal limits   CULTURE, BLOOD   CULTURE, BLOOD   LACTIC ACID, PLASMA          Imaging Results              MRI Lumbar Spine W WO Cont (Final result)  Result time 05/31/24 22:54:00      Final result by Geri Jackson MD (05/31/24 22:54:00)                   Impression:      Surgical change with hardware and associated artifact L4-S1 posterior approach.  There is a peripherally enhancing fluid loculation at the dorsal postsurgical elements measuring approximately 4 cm X 7 cm epicenter L5 level sterility uncertain possible abscess.    No overt canal compromise.    No overt acute osseous finding    Conus medullaris unremarkable      Electronically signed by: Geri Jackson  Date:    05/31/2024  Time:    22:54               Narrative:    EXAMINATION:  MRI LUMBAR SPINE W WO CONTRAST    CLINICAL HISTORY:  Low back pain, infection suspected;.    TECHNIQUE:  Multiplanar, multisequence MR images of the cervical spine were acquired without the administration of contrast.    COMPARISON:  07/03/2023                                       Medications   LIDOcaine (PF) 10 mg/ml (1%) injection 100 mg (100 mg Infiltration Given 5/31/24 6837)   morphine injection 4 mg (4 mg  "Intravenous Given 5/31/24 2017)   ondansetron injection 4 mg (4 mg Intravenous Given 5/31/24 2015)   piperacillin-tazobactam (ZOSYN) 4.5 g in dextrose 5 % in water (D5W) 100 mL IVPB (MB+) (0 g Intravenous Stopped 5/31/24 2246)   vancomycin 2 g in dextrose 5 % 500 mL IVPB (0 mg Intravenous Stopped 6/1/24 0135)   sodium chloride 0.9% bolus 3,216 mL 3,216 mL (1,000 mLs Intravenous New Bag 6/1/24 0141)   gadobutroL (GADAVIST) injection 10 mL (10 mLs Intravenous Given 5/31/24 2148)   ketorolac injection 30 mg (30 mg Intravenous Given 5/31/24 2224)   morphine injection 4 mg (4 mg Intravenous Given 6/1/24 0155)     Medical Decision Making  Noted swelling pain to sacral area and left upper buttock 6 days ago with numbness to right leg.  S/P lumbar fusion L4-5, L5-S1 in May 2023, with washout and 6 week IV antibiotics in July 2023.  Had been doing well until 6 days ago.  Tried using sitz baths, heat for pain, but no improvement.  Denies fever, n/v.  Bedside I & D of left upper buttock was attempted with no purulence.    Amount and/or Complexity of Data Reviewed  External Data Reviewed: labs and radiology.  Labs: ordered. Decision-making details documented in ED Course.  Radiology: ordered and independent interpretation performed. Decision-making details documented in ED Course.  ECG/medicine tests: ordered and independent interpretation performed. Decision-making details documented in ED Course.  Discussion of management or test interpretation with external provider(s): Dr. Solis was consulted for transfer to [facility]    Risk  Prescription drug management.  Parenteral controlled substances.  Decision regarding hospitalization.  Emergency major surgery.    Critical Care  Total time providing critical care: 60 minutes               Sepsis Perfusion Assessment: "I attest a sepsis perfusion exam was performed within 6 hours of sepsis, severe sepsis, or septic shock presentation, following fluid resuscitation."              "         Clinical Impression:  Final diagnoses:  [M46.20] Spinal abscess (Primary)  [Z76.89] Encounter for incision and drainage procedure  [R20.2] Paresthesia  [M54.40] Acute left-sided low back pain with sciatica, sciatica laterality unspecified          ED Disposition Condition    Transfer to Another Facility Stable                Geraldine Ceja MD  06/02/24 2020

## 2024-05-31 NOTE — TELEPHONE ENCOUNTER
Angelika ROBERT    5/31/24  9:15 AM  Note  Spoke with the patient concerning having an abscess. The patient was informed to visit an urgent care clinic or the ER. The patient was informed that Dr Valentin is not in clinic and their is no open appointment slots for today with another provider. The patient stated understanding,

## 2024-05-31 NOTE — TELEPHONE ENCOUNTER
----- Message from Sameer Urias sent at 5/31/2024  8:54 AM CDT -----  Contact: [phone]  Patient called in requesting a call back , need an appointment but nothing populated in the system , patient said he have an boil and  in his words (straight DOWN [address] ) and it  hurts really bad  , please call back [phone]

## 2024-06-01 ENCOUNTER — HOSPITAL ENCOUNTER (EMERGENCY)
Facility: HOSPITAL | Age: 69
Discharge: HOME | End: 2024-06-01
Attending: STUDENT IN AN ORGANIZED HEALTH CARE EDUCATION/TRAINING PROGRAM
Payer: MEDICARE

## 2024-06-01 VITALS
DIASTOLIC BLOOD PRESSURE: 58 MMHG | RESPIRATION RATE: 16 BRPM | HEIGHT: 69 IN | SYSTOLIC BLOOD PRESSURE: 114 MMHG | WEIGHT: 236 LBS | HEART RATE: 61 BPM | TEMPERATURE: 98 F | BODY MASS INDEX: 34.96 KG/M2 | OXYGEN SATURATION: 98 %

## 2024-06-01 VITALS
WEIGHT: 236.31 LBS | SYSTOLIC BLOOD PRESSURE: 130 MMHG | BODY MASS INDEX: 34.9 KG/M2 | DIASTOLIC BLOOD PRESSURE: 79 MMHG | OXYGEN SATURATION: 95 % | HEART RATE: 73 BPM | RESPIRATION RATE: 18 BRPM | TEMPERATURE: 98 F

## 2024-06-01 DIAGNOSIS — T84.7XXD HARDWARE COMPLICATING WOUND INFECTION, SUBSEQUENT ENCOUNTER: Primary | ICD-10-CM

## 2024-06-01 DIAGNOSIS — L02.31 ABSCESS OF GLUTEAL CLEFT: ICD-10-CM

## 2024-06-01 PROBLEM — M96.842 SEROMA OF MUSCULOSKELETAL STRUCTURE AFTER MUSCULOSKELETAL SYSTEM PROCEDURE: Status: ACTIVE | Noted: 2024-06-01

## 2024-06-01 LAB
ERYTHROCYTE [SEDIMENTATION RATE] IN BLOOD BY PHOTOMETRIC METHOD: 67 MM/HR (ref 0–23)
HIV 1+2 AB+HIV1 P24 AG SERPL QL IA: NORMAL
LACTATE SERPL-SCNC: 0.6 MMOL/L (ref 0.5–2.2)

## 2024-06-01 PROCEDURE — 63600175 PHARM REV CODE 636 W HCPCS: Performed by: FAMILY MEDICINE

## 2024-06-01 PROCEDURE — 96366 THER/PROPH/DIAG IV INF ADDON: CPT

## 2024-06-01 PROCEDURE — 83605 ASSAY OF LACTIC ACID: CPT | Performed by: REGISTERED NURSE

## 2024-06-01 PROCEDURE — 99284 EMERGENCY DEPT VISIT MOD MDM: CPT | Mod: 25

## 2024-06-01 PROCEDURE — 87389 HIV-1 AG W/HIV-1&-2 AB AG IA: CPT | Performed by: PHYSICIAN ASSISTANT

## 2024-06-01 PROCEDURE — 96374 THER/PROPH/DIAG INJ IV PUSH: CPT | Mod: 59

## 2024-06-01 PROCEDURE — 96374 THER/PROPH/DIAG INJ IV PUSH: CPT

## 2024-06-01 PROCEDURE — 63600175 PHARM REV CODE 636 W HCPCS: Performed by: STUDENT IN AN ORGANIZED HEALTH CARE EDUCATION/TRAINING PROGRAM

## 2024-06-01 PROCEDURE — 85652 RBC SED RATE AUTOMATED: CPT | Performed by: STUDENT IN AN ORGANIZED HEALTH CARE EDUCATION/TRAINING PROGRAM

## 2024-06-01 PROCEDURE — 25000003 PHARM REV CODE 250: Performed by: REGISTERED NURSE

## 2024-06-01 PROCEDURE — 25000003 PHARM REV CODE 250: Performed by: STUDENT IN AN ORGANIZED HEALTH CARE EDUCATION/TRAINING PROGRAM

## 2024-06-01 RX ORDER — MORPHINE SULFATE 2 MG/ML
6 INJECTION, SOLUTION INTRAMUSCULAR; INTRAVENOUS
Status: COMPLETED | OUTPATIENT
Start: 2024-06-01 | End: 2024-06-01

## 2024-06-01 RX ORDER — SULFAMETHOXAZOLE AND TRIMETHOPRIM 800; 160 MG/1; MG/1
1 TABLET ORAL 2 TIMES DAILY
Qty: 14 TABLET | Refills: 0 | Status: SHIPPED | OUTPATIENT
Start: 2024-06-01 | End: 2024-06-08

## 2024-06-01 RX ORDER — HYDROCODONE BITARTRATE AND ACETAMINOPHEN 5; 325 MG/1; MG/1
1 TABLET ORAL EVERY 6 HOURS PRN
Qty: 12 TABLET | Refills: 0 | Status: SHIPPED | OUTPATIENT
Start: 2024-06-01 | End: 2024-06-04

## 2024-06-01 RX ORDER — HYDROCODONE BITARTRATE AND ACETAMINOPHEN 5; 325 MG/1; MG/1
1 TABLET ORAL
Status: COMPLETED | OUTPATIENT
Start: 2024-06-01 | End: 2024-06-01

## 2024-06-01 RX ORDER — MORPHINE SULFATE 4 MG/ML
4 INJECTION, SOLUTION INTRAMUSCULAR; INTRAVENOUS
Status: COMPLETED | OUTPATIENT
Start: 2024-06-01 | End: 2024-06-01

## 2024-06-01 RX ADMIN — MORPHINE SULFATE 4 MG: 4 INJECTION INTRAVENOUS at 01:06

## 2024-06-01 RX ADMIN — HYDROCODONE BITARTRATE AND ACETAMINOPHEN 1 TABLET: 5; 325 TABLET ORAL at 11:06

## 2024-06-01 RX ADMIN — SODIUM CHLORIDE 1000 ML: 9 INJECTION, SOLUTION INTRAVENOUS at 01:06

## 2024-06-01 RX ADMIN — MORPHINE SULFATE 6 MG: 2 INJECTION, SOLUTION INTRAMUSCULAR; INTRAVENOUS at 06:06

## 2024-06-01 NOTE — PROVIDER PROGRESS NOTES - EMERGENCY DEPT.
SCRIBE #1 NOTE: I, Carolyn Burch, am scribing for, and in the presence of, Geraldine Ceja MD. I have scribed the entire note.       History     Chief Complaint   Patient presents with    Abscess     Abscess between buttocks x 6 days.      Review of patient's allergies indicates:  No Known Allergies      History of Present Illness     HPI    History of Present Illness: Abdullahi Urias is a 68 y.o. male patient with a PMHx of HTN, Hep C, CAD, substance abuse, BPH, asthma, CVA, CHF, prostate cancer, CKD, and back surgery who presents to the Emergency Department for evaluation of abscess between buttocks at hardware site which onset gradually 6 days ago. Pt states that the hardware in his spine has been infected before in July of last year. Symptoms are constant and moderate in severity. No mitigating or exacerbating factors reported. Associated sxs include back pain, R leg numbness, and bowel/urinary incontinence. Patient denies any fever, CP, SOB, n/v, and all other sxs at this time. Pt's neurosurgeon is Dr. Valencia, who he has not contacted since onset of sxs and is not currently on call. Pt states that if he does have to be transferred, he would rather be transferred to [facility] rather than [facility] or [facility]. No prior tx. No further complaints or concerns at this time.     PCP: Reji Valentin MD        Past Medical History:  Past Medical History:   Diagnosis Date    Aortic stenosis     [facility]    Asthma     BPH (benign prostatic hyperplasia)     CAD (coronary artery disease)     Cardiomyopathy     CHF (congestive heart failure)     Chronic hoarseness     vocal cord surg    Chronic pain     CKD (chronic kidney disease) stage 3, GFR 30-59 ml/min     CVA (cerebral infarction)     8/2012 olol; reviewed ed note    Ex-smoker     GERD (gastroesophageal reflux disease)     Hepatitis C     treatedharvoni says cured, RNA NEG 6/2020    Hypertension     Pancreatitis     Prostate cancer     Prostate cancer      Prostate cancer     PVD (peripheral vascular disease)     Renal insufficiency     Substance abuse     cocaine, etoh , tob in past       Past Surgical History:  Past Surgical History:   Procedure Laterality Date    AORTIC VALVE REPLACEMENT  05/19/2014    Tissue valve replacement    BACK SURGERY      CARDIAC CATHETERIZATION      COLONOSCOPY  2011    COLONOSCOPY N/A 2/24/2021    Procedure: COLONOSCOPY;  Surgeon: Faith Carrillo MD;  Location: [facility] ENDO;  Service: Endoscopy;  Laterality: N/A;    EPIDURAL STEROID INJECTION INTO CERVICAL SPINE N/A 6/21/2022    Procedure: C7-T1 IL PIEDAD;  Surgeon: Nehemiah Carmen MD;  Location: [facility] PAIN MGT;  Service: Pain Management;  Laterality: N/A;    EPIDURAL STEROID INJECTION INTO CERVICAL SPINE N/A 3/12/2024    Procedure: C7/T1 IL PIEDAD;  Surgeon: Nehemiah Carmen MD;  Location: [facility] PAIN MGT;  Service: Pain Management;  Laterality: N/A;    ESOPHAGOGASTRODUODENOSCOPY N/A 2/24/2021    Procedure: ESOPHAGOGASTRODUODENOSCOPY (EGD);  Surgeon: Faith Carrillo MD;  Location: [facility];  Service: Endoscopy;  Laterality: N/A;    FOOT SURGERY      INSERTION N/A 7/5/2023    Procedure: INSERTION;  Surgeon: Brandon Valencia MD;  Location: [facility] OR;  Service: Neurosurgery;  Laterality: N/A;  Antibiotic Beads    LEFT HEART CATHETERIZATION Left 7/24/2020    Procedure: CATHETERIZATION, HEART, LEFT;  Surgeon: Pasha Martin MD;  Location: [facility] CATH LAB;  Service: Cardiology;  Laterality: Left;    PENILE PROSTHESIS IMPLANT      PENILE PROSTHESIS REVISION  04/30/2018    PROSTATECTOMY  09/2019    [facility]    radiation for prostate      REMOVAL OF HARDWARE FROM SPINE N/A 7/5/2023    Procedure: REMOVAL, HARDWARE, SPINE;  Surgeon: Brandon Valencia MD;  Location: [facility] OR;  Service: Neurosurgery;  Laterality: N/A;    REPLACEMENT N/A 7/5/2023    Procedure: REPLACEMENT;  Surgeon: Brandon Valencia MD;  Location: [facility] OR;  Service: Neurosurgery;  Laterality: N/A;  of [employer]oval and [employer]ws.  "[employer]    TRANSFORAMINAL EPIDURAL INJECTION OF STEROID Right 10/20/2022    Procedure: Right  L4/5 + L5/S1 TF PIEDAD RN IV Sedation;  Surgeon: Nehemiah Carmen MD;  Location: [facility] MGT;  Service: Pain Management;  Laterality: Right;    TRANSFORAMINAL LUMBAR INTERBODY FUSION (TLIF) USING COMPUTER-ASSISTED NAVIGATION Bilateral 2023    Procedure: FUSION, SPINE, LUMBAR, TLIF, USING COMPUTER-ASSISTED NAVIGATION;  Surgeon: Brandon Valencia MD;  Location: [facility] OR;  Service: Neurosurgery;  Laterality: Bilateral;  2 level lumbar fusion at L4-5 and L5-S1    UPPER GASTROINTESTINAL ENDOSCOPY      WASHOUT N/A 2023    Procedure: WASHOUT;  Surgeon: Brandon Valencia MD;  Location: [facility] OR;  Service: Neurosurgery;  Laterality: N/A;    WOUND EXPLORATION Bilateral 2023    Procedure: EXPLORATION, WOUND;  Surgeon: Brandon Valencia MD;  Location: [facility] OR;  Service: Neurosurgery;  Laterality: Bilateral;         Family History:  Family History   Problem Relation Name Age of Onset    Heart disease Mother      Heart disease Father      Heart attack Sister      Heart attack Brother      Colon cancer Neg Hx      Colon polyps Neg Hx      Liver cancer Neg Hx      Inflammatory bowel disease Neg Hx      Liver disease Neg Hx      Rectal cancer Neg Hx      Stomach cancer Neg Hx      Ulcerative colitis Neg Hx         Social History:  Social History     Tobacco Use    Smoking status: Former     Current packs/day: 0.00     Average packs/day: 1 pack/day for 8.0 years (8.0 ttl pk-yrs)     Types: Cigarettes     Start date: 2004     Quit date: 2012     Years since quittin.7    Smokeless tobacco: Never   Substance and Sexual Activity    Alcohol use: Not Currently     Comment: Sober since 2012    Drug use: Not Currently     Types: "Crack" cocaine, Marijuana     Comment: Quit in     Sexual activity: Yes        Review of Systems     Review of Systems   Constitutional:  Negative for fever.   HENT:  Negative for " sore throat.    Respiratory:  Negative for shortness of breath.    Cardiovascular:  Negative for chest pain.   Gastrointestinal:  Negative for nausea and vomiting.   Genitourinary:  Negative for dysuria.   Musculoskeletal:  Positive for back pain.   Skin:  Positive for wound (abcess between buttocks). Negative for rash.   Neurological:  Positive for numbness (R leg). Negative for weakness.        (+) Bowel/urinary incontinence    Hematological:  Does not bruise/bleed easily.   All other systems reviewed and are negative.      Physical Exam             Initial Vitals [05/31/24 1804]   BP Pulse Resp Temp SpO2   127/72 96 16 98 °F (36.7 °C) 96 %       MAP           --              Physical Exam     Constitutional: He appears well-developed and well-nourished. No distress.   HENT:   Head: Normocephalic and atraumatic.   Nose: Nose normal.   Mouth/Throat: Uvula is midline and oropharynx is clear and moist.   Eyes: Conjunctivae and EOM are normal. Pupils are equal, round, and reactive to light.   Neck: Neck supple.   Normal range of motion.  Cardiovascular:  Normal rate and regular rhythm.           Pulmonary/Chest: Effort normal and breath sounds normal. No respiratory distress. He has no decreased breath sounds. He has no wheezes. He has no rales.   Abdominal: Abdomen is soft. Bowel sounds are normal. There is no abdominal tenderness.   Musculoskeletal:         General: Normal range of motion.      Cervical back: Normal range of motion and neck supple.      Lumbar back: Tenderness present.        Back:       Neurological: He is alert and oriented to person, place, and time. He has normal strength. GCS eye subscore is 4. GCS verbal subscore is 5. GCS motor subscore is 6.   Skin: Skin is warm and dry. Capillary refill takes less than 2 seconds. No rash noted.          There is erythema with swelling and induration noted to the superior left buttocks, no fluctuance, no drainage   Psychiatric: He has a normal mood and  affect. His speech is normal and behavior is normal.         ED Course   Procedures  ED Vital Signs:  Vitals:    05/31/24 1804 05/31/24 2017 05/31/24 2234   BP: 127/72 (!) 149/83 130/66   Pulse: 96 83 76   Resp: 16 18    Temp: 98 °F (36.7 °C)     TempSrc: Oral     SpO2: 96% 100% 96%   Weight: 107.2 kg (236 lb 5.3 oz)         Abnormal Lab Results:  Labs Reviewed   CBC W/ AUTO DIFFERENTIAL - Abnormal; Notable for the following components:       Result Value    Hemoglobin 11.0 (*)     Hematocrit 34.6 (*)     MCV 74 (*)     MCH 23.6 (*)     MCHC 31.8 (*)     RDW 19.4 (*)     All other components within normal limits   COMPREHENSIVE METABOLIC PANEL - Abnormal; Notable for the following components:    CO2 21 (*)     BUN 29 (*)     Creatinine 2.0 (*)     eGFR 36 (*)     All other components within normal limits   LACTIC ACID, PLASMA - Abnormal; Notable for the following components:    Lactate (Lactic Acid) 3.1 (*)     All other components within normal limits   C-REACTIVE PROTEIN - Abnormal; Notable for the following components:    CRP 22.8 (*)     All other components within normal limits   CULTURE, BLOOD   CULTURE, BLOOD   LACTIC ACID, PLASMA        All Lab Results:  Results for orders placed or performed during the hospital encounter of 05/31/24   CBC auto differential   Result Value Ref Range    WBC 6.91 3.90 - 12.70 K/uL    RBC 4.66 4.60 - 6.20 M/uL    Hemoglobin 11.0 (L) 14.0 - 18.0 g/dL    Hematocrit 34.6 (L) 40.0 - 54.0 %    MCV 74 (L) 82 - 98 fL    MCH 23.6 (L) 27.0 - 31.0 pg    MCHC 31.8 (L) 32.0 - 36.0 g/dL    RDW 19.4 (H) 11.5 - 14.5 %    Platelets 183 150 - 450 K/uL    MPV 10.0 9.2 - 12.9 fL    Immature Granulocytes 0.3 0.0 - 0.5 %    Gran # (ANC) 4.9 1.8 - 7.7 K/uL    Immature Grans (Abs) 0.02 0.00 - 0.04 K/uL    Lymph # 1.3 1.0 - 4.8 K/uL    Mono # 0.6 0.3 - 1.0 K/uL    Eos # 0.1 0.0 - 0.5 K/uL    Baso # 0.03 0.00 - 0.20 K/uL    nRBC 0 0 /100 WBC    Gran % 70.8 38.0 - 73.0 %    Lymph % 18.7 18.0 - 48.0 %     Mono % 8.1 4.0 - 15.0 %    Eosinophil % 1.7 0.0 - 8.0 %    Basophil % 0.4 0.0 - 1.9 %    Differential Method Automated    Comprehensive metabolic panel   Result Value Ref Range    Sodium 140 136 - 145 mmol/L    Potassium 4.2 3.5 - 5.1 mmol/L    Chloride 106 95 - 110 mmol/L    CO2 21 (L) 23 - 29 mmol/L    Glucose 103 70 - 110 mg/dL    BUN 29 (H) 8 - 23 mg/dL    Creatinine 2.0 (H) 0.5 - 1.4 mg/dL    Calcium 9.0 8.7 - 10.5 mg/dL    Total Protein 7.5 6.0 - 8.4 g/dL    Albumin 3.8 3.5 - 5.2 g/dL    Total Bilirubin 0.3 0.1 - 1.0 mg/dL    Alkaline Phosphatase 109 55 - 135 U/L    AST 20 10 - 40 U/L    ALT 17 10 - 44 U/L    eGFR 36 (A) >60 mL/min/1.73 m^2    Anion Gap 13 8 - 16 mmol/L   Lactic acid, plasma   Result Value Ref Range    Lactate (Lactic Acid) 3.1 (H) 0.5 - 2.2 mmol/L   C-reactive protein   Result Value Ref Range    CRP 22.8 (H) 0.0 - 8.2 mg/L     *Note: Due to a large number of results and/or encounters for the requested time period, some results have not been displayed. A complete set of results can be found in Results Review.         Imaging Results:  Imaging Results              MRI Lumbar Spine W WO Cont (Final result)  Result time 05/31/24 22:54:00      Final result by Geri Jackson MD (05/31/24 22:54:00)                   Impression:      Surgical change with hardware and associated artifact L4-S1 posterior approach.  There is a peripherally enhancing fluid loculation at the dorsal postsurgical elements measuring approximately 4 cm X 7 cm epicenter L5 level sterility uncertain possible abscess.    No overt canal compromise.    No overt acute osseous finding    Conus medullaris unremarkable      Electronically signed by: Geri Jackson  Date:    05/31/2024  Time:    22:54               Narrative:    EXAMINATION:  MRI LUMBAR SPINE W WO CONTRAST    CLINICAL HISTORY:  Low back pain, infection suspected;.    TECHNIQUE:  Multiplanar, multisequence MR images of the cervical spine were acquired without  the administration of contrast.    COMPARISON:  07/03/2023                                              The Emergency Provider reviewed the vital signs and test results, which are outlined above.     ED Discussion              Medical Decision Making  Amount and/or Complexity of Data Reviewed  Labs: ordered. Decision-making details documented in ED Course.  Radiology: ordered. Decision-making details documented in ED Course.  Discussion of management or test interpretation with external provider(s): 11:30 PM: Discussed pt's case with Dr. Solis (Orthopedic surgery) who recommends trying to contact Dr. Valencia despite him not being on call tonight.    12:14 AM: Consult with Dr. Solis (Emergency Medicine) at [facility] concerning pt. There are no neurosurgery services, which the patient requires, offered at [facility] at this time. Dr. Solis expresses understanding and will accept transfer for [facility].  Accepting Facility: [facility]  Accepting Physician: Dr. Solis    12:14 AM: Re-evaluated pt. Informed pt and family that there are no neurosurgery services available at this time. I have discussed test results, shared treatment plan, and the need for transfer with patient and family at bedside. All historical, clinical, radiographic, and laboratory findings were reviewed with the patient/family in detail. Patient will be transferred by [facility] services with  care required en route. Patient understands that there could be unforeseen motor vehicle accidents or loss of vital signs that could result in potential death or permanent disability. Pt and family express understanding at this time and agree with all information. All questions answered. Pt and family have no further questions or concerns at this time. Pt is ready for transfer.       Risk  Prescription drug management.  Decision regarding hospitalization.                ED Medication(s):  Medications   vancomycin - pharmacy to dose (has no  administration in time range)   vancomycin 2 g in dextrose 5 % 500 mL IVPB (2,000 mg Intravenous New Bag 5/31/24 2303)   sodium chloride 0.9% bolus 3,216 mL 3,216 mL (has no administration in time range)   LIDOcaine (PF) 10 mg/ml (1%) injection 100 mg (100 mg Infiltration Given 5/31/24 1845)   morphine injection 4 mg (4 mg Intravenous Given 5/31/24 2017)   ondansetron injection 4 mg (4 mg Intravenous Given 5/31/24 2015)   piperacillin-tazobactam (ZOSYN) 4.5 g in dextrose 5 % in water (D5W) 100 mL IVPB (MB+) (0 g Intravenous Stopped 5/31/24 2246)   gadobutroL (GADAVIST) injection 10 mL (10 mLs Intravenous Given 5/31/24 2148)   ketorolac injection 30 mg (30 mg Intravenous Given 5/31/24 2224)       New Prescriptions    No medications on file               Scribe Attestation:   Scribe #1: I performed the above scribed service and the documentation accurately describes the services I performed. I attest to the accuracy of the note.     Attending:   Physician Attestation Statement for Scribe #1: I, Geraldine Ceja MD, personally performed the services described in this documentation, as scribed by Carolyn Burch, in my presence, and it is both accurate and complete.           Clinical Impression       ICD-10-CM ICD-9-CM   1. Spinal abscess  M46.20 730.08   2. Encounter for incision and drainage procedure  Z76.89 V72.85   3. Paresthesia  R20.2 782.0   4. Acute left-sided low back pain with sciatica, sciatica laterality unspecified  M54.40 724.2     724.3       Disposition:   Disposition: Transferred  Condition: Fair

## 2024-06-01 NOTE — ED PROVIDER NOTES
Source of History  patient and EMR    Chief Complaint    Transfer (Transfer from [place] for Neurosurg for spinal abscess. )      History of Present Illness    Abdullahi Urias is a 68 y.o. male presenting with concern for spinal abscess was hardware infection.  From outside facility for orthopedic evaluation.  Swelling to sacral area and left upper buttock about 6 days ago with occasional numbness of the right lower extremity.  Status post lumbar fusion about a year ago with subsequent washout secondary to infection in July 20, 2023.  Previous bedside I and D of left upper buttock was attempted but there was no purulence so MRI was ordered which showed concerning findings of possible abscess near lower lumbar/sacral hardware      Review of Systems    As per HPI and below:  Constitutional symptoms:  No generalized weakness, no fevers or chills  Skin symptoms:  No rash, no bruising, no hematoma    Musculoskeletal symptoms:  + back pain, No joint pains or aches, no joint swelling  GI/ symptoms:  No saddle anesthesia  Neurologic symptoms:  No headache, occasional numbness to the right lower extremity without weakness      Past History    As per HPI and below:  Past Medical History:   Diagnosis Date    Aortic stenosis     [facility]    Asthma     BPH (benign prostatic hyperplasia)     CAD (coronary artery disease)     Cardiomyopathy     CHF (congestive heart failure)     Chronic hoarseness     vocal cord surg    Chronic pain     CKD (chronic kidney disease) stage 3, GFR 30-59 ml/min     CVA (cerebral infarction)     8/2012 olol; reviewed ed note    Ex-smoker     GERD (gastroesophageal reflux disease)     Hepatitis C     treatedharvoni says cured, RNA NEG 6/2020    Hypertension     Pancreatitis     Prostate cancer     Prostate cancer     Prostate cancer     PVD (peripheral vascular disease)     Renal insufficiency     Substance abuse     cocaine, etoh , tob in past       Past Surgical History:   Procedure  Laterality Date    AORTIC VALVE REPLACEMENT  05/19/2014    Tissue valve replacement    BACK SURGERY      CARDIAC CATHETERIZATION      COLONOSCOPY  2011    COLONOSCOPY N/A 2/24/2021    Procedure: COLONOSCOPY;  Surgeon: Faith Carrillo MD;  Location: [facility] ENDO;  Service: Endoscopy;  Laterality: N/A;    EPIDURAL STEROID INJECTION INTO CERVICAL SPINE N/A 6/21/2022    Procedure: C7-T1 IL PIEDAD;  Surgeon: Nehemiah Carmen MD;  [facility];  Service: Pain Management;  Laterality: N/A;    EPIDURAL STEROID INJECTION INTO CERVICAL SPINE N/A 3/12/2024    Procedure: C7/T1 IL PIEDAD;  Surgeon: Nehemiah Carmen MD;  Location: [facility] PAIN MGT;  Service: Pain Management;  Laterality: N/A;    ESOPHAGOGASTRODUODENOSCOPY N/A 2/24/2021    Procedure: ESOPHAGOGASTRODUODENOSCOPY (EGD);  Surgeon: Faith Carrillo MD;  Location: [facility] ENDO;  Service: Endoscopy;  Laterality: N/A;    FOOT SURGERY      INSERTION N/A 7/5/2023    Procedure: INSERTION;  Surgeon: Brandon Valencia MD;  Location: [facility] OR;  Service: Neurosurgery;  Laterality: N/A;  Antibiotic Beads    LEFT HEART CATHETERIZATION Left 7/24/2020    Procedure: CATHETERIZATION, HEART, LEFT;  Surgeon: Pasha Martin MD;  Location: [facility] CATH LAB;  Service: Cardiology;  Laterality: Left;    PENILE PROSTHESIS IMPLANT      PENILE PROSTHESIS REVISION  04/30/2018    PROSTATECTOMY  09/2019    [facility]    radiation for prostate      REMOVAL OF HARDWARE FROM SPINE N/A 7/5/2023    Procedure: REMOVAL, HARDWARE, SPINE;  Surgeon: Brandno Valencia MD;  Location: [facility] OR;  Service: Neurosurgery;  Laterality: N/A;    REPLACEMENT N/A 7/5/2023    Procedure: REPLACEMENT;  Surgeon: Brandon Valencia MD;  Location: [facility] OR;  Service: Neurosurgery;  Laterality: N/A;  of [employer]oval and [employer]ws. [employer]    TRANSFORAMINAL EPIDURAL INJECTION OF STEROID Right 10/20/2022    Procedure: Right  L4/5 + L5/S1 TF PIEDAD RN IV Sedation;  Surgeon: Nehemiah Carmen MD;  Location: [facility] PAIN MGT;  Service: Pain  "Management;  Laterality: Right;    TRANSFORAMINAL LUMBAR INTERBODY FUSION (TLIF) USING COMPUTER-ASSISTED NAVIGATION Bilateral 2023    Procedure: FUSION, SPINE, LUMBAR, TLIF, USING COMPUTER-ASSISTED NAVIGATION;  Surgeon: Brandon Valencia MD;  Location: [facility] OR;  Service: Neurosurgery;  Laterality: Bilateral;  2 level lumbar fusion at L4-5 and L5-S1    UPPER GASTROINTESTINAL ENDOSCOPY  2011    WASHOUT N/A 2023    Procedure: WASHOUT;  Surgeon: Brandon Valencia MD;  Location: [facility] OR;  Service: Neurosurgery;  Laterality: N/A;    WOUND EXPLORATION Bilateral 2023    Procedure: EXPLORATION, WOUND;  Surgeon: Brandon Valencia MD;  Location: [facility] OR;  Service: Neurosurgery;  Laterality: Bilateral;       Social History     Socioeconomic History    Marital status:    Tobacco Use    Smoking status: Former     Current packs/day: 0.00     Average packs/day: 1 pack/day for 8.0 years (8.0 ttl pk-yrs)     Types: Cigarettes     Start date: 2004     Quit date: 2012     Years since quittin.7    Smokeless tobacco: Never   Substance and Sexual Activity    Alcohol use: Not Currently     Comment: Sober since 2012    Drug use: Not Currently     Types: "Crack" cocaine, Marijuana     Comment: Quit in     Sexual activity: Yes   Social History Narrative    No pets in household, wife and daughter smokers. Former [employer].    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; as of  retired     Social Determinants of Health     Financial Resource Strain: Low Risk  (2023)    Overall Financial Resource Strain (CARDIA)     Difficulty of Paying Living Expenses: Not very hard   Food Insecurity: Food Insecurity Present (2023)    Hunger Vital Sign     Worried About Running Out of Food in the Last Year: Sometimes true     Ran Out of Food in the Last Year: Sometimes true   Transportation Needs: No Transportation Needs (2023)    PRAPARE - Transportation     Lack of Transportation (Medical): No     Lack " of Transportation (Non-Medical): No   Physical Activity: Insufficiently Active (9/7/2023)    Exercise Vital Sign     Days of Exercise per Week: 3 days     Minutes of Exercise per Session: 20 min   Stress: Stress Concern Present (9/7/2023)    [employer] - Occupational Stress Questionnaire     Feeling of Stress : Very much   Housing Stability: Low Risk  (9/7/2023)    Housing Stability Vital Sign     Unable to Pay for Housing in the Last Year: No     Number of Places Lived in the Last Year: 1     Unstable Housing in the Last Year: No       Family History   Problem Relation Name Age of Onset    Heart disease Mother      Heart disease Father      Heart attack Sister      Heart attack Brother      Colon cancer Neg Hx      Colon polyps Neg Hx      Liver cancer Neg Hx      Inflammatory bowel disease Neg Hx      Liver disease Neg Hx      Rectal cancer Neg Hx      Stomach cancer Neg Hx      Ulcerative colitis Neg Hx         Review of patient's allergies indicates:  No Known Allergies    Current Facility-Administered Medications on File Prior to Encounter   Medication Dose Route Frequency Provider Last Rate Last Admin    [COMPLETED] gadobutroL (GADAVIST) injection 10 mL  10 mL Intravenous ONCE PRN Phani Eckert Jr., FNP   10 mL at 05/31/24 2148    [COMPLETED] ketorolac injection 30 mg  30 mg Intravenous ED 1 Time Geraldine Ceja MD   30 mg at 05/31/24 2224    [COMPLETED] LIDOcaine (PF) 10 mg/ml (1%) injection 100 mg  10 mL Infiltration ED 1 Time Phani Eckert Jr., FNP   100 mg at 05/31/24 1845    [COMPLETED] morphine injection 4 mg  4 mg Intravenous ED 1 Time Phani Eckert Jr., FNP   4 mg at 05/31/24 2017    [COMPLETED] morphine injection 4 mg  4 mg Intravenous ED 1 Time Geraldine Ceja MD   4 mg at 06/01/24 0155    ondansetron injection 4 mg  4 mg Intravenous Once PRN Nehemiah Carmen MD        ondansetron injection 4 mg  4 mg Intravenous Once PRN Nehemiah Carmen,  MD        [COMPLETED] ondansetron injection 4 mg  4 mg Intravenous ED 1 Time Phani Eckert Jr., FNP   4 mg at 05/31/24 2015    [COMPLETED] piperacillin-tazobactam (ZOSYN) 4.5 g in dextrose 5 % in water (D5W) 100 mL IVPB (MB+)  4.5 g Intravenous ED 1 Time Phani Eckert Jr., FNP   Stopped at 05/31/24 2246    [COMPLETED] sodium chloride 0.9% bolus 3,216 mL 3,216 mL  30 mL/kg Intravenous ED 1 Time Phani Eckert Jr., FNP 3,216 mL/hr at 06/01/24 0141 1,000 mL at 06/01/24 0141    sodium chloride 0.9% flush 10 mL  10 mL Intravenous PRN Wilda Horne MD        [COMPLETED] vancomycin 2 g in dextrose 5 % 500 mL IVPB  20 mg/kg Intravenous Once Phani Eckert Jr., FNP   Stopped at 06/01/24 0135    [DISCONTINUED] vancomycin - pharmacy to dose   Intravenous pharmacy to manage frequency Phani Eckert Jr., FNP         Current Outpatient Medications on File Prior to Encounter   Medication Sig Dispense Refill    albuterol (PROVENTIL) 2.5 mg /3 mL (0.083 %) nebulizer solution Take 3 mLs (2.5 mg total) by nebulization every 6 (six) hours as needed for Wheezing. Rescue 180 mL 1    albuterol (PROVENTIL/VENTOLIN HFA) 90 mcg/actuation inhaler INHALE 2 PUFFS INTO THE LUNGS EVERY 6 HOURS AS NEEDED FOR COUGH 8.5 g 3    allopurinoL (ZYLOPRIM) 100 MG tablet Take 100 mg by mouth once daily. Takes 0.5 tab      ALPRAZolam (XANAX) 1 MG tablet Take 1 mg by mouth Daily. EVERY OTHER DAY      aluminum & magnesium hydroxide-simethicone (MYLANTA MAX STRENGTH) 400-400-40 mg/5 mL suspension TAKE 15ML BY MOUTH FOUR TIMES A DAY AS NEEDED FOR STOMACH ACID      aspirin (ECOTRIN) 81 MG EC tablet Take 1 tablet (81 mg total) by mouth once daily. 90 tablet 3    atorvastatin (LIPITOR) 80 MG tablet TAKE ONE-HALF TABLET BY MOUTH EVERY DAY FOR CHOLESTEROL      carvediloL (COREG) 12.5 MG tablet Take 1 tablet (12.5 mg total) by mouth 2 (two) times daily. 60 tablet 11    clopidogreL (PLAVIX) 75 mg tablet Take 1 tablet (75 mg total)  by mouth once daily. 30 tablet 11    docusate sodium (COLACE) 100 MG capsule Take 1 capsule (100 mg total) by mouth 2 (two) times daily. 20 capsule 0    esomeprazole (NEXIUM) 40 MG capsule Take 40 mg by mouth before breakfast.      famotidine (PEPCID) 40 MG tablet TAKE ONE TABLET BY MOUTH AT BEDTIME FOR ACID REFLUX      ferrous sulfate (FEOSOL) 325 mg (65 mg iron) Tab tablet Take 1 tablet (325 mg total) by mouth daily with breakfast. 90 tablet 1    fluticasone-umeclidin-vilanter (TRELEGY ELLIPTA) 200-62.5-25 mcg inhaler Inhale 1 puff into the lungs once daily. 60 each 5    gabapentin (NEURONTIN) 600 MG tablet Take 1 tablet (600 mg total) by mouth 3 (three) times daily. 90 tablet 11    guaiFENesin (MUCINEX) 600 mg 12 hr tablet Take 2 tablets (1,200 mg total) by mouth 2 (two) times daily. 20 tablet 0    hydrocortisone 2.5 % cream Apply topically 2 (two) times daily as needed. Apply to affected areas of the face and neck. 30 g 2    hydrOXYzine HCL (ATARAX) 25 MG tablet Take 1 tablet (25 mg total) by mouth 3 (three) times daily as needed for Itching. 30 tablet 0    ipratropium (ATROVENT) 21 mcg (0.03 %) nasal spray 2 sprays by Each Nostril route 2 (two) times daily. 30 mL 0    LIDOcaine (LIDODERM) 5 % APPLY 1 PATCH TOPICALLY EVERY DAY FOR PAIN. WEAR FOR 12 HOURS, THEN REMOVE. DO NOT APPLY NEW PATCH FOR AT LEAST 12 HOURS.      losartan (COZAAR) 50 MG tablet Take 1 tablet (50 mg total) by mouth once daily. 30 tablet 11    multivitamin (THERAGRAN) per tablet Take 1 tablet by mouth once daily.      oxyCODONE-acetaminophen (PERCOCET)  mg per tablet Take 1 tablet by mouth every 8 (eight) hours as needed for Pain. 21 tablet 0    sertraline (ZOLOFT) 100 MG tablet TAKE TWO TABLETS BY MOUTH EVERY DAY FOR MENTAL HEALTH DOSE INCREASED TO 200MG/DAY      simethicone (MYLICON) 80 MG chewable tablet Take 80 mg by mouth every 6 (six) hours as needed for Flatulence.      sucralfate (CARAFATE) 100 mg/mL suspension Take 1 g by mouth  "4 (four) times daily.       tiZANidine (ZANAFLEX) 4 MG tablet Take 1 capsule by mouth 2 (two) times daily as needed (muscle spasms).      zolpidem (AMBIEN CR) 12.5 MG CR tablet Take 1 tablet by mouth nightly as needed.         Physical Exam    Reviewed nursing notes.  Vitals:    06/01/24 0355 06/01/24 0430   BP: 114/75 113/63   Pulse: 67 65   Resp: 18 17   Temp: 97.6 °F (36.4 °C)    TempSrc: Oral    SpO2: 100% 97%   Weight: 107 kg (236 lb)    Height: 5' 9" (1.753 m)      General:  Alert, no acute distress.    Skin:  Warm, dry, induration near the buttocks, incision in the lower lumbar clean dry intact and closed  Head:  Normocephalic, atraumatic.    Neck:  Supple.   Eye:  extraocular movements are intact.    Ears, nose, mouth and throat:  Normal phonation.  Cardiovascular:  No edema.  Regular pulses.    Respiratory:  Respirations are non-labored.   Gastrointestinal:  Nondistended.  No belching or vomiting.  Back:  Not specifically tender in the lumbar spine  Musculoskeletal:  Normal range of motion observed of the lower extremities.  Neurological:  Alert and oriented to person, place, time, and situation.  Intact sensation at this time  Psychiatric:  Cooperative, appropriate mood & affect.       Initial MDM and Data Review    68 y.o. male presenting for evaluation of concern for hardware infection in the lower lumbar/S1 space based on MRI.    Differential includes but is not limited to:  Peripheral abscess, spinal abscess, hardware infection    Work-up includes:  As per orthopedics    Interventions include:  As per orthopedics    The patient has significant medical comorbidities that influence decision making for this acute process, such as:  Hardware in the lumbar spine with prior postop wound abscess, history of CAD and hypertension    I decided to obtain the patient's medical records and review relevant documentation from hospital records, EMS records, and outside hospital.  Pertinent information is " noted.    Patient received Zosyn and vancomycin  Lactic acid went from 3.1-0.6  Received morphine for analgesia    Impression:     Surgical change with hardware and associated artifact L4-S1 posterior approach.  There is a peripherally enhancing fluid loculation at the dorsal postsurgical elements measuring approximately 4 cm X 7 cm epicenter L5 level sterility uncertain possible abscess.     No overt canal compromise.     No overt acute osseous finding     Conus medullaris unremarkable        Electronically signed by:Geri Jackson  Date:                                            05/31/2024  Time:                                           22:54    Medications - No data to display    Results and ED Course    Labs Reviewed   HIV 1 / 2 ANTIBODY       Imaging Results    None         ED Course as of 06/01/24 0538   Sat Jun 01, 2024   0412 Paged ortho [AC]      ED Course User Index  [AC] Sami Monroy DO         Relevant imaging interpreted by myself  Reviewed MRI, collection of fluid noted    Impression and Plan    68 y.o. male with findings of collection of fluid near lower lumbar/sacral hardware based on the work up in the emergency department as above.    Important lab/imaging findings include:  Reviewed as above    I consulted with:  Orthopedic spine surgery    Orthopedics has evaluated the patient, awaiting disposition and plan    All tests, treatment options and disposition options were discussed with the patient.  The decision was made to observe the patient in the ED while awaiting further work up.    The patient was signed out to oncoming attending in stable condition and all further questions/concerns by patient and/or family were addressed.             Final diagnoses:  [T84.7XXD] Hardware complicating wound infection, subsequent encounter (Primary)        ED Disposition Condition    Admit Stable                  Sami Monroy DO  06/01/24 4706

## 2024-06-01 NOTE — PROGRESS NOTES
I received this patient in changeover.  Briefly, patient is a 68-year-old male who presents as a transfer from [place] for ortho spine evaluation for spinal abscess.  Patient has a history of lumbar spinal fusion 1 year ago with subsequent washout secondary to infection in July 20, 2023.  Outside hospital labs and imaging reviewed.  MRI with 4 cm x 7 cm fluid loculation at L5 concerning for spinal abscess.  Labs with initial mild elevated lactate.  Repeat normal.  No leukocytosis but elevated CRP.    At time of changeover, Orthopedics covering for spine.  Dispo pending ortho spine recommendations.  I evaluated the patient at changeover and he was resting comfortably.  Patient was updated on plan.    Orthopedics able to evaluate the patient.  The fluid collection has actually improved in size from previous MRI.  They recommend outpatient follow up with the patient's spine surgeon in [place].  They do not recommend any antibiotics at this time.  Patient feels comfortable with this plan and plans to follow up with his spinal surgeon in [place].    Prior to discharge, I did look at the gluteal cleft of the patient as he reportedly had a right upper gluteal cleft abscess.  He had an I&D performed at the outside hospital that did not have any purulent drainage.  The area is indurated and not fluctuant.  It was mildly tender and measures approximately 2 x 1 cm.  There is no drainage from the site.  There is no surrounding warmth or erythema.  Will go ahead and send home on antibiotics for this.  Patient states that he [facility] so will have him follow up for wound evaluation at that time as well.  He was given very strict return precautions regarding both his spine and his skin abscess.  Will send home with Bactrim.  He was also prescribed a few doses of Norco for pain control for home.  He was counseled on supportive care including warm compresses and Sitz  baths.        MRI Impression:     Surgical change with hardware and associated artifact L4-S1 posterior approach.  There is a peripherally enhancing fluid loculation at the dorsal postsurgical elements measuring approximately 4 cm X 7 cm epicenter L5 level sterility uncertain possible abscess.     No overt canal compromise.     No overt acute osseous finding     Conus medullaris unremarkable

## 2024-06-01 NOTE — ED NOTES
Assumed care of this pt at this time   Patient identifiers verified and correct for Abdullahi Urias   LOC: The patient is awake, alert and aware of environment with an appropriate affect, the patient is oriented x 3 and speaking appropriately.   APPEARANCE: Patient appears comfortable and in no acute distress, patient is clean and well groomed.  SKIN: The skin is warm and dry, color consistent with ethnicity, patient has normal skin turgor and moist mucus membranes  MUSCULOSKELETAL: Patient moving all extremities spontaneously  RESPIRATORY: Airway is open and patent, respirations are spontaneous, patient has a normal effort and rate, no accessory muscle use noted, pt placed on continuous pulse ox with O2 sats noted at 97% on room air.  CARDIAC: Pt placed on cardiac monitor. Patient has a normal rate, no edema noted, capillary refill < 3 seconds.   GASTRO: Soft and non tender to palpation, no distention noted  : Pt denies any pain or frequency with urination.  NEURO: Pt opens eyes spontaneously, behavior appropriate to situation, follows commands, facial expression symmetrical, bilateral hand grasp equal and even, purposeful motor response noted, normal sensation in all extremities when touched with a finger.

## 2024-06-01 NOTE — SUBJECTIVE & OBJECTIVE
Past Medical History:   Diagnosis Date    Aortic stenosis     [facility]    Asthma     BPH (benign prostatic hyperplasia)     CAD (coronary artery disease)     Cardiomyopathy     CHF (congestive heart failure)     Chronic hoarseness     vocal cord surg    Chronic pain     CKD (chronic kidney disease) stage 3, GFR 30-59 ml/min     CVA (cerebral infarction)     8/2012 olol; reviewed ed note    Ex-smoker     GERD (gastroesophageal reflux disease)     Hepatitis C     treatedharvoni says cured, RNA NEG 6/2020    Hypertension     Pancreatitis     Prostate cancer     Prostate cancer     Prostate cancer     PVD (peripheral vascular disease)     Renal insufficiency     Substance abuse     cocaine, etoh , tob in past       Past Surgical History:   Procedure Laterality Date    AORTIC VALVE REPLACEMENT  05/19/2014    Tissue valve replacement    BACK SURGERY      CARDIAC CATHETERIZATION      COLONOSCOPY  2011    COLONOSCOPY N/A 2/24/2021    Procedure: COLONOSCOPY;  Surgeon: Faith Carrillo MD;  Location: [facility] ENDO;  Service: Endoscopy;  Laterality: N/A;    EPIDURAL STEROID INJECTION INTO CERVICAL SPINE N/A 6/21/2022    Procedure: C7-T1 IL PIEDAD;  Surgeon: Nehemiah Carmen MD;  Location: [facility] PAIN MGT;  Service: Pain Management;  Laterality: N/A;    EPIDURAL STEROID INJECTION INTO CERVICAL SPINE N/A 3/12/2024    Procedure: C7/T1 IL PIEDAD;  Surgeon: Nehemiah Carmen MD;  Location: [facility] PAIN MGT;  Service: Pain Management;  Laterality: N/A;    ESOPHAGOGASTRODUODENOSCOPY N/A 2/24/2021    Procedure: ESOPHAGOGASTRODUODENOSCOPY (EGD);  Surgeon: Faith Carrillo MD;  Location: [facility] ENDO;  Service: Endoscopy;  Laterality: N/A;    FOOT SURGERY      INSERTION N/A 7/5/2023    Procedure: INSERTION;  Surgeon: Brandon Valencia MD;  Location: [facility] OR;  Service: Neurosurgery;  Laterality: N/A;  Antibiotic Beads    LEFT HEART CATHETERIZATION Left 7/24/2020    Procedure: CATHETERIZATION, HEART, LEFT;  Surgeon: Pasha Martin  MD;  Location: [facility] CATH LAB;  Service: Cardiology;  Laterality: Left;    PENILE PROSTHESIS IMPLANT      PENILE PROSTHESIS REVISION  04/30/2018    PROSTATECTOMY  09/2019    [facility]    radiation for prostate      REMOVAL OF HARDWARE FROM SPINE N/A 7/5/2023    Procedure: REMOVAL, HARDWARE, SPINE;  Surgeon: Brandon Valencia MD;  Location: [facility] OR;  Service: Neurosurgery;  Laterality: N/A;    REPLACEMENT N/A 7/5/2023    Procedure: REPLACEMENT;  Surgeon: Brandon Valencia MD;  Location: [facility] OR;  Service: Neurosurgery;  Laterality: N/A;  of [employer]. [email or web address]    TRANSFORAMINAL EPIDURAL INJECTION OF STEROID Right 10/20/2022    Procedure: Right  L4/5 + L5/S1 TF PIEDAD RN IV Sedation;  Surgeon: Nehemiah Carmen MD;  Location: [facility] PAIN MGT;  Service: Pain Management;  Laterality: Right;    TRANSFORAMINAL LUMBAR INTERBODY FUSION (TLIF) USING COMPUTER-ASSISTED NAVIGATION Bilateral 5/17/2023    Procedure: FUSION, SPINE, LUMBAR, TLIF, USING COMPUTER-ASSISTED NAVIGATION;  Surgeon: Brandon Valencia MD;  Location: [facility] OR;  Service: Neurosurgery;  Laterality: Bilateral;  2 level lumbar fusion at L4-5 and L5-S1    UPPER GASTROINTESTINAL ENDOSCOPY  2011    WASHOUT N/A 7/5/2023    Procedure: WASHOUT;  Surgeon: Brandon Valencia MD;  Location: [facility];  Service: Neurosurgery;  Laterality: N/A;    WOUND EXPLORATION Bilateral 7/5/2023    Procedure: EXPLORATION, WOUND;  Surgeon: Brandon Valencia MD;  Location: [facility];  Service: Neurosurgery;  Laterality: Bilateral;       Review of patient's allergies indicates:  No Known Allergies    No current facility-administered medications for this encounter.     Current Outpatient Medications   Medication Sig    albuterol (PROVENTIL) 2.5 mg /3 mL (0.083 %) nebulizer solution Take 3 mLs (2.5 mg total) by nebulization every 6 (six) hours as needed for Wheezing. Rescue    albuterol (PROVENTIL/VENTOLIN HFA) 90 mcg/actuation inhaler INHALE 2 PUFFS INTO THE LUNGS EVERY 6 HOURS AS NEEDED FOR  COUGH    allopurinoL (ZYLOPRIM) 100 MG tablet Take 100 mg by mouth once daily. Takes 0.5 tab    ALPRAZolam (XANAX) 1 MG tablet Take 1 mg by mouth Daily. EVERY OTHER DAY    aluminum & magnesium hydroxide-simethicone (MYLANTA MAX STRENGTH) 400-400-40 mg/5 mL suspension TAKE 15ML BY MOUTH FOUR TIMES A DAY AS NEEDED FOR STOMACH ACID    aspirin (ECOTRIN) 81 MG EC tablet Take 1 tablet (81 mg total) by mouth once daily.    atorvastatin (LIPITOR) 80 MG tablet TAKE ONE-HALF TABLET BY MOUTH EVERY DAY FOR CHOLESTEROL    carvediloL (COREG) 12.5 MG tablet Take 1 tablet (12.5 mg total) by mouth 2 (two) times daily.    clopidogreL (PLAVIX) 75 mg tablet Take 1 tablet (75 mg total) by mouth once daily.    docusate sodium (COLACE) 100 MG capsule Take 1 capsule (100 mg total) by mouth 2 (two) times daily.    esomeprazole (NEXIUM) 40 MG capsule Take 40 mg by mouth before breakfast.    famotidine (PEPCID) 40 MG tablet TAKE ONE TABLET BY MOUTH AT BEDTIME FOR ACID REFLUX    ferrous sulfate (FEOSOL) 325 mg (65 mg iron) Tab tablet Take 1 tablet (325 mg total) by mouth daily with breakfast.    fluticasone-umeclidin-vilanter (TRELEGY ELLIPTA) 200-62.5-25 mcg inhaler Inhale 1 puff into the lungs once daily.    gabapentin (NEURONTIN) 600 MG tablet Take 1 tablet (600 mg total) by mouth 3 (three) times daily.    guaiFENesin (MUCINEX) 600 mg 12 hr tablet Take 2 tablets (1,200 mg total) by mouth 2 (two) times daily.    HYDROcodone-acetaminophen (NORCO) 5-325 mg per tablet Take 1 tablet by mouth every 6 (six) hours as needed for Pain.    hydrocortisone 2.5 % cream Apply topically 2 (two) times daily as needed. Apply to affected areas of the face and neck.    hydrOXYzine HCL (ATARAX) 25 MG tablet Take 1 tablet (25 mg total) by mouth 3 (three) times daily as needed for Itching.    ipratropium (ATROVENT) 21 mcg (0.03 %) nasal spray 2 sprays by Each Nostril route 2 (two) times daily.    LIDOcaine (LIDODERM) 5 % APPLY 1 PATCH TOPICALLY EVERY DAY FOR  "PAIN. WEAR FOR 12 HOURS, THEN REMOVE. DO NOT APPLY NEW PATCH FOR AT LEAST 12 HOURS.    losartan (COZAAR) 50 MG tablet Take 1 tablet (50 mg total) by mouth once daily.    multivitamin (THERAGRAN) per tablet Take 1 tablet by mouth once daily.    oxyCODONE-acetaminophen (PERCOCET)  mg per tablet Take 1 tablet by mouth every 8 (eight) hours as needed for Pain.    sertraline (ZOLOFT) 100 MG tablet TAKE TWO TABLETS BY MOUTH EVERY DAY FOR MENTAL HEALTH DOSE INCREASED TO 200MG/DAY    simethicone (MYLICON) 80 MG chewable tablet Take 80 mg by mouth every 6 (six) hours as needed for Flatulence.    sucralfate (CARAFATE) 100 mg/mL suspension Take 1 g by mouth 4 (four) times daily.     sulfamethoxazole-trimethoprim 800-160mg (BACTRIM DS) 800-160 mg Tab Take 1 tablet by mouth 2 (two) times daily. for 7 days    tiZANidine (ZANAFLEX) 4 MG tablet Take 1 capsule by mouth 2 (two) times daily as needed (muscle spasms).    zolpidem (AMBIEN CR) 12.5 MG CR tablet Take 1 tablet by mouth nightly as needed.     Facility-Administered Medications Ordered in Other Encounters   Medication    ondansetron injection 4 mg    ondansetron injection 4 mg    sodium chloride 0.9% flush 10 mL     Family History       Problem Relation (Age of Onset)    Heart attack Sister, Brother    Heart disease Mother, Father          Tobacco Use    Smoking status: Former     Current packs/day: 0.00     Average packs/day: 1 pack/day for 8.0 years (8.0 ttl pk-yrs)     Types: Cigarettes     Start date: 2004     Quit date: 2012     Years since quittin.7    Smokeless tobacco: Never   Substance and Sexual Activity    Alcohol use: Not Currently     Comment: Sober since 2012    Drug use: Not Currently     Types: "Crack" cocaine, Marijuana     Comment: Quit in     Sexual activity: Yes     ROS    Constitutional: negative for fevers  Eyes: negative visual changes  ENT: negative for hearing loss  Respiratory: negative for dyspnea  Cardiovascular: " "negative for chest pain  Gastrointestinal: negative for abdominal pain  Genitourinary: negative for dysuria  Neurological: negative for headaches  Behavioral/Psych: negative for hallucinations  Endocrine: negative for temperature intolerance      Objective:     Vital Signs (Most Recent):  Temp: 97.7 °F (36.5 °C) (06/01/24 0645)  Pulse: 61 (06/01/24 1205)  Resp: 16 (06/01/24 1205)  BP: (!) 114/58 (06/01/24 1205)  SpO2: 98 % (06/01/24 1205) Vital Signs (24h Range):  Temp:  [97.6 °F (36.4 °C)-98 °F (36.7 °C)] 97.7 °F (36.5 °C)  Pulse:  [61-96] 61  Resp:  [16-21] 16  SpO2:  [95 %-100 %] 98 %  BP: (113-149)/(55-83) 114/58     Weight: 107 kg (236 lb)  Height: 5' 9" (175.3 cm)  Body mass index is 34.85 kg/m².    No intake or output data in the 24 hours ending 06/01/24 1233     Ortho/SPM Exam     Gen:  No acute distress  CV:  Peripherally well-perfused.    Lungs:  Normal respiratory effort.  Head/Neck:  Normocephalic.  Atraumatic.     Spine/pelvis/axial body:  TTP over the lumbar spine about his incision, otherwise nontender to thoracic and cervical spine.   TTP over area of induration about the right buttock.   No pain with compression of pelvis  No chest wall or abdominal tenderness  No decubitus ulcers    Motor                                                                                                    RIGHT             LEFT  Deltoid(C5)                                             5/5                  5/5  Biceps(C5)                                             5/5                  5/5  Extensor Carpi Radialis Longus(C6)     5/5                  5/5       Triceps(C7)                                            5/5                  5/5                               Flexor Carpi Radialis(C7)                      5/5                  5/5  Flexor Digitorum Superficialis(C8)         5/5                  5/5  Interossei(T1)                                         5/5                  5/5          Sensation (a=absent, " i=impaired, n=normal)                                                              RIGHT             LEFT  C5 dermatome                                    n                         n  C6 dermatome                                    n                         n  C7 dermatome                                    n                         n  C8 dermatome                                    n                         n  T1 dermatome                                    n                         n     Reflexes                                                              RIGHT             LEFT  Triceps                                                2+                       2+  Biceps                                                 2+                       2+  Brachioradialis                                    2+                       2+  Hoffmans's                                          neg                    neg        Motor                                                                                                                RIGHT             LEFT  Iliopsoas (L2,3)                                      5/5                  5/5  Quadriceps (L3,4)                                  5/5                  5/5   Tibialis Anterior (L4.5)                            5/5                  5/5       Extensor Halucis Longus (L5)               5/5                  5/5                               Gastrocnemius/Soleus (S1)                   5/5                  5/5  Flexor Hallucis Longus(S2)                   5/5                  5/5     Sensation (a=absent, i=impaired, n=normal)                                                              RIGHT             LEFT  L2 dermatome                                         n                     n  L3 dermatome                                         n                     n  L4 dermatome                                         n                     n  L5 dermatome                                          n                     n  S1 dermatome                                        n                     n  S2 dermatome                                        n                     n     Reflexes                                                              RIGHT             LEFT  Patellar                                                   2+                    2+  Achilles                                                   2+                    2+  Babinski                                                neg                  neg  Clonus                                                   neg                  neg      Significant Labs: CBC:   Recent Labs   Lab 05/31/24 2003   WBC 6.91   HGB 11.0*   HCT 34.6*        CMP:   Recent Labs   Lab 05/31/24 2003      K 4.2      CO2 21*      BUN 29*   CREATININE 2.0*   CALCIUM 9.0   PROT 7.5   ALBUMIN 3.8   BILITOT 0.3   ALKPHOS 109   AST 20   ALT 17   ANIONGAP 13     CRP:   Recent Labs   Lab 05/31/24 2003   CRP 22.8*     Lactic Acid:   Recent Labs   Lab 05/31/24 2003 06/01/24  0140   LACTATE 3.1* 0.6     All pertinent labs within the past 24 hours have been reviewed.    Significant Imaging: X-Ray: I have reviewed all pertinent results/findings and my personal findings are:  XR lumbar spine with expected post operative changes from prior L4-S1 TLIF.   MRI: I have reviewed all pertinent results/findings and my personal findings are:  MRI of lumbar spine shows subcutaneous fluid collection about prior incision site similar to prior imaging except the fluid collection is diminished.   I have reviewed and interpreted all pertinent imaging results/findings.

## 2024-06-01 NOTE — ED TRIAGE NOTES
Abdullahi Urias, an 68 y.o. male presents to the ED via EMS as a transfer from [place] for neurosurgery for a spinal abscess. Hx of lumbar sx with postop infection.       Chief Complaint   Patient presents with    Transfer     Transfer from [place] for Neurosurg for spinal abscess.      Review of patient's allergies indicates:  No Known Allergies  Past Medical History:   Diagnosis Date    Aortic stenosis     [facility]    Asthma     BPH (benign prostatic hyperplasia)     CAD (coronary artery disease)     Cardiomyopathy     CHF (congestive heart failure)     Chronic hoarseness     vocal cord surg    Chronic pain     CKD (chronic kidney disease) stage 3, GFR 30-59 ml/min     CVA (cerebral infarction)     8/2012 olol; reviewed ed note    Ex-smoker     GERD (gastroesophageal reflux disease)     Hepatitis C     treatedharvoni says cured, RNA NEG 6/2020    Hypertension     Pancreatitis     Prostate cancer     Prostate cancer     Prostate cancer     PVD (peripheral vascular disease)     Renal insufficiency     Substance abuse     cocaine, etoh , tob in past

## 2024-06-01 NOTE — CONSULTS
[facility]  Orthopedics  Consult Note    Patient Name: Abdullahi Urias  MRN: [id number]  Admission Date: 6/1/2024  Hospital Length of Stay: 0 days  Attending Provider: No att. providers found  Primary Care Provider: Reji Valentin MD    Inpatient consult to Orthopedic Surgery  Consult performed by: Gurwinder Valencia MD  Consult ordered by: Sami Monroy DO        Subjective:     Principal Problem:Seroma of musculoskeletal structure after musculoskeletal system procedure    Chief Complaint:   Chief Complaint   Patient presents with    Transfer     Transfer from [place] for Neurosurg for spinal abscess.         HPI: Abdullahi Urias is a 68 y.o. male with PMH significant for HTN, CAD, hx of prostate cancer, original L5-S1 laminectomy and discectomy in 2001 s/p L4-S1 TLIF by Dr. Valencia in  on 5/2023 and s/p I&D for abscess hardware replacement on 7/2023. At that time, patient had large fluid collection subcutaneously that tracked down to hardware. Patient subsequently developed re-accumulation of fluid and underwent aspiration with that yielded no growth. He is presenting as a transfer from  for a 6 week history of worsening chronic back pain over his incision site and induration distal to the incision site over the right buttock. He was seen in the  ED where they attempted to drain what was a suspected gluteal abscess, which yielded no fluid. Subsequent lumbar MRI shows smaller accumulation of fluid in the subcutaneous tissue of the back about the incision site that appears improved from his last post operative imaging showing much larger accumulation. Patient denies any head trauma or LOC. He endorses occasional numbness and tingling that radiates from his low back to the toes on his right side. Denies any other musculoskeletal pain or injuries. Patient denies bowel or bladder incontinence, saddle anesthesia, or muscle weakness. Patient also denies difficulty with keys, buttons, hand writing,  or small items. Walks w/out assisted devices at baseline. He takes plavix s/p AVR.   They endorse a remote history IV drug use with last use in 2001.   They endorse tobacco use.   They deny alcohol use.   They deny immunosuppressant medications.  They deny chemotherapy.  They endorse radiation therapy.     Past Medical History:   Diagnosis Date    Aortic stenosis     dr [facility]    Asthma     BPH (benign prostatic hyperplasia)     CAD (coronary artery disease)     Cardiomyopathy     CHF (congestive heart failure)     Chronic hoarseness     vocal cord surg    Chronic pain     CKD (chronic kidney disease) stage 3, GFR 30-59 ml/min     CVA (cerebral infarction)     8/2012 olol; reviewed ed note    Ex-smoker     GERD (gastroesophageal reflux disease)     Hepatitis C     treatedharvoni says cured, RNA NEG 6/2020    Hypertension     Pancreatitis     Prostate cancer     Prostate cancer     Prostate cancer     PVD (peripheral vascular disease)     Renal insufficiency     Substance abuse     cocaine, etoh , tob in past       Past Surgical History:   Procedure Laterality Date    AORTIC VALVE REPLACEMENT  05/19/2014    Tissue valve replacement    BACK SURGERY      CARDIAC CATHETERIZATION      COLONOSCOPY  2011    COLONOSCOPY N/A 2/24/2021    Procedure: COLONOSCOPY;  Surgeon: Faith Carrillo MD;  Location: [facility];  Service: Endoscopy;  Laterality: N/A;    EPIDURAL STEROID INJECTION INTO CERVICAL SPINE N/A 6/21/2022    Procedure: C7-T1 IL PIEDAD;  Surgeon: Nehemiah Carmen MD;  Location: [facility] PAIN MGT;  Service: Pain Management;  Laterality: N/A;    EPIDURAL STEROID INJECTION INTO CERVICAL SPINE N/A 3/12/2024    Procedure: C7/T1 IL PIEDAD;  Surgeon: Nehemiah Carmen MD;  Location: [facility] PAIN MGT;  Service: Pain Management;  Laterality: N/A;    ESOPHAGOGASTRODUODENOSCOPY N/A 2/24/2021    Procedure: ESOPHAGOGASTRODUODENOSCOPY (EGD);  Surgeon: Faith Carrillo MD;  Location: [facility];  Service: Endoscopy;   Laterality: N/A;    FOOT SURGERY      INSERTION N/A 7/5/2023    Procedure: INSERTION;  Surgeon: Brandon Valencia MD;  Location: [facility] OR;  Service: Neurosurgery;  Laterality: N/A;  Antibiotic Beads    LEFT HEART CATHETERIZATION Left 7/24/2020    Procedure: CATHETERIZATION, HEART, LEFT;  Surgeon: Pasha Martin MD;  Location: [facility] CATH LAB;  Service: Cardiology;  Laterality: Left;    PENILE PROSTHESIS IMPLANT      PENILE PROSTHESIS REVISION  04/30/2018    [facility]OSTATECTOMY  09/[facility]    [facility]    rad[facility] for prostate      REMOVAL OF HARDWARE FROM SPINE N/A 7/5/2023    Procedure: REMOVAL, HARDWARE, SPINE;  Surgeon: Brandon Valencia MD;  Location: [facility] OR;  Service: Neurosurgery;  Laterality: N/A;    REPLACEMENT N/A 7/5/2023    Procedure: REPLACEMENT;  Surgeon: Brandon Valencia MD;  Location: [facility] OR;  Service: Neurosurgery;  Laterality: N/A;  of [employer]oval and [employer]. [employer]    TRANSFORAMINAL EPIDURAL INJECTION OF STEROID Right 10/20/2022    Procedure: Right  L4/5 + L5/S1 TF PIEDAD RN IV Sedation;  Surgeon: Nehemiah Carmen MD;  Location: [facility] PAIN MGT;  Service: Pain Management;  Laterality: Right;    TRANSFORAMINAL LUMBAR INTERBODY FUSION (TLIF) USING COMPUTER-ASSISTED NAVIGATION Bilateral 5/17/2023    Procedure: FUSION, SPINE, LUMBAR, TLIF, USING COMPUTER-ASSISTED NAVIGATION;  Surgeon: Brandon Valencia MD;  Location: [facility] OR;  Service: Neurosurgery;  Laterality: Bilateral;  2 level lumbar fusion at L4-5 and L5-S1    UPPER GASTROINTESTINAL ENDOSCOPY  2011    WASHOUT N/A 7/5/2023    Procedure: WASHOUT;  Surgeon: Brandon Valencia MD;  Location: [facility] OR;  Service: Neurosurgery;  Laterality: N/A;    WOUND EXPLORATION Bilateral 7/5/2023    Procedure: EXPLORATION, WOUND;  Surgeon: Brandon Valencia MD;  Location: [facility] OR;  Service: Neurosurgery;  Laterality: Bilateral;       Review of patient's allergies indicates:  No Known Allergies    No current facility-administered medications for this encounter.     Current  Outpatient Medications   Medication Sig    albuterol (PROVENTIL) 2.5 mg /3 mL (0.083 %) nebulizer solution Take 3 mLs (2.5 mg total) by nebulization every 6 (six) hours as needed for Wheezing. Rescue    albuterol (PROVENTIL/VENTOLIN HFA) 90 mcg/actuation inhaler INHALE 2 PUFFS INTO THE LUNGS EVERY 6 HOURS AS NEEDED FOR COUGH    allopurinoL (ZYLOPRIM) 100 MG tablet Take 100 mg by mouth once daily. Takes 0.5 tab    ALPRAZolam (XANAX) 1 MG tablet Take 1 mg by mouth Daily. EVERY OTHER DAY    aluminum & magnesium hydroxide-simethicone (MYLANTA MAX STRENGTH) 400-400-40 mg/5 mL suspension TAKE 15ML BY MOUTH FOUR TIMES A DAY AS NEEDED FOR STOMACH ACID    aspirin (ECOTRIN) 81 MG EC tablet Take 1 tablet (81 mg total) by mouth once daily.    atorvastatin (LIPITOR) 80 MG tablet TAKE ONE-HALF TABLET BY MOUTH EVERY DAY FOR CHOLESTEROL    carvediloL (COREG) 12.5 MG tablet Take 1 tablet (12.5 mg total) by mouth 2 (two) times daily.    clopidogreL (PLAVIX) 75 mg tablet Take 1 tablet (75 mg total) by mouth once daily.    docusate sodium (COLACE) 100 MG capsule Take 1 capsule (100 mg total) by mouth 2 (two) times daily.    esomeprazole (NEXIUM) 40 MG capsule Take 40 mg by mouth before breakfast.    famotidine (PEPCID) 40 MG tablet TAKE ONE TABLET BY MOUTH AT BEDTIME FOR ACID REFLUX    ferrous sulfate (FEOSOL) 325 mg (65 mg iron) Tab tablet Take 1 tablet (325 mg total) by mouth daily with breakfast.    fluticasone-umeclidin-vilanter (TRELEGY ELLIPTA) 200-62.5-25 mcg inhaler Inhale 1 puff into the lungs once daily.    gabapentin (NEURONTIN) 600 MG tablet Take 1 tablet (600 mg total) by mouth 3 (three) times daily.    guaiFENesin (MUCINEX) 600 mg 12 hr tablet Take 2 tablets (1,200 mg total) by mouth 2 (two) times daily.    HYDROcodone-acetaminophen (NORCO) 5-325 mg per tablet Take 1 tablet by mouth every 6 (six) hours as needed for Pain.    hydrocortisone 2.5 % cream Apply topically 2 (two) times daily as needed. Apply to affected areas  of the face and neck.    hydrOXYzine HCL (ATARAX) 25 MG tablet Take 1 tablet (25 mg total) by mouth 3 (three) times daily as needed for Itching.    ipratropium (ATROVENT) 21 mcg (0.03 %) nasal spray 2 sprays by Each Nostril route 2 (two) times daily.    LIDOcaine (LIDODERM) 5 % APPLY 1 PATCH TOPICALLY EVERY DAY FOR PAIN. WEAR FOR 12 HOURS, THEN REMOVE. DO NOT APPLY NEW PATCH FOR AT LEAST 12 HOURS.    losartan (COZAAR) 50 MG tablet Take 1 tablet (50 mg total) by mouth once daily.    multivitamin (THERAGRAN) per tablet Take 1 tablet by mouth once daily.    oxyCODONE-acetaminophen (PERCOCET)  mg per tablet Take 1 tablet by mouth every 8 (eight) hours as needed for Pain.    sertraline (ZOLOFT) 100 MG tablet TAKE TWO TABLETS BY MOUTH EVERY DAY FOR MENTAL HEALTH DOSE INCREASED TO 200MG/DAY    simethicone (MYLICON) 80 MG chewable tablet Take 80 mg by mouth every 6 (six) hours as needed for Flatulence.    sucralfate (CARAFATE) 100 mg/mL suspension Take 1 g by mouth 4 (four) times daily.     sulfamethoxazole-trimethoprim 800-160mg (BACTRIM DS) 800-160 mg Tab Take 1 tablet by mouth 2 (two) times daily. for 7 days    tiZANidine (ZANAFLEX) 4 MG tablet Take 1 capsule by mouth 2 (two) times daily as needed (muscle spasms).    zolpidem (AMBIEN CR) 12.5 MG CR tablet Take 1 tablet by mouth nightly as needed.     Facility-Administered Medications Ordered in Other Encounters   Medication    ondansetron injection 4 mg    ondansetron injection 4 mg    sodium chloride 0.9% flush 10 mL     Family History       Problem Relation (Age of Onset)    Heart attack Sister, Brother    Heart disease Mother, Father          Tobacco Use    Smoking status: Former     Current packs/day: 0.00     Average packs/day: 1 pack/day for 8.0 years (8.0 ttl pk-yrs)     Types: Cigarettes     Start date: 2004     Quit date: 2012     Years since quittin.7    Smokeless tobacco: Never   Substance and Sexual Activity    Alcohol use: Not Currently  "    Comment: Sober since 08/24/2012    Drug use: Not Currently     Types: "Crack" cocaine, Marijuana     Comment: Quit in 2012    Sexual activity: Yes     ROS    Constitutional: negative for fevers  Eyes: negative visual changes  ENT: negative for hearing loss  Respiratory: negative for dyspnea  Cardiovascular: negative for chest pain  Gastrointestinal: negative for abdominal pain  Genitourinary: negative for dysuria  Neurological: negative for headaches  Behavioral/Psych: negative for hallucinations  Endocrine: negative for temperature intolerance      Objective:     Vital Signs (Most Recent):  Temp: 97.7 °F (36.5 °C) (06/01/24 0645)  Pulse: 61 (06/01/24 1205)  Resp: 16 (06/01/24 1205)  BP: (!) 114/58 (06/01/24 1205)  SpO2: 98 % (06/01/24 1205) Vital Signs (24h Range):  Temp:  [97.6 °F (36.4 °C)-98 °F (36.7 °C)] 97.7 °F (36.5 °C)  Pulse:  [61-96] 61  Resp:  [16-21] 16  SpO2:  [95 %-100 %] 98 %  BP: (113-149)/(55-83) 114/58     Weight: 107 kg (236 lb)  Height: 5' 9" (175.3 cm)  Body mass index is 34.85 kg/m².    No intake or output data in the 24 hours ending 06/01/24 1233     Ortho/SPM Exam     Gen:  No acute distress  CV:  Peripherally well-perfused.    Lungs:  Normal respiratory effort.  Head/Neck:  Normocephalic.  Atraumatic.     Spine/pelvis/axial body:  TTP over the lumbar spine about his incision, otherwise nontender to thoracic and cervical spine.   TTP over area of induration about the right buttock.   No pain with compression of pelvis  No chest wall or abdominal tenderness  No decubitus ulcers    Motor                                                                                                    RIGHT             LEFT  Deltoid(C5)                                             5/5                  5/5  Biceps(C5)                                             5/5                  5/5  Extensor Carpi Radialis Longus(C6)     5/5                  5/5       Triceps(C7)                                            " 5/5                  5/5                               Flexor Carpi Radialis(C7)                      5/5                  5/5  Flexor Digitorum Superficialis(C8)         5/5                  5/5  Interossei(T1)                                         5/5                  5/5          Sensation (a=absent, i=impaired, n=normal)                                                              RIGHT             LEFT  C5 dermatome                                    n                         n  C6 dermatome                                    n                         n  C7 dermatome                                    n                         n  C8 dermatome                                    n                         n  T1 dermatome                                    n                         n     Reflexes                                                              RIGHT             LEFT  Triceps                                                2+                       2+  Biceps                                                 2+                       2+  Brachioradialis                                    2+                       2+  Hoffmans's                                          neg                    neg        Motor                                                                                                                RIGHT             LEFT  Iliopsoas (L2,3)                                      5/5                  5/5  Quadriceps (L3,4)                                  5/5                  5/5   Tibialis Anterior (L4.5)                            5/5                  5/5       Extensor Halucis Longus (L5)               5/5                  5/5                               Gastrocnemius/Soleus (S1)                   5/5                  5/5  Flexor Hallucis Longus(S2)                   5/5                  5/5     Sensation (a=absent, i=impaired, n=normal)                                                               RIGHT             LEFT  L2 dermatome                                         n                     n  L3 dermatome                                         n                     n  L4 dermatome                                         n                     n  L5 dermatome                                         n                     n  S1 dermatome                                        n                     n  S2 dermatome                                        n                     n     Reflexes                                                              RIGHT             LEFT  Patellar                                                   2+                    2+  Achilles                                                   2+                    2+  Babinski                                                neg                  neg  Clonus                                                   neg                  neg      Significant Labs: CBC:   Recent Labs   Lab 05/31/24 2003   WBC 6.91   HGB 11.0*   HCT 34.6*        CMP:   Recent Labs   Lab 05/31/24 2003      K 4.2      CO2 21*      BUN 29*   CREATININE 2.0*   CALCIUM 9.0   PROT 7.5   ALBUMIN 3.8   BILITOT 0.3   ALKPHOS 109   AST 20   ALT 17   ANIONGAP 13     CRP:   Recent Labs   Lab 05/31/24 2003   CRP 22.8*     Lactic Acid:   Recent Labs   Lab 05/31/24 2003 06/01/24  0140   LACTATE 3.1* 0.6     All pertinent labs within the past 24 hours have been reviewed.    Significant Imaging: X-Ray: I have reviewed all pertinent results/findings and my personal findings are:  XR lumbar spine with expected post operative changes from prior L4-S1 TLIF.   MRI: I have reviewed all pertinent results/findings and my personal findings are:  MRI of lumbar spine shows subcutaneous fluid collection about prior incision site similar to prior imaging except the fluid collection is diminished.   I have reviewed and interpreted all pertinent imaging  results/findings.  Assessment/Plan:     * Seroma of musculoskeletal structure after musculoskeletal system procedure  Abdullahi Urias is a 68 y.o. male with pmhx of L4-S1 TLIF with subsequent I&D for abscess formation and resulting seroma without growth on culture. Imaging today shows seroma is significantly diminished. Patient is HDS, AF.    - Patient appears at [facility], complains of occasional right sided radiculopathy that has improved since his prior surgery  - WBAT bilateral lower extremities  - Labs:  WBC 6.91, hemoglobin 11.0, hematocrit 34.6, platelet count 183, ESR 67, BUN 29, creatinine 2.0, GFR 36, CRP 22.8, lactate was 3.1 however now decreased to 0.6  - pain control per Emergency Department    Based on the diminished appearance of his seroma as well as prior cultures negative for growth, this could likely be a noninfected seroma.  Patient was hemodynamically stable, and recommend follow up with primary surgeon for further evaluation and workup.  No inpatient acute orthopedic intervention recommended at this time.        JOHN ESCALANTE MD  Orthopedics  [facility] Emergency Dept

## 2024-06-01 NOTE — ASSESSMENT & PLAN NOTE
Abdullahi Urias is a 68 y.o. male with pmhx of L4-S1 TLIF with subsequent I&D for abscess formation and resulting seroma without growth on culture. Imaging today shows seroma is significantly diminished. Patient is HDS, AF.    - Patient appears at [facility], complains of occasional right sided radiculopathy that has improved since his prior surgery  - WBAT bilateral lower extremities  - Labs:  WBC 6.91, hemoglobin 11.0, hematocrit 34.6, platelet count 183, ESR 67, BUN 29, creatinine 2.0, GFR 36, CRP 22.8, lactate was 3.1 however now decreased to 0.6  - pain control per Emergency Department    Based on the diminished appearance of his seroma as well as prior cultures negative for growth, this could likely be a noninfected seroma.  Patient was hemodynamically stable, and recommend follow up with primary surgeon for further evaluation and workup.  No inpatient acute orthopedic intervention recommended at this time.

## 2024-06-01 NOTE — HPI
Abdullahi Urias is a 68 y.o. male with PMH significant for HTN, CAD, hx of prostate cancer, original L5-S1 laminectomy and discectomy in 2001 s/p L4-S1 TLIF by Dr. Valencia in  on 5/2023 and s/p I&D for abscess hardware replacement on 7/2023. At that time, patient had large fluid collection subcutaneously that tracked down to hardware. Patient subsequently developed re-accumulation of fluid and underwent aspiration with that yielded no growth. He is presenting as a transfer from  for a 6 week history of worsening chronic back pain over his incision site and induration distal to the incision site over the right buttock. He was seen in the  ED where they attempted to drain what was a suspected gluteal abscess, which yielded no fluid. Subsequent lumbar MRI shows smaller accumulation of fluid in the subcutaneous tissue of the back about the incision site that appears improved from his last post operative imaging showing much larger accumulation. Patient denies any head trauma or LOC. He endorses occasional numbness and tingling that radiates from his low back to the toes on his right side. Denies any other musculoskeletal pain or injuries. Patient denies bowel or bladder incontinence, saddle anesthesia, or muscle weakness. Patient also denies difficulty with keys, buttons, hand writing, or small items. Walks w/out assisted devices at baseline. He takes plavix s/p AVR.   They endorse a remote history IV drug use with last use in 2001.   They endorse tobacco use.   They deny alcohol use.   They deny immunosuppressant medications.  They deny chemotherapy.  They endorse radiation therapy.

## 2024-06-01 NOTE — DISCHARGE INSTRUCTIONS
You were seen today for two medical issues.  One is that you have a gluteal cleft skin abscess.  Please take the antibiotics as prescribed.  You have also been prescribed a short course of Norco for pain control at home.  Please do not take this medication while you are working or driving as it can cause you to feel drowsy.  Please do not take this with any other pain medications that you have been previously prescribed.  Please do not take this with Ambien or any other sleep medications.  You should continue to do Sitz baths and warm compresses.  If you develop fever, worsening swelling, worsening pain or any other concerns, you should return to the emergency department for wound re-evaluation.  [facility]  Please make sure that they look at this area and re-evaluate it.    You were also evaluated by ortho spine here.  Your MRI shows that you have a fluid collection near your hardware but it was decreased in size.  They recommend that you follow-up with your spine surgeon in [place].  Please call to schedule a follow up appointment with your spine surgeon as soon as possible.  If you develop any worsening back pain, leg weakness or numbness, difficulty walking, fever or any other worsening symptoms or concerns, you should return to the emergency department for re-evaluation.

## 2024-06-01 NOTE — PROGRESS NOTES
Pharmacokinetic Initial Assessment: IV Vancomycin    Assessment/Plan:    Initiate intravenous vancomycin with loading dose of 2000 mg once with subsequent doses when random concentrations are less than 20 mcg/mL  Desired empiric serum trough concentration is 10 to 20 mcg/mL  Draw vancomycin random level on 6/1/24 at 1700..  Pharmacy will continue to follow and monitor vancomycin.      Please contact pharmacy at extension [phone] with any questions regarding this assessment.     Thank you for the consult,   Vasquez Cui       Patient brief summary:  Abdullahi Urias is a 68 y.o. male initiated on antimicrobial therapy with IV Vancomycin for treatment of suspected skin & soft tissue infection    Drug Allergies:   Review of patient's allergies indicates:  No Known Allergies    Actual Body Weight:   107.2 kg    Renal Function:   Estimated Creatinine Clearance: 42.7 mL/min (A) (based on SCr of 2 mg/dL (H)).,     Dialysis Method (if applicable):  N/A    CBC (last 72 hours):  Recent Labs   Lab Result Units 05/31/24 2003   WBC K/uL 6.91   Hemoglobin g/dL 11.0*   Hematocrit % 34.6*   Platelets K/uL 183   Gran % % 70.8   Lymph % % 18.7   Mono % % 8.1   Eosinophil % % 1.7   Basophil % % 0.4   Differential Method  Automated       Metabolic Panel (last 72 hours):  Recent Labs   Lab Result Units 05/31/24 2003   Sodium mmol/L 140   Potassium mmol/L 4.2   Chloride mmol/L 106   CO2 mmol/L 21*   Glucose mg/dL 103   BUN mg/dL 29*   Creatinine mg/dL 2.0*   Albumin g/dL 3.8   Total Bilirubin mg/dL 0.3   Alkaline Phosphatase U/L 109   AST U/L 20   ALT U/L 17     Microbiologic Results:  Microbiology Results (last 7 days)       Procedure Component Value Units Date/Time    Blood culture x two cultures. Draw prior to antibiotics. [[id number]]     Order Status: Canceled Specimen: Blood     Blood culture x two cultures. Draw prior to antibiotics. [[id number]]     Order Status: Canceled Specimen: Blood     Blood Culture #2 **CANNOT BE ORDERED  STAT** [[id number]] Collected: 05/31/24 2005    Order Status: Sent Specimen: Blood from Peripheral, Forearm, Left     Blood Culture #1 **CANNOT BE ORDERED STAT** [[id number]] Collected: 05/31/24 2001    Order Status: Sent Specimen: Blood from Peripheral, Antecubital, Left

## 2024-06-03 ENCOUNTER — PATIENT OUTREACH (OUTPATIENT)
Dept: EMERGENCY MEDICINE | Facility: HOSPITAL | Age: 69
End: 2024-06-03
Payer: MEDICARE

## 2024-06-04 ENCOUNTER — OFFICE VISIT (OUTPATIENT)
Dept: NEUROSURGERY | Facility: CLINIC | Age: 69
End: 2024-06-04
Payer: MEDICARE

## 2024-06-04 ENCOUNTER — E-CONSULT (OUTPATIENT)
Dept: CARDIOLOGY | Facility: CLINIC | Age: 69
End: 2024-06-04
Payer: MEDICARE

## 2024-06-04 VITALS
BODY MASS INDEX: 33.24 KG/M2 | DIASTOLIC BLOOD PRESSURE: 82 MMHG | HEART RATE: 80 BPM | SYSTOLIC BLOOD PRESSURE: 130 MMHG | WEIGHT: 225.06 LBS

## 2024-06-04 DIAGNOSIS — M54.16 LUMBAR RADICULOPATHY: ICD-10-CM

## 2024-06-04 DIAGNOSIS — Z01.810 PREOP CARDIOVASCULAR EXAM: Primary | ICD-10-CM

## 2024-06-04 DIAGNOSIS — M51.36 DEGENERATIVE DISC DISEASE, LUMBAR: ICD-10-CM

## 2024-06-04 DIAGNOSIS — M54.9 DORSALGIA, UNSPECIFIED: ICD-10-CM

## 2024-06-04 DIAGNOSIS — G97.63 SEROMA OF NERVOUS SYSTEM AFTER NERVOUS SYSTEM PROCEDURE: Primary | ICD-10-CM

## 2024-06-04 PROCEDURE — 1101F PT FALLS ASSESS-DOCD LE1/YR: CPT | Mod: CPTII,S$GLB,, | Performed by: NEUROLOGICAL SURGERY

## 2024-06-04 PROCEDURE — 3044F HG A1C LEVEL LT 7.0%: CPT | Mod: CPTII,S$GLB,, | Performed by: NEUROLOGICAL SURGERY

## 2024-06-04 PROCEDURE — 3079F DIAST BP 80-89 MM HG: CPT | Mod: CPTII,S$GLB,, | Performed by: NEUROLOGICAL SURGERY

## 2024-06-04 PROCEDURE — 3008F BODY MASS INDEX DOCD: CPT | Mod: CPTII,S$GLB,, | Performed by: NEUROLOGICAL SURGERY

## 2024-06-04 PROCEDURE — 99999 PR PBB SHADOW E&M-EST. PATIENT-LVL II: CPT | Mod: PBBFAC,,, | Performed by: NEUROLOGICAL SURGERY

## 2024-06-04 PROCEDURE — 1159F MED LIST DOCD IN RCRD: CPT | Mod: CPTII,S$GLB,, | Performed by: NEUROLOGICAL SURGERY

## 2024-06-04 PROCEDURE — 1125F AMNT PAIN NOTED PAIN PRSNT: CPT | Mod: CPTII,S$GLB,, | Performed by: NEUROLOGICAL SURGERY

## 2024-06-04 PROCEDURE — 3288F FALL RISK ASSESSMENT DOCD: CPT | Mod: CPTII,S$GLB,, | Performed by: NEUROLOGICAL SURGERY

## 2024-06-04 PROCEDURE — 3075F SYST BP GE 130 - 139MM HG: CPT | Mod: CPTII,S$GLB,, | Performed by: NEUROLOGICAL SURGERY

## 2024-06-04 PROCEDURE — 99214 OFFICE O/P EST MOD 30 MIN: CPT | Mod: S$GLB,,, | Performed by: NEUROLOGICAL SURGERY

## 2024-06-04 PROCEDURE — 99451 NTRPROF PH1/NTRNET/EHR 5/>: CPT | Mod: S$GLB,,, | Performed by: INTERNAL MEDICINE

## 2024-06-04 RX ORDER — OXYCODONE AND ACETAMINOPHEN 5; 325 MG/1; MG/1
1 TABLET ORAL EVERY 4 HOURS PRN
Qty: 30 TABLET | Refills: 0 | Status: SHIPPED | OUTPATIENT
Start: 2024-06-04

## 2024-06-04 RX ORDER — METHYLPREDNISOLONE 4 MG/1
TABLET ORAL
Qty: 21 EACH | Refills: 0 | Status: SHIPPED | OUTPATIENT
Start: 2024-06-04 | End: 2024-06-25

## 2024-06-04 NOTE — PROGRESS NOTES
Patient seen with Dr. Valencia today  [facility]  Ed Navigator

## 2024-06-04 NOTE — CONSULTS
The [facility] Cardiology [facility]  Response for E-Consult     Patient Name: Abdullahi Urias  MRN: [id number]  Primary Care Provider: Reji Valentin MD   Requesting Provider: Whit Sampson NP  E-Consult to Cardiology  Consult performed by: Jeffery Mckeon MD  Consult ordered by: Whit Sampson NP          69 yo male, E consult for preop clearance of colonoscopy/EGD  The chart reviewed.  PMH s/p bioprosthetic AVR at [facility] in 2014 Salazar [id number] 25 mm pericardial tissue valve, nonobstructive CAD s/p [facility] in  nonobstructive, 20% midLAD and RCA by Dr. quiroz, PAD, h/o stroke in 2012, HTN, HLD CHEO on CPAP remote h/o cocaine in 2012, quit drinking in 2012 and no smoking. H/o prostate cancer in 2018,   05/24 EKG reviewed by myself today reveals NSR nonspecific STT change    Plan  Elevated periop risk of CV events for non-high risk procedure.  Ok to proceed the scheduled procedure without further cardiac study.  OK to hold ASa and Plavix 5 days before the procedure and resume postop once hemodynamically stable      Total time of Consultation: 10 minute    I did not speak to the requesting provider verbally about this.     *This eConsult is based on the clinical data available to me and is furnished without benefit of a physical examination. The eConsult will need to be interpreted in light of any clinical issues or changes in patient status not available to me at the time of filing this eConsults. Significant changes in patient condition or level of acuity should result in immediate formal consultation and reevaluation. Please alert me if you have further questions.    Thank you for this eConsult referral.     Jeffery Mckeon MD  The [address]

## 2024-06-05 ENCOUNTER — OFFICE VISIT (OUTPATIENT)
Dept: SURGERY | Facility: CLINIC | Age: 69
End: 2024-06-05
Payer: MEDICARE

## 2024-06-05 VITALS
BODY MASS INDEX: 35.16 KG/M2 | HEART RATE: 76 BPM | WEIGHT: 238.13 LBS | DIASTOLIC BLOOD PRESSURE: 75 MMHG | SYSTOLIC BLOOD PRESSURE: 119 MMHG

## 2024-06-05 DIAGNOSIS — K43.2 INCISIONAL HERNIA, WITHOUT OBSTRUCTION OR GANGRENE: ICD-10-CM

## 2024-06-05 PROCEDURE — 99999 PR PBB SHADOW E&M-EST. PATIENT-LVL IV: CPT | Mod: PBBFAC,,, | Performed by: SURGERY

## 2024-06-05 PROCEDURE — 3078F DIAST BP <80 MM HG: CPT | Mod: CPTII,S$GLB,, | Performed by: SURGERY

## 2024-06-05 PROCEDURE — 3074F SYST BP LT 130 MM HG: CPT | Mod: CPTII,S$GLB,, | Performed by: SURGERY

## 2024-06-05 PROCEDURE — 99214 OFFICE O/P EST MOD 30 MIN: CPT | Mod: S$GLB,,, | Performed by: SURGERY

## 2024-06-05 PROCEDURE — 1159F MED LIST DOCD IN RCRD: CPT | Mod: CPTII,S$GLB,, | Performed by: SURGERY

## 2024-06-05 PROCEDURE — 3044F HG A1C LEVEL LT 7.0%: CPT | Mod: CPTII,S$GLB,, | Performed by: SURGERY

## 2024-06-05 PROCEDURE — 1160F RVW MEDS BY RX/DR IN RCRD: CPT | Mod: CPTII,S$GLB,, | Performed by: SURGERY

## 2024-06-05 PROCEDURE — 1125F AMNT PAIN NOTED PAIN PRSNT: CPT | Mod: CPTII,S$GLB,, | Performed by: SURGERY

## 2024-06-05 PROCEDURE — 3008F BODY MASS INDEX DOCD: CPT | Mod: CPTII,S$GLB,, | Performed by: SURGERY

## 2024-06-05 NOTE — PROGRESS NOTES
History & Physical    SUBJECTIVE:     History of Present Illness:  Patient is a 68 y.o. male presents for follow-up of his hernia.  He reports some discomfort associated with a bulge.  He recently underwent I&D of suspected back abscess however noted to have a fluid collection near his previous spine surgery.  He follows with Neurosurgery and is undergoing treatment of fluid collection near his previous spine surgery.    Initially referred for hernia. Reports bulge on abdomen. Denies significant pain.    Chief Complaint   Patient presents with    Hernia       Review of patient's allergies indicates:  No Known Allergies    Current Outpatient Medications   Medication Sig Dispense Refill    albuterol (PROVENTIL) 2.5 mg /3 mL (0.083 %) nebulizer solution Take 3 mLs (2.5 mg total) by nebulization every 6 (six) hours as needed for Wheezing. Rescue 180 mL 1    albuterol (PROVENTIL/VENTOLIN HFA) 90 mcg/actuation inhaler INHALE 2 PUFFS INTO THE LUNGS EVERY 6 HOURS AS NEEDED FOR COUGH 8.5 g 3    allopurinoL (ZYLOPRIM) 100 MG tablet Take 100 mg by mouth once daily. Takes 0.5 tab      ALPRAZolam (XANAX) 1 MG tablet Take 1 mg by mouth Daily. EVERY OTHER DAY      aluminum & magnesium hydroxide-simethicone (MYLANTA MAX STRENGTH) 400-400-40 mg/5 mL suspension TAKE 15ML BY MOUTH FOUR TIMES A DAY AS NEEDED FOR STOMACH ACID      aspirin (ECOTRIN) 81 MG EC tablet Take 1 tablet (81 mg total) by mouth once daily. 90 tablet 3    atorvastatin (LIPITOR) 80 MG tablet TAKE ONE-HALF TABLET BY MOUTH EVERY DAY FOR CHOLESTEROL      carvediloL (COREG) 12.5 MG tablet Take 1 tablet (12.5 mg total) by mouth 2 (two) times daily. 60 tablet 11    clopidogreL (PLAVIX) 75 mg tablet Take 1 tablet (75 mg total) by mouth once daily. 30 tablet 11    docusate sodium (COLACE) 100 MG capsule Take 1 capsule (100 mg total) by mouth 2 (two) times daily. 20 capsule 0    esomeprazole (NEXIUM) 40 MG capsule Take 40 mg by mouth before breakfast.      famotidine  (PEPCID) 40 MG tablet TAKE ONE TABLET BY MOUTH AT BEDTIME FOR ACID REFLUX      ferrous sulfate (FEOSOL) 325 mg (65 mg iron) Tab tablet Take 1 tablet (325 mg total) by mouth daily with breakfast. 90 tablet 1    fluticasone-umeclidin-vilanter (TRELEGY ELLIPTA) 200-62.5-25 mcg inhaler Inhale 1 puff into the lungs once daily. 60 each 5    guaiFENesin (MUCINEX) 600 mg 12 hr tablet Take 2 tablets (1,200 mg total) by mouth 2 (two) times daily. 20 tablet 0    hydrocortisone 2.5 % cream Apply topically 2 (two) times daily as needed. Apply to affected areas of the face and neck. 30 g 2    hydrOXYzine HCL (ATARAX) 25 MG tablet Take 1 tablet (25 mg total) by mouth 3 (three) times daily as needed for Itching. 30 tablet 0    ipratropium (ATROVENT) 21 mcg (0.03 %) nasal spray 2 sprays by Each Nostril route 2 (two) times daily. 30 mL 0    LIDOcaine (LIDODERM) 5 % APPLY 1 PATCH TOPICALLY EVERY DAY FOR PAIN. WEAR FOR 12 HOURS, THEN REMOVE. DO NOT APPLY NEW PATCH FOR AT LEAST 12 HOURS.      losartan (COZAAR) 50 MG tablet Take 1 tablet (50 mg total) by mouth once daily. 30 tablet 11    methylPREDNISolone (MEDROL DOSEPACK) 4 mg tablet use as directed 21 each 0    multivitamin (THERAGRAN) per tablet Take 1 tablet by mouth once daily.      oxyCODONE-acetaminophen (PERCOCET) 5-325 mg per tablet Take 1 tablet by mouth every 4 (four) hours as needed for Pain. 30 tablet 0    sertraline (ZOLOFT) 100 MG tablet TAKE TWO TABLETS BY MOUTH EVERY DAY FOR MENTAL HEALTH DOSE INCREASED TO 200MG/DAY      simethicone (MYLICON) 80 MG chewable tablet Take 80 mg by mouth every 6 (six) hours as needed for Flatulence.      sucralfate (CARAFATE) 100 mg/mL suspension Take 1 g by mouth 4 (four) times daily.       sulfamethoxazole-trimethoprim 800-160mg (BACTRIM DS) 800-160 mg Tab Take 1 tablet by mouth 2 (two) times daily. for 7 days 14 tablet 0    tiZANidine (ZANAFLEX) 4 MG tablet Take 1 capsule by mouth 2 (two) times daily as needed (muscle spasms).       zolpidem (AMBIEN CR) 12.5 MG CR tablet Take 1 tablet by mouth nightly as needed.      gabapentin (NEURONTIN) 600 MG tablet Take 1 tablet (600 mg total) by mouth 3 (three) times daily. 90 tablet 11     No current facility-administered medications for this visit.     Facility-Administered Medications Ordered in Other Visits   Medication Dose Route Frequency Provider Last Rate Last Admin    ondansetron injection 4 mg  4 mg Intravenous Once PRN Nehemiah Carmen MD        ondansetron injection 4 mg  4 mg Intravenous Once PRN Nehemiah Carmen MD        sodium chloride 0.9% flush 10 mL  10 mL Intravenous PRN Wilda Horne MD           Past Medical History:   Diagnosis Date    Aortic stenosis     [facility]    Asthma     BPH (benign prostatic hyperplasia)     CAD (coronary artery disease)     Cardiomyopathy     CHF (congestive heart failure)     Chronic hoarseness     vocal cord surg    Chronic pain     CKD (chronic kidney disease) stage 3, GFR 30-59 ml/min     CVA (cerebral infarction)     8/2012 olol; reviewed ed note    Ex-smoker     GERD (gastroesophageal reflux disease)     Hepatitis C     treatedharvoni says cured, RNA NEG 6/2020    Hypertension     Pancreatitis     Prostate cancer     Prostate cancer     Prostate cancer     PVD (peripheral vascular disease)     Renal insufficiency     Substance abuse     cocaine, etoh , tob in past     Past Surgical History:   Procedure Laterality Date    AORTIC VALVE REPLACEMENT  05/19/2014    Tissue valve replacement    BACK SURGERY      CARDIAC CATHETERIZATION      COLONOSCOPY  2011    COLONOSCOPY N/A 2/24/2021    Procedure: COLONOSCOPY;  Surgeon: Faith Carrillo MD;  Location: [facility] ENDO;  Service: Endoscopy;  Laterality: N/A;    EPIDURAL STEROID INJECTION INTO CERVICAL SPINE N/A 6/21/2022    Procedure: C7-T1 IL PIEDAD;  Surgeon: Nehemiah Carmen MD;  Location: [facility] PAIN MGT;  Service: Pain Management;  Laterality: N/A;    EPIDURAL STEROID INJECTION INTO  CERVICAL SPINE N/A 3/12/2024    Procedure: C7/T1 IL PIEDAD;  Surgeon: Nehemiah Carmen MD;  Location: [facility] PAIN MGT;  Service: Pain Management;  Laterality: N/A;    ESOPHAGOGASTRODUODENOSCOPY N/A 2/24/2021    Procedure: ESOPHAGOGASTRODUODENOSCOPY (EGD);  Surgeon: Faith Carrillo MD;  Location: [facility] ENDO;  Service: Endoscopy;  Laterality: N/A;    FOOT SURGERY      INSERTION N/A 7/5/2023    Procedure: INSERTION;  Surgeon: Brandon Valencia MD;  Location: [facility] OR;  Service: Neurosurgery;  Laterality: N/A;  Antibiotic Beads    LEFT HEART CATHETERIZATION Left 7/24/2020    Procedure: CATHETERIZATION, HEART, LEFT;  Surgeon: Pasha Martin MD;  Location: [facility] CATH LAB;  Service: Cardiology;  Laterality: Left;    PENILE PROSTHESIS IMPLANT      PENILE PROSTHESIS REVISION  04/30/2018    PROSTATECTOMY  09/2019    [facility]    radiation for prostate      REMOVAL OF HARDWARE FROM SPINE N/A 7/5/2023    Procedure: REMOVAL, HARDWARE, SPINE;  Surgeon: Brandon Valencia MD;  Location: [facility] OR;  Service: Neurosurgery;  Laterality: N/A;    REPLACEMENT N/A 7/5/2023    Procedure: REPLACEMENT;  Surgeon: Brandon Valencia MD;  Location: [facility] OR;  Service: Neurosurgery;  Laterality: N/A;  of Sandoval and Screws. Overwolftronic    TRANSFORAMINAL EPIDURAL INJECTION OF STEROID Right 10/20/2022    Procedure: Right  L4/5 + L5/S1 TF PIEDAD RN IV Sedation;  Surgeon: Nehemiah Carmen MD;  Location: [facility] PAIN MGT;  Service: Pain Management;  Laterality: Right;    TRANSFORAMINAL LUMBAR INTERBODY FUSION (TLIF) USING COMPUTER-ASSISTED NAVIGATION Bilateral 5/17/2023    Procedure: FUSION, SPINE, LUMBAR, TLIF, USING COMPUTER-ASSISTED NAVIGATION;  Surgeon: Brandon Valencia MD;  Location: [facility] OR;  Service: Neurosurgery;  Laterality: Bilateral;  2 level lumbar fusion at L4-5 and L5-S1    UPPER GASTROINTESTINAL ENDOSCOPY  2011    WASHOUT N/A 7/5/2023    Procedure: WASHOUT;  Surgeon: Brandon Valencia MD;  Location: [facility] OR;  Service: Neurosurgery;  Laterality:  "N/A;    WOUND EXPLORATION Bilateral 2023    Procedure: EXPLORATION, WOUND;  Surgeon: Brandon Valencia MD;  Location: [facility] OR;  Service: Neurosurgery;  Laterality: Bilateral;     Family History   Problem Relation Name Age of Onset    Heart disease Mother      Heart disease Father      Heart attack Sister      Heart attack Brother      Colon cancer Neg Hx      Colon polyps Neg Hx      Liver cancer Neg Hx      Inflammatory bowel disease Neg Hx      Liver disease Neg Hx      Rectal cancer Neg Hx      Stomach cancer Neg Hx      Ulcerative colitis Neg Hx       Social History     Tobacco Use    Smoking status: Former     Current packs/day: 0.00     Average packs/day: 1 pack/day for 8.0 years (8.0 ttl pk-yrs)     Types: Cigarettes     Start date: 2004     Quit date: 2012     Years since quittin.7    Smokeless tobacco: Never   Substance Use Topics    Alcohol use: Not Currently     Comment: Sober since 2012    Drug use: Not Currently     Types: "Crack" cocaine, Marijuana     Comment: Quit in         Review of Systems:  Review of Systems   Constitutional:  Negative for chills, fever and unexpected weight change.   HENT:  Negative for congestion.    Eyes:  Negative for visual disturbance.   Respiratory:  Negative for shortness of breath.    Cardiovascular:  Negative for chest pain.   Gastrointestinal:  Negative for abdominal distention, abdominal pain, constipation, nausea, rectal pain and vomiting.   Genitourinary:  Negative for dysuria.   Musculoskeletal:  Positive for back pain. Negative for arthralgias.   Skin:  Negative for rash.   Neurological:  Negative for light-headedness.   Hematological:  Negative for adenopathy.       OBJECTIVE:     Vital Signs (Most Recent)  Pulse: 76 (24 1115)  BP: 119/75 (24 1115)     108 kg (238 lb 1.6 oz)     Physical Exam:  Physical Exam  Vitals reviewed.   Constitutional:       Appearance: He is well-developed.   HENT:      Head: Normocephalic and " atraumatic.   Cardiovascular:      Rate and Rhythm: Normal rate and regular rhythm.   Pulmonary:      Effort: Pulmonary effort is normal.      Breath sounds: Normal breath sounds.   Abdominal:      General: Bowel sounds are normal. There is no distension.      Palpations: Abdomen is soft.      Tenderness: There is no abdominal tenderness.      Hernia: A hernia (supraumbilical) is present.   Musculoskeletal:      Cervical back: Normal range of motion and neck supple.   Skin:     General: Skin is warm and dry.   Neurological:      Mental Status: He is alert and oriented to person, place, and time.         Diagnostic Results:  CT:  Impression:   Fat containing supraumbilical hernia with mild stranding raises possibility of incarceration/complication particularly if clinical symptoms correlate    ASSESSMENT/PLAN:      67-year-old male with supraumbilical hernia    PLAN:Plan     Hernia relatively unchanged.  Discussed completion of treatment for fluid collection near previous back surgery as would not recommend hernia repair if concern for infection present    He would also need cardiac clearance prior to surgery with history of CAD/valve replacement on aspirin and Plavix    Discussed options for observation with monitoring for symptoms of incarceration / strangulation or increasingly symptomatic  versus surgical repair including risks and benefits of surgery  Previous aortic valve replacement as well as heart caths, on PlavixPatient would be at elevated risk for surgery with his comorbidities and underlying cardiac conditions. He would need cardiac clearance to hold antiplatelet medications prior to any surgery.    He will call for follow-up once he is completed treatment for his other conditions if he would like to consider undergoing surgery for his hernia as it seems to be having mild symptoms    30 minutes of total time spent on the encounter, which includes face to face time and non-face to face time preparing to  see the patient (eg, review of tests), Obtaining and/or reviewing separately obtained history, Documenting clinical information in the electronic or other health record, Independently interpreting results (not separately reported) and communicating results to the patient/family/caregiver, or Care coordination (not separately reported).

## 2024-06-05 NOTE — PROGRESS NOTES
Subjective:      Patient ID: Abdullahi Urias is a 68 y.o. male.    Chief Complaint: No chief complaint on file.     Patient presents for follow-up   He had a two-level TLIF proximally 1 year ago   Postoperatively 1 month later he developed fluid collection consistent with seroma which was washed out   That time did not appear or have any infection associated with the fluid   Several weeks later we repeated imaging and the fluid re accumulated   IR biopsy the fluid and again it turned back sterile   We would follow him clinically and he did well and we have been monitoring him since that time   Several weeks ago he presented with increasing back pain for which he had an MRI which did show fluid collection     ER at the time thought it can be associated with the gluteal abscess which was attempted to be drained however no fluid came out    MRI showed of the lumbar spine a fluid collection which was smaller than was previously noted they attempted to biopsy the fluid with ultrasound guidance however they could not aspira  Te any fluid     He was seen by the Orthopedic spine Service deemed stable it was then him you for clinic follow-up   Today he is complaining of axial back pain with right-sided radiculopathy   He is ambulating unassisted   Denies any weakness or bowel or bladder symptoms   He was given Norco for pain in the interim      Review of Systems   Constitutional:  Negative for activity change, appetite change and chills.   HENT:  Negative for hearing loss, sore throat and tinnitus.    Eyes:  Negative for pain, discharge and itching.   Cardiovascular:  Negative for chest pain.   Gastrointestinal:  Negative for abdominal pain.   Endocrine: Negative for cold intolerance and heat intolerance.   Genitourinary:  Negative for difficulty urinating and dysuria.   Musculoskeletal:  Positive for back pain and gait problem.   Allergic/Immunologic: Negative for environmental allergies.   Neurological:  Negative for  dizziness, tremors, weakness, light-headedness and headaches.   Hematological:  Negative for adenopathy.   Psychiatric/Behavioral:  Negative for agitation, behavioral problems and confusion.          Objective:       Physical Exam:  Nursing note and vitals reviewed.    Constitutional: He appears well-nourished. He is not diaphoretic. No distress.     Eyes: Pupils are equal, round, and reactive to light. EOM are normal.     Cardiovascular: Normal rate and regular rhythm.     Psych/Behavior: He is alert. He is oriented to person, place, and time. He has a normal mood and affect.     Musculoskeletal:        Back: Range of motion is limited. There is tenderness. Muscle strength is 5/5.       Right Lower Extremities: Range of motion is full. There is tenderness. Muscle strength is 5/5. Tone is normal.        Left Lower Extremities: There is no tenderness. Muscle strength is 5/5. Tone is normal.     Neurological:        Sensory: There is no sensory deficit in the trunk. There is no sensory deficit in the extremities.        Cranial nerves: Cranial nerve(s) II, III, IV, V, VI, VII, VIII, IX, X, XI and XII are intact.     General    Nursing note and vitals reviewed.  Constitutional: He is oriented to person, place, and time. He appears well-nourished. No distress.   Eyes: EOM are normal. Pupils are equal, round, and reactive to light.   Cardiovascular:  Normal rate and regular rhythm.            Neurological: He is alert and oriented to person, place, and time.   Psychiatric: He has a normal mood and affect.             Ortho Exam    EXAMINATION:  MRI LUMBAR SPINE W WO CONTRAST     CLINICAL HISTORY:  Low back pain, infection suspected;.     TECHNIQUE:  Multiplanar, multisequence MR images of the cervical spine were acquired without the administration of contrast.     COMPARISON:  07/03/2023     Impression:     Surgical change with hardware and associated artifact L4-S1 posterior approach.  There is a peripherally enhancing  fluid loculation at the dorsal postsurgical elements measuring approximately 4 cm X 7 cm epicenter L5 level sterility uncertain possible abscess.     No overt canal compromise.     No overt acute osseous finding     Conus medullaris unremarkable           Electronically signed by: Dawood Riggs Jr., MD  Date:    03/08/2023  Time:    19:18     No results found for this or any previous visit.    Results for orders placed during the hospital encounter of 01/04/24    X-Ray Lumbar Spine AP And Lateral    Narrative  EXAMINATION:  XR LUMBAR SPINE AP AND LATERAL    CLINICAL HISTORY:  Other specified postprocedural states    TECHNIQUE:  AP, lateral and spot images were performed of the lumbar spine.    COMPARISON:  05/30/2023    FINDINGS:  Pedicle screws and fixation rods seen bilaterally at the L4 through S1 levels with intradiscal spacers present at L4-5 and L5-S1.  Hardware unchanged in positioning..  Moderate bilateral facet arthropathy seen at L3-4.    Impression  1.  As above      Electronically signed by: Arthur Gonzales DO  Date:    01/04/2024  Time:    11:46         I  reviewed all pertinent imaging regarding this case.  Assessment:     1. Seroma of nervous system after nervous system procedure    2. Degenerative disc disease, lumbar    3. Lumbar radiculopathy    4. Dorsalgia, unspecified      Plan:     Seroma of nervous system after nervous system procedure  -     Sedimentation rate; Future; Expected date: 06/05/2024  -     C-REACTIVE PROTEIN; Future; Expected date: 06/05/2024  -     CT Guided Needle Placement; Future; Expected date: 06/05/2024    Degenerative disc disease, lumbar  -     methylPREDNISolone (MEDROL DOSEPACK) 4 mg tablet; use as directed  Dispense: 21 each; Refill: 0  -     CT Lumbar Spine Without Contrast; Future; Expected date: 06/04/2024    Lumbar radiculopathy  -     methylPREDNISolone (MEDROL DOSEPACK) 4 mg tablet; use as directed  Dispense: 21 each; Refill: 0  -     CT Lumbar Spine Without  Contrast; Future; Expected date: 06/04/2024    Dorsalgia, unspecified  -     CT Lumbar Spine Without Contrast; Future; Expected date: 06/04/2024    Other orders  -     oxyCODONE-acetaminophen (PERCOCET) 5-325 mg per tablet; Take 1 tablet by mouth every 4 (four) hours as needed for Pain.  Dispense: 30 tablet; Refill: 0      Patient is an MRI which shows fluid collection which is smaller than previously noted   Since there is concern for possible infection I will order an ESR and CRP   I will order a CT scan to make sure that there was no bone issue into check fusion status   I will consult IR for biopsy of this fluid collection was CT guidance to send for culture and analysis   Decadron Dosepak as well as oxycodone for pain and symptom relief   Will follow up in clinic after test completed    Thank you for the referral   Please call with any questions    Brandon Valencia MD  Neurosurgery     Disclaimer: This note was prepared using a voice recognition system and is likely to have sound alike errors within the text.

## 2024-06-06 LAB
BACTERIA BLD CULT: NORMAL
BACTERIA BLD CULT: NORMAL

## 2024-06-14 ENCOUNTER — HOSPITAL ENCOUNTER (OUTPATIENT)
Dept: RADIOLOGY | Facility: HOSPITAL | Age: 69
Discharge: HOME | End: 2024-06-14
Attending: NEUROLOGICAL SURGERY
Payer: MEDICARE

## 2024-06-14 DIAGNOSIS — M51.36 DEGENERATIVE DISC DISEASE, LUMBAR: ICD-10-CM

## 2024-06-14 DIAGNOSIS — M54.9 DORSALGIA, UNSPECIFIED: ICD-10-CM

## 2024-06-14 DIAGNOSIS — M54.16 LUMBAR RADICULOPATHY: ICD-10-CM

## 2024-06-14 PROCEDURE — 72131 CT LUMBAR SPINE W/O DYE: CPT | Mod: 26,,, | Performed by: RADIOLOGY

## 2024-06-14 PROCEDURE — 72131 CT LUMBAR SPINE W/O DYE: CPT | Mod: TC

## 2024-06-20 ENCOUNTER — TELEPHONE (OUTPATIENT)
Dept: NEPHROLOGY | Facility: CLINIC | Age: 69
End: 2024-06-20
Payer: MEDICARE

## 2024-06-20 ENCOUNTER — TELEPHONE (OUTPATIENT)
Dept: RADIOLOGY | Facility: HOSPITAL | Age: 69
End: 2024-06-20
Payer: MEDICARE

## 2024-06-20 ENCOUNTER — OFFICE VISIT (OUTPATIENT)
Dept: INTERNAL MEDICINE | Facility: CLINIC | Age: 69
End: 2024-06-20
Payer: MEDICARE

## 2024-06-20 ENCOUNTER — LAB VISIT (OUTPATIENT)
Dept: LAB | Facility: HOSPITAL | Age: 69
End: 2024-06-20
Payer: MEDICARE

## 2024-06-20 VITALS
SYSTOLIC BLOOD PRESSURE: 118 MMHG | TEMPERATURE: 97 F | OXYGEN SATURATION: 99 % | HEART RATE: 88 BPM | HEIGHT: 69 IN | WEIGHT: 238.56 LBS | BODY MASS INDEX: 35.33 KG/M2 | DIASTOLIC BLOOD PRESSURE: 76 MMHG

## 2024-06-20 DIAGNOSIS — N18.32 STAGE 3B CHRONIC KIDNEY DISEASE: ICD-10-CM

## 2024-06-20 DIAGNOSIS — M96.842 SEROMA OF MUSCULOSKELETAL STRUCTURE AFTER MUSCULOSKELETAL SYSTEM PROCEDURE: Primary | ICD-10-CM

## 2024-06-20 DIAGNOSIS — K21.9 GASTROESOPHAGEAL REFLUX DISEASE WITHOUT ESOPHAGITIS: ICD-10-CM

## 2024-06-20 DIAGNOSIS — M62.838 MUSCLE SPASMS OF NECK: ICD-10-CM

## 2024-06-20 DIAGNOSIS — D50.9 IRON DEFICIENCY ANEMIA, UNSPECIFIED IRON DEFICIENCY ANEMIA TYPE: ICD-10-CM

## 2024-06-20 DIAGNOSIS — T81.49XA POST-OPERATIVE WOUND ABSCESS: Chronic | ICD-10-CM

## 2024-06-20 DIAGNOSIS — K92.1 HEMATOCHEZIA: ICD-10-CM

## 2024-06-20 DIAGNOSIS — G97.63 SEROMA OF NERVOUS SYSTEM AFTER NERVOUS SYSTEM PROCEDURE: Primary | ICD-10-CM

## 2024-06-20 DIAGNOSIS — I10 PRIMARY HYPERTENSION: Chronic | ICD-10-CM

## 2024-06-20 LAB
BILIRUB UR QL STRIP: NEGATIVE
CLARITY UR: CLEAR
COLOR UR: YELLOW
GLUCOSE UR QL STRIP: NEGATIVE
HGB UR QL STRIP: NEGATIVE
KETONES UR QL STRIP: NEGATIVE
LEUKOCYTE ESTERASE UR QL STRIP: NEGATIVE
NITRITE UR QL STRIP: NEGATIVE
PH UR STRIP: 6 [PH] (ref 5–8)
PROT UR QL STRIP: NEGATIVE
SP GR UR STRIP: 1.02 (ref 1–1.03)
URN SPEC COLLECT METH UR: NORMAL

## 2024-06-20 PROCEDURE — 81003 URINALYSIS AUTO W/O SCOPE: CPT | Performed by: PHYSICIAN ASSISTANT

## 2024-06-20 PROCEDURE — 99999 PR PBB SHADOW E&M-EST. PATIENT-LVL V: CPT | Mod: PBBFAC,,, | Performed by: PHYSICIAN ASSISTANT

## 2024-06-20 RX ORDER — FERROUS SULFATE 325(65) MG
325 TABLET ORAL
Qty: 90 TABLET | Refills: 1 | Status: SHIPPED | OUTPATIENT
Start: 2024-06-20 | End: 2024-12-17

## 2024-06-20 RX ORDER — DICLOFENAC SODIUM 10 MG/G
2 GEL TOPICAL 2 TIMES DAILY
Qty: 100 G | Refills: 0 | Status: SHIPPED | OUTPATIENT
Start: 2024-06-20 | End: 2024-06-27

## 2024-06-20 NOTE — TELEPHONE ENCOUNTER
Interventional Radiology  Called and LVM for pt. to return my call at [phone].  I called to schedule a guided needle placement.

## 2024-06-20 NOTE — TELEPHONE ENCOUNTER
"----- Message from Britni Reilly sent at 6/20/2024 10:34 AM CDT -----  Regarding: referral  Good Morning,     Patient has an referral for Per provider order ERIK Fernandez.  Dx "Stage 3b chronic kidney disease" Patient can be contact by number on file.     Thank you   Vasile  "

## 2024-06-20 NOTE — PROGRESS NOTES
Subjective:      Patient ID: Abdullahi Urias is a 68 y.o. male.    Chief Complaint: Follow-up    HPI  Here today for a follow up from our previous visit regarding his dark stools, fatigue, anemia, GERD. Last visit labs reveal chronic microcytic anemia. He has not yet started on an iron supplement. He is scheduled for EGD/colonoscopy for the uncontrolled GERD, melena/hematochezia, anemia.   Worsening kidney functions on labs as well. Needs to get established with a nephrologist. Will get him scheduled.   Admits to intermittent nsaid use for chronic neck/back pains.     ER visit twice last month, 5/31/24 and 6/1/24 for spinal abscess/ hardware infection.  Status post lumbar fusion about a year ago with subsequent washout secondary to infection in July 20, 2023. I and D of left upper buttock was attempted but there was no purulence. MRI  showed concerning findings of possible abscess near lower lumbar/sacral hardware     Colonoscopy/EGD scheduled for 7/2.     Saw gen surg and agree'd to hold off on hernia repair. Agree.    Denies any new or worsening pain.   Still having night sweats.     Today he started having some neck pain/muscle spasms. Felt like he slept wrong. NO numbness/tingling/weakness or fever.   No longer feeling the pain on his buttocks.     Patient Active Problem List   Diagnosis    Aortic stenosis    ED (erectile dysfunction)    Asthmatic bronchitis    Hypertension    CAD (coronary artery disease)    BPH (benign prostatic hyperplasia)    Chronic back pain    Cardiomyopathy    Class 2 severe obesity due to excess calories with serious comorbidity and body mass index (BMI) of 38.0 to 38.9 in adult    Old peripheral tear of lateral meniscus of right knee    Arthritis of right knee    Chondromalacia, right knee    Penetrating foreign body of skin of right knee    Chronic gout    CHEO on CPAP    History of CVA in adulthood    Prostate cancer    S/P AVR (aortic valve replacement) biopresthetic miller 25 mm in  2014     EKG abnormalities    Stage 3b chronic kidney disease    History of hepatitis C    Pain in both lower extremities    GERD (gastroesophageal reflux disease)    Personal history of colonic polyps    Localized swelling of left foot    History of prostate cancer    PAD (peripheral artery disease)    Aorto-iliac atherosclerosis    Moderate persistent asthma without complication    Former tobacco use    Synovial cyst of lumbar spine    Lumbar radiculopathy, chronic    Lumbar stenosis with neurogenic claudication    Post-operative wound abscess    SOB (shortness of breath)    ABLA (acute blood loss anemia)    Secondary hyperparathyroidism of renal origin    Moderate episode of recurrent major depressive disorder    Chronic bilateral low back pain without sciatica    Decreased ROM of lumbar spine    Prediabetes    Cocaine dependence in remission    Preop cardiovascular exam    Chronic diastolic heart failure    Seroma of musculoskeletal structure after musculoskeletal system procedure         Current Outpatient Medications:     albuterol (PROVENTIL) 2.5 mg /3 mL (0.083 %) nebulizer solution, Take 3 mLs (2.5 mg total) by nebulization every 6 (six) hours as needed for Wheezing. Rescue, Disp: 180 mL, Rfl: 1    albuterol (PROVENTIL/VENTOLIN HFA) 90 mcg/actuation inhaler, INHALE 2 PUFFS INTO THE LUNGS EVERY 6 HOURS AS NEEDED FOR COUGH, Disp: 8.5 g, Rfl: 3    allopurinoL (ZYLOPRIM) 100 MG tablet, Take 100 mg by mouth once daily. Takes 0.5 tab, Disp: , Rfl:     ALPRAZolam (XANAX) 1 MG tablet, Take 1 mg by mouth Daily. EVERY OTHER DAY, Disp: , Rfl:     aluminum & magnesium hydroxide-simethicone (MYLANTA MAX STRENGTH) 400-400-40 mg/5 mL suspension, TAKE 15ML BY MOUTH FOUR TIMES A DAY AS NEEDED FOR STOMACH ACID, Disp: , Rfl:     aspirin (ECOTRIN) 81 MG EC tablet, Take 1 tablet (81 mg total) by mouth once daily., Disp: 90 tablet, Rfl: 3    atorvastatin (LIPITOR) 80 MG tablet, TAKE ONE-HALF TABLET BY MOUTH EVERY DAY FOR  CHOLESTEROL, Disp: , Rfl:     carvediloL (COREG) 12.5 MG tablet, Take 1 tablet (12.5 mg total) by mouth 2 (two) times daily., Disp: 60 tablet, Rfl: 11    clopidogreL (PLAVIX) 75 mg tablet, Take 1 tablet (75 mg total) by mouth once daily., Disp: 30 tablet, Rfl: 11    docusate sodium (COLACE) 100 MG capsule, Take 1 capsule (100 mg total) by mouth 2 (two) times daily., Disp: 20 capsule, Rfl: 0    esomeprazole (NEXIUM) 40 MG capsule, Take 40 mg by mouth before breakfast., Disp: , Rfl:     famotidine (PEPCID) 40 MG tablet, TAKE ONE TABLET BY MOUTH AT BEDTIME FOR ACID REFLUX, Disp: , Rfl:     fluticasone-umeclidin-vilanter (TRELEGY ELLIPTA) 200-62.5-25 mcg inhaler, Inhale 1 puff into the lungs once daily., Disp: 60 each, Rfl: 5    guaiFENesin (MUCINEX) 600 mg 12 hr tablet, Take 2 tablets (1,200 mg total) by mouth 2 (two) times daily., Disp: 20 tablet, Rfl: 0    hydrocortisone 2.5 % cream, Apply topically 2 (two) times daily as needed. Apply to affected areas of the face and neck., Disp: 30 g, Rfl: 2    hydrOXYzine HCL (ATARAX) 25 MG tablet, Take 1 tablet (25 mg total) by mouth 3 (three) times daily as needed for Itching., Disp: 30 tablet, Rfl: 0    ipratropium (ATROVENT) 21 mcg (0.03 %) nasal spray, 2 sprays by Each Nostril route 2 (two) times daily., Disp: 30 mL, Rfl: 0    LIDOcaine (LIDODERM) 5 %, APPLY 1 PATCH TOPICALLY EVERY DAY FOR PAIN. WEAR FOR 12 HOURS, THEN REMOVE. DO NOT APPLY NEW PATCH FOR AT LEAST 12 HOURS., Disp: , Rfl:     losartan (COZAAR) 50 MG tablet, Take 1 tablet (50 mg total) by mouth once daily., Disp: 30 tablet, Rfl: 11    methylPREDNISolone (MEDROL DOSEPACK) 4 mg tablet, use as directed, Disp: 21 each, Rfl: 0    multivitamin (THERAGRAN) per tablet, Take 1 tablet by mouth once daily., Disp: , Rfl:     oxyCODONE-acetaminophen (PERCOCET) 5-325 mg per tablet, Take 1 tablet by mouth every 4 (four) hours as needed for Pain., Disp: 30 tablet, Rfl: 0    sertraline (ZOLOFT) 100 MG tablet, TAKE TWO TABLETS BY  MOUTH EVERY DAY FOR MENTAL HEALTH DOSE INCREASED TO 200MG/DAY, Disp: , Rfl:     simethicone (MYLICON) 80 MG chewable tablet, Take 80 mg by mouth every 6 (six) hours as needed for Flatulence., Disp: , Rfl:     sucralfate (CARAFATE) 100 mg/mL suspension, Take 1 g by mouth 4 (four) times daily. , Disp: , Rfl:     tiZANidine (ZANAFLEX) 4 MG tablet, Take 1 capsule by mouth 2 (two) times daily as needed (muscle spasms)., Disp: , Rfl:     zolpidem (AMBIEN CR) 12.5 MG CR tablet, Take 1 tablet by mouth nightly as needed., Disp: , Rfl:     diclofenac sodium (VOLTAREN) 1 % Gel, Apply 2 g topically 2 (two) times daily. for 7 days, Disp: 100 g, Rfl: 0    ferrous sulfate (FEOSOL) 325 mg (65 mg iron) Tab tablet, Take 1 tablet (325 mg total) by mouth daily with breakfast., Disp: 90 tablet, Rfl: 1    gabapentin (NEURONTIN) 600 MG tablet, Take 1 tablet (600 mg total) by mouth 3 (three) times daily., Disp: 90 tablet, Rfl: 11  No current facility-administered medications for this visit.    Facility-Administered Medications Ordered in Other Visits:     ondansetron injection 4 mg, 4 mg, Intravenous, Once PRN, Nehemiah Carmen MD    ondansetron injection 4 mg, 4 mg, Intravenous, Once PRN, Nehemiah Carmen MD    sodium chloride 0.9% flush 10 mL, 10 mL, Intravenous, PRN, Wilda Horne MD    Review of Systems   Constitutional:  Positive for diaphoresis and fatigue. Negative for activity change, appetite change, chills, fever and unexpected weight change.   HENT: Negative.  Negative for congestion, hearing loss, postnasal drip, rhinorrhea, sore throat, trouble swallowing and voice change.    Eyes: Negative.  Negative for visual disturbance.   Respiratory: Negative.  Negative for cough, choking, chest tightness and shortness of breath.    Cardiovascular:  Negative for chest pain, palpitations and leg swelling.   Gastrointestinal:  Positive for abdominal pain and blood in stool. Negative for abdominal distention, anal bleeding,  "constipation, diarrhea, nausea, rectal pain and vomiting.   Endocrine: Negative for cold intolerance, heat intolerance, polydipsia and polyuria.   Genitourinary: Negative.  Negative for difficulty urinating and frequency.   Musculoskeletal:  Positive for arthralgias, back pain, myalgias, neck pain and neck stiffness. Negative for gait problem and joint swelling.   Skin:  Negative for color change, pallor, rash and wound.   Neurological:  Negative for dizziness, tremors, weakness, light-headedness, numbness and headaches.   Hematological:  Negative for adenopathy.   Psychiatric/Behavioral:  Negative for behavioral problems, confusion, self-injury, sleep disturbance and suicidal ideas. The patient is not nervous/anxious.      Objective:   /76 (BP Location: Left arm, Patient Position: Sitting, BP Method: Large (Manual))   Pulse 88   Temp 96.7 °F (35.9 °C) (Tympanic)   Ht 5' 9" (1.753 m)   Wt 108.2 kg (238 lb 8.6 oz)   SpO2 99%   BMI 35.23 kg/m²     Physical Exam  Vitals and nursing note reviewed.   Constitutional:       General: He is not in acute distress.     Appearance: Normal appearance. He is well-developed. He is not ill-appearing, toxic-appearing or diaphoretic.   HENT:      Head: Normocephalic and atraumatic.      Right Ear: External ear normal.      Left Ear: External ear normal.      Nose: Nose normal.   Eyes:      Conjunctiva/sclera: Conjunctivae normal.      Pupils: Pupils are equal, round, and reactive to light.   Cardiovascular:      Rate and Rhythm: Normal rate and regular rhythm.      Heart sounds: Normal heart sounds. No murmur heard.     No friction rub. No gallop.   Pulmonary:      Effort: Pulmonary effort is normal. No respiratory distress.      Breath sounds: Normal breath sounds. No wheezing or rales.   Chest:      Chest wall: No tenderness.   Abdominal:      General: There is no distension.      Palpations: Abdomen is soft.      Tenderness: There is no abdominal tenderness. "   Genitourinary:      Musculoskeletal:         General: Normal range of motion.      Cervical back: Normal range of motion and neck supple. No edema, erythema, signs of trauma, rigidity, torticollis or crepitus. Pain with movement and muscular tenderness present. No spinous process tenderness. Normal range of motion.   Lymphadenopathy:      Cervical: No cervical adenopathy.   Skin:     General: Skin is warm and dry.      Capillary Refill: Capillary refill takes less than 2 seconds.      Findings: No rash.   Neurological:      Mental Status: He is alert and oriented to person, place, and time.      Motor: No weakness.      Coordination: Coordination normal.      Gait: Gait normal.   Psychiatric:         Mood and Affect: Mood normal.         Behavior: Behavior normal.         Thought Content: Thought content normal.         Judgment: Judgment normal.       EXAMINATION:  MRI LUMBAR SPINE W WO CONTRAST     CLINICAL HISTORY:  Low back pain, infection suspected;.     TECHNIQUE:  Multiplanar, multisequence MR images of the cervical spine were acquired without the administration of contrast.     COMPARISON:  07/03/2023     Impression:     Surgical change with hardware and associated artifact L4-S1 posterior approach.  There is a peripherally enhancing fluid loculation at the dorsal postsurgical elements measuring approximately 4 cm X 7 cm epicenter L5 level sterility uncertain possible abscess.     No overt canal compromise.     No overt acute osseous finding     Conus medullaris unremarkable        Electronically signed by:Geri Jackson  Date:                                            05/31/2024  Time:                                           22:54    Lab Results   Component Value Date    WBC 6.91 05/31/2024    HGB 11.0 (L) 05/31/2024    HCT 34.6 (L) 05/31/2024    MCV 74 (L) 05/31/2024     05/31/2024       CMP  Sodium   Date Value Ref Range Status   06/20/2024 140 136 - 145 mmol/L Final     Potassium   Date  Value Ref Range Status   06/20/2024 4.9 3.5 - 5.1 mmol/L Final     Chloride   Date Value Ref Range Status   06/20/2024 108 95 - 110 mmol/L Final     CO2   Date Value Ref Range Status   06/20/2024 23 23 - 29 mmol/L Final     Glucose   Date Value Ref Range Status   06/20/2024 90 70 - 110 mg/dL Final     BUN   Date Value Ref Range Status   06/20/2024 19 8 - 23 mg/dL Final     Creatinine   Date Value Ref Range Status   06/20/2024 1.9 (H) 0.5 - 1.4 mg/dL Final     Calcium   Date Value Ref Range Status   06/20/2024 9.5 8.7 - 10.5 mg/dL Final     Total Protein   Date Value Ref Range Status   05/31/2024 7.5 6.0 - 8.4 g/dL Final     Albumin   Date Value Ref Range Status   06/20/2024 3.7 3.5 - 5.2 g/dL Final     Total Bilirubin   Date Value Ref Range Status   05/31/2024 0.3 0.1 - 1.0 mg/dL Final     Comment:     For infants and newborns, interpretation of results should be based  on gestational age, weight and in agreement with clinical  observations.    Premature Infant recommended reference ranges:  Up to 24 hours.............<8.0 mg/dL  Up to 48 hours............<12.0 mg/dL  3-5 days..................<15.0 mg/dL  6-29 days.................<15.0 mg/dL       Alkaline Phosphatase   Date Value Ref Range Status   05/31/2024 109 55 - 135 U/L Final     AST   Date Value Ref Range Status   05/31/2024 20 10 - 40 U/L Final     ALT   Date Value Ref Range Status   05/31/2024 17 10 - 44 U/L Final     Anion Gap   Date Value Ref Range Status   06/20/2024 9 8 - 16 mmol/L Final     eGFR   Date Value Ref Range Status   06/20/2024 38 (A) >60 mL/min/1.73 m^2 Final     Lab Results   Component Value Date    CRP 7.7 06/14/2024     Lab Results   Component Value Date    SEDRATE 14 06/14/2024       Assessment:     1. Seroma of musculoskeletal structure after musculoskeletal system procedure    2. Post-operative wound abscess    3. Muscle spasms of neck    4. Stage 3b chronic kidney disease    5. Iron deficiency anemia, unspecified iron deficiency  anemia type    6. Primary hypertension    7. Gastroesophageal reflux disease without esophagitis    8. Hematochezia      Plan:   Seroma of musculoskeletal structure after musculoskeletal system procedure  Post-operative wound abscess  -recent labs crp/esr back in normal range  -will send message to neurosurg to follow up with patient    Muscle spasms of neck  -     diclofenac sodium (VOLTAREN) 1 % Gel; Apply 2 g topically 2 (two) times daily. for 7 days  Dispense: 100 g; Refill: 0  -alternate ice and heat to  neck.  Light stretching  -  Stage 3b chronic kidney disease  -     RENAL FUNCTION PANEL; Future; Expected date: 06/20/2024  -     Ambulatory referral/consult to Nephrology; Future; Expected date: 06/27/2024  -     Urinalysis; Future; Expected date: 06/20/2024    Iron deficiency anemia, unspecified iron deficiency anemia type  -     ferrous sulfate (FEOSOL) 325 mg (65 mg iron) Tab tablet; Take 1 tablet (325 mg total) by mouth daily with breakfast.  Dispense: 90 tablet; Refill: 1  It is best to take iron supplements on an empty stomach. One hour before or two hours after meals. If this causes you some abdominal discomfort, ok to take with a small amount of food. Do not take iron with TUMs, calcium, milk, or dairy products because this can decrease the efficacy of the medication.     Hematochezia  -EGD and colonoscopy as scheduled.     Follow up in about 3 months (around 9/20/2024), or if symptoms worsen or fail to improve.

## 2024-06-20 NOTE — TELEPHONE ENCOUNTER
Called and left a message for patient to return the call to get scheduled for follow up. No answer, left message for patient to return the call.

## 2024-06-25 ENCOUNTER — OFFICE VISIT (OUTPATIENT)
Dept: NEPHROLOGY | Facility: CLINIC | Age: 69
End: 2024-06-25
Payer: MEDICARE

## 2024-06-25 VITALS
DIASTOLIC BLOOD PRESSURE: 70 MMHG | WEIGHT: 239.19 LBS | HEART RATE: 74 BPM | RESPIRATION RATE: 18 BRPM | SYSTOLIC BLOOD PRESSURE: 130 MMHG | HEIGHT: 69 IN | BODY MASS INDEX: 35.43 KG/M2

## 2024-06-25 DIAGNOSIS — N18.30 STAGE 3 CHRONIC KIDNEY DISEASE, UNSPECIFIED WHETHER STAGE 3A OR 3B CKD: Primary | ICD-10-CM

## 2024-06-25 PROCEDURE — 99999 PR PBB SHADOW E&M-EST. PATIENT-LVL IV: CPT | Mod: PBBFAC,,, | Performed by: INTERNAL MEDICINE

## 2024-06-25 NOTE — PROGRESS NOTES
Renal clinic f/u note:  Date of clinic visit: 6/25/24  Reason for f/u and chief c/o: CKD stage 3     HPI: Pt is a 69 y/o male with CKD stage 3, gout, HTN, and prostate cancer, who presents for f/u. Pt was last seen in renal clinic 2 years ago. Chart and urology note reviewed. No new events. Pt says he has been doing well. His prior PMH was reviewed with him today. Pt has no new c/o's today, feels no SOB, no leg swelling. No recent gout attacks. BP meds reviewed with pt.     PAST MEDICAL HISTORY: CKD stage 3, HTN, aortic stenosis, Asthma, BPH (benign prostatic hyperplasia), CAD (coronary artery disease), Cardiomyopathy, CHF (congestive heart failure), Chronic hoarseness, CVA (cerebral infarction), Ex-smoker, GERD (gastroesophageal reflux disease), Hepatitis C, Pancreatitis, Prostate cancer, Prostate cancer (h/o of prostatectomy in 2019, followed by chemotherapy and XRT treatments in 2021) and Substance abuse.     PAST SURGICAL HISTORY:  He  has a past surgical history that includes Foot surgery; Back surgery; Penile prosthesis implant; Cardiac catheterization; Upper gastrointestinal endoscopy (2011); Colonoscopy (2011); Aortic valve replacement (05/19/2014); Penile prosthesis revision (04/30/2018); Prostatectomy (09/2019); Left heart catheterization (Left, 7/24/2020); Esophagogastroduodenoscopy (N/A, 2/24/2021); and Colonoscopy (N/A, 2/24/2021).     SOCIAL HISTORY:  He  reports that he quit smoking about 8 years ago. He has a 16.00 pack-year smoking history. He has never used smokeless tobacco. He reports that he does not drink alcohol and does not use drugs.     FAMILY MEDICAL HISTORY:  His family history includes Heart attack in his brother and sister; Heart disease in his mother.     Review of patient's allergies indicates:  No Known Allergies     Meds reviewed    Current Outpatient Medications:     albuterol (PROVENTIL) 2.5 mg /3 mL (0.083 %) nebulizer solution, Take 3 mLs (2.5 mg total) by nebulization every 6  (six) hours as needed for Wheezing. Rescue, Disp: 180 mL, Rfl: 1    albuterol (PROVENTIL/VENTOLIN HFA) 90 mcg/actuation inhaler, INHALE 2 PUFFS INTO THE LUNGS EVERY 6 HOURS AS NEEDED FOR COUGH, Disp: 8.5 g, Rfl: 3    allopurinoL (ZYLOPRIM) 100 MG tablet, Take 100 mg by mouth once daily. Takes 0.5 tab, Disp: , Rfl:     ALPRAZolam (XANAX) 1 MG tablet, Take 1 mg by mouth Daily. EVERY OTHER DAY, Disp: , Rfl:     aluminum & magnesium hydroxide-simethicone (MYLANTA MAX STRENGTH) 400-400-40 mg/5 mL suspension, TAKE 15ML BY MOUTH FOUR TIMES A DAY AS NEEDED FOR STOMACH ACID, Disp: , Rfl:     aspirin (ECOTRIN) 81 MG EC tablet, Take 1 tablet (81 mg total) by mouth once daily., Disp: 90 tablet, Rfl: 3    atorvastatin (LIPITOR) 80 MG tablet, TAKE ONE-HALF TABLET BY MOUTH EVERY DAY FOR CHOLESTEROL, Disp: , Rfl:     carvediloL (COREG) 12.5 MG tablet, Take 1 tablet (12.5 mg total) by mouth 2 (two) times daily., Disp: 60 tablet, Rfl: 11    clopidogreL (PLAVIX) 75 mg tablet, Take 1 tablet (75 mg total) by mouth once daily., Disp: 30 tablet, Rfl: 11    diclofenac sodium (VOLTAREN) 1 % Gel, Apply 2 g topically 2 (two) times daily. for 7 days, Disp: 100 g, Rfl: 0    docusate sodium (COLACE) 100 MG capsule, Take 1 capsule (100 mg total) by mouth 2 (two) times daily., Disp: 20 capsule, Rfl: 0    esomeprazole (NEXIUM) 40 MG capsule, Take 40 mg by mouth before breakfast., Disp: , Rfl:     famotidine (PEPCID) 40 MG tablet, TAKE ONE TABLET BY MOUTH AT BEDTIME FOR ACID REFLUX, Disp: , Rfl:     ferrous sulfate (FEOSOL) 325 mg (65 mg iron) Tab tablet, Take 1 tablet (325 mg total) by mouth daily with breakfast., Disp: 90 tablet, Rfl: 1    fluticasone-umeclidin-vilanter (TRELEGY ELLIPTA) 200-62.5-25 mcg inhaler, Inhale 1 puff into the lungs once daily., Disp: 60 each, Rfl: 5    guaiFENesin (MUCINEX) 600 mg 12 hr tablet, Take 2 tablets (1,200 mg total) by mouth 2 (two) times daily., Disp: 20 tablet, Rfl: 0    hydrocortisone 2.5 % cream, Apply  topically 2 (two) times daily as needed. Apply to affected areas of the face and neck., Disp: 30 g, Rfl: 2    hydrOXYzine HCL (ATARAX) 25 MG tablet, Take 1 tablet (25 mg total) by mouth 3 (three) times daily as needed for Itching., Disp: 30 tablet, Rfl: 0    ipratropium (ATROVENT) 21 mcg (0.03 %) nasal spray, 2 sprays by Each Nostril route 2 (two) times daily., Disp: 30 mL, Rfl: 0    LIDOcaine (LIDODERM) 5 %, APPLY 1 PATCH TOPICALLY EVERY DAY FOR PAIN. WEAR FOR 12 HOURS, THEN REMOVE. DO NOT APPLY NEW PATCH FOR AT LEAST 12 HOURS., Disp: , Rfl:     losartan (COZAAR) 50 MG tablet, Take 1 tablet (50 mg total) by mouth once daily., Disp: 30 tablet, Rfl: 11    methylPREDNISolone (MEDROL DOSEPACK) 4 mg tablet, use as directed, Disp: 21 each, Rfl: 0    multivitamin (THERAGRAN) per tablet, Take 1 tablet by mouth once daily., Disp: , Rfl:     oxyCODONE-acetaminophen (PERCOCET) 5-325 mg per tablet, Take 1 tablet by mouth every 4 (four) hours as needed for Pain., Disp: 30 tablet, Rfl: 0    sertraline (ZOLOFT) 100 MG tablet, TAKE TWO TABLETS BY MOUTH EVERY DAY FOR MENTAL HEALTH DOSE INCREASED TO 200MG/DAY, Disp: , Rfl:     simethicone (MYLICON) 80 MG chewable tablet, Take 80 mg by mouth every 6 (six) hours as needed for Flatulence., Disp: , Rfl:     sucralfate (CARAFATE) 100 mg/mL suspension, Take 1 g by mouth 4 (four) times daily. , Disp: , Rfl:     tiZANidine (ZANAFLEX) 4 MG tablet, Take 1 capsule by mouth 2 (two) times daily as needed (muscle spasms)., Disp: , Rfl:     zolpidem (AMBIEN CR) 12.5 MG CR tablet, Take 1 tablet by mouth nightly as needed., Disp: , Rfl:     gabapentin (NEURONTIN) 600 MG tablet, Take 1 tablet (600 mg total) by mouth 3 (three) times daily., Disp: 90 tablet, Rfl: 11  No current facility-administered medications for this visit.     REVIEW OF SYSTEMS:  Patient has no fever, fatigue, visual changes, chest pain, edema, cough, dyspnea, nausea, vomiting, constipation, diarrhea, arthralgias, pruritis,  "dizziness, weakness, depression, confusion.        PHYSICAL EXAM:  Blood pressure 130/70, pulse 74, resp. rate 20, height 5' 9" (1.753 m), weight 108.7 Kg, from 111.9 kg  Gen:    WDWN male in no apparent distress  Psych: Normal mood and affect  Neck:   No JVD  Chest:  Clear with no rales, rhonchi, wheezing with normal effort  CV:      Regular with no murmurs, gallops or rubs  Abd:     Soft, nontender, no distension  Ext:      No edema        Labs reviewed  BMP  Lab Results   Component Value Date     06/20/2024    K 4.9 06/20/2024     06/20/2024    CO2 23 06/20/2024    BUN 19 06/20/2024    CREATININE 1.9 (H) 06/20/2024    CALCIUM 9.5 06/20/2024    ANIONGAP 9 06/20/2024    EGFRNORACEVR 38 (A) 06/20/2024     Lab Results   Component Value Date    WBC 6.91 05/31/2024    HGB 11.0 (L) 05/31/2024    HCT 34.6 (L) 05/31/2024    MCV 74 (L) 05/31/2024     05/31/2024       Lab Results   Component Value Date    .2 (H) 06/02/2022    CALCIUM 9.5 06/20/2024    PHOS 3.5 06/20/2024      U/a: no protein, no blood  Uric acid 5.8    PSA Diagnostic 0.00 - 4.00 ng/mL <0.01           IMPRESSION AND RECOMMENDATIONS:  69 y/o male with CKD stage 3 presents for f/u:     1. Renal: s Cr stable, at baseline, no change, noted tends to fluctuate  Stable renal function. CKD stage 3  K normal  Na normal  Ca normal  Acid base stable  PTH normal  PO4 normal  Secondary hyperparathyroidism, mild  Anemia, mild     CKD likely due to HTN and gout/hyperuricemia  Uric acid normal  H/o of prostate cancer, PSA normal     2. HTN: controlled  Meds reviewed  No changes recommended today     3. H/o of prostate CA: reviewed the chart  S/p radical prostatectomy Sept 2019  Had residually elevated PSA  S/p chemotherapy and XRT in 2021  PSA undetectable      4. Med review: was done     Plans and recommendations:  As reviewed above.  Total time spent 40 minutes including time needed to review the records, the   patient evaluation, documentation, " face-to-face discussion with the patient,   more than 50% of the time was spent on coordination of care and counseling.    Level V visit.  RTC 6-9 months     Brijesh Delatorre MD

## 2024-06-27 ENCOUNTER — OFFICE VISIT (OUTPATIENT)
Dept: UROLOGY | Facility: CLINIC | Age: 69
End: 2024-06-27
Payer: MEDICARE

## 2024-06-27 VITALS
BODY MASS INDEX: 35.3 KG/M2 | HEART RATE: 77 BPM | DIASTOLIC BLOOD PRESSURE: 68 MMHG | SYSTOLIC BLOOD PRESSURE: 106 MMHG | RESPIRATION RATE: 18 BRPM | HEIGHT: 69 IN | WEIGHT: 238.31 LBS

## 2024-06-27 DIAGNOSIS — C61 PROSTATE CANCER: Primary | ICD-10-CM

## 2024-06-27 DIAGNOSIS — Z01.818 PRE-OP TESTING: ICD-10-CM

## 2024-06-27 PROCEDURE — 99999 PR PBB SHADOW E&M-EST. PATIENT-LVL IV: CPT | Mod: PBBFAC,HCNC,, | Performed by: UROLOGY

## 2024-06-27 NOTE — PROGRESS NOTES
Chief Complaint:  History of prostate cancer    HPI:   06/27/2024 - returns today for follow-up, no new issues in the interim, due for PSA, notes that his IPP is no longer working, will inflate but will not stay inflated, no pain associated with the device, denies any gross hematuria or dysuria    12/21/2023 - patient returns today for follow-up, no new issues in the interim, unfortunately had a postop infection from his surgical wound and needed to be in the hospital for 14 days and another six weeks of a antibiotics through a PICC line, feeling great now, no gross hematuria or dysuria, due for PSA    06/15/2023 - patient returns today for follow-up, no new issues in the interim, had back surgery with Dr. Valencia and has done very well since then, pain much improved, mobility much improved, had some urgency and urge incontinence right afterwards but this resolved fairly quickly as his mobility improved, denies any weak stream or incomplete emptying, denies gross hematuria, due for PSA today    11/03/2022 - returns for follow-up, PSA remains undetectable, notes some new stress incontinence when he stands from a seated or lying position, does not do pelvic floor exercises, also notes some urinary leakage when he achieves orgasm, as well as some postvoid dribbling, no gross hematuria or dysuria    04/28/2022 - returns today for follow-up, PSA remains undetectable, energy good, voiding well, denies GH or leakage    12/17/2021 - patient returns today for follow-up, PSA remains undetectable, his energy level has improved and his hot flashes have diminished as his ADT has worn off, voiding well, denies any gross hematuria    09/17/2021 - patient returns for follow-up, notes decreased urgency, is voiding well with a good stream and denies gross hematuria, nocturia x2 but is not bothersome, PSA undetectable, has some hot flashes which are bothersome but he can tolerate them    06/11/2021 - patient presents for follow-up,  has completed radiation, did fine during radiation but notes fatigue possibly from radiation versus the Lupron, denies any gross hematuria or voiding difficulty, not having any leakage, denies UTIs  03/05/2021 - patient presents for follow-up, [facility] able [facility] which showed GG 4+5= 9 prostate cancer with negative margins, patient's PSA never went to undetectable, Dr Garces thinks Axumin  PET followed by salvage radiation with ADT x 6 months is the best course of action  01/21/2021 - patient presents for follow-up, we were unable to obtain his outside records due to a clerical error, however we have requested these and they should be faxed soon, he he continues to note dysuria and notes that his urine is very dark and concentrated, he denies any foul-smelling urine or gross hematuria, his follow-up PSA was 0.11, denies weak stream or incomplete emptying  12/10/2020 - 68 y.o. male that presents for history of prostate cancer, [facility] [place] [facility], patient believes he was told that he had aggressive prostate cancer, he believes thathis initial postop PSA was undetectable however he has had subsequent biochemical recurrence, then had a postprostatectomy MRI in mid November which was concerning for prostate cancer recurrence and was told that he would need radiation.  Thus the patient has decided to seek a 2nd opinion.  Patient notes that he initially had incontinence after his surgery, but this has resolved.  He currently wears no pads.  He had erectile dysfunction prior to his radical prostatectomy and had an IPP placed in 2018.  This continues to work well for him.  Patient does note some sciatic nerve pain on the back of his right leg for the past 6 months.  He notes chronic back pain since 2001 for which he gets epidural injections which works well for him.  Other significant medical history includes a stroke in 2012 and in open  heart surgery in 2014.  He denies any gross hematuria but does confirm some dysuria. He denies a history of kidney stones.  He also denies a family history of prostate cancer.      PMH:  Past Medical History:   Diagnosis Date    Aortic stenosis     dr ph[facility]    Asthma     BPH (benign prostatic hyperplasia)     CAD (coronary artery disease)     Cardiomyopathy     CHF (congestive heart failure)     Chronic hoarseness     vocal cord surg    Chronic pain     CKD (chronic kidney disease) stage 3, GFR 30-59 ml/min     CVA (cerebral infarction)     8/2012 olol; reviewed ed note    Ex-smoker     GERD (gastroesophageal reflux disease)     Hepatitis C     treatedharvoni says cured, RNA NEG 6/2020    Hypertension     Pancreatitis     Prostate cancer     Prostate cancer     Prostate cancer     PVD (peripheral vascular disease)     Renal insufficiency     Substance abuse     cocaine, etoh , tob in past       PSH:  Past Surgical History:   Procedure Laterality Date    AORTIC VALVE REPLACEMENT  05/19/2014    Tissue valve replacement    BACK SURGERY      CARDIAC CATHETERIZATION      COLONOSCOPY  2011    COLONOSCOPY N/A 2/24/2021    Procedure: COLONOSCOPY;  Surgeon: Faith Carrillo MD;  Location: [facility];  Service: Endoscopy;  Laterality: N/A;    EPIDURAL STEROID INJECTION INTO CERVICAL SPINE N/A 6/21/2022    Procedure: C7-T1 IL PIEDAD;  Surgeon: Nehemiah Carmen MD;  Location: [facility] PAIN MGT;  Service: Pain Management;  Laterality: N/A;    EPIDURAL STEROID INJECTION INTO CERVICAL SPINE N/A 3/12/2024    Procedure: C7/T1 IL PIEDAD;  Surgeon: Nehemiah Carmen MD;  Location: [facility] PAIN MGT;  Service: Pain Management;  Laterality: N/A;    ESOPHAGOGASTRODUODENOSCOPY N/A 2/24/2021    Procedure: ESOPHAGOGASTRODUODENOSCOPY (EGD);  Surgeon: Faith Carrillo MD;  Location: [facility];  Service: Endoscopy;  Laterality: N/A;    FOOT SURGERY      INSERTION N/A 7/5/2023    Procedure: INSERTION;  Surgeon: Brandon Valencia MD;   Location: [facility] OR;  Service: Neurosurgery;  Laterality: N/A;  Antibiotic Beads    LEFT HEART CATHETERIZATION Left 7/24/2020    Procedure: CATHETERIZATION, HEART, LEFT;  Surgeon: Pasha Martin MD;  Location: [facility] CATH LAB;  Service: Cardiology;  Laterality: Left;    PENILE PROSTHESIS IMPLANT      PENILE PROSTHESIS REVISION  04/30/2018    PROSTATECTOMY  09/2019    [facility]    rad[facility] for prostate      REMOVAL OF HARDWARE FROM SPINE N/A 7/5/2023    Procedure: REMOVAL, HARDWARE, SPINE;  Surgeon: Brandon Valencia MD;  Location: [facility] OR;  Service: Neurosurgery;  Laterality: N/A;    REPLACEMENT N/A 7/5/2023    Procedure: REPLACEMENT;  Surgeon: Brandon Valencia MD;  Location: [facility] OR;  Service: Neurosurgery;  Laterality: N/A;  of Sandoval and Screws. Order [employer]    TRANSFORAMINAL EPIDURAL INJECTION OF STEROID Right 10/20/2022    Procedure: Right  L4/5 + L5/S1 TF PIEDAD RN IV Sedation;  Surgeon: Nehemiah Carmen MD;  Location: [facility] PAIN MGT;  Service: Pain Management;  Laterality: Right;    TRANSFORAMINAL LUMBAR INTERBODY FUSION (TLIF) USING COMPUTER-ASSISTED NAVIGATION Bilateral 5/17/2023    Procedure: FUSION, SPINE, LUMBAR, TLIF, USING COMPUTER-ASSISTED NAVIGATION;  Surgeon: Brandon Valencia MD;  Location: [facility];  Service: Neurosurgery;  Laterality: Bilateral;  2 level lumbar fusion at L4-5 and L5-S1    UPPER GASTROINTESTINAL ENDOSCOPY  2011    WASHOUT N/A 7/5/2023    Procedure: WASHOUT;  Surgeon: Brandon Valencia MD;  Location: [facility];  Service: Neurosurgery;  Laterality: N/A;    WOUND EXPLORATION Bilateral 7/5/2023    Procedure: EXPLORATION, WOUND;  Surgeon: Brandon Valencia MD;  Location: [facility];  Service: Neurosurgery;  Laterality: Bilateral;       Family History:  Family History   Problem Relation Name Age of Onset    Heart disease Mother      Heart disease Father      Heart attack Sister      Heart attack Brother      Colon cancer Neg Hx      Colon polyps Neg Hx      Liver cancer Neg Hx      Inflammatory  "bowel disease Neg Hx      Liver disease Neg Hx      Rectal cancer Neg Hx      Stomach cancer Neg Hx      Ulcerative colitis Neg Hx         Social History:  Social History     Tobacco Use    Smoking status: Former     Current packs/day: 0.00     Average packs/day: 1 pack/day for 8.0 years (8.0 ttl pk-yrs)     Types: Cigarettes     Start date: 2004     Quit date: 2012     Years since quittin.8    Smokeless tobacco: Never   Substance Use Topics    Alcohol use: Not Currently     Comment: Sober since 2012    Drug use: Not Currently     Types: "Crack" cocaine, Marijuana     Comment: Quit in         Review of Systems:  General: No fever, chills  Skin: No rashes  Chest:  Denies cough and sputum production  Heart: Denies chest pain  Resp: Denies dyspnea  Abdomen: Denies diarrhea, abdominal pain, hematemesis, or blood in stool.  Musculoskeletal: No joint stiffness or swelling. Denies back pain.  : see HPI  Neuro: no dizziness or weakness    Allergies:  Patient has no known allergies.    Medications:    Current Outpatient Medications:     albuterol (PROVENTIL) 2.5 mg /3 mL (0.083 %) nebulizer solution, Take 3 mLs (2.5 mg total) by nebulization every 6 (six) hours as needed for Wheezing. Rescue, Disp: 180 mL, Rfl: 1    albuterol (PROVENTIL/VENTOLIN HFA) 90 mcg/actuation inhaler, INHALE 2 PUFFS INTO THE LUNGS EVERY 6 HOURS AS NEEDED FOR COUGH, Disp: 8.5 g, Rfl: 3    allopurinoL (ZYLOPRIM) 100 MG tablet, Take 100 mg by mouth once daily. Takes 0.5 tab, Disp: , Rfl:     ALPRAZolam (XANAX) 1 MG tablet, Take 1 mg by mouth Daily. EVERY OTHER DAY, Disp: , Rfl:     aluminum & magnesium hydroxide-simethicone (MYLANTA MAX STRENGTH) 400-400-40 mg/5 mL suspension, TAKE 15ML BY MOUTH FOUR TIMES A DAY AS NEEDED FOR STOMACH ACID, Disp: , Rfl:     aspirin (ECOTRIN) 81 MG EC tablet, Take 1 tablet (81 mg total) by mouth once daily., Disp: 90 tablet, Rfl: 3    atorvastatin (LIPITOR) 80 MG tablet, TAKE ONE-HALF TABLET BY " MOUTH EVERY DAY FOR CHOLESTEROL, Disp: , Rfl:     carvediloL (COREG) 12.5 MG tablet, Take 1 tablet (12.5 mg total) by mouth 2 (two) times daily., Disp: 60 tablet, Rfl: 11    clopidogreL (PLAVIX) 75 mg tablet, Take 1 tablet (75 mg total) by mouth once daily., Disp: 30 tablet, Rfl: 11    diclofenac sodium (VOLTAREN) 1 % Gel, Apply 2 g topically 2 (two) times daily. for 7 days, Disp: 100 g, Rfl: 0    docusate sodium (COLACE) 100 MG capsule, Take 1 capsule (100 mg total) by mouth 2 (two) times daily., Disp: 20 capsule, Rfl: 0    esomeprazole (NEXIUM) 40 MG capsule, Take 40 mg by mouth before breakfast., Disp: , Rfl:     famotidine (PEPCID) 40 MG tablet, TAKE ONE TABLET BY MOUTH AT BEDTIME FOR ACID REFLUX, Disp: , Rfl:     ferrous sulfate (FEOSOL) 325 mg (65 mg iron) Tab tablet, Take 1 tablet (325 mg total) by mouth daily with breakfast., Disp: 90 tablet, Rfl: 1    fluticasone-umeclidin-vilanter (TRELEGY ELLIPTA) 200-62.5-25 mcg inhaler, Inhale 1 puff into the lungs once daily., Disp: 60 each, Rfl: 5    guaiFENesin (MUCINEX) 600 mg 12 hr tablet, Take 2 tablets (1,200 mg total) by mouth 2 (two) times daily., Disp: 20 tablet, Rfl: 0    hydrocortisone 2.5 % cream, Apply topically 2 (two) times daily as needed. Apply to affected areas of the face and neck., Disp: 30 g, Rfl: 2    hydrOXYzine HCL (ATARAX) 25 MG tablet, Take 1 tablet (25 mg total) by mouth 3 (three) times daily as needed for Itching., Disp: 30 tablet, Rfl: 0    ipratropium (ATROVENT) 21 mcg (0.03 %) nasal spray, 2 sprays by Each Nostril route 2 (two) times daily., Disp: 30 mL, Rfl: 0    LIDOcaine (LIDODERM) 5 %, APPLY 1 PATCH TOPICALLY EVERY DAY FOR PAIN. WEAR FOR 12 HOURS, THEN REMOVE. DO NOT APPLY NEW PATCH FOR AT LEAST 12 HOURS., Disp: , Rfl:     losartan (COZAAR) 50 MG tablet, Take 1 tablet (50 mg total) by mouth once daily., Disp: 30 tablet, Rfl: 11    multivitamin (THERAGRAN) per tablet, Take 1 tablet by mouth once daily., Disp: , Rfl:      oxyCODONE-acetaminophen (PERCOCET) 5-325 mg per tablet, Take 1 tablet by mouth every 4 (four) hours as needed for Pain., Disp: 30 tablet, Rfl: 0    sertraline (ZOLOFT) 100 MG tablet, TAKE TWO TABLETS BY MOUTH EVERY DAY FOR MENTAL HEALTH DOSE INCREASED TO 200MG/DAY, Disp: , Rfl:     simethicone (MYLICON) 80 MG chewable tablet, Take 80 mg by mouth every 6 (six) hours as needed for Flatulence., Disp: , Rfl:     sucralfate (CARAFATE) 100 mg/mL suspension, Take 1 g by mouth 4 (four) times daily. , Disp: , Rfl:     tiZANidine (ZANAFLEX) 4 MG tablet, Take 1 capsule by mouth 2 (two) times daily as needed (muscle spasms)., Disp: , Rfl:     zolpidem (AMBIEN CR) 12.5 MG CR tablet, Take 1 tablet by mouth nightly as needed., Disp: , Rfl:     gabapentin (NEURONTIN) 600 MG tablet, Take 1 tablet (600 mg total) by mouth 3 (three) times daily., Disp: 90 tablet, Rfl: 11  No current facility-administered medications for this visit.    Facility-Administered Medications Ordered in Other Visits:     ondansetron injection 4 mg, 4 mg, Intravenous, Once PRN, Nehemiah Carmen MD    ondansetron injection 4 mg, 4 mg, Intravenous, Once PRN, Nehemiah Carmen MD    sodium chloride 0.9% flush 10 mL, 10 mL, Intravenous, PRN, Wilda Horne MD    Physical Exam:  Vitals:    06/27/24 0744   BP: 106/68   Pulse: 77   Resp: 18     General: awake, alert, cooperative  Head: NC/AT  Ears: external ears normal  Eyes: sclera normal  Lungs: normal inspiration, NAD  Heart: well-perfused  Abdomen: Soft, NT, ND  : Normal phallus, meatus normal in size and position, BL testicles palpable, no masses, nontender, no abnormalities of epididymi, all IPP components nontender, device will not inflate  Lymphatic: groin nodes negative  Skin: The skin is warm and dry  Ext: No c/c/e.  Neuro: grossly intact, AOx3      RADIOLOGY:  NM PET CT FLUCICLOVINE F18 03/24/2021     CLINICAL HISTORY:  Biochemical failure after prostatectomy in 2019, high risk;  Malignant  neoplasm of prostate     TECHNIQUE:  Segmented attenuation corrected 3-D PET imaging was obtained from the skull base through the mid thighs utilizing 10.38 mCi F-18-fluciclovine.  Noncontrast CT imaging was performed for attenuation correction, diagnosis, and anatomical fusion with PET.     COMPARISON:  None.     FINDINGS:  There is physiologic tracer uptake in the liver, pancreas, and bowel.     Patient is status post prostatectomy.  No abnormal hypermetabolism is demonstrated within the prostate bed.  No hypermetabolic regional lymphadenopathy in the pelvis. No hypermetabolic osseous lesions are identified.     Incidental: Penile prosthesis, aortic atherosclerosis, right renal cyst, median sternotomy/CABG changes, degenerative changes in the spine.     Impression:     Status post prostatectomy.  No evidence to indicate residual or recurrent malignancy.    LABS:  I personally reviewed the following lab values:  Lab Results   Component Value Date    WBC 6.91 05/31/2024    HGB 11.0 (L) 05/31/2024    HCT 34.6 (L) 05/31/2024     05/31/2024     06/20/2024    K 4.9 06/20/2024     06/20/2024    CREATININE 1.9 (H) 06/20/2024    BUN 19 06/20/2024    CO2 23 06/20/2024    TSH 2.854 05/23/2024    PSA 0.88 04/08/2014    INR 1.1 07/04/2023    GLUF 83 03/13/2007    HGBA1C 5.8 (H) 01/22/2024    CHOL 137 01/22/2024    TRIG 67 01/22/2024    HDL 44 01/22/2024    ALT 17 05/31/2024    AST 20 05/31/2024     Assessment/Plan:   Abdullahi Urias is a 68 y.o. male with:    ED - IPP no longer functional, patient wants to proceed with a replacement, we will schedule for 7/22, risks, benefits, alternatives reviewed    History of GG 9 prostate cancer - status post radical prostatectomy in September of 2019 with biochemical recurrence now s/p XRT and ADT, PSA today, f/u 6 months with PSA    NATI - improved with PFPT    Rommel Rodriguez MD  Urology

## 2024-06-27 NOTE — H&P (VIEW-ONLY)
Chief Complaint:  History of prostate cancer    HPI:   06/27/2024 - returns today for follow-up, no new issues in the interim, due for PSA, notes that his IPP is no longer working, will inflate but will not stay inflated, no pain associated with the device, denies any gross hematuria or dysuria    12/21/2023 - patient returns today for follow-up, no new issues in the interim, unfortunately had a postop infection from his surgical wound and needed to be in the hospital for 14 days and another six weeks of a antibiotics through a PICC line, feeling great now, no gross hematuria or dysuria, due for PSA    06/15/2023 - patient returns today for follow-up, no new issues in the interim, had back surgery with Dr. Valencia and has done very well since then, pain much improved, mobility much improved, had some urgency and urge incontinence right afterwards but this resolved fairly quickly as his mobility improved, denies any weak stream or incomplete emptying, denies gross hematuria, due for PSA today    11/03/2022 - returns for follow-up, PSA remains undetectable, notes some new stress incontinence when he stands from a seated or lying position, does not do pelvic floor exercises, also notes some urinary leakage when he achieves orgasm, as well as some postvoid dribbling, no gross hematuria or dysuria    04/28/2022 - returns today for follow-up, PSA remains undetectable, energy good, voiding well, denies GH or leakage    12/17/2021 - patient returns today for follow-up, PSA remains undetectable, his energy level has improved and his hot flashes have diminished as his ADT has worn off, voiding well, denies any gross hematuria    09/17/2021 - patient returns for follow-up, notes decreased urgency, is voiding well with a good stream and denies gross hematuria, nocturia x2 but is not bothersome, PSA undetectable, has some hot flashes which are bothersome but he can tolerate them    06/11/2021 - patient presents for follow-up,  has completed radiation, did fine during radiation but notes fatigue possibly from radiation versus the Lupron, denies any gross hematuria or voiding difficulty, not having any leakage, denies UTIs  03/05/2021 - patient presents for follow-up, [facility] able [facility] which showed GG 4+5= 9 prostate cancer with negative margins, patient's PSA never went to undetectable, Dr Garces thinks Axumin  PET followed by salvage radiation with ADT x 6 months is the best course of action  01/21/2021 - patient presents for follow-up, we were unable to obtain his outside records due to a clerical error, however we have requested these and they should be faxed soon, he he continues to note dysuria and notes that his urine is very dark and concentrated, he denies any foul-smelling urine or gross hematuria, his follow-up PSA was 0.11, denies weak stream or incomplete emptying  12/10/2020 - 68 y.o. male that presents for history of prostate cancer, [facility] [place] [facility], patient believes he was told that he had aggressive prostate cancer, he believes thathis initial postop PSA was undetectable however he has had subsequent biochemical recurrence, then had a postprostatectomy MRI in mid November which was concerning for prostate cancer recurrence and was told that he would need radiation.  Thus the patient has decided to seek a 2nd opinion.  Patient notes that he initially had incontinence after his surgery, but this has resolved.  He currently wears no pads.  He had erectile dysfunction prior to his radical prostatectomy and had an IPP placed in 2018.  This continues to work well for him.  Patient does note some sciatic nerve pain on the back of his right leg for the past 6 months.  He notes chronic back pain since 2001 for which he gets epidural injections which works well for him.  Other significant medical history includes a stroke in 2012 and in open  heart surgery in 2014.  He denies any gross hematuria but does confirm some dysuria. He denies a history of kidney stones.  He also denies a family history of prostate cancer.      PMH:  Past Medical History:   Diagnosis Date    Aortic stenosis     dr ph[facility]    Asthma     BPH (benign prostatic hyperplasia)     CAD (coronary artery disease)     Cardiomyopathy     CHF (congestive heart failure)     Chronic hoarseness     vocal cord surg    Chronic pain     CKD (chronic kidney disease) stage 3, GFR 30-59 ml/min     CVA (cerebral infarction)     8/2012 olol; reviewed ed note    Ex-smoker     GERD (gastroesophageal reflux disease)     Hepatitis C     treatedharvoni says cured, RNA NEG 6/2020    Hypertension     Pancreatitis     Prostate cancer     Prostate cancer     Prostate cancer     PVD (peripheral vascular disease)     Renal insufficiency     Substance abuse     cocaine, etoh , tob in past       PSH:  Past Surgical History:   Procedure Laterality Date    AORTIC VALVE REPLACEMENT  05/19/2014    Tissue valve replacement    BACK SURGERY      CARDIAC CATHETERIZATION      COLONOSCOPY  2011    COLONOSCOPY N/A 2/24/2021    Procedure: COLONOSCOPY;  Surgeon: Faith Carrillo MD;  Location: [facility];  Service: Endoscopy;  Laterality: N/A;    EPIDURAL STEROID INJECTION INTO CERVICAL SPINE N/A 6/21/2022    Procedure: C7-T1 IL PIEDAD;  Surgeon: Nehemiah Carmen MD;  Location: [facility] PAIN MGT;  Service: Pain Management;  Laterality: N/A;    EPIDURAL STEROID INJECTION INTO CERVICAL SPINE N/A 3/12/2024    Procedure: C7/T1 IL PIEDAD;  Surgeon: Nehemiah Carmen MD;  Location: [facility]IN [facility]GT;  Service: Pain Management;  Laterality: N/A;    ESOPHAGOGASTRODUODENOSCOPY N/A 2/24/2021    Procedure: ESOPHAGOGASTRODUODENOSCOPY (EGD);  Surgeon: Faith Carrillo MD;  Location: [facility];  Service: Endoscopy;  Laterality: N/A;    FOOT SURGERY      INSERTION N/A 7/5/2023    Procedure: INSERTION;  Surgeon: Brandon Valencia MD;   Location: [facility] [place] OR;  Service: Neurosurgery;  Laterality: N/A;  Antibiotic Beads    LEFT HEART CATHETERIZATION Left 7/24/2020    Procedure: CATHETERIZATION, HEART, LEFT;  Surgeon: Pasha Martin MD;  Location: [facility] [place] CATH LAB;  Service: Cardiology;  Laterality: Left;    PENILE PROSTHESIS IMPLANT      PENILE PROSTHESIS REVISION  04/30/2018    PROSTATECTOMY  09/2019    [facility]    radiation for prostate      REMOVAL OF HARDWARE FROM SPINE N/A 7/5/2023    Procedure: REMOVAL, HARDWARE, SPINE;  Surgeon: Brandon Valencia MD;  Location: [facility] [place] OR;  Service: Neurosurgery;  Laterality: N/A;    REPLACEMENT N/A 7/5/2023    Procedure: REPLACEMENT;  Surgeon: Brandon Valencia MD;  Location: [facility] [place] OR;  Service: Neurosurgery;  Laterality: N/A;  of Sandoval and Screws. [employer]    TRANSFORAMINAL EPIDURAL INJECTION OF STEROID Right 10/20/2022    Procedure: Right  L4/5 + L5/S1 TF PIEDAD RN IV Sedation;  Surgeon: Nehemiah Carmen MD;  Location: [facility] PAIN MGT;  Service: Pain Management;  Laterality: Right;    TRANSFORAMINAL LUMBAR INTERBODY FUSION (TLIF) USING COMPUTER-ASSISTED NAVIGATION Bilateral 5/17/2023    Procedure: FUSION, SPINE, LUMBAR, TLIF, USING COMPUTER-ASSISTED NAVIGATION;  Surgeon: Brandon Valencia MD;  Location: [facility];  Service: Neurosurgery;  Laterality: Bilateral;  2 level lumbar fusion at L4-5 and L5-S1    UPPER GASTROINTESTINAL ENDOSCOPY  2011    WASHOUT N/A 7/5/2023    Procedure: WASHOUT;  Surgeon: Brandon Valencia MD;  Location: [facility];  Service: Neurosurgery;  Laterality: N/A;    WOUND EXPLORATION Bilateral 7/5/2023    Procedure: EXPLORATION, WOUND;  Surgeon: Brandon Valencia MD;  Location: [facility];  Service: Neurosurgery;  Laterality: Bilateral;       Family History:  Family History   Problem Relation Name Age of Onset    Heart disease Mother      Heart disease Father      Heart attack Sister      Heart attack Brother      Colon cancer Neg Hx      Colon polyps Neg Hx      Liver cancer Neg Hx      Inflammatory  "bowel disease Neg Hx      Liver disease Neg Hx      Rectal cancer Neg Hx      Stomach cancer Neg Hx      Ulcerative colitis Neg Hx         Social History:  Social History     Tobacco Use    Smoking status: Former     Current packs/day: 0.00     Average packs/day: 1 pack/day for 8.0 years (8.0 ttl pk-yrs)     Types: Cigarettes     Start date: 2004     Quit date: 2012     Years since quittin.8    Smokeless tobacco: Never   Substance Use Topics    Alcohol use: Not Currently     Comment: Sober since 2012    Drug use: Not Currently     Types: "Crack" cocaine, Marijuana     Comment: Quit in         Review of Systems:  General: No fever, chills  Skin: No rashes  Chest:  Denies cough and sputum production  Heart: Denies chest pain  Resp: Denies dyspnea  Abdomen: Denies diarrhea, abdominal pain, hematemesis, or blood in stool.  Musculoskeletal: No joint stiffness or swelling. Denies back pain.  : see HPI  Neuro: no dizziness or weakness    Allergies:  Patient has no known allergies.    Medications:    Current Outpatient Medications:     albuterol (PROVENTIL) 2.5 mg /3 mL (0.083 %) nebulizer solution, Take 3 mLs (2.5 mg total) by nebulization every 6 (six) hours as needed for Wheezing. Rescue, Disp: 180 mL, Rfl: 1    albuterol (PROVENTIL/VENTOLIN HFA) 90 mcg/actuation inhaler, INHALE 2 PUFFS INTO THE LUNGS EVERY 6 HOURS AS NEEDED FOR COUGH, Disp: 8.5 g, Rfl: 3    allopurinoL (ZYLOPRIM) 100 MG tablet, Take 100 mg by mouth once daily. Takes 0.5 tab, Disp: , Rfl:     ALPRAZolam (XANAX) 1 MG tablet, Take 1 mg by mouth Daily. EVERY OTHER DAY, Disp: , Rfl:     aluminum & magnesium hydroxide-simethicone (MYLANTA MAX STRENGTH) 400-400-40 mg/5 mL suspension, TAKE 15ML BY MOUTH FOUR TIMES A DAY AS NEEDED FOR STOMACH ACID, Disp: , Rfl:     aspirin (ECOTRIN) 81 MG EC tablet, Take 1 tablet (81 mg total) by mouth once daily., Disp: 90 tablet, Rfl: 3    atorvastatin (LIPITOR) 80 MG tablet, TAKE ONE-HALF TABLET BY " MOUTH EVERY DAY FOR CHOLESTEROL, Disp: , Rfl:     carvediloL (COREG) 12.5 MG tablet, Take 1 tablet (12.5 mg total) by mouth 2 (two) times daily., Disp: 60 tablet, Rfl: 11    clopidogreL (PLAVIX) 75 mg tablet, Take 1 tablet (75 mg total) by mouth once daily., Disp: 30 tablet, Rfl: 11    diclofenac sodium (VOLTAREN) 1 % Gel, Apply 2 g topically 2 (two) times daily. for 7 days, Disp: 100 g, Rfl: 0    docusate sodium (COLACE) 100 MG capsule, Take 1 capsule (100 mg total) by mouth 2 (two) times daily., Disp: 20 capsule, Rfl: 0    esomeprazole (NEXIUM) 40 MG capsule, Take 40 mg by mouth before breakfast., Disp: , Rfl:     famotidine (PEPCID) 40 MG tablet, TAKE ONE TABLET BY MOUTH AT BEDTIME FOR ACID REFLUX, Disp: , Rfl:     ferrous sulfate (FEOSOL) 325 mg (65 mg iron) Tab tablet, Take 1 tablet (325 mg total) by mouth daily with breakfast., Disp: 90 tablet, Rfl: 1    fluticasone-umeclidin-vilanter (TRELEGY ELLIPTA) 200-62.5-25 mcg inhaler, Inhale 1 puff into the lungs once daily., Disp: 60 each, Rfl: 5    guaiFENesin (MUCINEX) 600 mg 12 hr tablet, Take 2 tablets (1,200 mg total) by mouth 2 (two) times daily., Disp: 20 tablet, Rfl: 0    hydrocortisone 2.5 % cream, Apply topically 2 (two) times daily as needed. Apply to affected areas of the face and neck., Disp: 30 g, Rfl: 2    hydrOXYzine HCL (ATARAX) 25 MG tablet, Take 1 tablet (25 mg total) by mouth 3 (three) times daily as needed for Itching., Disp: 30 tablet, Rfl: 0    ipratropium (ATROVENT) 21 mcg (0.03 %) nasal spray, 2 sprays by Each Nostril route 2 (two) times daily., Disp: 30 mL, Rfl: 0    LIDOcaine (LIDODERM) 5 %, APPLY 1 PATCH TOPICALLY EVERY DAY FOR PAIN. WEAR FOR 12 HOURS, THEN REMOVE. DO NOT APPLY NEW PATCH FOR AT LEAST 12 HOURS., Disp: , Rfl:     losartan (COZAAR) 50 MG tablet, Take 1 tablet (50 mg total) by mouth once daily., Disp: 30 tablet, Rfl: 11    multivitamin (THERAGRAN) per tablet, Take 1 tablet by mouth once daily., Disp: , Rfl:      oxyCODONE-acetaminophen (PERCOCET) 5-325 mg per tablet, Take 1 tablet by mouth every 4 (four) hours as needed for Pain., Disp: 30 tablet, Rfl: 0    sertraline (ZOLOFT) 100 MG tablet, TAKE TWO TABLETS BY MOUTH EVERY DAY FOR MENTAL HEALTH DOSE INCREASED TO 200MG/DAY, Disp: , Rfl:     simethicone (MYLICON) 80 MG chewable tablet, Take 80 mg by mouth every 6 (six) hours as needed for Flatulence., Disp: , Rfl:     sucralfate (CARAFATE) 100 mg/mL suspension, Take 1 g by mouth 4 (four) times daily. , Disp: , Rfl:     tiZANidine (ZANAFLEX) 4 MG tablet, Take 1 capsule by mouth 2 (two) times daily as needed (muscle spasms)., Disp: , Rfl:     zolpidem (AMBIEN CR) 12.5 MG CR tablet, Take 1 tablet by mouth nightly as needed., Disp: , Rfl:     gabapentin (NEURONTIN) 600 MG tablet, Take 1 tablet (600 mg total) by mouth 3 (three) times daily., Disp: 90 tablet, Rfl: 11  No current facility-administered medications for this visit.    Facility-Administered Medications Ordered in Other Visits:     ondansetron injection 4 mg, 4 mg, Intravenous, Once PRN, Nehemiah Carmen MD    ondansetron injection 4 mg, 4 mg, Intravenous, Once PRN, Nehemiah Carmen MD    sodium chloride 0.9% flush 10 mL, 10 mL, Intravenous, PRN, Wilda Horne MD    Physical Exam:  Vitals:    06/27/24 0744   BP: 106/68   Pulse: 77   Resp: 18     General: awake, alert, cooperative  Head: NC/AT  Ears: external ears normal  Eyes: sclera normal  Lungs: normal inspiration, NAD  Heart: well-perfused  Abdomen: Soft, NT, ND  : Normal phallus, meatus normal in size and position, BL testicles palpable, no masses, nontender, no abnormalities of epididymi, all IPP components nontender, device will not inflate  Lymphatic: groin nodes negative  Skin: The skin is warm and dry  Ext: No c/c/e.  Neuro: grossly intact, AOx3      RADIOLOGY:  NM PET CT FLUCICLOVINE F18 03/24/2021     CLINICAL HISTORY:  Biochemical failure after prostatectomy in 2019, high risk;  Malignant  neoplasm of prostate     TECHNIQUE:  Segmented attenuation corrected 3-D PET imaging was obtained from the skull base through the mid thighs utilizing 10.38 mCi F-18-fluciclovine.  Noncontrast CT imaging was performed for attenuation correction, diagnosis, and anatomical fusion with PET.     COMPARISON:  None.     FINDINGS:  There is physiologic tracer uptake in the liver, pancreas, and bowel.     Patient is status post prostatectomy.  No abnormal hypermetabolism is demonstrated within the prostate bed.  No hypermetabolic regional lymphadenopathy in the pelvis. No hypermetabolic osseous lesions are identified.     Incidental: Penile prosthesis, aortic atherosclerosis, right renal cyst, median sternotomy/CABG changes, degenerative changes in the spine.     Impression:     Status post prostatectomy.  No evidence to indicate residual or recurrent malignancy.    LABS:  I personally reviewed the following lab values:  Lab Results   Component Value Date    WBC 6.91 05/31/2024    HGB 11.0 (L) 05/31/2024    HCT 34.6 (L) 05/31/2024     05/31/2024     06/20/2024    K 4.9 06/20/2024     06/20/2024    CREATININE 1.9 (H) 06/20/2024    BUN 19 06/20/2024    CO2 23 06/20/2024    TSH 2.854 05/23/2024    PSA 0.88 04/08/2014    INR 1.1 07/04/2023    GLUF 83 03/13/2007    HGBA1C 5.8 (H) 01/22/2024    CHOL 137 01/22/2024    TRIG 67 01/22/2024    HDL 44 01/22/2024    ALT 17 05/31/2024    AST 20 05/31/2024     Assessment/Plan:   Abdullahi Urias is a 68 y.o. male with:    ED - IPP no longer functional, patient wants to proceed with a replacement, we will schedule for 7/22, risks, benefits, alternatives reviewed    History of GG 9 prostate cancer - status post radical prostatectomy in September of 2019 with biochemical recurrence now s/p XRT and ADT, PSA today, f/u 6 months with PSA    NATI - improved with PFPT    Rommel Rodriguez MD  Urology

## 2024-06-30 ENCOUNTER — NURSE TRIAGE (OUTPATIENT)
Dept: ADMINISTRATIVE | Facility: CLINIC | Age: 69
End: 2024-06-30
Payer: MEDICARE

## 2024-06-30 NOTE — TELEPHONE ENCOUNTER
Pt calling to clarify colonoscopy prep introductions. Chart reviewed and instructions reviewed with pt. ADAN. Encounter routed to provider.     Reason for Disposition   Question about upcoming scheduled surgery, procedure or test, no triage required, and triager able to answer question    Protocols used: Information Only Call - No Triage-A-

## 2024-07-01 ENCOUNTER — PATIENT MESSAGE (OUTPATIENT)
Dept: FAMILY MEDICINE | Facility: CLINIC | Age: 69
End: 2024-07-01
Payer: MEDICARE

## 2024-07-01 PROBLEM — K92.1 HEMATOCHEZIA: Status: ACTIVE | Noted: 2024-07-01

## 2024-07-02 ENCOUNTER — HOSPITAL ENCOUNTER (OUTPATIENT)
Facility: HOSPITAL | Age: 69
Discharge: HOME | End: 2024-07-02
Attending: FAMILY MEDICINE | Admitting: FAMILY MEDICINE
Payer: MEDICARE

## 2024-07-02 ENCOUNTER — ANESTHESIA (OUTPATIENT)
Dept: ENDOSCOPY | Facility: HOSPITAL | Age: 69
End: 2024-07-02
Payer: MEDICARE

## 2024-07-02 ENCOUNTER — ANESTHESIA EVENT (OUTPATIENT)
Dept: ENDOSCOPY | Facility: HOSPITAL | Age: 69
End: 2024-07-02
Payer: MEDICARE

## 2024-07-02 DIAGNOSIS — K64.8 INTERNAL HEMORRHOIDS: ICD-10-CM

## 2024-07-02 DIAGNOSIS — K29.30 CHRONIC SUPERFICIAL GASTRITIS WITHOUT BLEEDING: ICD-10-CM

## 2024-07-02 DIAGNOSIS — K92.1 HEMATOCHEZIA: Primary | ICD-10-CM

## 2024-07-02 DIAGNOSIS — K21.9 GASTROESOPHAGEAL REFLUX DISEASE WITHOUT ESOPHAGITIS: ICD-10-CM

## 2024-07-02 DIAGNOSIS — Z86.010 PERSONAL HISTORY OF COLONIC POLYPS: ICD-10-CM

## 2024-07-02 DIAGNOSIS — K62.7 CHRONIC RADIATION PROCTITIS: ICD-10-CM

## 2024-07-02 PROBLEM — K57.30 DIVERTICULOSIS OF COLON: Status: ACTIVE | Noted: 2024-07-02

## 2024-07-02 PROCEDURE — 45380 COLONOSCOPY AND BIOPSY: CPT | Mod: ,,, | Performed by: FAMILY MEDICINE

## 2024-07-02 PROCEDURE — 37000008 HC ANESTHESIA 1ST 15 MINUTES: Mod: HCNC | Performed by: FAMILY MEDICINE

## 2024-07-02 PROCEDURE — 25000003 PHARM REV CODE 250: Mod: HCNC | Performed by: NURSE ANESTHETIST, CERTIFIED REGISTERED

## 2024-07-02 PROCEDURE — 45380 COLONOSCOPY AND BIOPSY: CPT | Performed by: FAMILY MEDICINE

## 2024-07-02 PROCEDURE — 43239 EGD BIOPSY SINGLE/MULTIPLE: CPT | Mod: 51,,, | Performed by: FAMILY MEDICINE

## 2024-07-02 PROCEDURE — 43239 EGD BIOPSY SINGLE/MULTIPLE: CPT | Performed by: FAMILY MEDICINE

## 2024-07-02 PROCEDURE — 25000003 PHARM REV CODE 250: Mod: HCNC | Performed by: FAMILY MEDICINE

## 2024-07-02 PROCEDURE — 37000009 HC ANESTHESIA EA ADD 15 MINS: Mod: HCNC | Performed by: FAMILY MEDICINE

## 2024-07-02 PROCEDURE — 63600175 PHARM REV CODE 636 W HCPCS: Mod: HCNC | Performed by: NURSE ANESTHETIST, CERTIFIED REGISTERED

## 2024-07-02 PROCEDURE — 88305 TISSUE EXAM BY PATHOLOGIST: CPT | Mod: 59,HCNC | Performed by: STUDENT IN AN ORGANIZED HEALTH CARE EDUCATION/TRAINING PROGRAM

## 2024-07-02 PROCEDURE — 27201012 HC FORCEPS, HOT/COLD, DISP: Mod: HCNC | Performed by: FAMILY MEDICINE

## 2024-07-02 RX ORDER — PROPOFOL 10 MG/ML
VIAL (ML) INTRAVENOUS
Status: DISCONTINUED | OUTPATIENT
Start: 2024-07-02 | End: 2024-07-02

## 2024-07-02 RX ORDER — DEXTROMETHORPHAN/PSEUDOEPHED 2.5-7.5/.8
DROPS ORAL
Status: DISCONTINUED | OUTPATIENT
Start: 2024-07-02 | End: 2024-07-02 | Stop reason: HOSPADM

## 2024-07-02 RX ORDER — LIDOCAINE HYDROCHLORIDE 10 MG/ML
INJECTION, SOLUTION EPIDURAL; INFILTRATION; INTRACAUDAL; PERINEURAL
Status: DISCONTINUED | OUTPATIENT
Start: 2024-07-02 | End: 2024-07-02

## 2024-07-02 RX ADMIN — PROPOFOL 30 MG: 10 INJECTION, EMULSION INTRAVENOUS at 09:07

## 2024-07-02 RX ADMIN — PROPOFOL 50 MG: 10 INJECTION, EMULSION INTRAVENOUS at 09:07

## 2024-07-02 RX ADMIN — PROPOFOL 70 MG: 10 INJECTION, EMULSION INTRAVENOUS at 09:07

## 2024-07-02 RX ADMIN — PROPOFOL 20 MG: 10 INJECTION, EMULSION INTRAVENOUS at 09:07

## 2024-07-02 RX ADMIN — LIDOCAINE HYDROCHLORIDE 50 MG: 10 SOLUTION INTRAVENOUS at 09:07

## 2024-07-02 RX ADMIN — SODIUM CHLORIDE, POTASSIUM CHLORIDE, SODIUM LACTATE AND CALCIUM CHLORIDE: 600; 310; 30; 20 INJECTION, SOLUTION INTRAVENOUS at 09:07

## 2024-07-02 NOTE — H&P
Short Stay Endoscopy History and Physical    PCP - Reji Valentin MD    Procedure - EGD and colonoscopy  ASA - 3  Mallampati - per anesthesia  History of Anesthesia problems - no  Family history Anesthesia problems -  no     HPI:  This is a 68 y.o. male here for evaluation of :   Active Hospital Problems    Diagnosis  POA    *Hematochezia [K92.1]  Yes    GERD (gastroesophageal reflux disease) [K21.9]  Yes      Resolved Hospital Problems   No resolved problems to display.         Health Maintenance         Date Due Completion Date    Annual UACr Never done ---    COVID-19 Vaccine (7 - 2023-24 season) 12/08/2023 10/13/2023    Influenza Vaccine (1) 09/01/2024 10/13/2023    Hemoglobin A1c (Prediabetes) 01/22/2025 1/22/2024    Colorectal Cancer Screening 02/24/2028 2/24/2021    Lipid Panel 01/22/2029 1/22/2024    TETANUS VACCINE 10/19/2032 10/19/2022            EGD  Reflux - Yes  Dysphagia - no  Abdominal pain - no  Diarrhea - no  Anemia - no  GI bleeding - yes  Other - no    Colonoscopy  Screening - No  History of polyps - No    Diarrhea - no  Anemia - no  Blood in stools - yes  Abdominal pain - no  Other - no    ROS:  CONSTITUTIONAL: Denies weight change,  fatigue, fevers, chills, night sweats.  CARDIOVASCULAR: Denies chest pain, shortness of breath, orthopnea and edema.  RESPIRATORY: Denies cough, hemoptysis, dyspnea, and wheezing.  GI: See HPI.    Medical History:   Past Medical History:   Diagnosis Date    Aortic stenosis     [facility]    Asthma     BPH (benign prostatic hyperplasia)     CAD (coronary artery disease)     Cardiomyopathy     CHF (congestive heart failure)     Chronic hoarseness     vocal cord surg    Chronic pain     CKD (chronic kidney disease) stage 3, GFR 30-59 ml/min     CVA (cerebral infarction)     8/2012 olol; reviewed ed note    Ex-smoker     GERD (gastroesophageal reflux disease)     Hepatitis C     treatedharvoni says cured, RNA NEG 6/2020    Hypertension     Pancreatitis      Prostate cancer     Prostate cancer     Prostate cancer     PVD (peripheral vascular disease)     Renal insufficiency     Substance abuse     cocaine, etoh , tob in past       Surgical History:   Past Surgical History:   Procedure Laterality Date    AORTIC VALVE REPLACEMENT  05/19/2014    Tissue valve replacement    BACK SURGERY      CARDIAC CATHETERIZATION      COLONOSCOPY  2011    COLONOSCOPY N/A 2/24/2021    Procedure: COLONOSCOPY;  Surgeon: Faith Carrillo MD;  Location: [facility] ENDO;  Service: Endoscopy;  Laterality: N/A;    EPIDURAL STEROID INJECTION INTO CERVICAL SPINE N/A 6/21/2022    Procedure: C7-T1 IL PIEDAD;  Surgeon: Nehemiah Carmen MD;  Location: [facility] PAIN MGT;  Service: Pain Management;  Laterality: N/A;    EPIDURAL STEROID INJECTION INTO CERVICAL SPINE N/A 3/12/2024    Procedure: C7/T1 IL PIEDAD;  Surgeon: Nehemiah Carmen MD;  Location: [facility] PAIN MGT;  Service: Pain Management;  Laterality: N/A;    ESOPHAGOGASTRODUODENOSCOPY N/A 2/24/2021    Procedure: ESOPHAGOGASTRODUODENOSCOPY (EGD);  Surgeon: Faith Carrillo MD;  Location: [facility];  Service: Endoscopy;  Laterality: N/A;    FOOT SURGERY      INSERTION N/A 7/5/2023    Procedure: INSERTION;  Surgeon: Brandon Valencia MD;  Location: [facility];  Service: Neurosurgery;  Laterality: N/A;  Antibiotic Beads    LEFT HEART CATHETERIZATION Left 7/24/2020    Procedure: CATHETERIZATION, HEART, LEFT;  Surgeon: Pasha Martin MD;  Location: [facility] CATH LAB;  Service: Cardiology;  Laterality: Left;    PENILE PROSTHESIS IMPLANT      PENILE PROSTHESIS REVISION  04/30/2018    PROSTATECTOMY  09/2019    [facility]    radiation for prostate      REMOVAL OF HARDWARE FROM SPINE N/A 7/5/2023    Procedure: REMOVAL, HARDWARE, SPINE;  Surgeon: Brandon Valencia MD;  Location: [facility] OR;  Service: Neurosurgery;  Laterality: N/A;    REPLACEMENT N/A 7/5/2023    Procedure: REPLACEMENT;  Surgeon: Brandon Valencia MD;  Location: [facility];  Service: Neurosurgery;   "Laterality: N/A;  of [employer]. [employer]    TRANSFORAMINAL EPIDURAL INJECTION OF STEROID Right 10/20/2022    Procedure: Right  L4/5 + L5/S1 TF PIEDAD RN IV Sedation;  Surgeon: Nehemiah Carmen MD;  Location: [facility];  Service: Pain Management;  Laterality: Right;    TRANSFORAMINAL LUMBAR INTERBODY FUSION (TLIF) USING COMPUTER-ASSISTED NAVIGATION Bilateral 2023    Procedure: FUSION, SPINE, LUMBAR, TLIF, USING COMPUTER-ASSISTED NAVIGATION;  Surgeon: Brandon Valencia MD;  Location: [facility] OR;  Service: Neurosurgery;  Laterality: Bilateral;  2 level lumbar fusion at L4-5 and L5-S1    UPPER GASTROINTESTINAL ENDOSCOPY      WASHOUT N/A 2023    Procedure: WASHOUT;  Surgeon: Brandon Valencia MD;  Location: [facility] OR;  Service: Neurosurgery;  Laterality: N/A;    WOUND EXPLORATION Bilateral 2023    Procedure: EXPLORATION, WOUND;  Surgeon: Brandon Valencia MD;  Location: [facility];  Service: Neurosurgery;  Laterality: Bilateral;       Family History:   Family History   Problem Relation Name Age of Onset    Heart disease Mother      Heart disease Father      Heart attack Sister      Heart attack Brother      Colon cancer Neg Hx      Colon polyps Neg Hx      Liver cancer Neg Hx      Inflammatory bowel disease Neg Hx      Liver disease Neg Hx      Rectal cancer Neg Hx      Stomach cancer Neg Hx      Ulcerative colitis Neg Hx         Social History:   Social History     Tobacco Use    Smoking status: Former     Current packs/day: 0.00     Average packs/day: 1 pack/day for 8.0 years (8.0 ttl pk-yrs)     Types: Cigarettes     Start date: 2004     Quit date: 2012     Years since quittin.8    Smokeless tobacco: Never   Substance Use Topics    Alcohol use: Not Currently     Comment: Sober since 2012    Drug use: Not Currently     Types: "Crack" cocaine, Marijuana     Comment: Quit in        Allergies:   Review of patient's allergies indicates:  No Known Allergies    Medications:   Current " Facility-Administered Medications on File Prior to Encounter   Medication Dose Route Frequency Provider Last Rate Last Admin    ondansetron injection 4 mg  4 mg Intravenous Once PRN Nehemiah Carmen MD        ondansetron injection 4 mg  4 mg Intravenous Once PRN Nehemiah Carmen MD        sodium chloride 0.9% flush 10 mL  10 mL Intravenous PRN Wilda Horne MD         Current Outpatient Medications on File Prior to Encounter   Medication Sig Dispense Refill    allopurinoL (ZYLOPRIM) 100 MG tablet Take 100 mg by mouth once daily. Takes 0.5 tab      aspirin (ECOTRIN) 81 MG EC tablet Take 1 tablet (81 mg total) by mouth once daily. 90 tablet 3    atorvastatin (LIPITOR) 80 MG tablet TAKE ONE-HALF TABLET BY MOUTH EVERY DAY FOR CHOLESTEROL      carvediloL (COREG) 12.5 MG tablet Take 1 tablet (12.5 mg total) by mouth 2 (two) times daily. 60 tablet 11    esomeprazole (NEXIUM) 40 MG capsule Take 40 mg by mouth before breakfast.      fluticasone-umeclidin-vilanter (TRELEGY ELLIPTA) 200-62.5-25 mcg inhaler Inhale 1 puff into the lungs once daily. 60 each 5    gabapentin (NEURONTIN) 600 MG tablet Take 1 tablet (600 mg total) by mouth 3 (three) times daily. 90 tablet 11    losartan (COZAAR) 50 MG tablet Take 1 tablet (50 mg total) by mouth once daily. 30 tablet 11    multivitamin (THERAGRAN) per tablet Take 1 tablet by mouth once daily.      sertraline (ZOLOFT) 100 MG tablet TAKE TWO TABLETS BY MOUTH EVERY DAY FOR MENTAL HEALTH DOSE INCREASED TO 200MG/DAY      simethicone (MYLICON) 80 MG chewable tablet Take 80 mg by mouth every 6 (six) hours as needed for Flatulence.      albuterol (PROVENTIL) 2.5 mg /3 mL (0.083 %) nebulizer solution Take 3 mLs (2.5 mg total) by nebulization every 6 (six) hours as needed for Wheezing. Rescue 180 mL 1    albuterol (PROVENTIL/VENTOLIN HFA) 90 mcg/actuation inhaler INHALE 2 PUFFS INTO THE LUNGS EVERY 6 HOURS AS NEEDED FOR COUGH 8.5 g 3    ALPRAZolam (XANAX) 1 MG tablet Take 1 mg by  mouth Daily. EVERY OTHER DAY      aluminum & magnesium hydroxide-simethicone (MYLANTA MAX STRENGTH) 400-400-40 mg/5 mL suspension TAKE 15ML BY MOUTH FOUR TIMES A DAY AS NEEDED FOR STOMACH ACID      clopidogreL (PLAVIX) 75 mg tablet Take 1 tablet (75 mg total) by mouth once daily. 30 tablet 11    docusate sodium (COLACE) 100 MG capsule Take 1 capsule (100 mg total) by mouth 2 (two) times daily. 20 capsule 0    famotidine (PEPCID) 40 MG tablet TAKE ONE TABLET BY MOUTH AT BEDTIME FOR ACID REFLUX      guaiFENesin (MUCINEX) 600 mg 12 hr tablet Take 2 tablets (1,200 mg total) by mouth 2 (two) times daily. 20 tablet 0    hydrocortisone 2.5 % cream Apply topically 2 (two) times daily as needed. Apply to affected areas of the face and neck. 30 g 2    hydrOXYzine HCL (ATARAX) 25 MG tablet Take 1 tablet (25 mg total) by mouth 3 (three) times daily as needed for Itching. 30 tablet 0    ipratropium (ATROVENT) 21 mcg (0.03 %) nasal spray 2 sprays by Each Nostril route 2 (two) times daily. 30 mL 0    LIDOcaine (LIDODERM) 5 % APPLY 1 PATCH TOPICALLY EVERY DAY FOR PAIN. WEAR FOR 12 HOURS, THEN REMOVE. DO NOT APPLY NEW PATCH FOR AT LEAST 12 HOURS.      sucralfate (CARAFATE) 100 mg/mL suspension Take 1 g by mouth 4 (four) times daily.       tiZANidine (ZANAFLEX) 4 MG tablet Take 1 capsule by mouth 2 (two) times daily as needed (muscle spasms).      zolpidem (AMBIEN CR) 12.5 MG CR tablet Take 1 tablet by mouth nightly as needed.         Physical Exam:  Vital Signs: There were no vitals filed for this visit.  General Appearance: Well appearing in no acute distress  ENT: OP clear  Chest: CTA B  CV: RRR, no m/r/g  Abd: s/nt/nd/nabs  Ext: no edema    Labs:Reviewed    IMP:  Active Hospital Problems    Diagnosis  POA    *Hematochezia [K92.1]  Yes    GERD (gastroesophageal reflux disease) [K21.9]  Yes      Resolved Hospital Problems   No resolved problems to display.         Plan:   I have explained the risks and benefits of upper endoscopy  and colonoscopy to the patient including but not limited to bleeding, perforation, infection, and death. The patient wishes to proceed.

## 2024-07-02 NOTE — PLAN OF CARE
Dr. Griffin at the bedside. Discussed procedure and results with the patient and his wife. All questions answered. No further questions at this time.

## 2024-07-02 NOTE — [employer]OVATION PATIENT INSTRUCTIONS
Discharge Summary/Instructions after an Endoscopic Procedure  Patient Name: Abdullahi Urias  Patient MRN: [id number]  Patient YOB: 1955 Tuesday, July 2, 2024 Prince Griffin MD  Dear patient,  As a result of recent federal legislation (The Federal Cures Act), you may   receive lab or pathology results from your procedure in your [facility]   account before your physician is able to contact you. Your physician or   their representative will relay the results to you with their   recommendations at their soonest availability.  Thank you,  RESTRICTIONS:  During your procedure today, you received medications for sedation.  These   medications may affect your judgment, balance and coordination.  Therefore,   for 24 hours, you have the following restrictions:   - DO NOT drive a car, operate machinery, make legal/financial decisions,   sign important papers or drink alcohol.    ACTIVITY:  Today: no heavy lifting, straining or running due to procedural   sedation/anesthesia.  The following day: return to full activity including work.  DIET:  Eat and drink normally unless instructed otherwise.     TREATMENT FOR COMMON SIDE EFFECTS:  - Mild abdominal pain, nausea, belching, bloating or excessive gas:  rest,   eat lightly and use a heating pad.  - Sore Throat: treat with throat lozenges and/or gargle with warm salt   water.  - Because air was used during the procedure, expelling large amounts of air   from your rectum or belching is normal.  - If a bowel prep was taken, you may not have a bowel movement for 1-3 days.    This is normal.  SYMPTOMS TO WATCH FOR AND REPORT TO YOUR PHYSICIAN:  1. Abdominal pain or bloating, other than gas cramps.  2. Chest pain.  3. Back pain.  4. Signs of infection such as: chills or fever occurring within 24 hours   after the procedure.  5. Rectal bleeding, which would show as bright red, maroon, or black stools.   (A tablespoon of blood from the rectum is not serious, especially if    hemorrhoids are present.)  6. Vomiting.  7. Weakness or dizziness.  GO DIRECTLY TO THE NEAREST EMERGENCY ROOM IF YOU HAVE ANY OF THE FOLLOWING:      Difficulty breathing              Chills and/or fever over 101 F   Persistent vomiting and/or vomiting blood   Severe abdominal pain   Severe chest pain   Black, tarry stools   Bleeding- more than one tablespoon   Any other symptom or condition that you feel may need urgent attention  Your doctor recommends these additional instructions:  If any biopsies were taken, your doctors clinic will contact you in 1 to 2   weeks with any results.  - Patient has a contact number available for emergencies.  The signs and   symptoms of potential delayed complications were discussed with the   patient.  Return to normal activities tomorrow.  Written discharge   instructions were provided to the patient.   - Resume previous diet.   - Continue present medications.   - Await pathology results.   - Repeat colonoscopy in 5 years for surveillance.   - Telephone my office for pathology results in 2 weeks.   - Discharge patient to home (via wheelchair).  For questions, problems or results please call your physician Prince Griffin MD at Work:  [phone]  If you have any questions about the above instructions, call the GI   department at [phone] or call the endoscopy unit at [phone]   from 7am until 3 pm.  [facility] [place], EMERGENCY ROOM PHONE NUMBER:   [phone]  IF A COMPLICATION OR EMERGENCY SITUATION ARISES AND YOU ARE UNABLE TO REACH   YOUR PHYSICIAN - GO DIRECTLY TO THE EMERGENCY ROOM.  I have read or have had read to me these discharge instructions for my   procedure and have received a written copy.  I understand these   instructions and will follow-up with my physician if I have any questions.     __________________________________       _____________________________________  Nurse Signature                                           Patient/Designated   Responsible Party Signature  Prince Griffin MD  7/2/2024 10:10:25 AM  This report has been verified and signed electronically.  Dear patient,  As a result of recent federal legislation (The Federal Cures Act), you may   receive lab or pathology results from your procedure in your [facility]   account before your physician is able to contact you. Your physician or   their representative will relay the results to you with their   recommendations at their soonest availability.  Thank you,  [employer]OVATION

## 2024-07-02 NOTE — PROVATION PATIENT INSTRUCTIONS
Discharge Summary/Instructions after an Endoscopic Procedure  Patient Name: Abdullahi Urias  Patient MRN: [id number]  Patient YOB: 1955 Tuesday, July 2, 2024 Prince Griffin MD  Dear patient,  As a result of recent federal legislation (The Federal Cures Act), you may   receive lab or pathology results from your procedure in your [facility]   account before your physician is able to contact you. Your physician or   their representative will relay the results to you with their   recommendations at their soonest availability.  Thank you,  RESTRICTIONS:  During your procedure today, you received medications for sedation.  These   medications may affect your judgment, balance and coordination.  Therefore,   for 24 hours, you have the following restrictions:   - DO NOT drive a car, operate machinery, make legal/financial decisions,   sign important papers or drink alcohol.    ACTIVITY:  Today: no heavy lifting, straining or running due to procedural   sedation/anesthesia.  The following day: return to full activity including work.  DIET:  Eat and drink normally unless instructed otherwise.     TREATMENT FOR COMMON SIDE EFFECTS:  - Mild abdominal pain, nausea, belching, bloating or excessive gas:  rest,   eat lightly and use a heating pad.  - Sore Throat: treat with throat lozenges and/or gargle with warm salt   water.  - Because air was used during the procedure, expelling large amounts of air   from your rectum or belching is normal.  - If a bowel prep was taken, you may not have a bowel movement for 1-3 days.    This is normal.  SYMPTOMS TO WATCH FOR AND REPORT TO YOUR PHYSICIAN:  1. Abdominal pain or bloating, other than gas cramps.  2. Chest pain.  3. Back pain.  4. Signs of infection such as: chills or fever occurring within 24 hours   after the procedure.  5. Rectal bleeding, which would show as bright red, maroon, or black stools.   (A tablespoon of blood from the rectum is not serious, especially if    hemorrhoids are present.)  6. Vomiting.  7. Weakness or dizziness.  GO DIRECTLY TO THE NEAREST EMERGENCY ROOM IF YOU HAVE ANY OF THE FOLLOWING:      Difficulty breathing              Chills and/or fever over 101 F   Persistent vomiting and/or vomiting blood   Severe abdominal pain   Severe chest pain   Black, tarry stools   Bleeding- more than one tablespoon   Any other symptom or condition that you feel may need urgent attention  Your doctor recommends these additional instructions:  If any biopsies were taken, your doctors clinic will contact you in 1 to 2   weeks with any results.  - Patient has a contact number available for emergencies.  The signs and   symptoms of potential delayed complications were discussed with the   patient.  Return to normal activities tomorrow.  Written discharge   instructions were provided to the patient.   - The patient should eat a bland diet and avoid caffeine, carbonation,   alcohol, nicotine, aspirin and NSAID's including ibuprofen and advil.    - Follow an antireflux regimen.   - Continue present medications.   - Telephone my office for pathology results in 2 weeks.   - Use Nexium (esomeprazole) 40 mg PO daily.   - Use Pepcid (famotidine) 40 mg PO daily.   - Discharge patient to home (via wheelchair).  For questions, problems or results please call your physician Prince Griffin MD at Work:  [phone]  If you have any questions about the above instructions, call the GI   department at [phone] or call the endoscopy unit at [phone]   from 7am until 3 pm.  [facility] [place], EMERGENCY ROOM PHONE NUMBER:   [phone]  IF A COMPLICATION OR EMERGENCY SITUATION ARISES AND YOU ARE UNABLE TO REACH   YOUR PHYSICIAN - GO DIRECTLY TO THE EMERGENCY ROOM.  I have read or have had read to me these discharge instructions for my   procedure and have received a written copy.  I understand these   instructions and will follow-up with my physician if I have  any questions.     __________________________________       _____________________________________  Nurse Signature                                          Patient/Designated   Responsible Party Signature  Prince Griffin MD  7/2/2024 10:11:36 AM  This report has been verified and signed electronically.  Dear patient,  As a result of recent federal legislation (The Federal Cures Act), you may   receive lab or pathology results from your procedure in your [facility]   account before your physician is able to contact you. Your physician or   their representative will relay the results to you with their   recommendations at their soonest availability.  Thank you,  [employer]OVATION

## 2024-07-02 NOTE — TRANSFER OF CARE
"Anesthesia Transfer of Care Note    Patient: Abdullahi Urias    Procedure(s) Performed: Procedure(s) (LRB):  EGD (ESOPHAGOGASTRODUODENOSCOPY) (N/A)  COLONOSCOPY 6/4 plavix 5 day hold econ (N/A)    Patient location: GI    Anesthesia Type: MAC    Transport from OR: Transported from OR on room air with adequate spontaneous ventilation    Post pain: adequate analgesia    Post assessment: no apparent anesthetic complications    Post vital signs: stable    Level of consciousness: responds to stimulation    Nausea/Vomiting: no nausea/vomiting    Complications: none    Transfer of care protocol was followed      Last vitals: Visit Vitals  /63   Pulse 68   Temp 36.5 °C (97.7 °F)   Resp 20   Ht 5' 9" (1.753 m)   Wt 106.6 kg (235 lb)   SpO2 95%   BMI 34.70 kg/m²     "

## 2024-07-02 NOTE — ANESTHESIA PREPROCEDURE EVALUATION
07/02/2024  Abdullahi Urias is a 68 y.o., male.      Pre-op Assessment    I have reviewed the Patient Summary Reports.    I have reviewed the NPO Status.   I have reviewed the Medications.     Review of Systems  Anesthesia Hx:  No problems with previous Anesthesia                Social:  Former Smoker       Cardiovascular:     Hypertension Valvular problems/Murmurs (S/P AVR)  CAD       CHF   PVD       Summary       ·  Left Ventricle: The left ventricle is normal in size. Normal wall thickness. There is concentric remodeling. Normal wall motion. There is normal systolic function with a visually estimated ejection fraction of 55 - 70%. Ejection fraction by visual approximation is 60%. There is indeterminate diastolic function.  ·  Right Ventricle: Normal right ventricular cavity size. Wall thickness is normal. Right ventricle wall motion  is normal. Systolic function is mildly reduced.  ·  Aortic Valve: There is a transcatheter valve replacement in the aortic position. Aortic valve area by VTI is 1.90 cm². Aortic valve peak velocity is 2.20 m/s. Mean gradient is 10 mmHg. The dimensionless index is 0.62. There is no significant regurgitation.  ·  Tricuspid Valve: There is mild to moderate regurgitation.  ·  Pulmonic Valve: There is mild regurgitation.  ·  Pulmonary Artery: The estimated pulmonary artery systolic pressure is 32 mmHg.  ·  IVC/SVC: Normal venous pressure at 3 mmHg.    Cardiac clearance:  Elevated periop risk of CV events for non-high risk procedure.  Ok to proceed the scheduled procedure without further cardiac study.  OK to hold ASa and Plavix 5 days before the procedure and resume postop once hemodynamically stable        Shortness of Breath    Coronary Artery Disease:               Congestive Heart Failure (CHF)                Hypertension         Pulmonary:    Asthma  Shortness of breath  Sleep  Apnea, CPAP   Asthma:    Obstructive Sleep Apnea (CHEO).           Renal/:  Chronic Renal Disease, CKD   Prostate CA      Kidney Function/Disease             Hepatic/GI:     GERD Liver Disease, Hepatitis, C    Gerd       Liver Disease, Hepatitis        Musculoskeletal:  Arthritis        Arthritis          Neurological:   CVA Neuromuscular Disease,           Arthritis                         Neuromuscular Disease   Psych:  Psychiatric History                  Physical Exam  General: Well nourished, Cooperative, Alert and Oriented    Airway:  Mallampati: III   Mouth Opening: Normal  TM Distance: Normal  Tongue: Normal  Neck ROM: Normal ROM    Dental:  Partial Dentures  Partials removed; few teeth left, but denies any loose       Anesthesia Plan  Type of Anesthesia, risks & benefits discussed:    Anesthesia Type: MAC, Gen Natural Airway  Intra-op Monitoring Plan: Standard ASA Monitors  Post Op Pain Control Plan: IV/PO Opioids PRN  Induction:  IV  Informed Consent: Informed consent signed with the Patient and all parties understand the risks and agree with anesthesia plan.  All questions answered.   ASA Score: 3  Day of Surgery Review of History & Physical: H&P Update referred to the surgeon/provider.I have interviewed and examined the patient. I have reviewed the patient's H&P dated: There are no significant changes.     Ready For Surgery From Anesthesia Perspective.     .

## 2024-07-03 VITALS
SYSTOLIC BLOOD PRESSURE: 139 MMHG | DIASTOLIC BLOOD PRESSURE: 66 MMHG | RESPIRATION RATE: 20 BRPM | HEIGHT: 69 IN | WEIGHT: 235 LBS | OXYGEN SATURATION: 98 % | HEART RATE: 65 BPM | BODY MASS INDEX: 34.8 KG/M2 | TEMPERATURE: 98 F

## 2024-07-03 LAB
FINAL PATHOLOGIC DIAGNOSIS: NORMAL
GROSS: NORMAL
Lab: NORMAL

## 2024-07-03 NOTE — ANESTHESIA POSTPROCEDURE EVALUATION
Anesthesia Post Evaluation    Patient: Abdullahi Urias    Procedure(s) Performed: Procedure(s) (LRB):  EGD (ESOPHAGOGASTRODUODENOSCOPY) (N/A)  COLONOSCOPY 6/4 plavix 5 day hold econ (N/A)    Final Anesthesia Type: MAC      Patient location during evaluation: GI PACU  Patient participation: Yes- Able to Participate  Level of consciousness: awake and alert  Post-procedure vital signs: reviewed and stable  Pain management: adequate  Airway patency: patent    PONV status at discharge: No PONV  Anesthetic complications: no      Cardiovascular status: hemodynamically stable  Respiratory status: unassisted, room air and spontaneous ventilation  Hydration status: euvolemic  Follow-up not needed.              Vitals Value Taken Time   /66 07/02/24 1022   Temp 36.5 °C (97.7 °F) 07/02/24 1002   Pulse 65 07/02/24 1022   Resp 20 07/02/24 1022   SpO2 98 % 07/02/24 1022         Event Time   Out of Recovery 10:32:33         Pain/Shoshana Score: Shoshana Score: 10 (7/2/2024 10:12 AM)           Applied

## 2024-07-05 NOTE — PROGRESS NOTES
The biopsy of the stomach showed some mild inflammation but nothing was bad.  On the colon, it was also benign.  No repeat EGD needs to be done but the colonoscopy needs to be repeated in 5 years.

## 2024-07-16 ENCOUNTER — E-CONSULT (OUTPATIENT)
Dept: CARDIOLOGY | Facility: HOSPITAL | Age: 69
End: 2024-07-16
Payer: MEDICARE

## 2024-07-16 DIAGNOSIS — Z01.810 PREOP CARDIOVASCULAR EXAM: Primary | ICD-10-CM

## 2024-07-16 PROCEDURE — 99451 NTRPROF PH1/NTRNET/EHR 5/>: CPT | Mod: ,,, | Performed by: INTERNAL MEDICINE

## 2024-07-16 NOTE — CONSULTS
[facility] CARDIOLOGY  Response for E-Consult     Patient Name: Abdullahi Urias  MRN: [id number]  Primary Care Provider: Reji Valentin MD   Requesting Provider: Lucy Wilkerson NP  E-Consult to Cardiology  Consult performed by: Jeffery Mckeon MD  Consult ordered by: Lucy Wilkerson NP           69 yo male, E consult for preop clearance of penile prothesis surgery.  The chart reviewed.  PMH s/p bioprosthetic AVR at [facility] in 2014 Salazar [id number] 25 mm pericardial tissue valve, nonobstructive CAD s/p C in  nonobstructive, 20% midLAD and RCA by Dr. quiroz, PAD, h/o stroke in 2012, HTN, HLD CHEO on CPAP remote h/o cocaine in 2012, quit drinking in 2012 and no smoking. H/o prostate cancer in 2018,     Plan  Elevated periop risk of CV events for non-high risk procedure.  Ok to proceed the scheduled procedure without further cardiac study.  OK to hold ASA and Plavix 5 to 7 days before the procedure and resume postop once hemodynamically stable      Total time of Consultation: 10 minute    I did not speak to the requesting provider verbally about this.     *This eConsult is based on the clinical data available to me and is furnished without benefit of a physical examination. The eConsult will need to be interpreted in light of any clinical issues or changes in patient status not available to me at the time of filing this eConsults. Significant changes in patient condition or level of acuity should result in immediate formal consultation and reevaluation. Please alert me if you have further questions.    Thank you for this eConsult referral.     Jeffery Mckeon MD  [facility] CARDIOLOG[facility]

## 2024-07-17 ENCOUNTER — PATIENT MESSAGE (OUTPATIENT)
Dept: UROLOGY | Facility: CLINIC | Age: 69
End: 2024-07-17
Payer: MEDICARE

## 2024-07-17 ENCOUNTER — TELEPHONE (OUTPATIENT)
Dept: UROLOGY | Facility: CLINIC | Age: 69
End: 2024-07-17
Payer: MEDICARE

## 2024-07-17 NOTE — TELEPHONE ENCOUNTER
Patient notified about surgery instructions, and that someone will call him from the pre-op department for the time of the procedure and further instructions.    ----- Message from Sheila Quevedo sent at 7/17/2024  4:10 PM CDT -----  Regarding: patient advice  Contact: Abdullahi  .Type:  Needs Medical Advice    Who Called: Abdullahi   Symptoms (please be specific):    How long has patient had these symptoms:    Pharmacy name and phone #:    Would the patient rather a call back or a response via My [employer]? call  Best Call Back Number: [phone] (home)    Additional Information:  Abdullahi is requesting a callback from the nurse in regard to he will have surgery Monday but has not been contacted. He also need to know what medications to stop.

## 2024-07-18 ENCOUNTER — OFFICE VISIT (OUTPATIENT)
Dept: PULMONOLOGY | Facility: CLINIC | Age: 69
End: 2024-07-18
Attending: HOSPITALIST
Payer: MEDICARE

## 2024-07-18 ENCOUNTER — HOSPITAL ENCOUNTER (OUTPATIENT)
Dept: RADIOLOGY | Facility: HOSPITAL | Age: 69
Discharge: HOME | End: 2024-07-18
Attending: HOSPITALIST
Payer: MEDICARE

## 2024-07-18 ENCOUNTER — TELEPHONE (OUTPATIENT)
Dept: PREADMISSION TESTING | Facility: HOSPITAL | Age: 69
End: 2024-07-18
Payer: MEDICARE

## 2024-07-18 VITALS
WEIGHT: 239.88 LBS | OXYGEN SATURATION: 100 % | SYSTOLIC BLOOD PRESSURE: 128 MMHG | BODY MASS INDEX: 35.53 KG/M2 | DIASTOLIC BLOOD PRESSURE: 72 MMHG | HEIGHT: 69 IN | HEART RATE: 82 BPM

## 2024-07-18 DIAGNOSIS — J45.40 MODERATE PERSISTENT ASTHMA WITHOUT COMPLICATION: ICD-10-CM

## 2024-07-18 DIAGNOSIS — G47.33 OSA ON CPAP: ICD-10-CM

## 2024-07-18 DIAGNOSIS — R91.1 SOLITARY PULMONARY NODULE: ICD-10-CM

## 2024-07-18 DIAGNOSIS — Z01.811 ENCOUNTER FOR PREOPERATIVE PULMONARY EXAMINATION: ICD-10-CM

## 2024-07-18 DIAGNOSIS — R05.3 CHRONIC COUGH: ICD-10-CM

## 2024-07-18 DIAGNOSIS — J45.901 ASTHMATIC BRONCHITIS WITH ACUTE EXACERBATION, UNSPECIFIED ASTHMA SEVERITY, UNSPECIFIED WHETHER PERSISTENT: ICD-10-CM

## 2024-07-18 DIAGNOSIS — Z87.891 FORMER TOBACCO USE: Primary | ICD-10-CM

## 2024-07-18 PROCEDURE — 71271 CT THORAX LUNG CANCER SCR C-: CPT | Mod: TC

## 2024-07-18 PROCEDURE — 3066F NEPHROPATHY DOC TX: CPT | Mod: HCNC,CPTII,S$GLB, | Performed by: HOSPITALIST

## 2024-07-18 PROCEDURE — 3074F SYST BP LT 130 MM HG: CPT | Mod: HCNC,CPTII,S$GLB, | Performed by: HOSPITALIST

## 2024-07-18 PROCEDURE — 3044F HG A1C LEVEL LT 7.0%: CPT | Mod: HCNC,CPTII,S$GLB, | Performed by: HOSPITALIST

## 2024-07-18 PROCEDURE — 3078F DIAST BP <80 MM HG: CPT | Mod: HCNC,CPTII,S$GLB, | Performed by: HOSPITALIST

## 2024-07-18 PROCEDURE — 71271 CT THORAX LUNG CANCER SCR C-: CPT | Mod: 26,,, | Performed by: RADIOLOGY

## 2024-07-18 PROCEDURE — 1160F RVW MEDS BY RX/DR IN RCRD: CPT | Mod: HCNC,CPTII,S$GLB, | Performed by: HOSPITALIST

## 2024-07-18 PROCEDURE — 3008F BODY MASS INDEX DOCD: CPT | Mod: HCNC,CPTII,S$GLB, | Performed by: HOSPITALIST

## 2024-07-18 PROCEDURE — 3288F FALL RISK ASSESSMENT DOCD: CPT | Mod: HCNC,CPTII,S$GLB, | Performed by: HOSPITALIST

## 2024-07-18 PROCEDURE — 1159F MED LIST DOCD IN RCRD: CPT | Mod: HCNC,CPTII,S$GLB, | Performed by: HOSPITALIST

## 2024-07-18 PROCEDURE — 99999 PR PBB SHADOW E&M-EST. PATIENT-LVL V: CPT | Mod: PBBFAC,HCNC,, | Performed by: HOSPITALIST

## 2024-07-18 PROCEDURE — 1101F PT FALLS ASSESS-DOCD LE1/YR: CPT | Mod: HCNC,CPTII,S$GLB, | Performed by: HOSPITALIST

## 2024-07-18 PROCEDURE — 99213 OFFICE O/P EST LOW 20 MIN: CPT | Mod: HCNC,S$GLB,, | Performed by: HOSPITALIST

## 2024-07-18 RX ORDER — ALBUTEROL SULFATE 90 UG/1
AEROSOL, METERED RESPIRATORY (INHALATION)
Qty: 8.5 G | Refills: 3 | Status: SHIPPED | OUTPATIENT
Start: 2024-07-18

## 2024-07-18 NOTE — ASSESSMENT & PLAN NOTE
- Low pulmonary risk for procedure, no contraindication for surgery  - pt instructed to let nursing staff know if he brings his Trelegy

## 2024-07-18 NOTE — PROGRESS NOTES
Subjective:      Patient ID: Abdullahi Urias is a 68 y.o. male.    Chief Complaint: Shortness of Breath    Interval Hx 7/18/24:    Mr. Urias presents today for follow up ground glass lung nodule and asthma. He was last seen 1/18/24- continued on Trelegy, 6 month LDCT ordered.     Mr. Urias has been doing well, respiratory symptoms controlled. He had been losing weight, but stopped going to the gym after Mother's day, is going to try and get back to going. He is having surgery with Urology on Monday. His last surgery was about May 2023- no prior issues with anesthesia. He intermittently wears his CPAP machine, [facility].    Interim Work Up:  LDCT 7/18/24- Lung-rads 1, resolution of previously seen ground     Pulmonary Interventions:  Albuterol HFA- been a long time since used  Albuterol nebulized- been a long time since used  Trelegy- daily    Interval Hx 1/18/24:     Mr. Urias presents to Pulmonary clinic for follow up asthma. He was last seen 11/21/23- at that visit reported increased TANIA use and productive cough. He was treated for exacerbation with medrol dose pack and z pack. Feeling much better as far as mobility, been going to the gym everyday. Breathing much improved, not having to use TANIA since he finished the steroids.      Interim Work Up:  LDCT 1/18/24- Lung-rads 3, compared to CTA 8/25/22- 8mm LLL groundglass focus, stable 4mm left fissural nodule     Pulmonary Interventions:  Albuterol HFA- last used in November.  Albuterol nebulized- last did one in November  Trelegy 200- daily     Interval Hx 11/21/23:     Mr. Urias presents to Pulmonary clinic for increased use of rescue inhaler. He reports worsening shortness of breath and wheezing since last Thursday. No fever. Productive cough, was clear, now phlegm is yellow. Nebulizer helps, uses three times per day. No Le swelling. No wheezing on exam. No known sick contacts.      Interim Work Up:  CXR today- No acute change     Pulmonary  Interventions:   Albuterol HFA- as needed  Albuterol nebulized- TID  Trelegy 200- compliant  Mucinex     Interval Hx 10/18/23:     Mr. Urias presents to Pulmonary clinic for follow up dyspnea. He was last seen 8/2023 following an acute care visit for dyspnea. At that visit he was prescribed a nebulizer with albuterol solution and atrovent nasal spray. Further eval done with CXR, BNP and  D-dimer given his recent surgery. BNP WNL, D-dimer elevated at 4.19. VQ scan normal.     Mr. Urias states that his dyspnea is a little better, but certainly still present. He has recently started with outpatient physical therapy and has been walking around his house lot. He is motivated to try and lose weight.      Interim Testing  VQ scan- Normal  BNP- WNL  D-dimer 4  CXR- No acute findings     Pulmonary Interventions:   Albuterol HFA- Only uses every 2 weeks or so  Albuterol nebulized- hasn't used it  Trelegy 200- using daily  Atrovent Nasal spray     Interval History 8/18/2023:     Mr. Urias presents to Pulmonary clinic as an acute care visit with increasing dyspnea. Feeling more out of breath with exertion, coughing a little more over the past several weeks. Also with congestion over the past week, productive last night and this morning- clear/yellow. No sore throat. No fever. No thrush. No calf swelling or pain.      Per chart review- since I last saw patient he had his TLIF 5/17/23 with Dr. Valencia which was complicated by post-op infection, treated with hardware removal and IV antibiotics. Completed 6 weeks of IV abx today- 8/18/23, to have PICC removed.     Pulmonary Interventions:   Albuterol- Using 2-3/day, helps  Trelegy- once daily  Flonase- using every day for the past week or so     Interval History 4/18/23:     Mr. Urias is seen in follow up of asthma as well as Pulmonary pre-op assessment for TLIF, I last saw him in March, at that time he was changed to Trelegy from Advair for persistent asthma symptoms (using  albuterol 3-4 times daily)     Interim Testing:  Complete PFT 4/2023- Normal spirometry, loop consistent with restrictive pattern, bronchodilator portion not done as he used inhaler prior to exam  6MW- Remained on room air, Aristeo 1-3, walked 89% predicted  ABG - 7.36/41/85/23, done on room air  CXR 4/13- No acute findings, cardiomegaly, no effusions, no pneumo, no focal consolidation     Pulmonary interventions:   Albuterol- not having to use really since starting [facility]- has seen a big improvement  PDM- Benefited from technique education     HPI 3/7/23:  67 year old male with history of AS s/p bioprosthetic AVR, dCHF, CKD3, HTN, CVA, CHEO ([facility]), former tobacco user, prostate cancer 2020 s/p prostatectomy s/p radiation, in remission who was referred to Pulmonary clinic by Dick Baez MD for evaluation of  asthma. Mr. Urias was admitted to the hospital 1/25-1/26 for treatment of asthma exacerbation. He was treated with IV steroids, breathing treatments, empiric antibiotics and oxygen as needed. CXR clear, SpO2 98% on room air on day of discharge. Patient reports several months of wheezing. Has been using Advair diskus twice daily and Albuterol inhaler 2-3/day. He did notice that symptoms improved when he was on a cruise last week, and worsened when back in [place].      Quit smoking 2012 (with stroke), about 30 pack years     Pertinent Work Up:  CXR 1/24/2023- Clear chest, no pneumo or effusion. Cardiomegaly.  CTA Chest 8/25/2022: Negative for PE, clear lungs  Eosinophils borderline/mildly elevated 1/2023     Pulmonary Interventions:  Albuterol HFA, using 2-3x/day  Fluticasone- salmeterol 250-50mcg twice daily, been on since end of January    Review of Systems   Respiratory:  Positive for dyspnea on extertion. Negative for cough, sputum production and wheezing.      Objective:     Physical Exam   Constitutional: He is oriented to person, place, and time. He appears well-developed and  "well-nourished. He is obese.   Cardiovascular: Normal rate and regular rhythm.   Pulmonary/Chest: Normal expansion, effort normal and breath sounds normal.   Neurological: He is alert and oriented to person, place, and time.   Skin: Skin is warm and dry.     Personal Diagnostic Review  As Above      7/18/2024     9:02 AM 7/2/2024    10:22 AM 7/2/2024    10:12 AM 7/2/2024    10:02 AM 7/2/2024     8:41 AM 6/27/2024     7:44 AM 6/25/2024     9:20 AM   Pulmonary Function Tests   SpO2 100 % 98 % 97 % 95 % 99 %     Height 5' 9" (1.753 m)    5' 9" (1.753 m) 5' 9" (1.753 m) 5' 9" (1.753 m)   Weight 108.8 kg (239 lb 13.8 oz)    106.6 kg (235 lb) 108.1 kg (238 lb 5.1 oz) 108.5 kg (239 lb 3.2 oz)   BMI (Calculated) 35.4    34.7 35.2 35.3        Assessment:     No diagnosis found.     Outpatient Encounter Medications as of 7/18/2024   Medication Sig Dispense Refill    albuterol (PROVENTIL) 2.5 mg /3 mL (0.083 %) nebulizer solution Take 3 mLs (2.5 mg total) by nebulization every 6 (six) hours as needed for Wheezing. Rescue 180 mL 1    albuterol (PROVENTIL/VENTOLIN HFA) 90 mcg/actuation inhaler INHALE 2 PUFFS INTO THE LUNGS EVERY 6 HOURS AS NEEDED FOR COUGH 8.5 g 3    allopurinoL (ZYLOPRIM) 100 MG tablet Take 100 mg by mouth once daily. Takes 0.5 tab      ALPRAZolam (XANAX) 1 MG tablet Take 1 mg by mouth Daily. EVERY OTHER DAY      aluminum & magnesium hydroxide-simethicone (MYLANTA MAX STRENGTH) 400-400-40 mg/5 mL suspension TAKE 15ML BY MOUTH FOUR TIMES A DAY AS NEEDED FOR STOMACH ACID      aspirin (ECOTRIN) 81 MG EC tablet Take 1 tablet (81 mg total) by mouth once daily. 90 tablet 3    atorvastatin (LIPITOR) 80 MG tablet TAKE ONE-HALF TABLET BY MOUTH EVERY DAY FOR CHOLESTEROL      carvediloL (COREG) 12.5 MG tablet Take 1 tablet (12.5 mg total) by mouth 2 (two) times daily. 60 tablet 11    clopidogreL (PLAVIX) 75 mg tablet Take 1 tablet (75 mg total) by mouth once daily. 30 tablet 11    diclofenac sodium (VOLTAREN) 1 % Gel " Apply 2 g topically 2 (two) times daily. for 7 days 100 g 0    docusate sodium (COLACE) 100 MG capsule Take 1 capsule (100 mg total) by mouth 2 (two) times daily. 20 capsule 0    esomeprazole (NEXIUM) 40 MG capsule Take 40 mg by mouth before breakfast.      famotidine (PEPCID) 40 MG tablet TAKE ONE TABLET BY MOUTH AT BEDTIME FOR ACID REFLUX      ferrous sulfate (FEOSOL) 325 mg (65 mg iron) Tab tablet Take 1 tablet (325 mg total) by mouth daily with breakfast. 90 tablet 1    fluticasone-umeclidin-vilanter (TRELEGY ELLIPTA) 200-62.5-25 mcg inhaler Inhale 1 puff into the lungs once daily. 60 each 5    gabapentin (NEURONTIN) 600 MG tablet Take 1 tablet (600 mg total) by mouth 3 (three) times daily. 90 tablet 11    hydrocortisone 2.5 % cream Apply topically 2 (two) times daily as needed. Apply to affected areas of the face and neck. 30 g 2    hydrOXYzine HCL (ATARAX) 25 MG tablet Take 1 tablet (25 mg total) by mouth 3 (three) times daily as needed for Itching. 30 tablet 0    ipratropium (ATROVENT) 21 mcg (0.03 %) nasal spray 2 sprays by Each Nostril route 2 (two) times daily. 30 mL 0    losartan (COZAAR) 50 MG tablet Take 1 tablet (50 mg total) by mouth once daily. 30 tablet 11    [] methylPREDNISolone (MEDROL DOSEPACK) 4 mg tablet use as directed 21 each 0    multivitamin (THERAGRAN) per tablet Take 1 tablet by mouth once daily.      oxyCODONE-acetaminophen (PERCOCET) 5-325 mg per tablet Take 1 tablet by mouth every 4 (four) hours as needed for Pain. 30 tablet 0    sertraline (ZOLOFT) 100 MG tablet TAKE TWO TABLETS BY MOUTH EVERY DAY FOR MENTAL HEALTH DOSE INCREASED TO 200MG/DAY      [DISCONTINUED] ferrous sulfate (FEOSOL) 325 mg (65 mg iron) Tab tablet Take 1 tablet (325 mg total) by mouth daily with breakfast. 90 tablet 1    [DISCONTINUED] guaiFENesin (MUCINEX) 600 mg 12 hr tablet Take 2 tablets (1,200 mg total) by mouth 2 (two) times daily. 20 tablet 0    [DISCONTINUED] LIDOcaine (LIDODERM) 5 % APPLY 1 PATCH  TOPICALLY EVERY DAY FOR PAIN. WEAR FOR 12 HOURS, THEN REMOVE. DO NOT APPLY NEW PATCH FOR AT LEAST 12 HOURS.      [DISCONTINUED] simethicone (MYLICON) 80 MG chewable tablet Take 80 mg by mouth every 6 (six) hours as needed for Flatulence.      [DISCONTINUED] sucralfate (CARAFATE) 100 mg/mL suspension Take 1 g by mouth 4 (four) times daily.       [DISCONTINUED] tiZANidine (ZANAFLEX) 4 MG tablet Take 1 capsule by mouth 2 (two) times daily as needed (muscle spasms).      [DISCONTINUED] zolpidem (AMBIEN CR) 12.5 MG CR tablet Take 1 tablet by mouth nightly as needed.       Facility-Administered Encounter Medications as of 7/18/2024   Medication Dose Route Frequency Provider Last Rate Last Admin    aztreonam 2,000 mg in D5W 100 mL IVPB  2,000 mg Intravenous On Call Procedure Rommel Rodriguez MD        ondansetron injection 4 mg  4 mg Intravenous Once PRN Nehemiah Carmen MD        ondansetron injection 4 mg  4 mg Intravenous Once PRN Nehemiah Carmen MD        sodium chloride 0.9% flush 10 mL  10 mL Intravenous PRN Wilda Horne MD         No orders of the defined types were placed in this encounter.      Plan:     Problem List Items Addressed This Visit          Pulmonary    Asthmatic bronchitis    Moderate persistent asthma without complication     - has been controlled, doing well  - continue Trelegy, albuterol as needed         Encounter for preoperative pulmonary examination     - Low pulmonary risk for procedure, no contraindication for surgery  - pt instructed to let nursing staff know if he brings his Trelegy            Other    CHEO on CPAP     - [facility]  - encouraged pt to bring to hospital if staying overnight         Former tobacco use - Primary     - quit 2012, >30 pack   - LDCT today Lung-rads 1, continue annual screening         Relevant Orders    CT Chest Lung Screening Low Dose     Other Visit Diagnoses       Chronic cough        Relevant Medications    albuterol (PROVENTIL/VENTOLIN  HFA) 90 mcg/actuation inhaler          Follow up in 6 months or sooner as needed.

## 2024-07-19 ENCOUNTER — TELEPHONE (OUTPATIENT)
Dept: PREADMISSION TESTING | Facility: HOSPITAL | Age: 69
End: 2024-07-19
Payer: MEDICARE

## 2024-07-19 NOTE — TELEPHONE ENCOUNTER
Called and spoke with pt about the following:     Please arrive to [facility] ([facility]) at 11 am on 7/22/2024 for your scheduled procedure.  Address: [address] [place] [address] [place] [id number] ([address] on left, 1st Floor Lobby)  >>>NO eating or drinking after midnight unless instructed otherwise by your Surgeon<<<    Thank you,  -[facility] Pre Admit Testing Dept.  Mon-Fri 7:30 am - 4 pm [phone]

## 2024-07-22 ENCOUNTER — ANESTHESIA EVENT (OUTPATIENT)
Dept: SURGERY | Facility: HOSPITAL | Age: 69
End: 2024-07-22
Payer: MEDICARE

## 2024-07-22 ENCOUNTER — ANESTHESIA (OUTPATIENT)
Dept: SURGERY | Facility: HOSPITAL | Age: 69
End: 2024-07-22
Payer: MEDICARE

## 2024-07-22 ENCOUNTER — HOSPITAL ENCOUNTER (OUTPATIENT)
Facility: HOSPITAL | Age: 69
Discharge: HOME | End: 2024-07-23
Attending: UROLOGY | Admitting: UROLOGY
Payer: MEDICARE

## 2024-07-22 DIAGNOSIS — C61 PROSTATE CANCER: ICD-10-CM

## 2024-07-22 DIAGNOSIS — N52.31 ERECTILE DYSFUNCTION AFTER RADICAL PROSTATECTOMY: Primary | ICD-10-CM

## 2024-07-22 LAB
ABO + RH BLD: NORMAL
BLD GP AB SCN CELLS X3 SERPL QL: NORMAL

## 2024-07-22 PROCEDURE — 25000003 PHARM REV CODE 250: Mod: HCNC | Performed by: UROLOGY

## 2024-07-22 PROCEDURE — 94761 N-INVAS EAR/PLS OXIMETRY MLT: CPT | Mod: HCNC

## 2024-07-22 PROCEDURE — 71000033 HC RECOVERY, INTIAL HOUR: Mod: HCNC | Performed by: UROLOGY

## 2024-07-22 PROCEDURE — 37000008 HC ANESTHESIA 1ST 15 MINUTES: Mod: HCNC | Performed by: UROLOGY

## 2024-07-22 PROCEDURE — 25000003 PHARM REV CODE 250: Mod: HCNC

## 2024-07-22 PROCEDURE — 63600175 PHARM REV CODE 636 W HCPCS: Mod: HCNC

## 2024-07-22 PROCEDURE — 36000706: Mod: HCNC | Performed by: UROLOGY

## 2024-07-22 PROCEDURE — 86850 RBC ANTIBODY SCREEN: CPT | Mod: HCNC | Performed by: STUDENT IN AN ORGANIZED HEALTH CARE EDUCATION/TRAINING PROGRAM

## 2024-07-22 PROCEDURE — 86901 BLOOD TYPING SEROLOGIC RH(D): CPT | Mod: HCNC | Performed by: STUDENT IN AN ORGANIZED HEALTH CARE EDUCATION/TRAINING PROGRAM

## 2024-07-22 PROCEDURE — 99900035 HC TECH TIME PER 15 MIN (STAT): Mod: HCNC

## 2024-07-22 PROCEDURE — 63600175 PHARM REV CODE 636 W HCPCS: Mod: JZ,JG,HCNC | Performed by: UROLOGY

## 2024-07-22 PROCEDURE — 86900 BLOOD TYPING SEROLOGIC ABO: CPT | Mod: HCNC | Performed by: STUDENT IN AN ORGANIZED HEALTH CARE EDUCATION/TRAINING PROGRAM

## 2024-07-22 PROCEDURE — 63600175 PHARM REV CODE 636 W HCPCS: Mod: HCNC | Performed by: STUDENT IN AN ORGANIZED HEALTH CARE EDUCATION/TRAINING PROGRAM

## 2024-07-22 PROCEDURE — 27000221 HC OXYGEN, UP TO 24 HOURS: Mod: HCNC

## 2024-07-22 PROCEDURE — C1889 IMPLANT/INSERT DEVICE, NOC: HCPCS | Mod: HCNC | Performed by: UROLOGY

## 2024-07-22 PROCEDURE — 37000009 HC ANESTHESIA EA ADD 15 MINS: Mod: HCNC | Performed by: UROLOGY

## 2024-07-22 PROCEDURE — 63600175 PHARM REV CODE 636 W HCPCS: Mod: HCNC | Performed by: UROLOGY

## 2024-07-22 PROCEDURE — 54410 REMOVE/REPLACE PENIS PROSTH: CPT | Mod: ,,, | Performed by: UROLOGY

## 2024-07-22 PROCEDURE — 71000039 HC RECOVERY, EACH ADD'L HOUR: Mod: HCNC | Performed by: UROLOGY

## 2024-07-22 PROCEDURE — C1813 PROSTHESIS, PENILE, INFLATAB: HCPCS | Mod: HCNC | Performed by: UROLOGY

## 2024-07-22 PROCEDURE — 36000707: Mod: HCNC | Performed by: UROLOGY

## 2024-07-22 PROCEDURE — 27201423 OPTIME MED/SURG SUP & DEVICES STERILE SUPPLY: Mod: HCNC | Performed by: UROLOGY

## 2024-07-22 DEVICE — RESERVOIR FLAT I2 100ML: Type: IMPLANTABLE DEVICE | Site: SCROTUM | Status: FUNCTIONAL

## 2024-07-22 DEVICE — PROSTHESIS PENILE 700CX 18CM: Type: IMPLANTABLE DEVICE | Site: SCROTUM | Status: FUNCTIONAL

## 2024-07-22 DEVICE — INFLATABLE PENILE PROSTHESIS ACCESSORY KIT WITH MS PUMP
Type: IMPLANTABLE DEVICE | Site: SCROTUM | Status: FUNCTIONAL
Brand: AMS 700 ACCESSORY KIT

## 2024-07-22 RX ORDER — PROPOFOL 10 MG/ML
VIAL (ML) INTRAVENOUS
Status: DISCONTINUED | OUTPATIENT
Start: 2024-07-22 | End: 2024-07-22

## 2024-07-22 RX ORDER — LIDOCAINE HYDROCHLORIDE 20 MG/ML
INJECTION INTRAVENOUS
Status: DISCONTINUED | OUTPATIENT
Start: 2024-07-22 | End: 2024-07-22

## 2024-07-22 RX ORDER — ONDANSETRON HYDROCHLORIDE 2 MG/ML
INJECTION, SOLUTION INTRAVENOUS
Status: DISCONTINUED | OUTPATIENT
Start: 2024-07-22 | End: 2024-07-22

## 2024-07-22 RX ORDER — EPHEDRINE SULFATE 50 MG/ML
INJECTION, SOLUTION INTRAVENOUS
Status: DISCONTINUED | OUTPATIENT
Start: 2024-07-22 | End: 2024-07-22

## 2024-07-22 RX ORDER — MIDAZOLAM HYDROCHLORIDE 1 MG/ML
INJECTION INTRAMUSCULAR; INTRAVENOUS
Status: DISCONTINUED | OUTPATIENT
Start: 2024-07-22 | End: 2024-07-22

## 2024-07-22 RX ORDER — ONDANSETRON HYDROCHLORIDE 2 MG/ML
4 INJECTION, SOLUTION INTRAVENOUS DAILY PRN
Status: DISCONTINUED | OUTPATIENT
Start: 2024-07-22 | End: 2024-07-22 | Stop reason: HOSPADM

## 2024-07-22 RX ORDER — ACETAMINOPHEN 10 MG/ML
1000 INJECTION, SOLUTION INTRAVENOUS ONCE
Status: COMPLETED | OUTPATIENT
Start: 2024-07-22 | End: 2024-07-22

## 2024-07-22 RX ORDER — BACITRACIN ZINC 500 UNIT/G
OINTMENT (GRAM) TOPICAL
Status: DISCONTINUED | OUTPATIENT
Start: 2024-07-22 | End: 2024-07-22 | Stop reason: HOSPADM

## 2024-07-22 RX ORDER — SODIUM CHLORIDE 0.9 % (FLUSH) 0.9 %
10 SYRINGE (ML) INJECTION
Status: DISCONTINUED | OUTPATIENT
Start: 2024-07-22 | End: 2024-07-23 | Stop reason: HOSPADM

## 2024-07-22 RX ORDER — DEXAMETHASONE SODIUM PHOSPHATE 4 MG/ML
INJECTION, SOLUTION INTRA-ARTICULAR; INTRALESIONAL; INTRAMUSCULAR; INTRAVENOUS; SOFT TISSUE
Status: DISCONTINUED | OUTPATIENT
Start: 2024-07-22 | End: 2024-07-22

## 2024-07-22 RX ORDER — OXYCODONE HYDROCHLORIDE 5 MG/1
10 TABLET ORAL EVERY 4 HOURS PRN
Status: DISCONTINUED | OUTPATIENT
Start: 2024-07-22 | End: 2024-07-23 | Stop reason: HOSPADM

## 2024-07-22 RX ORDER — FENTANYL CITRATE 50 UG/ML
INJECTION, SOLUTION INTRAMUSCULAR; INTRAVENOUS
Status: DISCONTINUED | OUTPATIENT
Start: 2024-07-22 | End: 2024-07-22

## 2024-07-22 RX ORDER — OXYCODONE AND ACETAMINOPHEN 5; 325 MG/1; MG/1
1 TABLET ORAL
Status: DISCONTINUED | OUTPATIENT
Start: 2024-07-22 | End: 2024-07-22 | Stop reason: HOSPADM

## 2024-07-22 RX ORDER — SUCCINYLCHOLINE CHLORIDE 20 MG/ML
INJECTION INTRAMUSCULAR; INTRAVENOUS
Status: DISCONTINUED | OUTPATIENT
Start: 2024-07-22 | End: 2024-07-22

## 2024-07-22 RX ORDER — SODIUM CHLORIDE, SODIUM LACTATE, POTASSIUM CHLORIDE, CALCIUM CHLORIDE 600; 310; 30; 20 MG/100ML; MG/100ML; MG/100ML; MG/100ML
INJECTION, SOLUTION INTRAVENOUS CONTINUOUS
Status: DISCONTINUED | OUTPATIENT
Start: 2024-07-22 | End: 2024-07-23 | Stop reason: HOSPADM

## 2024-07-22 RX ORDER — GLUCAGON 1 MG
1 KIT INJECTION
Status: DISCONTINUED | OUTPATIENT
Start: 2024-07-22 | End: 2024-07-22 | Stop reason: HOSPADM

## 2024-07-22 RX ORDER — BUPIVACAINE HYDROCHLORIDE 2.5 MG/ML
INJECTION, SOLUTION EPIDURAL; INFILTRATION; INTRACAUDAL
Status: DISCONTINUED | OUTPATIENT
Start: 2024-07-22 | End: 2024-07-22 | Stop reason: HOSPADM

## 2024-07-22 RX ORDER — ROCURONIUM BROMIDE 10 MG/ML
INJECTION, SOLUTION INTRAVENOUS
Status: DISCONTINUED | OUTPATIENT
Start: 2024-07-22 | End: 2024-07-22

## 2024-07-22 RX ORDER — HYDROMORPHONE HYDROCHLORIDE 2 MG/ML
0.2 INJECTION, SOLUTION INTRAMUSCULAR; INTRAVENOUS; SUBCUTANEOUS EVERY 5 MIN PRN
Status: DISCONTINUED | OUTPATIENT
Start: 2024-07-22 | End: 2024-07-22 | Stop reason: HOSPADM

## 2024-07-22 RX ORDER — KETAMINE HCL IN 0.9 % NACL 50 MG/5 ML
SYRINGE (ML) INTRAVENOUS
Status: DISCONTINUED | OUTPATIENT
Start: 2024-07-22 | End: 2024-07-22

## 2024-07-22 RX ORDER — ACETAMINOPHEN 500 MG
1000 TABLET ORAL EVERY 8 HOURS
Status: DISCONTINUED | OUTPATIENT
Start: 2024-07-23 | End: 2024-07-23 | Stop reason: HOSPADM

## 2024-07-22 RX ADMIN — HYDROMORPHONE HYDROCHLORIDE 0.2 MG: 2 INJECTION INTRAMUSCULAR; INTRAVENOUS; SUBCUTANEOUS at 04:07

## 2024-07-22 RX ADMIN — EPHEDRINE SULFATE 10 MG: 50 INJECTION INTRAVENOUS at 01:07

## 2024-07-22 RX ADMIN — MIDAZOLAM HYDROCHLORIDE 1 MG: 1 INJECTION, SOLUTION INTRAMUSCULAR; INTRAVENOUS at 01:07

## 2024-07-22 RX ADMIN — OXYCODONE 10 MG: 5 TABLET ORAL at 04:07

## 2024-07-22 RX ADMIN — HYDROMORPHONE HYDROCHLORIDE 0.2 MG: 2 INJECTION INTRAMUSCULAR; INTRAVENOUS; SUBCUTANEOUS at 05:07

## 2024-07-22 RX ADMIN — OXYCODONE 10 MG: 5 TABLET ORAL at 09:07

## 2024-07-22 RX ADMIN — DEXAMETHASONE SODIUM PHOSPHATE 4 MG: 4 INJECTION, SOLUTION INTRA-ARTICULAR; INTRALESIONAL; INTRAMUSCULAR; INTRAVENOUS; SOFT TISSUE at 01:07

## 2024-07-22 RX ADMIN — FENTANYL CITRATE 50 MCG: 50 INJECTION, SOLUTION INTRAMUSCULAR; INTRAVENOUS at 02:07

## 2024-07-22 RX ADMIN — PROPOFOL 120 MG: 10 INJECTION, EMULSION INTRAVENOUS at 01:07

## 2024-07-22 RX ADMIN — PROPOFOL 30 MG: 10 INJECTION, EMULSION INTRAVENOUS at 01:07

## 2024-07-22 RX ADMIN — VANCOMYCIN HYDROCHLORIDE 1000 MG: 1 INJECTION, POWDER, LYOPHILIZED, FOR SOLUTION INTRAVENOUS at 01:07

## 2024-07-22 RX ADMIN — SODIUM CHLORIDE, POTASSIUM CHLORIDE, SODIUM LACTATE AND CALCIUM CHLORIDE: 600; 310; 30; 20 INJECTION, SOLUTION INTRAVENOUS at 08:07

## 2024-07-22 RX ADMIN — FENTANYL CITRATE 50 MCG: 50 INJECTION, SOLUTION INTRAMUSCULAR; INTRAVENOUS at 03:07

## 2024-07-22 RX ADMIN — PROPOFOL 30 MG: 10 INJECTION, EMULSION INTRAVENOUS at 03:07

## 2024-07-22 RX ADMIN — ROCURONIUM BROMIDE 10 MG: 10 SOLUTION INTRAVENOUS at 02:07

## 2024-07-22 RX ADMIN — SUCCINYLCHOLINE CHLORIDE 140 MG: 20 INJECTION, SOLUTION INTRAMUSCULAR; INTRAVENOUS; PARENTERAL at 01:07

## 2024-07-22 RX ADMIN — Medication 10 MG: at 02:07

## 2024-07-22 RX ADMIN — LIDOCAINE HYDROCHLORIDE 100 MG: 20 INJECTION INTRAVENOUS at 01:07

## 2024-07-22 RX ADMIN — ROCURONIUM BROMIDE 5 MG: 10 SOLUTION INTRAVENOUS at 01:07

## 2024-07-22 RX ADMIN — ROCURONIUM BROMIDE 35 MG: 10 SOLUTION INTRAVENOUS at 01:07

## 2024-07-22 RX ADMIN — FENTANYL CITRATE 50 MCG: 50 INJECTION, SOLUTION INTRAMUSCULAR; INTRAVENOUS at 01:07

## 2024-07-22 RX ADMIN — SUGAMMADEX 200 MG: 100 INJECTION, SOLUTION INTRAVENOUS at 03:07

## 2024-07-22 RX ADMIN — GLYCOPYRROLATE 0.2 MG: 0.2 INJECTION, SOLUTION INTRAMUSCULAR; INTRAVITREAL at 01:07

## 2024-07-22 RX ADMIN — CEFTRIAXONE SODIUM 1 G: 1 INJECTION, POWDER, FOR SOLUTION INTRAMUSCULAR; INTRAVENOUS at 01:07

## 2024-07-22 RX ADMIN — ACETAMINOPHEN 1000 MG: 10 INJECTION INTRAVENOUS at 04:07

## 2024-07-22 RX ADMIN — Medication 20 MG: at 01:07

## 2024-07-22 RX ADMIN — SODIUM CHLORIDE, SODIUM LACTATE, POTASSIUM CHLORIDE, AND CALCIUM CHLORIDE: .6; .31; .03; .02 INJECTION, SOLUTION INTRAVENOUS at 01:07

## 2024-07-22 RX ADMIN — ONDANSETRON 4 MG: 2 INJECTION INTRAMUSCULAR; INTRAVENOUS at 03:07

## 2024-07-22 NOTE — ANESTHESIA PREPROCEDURE EVALUATION
07/22/2024  Abdullahi Urias is a 68 y.o., male.    Patient Active Problem List   Diagnosis    Aortic stenosis    ED (erectile dysfunction)    Asthmatic bronchitis    Hypertension    CAD (coronary artery disease)    BPH (benign prostatic hyperplasia)    Chronic back pain    Cardiomyopathy    Class 2 severe obesity due to excess calories with serious comorbidity and body mass index (BMI) of 38.0 to 38.9 in adult    Old peripheral tear of lateral meniscus of right knee    Arthritis of right knee    Chondromalacia, right knee    Penetrating foreign body of skin of right knee    Chronic gout    CHEO on CPAP    History of CVA in adulthood    Prostate cancer    S/P AVR (aortic valve replacement) biopresthetic miller 25 mm in 2014     EKG abnormalities    Stage 3b chronic kidney disease    History of hepatitis C    Pain in both lower extremities    GERD (gastroesophageal reflux disease)    Personal history of colonic polyps    Localized swelling of left foot    History of prostate cancer    PAD (peripheral artery disease)    Aorto-iliac atherosclerosis    Moderate persistent asthma without complication    Former tobacco use    Synovial cyst of lumbar spine    Lumbar radiculopathy, chronic    Lumbar stenosis with neurogenic claudication    Post-operative wound abscess    SOB (shortness of breath)    ABLA (acute blood loss anemia)    Secondary hyperparathyroidism of renal origin    Moderate episode of recurrent major depressive disorder    Chronic bilateral low back pain without sciatica    Decreased ROM of lumbar spine    Prediabetes    Cocaine dependence in remission    Encounter for preoperative pulmonary examination    Chronic diastolic heart failure    Seroma of musculoskeletal structure after musculoskeletal system procedure    Hematochezia    Chronic radiation proctitis    Chronic superficial gastritis without  bleeding    Diverticulosis of colon     Past Medical History:   Diagnosis Date    Aortic stenosis     [facility]    Asthma     BPH (benign prostatic hyperplasia)     CAD (coronary artery disease)     Cardiomyopathy     CHF (congestive heart failure)     Chronic hoarseness     vocal cord surg    Chronic pain     CKD (chronic kidney disease) stage 3, GFR 30-59 ml/min     CVA (cerebral infarction)     8/2012 olol; reviewed ed note    Ex-smoker     GERD (gastroesophageal reflux disease)     Hepatitis C     treatedharvoni says cured, RNA NEG 6/2020    Hypertension     Pancreatitis     Prostate cancer     Prostate cancer     Prostate cancer     PVD (peripheral vascular disease)     Renal insufficiency     Substance abuse     cocaine, etoh , tob in past       Past Surgical History:   Procedure Laterality Date    AORTIC VALVE REPLACEMENT  05/19/2014    Tissue valve replacement    BACK SURGERY      CARDIAC CATHETERIZATION      COLONOSCOPY  2011    COLONOSCOPY N/A 2/24/2021    Procedure: COLONOSCOPY;  Surgeon: Faith Carrillo MD;  Location: [facility];  Service: Endoscopy;  Laterality: N/A;    COLONOSCOPY N/A 7/2/2024    Procedure: COLONOSCOPY 6/4 plavix 5 day hold econ;  Surgeon: Prince Griffin MD;  Location: [facility];  Service: Endoscopy;  Laterality: N/A;    EPIDURAL STEROID INJECTION INTO CERVICAL SPINE N/A 6/21/2022    Procedure: C7-T1 IL PIEDAD;  Surgeon: Nehemiah Carmen MD;  Location: [facility] PAIN [facility]GT;  Service: Pain Management;  Laterality: N/A;    EPIDURAL STEROID INJECTION INTO CERVICAL SPINE N/A 3/12/2024    Procedure: C7/T1 IL PIEDAD;  Surgeon: Nehemiah Carmen MD;  Location: [facility];  Service: Pain Management;  Laterality: N/A;    ESOPHAGOGASTRODUODENOSCOPY N/A 2/24/2021    Procedure: ESOPHAGOGASTRODUODENOSCOPY (EGD);  Surgeon: Faith Carrillo MD;  Location: [facility];  Service: Endoscopy;  Laterality: N/A;    ESOPHAGOGASTRODUODENOSCOPY N/A 7/2/2024    Procedure: EGD  (ESOPHAGOGASTRODUODENOSCOPY);  Surgeon: Prince Griffin MD;  Location: [facility] ENDO;  Service: Endoscopy;  Laterality: N/A;    FOOT SURGERY      INSERTION N/A 7/5/2023    Procedure: INSERTION;  Surgeon: Brandon Valencia MD;  Location: [facility] OR;  Service: Neurosurgery;  Laterality: N/A;  Antibiotic Beads    LEFT HEART CATHETERIZATION Left 7/24/2020    Procedure: CATHETERIZATION, HEART, LEFT;  Surgeon: Pasha Martin MD;  Location: [facility] CATH LAB;  Service: Cardiology;  Laterality: Left;    PENILE PROSTHESIS IMPLANT      PENILE PROSTHESIS REVISION  04/30/2018    PROSTATECTOMY  09/2019    [facility]    radiation for prostate      REMOVAL OF HARDWARE FROM SPINE N/A 7/5/2023    Procedure: REMOVAL, HARDWARE, SPINE;  Surgeon: Brandon Valencia MD;  Location: [facility] OR;  Service: Neurosurgery;  Laterality: N/A;    REPLACEMENT N/A 7/5/2023    Procedure: REPLACEMENT;  Surgeon: Brandon Valencia MD;  Location: [facility] OR;  Service: Neurosurgery;  Laterality: N/A;  of [employer]. [employer]    TRANSFORAMINAL EPIDURAL INJECTION OF STEROID Right 10/20/2022    Procedure: Right  L4/5 + L5/S1 TF PIEDAD RN IV Sedation;  Surgeon: Nehemiah Carmen MD;  Location: [facility] PAIN [facility]GT;  Service: Pain Management;  Laterality: Right;    TRANSFORAMINAL LUMBAR INTERBODY FUSION (TLIF) USING COMPUTER-ASSISTED NAVIGATION Bilateral 5/17/2023    Procedure: FUSION, SPINE, LUMBAR, TLIF, USING COMPUTER-ASSISTED NAVIGATION;  Surgeon: Brandon Valencia MD;  Location: [facility] OR;  Service: Neurosurgery;  Laterality: Bilateral;  2 level lumbar fusion at L4-5 and L5-S1    UPPER GASTROINTESTINAL ENDOSCOPY  2011    WASHOUT N/A 7/5/2023    Procedure: WASHOUT;  Surgeon: Brandon Valencia MD;  Location: [facility] OR;  Service: Neurosurgery;  Laterality: N/A;    WOUND EXPLORATION Bilateral 7/5/2023    Procedure: EXPLORATION, WOUND;  Surgeon: Brandon Valencia MD;  Location: [facility] OR;  Service: Neurosurgery;  Laterality: Bilateral;       Pre-op Assessment    I have reviewed the  Patient Summary Reports.    I have reviewed the NPO Status.   I have reviewed the Medications.     Review of Systems  Anesthesia Hx:  No problems with previous Anesthesia   History of prior surgery of interest to airway management or planning:  Previous anesthesia: General see Anesthesia plan with general anesthesia.          Denies Personal Hx of Anesthesia complications.                    Social:  Former Smoker Hx of sub abuse (cocaine) - none recent      Hematology/Oncology:  Hematology Normal                  Hematology Comments: H/H  11/34.6      --  Cancer in past history:                 Oncology Comments: Hx prostate Ca.  S/P prostatectomy/radiation     Cardiovascular:     Hypertension   CAD       CHF   PVD    ECG has been reviewed. S/p AVR    Seen and evaluated by Cards: (Dr. Mckeon)  *Elevated periop risk of CV events for non-high risk procedure.  Ok to proceed the scheduled procedure without further cardiac study.  OK to hold ASA and Plavix 5 to 7 days before the procedure and resume postop once hemodynamically stable      EKG (7/3/23):  Sinus rhythm with occasional Premature ventricular complexes   T wave abnormality, consider anterior ischemia   Prolonged QT   Abnormal ECG   When compared with ECG of 24-APR-2023 16:17,   Premature ventricular complexes are now Present   Confirmed by ERIC JACOB MD ([id number]) on 7/3/2023 1:55:32 PM                          Pulmonary:    Asthma    Sleep Apnea, CPAP Intermittent CPAP use    *Cleared by Pulm:   Encounter for preoperative pulmonary examination         - Low pulmonary risk for procedure, no contraindication for surgery  - pt instructed to let nursing staff know if he brings his Trelegy                     Renal/:  Chronic Renal Disease, CKD  BPH              Hepatic/GI:     GERD  Hepatitis, C Hx of hep C          Musculoskeletal:     S/p TLIF in May 2023        Spine Disorders: lumbar            Neurological:   CVA                                     Endocrine:  Endocrine Normal    BMI 35      Obesity / BMI > 30      Physical Exam  General: Well nourished, Cooperative, Alert and Oriented    Airway:  Mallampati: III   Mouth Opening: Normal  TM Distance: Normal  Tongue: Normal  Neck ROM: Normal ROM    Dental:  Partial Dentures  Upper and lower Partials; few teeth left, but denies any loose       Anesthesia Plan  Type of Anesthesia, risks & benefits discussed:    Anesthesia Type: Gen ETT  Intra-op Monitoring Plan: Standard ASA Monitors  Post Op Pain Control Plan: multimodal analgesia and IV/PO Opioids PRN  Induction:  IV  Airway Plan: Direct, Post-Induction  Informed Consent: Informed consent signed with the Patient and all parties understand the risks and agree with anesthesia plan.  All questions answered.   ASA Score: 3  Day of Surgery Review of History & Physical: H&P Update referred to the surgeon/provider.  Anesthesia Plan Notes:     Intubation:     Induction:  Intravenous    Intubated:  Postinduction    Mask Ventilation:  Easy with oral airway    Attempts:  1    Attempted By:  Student    Method of Intubation:  Direct    Blade:  Jade 3    Laryngeal View Grade: Grade IIA - cords partially seen      Difficult Airway Encountered?: No      Complications:  None    Airway Device:  Oral endotracheal tube    Airway Device Size:  7.5    Style/Cuff Inflation:  Cuffed (inflated to minimal occlusive pressure)    Tube secured:  2    Secured at:  The lips    Placement Verified By:  Capnometry    Complicating Factors:  None    Findings Post-Intubation:  BS equal bilateral       Ready For Surgery From Anesthesia Perspective.     .      Chemistry        Component Value Date/Time     06/20/2024 1042    K 4.9 06/20/2024 1042     06/20/2024 1042    CO2 23 06/20/2024 1042    BUN 19 06/20/2024 1042    CREATININE 1.9 (H) 06/20/2024 1042    GLU 90 06/20/2024 1042        Component Value Date/Time    CALCIUM 9.5 06/20/2024 1042    ALKPHOS 109 05/31/2024 2003    AST  20 05/31/2024 2003    ALT 17 05/31/2024 2003    BILITOT 0.3 05/31/2024 2003    ESTGFRAFRICA 51.1 (A) 06/02/2022 1246    EGFRNONAA 44.2 (A) 06/02/2022 1246        Lab Results   Component Value Date    WBC 6.91 05/31/2024    HGB 11.0 (L) 05/31/2024    HCT 34.6 (L) 05/31/2024    MCV 74 (L) 05/31/2024     05/31/2024       Normal sinus rhythm   T wave abnormality, consider anterior ischemia   Abnormal ECG   When compared with ECG of 24-JAN-2023 17:09,   No significant change was found   Confirmed by DARIA ASH MD ([id number]) on 4/25/2023 10:44:14 AM     ECHO: (10/27/23)   Summary         Left Ventricle: The left ventricle is normal in size. Normal wall thickness. There is concentric remodeling. Normal wall motion. There is normal systolic function with a visually estimated ejection fraction of 55 - 70%. Ejection fraction by visual approximation is 60%. There is indeterminate diastolic function.    Right Ventricle: Normal right ventricular cavity size. Wall thickness is normal. Right ventricle wall motion  is normal. Systolic function is mildly reduced.    Aortic Valve: There is a transcatheter valve replacement in the aortic position. Aortic valve area by VTI is 1.90 cm². Aortic valve peak velocity is 2.20 m/s. Mean gradient is 10 mmHg. The dimensionless index is 0.62. There is no significant regurgitation.    Tricuspid Valve: There is mild to moderate regurgitation.    Pulmonic Valve: There is mild regurgitation.    Pulmonary Artery: The estimated pulmonary artery systolic pressure is 32 mmHg.    IVC/SVC: Normal venous pressure at 3 mmHg.      Nuc Stress 7/9/20:    The study shows normal myocardial perfusion.    The perfusion scan is free of evidence from myocardial ischemia or injury.    Gated perfusion images showed an ejection fraction of 60% at rest and 55% post stress.    The EKG portion of this study is negative for ischemia.    The patient reported no chest pain during the stress test.    Arrhythmias  during stress: occasional PVCs.    Heart Cath 7/24/20:  Non-obstructive CAD.    Left Anterior Descending   Mid LAD lesion is 20% stenosed.      Right Coronary Artery   Mid RCA lesion is 20% stenosed.

## 2024-07-22 NOTE — TRANSFER OF CARE
"Anesthesia Transfer of Care Note    Patient: Abdullahi Urias    Procedure(s) Performed: Procedure(s) (LRB):  REVISION, PROCEDURE INVOLVING INFLATABLE PENILE PROSTHESIS (N/A)    Patient location: PACU    Anesthesia Type: general    Transport from OR: Transported from OR on room air with adequate spontaneous ventilation    Post pain: adequate analgesia    Post assessment: no apparent anesthetic complications and tolerated procedure well    Post vital signs: stable    Level of consciousness: responds to stimulation and sedated    Nausea/Vomiting: no nausea/vomiting    Complications: none    Transfer of care protocol was followedComments: Report given to PACU RN at bedside. Hand off tool used. RN given opportunity to ask questions or clarify concerns. No Concerns verbalized. RN was asked if ready to assume care of patient. RN verbally confirmed. Pt. left in stable condition. SV. Vital Signs Return to Near Baseline. No s/s of distress noted.       Last vitals: Visit Vitals  /66 (BP Location: Right arm, Patient Position: Sitting)   Pulse 73   Temp 36.3 °C (97.3 °F) (Temporal)   Resp 18   Ht 5' 9" (1.753 m)   Wt 109 kg (240 lb 4.8 oz)   SpO2 95%   BMI 35.49 kg/m²     "

## 2024-07-22 NOTE — OP NOTE
Date of Procedure: 07/22/2024     PREOPERATIVE DIAGNOSIS:  Erectile dysfunction, malfunctioning penile prosthesis.     POSTOPERATIVE DIAGNOSIS:  Erectile dysfunction, malfunctioning penile prosthesis.     PROCEDURES:  Removal and replacement of a 3 piece inflatable penile prosthesis.     SURGEON:  Nehemiah Pathak M.D. ASSISTANT SURGEON:  Rommel Rodriguez MD     Assistants: None     Specimen:  None     ANESTHESIA:  General endotracheal.     BLOOD LOSS:  Minimal     FINDINGS:  18 cm prosthesis with a 1 cm right rear-tip extender, 100 mL conceal reservoir, whole seen in the old left prosthesis     PROCEDURE IN DETAIL:  Patient was brought into the operating suite where he was placed under general anesthesia in the supine position.  Patient was then sterilely prepped and draped.  16 Ukrainian Dyer catheter was then inserted with clear yellow urine upon return, 10 mL of sterile water was placed in the balloon set nicely pulled the bladder neck.  Then using the assistance of the [place] retractor, incision was made at the penoscrotal junction.  Incision was made along the upper scrotum, dissection was continued with the electrocautery Bovie down to expose the tubing.  Once exposed we are able to remove the prosthetic pump.  We initially tract the left tubing down to the corporotomy.  Corporotomy was made in the prosthesis was removed.  We then focused our attention to the right tubing dissecting it down to the corpora, corporotomy was again made and the prosthesis was removed.  Of note rear tips were removed intact on both sides.  Attention was then focused to the reservoir, dissection was continued down until the pubic bone was encountered.  At this point due to significant scarring we could not dissect safely any further in the tubing was cut leaving the reservoir in place.  Meng was then used on the left side to measure, distally 9 cm and 9 cm proximally, 18 cm total.  At this point we determined we would use a 18 cm  implant.  Kaila was then used on the right side to measure distally 10 cm, proximal 9 cm, 19 cm total, therefore a 1 cm rear-tip extender was used on the right side.  The prosthesis was prepared.  Then with blunt dissection, we were able to expose the space of Retzius, and a plane and pocket was made for the reservoir.  We chose to use a conceal reservoir with 100 mL of sterile saline placed within the reservoir after it was appropriately placed within the pocket.  Then with the assistance of the Meng we were able to place our corporal implants through both the left and right corporotomies and up through the glans appropriately.  Test dilation was done demonstrating good expansion of the implants with no evidence of significant curvature and good position of the tips within the glans bilaterally.  The corporal incisions were then closed bilaterally with the stay sutures.  Using the boots and cuff we were able to attach the two edges of the tubing appropriately.  A test inflation was again performed and successful.  The pump was placed appropriately within the scrotum.  We then closed the deep layers with interrupted 2 0 Vicryl sutures.  Dermis was closed with a running 2 0 Vicryl suture.  Skin was closed with a horizontal mattress suture, using 3 0 chromic.  Antibiotic ointment and sterile dressings were applied, as was taping.  Dyer catheter will be left in place overnight.  We will deflate the prosthesis and remove the Dyer tomorrow, he will return in 5 weeks for activation.     COMPLICATIONS:  None

## 2024-07-22 NOTE — ANESTHESIA PROCEDURE NOTES
Intubation    Date/Time: 7/22/2024 1:34 PM    Performed by: Aidee Bennett CRNA  Authorized by: Elvis José MD    Intubation:     Induction:  Intravenous    Intubated:  Postinduction    Mask Ventilation:  Moderately difficult with oral airway (Due to beard)    Attempts:  2    Attempted By:  CRNA    Method of Intubation:  Direct    Blade:  Ramirez 2    Laryngeal View Grade: Grade III - only epiglottis visible      Attempted By (2nd Attempt):  CRNA    Method of Intubation (2nd Attempt):  Video laryngoscopy    Blade (2nd Attempt):  Rosa 4    Laryngeal View Grade (2nd Attempt): Grade I - full view of cords      Difficult Airway Encountered?: No      Complications:  None    Airway Device:  Oral endotracheal tube    Airway Device Size:  7.5    Style/Cuff Inflation:  Cuffed    Tube secured:  23    Secured at:  The lips    Placement Verified By:  Capnometry    Complicating Factors:  None    Findings Post-Intubation:  BS equal bilateral and atraumatic/condition of teeth unchanged

## 2024-07-22 NOTE — ANESTHESIA POSTPROCEDURE EVALUATION
Anesthesia Post Evaluation    Patient: Abdullahi Urias    Procedure(s) Performed: Procedure(s) (LRB):  REVISION, PROCEDURE INVOLVING INFLATABLE PENILE PROSTHESIS (N/A)    Final Anesthesia Type: general      Patient location during evaluation: PACU  Patient participation: Yes- Able to Participate  Level of consciousness: awake and alert and oriented  Post-procedure vital signs: reviewed and stable  Pain management: adequate  Airway patency: patent  CHEO mitigation strategies: Verification of full reversal of neuromuscular block  PONV status at discharge: No PONV  Anesthetic complications: no      Cardiovascular status: blood pressure returned to baseline and hemodynamically stable  Respiratory status: unassisted  Hydration status: euvolemic  Follow-up not needed.              Vitals Value Taken Time   /79 07/22/24 1827   Temp 36.1 °C (97 °F) 07/22/24 1827   Pulse 64 07/22/24 1827   Resp 17 07/22/24 1827   SpO2 96 % 07/22/24 1827         Event Time   Out of Recovery 18:05:00         Pain/Shoshana Score: Pain Rating Prior to Med Admin: 6 (7/22/2024  4:55 PM)  Shoshana Score: 8 (7/22/2024  5:30 PM)

## 2024-07-23 VITALS
HEIGHT: 69 IN | SYSTOLIC BLOOD PRESSURE: 139 MMHG | HEART RATE: 73 BPM | OXYGEN SATURATION: 96 % | BODY MASS INDEX: 35.59 KG/M2 | WEIGHT: 240.31 LBS | TEMPERATURE: 98 F | RESPIRATION RATE: 17 BRPM | DIASTOLIC BLOOD PRESSURE: 64 MMHG

## 2024-07-23 DIAGNOSIS — J45.40 MODERATE PERSISTENT ASTHMA WITHOUT COMPLICATION: Primary | ICD-10-CM

## 2024-07-23 PROCEDURE — 25000003 PHARM REV CODE 250: Mod: HCNC | Performed by: UROLOGY

## 2024-07-23 PROCEDURE — 99900035 HC TECH TIME PER 15 MIN (STAT): Mod: HCNC

## 2024-07-23 PROCEDURE — 94761 N-INVAS EAR/PLS OXIMETRY MLT: CPT | Mod: HCNC

## 2024-07-23 PROCEDURE — 63600175 PHARM REV CODE 636 W HCPCS: Mod: HCNC | Performed by: UROLOGY

## 2024-07-23 RX ORDER — ONDANSETRON HYDROCHLORIDE 2 MG/ML
4 INJECTION, SOLUTION INTRAVENOUS EVERY 6 HOURS PRN
Status: DISCONTINUED | OUTPATIENT
Start: 2024-07-23 | End: 2024-07-23 | Stop reason: HOSPADM

## 2024-07-23 RX ORDER — ALUMINUM HYDROXIDE, MAGNESIUM HYDROXIDE, AND SIMETHICONE 1200; 120; 1200 MG/30ML; MG/30ML; MG/30ML
30 SUSPENSION ORAL EVERY 6 HOURS PRN
Status: DISCONTINUED | OUTPATIENT
Start: 2024-07-23 | End: 2024-07-23 | Stop reason: HOSPADM

## 2024-07-23 RX ORDER — DOCUSATE SODIUM 100 MG/1
100 CAPSULE, LIQUID FILLED ORAL 2 TIMES DAILY
Status: DISCONTINUED | OUTPATIENT
Start: 2024-07-23 | End: 2024-07-23 | Stop reason: HOSPADM

## 2024-07-23 RX ORDER — PANTOPRAZOLE SODIUM 40 MG/1
40 TABLET, DELAYED RELEASE ORAL DAILY
Status: DISCONTINUED | OUTPATIENT
Start: 2024-07-24 | End: 2024-07-23 | Stop reason: HOSPADM

## 2024-07-23 RX ORDER — ALBUTEROL SULFATE 90 UG/1
2 INHALANT RESPIRATORY (INHALATION) EVERY 6 HOURS PRN
Status: DISCONTINUED | OUTPATIENT
Start: 2024-07-23 | End: 2024-07-23 | Stop reason: CLARIF

## 2024-07-23 RX ORDER — LOSARTAN POTASSIUM 50 MG/1
50 TABLET ORAL DAILY
Status: DISCONTINUED | OUTPATIENT
Start: 2024-07-24 | End: 2024-07-23 | Stop reason: HOSPADM

## 2024-07-23 RX ORDER — HYDROXYZINE HYDROCHLORIDE 25 MG/1
25 TABLET, FILM COATED ORAL 3 TIMES DAILY PRN
Status: DISCONTINUED | OUTPATIENT
Start: 2024-07-23 | End: 2024-07-23 | Stop reason: HOSPADM

## 2024-07-23 RX ORDER — ALLOPURINOL 100 MG/1
100 TABLET ORAL DAILY
Status: DISCONTINUED | OUTPATIENT
Start: 2024-07-24 | End: 2024-07-23 | Stop reason: HOSPADM

## 2024-07-23 RX ORDER — SERTRALINE HYDROCHLORIDE 50 MG/1
100 TABLET, FILM COATED ORAL DAILY
Status: DISCONTINUED | OUTPATIENT
Start: 2024-07-24 | End: 2024-07-23 | Stop reason: HOSPADM

## 2024-07-23 RX ORDER — SULFAMETHOXAZOLE AND TRIMETHOPRIM 800; 160 MG/1; MG/1
1 TABLET ORAL 2 TIMES DAILY
Qty: 14 TABLET | Refills: 0 | Status: SHIPPED | OUTPATIENT
Start: 2024-07-23 | End: 2024-07-30

## 2024-07-23 RX ORDER — CARVEDILOL 12.5 MG/1
12.5 TABLET ORAL 2 TIMES DAILY
Status: DISCONTINUED | OUTPATIENT
Start: 2024-07-23 | End: 2024-07-23 | Stop reason: HOSPADM

## 2024-07-23 RX ORDER — ALBUTEROL SULFATE 0.83 MG/ML
2.5 SOLUTION RESPIRATORY (INHALATION) EVERY 6 HOURS PRN
Status: DISCONTINUED | OUTPATIENT
Start: 2024-07-23 | End: 2024-07-23 | Stop reason: HOSPADM

## 2024-07-23 RX ORDER — OXYCODONE HYDROCHLORIDE 10 MG/1
10 TABLET ORAL EVERY 4 HOURS PRN
Qty: 30 TABLET | Refills: 0 | Status: SHIPPED | OUTPATIENT
Start: 2024-07-23 | End: 2024-08-07 | Stop reason: SDUPTHER

## 2024-07-23 RX ORDER — OXYCODONE HYDROCHLORIDE 5 MG/1
5 TABLET ORAL EVERY 4 HOURS PRN
Status: DISCONTINUED | OUTPATIENT
Start: 2024-07-23 | End: 2024-07-23 | Stop reason: HOSPADM

## 2024-07-23 RX ORDER — IPRATROPIUM BROMIDE 21 UG/1
2 SPRAY, METERED NASAL 2 TIMES DAILY
Status: DISCONTINUED | OUTPATIENT
Start: 2024-07-23 | End: 2024-07-23 | Stop reason: HOSPADM

## 2024-07-23 RX ORDER — LANOLIN ALCOHOL/MO/W.PET/CERES
1 CREAM (GRAM) TOPICAL DAILY
Status: DISCONTINUED | OUTPATIENT
Start: 2024-07-24 | End: 2024-07-23 | Stop reason: HOSPADM

## 2024-07-23 RX ORDER — HYDROMORPHONE HYDROCHLORIDE 2 MG/ML
0.2 INJECTION, SOLUTION INTRAMUSCULAR; INTRAVENOUS; SUBCUTANEOUS
Status: DISCONTINUED | OUTPATIENT
Start: 2024-07-23 | End: 2024-07-23 | Stop reason: HOSPADM

## 2024-07-23 RX ORDER — ACETAMINOPHEN 500 MG
1000 TABLET ORAL EVERY 8 HOURS
Qty: 30 TABLET | Refills: 0 | Status: SHIPPED | OUTPATIENT
Start: 2024-07-23 | End: 2024-07-28

## 2024-07-23 RX ADMIN — OXYCODONE 10 MG: 5 TABLET ORAL at 03:07

## 2024-07-23 RX ADMIN — SODIUM CHLORIDE, POTASSIUM CHLORIDE, SODIUM LACTATE AND CALCIUM CHLORIDE: 600; 310; 30; 20 INJECTION, SOLUTION INTRAVENOUS at 10:07

## 2024-07-23 RX ADMIN — OXYCODONE 10 MG: 5 TABLET ORAL at 10:07

## 2024-07-23 RX ADMIN — ACETAMINOPHEN 1000 MG: 500 TABLET ORAL at 06:07

## 2024-07-23 RX ADMIN — OXYCODONE 10 MG: 5 TABLET ORAL at 02:07

## 2024-07-23 RX ADMIN — ACETAMINOPHEN 1000 MG: 500 TABLET ORAL at 02:07

## 2024-07-23 RX ADMIN — OXYCODONE 10 MG: 5 TABLET ORAL at 06:07

## 2024-07-23 NOTE — PROGRESS NOTES
"  Subjective:   NAEO, pain controlled, tolerating regular diet, good UOP per esteves, +flatus, denies n/v     Objective:     Temp:  [97 °F (36.1 °C)-98.1 °F (36.7 °C)] 97.7 °F (36.5 °C)  Pulse:  [64-77] 71  Resp:  [12-20] 19  SpO2:  [91 %-99 %] 98 %  BP: (116-157)/(57-79) 126/57  I/O last 3 completed shifts:  In: 1200 [IV Piggyback:1200]  Out: 2350 [Urine:2300; Blood:50]      General: awake, alert, cooperative  Head: NC/AT  Ears: external ears normal  Eyes: sclera normal  Lungs: normal inspiration, NAD  Heart: well-perfused  Abdomen: Soft, NT, ND  : penoscrotal incision clean, IPP deflated on rounds, esteves draining CYU  Lymphatic: groin nodes negative  Back: No CVA TTP, no spine TTP  Skin: The skin is warm and dry  Ext: No c/c/e.  Neuro: grossly intact, AOx3      Labs:   No results for input(s): "NA", "K", "CL", "CO2", "BUN", "CREATININE", "LABGLOM", "GLUCOSE", "CALCIUM" in the last 168 hours.  No results for input(s): "WBC", "HGB", "HCT", "PLT" in the last 168 hours.    Assessment/Plan:   Abdullahi Urias is a 68 y.o. male with ED, s/p IPP revision, doing well.    - continue pain regimen  - continue regular diet  - d/c pending ROBF, ambulation, pain control, tolerating PO      Rommel Rodriguez MD    "

## 2024-07-23 NOTE — PLAN OF CARE
[facility] Med Surg 3  Discharge Final Note    Primary Care Provider: Reji Valentin MD    Expected Discharge Date: 7/23/2024    Final Discharge Note (most recent)       Final Note - 07/23/24 1506          Final Note    Assessment Type Final Discharge Note     Anticipated Discharge Disposition Home or Self Care     Hospital Resources/Appts/Education Provided Appointments scheduled and added to AVS        Post-Acute Status    Discharge Delays None known at this time                   Patient has no d/c needs at this time. Sw to follow up, as needed, for d/c planning purposes.     Sw scheduled PCP appt for 8/7/24. Sw added pt to wait list for earlier appt time.

## 2024-07-23 NOTE — PLAN OF CARE
[facility] Med Surg 3  Initial Discharge Assessment       Primary Care Provider: Reji Valentin MD    Admission Diagnosis: Prostate cancer [C61]  Erectile dysfunction [N52.9]    Admission Date: 7/22/2024  Expected Discharge Date: per Attending     Transition of Care Barriers: None    Payor: HU[employer] MANAGED MEDICARE / Plan: HU[employer] MEDICARE HMO / Product Type: Capitation /     Extended Emergency Contact Information  Primary Emergency Contact: Vinita Urias   [place]  Mobile Phone: [phone]  Relation: Spouse  Secondary Emergency Contact: Amanda Pickett  Mobile Phone: [phone]  Relation: Grandchild    Discharge Plan A: Home Health         [facility] - [place] - [address]  [address]  [place] [address]  Phone: [phone] Fax: [phone]    [facility] [place] - [address]  [address]  [place] [address]  Phone: [phone] Fax: [phone]    [id number]  [place] - [address] [facility] [address] [address]  [address] [place] [place] [address]  Phone: [phone] Fax: [phone]      Initial Assessment (most recent)       Adult Discharge Assessment - 07/23/24 1112          Discharge Assessment    Assessment Type Discharge Planning Assessment     Confirmed/corrected address, phone number and insurance Yes     Confirmed Demographics Correct on Facesheet     Source of Information patient     Communicated AURELIA with patient/caregiver Date not available/Unable to determine     Reason For Admission ED     People in Home spouse     Do you expect to return to your current living situation? Yes     Do you have help at home or someone to help you manage your care at home? Yes     Who are your caregiver(s) and their phone number(s)? [facility]; aid 3x/week for 3 hours a day     Prior to hospitilization cognitive status:  Alert/Oriented     Current cognitive status: Alert/Oriented     Walking or Climbing Stairs Difficulty no     Dressing/Bathing Difficulty yes     Dressing/Bathing bathing difficulty, assistance 1 person     Home Accessibility wheelchair accessible     Home Layout Able to live on 1st floor     Equipment Currently Used at Home walker, rolling;cane, straight;power chair     Readmission within 30 days? No     Patient currently being followed by outpatient case management? No     Do you currently have service(s) that help you manage your care at home? Yes     Name and Contact number of agency [facility]     Is the pt/caregiver preference to resume services with current agency Yes     Do you take prescription medications? Yes     Do you have prescription coverage? Yes     Coverage [employer] and [employer]     Do you have any problems affording any of your prescribed medications? No     Is the patient taking medications as prescribed? yes     Who is going to help you get home at discharge? family     How do you get to doctors appointments? car, drives self     Are you on dialysis? No     Do you take coumadin? No     Discharge Plan A Home Health     DME Needed Upon Discharge  none     Discharge Plan discussed with: Patient     Transition of Care Barriers None                   Anticipated DC dispo: home   Prior Level of Function: modified independent   People in home:  spouse     Comments:  CM met with patient at bedside to introduce role and discuss discharge planning. Spouse will be help at home and can provide transport at time of discharge. CM discharge needs depends on hospital progress. CM will continue following to assist with other needs.     Patient reported his VA insurance pays for an aide 3x/week for 3 hours a day through [facility].

## 2024-07-23 NOTE — PLAN OF CARE
Problem: Adult Inpatient Plan of Care  Goal: Plan of Care Review  Outcome: Met  Goal: Patient-Specific Goal (Individualized)  Outcome: Met  Goal: Absence of Hospital-Acquired Illness or Injury  Outcome: Met  Goal: Optimal Comfort and Wellbeing  Outcome: Met  Goal: Readiness for Transition of Care  Outcome: Met     Problem: Fall Injury Risk  Goal: Absence of Fall and Fall-Related Injury  Outcome: Met     Problem: Infection  Goal: Absence of Infection Signs and Symptoms  Outcome: Met     Problem: Wound  Goal: Optimal Coping  Outcome: Met  Goal: Optimal Functional Ability  Outcome: Met  Goal: Absence of Infection Signs and Symptoms  Outcome: Met  Goal: Improved Oral Intake  Outcome: Met  Goal: Optimal Pain Control and Function  Outcome: Met  Goal: Skin Health and Integrity  Outcome: Met  Goal: Optimal Wound Healing  Outcome: Met

## 2024-07-31 ENCOUNTER — OUTPATIENT CASE MANAGEMENT (OUTPATIENT)
Dept: ADMINISTRATIVE | Facility: OTHER | Age: 69
End: 2024-07-31
Payer: MEDICARE

## 2024-08-04 NOTE — DISCHARGE SUMMARY
Discharge Summary  Urology    Admit Date: 7/22/2024    Discharge Date: 08/04/2024    Discharge Attending Physician: Rommel Rodriguez    Diagnoses:  Active Hospital Problems    Diagnosis  POA    *ED (erectile dysfunction) [N52.9]  Yes      Resolved Hospital Problems   No resolved problems to display.       Hospital Course:  Patient underwent IPP removal and replacement.  He was recovered in the PACU and transferred to the nursing unit thereafter.  Pain was controlled with p.o. and IV medications.  He was given a regular diet.  Overnight, he continued to do well.  Postop day 1., his pain was well-controlled, Dyer catheter was removed, patient was felt safe for discharge.    Consults: None    Pertinent Diagnostic Studies and Procedures:  None    Discharged Condition: good    Disposition: Home or Self Care    Follow-up Plans:  Follow-up six weeks for wound check    Recent Labs:  No results found for this or any previous visit (from the past 336 hour(s)).  No results found for this or any previous visit (from the past 336 hour(s)).  Lab Results   Component Value Date    HGBA1C 5.8 (H) 01/22/2024       Discharge Medication List:     Medication List        START taking these medications      oxyCODONE 10 mg Tab immediate release tablet  Commonly known as: ROXICODONE  Take 1 tablet (10 mg total) by mouth every 4 (four) hours as needed for Pain.            CONTINUE taking these medications      * albuterol 2.5 mg /3 mL (0.083 %) nebulizer solution  Commonly known as: PROVENTIL  Take 3 mLs (2.5 mg total) by nebulization every 6 (six) hours as needed for Wheezing. Rescue     * albuterol 90 mcg/actuation inhaler  Commonly known as: PROVENTIL/VENTOLIN HFA  INHALE 2 PUFFS INTO THE LUNGS EVERY 6 HOURS AS NEEDED FOR COUGH     allopurinoL 100 MG tablet  Commonly known as: ZYLOPRIM     ALPRAZolam 1 MG tablet  Commonly known as: XANAX     aluminum & magnesium hydroxide-simethicone 400-400-40 mg/5 mL suspension  Commonly known  as: MYLANTA MAX STRENGTH     aspirin 81 MG EC tablet  Commonly known as: ECOTRIN  Take 1 tablet (81 mg total) by mouth once daily.     carvediloL 12.5 MG tablet  Commonly known as: COREG  Take 1 tablet (12.5 mg total) by mouth 2 (two) times daily.     clopidogreL 75 mg tablet  Commonly known as: PLAVIX  Take 1 tablet (75 mg total) by mouth once daily.     diclofenac sodium 1 % Gel  Commonly known as: VOLTAREN  Apply 2 g topically 2 (two) times daily. for 7 days     docusate sodium 100 MG capsule  Commonly known as: COLACE  Take 1 capsule (100 mg total) by mouth 2 (two) times daily.     esomeprazole 40 MG capsule  Commonly known as: NEXIUM     ferrous sulfate 325 mg (65 mg iron) Tab tablet  Commonly known as: FEOSOL  Take 1 tablet (325 mg total) by mouth daily with breakfast.     gabapentin 600 MG tablet  Commonly known as: NEURONTIN  Take 1 tablet (600 mg total) by mouth 3 (three) times daily.     hydrocortisone 2.5 % cream  Apply topically 2 (two) times daily as needed. Apply to affected areas of the face and neck.     hydrOXYzine HCL 25 MG tablet  Commonly known as: ATARAX  Take 1 tablet (25 mg total) by mouth 3 (three) times daily as needed for Itching.     ipratropium 21 mcg (0.03 %) nasal spray  Commonly known as: ATROVENT  2 sprays by Each Nostril route 2 (two) times daily.     losartan 50 MG tablet  Commonly known as: COZAAR  Take 1 tablet (50 mg total) by mouth once daily.     multivitamin per tablet  Commonly known as: THERAGRAN     oxyCODONE-acetaminophen 5-325 mg per tablet  Commonly known as: PERCOCET  Take 1 tablet by mouth every 4 (four) hours as needed for Pain.     sertraline 100 MG tablet  Commonly known as: ZOLOFT     TRELEGY ELLIPTA 200-62.5-25 mcg inhaler  Generic drug: fluticasone-umeclidin-vilanter  Inhale 1 puff into the lungs once daily.           * This list has 2 medication(s) that are the same as other medications prescribed for you. Read the directions carefully, and ask your doctor or  other care provider to review them with you.                ASK your doctor about these medications      acetaminophen 500 MG tablet  Commonly known as: TYLENOL  Take 2 tablets (1,000 mg total) by mouth every 8 (eight) hours. for 5 days  Ask about: Should I take this medication?     atorvastatin 80 MG tablet  Commonly known as: LIPITOR     sulfamethoxazole-trimethoprim 800-160mg 800-160 mg Tab  Commonly known as: BACTRIM DS  Take 1 tablet by mouth 2 (two) times daily. for 7 days  Ask about: Should I take this medication?               Where to Get Your Medications        These medications were sent to [facility] [address], [place] [address]      Hours: Mon-Fri, 8a-5:30p Phone: [phone]   acetaminophen 500 MG tablet  oxyCODONE 10 mg Tab immediate release tablet  sulfamethoxazole-trimethoprim 800-160mg 800-160 mg Tab           Discharge Procedure Orders   Ambulatory referral/consult to Outpatient Case Management   Referral Priority: Routine Referral Type: Consultation   Referral Reason: Specialty Services Required   Number of Visits Requested: 1     Diet Adult Regular     Notify your health care provider if you experience any of the following:  temperature >100.4     Notify your health care provider if you experience any of the following:  persistent nausea and vomiting or diarrhea     Notify your health care provider if you experience any of the following:  severe uncontrolled pain     Notify your health care provider if you experience any of the following:  redness, tenderness, or signs of infection (pain, swelling, redness, odor or green/yellow discharge around incision site)     Notify your health care provider if you experience any of the following:  difficulty breathing or increased cough     Notify your health care provider if you experience any of the following:  severe persistent headache     Notify your health care provider if you experience any of the following:   worsening rash     Notify your health care provider if you experience any of the following:  persistent dizziness, light-headedness, or visual disturbances     Notify your health care provider if you experience any of the following:  increased confusion or weakness     No dressing needed     Activity as tolerated

## 2024-08-06 ENCOUNTER — OUTPATIENT CASE MANAGEMENT (OUTPATIENT)
Dept: ADMINISTRATIVE | Facility: OTHER | Age: 69
End: 2024-08-06
Payer: MEDICARE

## 2024-08-07 ENCOUNTER — OFFICE VISIT (OUTPATIENT)
Dept: INTERNAL MEDICINE | Facility: CLINIC | Age: 69
End: 2024-08-07
Payer: MEDICARE

## 2024-08-07 VITALS
DIASTOLIC BLOOD PRESSURE: 70 MMHG | TEMPERATURE: 98 F | HEART RATE: 73 BPM | WEIGHT: 240.94 LBS | OXYGEN SATURATION: 96 % | BODY MASS INDEX: 35.69 KG/M2 | SYSTOLIC BLOOD PRESSURE: 116 MMHG | HEIGHT: 69 IN

## 2024-08-07 DIAGNOSIS — I10 PRIMARY HYPERTENSION: Chronic | ICD-10-CM

## 2024-08-07 DIAGNOSIS — N52.31 ERECTILE DYSFUNCTION AFTER RADICAL PROSTATECTOMY: Primary | ICD-10-CM

## 2024-08-07 DIAGNOSIS — Z09 HOSPITAL DISCHARGE FOLLOW-UP: ICD-10-CM

## 2024-08-07 PROCEDURE — 99999 PR PBB SHADOW E&M-EST. PATIENT-LVL V: CPT | Mod: PBBFAC,HCNC,, | Performed by: PHYSICIAN ASSISTANT

## 2024-08-07 RX ORDER — OXYCODONE HYDROCHLORIDE 10 MG/1
10 TABLET ORAL EVERY 6 HOURS PRN
Qty: 20 TABLET | Refills: 0 | Status: SHIPPED | OUTPATIENT
Start: 2024-08-07

## 2024-08-07 NOTE — H&P (VIEW-ONLY)
Subjective:      Patient ID: Abdullahi Urias is a 68 y.o. male.    Chief Complaint: Hospital Follow Up    HPI  Here today for a hospital follow up. 7/22/2024. Patient underwent IPP removal and replacement.   Doing well since being in the hospital. Denies any worsening swelling or pain. No drainage from surgical site. He has been experiencing moderate to severe pain especially with tactile pressure. Difficulty finding a comfortable position to sit. He is currently out of his pain medication. No improvement with tylenol. Requesting pain medication be called in for him today.   Denies any shortness of breath, cough, or fever since being in the hospital.   Urination is normal. No difficulty.  EGD/Colonoscopy completed. Normal. Repeat colonoscopy in 5 years. No need to repeat EGD (per GI instructions).     Transitional Care Note    Family and/or Caretaker present at visit?  No.  Diagnostic tests reviewed/disposition: I have reviewed all completed as well as pending diagnostic tests at the time of discharge.  Disease/illness education: complete  Home health/community services discussion/referrals: Patient does not have home health established from hospital visit.  They do not need home health.  If needed, we will set up home health for the patient.   Establishment or re-establishment of referral orders for community resources: No other necessary community resources.   Discussion with other health care providers: No discussion with other health care providers necessary.          Patient Active Problem List   Diagnosis    Aortic stenosis    ED (erectile dysfunction)    Asthmatic bronchitis    Hypertension    CAD (coronary artery disease)    BPH (benign prostatic hyperplasia)    Chronic back pain    Cardiomyopathy    Class 2 severe obesity due to excess calories with serious comorbidity and body mass index (BMI) of 38.0 to 38.9 in adult    Old peripheral tear of lateral meniscus of right knee    Arthritis of right knee     Chondromalacia, right knee    Penetrating foreign body of skin of right knee    Chronic gout    CHEO on CPAP    History of CVA in adulthood    Prostate cancer    S/P AVR (aortic valve replacement) biopresthetic miller 25 mm in 2014     EKG abnormalities    Stage 3b chronic kidney disease    History of hepatitis C    Pain in both lower extremities    GERD (gastroesophageal reflux disease)    Personal history of colonic polyps    Localized swelling of left foot    History of prostate cancer    PAD (peripheral artery disease)    Aorto-iliac atherosclerosis    Moderate persistent asthma without complication    Former tobacco use    Synovial cyst of lumbar spine    Lumbar radiculopathy, chronic    Lumbar stenosis with neurogenic claudication    Post-operative wound abscess    SOB (shortness of breath)    ABLA (acute blood loss anemia)    Secondary hyperparathyroidism of renal origin    Moderate episode of recurrent major depressive disorder    Chronic bilateral low back pain without sciatica    Decreased ROM of lumbar spine    Prediabetes    Cocaine dependence in remission    Encounter for preoperative pulmonary examination    Chronic diastolic heart failure    Seroma of musculoskeletal structure after musculoskeletal system procedure    Hematochezia    Chronic radiation proctitis    Chronic superficial gastritis without bleeding    Diverticulosis of colon         Current Outpatient Medications:     albuterol (PROVENTIL) 2.5 mg /3 mL (0.083 %) nebulizer solution, Take 3 mLs (2.5 mg total) by nebulization every 6 (six) hours as needed for Wheezing. Rescue, Disp: 180 mL, Rfl: 1    albuterol (PROVENTIL/VENTOLIN HFA) 90 mcg/actuation inhaler, INHALE 2 PUFFS INTO THE LUNGS EVERY 6 HOURS AS NEEDED FOR COUGH, Disp: 8.5 g, Rfl: 3    allopurinoL (ZYLOPRIM) 100 MG tablet, Take 100 mg by mouth once daily. Takes 0.5 tab, Disp: , Rfl:     ALPRAZolam (XANAX) 1 MG tablet, Take 1 mg by mouth Daily. EVERY OTHER DAY, Disp: , Rfl:      aluminum & magnesium hydroxide-simethicone (MYLANTA MAX STRENGTH) 400-400-40 mg/5 mL suspension, TAKE 15ML BY MOUTH FOUR TIMES A DAY AS NEEDED FOR STOMACH ACID, Disp: , Rfl:     aspirin (ECOTRIN) 81 MG EC tablet, Take 1 tablet (81 mg total) by mouth once daily., Disp: 90 tablet, Rfl: 3    atorvastatin (LIPITOR) 80 MG tablet, TAKE ONE-HALF TABLET BY MOUTH EVERY DAY FOR CHOLESTEROL (Patient not taking: Reported on 7/18/2024), Disp: , Rfl:     carvediloL (COREG) 12.5 MG tablet, Take 1 tablet (12.5 mg total) by mouth 2 (two) times daily., Disp: 60 tablet, Rfl: 11    clopidogreL (PLAVIX) 75 mg tablet, Take 1 tablet (75 mg total) by mouth once daily., Disp: 30 tablet, Rfl: 11    diclofenac sodium (VOLTAREN) 1 % Gel, Apply 2 g topically 2 (two) times daily. for 7 days, Disp: 100 g, Rfl: 0    docusate sodium (COLACE) 100 MG capsule, Take 1 capsule (100 mg total) by mouth 2 (two) times daily., Disp: 20 capsule, Rfl: 0    esomeprazole (NEXIUM) 40 MG capsule, Take 40 mg by mouth before breakfast., Disp: , Rfl:     ferrous sulfate (FEOSOL) 325 mg (65 mg iron) Tab tablet, Take 1 tablet (325 mg total) by mouth daily with breakfast., Disp: 90 tablet, Rfl: 1    fluticasone-umeclidin-vilanter (TRELEGY ELLIPTA) 200-62.5-25 mcg inhaler, Inhale 1 puff into the lungs once daily., Disp: 60 each, Rfl: 5    gabapentin (NEURONTIN) 600 MG tablet, Take 1 tablet (600 mg total) by mouth 3 (three) times daily., Disp: 90 tablet, Rfl: 11    hydrocortisone 2.5 % cream, Apply topically 2 (two) times daily as needed. Apply to affected areas of the face and neck., Disp: 30 g, Rfl: 2    hydrOXYzine HCL (ATARAX) 25 MG tablet, Take 1 tablet (25 mg total) by mouth 3 (three) times daily as needed for Itching., Disp: 30 tablet, Rfl: 0    ipratropium (ATROVENT) 21 mcg (0.03 %) nasal spray, 2 sprays by Each Nostril route 2 (two) times daily., Disp: 30 mL, Rfl: 0    losartan (COZAAR) 50 MG tablet, Take 1 tablet (50 mg total) by mouth once daily., Disp: 30  tablet, Rfl: 11    multivitamin (THERAGRAN) per tablet, Take 1 tablet by mouth once daily., Disp: , Rfl:     oxyCODONE (ROXICODONE) 10 mg Tab immediate release tablet, Take 1 tablet (10 mg total) by mouth every 6 (six) hours as needed for Pain., Disp: 20 tablet, Rfl: 0    oxyCODONE-acetaminophen (PERCOCET) 5-325 mg per tablet, Take 1 tablet by mouth every 4 (four) hours as needed for Pain., Disp: 30 tablet, Rfl: 0    sertraline (ZOLOFT) 100 MG tablet, TAKE TWO TABLETS BY MOUTH EVERY DAY FOR MENTAL HEALTH DOSE INCREASED TO 200MG/DAY, Disp: , Rfl:     Current Facility-Administered Medications:     aztreonam 2,000 mg in D5W 100 mL IVPB, 2,000 mg, Intravenous, On Call Procedure, Rommel Rodriguez MD    Facility-Administered Medications Ordered in Other Visits:     ondansetron injection 4 mg, 4 mg, Intravenous, Once PRN, Nehemiah Carmen MD    ondansetron injection 4 mg, 4 mg, Intravenous, Once PRN, Nehemiah Carmen MD    sodium chloride 0.9% flush 10 mL, 10 mL, Intravenous, PRN, Wilda Horne MD    Review of Systems   Constitutional:  Negative for activity change, appetite change, chills, diaphoresis, fatigue, fever and unexpected weight change.   HENT: Negative.  Negative for congestion, hearing loss, postnasal drip, rhinorrhea, sore throat, trouble swallowing and voice change.    Eyes: Negative.  Negative for visual disturbance.   Respiratory: Negative.  Negative for cough, choking, chest tightness and shortness of breath.    Cardiovascular:  Negative for chest pain, palpitations and leg swelling.   Gastrointestinal:  Negative for abdominal distention, abdominal pain, blood in stool, constipation, diarrhea, nausea and vomiting.   Endocrine: Negative for cold intolerance, heat intolerance, polydipsia and polyuria.   Genitourinary:  Positive for scrotal swelling and testicular pain. Negative for difficulty urinating and frequency.   Musculoskeletal:  Negative for arthralgias, back pain, gait problem,  "joint swelling and myalgias.   Skin:  Negative for color change, pallor, rash and wound.   Neurological:  Negative for dizziness, tremors, weakness, light-headedness, numbness and headaches.   Hematological:  Negative for adenopathy.   Psychiatric/Behavioral:  Negative for behavioral problems, confusion, self-injury, sleep disturbance and suicidal ideas. The patient is not nervous/anxious.      Objective:   /70 (BP Location: Left arm, Patient Position: Sitting, BP Method: Large (Manual))   Pulse 73   Temp 98.1 °F (36.7 °C) (Tympanic)   Ht 5' 9" (1.753 m)   Wt 109.3 kg (240 lb 15.4 oz)   SpO2 96%   BMI 35.58 kg/m²     Physical Exam  Vitals and nursing note reviewed.   Constitutional:       General: He is not in acute distress.     Appearance: Normal appearance. He is well-developed. He is not ill-appearing, toxic-appearing or diaphoretic.   HENT:      Head: Normocephalic and atraumatic.   Cardiovascular:      Rate and Rhythm: Normal rate and regular rhythm.      Heart sounds: Normal heart sounds. No murmur heard.     No friction rub. No gallop.   Pulmonary:      Effort: Pulmonary effort is normal. No respiratory distress.      Breath sounds: Normal breath sounds. No wheezing or rales.   Genitourinary:      Skin:     General: Skin is warm.      Findings: No rash.   Neurological:      Mental Status: He is alert and oriented to person, place, and time.   Psychiatric:         Mood and Affect: Mood normal.         Behavior: Behavior normal.         Thought Content: Thought content normal.         Judgment: Judgment normal.           Assessment:     1. Erectile dysfunction after radical prostatectomy    2. Hospital discharge follow-up    3. Primary hypertension      Plan:   Erectile dysfunction after radical prostatectomy    Hospital discharge follow-up    Primary hypertension    Other orders  -     oxyCODONE (ROXICODONE) 10 mg Tab immediate release tablet; Take 1 tablet (10 mg total) by mouth every 6 (six) " hours as needed for Pain.  Dispense: 20 tablet; Refill: 0    -surgical wounds healing well.   -spoke with DR. Rodriguez today regarding the patients pain. Pain is appropriate for his surgery. Pain medications called in for the patient by the surgeon. Appropriate per .     Follow up if symptoms worsen or fail to improve.

## 2024-08-14 ENCOUNTER — OFFICE VISIT (OUTPATIENT)
Dept: PAIN MEDICINE | Facility: CLINIC | Age: 69
End: 2024-08-14
Payer: MEDICARE

## 2024-08-14 ENCOUNTER — TELEPHONE (OUTPATIENT)
Dept: PAIN MEDICINE | Facility: CLINIC | Age: 69
End: 2024-08-14
Payer: MEDICARE

## 2024-08-14 VITALS
SYSTOLIC BLOOD PRESSURE: 120 MMHG | HEIGHT: 69 IN | WEIGHT: 237.75 LBS | HEART RATE: 87 BPM | BODY MASS INDEX: 35.21 KG/M2 | DIASTOLIC BLOOD PRESSURE: 74 MMHG

## 2024-08-14 DIAGNOSIS — M96.842 POSTOPERATIVE SEROMA OF MUSCULOSKELETAL STRUCTURE AFTER MUSCULOSKELETAL PROCEDURE: Primary | ICD-10-CM

## 2024-08-14 DIAGNOSIS — M54.12 CERVICAL RADICULOPATHY: ICD-10-CM

## 2024-08-14 DIAGNOSIS — M50.30 DDD (DEGENERATIVE DISC DISEASE), CERVICAL: ICD-10-CM

## 2024-08-14 DIAGNOSIS — M54.2 CERVICALGIA: ICD-10-CM

## 2024-08-14 DIAGNOSIS — M96.1 POSTLAMINECTOMY SYNDROME OF LUMBAR REGION: ICD-10-CM

## 2024-08-14 DIAGNOSIS — M54.16 LUMBAR RADICULOPATHY, CHRONIC: ICD-10-CM

## 2024-08-14 PROCEDURE — 1125F AMNT PAIN NOTED PAIN PRSNT: CPT | Mod: HCNC,CPTII,S$GLB, | Performed by: PHYSICAL MEDICINE & REHABILITATION

## 2024-08-14 PROCEDURE — 1101F PT FALLS ASSESS-DOCD LE1/YR: CPT | Mod: HCNC,CPTII,S$GLB, | Performed by: PHYSICAL MEDICINE & REHABILITATION

## 2024-08-14 PROCEDURE — 3078F DIAST BP <80 MM HG: CPT | Mod: HCNC,CPTII,S$GLB, | Performed by: PHYSICAL MEDICINE & REHABILITATION

## 2024-08-14 PROCEDURE — 3008F BODY MASS INDEX DOCD: CPT | Mod: HCNC,CPTII,S$GLB, | Performed by: PHYSICAL MEDICINE & REHABILITATION

## 2024-08-14 PROCEDURE — G2211 COMPLEX E/M VISIT ADD ON: HCPCS | Mod: HCNC,S$GLB,, | Performed by: PHYSICAL MEDICINE & REHABILITATION

## 2024-08-14 PROCEDURE — 1159F MED LIST DOCD IN RCRD: CPT | Mod: HCNC,CPTII,S$GLB, | Performed by: PHYSICAL MEDICINE & REHABILITATION

## 2024-08-14 PROCEDURE — 3288F FALL RISK ASSESSMENT DOCD: CPT | Mod: HCNC,CPTII,S$GLB, | Performed by: PHYSICAL MEDICINE & REHABILITATION

## 2024-08-14 PROCEDURE — 3066F NEPHROPATHY DOC TX: CPT | Mod: HCNC,CPTII,S$GLB, | Performed by: PHYSICAL MEDICINE & REHABILITATION

## 2024-08-14 PROCEDURE — 3044F HG A1C LEVEL LT 7.0%: CPT | Mod: HCNC,CPTII,S$GLB, | Performed by: PHYSICAL MEDICINE & REHABILITATION

## 2024-08-14 PROCEDURE — 3074F SYST BP LT 130 MM HG: CPT | Mod: HCNC,CPTII,S$GLB, | Performed by: PHYSICAL MEDICINE & REHABILITATION

## 2024-08-14 PROCEDURE — 99214 OFFICE O/P EST MOD 30 MIN: CPT | Mod: HCNC,S$GLB,, | Performed by: PHYSICAL MEDICINE & REHABILITATION

## 2024-08-14 PROCEDURE — 99999 PR PBB SHADOW E&M-EST. PATIENT-LVL IV: CPT | Mod: PBBFAC,HCNC,, | Performed by: PHYSICAL MEDICINE & REHABILITATION

## 2024-08-14 RX ORDER — HYDROCODONE BITARTRATE AND ACETAMINOPHEN 7.5; 325 MG/1; MG/1
1 TABLET ORAL EVERY 8 HOURS PRN
Qty: 21 TABLET | Refills: 0 | Status: SHIPPED | OUTPATIENT
Start: 2024-08-14 | End: 2024-09-13

## 2024-08-14 RX ORDER — TIZANIDINE 4 MG/1
4 TABLET ORAL EVERY 8 HOURS PRN
Qty: 90 TABLET | Refills: 1 | Status: SHIPPED | OUTPATIENT
Start: 2024-08-14 | End: 2024-11-12

## 2024-08-14 NOTE — PROGRESS NOTES
Established Patient Chronic Pain Note (Follow up visit)    Chief Complaint:   Chief Complaint   Patient presents with    Low-back Pain    Leg Pain     Right leg         SUBJECTIVE:    Interval History (8/14/2024):    Abdullahi Urias is a 68 y.o. male presents today for follow-up lower back and right leg pain.  Current pain intensity is 7/10.  He continues to utilize tizanidine and gabapentin without any difficulty.  He has had piriformis trigger point injections in the past that were beneficial.  He recently saw Neurosurgery who was concerned about the seroma and has requested IR assistance for CT-guided biopsy/aspiration of the seroma near the surgical site.  This is yet to be completed unfortunately.    Patient is interested in repeat cervical PIEDAD as this was extremely beneficial for the past 5 months.      Patient denies night fever/night sweats, urinary incontinence, bowel incontinence, significant weight loss and significant motor weakness.   Patient denies any other complaints or concerns at this time.      Pain Disability Index Review:      8/14/2024     9:14 AM 4/9/2024    10:26 AM 2/26/2024     9:47 AM   Last 3 PDI Scores   Pain Disability Index (PDI) 49 28 25     Interval HPI 04/09/2024:  Patient Abdullahi Urias presents today for follow-up visit.  Patient was last seen on  3/12/2024 C7/T1 IL PIEDAD with 95% relief.  Neck pain is doing much better and he is no longer having any numbness or tingling in the neck.  He rates his pain today a 4/10.  He does still have some lower back pain which does not radiate.  He continues to follow up with  following his laminectomy.    Interval History (2/26/2024):  Patient Abdullahi Urias presents today for follow-up visit.  Patient is seen seen today for review of his cervical MRI.  He has neck pain which radiates down into the bilateral shoulders with a tingling sensation.  He rates his pain today a 5/10 but states it will go up to a 9/10 especially with flexion of the  neck.  He is currently taking gabapentin 600 mg twice a day.  He does take oxycodone sparingly if needed and is requesting a refill.    He also has chronic lumbar radiculopathy and has been seeing Dr. Wilkinson, he reports this is stable currently.    Interval History (3/6/2023):  Patient presents today for follow-up visit.  Patient was last seen on 1/9/23.  Patient reports pain as 7/10 today.  Patients is here today discuss neck pain with radicular symptoms.   Has tingling in R shoulder>L shoulder and will radiate up to his neck but other times it will radiate up to face or down arm. This has been ongoing for one month now. Although he does not notice pain radiating down his arms, he has discomfort in both sides of his neck.   He will c/p lower back pain if he is standing or bending for a long period of time.     Since his last office visit, he underwent a TLIF at L4-S1 with Dr. Valencia on 5/17/23 followed by a wound exploration on 7/5/23. He is doing well at this time (in regards to his lower back).      Interval History (1/9/2023): Abdullahi Urias presents today for follow-up visit.  he underwent right L4/5 +L5/S1 TF PIEDAD on 10/20/22.  The patient reports that he is/was better following the procedure.  he reports 40% pain relief.  The changes lasted for a few weeks before the pain insidiously returned.  Patient reports pain as 9/10 today. Also has history of vasculogenic claudication. Recently went on a cruise, needed a wheelchair to get on the ship. Continues to report low back pain with radiation into his right lower extremity along L4/5-S1 distribution with associated numbness in the right foot.      Interval History (10/4/2022): Abdullahi Urias presents today for follow-up visit.  he underwent C7/T1 IL PIEDAD on 06/21/2022.  The patient reports that he is/was better following the procedure.  he reports 60% pain relief.  The changes lasted 6 weeks before neck stiffness and arm numbness returned. Patient reports pain as  8/10 today.    Today he reports his primary complaint is low back pain with radiation into right leg into the posterior and lateral aspect along L4-S1 distribution. Reports back pain is worse than neck pain at this time. Also has history of vasculogenic claudication. Patient reports pain is causing him to be depressed and anxious. Pain interferes with daily activities. [facility]ator [facility]. Recently went on a cruise, needed to use a wheelchair due to weakness and pain.    Interval HPI  Abdullahi Urias is a 68 y.o. male who presents to the clinic for a follow-up.  He reports that his back pain is much improved ever since undergoing bilateral piriformis trigger point injections.  He wants to focus more on his neck and upper extremity pain currently.  Since the last visit, Abdullahi Urias states the pain has been worsening. Current pain intensity is 8/10.    Interval HPI 01/27/2022:  Abdullahi Urias is a 66 y.o. male who presents to the clinic for the evaluation of lower back pain.  He is self referred.  He has past medical history of CVA, substance abuse, asthma, CHF, hypertension, CAD, cardiomyopathy, renal insufficiency, CKD stage 3, H/O hepatitis-C, H/O prostate cancer, multiple other medical comorbidities as listed in his chart.  The pain started several years ago and symptoms have been worsening.The pain is located in the lumbosacral area and radiates to the bilateral lower extremities extending posteriorly to the bilateral feet.  Of note, patient recently had cardiovascular studies done on his lower extremities which did demonstrate vasculogenic claudication.  The pain is described as Aching, burning, tingling and is rated as 8/10. The pain is rated with a score of  6/10 on the BEST day and a score of 10/10 on the WORST day.  Symptoms interfere with daily activity. The pain is exacerbated by activities.  The pain is mitigated by medications.        Non-Pharmacologic Treatments:  Physical  Therapy/Home Exercise: yes  Ice/Heat:yes  TENS: yes  Acupuncture: no  Massage: yes  Chiropractic: no    Other: Surgery-   5/17/23: TLIF L4-S1 with Dr. Valencia  7/5/23: Wound exploration with Dr. Valencia        Pain Medications:  - Opioids: Norco, Tylenol 3  - Adjuvant Medications: Ambien, alprazolam, Flexeril, Voltaren gel, gabapentin, lidocaine patch, Zoloft  - Anti-Coagulants: Aspirin, Pletal      Pain Procedures:   -previous lumbar PIEDAD  -previous lumbar MBB/RFA  -previous lumbar laminectomy in 2001 at L5-S1  -01/27/2022:  Bilateral piriformis trigger point injections, significant relief that is still ongoing  -right L4/5 +L5/S1 TF PIEDAD on 10/20/22 with 40% relief   3/12/2024 C7/T1 IL PIEDAD 95% relief     Imaging (Reviewed on 8/14/2024):    MRI lumbar spine 05/31/2024:  Surgical change with hardware and associated artifact L4-S1 posterior approach.  There is a peripherally enhancing fluid loculation at the dorsal postsurgical elements measuring approximately 4 cm X 7 cm epicenter L5 level sterility uncertain possible abscess.     No overt canal compromise.     No overt acute osseous finding     Conus medullaris unremarkable       2/23/2024 cervical MRI  FINDINGS:  The cervical cord reveals normal signal and morphology.     There is straightening/reversal of the normal cervical lordosis.  There is mild to moderate multilevel degenerative disc disease as detailed below.     C1-2: Moderate C1-2 arthrosis.     C2-C3: Mild disc bulge.  Moderate to severe right and mild left facet arthrosis with moderate right foraminal stenosis.     C3-C4: Mild disc bulge.  Bilateral uncovertebral joint spurring with mild foraminal stenosis.     C4-C5: Mild disc bulge.  Bilateral uncovertebral joint spurring with moderate right and mild left foraminal stenosis.     C5-C6: Moderate disc degeneration with disc bulging osteophyte with ventral sac impression.  Mild central stenosis.  Uncovertebral joint spurring with moderate to severe right  foraminal stenosis.     C6-C7: Moderate degenerative disc disease with disc bulging osteophyte with mild ventral sac impression.  Uncovertebral joint spurring with mild foraminal stenosis.     C7-T1: Moderate disc degeneration with disc bulging osteophyte eccentric to the left with mild left lateral recess stenosis.  Uncovertebral joint spurring with moderate left and mild right foraminal stenosis.     Impression:     No acute abnormality.  Reversal of the normal cervical lordosis.     C5-6, C6-7 and C7-T1 degenerative disc disease as above with C5-6 central canal stenosis.  Foraminal stenosis as above.    MRI lumbar spine 01/12/2021:  Visualized distal cord demonstrates normal signal.     No marrow replacement process.  No fracture.  No listhesis.     There are degenerative changes of the lower lumbar spine with mild disc space narrowing, most severe at L5-S1 with marginal spurring with facet arthropathy     L1-L2: There is posterior disc bulge with indentation of the thecal sac.  No spinal canal or neural foraminal encroachment.     L2-L3: Mild facet arthropathy.  Mild posterior disc bulge.  No spinal canal or neural foraminal encroachment.     L3-L4: Bilateral ligamentum flavum and facet arthropathy.  Mild posterior disc bulge with indentation of thecal sac.  No spinal canal or neural foraminal encroachment.     L4-L5: Ligamentum flavum and facet arthropathy with broad-based disc bulge.  No significant spinal canal stenosis.  There is crowding of the lateral recesses with narrowing along the inferior right greater than left neural foramen.     L5-S1: Posterior disc osteophyte complex with facet arthropathy.  Appearance of possible prior right hemilaminectomy changes..  There is severe narrowing along the right neural foramen  with mild to moderate narrowing along the left neural foramen.  No significant spinal canal stenosis        CT cervical spine 12/01/2020:  Vertebral body heights maintained.  2 mm  anterolisthesis of C3 on C4.  Disc height loss and osteophyte changes at C6-7 and C7-T1.  Multilevel facet degenerative findings most prevalent at the right C2-3 and left C3-4 levels.     Neck soft tissues are unremarkable.     C2-3: No CT evidence of spinal canal stenosis.  Mild right neural foraminal narrowing.     C3-4: No CT evidence of significant spinal canal stenosis.  Left greater than right facet and uncovertebral degenerative changes results in mild right and severe left neural foraminal narrowing.     C4-5: Posterior disc osteophyte complex present with mild spinal canal stenosis.  Mild to moderate right neural foraminal narrowing secondary to facet and uncovertebral degenerative findings.     C5-6: Posterior disc osteophyte complex present asymmetric to the right with mild spinal canal stenosis.  Severe right neural foraminal narrowing secondary to facet and uncovertebral degenerative findings.     C6-7: Posterior disc osteophyte complex present causing at least mild spinal canal stenosis.  Left greater than right facet and uncovertebral degenerative findings with mild-to-moderate right and moderate to severe left neural foraminal narrowing.     C7-T1: Posterior disc osteophyte complex with spinal canal stenosis suspected.  Mild left neural foraminal narrowing secondary to uncovertebral degenerative findings.        PMHx,PSHx, Social history, and Family history:  I have reviewed the patient's medical, surgical, social, and family history in detail and updated the computerized patient record.      Review of patient's allergies indicates:  No Known Allergies      Current Outpatient Medications   Medication Sig    albuterol (PROVENTIL) 2.5 mg /3 mL (0.083 %) nebulizer solution Take 3 mLs (2.5 mg total) by nebulization every 6 (six) hours as needed for Wheezing. Rescue    albuterol (PROVENTIL/VENTOLIN HFA) 90 mcg/actuation inhaler INHALE 2 PUFFS INTO THE LUNGS EVERY 6 HOURS AS NEEDED FOR COUGH    allopurinoL  (ZYLOPRIM) 100 MG tablet Take 100 mg by mouth once daily. Takes 0.5 tab    ALPRAZolam (XANAX) 1 MG tablet Take 1 mg by mouth Daily. EVERY OTHER DAY    aluminum & magnesium hydroxide-simethicone (MYLANTA MAX STRENGTH) 400-400-40 mg/5 mL suspension TAKE 15ML BY MOUTH FOUR TIMES A DAY AS NEEDED FOR STOMACH ACID    aspirin (ECOTRIN) 81 MG EC tablet Take 1 tablet (81 mg total) by mouth once daily.    atorvastatin (LIPITOR) 80 MG tablet     carvediloL (COREG) 12.5 MG tablet Take 1 tablet (12.5 mg total) by mouth 2 (two) times daily.    clopidogreL (PLAVIX) 75 mg tablet Take 1 tablet (75 mg total) by mouth once daily.    docusate sodium (COLACE) 100 MG capsule Take 1 capsule (100 mg total) by mouth 2 (two) times daily.    esomeprazole (NEXIUM) 40 MG capsule Take 40 mg by mouth before breakfast.    ferrous sulfate (FEOSOL) 325 mg (65 mg iron) Tab tablet Take 1 tablet (325 mg total) by mouth daily with breakfast.    fluticasone-umeclidin-vilanter (TRELEGY ELLIPTA) 200-62.5-25 mcg inhaler Inhale 1 puff into the lungs once daily.    hydrocortisone 2.5 % cream Apply topically 2 (two) times daily as needed. Apply to affected areas of the face and neck.    hydrOXYzine HCL (ATARAX) 25 MG tablet Take 1 tablet (25 mg total) by mouth 3 (three) times daily as needed for Itching.    ipratropium (ATROVENT) 21 mcg (0.03 %) nasal spray 2 sprays by Each Nostril route 2 (two) times daily.    losartan (COZAAR) 50 MG tablet Take 1 tablet (50 mg total) by mouth once daily.    multivitamin (THERAGRAN) per tablet Take 1 tablet by mouth once daily.    oxyCODONE (ROXICODONE) 10 mg Tab immediate release tablet Take 1 tablet (10 mg total) by mouth every 6 (six) hours as needed for Pain.    oxyCODONE-acetaminophen (PERCOCET) 5-325 mg per tablet Take 1 tablet by mouth every 4 (four) hours as needed for Pain.    sertraline (ZOLOFT) 100 MG tablet TAKE TWO TABLETS BY MOUTH EVERY DAY FOR MENTAL HEALTH DOSE INCREASED TO 200MG/DAY    diclofenac sodium  "(VOLTAREN) 1 % Gel Apply 2 g topically 2 (two) times daily. for 7 days    gabapentin (NEURONTIN) 600 MG tablet Take 1 tablet (600 mg total) by mouth 3 (three) times daily.    HYDROcodone-acetaminophen (NORCO) 7.5-325 mg per tablet Take 1 tablet by mouth every 8 (eight) hours as needed for Pain.    tiZANidine (ZANAFLEX) 4 MG tablet Take 1 tablet (4 mg total) by mouth every 8 (eight) hours as needed (pain).     Current Facility-Administered Medications   Medication    aztreonam 2,000 mg in D5W 100 mL IVPB     Facility-Administered Medications Ordered in Other Visits   Medication    ondansetron injection 4 mg    ondansetron injection 4 mg    sodium chloride 0.9% flush 10 mL         REVIEW OF SYSTEMS:    GENERAL:  No weight loss, malaise or fevers.  HEENT:   No recent changes in vision or hearing  NECK:  Negative for lumps, no difficulty with swallowing.  RESPIRATORY:  Negative for cough, wheezing or shortness of breath, patient denies any recent URI.  CARDIOVASCULAR:  Negative for chest pain, leg swelling or palpitations.  GI:  Negative for abdominal discomfort, blood in stools or black stools or change in bowel habits.  MUSCULOSKELETAL:  See HPI.  SKIN:  Negative for lesions, rash, and itching.  PSYCH:  No mood disorder or recent psychosocial stressors.  Patients sleep is not disturbed secondary to pain.  HEMATOLOGY/LYMPHOLOGY:  Negative for prolonged bleeding, bruising easily or swollen nodes.  Patient is currently taking anti-coagulants - ASA and Pletal  NEURO:   No history of headaches, syncope, paralysis, seizures or tremors.  All other reviewed and negative other than HPI.       OBJECTIVE:    /74   Pulse 87   Ht 5' 9" (1.753 m)   Wt 107.9 kg (237 lb 12.3 oz)   BMI 35.11 kg/m²     PHYSICAL EXAMINATION:    GENERAL: Well appearing, in no acute distress, alert and oriented x3.  PSYCH:  Mood and affect appropriate.  SKIN: Skin color, texture, turgor normal, no rashes or lesions.  HEAD/FACE:  Normocephalic, " atraumatic. Cranial nerves grossly intact.   NECK:  Tenderness palpation over the bilateral cervical paraspinous muscles.  Spurling's positive  bilaterally.  Limited range of motion secondary to pain in all planes.    PULM: No evidence of respiratory difficulty, symmetric chest rise.  GI:  Soft and non-tender.  BACK:  Surgical scarring evident.  Incision(s) CDI. No pain / TTP lumbar spine    EXTREMITIES: Peripheral joint ROM is full and pain free without obvious instability or laxity in all four extremities. No deformities, edema, or skin discoloration. Good capillary refill.    NEURO: Bilateral upper and lower extremity coordination and muscle stretch reflexes are physiologic and symmetric.  Plantar response are downgoing. No clonus.  No loss of sensation is noted.  GAIT:  Slow, antalgic.  General: alert and oriented, in no apparent distress  Gait: normal gait.  Skin: no rashes, no discoloration, no obvious lesions  HEENT: normocephalic, atraumatic. Pupils equal and round.  Cardiovascular: no significant peripheral edema present.  Respiratory: without use of accessory muscles of respiration.        Neuro - Upper Extremities:  - BUE Strength:R/L: D: 5/5; B: 5/5; T: 5/5; WF: 5/5; WE: 5/5; IO: 5/5  - Extremity Reflexes: Brisk and symmetric throughout  - Sensory: Sensation to light touch intact bilaterally    Psych:  Mood and affect is appropriate      LABS:  Lab Results   Component Value Date    WBC 6.91 05/31/2024    HGB 11.0 (L) 05/31/2024    HCT 34.6 (L) 05/31/2024    MCV 74 (L) 05/31/2024     05/31/2024       CMP  Sodium   Date Value Ref Range Status   06/20/2024 140 136 - 145 mmol/L Final     Potassium   Date Value Ref Range Status   06/20/2024 4.9 3.5 - 5.1 mmol/L Final     Chloride   Date Value Ref Range Status   06/20/2024 108 95 - 110 mmol/L Final     CO2   Date Value Ref Range Status   06/20/2024 23 23 - 29 mmol/L Final     Glucose   Date Value Ref Range Status   06/20/2024 90 70 - 110 mg/dL Final      BUN   Date Value Ref Range Status   06/20/2024 19 8 - 23 mg/dL Final     Creatinine   Date Value Ref Range Status   06/20/2024 1.9 (H) 0.5 - 1.4 mg/dL Final     Calcium   Date Value Ref Range Status   06/20/2024 9.5 8.7 - 10.5 mg/dL Final     Total Protein   Date Value Ref Range Status   05/31/2024 7.5 6.0 - 8.4 g/dL Final     Albumin   Date Value Ref Range Status   06/20/2024 3.7 3.5 - 5.2 g/dL Final     Total Bilirubin   Date Value Ref Range Status   05/31/2024 0.3 0.1 - 1.0 mg/dL Final     Comment:     For infants and newborns, interpretation of results should be based  on gestational age, weight and in agreement with clinical  observations.    Premature Infant recommended reference ranges:  Up to 24 hours.............<8.0 mg/dL  Up to 48 hours............<12.0 mg/dL  3-5 days..................<15.0 mg/dL  6-29 days.................<15.0 mg/dL       Alkaline Phosphatase   Date Value Ref Range Status   05/31/2024 109 55 - 135 U/L Final     AST   Date Value Ref Range Status   05/31/2024 20 10 - 40 U/L Final     ALT   Date Value Ref Range Status   05/31/2024 17 10 - 44 U/L Final     Anion Gap   Date Value Ref Range Status   06/20/2024 9 8 - 16 mmol/L Final     eGFR if    Date Value Ref Range Status   06/02/2022 51.1 (A) >60 mL/min/1.73 m^2 Final     eGFR if non    Date Value Ref Range Status   06/02/2022 44.2 (A) >60 mL/min/1.73 m^2 Final     Comment:     Calculation used to obtain the estimated glomerular filtration  rate (eGFR) is the CKD-EPI equation.          Lab Results   Component Value Date    HGBA1C 5.8 (H) 01/22/2024             ASSESSMENT: 68 y.o. year old male with neck pain, consistent with     1. Postoperative seroma of musculoskeletal structure after musculoskeletal procedure  Ambulatory referral/consult to Interventional RAD      2. Cervical radiculopathy        3. Postlaminectomy syndrome of lumbar region        4. Cervicalgia        5. DDD (degenerative disc  disease), cervical        6. Lumbar radiculopathy, chronic                    PLAN:   - Interventions:  Repeat Cervical PIEDAD once he has the lumbar seroma further investigated with IR    S/p C7/T1 IL PIEDAD 95% relief 3/12/2024  S/p right L4/5 +L5/S1 TF PIEDAD on 10/20/22 with 40% relief  S/p C7-T1 IL PIEDAD for diagnostic and therapeutic purposes with 60% relief    - Anticoagulation use: yes Aspirin and Plavix.       - Medications: I have stressed the importance of physical activity and a home exercise plan to help with pain and improve health. Patient can continue with medications for now since they are providing benefits, using them appropriately, and without side effects.       Continue Tizanidine 4 mg BID, refill today  Continue Gabapentin [facility] B[facility]     report:  Reviewed and consistent with medication use as prescribed.      - Therapy:  Advised patient continue with home exercises as tolerated     - Imaging: Reviewed MRI cervical and lumbar spine     - Consults/Referrals:  Continue follow up with Dr. Valencia and message in the IR team have this patient urgently placed on the scheduled for CT-guided biopsy/aspiration of seroma site      - Follow up visit:  As needed, will schedule cervical PIEDAD in the near future       - Counseled patient regarding the importance of activity modification and physical therapy     - This condition does not require this patient to take time off of work, and the primary goal of our Pain Management services is to improve the patient's functional capacity.     - Patient Questions: Answered all of the patient's questions regarding diagnosis, therapy, and treatment       The above plan and management options were discussed at length with patient. Patient is in agreement with the above and verbalized understanding.      Visit today included increased complexity associated with the care of the episodic problem of chronic pain which was addressed and continue to manage the longitudinal  care of the patient due to the serious and/or complex managed problem(s) listed above.      Nehemiah Carmen MD  Interventional Pain Management  [facility]

## 2024-08-14 NOTE — Clinical Note
Consult placed for biopsy of fluid collection near lumbar surgical site with CT guidance to send for culture and analysis

## 2024-08-14 NOTE — TELEPHONE ENCOUNTER
----- Message from Nehemiah Carmen MD sent at 8/14/2024 11:31 AM CDT -----  Contact: Guillermina/[facility]  Recently had neurological surgery for postoperative pain, I am providing this for his chronic back pain  ----- Message -----  From: Sandy Carrion, Patient Care Assistant  Sent: 8/14/2024  11:26 AM CDT  To: Nehemiah Carmen MD    Pt receiving multiple scripts  ----- Message -----  From: Radha Morales  Sent: 8/14/2024  11:02 AM CDT  To: Kermit Cerna Staff    Type:  Pharmacy Calling to Clarify an RX    Name of Caller:Guillermina  Pharmacy Name:[facility]  Prescription Name:HYDROcodone-acetaminophen (NORCO) 7.5-325 mg per tablet   What do they need to clarify?:Prescription  Best Call Back Number: [phone]  Additional Information: Request to notify provider patient is seeing additional providers and receiving controlled substances as well. Please give pharmacy a call back to assist.   Thank you,  GH

## 2024-08-27 DIAGNOSIS — T88.8XXD FLUID COLLECTION AT SURGICAL SITE, SUBSEQUENT ENCOUNTER: Primary | ICD-10-CM

## 2024-08-28 ENCOUNTER — TELEPHONE (OUTPATIENT)
Dept: RADIOLOGY | Facility: HOSPITAL | Age: 69
End: 2024-08-28
Payer: MEDICARE

## 2024-08-28 ENCOUNTER — PATIENT MESSAGE (OUTPATIENT)
Dept: RADIOLOGY | Facility: HOSPITAL | Age: 69
End: 2024-08-28
Payer: MEDICARE

## 2024-08-28 DIAGNOSIS — M96.842 POSTOPERATIVE SEROMA OF MUSCULOSKELETAL STRUCTURE AFTER MUSCULOSKELETAL PROCEDURE: Primary | ICD-10-CM

## 2024-08-28 DIAGNOSIS — D68.9 COAGULATION DEFECT: Primary | ICD-10-CM

## 2024-08-28 NOTE — TELEPHONE ENCOUNTER
Interventional Radiology  Scheduled 10:30AM needle placement for 9/4/24 w/pt.  Instructed pt. to arrive by 9:00AM to the hospital ([id number]/ Entr[address]) off [address] in [place] and check in at Patient Registration located on the first floor.  He must have a ride and NPO after midnight the night before.  I explained that he can take regular morning medication with a small sip of water.  Pt. is to stop ASA and Plavix on 8/29/24.  He does not take other blood thinners, NSAIDS, fish oil, or GLP-1/GIP agonists.  I will send him our contact number via Pt. Message in [place].  He verbalized understanding of all instructions.

## 2024-09-04 ENCOUNTER — HOSPITAL ENCOUNTER (OUTPATIENT)
Dept: RADIOLOGY | Facility: HOSPITAL | Age: 69
Discharge: HOME | End: 2024-09-04
Attending: PHYSICIAN ASSISTANT
Payer: MEDICARE

## 2024-09-04 VITALS
DIASTOLIC BLOOD PRESSURE: 61 MMHG | WEIGHT: 237 LBS | SYSTOLIC BLOOD PRESSURE: 122 MMHG | BODY MASS INDEX: 35.1 KG/M2 | HEIGHT: 69 IN | OXYGEN SATURATION: 95 % | RESPIRATION RATE: 15 BRPM | HEART RATE: 69 BPM

## 2024-09-04 DIAGNOSIS — T88.8XXD FLUID COLLECTION AT SURGICAL SITE, SUBSEQUENT ENCOUNTER: ICD-10-CM

## 2024-09-04 LAB
ALBUMIN SERPL BCP-MCNC: 3.8 G/DL (ref 3.5–5.2)
ALP SERPL-CCNC: 96 U/L (ref 55–135)
ALT SERPL W/O P-5'-P-CCNC: 22 U/L (ref 10–44)
ANION GAP SERPL CALC-SCNC: 8 MMOL/L (ref 8–16)
AST SERPL-CCNC: 26 U/L (ref 10–40)
BASOPHILS # BLD AUTO: 0.03 K/UL (ref 0–0.2)
BASOPHILS NFR BLD: 0.9 % (ref 0–1.9)
BILIRUB SERPL-MCNC: 0.5 MG/DL (ref 0.1–1)
BUN SERPL-MCNC: 20 MG/DL (ref 8–23)
CALCIUM SERPL-MCNC: 9.4 MG/DL (ref 8.7–10.5)
CHLORIDE SERPL-SCNC: 108 MMOL/L (ref 95–110)
CO2 SERPL-SCNC: 22 MMOL/L (ref 23–29)
CREAT SERPL-MCNC: 1.7 MG/DL (ref 0.5–1.4)
DIFFERENTIAL METHOD BLD: ABNORMAL
EOSINOPHIL # BLD AUTO: 0.2 K/UL (ref 0–0.5)
EOSINOPHIL NFR BLD: 5.7 % (ref 0–8)
ERYTHROCYTE [DISTWIDTH] IN BLOOD BY AUTOMATED COUNT: 17.5 % (ref 11.5–14.5)
EST. GFR  (NO RACE VARIABLE): 43 ML/MIN/1.73 M^2
GLUCOSE SERPL-MCNC: 96 MG/DL (ref 70–110)
HCT VFR BLD AUTO: 39.7 % (ref 40–54)
HGB BLD-MCNC: 13.3 G/DL (ref 14–18)
IMM GRANULOCYTES # BLD AUTO: 0.01 K/UL (ref 0–0.04)
IMM GRANULOCYTES NFR BLD AUTO: 0.3 % (ref 0–0.5)
INR PPP: 0.9 (ref 0.8–1.2)
LYMPHOCYTES # BLD AUTO: 1 K/UL (ref 1–4.8)
LYMPHOCYTES NFR BLD: 29.9 % (ref 18–48)
MCH RBC QN AUTO: 27.2 PG (ref 27–31)
MCHC RBC AUTO-ENTMCNC: 33.5 G/DL (ref 32–36)
MCV RBC AUTO: 81 FL (ref 82–98)
MONOCYTES # BLD AUTO: 0.3 K/UL (ref 0.3–1)
MONOCYTES NFR BLD: 9.8 % (ref 4–15)
NEUTROPHILS # BLD AUTO: 1.9 K/UL (ref 1.8–7.7)
NEUTROPHILS NFR BLD: 53.4 % (ref 38–73)
NRBC BLD-RTO: 0 /100 WBC
PLATELET # BLD AUTO: 152 K/UL (ref 150–450)
PMV BLD AUTO: 10.2 FL (ref 9.2–12.9)
POTASSIUM SERPL-SCNC: 4.6 MMOL/L (ref 3.5–5.1)
PROT SERPL-MCNC: 7.4 G/DL (ref 6–8.4)
PROTHROMBIN TIME: 10.9 SEC (ref 9–12.5)
RBC # BLD AUTO: 4.89 M/UL (ref 4.6–6.2)
SODIUM SERPL-SCNC: 138 MMOL/L (ref 136–145)
WBC # BLD AUTO: 3.48 K/UL (ref 3.9–12.7)

## 2024-09-04 PROCEDURE — 80053 COMPREHEN METABOLIC PANEL: CPT | Mod: HCNC | Performed by: RADIOLOGY

## 2024-09-04 PROCEDURE — 63600175 PHARM REV CODE 636 W HCPCS: Mod: HCNC | Performed by: RADIOLOGY

## 2024-09-04 PROCEDURE — 87205 SMEAR GRAM STAIN: CPT | Mod: HCNC | Performed by: NEUROLOGICAL SURGERY

## 2024-09-04 PROCEDURE — 87075 CULTR BACTERIA EXCEPT BLOOD: CPT | Mod: HCNC | Performed by: NEUROLOGICAL SURGERY

## 2024-09-04 PROCEDURE — 77012 CT SCAN FOR NEEDLE BIOPSY: CPT | Mod: TC,HCNC

## 2024-09-04 PROCEDURE — 87070 CULTURE OTHR SPECIMN AEROBIC: CPT | Mod: HCNC | Performed by: NEUROLOGICAL SURGERY

## 2024-09-04 PROCEDURE — 77012 CT SCAN FOR NEEDLE BIOPSY: CPT | Mod: 26,HCNC,, | Performed by: RADIOLOGY

## 2024-09-04 PROCEDURE — 85025 COMPLETE CBC W/AUTO DIFF WBC: CPT | Mod: HCNC | Performed by: RADIOLOGY

## 2024-09-04 PROCEDURE — 85610 PROTHROMBIN TIME: CPT | Mod: HCNC | Performed by: RADIOLOGY

## 2024-09-04 RX ORDER — MIDAZOLAM HYDROCHLORIDE 1 MG/ML
INJECTION, SOLUTION INTRAMUSCULAR; INTRAVENOUS CODE/TRAUMA/SEDATION MEDICATION
Status: COMPLETED | OUTPATIENT
Start: 2024-09-04 | End: 2024-09-04

## 2024-09-04 RX ORDER — FENTANYL CITRATE 50 UG/ML
INJECTION, SOLUTION INTRAMUSCULAR; INTRAVENOUS CODE/TRAUMA/SEDATION MEDICATION
Status: COMPLETED | OUTPATIENT
Start: 2024-09-04 | End: 2024-09-04

## 2024-09-04 RX ADMIN — FENTANYL CITRATE 50 MCG: 0.05 INJECTION, SOLUTION INTRAMUSCULAR; INTRAVENOUS at 11:09

## 2024-09-04 RX ADMIN — MIDAZOLAM 1 MG: 1 INJECTION INTRAMUSCULAR; INTRAVENOUS at 11:09

## 2024-09-04 NOTE — DISCHARGE SUMMARY
[facility])  Discharge Note  Short Stay    CT Guided Needle Placement      OUTCOME: Patient tolerated treatment/procedure well without complication and is now ready for discharge.    DISPOSITION: Home or Self Care    FINAL DIAGNOSIS:  <principal problem not specified>    FOLLOWUP: In clinic    DISCHARGE INSTRUCTIONS:  No discharge procedures on file.      Clinical Reference Documents Added to Patient Instructions         Document    FINE NEEDLE BIOPSY (ENGLISH)            TIME SPENT ON DISCHARGE: 10 minutes    Date:  09/04/2024    Pre Op Diagnosis: postoperative seroma     Post Op Diagnosis: same     Procedure:  image guided aspiration of post op seroma     Procedure performed by: Karrie ALMAGUER, Chai SALCEDO     Written Informed Consent Obtained: Yes     Specimen Removed:  yes     Estimated Blood Loss:  minimal     Findings: Local anesthesia and moderate sedation used     The patient tolerated the procedure well and there were no complications.      Disposition: F/U in clinic or with ordering physician    Discharge instructions: Light activity for 24 hours.  No driving for 24 hours.  Remove bandage in 24 hours.  No baths for 24 hours; showers are appropriate.    Sterile technique was performed in the lower back, lidocaine was used as a local anesthetic.  Approximately 10 mL of serous fluid aspirated from lower back and sent to lab.  Patient tolerated the procedure well without immediate complications.  Please see radiologist report for details. F/u with PCP and/or ordering physician.     Time spent on discharge:  10 minutes

## 2024-09-04 NOTE — PLAN OF CARE
Band aid to lower back C/D/I with no bleeding/redness/swelling noted. VSS, NADN, and pt meets criteria for discharge. Discharge instructions given to and reviewed with pt, and pt verbalized understanding of all. Pt discharged to home, taken out via wheelchair and driven home by wife.

## 2024-09-04 NOTE — DISCHARGE INSTRUCTIONS
Please return to ER if any of these symptoms occur:  Fever over 101 degrees,  Any purulent drainage from site (pus, yellow or has foul odor), or any redness or swelling to site  Bleeding from the puncture site not controlled, If bleeding occurs at site hold pressure for 5 mins.  If bleeding continues go to ER  Pain not controlled with Aleve or Tylenol,    No driving for 24 hours after procedure due to sedation given during procedure.     Do not submerge in standing water for 2 days after biopsy but you may shower.    No heavy lifting for the next couple of days. Do not lift anything heavier than a gallon of milk. Increase activity as tolerated. Rest as needed.    May change bandage if it becomes soiled and bandage may be removed in 2 days.    Resume home medications and diet    Biopsy results will be with  *** in 5-7 days, please follow up with him for results and any other questions or concerns that you may have.

## 2024-09-05 LAB
GRAM STN SPEC: NORMAL
GRAM STN SPEC: NORMAL

## 2024-09-06 ENCOUNTER — TELEPHONE (OUTPATIENT)
Dept: RADIOLOGY | Facility: HOSPITAL | Age: 69
End: 2024-09-06
Payer: MEDICARE

## 2024-09-06 LAB
BACTERIA SPEC AEROBE CULT: NO GROWTH
BACTERIA SPEC ANAEROBE CULT: NORMAL

## 2024-09-26 ENCOUNTER — OFFICE VISIT (OUTPATIENT)
Dept: INTERNAL MEDICINE | Facility: CLINIC | Age: 69
End: 2024-09-26
Payer: MEDICARE

## 2024-09-26 VITALS
SYSTOLIC BLOOD PRESSURE: 118 MMHG | RESPIRATION RATE: 18 BRPM | BODY MASS INDEX: 35.92 KG/M2 | HEART RATE: 90 BPM | DIASTOLIC BLOOD PRESSURE: 68 MMHG | OXYGEN SATURATION: 96 % | TEMPERATURE: 97 F | HEIGHT: 69 IN | WEIGHT: 242.5 LBS

## 2024-09-26 DIAGNOSIS — D50.9 IRON DEFICIENCY ANEMIA, UNSPECIFIED IRON DEFICIENCY ANEMIA TYPE: ICD-10-CM

## 2024-09-26 DIAGNOSIS — Z85.46 HISTORY OF PROSTATE CANCER: Primary | ICD-10-CM

## 2024-09-26 DIAGNOSIS — R73.03 PREDIABETES: ICD-10-CM

## 2024-09-26 PROCEDURE — 99999 PR PBB SHADOW E&M-EST. PATIENT-LVL IV: CPT | Mod: PBBFAC,HCNC,, | Performed by: FAMILY MEDICINE

## 2024-09-26 NOTE — Clinical Note
Hello He has iron def anemia. Nl scopes and ua ok . He has had some procedures since May but nothing soon before May to see cause of low iron. Do you think VCE is next step? You saw him in march  prior to this issue.

## 2024-09-26 NOTE — PROGRESS NOTES
Subjective:       Patient ID: Abdullahi Urias is a . male.    Chief Complaint: here for physical examination and issues  below      HPI  prediab d/wd     Iron def anemia [facility]. Egd colon 7/24 no obv source; ua few months no blood  [facility] also [facility]      Cad prev [facility] card     [facility]d [facility];had egd done 2020 per him was incomplete and he said [facility] but hasnt been done    Asthma  see above    Hep c hx says was cured w harvni;hep C rna negative summer 2020    Prost ca now sees [facility] urol ;[facility]    Gout:  No recent flares. Tolerating medication     Cri d/wd nephrol;    Cad/pvd recent dx via dr yovani Radford foot burning . hx gabapentin  Past Medical History:   Diagnosis Date    Aortic stenosis     [facility] ph[facility]    Asthma     BPH (benign prostatic hyperplasia)     CAD (coronary artery disease)     Cardiomyopathy     CHF (congestive heart failure)     Chronic hoarseness     vocal cord surg    Chronic pain     CKD (chronic kidney disease) stage 3, GFR 30-59 ml/min     CVA (cerebral infarction)     8/2012 olol; reviewed ed note    Ex-smoker     GERD (gastroesophageal reflux disease)     Hepatitis C     treatedharvoni says cured, RNA NEG 6/2020    Hypertension     Pancreatitis     Prostate cancer     Prostate cancer     Prostate cancer     PVD (peripheral vascular disease)     Renal insufficiency     Substance abuse     cocaine, etoh , tob in past     Past Surgical History:   Procedure Laterality Date    AORTIC VALVE REPLACEMENT  05/19/2014    Tissue valve replacement    BACK SURGERY      CARDIAC CATHETERIZATION      COLONOSCOPY  2011    COLONOSCOPY N/A 2/24/2021    Procedure: COLONOSCOPY;  Surgeon: Faith Carrillo MD;  Location: [facility];  Service: Endoscopy;  Laterality: N/A;    COLONOSCOPY N/A 7/2/2024    Procedure: COLONOSCOPY 6/4 plavix 5 day hold econ;  Surgeon: Prince Griffin MD;  Location: [facility];  Service: Endoscopy;   Laterality: N/A;    EPIDURAL STEROID INJECTION INTO CERVICAL SPINE N/A 6/21/2022    Procedure: C7-T1 IL PIEDAD;  Surgeon: Nehemiah Carmen MD;  Location: [facility];  Service: Pain Management;  Laterality: N/A;    EPIDURAL STEROID INJECTION INTO CERVICAL SPINE N/A 3/12/2024    Procedure: C7/T1 IL PIEDAD;  Surgeon: Nehemiah Carmen MD;  Location: [facility];  Service: Pain Management;  Laterality: N/A;    ESOPHAGOGASTRODUODENOSCOPY N/A 2/24/2021    Procedure: ESOPHAGOGASTRODUODENOSCOPY (EGD);  Surgeon: Faith Carrillo MD;  Location: [facility] END[facility];  Service: Endoscopy;  Laterality: N/A;    ESOPHAGOGASTRODUODENOSCOPY N/A 7/2/2024    Procedure: EGD (ESOPHAGOGASTRODUODENOSCOPY);  Surgeon: Prince Griffin MD;  Location: [facility] ENDO;  Service: Endoscopy;  Laterality: N/A;    FOOT SURGERY      INSERTION N/A 7/5/2023    Procedure: INSERTION;  Surgeon: Brandon Valencia MD;  Location: [facility] OR;  Service: Neurosurgery;  Laterality: N/A;  Antibiotic Beads    LEFT HEART CATHETERIZATION Left 7/24/2020    Procedure: CATHETERIZATION, HEART, LEFT;  Surgeon: Pasha Martin MD;  Location: [facility] CATH LAB;  Service: Cardiology;  Laterality: Left;    PENILE PROSTHESIS IMPLANT      PENILE PROSTHESIS REVISION  04/30/2018    [facility]OSTATECTOMY  [facility]    [facility] for prostate      REMOVAL OF HARDWARE FROM SPINE N/A 7/5/2023    Procedure: REMOVAL, HARDWARE, SPINE;  Surgeon: Brandon Valencia MD;  Location: [facility] OR;  Service: Neurosurgery;  Laterality: N/A;    REPLACEMENT N/A 7/5/2023    Procedure: REPLACEMENT;  Surgeon: Brandon Valencia MD;  Location: [facility] OR;  Service: Neurosurgery;  Laterality: N/A;  of [employer]. [employer]    REVISION OF PROCEDURE INVOLVING INFLATABLE PENILE PROSTHESIS N/A 7/22/2024    Procedure: REVISION, PROCEDURE INVOLVING INFLATABLE PENILE PROSTHESIS;  Surgeon: Rommel Rodriguez MD;  Location: [facility] OR;  Service: Urology;  Laterality: N/A;    TRANSFORAMINAL EPIDURAL INJECTION OF STEROID  "Right 10/20/2022    Procedure: Right  L4/5 + L5/S1 TF PIEDAD RN IV Sedation;  Surgeon: Nehemiah Carmen MD;  [facility];  Service: Pain Management;  Laterality: Right;    TRANSFORAMINAL LUMBAR INTERBODY FUSION (TLIF) USING COMPUTER-ASSISTED NAVIGATION Bilateral 2023    Procedure: FUSION, SPINE, LUMBAR, TLIF, USING COMPUTER-ASSISTED NAVIGATION;  Surgeon: Brandon Valencia MD;  Location: [facility] OR;  Service: Neurosurgery;  Laterality: Bilateral;  2 level lumbar fusion at L4-5 and L5-S1    UPPER GASTROINTESTINAL ENDOSCOPY      WASHOUT N/A 2023    Procedure: WASHOUT;  Surgeon: Brandon Valencia MD;  Location: [facility] OR;  Service: Neurosurgery;  Laterality: N/A;    WOUND EXPLORATION Bilateral 2023    Procedure: EXPLORATION, WOUND;  Surgeon: Brandon Valencia MD;  Location: [facility] OR;  Service: Neurosurgery;  Laterality: Bilateral;     Family History   Problem Relation Name Age of Onset    Heart disease Mother      Heart disease Father      Heart attack Sister      Heart attack Brother      Colon cancer Neg Hx      Colon polyps Neg Hx      Liver cancer Neg Hx      Inflammatory bowel disease Neg Hx      Liver disease Neg Hx      Rectal cancer Neg Hx      Stomach cancer Neg Hx      Ulcerative colitis Neg Hx       Social History     Socioeconomic History    Marital status:    Tobacco Use    Smoking status: Former     Current packs/day: 0.00     Average packs/day: 1 pack/day for 8.0 years (8.0 ttl pk-yrs)     Types: Cigarettes     Start date: 2004     Quit date: 2012     Years since quittin.0    Smokeless tobacco: Never   Substance and Sexual Activity    Alcohol use: Not Currently     Comment: Sober since 2012    Drug use: Not Currently     Types: "Crack" cocaine, Marijuana     Comment: Quit in     Sexual activity: Yes   Social History Narrative    No pets in household, wife and daughter smokers. Former [employer].    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; as of  " retired     Social Determinants of Health     Financial Resource Strain: Low Risk  (9/7/2023)    Overall Financial Resource Strain (CARDIA)     Difficulty of Paying Living Expenses: Not very hard   Food Insecurity: Food Insecurity Present (9/7/2023)    Hunger Vital Sign     Worried About Running Out of Food in the Last Year: Sometimes true     Ran Out of Food in the Last Year: Sometimes true   Transportation Needs: No Transportation Needs (9/7/2023)    PRAPARE - Transportation     Lack of Transportation (Medical): No     Lack of Transportation (Non-Medical): No   Physical Activity: Insufficiently Active (9/7/2023)    Exercise Vital Sign     Days of Exercise per Week: 3 days     Minutes of Exercise per Session: 20 min   Stress: Stress Concern Present (9/7/2023)    [employer] [place] [employer] - Occupational Stress Questionnaire     Feeling of Stress : Very much   Housing Stability: Low Risk  (9/7/2023)    Housing Stability Vital Sign     Unable to Pay for Housing in the Last Year: No     Number of Places Lived in the Last Year: 1     Unstable Housing in the Last Year: No       Cardiovascular: no chest pain  Chest: no shortness of breath  Abd: no abd pain  Remainder review of systems negative    Objective:      Physical Exam  gen nad a and o x 3  bilat ac tm clear  Neck no bruit  cvrrr  Chestwheeze    Assessment:    Iron def anemia  Prost ca hx      2. History of CVA in adulthood    3. Coronary artery disease, angina presence unspecified, unspecified vessel or lesion type, unspecified whether native or transplanted heart    4. Chronic gout involving toe without tophus, unspecified cause, unspecified laterality      Asthma  gerd  Cri   Lung nod  Plan:   F/u , urol, radonc, pain mgmt and nephrol when due  F/u card due      Has plan abril ldct via pulm in 6m      hx;[facility]         Please add lab to dece lab  F/u ochoa after December lab    Follow up with Dr Delatorre (nephrologist)  P   Msg B Saale about  poss vce      History of prostate cancer    Prediabetes  -     Hemoglobin A1C; Future; Expected date: 12/25/2024    Iron deficiency anemia, unspecified iron deficiency anemia type  -     CBC Auto Differential; Future; Expected date: 12/25/2024  -     Ferritin; Future; Expected date: 12/26/2024

## 2024-09-30 DIAGNOSIS — D50.9 IRON DEFICIENCY ANEMIA, UNSPECIFIED IRON DEFICIENCY ANEMIA TYPE: Primary | ICD-10-CM

## 2024-09-30 NOTE — PROGRESS NOTES
Case discussed with PCP  Has SAUD. EGD and colonoscopy in July for this unrevealing. Recommend VCE. Orders placed.

## 2024-10-14 ENCOUNTER — PATIENT MESSAGE (OUTPATIENT)
Dept: ENDOSCOPY | Facility: HOSPITAL | Age: 69
End: 2024-10-14
Payer: MEDICARE

## 2024-10-21 ENCOUNTER — TELEPHONE (OUTPATIENT)
Dept: INTERNAL MEDICINE | Facility: CLINIC | Age: 69
End: 2024-10-21
Payer: MEDICARE

## 2024-10-21 NOTE — TELEPHONE ENCOUNTER
----- Message from Sameer sent at 10/21/2024  1:35 PM CDT -----  Contact: [phone]  Type:  Patient Returning Call    Who Called:KIRK CHAPARRO [phone] LEAVE A SECURE  VOICEMAIL  Who Left Message for Patient:  Does the patient know what this is regarding?:verified if the member have a pace maker or other implanted device   Would the patient rather a call back or a response via [employer]? Call back  Best Call Back Number: [phone]  Additional Information: n [phone]

## 2024-10-23 ENCOUNTER — PATIENT MESSAGE (OUTPATIENT)
Dept: ENDOSCOPY | Facility: HOSPITAL | Age: 69
End: 2024-10-23
Payer: MEDICARE

## 2024-10-29 DIAGNOSIS — I50.32 CHRONIC DIASTOLIC HEART FAILURE: ICD-10-CM

## 2024-10-29 DIAGNOSIS — I10 PRIMARY HYPERTENSION: Primary | Chronic | ICD-10-CM

## 2024-10-31 ENCOUNTER — PATIENT OUTREACH (OUTPATIENT)
Dept: ADMINISTRATIVE | Facility: OTHER | Age: 69
End: 2024-10-31
Payer: MEDICARE

## 2024-11-01 ENCOUNTER — HOSPITAL ENCOUNTER (OUTPATIENT)
Facility: HOSPITAL | Age: 69
Discharge: HOME | End: 2024-11-01
Attending: INTERNAL MEDICINE | Admitting: INTERNAL MEDICINE
Payer: MEDICARE

## 2024-11-01 PROCEDURE — 27201489 HC PILLCAM CAPSULE: Mod: HCNC

## 2024-11-01 PROCEDURE — 91110 GI TRC IMG INTRAL ESOPH-ILE: CPT | Mod: TC,HCNC | Performed by: INTERNAL MEDICINE

## 2024-11-04 ENCOUNTER — HOSPITAL ENCOUNTER (OUTPATIENT)
Dept: CARDIOLOGY | Facility: HOSPITAL | Age: 69
Discharge: HOME | End: 2024-11-04
Attending: INTERNAL MEDICINE
Payer: MEDICARE

## 2024-11-04 ENCOUNTER — PATIENT MESSAGE (OUTPATIENT)
Dept: CARDIOLOGY | Facility: HOSPITAL | Age: 69
End: 2024-11-04
Payer: MEDICARE

## 2024-11-04 ENCOUNTER — OFFICE VISIT (OUTPATIENT)
Dept: CARDIOLOGY | Facility: CLINIC | Age: 69
End: 2024-11-04
Payer: MEDICARE

## 2024-11-04 VITALS
HEIGHT: 69 IN | HEART RATE: 72 BPM | OXYGEN SATURATION: 98 % | SYSTOLIC BLOOD PRESSURE: 114 MMHG | RESPIRATION RATE: 16 BRPM | WEIGHT: 239.06 LBS | BODY MASS INDEX: 35.41 KG/M2 | DIASTOLIC BLOOD PRESSURE: 69 MMHG

## 2024-11-04 DIAGNOSIS — E66.812 CLASS 2 SEVERE OBESITY DUE TO EXCESS CALORIES WITH SERIOUS COMORBIDITY AND BODY MASS INDEX (BMI) OF 38.0 TO 38.9 IN ADULT: ICD-10-CM

## 2024-11-04 DIAGNOSIS — I73.9 PAD (PERIPHERAL ARTERY DISEASE): Primary | ICD-10-CM

## 2024-11-04 DIAGNOSIS — I35.0 NONRHEUMATIC AORTIC VALVE STENOSIS: ICD-10-CM

## 2024-11-04 DIAGNOSIS — I70.0 AORTO-ILIAC ATHEROSCLEROSIS: ICD-10-CM

## 2024-11-04 DIAGNOSIS — I50.32 CHRONIC DIASTOLIC HEART FAILURE: ICD-10-CM

## 2024-11-04 DIAGNOSIS — Z86.73 HISTORY OF CVA IN ADULTHOOD: ICD-10-CM

## 2024-11-04 DIAGNOSIS — E66.01 CLASS 2 SEVERE OBESITY DUE TO EXCESS CALORIES WITH SERIOUS COMORBIDITY AND BODY MASS INDEX (BMI) OF 38.0 TO 38.9 IN ADULT: ICD-10-CM

## 2024-11-04 DIAGNOSIS — I70.8 AORTO-ILIAC ATHEROSCLEROSIS: ICD-10-CM

## 2024-11-04 DIAGNOSIS — I10 PRIMARY HYPERTENSION: Chronic | ICD-10-CM

## 2024-11-04 DIAGNOSIS — Z95.2 S/P AVR (AORTIC VALVE REPLACEMENT): ICD-10-CM

## 2024-11-04 DIAGNOSIS — I25.10 CORONARY ARTERY DISEASE INVOLVING NATIVE CORONARY ARTERY OF NATIVE HEART WITHOUT ANGINA PECTORIS: Chronic | ICD-10-CM

## 2024-11-04 PROCEDURE — 93010 ELECTROCARDIOGRAM REPORT: CPT | Mod: HCNC,,, | Performed by: INTERNAL MEDICINE

## 2024-11-04 PROCEDURE — 93005 ELECTROCARDIOGRAM TRACING: CPT

## 2024-11-04 PROCEDURE — 99999 PR PBB SHADOW E&M-EST. PATIENT-LVL V: CPT | Mod: PBBFAC,,, | Performed by: INTERNAL MEDICINE

## 2024-11-04 NOTE — PROGRESS NOTES
Subjective:   Patient ID:  Abdullahi Urias is a 69 y.o. male who presents for follow up of No chief complaint on file.      70 yo male, came in for 6 m f/u  PMH s/p bioprosthetic AVR at [facility] in 2014 Salazar [id number] 25 mm pericardial tissue valve, nonobstructive CAD s/p LHC in  nonobstructive, 20% midLAD and RCA by Dr. quiroz, PAD, h/o stroke in 2012, HTN, HLD CHEO on CPAP remote h/o cocaine in 2012, quit drinking in 2012 and no smoking. H/o prostate cancer in 2018,   PAD on plavix allergic to pletal and  leg pain from neuropathy.    MPI in 2012 small apical infarct  ekg in 2018 NSR LVH with 2nd stt change  BNP in 2016 was 37  ECHO in  normal EF, perivalvular AI and s/p AVR mean G 16 mmHG and peak V 2.85    07/2021 visit  Limited exercise due to hip pain. C/o arm numbness. Sweating at rest.    Fin[facility] X[facility] for prostate cancer and [facility] [place] [facility]  BIRD when climbing the stairs over 6 months, and episode of syncope after got out the bed. Woke up quickly  V/Q scan in  low risk for PE and d dimer 1.8     visit  H/O COVID 19 infection on 12/30/2021 and had AB infusion at [facility].   Hands and legs numbness for few months. Occasional chest discomfort and ingestion. Lower back pain.   Some calf pain and left foot swelling. The burning sensation worse below the knee. Some mild numbness and pain at thigh, L> R. occred at rest and with exertion.      visit  eval by Dr Kamara at vascular medicine clinic in  and r/o PAD related leg pain. Had uncomplicated CA travel.   Leg pain controlled and f/u at pain clinic  Plan to have dental work. No chest pain. SOB chronic stable. A lot of GERD.   On plavix for PAD and allergic to pletal     visit  The leg pain controlled now, and f/u at pain clinic on steroid injection  Some GERD burning. No SOB palpitation dizziness and leg swelling. Some exercise aerobic daily and gym work   No  smoking/drinking now. LDL 70    04/23 visit  Plan to have lumbar spinal procedure done by Dr. Valencia.  H/o nonobstructive CAD and s/p bioprosthetic AVR in 2014  Repeat echo on  EF nl, s/p AVR functioning well  SOB improved after trilicity rx  No chest pain dizziness palpitation and leg swelling. No active bleeding   Ekg today NSR TWI on alli inferior leads    10/23 visit  05/23 s/p lumbar fusion procedure and developed wound abscess in 07/23 Rx of Abx for 6 weeks  F/u pulm for SOB on triligy Rx    05/24 visit  11/23 echo showed EF nml. S/p tissue AVR. Well seated.  04/24 LE venous US showed no DVT  No chest pain dizziness faint orthopnea. Does gym exercise.  Bottom of the fee pain at exertion  LDL 80. BP A1c controlled  [facility]as[facility]oid [facility]  EKG reviewed by myself today reveals NSR nonspecific STT change    Interval history  Leg calf pain at walking and resolved after rest. No chest pain.   Stopped Lipitor 3 months ago for resting pain. Now questioning ? the resting pain resolved  LDL 80 BP C quit smoking in 2012.  EKG reviewed by myself today reveals NSR nonspecific STT change                    Past Medical History:   Diagnosis Date    Aortic stenosis     [facility]    Asthma     BPH (benign prostatic hyperplasia)     CAD (coronary artery disease)     Cardiomyopathy     CHF (congestive heart failure)     Chronic hoarseness     vocal cord surg    Chronic pain     CKD (chronic kidney disease) stage 3, GFR 30-59 ml/min     CVA (cerebral infarction)     8/2012 olol; reviewed ed note    Ex-smoker     GERD (gastroesophageal reflux disease)     Hepatitis C     treatedgabrielavoni says cured, RNA NEG 6/2020    Hypertension     Pancreatitis     Prostate cancer     Prostate cancer     Prostate cancer     PVD (peripheral vascular disease)     Renal insufficiency     Substance abuse     cocaine, etoh , tob in past       Past Surgical History:   Procedure Laterality Date    AORTIC VALVE  REPLACEMENT  05/19/2014    Tissue valve replacement    BACK SURGERY      CARDIAC CATHETERIZATION      COLONOSCOPY  2011    COLONOSCOPY N/A 2/24/2021    Procedure: COLONOSCOPY;  Surgeon: Faith Carrillo MD;  Location: [facility] ENDO;  Service: Endoscopy;  Laterality: N/A;    COLONOSCOPY N/A 7/2/2024    Procedure: COLONOSCOPY 6/4 plavix 5 day hold econ;  Surgeon: Prince Griffin MD;  Location: [facility] ENDO;  Service: Endoscopy;  Laterality: N/A;    EPIDURAL STEROID INJECTION INTO CERVICAL SPINE N/A 6/21/2022    Procedure: C7-T1 IL PIEDAD;  Surgeon: Nehemiah Carmen MD;  Location: [facility] PAIN MGT;  Service: Pain Management;  Laterality: N/A;    EPIDURAL STEROID INJECTION INTO CERVICAL SPINE N/A 3/12/2024    Procedure: C7/T1 IL PIEDAD;  Surgeon: Nehemiah Carmen MD;  Location: [facility] PAIN MGT;  Service: Pain Management;  Laterality: N/A;    ESOPHAGOGASTRODUODENOSCOPY N/A 2/24/2021    Procedure: ESOPHAGOGASTRODUODENOSCOPY (EGD);  Surgeon: Faith Carrillo MD;  Location: [facility];  Service: Endoscopy;  Laterality: N/A;    ESOPHAGOGASTRODUODENOSCOPY N/A 7/2/2024    Procedure: EGD (ESOPHAGOGASTRODUODENOSCOPY);  Surgeon: Prince Griffin MD;  Location: [facility];  Service: Endoscopy;  Laterality: N/A;    FOOT SURGERY      INSERTION N/A 7/5/2023    Procedure: INSERTION;  Surgeon: Brandon Valencia MD;  Location: [facility] OR;  Service: Neurosurgery;  Laterality: N/A;  Antibiotic Beads    INTRALUMINAL GASTROINTESTINAL TRACT IMAGING VIA CAPSULE N/A 11/1/2024    Procedure: IMAGING PROCEDURE, GI TRACT, INTRALUMINAL, VIA CAPSULE;  Surgeon: [place], First Available;  Location: [facility] ENDO;  Service: Endoscopy;  Laterality: N/A;    LEFT HEART CATHETERIZATION Left 7/24/2020    Procedure: CATHETERIZATION, HEART, LEFT;  Surgeon: Pasha Martin MD;  Location: [facility] CATH LAB;  Service: Cardiology;  Laterality: Left;    PENILE PROSTHESIS IMPLANT      PENILE PROSTHESIS REVISION  04/30/2018    PROSTATECTOMY  09/2019    [facility]    radiation  for prostate      REMOVAL OF HARDWARE FROM SPINE N/A 2023    Procedure: REMOVAL, HARDWARE, SPINE;  Surgeon: Brandon Valencia MD;  Location: [facility] OR;  Service: Neurosurgery;  Laterality: N/A;    REPLACEMENT N/A 2023    Procedure: REPLACEMENT;  Surgeon: Brandon Valencia MD;  Location: [facility] OR;  Service: Neurosurgery;  Laterality: N/A;  of Sandoval and Screws. Sonya [employer]    REVISION OF PROCEDURE INVOLVING INFLATABLE PENILE PROSTHESIS N/A 2024    Procedure: REVISION, PROCEDURE INVOLVING INFLATABLE PENILE PROSTHESIS;  Surgeon: Rommel Rodriguez MD;  Location: [facility] OR;  Service: Urology;  Laterality: N/A;    TRANSFORAMINAL EPIDURAL INJECTION OF STEROID Right 10/20/2022    Procedure: Right  L4/5 + L5/S1 TF PIEDAD RN IV Sedation;  Surgeon: Nehemiah Carmen MD;  [facility];  Service: Pain Management;  Laterality: Right;    TRANSFORAMINAL LUMBAR INTERBODY FUSION (TLIF) USING COMPUTER-ASSISTED NAVIGATION Bilateral 2023    Procedure: FUSION, SPINE, LUMBAR, TLIF, USING COMPUTER-ASSISTED NAVIGATION;  Surgeon: Brandon Valencia MD;  Location: [facility] OR;  Service: Neurosurgery;  Laterality: Bilateral;  2 level lumbar fusion at L4-5 and L5-S1    UPPER GASTROINTESTINAL ENDOSCOPY      WASHOUT N/A 2023    Procedure: WASHOUT;  Surgeon: Brandon Valencia MD;  Location: [facility];  Service: Neurosurgery;  Laterality: N/A;    WOUND EXPLORATION Bilateral 2023    Procedure: EXPLORATION, WOUND;  Surgeon: Brandon Valencia MD;  Location: [facility];  Service: Neurosurgery;  Laterality: Bilateral;       Social History     Tobacco Use    Smoking status: Former     Current packs/day: 0.00     Average packs/day: 1 pack/day for 8.0 years (8.0 ttl pk-yrs)     Types: Cigarettes     Start date: 2004     Quit date: 2012     Years since quittin.2    Smokeless tobacco: Never   Substance Use Topics    Alcohol use: Not Currently     Comment: Sober since 2012    Drug use: Not Currently     Types:  ""Crack" cocaine, Marijuana     Comment: Quit in 2012       Family History   Problem Relation Name Age of Onset    Heart disease Mother      Heart disease Father      Heart attack Sister      Heart attack Brother      Colon cancer Neg Hx      Colon polyps Neg Hx      Liver cancer Neg Hx      Inflammatory bowel disease Neg Hx      Liver disease Neg Hx      Rectal cancer Neg Hx      Stomach cancer Neg Hx      Ulcerative colitis Neg Hx           ROS    Objective:   Physical Exam  HENT:      Head: Normocephalic.   Eyes:      Pupils: Pupils are equal, round, and reactive to light.   Neck:      Thyroid: No thyromegaly.      Vascular: Normal carotid pulses. No carotid bruit or JVD.   Cardiovascular:      Rate and Rhythm: Normal rate and regular rhythm. No extrasystoles are present.     Chest Wall: PMI is not displaced.      Pulses: Normal pulses.      Heart sounds: Murmur (ESM + on RUSB ) heard.      No gallop. No S3 sounds.   Pulmonary:      Effort: No respiratory distress.      Breath sounds: Normal breath sounds. No stridor.   Abdominal:      General: Bowel sounds are normal.      Palpations: Abdomen is soft.      Tenderness: There is no abdominal tenderness. There is no rebound.   Musculoskeletal:         General: Normal range of motion.   Skin:     Findings: No rash.   Neurological:      Mental Status: He is alert and oriented to person, place, and time.   Psychiatric:         Behavior: Behavior normal.         Lab Results   Component Value Date    CHOL 137 01/22/2024    CHOL 123 01/18/2022    CHOL 134 10/22/2021     Lab Results   Component Value Date    HDL 44 01/22/2024    HDL 39 (L) 01/18/2022    HDL 44 10/22/2021     Lab Results   Component Value Date    LDLCALC 79.6 01/22/2024    LDLCALC 72.6 01/18/2022    LDLCALC 78.8 10/22/2021     Lab Results   Component Value Date    TRIG 67 01/22/2024    TRIG 57 01/18/2022    TRIG 56 10/22/2021     Lab Results   Component Value Date    CHOLHDL 32.1 01/22/2024    CHOLHDL 31.7 " 01/18/2022    CHOLHDL 32.8 10/22/2021       Chemistry        Component Value Date/Time     09/04/2024 0948    K 4.6 09/04/2024 0948     09/04/2024 0948    CO2 22 (L) 09/04/2024 0948    BUN 20 09/04/2024 0948    CREATININE 1.7 (H) 09/04/2024 0948    GLU 96 09/04/2024 0948        Component Value Date/Time    CALCIUM 9.4 09/04/2024 0948    ALKPHOS 96 09/04/2024 0948    AST 26 09/04/2024 0948    ALT 22 09/04/2024 0948    BILITOT 0.5 09/04/2024 0948    ESTGFRAFRICA 51.1 (A) 06/02/2022 1246    EGFRNONAA 44.2 (A) 06/02/2022 1246          Lab Results   Component Value Date    HGBA1C 5.8 (H) 01/22/2024     Lab Results   Component Value Date    TSH 2.854 05/23/2024     Lab Results   Component Value Date    INR 0.9 09/04/2024    INR 1.1 07/04/2023    INR 1.0 04/13/2023     Lab Results   Component Value Date    WBC 3.48 (L) 09/04/2024    HGB 13.3 (L) 09/04/2024    HCT 39.7 (L) 09/04/2024    MCV 81 (L) 09/04/2024     09/04/2024     BMP  Sodium   Date Value Ref Range Status   09/04/2024 138 136 - 145 mmol/L Final     Potassium   Date Value Ref Range Status   09/04/2024 4.6 3.5 - 5.1 mmol/L Final     Chloride   Date Value Ref Range Status   09/04/2024 108 95 - 110 mmol/L Final     CO2   Date Value Ref Range Status   09/04/2024 22 (L) 23 - 29 mmol/L Final     BUN   Date Value Ref Range Status   09/04/2024 20 8 - 23 mg/dL Final     Creatinine   Date Value Ref Range Status   09/04/2024 1.7 (H) 0.5 - 1.4 mg/dL Final     Calcium   Date Value Ref Range Status   09/04/2024 9.4 8.7 - 10.5 mg/dL Final     Anion Gap   Date Value Ref Range Status   09/04/2024 8 8 - 16 mmol/L Final     eGFR if    Date Value Ref Range Status   06/02/2022 51.1 (A) >60 mL/min/1.73 m^2 Final     eGFR if non    Date Value Ref Range Status   06/02/2022 44.2 (A) >60 mL/min/1.73 m^2 Final     Comment:     Calculation used to obtain the estimated glomerular filtration  rate (eGFR) is the CKD-EPI equation.         BNP  @LABRCNTIP(BNP,BNPTRIAGEBLO)@  @LABRCNTIP(troponini)@  CrCl cannot be calculated (Patient's most recent lab result is older than the maximum 7 days allowed.).  No results found in the last 24 hours.  No results found in the last 24 hours.  No results found in the last 24 hours.    Assessment:      1. PAD (peripheral artery disease)    2. History of CVA in adulthood    3. Aorto-iliac atherosclerosis    4. Nonrheumatic aortic valve stenosis    5. Chronic diastolic heart failure    6. Coronary artery disease involving native coronary artery of native heart without angina pectoris    7. Primary hypertension    8. S/P AVR (aortic valve replacement) biopresthetic miller 25 mm in 2014     9. Class 2 severe obesity due to excess calories with serious comorbidity and body mass index (BMI) of 38.0 to 38.9 in adult        Plan:   Check exericse LILLIE and LE arterial US for exertional calf pain  Resume Lipitor 80 mg daily and hold if recurrent leg pain  Contiune Lipitor Coreg ASA plavix losartan  RTC in 6m

## 2024-11-05 LAB
OHS QRS DURATION: 108 MS
OHS QTC CALCULATION: 452 MS

## 2024-11-06 PROCEDURE — 91110 GI TRC IMG INTRAL ESOPH-ILE: CPT | Mod: 26,HCNC,, | Performed by: INTERNAL MEDICINE

## 2024-11-06 NOTE — PROVATION PATIENT INSTRUCTIONS
Discharge Summary/Instructions for after Video Capsule Endoscopy  Patient Name: Abdullahi Urias  Patient MRN: [id number]  Patient YOB: 1955 Friday, November 1, 2024  Wilda Horne MD  This is a 69 year old male.  1.  Do Not eat or drink anything for 1 hour.  Try sips of water first.  If   tolerated, resume your regular diet or one recommended by your physician.  2.  Do not drive, operate machinery, make critical decisions, or do   activities that require coordination or balance for 24 hours.  3.   You may experience a sore throat for 24 to 48 hours.  You may use   throat lozenges or gargle with warm salt water to relieve the discomfort.  4.  Because air was put into your stomach during the procedure, you may   experience some belching.  5.  Do not use any medication containing aspirin for 10 days.  6.  Go directly to the emergency room if you notice any of the following:   Chills and/or fever over 101 F   Persistent vomiting or vomiting with blood/nasal regurgitation   Severe abdominal pain, other than gas cramps   Severe chest pain   Black, tarry stools     Your doctor recommends these additional instructions:  - Discharge patient to home.   - Return to referring physician.   - Please consider push enteroscopy management of small bowel bleeding.  For questions, problems or results please call your physician Wilda Horne MD at Work:  [phone]  If you have any questions about the above instructions, call the GI   department at [phone] or call the endoscopy unit at [phone]   from 7am until 3 pm.  [facility], EMERGENCY ROOM PHONE NUMBER:   [phone]  IF A COMPLICATION OR EMERGENCY SITUATION ARISES AND YOU ARE UNABLE TO REACH   YOUR PHYSICIAN - GO DIRECTLY TO THE EMERGENCY ROOM.  I have read or have had read to me these discharge instructions for my   procedure and have received a written copy.  I understand these   instructions and will follow-up  with my physician if I have any questions.     __________________________________       _____________________________________  Nurse Signature                                          Patient/Designated   Responsible Party Signature  Wilda Horne MD  11/6/2024 12:07:27 PM  This report has been verified and signed electronically.  Dear patient,  As a result of recent federal legislation (The Federal Cures Act), you may   receive lab or pathology results from your procedure in your [facility]   account before your physician is able to contact you. Your physician or   their representative will relay the results to you with their   recommendations at their soonest availability.  Thank you,  PROVATION

## 2024-11-07 ENCOUNTER — PATIENT OUTREACH (OUTPATIENT)
Dept: ADMINISTRATIVE | Facility: OTHER | Age: 69
End: 2024-11-07
Payer: MEDICARE

## 2024-11-07 DIAGNOSIS — K92.2 GASTROINTESTINAL HEMORRHAGE, UNSPECIFIED GASTROINTESTINAL HEMORRHAGE TYPE: Primary | ICD-10-CM

## 2024-11-07 NOTE — PROGRESS NOTES
CHW - Case Closure    This Community Health Worker spoke to patient via telephone today.     Pt/Caregiver reported: pt confirmed he received the [facility] financial assistance application in the mail yesterday and will fill it out      Pt/Caregiver denied any additional needs at this time and agrees with episode closure at this time.  Provided patient with Community Health Worker's contact information and encouraged him/her to contact this Community Health Worker if additional needs arise.

## 2024-11-13 ENCOUNTER — TELEPHONE (OUTPATIENT)
Dept: PAIN MEDICINE | Facility: CLINIC | Age: 69
End: 2024-11-13
Payer: MEDICARE

## 2024-11-13 ENCOUNTER — HOSPITAL ENCOUNTER (OUTPATIENT)
Dept: CARDIOLOGY | Facility: HOSPITAL | Age: 69
Discharge: HOME | End: 2024-11-13
Attending: INTERNAL MEDICINE
Payer: MEDICARE

## 2024-11-13 VITALS
SYSTOLIC BLOOD PRESSURE: 130 MMHG | BODY MASS INDEX: 35.4 KG/M2 | WEIGHT: 239 LBS | HEIGHT: 69 IN | DIASTOLIC BLOOD PRESSURE: 80 MMHG | BODY MASS INDEX: 35.4 KG/M2 | WEIGHT: 239 LBS | HEIGHT: 69 IN | SYSTOLIC BLOOD PRESSURE: 130 MMHG | DIASTOLIC BLOOD PRESSURE: 80 MMHG

## 2024-11-13 DIAGNOSIS — I73.9 PAD (PERIPHERAL ARTERY DISEASE): ICD-10-CM

## 2024-11-13 LAB
LEFT ABI: 0.95
LEFT ARM BP: 130 MMHG
LEFT DORSALIS PEDIS: 110 MMHG
LEFT POSTERIOR TIBIAL: 124 MMHG
LEFT TBI: 0.28
LEFT TOE PRESSURE: 36 MMHG
RIGHT ABI: 0.59
RIGHT ARM BP: 105 MMHG
RIGHT DORSALIS PEDIS: 56 MMHG
RIGHT POSTERIOR TIBIAL: 77 MMHG

## 2024-11-13 PROCEDURE — 93925 LOWER EXTREMITY STUDY: CPT | Mod: 26,,, | Performed by: INTERNAL MEDICINE

## 2024-11-13 PROCEDURE — 93922 UPR/L XTREMITY ART 2 LEVELS: CPT

## 2024-11-13 PROCEDURE — 93925 LOWER EXTREMITY STUDY: CPT

## 2024-11-13 PROCEDURE — 93922 UPR/L XTREMITY ART 2 LEVELS: CPT | Mod: 26,,, | Performed by: INTERNAL MEDICINE

## 2024-11-13 NOTE — TELEPHONE ENCOUNTER
Patient walked in and he wanted to know if he could get pain medicine for his neck pain. I informed patient he would need to be scheduled a follow up appt in order to discuss pain medication being that the last time he was seen was in August. Pt verbalized understanding.    Lucien HOUGH

## 2024-11-14 ENCOUNTER — TELEPHONE (OUTPATIENT)
Dept: CARDIOLOGY | Facility: CLINIC | Age: 69
End: 2024-11-14
Payer: MEDICARE

## 2024-11-14 ENCOUNTER — TELEPHONE (OUTPATIENT)
Dept: PREADMISSION TESTING | Facility: HOSPITAL | Age: 69
End: 2024-11-14
Payer: MEDICARE

## 2024-11-14 DIAGNOSIS — E78.2 MIXED HYPERLIPIDEMIA: Primary | ICD-10-CM

## 2024-11-14 LAB
LEFT ANT TIBIAL SYS PSV: 22 CM/S
LEFT CFA PSV: 134 CM/S
LEFT PERONEAL SYS PSV: 169 CM/S
LEFT POPLITEAL PSV: 131 CM/S
LEFT POST TIBIAL SYS PSV: 60 CM/S
LEFT PROFUNDA SYS PSV: 101 CM/S
LEFT SUPER FEMORAL DIST SYS PSV: 109 CM/S
LEFT SUPER FEMORAL MID SYS PSV: 111 CM/S
LEFT SUPER FEMORAL OSTIAL SYS PSV: 124 CM/S
LEFT SUPER FEMORAL PROX SYS PSV: 242 CM/S
LEFT TIB/PER TRUNK SYS PSV: 104 CM/S
OHS CV LEFT LOWER EXTREMITY ABI (NO CALC): 0.95
OHS CV RIGHT ABI LOWER EXTREMITY (NO CALC): 0.59
RIGHT ANT TIBIAL SYS PSV: 36 CM/S
RIGHT CFA PSV: 182 CM/S
RIGHT PERONEAL SYS PSV: 20 CM/S
RIGHT POPLITEAL PSV: 58 CM/S
RIGHT POST TIBIAL SYS PSV: 49 CM/S
RIGHT PROFUNDA SYS PSV: 137 CM/S
RIGHT SUPER FEMORAL DIST SYS PSV: 127 CM/S
RIGHT SUPER FEMORAL MID SYS PSV: 115 CM/S
RIGHT SUPER FEMORAL OSTIAL SYS PSV: 139 CM/S
RIGHT SUPER FEMORAL PROX SYS PSV: 110 CM/S
RIGHT TIB/PER TRUNK SYS PSV: 161 CM/S

## 2024-11-14 NOTE — TELEPHONE ENCOUNTER
The patient had cardiac testing done yesterday. He is scheduled for an EGD on Tuesday, 11/19 which would require him to stop his Plavix today. We have entered an econsult for him, but it has not yet been resulted. Is he cleared to proceed with endoscopy and may he stop his Plavix x5 days for endoscopy?  
brother/mother/father

## 2024-11-15 ENCOUNTER — LAB VISIT (OUTPATIENT)
Dept: LAB | Facility: HOSPITAL | Age: 69
End: 2024-11-15
Attending: NURSE PRACTITIONER
Payer: MEDICARE

## 2024-11-15 DIAGNOSIS — K92.2 GASTROINTESTINAL HEMORRHAGE, UNSPECIFIED GASTROINTESTINAL HEMORRHAGE TYPE: ICD-10-CM

## 2024-11-15 LAB
BASOPHILS # BLD AUTO: 0.03 K/UL (ref 0–0.2)
BASOPHILS NFR BLD: 0.8 % (ref 0–1.9)
DIFFERENTIAL METHOD BLD: ABNORMAL
EOSINOPHIL # BLD AUTO: 0.1 K/UL (ref 0–0.5)
EOSINOPHIL NFR BLD: 3.6 % (ref 0–8)
ERYTHROCYTE [DISTWIDTH] IN BLOOD BY AUTOMATED COUNT: 14.7 % (ref 11.5–14.5)
HCT VFR BLD AUTO: 43 % (ref 40–54)
HGB BLD-MCNC: 14.3 G/DL (ref 14–18)
IMM GRANULOCYTES # BLD AUTO: 0.01 K/UL (ref 0–0.04)
IMM GRANULOCYTES NFR BLD AUTO: 0.3 % (ref 0–0.5)
LYMPHOCYTES # BLD AUTO: 1.1 K/UL (ref 1–4.8)
LYMPHOCYTES NFR BLD: 30.1 % (ref 18–48)
MCH RBC QN AUTO: 28.7 PG (ref 27–31)
MCHC RBC AUTO-ENTMCNC: 33.3 G/DL (ref 32–36)
MCV RBC AUTO: 86 FL (ref 82–98)
MONOCYTES # BLD AUTO: 0.4 K/UL (ref 0.3–1)
MONOCYTES NFR BLD: 10.4 % (ref 4–15)
NEUTROPHILS # BLD AUTO: 2 K/UL (ref 1.8–7.7)
NEUTROPHILS NFR BLD: 54.8 % (ref 38–73)
NRBC BLD-RTO: 0 /100 WBC
PLATELET # BLD AUTO: 146 K/UL (ref 150–450)
PMV BLD AUTO: 11.1 FL (ref 9.2–12.9)
RBC # BLD AUTO: 4.98 M/UL (ref 4.6–6.2)
WBC # BLD AUTO: 3.66 K/UL (ref 3.9–12.7)

## 2024-11-15 PROCEDURE — 85025 COMPLETE CBC W/AUTO DIFF WBC: CPT | Performed by: NURSE PRACTITIONER

## 2024-11-15 PROCEDURE — 36415 COLL VENOUS BLD VENIPUNCTURE: CPT | Performed by: NURSE PRACTITIONER

## 2024-11-19 ENCOUNTER — HOSPITAL ENCOUNTER (OUTPATIENT)
Facility: HOSPITAL | Age: 69
Discharge: HOME | End: 2024-11-19
Attending: INTERNAL MEDICINE | Admitting: INTERNAL MEDICINE
Payer: MEDICARE

## 2024-11-19 ENCOUNTER — ANESTHESIA EVENT (OUTPATIENT)
Dept: ENDOSCOPY | Facility: HOSPITAL | Age: 69
End: 2024-11-19
Payer: MEDICARE

## 2024-11-19 ENCOUNTER — ANESTHESIA (OUTPATIENT)
Dept: ENDOSCOPY | Facility: HOSPITAL | Age: 69
End: 2024-11-19
Payer: MEDICARE

## 2024-11-19 DIAGNOSIS — K55.20 AVM (ARTERIOVENOUS MALFORMATION) OF SMALL BOWEL, ACQUIRED: ICD-10-CM

## 2024-11-19 PROCEDURE — 27202087 HC PROBE, APC: Performed by: INTERNAL MEDICINE

## 2024-11-19 PROCEDURE — 37000009 HC ANESTHESIA EA ADD 15 MINS: Performed by: INTERNAL MEDICINE

## 2024-11-19 PROCEDURE — 44369 SMALL BOWEL ENDOSCOPY: CPT | Performed by: INTERNAL MEDICINE

## 2024-11-19 PROCEDURE — 63600175 PHARM REV CODE 636 W HCPCS: Performed by: NURSE ANESTHETIST, CERTIFIED REGISTERED

## 2024-11-19 PROCEDURE — 27202363 HC INJECTION AGENT, SUBMUCOSAL, ANY: Performed by: INTERNAL MEDICINE

## 2024-11-19 PROCEDURE — 44799 UNLISTED PX SMALL INTESTINE: CPT | Performed by: INTERNAL MEDICINE

## 2024-11-19 PROCEDURE — 44369 SMALL BOWEL ENDOSCOPY: CPT | Mod: ,,, | Performed by: INTERNAL MEDICINE

## 2024-11-19 PROCEDURE — 44799 UNLISTED PX SMALL INTESTINE: CPT | Mod: ,,, | Performed by: INTERNAL MEDICINE

## 2024-11-19 PROCEDURE — 27201028 HC NEEDLE, SCLERO: Performed by: INTERNAL MEDICINE

## 2024-11-19 PROCEDURE — 25000003 PHARM REV CODE 250: Performed by: INTERNAL MEDICINE

## 2024-11-19 PROCEDURE — 37000008 HC ANESTHESIA 1ST 15 MINUTES: Performed by: INTERNAL MEDICINE

## 2024-11-19 RX ORDER — PROPOFOL 10 MG/ML
VIAL (ML) INTRAVENOUS
Status: DISCONTINUED | OUTPATIENT
Start: 2024-11-19 | End: 2024-11-19

## 2024-11-19 RX ORDER — DEXTROMETHORPHAN/PSEUDOEPHED 2.5-7.5/.8
DROPS ORAL
Status: COMPLETED | OUTPATIENT
Start: 2024-11-19 | End: 2024-11-19

## 2024-11-19 RX ADMIN — PROPOFOL 20 MG: 10 INJECTION, EMULSION INTRAVENOUS at 02:11

## 2024-11-19 RX ADMIN — PROPOFOL 100 MG: 10 INJECTION, EMULSION INTRAVENOUS at 02:11

## 2024-11-19 RX ADMIN — PROPOFOL 50 MG: 10 INJECTION, EMULSION INTRAVENOUS at 02:11

## 2024-11-19 RX ADMIN — PROPOFOL 30 MG: 10 INJECTION, EMULSION INTRAVENOUS at 02:11

## 2024-11-19 NOTE — PROVATION PATIENT INSTRUCTIONS
Discharge Summary/Instructions after an Endoscopic Procedure  Patient Name: Abdullahi Urias  Patient MRN: [id number]  Patient YOB: 1955 Tuesday, November 19, 2024 Kayleigh Leiva MD  Dear patient,  As a result of recent federal legislation (The Federal Cures Act), you may   receive lab or pathology results from your procedure in your [facility]   account before your physician is able to contact you. Your physician or   their representative will relay the results to you with their   recommendations at their soonest availability.  Thank you,  RESTRICTIONS:  During your procedure today, you received medications for sedation.  These   medications may affect your judgment, balance and coordination.  Therefore,   for 24 hours, you have the following restrictions:   - DO NOT drive a car, operate machinery, make legal/financial decisions,   sign important papers or drink alcohol.    ACTIVITY:  Today: no heavy lifting, straining or running due to procedural   sedation/anesthesia.  The following day: return to full activity including work.  DIET:  Eat and drink normally unless instructed otherwise.     TREATMENT FOR COMMON SIDE EFFECTS:  - Mild abdominal pain, nausea, belching, bloating or excessive gas:  rest,   eat lightly and use a heating pad.  - Sore Throat: treat with throat lozenges and/or gargle with warm salt   water.  - Because air was used during the procedure, expelling large amounts of air   from your rectum or belching is normal.  - If a bowel prep was taken, you may not have a bowel movement for 1-3 days.    This is normal.  SYMPTOMS TO WATCH FOR AND REPORT TO YOUR PHYSICIAN:  1. Abdominal pain or bloating, other than gas cramps.  2. Chest pain.  3. Back pain.  4. Signs of infection such as: chills or fever occurring within 24 hours   after the procedure.  5. Rectal bleeding, which would show as bright red, maroon, or black stools.   (A tablespoon of blood from the rectum is not serious, especially if    hemorrhoids are present.)  6. Vomiting.  7. Weakness or dizziness.  GO DIRECTLY TO THE NEAREST EMERGENCY ROOM IF YOU HAVE ANY OF THE FOLLOWING:      Difficulty breathing              Chills and/or fever over 101 F   Persistent vomiting and/or vomiting blood   Severe abdominal pain   Severe chest pain   Black, tarry stools   Bleeding- more than one tablespoon   Any other symptom or condition that you feel may need urgent attention  Your doctor recommends these additional instructions:  If any biopsies were taken, your doctors clinic will contact you in 1 to 2   weeks with any results.  - Discharge patient to home (with escort).   - Resume previous diet.   - Continue present medications.   - Resume Plavix (clopidogrel) at prior dose tomorrow.  Refer to referring   physician for further adjustment of therapy.   - Continue Nexium at current dose.   - Return to GI clinic with TESSY Barraza.  - Consider repeating labs: CBC and iron studies in 3 months.   - Return to primary care physician.  For questions, problems or results please call your physician Kayleigh Leiva MD at Work:  [phone]  If you have any questions about the above instructions, call the GI   department at [phone] or call the endoscopy unit at [phone]   from 7am until 3 pm.  [facility], EMERGENCY ROOM PHONE NUMBER:   [phone]  IF A COMPLICATION OR EMERGENCY SITUATION ARISES AND YOU ARE UNABLE TO REACH   YOUR PHYSICIAN - GO DIRECTLY TO THE EMERGENCY ROOM.  I have read or have had read to me these discharge instructions for my   procedure and have received a written copy.  I understand these   instructions and will follow-up with my physician if I have any questions.     __________________________________       _____________________________________  Nurse Signature                                          Patient/Designated   Responsible Party Signature  MD Kayleigh Morgan,  MD  11/19/2024 3:09:41 PM  This report has been verified and signed electronically.  Dear patient,  As a result of recent federal legislation (The Federal Cures Act), you may   receive lab or pathology results from your procedure in your [facility]   account before your physician is able to contact you. Your physician or   their representative will relay the results to you with their   recommendations at their soonest availability.  Thank you,  PROVATION

## 2024-11-19 NOTE — ANESTHESIA PREPROCEDURE EVALUATION
11/19/2024  Abdullahi Urias is a 69 y.o., male.      Pre-op Assessment    I have reviewed the Patient Summary Reports.    I have reviewed the NPO Status.   I have reviewed the Medications.     Review of Systems  Anesthesia Hx:  No problems with previous Anesthesia                Social:  Former Smoker       Hematology/Oncology:                                  Oncology Comments: Prostate     Cardiovascular:     Hypertension, well controlled Valvular problems/Murmurs (S/P AVR), AS  CAD          PVD    ECG has been reviewed.                            Pulmonary:    Asthma mild                   Renal/:  Chronic Renal Disease, CKD                Hepatic/GI:     GERD, well controlled Liver Disease, Hepatitis, C                  Physical Exam  General: Well nourished    Airway:  Mallampati: II   Mouth Opening: Normal  TM Distance: Normal  Tongue: Normal  Neck ROM: Normal ROM    Dental:  Intact    Chest/Lungs:  Normal Respiratory Rate    Heart:  Rate: Normal  Rhythm: Regular Rhythm    Anesthesia Plan  Type of Anesthesia, risks & benefits discussed:    Anesthesia Type: MAC  Intra-op Monitoring Plan: Standard ASA Monitors  Post Op Pain Control Plan: multimodal analgesia  Induction:  IV  Informed Consent: Informed consent signed with the Patient and all parties understand the risks and agree with anesthesia plan.  All questions answered.   ASA Score: 3  Day of Surgery Review of History & Physical: H&P Update referred to the surgeon/provider.    Ready For Surgery From Anesthesia Perspective.   .

## 2024-11-19 NOTE — PLAN OF CARE
Dr. Leiva  at bedside discussing findings. VSS. Patient alert. No N/V. No bleeding, no pain. Patient released from unit.

## 2024-11-19 NOTE — H&P
PRE PROCEDURE H&P    Patient Name: Abdullahi Urias  MRN: [id number]  : 1955  Date of Procedure:  2024  Referring Physician: Gwen Tony NP  Primary Physician: Reji Valentin MD  Procedure Physician: Kayleigh Leiva MD       Planned Procedure: Small Bowel Enteroscopy  Diagnosis:   positive VCE    Chief Complaint: Same as above    HPI: Patient is an 69 y.o. male is here for the above. Recent VCE positive for active bleeding. Denies overt GI bleeding. Plavix held 24    Anticoagulation: ASA and Plavix    Past Medical History:   Past Medical History:   Diagnosis Date    Aortic stenosis     [facility]    Asthma     BPH (benign prostatic hyperplasia)     CAD (coronary artery disease)     Cardiomyopathy     CHF (congestive heart failure)     Chronic hoarseness     vocal cord surg    Chronic pain     CKD (chronic kidney disease) stage 3, GFR 30-59 ml/min     CVA (cerebral infarction)     2012 olol; reviewed ed note    Ex-smoker     GERD (gastroesophageal reflux disease)     Hepatitis C     treatedharvoni says cured, RNA NEG 2020    Hypertension     Pancreatitis     Prostate cancer     Prostate cancer     Prostate cancer     PVD (peripheral vascular disease)     Renal insufficiency     Substance abuse     cocaine, etoh , tob in past        Past Surgical History:  Past Surgical History:   Procedure Laterality Date    AORTIC VALVE REPLACEMENT  2014    Tissue valve replacement    BACK SURGERY      CARDIAC CATHETERIZATION      COLONOSCOPY      COLONOSCOPY N/A 2021    Procedure: COLONOSCOPY;  Surgeon: Faith Carrillo MD;  Location: [facility];  Service: Endoscopy;  Laterality: N/A;    COLONOSCOPY N/A 2024    Procedure: COLONOSCOPY  plavix 5 day hold econ;  Surgeon: Prince Griffin MD;  Location: [facility];  Service: Endoscopy;  Laterality: N/A;    EPIDURAL STEROID INJECTION INTO CERVICAL SPINE N/A 2022    Procedure: C7-T1 IL PIEDAD;  Surgeon: Nehemiah Carmen MD;   Location: [facility] PAIN MGT;  Service: Pain Management;  Laterality: N/A;    EPIDURAL STEROID INJECTION INTO CERVICAL SPINE N/A 3/12/2024    Procedure: C7/T1 IL PIEDAD;  Surgeon: Nehemiah Carmen MD;  Location: [facility] PAIN MGT;  Service: Pain Management;  Laterality: N/A;    ESOPHAGOGASTRODUODENOSCOPY N/A 2/24/2021    Procedure: ESOPHAGOGASTRODUODENOSCOPY (EGD);  Surgeon: Faith Carrillo MD;  Location: [facility] ENDO;  Service: Endoscopy;  Laterality: N/A;    ESOPHAGOGASTRODUODENOSCOPY N/A 7/2/2024    Procedure: EGD (ESOPHAGOGASTRODUODENOSCOPY);  Surgeon: Prince Griffin MD;  Location: [facility] ENDO;  Service: Endoscopy;  Laterality: N/A;    FOOT SURGERY      INSERTION N/A 7/5/2023    Procedure: INSERTION;  Surgeon: Brandon Valencia MD;  Location: [facility] OR;  Service: Neurosurgery;  Laterality: N/A;  Antibiotic Beads    INTRALUMINAL GASTROINTESTINAL TRACT IMAGING VIA CAPSULE N/A 11/1/2024    Procedure: IMAGING PROCEDURE, GI TRACT, INTRALUMINAL, VIA CAPSULE;  Surgeon: Beeson, First Available;  Location: [facility] ENDO;  Service: Endoscopy;  Laterality: N/A;    LEFT HEART CATHETERIZATION Left 7/24/2020    Procedure: CATHETERIZATION, HEART, LEFT;  Surgeon: Pasha Martin MD;  Location: [facility] CATH LAB;  Service: Cardiology;  Laterality: Left;    PENILE PROSTHESIS IMPLANT      PENILE PROSTHESIS REVISION  04/30/2018    PROSTATECTOMY  09/2019    [facility]    radiation for prostate      REMOVAL OF HARDWARE FROM SPINE N/A 7/5/2023    Procedure: REMOVAL, HARDWARE, SPINE;  Surgeon: Brandon Valencia MD;  Location: [facility] OR;  Service: Neurosurgery;  Laterality: N/A;    REPLACEMENT N/A 7/5/2023    Procedure: REPLACEMENT;  Surgeon: Brandon Valencia MD;  Location: [facility] OR;  Service: Neurosurgery;  Laterality: N/A;  of [employer]. [employer]    REVISION OF PROCEDURE INVOLVING INFLATABLE PENILE PROSTHESIS N/A 7/22/2024    Procedure: REVISION, PROCEDURE INVOLVING INFLATABLE PENILE PROSTHESIS;  Surgeon: Rommel Rodriguez MD;   Location: [facility];  Service: Urology;  Laterality: N/A;    TRANSFORAMINAL EPIDURAL INJECTION OF STEROID Right 10/20/2022    Procedure: Right  L4/5 + L5/S1 TF PIEDAD RN IV Sedation;  Surgeon: Nehemiah Carmen MD;  [facility];  Service: Pain Management;  Laterality: Right;    TRANSFORAMINAL LUMBAR INTERBODY FUSION (TLIF) USING COMPUTER-ASSISTED NAVIGATION Bilateral 5/17/2023    Procedure: FUSION, SPINE, LUMBAR, TLIF, USING COMPUTER-ASSISTED NAVIGATION;  Surgeon: Brandon Valencia MD;  Location: [facility];  Service: Neurosurgery;  Laterality: Bilateral;  2 level lumbar fusion at L4-5 and L5-S1    UPPER GASTROINTESTINAL ENDOSCOPY  2011    WASHOUT N/A 7/5/2023    Procedure: WASHOUT;  Surgeon: Brandon Valencia MD;  Location: [facility];  Service: Neurosurgery;  Laterality: N/A;    WOUND EXPLORATION Bilateral 7/5/2023    Procedure: EXPLORATION, WOUND;  Surgeon: Brandon Valencia MD;  Location: [facility] OR;  Service: Neurosurgery;  Laterality: Bilateral;        Home Medications:  Prior to Admission medications    Medication Sig Start Date End Date Taking? Authorizing Provider   allopurinoL (ZYLOPRIM) 100 MG tablet Take 100 mg by mouth once daily. Takes 0.5 tab   Yes Provider, Historical   ALPRAZolam (XANAX) 1 MG tablet Take 1 mg by mouth Daily. EVERY OTHER DAY   Yes Provider, Historical   aspirin (ECOTRIN) 81 MG EC tablet Take 1 tablet (81 mg total) by mouth once daily. 7/19/22  Yes Jeffery Mckeon MD   atorvastatin (LIPITOR) 80 MG tablet  7/23/21  Yes Provider, Historical   carvediloL (COREG) 12.5 MG tablet Take 1 tablet (12.5 mg total) by mouth 2 (two) times daily. 5/28/21  Yes Brijesh Delatorre MD   docusate sodium (COLACE) 100 MG capsule Take 1 capsule (100 mg total) by mouth 2 (two) times daily. 5/30/23  Yes Yazmin Farfan PA-C   esomeprazole (NEXIUM) 40 MG capsule Take 40 mg by mouth before breakfast.   Yes Provider, Historical   ferrous sulfate (FEOSOL) 325 mg (65 mg iron) Tab tablet Take 1 tablet (325 mg total) by  mouth daily with breakfast. 6/20/24 12/17/24 Yes Scarlet Duarte PA-C   fluticasone-umeclidin-vilanter (TRELEGY ELLIPTA) 200-62.5-25 mcg inhaler Inhale 1 puff into the lungs once daily. 4/18/24  Yes Radha Martinez PA-C   gabapentin (NEURONTIN) 600 MG tablet Take 1 tablet (600 mg total) by mouth 3 (three) times daily. 1/9/23 11/19/24 Yes Khadiajh Machado PA-C   losartan (COZAAR) 50 MG tablet Take 1 tablet (50 mg total) by mouth once daily. 7/22/21  Yes Jeffery Mckeon MD   multivitamin (THERAGRAN) per tablet Take 1 tablet by mouth once daily.   Yes Provider, Historical   sertraline (ZOLOFT) 100 MG tablet TAKE TWO TABLETS BY MOUTH EVERY DAY FOR MENTAL HEALTH DOSE INCREASED TO 200MG/DAY 9/30/20  Yes Provider, Historical   albuterol (PROVENTIL/VENTOLIN HFA) 90 mcg/actuation inhaler INHALE 2 PUFFS INTO THE LUNGS EVERY 6 HOURS AS NEEDED FOR COUGH 7/18/24   Radha Martinez PA-C   aluminum & magnesium hydroxide-simethicone (MYLANTA MAX STRENGTH) 400-400-40 mg/5 mL suspension TAKE 15ML BY MOUTH FOUR TIMES A DAY AS NEEDED FOR STOMACH ACID 6/11/20   Provider, Historical   clopidogreL (PLAVIX) 75 mg tablet Take 1 tablet (75 mg total) by mouth once daily. 12/4/23 12/3/24  Demetri So MD   diclofenac sodium (VOLTAREN) 1 % Gel Apply 2 g topically 2 (two) times daily. for 7 days 6/20/24 6/27/24  Scarlet Duarte PA-C   hydrocortisone 2.5 % cream Apply topically 2 (two) times daily as needed. Apply to affected areas of the face and neck. 3/26/20   Thom De La Rosa MD   hydrOXYzine HCL (ATARAX) 25 MG tablet Take 1 tablet (25 mg total) by mouth 3 (three) times daily as needed for Itching. 8/9/23   Olinde, Kristene W., PA-C   ipratropium (ATROVENT) 21 mcg (0.03 %) nasal spray 2 sprays by Each Nostril route 2 (two) times daily. 8/18/23   Radha Martinez PA-C   oxyCODONE (ROXICODONE) 10 mg Tab immediate release tablet Take 1 tablet (10 mg total) by mouth every 6 (six) hours as needed for Pain. 8/7/24   Jennifer,  "Rommel LOZANO MD   oxyCODONE-acetaminophen (PERCOCET) 5-325 mg per tablet Take 1 tablet by mouth every 4 (four) hours as needed for Pain. 24   Brandon Valencia MD        Allergies:  Review of patient's allergies indicates:  No Known Allergies     Social History:   Social History     Socioeconomic History    Marital status:    Tobacco Use    Smoking status: Former     Current packs/day: 0.00     Average packs/day: 1 pack/day for 8.0 years (8.0 ttl pk-yrs)     Types: Cigarettes     Start date: 2004     Quit date: 2012     Years since quittin.2    Smokeless tobacco: Never   Substance and Sexual Activity    Alcohol use: Not Currently     Comment: Sober since 2012    Drug use: Not Currently     Types: "Crack" cocaine, Marijuana     Comment: Quit in     Sexual activity: Yes   Social History Narrative    No pets in household, wife and daughter smokers. Former [employer].    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; as of  retired     Social Drivers of Health     Financial Resource Strain: Low Risk  (2023)    Overall Financial Resource Strain (CARDIA)     Difficulty of Paying Living Expenses: Not very hard   Food Insecurity: No Food Insecurity (10/31/2024)    Hunger Vital Sign     Worried About Running Out of Food in the Last Year: Never true     Ran Out of Food in the Last Year: Never true   Transportation Needs: No Transportation Needs (2023)    PRAPARE - Transportation     Lack of Transportation (Medical): No     Lack of Transportation (Non-Medical): No   Physical Activity: Insufficiently Active (2023)    Exercise Vital Sign     Days of Exercise per Week: 3 days     Minutes of Exercise per Session: 20 min   Stress: Stress Concern Present (2023)    [facility] - Occupational Stress Questionnaire     Feeling of Stress : Very much   Housing Stability: Low Risk  (2023)    Housing Stability Vital Sign     Unable to Pay for Housing in " "the Last Year: No     Number of Places Lived in the Last Year: 1     Unstable Housing in the Last Year: No       Family History:  Family History   Problem Relation Name Age of Onset    Heart disease Mother      Heart disease Father      Heart attack Sister      Heart attack Brother      Colon cancer Neg Hx      Colon polyps Neg Hx      Liver cancer Neg Hx      Inflammatory bowel disease Neg Hx      Liver disease Neg Hx      Rectal cancer Neg Hx      Stomach cancer Neg Hx      Ulcerative colitis Neg Hx         ROS: No acute cardiac events, no acute respiratory complaints.     Physical Exam (all patients):    /80 (BP Location: Left arm, Patient Position: Lying)   Pulse 78   Temp 98.4 °F (36.9 °C) (Temporal)   Resp 16   Ht 5' 9" (1.753 m)   Wt 108.4 kg (239 lb)   SpO2 97%   BMI 35.29 kg/m²   Lungs: Clear to auscultation bilaterally, respirations unlabored  Heart: Regular rate and rhythm, S1 and S2 normal, no obvious murmurs  Abdomen:         Soft, non-tender, bowel sounds normal, no masses, no organomegaly    Lab Results   Component Value Date    WBC 3.66 (L) 11/15/2024    MCV 86 11/15/2024    RDW 14.7 (H) 11/15/2024     (L) 11/15/2024    INR 0.9 09/04/2024    GLU 96 09/04/2024    HGBA1C 5.8 (H) 01/22/2024    BUN 20 09/04/2024     09/04/2024    K 4.6 09/04/2024     09/04/2024        SEDATION PLAN: per anesthesia      History reviewed, vital signs satisfactory, cardiopulmonary status satisfactory, sedation options, risks and plans have been discussed with the patient  All their questions were answered and the patient agrees to the sedation procedures as planned and the patient is deemed an appropriate candidate for the sedation as planned.    The risks, benefits and alternatives of the procedure were discussed with the patient in detail. This discussion was had in the presence of endoscopy staff. The risks include, risks of adverse reaction to sedation requiring the use of reversal " agents, bleeding requiring blood transfusion, perforation requiring surgical intervention and technical failure. Other risks include aspiration leading to respiratory distress and respiratory failure resulting in endotracheal intubation and mechanical ventilation including death. If anesthesia is being utilized for this procedure, it is up to the anesthesiologist to determine airway safety including elective endotracheal intubation. Questions were answered, they agree to proceed. There was no language barriers.      Procedure explained to patient, informed consent obtained and placed in chart.    Kayleigh Leiva  11/19/2024  2:38 PM

## 2024-11-19 NOTE — ANESTHESIA POSTPROCEDURE EVALUATION
Anesthesia Post Evaluation    Patient: Abdullahi Urias    Procedure(s) Performed: Procedure(s) (LRB):  EGD (ESOPHAGOGASTRODUODENOSCOPY) (N/A)    Final Anesthesia Type: MAC      Patient location during evaluation: PACU  Patient participation: Yes- Able to Participate  Level of consciousness: awake  Post-procedure vital signs: reviewed and stable  Pain management: adequate  Airway patency: patent    PONV status at discharge: No PONV  Anesthetic complications: no      Cardiovascular status: blood pressure returned to baseline and hemodynamically stable  Respiratory status: unassisted and spontaneous ventilation  Hydration status: euvolemic  Follow-up not needed.                No case tracking events are documented in the log.      Pain/Shoshana Score: No data recorded

## 2024-11-19 NOTE — TRANSFER OF CARE
"Anesthesia Transfer of Care Note    Patient: Abdullahi Urias    Procedure(s) Performed: Procedure(s) (LRB):  EGD (ESOPHAGOGASTRODUODENOSCOPY) (N/A)    Patient location: PACU    Anesthesia Type: MAC    Transport from OR: Transported from OR on room air with adequate spontaneous ventilation    Post pain: adequate analgesia    Post assessment: no apparent anesthetic complications and tolerated procedure well    Post vital signs: stable    Level of consciousness: sedated    Nausea/Vomiting: no nausea/vomiting    Complications: none    Transfer of care protocol was followed    Last vitals: Visit Vitals  /80 (BP Location: Left arm, Patient Position: Lying)   Pulse 78   Temp 36.9 °C (98.4 °F) (Temporal)   Resp 16   Ht 5' 9" (1.753 m)   Wt 108.4 kg (239 lb)   SpO2 97%   BMI 35.29 kg/m²     "

## 2024-11-20 VITALS
OXYGEN SATURATION: 94 % | HEART RATE: 74 BPM | SYSTOLIC BLOOD PRESSURE: 126 MMHG | BODY MASS INDEX: 35.4 KG/M2 | TEMPERATURE: 97 F | RESPIRATION RATE: 17 BRPM | DIASTOLIC BLOOD PRESSURE: 63 MMHG | WEIGHT: 239 LBS | HEIGHT: 69 IN

## 2024-11-21 ENCOUNTER — TELEPHONE (OUTPATIENT)
Dept: PAIN MEDICINE | Facility: CLINIC | Age: 69
End: 2024-11-21
Payer: MEDICARE

## 2024-11-21 NOTE — TELEPHONE ENCOUNTER
Called patient in regards to appt request but patient didn't answer the phone LVM to call back at earliest convenience.    Lucien HOUGH

## 2024-12-04 DIAGNOSIS — N18.32 STAGE 3B CHRONIC KIDNEY DISEASE: ICD-10-CM

## 2024-12-09 ENCOUNTER — LAB VISIT (OUTPATIENT)
Dept: LAB | Facility: HOSPITAL | Age: 69
End: 2024-12-09
Attending: INTERNAL MEDICINE
Payer: MEDICARE

## 2024-12-09 ENCOUNTER — TELEPHONE (OUTPATIENT)
Dept: PAIN MEDICINE | Facility: CLINIC | Age: 69
End: 2024-12-09
Payer: MEDICARE

## 2024-12-09 DIAGNOSIS — N18.30 STAGE 3 CHRONIC KIDNEY DISEASE, UNSPECIFIED WHETHER STAGE 3A OR 3B CKD: ICD-10-CM

## 2024-12-09 LAB
CREAT UR-MCNC: 126 MG/DL (ref 23–375)
PROT UR-MCNC: 9 MG/DL (ref 0–15)
PROT/CREAT UR: 0.07 MG/G{CREAT} (ref 0–0.2)

## 2024-12-09 PROCEDURE — 82570 ASSAY OF URINE CREATININE: CPT | Mod: HCNC | Performed by: INTERNAL MEDICINE

## 2024-12-09 NOTE — TELEPHONE ENCOUNTER
Called patient to get him scheduled for an appt to discuss med but patient didn't answer the phone LVM to call back at earliest convenience.    Lucien HOUGH

## 2024-12-10 RX ORDER — CLOPIDOGREL BISULFATE 75 MG/1
75 TABLET ORAL DAILY
Qty: 30 TABLET | Refills: 11 | Status: SHIPPED | OUTPATIENT
Start: 2024-12-10 | End: 2025-12-10

## 2024-12-12 ENCOUNTER — TELEPHONE (OUTPATIENT)
Dept: PULMONOLOGY | Facility: CLINIC | Age: 69
End: 2024-12-12
Payer: MEDICARE

## 2024-12-12 NOTE — TELEPHONE ENCOUNTER
Called pt to reschedule pulm appts due to provider being out that time/day, appts re/s and pt verbalized understanding

## 2024-12-13 ENCOUNTER — HOSPITAL ENCOUNTER (OUTPATIENT)
Dept: RADIOLOGY | Facility: HOSPITAL | Age: 69
Discharge: HOME | End: 2024-12-13
Attending: PHYSICIAN ASSISTANT
Payer: MEDICARE

## 2024-12-13 ENCOUNTER — OFFICE VISIT (OUTPATIENT)
Dept: PAIN MEDICINE | Facility: CLINIC | Age: 69
End: 2024-12-13
Payer: MEDICARE

## 2024-12-13 VITALS
RESPIRATION RATE: 17 BRPM | WEIGHT: 244.69 LBS | BODY MASS INDEX: 36.24 KG/M2 | DIASTOLIC BLOOD PRESSURE: 89 MMHG | HEIGHT: 69 IN | HEART RATE: 83 BPM | SYSTOLIC BLOOD PRESSURE: 139 MMHG

## 2024-12-13 DIAGNOSIS — M54.16 LUMBAR RADICULOPATHY, CHRONIC: Primary | ICD-10-CM
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
## 2024-12-13 DIAGNOSIS — M50.30 DDD (DEGENERATIVE DISC DISEASE), CERVICAL: ICD-10-CM

## 2024-12-13 DIAGNOSIS — M54.12 CERVICAL RADICULOPATHY: ICD-10-CM

## 2024-12-13 DIAGNOSIS — M54.2 CERVICALGIA: ICD-10-CM

## 2024-12-13 DIAGNOSIS — M96.842 POSTOPERATIVE SEROMA OF MUSCULOSKELETAL STRUCTURE AFTER MUSCULOSKELETAL PROCEDURE: ICD-10-CM

## 2024-12-13 PROCEDURE — 99999 PR PBB SHADOW E&M-EST. PATIENT-LVL V: CPT | Mod: PBBFAC,HCNC,, | Performed by: PHYSICIAN ASSISTANT

## 2024-12-13 PROCEDURE — 73590 X-RAY EXAM OF LOWER LEG: CPT | Mod: 26,HCNC,RT, | Performed by: RADIOLOGY

## 2024-12-13 PROCEDURE — 73590 X-RAY EXAM OF LOWER LEG: CPT | Mod: TC,HCNC,RT

## 2024-12-13 RX ORDER — TIZANIDINE 4 MG/1
4 TABLET ORAL EVERY 8 HOURS PRN
Qty: 90 TABLET | Refills: 0 | Status: SHIPPED | OUTPATIENT
Start: 2024-12-13 | End: 2025-01-12

## 2024-12-13 RX ORDER — HYDROCODONE BITARTRATE AND ACETAMINOPHEN 7.5; 325 MG/1; MG/1
TABLET ORAL
Qty: 21 TABLET | Refills: 0 | Status: SHIPPED | OUTPATIENT
Start: 2024-12-13

## 2024-12-13 RX ORDER — PREGABALIN 75 MG/1
CAPSULE ORAL
Qty: 60 CAPSULE | Refills: 0 | Status: SHIPPED | OUTPATIENT
Start: 2024-12-13 | End: 2025-02-11

## 2024-12-13 NOTE — PROGRESS NOTES
Established Patient Chronic Pain Note (Follow up visit)    PCP: Reji Valentin MD    Chief Complaint:   Chief Complaint   Patient presents with    Neck Pain     Patient has pain inneck/shoulder area and pain in the lower back that radiates into legs.  Pain level 8/10        SUBJECTIVE:    Interval History (12/13/2024): Abdullahi Urias presents today for follow-up visit.  Patient was last seen on 8/14/2024. At that visit, the plan was to reach out to IR to address biopsy of seroma in lumbar spine. Patient reports pain as 8/10 today. He has pain in the neck radiating into shoulders and also lower back pain.     On 11/30/24, he fell off the ladder while putting up [place] decorations. He did not seek medical attention afterwards. He has swelling and scabbing present to anterior R calf. States it was very swollen at onset.   Patient denies LOC. He fell about 4-5 feet.    Patient did follow up with IR and had biopsy of seroma. Results did not yield any abnormal growth. Patient states he did not follow up with anyone after this was completed.    Patient denies weight loss, fevers, chills, syncope, or LOC.    Interval History (8/14/2024):    Abdullahi Urias is a 69 y.o. male presents today for follow-up lower back and right leg pain.  Current pain intensity is 7/10.  He continues to utilize tizanidine and gabapentin without any difficulty.  He has had piriformis trigger point injections in the past that were beneficial.  He recently saw Neurosurgery who was concerned about the seroma and has requested IR assistance for CT-guided biopsy/aspiration of the seroma near the surgical site.  This is yet to be completed unfortunately.    Patient is interested in repeat cervical PIEDAD as this was extremely beneficial for the past 5 months.    Patient denies night fever/night sweats, urinary incontinence, bowel incontinence, significant weight loss and significant motor weakness.   Patient denies any other complaints or concerns at  this time.      Pain Disability Index Review:      12/13/2024    11:47 AM 8/14/2024     9:14 AM 4/9/2024    10:26 AM   Last 3 PDI Scores   Pain Disability Index (PDI) 56 [phone] 28     Interval HPI 04/09/2024:  Patient Abdullahi Urias presents today for follow-up visit.  Patient was last seen on  3/12/2024 C7/T1 IL PIEDAD with 95% relief.  Neck pain is doing much better and he is no longer having any numbness or tingling in the neck.  He rates his pain today a 4/10.  He does still have some lower back pain which does not radiate.  He continues to follow up with  following his laminectomy.    Interval History (2/26/2024):  Patient Abdullahi Urias presents today for follow-up visit.  Patient is seen seen today for review of his cervical MRI.  He has neck pain which radiates down into the bilateral shoulders with a tingling sensation.  He rates his pain today a 5/10 but states it will go up to a 9/10 especially with flexion of the neck.  He is currently taking gabapentin 600 mg twice a day.  He does take oxycodone sparingly if needed and is requesting a refill.    He also has chronic lumbar radiculopathy and has been seeing Dr. Wilkinson, he reports this is stable currently.    Interval History (3/6/2023):  Patient presents today for follow-up visit.  Patient was last seen on 1/9/23.  Patient reports pain as 7/10 today.  Patients is here today discuss neck pain with radicular symptoms.   Has tingling in R shoulder>L shoulder and will radiate up to his neck but other times it will radiate up to face or down arm. This has been ongoing for one month now. Although he does not notice pain radiating down his arms, he has discomfort in both sides of his neck.   He will c/p lower back pain if he is standing or bending for a long period of time.     Since his last office visit, he underwent a TLIF at L4-S1 with Dr. Valencia on 5/17/23 followed by a wound exploration on 7/5/23. He is doing well at this time (in regards to his lower  back).      Interval History (1/9/2023): Abdullahi Urias presents today for follow-up visit.  he underwent right L4/5 +L5/S1 TF PIEDAD on 10/20/22.  The patient reports that he is/was better following the procedure.  he reports 40% pain relief.  The changes lasted for a few weeks before the pain insidiously returned.  Patient reports pain as 9/10 today. Also has history of vasculogenic claudication. Recently went on a cruise, needed a wheelchair to get on the ship. Continues to report low back pain with radiation into his right lower extremity along L4/5-S1 distribution with associated numbness in the right foot.      Interval History (10/4/2022): Abdullahi Urias presents today for follow-up visit.  he underwent C7/T1 IL PIEDAD on 06/21/2022.  The patient reports that he is/was better following the procedure.  he reports 60% pain relief.  The changes lasted 6 weeks before neck stiffness and arm numbness returned. Patient reports pain as 8/10 today.    Today he reports his primary complaint is low back pain with radiation into right leg into the posterior and lateral aspect along L4-S1 distribution. Reports back pain is worse than neck pain at this time. Also has history of vasculogenic claudication. Patient reports pain is causing him to be depressed and anxious. Pain interferes with daily activities. Utilizes [facility] rollator [facility] sc[facility]. Recently went on a cruise, needed to use a wheelchair due to weakness and pain.    Interval HPI  Abdullahi Urias is a 68 y.o. male who presents to the clinic for a follow-up.  He reports that his back pain is much improved ever since undergoing bilateral piriformis trigger point injections.  He wants to focus more on his neck and upper extremity pain currently.  Since the last visit, Abdullahi Urias states the pain has been worsening. Current pain intensity is 8/10.    Interval HPI 01/27/2022:  Abdullahi Urias is a 66 y.o. male who presents to the clinic for the evaluation of lower  back pain.  He is self referred.  He has past medical history of CVA, substance abuse, asthma, CHF, hypertension, CAD, cardiomyopathy, renal insufficiency, CKD stage 3, H/O hepatitis-C, H/O prostate cancer, multiple other medical comorbidities as listed in his chart.  The pain started several years ago and symptoms have been worsening.The pain is located in the lumbosacral area and radiates to the bilateral lower extremities extending posteriorly to the bilateral feet.  Of note, patient recently had cardiovascular studies done on his lower extremities which did demonstrate vasculogenic claudication.  The pain is described as Aching, burning, tingling and is rated as 8/10. The pain is rated with a score of  6/10 on the BEST day and a score of 10/10 on the WORST day.  Symptoms interfere with daily activity. The pain is exacerbated by activities.  The pain is mitigated by medications.        Non-Pharmacologic Treatments:  Physical Therapy/Home Exercise: yes  Ice/Heat:yes  TENS: yes  Acupuncture: no  Massage: yes  Chiropractic: no    Other: Surgery-   5/17/23: TLIF L4-S1 with Dr. Valencia  7/5/23: Wound exploration with Dr. Valencia        Pain Medications:  - Opioids: Norco, Tylenol 3  - Adjuvant Medications: Ambien, alprazolam, Flexeril, Voltaren gel, gabapentin, lidocaine patch, Zoloft  - Anti-Coagulants: Aspirin, Pletal      Pain Procedures:   -previous lumbar PIEDAD  -previous lumbar MBB/RFA  -previous lumbar laminectomy in 2001 at L5-S1  -01/27/2022:  Bilateral piriformis trigger point injections, significant relief that is still ongoing  -right L4/5 +L5/S1 TF PIEDAD on 10/20/22 with 40% relief   3/12/2024 C7/T1 IL PIEDAD 95% relief     Imaging (Reviewed on 12/13/2024):                MRI lumbar spine 05/31/2024:  Surgical change with hardware and associated artifact L4-S1 posterior approach.  There is a peripherally enhancing fluid loculation at the dorsal postsurgical elements measuring approximately 4 cm X 7 cm epicenter L5  level sterility uncertain possible abscess.     No overt canal compromise.     No overt acute osseous finding     Conus medullaris unremarkable       2/23/2024 cervical MRI  FINDINGS:  The cervical cord reveals normal signal and morphology.     There is straightening/reversal of the normal cervical lordosis.  There is mild to moderate multilevel degenerative disc disease as detailed below.     C1-2: Moderate C1-2 arthrosis.     C2-C3: Mild disc bulge.  Moderate to severe right and mild left facet arthrosis with moderate right foraminal stenosis.     C3-C4: Mild disc bulge.  Bilateral uncovertebral joint spurring with mild foraminal stenosis.     C4-C5: Mild disc bulge.  Bilateral uncovertebral joint spurring with moderate right and mild left foraminal stenosis.     C5-C6: Moderate disc degeneration with disc bulging osteophyte with ventral sac impression.  Mild central stenosis.  Uncovertebral joint spurring with moderate to severe right foraminal stenosis.     C6-C7: Moderate degenerative disc disease with disc bulging osteophyte with mild ventral sac impression.  Uncovertebral joint spurring with mild foraminal stenosis.     C7-T1: Moderate disc degeneration with disc bulging osteophyte eccentric to the left with mild left lateral recess stenosis.  Uncovertebral joint spurring with moderate left and mild right foraminal stenosis.     Impression:     No acute abnormality.  Reversal of the normal cervical lordosis.     C5-6, C6-7 and C7-T1 degenerative disc disease as above with C5-6 central canal stenosis.  Foraminal stenosis as above.    MRI lumbar spine 01/12/2021:  Visualized distal cord demonstrates normal signal.     No marrow replacement process.  No fracture.  No listhesis.     There are degenerative changes of the lower lumbar spine with mild disc space narrowing, most severe at L5-S1 with marginal spurring with facet arthropathy     L1-L2: There is posterior disc bulge with indentation of the thecal sac.   No spinal canal or neural foraminal encroachment.     L2-L3: Mild facet arthropathy.  Mild posterior disc bulge.  No spinal canal or neural foraminal encroachment.     L3-L4: Bilateral ligamentum flavum and facet arthropathy.  Mild posterior disc bulge with indentation of thecal sac.  No spinal canal or neural foraminal encroachment.     L4-L5: Ligamentum flavum and facet arthropathy with broad-based disc bulge.  No significant spinal canal stenosis.  There is crowding of the lateral recesses with narrowing along the inferior right greater than left neural foramen.     L5-S1: Posterior disc osteophyte complex with facet arthropathy.  Appearance of possible prior right hemilaminectomy changes..  There is severe narrowing along the right neural foramen  with mild to moderate narrowing along the left neural foramen.  No significant spinal canal stenosis        CT cervical spine 12/01/2020:  Vertebral body heights maintained.  2 mm anterolisthesis of C3 on C4.  Disc height loss and osteophyte changes at C6-7 and C7-T1.  Multilevel facet degenerative findings most prevalent at the right C2-3 and left C3-4 levels.     Neck soft tissues are unremarkable.     C2-3: No CT evidence of spinal canal stenosis.  Mild right neural foraminal narrowing.     C3-4: No CT evidence of significant spinal canal stenosis.  Left greater than right facet and uncovertebral degenerative changes results in mild right and severe left neural foraminal narrowing.     C4-5: Posterior disc osteophyte complex present with mild spinal canal stenosis.  Mild to moderate right neural foraminal narrowing secondary to facet and uncovertebral degenerative findings.     C5-6: Posterior disc osteophyte complex present asymmetric to the right with mild spinal canal stenosis.  Severe right neural foraminal narrowing secondary to facet and uncovertebral degenerative findings.     C6-7: Posterior disc osteophyte complex present causing at least mild spinal canal  stenosis.  Left greater than right facet and uncovertebral degenerative findings with mild-to-moderate right and moderate to severe left neural foraminal narrowing.     C7-T1: Posterior disc osteophyte complex with spinal canal stenosis suspected.  Mild left neural foraminal narrowing secondary to uncovertebral degenerative findings.        PMHx,PSHx, Social history, and Family history:  I have reviewed the patient's medical, surgical, social, and family history in detail and updated the computerized patient record.      Review of patient's allergies indicates:  No Known Allergies      Current Outpatient Medications   Medication Sig    albuterol (PROVENTIL/VENTOLIN HFA) 90 mcg/actuation inhaler INHALE 2 PUFFS INTO THE LUNGS EVERY 6 HOURS AS NEEDED FOR COUGH    allopurinoL (ZYLOPRIM) 100 MG tablet Take 100 mg by mouth once daily. Takes 0.5 tab    ALPRAZolam (XANAX) 1 MG tablet Take 1 mg by mouth Daily. EVERY OTHER DAY    aspirin (ECOTRIN) 81 MG EC tablet Take 1 tablet (81 mg total) by mouth once daily.    atorvastatin (LIPITOR) 80 MG tablet     carvediloL (COREG) 12.5 MG tablet Take 1 tablet (12.5 mg total) by mouth 2 (two) times daily.    clopidogreL (PLAVIX) 75 mg tablet Take 1 tablet (75 mg total) by mouth once daily.    esomeprazole (NEXIUM) 40 MG capsule Take 40 mg by mouth before breakfast.    ferrous sulfate (FEOSOL) 325 mg (65 mg iron) Tab tablet Take 1 tablet (325 mg total) by mouth daily with breakfast.    fluticasone-umeclidin-vilanter (TRELEGY ELLIPTA) 200-62.5-25 mcg inhaler Inhale 1 puff into the lungs once daily.    hydrOXYzine HCL (ATARAX) 25 MG tablet Take 1 tablet (25 mg total) by mouth 3 (three) times daily as needed for Itching.    losartan (COZAAR) 50 MG tablet Take 1 tablet (50 mg total) by mouth once daily.    multivitamin (THERAGRAN) per tablet Take 1 tablet by mouth once daily.    sertraline (ZOLOFT) 100 MG tablet TAKE TWO TABLETS BY MOUTH EVERY DAY FOR MENTAL HEALTH DOSE INCREASED TO  200MG/DAY    aluminum & magnesium hydroxide-simethicone (MYLANTA MAX STRENGTH) 400-400-40 mg/5 mL suspension TAKE 15ML BY MOUTH FOUR TIMES A DAY AS NEEDED FOR STOMACH ACID    diclofenac sodium (VOLTAREN) 1 % Gel Apply 2 g topically 2 (two) times daily. for 7 days    docusate sodium (COLACE) 100 MG capsule Take 1 capsule (100 mg total) by mouth 2 (two) times daily.    gabapentin (NEURONTIN) 600 MG tablet Take 1 tablet (600 mg total) by mouth 3 (three) times daily.    hydrocortisone 2.5 % cream Apply topically 2 (two) times daily as needed. Apply to affected areas of the face and neck.    ipratropium (ATROVENT) 21 mcg (0.03 %) nasal spray 2 sprays by Each Nostril route 2 (two) times daily.    oxyCODONE (ROXICODONE) 10 mg Tab immediate release tablet Take 1 tablet (10 mg total) by mouth every 6 (six) hours as needed for Pain.    oxyCODONE-acetaminophen (PERCOCET) 5-325 mg per tablet Take 1 tablet by mouth every 4 (four) hours as needed for Pain.     Current Facility-Administered Medications   Medication    aztreonam 2,000 mg in D5W 100 mL IVPB     Facility-Administered Medications Ordered in Other Visits   Medication    ondansetron injection 4 mg    ondansetron injection 4 mg    sodium chloride 0.9% flush 10 mL         REVIEW OF SYSTEMS:    GENERAL:  No weight loss, malaise or fevers.  HEENT:   No recent changes in vision or hearing  NECK:  Negative for lumps, no difficulty with swallowing.  RESPIRATORY:  Negative for cough, wheezing or shortness of breath, patient denies any recent URI.  CARDIOVASCULAR:  Negative for chest pain, leg swelling or palpitations.  GI:  Negative for abdominal discomfort, blood in stools or black stools or change in bowel habits.  MUSCULOSKELETAL:  See HPI.  SKIN:  Negative for lesions, rash, and itching.  PSYCH:  No mood disorder or recent psychosocial stressors.  Patients sleep is not disturbed secondary to pain.  HEMATOLOGY/LYMPHOLOGY:  Negative for prolonged bleeding, bruising easily or  "swollen nodes.  Patient is currently taking anti-coagulants - ASA and Pletal  NEURO:   No history of headaches, syncope, paralysis, seizures or tremors.  All other reviewed and negative other than HPI.       OBJECTIVE:    /89   Pulse 83   Resp 17   Ht 5' 9" (1.753 m)   Wt 111 kg (244 lb 11.4 oz)   BMI 36.14 kg/m²     PHYSICAL EXAMINATION:    GENERAL: Well appearing, in no acute distress, alert and oriented x3.  PSYCH:  Mood and affect appropriate.  SKIN: Skin color, texture, turgor normal, no rashes or lesions.  HEAD/FACE:  Normocephalic, atraumatic. Cranial nerves grossly intact.   NECK:  Tenderness palpation over the bilateral cervical paraspinous muscles.  Spurling's positive  bilaterally.  Limited range of motion secondary to pain in all planes.    PULM: No evidence of respiratory difficulty, symmetric chest rise.  GI:  Soft and non-tender.  BACK:  Surgical scarring evident.  Incision(s) CDI. No pain / TTP lumbar spine        EXTREMITIES:   TTP, abrasions and soft tissue swelling present over anterior Right calf:      NEURO: Bilateral upper and lower extremity coordination and muscle stretch reflexes are physiologic and symmetric.  Plantar response are downgoing. No clonus.  No loss of sensation is noted.  GAIT:  Slow, antalgic.      Neuro - Upper Extremities:  - BUE Strength:R/L: D: 5/5; B: 5/5; T: 5/5; WF: 5/5; WE: 5/5; IO: 5/5  - Extremity Reflexes: Brisk and symmetric throughout  - Sensory: Sensation to light touch intact bilaterally    Psych:  Mood and affect is appropriate      LABS:  Lab Results   Component Value Date    WBC 4.08 12/09/2024    HGB 14.5 12/09/2024    HCT 45.6 12/09/2024    MCV 90 12/09/2024     12/09/2024       CMP  Sodium   Date Value Ref Range Status   12/09/2024 142 136 - 145 mmol/L Final     Potassium   Date Value Ref Range Status   12/09/2024 4.1 3.5 - 5.1 mmol/L Final     Chloride   Date Value Ref Range Status   12/09/2024 109 95 - 110 mmol/L Final     CO2   Date " Value Ref Range Status   12/09/2024 25 23 - 29 mmol/L Final     Glucose   Date Value Ref Range Status   12/09/2024 96 70 - 110 mg/dL Final     BUN   Date Value Ref Range Status   12/09/2024 18 8 - 23 mg/dL Final     Creatinine   Date Value Ref Range Status   12/09/2024 1.6 (H) 0.5 - 1.4 mg/dL Final     Calcium   Date Value Ref Range Status   12/09/2024 9.3 8.7 - 10.5 mg/dL Final     Total Protein   Date Value Ref Range Status   09/04/2024 7.4 6.0 - 8.4 g/dL Final     Albumin   Date Value Ref Range Status   12/09/2024 4.0 3.5 - 5.2 g/dL Final     Total Bilirubin   Date Value Ref Range Status   09/04/2024 0.5 0.1 - 1.0 mg/dL Final     Comment:     For infants and newborns, interpretation of results should be based  on gestational age, weight and in agreement with clinical  observations.    Premature Infant recommended reference ranges:  Up to 24 hours.............<8.0 mg/dL  Up to 48 hours............<12.0 mg/dL  3-5 days..................<15.0 mg/dL  6-29 days.................<15.0 mg/dL       Alkaline Phosphatase   Date Value Ref Range Status   09/04/2024 96 55 - 135 U/L Final     AST   Date Value Ref Range Status   09/04/2024 26 10 - 40 U/L Final     ALT   Date Value Ref Range Status   09/04/2024 22 10 - 44 U/L Final     Anion Gap   Date Value Ref Range Status   12/09/2024 8 8 - 16 mmol/L Final     eGFR if    Date Value Ref Range Status   06/02/2022 51.1 (A) >60 mL/min/1.73 m^2 Final     eGFR if non    Date Value Ref Range Status   06/02/2022 44.2 (A) >60 mL/min/1.73 m^2 Final     Comment:     Calculation used to obtain the estimated glomerular filtration  rate (eGFR) is the CKD-EPI equation.          Lab Results   Component Value Date    HGBA1C 5.8 (H) 01/22/2024       ASSESSMENT: 69 y.o. year old male with neck pain, consistent with     1. Lumbar radiculopathy, chronic  pregabalin (LYRICA) 75 MG capsule    tiZANidine (ZANAFLEX) 4 MG tablet      2. Fall from ladder, initial  encounter  X-Ray Tibia Fibula 2 View Right    pregabalin (LYRICA) 75 MG capsule    tiZANidine (ZANAFLEX) 4 MG tablet      3. Cervical radiculopathy  Case Request-RAD/Other Procedure Area: Cervical C7/T1 Interlaminar PIEDAD with RN IV Sedation,  Takes ASA, Plavix- will need to hold 5 days prior (clearance from Dr. Mckeon)    pregabalin (LYRICA) 75 MG capsule    tiZANidine (ZANAFLEX) 4 MG tablet      4. Cervicalgia  Case Request-RAD/Other Procedure Area: Cervical C7/T1 Interlaminar PIEDAD with RN IV Sedation,  Takes ASA, Plavix- will need to hold 5 days prior (clearance from Dr. Mckeon)    pregabalin (LYRICA) 75 MG capsule    tiZANidine (ZANAFLEX) 4 MG tablet      5. DDD (degenerative disc disease), cervical  Case Request-RAD/Other Procedure Area: Cervical C7/T1 Interlaminar PIEDAD with RN IV Sedation,  Takes ASA, Plavix- will need to hold 5 days prior (clearance from Dr. Mckeon)    pregabalin (LYRICA) 75 MG capsule    tiZANidine (ZANAFLEX) 4 MG tablet      6. Postoperative seroma of musculoskeletal structure after musculoskeletal procedure  pregabalin (LYRICA) 75 MG capsule    tiZANidine (ZANAFLEX) 4 MG tablet          PLAN:   - Interventions:  Schedule patient for Repeat Cervical C7/T1 IL PIEDAD to help with neck pain and cervical radiculopathy. Explained the risks and benefits of the procedure in detail with the patient today in clinic along with alternative treatment options, and the patient elected to pursue the intervention.      S/p C7/T1 IL PIEDAD 95% relief 3/12/2024 (relief for over 3 months)  S/p right L4/5 +L5/S1 TF PIEDAD on 10/20/22 with 40% relief  S/p C7-T1 IL PIEDAD for diagnostic and therapeutic purposes with 60% relief    - Anticoagulation use: yes Aspirin and Plavix.Will need to hold for 5 days prior, Will get clearance from Dr. Mckeon.       - Medications: I have stressed the importance of physical activity and a home exercise plan to help with pain and improve health. Patient can continue with medications for now since  they are providing benefits, using them appropriately, and without side effects.       Continue Tizanidine 4 mg BID, refill today    Patient reports no relief with Gabapentin. He is interested in trying Lyrica.   - Start Lyrica 75mg BID (with titration instructions to take 75mg QHS x 5 days, then increase to BID if tolerated).  Consider further Increase if no improvement after 1-3 weeks.  Patient informed not to stop taking if he does not find significant pain relief. We previously discussed potential side effects of this medication, which may include drowsiness,dizziness, dry mouth, constipation, or peripheral edema.       report:  Reviewed and consistent with medication use as prescribed.        - Will route [place] 7.5/325 [place], #21, 0 refills to Dr. Carmen for approval. Patient aware to take for severe pain relief only.       - Therapy:  Advised patient continue with home exercises as tolerated     - Imaging: Reviewed previous imaging, labs, pathology results. X-rays of Right Tib/Fib ordered today.      - Consults/Referrals:  Continue follow up with Dr. Valencia.      - Follow up visit: 4 weeks after C-PIEDAD, with Dr. Carmen to discuss establishing pain contract     - Counseled patient regarding the importance of activity modification and physical therapy     - This condition does not require this patient to take time off of work, and the primary goal of our Pain Management services is to improve the patient's functional capacity.     - Patient Questions: Answered all of the patient's questions regarding diagnosis, therapy, and treatment       The above plan and management options were discussed at length with patient. Patient is in agreement with the above and verbalized understanding.      Visit today included increased complexity associated with the care of the episodic problem of chronic pain which was addressed and continue to manage the longitudinal care of the patient due to the serious and/or complex  managed problem(s) listed above.      Yazmin Farfan PA-C  Interventional Pain Management  [facility]

## 2024-12-16 ENCOUNTER — OFFICE VISIT (OUTPATIENT)
Dept: NEPHROLOGY | Facility: CLINIC | Age: 69
End: 2024-12-16
Payer: MEDICARE

## 2024-12-16 VITALS
SYSTOLIC BLOOD PRESSURE: 130 MMHG | HEART RATE: 96 BPM | BODY MASS INDEX: 36.51 KG/M2 | DIASTOLIC BLOOD PRESSURE: 82 MMHG | HEIGHT: 69 IN | WEIGHT: 246.5 LBS

## 2024-12-16 DIAGNOSIS — N18.30 STAGE 3 CHRONIC KIDNEY DISEASE, UNSPECIFIED WHETHER STAGE 3A OR 3B CKD: Primary | ICD-10-CM

## 2024-12-16 PROCEDURE — 99999 PR PBB SHADOW E&M-EST. PATIENT-LVL II: CPT | Mod: PBBFAC,HCNC,, | Performed by: INTERNAL MEDICINE

## 2024-12-16 PROCEDURE — 1159F MED LIST DOCD IN RCRD: CPT | Mod: HCNC,CPTII,S$GLB, | Performed by: INTERNAL MEDICINE

## 2024-12-16 PROCEDURE — 1125F AMNT PAIN NOTED PAIN PRSNT: CPT | Mod: HCNC,CPTII,S$GLB, | Performed by: INTERNAL MEDICINE

## 2024-12-16 PROCEDURE — 3044F HG A1C LEVEL LT 7.0%: CPT | Mod: HCNC,CPTII,S$GLB, | Performed by: INTERNAL MEDICINE

## 2024-12-16 PROCEDURE — 3075F SYST BP GE 130 - 139MM HG: CPT | Mod: HCNC,CPTII,S$GLB, | Performed by: INTERNAL MEDICINE

## 2024-12-16 PROCEDURE — 99215 OFFICE O/P EST HI 40 MIN: CPT | Mod: HCNC,S$GLB,, | Performed by: INTERNAL MEDICINE

## 2024-12-16 PROCEDURE — 3008F BODY MASS INDEX DOCD: CPT | Mod: HCNC,CPTII,S$GLB, | Performed by: INTERNAL MEDICINE

## 2024-12-16 PROCEDURE — 1101F PT FALLS ASSESS-DOCD LE1/YR: CPT | Mod: HCNC,CPTII,S$GLB, | Performed by: INTERNAL MEDICINE

## 2024-12-16 PROCEDURE — 3066F NEPHROPATHY DOC TX: CPT | Mod: HCNC,CPTII,S$GLB, | Performed by: INTERNAL MEDICINE

## 2024-12-16 PROCEDURE — 3079F DIAST BP 80-89 MM HG: CPT | Mod: HCNC,CPTII,S$GLB, | Performed by: INTERNAL MEDICINE

## 2024-12-16 PROCEDURE — 3288F FALL RISK ASSESSMENT DOCD: CPT | Mod: HCNC,CPTII,S$GLB, | Performed by: INTERNAL MEDICINE

## 2024-12-16 NOTE — PROGRESS NOTES
Renal clinic f/u note:  Date of clinic visit: 12/16/24  Reason for f/u and chief c/o: CKD stage 3     HPI: Pt is a 68 y/o male with CKD stage 3, gout, HTN, and prostate cancer, who presents for f/u. Pt was last seen in renal clinic 6 months ago. Chart was reviewed. No new events. Pt says he has been doing well. Pt has no new c/o's today, feels no SOB, no leg swelling. No recent gout attacks. BP meds reviewed with pt.     PAST MEDICAL HISTORY: CKD stage 3, HTN, aortic stenosis, Asthma, BPH (benign prostatic hyperplasia), CAD (coronary artery disease), Cardiomyopathy, CHF (congestive heart failure), Chronic hoarseness, CVA (cerebral infarction), Ex-smoker, GERD (gastroesophageal reflux disease), Hepatitis C, Pancreatitis, Prostate cancer, Prostate cancer (h/o of prostatectomy in 2019, followed by chemotherapy and XRT treatments in 2021) and Substance abuse.     PAST SURGICAL HISTORY:  He  has a past surgical history that includes Foot surgery; Back surgery; Penile prosthesis implant; Cardiac catheterization; Upper gastrointestinal endoscopy (2011); Colonoscopy (2011); Aortic valve replacement (05/19/2014); Penile prosthesis revision (04/30/2018); Prostatectomy (09/2019); Left heart catheterization (Left, 7/24/2020); Esophagogastroduodenoscopy (N/A, 2/24/2021); and Colonoscopy (N/A, 2/24/2021).     SOCIAL HISTORY:  He  reports that he quit smoking about 8 years ago. He has a 16.00 pack-year smoking history. He has never used smokeless tobacco. He reports that he does not drink alcohol and does not use drugs.     FAMILY MEDICAL HISTORY:  His family history includes Heart attack in his brother and sister; Heart disease in his mother.     Review of patient's allergies indicates:  No Known Allergies     Meds reviewed    Current Outpatient Medications:     albuterol (PROVENTIL/VENTOLIN HFA) 90 mcg/actuation inhaler, INHALE 2 PUFFS INTO THE LUNGS EVERY 6 HOURS AS NEEDED FOR COUGH, Disp: 8.5 g, Rfl: 3    allopurinoL  (ZYLOPRIM) 100 MG tablet, Take 100 mg by mouth once daily. Takes 0.5 tab, Disp: , Rfl:     ALPRAZolam (XANAX) 1 MG tablet, Take 1 mg by mouth Daily. EVERY OTHER DAY, Disp: , Rfl:     aspirin (ECOTRIN) 81 MG EC tablet, Take 1 tablet (81 mg total) by mouth once daily., Disp: 90 tablet, Rfl: 3    atorvastatin (LIPITOR) 80 MG tablet, , Disp: , Rfl:     carvediloL (COREG) 12.5 MG tablet, Take 1 tablet (12.5 mg total) by mouth 2 (two) times daily., Disp: 60 tablet, Rfl: 11    clopidogreL (PLAVIX) 75 mg tablet, Take 1 tablet (75 mg total) by mouth once daily., Disp: 30 tablet, Rfl: 11    esomeprazole (NEXIUM) 40 MG capsule, Take 40 mg by mouth before breakfast., Disp: , Rfl:     ferrous sulfate (FEOSOL) 325 mg (65 mg iron) Tab tablet, Take 1 tablet (325 mg total) by mouth daily with breakfast., Disp: 90 tablet, Rfl: 1    fluticasone-umeclidin-vilanter (TRELEGY ELLIPTA) 200-62.5-25 mcg inhaler, Inhale 1 puff into the lungs once daily., Disp: 60 each, Rfl: 5    HYDROcodone-acetaminophen (NORCO) 7.5-325 mg per tablet, Take 1/2 to 1 tablets TID PRN pain., Disp: 21 tablet, Rfl: 0    hydrOXYzine HCL (ATARAX) 25 MG tablet, Take 1 tablet (25 mg total) by mouth 3 (three) times daily as needed for Itching., Disp: 30 tablet, Rfl: 0    losartan (COZAAR) 50 MG tablet, Take 1 tablet (50 mg total) by mouth once daily., Disp: 30 tablet, Rfl: 11    multivitamin (THERAGRAN) per tablet, Take 1 tablet by mouth once daily., Disp: , Rfl:     pregabalin (LYRICA) 75 MG capsule, Take 1 capsule (75 mg total) by mouth every evening for 5 days, THEN 1 capsule (75 mg total) 2 (two) times daily., Disp: 60 capsule, Rfl: 0    sertraline (ZOLOFT) 100 MG tablet, TAKE TWO TABLETS BY MOUTH EVERY DAY FOR MENTAL HEALTH DOSE INCREASED TO 200MG/DAY, Disp: , Rfl:     tiZANidine (ZANAFLEX) 4 MG tablet, Take 1 tablet (4 mg total) by mouth every 8 (eight) hours as needed., Disp: 90 tablet, Rfl: 0    aluminum & magnesium hydroxide-simethicone (MYLANTA MAX STRENGTH)  "400-400-40 mg/5 mL suspension, TAKE 15ML BY MOUTH FOUR TIMES A DAY AS NEEDED FOR STOMACH ACID, Disp: , Rfl:     diclofenac sodium (VOLTAREN) 1 % Gel, Apply 2 g topically 2 (two) times daily. for 7 days, Disp: 100 g, Rfl: 0    docusate sodium (COLACE) 100 MG capsule, Take 1 capsule (100 mg total) by mouth 2 (two) times daily., Disp: 20 capsule, Rfl: 0    hydrocortisone 2.5 % cream, Apply topically 2 (two) times daily as needed. Apply to affected areas of the face and neck., Disp: 30 g, Rfl: 2    ipratropium (ATROVENT) 21 mcg (0.03 %) nasal spray, 2 sprays by Each Nostril route 2 (two) times daily., Disp: 30 mL, Rfl: 0    oxyCODONE (ROXICODONE) 10 mg Tab immediate release tablet, Take 1 tablet (10 mg total) by mouth every 6 (six) hours as needed for Pain., Disp: 20 tablet, Rfl: 0    oxyCODONE-acetaminophen (PERCOCET) 5-325 mg per tablet, Take 1 tablet by mouth every 4 (four) hours as needed for Pain., Disp: 30 tablet, Rfl: 0     REVIEW OF SYSTEMS:  Patient has no fever, fatigue, visual changes, chest pain, edema, cough, dyspnea, nausea, vomiting, constipation, diarrhea, arthralgias, pruritis, dizziness, weakness, depression, confusion.        PHYSICAL EXAM:  Blood pressure 130/82, pulse 96, resp. rate 20, height 5' 9" (1.753 m), weight 111.8 Kg, from 108.7 Kg, from 111.9 kg  Gen:    WDWN male in no apparent distress  Psych: Normal mood and affect  Neck:   No JVD  Chest:  Clear with no rales, rhonchi, wheezing with normal effort  CV:      Regular with no murmurs, gallops or rubs  Abd:     Soft, nontender, no distension  Ext:      No edema        Labs reviewed  BMP  Lab Results   Component Value Date     12/09/2024    K 4.1 12/09/2024     12/09/2024    CO2 25 12/09/2024    BUN 18 12/09/2024    CREATININE 1.6 (H) 12/09/2024    CALCIUM 9.3 12/09/2024    ANIONGAP 8 12/09/2024    EGFRNORACEVR 46.4 (A) 12/09/2024     Lab Results   Component Value Date    WBC 4.08 12/09/2024    HGB 14.5 12/09/2024    HCT 45.6 " 12/09/2024    MCV 90 12/09/2024     12/09/2024     Lab Results   Component Value Date    .2 (H) 12/09/2024    CALCIUM 9.3 12/09/2024    PHOS 2.5 (L) 12/09/2024        U/a: no protein, no blood  Urine p/Cr 70 mg    Uric acid 5.8     PSA Diagnostic 0.00 - 4.00 ng/mL <0.01            IMPRESSION AND RECOMMENDATIONS:  70 y/o male with CKD stage 3 presents for f/u:     1. Renal: s Cr stable, at baseline, noted a little lower than last visit's, tends to fluctuate  Stable renal function. CKD stage 3  K normal  Na normal  Ca normal  Acid base stable  PTH normal  PO4 normal  Secondary hyperparathyroidism, mild, will monitor  Not anemic     CKD likely due to HTN and gout/hyperuricemia  Uric acid normal  H/o of prostate cancer, PSA normal     2. HTN: controlled  Meds reviewed  No changes recommended today     3. H/o of prostate CA: reviewed the chart  S/p radical prostatectomy Sept 2019  Had residually elevated PSA  S/p chemotherapy and XRT in 2021  PSA undetectable      4. Med review: was done     Plans and recommendations:  As reviewed above.  Total time spent 40 minutes including time needed to review the records, the   patient evaluation, documentation, face-to-face discussion with the patient,   more than 50% of the time was spent on coordination of care and counseling.    Level V visit.  RTC 6-9 months     Brijesh Delatorre MD

## 2024-12-19 ENCOUNTER — LAB VISIT (OUTPATIENT)
Dept: LAB | Facility: HOSPITAL | Age: 69
End: 2024-12-19
Attending: INTERNAL MEDICINE
Payer: MEDICARE

## 2024-12-19 DIAGNOSIS — E78.2 MIXED HYPERLIPIDEMIA: ICD-10-CM

## 2024-12-19 LAB
ALBUMIN SERPL BCP-MCNC: 3.8 G/DL (ref 3.5–5.2)
ALP SERPL-CCNC: 103 U/L (ref 40–150)
ALT SERPL W/O P-5'-P-CCNC: 18 U/L (ref 10–44)
ANION GAP SERPL CALC-SCNC: 9 MMOL/L (ref 8–16)
AST SERPL-CCNC: 26 U/L (ref 10–40)
BILIRUB SERPL-MCNC: 0.5 MG/DL (ref 0.1–1)
BUN SERPL-MCNC: 20 MG/DL (ref 8–23)
CALCIUM SERPL-MCNC: 9.2 MG/DL (ref 8.7–10.5)
CHLORIDE SERPL-SCNC: 109 MMOL/L (ref 95–110)
CHOLEST SERPL-MCNC: 140 MG/DL (ref 120–199)
CHOLEST/HDLC SERPL: 3.4 {RATIO} (ref 2–5)
CO2 SERPL-SCNC: 20 MMOL/L (ref 23–29)
CREAT SERPL-MCNC: 1.5 MG/DL (ref 0.5–1.4)
EST. GFR  (NO RACE VARIABLE): 50.1 ML/MIN/1.73 M^2
GLUCOSE SERPL-MCNC: 86 MG/DL (ref 70–110)
HDLC SERPL-MCNC: 41 MG/DL (ref 40–75)
HDLC SERPL: 29.3 % (ref 20–50)
LDLC SERPL CALC-MCNC: 87 MG/DL (ref 63–159)
NONHDLC SERPL-MCNC: 99 MG/DL
POTASSIUM SERPL-SCNC: 4.6 MMOL/L (ref 3.5–5.1)
PROT SERPL-MCNC: 7.2 G/DL (ref 6–8.4)
SODIUM SERPL-SCNC: 138 MMOL/L (ref 136–145)
TRIGL SERPL-MCNC: 60 MG/DL (ref 30–150)

## 2024-12-19 PROCEDURE — 36415 COLL VENOUS BLD VENIPUNCTURE: CPT | Mod: HCNC | Performed by: INTERNAL MEDICINE

## 2024-12-19 PROCEDURE — 80053 COMPREHEN METABOLIC PANEL: CPT | Mod: HCNC | Performed by: INTERNAL MEDICINE

## 2024-12-19 PROCEDURE — 80061 LIPID PANEL: CPT | Mod: HCNC | Performed by: INTERNAL MEDICINE

## 2024-12-20 ENCOUNTER — TELEPHONE (OUTPATIENT)
Dept: CARDIOLOGY | Facility: CLINIC | Age: 69
End: 2024-12-20
Payer: MEDICARE

## 2024-12-20 NOTE — TELEPHONE ENCOUNTER
Spoke with pt in regards to test results. Pt verbalized understanding information without any concerns.                   ----- Message from Jeffery Mckeon MD sent at 12/20/2024  3:13 PM CST -----  The lab showed CHF is controlled  Continue current Rx.   F/U as scheduled

## 2025-01-21 ENCOUNTER — TELEPHONE (OUTPATIENT)
Dept: PULMONOLOGY | Facility: CLINIC | Age: 70
End: 2025-01-21
Payer: MEDICARE

## 2025-01-21 NOTE — TELEPHONE ENCOUNTER
Called pt to change appt to virtual or reschedule (01/22/25). No answer. Lm informing appt on 01/22/25 was changed to virtual at 10:30 am.

## 2025-01-22 ENCOUNTER — OFFICE VISIT (OUTPATIENT)
Dept: PULMONOLOGY | Facility: CLINIC | Age: 70
End: 2025-01-22
Payer: MEDICARE

## 2025-01-22 DIAGNOSIS — E66.01 CLASS 2 SEVERE OBESITY DUE TO EXCESS CALORIES WITH SERIOUS COMORBIDITY AND BODY MASS INDEX (BMI) OF 38.0 TO 38.9 IN ADULT: ICD-10-CM

## 2025-01-22 DIAGNOSIS — J45.901 ASTHMATIC BRONCHITIS WITH ACUTE EXACERBATION, UNSPECIFIED ASTHMA SEVERITY, UNSPECIFIED WHETHER PERSISTENT: ICD-10-CM

## 2025-01-22 DIAGNOSIS — R09.81 SINUS CONGESTION: Primary | ICD-10-CM

## 2025-01-22 DIAGNOSIS — J45.40 MODERATE PERSISTENT ASTHMA WITHOUT COMPLICATION: ICD-10-CM

## 2025-01-22 DIAGNOSIS — R05.3 CHRONIC COUGH: ICD-10-CM

## 2025-01-22 DIAGNOSIS — E66.812 CLASS 2 SEVERE OBESITY DUE TO EXCESS CALORIES WITH SERIOUS COMORBIDITY AND BODY MASS INDEX (BMI) OF 38.0 TO 38.9 IN ADULT: ICD-10-CM

## 2025-01-22 RX ORDER — ALBUTEROL SULFATE 90 UG/1
INHALANT RESPIRATORY (INHALATION)
Qty: 8.5 G | Refills: 3 | Status: SHIPPED | OUTPATIENT
Start: 2025-01-22

## 2025-01-22 RX ORDER — FLUTICASONE FUROATE, UMECLIDINIUM BROMIDE AND VILANTEROL TRIFENATATE 200; 62.5; 25 UG/1; UG/1; UG/1
1 POWDER RESPIRATORY (INHALATION) DAILY
Qty: 60 EACH | Refills: 5 | Status: SHIPPED | OUTPATIENT
Start: 2025-01-22

## 2025-01-22 RX ORDER — HYDROXYZINE HYDROCHLORIDE 25 MG/1
25 TABLET, FILM COATED ORAL 3 TIMES DAILY PRN
Qty: 30 TABLET | Refills: 0 | Status: SHIPPED | OUTPATIENT
Start: 2025-01-22

## 2025-01-22 RX ORDER — AZELASTINE 1 MG/ML
1 SPRAY, METERED NASAL 2 TIMES DAILY
Qty: 30 ML | Refills: 1 | Status: SHIPPED | OUTPATIENT
Start: 2025-01-22 | End: 2026-01-22

## 2025-01-22 NOTE — PROGRESS NOTES
Subjective:      Patient ID: Abdullahi Urias is a 69 y.o. male.    Chief Complaint: Asthma, shortness of breath    The patient location is: [place]    Visit type: Audiovisual    Face to Face time with patient: 9 minutes  22minutes of total time spent on the encounter, which includes face to face time and non-face to face time preparing to see the patient (eg, review of tests), Obtaining and/or reviewing separately obtained history, Documenting clinical information in the electronic or other health record, Independently interpreting results (not separately reported) and communicating results to the patient/family/caregiver, or Care coordination (not separately reported).     Each patient to whom he or she provides medical services by telemedicine is:  (1) informed of the relationship between the physician and patient and the respective role of any other health care provider with respect to management of the patient; and (2) notified that he or she may decline to receive medical services by telemedicine and may withdraw from such care at any time.      Interval Hx 1/22/25:    Mr. Urias presents today for follow up asthma. He was last seen 7/2024- continued on Trelegy.     Has had nasal congestion for the past 2 weeks or so. No dental pain or sinus pressure. No fever or body aches. Has been coughing a little with it. No change in shortness of breath or wheezing. Got back to the gym about three weeks ago.    Pulmonary Interventions:  - Trelegy- daily  - Albuterol HFA- hasn't used in a long time  - Albuterol neb- hasn't had to use in a long time    nterval Hx 7/18/24:     Mr. Urias presents today for follow up ground glass lung nodule and asthma. He was last seen 1/18/24- continued on Trelegy, 6 month LDCT ordered.      Mr. Urias has been doing well, respiratory symptoms controlled. He had been losing weight, but stopped going to the gym after Mother's day, is going to try and get back to going. He is having surgery with  Urology on Monday. His last surgery was about May 2023- no prior issues with anesthesia. He intermittently wears his CPAP machine, [facility]     Interim Work Up:  LDCT 7/18/24- Lung-rads 1, resolution of previously seen ground      Pulmonary Interventions:  Albuterol HFA- been a long time since used  Albuterol nebulized- been a long time since used  Trelegy- daily     Interval Hx 1/18/24:     Mr. Urias presents to Pulmonary clinic for follow up asthma. He was last seen 11/21/23- at that visit reported increased TANIA use and productive cough. He was treated for exacerbation with medrol dose pack and z pack. Feeling much better as far as mobility, been going to the gym everyday. Breathing much improved, not having to use TANIA since he finished the steroids.      Interim Work Up:  LDCT 1/18/24- Lung-rads 3, compared to CTA 8/25/22- 8mm LLL groundglass focus, stable 4mm left fissural nodule     Pulmonary Interventions:  Albuterol HFA- last used in November.  Albuterol nebulized- last did one in November  Trelegy 200- daily     Interval Hx 11/21/23:     Mr. Urias presents to Pulmonary clinic for increased use of rescue inhaler. He reports worsening shortness of breath and wheezing since last Thursday. No fever. Productive cough, was clear, now phlegm is yellow. Nebulizer helps, uses three times per day. No Le swelling. No wheezing on exam. No known sick contacts.      Interim Work Up:  CXR today- No acute change     Pulmonary Interventions:   Albuterol HFA- as needed  Albuterol nebulized- TID  Trelegy 200- compliant  Mucinex     Interval Hx 10/18/23:     Mr. Urias presents to Pulmonary clinic for follow up dyspnea. He was last seen 8/2023 following an acute care visit for dyspnea. At that visit he was prescribed a nebulizer with albuterol solution and atrovent nasal spray. Further eval done with CXR, BNP and  D-dimer given his recent surgery. BNP WNL, D-dimer elevated at 4.19. VQ scan normal.     Mr. Urias  states that his dyspnea is a little better, but certainly still present. He has recently started with outpatient physical therapy and has been walking around his house lot. He is motivated to try and lose weight.      Interim Testing  VQ scan- Normal  BNP- WNL  D-dimer 4  CXR- No acute findings     Pulmonary Interventions:   Albuterol HFA- Only uses every 2 weeks or so  Albuterol nebulized- hasn't used it  Trelegy 200- using daily  Atrovent Nasal spray     Interval History 8/18/2023:     Mr. Urias presents to Pulmonary clinic as an acute care visit with increasing dyspnea. Feeling more out of breath with exertion, coughing a little more over the past several weeks. Also with congestion over the past week, productive last night and this morning- clear/yellow. No sore throat. No fever. No thrush. No calf swelling or pain.      Per chart review- since I last saw patient he had his TLIF 5/17/23 with Dr. Valencia which was complicated by post-op infection, treated with hardware removal and IV antibiotics. Completed 6 weeks of IV abx today- 8/18/23, to have PICC removed.     Pulmonary Interventions:   Albuterol- Using 2-3/day, helps  Trelegy- once daily  Flonase- using every day for the past week or so     Interval History 4/18/23:     Mr. Urias is seen in follow up of asthma as well as Pulmonary pre-op assessment for TLIF, I last saw him in March, at that time he was changed to Trelegy from Advair for persistent asthma symptoms (using albuterol 3-4 times daily)     Interim Testing:  Complete PFT 4/2023- Normal spirometry, loop consistent with restrictive pattern, bronchodilator portion not done as he used inhaler prior to exam  6MW- Remained on room air, [place] 1-3, walked 89% predicted  ABG - 7.36/41/85/23, done on room air  CXR 4/13- No acute findings, cardiomegaly, no effusions, no pneumo, no focal consolidation     Pulmonary interventions:   Albuterol- not having to use really since starting Trelegy  Trelegy- has seen a  "big improvement  PDM- Benefited from technique education     HPI 3/7/23:  67 year old male with history of AS s/p bioprosthetic AVR, dCHF, CKD3, HTN, CVA, CHEO ([facility]), former tobacco user, prostate cancer 2020 s/p prostatectomy s/p radiation, in remission who was referred to Pulmonary clinic by Dick Baez MD for evaluation of  asthma. Mr. Urias was admitted to the hospital 1/25-1/26 for treatment of asthma exacerbation. He was treated with IV steroids, breathing treatments, empiric antibiotics and oxygen as needed. CXR clear, SpO2 98% on room air on day of discharge. Patient reports several months of wheezing. Has been using Advair diskus twice daily and Albuterol inhaler 2-3/day. He did notice that symptoms improved when he was on a cruise last week, and worsened when back in [place].      Quit smoking 2012 (with stroke), about 30 pack years     Pertinent Work Up:  CXR 1/24/2023- Clear chest, no pneumo or effusion. Cardiomegaly.  CTA Chest 8/25/2022: Negative for PE, clear lungs  Eosinophils borderline/mildly elevated 1/2023     Pulmonary Interventions:  Albuterol HFA, using 2-3x/day  Fluticasone- salmeterol 250-50mcg twice daily, been on since end of January    Review of Systems   HENT:  Positive for rhinorrhea and congestion.    Respiratory:  Positive for cough. Negative for wheezing.      Objective:     Physical Exam   Constitutional:   Pt viewed via phone camera, he is awake and alert, in no distress, able to provide full and clear history without significant breathlessness or cough     Personal Diagnostic Review  As Above      12/16/2024     9:40 AM 12/13/2024    11:44 AM 11/19/2024     3:12 PM 11/19/2024     3:02 PM 11/19/2024    12:57 PM 11/13/2024    10:10 AM 11/13/2024    10:00 AM   Pulmonary Function Tests   SpO2   94 % 94 % 97 %     Height 5' 9" (1.753 m) 5' 9" (1.753 m)   5' 9" (1.753 m) 5' 9" (1.753 m) 5' 9" (1.753 m)   Weight 111.8 kg (246 lb 7.6 oz) 111 kg (244 lb 11.4 oz)   108.4 kg " (239 lb) 108.4 kg (239 lb) 108.4 kg (239 lb)   BMI (Calculated) 36.4 36.1   35.3 35.3 35.3        Assessment:     No diagnosis found.     Outpatient Encounter Medications as of 1/22/2025   Medication Sig Dispense Refill    albuterol (PROVENTIL/VENTOLIN HFA) 90 mcg/actuation inhaler INHALE 2 PUFFS INTO THE LUNGS EVERY 6 HOURS AS NEEDED FOR COUGH 8.5 g 3    allopurinoL (ZYLOPRIM) 100 MG tablet Take 100 mg by mouth once daily. Takes 0.5 tab      ALPRAZolam (XANAX) 1 MG tablet Take 1 mg by mouth Daily. EVERY OTHER DAY      aluminum & magnesium hydroxide-simethicone (MYLANTA MAX STRENGTH) 400-400-40 mg/5 mL suspension TAKE 15ML BY MOUTH FOUR TIMES A DAY AS NEEDED FOR STOMACH ACID      aspirin (ECOTRIN) 81 MG EC tablet Take 1 tablet (81 mg total) by mouth once daily. 90 tablet 3    atorvastatin (LIPITOR) 80 MG tablet       carvediloL (COREG) 12.5 MG tablet Take 1 tablet (12.5 mg total) by mouth 2 (two) times daily. 60 tablet 11    clopidogreL (PLAVIX) 75 mg tablet Take 1 tablet (75 mg total) by mouth once daily. 30 tablet 11    diclofenac sodium (VOLTAREN) 1 % Gel Apply 2 g topically 2 (two) times daily. for 7 days 100 g 0    docusate sodium (COLACE) 100 MG capsule Take 1 capsule (100 mg total) by mouth 2 (two) times daily. 20 capsule 0    esomeprazole (NEXIUM) 40 MG capsule Take 40 mg by mouth before breakfast.      fluticasone-umeclidin-vilanter (TRELEGY ELLIPTA) 200-62.5-25 mcg inhaler Inhale 1 puff into the lungs once daily. 60 each 5    HYDROcodone-acetaminophen (NORCO) 7.5-325 mg per tablet Take 1/2 to 1 tablets TID PRN pain. 21 tablet 0    hydrocortisone 2.5 % cream Apply topically 2 (two) times daily as needed. Apply to affected areas of the face and neck. 30 g 2    hydrOXYzine HCL (ATARAX) 25 MG tablet Take 1 tablet (25 mg total) by mouth 3 (three) times daily as needed for Itching. 30 tablet 0    ipratropium (ATROVENT) 21 mcg (0.03 %) nasal spray 2 sprays by Each Nostril route 2 (two) times daily. 30 mL 0     losartan (COZAAR) 50 MG tablet Take 1 tablet (50 mg total) by mouth once daily. 30 tablet 11    multivitamin (THERAGRAN) per tablet Take 1 tablet by mouth once daily.      oxyCODONE (ROXICODONE) 10 mg Tab immediate release tablet Take 1 tablet (10 mg total) by mouth every 6 (six) hours as needed for Pain. 20 tablet 0    oxyCODONE-acetaminophen (PERCOCET) 5-325 mg per tablet Take 1 tablet by mouth every 4 (four) hours as needed for Pain. 30 tablet 0    pregabalin (LYRICA) 75 MG capsule Take 1 capsule (75 mg total) by mouth every evening for 5 days, THEN 1 capsule (75 mg total) 2 (two) times daily. 60 capsule 0    sertraline (ZOLOFT) 100 MG tablet TAKE TWO TABLETS BY MOUTH EVERY DAY FOR MENTAL HEALTH DOSE INCREASED TO 200MG/DAY       Facility-Administered Encounter Medications as of 1/22/2025   Medication Dose Route Frequency Provider Last Rate Last Admin    aztreonam 2,000 mg in D5W 100 mL IVPB  2,000 mg Intravenous On Call Procedure Rommel Rodriguez MD        ondansetron injection 4 mg  4 mg Intravenous Once PRN Nehemiah Carmen MD        ondansetron injection 4 mg  4 mg Intravenous Once PRN Nehemiah Carmen MD        sodium chloride 0.9% flush 10 mL  10 mL Intravenous PRN Wilda Horne MD         No orders of the defined types were placed in this encounter.      Plan:     Problem List Items Addressed This Visit       Asthmatic bronchitis    Relevant Medications    fluticasone-umeclidin-vilanter (TRELEGY ELLIPTA) 200-62.5-25 mcg inhaler    Class 2 severe obesity due to excess calories with serious comorbidity and body mass index (BMI) of 38.0 to 38.9 in adult    Moderate persistent asthma without complication     - controlled, doing well  - continue Trelegy, albuterol as needed           Sinus congestion - Primary     - for two weeks, no associated ill symptoms  - astelin NS             Other Visit Diagnoses       Chronic cough        Relevant Medications    albuterol (PROVENTIL/VENTOLIN HFA) 90  mcg/actuation inhaler    azelastine (ASTELIN) 137 mcg (0.1 %) nasal spray    hydrOXYzine HCL (ATARAX) 25 MG tablet          Follow up in 6 months with carmen and walk or sooner as needed.

## 2025-02-03 ENCOUNTER — OFFICE VISIT (OUTPATIENT)
Dept: CARDIOLOGY | Facility: CLINIC | Age: 70
End: 2025-02-03
Payer: MEDICARE

## 2025-02-03 VITALS
SYSTOLIC BLOOD PRESSURE: 126 MMHG | WEIGHT: 234.38 LBS | BODY MASS INDEX: 34.71 KG/M2 | HEIGHT: 69 IN | HEART RATE: 80 BPM | OXYGEN SATURATION: 100 % | DIASTOLIC BLOOD PRESSURE: 78 MMHG

## 2025-02-03 DIAGNOSIS — I73.9 PAD (PERIPHERAL ARTERY DISEASE): Primary | ICD-10-CM

## 2025-02-03 DIAGNOSIS — I10 PRIMARY HYPERTENSION: Chronic | ICD-10-CM

## 2025-02-03 DIAGNOSIS — Z95.2 S/P AVR (AORTIC VALVE REPLACEMENT): ICD-10-CM

## 2025-02-03 DIAGNOSIS — I50.32 CHRONIC DIASTOLIC HEART FAILURE: ICD-10-CM

## 2025-02-03 DIAGNOSIS — I25.10 CORONARY ARTERY DISEASE INVOLVING NATIVE CORONARY ARTERY OF NATIVE HEART WITHOUT ANGINA PECTORIS: Chronic | ICD-10-CM

## 2025-02-03 DIAGNOSIS — Z86.73 HISTORY OF CVA IN ADULTHOOD: ICD-10-CM

## 2025-02-03 DIAGNOSIS — I70.8 AORTO-ILIAC ATHEROSCLEROSIS: ICD-10-CM

## 2025-02-03 DIAGNOSIS — F14.21 COCAINE DEPENDENCE IN REMISSION: ICD-10-CM

## 2025-02-03 DIAGNOSIS — I70.0 AORTO-ILIAC ATHEROSCLEROSIS: ICD-10-CM

## 2025-02-03 DIAGNOSIS — E78.2 MIXED HYPERLIPIDEMIA: ICD-10-CM

## 2025-02-03 PROCEDURE — 3288F FALL RISK ASSESSMENT DOCD: CPT | Mod: HCNC,CPTII,S$GLB, | Performed by: INTERNAL MEDICINE

## 2025-02-03 PROCEDURE — 99214 OFFICE O/P EST MOD 30 MIN: CPT | Mod: HCNC,S$GLB,, | Performed by: INTERNAL MEDICINE

## 2025-02-03 PROCEDURE — 3008F BODY MASS INDEX DOCD: CPT | Mod: HCNC,CPTII,S$GLB, | Performed by: INTERNAL MEDICINE

## 2025-02-03 PROCEDURE — 1126F AMNT PAIN NOTED NONE PRSNT: CPT | Mod: HCNC,CPTII,S$GLB, | Performed by: INTERNAL MEDICINE

## 2025-02-03 PROCEDURE — 1101F PT FALLS ASSESS-DOCD LE1/YR: CPT | Mod: HCNC,CPTII,S$GLB, | Performed by: INTERNAL MEDICINE

## 2025-02-03 PROCEDURE — 3078F DIAST BP <80 MM HG: CPT | Mod: HCNC,CPTII,S$GLB, | Performed by: INTERNAL MEDICINE

## 2025-02-03 PROCEDURE — 1159F MED LIST DOCD IN RCRD: CPT | Mod: HCNC,CPTII,S$GLB, | Performed by: INTERNAL MEDICINE

## 2025-02-03 PROCEDURE — 99999 PR PBB SHADOW E&M-EST. PATIENT-LVL V: CPT | Mod: PBBFAC,HCNC,, | Performed by: INTERNAL MEDICINE

## 2025-02-03 PROCEDURE — 1160F RVW MEDS BY RX/DR IN RCRD: CPT | Mod: HCNC,CPTII,S$GLB, | Performed by: INTERNAL MEDICINE

## 2025-02-03 PROCEDURE — 3074F SYST BP LT 130 MM HG: CPT | Mod: HCNC,CPTII,S$GLB, | Performed by: INTERNAL MEDICINE

## 2025-02-03 RX ORDER — EVOLOCUMAB 140 MG/ML
140 INJECTION, SOLUTION SUBCUTANEOUS
Qty: 2 EACH | Refills: 0 | Status: SHIPPED | OUTPATIENT
Start: 2025-02-03 | End: 2025-02-03

## 2025-02-03 RX ORDER — EVOLOCUMAB 140 MG/ML
140 INJECTION, SOLUTION SUBCUTANEOUS
Qty: 2 EACH | Refills: 0 | Status: SHIPPED | OUTPATIENT
Start: 2025-02-03 | End: 2025-02-14 | Stop reason: SDUPTHER

## 2025-02-03 NOTE — PROGRESS NOTES
Subjective:   Patient ID:  Abdullahi Urias is a 69 y.o. male who presents for follow up of No chief complaint on file.      68 yo male, came in for 3 m f/u  PMH s/p bioprosthetic AVR at [facility] in 2014 Salazar [id number] 25 mm pericardial tissue valve, nonobstructive CAD s/p LHC in  nonobstructive, 20% midLAD and RCA by Dr. quiroz, PAD, h/o stroke in 2012, HTN, HLD CHEO on CPAP remote h/o cocaine in 2012, quit drinking in 2012 and no smoking. H/o prostate cancer in 2018,   PAD on plavix allergic to pletal and  leg pain from neuropathy.    MPI in 2012 small apical infarct  ekg in 2018 NSR LVH with 2nd stt change  BNP in 2016 was 37  ECHO in  normal EF, perivalvular AI and s/p AVR mean G 16 mmHG and peak V 2.85    07/2021 visit  Limited exercise due to hip pain. C/o arm numbness. Sweating at rest.    Fin[facility] for prostate cancer and [facility] [place] [facility]  BIRD when climbing the stairs over 6 months, and episode of syncope after got out the bed. Woke up quickly  V/Q scan in  low risk for PE and d dimer 1.8     visit  H/O COVID 19 infection on 12/30/2021 and had AB infusion at [facility].   Hands and legs numbness for few months. Occasional chest discomfort and ingestion. Lower back pain.   Some calf pain and left foot swelling. The burning sensation worse below the knee. Some mild numbness and pain at thigh, L> R. occred at rest and with exertion.      visit  eval by Dr Kamara at vascular medicine clinic in  and r/o PAD related leg pain. Had uncomplicated CA travel.   Leg pain controlled and f/u at pain clinic  Plan to have dental work. No chest pain. SOB chronic stable. A lot of GERD.   On plavix for PAD and allergic to pletal     visit  The leg pain controlled now, and f/u at pain clinic on steroid injection  Some GERD burning. No SOB palpitation dizziness and leg swelling. Some exercise aerobic daily and gym work   No  smoking/drinking now. LDL 70    04/23 visit  Plan to have lumbar spinal procedure done by Dr. Valencia.  H/o nonobstructive CAD and s/p bioprosthetic AVR in 2014  Repeat echo on  EF nl, s/p AVR functioning well  SOB improved after trilicity rx  No chest pain dizziness palpitation and leg swelling. No active bleeding   Ekg today NSR TWI on alli inferior leads    10/23 visit  05/23 s/p lumbar fusion procedure and developed wound abscess in 07/23 Rx of Abx for 6 weeks  F/u pulm for SOB on triligy Rx    05/24 visit  11/23 echo showed EF nml. S/p tissue AVR. Well seated.  04/24 LE venous US showed no DVT  No chest pain dizziness faint orthopnea. Does gym exercise.  Bottom of the fee pain at exertion  LDL 80. BP A1c controlled  Occasional hemorrhoid bleeding [facility]  EKG reviewed by myself today reveals NSR nonspecific STT change    11/24 visit  Leg calf pain at walking and resolved after rest. No chest pain.   Stopped Lipitor 3 months ago for resting pain. Now questioning ? the resting pain resolved  LDL 80 BP C quit smoking in 2012.  EKG reviewed by myself today reveals NSR nonspecific STT change    Interval history  RLE calf pain > L side. States that the pain was better after resumed Lipitor 80 mg daily  11/24 LILLIE right 0.59 and left 0.99, LE arterial US showed biphasic waveform in both AFS. Some infra popliteal disease                      Past Medical History:   Diagnosis Date    Aortic stenosis     [facility]    Asthma     BPH (benign prostatic hyperplasia)     CAD (coronary artery disease)     Cardiomyopathy     CHF (congestive heart failure)     Chronic hoarseness     vocal cord surg    Chronic pain     CKD (chronic kidney disease) stage 3, GFR 30-59 ml/min     CVA (cerebral infarction)     8/2012 olol; reviewed ed note    Ex-smoker     GERD (gastroesophageal reflux disease)     Hepatitis C     treatedharvoni says cured, RNA NEG 6/2020    Hypertension     Mixed hyperlipidemia 2/3/2025     Pancreatitis     Prostate cancer     Prostate cancer     Prostate cancer     PVD (peripheral vascular disease)     Renal insufficiency     Substance abuse     cocaine, etoh , tob in past       Past Surgical History:   Procedure Laterality Date    AORTIC VALVE REPLACEMENT  05/19/2014    Tissue valve replacement    BACK SURGERY      CARDIAC CATHETERIZATION      COLONOSCOPY  2011    COLONOSCOPY N/A 2/24/2021    Procedure: COLONOSCOPY;  Surgeon: Faith Carrillo MD;  Location: [facility] ENDO;  Service: Endoscopy;  Laterality: N/A;    COLONOSCOPY N/A 7/2/2024    Procedure: COLONOSCOPY 6/4 plavix 5 day hold econ;  Surgeon: Prince Griffin MD;  Location: [facility] ENDO;  Service: Endoscopy;  Laterality: N/A;    EPIDURAL STEROID INJECTION INTO CERVICAL SPINE N/A 6/21/2022    Procedure: C7-T1 IL PIEDAD;  Surgeon: Nehemiah Carmen MD;  Location: [facility] PAIN MGT;  Service: Pain Management;  Laterality: N/A;    EPIDURAL STEROID INJECTION INTO CERVICAL SPINE N/A 3/12/2024    Procedure: C7/T1 IL PIEDAD;  Surgeon: Nehemiah Carmen MD;  Location: [facility] PAIN MGT;  Service: Pain Management;  Laterality: N/A;    ESOPHAGOGASTRODUODENOSCOPY N/A 2/24/2021    Procedure: ESOPHAGOGASTRODUODENOSCOPY (EGD);  Surgeon: Faith Carrillo MD;  Location: [facility];  Service: Endoscopy;  Laterality: N/A;    ESOPHAGOGASTRODUODENOSCOPY N/A 7/2/2024    Procedure: EGD (ESOPHAGOGASTRODUODENOSCOPY);  Surgeon: Prince Griffin MD;  Location: [facility] ENDO;  Service: Endoscopy;  Laterality: N/A;    ESOPHAGOGASTRODUODENOSCOPY N/A 11/19/2024    Procedure: EGD (ESOPHAGOGASTRODUODENOSCOPY);  Surgeon: Kayleigh Leiva MD;  Location: [facility] ENDO;  Service: Gastroenterology;  Laterality: N/A;    FOOT SURGERY      INSERTION N/A 7/5/2023    Procedure: INSERTION;  Surgeon: Brandon Valencia MD;  Location: [facility] OR;  Service: Neurosurgery;  Laterality: N/A;  Antibiotic Beads    INTRALUMINAL GASTROINTESTINAL TRACT IMAGING VIA CAPSULE N/A 11/1/2024    Procedure: IMAGING PROCEDURE,  GI TRACT, INTRALUMINAL, VIA CAPSULE;  Surgeon: [place], First Available;  Location: [facility] ENDO;  Service: Endoscopy;  Laterality: N/A;    LEFT HEART CATHETERIZATION Left 7/24/2020    Procedure: CATHETERIZATION, HEART, LEFT;  Surgeon: Pasha Martin MD;  Location: [facility] CATH LAB;  Service: Cardiology;  Laterality: Left;    PENILE PROSTHESIS IMPLANT      PENILE PROSTHESIS REVISION  04/30/2018    PROSTATECTOMY  09/2019    [facility]    radiation for prostate      REMOVAL OF HARDWARE FROM SPINE N/A 7/5/2023    Procedure: REMOVAL, HARDWARE, SPINE;  Surgeon: Brandon Valencia MD;  Location: [facility] OR;  Service: Neurosurgery;  Laterality: N/A;    REPLACEMENT N/A 7/5/2023    Procedure: REPLACEMENT;  Surgeon: Brandon Valencia MD;  Location: [facility] OR;  Service: Neurosurgery;  Laterality: N/A;  of [employer]    REVISION OF PROCEDURE INVOLVING INFLATABLE PENILE PROSTHESIS N/A 7/22/2024    Procedure: REVISION, PROCEDURE INVOLVING INFLATABLE PENILE PROSTHESIS;  Surgeon: Rommel Rodriguez MD;  Location: [facility] OR;  Service: Urology;  Laterality: N/A;    TRANSFORAMINAL EPIDURAL INJECTION OF STEROID Right 10/20/2022    Procedure: Right  L4/5 + L5/S1 TF PIEDAD RN IV Sedation;  Surgeon: Nehemiah Carmen MD;  Location: [facility] PAIN MGT;  Service: Pain Management;  Laterality: Right;    TRANSFORAMINAL LUMBAR INTERBODY FUSION (TLIF) USING COMPUTER-ASSISTED NAVIGATION Bilateral 5/17/2023    Procedure: FUSION, SPINE, LUMBAR, TLIF, USING COMPUTER-ASSISTED NAVIGATION;  Surgeon: Brandon Valencia MD;  Location: [facility] OR;  Service: Neurosurgery;  Laterality: Bilateral;  2 level lumbar fusion at L4-5 and L5-S1    UPPER GASTROINTESTINAL ENDOSCOPY  2011    WASHOUT N/A 7/5/2023    Procedure: WASHOUT;  Surgeon: Brandon Valencia MD;  Location: [facility] OR;  Service: Neurosurgery;  Laterality: N/A;    WOUND EXPLORATION Bilateral 7/5/2023    Procedure: EXPLORATION, WOUND;  Surgeon: Brandon Valencia MD;  Location: [facility] OR;  Service:  "Neurosurgery;  Laterality: Bilateral;       Social History     Tobacco Use    Smoking status: Former     Current packs/day: 0.00     Average packs/day: 1 pack/day for 8.0 years (8.0 ttl pk-yrs)     Types: Cigarettes     Start date: 2004     Quit date: 2012     Years since quittin.4    Smokeless tobacco: Never   Substance Use Topics    Alcohol use: Not Currently     Comment: Sober since 2012    Drug use: Not Currently     Types: "Crack" cocaine, Marijuana     Comment: Quit in        Family History   Problem Relation Name Age of Onset    Heart disease Mother      Heart disease Father      Heart attack Sister      Heart attack Brother      Colon cancer Neg Hx      Colon polyps Neg Hx      Liver cancer Neg Hx      Inflammatory bowel disease Neg Hx      Liver disease Neg Hx      Rectal cancer Neg Hx      Stomach cancer Neg Hx      Ulcerative colitis Neg Hx           ROS    Objective:   Physical Exam  HENT:      Head: Normocephalic.   Eyes:      Pupils: Pupils are equal, round, and reactive to light.   Neck:      Thyroid: No thyromegaly.      Vascular: Normal carotid pulses. No carotid bruit or JVD.   Cardiovascular:      Rate and Rhythm: Normal rate and regular rhythm. No extrasystoles are present.     Chest Wall: PMI is not displaced.      Pulses: Normal pulses.      Heart sounds: Murmur (ESM + on RUSB ) heard.      No gallop. No S3 sounds.   Pulmonary:      Effort: No respiratory distress.      Breath sounds: Normal breath sounds. No stridor.   Abdominal:      General: Bowel sounds are normal.      Palpations: Abdomen is soft.      Tenderness: There is no abdominal tenderness. There is no rebound.   Musculoskeletal:         General: Normal range of motion.   Skin:     Findings: No rash.   Neurological:      Mental Status: He is alert and oriented to person, place, and time.   Psychiatric:         Behavior: Behavior normal.         Lab Results   Component Value Date    CHOL 140 2024    " CHOL 137 01/22/2024    CHOL 123 01/18/2022     Lab Results   Component Value Date    HDL 41 12/19/2024    HDL 44 01/22/2024    HDL 39 (L) 01/18/2022     Lab Results   Component Value Date    LDLCALC 87.0 12/19/2024    LDLCALC 79.6 01/22/2024    LDLCALC 72.6 01/18/2022     Lab Results   Component Value Date    TRIG 60 12/19/2024    TRIG 67 01/22/2024    TRIG 57 01/18/2022     Lab Results   Component Value Date    CHOLHDL 29.3 12/19/2024    CHOLHDL 32.1 01/22/2024    CHOLHDL 31.7 01/18/2022       Chemistry        Component Value Date/Time     12/19/2024 0829    K 4.6 12/19/2024 0829     12/19/2024 0829    CO2 20 (L) 12/19/2024 0829    BUN 20 12/19/2024 0829    CREATININE 1.5 (H) 12/19/2024 0829    GLU 86 12/19/2024 0829        Component Value Date/Time    CALCIUM 9.2 12/19/2024 0829    ALKPHOS 103 12/19/2024 0829    AST 26 12/19/2024 0829    ALT 18 12/19/2024 0829    BILITOT 0.5 12/19/2024 0829    ESTGFRAFRICA 51.1 (A) 06/02/2022 1246    EGFRNONAA 44.2 (A) 06/02/2022 1246          Lab Results   Component Value Date    HGBA1C 5.8 (H) 01/22/2024     Lab Results   Component Value Date    TSH 2.854 05/23/2024     Lab Results   Component Value Date    INR 0.9 09/04/2024    INR 1.1 07/04/2023    INR 1.0 04/13/2023     Lab Results   Component Value Date    WBC 4.08 12/09/2024    HGB 14.5 12/09/2024    HCT 45.6 12/09/2024    MCV 90 12/09/2024     12/09/2024     BMP  Sodium   Date Value Ref Range Status   12/19/2024 138 136 - 145 mmol/L Final     Potassium   Date Value Ref Range Status   12/19/2024 4.6 3.5 - 5.1 mmol/L Final     Chloride   Date Value Ref Range Status   12/19/2024 109 95 - 110 mmol/L Final     CO2   Date Value Ref Range Status   12/19/2024 20 (L) 23 - 29 mmol/L Final     BUN   Date Value Ref Range Status   12/19/2024 20 8 - 23 mg/dL Final     Creatinine   Date Value Ref Range Status   12/19/2024 1.5 (H) 0.5 - 1.4 mg/dL Final     Calcium   Date Value Ref Range Status   12/19/2024 9.2 8.7 -  10.5 mg/dL Final     Anion Gap   Date Value Ref Range Status   12/19/2024 9 8 - 16 mmol/L Final     eGFR if    Date Value Ref Range Status   06/02/2022 51.1 (A) >60 mL/min/1.73 m^2 Final     eGFR if non    Date Value Ref Range Status   06/02/2022 44.2 (A) >60 mL/min/1.73 m^2 Final     Comment:     Calculation used to obtain the estimated glomerular filtration  rate (eGFR) is the CKD-EPI equation.        BNP  @LABRCNTIP(BNP,BNPTRIAGEBLO)@  @LABRCNTIP(troponini)@  CrCl cannot be calculated (Patient's most recent lab result is older than the maximum 7 days allowed.).  No results found in the last 24 hours.  No results found in the last 24 hours.  No results found in the last 24 hours.    Assessment:      1. PAD (peripheral artery disease)    2. Chronic diastolic heart failure    3. History of CVA in adulthood    4. Cocaine dependence in remission    5. Aorto-iliac atherosclerosis    6. Coronary artery disease involving native coronary artery of native heart without angina pectoris    7. S/P AVR (aortic valve replacement) biopresthetic miller 25 mm in 2014     8. Primary hypertension    9. Mixed hyperlipidemia      PAD   H/o CVA  HLD HTN  Plan:   Start to take Lipitor 80 mg from 0.5 tab to 1 tab   Add repatha for HLD and h/o CVA and PAD      Contiune Coreg ASA plavix losartan   .  Counseled DASH  Check Lipid profile with PCP in 6 months  Recommend heart-healthy diet, weight control and regular exercise.  [place]. Risk modification.   I have reviewed all pertinent labs and cardiac studies independently. Plans and recommendations have been formulated under my direct supervision. All questions answered and patient voiced understanding.   If symptoms persist go to the ED  RTC in 6 months

## 2025-02-06 ENCOUNTER — TELEPHONE (OUTPATIENT)
Dept: ADMINISTRATIVE | Facility: CLINIC | Age: 70
End: 2025-02-06
Payer: MEDICARE

## 2025-02-06 NOTE — TELEPHONE ENCOUNTER
Called pt; informed pt I was calling to make sure his 2/13/25 home visit for his eawv appt still worked for him; pt stated as of now the appt still works; informed pt the provider would call 24-48 hours before the scheduled appt date to confirm appt time; pt verbalized understanding

## 2025-02-12 ENCOUNTER — E-CONSULT (OUTPATIENT)
Dept: CARDIOLOGY | Facility: CLINIC | Age: 70
End: 2025-02-12
Payer: MEDICARE

## 2025-02-12 ENCOUNTER — OFFICE VISIT (OUTPATIENT)
Dept: PAIN MEDICINE | Facility: CLINIC | Age: 70
End: 2025-02-12
Payer: MEDICARE

## 2025-02-12 VITALS
HEART RATE: 76 BPM | BODY MASS INDEX: 34.35 KG/M2 | SYSTOLIC BLOOD PRESSURE: 130 MMHG | WEIGHT: 232.56 LBS | DIASTOLIC BLOOD PRESSURE: 61 MMHG

## 2025-02-12 DIAGNOSIS — M54.16 LUMBAR RADICULOPATHY: ICD-10-CM

## 2025-02-12 DIAGNOSIS — Z01.810 PREOP CARDIOVASCULAR EXAM: Primary | ICD-10-CM

## 2025-02-12 DIAGNOSIS — M50.30 DDD (DEGENERATIVE DISC DISEASE), CERVICAL: ICD-10-CM

## 2025-02-12 DIAGNOSIS — Z79.01 ANTICOAGULATED: ICD-10-CM

## 2025-02-12 DIAGNOSIS — M54.12 CERVICAL RADICULOPATHY: Primary | ICD-10-CM

## 2025-02-12 DIAGNOSIS — M96.1 POSTLAMINECTOMY SYNDROME OF LUMBAR REGION: ICD-10-CM

## 2025-02-12 PROCEDURE — 3078F DIAST BP <80 MM HG: CPT | Mod: HCNC,CPTII,S$GLB, | Performed by: PHYSICAL MEDICINE & REHABILITATION

## 2025-02-12 PROCEDURE — 3008F BODY MASS INDEX DOCD: CPT | Mod: HCNC,CPTII,S$GLB, | Performed by: PHYSICAL MEDICINE & REHABILITATION

## 2025-02-12 PROCEDURE — 1101F PT FALLS ASSESS-DOCD LE1/YR: CPT | Mod: HCNC,CPTII,S$GLB, | Performed by: PHYSICAL MEDICINE & REHABILITATION

## 2025-02-12 PROCEDURE — G2211 COMPLEX E/M VISIT ADD ON: HCPCS | Mod: HCNC,S$GLB,, | Performed by: PHYSICAL MEDICINE & REHABILITATION

## 2025-02-12 PROCEDURE — 1125F AMNT PAIN NOTED PAIN PRSNT: CPT | Mod: HCNC,CPTII,S$GLB, | Performed by: PHYSICAL MEDICINE & REHABILITATION

## 2025-02-12 PROCEDURE — 3288F FALL RISK ASSESSMENT DOCD: CPT | Mod: HCNC,CPTII,S$GLB, | Performed by: PHYSICAL MEDICINE & REHABILITATION

## 2025-02-12 PROCEDURE — 99214 OFFICE O/P EST MOD 30 MIN: CPT | Mod: HCNC,S$GLB,, | Performed by: PHYSICAL MEDICINE & REHABILITATION

## 2025-02-12 PROCEDURE — 99999 PR PBB SHADOW E&M-EST. PATIENT-LVL III: CPT | Mod: PBBFAC,HCNC,, | Performed by: PHYSICAL MEDICINE & REHABILITATION

## 2025-02-12 PROCEDURE — 3075F SYST BP GE 130 - 139MM HG: CPT | Mod: HCNC,CPTII,S$GLB, | Performed by: PHYSICAL MEDICINE & REHABILITATION

## 2025-02-12 PROCEDURE — 1159F MED LIST DOCD IN RCRD: CPT | Mod: HCNC,CPTII,S$GLB, | Performed by: PHYSICAL MEDICINE & REHABILITATION

## 2025-02-12 RX ORDER — HYDROCODONE BITARTRATE AND ACETAMINOPHEN 10; 325 MG/1; MG/1
1 TABLET ORAL
Qty: 30 TABLET | Refills: 0 | Status: SHIPPED | OUTPATIENT
Start: 2025-04-10 | End: 2025-05-10

## 2025-02-12 RX ORDER — HYDROCODONE BITARTRATE AND ACETAMINOPHEN 10; 325 MG/1; MG/1
1 TABLET ORAL
Qty: 30 TABLET | Refills: 0 | Status: SHIPPED | OUTPATIENT
Start: 2025-02-12 | End: 2025-03-14

## 2025-02-12 RX ORDER — HYDROCODONE BITARTRATE AND ACETAMINOPHEN 10; 325 MG/1; MG/1
1 TABLET ORAL
Qty: 30 TABLET | Refills: 0 | Status: SHIPPED | OUTPATIENT
Start: 2025-03-12 | End: 2025-04-11

## 2025-02-12 NOTE — PROGRESS NOTES
Established Patient Chronic Pain Note (Follow up visit)    Chief Complaint:   Chief Complaint   Patient presents with    Low-back Pain     Radiates into the legs    Neck Pain    Shoulder Pain     Radiated into the arm       SUBJECTIVE:    Interval History (2/12/2025):   Abdullahi Urias is a 69 y.o. male presents today for follow-up chronic neck and back pain.  Current pain intensity is 7/10.  He continues to utilize tizanidine, Lyrica and also had previous short term supply of Norco provided.  Current pain is of the same type and quality in starts in the cervical myofascial region and radiates down the right upper extremity extending his hands and fingers.  He has had significant relief with cervical PIEDAD previously.  He also continues with chronic lower back pain with radiation down into the right lower extremity to the foot and ankle posterolaterally.    Interval HPI 12/13/2024:  Abdullahi Urias presents today for follow-up visit.  Patient was last seen on 12/13/2024. At that visit, the plan was to reach out to IR to address biopsy of seroma in lumbar spine. Patient reports pain as 8/10 today. He has pain in the neck radiating into shoulders and also lower back pain.     On 11/30/24, he fell off the ladder while putting up [employer] decorations. He did not seek medical attention afterwards. He has swelling and scabbing present to anterior R calf. States it was very swollen at onset.   Patient denies LOC. He fell about 4-5 feet.    Patient did follow up with IR and had biopsy of seroma. Results did not yield any abnormal growth. Patient states he did not follow up with anyone after this was completed.    Patient denies weight loss, fevers, chills, syncope, or LOC.    Interval History (8/14/2024):    Abdullahi Urias is a 69 y.o. male presents today for follow-up lower back and right leg pain.  Current pain intensity is 7/10.  He continues to utilize tizanidine and gabapentin without any difficulty.  He has had piriformis  trigger point injections in the past that were beneficial.  He recently saw Neurosurgery who was concerned about the seroma and has requested IR assistance for CT-guided biopsy/aspiration of the seroma near the surgical site.  This is yet to be completed unfortunately.    Patient is interested in repeat cervical PIEDAD as this was extremely beneficial for the past 5 months.    Patient denies night fever/night sweats, urinary incontinence, bowel incontinence, significant weight loss and significant motor weakness.   Patient denies any other complaints or concerns at this time.      Pain Disability Index Review:      2/12/2025     9:14 AM 12/13/2024    11:47 AM 8/14/2024     9:14 AM   Last 3 PDI Scores   Pain Disability Index (PDI) 49 [phone] 49     Interval HPI 04/09/2024:  Patient Abdullahi Urias presents today for follow-up visit.  Patient was last seen on  3/12/2024 C7/T1 IL PIEDAD with 95% relief.  Neck pain is doing much better and he is no longer having any numbness or tingling in the neck.  He rates his pain today a 4/10.  He does still have some lower back pain which does not radiate.  He continues to follow up with  following his laminectomy.    Interval History (2/26/2024):  Patient Abdullahi Urias presents today for follow-up visit.  Patient is seen seen today for review of his cervical MRI.  He has neck pain which radiates down into the bilateral shoulders with a tingling sensation.  He rates his pain today a 5/10 but states it will go up to a 9/10 especially with flexion of the neck.  He is currently taking gabapentin 600 mg twice a day.  He does take oxycodone sparingly if needed and is requesting a refill.    He also has chronic lumbar radiculopathy and has been seeing Dr. Wilkinson, he reports this is stable currently.    Interval History (3/6/2023):  Patient presents today for follow-up visit.  Patient was last seen on 1/9/23.  Patient reports pain as 7/10 today.  Patients is here today discuss neck pain with  radicular symptoms.   Has tingling in R shoulder>L shoulder and will radiate up to his neck but other times it will radiate up to face or down arm. This has been ongoing for one month now. Although he does not notice pain radiating down his arms, he has discomfort in both sides of his neck.   He will c/p lower back pain if he is standing or bending for a long period of time.     Since his last office visit, he underwent a TLIF at L4-S1 with Dr. Valencia on 5/17/23 followed by a wound exploration on 7/5/23. He is doing well at this time (in regards to his lower back).      Interval History (1/9/2023): Abdullahi Urias presents today for follow-up visit.  he underwent right L4/5 +L5/S1 TF PIEDAD on 10/20/22.  The patient reports that he is/was better following the procedure.  he reports 40% pain relief.  The changes lasted for a few weeks before the pain insidiously returned.  Patient reports pain as 9/10 today. Also has history of vasculogenic claudication. Recently went on a cruise, needed a wheelchair to get on the ship. Continues to report low back pain with radiation into his right lower extremity along L4/5-S1 distribution with associated numbness in the right foot.      Interval History (10/4/2022): Abdullahi Urias presents today for follow-up visit.  he underwent C7/T1 IL PIEDAD on 06/21/2022.  The patient reports that he is/was better following the procedure.  he reports 60% pain relief.  The changes lasted 6 weeks before neck stiffness and arm numbness returned. Patient reports pain as 8/10 today.    Today he reports his primary complaint is low back pain with radiation into right leg into the posterior and lateral aspect along L4-S1 distribution. Reports back pain is worse than neck pain at this time. Also has history of vasculogenic claudication. Patient reports pain is causing him to be depressed and anxious. Pain interferes with daily activities. [facility]ator [facility]. Recently went on a  cruise, needed to use a wheelchair due to weakness and pain.    Interval HPI  Abdullahi Urias is a 68 y.o. male who presents to the clinic for a follow-up.  He reports that his back pain is much improved ever since undergoing bilateral piriformis trigger point injections.  He wants to focus more on his neck and upper extremity pain currently.  Since the last visit, Abdullahi Urias states the pain has been worsening. Current pain intensity is 8/10.    Interval HPI 01/27/2022:  Abdullahi Urias is a 66 y.o. male who presents to the clinic for the evaluation of lower back pain.  He is self referred.  He has past medical history of CVA, substance abuse, asthma, CHF, hypertension, CAD, cardiomyopathy, renal insufficiency, CKD stage 3, H/O hepatitis-C, H/O prostate cancer, multiple other medical comorbidities as listed in his chart.  The pain started several years ago and symptoms have been worsening.The pain is located in the lumbosacral area and radiates to the bilateral lower extremities extending posteriorly to the bilateral feet.  Of note, patient recently had cardiovascular studies done on his lower extremities which did demonstrate vasculogenic claudication.  The pain is described as Aching, burning, tingling and is rated as 8/10. The pain is rated with a score of  6/10 on the BEST day and a score of 10/10 on the WORST day.  Symptoms interfere with daily activity. The pain is exacerbated by activities.  The pain is mitigated by medications.        Non-Pharmacologic Treatments:  Physical Therapy/Home Exercise: yes  Ice/Heat:yes  TENS: yes  Acupuncture: no  Massage: yes  Chiropractic: no    Other: Surgery-   5/17/23: TLIF L4-S1 with Dr. Valencia  7/5/23: Wound exploration with Dr. Valencia        Pain Medications:  - Opioids: Norco, Tylenol 3  - Adjuvant Medications: Ambien, alprazolam, Flexeril, Voltaren gel, gabapentin, lidocaine patch, Zoloft  - Anti-Coagulants: Aspirin, Pletal      Pain Procedures:   -previous lumbar  PIEDAD  -previous lumbar MBB/RFA  -previous lumbar laminectomy in 2001 at L5-S1  -01/27/2022:  Bilateral piriformis trigger point injections, significant relief that is still ongoing  -right L4/5 +L5/S1 TF PIEDAD on 10/20/22 with 40% relief   3/12/2024 C7/T1 IL PIEDAD 95% relief     Imaging (Reviewed on 2/12/2025):                MRI lumbar spine 05/31/2024:  Surgical change with hardware and associated artifact L4-S1 posterior approach.  There is a peripherally enhancing fluid loculation at the dorsal postsurgical elements measuring approximately 4 cm X 7 cm epicenter L5 level sterility uncertain possible abscess.     No overt canal compromise.     No overt acute osseous finding     Conus medullaris unremarkable       2/23/2024 cervical MRI  FINDINGS:  The cervical cord reveals normal signal and morphology.     There is straightening/reversal of the normal cervical lordosis.  There is mild to moderate multilevel degenerative disc disease as detailed below.     C1-2: Moderate C1-2 arthrosis.     C2-C3: Mild disc bulge.  Moderate to severe right and mild left facet arthrosis with moderate right foraminal stenosis.     C3-C4: Mild disc bulge.  Bilateral uncovertebral joint spurring with mild foraminal stenosis.     C4-C5: Mild disc bulge.  Bilateral uncovertebral joint spurring with moderate right and mild left foraminal stenosis.     C5-C6: Moderate disc degeneration with disc bulging osteophyte with ventral sac impression.  Mild central stenosis.  Uncovertebral joint spurring with moderate to severe right foraminal stenosis.     C6-C7: Moderate degenerative disc disease with disc bulging osteophyte with mild ventral sac impression.  Uncovertebral joint spurring with mild foraminal stenosis.     C7-T1: Moderate disc degeneration with disc bulging osteophyte eccentric to the left with mild left lateral recess stenosis.  Uncovertebral joint spurring with moderate left and mild right foraminal stenosis.     Impression:     No  acute abnormality.  Reversal of the normal cervical lordosis.     C5-6, C6-7 and C7-T1 degenerative disc disease as above with C5-6 central canal stenosis.  Foraminal stenosis as above.    MRI lumbar spine 01/12/2021:  Visualized distal cord demonstrates normal signal.     No marrow replacement process.  No fracture.  No listhesis.     There are degenerative changes of the lower lumbar spine with mild disc space narrowing, most severe at L5-S1 with marginal spurring with facet arthropathy     L1-L2: There is posterior disc bulge with indentation of the thecal sac.  No spinal canal or neural foraminal encroachment.     L2-L3: Mild facet arthropathy.  Mild posterior disc bulge.  No spinal canal or neural foraminal encroachment.     L3-L4: Bilateral ligamentum flavum and facet arthropathy.  Mild posterior disc bulge with indentation of thecal sac.  No spinal canal or neural foraminal encroachment.     L4-L5: Ligamentum flavum and facet arthropathy with broad-based disc bulge.  No significant spinal canal stenosis.  There is crowding of the lateral recesses with narrowing along the inferior right greater than left neural foramen.     L5-S1: Posterior disc osteophyte complex with facet arthropathy.  Appearance of possible prior right hemilaminectomy changes..  There is severe narrowing along the right neural foramen  with mild to moderate narrowing along the left neural foramen.  No significant spinal canal stenosis        CT cervical spine 12/01/2020:  Vertebral body heights maintained.  2 mm anterolisthesis of C3 on C4.  Disc height loss and osteophyte changes at C6-7 and C7-T1.  Multilevel facet degenerative findings most prevalent at the right C2-3 and left C3-4 levels.     Neck soft tissues are unremarkable.     C2-3: No CT evidence of spinal canal stenosis.  Mild right neural foraminal narrowing.     C3-4: No CT evidence of significant spinal canal stenosis.  Left greater than right facet and uncovertebral  degenerative changes results in mild right and severe left neural foraminal narrowing.     C4-5: Posterior disc osteophyte complex present with mild spinal canal stenosis.  Mild to moderate right neural foraminal narrowing secondary to facet and uncovertebral degenerative findings.     C5-6: Posterior disc osteophyte complex present asymmetric to the right with mild spinal canal stenosis.  Severe right neural foraminal narrowing secondary to facet and uncovertebral degenerative findings.     C6-7: Posterior disc osteophyte complex present causing at least mild spinal canal stenosis.  Left greater than right facet and uncovertebral degenerative findings with mild-to-moderate right and moderate to severe left neural foraminal narrowing.     C7-T1: Posterior disc osteophyte complex with spinal canal stenosis suspected.  Mild left neural foraminal narrowing secondary to uncovertebral degenerative findings.        PMHx,PSHx, Social history, and Family history:  I have reviewed the patient's medical, surgical, social, and family history in detail and updated the computerized patient record.      Review of patient's allergies indicates:  No Known Allergies      Current Outpatient Medications   Medication Sig    albuterol (PROVENTIL/VENTOLIN HFA) 90 mcg/actuation inhaler INHALE 2 PUFFS INTO THE LUNGS EVERY 6 HOURS AS NEEDED FOR COUGH    allopurinoL (ZYLOPRIM) 100 MG tablet Take 100 mg by mouth once daily. Takes 0.5 tab    ALPRAZolam (XANAX) 1 MG tablet Take 1 mg by mouth Daily. EVERY OTHER DAY    aspirin (ECOTRIN) 81 MG EC tablet Take 1 tablet (81 mg total) by mouth once daily.    atorvastatin (LIPITOR) 80 MG tablet     azelastine (ASTELIN) 137 mcg (0.1 %) nasal spray 1 spray (137 mcg total) by Nasal route 2 (two) times daily.    carvediloL (COREG) 12.5 MG tablet Take 1 tablet (12.5 mg total) by mouth 2 (two) times daily.    clopidogreL (PLAVIX) 75 mg tablet Take 1 tablet (75 mg total) by mouth once daily.    esomeprazole  (NEXIUM) 40 MG capsule Take 40 mg by mouth before breakfast.    evolocumab (REPATHA SURECLICK) 140 mg/mL PnIj Inject 1 mL (140 mg total) into the skin every 14 (fourteen) days.    fluticasone-umeclidin-vilanter (TRELEGY ELLIPTA) 200-62.5-25 mcg inhaler Inhale 1 puff into the lungs once daily.    hydrOXYzine HCL (ATARAX) 25 MG tablet Take 1 tablet (25 mg total) by mouth 3 (three) times daily as needed for Itching.    losartan (COZAAR) 50 MG tablet Take 1 tablet (50 mg total) by mouth once daily.    multivitamin (THERAGRAN) per tablet Take 1 tablet by mouth once daily.    pregabalin (LYRICA) 75 MG capsule Take 1 capsule (75 mg total) by mouth every evening for 5 days, THEN 1 capsule (75 mg total) 2 (two) times daily.    sertraline (ZOLOFT) 100 MG tablet TAKE TWO TABLETS BY MOUTH EVERY DAY FOR MENTAL HEALTH DOSE INCREASED TO 200MG/DAY    aluminum & magnesium hydroxide-simethicone (MYLANTA MAX STRENGTH) 400-400-40 mg/5 mL suspension TAKE 15ML BY MOUTH FOUR TIMES A DAY AS NEEDED FOR STOMACH ACID    diclofenac sodium (VOLTAREN) 1 % Gel Apply 2 g topically 2 (two) times daily. for 7 days    docusate sodium (COLACE) 100 MG capsule Take 1 capsule (100 mg total) by mouth 2 (two) times daily.    HYDROcodone-acetaminophen (NORCO)  mg per tablet Take 1 tablet by mouth every 24 hours as needed for Pain.    [START ON 3/12/2025] HYDROcodone-acetaminophen (NORCO)  mg per tablet Take 1 tablet by mouth every 24 hours as needed for Pain.    [START ON 4/10/2025] HYDROcodone-acetaminophen (NORCO)  mg per tablet Take 1 tablet by mouth every 24 hours as needed for Pain.    hydrocortisone 2.5 % cream Apply topically 2 (two) times daily as needed. Apply to affected areas of the face and neck.     Current Facility-Administered Medications   Medication    aztreonam 2,000 mg in D5W 100 mL IVPB     Facility-Administered Medications Ordered in Other Visits   Medication    ondansetron injection 4 mg    ondansetron injection 4 mg     sodium chloride 0.9% flush 10 mL         REVIEW OF SYSTEMS:    GENERAL:  No weight loss, malaise or fevers.  HEENT:   No recent changes in vision or hearing  NECK:  Negative for lumps, no difficulty with swallowing.  RESPIRATORY:  Negative for cough, wheezing or shortness of breath, patient denies any recent URI.  CARDIOVASCULAR:  Negative for chest pain, leg swelling or palpitations.  GI:  Negative for abdominal discomfort, blood in stools or black stools or change in bowel habits.  MUSCULOSKELETAL:  See HPI.  SKIN:  Negative for lesions, rash, and itching.  PSYCH:  No mood disorder or recent psychosocial stressors.  Patients sleep is not disturbed secondary to pain.  HEMATOLOGY/LYMPHOLOGY:  Negative for prolonged bleeding, bruising easily or swollen nodes.  Patient is currently taking anti-coagulants - ASA and Pletal  NEURO:   No history of headaches, syncope, paralysis, seizures or tremors.  All other reviewed and negative other than HPI.       OBJECTIVE:    /61   Pulse 76   Wt 105.5 kg (232 lb 9.4 oz)   BMI 34.35 kg/m²     PHYSICAL EXAMINATION:    GENERAL: Well appearing, in no acute distress, alert and oriented x3.  PSYCH:  Mood and affect appropriate.  SKIN: Skin color, texture, turgor normal, no rashes or lesions.  HEAD/FACE:  Normocephalic, atraumatic. Cranial nerves grossly intact.   NECK:  Tenderness palpation over the bilateral cervical paraspinous muscles.  Spurling's positive  bilaterally, worse on the right.  Limited range of motion secondary to pain in all planes.  PULM: No evidence of respiratory difficulty, symmetric chest rise.  GI:  Soft and non-tender.  BACK:  Surgical scarring evident.  Incision(s) CDI. No pain / TTP lumbar spine.  Positive straight leg raise on the right  EXTREMITIES: TTP, abrasions and soft tissue swelling present over anterior Right calf:  NEURO: Bilateral upper and lower extremity coordination and muscle stretch reflexes are physiologic and symmetric.  Plantar  response are downgoing. No clonus.  No loss of sensation is noted.  GAIT:  Slow, antalgic.      Neuro - Upper Extremities:  - BUE Strength:R/L: D: 5/5; B: 5/5; T: 5/5; WF: 5/5; WE: 5/5; IO: 5/5  - Extremity Reflexes: Brisk and symmetric throughout  - Sensory: Sensation to light touch intact bilaterally    Psych:  Mood and affect is appropriate      LABS:  Lab Results   Component Value Date    WBC 4.08 12/09/2024    HGB 14.5 12/09/2024    HCT 45.6 12/09/2024    MCV 90 12/09/2024     12/09/2024       CMP  Sodium   Date Value Ref Range Status   12/19/2024 138 136 - 145 mmol/L Final     Potassium   Date Value Ref Range Status   12/19/2024 4.6 3.5 - 5.1 mmol/L Final     Chloride   Date Value Ref Range Status   12/19/2024 109 95 - 110 mmol/L Final     CO2   Date Value Ref Range Status   12/19/2024 20 (L) 23 - 29 mmol/L Final     Glucose   Date Value Ref Range Status   12/19/2024 86 70 - 110 mg/dL Final     BUN   Date Value Ref Range Status   12/19/2024 20 8 - 23 mg/dL Final     Creatinine   Date Value Ref Range Status   12/19/2024 1.5 (H) 0.5 - 1.4 mg/dL Final     Calcium   Date Value Ref Range Status   12/19/2024 9.2 8.7 - 10.5 mg/dL Final     Total Protein   Date Value Ref Range Status   12/19/2024 7.2 6.0 - 8.4 g/dL Final     Albumin   Date Value Ref Range Status   12/19/2024 3.8 3.5 - 5.2 g/dL Final     Total Bilirubin   Date Value Ref Range Status   12/19/2024 0.5 0.1 - 1.0 mg/dL Final     Comment:     For infants and newborns, interpretation of results should be based  on gestational age, weight and in agreement with clinical  observations.    Premature Infant recommended reference ranges:  Up to 24 hours.............<8.0 mg/dL  Up to 48 hours............<12.0 mg/dL  3-5 days..................<15.0 mg/dL  6-29 days.................<15.0 mg/dL       Alkaline Phosphatase   Date Value Ref Range Status   12/19/2024 103 40 - 150 U/L Final     AST   Date Value Ref Range Status   12/19/2024 26 10 - 40 U/L Final      ALT   Date Value Ref Range Status   12/19/2024 18 10 - 44 U/L Final     Anion Gap   Date Value Ref Range Status   12/19/2024 9 8 - 16 mmol/L Final     eGFR if    Date Value Ref Range Status   06/02/2022 51.1 (A) >60 mL/min/1.73 m^2 Final     eGFR if non    Date Value Ref Range Status   06/02/2022 44.2 (A) >60 mL/min/1.73 m^2 Final     Comment:     Calculation used to obtain the estimated glomerular filtration  rate (eGFR) is the CKD-EPI equation.          Lab Results   Component Value Date    HGBA1C 5.8 (H) 01/22/2024       ASSESSMENT: 69 y.o. year old male with neck pain, consistent with     1. Cervical radiculopathy        2. Anticoagulated  E-Consult to General Cardiology      3. Postlaminectomy syndrome of lumbar region        4. Lumbar radiculopathy        5. DDD (degenerative disc disease), cervical              PLAN:   - Interventions:  Previously scheduled patient for Repeat Cervical C7/T1 IL PIEDAD to help with neck pain and cervical radiculopathy, we will attempt to get scheduled after obtaining clearance for anticoagulation. Explained the risks and benefits of the procedure in detail with the patient today in clinic along with alternative treatment options, and the patient elected to pursue the intervention.      Consider spinal cord stimulation therapy for chronic back and leg pain-provide the patient with [facility] information today    S/p C7/T1 IL PIEDAD 95% relief 3/12/2024 (relief for over 3 months)  S/p right L4/5 +L5/S1 TF PIEDAD on 10/20/22 with 40% relief  S/p C7-T1 IL PIEDAD for diagnostic and therapeutic purposes with 60% relief    - Anticoagulation use: yes Aspirin and Plavix.Will need to hold for 5 days prior, Will get clearance from Dr. Mckeon.       - Medications: I have stressed the importance of physical activity and a home exercise plan to help with pain and improve health. Patient can continue with medications for now since they are providing benefits,  using them appropriately, and without side effects.       - Continue Tizanidine 4 mg BID, refill today  - no longer using Lyrica    An opioid pain contract was completed on 02/12/2025 after having an extensive conversation about chronic opioid use for pain management. We discussed the risks and benefits of opioids.  I discouraged the patient from escalation of opioid use and try to minimize its use to decrease chance of dependence, tolerance, and opioid-based hyperalgesia.  I reviewed the  in great detail and there are no inconsistencies and it is appropriate with patient's history.  There are no signs of aberrant drug behavior.  The opioid risk tool was also completed, moderate risk.  Pt counseled about the side effects of long term use of opioids including dependence, tolerance, addiction, respiratory depression, somnelence , immune and endocrine dysfunction.  The patient expressed understanding.    Provide Norco 10/325 mg daily p.r.n. severe pain, 30 tablets with future fill dates for 02/12/2025, 03/12/2025, and 04/10/2025       report:  Reviewed and consistent with medication use as prescribed.          - Therapy:  Advised patient continue with home exercises as tolerated     - Imaging: Reviewed previous imaging, labs, pathology results.     - Consults/Referrals:  EConsult to cardiology      - Follow up visit: 4 weeks after C-PIEDAD, with Dr. Carmen to discuss establishing pain contract     - Counseled patient regarding the importance of activity modification and physical therapy     - This condition does not require this patient to take time off of work, and the primary goal of our Pain Management services is to improve the patient's functional capacity.     - Patient Questions: Answered all of the patient's questions regarding diagnosis, therapy, and treatment       The above plan and management options were discussed at length with patient. Patient is in agreement with the above and verbalized  understanding.      Visit today included increased complexity associated with the care of the episodic problem of chronic pain which was addressed and continue to manage the longitudinal care of the patient due to the serious and/or complex managed problem(s) listed above.      Nehemiah Carmen MD  Interventional Pain Management  [facility]

## 2025-02-12 NOTE — CONSULTS
[facility] - Cardiology  Response for E-Consult     Patient Name: Abdullahi Urias  MRN: [id number]  Primary Care Provider: Reji Valentin MD   Requesting Provider: Nehemiah Carmen MD  E-Consult to General Cardiology  Consult performed by: Jeffery Mckeon MD  Consult ordered by: Nehemiah Carmen MD          70 yo male, E consult for preop clearance of spinal cord stimulation therapy   The chart reviewed.  PMH s/p bioprosthetic AVR at [facility] in 2014 Salazar [id number] 25 mm pericardial tissue valve, nonobstructive CAD s/p C in  nonobstructive, 20% midLAD and RCA, PAD, h/o stroke in 2012, HTN, HLD CHEO on CPAP     Plan  Elevated periop risk of CV events for non-high risk procedure.  Ok to proceed the scheduled procedure without further cardiac study.  OK to hold ASa and Plavix 5 days before the procedure and resume postop once hemodynamically stable      Total time of Consultation: 10 minute    I did not speak to the requesting provider verbally about this.     *This eConsult is based on the clinical data available to me and is furnished without benefit of a physical examination. The eConsult will need to be interpreted in light of any clinical issues or changes in patient status not available to me at the time of filing this eConsults. Significant changes in patient condition or level of acuity should result in immediate formal consultation and reevaluation. Please alert me if you have further questions.    Thank you for this eConsult referral.     Jeffery Mkceon MD  [facility] - Cardiology

## 2025-02-13 ENCOUNTER — OFFICE VISIT (OUTPATIENT)
Dept: HOME HEALTH SERVICES | Facility: CLINIC | Age: 70
End: 2025-02-13
Payer: MEDICARE

## 2025-02-13 ENCOUNTER — TELEPHONE (OUTPATIENT)
Dept: UROLOGY | Facility: CLINIC | Age: 70
End: 2025-02-13
Payer: MEDICARE

## 2025-02-13 ENCOUNTER — TELEPHONE (OUTPATIENT)
Dept: INTERNAL MEDICINE | Facility: CLINIC | Age: 70
End: 2025-02-13
Payer: MEDICARE

## 2025-02-13 VITALS
OXYGEN SATURATION: 96 % | HEART RATE: 68 BPM | TEMPERATURE: 97 F | HEIGHT: 69 IN | WEIGHT: 232 LBS | SYSTOLIC BLOOD PRESSURE: 128 MMHG | BODY MASS INDEX: 34.36 KG/M2 | DIASTOLIC BLOOD PRESSURE: 65 MMHG

## 2025-02-13 DIAGNOSIS — I35.0 NONRHEUMATIC AORTIC VALVE STENOSIS: ICD-10-CM

## 2025-02-13 DIAGNOSIS — F14.21 COCAINE DEPENDENCE IN REMISSION: ICD-10-CM

## 2025-02-13 DIAGNOSIS — E66.811 CLASS 1 OBESITY DUE TO EXCESS CALORIES WITH SERIOUS COMORBIDITY AND BODY MASS INDEX (BMI) OF 34.0 TO 34.9 IN ADULT: ICD-10-CM

## 2025-02-13 DIAGNOSIS — I42.8 OTHER CARDIOMYOPATHY: ICD-10-CM

## 2025-02-13 DIAGNOSIS — M53.86 DECREASED ROM OF LUMBAR SPINE: ICD-10-CM

## 2025-02-13 DIAGNOSIS — R09.81 SINUS CONGESTION: ICD-10-CM

## 2025-02-13 DIAGNOSIS — N52.9 ERECTILE DYSFUNCTION, UNSPECIFIED ERECTILE DYSFUNCTION TYPE: ICD-10-CM

## 2025-02-13 DIAGNOSIS — N25.81 SECONDARY HYPERPARATHYROIDISM OF RENAL ORIGIN: ICD-10-CM

## 2025-02-13 DIAGNOSIS — C61 PROSTATE CANCER: ICD-10-CM

## 2025-02-13 DIAGNOSIS — Z00.00 ENCOUNTER FOR MEDICARE ANNUAL WELLNESS EXAM: Primary | ICD-10-CM

## 2025-02-13 DIAGNOSIS — K57.30 DIVERTICULOSIS OF COLON: ICD-10-CM

## 2025-02-13 DIAGNOSIS — M54.16 LUMBAR RADICULOPATHY, CHRONIC: ICD-10-CM

## 2025-02-13 DIAGNOSIS — Z87.891 FORMER TOBACCO USE: ICD-10-CM

## 2025-02-13 DIAGNOSIS — I25.10 CORONARY ARTERY DISEASE INVOLVING NATIVE CORONARY ARTERY OF NATIVE HEART WITHOUT ANGINA PECTORIS: Chronic | ICD-10-CM

## 2025-02-13 DIAGNOSIS — I70.0 AORTO-ILIAC ATHEROSCLEROSIS: ICD-10-CM

## 2025-02-13 DIAGNOSIS — M54.9 CHRONIC BACK PAIN, UNSPECIFIED BACK LOCATION, UNSPECIFIED BACK PAIN LATERALITY: ICD-10-CM

## 2025-02-13 DIAGNOSIS — R73.03 PREDIABETES: ICD-10-CM

## 2025-02-13 DIAGNOSIS — J45.40 MODERATE PERSISTENT ASTHMA WITHOUT COMPLICATION: ICD-10-CM

## 2025-02-13 DIAGNOSIS — G47.33 OSA ON CPAP: ICD-10-CM

## 2025-02-13 DIAGNOSIS — I70.8 AORTO-ILIAC ATHEROSCLEROSIS: ICD-10-CM

## 2025-02-13 DIAGNOSIS — K21.9 GASTROESOPHAGEAL REFLUX DISEASE WITHOUT ESOPHAGITIS: ICD-10-CM

## 2025-02-13 DIAGNOSIS — Z95.2 S/P AVR (AORTIC VALVE REPLACEMENT): ICD-10-CM

## 2025-02-13 DIAGNOSIS — M48.062 LUMBAR STENOSIS WITH NEUROGENIC CLAUDICATION: ICD-10-CM

## 2025-02-13 DIAGNOSIS — M1A.00X0 IDIOPATHIC CHRONIC GOUT WITHOUT TOPHUS, UNSPECIFIED SITE: ICD-10-CM

## 2025-02-13 DIAGNOSIS — I73.9 PAD (PERIPHERAL ARTERY DISEASE): ICD-10-CM

## 2025-02-13 DIAGNOSIS — G89.29 CHRONIC BACK PAIN, UNSPECIFIED BACK LOCATION, UNSPECIFIED BACK PAIN LATERALITY: ICD-10-CM

## 2025-02-13 DIAGNOSIS — I10 PRIMARY HYPERTENSION: Chronic | ICD-10-CM

## 2025-02-13 DIAGNOSIS — N18.32 STAGE 3B CHRONIC KIDNEY DISEASE: ICD-10-CM

## 2025-02-13 DIAGNOSIS — L30.9 ECZEMA, UNSPECIFIED TYPE: ICD-10-CM

## 2025-02-13 DIAGNOSIS — I50.32 CHRONIC DIASTOLIC HEART FAILURE: ICD-10-CM

## 2025-02-13 DIAGNOSIS — F33.1 MODERATE EPISODE OF RECURRENT MAJOR DEPRESSIVE DISORDER: ICD-10-CM

## 2025-02-13 DIAGNOSIS — I10 PRIMARY HYPERTENSION: Primary | ICD-10-CM

## 2025-02-13 DIAGNOSIS — J45.901 ASTHMATIC BRONCHITIS WITH ACUTE EXACERBATION, UNSPECIFIED ASTHMA SEVERITY, UNSPECIFIED WHETHER PERSISTENT: ICD-10-CM

## 2025-02-13 DIAGNOSIS — E78.2 MIXED HYPERLIPIDEMIA: ICD-10-CM

## 2025-02-13 DIAGNOSIS — N40.0 BENIGN PROSTATIC HYPERPLASIA, UNSPECIFIED WHETHER LOWER URINARY TRACT SYMPTOMS PRESENT: Chronic | ICD-10-CM

## 2025-02-13 DIAGNOSIS — E66.09 CLASS 1 OBESITY DUE TO EXCESS CALORIES WITH SERIOUS COMORBIDITY AND BODY MASS INDEX (BMI) OF 34.0 TO 34.9 IN ADULT: ICD-10-CM

## 2025-02-13 PROBLEM — S81.041A: Status: RESOLVED | Noted: 2017-01-11 | Resolved: 2025-02-13

## 2025-02-13 PROBLEM — D62 ABLA (ACUTE BLOOD LOSS ANEMIA): Status: RESOLVED | Noted: 2023-07-08 | Resolved: 2025-02-13

## 2025-02-13 PROBLEM — T81.49XA POST-OPERATIVE WOUND ABSCESS: Chronic | Status: RESOLVED | Noted: 2023-07-04 | Resolved: 2025-02-13

## 2025-02-13 PROBLEM — Z01.811 ENCOUNTER FOR PREOPERATIVE PULMONARY EXAMINATION: Status: RESOLVED | Noted: 2024-02-27 | Resolved: 2025-02-13

## 2025-02-13 RX ORDER — TIZANIDINE 4 MG/1
4 TABLET ORAL EVERY 8 HOURS PRN
COMMUNITY

## 2025-02-13 RX ORDER — QUINIDINE GLUCONATE 324 MG
240 TABLET, EXTENDED RELEASE ORAL
COMMUNITY

## 2025-02-13 RX ORDER — GABAPENTIN 600 MG/1
600 TABLET ORAL 3 TIMES DAILY PRN
COMMUNITY
Start: 2025-01-13

## 2025-02-13 NOTE — TELEPHONE ENCOUNTER
----- Message from ANSHU Chinchilla sent at 2/13/2025 11:06 AM CST -----  Please call pt to schedule annual physical exam with Dr. Valentin.  Also please schedule annual uacr and hgba1c which are already ordered.   Thank you,   Rose Marie

## 2025-02-13 NOTE — TELEPHONE ENCOUNTER
Spoke with the patient concerning his annual exam with Dr Valentin. Labs on 3/6/25 and office visit on 3/16/25. The patient stated that he will view his appointment on his My Chart.

## 2025-02-13 NOTE — TELEPHONE ENCOUNTER
I spoke with patient and he just want to schedule a follow up visit with Dr Rodriguez, I scheduled him, and he agreed with day and time.    ----- Message from ANSHU Chinchilla sent at 2/13/2025 10:23 AM CST -----  Please call pt to schedule an appointment with Dr. Rodriguez.

## 2025-02-13 NOTE — Clinical Note
Medicare awv complete. Health maintenance:  annual uacr and hgba1c needs to be scheduled.  Annual exam with pcp due-message sent to pcp staff to schedule appointment and labs.    [facility]  Message sent to Dr. Mckeon.  Has moderate depression with a phq-9 score of 11. On zoloft. Denies any thoughts of suicide.  [facility]  [facility]  States anxiety is from his wife. Takes xanax. Informed him not to take xanax with pain medications.   Patient complains eczema to the neck and requests referral to Dermatology. - Ambulatory referral/consult to Dermatology; Future  penile implant.  Requests follow up appointment with Dr. Rodriguez.  Message sent to Dr. Rodriguez staff to call patient to schedule.

## 2025-02-13 NOTE — PATIENT INSTRUCTIONS
Counseling and Referral of Other Preventative  (Italic type indicates deductible and co-insurance are waived)    Patient Name: Abdullahi Urias  Today's Date: 2/13/2025    Health Maintenance       Date Due Completion Date    Annual UACr Never done ---    COVID-19 Vaccine (8 - 2024-25 season) 12/12/2024 10/17/2024    Hemoglobin A1c (Prediabetes) 01/22/2025 1/22/2024    Aspirin/Antiplatelet Therapy 02/12/2026 2/12/2025    Colorectal Cancer Screening 07/02/2029 7/2/2024    Lipid Panel 12/19/2029 12/19/2024    TETANUS VACCINE 10/19/2032 10/19/2022        No orders of the defined types were placed in this encounter.      The following information is provided to all patients.  This information is to help you find resources for any of the problems found today that may be affecting your health:                  Living healthy guide: www.[facility].[place].[facility]      Understanding Diabetes: www.diabetes.org      Eating healthy: www.cdc.gov/healthyweight      CDC home safety checklist: www.cdc.gov/steadi/patient.html      Agency on Aging: www.goea.[place].[facility]      Alcoholics anonymous (AA): www.aa.org      Physical Activity: www.kashif.nih.gov/zt5sucp      Tobacco use: www.quitwithusla.org

## 2025-02-13 NOTE — PROGRESS NOTES
"Abdullahi Urias presented for a follow-up Medicare AWV today. The following components were reviewed and updated:    Medical history  Family History  Social history  Allergies and Current Medications  Health Risk Assessment  Health Maintenance  Care Team    **See Completed Assessments for Annual Wellness visit with in the encounter summary    The following assessments were completed:  Depression Screening  Cognitive function Screening    Timed Get Up Test  Whisper Test      Opioid documentation:      Patient does have a current opioid prescription.      Patient accepted further discussion regarding opioid medication use.      Patient is currently taking hydrocodone narcotic for back pain.        Pain level today is 6/10.       In addition to narcotic pain medications, patient is also using acetaminophen and topical analgesic for pain control.       Patient is followed by a specialist currently for their pain and will be referred today.       Patient's opioid risk potential based on ORT-OUD tool:       Albert each box that applies   No   Yes     Family history of substance abuse   Alcohol [x] []   Illegal drugs [x] []   Rx drugs [x] []     Personal history of substance abuse   Alcohol [x] []   Illegal drugs [] [x]   Rx drugs [x] []     Age between 16-45 years   [x]   []     Patient with ADD, OCD, Bipolar disorder, schizoprenia   [x]   []     Patient with depression   []   [x]                         Scoring total                                           2                      Non-opioid treatment options have been discussed today and added to the patient's after visit summary.          Vitals:    02/13/25 0958   BP: 128/65   Pulse: 68   Temp: 97.3 °F (36.3 °C)   TempSrc: Temporal   SpO2: 96%   Weight: 105.2 kg (232 lb)   Height: 5' 9" (1.753 m)     Body mass index is 34.26 kg/m².       Physical Exam  Constitutional:       Appearance: He is obese.   HENT:      Mouth/Throat:      Mouth: Mucous membranes are moist. "   Cardiovascular:      Rate and Rhythm: Normal rate and regular rhythm.      Pulses:           Dorsalis pedis pulses are 1+ on the right side and 1+ on the left side.        Posterior tibial pulses are 1+ on the right side and 1+ on the left side.      Heart sounds: Normal heart sounds.   Pulmonary:      Effort: Pulmonary effort is normal.      Breath sounds: Normal breath sounds.   Feet:      Right foot:      Skin integrity: Skin integrity normal.      Left foot:      Skin integrity: Skin integrity normal.      Comments: Missing great toenails  Skin:     General: Skin is warm and dry.   Neurological:      General: No focal deficit present.      Mental Status: He is alert and oriented to person, place, and time.   Psychiatric:         Mood and Affect: Mood normal.         Behavior: Behavior normal.           Diagnoses and health risks identified today and associated recommendations/orders:  1. Encounter for Medicare annual wellness exam  Medicare awv complete. Health maintenance:  annual uacr and hgba1c needs to be scheduled.  Annual exam with pcp due-message sent to pcp staff to schedule appointment and labs.      2. Chronic diastolic heart failure;  Other cardiomyopathy  Symptoms stable. Continue aspirin, , carvedilol, losartan , Lipitor.  [facility]  Message sent to Dr. Mckeon. Follow up with Cardiology.      3. Coronary artery disease involving native coronary artery of native heart without angina pectoris  Symptoms stable. Continue aspirin, , carvedilol, losartan , Lipitor, Plavix  [facility]  Message sent to Dr. Mckeon. Follow up with Cardiology.      4. Stage 3b chronic kidney disease   Latest Reference Range & Units 08/25/22 21:47 01/14/23 20:35 01/24/23 17:29 01/25/23 05:54 01/26/23 08:34 04/13/23 15:51 05/18/23 06:00 05/19/23 05:29 07/03/23 11:23 07/05/23 04:31 07/06/23 02:59 07/07/23 04:28 07/08/23 06:19 07/09/23 05:37  07/10/23 10:43 08/16/23 09:15 08/24/23 14:43 01/22/24 10:10 05/23/24 11:12 05/31/24 20:03 06/20/24 10:42 09/04/24 09:48 12/09/24 10:40 12/19/24 08:29   eGFR >60 mL/min/1.73 m^2 44 ! 44 ! 44 ! 47 ! 55 ! 50.7 ! 51 ! 44 ! 47 ! 38 ! 47 ! 47 ! 51 ! 44 ! 51 ! >60.0 46.9 ! 46.6 ! 40 ! 36 ! 38 ! 43 ! 46.4 ! 50.1 !   !: Data is abnormal  Labs stable.  Continue routine monitoring. Managed by PCP.       5. Moderate episode of recurrent major depressive disorder  Stable. Has moderate depression with a phq-9 score of 11. On zoloft. Denies any thoughts of suicide. States anxiety is from his wife. Takes xanax. Informed him not to take xanax with pain medications.   [facility]   [facility].     6. Prostate cancer   2020 s/p prostatectomy s/p radiation, in remission      7. Cocaine dependence in remission  No issues.     9. Aorto-iliac atherosclerosis  Chronic and stable on statin medication and aspirin. Continue current. F/u with pcp.      10. Nonrheumatic aortic valve stenosis  Symptoms stable. Follow up with cardiology.     11. Primary hypertension  Chronic and stable on BP medication continue losartan and carvedilol..  See med list above. Recommend low sodium diet. Follow up with PCP.       12. Eczema, unspecified type  Patient complains eczema to the neck and requests referral to Dermatology.  - Ambulatory referral/consult to Dermatology; Future    13. Moderate persistent asthma without complication  Respiratory symptoms stable.  Continue  Trelegy and albuterol. Follow up with pulmonology.         14. Asthmatic bronchitis with acute exacerbation, unspecified asthma severity, unspecified whether persistent  Respiratory symptoms stable.  Continue  Trelegy and albuterol. Follow up with pulmonology.         15. Decreased ROM of lumbar spine  16. Lumbar radiculopathy, chronic  17. Lumbar stenosis with neurogenic claudication  Chronic back pain, unspecified back location,  unspecified back pain laterality  Pain controlled. Continue gabapentin, hydrocodone, tizanidine .  Informed patient to only take gabapentin or Lyrica and not both at the same time.  Follow up with pain management.     18. Sinus congestion  Symptoms stable. Continue azelastine nasal spray. Follow up with pcp.      19. Mixed hyperlipidemia  Lab Results   Component Value Date    CHOL 140 12/19/2024    CHOL 137 01/22/2024    CHOL 123 01/18/2022     Lab Results   Component Value Date    HDL 41 12/19/2024    HDL 44 01/22/2024    HDL 39 (L) 01/18/2022     Lab Results   Component Value Date    LDLCALC 87.0 12/19/2024    LDLCALC 79.6 01/22/2024    LDLCALC 72.6 01/18/2022     Lab Results   Component Value Date    TRIG 60 12/19/2024    TRIG 67 01/22/2024    TRIG 57 01/18/2022       Lab Results   Component Value Date    CHOLHDL 29.3 12/19/2024    CHOLHDL 32.1 01/22/2024    CHOLHDL 31.7 01/18/2022    Chronic and stable on statin medication. Continue lipitor. F/u with pcp.      20. PAD (peripheral artery disease)  Chronic and stable on statin medication and aspirin. Continue current. F/u with cardiology.     21. S/P AVR (aortic valve replacement) biopresthetic miller 25 mm in 2014   Symptoms stable. Continue monitoring . Follow up with Cardiology    22. Benign prostatic hyperplasia, unspecified whether lower urinary tract symptoms present  Symptoms stable. Continue  . Follow up with pcp.      23. Erectile dysfunction, unspecified erectile dysfunction type  penile implant.  Requests follow up appointment with Dr. Rodriguez.  Message sent to Dr. Rodriguez staff to call patient to schedule.     24. Class 1 obesity due to excess calories with serious comorbidity and body mass index (BMI) of 34.0 to 34.9 in adult  Recommend diet and exercise to lose weight. Follow up with your PCP as planned to discuss adjustments to your treatment plan.      25. Prediabetes  Lab Results   Component Value Date    HGBA1C 5.8 (H) 01/22/2024    Lab stable.   Continue diabetic diet and exercise.  Follow up with PCP.      26. Secondary hyperparathyroidism of renal origin  Lab Results   Component Value Date    .2 (H) 12/09/2024    CALCIUM 9.2 12/19/2024    PHOS 2.5 (L) 12/09/2024   Chronic and stable. Continue current management.  PCP follow up    27. Diverticulosis of colon  Symptoms stable.   Follow up with pcp.      28. Gastroesophageal reflux disease without esophagitis  Symptoms stable. Continue esomeprazole . Follow up with pcp.      30. Idiopathic chronic gout without tophus, unspecified site  Symptoms stable. Continue  allopurinol. Follow up with pcp.      31. Former tobacco use  No acute issues.  Has not resumed smoking.  Patient quit smoking August 24, 2012.  Smoked total of 8 pack years .  Not a candidate for low-dose CT lung screen.  Patient has already had abdominal aortic aneurysm screening.  Completed 05/24/2024.    32. CHEO on CPAP  Using CPAP at night.  No issues.  Follow up with pulmonology.        Provided Abdullahi with a 5-10 year written screening schedule and personal prevention plan. Recommendations were developed using the USPSTF age appropriate recommendations. Education, counseling, and referrals were provided as needed.  After Visit Summary printed and given to patient which includes a list of additional screenings\tests needed.    Follow up in about 1 year (around 2/13/2026) for annual wellness visit.      ANSHU Madrid      I offered to discuss advanced care planning, including how to pick a person who would make decisions for you if you were unable to make them for yourself, called a health care power of , and what kind of decisions you might make such as use of life sustaining treatments such as ventilators and tube feeding when faced with a life limiting illness recorded on a living will that they will need to know. (How you want to be cared for as you near the end of your natural life)     X Patient is interested in  learning more about how to make advanced directives.  I provided them paperwork and offered to discuss this with them.

## 2025-02-14 ENCOUNTER — TELEPHONE (OUTPATIENT)
Dept: CARDIOLOGY | Facility: CLINIC | Age: 70
End: 2025-02-14
Payer: MEDICARE

## 2025-02-14 ENCOUNTER — PATIENT MESSAGE (OUTPATIENT)
Dept: INTERNAL MEDICINE | Facility: CLINIC | Age: 70
End: 2025-02-14
Payer: MEDICARE

## 2025-02-14 DIAGNOSIS — E78.2 MIXED HYPERLIPIDEMIA: ICD-10-CM

## 2025-02-14 RX ORDER — EVOLOCUMAB 140 MG/ML
140 INJECTION, SOLUTION SUBCUTANEOUS
Qty: 2 EACH | Refills: 9 | Status: SHIPPED | OUTPATIENT
Start: 2025-02-14

## 2025-02-14 NOTE — TELEPHONE ENCOUNTER
----- Message from ANSHU Chinchilla sent at 2/14/2025  3:45 PM CST -----  [id number] [place] - [address] AT [facility]  ----- Message -----  From: Jeffery Mckeon MD  Sent: 2/14/2025   3:36 PM CST  To: ANSHU Madrid    Where to send ?  ----- Message -----  From: Orin Browning FNP  Sent: 2/13/2025   2:34 PM CST  To: Jeffery Mckeon MD    Ok patient said ok. Thank you.  ----- Message -----  From: Jeffery Mckeon MD  Sent: 2/13/2025   1:37 PM CST  To: ANSHU Madrid    I can prescript the Repatha.  ----- Message -----  From: Orin Browning FNP  Sent: 2/13/2025  10:50 AM CST  To: MD Anuj Mann Dr[address]   I just wanted to let you know regarding repatha. [facility] Duque said his lipids are stable and would not prescribe it.   Thank you,   Rose Marie

## 2025-02-23 ENCOUNTER — OFFICE VISIT (OUTPATIENT)
Dept: URGENT CARE | Facility: CLINIC | Age: 70
End: 2025-02-23
Payer: MEDICARE

## 2025-02-23 VITALS
SYSTOLIC BLOOD PRESSURE: 132 MMHG | WEIGHT: 232 LBS | TEMPERATURE: 99 F | OXYGEN SATURATION: 98 % | BODY MASS INDEX: 34.36 KG/M2 | HEART RATE: 78 BPM | DIASTOLIC BLOOD PRESSURE: 65 MMHG | RESPIRATION RATE: 20 BRPM | HEIGHT: 69 IN

## 2025-02-23 DIAGNOSIS — Z87.09 HISTORY OF ASTHMA: ICD-10-CM

## 2025-02-23 DIAGNOSIS — R52 BODY ACHES: ICD-10-CM

## 2025-02-23 DIAGNOSIS — R06.2 WHEEZE: Primary | ICD-10-CM

## 2025-02-23 DIAGNOSIS — R09.82 PND (POST-NASAL DRIP): ICD-10-CM

## 2025-02-23 DIAGNOSIS — R05.9 COUGH, UNSPECIFIED TYPE: ICD-10-CM

## 2025-02-23 DIAGNOSIS — J06.9 UPPER RESPIRATORY TRACT INFECTION, UNSPECIFIED TYPE: ICD-10-CM

## 2025-02-23 DIAGNOSIS — Z86.69 HISTORY OF OBSTRUCTIVE SLEEP APNEA: ICD-10-CM

## 2025-02-23 LAB
CTP QC/QA: YES
CTP QC/QA: YES
POC MOLECULAR INFLUENZA A AGN: NEGATIVE
POC MOLECULAR INFLUENZA B AGN: NEGATIVE
SARS CORONAVIRUS 2 ANTIGEN: NEGATIVE

## 2025-02-23 PROCEDURE — 87811 SARS-COV-2 COVID19 W/OPTIC: CPT | Mod: QW,S$GLB,, | Performed by: NURSE PRACTITIONER

## 2025-02-23 PROCEDURE — 87502 INFLUENZA DNA AMP PROBE: CPT | Mod: QW,S$GLB,, | Performed by: NURSE PRACTITIONER

## 2025-02-23 PROCEDURE — 99214 OFFICE O/P EST MOD 30 MIN: CPT | Mod: S$GLB,,, | Performed by: NURSE PRACTITIONER

## 2025-02-23 RX ORDER — ALBUTEROL SULFATE 90 UG/1
INHALANT RESPIRATORY (INHALATION)
Qty: 8.5 G | Refills: 3 | Status: SHIPPED | OUTPATIENT
Start: 2025-02-23

## 2025-02-23 RX ORDER — BENZONATATE 200 MG/1
200 CAPSULE ORAL 3 TIMES DAILY PRN
Qty: 25 CAPSULE | Refills: 0 | Status: SHIPPED | OUTPATIENT
Start: 2025-02-23 | End: 2025-03-05

## 2025-02-23 RX ORDER — DEXTROMETHORPHAN HYDROBROMIDE AND GUAIFENESIN 10; 200 MG/1; MG/1
2 CAPSULE, GELATIN COATED ORAL
Qty: 40 EACH | Refills: 0 | Status: SHIPPED | OUTPATIENT
Start: 2025-02-23 | End: 2025-03-05

## 2025-02-23 NOTE — PROGRESS NOTES
"  Subjective:      Patient ID: Abdullahi Urias is a 69 y.o. male.    Vitals:  height is 5' 9" (1.753 m) and weight is 105.2 kg (232 lb). His tympanic temperature is 98.6 °F (37 °C). His blood pressure is 132/65 and his pulse is 78. His respiration is 20 and oxygen saturation is 98%.     Chief Complaint: No chief complaint on file.    Patient presents with cough sinus congestion post nasal drip runny nose body aches.      Sinus Problem  This is a new problem. The current episode started in the past 7 days. The problem is unchanged. There has been no fever. His pain is at a severity of 6/10. The pain is mild. Associated symptoms include congestion, coughing, sinus pressure and sneezing. Pertinent negatives include no chills, diaphoresis, ear pain, headaches, hoarse voice, neck pain, shortness of breath, sore throat or swollen glands. Treatments tried: mucinex. The treatment provided no relief.       Constitution: Negative for chills and sweating.   HENT:  Positive for congestion, postnasal drip and sinus pressure. Negative for ear pain and sore throat.    Neck: Negative for neck pain.   Respiratory:  Positive for cough, wheezing and asthma. Negative for sputum production, shortness of breath and stridor.    Gastrointestinal:  Negative for nausea and vomiting.   Musculoskeletal:  Positive for muscle ache.   Skin:  Negative for rash.   Allergic/Immunologic: Positive for asthma and sneezing.   Neurological:  Negative for headaches.      Objective:     Physical Exam   Constitutional: He is oriented to person, place, and time. He appears well-developed. He is cooperative.  Non-toxic appearance. He does not appear ill. No distress.   HENT:   Head: Normocephalic and atraumatic.   Ears:   Right Ear: Hearing and external ear normal.   Left Ear: Hearing and external ear normal.   Nose: Rhinorrhea and congestion present. No mucosal edema or nasal deformity. No epistaxis. Right sinus exhibits no maxillary sinus tenderness and no " frontal sinus tenderness. Left sinus exhibits no maxillary sinus tenderness and no frontal sinus tenderness.   Mouth/Throat: Uvula is midline and mucous membranes are normal. No trismus in the jaw. Normal dentition. No uvula swelling. Posterior oropharyngeal erythema present. No oropharyngeal exudate or posterior oropharyngeal edema.   Eyes: Conjunctivae and lids are normal. No scleral icterus.   Neck: Trachea normal and phonation normal. Neck supple. No edema present. No erythema present. No neck rigidity present.   Cardiovascular: Normal rate, regular rhythm, normal heart sounds and normal pulses.   Pulmonary/Chest: Effort normal. No respiratory distress. He has no decreased breath sounds. He has wheezes (diffuse bilaterall upper lobes). He has no rhonchi. He has no rales.   Abdominal: Normal appearance.   Musculoskeletal: Normal range of motion.         General: No deformity. Normal range of motion.   Neurological: He is alert and oriented to person, place, and time. He exhibits normal muscle tone. Coordination normal.   Skin: Skin is warm, dry, intact, not diaphoretic and not pale.   Psychiatric: His speech is normal and behavior is normal. Judgment and thought content normal.   Nursing note and vitals reviewed.      Assessment:     1. Wheeze    2. History of asthma    3. History of obstructive sleep apnea    4. PND (post-nasal drip)    5. Body aches    6. Cough, unspecified type    7. Upper respiratory tract infection, unspecified type        Plan:       Wheeze  -     SARS Coronavirus 2 Antigen, POCT Manual Read  -     POCT Influenza A/B MOLECULAR    History of asthma    History of obstructive sleep apnea    PND (post-nasal drip)    Body aches    Cough, unspecified type  -     albuterol (PROVENTIL/VENTOLIN HFA) 90 mcg/actuation inhaler; INHALE 2 PUFFS INTO THE LUNGS EVERY 6 HOURS AS NEEDED FOR COUGH  Dispense: 8.5 g; Refill: 3    Upper respiratory tract infection, unspecified type    Other orders  -      benzonatate (TESSALON) 200 MG capsule; Take 1 capsule (200 mg total) by mouth 3 (three) times daily as needed for Cough.  Dispense: 25 capsule; Refill: 0  -     dextromethorphan-guaiFENesin (CORICIDIN HBP CHEST ARANZA-COUGH)  mg Cap; Take 2 capsules by mouth every 6 (six) hours while awake. for 10 days  Dispense: 40 each; Refill: 0          Medical Decision Making:   Initial Assessment:   After complete evaluation, including thorough history and physical exam, the patient's symptoms are most likely due to viral upper respiratory infection. There are no concerning features on physical exam to suggest bacterial otitis media/externa, sinusitis, strep pharyngitis, or peritonsillar abscess. Vital signs do not suggest sepsis. Lung sounds are clear and not consistent with pneumonia. There is no neck pain or limited ROM to suggest retropharyngeal abscess or meningitis. In clinic testing for covid/flu is negative.The patient will be treated with supportive care. Will provide RX for tessalon upon D/C. Follow up instructions and ED precautions provided.     Patient has history of respiratory disease (copd/asthma/CHEO/CPaP use) noted to have increase risk of pneumonia (bacterial infection) development with acute respiratory illness. Patient was advised close monitoring and is to continue current medications including inhalers. Patient to follow up with PCP and given strict ER precautions.         Results for orders placed or performed in visit on 02/23/25   SARS Coronavirus 2 Antigen, POCT Manual Read    Collection Time: 02/23/25 12:11 PM   Result Value Ref Range    SARS Coronavirus 2 Antigen Negative Negative, Presumptive Negative     Acceptable Yes    POCT Influenza A/B MOLECULAR    Collection Time: 02/23/25 12:14 PM   Result Value Ref Range    POC Molecular Influenza A Ag Negative Negative    POC Molecular Influenza B Ag Negative Negative     Acceptable Yes      *Note: Due to a large number  of results and/or encounters for the requested time period, some results have not been displayed. A complete set of results can be found in Results Review.     Patient Instructions   A cold is caused by a virus that can settle in your nose, throat or lungs. This causesa runny or stuffy nose and sneezing. You may also have a sore throat, cough, headache, fever and muscle aches. Different cold viruses last different lengthsof time, but the average time is 2 to 14 days.    Seek immediate medical care if you develop fever, chest pain, or shortness of breath.     Treatment: There is no cure for the common cold, there is only symptomatic care.     Antibiotics may be used to treat signs of a secondary infection, but they do not treat  the cold virus. Try these tips to keep yourself comfortable:                   -Get plenty of rest.                   -Drink plenty of fluids, at least 8 large glasses of fluid a day. Good fluid choices are water, fruit juices high in Vitamin C, tea, gelatin, or broths and soups. These help to keep mucus thin and ease congestion.                  -Use salt water gargle, cough drops or throat sprays to relieve throat pain. Mi ¼ to ½ teaspoon of salt in 1 cup of warm water for a salt water gargle  solution.                  -Use petroleum jelly or lip balm around lips and nose to prevent chapping.                  -Use saline nose drops or spray to help ease congestion.    Over the Counter (OTC) Medicines:  Take over the counter medicines as needed to ease your signs.  Read labels carefully.  Use a product that treats only the signs that you have. Ask your pharmacist  for recommendations. Be sure to ask about possible interactions with other  medicines you are taking.  Common medicines used to treat signs of a cold include:     -Flonase daily.  -Claritin or Zyrtec daily.  -Mucinex every 12 hours -- Drink plenty of water while taking this medication.    - Cough suppressant, also called  antitussive, such as dextromethorphan.  This medicine decreases your reflex and sensitivity to cough. This  medicine may be kept behind the pharmacy counter for purchase.    Cold and cough medicines often contain more than one type of medicine.  Ask the pharmacist for help to confirm that you are not using more than one  product with the same or similar ingredient. For example, some cold and  cough medicines have acetaminophen or ibuprofen in them to help lower a  fever or ease muscle aches. Do not take extra acetaminophen (Tylenol) or  ibuprofen (Advil, Motrin) if the cold or cough medicine has it as an  ingredient. Too much medicine could be harmful.    Take the correct dose as listed on the package. Do not take more than  recommended.    Use a Humidifier:  A cool mist humidifier can make breathing easier by thinning mucus. Do not use  a steam humidifier as hot water can cause burns if spilled.  Place the humidifier a few feet from the bed. Drain and clean each day with  soap and water to prevent bacteria and mold from growing.  Indoor humidity should not be above 50%. Stop using the humidifier if you  notice moisture on windows, walls or pictures.  You do not need to add any medicine to the humidifier.  If you cannot get a humidifier, place a pan of water next to heating vents and  refill the water level daily. The water will evaporate and add moisture to the  Room.    How to prevent the spread of colds  -Wash your hands with soap and water or use alcohol based hand   often. Dry hands wet from washing with soap on a paper towel instead of cloth towel.  -Cough or sneeze into your elbow to avoid spreading germs.  -Wipe down common surfaces, such as door knobs and faucet handles, with a disinfectant spray.  -Do not share cups or utensils.        Please follow up with your Primary care provider within 2-5 days if your signs and symptoms have not resolved or worsen.      If your condition worsens or fails to  improve we recommend that you receive another evaluation at the emergency room immediately or contact your primary medical clinic to discuss your concerns.   You must understand that you have received an Urgent Care treatment only and that you may be released before all of your medical problems are known or treated. You, the patient, will arrange for follow up care as instructed.

## 2025-02-23 NOTE — PATIENT INSTRUCTIONS
A cold is caused by a virus that can settle in your nose, throat or lungs. This causesa runny or stuffy nose and sneezing. You may also have a sore throat, cough, headache, fever and muscle aches. Different cold viruses last different lengthsof time, but the average time is 2 to 14 days.    Seek immediate medical care if you develop fever, chest pain, or shortness of breath.     Treatment: There is no cure for the common cold, there is only symptomatic care.     Antibiotics may be used to treat signs of a secondary infection, but they do not treat  the cold virus. Try these tips to keep yourself comfortable:                   -Get plenty of rest.                   -Drink plenty of fluids, at least 8 large glasses of fluid a day. Good fluid choices are water, fruit juices high in Vitamin C, tea, gelatin, or broths and soups. These help to keep mucus thin and ease congestion.                  -Use salt water gargle, cough drops or throat sprays to relieve throat pain. Mi ¼ to ½ teaspoon of salt in 1 cup of warm water for a salt water gargle  solution.                  -Use petroleum jelly or lip balm around lips and nose to prevent chapping.                  -Use saline nose drops or spray to help ease congestion.    Over the Counter (OTC) Medicines:  Take over the counter medicines as needed to ease your signs.  Read labels carefully.  Use a product that treats only the signs that you have. Ask your pharmacist  for recommendations. Be sure to ask about possible interactions with other  medicines you are taking.  Common medicines used to treat signs of a cold include:     -Flonase daily.  -Claritin or Zyrtec daily.  -Mucinex every 12 hours -- Drink plenty of water while taking this medication.    - Cough suppressant, also called antitussive, such as dextromethorphan.  This medicine decreases your reflex and sensitivity to cough. This  medicine may be kept behind the pharmacy counter for purchase.    Cold and cough  medicines often contain more than one type of medicine.  Ask the pharmacist for help to confirm that you are not using more than one  product with the same or similar ingredient. For example, some cold and  cough medicines have acetaminophen or ibuprofen in them to help lower a  fever or ease muscle aches. Do not take extra acetaminophen (Tylenol) or  ibuprofen (Advil, Motrin) if the cold or cough medicine has it as an  ingredient. Too much medicine could be harmful.    Take the correct dose as listed on the package. Do not take more than  recommended.    Use a Humidifier:  A cool mist humidifier can make breathing easier by thinning mucus. Do not use  a steam humidifier as hot water can cause burns if spilled.  Place the humidifier a few feet from the bed. Drain and clean each day with  soap and water to prevent bacteria and mold from growing.  Indoor humidity should not be above 50%. Stop using the humidifier if you  notice moisture on windows, walls or pictures.  You do not need to add any medicine to the humidifier.  If you cannot get a humidifier, place a pan of water next to heating vents and  refill the water level daily. The water will evaporate and add moisture to the  Room.    How to prevent the spread of colds  -Wash your hands with soap and water or use alcohol based hand   often. Dry hands wet from washing with soap on a paper towel instead of cloth towel.  -Cough or sneeze into your elbow to avoid spreading germs.  -Wipe down common surfaces, such as door knobs and faucet handles, with a disinfectant spray.  -Do not share cups or utensils.        Please follow up with your Primary care provider within 2-5 days if your signs and symptoms have not resolved or worsen.      If your condition worsens or fails to improve we recommend that you receive another evaluation at the emergency room immediately or contact your primary medical clinic to discuss your concerns.   You must understand that you  have received an Urgent Care treatment only and that you may be released before all of your medical problems are known or treated. You, the patient, will arrange for follow up care as instructed.

## 2025-02-27 ENCOUNTER — PATIENT OUTREACH (OUTPATIENT)
Dept: ADMINISTRATIVE | Facility: HOSPITAL | Age: 70
End: 2025-02-27
Payer: MEDICARE

## 2025-02-28 ENCOUNTER — PATIENT MESSAGE (OUTPATIENT)
Dept: PAIN MEDICINE | Facility: CLINIC | Age: 70
End: 2025-02-28
Payer: MEDICARE

## 2025-02-28 NOTE — TELEPHONE ENCOUNTER
Called to scheduled pt injection with Dr Carmen, pt was scheduled for 3/20/25.Cardiac Clearance obtained.    .Hi HOUGH

## 2025-03-06 ENCOUNTER — LAB VISIT (OUTPATIENT)
Dept: LAB | Facility: HOSPITAL | Age: 70
End: 2025-03-06
Attending: FAMILY MEDICINE
Payer: MEDICARE

## 2025-03-06 DIAGNOSIS — R73.03 PREDIABETES: ICD-10-CM

## 2025-03-06 DIAGNOSIS — M1A.00X0 IDIOPATHIC CHRONIC GOUT WITHOUT TOPHUS, UNSPECIFIED SITE: ICD-10-CM

## 2025-03-06 DIAGNOSIS — I10 PRIMARY HYPERTENSION: ICD-10-CM

## 2025-03-06 DIAGNOSIS — D50.9 IRON DEFICIENCY ANEMIA, UNSPECIFIED IRON DEFICIENCY ANEMIA TYPE: ICD-10-CM

## 2025-03-06 LAB
ALBUMIN SERPL BCP-MCNC: 3.9 G/DL (ref 3.5–5.2)
ALP SERPL-CCNC: 90 U/L (ref 40–150)
ALT SERPL W/O P-5'-P-CCNC: 17 U/L (ref 10–44)
ANION GAP SERPL CALC-SCNC: 6 MMOL/L (ref 8–16)
AST SERPL-CCNC: 36 U/L (ref 10–40)
BASOPHILS # BLD AUTO: 0.03 K/UL (ref 0–0.2)
BASOPHILS # BLD AUTO: 0.03 K/UL (ref 0–0.2)
BASOPHILS NFR BLD: 0.7 % (ref 0–1.9)
BASOPHILS NFR BLD: 0.7 % (ref 0–1.9)
BILIRUB SERPL-MCNC: 0.4 MG/DL (ref 0.1–1)
BUN SERPL-MCNC: 16 MG/DL (ref 8–23)
CALCIUM SERPL-MCNC: 9.5 MG/DL (ref 8.7–10.5)
CHLORIDE SERPL-SCNC: 109 MMOL/L (ref 95–110)
CHOLEST SERPL-MCNC: 136 MG/DL (ref 120–199)
CHOLEST/HDLC SERPL: 3.2 {RATIO} (ref 2–5)
CO2 SERPL-SCNC: 26 MMOL/L (ref 23–29)
CREAT SERPL-MCNC: 1.7 MG/DL (ref 0.5–1.4)
DIFFERENTIAL METHOD BLD: NORMAL
DIFFERENTIAL METHOD BLD: NORMAL
EOSINOPHIL # BLD AUTO: 0.1 K/UL (ref 0–0.5)
EOSINOPHIL # BLD AUTO: 0.1 K/UL (ref 0–0.5)
EOSINOPHIL NFR BLD: 3.2 % (ref 0–8)
EOSINOPHIL NFR BLD: 3.2 % (ref 0–8)
ERYTHROCYTE [DISTWIDTH] IN BLOOD BY AUTOMATED COUNT: 13.6 % (ref 11.5–14.5)
ERYTHROCYTE [DISTWIDTH] IN BLOOD BY AUTOMATED COUNT: 13.6 % (ref 11.5–14.5)
EST. GFR  (NO RACE VARIABLE): 43.1 ML/MIN/1.73 M^2
ESTIMATED AVG GLUCOSE: 111 MG/DL (ref 68–131)
FERRITIN SERPL-MCNC: 136 NG/ML (ref 20–300)
GLUCOSE SERPL-MCNC: 94 MG/DL (ref 70–110)
HBA1C MFR BLD: 5.5 % (ref 4–5.6)
HCT VFR BLD AUTO: 44.8 % (ref 40–54)
HCT VFR BLD AUTO: 44.8 % (ref 40–54)
HDLC SERPL-MCNC: 42 MG/DL (ref 40–75)
HDLC SERPL: 30.9 % (ref 20–50)
HGB BLD-MCNC: 14.5 G/DL (ref 14–18)
HGB BLD-MCNC: 14.5 G/DL (ref 14–18)
IMM GRANULOCYTES # BLD AUTO: 0.02 K/UL (ref 0–0.04)
IMM GRANULOCYTES # BLD AUTO: 0.02 K/UL (ref 0–0.04)
IMM GRANULOCYTES NFR BLD AUTO: 0.5 % (ref 0–0.5)
IMM GRANULOCYTES NFR BLD AUTO: 0.5 % (ref 0–0.5)
LDLC SERPL CALC-MCNC: 84 MG/DL (ref 63–159)
LYMPHOCYTES # BLD AUTO: 1.2 K/UL (ref 1–4.8)
LYMPHOCYTES # BLD AUTO: 1.2 K/UL (ref 1–4.8)
LYMPHOCYTES NFR BLD: 29.1 % (ref 18–48)
LYMPHOCYTES NFR BLD: 29.1 % (ref 18–48)
MCH RBC QN AUTO: 28.8 PG (ref 27–31)
MCH RBC QN AUTO: 28.8 PG (ref 27–31)
MCHC RBC AUTO-ENTMCNC: 32.4 G/DL (ref 32–36)
MCHC RBC AUTO-ENTMCNC: 32.4 G/DL (ref 32–36)
MCV RBC AUTO: 89 FL (ref 82–98)
MCV RBC AUTO: 89 FL (ref 82–98)
MONOCYTES # BLD AUTO: 0.4 K/UL (ref 0.3–1)
MONOCYTES # BLD AUTO: 0.4 K/UL (ref 0.3–1)
MONOCYTES NFR BLD: 8.9 % (ref 4–15)
MONOCYTES NFR BLD: 8.9 % (ref 4–15)
NEUTROPHILS # BLD AUTO: 2.3 K/UL (ref 1.8–7.7)
NEUTROPHILS # BLD AUTO: 2.3 K/UL (ref 1.8–7.7)
NEUTROPHILS NFR BLD: 57.6 % (ref 38–73)
NEUTROPHILS NFR BLD: 57.6 % (ref 38–73)
NONHDLC SERPL-MCNC: 94 MG/DL
NRBC BLD-RTO: 0 /100 WBC
NRBC BLD-RTO: 0 /100 WBC
PLATELET # BLD AUTO: 177 K/UL (ref 150–450)
PLATELET # BLD AUTO: 177 K/UL (ref 150–450)
PMV BLD AUTO: 11.1 FL (ref 9.2–12.9)
PMV BLD AUTO: 11.1 FL (ref 9.2–12.9)
POTASSIUM SERPL-SCNC: 5.1 MMOL/L (ref 3.5–5.1)
PROT SERPL-MCNC: 7.3 G/DL (ref 6–8.4)
RBC # BLD AUTO: 5.03 M/UL (ref 4.6–6.2)
RBC # BLD AUTO: 5.03 M/UL (ref 4.6–6.2)
SODIUM SERPL-SCNC: 141 MMOL/L (ref 136–145)
TRIGL SERPL-MCNC: 50 MG/DL (ref 30–150)
WBC # BLD AUTO: 4.05 K/UL (ref 3.9–12.7)
WBC # BLD AUTO: 4.05 K/UL (ref 3.9–12.7)

## 2025-03-06 PROCEDURE — 83036 HEMOGLOBIN GLYCOSYLATED A1C: CPT | Performed by: FAMILY MEDICINE

## 2025-03-06 PROCEDURE — 80053 COMPREHEN METABOLIC PANEL: CPT | Performed by: FAMILY MEDICINE

## 2025-03-06 PROCEDURE — 82728 ASSAY OF FERRITIN: CPT | Performed by: FAMILY MEDICINE

## 2025-03-06 PROCEDURE — 85025 COMPLETE CBC W/AUTO DIFF WBC: CPT | Performed by: FAMILY MEDICINE

## 2025-03-06 PROCEDURE — 80061 LIPID PANEL: CPT | Performed by: FAMILY MEDICINE

## 2025-03-11 ENCOUNTER — PATIENT MESSAGE (OUTPATIENT)
Dept: PAIN MEDICINE | Facility: HOSPITAL | Age: 70
End: 2025-03-11
Payer: MEDICARE

## 2025-03-11 NOTE — PRE-PROCEDURE INSTRUCTIONS
Spoke with patient regarding procedure scheduled on 3.20     Arrival time 0645     Has patient been sick with fever or on antibiotics within the last 7 days? No     Does the patient have any open wounds, sores or rashes? No     Does the patient have any recent fractures? no     Has patient received a vaccination within the last 7 days? No     Received the COVID vaccination? yes     Has the patient stopped all medications as directed? hold asa and plavix 5 days prior to procedure. Cardiac clearance obtained from dr pina on 2.12.     Does patient have a pacemaker, defibrillator, or implantable stimulator? No     Does the patient have a ride to and from procedure and someone reliable to remain with patient?  wife     Is the patient diabetic? pre      Does the patient have sleep apnea? Or use O2 at home? elda on cpap     Is the patient receiving sedation? Yes      Is the patient instructed to remain NPO beginning at midnight the night before their procedure? yes     Procedure location confirmed with patient? Yes     Covid- Denies signs/symptoms. Instructed to notify PAT/MD if any changes.

## 2025-03-13 ENCOUNTER — OFFICE VISIT (OUTPATIENT)
Dept: INTERNAL MEDICINE | Facility: CLINIC | Age: 70
End: 2025-03-13
Payer: MEDICARE

## 2025-03-13 VITALS
SYSTOLIC BLOOD PRESSURE: 118 MMHG | HEART RATE: 88 BPM | WEIGHT: 231.94 LBS | DIASTOLIC BLOOD PRESSURE: 66 MMHG | BODY MASS INDEX: 34.35 KG/M2 | OXYGEN SATURATION: 97 % | HEIGHT: 69 IN

## 2025-03-13 DIAGNOSIS — J45.40 MODERATE PERSISTENT ASTHMA WITHOUT COMPLICATION: ICD-10-CM

## 2025-03-13 DIAGNOSIS — D50.9 IRON DEFICIENCY ANEMIA, UNSPECIFIED IRON DEFICIENCY ANEMIA TYPE: ICD-10-CM

## 2025-03-13 DIAGNOSIS — I73.9 PAD (PERIPHERAL ARTERY DISEASE): ICD-10-CM

## 2025-03-13 DIAGNOSIS — N18.32 STAGE 3B CHRONIC KIDNEY DISEASE: ICD-10-CM

## 2025-03-13 DIAGNOSIS — I10 PRIMARY HYPERTENSION: Primary | Chronic | ICD-10-CM

## 2025-03-13 DIAGNOSIS — I35.0 NONRHEUMATIC AORTIC VALVE STENOSIS: ICD-10-CM

## 2025-03-13 DIAGNOSIS — I25.10 CORONARY ARTERY DISEASE INVOLVING NATIVE CORONARY ARTERY OF NATIVE HEART WITHOUT ANGINA PECTORIS: Chronic | ICD-10-CM

## 2025-03-13 DIAGNOSIS — R73.03 PREDIABETES: ICD-10-CM

## 2025-03-13 DIAGNOSIS — Z85.46 HISTORY OF PROSTATE CANCER: ICD-10-CM

## 2025-03-13 DIAGNOSIS — C61 PROSTATE CANCER: ICD-10-CM

## 2025-03-13 DIAGNOSIS — M1A.00X0 IDIOPATHIC CHRONIC GOUT WITHOUT TOPHUS, UNSPECIFIED SITE: ICD-10-CM

## 2025-03-13 PROCEDURE — 3078F DIAST BP <80 MM HG: CPT | Mod: CPTII,S$GLB,, | Performed by: FAMILY MEDICINE

## 2025-03-13 PROCEDURE — 99999 PR PBB SHADOW E&M-EST. PATIENT-LVL IV: CPT | Mod: PBBFAC,,, | Performed by: FAMILY MEDICINE

## 2025-03-13 PROCEDURE — 1159F MED LIST DOCD IN RCRD: CPT | Mod: CPTII,S$GLB,, | Performed by: FAMILY MEDICINE

## 2025-03-13 PROCEDURE — 3288F FALL RISK ASSESSMENT DOCD: CPT | Mod: CPTII,S$GLB,, | Performed by: FAMILY MEDICINE

## 2025-03-13 PROCEDURE — 3008F BODY MASS INDEX DOCD: CPT | Mod: CPTII,S$GLB,, | Performed by: FAMILY MEDICINE

## 2025-03-13 PROCEDURE — 1101F PT FALLS ASSESS-DOCD LE1/YR: CPT | Mod: CPTII,S$GLB,, | Performed by: FAMILY MEDICINE

## 2025-03-13 PROCEDURE — 1126F AMNT PAIN NOTED NONE PRSNT: CPT | Mod: CPTII,S$GLB,, | Performed by: FAMILY MEDICINE

## 2025-03-13 PROCEDURE — 3044F HG A1C LEVEL LT 7.0%: CPT | Mod: CPTII,S$GLB,, | Performed by: FAMILY MEDICINE

## 2025-03-13 PROCEDURE — 99214 OFFICE O/P EST MOD 30 MIN: CPT | Mod: S$GLB,,, | Performed by: FAMILY MEDICINE

## 2025-03-13 PROCEDURE — 3066F NEPHROPATHY DOC TX: CPT | Mod: CPTII,S$GLB,, | Performed by: FAMILY MEDICINE

## 2025-03-13 PROCEDURE — 3061F NEG MICROALBUMINURIA REV: CPT | Mod: CPTII,S$GLB,, | Performed by: FAMILY MEDICINE

## 2025-03-13 PROCEDURE — 3074F SYST BP LT 130 MM HG: CPT | Mod: CPTII,S$GLB,, | Performed by: FAMILY MEDICINE

## 2025-03-13 PROCEDURE — G2211 COMPLEX E/M VISIT ADD ON: HCPCS | Mod: S$GLB,,, | Performed by: FAMILY MEDICINE

## 2025-03-13 RX ORDER — PROMETHAZINE HYDROCHLORIDE AND DEXTROMETHORPHAN HYDROBROMIDE 6.25; 15 MG/5ML; MG/5ML
5 SYRUP ORAL EVERY 4 HOURS PRN
Qty: 118 ML | Refills: 0 | Status: SHIPPED | OUTPATIENT
Start: 2025-03-13 | End: 2025-03-23

## 2025-03-13 RX ORDER — DOXYCYCLINE 100 MG/1
100 CAPSULE ORAL 2 TIMES DAILY
Qty: 20 CAPSULE | Refills: 0 | Status: SHIPPED | OUTPATIENT
Start: 2025-03-13 | End: 2025-03-23

## 2025-03-13 NOTE — Clinical Note
Hello He is starting 10 days of antibiotics for doxycycline. No fever . Looks to be straightforward bronchitis but I know he has sami of his neck next weeks so did not know if that needed to be postponed. Dr Valentin

## 2025-03-14 ENCOUNTER — TELEPHONE (OUTPATIENT)
Dept: PAIN MEDICINE | Facility: CLINIC | Age: 70
End: 2025-03-14
Payer: MEDICARE

## 2025-03-14 NOTE — TELEPHONE ENCOUNTER
----- Message from Nehemiah Carmen MD sent at 3/14/2025  7:43 AM CDT -----  Yes please, will need to push back 2 weeks due to bronchitis  ----- Message -----  From: Myranda Weaver MA  Sent: 3/13/2025   2:53 PM CDT  To: Nehemiah Carmen MD    Good afternoon Dr. Carmen,     Would you like me to push this procedure back 2 weeks?Myranda VAZQUEZ ([facility])  ----- Message -----  From: Reji Valentin MD  Sent: 3/13/2025   2:50 PM CDT  To: [facility]erm[facility] Staff    Hello  He is starting 10 days of antibiotics for doxycycline. No fever . Looks to be straightforward bronchitis but I know he has sami of his neck next weeks so did not know if that needed to be postponed.  Dr Valentin

## 2025-03-14 NOTE — TELEPHONE ENCOUNTER
I called the patient and informed him that when he stakes his last dosage of antibiotics to give us a call so I can schedule him 2 weeks out which he does understand. I also told the patient that I took him off the schedule for next week as well.    Myranda VAZQUEZ ([facility])

## 2025-03-20 ENCOUNTER — OFFICE VISIT (OUTPATIENT)
Dept: UROLOGY | Facility: CLINIC | Age: 70
End: 2025-03-20
Payer: MEDICARE

## 2025-03-20 ENCOUNTER — LAB VISIT (OUTPATIENT)
Dept: LAB | Facility: HOSPITAL | Age: 70
End: 2025-03-20
Attending: UROLOGY
Payer: MEDICARE

## 2025-03-20 VITALS
SYSTOLIC BLOOD PRESSURE: 124 MMHG | HEART RATE: 84 BPM | BODY MASS INDEX: 34.25 KG/M2 | HEIGHT: 69 IN | WEIGHT: 231.25 LBS | DIASTOLIC BLOOD PRESSURE: 77 MMHG

## 2025-03-20 DIAGNOSIS — N52.9 ERECTILE DYSFUNCTION, UNSPECIFIED ERECTILE DYSFUNCTION TYPE: ICD-10-CM

## 2025-03-20 DIAGNOSIS — C61 PROSTATE CANCER: ICD-10-CM

## 2025-03-20 DIAGNOSIS — C61 PROSTATE CANCER: Primary | ICD-10-CM

## 2025-03-20 LAB — COMPLEXED PSA SERPL-MCNC: <0.01 NG/ML (ref 0–4)

## 2025-03-20 PROCEDURE — 1160F RVW MEDS BY RX/DR IN RCRD: CPT | Mod: HCNC,CPTII,S$GLB, | Performed by: UROLOGY

## 2025-03-20 PROCEDURE — 3066F NEPHROPATHY DOC TX: CPT | Mod: HCNC,CPTII,S$GLB, | Performed by: UROLOGY

## 2025-03-20 PROCEDURE — 3074F SYST BP LT 130 MM HG: CPT | Mod: HCNC,CPTII,S$GLB, | Performed by: UROLOGY

## 2025-03-20 PROCEDURE — 3078F DIAST BP <80 MM HG: CPT | Mod: HCNC,CPTII,S$GLB, | Performed by: UROLOGY

## 2025-03-20 PROCEDURE — 3061F NEG MICROALBUMINURIA REV: CPT | Mod: HCNC,CPTII,S$GLB, | Performed by: UROLOGY

## 2025-03-20 PROCEDURE — 1101F PT FALLS ASSESS-DOCD LE1/YR: CPT | Mod: HCNC,CPTII,S$GLB, | Performed by: UROLOGY

## 2025-03-20 PROCEDURE — 36415 COLL VENOUS BLD VENIPUNCTURE: CPT | Mod: HCNC | Performed by: UROLOGY

## 2025-03-20 PROCEDURE — 84153 ASSAY OF PSA TOTAL: CPT | Mod: HCNC | Performed by: UROLOGY

## 2025-03-20 PROCEDURE — 99214 OFFICE O/P EST MOD 30 MIN: CPT | Mod: HCNC,S$GLB,, | Performed by: UROLOGY

## 2025-03-20 PROCEDURE — 3288F FALL RISK ASSESSMENT DOCD: CPT | Mod: HCNC,CPTII,S$GLB, | Performed by: UROLOGY

## 2025-03-20 PROCEDURE — 1126F AMNT PAIN NOTED NONE PRSNT: CPT | Mod: HCNC,CPTII,S$GLB, | Performed by: UROLOGY

## 2025-03-20 PROCEDURE — 99999 PR PBB SHADOW E&M-EST. PATIENT-LVL IV: CPT | Mod: PBBFAC,HCNC,, | Performed by: UROLOGY

## 2025-03-20 PROCEDURE — 3008F BODY MASS INDEX DOCD: CPT | Mod: HCNC,CPTII,S$GLB, | Performed by: UROLOGY

## 2025-03-20 PROCEDURE — 1159F MED LIST DOCD IN RCRD: CPT | Mod: HCNC,CPTII,S$GLB, | Performed by: UROLOGY

## 2025-03-20 PROCEDURE — 3044F HG A1C LEVEL LT 7.0%: CPT | Mod: HCNC,CPTII,S$GLB, | Performed by: UROLOGY

## 2025-03-20 NOTE — PROGRESS NOTES
Chief Complaint:  History of prostate cancer    HPI:   03/20/2025 - returns today for follow-up, notes a small bulge in his left groin that gets bigger when he inflates the device, the device is working well, all incisions healed up, no new voiding issues or incontinence, due for PSA    06/27/2024 - returns today for follow-up, no new issues in the interim, due for PSA, notes that his IPP is no longer working, will inflate but will not stay inflated, no pain associated with the device, denies any gross hematuria or dysuria    12/21/2023 - patient returns today for follow-up, no new issues in the interim, unfortunately had a postop infection from his surgical wound and needed to be in the hospital for 14 days and another six weeks of a antibiotics through a PICC line, feeling great now, no gross hematuria or dysuria, due for PSA    06/15/2023 - patient returns today for follow-up, no new issues in the interim, had back surgery with Dr. Valencia and has done very well since then, pain much improved, mobility much improved, had some urgency and urge incontinence right afterwards but this resolved fairly quickly as his mobility improved, denies any weak stream or incomplete emptying, denies gross hematuria, due for PSA today    11/03/2022 - returns for follow-up, PSA remains undetectable, notes some new stress incontinence when he stands from a seated or lying position, does not do pelvic floor exercises, also notes some urinary leakage when he achieves orgasm, as well as some postvoid dribbling, no gross hematuria or dysuria    04/28/2022 - returns today for follow-up, PSA remains undetectable, energy good, voiding well, denies GH or leakage    12/17/2021 - patient returns today for follow-up, PSA remains undetectable, his energy level has improved and his hot flashes have diminished as his ADT has worn off, voiding well, denies any gross hematuria    09/17/2021 - patient returns for follow-up, notes decreased urgency,  is voiding well with a good stream and denies gross hematuria, nocturia x2 but is not bothersome, PSA undetectable, has some hot flashes which are bothersome but he can tolerate them    06/11/2021 - patient presents for follow-up, has completed radiation, did fine during radiation but notes fatigue possibly from radiation versus the Lupron, denies any gross hematuria or voiding difficulty, not having any leakage, denies UTIs  03/05/2021 - patient presents for follow-up, [facility] which [facility] GG 4+5= 9 prostate cancer with negative margins, patient's PSA never went to undetectable, Dr Garces thinks Axumin  PET followed by salvage radiation with ADT x 6 months is the best course of action  01/21/2021 - patient presents for follow-up, we were unable to obtain his outside records due to a clerical error, however we have requested these and they should be faxed soon, he he continues to note dysuria and notes that his urine is very dark and concentrated, he denies any foul-smelling urine or gross hematuria, his follow-up PSA was 0.11, denies weak stream or incomplete emptying  12/10/2020 - 69 y.o. male that presents for history of prostate cancer, [facility] [place] [facility], patient believes he was told that he had aggressive prostate cancer, he believes thathis initial postop PSA was undetectable however he has had subsequent biochemical recurrence, then had a postprostatectomy MRI in mid November which was concerning for prostate cancer recurrence and was told that he would need radiation.  Thus the patient has decided to seek a 2nd opinion.  Patient notes that he initially had incontinence after his surgery, but this has resolved.  He currently wears no pads.  He had erectile dysfunction prior to his radical prostatectomy and had an IPP placed in 2018.  This continues to work well for him.  Patient does note some sciatic nerve  pain on the back of his right leg for the past 6 months.  He notes chronic back pain since 2001 for which he gets epidural injections which works well for him.  Other significant medical history includes a stroke in 2012 and in open heart surgery in 2014.  He denies any gross hematuria but does confirm some dysuria. He denies a history of kidney stones.  He also denies a family history of prostate cancer.      PMH:  Past Medical History:   Diagnosis Date    Aortic stenosis     [facility]    Asthma     BPH (benign prostatic hyperplasia)     CAD (coronary artery disease)     Cardiomyopathy     CHF (congestive heart failure)     Chronic hoarseness     vocal cord surg    Chronic pain     CKD (chronic kidney disease) stage 3, GFR 30-59 ml/min     CVA (cerebral infarction)     8/2012 olol; reviewed ed note    Ex-smoker     GERD (gastroesophageal reflux disease)     Hepatitis C     treatedharvoni says cured, RNA NEG 6/2020    Hypertension     Mixed hyperlipidemia 2/3/2025    Pancreatitis     Prostate cancer     Prostate cancer     Prostate cancer     PVD (peripheral vascular disease)     Renal insufficiency     Substance abuse     cocaine, etoh , tob in past       PSH:  Past Surgical History:   Procedure Laterality Date    AORTIC VALVE REPLACEMENT  05/19/2014    Tissue valve replacement    BACK SURGERY      CARDIAC CATHETERIZATION      COLONOSCOPY  2011    COLONOSCOPY N/A 2/24/2021    Procedure: COLONOSCOPY;  Surgeon: Faith Carrillo MD;  Location: [facility] ENDO;  Service: Endoscopy;  Laterality: N/A;    COLONOSCOPY N/A 7/2/2024    Procedure: COLONOSCOPY 6/4 plavix 5 day hold econ;  Surgeon: Prince Griffin MD;  Location: [facility] ENDO;  Service: Endoscopy;  Laterality: N/A;    EPIDURAL STEROID INJECTION INTO CERVICAL SPINE N/A 6/21/2022    Procedure: C7-T1 IL PIEDAD;  Surgeon: Nehemiah Carmen MD;  Location: [facility] PAIN MGT;  Service: Pain Management;  Laterality: N/A;    EPIDURAL STEROID INJECTION INTO CERVICAL SPINE  N/A 3/12/2024    Procedure: C7/T1 IL PIEDAD;  Surgeon: Nehemiah Carmen MD;  Location: [facility] PAIN MGT;  Service: Pain Management;  Laterality: N/A;    ESOPHAGOGASTRODUODENOSCOPY N/A 2/24/2021    Procedure: ESOPHAGOGASTRODUODENOSCOPY (EGD);  Surgeon: Faith Carrillo MD;  Location: [facility] ENDO;  Service: Endoscopy;  Laterality: N/A;    ESOPHAGOGASTRODUODENOSCOPY N/A 7/2/2024    Procedure: EGD (ESOPHAGOGASTRODUODENOSCOPY);  Surgeon: Prince Griffin MD;  Location: [facility] ENDO;  Service: Endoscopy;  Laterality: N/A;    ESOPHAGOGASTRODUODENOSCOPY N/A 11/19/2024    Procedure: EGD (ESOPHAGOGASTRODUODENOSCOPY);  Surgeon: Kayleigh Leiva MD;  Location: [facility] ENDO;  Service: Gastroenterology;  Laterality: N/A;    FOOT SURGERY      INSERTION N/A 7/5/2023    Procedure: INSERTION;  Surgeon: Brandon Valencia MD;  Location: [facility];  Service: Neurosurgery;  Laterality: N/A;  Antibiotic Beads    INTRALUMINAL GASTROINTESTINAL TRACT IMAGING VIA CAPSULE N/A 11/1/2024    Procedure: IMAGING PROCEDURE, GI TRACT, INTRALUMINAL, VIA CAPSULE;  Surgeon: [place], First Available;  Location: [facility] ENDO;  Service: Endoscopy;  Laterality: N/A;    LEFT HEART CATHETERIZATION Left 7/24/2020    Procedure: CATHETERIZATION, HEART, LEFT;  Surgeon: Pasha Martin MD;  Location: [facility] CATH LAB;  Service: Cardiology;  Laterality: Left;    PENILE PROSTHESIS IMPLANT      PENILE PROSTHESIS REVISION  04/30/2018    PROSTATECTOMY  09/2019    [facility]    rad[facility] for prostate      REMOVAL OF HARDWARE FROM SPINE N/A 7/5/2023    Procedure: REMOVAL, HARDWARE, SPINE;  Surgeon: Brandon Valencia MD;  Location: [facility] OR;  Service: Neurosurgery;  Laterality: N/A;    REPLACEMENT N/A 7/5/2023    Procedure: REPLACEMENT;  Surgeon: Brandon Valencia MD;  Location: [facility] OR;  Service: Neurosurgery;  Laterality: N/A;  of Sandoval and Screws. [employer]iset[employer]    REVISION OF PROCEDURE INVOLVING INFLATABLE PENILE PROSTHESIS N/A 7/22/2024    Procedure: REVISION, PROCEDURE INVOLVING  "INFLATABLE PENILE PROSTHESIS;  Surgeon: Rommel Rodriguez MD;  Location: [facility] OR;  Service: Urology;  Laterality: N/A;    TRANSFORAMINAL EPIDURAL INJECTION OF STEROID Right 10/20/2022    Procedure: Right  L4/5 + L5/S1 TF PIEDAD RN IV Sedation;  Surgeon: Nehemiah Carmen MD;  Location: [facility] PAIN MGT;  Service: Pain Management;  Laterality: Right;    TRANSFORAMINAL LUMBAR INTERBODY FUSION (TLIF) USING COMPUTER-ASSISTED NAVIGATION Bilateral 2023    Procedure: FUSION, SPINE, LUMBAR, TLIF, USING COMPUTER-ASSISTED NAVIGATION;  Surgeon: Brandon Valencia MD;  Location: [facility] OR;  Service: Neurosurgery;  Laterality: Bilateral;  2 level lumbar fusion at L4-5 and L5-S1    UPPER GASTROINTESTINAL ENDOSCOPY      WASHOUT N/A 2023    Procedure: WASHOUT;  Surgeon: Brandon Valencia MD;  Location: [facility];  Service: Neurosurgery;  Laterality: N/A;    WOUND EXPLORATION Bilateral 2023    Procedure: EXPLORATION, WOUND;  Surgeon: Brandon Valencia MD;  Location: [facility] OR;  Service: Neurosurgery;  Laterality: Bilateral;       Family History:  Family History   Problem Relation Name Age of Onset    Heart disease Mother      Heart disease Father      Heart attack Sister      Heart attack Brother      Colon cancer Neg Hx      Colon polyps Neg Hx      Liver cancer Neg Hx      Inflammatory bowel disease Neg Hx      Liver disease Neg Hx      Rectal cancer Neg Hx      Stomach cancer Neg Hx      Ulcerative colitis Neg Hx         Social History:  Social History     Tobacco Use    Smoking status: Former     Current packs/day: 0.00     Average packs/day: 1 pack/day for 8.0 years (8.0 ttl pk-yrs)     Types: Cigarettes     Start date: 2004     Quit date: 2012     Years since quittin.5    Smokeless tobacco: Never   Substance Use Topics    Alcohol use: Not Currently     Comment: Sober since 2012    Drug use: Not Currently     Types: "Crack" cocaine, Marijuana     Comment: Quit in         Review of " Systems:  General: No fever, chills  Skin: No rashes  Chest:  Denies cough and sputum production  Heart: Denies chest pain  Resp: Denies dyspnea  Abdomen: Denies diarrhea, abdominal pain, hematemesis, or blood in stool.  Musculoskeletal: No joint stiffness or swelling. Denies back pain.  : see HPI  Neuro: no dizziness or weakness    Allergies:  Patient has no known allergies.    Medications:    Current Outpatient Medications:     albuterol (PROVENTIL/VENTOLIN HFA) 90 mcg/actuation inhaler, INHALE 2 PUFFS INTO THE LUNGS EVERY 6 HOURS AS NEEDED FOR COUGH, Disp: 8.5 g, Rfl: 3    allopurinoL (ZYLOPRIM) 100 MG tablet, Take 100 mg by mouth once daily. Takes 0.5 tab, Disp: , Rfl:     ALPRAZolam (XANAX) 1 MG tablet, Take 1 mg by mouth daily as needed for Anxiety or Insomnia., Disp: , Rfl:     aspirin (ECOTRIN) 81 MG EC tablet, Take 1 tablet (81 mg total) by mouth once daily., Disp: 90 tablet, Rfl: 3    atorvastatin (LIPITOR) 80 MG tablet, Take 40 mg by mouth once daily. 0.5 tab of 80mg, Disp: , Rfl:     azelastine (ASTELIN) 137 mcg (0.1 %) nasal spray, 1 spray (137 mcg total) by Nasal route 2 (two) times daily., Disp: 30 mL, Rfl: 1    carvediloL (COREG) 12.5 MG tablet, Take 1 tablet (12.5 mg total) by mouth 2 (two) times daily., Disp: 60 tablet, Rfl: 11    clopidogreL (PLAVIX) 75 mg tablet, Take 1 tablet (75 mg total) by mouth once daily., Disp: 30 tablet, Rfl: 11    diclofenac sodium (VOLTAREN) 1 % Gel, Apply 2 g topically 2 (two) times daily. for 7 days, Disp: 100 g, Rfl: 0    doxycycline (VIBRAMYCIN) 100 MG Cap, Take 1 capsule (100 mg total) by mouth 2 (two) times daily. for 10 days, Disp: 20 capsule, Rfl: 0    esomeprazole (NEXIUM) 40 MG capsule, Take 40 mg by mouth before breakfast., Disp: , Rfl:     evolocumab (REPATHA SURECLICK) 140 mg/mL PnIj, Inject 1 mL (140 mg total) into the skin every 14 (fourteen) days., Disp: 2 each, Rfl: 9    fluticasone-umeclidin-vilanter (TRELEGY ELLIPTA) 200-62.5-25 mcg inhaler, Inhale  1 puff into the lungs once daily., Disp: 60 each, Rfl: 5    gabapentin (NEURONTIN) 600 MG tablet, Take 600 mg by mouth 3 (three) times daily as needed (pain)., Disp: , Rfl:     HYDROcodone-acetaminophen (NORCO)  mg per tablet, Take 1 tablet by mouth every 24 hours as needed for Pain., Disp: 30 tablet, Rfl: 0    [START ON 4/10/2025] HYDROcodone-acetaminophen (NORCO)  mg per tablet, Take 1 tablet by mouth every 24 hours as needed for Pain., Disp: 30 tablet, Rfl: 0    hydrOXYzine HCL (ATARAX) 25 MG tablet, Take 1 tablet (25 mg total) by mouth 3 (three) times daily as needed for Itching., Disp: 30 tablet, Rfl: 0    losartan (COZAAR) 50 MG tablet, Take 1 tablet (50 mg total) by mouth once daily., Disp: 30 tablet, Rfl: 11    multivitamin (THERAGRAN) per tablet, Take 1 tablet by mouth once daily., Disp: , Rfl:     pregabalin (LYRICA) 75 MG capsule, Take 1 capsule (75 mg total) by mouth every evening for 5 days, THEN 1 capsule (75 mg total) 2 (two) times daily., Disp: 60 capsule, Rfl: 0    promethazine-dextromethorphan (PROMETHAZINE-DM) 6.25-15 mg/5 mL Syrp, Take 5 mLs by mouth every 4 (four) hours as needed., Disp: 118 mL, Rfl: 0    sertraline (ZOLOFT) 100 MG tablet, TAKE TWO TABLETS BY MOUTH EVERY DAY FOR MENTAL HEALTH DOSE INCREASED TO 200MG/DAY, Disp: , Rfl:     tiZANidine (ZANAFLEX) 4 MG tablet, Take 4 mg by mouth every 8 (eight) hours as needed (pain)., Disp: , Rfl:     Current Facility-Administered Medications:     aztreonam 2,000 mg in D5W 100 mL IVPB, 2,000 mg, Intravenous, On Call Guy, Rommel Rodriguez MD    Facility-Administered Medications Ordered in Other Visits:     ondansetron injection 4 mg, 4 mg, Intravenous, Once PRN, Nehemiah Carmen MD    ondansetron injection 4 mg, 4 mg, Intravenous, Once PRN, Nehemiah Carmen MD    sodium chloride 0.9% flush 10 mL, 10 mL, Intravenous, PRN, Wilda Horne MD    Physical Exam:  Vitals:    03/20/25 1002   BP: 124/77   Pulse: 84      General: awake, alert, cooperative  Head: NC/AT  Ears: external ears normal  Eyes: sclera normal  Lungs: normal inspiration, NAD  Heart: well-perfused  Abdomen: Soft, NT, ND  : Normal phallus, meatus normal in size and position, BL testicles palpable, no masses, nontender, no abnormalities of epididymi, all IPP components nontender, device cycles, left sided reservoir palpable but nontender  Lymphatic: groin nodes negative  Skin: The skin is warm and dry  Ext: No c/c/e.  Neuro: grossly intact, AOx3      RADIOLOGY:  NM PET CT FLUCICLOVINE F18 03/24/2021     CLINICAL HISTORY:  Biochemical failure after prostatectomy in 2019, high risk;  Malignant neoplasm of prostate     TECHNIQUE:  Segmented attenuation corrected 3-D PET imaging was obtained from the skull base through the mid thighs utilizing 10.38 mCi F-18-fluciclovine.  Noncontrast CT imaging was performed for attenuation correction, diagnosis, and anatomical fusion with PET.     COMPARISON:  None.     FINDINGS:  There is physiologic tracer uptake in the liver, pancreas, and bowel.     Patient is status post prostatectomy.  No abnormal hypermetabolism is demonstrated within the prostate bed.  No hypermetabolic regional lymphadenopathy in the pelvis. No hypermetabolic osseous lesions are identified.     Incidental: Penile prosthesis, aortic atherosclerosis, right renal cyst, median sternotomy/CABG changes, degenerative changes in the spine.     Impression:     Status post prostatectomy.  No evidence to indicate residual or recurrent malignancy.    LABS:  I personally reviewed the following lab values:  Lab Results   Component Value Date    WBC 4.05 03/06/2025    WBC 4.05 03/06/2025    HGB 14.5 03/06/2025    HGB 14.5 03/06/2025    HCT 44.8 03/06/2025    HCT 44.8 03/06/2025     03/06/2025     03/06/2025     03/06/2025    K 5.1 03/06/2025     03/06/2025    CREATININE 1.7 (H) 03/06/2025    BUN 16 03/06/2025    CO2 26 03/06/2025    TSH  2.854 05/23/2024    PSA 0.88 04/08/2014    INR 0.9 09/04/2024    GLUF 83 03/13/2007    HGBA1C 5.5 03/06/2025    CHOL 136 03/06/2025    TRIG 50 03/06/2025    HDL 42 03/06/2025    ALT 17 03/06/2025    AST 36 03/06/2025     Assessment/Plan:   Abdullahi Urias is a 69 y.o. male with:    ED - s/p IPP revision, device cycling, left sided reservior is palpable but nontender and does not bother patient, continue to monitor    History of GG 9 prostate cancer - status post radical prostatectomy in September of 2019 with biochemical recurrence now s/p XRT and ADT, PSA today, f/u 6 months with PSA    NATI - improved with PFPT    Rommel Rodriguez MD  Urology

## 2025-03-25 ENCOUNTER — TELEPHONE (OUTPATIENT)
Dept: PAIN MEDICINE | Facility: CLINIC | Age: 70
End: 2025-03-25
Payer: MEDICARE

## 2025-03-25 NOTE — TELEPHONE ENCOUNTER
I called the patient back and rescheduled his procedure.    Myranda VAZQUEZ ([facility])

## 2025-03-25 NOTE — TELEPHONE ENCOUNTER
----- Message from Cinda Knight sent at 3/25/2025  2:00 PM CDT -----  Contact: Abdullahi  .Type: Patient  Requesting Call BackWhjen Called:Marcellus is this regarding?: Missed procedure scheduled 3/20/2025Would the patient rather a call back or a response via [facility]? [facility] Number:.[phone] Information: Patient call in to advise he has finished with his antibiotics and is ready to reschedule procedure.

## 2025-03-27 NOTE — PRE-PROCEDURE INSTRUCTIONS
Spoke with patient regarding procedure scheduled on 4.10     Arrival time 0630     Has patient been sick with fever or on antibiotics within the last 7 days? No     Does the patient have any open wounds, sores or rashes? No     Does the patient have any recent fractures? no     Has patient received a vaccination within the last 7 days? No     Received the COVID vaccination? yes     Has the patient stopped all medications as directed? hold asa and plavix 5 days prior to procedure. Cardiac clearance obtained from dr pina on 2.12     Does patient have a pacemaker, defibrillator, or implantable stimulator? No     Does the patient have a ride to and from procedure and someone reliable to remain with patient?  wife     Is the patient diabetic? no      Does the patient have sleep apnea? Or use O2 at home? elda on cpap     Is the patient receiving sedation? Yes      Is the patient instructed to remain NPO beginning at midnight the night before their procedure? yes     Procedure location confirmed with patient? Yes     Covid- Denies signs/symptoms. Instructed to notify PAT/MD if any changes.

## 2025-04-08 ENCOUNTER — TELEPHONE (OUTPATIENT)
Dept: PAIN MEDICINE | Facility: CLINIC | Age: 70
End: 2025-04-08
Payer: MEDICARE

## 2025-04-08 NOTE — TELEPHONE ENCOUNTER
----- Message from Shruthi sent at 4/8/2025  4:10 PM CDT -----  Contact: ZAK REAGAN [[id number]]  ..Type:  Patient Requesting CallWho Called: ZAK REAGAN [[id number]]What is the call regarding?: pt needs the time for his appt on 4/10Would the patient rather a call back or a response via [facility]?  [facility] Call Back Number:  [phone] Information:

## 2025-04-10 ENCOUNTER — HOSPITAL ENCOUNTER (OUTPATIENT)
Facility: HOSPITAL | Age: 70
Discharge: HOME | End: 2025-04-10
Attending: PHYSICAL MEDICINE & REHABILITATION | Admitting: PHYSICAL MEDICINE & REHABILITATION
Payer: MEDICARE

## 2025-04-10 VITALS
RESPIRATION RATE: 15 BRPM | HEIGHT: 69 IN | OXYGEN SATURATION: 96 % | SYSTOLIC BLOOD PRESSURE: 130 MMHG | HEART RATE: 66 BPM | TEMPERATURE: 96 F | BODY MASS INDEX: 34.12 KG/M2 | DIASTOLIC BLOOD PRESSURE: 78 MMHG | WEIGHT: 230.38 LBS

## 2025-04-10 DIAGNOSIS — M54.12 CERVICAL RADICULOPATHY: Primary | ICD-10-CM

## 2025-04-10 PROCEDURE — 63600175 PHARM REV CODE 636 W HCPCS: Mod: HCNC | Performed by: PHYSICAL MEDICINE & REHABILITATION

## 2025-04-10 PROCEDURE — 62321 NJX INTERLAMINAR CRV/THRC: CPT | Mod: HCNC | Performed by: PHYSICAL MEDICINE & REHABILITATION

## 2025-04-10 PROCEDURE — 62321 NJX INTERLAMINAR CRV/THRC: CPT | Mod: HCNC,,, | Performed by: PHYSICAL MEDICINE & REHABILITATION

## 2025-04-10 PROCEDURE — 25500020 PHARM REV CODE 255: Mod: HCNC | Performed by: PHYSICAL MEDICINE & REHABILITATION

## 2025-04-10 RX ORDER — BUPIVACAINE HYDROCHLORIDE 2.5 MG/ML
INJECTION, SOLUTION EPIDURAL; INFILTRATION; INTRACAUDAL; PERINEURAL
Status: DISCONTINUED | OUTPATIENT
Start: 2025-04-10 | End: 2025-04-10 | Stop reason: HOSPADM

## 2025-04-10 RX ORDER — ONDANSETRON HYDROCHLORIDE 2 MG/ML
4 INJECTION, SOLUTION INTRAVENOUS ONCE AS NEEDED
Status: DISCONTINUED | OUTPATIENT
Start: 2025-04-10 | End: 2025-04-10 | Stop reason: HOSPADM

## 2025-04-10 RX ORDER — DEXAMETHASONE SODIUM PHOSPHATE 10 MG/ML
INJECTION, SOLUTION INTRA-ARTICULAR; INTRALESIONAL; INTRAMUSCULAR; INTRAVENOUS; SOFT TISSUE
Status: DISCONTINUED | OUTPATIENT
Start: 2025-04-10 | End: 2025-04-10 | Stop reason: HOSPADM

## 2025-04-10 RX ORDER — MIDAZOLAM HYDROCHLORIDE 1 MG/ML
INJECTION INTRAMUSCULAR; INTRAVENOUS
Status: DISCONTINUED | OUTPATIENT
Start: 2025-04-10 | End: 2025-04-10 | Stop reason: HOSPADM

## 2025-04-10 RX ORDER — FENTANYL CITRATE 50 UG/ML
INJECTION, SOLUTION INTRAMUSCULAR; INTRAVENOUS
Status: DISCONTINUED | OUTPATIENT
Start: 2025-04-10 | End: 2025-04-10 | Stop reason: HOSPADM

## 2025-04-10 NOTE — OP NOTE
Cervical Interlaminar Epidural Steroid Injection under Fluoroscopic Guidance.   Time-out taken to identify patient and procedure prior to starting the procedure.     Date of Procedure: 04/10/2025    PROCEDURE: Cervical Interlaminar epidural steroid injection C7/T1 under fluoroscopic guidance.     Pre-Op diagnosis: Cervical radiculopathy [M54.12]    Post-Op diagnosis: Cervical radiculopathy [M54.12]    PHYSICIAN: Nehemiah Carmen MD    ASSISTANTS: None     SEDATION: Conscious sedation provided by M.D    The patient was monitored with continuous pulse oximetry, EKG, and intermittent blood pressure monitors, immediately prior to administration of sedation.  The patient was hemodynamically stable throughout the entire process was responsive to voice, and breathing spontaneously.  Supplemental O2 was provided at 2L/min via nasal cannula.  Patient was comfortable for the duration of the procedure.     There was a total of 2mg IV Midazolam and 50mcg Fentanyl titrated for the procedure    Total sedation time was >10minutes and <20minutes      MEDICATIONS INJECTED: Preservative-free Decadron 10 mg with 1mL of sterile 0.25%Bupivicaine and 3ml of preservative free normal saline.     LOCAL ANESTHETIC INJECTED: Xylocaine 1% 3mL    ESTIMATED BLOOD LOSS: none.     COMPLICATIONS: none.     TECHNIQUE: With the patient laying in a prone position, the area was prepped and draped in the usual sterile fashion using ChloraPrep and a fenestrated drape. Local anesthetic was given using a 27-gauge needle by raising a wheal and going down to the hub of the needle over the C7/T1 interlaminar space.  The interlaminar space was then approached with a 3.5 inch 18-gauge Touhy needle was introduced under fluoroscopic guidance in the AP and Lateral view. Once the Ligamentum flavum was encountered loss of resistance to saline was used to enter the epidural space. With positive loss of resistance and negative CSF or Blood, 2mL contrast dye Omnipaque  (300mg/ml) was injected to confirm placement and there was no vascular runoff. The medication was then injected slowly. The patient tolerated the procedure well.       The patient was monitored after the procedure.   They were given post-procedure and discharge instructions to follow at home.  The patient was discharged in a stable condition.

## 2025-04-10 NOTE — DISCHARGE INSTRUCTIONS

## 2025-04-10 NOTE — H&P
HPI  Patient presenting for Procedure(s) (LRB):  Cervical C7/T1 Interlaminar PIEDAD with RN IV Sedation, Takes ASA, Plavix- will need to hold 5 days prior (clearance from Dr. Mckeon) (N/A)     No health changes since previous encounter    Past Medical History:   Diagnosis Date    Aortic stenosis     [facility]    Asthma     BPH (benign prostatic hyperplasia)     CAD (coronary artery disease)     Cardiomyopathy     CHF (congestive heart failure)     Chronic hoarseness     vocal cord surg    Chronic pain     CKD (chronic kidney disease) stage 3, GFR 30-59 ml/min     CVA (cerebral infarction)     8/2012 olol; reviewed ed note    Ex-smoker     GERD (gastroesophageal reflux disease)     Hepatitis C     treatedharvoni says cured, RNA NEG 6/2020    Hypertension     Mixed hyperlipidemia 2/3/2025    Pancreatitis     Prostate cancer     Prostate cancer     Prostate cancer     PVD (peripheral vascular disease)     Renal insufficiency     Substance abuse     cocaine, etoh , tob in past     Past Surgical History:   Procedure Laterality Date    AORTIC VALVE REPLACEMENT  05/19/2014    Tissue valve replacement    BACK SURGERY      CARDIAC CATHETERIZATION      COLONOSCOPY  2011    COLONOSCOPY N/A 2/24/2021    Procedure: COLONOSCOPY;  Surgeon: Faith Carrillo MD;  Location: [facility] ENDO;  Service: Endoscopy;  Laterality: N/A;    COLONOSCOPY N/A 7/2/2024    Procedure: COLONOSCOPY 6/4 plavix 5 day hold econ;  Surgeon: Prince Griffin MD;  Location: [facility] ENDO;  Service: Endoscopy;  Laterality: N/A;    EPIDURAL STEROID INJECTION INTO CERVICAL SPINE N/A 6/21/2022    Procedure: C7-T1 IL PIEDAD;  Surgeon: Nehemiah Carmen MD;  Location: [facility] PAIN MGT;  Service: Pain Management;  Laterality: N/A;    EPIDURAL STEROID INJECTION INTO CERVICAL SPINE N/A 3/12/2024    Procedure: C7/T1 IL PIEDAD;  Surgeon: Nehemiah Carmen MD;  Location: [facility] PAIN MGT;  Service: Pain Management;  Laterality: N/A;    ESOPHAGOGASTRODUODENOSCOPY N/A 2/24/2021     Procedure: ESOPHAGOGASTRODUODENOSCOPY (EGD);  Surgeon: Faith Carrillo MD;  Location: [facility] ENDO;  Service: Endoscopy;  Laterality: N/A;    ESOPHAGOGASTRODUODENOSCOPY N/A 7/2/2024    Procedure: EGD (ESOPHAGOGASTRODUODENOSCOPY);  Surgeon: Prince Griffin MD;  Location: [facility] ENDO;  Service: Endoscopy;  Laterality: N/A;    ESOPHAGOGASTRODUODENOSCOPY N/A 11/19/2024    Procedure: EGD (ESOPHAGOGASTRODUODENOSCOPY);  Surgeon: Kayleigh Leiva MD;  Location: [facility] ENDO;  Service: Gastroenterology;  Laterality: N/A;    FOOT SURGERY      INSERTION N/A 7/5/2023    Procedure: INSERTION;  Surgeon: Brandon Valencia MD;  Location: [facility] OR;  Service: Neurosurgery;  Laterality: N/A;  Antibiotic Beads    INTRALUMINAL GASTROINTESTINAL TRACT IMAGING VIA CAPSULE N/A 11/1/2024    Procedure: IMAGING PROCEDURE, GI TRACT, INTRALUMINAL, VIA CAPSULE;  Surgeon: Wayne, First Available;  Location: [facility] ENDO;  Service: Endoscopy;  Laterality: N/A;    LEFT HEART CATHETERIZATION Left 7/24/2020    Procedure: CATHETERIZATION, HEART, LEFT;  Surgeon: Pasha Martin MD;  Location: [facility] CATH LAB;  Service: Cardiology;  Laterality: Left;    PENILE PROSTHESIS IMPLANT      PENILE PROSTHESIS REVISION  04/30/2018    PROSTATECTOMY  09/2019    [facility]    radiation for prostate      REMOVAL OF HARDWARE FROM SPINE N/A 7/5/2023    Procedure: REMOVAL, HARDWARE, SPINE;  Surgeon: Brandon Valencia MD;  Location: [facility] OR;  Service: Neurosurgery;  Laterality: N/A;    REPLACEMENT N/A 7/5/2023    Procedure: REPLACEMENT;  Surgeon: Brandon Valencia MD;  Location: [facility] OR;  Service: Neurosurgery;  Laterality: N/A;  of Sandoval and Screws. VMO [employer]    REVISION OF PROCEDURE INVOLVING INFLATABLE PENILE PROSTHESIS N/A 7/22/2024    Procedure: REVISION, PROCEDURE INVOLVING INFLATABLE PENILE PROSTHESIS;  Surgeon: Rommel Rodriguez MD;  Location: [facility] OR;  Service: Urology;  Laterality: N/A;    TRANSFORAMINAL EPIDURAL INJECTION OF STEROID Right 10/20/2022     "Procedure: Right  L4/5 + L5/S1 TF PIEDAD RN IV Sedation;  Surgeon: Nehemiah Carmen MD;  Location: [facility] PAIN MGT;  Service: Pain Management;  Laterality: Right;    TRANSFORAMINAL LUMBAR INTERBODY FUSION (TLIF) USING COMPUTER-ASSISTED NAVIGATION Bilateral 5/17/2023    Procedure: FUSION, SPINE, LUMBAR, TLIF, USING COMPUTER-ASSISTED NAVIGATION;  Surgeon: Brandon Valencia MD;  Location: [facility] OR;  Service: Neurosurgery;  Laterality: Bilateral;  2 level lumbar fusion at L4-5 and L5-S1    UPPER GASTROINTESTINAL ENDOSCOPY  2011    WASHOUT N/A 7/5/2023    Procedure: WASHOUT;  Surgeon: Brandon Valencia MD;  Location: [facility] OR;  Service: Neurosurgery;  Laterality: N/A;    WOUND EXPLORATION Bilateral 7/5/2023    Procedure: EXPLORATION, WOUND;  Surgeon: Brandon Valencia MD;  Location: [facility] OR;  Service: Neurosurgery;  Laterality: Bilateral;     Review of patient's allergies indicates:  No Known Allergies     Medications Ordered Prior to Encounter[1]     PMHx, PSHx, Allergies, Medications reviewed in epic    ROS negative except pain complaints in HPI    OBJECTIVE:    /69 (BP Location: Right arm, Patient Position: Sitting)   Pulse 71   Temp 96 °F (35.6 °C) (Temporal)   Resp 16   Ht 5' 9" (1.753 m)   Wt 104.5 kg (230 lb 6.1 oz)   SpO2 98%   BMI 34.02 kg/m²     PHYSICAL EXAMINATION:    GENERAL: Well appearing, in no acute distress, alert and oriented x3.  PSYCH:  Mood and affect appropriate.  SKIN: Skin color, texture, turgor normal, no rashes or lesions which will impact the procedure.  CV: RRR with palpation of the radial artery.  PULM: No evidence of respiratory difficulty, symmetric chest rise. Clear to auscultation.  NEURO: Cranial nerves grossly intact.    Plan:    Proceed with procedure as planned Procedure(s) (LRB):  Cervical C7/T1 Interlaminar PIEDAD with RN IV Sedation, Takes ASA, Plavix- will need to hold 5 days prior (clearance from Dr. Mckeon) (N/A)    Nehemiah Carmen MD  04/10/2025                 [1] "   Current Facility-Administered Medications on File Prior to Encounter   Medication Dose Route Frequency Provider Last Rate Last Admin    ondansetron injection 4 mg  4 mg Intravenous Once PRN Nehemiah Carmen MD        ondansetron injection 4 mg  4 mg Intravenous Once PRN Nehemiah Carmen MD        sodium chloride 0.9% flush 10 mL  10 mL Intravenous PRN Wilda Horne MD         Current Outpatient Medications on File Prior to Encounter   Medication Sig Dispense Refill    carvediloL (COREG) 12.5 MG tablet Take 1 tablet (12.5 mg total) by mouth 2 (two) times daily. 60 tablet 11    losartan (COZAAR) 50 MG tablet Take 1 tablet (50 mg total) by mouth once daily. 30 tablet 11    allopurinoL (ZYLOPRIM) 100 MG tablet Take 100 mg by mouth once daily. Takes 0.5 tab      ALPRAZolam (XANAX) 1 MG tablet Take 1 mg by mouth daily as needed for Anxiety or Insomnia.      aspirin (ECOTRIN) 81 MG EC tablet Take 1 tablet (81 mg total) by mouth once daily. 90 tablet 3    atorvastatin (LIPITOR) 80 MG tablet Take 40 mg by mouth once daily. 0.5 tab of 80mg      clopidogreL (PLAVIX) 75 mg tablet Take 1 tablet (75 mg total) by mouth once daily. 30 tablet 11    diclofenac sodium (VOLTAREN) 1 % Gel Apply 2 g topically 2 (two) times daily. for 7 days 100 g 0    esomeprazole (NEXIUM) 40 MG capsule Take 40 mg by mouth before breakfast.      multivitamin (THERAGRAN) per tablet Take 1 tablet by mouth once daily.      pregabalin (LYRICA) 75 MG capsule Take 1 capsule (75 mg total) by mouth every evening for 5 days, THEN 1 capsule (75 mg total) 2 (two) times daily. 60 capsule 0    sertraline (ZOLOFT) 100 MG tablet TAKE TWO TABLETS BY MOUTH EVERY DAY FOR MENTAL HEALTH DOSE INCREASED TO 200MG/DAY

## 2025-04-10 NOTE — DISCHARGE SUMMARY
Discharge Note  Short Stay      SUMMARY     Admit Date: 4/10/2025    Attending Physician: Nehemiah Carmen MD        Discharge Physician: Nehemiah Carmen MD        Discharge Date: 4/10/2025 8:14 AM    Procedure(s) (LRB):  Cervical C7/T1 Interlaminar PIEDAD with RN IV Sedation, Takes ASA, Plavix- will need to hold 5 days prior (clearance from Dr. Mckeon) (N/A)    Final Diagnosis: Cervical radiculopathy [M54.12]  Cervicalgia [M54.2]  DDD (degenerative disc disease), cervical [M50.30]    Disposition: Home or self care    Patient Instructions:   Current Discharge Medication List        CONTINUE these medications which have NOT CHANGED    Details   carvediloL (COREG) 12.5 MG tablet Take 1 tablet (12.5 mg total) by mouth 2 (two) times daily.  Qty: 60 tablet, Refills: 11    Comments: .      gabapentin (NEURONTIN) 600 MG tablet Take 600 mg by mouth 3 (three) times daily as needed (pain).      losartan (COZAAR) 50 MG tablet Take 1 tablet (50 mg total) by mouth once daily.  Qty: 30 tablet, Refills: 11    Comments: .      albuterol (PROVENTIL/VENTOLIN HFA) 90 mcg/actuation inhaler INHALE 2 PUFFS INTO THE LUNGS EVERY 6 HOURS AS NEEDED FOR COUGH  Qty: 8.5 g, Refills: 3    Associated Diagnoses: Cough, unspecified type      allopurinoL (ZYLOPRIM) 100 MG tablet Take 100 mg by mouth once daily. Takes 0.5 tab      ALPRAZolam (XANAX) 1 MG tablet Take 1 mg by mouth daily as needed for Anxiety or Insomnia.      aspirin (ECOTRIN) 81 MG EC tablet Take 1 tablet (81 mg total) by mouth once daily.  Qty: 90 tablet, Refills: 3      atorvastatin (LIPITOR) 80 MG tablet Take 40 mg by mouth once daily. 0.5 tab of 80mg      azelastine (ASTELIN) 137 mcg (0.1 %) nasal spray 1 spray (137 mcg total) by Nasal route 2 (two) times daily.  Qty: 30 mL, Refills: 1    Associated Diagnoses: Chronic cough      clopidogreL (PLAVIX) 75 mg tablet Take 1 tablet (75 mg total) by mouth once daily.  Qty: 30 tablet, Refills: 11      diclofenac sodium (VOLTAREN) 1 % Gel  Apply 2 g topically 2 (two) times daily. for 7 days  Qty: 100 g, Refills: 0    Associated Diagnoses: Muscle spasms of neck      esomeprazole (NEXIUM) 40 MG capsule Take 40 mg by mouth before breakfast.      evolocumab (REPATHA SURECLICK) 140 mg/mL PnIj Inject 1 mL (140 mg total) into the skin every 14 (fourteen) days.  Qty: 2 each, Refills: 9    Associated Diagnoses: Mixed hyperlipidemia      fluticasone-umeclidin-vilanter (TRELEGY ELLIPTA) 200-62.5-25 mcg inhaler Inhale 1 puff into the lungs once daily.  Qty: 60 each, Refills: 5    Associated Diagnoses: Asthmatic bronchitis with acute exacerbation, unspecified asthma severity, unspecified whether persistent      !! HYDROcodone-acetaminophen (NORCO)  mg per tablet Take 1 tablet by mouth every 24 hours as needed for Pain.  Qty: 30 tablet, Refills: 0    Comments: Quantity prescribed more than 7 day supply? Yes, quantity medically necessary      !! HYDROcodone-acetaminophen (NORCO)  mg per tablet Take 1 tablet by mouth every 24 hours as needed for Pain.  Qty: 30 tablet, Refills: 0    Comments: Quantity prescribed more than 7 day supply? Yes, quantity medically necessary      hydrOXYzine HCL (ATARAX) 25 MG tablet Take 1 tablet (25 mg total) by mouth 3 (three) times daily as needed for Itching.  Qty: 30 tablet, Refills: 0    Associated Diagnoses: Chronic cough      multivitamin (THERAGRAN) per tablet Take 1 tablet by mouth once daily.      pregabalin (LYRICA) 75 MG capsule Take 1 capsule (75 mg total) by mouth every evening for 5 days, THEN 1 capsule (75 mg total) 2 (two) times daily.  Qty: 60 capsule, Refills: 0    Associated Diagnoses: Fall from ladder, initial encounter; Cervical radiculopathy; Cervicalgia; DDD (degenerative disc disease), cervical; Postoperative seroma of musculoskeletal structure after musculoskeletal procedure; Lumbar radiculopathy, chronic      sertraline (ZOLOFT) 100 MG tablet TAKE TWO TABLETS BY MOUTH EVERY DAY FOR MENTAL HEALTH DOSE  INCREASED TO 200MG/DAY      tiZANidine (ZANAFLEX) 4 MG tablet Take 4 mg by mouth every 8 (eight) hours as needed (pain).       !! - Potential duplicate medications found. Please discuss with provider.              Discharge Diagnosis: Cervical radiculopathy [M54.12]  Cervicalgia [M54.2]  DDD (degenerative disc disease), cervical [M50.30]  Condition on Discharge: Stable with no complications to procedure   Diet on Discharge: Same as before.  Activity: as per instruction sheet.  Discharge to: Home with a responsible adult.  Follow up: 2-4 weeks       Please call the office at [phone] if you experience any weakness or loss of sensation, fever > 101.5, pain uncontrolled with oral medications, persistent nausea/vomiting/or diarrhea, redness or drainage from the incisions, or any other worrisome concerns. If physician on call was not reached or could not communicate with our office for any reason please go to the nearest emergency department

## 2025-04-12 NOTE — TELEPHONE ENCOUNTER
----- Message from Yung Ramos RN sent at 5/12/2023  9:36 AM CDT -----  Regarding: pain  Good morning,    I just spoke to Mr. Urias and he stated he was having surgery with Dr. Valencia on 5/15/23. He stated that his back pain is an 8/10 currently and he was wondering if there was anything he could take to help relieve his pain prior to surgery. Thank you for your time and assistance!    Kind regards,    Yung Ramos RN [facility]      Average

## 2025-05-01 ENCOUNTER — HOSPITAL ENCOUNTER (EMERGENCY)
Facility: HOSPITAL | Age: 70
Discharge: HOME | End: 2025-05-01
Attending: EMERGENCY MEDICINE
Payer: MEDICARE

## 2025-05-01 VITALS
RESPIRATION RATE: 16 BRPM | SYSTOLIC BLOOD PRESSURE: 153 MMHG | DIASTOLIC BLOOD PRESSURE: 65 MMHG | BODY MASS INDEX: 35.01 KG/M2 | OXYGEN SATURATION: 99 % | TEMPERATURE: 98 F | WEIGHT: 236.38 LBS | HEART RATE: 70 BPM | HEIGHT: 69 IN

## 2025-05-01 DIAGNOSIS — K64.9 HEMORRHOIDS, UNSPECIFIED HEMORRHOID TYPE: ICD-10-CM

## 2025-05-01 DIAGNOSIS — K62.5 RECTAL BLEEDING: Primary | ICD-10-CM

## 2025-05-01 LAB
ABSOLUTE EOSINOPHIL (OHS): 0.19 K/UL
ABSOLUTE EOSINOPHIL (OHS): 0.21 K/UL
ABSOLUTE MONOCYTE (OHS): 0.38 K/UL (ref 0.3–1)
ABSOLUTE MONOCYTE (OHS): 0.44 K/UL (ref 0.3–1)
ABSOLUTE NEUTROPHIL COUNT (OHS): 2.29 K/UL (ref 1.8–7.7)
ABSOLUTE NEUTROPHIL COUNT (OHS): 2.41 K/UL (ref 1.8–7.7)
ALBUMIN SERPL BCP-MCNC: 3.8 G/DL (ref 3.5–5.2)
ALP SERPL-CCNC: 97 UNIT/L (ref 40–150)
ALT SERPL W/O P-5'-P-CCNC: 28 UNIT/L (ref 10–44)
ANION GAP (OHS): 7 MMOL/L (ref 8–16)
APTT PPP: 23.1 SECONDS (ref 21–32)
AST SERPL-CCNC: 23 UNIT/L (ref 11–45)
BASOPHILS # BLD AUTO: 0.03 K/UL
BASOPHILS # BLD AUTO: 0.03 K/UL
BASOPHILS NFR BLD AUTO: 0.7 %
BASOPHILS NFR BLD AUTO: 0.7 %
BILIRUB SERPL-MCNC: 0.4 MG/DL (ref 0.1–1)
BUN SERPL-MCNC: 17 MG/DL (ref 8–23)
CALCIUM SERPL-MCNC: 9 MG/DL (ref 8.7–10.5)
CHLORIDE SERPL-SCNC: 110 MMOL/L (ref 95–110)
CO2 SERPL-SCNC: 22 MMOL/L (ref 23–29)
CREAT SERPL-MCNC: 1.4 MG/DL (ref 0.5–1.4)
ERYTHROCYTE [DISTWIDTH] IN BLOOD BY AUTOMATED COUNT: 13.9 % (ref 11.5–14.5)
ERYTHROCYTE [DISTWIDTH] IN BLOOD BY AUTOMATED COUNT: 13.9 % (ref 11.5–14.5)
GFR SERPLBLD CREATININE-BSD FMLA CKD-EPI: 54 ML/MIN/1.73/M2
GLUCOSE SERPL-MCNC: 78 MG/DL (ref 70–110)
HCT VFR BLD AUTO: 39.8 % (ref 40–54)
HCT VFR BLD AUTO: 39.8 % (ref 40–54)
HCV AB SERPL QL IA: POSITIVE
HGB BLD-MCNC: 13.5 GM/DL (ref 14–18)
HGB BLD-MCNC: 13.5 GM/DL (ref 14–18)
HIV 1+2 AB+HIV1 P24 AG SERPL QL IA: NEGATIVE
IMM GRANULOCYTES # BLD AUTO: 0.01 K/UL (ref 0–0.04)
IMM GRANULOCYTES # BLD AUTO: 0.02 K/UL (ref 0–0.04)
IMM GRANULOCYTES NFR BLD AUTO: 0.2 % (ref 0–0.5)
IMM GRANULOCYTES NFR BLD AUTO: 0.5 % (ref 0–0.5)
INR PPP: 0.9 (ref 0.8–1.2)
LIPASE SERPL-CCNC: 105 U/L (ref 4–60)
LYMPHOCYTES # BLD AUTO: 1.35 K/UL (ref 1–4.8)
LYMPHOCYTES # BLD AUTO: 1.36 K/UL (ref 1–4.8)
MCH RBC QN AUTO: 29.2 PG (ref 27–31)
MCH RBC QN AUTO: 29.3 PG (ref 27–31)
MCHC RBC AUTO-ENTMCNC: 33.9 G/DL (ref 32–36)
MCHC RBC AUTO-ENTMCNC: 33.9 G/DL (ref 32–36)
MCV RBC AUTO: 86 FL (ref 82–98)
MCV RBC AUTO: 86 FL (ref 82–98)
NUCLEATED RBC (/100WBC) (OHS): 0 /100 WBC
NUCLEATED RBC (/100WBC) (OHS): 0 /100 WBC
PLATELET # BLD AUTO: 142 K/UL (ref 150–450)
PLATELET # BLD AUTO: 144 K/UL (ref 150–450)
PMV BLD AUTO: 10.6 FL (ref 9.2–12.9)
PMV BLD AUTO: 10.6 FL (ref 9.2–12.9)
POTASSIUM SERPL-SCNC: 4 MMOL/L (ref 3.5–5.1)
PROT SERPL-MCNC: 7.4 GM/DL (ref 6–8.4)
PROTHROMBIN TIME: 10.9 SECONDS (ref 9–12.5)
RBC # BLD AUTO: 4.61 M/UL (ref 4.6–6.2)
RBC # BLD AUTO: 4.63 M/UL (ref 4.6–6.2)
RELATIVE EOSINOPHIL (OHS): 4.3 %
RELATIVE EOSINOPHIL (OHS): 4.9 %
RELATIVE LYMPHOCYTE (OHS): 30.6 % (ref 18–48)
RELATIVE LYMPHOCYTE (OHS): 31.5 % (ref 18–48)
RELATIVE MONOCYTE (OHS): 8.9 % (ref 4–15)
RELATIVE MONOCYTE (OHS): 9.9 % (ref 4–15)
RELATIVE NEUTROPHIL (OHS): 53.5 % (ref 38–73)
RELATIVE NEUTROPHIL (OHS): 54.3 % (ref 38–73)
SODIUM SERPL-SCNC: 139 MMOL/L (ref 136–145)
WBC # BLD AUTO: 4.28 K/UL (ref 3.9–12.7)
WBC # BLD AUTO: 4.44 K/UL (ref 3.9–12.7)

## 2025-05-01 PROCEDURE — 83690 ASSAY OF LIPASE: CPT | Performed by: EMERGENCY MEDICINE

## 2025-05-01 PROCEDURE — 85025 COMPLETE CBC W/AUTO DIFF WBC: CPT | Performed by: EMERGENCY MEDICINE

## 2025-05-01 PROCEDURE — 87389 HIV-1 AG W/HIV-1&-2 AB AG IA: CPT | Performed by: EMERGENCY MEDICINE

## 2025-05-01 PROCEDURE — 86803 HEPATITIS C AB TEST: CPT | Performed by: EMERGENCY MEDICINE

## 2025-05-01 PROCEDURE — 80053 COMPREHEN METABOLIC PANEL: CPT | Performed by: EMERGENCY MEDICINE

## 2025-05-01 PROCEDURE — 87522 HEPATITIS C REVRS TRNSCRPJ: CPT | Performed by: EMERGENCY MEDICINE

## 2025-05-01 PROCEDURE — 99283 EMERGENCY DEPT VISIT LOW MDM: CPT

## 2025-05-01 PROCEDURE — 85730 THROMBOPLASTIN TIME PARTIAL: CPT | Performed by: EMERGENCY MEDICINE

## 2025-05-01 PROCEDURE — 85610 PROTHROMBIN TIME: CPT | Performed by: EMERGENCY MEDICINE

## 2025-05-01 RX ORDER — HYDROCORTISONE ACETATE PRAMOXINE HCL 1; 1 G/100G; G/100G
CREAM TOPICAL 2 TIMES DAILY
Qty: 60 G | Refills: 2 | Status: SHIPPED | OUTPATIENT
Start: 2025-05-01

## 2025-05-01 NOTE — ED PROVIDER NOTES
SCRIBE #1 NOTE: I, Kt Castellano, am scribing for, and in the presence of, Felix Johnston Jr., MD. I have scribed the HPI and ROS.    SCRIBE #2 NOTE: I, Tavon Carpenter, am scribing for, and in the presence of,  Ryley Jarrell MD. I have scribed the remaining portions of the note not scribed by Scribe #1.      History     Chief Complaint   Patient presents with    Rectal Bleeding     Started rectal bleeding yesterday, 4 episodes taking plavix and ASA     Review of patient's allergies indicates:  No Known Allergies      History of Present Illness     HPI    5/1/2025, 2:35 PM   History obtained from the patient and medical records      History of Present Illness: Abdullahi Urias is a 69 y.o. male patient with a PMHx of HTN, hepatitis C, CAD, substance abuse, CHF, prostate cancer, CVA, cardiomyopathy, pancreatitis, GERD, CKD, PVD, and mixed HLD who presents to the Emergency Department for evaluation of rectal bleeding which onset yesterday. Pt has had 4 separate episodes of bleeding since then. Pt takes Plavix and ASA. Symptoms are constant and moderate in severity. No mitigating or exacerbating factors reported. Patient denies any N/V/D, abd pain, dizziness, SOB, and all other sxs at this time. No further complaints or concerns at this time.       Arrival mode: Personal Transportation    PCP: Reji Valentin MD        Past Medical History:  Past Medical History:   Diagnosis Date    Aortic stenosis     [facility]    Asthma     BPH (benign prostatic hyperplasia)     CAD (coronary artery disease)     Cardiomyopathy     CHF (congestive heart failure)     Chronic hoarseness     vocal cord surg    Chronic pain     CKD (chronic kidney disease) stage 3, GFR 30-59 ml/min     CVA (cerebral infarction)     8/2012 olol; reviewed ed note    Ex-smoker     GERD (gastroesophageal reflux disease)     Hepatitis C     treatedharvoni says cured, RNA NEG 6/2020    Hypertension     Mixed hyperlipidemia 2/3/2025    Pancreatitis      Prostate cancer     Prostate cancer     Prostate cancer     PVD (peripheral vascular disease)     Renal insufficiency     Substance abuse     cocaine, etoh , tob in past       Past Surgical History:  Past Surgical History:   Procedure Laterality Date    AORTIC VALVE REPLACEMENT  05/19/2014    Tissue valve replacement    BACK SURGERY      CARDIAC CATHETERIZATION      COLONOSCOPY  2011    COLONOSCOPY N/A 2/24/2021    Procedure: COLONOSCOPY;  Surgeon: Faith Carrillo MD;  Location: [facility];  Service: Endoscopy;  Laterality: N/A;    COLONOSCOPY N/A 7/2/2024    Procedure: COLONOSCOPY 6/4 plavix 5 day hold econ;  Surgeon: Prince Griffin MD;  Location: [facility];  Service: Endoscopy;  Laterality: N/A;    EPIDURAL STEROID INJECTION INTO CERVICAL SPINE N/A 6/21/2022    Procedure: C7-T1 IL PIEDAD;  Surgeon: Nehemiah Carmen MD;  Location: [facility] PAIN MGT;  Service: Pain Management;  Laterality: N/A;    EPIDURAL STEROID INJECTION INTO CERVICAL SPINE N/A 3/12/2024    Procedure: C7/T1 IL PIEDAD;  Surgeon: Nehemiah Carmen MD;  Location: [facility] PAIN MGT;  Service: Pain Management;  Laterality: N/A;    EPIDURAL STEROID INJECTION INTO CERVICAL SPINE N/A 4/10/2025    Procedure: Cervical C7/T1 Interlaminar PIEDAD with RN IV Sedation, Takes ASA, Plavix- will need to hold 5 days prior (clearance from Dr. Mckeon);  Surgeon: Nehemiah Carmen MD;  Location: [facility] PAIN MGT;  Service: Pain Management;  Laterality: N/A;    ESOPHAGOGASTRODUODENOSCOPY N/A 2/24/2021    Procedure: ESOPHAGOGASTRODUODENOSCOPY (EGD);  Surgeon: Faith Carrillo MD;  Location: [facility];  Service: Endoscopy;  Laterality: N/A;    ESOPHAGOGASTRODUODENOSCOPY N/A 7/2/2024    Procedure: EGD (ESOPHAGOGASTRODUODENOSCOPY);  Surgeon: Prince Griffin MD;  Location: [facility];  Service: Endoscopy;  Laterality: N/A;    ESOPHAGOGASTRODUODENOSCOPY N/A 11/19/2024    Procedure: EGD (ESOPHAGOGASTRODUODENOSCOPY);  Surgeon: Kayleigh Leiva MD;  Location: [facility];  Service:  Gastroenterology;  Laterality: N/A;    FOOT SURGERY      INSERTION N/A 7/5/2023    Procedure: INSERTION;  Surgeon: Brandon Valencia MD;  Location: [facility] OR;  Service: Neurosurgery;  Laterality: N/A;  Antibiotic Beads    INTRALUMINAL GASTROINTESTINAL TRACT IMAGING VIA CAPSULE N/A 11/1/2024    Procedure: IMAGING PROCEDURE, GI TRACT, INTRALUMINAL, VIA CAPSULE;  Surgeon: [place], First Available;  Location: [facility] ENDO;  Service: Endoscopy;  Laterality: N/A;    LEFT HEART CATHETERIZATION Left 7/24/2020    Procedure: CATHETERIZATION, HEART, LEFT;  Surgeon: Pasha Martin MD;  Location: [facility] CATH LAB;  Service: Cardiology;  Laterality: Left;    PENILE PROSTHESIS IMPLANT      PENILE PROSTHESIS REVISION  04/30/2018    PROSTATECTOMY  09/2019    [facility]    radiation for prostate      REMOVAL OF HARDWARE FROM SPINE N/A 7/5/2023    Procedure: REMOVAL, HARDWARE, SPINE;  Surgeon: Brandon Valencia MD;  Location: [facility] OR;  Service: Neurosurgery;  Laterality: N/A;    REPLACEMENT N/A 7/5/2023    Procedure: REPLACEMENT;  Surgeon: Brandon Valencia MD;  Location: [facility] OR;  Service: Neurosurgery;  Laterality: N/A;  of [employer]    REVISION OF PROCEDURE INVOLVING INFLATABLE PENILE PROSTHESIS N/A 7/22/2024    Procedure: REVISION, PROCEDURE INVOLVING INFLATABLE PENILE PROSTHESIS;  Surgeon: Rommel Rodriguez MD;  Location: [facility] OR;  Service: Urology;  Laterality: N/A;    TRANSFORAMINAL EPIDURAL INJECTION OF STEROID Right 10/20/2022    Procedure: Right  L4/5 + L5/S1 TF PIEDAD RN IV Sedation;  Surgeon: Nehemiah Carmen MD;  Location: [facility] PAIN MGT;  Service: Pain Management;  Laterality: Right;    TRANSFORAMINAL LUMBAR INTERBODY FUSION (TLIF) USING COMPUTER-ASSISTED NAVIGATION Bilateral 5/17/2023    Procedure: FUSION, SPINE, LUMBAR, TLIF, USING COMPUTER-ASSISTED NAVIGATION;  Surgeon: Brandon Valencia MD;  Location: [facility];  Service: Neurosurgery;  Laterality: Bilateral;  2 level lumbar fusion at L4-5 and L5-S1  "   UPPER GASTROINTESTINAL ENDOSCOPY  2011    WASHOUT N/A 2023    Procedure: WASHOUT;  Surgeon: Brandon Valencia MD;  Location: [facility] OR;  Service: Neurosurgery;  Laterality: N/A;    WOUND EXPLORATION Bilateral 2023    Procedure: EXPLORATION, WOUND;  Surgeon: Brandon Valencia MD;  Location: [facility] OR;  Service: Neurosurgery;  Laterality: Bilateral;         Family History:  Family History   Problem Relation Name Age of Onset    Heart disease Mother      Heart disease Father      Heart attack Sister      Heart attack Brother      Colon cancer Neg Hx      Colon polyps Neg Hx      Liver cancer Neg Hx      Inflammatory bowel disease Neg Hx      Liver disease Neg Hx      Rectal cancer Neg Hx      Stomach cancer Neg Hx      Ulcerative colitis Neg Hx         Social History:  Social History     Tobacco Use    Smoking status: Former     Current packs/day: 0.00     Average packs/day: 1 pack/day for 8.0 years (8.0 ttl pk-yrs)     Types: Cigarettes     Start date: 2004     Quit date: 2012     Years since quittin.6    Smokeless tobacco: Never   Substance and Sexual Activity    Alcohol use: Not Currently     Comment: Sober since 2012    Drug use: Not Currently     Types: "Crack" cocaine, Marijuana     Comment: Quit in     Sexual activity: Yes        Review of Systems     Review of Systems   Constitutional:  Negative for chills and fever.   HENT:  Negative for congestion and sore throat.    Respiratory:  Negative for cough and shortness of breath.    Cardiovascular:  Negative for chest pain and palpitations.   Gastrointestinal:  Positive for anal bleeding. Negative for abdominal pain, diarrhea, nausea and vomiting.   Genitourinary:  Negative for dysuria.   Musculoskeletal:  Negative for back pain.   Skin:  Negative for rash.   Neurological:  Negative for dizziness, weakness, light-headedness, numbness and headaches.   Hematological:  Does not bruise/bleed easily.   All other systems reviewed and are " "negative.       Physical Exam     Initial Vitals [05/01/25 1334]   BP Pulse Resp Temp SpO2   (!) 149/81 83 18 97.6 °F (36.4 °C) 96 %      MAP       --          Physical Exam  Nursing Notes and Vital Signs Reviewed.  Constitutional: Patient is in no acute distress. Well-developed and well-nourished.  Head: Atraumatic. Normocephalic.  Eyes:  EOM intact.  No scleral icterus.  ENT: Mucous membranes are moist.  Nares clear   Neck:  Full ROM. No JVD.  Cardiovascular: Regular rate. Regular rhythm No murmurs, rubs, or gallops. Distal pulses are 2+ and symmetric  Pulmonary/Chest: No respiratory distress. Clear to auscultation bilaterally. No wheezing or rales.  Equal chest wall rise bilaterally  Abdominal: Soft and non-distended.  There is no tenderness.  No rebound, guarding, or rigidity. Good bowel sounds.  Rectal exam shows some minimal red blood.  It appears to be an anal fissure on rectal exam.  There are no hemorrhoids.  Genitourinary: No CVA tenderness.  No suprapubic tenderness  Musculoskeletal: Moves all extremities. No obvious deformities.  5 x 5 strength in all extremities   Skin: Warm and dry.  Neurological:  Alert, awake, and appropriate.  Normal speech.  No acute focal neurological deficits are appreciated.  Two through 12 intact bilaterally.  Psychiatric: Normal affect. Good eye contact. Appropriate in content.       ED Course   Procedures  ED Vital Signs:  Vitals:    05/01/25 1334 05/01/25 1450 05/01/25 1453 05/01/25 1700   BP: (!) 149/81  139/72 (!) 154/72   Pulse: 83 71 71 68   Resp: 18  18 17   Temp: 97.6 °F (36.4 °C)      TempSrc: Oral      SpO2: 96%  98% 97%   Weight: 107.2 kg (236 lb 6.4 oz)      Height: 5' 9" (1.753 m)       05/01/25 1800   BP: (!) 153/65   Pulse: 70   Resp: 16   Temp:    TempSrc:    SpO2: 99%   Weight:    Height:        Abnormal Lab Results:  Labs Reviewed   HEPATITIS C ANTIBODY - Abnormal       Result Value    Hep C Ab Interp Positive (*)    COMPREHENSIVE METABOLIC PANEL - Abnormal "    Sodium 139      Potassium 4.0      Chloride 110      CO2 22 (*)     Glucose 78      BUN 17      Creatinine 1.4      Calcium 9.0      Protein Total 7.4      Albumin 3.8      Bilirubin Total 0.4      ALP 97      AST 23      ALT 28      Anion Gap 7 (*)     eGFR 54 (*)    LIPASE - Abnormal    Lipase Level 105 (*)    CBC WITH DIFFERENTIAL - Abnormal    WBC 4.44      RBC 4.61      HGB 13.5 (*)     HCT 39.8 (*)     MCV 86      MCH 29.3      MCHC 33.9      RDW 13.9      Platelet Count 144 (*)     MPV 10.6      Nucleated RBC 0      Neut % 54.3      Lymph % 30.6      Mono % 9.9      Eos % 4.3      Basophil % 0.7      Imm Grans % 0.2      Neut # 2.41      Lymph # 1.36      Mono # 0.44      Eos # 0.19      Baso # 0.03      Imm Grans # 0.01     CBC WITH DIFFERENTIAL - Abnormal    WBC 4.28      RBC 4.63      HGB 13.5 (*)     HCT 39.8 (*)     MCV 86      MCH 29.2      MCHC 33.9      RDW 13.9      Platelet Count 142 (*)     MPV 10.6      Nucleated RBC 0      Neut % 53.5      Lymph % 31.5      Mono % 8.9      Eos % 4.9      Basophil % 0.7      Imm Grans % 0.5      Neut # 2.29      Lymph # 1.35      Mono # 0.38      Eos # 0.21      Baso # 0.03      Imm Grans # 0.02     HIV 1 / 2 ANTIBODY - Normal    HIV 1/2 Ag/Ab Negative     APTT - Normal    PTT 23.1     PROTIME-INR - Normal    PT 10.9      INR 0.9     CBC W/ AUTO DIFFERENTIAL    Narrative:     The following orders were created for panel order CBC auto differential.  Procedure                               Abnormality         Status                     ---------                               -----------         ------                     CBC with Differential[[id number]]       Abnormal            Final result                 Please view results for these tests on the individual orders.   CBC W/ AUTO DIFFERENTIAL    Narrative:     The following orders were created for panel order CBC auto differential.  Procedure                               Abnormality         Status                      ---------                               -----------         ------                     CBC with Differential[[id number]]       Abnormal            Final result                 Please view results for these tests on the individual orders.   HEP C VIRUS HOLD SPECIMEN   HEPATITIS C RNA, QUANTITATIVE, PCR        All Lab Results:  Results for orders placed or performed during the hospital encounter of 05/01/25   Hepatitis C Antibody    Collection Time: 05/01/25  2:51 PM   Result Value Ref Range    Hep C Ab Interp Positive (A) Negative   HIV 1/2 Ag/Ab (4th Gen)    Collection Time: 05/01/25  2:51 PM   Result Value Ref Range    HIV 1/2 Ag/Ab Negative Negative   Comprehensive metabolic panel    Collection Time: 05/01/25  2:51 PM   Result Value Ref Range    Sodium 139 136 - 145 mmol/L    Potassium 4.0 3.5 - 5.1 mmol/L    Chloride 110 95 - 110 mmol/L    CO2 22 (L) 23 - 29 mmol/L    Glucose 78 70 - 110 mg/dL    BUN 17 8 - 23 mg/dL    Creatinine 1.4 0.5 - 1.4 mg/dL    Calcium 9.0 8.7 - 10.5 mg/dL    Protein Total 7.4 6.0 - 8.4 gm/dL    Albumin 3.8 3.5 - 5.2 g/dL    Bilirubin Total 0.4 0.1 - 1.0 mg/dL    ALP 97 40 - 150 unit/L    AST 23 11 - 45 unit/L    ALT 28 10 - 44 unit/L    Anion Gap 7 (L) 8 - 16 mmol/L    eGFR 54 (L) >60 mL/min/1.73/m2   Lipase    Collection Time: 05/01/25  2:51 PM   Result Value Ref Range    Lipase Level 105 (H) 4 - 60 U/L   APTT    Collection Time: 05/01/25  2:51 PM   Result Value Ref Range    PTT 23.1 21.0 - 32.0 seconds   Protime-INR    Collection Time: 05/01/25  2:51 PM   Result Value Ref Range    PT 10.9 9.0 - 12.5 seconds    INR 0.9 0.8 - 1.2   CBC with Differential    Collection Time: 05/01/25  2:51 PM   Result Value Ref Range    WBC 4.44 3.90 - 12.70 K/uL    RBC 4.61 4.60 - 6.20 M/uL    HGB 13.5 (L) 14.0 - 18.0 gm/dL    HCT 39.8 (L) 40.0 - 54.0 %    MCV 86 82 - 98 fL    MCH 29.3 27.0 - 31.0 pg    MCHC 33.9 32.0 - 36.0 g/dL    RDW 13.9 11.5 - 14.5 %    Platelet Count 144 (L) 150 - 450 K/uL     MPV 10.6 9.2 - 12.9 fL    Nucleated RBC 0 <=0 /100 WBC    Neut % 54.3 38 - 73 %    Lymph % 30.6 18 - 48 %    Mono % 9.9 4 - 15 %    Eos % 4.3 <=8 %    Basophil % 0.7 <=1.9 %    Imm Grans % 0.2 0.0 - 0.5 %    Neut # 2.41 1.8 - 7.7 K/uL    Lymph # 1.36 1 - 4.8 K/uL    Mono # 0.44 0.3 - 1 K/uL    Eos # 0.19 <=0.5 K/uL    Baso # 0.03 <=0.2 K/uL    Imm Grans # 0.01 0.00 - 0.04 K/uL   CBC with Differential    Collection Time: 05/01/25  5:15 PM   Result Value Ref Range    WBC 4.28 3.90 - 12.70 K/uL    RBC 4.63 4.60 - 6.20 M/uL    HGB 13.5 (L) 14.0 - 18.0 gm/dL    HCT 39.8 (L) 40.0 - 54.0 %    MCV 86 82 - 98 fL    MCH 29.2 27.0 - 31.0 pg    MCHC 33.9 32.0 - 36.0 g/dL    RDW 13.9 11.5 - 14.5 %    Platelet Count 142 (L) 150 - 450 K/uL    MPV 10.6 9.2 - 12.9 fL    Nucleated RBC 0 <=0 /100 WBC    Neut % 53.5 38 - 73 %    Lymph % 31.5 18 - 48 %    Mono % 8.9 4 - 15 %    Eos % 4.9 <=8 %    Basophil % 0.7 <=1.9 %    Imm Grans % 0.5 0.0 - 0.5 %    Neut # 2.29 1.8 - 7.7 K/uL    Lymph # 1.35 1 - 4.8 K/uL    Mono # 0.38 0.3 - 1 K/uL    Eos # 0.21 <=0.5 K/uL    Baso # 0.03 <=0.2 K/uL    Imm Grans # 0.02 0.00 - 0.04 K/uL     *Note: Due to a large number of results and/or encounters for the requested time period, some results have not been displayed. A complete set of results can be found in Results Review.       Imaging Results:  Imaging Results    None          The Emergency Provider reviewed the vital signs and test results, which are outlined above.     ED Discussion       4:00 PM: Dr. Johnston transfers care of patient to Dr. Jarrell pending lab results.    6:05 PM: Reassessed pt at this time. Discussed with pt all pertinent ED information and results. Discussed pt dx and plan of tx. Gave pt all f/u and return to the ED instructions. All questions and concerns were addressed at this time. Pt expresses understanding of information and instructions, and is comfortable with plan to discharge. Pt is stable for discharge.    I  discussed with patient and/or family/caretaker that evaluation in the ED does not suggest any emergent or life threatening medical conditions requiring immediate intervention beyond what was provided in the ED, and I believe patient is safe for discharge.  Regardless, an unremarkable evaluation in the ED does not preclude the development or presence of a serious of life threatening condition. As such, patient was instructed to return immediately for any worsening or change in current symptoms.          ED Course as of 05/02/25 0101   Thu May 01, 2025   1441 Cardiac monitor interpretation  Independent interpretation  Indication: Rectal bleeding  Normal sinus rhythm.  Rate 73.  No STEMI [RT]      ED Course User Index  [RT] Felix Johnston Jr., MD     Medical Decision Making  Differential diagnosis: Upper GI bleed, lower GI bleed, anemia, anal fissure, hemorrhoids    69-year-old black male with a history of rectal bleeding.  Patient had a bruises colored blood stain on toilet tissue.  Rectal examination confirmed bruits colored stool.  Workup was unremarkable.  Hemoglobin was stable at 13.5 x2.  Patient is hemodynamically stable.  He has a known history of hemorrhoids.    Patient has seen a gastroenterologist in the past.  Had a colonoscopy and EGD about 2 months ago.    He will be discharged in stable condition.  Proctocream HC applied b.i.d..  Follow up with PCP.    Amount and/or Complexity of Data Reviewed  Labs: ordered. Decision-making details documented in ED Course.    Risk  OTC drugs.  Prescription drug management.  Decision regarding hospitalization.                ED Medication(s):  Medications - No data to display    Discharge Medication List as of 5/1/2025  6:16 PM        START taking these medications    Details   pramoxine-hydrocortisone (PROCTOCREAM-HC) 1-1 % rectal cream Place rectally 2 (two) times daily., Starting Thu 5/1/2025, Print              Follow-up Information       Reji Valentin MD.     Specialty: Family Medicine  Contact information:  [id number]  [place] [address]  [phone]                                 Scribe Attestation:   Scribe #1: I performed the above scribed service and the documentation accurately describes the services I performed. I attest to the accuracy of the note.     Attending:   Physician Attestation Statement for Scribe #1: I, Felix Johnston Jr., MD, personally performed the services described in this documentation, as scribed by Kt Castellano, in my presence, and it is both accurate and complete.       Scribe Attestation:   Scribe #2: I performed the above scribed service and the documentation accurately describes the services I performed. I attest to the accuracy of the note.    Attending Attestation:           Physician Attestation for Scribe:    Physician Attestation Statement for Scribe #2: I, Ryley Jarrell MD, reviewed documentation, as scribed by Tavon Carpenter in my presence, and it is both accurate and complete. I also acknowledge and confirm the content of the note done by Scribe #1.           Clinical Impression       ICD-10-CM ICD-9-CM   1. Rectal bleeding  K62.5 569.3   2. Hemorrhoids, unspecified hemorrhoid type  K64.9 455.6       Disposition:   Disposition: Discharged  Condition: Ryley Rogers MD  05/02/25 0101

## 2025-05-02 LAB — HCV RNA SERPL NAA+PROBE-LOG IU: NOT DETECTED {LOG_IU}/ML

## 2025-05-05 NOTE — PROGRESS NOTES
Established Patient Chronic Pain Note (Follow up visit)    Chief Complaint:   Chief Complaint   Patient presents with    Neck Pain     Right side       SUBJECTIVE:  Interval History (5/12/2025):  Patient Abdullahi Urias presents today for follow-up visit.  Patient was last seen on 4/10/2025 C7/T1 IL PIEDAD with 30% relief. He does not feel this worked as well as it did in the past. Pain is 8/10 today. Pain is in the right neck and right shoulder and radiates down the right arm. He has tenderness to the right shoulder and it hurts to lay on his right side. He has had a shoulder injection in the past and states it worked very well for some of his arm and shoulder pain. Would like to repeat.   Requests refills on medcations. He feels the pain medication is providing adequate pain relief and reduces the negative effects of chronic pain that affects quality of life.  Low back pain stable today  Patient denies night fever/night sweats, urinary incontinence, bowel incontinence, significant weight loss and significant motor weakness.   Patient denies any other complaints or concerns at this time.        Interval History (2/12/2025):   Abdullahi Urias is a 69 y.o. male presents today for follow-up chronic neck and back pain.  Current pain intensity is 7/10.  He continues to utilize tizanidine, Lyrica and also had previous short term supply of Norco provided.  Current pain is of the same type and quality in starts in the cervical myofascial region and radiates down the right upper extremity extending his hands and fingers.  He has had significant relief with cervical PIEDAD previously.  He also continues with chronic lower back pain with radiation down into the right lower extremity to the foot and ankle posterolaterally.    Interval HPI 12/13/2024:  Abdullahi Urias presents today for follow-up visit.  Patient was last seen on 2/12/2025. At that visit, the plan was to reach out to IR to address biopsy of seroma in lumbar spine. Patient  reports pain as 8/10 today. He has pain in the neck radiating into shoulders and also lower back pain.     On 11/30/24, he fell off the ladder while putting up [place] decorations. He did not seek medical attention afterwards. He has swelling and scabbing present to anterior R calf. States it was very swollen at onset.   Patient denies LOC. He fell about 4-5 feet.    Patient did follow up with IR and had biopsy of seroma. Results did not yield any abnormal growth. Patient states he did not follow up with anyone after this was completed.    Patient denies weight loss, fevers, chills, syncope, or LOC.    Interval History (8/14/2024):    Abdullahi Urias is a 69 y.o. male presents today for follow-up lower back and right leg pain.  Current pain intensity is 7/10.  He continues to utilize tizanidine and gabapentin without any difficulty.  He has had piriformis trigger point injections in the past that were beneficial.  He recently saw Neurosurgery who was concerned about the seroma and has requested IR assistance for CT-guided biopsy/aspiration of the seroma near the surgical site.  This is yet to be completed unfortunately.    Patient is interested in repeat cervical PIEDAD as this was extremely beneficial for the past 5 months.    Patient denies night fever/night sweats, urinary incontinence, bowel incontinence, significant weight loss and significant motor weakness.   Patient denies any other complaints or concerns at this time.      Pain Disability Index Review:      2/12/2025     9:14 AM 12/13/2024    11:47 AM 8/14/2024     9:14 AM   Last 3 PDI Scores   Pain Disability Index (PDI) 49 [phone]     Interval HPI 04/09/2024:  Patient Abdullahi Urias presents today for follow-up visit.  Patient was last seen on  3/12/2024 C7/T1 IL PIEDAD with 95% relief.  Neck pain is doing much better and he is no longer having any numbness or tingling in the neck.  He rates his pain today a 4/10.  He does still have some lower back pain which  does not radiate.  He continues to follow up with  following his laminectomy.    Interval History (2/26/2024):  Patient Abdullahi Urias presents today for follow-up visit.  Patient is seen seen today for review of his cervical MRI.  He has neck pain which radiates down into the bilateral shoulders with a tingling sensation.  He rates his pain today a 5/10 but states it will go up to a 9/10 especially with flexion of the neck.  He is currently taking gabapentin 600 mg twice a day.  He does take oxycodone sparingly if needed and is requesting a refill.    He also has chronic lumbar radiculopathy and has been seeing Dr. Wilkinson, he reports this is stable currently.    Interval History (3/6/2023):  Patient presents today for follow-up visit.  Patient was last seen on 1/9/23.  Patient reports pain as 7/10 today.  Patients is here today discuss neck pain with radicular symptoms.   Has tingling in R shoulder>L shoulder and will radiate up to his neck but other times it will radiate up to face or down arm. This has been ongoing for one month now. Although he does not notice pain radiating down his arms, he has discomfort in both sides of his neck.   He will c/p lower back pain if he is standing or bending for a long period of time.     Since his last office visit, he underwent a TLIF at L4-S1 with Dr. Valencia on 5/17/23 followed by a wound exploration on 7/5/23. He is doing well at this time (in regards to his lower back).      Interval History (1/9/2023): Abdullahi Urias presents today for follow-up visit.  he underwent right L4/5 +L5/S1 TF PIEDAD on 10/20/22.  The patient reports that he is/was better following the procedure.  he reports 40% pain relief.  The changes lasted for a few weeks before the pain insidiously returned.  Patient reports pain as 9/10 today. Also has history of vasculogenic claudication. Recently went on a cruise, needed a wheelchair to get on the ship. Continues to report low back pain with radiation  into his right lower extremity along L4/5-S1 distribution with associated numbness in the right foot.      Interval History (10/4/2022): Abdullahi Urias presents today for follow-up visit.  he underwent C7/T1 IL PIEDAD on 06/21/2022.  The patient reports that he is/was better following the procedure.  he reports 60% pain relief.  The changes lasted 6 weeks before neck stiffness and arm numbness returned. Patient reports pain as 8/10 today.    Today he reports his primary complaint is low back pain with radiation into right leg into the posterior and lateral aspect along L4-S1 distribution. Reports back pain is worse than neck pain at this time. Also has history of vasculogenic claudication. Patient reports pain is causing him to be depressed and anxious. Pain interferes with daily activities. [facility] rollator [facility]. Recently went on a cruise, needed to use a wheelchair due to weakness and pain.    Interval HPI  Abdullahi Urias is a 68 y.o. male who presents to the clinic for a follow-up.  He reports that his back pain is much improved ever since undergoing bilateral piriformis trigger point injections.  He wants to focus more on his neck and upper extremity pain currently.  Since the last visit, Abdullahi Urias states the pain has been worsening. Current pain intensity is 8/10.    Interval HPI 01/27/2022:  Abdullahi Urias is a 66 y.o. male who presents to the clinic for the evaluation of lower back pain.  He is self referred.  He has past medical history of CVA, substance abuse, asthma, CHF, hypertension, CAD, cardiomyopathy, renal insufficiency, CKD stage 3, H/O hepatitis-C, H/O prostate cancer, multiple other medical comorbidities as listed in his chart.  The pain started several years ago and symptoms have been worsening.The pain is located in the lumbosacral area and radiates to the bilateral lower extremities extending posteriorly to the bilateral feet.  Of note, patient recently had  cardiovascular studies done on his lower extremities which did demonstrate vasculogenic claudication.  The pain is described as Aching, burning, tingling and is rated as 8/10. The pain is rated with a score of  6/10 on the BEST day and a score of 10/10 on the WORST day.  Symptoms interfere with daily activity. The pain is exacerbated by activities.  The pain is mitigated by medications.        Non-Pharmacologic Treatments:  Physical Therapy/Home Exercise: yes  Ice/Heat:yes  TENS: yes  Acupuncture: no  Massage: yes  Chiropractic: no    Other: Surgery-   5/17/23: TLIF L4-S1 with Dr. Valencia  7/5/23: Wound exploration with Dr. Valencia        Pain Medications:  - Opioids: Norco, Tylenol 3  - Adjuvant Medications: Ambien, alprazolam, Flexeril, Voltaren gel, gabapentin, lidocaine patch, Zoloft  - Anti-Coagulants: Aspirin, Pletal      Pain Procedures:   -previous lumbar PIEDAD  -previous lumbar MBB/RFA  -previous lumbar laminectomy in 2001 at L5-S1  -01/27/2022:  Bilateral piriformis trigger point injections, significant relief that is still ongoing  -right L4/5 +L5/S1 TF PIEDAD on 10/20/22 with 40% relief   3/12/2024 C7/T1 IL PIEDAD 95% relief   4/10/2025 C7/T1 IL PIEDAD with 30% relief.    Imaging (Reviewed on 5/12/2025):                MRI lumbar spine 05/31/2024:  Surgical change with hardware and associated artifact L4-S1 posterior approach.  There is a peripherally enhancing fluid loculation at the dorsal postsurgical elements measuring approximately 4 cm X 7 cm epicenter L5 level sterility uncertain possible abscess.     No overt canal compromise.     No overt acute osseous finding     Conus medullaris unremarkable       2/23/2024 cervical MRI  FINDINGS:  The cervical cord reveals normal signal and morphology.     There is straightening/reversal of the normal cervical lordosis.  There is mild to moderate multilevel degenerative disc disease as detailed below.     C1-2: Moderate C1-2 arthrosis.     C2-C3: Mild disc bulge.   Moderate to severe right and mild left facet arthrosis with moderate right foraminal stenosis.     C3-C4: Mild disc bulge.  Bilateral uncovertebral joint spurring with mild foraminal stenosis.     C4-C5: Mild disc bulge.  Bilateral uncovertebral joint spurring with moderate right and mild left foraminal stenosis.     C5-C6: Moderate disc degeneration with disc bulging osteophyte with ventral sac impression.  Mild central stenosis.  Uncovertebral joint spurring with moderate to severe right foraminal stenosis.     C6-C7: Moderate degenerative disc disease with disc bulging osteophyte with mild ventral sac impression.  Uncovertebral joint spurring with mild foraminal stenosis.     C7-T1: Moderate disc degeneration with disc bulging osteophyte eccentric to the left with mild left lateral recess stenosis.  Uncovertebral joint spurring with moderate left and mild right foraminal stenosis.     Impression:     No acute abnormality.  Reversal of the normal cervical lordosis.     C5-6, C6-7 and C7-T1 degenerative disc disease as above with C5-6 central canal stenosis.  Foraminal stenosis as above.    MRI lumbar spine 01/12/2021:  Visualized distal cord demonstrates normal signal.     No marrow replacement process.  No fracture.  No listhesis.     There are degenerative changes of the lower lumbar spine with mild disc space narrowing, most severe at L5-S1 with marginal spurring with facet arthropathy     L1-L2: There is posterior disc bulge with indentation of the thecal sac.  No spinal canal or neural foraminal encroachment.     L2-L3: Mild facet arthropathy.  Mild posterior disc bulge.  No spinal canal or neural foraminal encroachment.     L3-L4: Bilateral ligamentum flavum and facet arthropathy.  Mild posterior disc bulge with indentation of thecal sac.  No spinal canal or neural foraminal encroachment.     L4-L5: Ligamentum flavum and facet arthropathy with broad-based disc bulge.  No significant spinal canal stenosis.   There is crowding of the lateral recesses with narrowing along the inferior right greater than left neural foramen.     L5-S1: Posterior disc osteophyte complex with facet arthropathy.  Appearance of possible prior right hemilaminectomy changes..  There is severe narrowing along the right neural foramen  with mild to moderate narrowing along the left neural foramen.  No significant spinal canal stenosis        CT cervical spine 12/01/2020:  Vertebral body heights maintained.  2 mm anterolisthesis of C3 on C4.  Disc height loss and osteophyte changes at C6-7 and C7-T1.  Multilevel facet degenerative findings most prevalent at the right C2-3 and left C3-4 levels.     Neck soft tissues are unremarkable.     C2-3: No CT evidence of spinal canal stenosis.  Mild right neural foraminal narrowing.     C3-4: No CT evidence of significant spinal canal stenosis.  Left greater than right facet and uncovertebral degenerative changes results in mild right and severe left neural foraminal narrowing.     C4-5: Posterior disc osteophyte complex present with mild spinal canal stenosis.  Mild to moderate right neural foraminal narrowing secondary to facet and uncovertebral degenerative findings.     C5-6: Posterior disc osteophyte complex present asymmetric to the right with mild spinal canal stenosis.  Severe right neural foraminal narrowing secondary to facet and uncovertebral degenerative findings.     C6-7: Posterior disc osteophyte complex present causing at least mild spinal canal stenosis.  Left greater than right facet and uncovertebral degenerative findings with mild-to-moderate right and moderate to severe left neural foraminal narrowing.     C7-T1: Posterior disc osteophyte complex with spinal canal stenosis suspected.  Mild left neural foraminal narrowing secondary to uncovertebral degenerative findings.        PMHx,PSHx, Social history, and Family history:  I have reviewed the patient's medical, surgical, social, and  family history in detail and updated the computerized patient record.      Review of patient's allergies indicates:  No Known Allergies      Current Outpatient Medications   Medication Sig    albuterol (PROVENTIL/VENTOLIN HFA) 90 mcg/actuation inhaler INHALE 2 PUFFS INTO THE LUNGS EVERY 6 HOURS AS NEEDED FOR COUGH    allopurinoL (ZYLOPRIM) 100 MG tablet Take 100 mg by mouth once daily. Takes 0.5 tab    ALPRAZolam (XANAX) 1 MG tablet Take 1 mg by mouth daily as needed for Anxiety or Insomnia.    aspirin (ECOTRIN) 81 MG EC tablet Take 1 tablet (81 mg total) by mouth once daily.    atorvastatin (LIPITOR) 80 MG tablet Take 40 mg by mouth once daily. 0.5 tab of 80mg    azelastine (ASTELIN) 137 mcg (0.1 %) nasal spray 1 spray (137 mcg total) by Nasal route 2 (two) times daily.    carvediloL (COREG) 12.5 MG tablet Take 1 tablet (12.5 mg total) by mouth 2 (two) times daily.    clopidogreL (PLAVIX) 75 mg tablet Take 1 tablet (75 mg total) by mouth once daily.    diclofenac sodium (VOLTAREN) 1 % Gel Apply 2 g topically 2 (two) times daily. for 7 days    esomeprazole (NEXIUM) 40 MG capsule Take 40 mg by mouth before breakfast.    evolocumab (REPATHA SURECLICK) 140 mg/mL PnIj Inject 1 mL (140 mg total) into the skin every 14 (fourteen) days.    fluticasone-umeclidin-vilanter (TRELEGY ELLIPTA) 200-62.5-25 mcg inhaler Inhale 1 puff into the lungs once daily.    gabapentin (NEURONTIN) 600 MG tablet Take 600 mg by mouth 3 (three) times daily as needed (pain).    hydrOXYzine HCL (ATARAX) 25 MG tablet Take 1 tablet (25 mg total) by mouth 3 (three) times daily as needed for Itching.    losartan (COZAAR) 50 MG tablet Take 1 tablet (50 mg total) by mouth once daily.    multivitamin (THERAGRAN) per tablet Take 1 tablet by mouth once daily.    pramoxine-hydrocortisone (PROCTOCREAM-HC) 1-1 % rectal cream Place rectally 2 (two) times daily.    sertraline (ZOLOFT) 100 MG tablet TAKE TWO TABLETS BY MOUTH EVERY DAY FOR MENTAL HEALTH DOSE  "INCREASED TO 200MG/DAY    tiZANidine (ZANAFLEX) 4 MG tablet Take 4 mg by mouth every 8 (eight) hours as needed (pain).    pregabalin (LYRICA) 75 MG capsule Take 1 capsule (75 mg total) by mouth every evening for 5 days, THEN 1 capsule (75 mg total) 2 (two) times daily.     Current Facility-Administered Medications   Medication    aztreonam 2,000 mg in D5W 100 mL IVPB    ketorolac injection 30 mg     Facility-Administered Medications Ordered in Other Visits   Medication    ondansetron injection 4 mg    ondansetron injection 4 mg    sodium chloride 0.9% flush 10 mL         REVIEW OF SYSTEMS:    GENERAL:  No weight loss, malaise or fevers.  HEENT:   No recent changes in vision or hearing  NECK:  Negative for lumps, no difficulty with swallowing.  RESPIRATORY:  Negative for cough, wheezing or shortness of breath, patient denies any recent URI.  CARDIOVASCULAR:  Negative for chest pain, leg swelling or palpitations.  GI:  Negative for abdominal discomfort, blood in stools or black stools or change in bowel habits.  MUSCULOSKELETAL:  See HPI.  SKIN:  Negative for lesions, rash, and itching.  PSYCH:  No mood disorder or recent psychosocial stressors.  Patients sleep is not disturbed secondary to pain.  HEMATOLOGY/LYMPHOLOGY:  Negative for prolonged bleeding, bruising easily or swollen nodes.  Patient is currently taking anti-coagulants - ASA and Pletal  NEURO:   No history of headaches, syncope, paralysis, seizures or tremors.  All other reviewed and negative other than HPI.       OBJECTIVE:    /67 (BP Location: Left arm)   Pulse 75   Ht 5' 9" (1.753 m)   Wt 101.6 kg (224 lb 1.6 oz)   BMI 33.09 kg/m²     PHYSICAL EXAMINATION:    GENERAL: Well appearing, in no acute distress, alert and oriented x3.  PSYCH:  Mood and affect appropriate.  SKIN: Skin color, texture, turgor normal, no rashes or lesions.  HEAD/FACE:  Normocephalic, atraumatic. Cranial nerves grossly intact.   NECK:  Tenderness palpation over the " bilateral cervical paraspinous muscles.  Spurling's positive  bilaterally, worse on the right.  Limited range of motion secondary to pain in all planes.  Right shoulder: tender across joint space. Positive empty can. Negative neer's. Some limited ROM lifting above shoulder height  PULM: No evidence of respiratory difficulty, symmetric chest rise.  GI:  Soft and non-tender.  BACK:  Surgical scarring evident.  Incision(s) CDI. No pain / TTP lumbar spine.  Positive straight leg raise on the right  EXTREMITIES: TTP, abrasions and soft tissue swelling present over anterior Right calf:  NEURO: Bilateral upper and lower extremity coordination and muscle stretch reflexes are physiologic and symmetric.  Plantar response are downgoing. No clonus.  No loss of sensation is noted.  GAIT:  Slow, antalgic.      Neuro - Upper Extremities:  - BUE Strength:R/L: D: 5/5; B: 5/5; T: 5/5; WF: 5/5; WE: 5/5; IO: 5/5  - Extremity Reflexes: Brisk and symmetric throughout  - Sensory: Sensation to light touch intact bilaterally    Psych:  Mood and affect is appropriate      LABS:  Lab Results   Component Value Date    WBC 4.28 05/01/2025    HGB 13.5 (L) 05/01/2025    HCT 39.8 (L) 05/01/2025    MCV 86 05/01/2025     (L) 05/01/2025       CMP  Sodium   Date Value Ref Range Status   05/01/2025 139 136 - 145 mmol/L Final   03/06/2025 141 136 - 145 mmol/L Final     Potassium   Date Value Ref Range Status   05/01/2025 4.0 3.5 - 5.1 mmol/L Final   03/06/2025 5.1 3.5 - 5.1 mmol/L Final     Chloride   Date Value Ref Range Status   05/01/2025 110 95 - 110 mmol/L Final   03/06/2025 109 95 - 110 mmol/L Final     CO2   Date Value Ref Range Status   05/01/2025 22 (L) 23 - 29 mmol/L Final   03/06/2025 26 23 - 29 mmol/L Final     Glucose   Date Value Ref Range Status   05/01/2025 78 70 - 110 mg/dL Final   03/06/2025 94 70 - 110 mg/dL Final     BUN   Date Value Ref Range Status   05/01/2025 17 8 - 23 mg/dL Final     Creatinine   Date Value Ref Range  Status   05/01/2025 1.4 0.5 - 1.4 mg/dL Final     Calcium   Date Value Ref Range Status   05/01/2025 9.0 8.7 - 10.5 mg/dL Final   03/06/2025 9.5 8.7 - 10.5 mg/dL Final     Protein Total   Date Value Ref Range Status   05/01/2025 7.4 6.0 - 8.4 gm/dL Final     Total Protein   Date Value Ref Range Status   03/06/2025 7.3 6.0 - 8.4 g/dL Final     Albumin   Date Value Ref Range Status   05/01/2025 3.8 3.5 - 5.2 g/dL Final   03/06/2025 3.9 3.5 - 5.2 g/dL Final     Total Bilirubin   Date Value Ref Range Status   03/06/2025 0.4 0.1 - 1.0 mg/dL Final     Comment:     For infants and newborns, interpretation of results should be based  on gestational age, weight and in agreement with clinical  observations.    Premature Infant recommended reference ranges:  Up to 24 hours.............<8.0 mg/dL  Up to 48 hours............<12.0 mg/dL  3-5 days..................<15.0 mg/dL  6-29 days.................<15.0 mg/dL       Bilirubin Total   Date Value Ref Range Status   05/01/2025 0.4 0.1 - 1.0 mg/dL Final     Comment:     For infants and newborns, interpretation of results should be based   on gestational age, weight and in agreement with clinical   observations.    Premature Infant recommended reference ranges:   0-24 hours:  <8.0 mg/dL   24-48 hours: <12.0 mg/dL   3-5 days:    <15.0 mg/dL   6-29 days:   <15.0 mg/dL     Alkaline Phosphatase   Date Value Ref Range Status   03/06/2025 90 40 - 150 U/L Final     ALP   Date Value Ref Range Status   05/01/2025 97 40 - 150 unit/L Final     AST   Date Value Ref Range Status   05/01/2025 23 11 - 45 unit/L Final   03/06/2025 36 10 - 40 U/L Final     ALT   Date Value Ref Range Status   05/01/2025 28 10 - 44 unit/L Final   03/06/2025 17 10 - 44 U/L Final     Anion Gap   Date Value Ref Range Status   05/01/2025 7 (L) 8 - 16 mmol/L Final     eGFR if    Date Value Ref Range Status   06/02/2022 51.1 (A) >60 mL/min/1.73 m^2 Final     eGFR if non    Date Value Ref  Range Status   06/02/2022 44.2 (A) >60 mL/min/1.73 m^2 Final     Comment:     Calculation used to obtain the estimated glomerular filtration  rate (eGFR) is the CKD-EPI equation.          Lab Results   Component Value Date    HGBA1C 5.5 03/06/2025       ASSESSMENT: 69 y.o. year old male with neck pain, consistent with     1. Cervical radiculopathy        2. Postlaminectomy syndrome of lumbar region        3. DDD (degenerative disc disease), cervical        4. Chronic right shoulder pain  Case Request-RAD/Other Procedure Area: Right shoulder joint injection      5. Chronic pain syndrome  Pain Clinic Drug Screen      6. Pain in other specified joint  Pain Clinic Drug Screen              PLAN:   - Interventions:  schedule right IA shoulder injection.   Explained the risks and benefits of the procedure in detail with the patient today in clinic along with alternative treatment options, and the patient elected to pursue the intervention.      Consider spinal cord stimulation therapy for chronic back and leg pain-provide the patient with Surreal InkÂº today    S/p C7/T1 IL PIEDAD 95% relief 3/12/2024 (relief for over 3 months)  S/p right L4/5 +L5/S1 TF PIEDAD on 10/20/22 with 40% relief  S/p C7-T1 IL PIEDAD for diagnostic and therapeutic purposes with 60% relief    - Anticoagulation use: yes Aspirin and Plavix.Will need to hold for 5 days prior, Will get clearance from Dr. Mckeon.       - Medications: I have stressed the importance of physical activity and a home exercise plan to help with pain and improve health. Patient can continue with medications for now since they are providing benefits, using them appropriately, and without side effects.       - Continue Tizanidine 4 mg BID, We have discussed potential deleterious side effects associated with this medication including  dizziness, drowsiness, dry mouth or tingling sensation in the upper or lower extremities.     - no longer using Lyrica    An opioid pain  contract was completed on 02/12/2025 after having an extensive conversation about chronic opioid use for pain management. We discussed the risks and benefits of opioids.  I discouraged the patient from escalation of opioid use and try to minimize its use to decrease chance of dependence, tolerance, and opioid-based hyperalgesia.  I reviewed the  in great detail and there are no inconsistencies and it is appropriate with patient's history.  There are no signs of aberrant drug behavior.  The opioid risk tool was also completed, moderate risk.  Pt counseled about the side effects of long term use of opioids including dependence, tolerance, addiction, respiratory depression, somnelence , immune and endocrine dysfunction.  The patient expressed understanding.    Provide Norco 10/325 mg daily p.r.n. severe pain, 30 tablets with future fill dates for 05/12/2025, 6/10/2025, 7/8/2025  UDS today    - Procedure note: An IM injection of (ketolorac 30mg/1mL) was administered during clinic visit by our medical assistant.  This was well tolerated.     report:  Reviewed and consistent with medication use as prescribed.          - Therapy:  Advised patient continue with home exercises as tolerated     - Imaging: Reviewed previous imaging, labs, pathology results.     - Consults/Referrals:  EConsult to cardiology      - Follow up visit: 4 weeks after  injection     - Counseled patient regarding the importance of activity modification and physical therapy     - This condition does not require this patient to take time off of work, and the primary goal of our Pain Management services is to improve the patient's functional capacity.     - Patient Questions: Answered all of the patient's questions regarding diagnosis, therapy, and treatment       The above plan and management options were discussed at length with patient. Patient is in agreement with the above and verbalized understanding.      Visit today included increased  complexity associated with the care of the episodic problem of chronic pain which was addressed and continue to manage the longitudinal care of the patient due to the serious and/or complex managed problem(s) listed above.      Nighat Mcmahon NP  Interventional Pain Management  [facility]      UDS 5/12/2025 pending

## 2025-05-12 ENCOUNTER — OFFICE VISIT (OUTPATIENT)
Dept: PAIN MEDICINE | Facility: CLINIC | Age: 70
End: 2025-05-12
Payer: MEDICARE

## 2025-05-12 VITALS
HEART RATE: 75 BPM | WEIGHT: 224.13 LBS | DIASTOLIC BLOOD PRESSURE: 67 MMHG | HEIGHT: 69 IN | SYSTOLIC BLOOD PRESSURE: 119 MMHG | BODY MASS INDEX: 33.2 KG/M2

## 2025-05-12 DIAGNOSIS — G89.29 CHRONIC RIGHT SHOULDER PAIN: ICD-10-CM

## 2025-05-12 DIAGNOSIS — M25.59 PAIN IN OTHER SPECIFIED JOINT: ICD-10-CM

## 2025-05-12 DIAGNOSIS — M54.12 CERVICAL RADICULOPATHY: Primary | ICD-10-CM

## 2025-05-12 DIAGNOSIS — M96.1 POSTLAMINECTOMY SYNDROME OF LUMBAR REGION: ICD-10-CM

## 2025-05-12 DIAGNOSIS — M25.511 CHRONIC RIGHT SHOULDER PAIN: ICD-10-CM

## 2025-05-12 DIAGNOSIS — M50.30 DDD (DEGENERATIVE DISC DISEASE), CERVICAL: ICD-10-CM

## 2025-05-12 DIAGNOSIS — G89.4 CHRONIC PAIN SYNDROME: ICD-10-CM

## 2025-05-12 PROCEDURE — 3078F DIAST BP <80 MM HG: CPT | Mod: CPTII,HCNC,S$GLB, | Performed by: NURSE PRACTITIONER

## 2025-05-12 PROCEDURE — 96372 THER/PROPH/DIAG INJ SC/IM: CPT | Mod: HCNC,S$GLB,, | Performed by: NURSE PRACTITIONER

## 2025-05-12 PROCEDURE — 80364 OPIOID &OPIATE ANALOG 5/MORE: CPT | Mod: HCNC | Performed by: NURSE PRACTITIONER

## 2025-05-12 PROCEDURE — 3288F FALL RISK ASSESSMENT DOCD: CPT | Mod: CPTII,HCNC,S$GLB, | Performed by: NURSE PRACTITIONER

## 2025-05-12 PROCEDURE — 3044F HG A1C LEVEL LT 7.0%: CPT | Mod: CPTII,HCNC,S$GLB, | Performed by: NURSE PRACTITIONER

## 2025-05-12 PROCEDURE — 99999 PR PBB SHADOW E&M-EST. PATIENT-LVL III: CPT | Mod: PBBFAC,HCNC,, | Performed by: NURSE PRACTITIONER

## 2025-05-12 PROCEDURE — 1125F AMNT PAIN NOTED PAIN PRSNT: CPT | Mod: CPTII,HCNC,S$GLB, | Performed by: NURSE PRACTITIONER

## 2025-05-12 PROCEDURE — 1159F MED LIST DOCD IN RCRD: CPT | Mod: CPTII,HCNC,S$GLB, | Performed by: NURSE PRACTITIONER

## 2025-05-12 PROCEDURE — 3066F NEPHROPATHY DOC TX: CPT | Mod: CPTII,HCNC,S$GLB, | Performed by: NURSE PRACTITIONER

## 2025-05-12 PROCEDURE — 1101F PT FALLS ASSESS-DOCD LE1/YR: CPT | Mod: CPTII,HCNC,S$GLB, | Performed by: NURSE PRACTITIONER

## 2025-05-12 PROCEDURE — 3074F SYST BP LT 130 MM HG: CPT | Mod: CPTII,HCNC,S$GLB, | Performed by: NURSE PRACTITIONER

## 2025-05-12 PROCEDURE — 99214 OFFICE O/P EST MOD 30 MIN: CPT | Mod: 25,HCNC,S$GLB, | Performed by: NURSE PRACTITIONER

## 2025-05-12 PROCEDURE — 3008F BODY MASS INDEX DOCD: CPT | Mod: CPTII,HCNC,S$GLB, | Performed by: NURSE PRACTITIONER

## 2025-05-12 PROCEDURE — 3061F NEG MICROALBUMINURIA REV: CPT | Mod: CPTII,HCNC,S$GLB, | Performed by: NURSE PRACTITIONER

## 2025-05-12 RX ORDER — KETOROLAC TROMETHAMINE 30 MG/ML
30 INJECTION, SOLUTION INTRAMUSCULAR; INTRAVENOUS
Status: COMPLETED | OUTPATIENT
Start: 2025-05-12 | End: 2025-05-12

## 2025-05-12 RX ORDER — HYDROCODONE BITARTRATE AND ACETAMINOPHEN 10; 325 MG/1; MG/1
1 TABLET ORAL DAILY PRN
Qty: 30 TABLET | Refills: 0 | Status: SHIPPED | OUTPATIENT
Start: 2025-07-10 | End: 2025-08-08

## 2025-05-12 RX ORDER — HYDROCODONE BITARTRATE AND ACETAMINOPHEN 10; 325 MG/1; MG/1
1 TABLET ORAL DAILY PRN
Qty: 30 TABLET | Refills: 0 | Status: SHIPPED | OUTPATIENT
Start: 2025-05-12 | End: 2025-06-11

## 2025-05-12 RX ORDER — HYDROCODONE BITARTRATE AND ACETAMINOPHEN 10; 325 MG/1; MG/1
1 TABLET ORAL DAILY PRN
Qty: 30 TABLET | Refills: 0 | Status: SHIPPED | OUTPATIENT
Start: 2025-07-08 | End: 2025-08-07

## 2025-05-12 RX ADMIN — KETOROLAC TROMETHAMINE 30 MG: 30 INJECTION, SOLUTION INTRAMUSCULAR; INTRAVENOUS at 10:05

## 2025-05-16 LAB
1OH-MIDAZOLAM UR QL SCN: NOT DETECTED
6MAM UR QL: NOT DETECTED
7AMINOCLONAZEPAM UR QL: NOT DETECTED
A-OH ALPRAZ UR QL: NOT DETECTED
ALPRAZ UR QL: NOT DETECTED
AMPHET UR QL SCN: NOT DETECTED
ANNOTATION COMMENT IMP: NORMAL
AR NOROXYMORPHONE (CUTOFF 100 NG/ML): NOT DETECTED
AR OXYMORPHONE (CUTOFF 40 NG/ML): NOT DETECTED
BARBITURATES UR QL: NEGATIVE
BUPRENORPHINE UR QL: NOT DETECTED
BZE UR QL: NEGATIVE
CARBOXYTHC UR QL: NEGATIVE
CARISOPRODOL UR QL: NEGATIVE
CLONAZEPAM UR QL: NOT DETECTED
CODEINE UR QL: NOT DETECTED
CREAT UR-MCNC: 138.8 MG/DL
DIAZEPAM UR QL: NOT DETECTED
ETHYL GLUCURONIDE UR QL: NEGATIVE
FENTANYL UR QL: NOT DETECTED
GABAPENTIN UR QL CFM: PRESENT
HYDROCODONE UR QL: PRESENT
HYDROMORPHONE UR QL: PRESENT
LORAZEPAM UR QL: NOT DETECTED
MDA UR QL: NOT DETECTED
MDEA UR QL: NOT DETECTED
MDMA UR QL: NOT DETECTED
ME-PHENIDATE UR QL: NOT DETECTED
METHADONE UR QL: NEGATIVE
METHAMPHET UR QL: NOT DETECTED
MIDAZOLAM UR QL SCN: NOT DETECTED
MORPHINE UR QL: NOT DETECTED
NALOXONE UR QL CFM: NOT DETECTED
NORBUPRENORPHINE UR QL CFM: NOT DETECTED
NORDIAZEPAM UR QL: NOT DETECTED
NORFENTANYL UR QL: NOT DETECTED
NORMEPERIDINE UR QL CFM: NOT DETECTED
NOROXYCODONE UR QL CFM: PRESENT
NOROXYMORPHONE UR QL SCN: NOT DETECTED
OXAZEPAM UR QL: NOT DETECTED
OXYCODONE UR QL: NOT DETECTED
PATHOLOGY STUDY: NORMAL
PCP UR QL: NEGATIVE
PHENTERMINE UR QL: NOT DETECTED
PREGABALIN UR QL CFM: NOT DETECTED
SERVICE CMNT-IMP: NORMAL
TAPENTADOL UR QL SCN: NOT DETECTED
TAPENTADOL UR QL SCN: NOT DETECTED
TEMAZEPAM UR QL: NOT DETECTED
TRAMADOL UR QL: NEGATIVE
ZOLPIDEM PHENYL-4-CARB UR QL SCN: NOT DETECTED
ZOLPIDEM UR QL: NOT DETECTED

## 2025-05-18 ENCOUNTER — RESULTS FOLLOW-UP (OUTPATIENT)
Dept: PAIN MEDICINE | Facility: CLINIC | Age: 70
End: 2025-05-18

## 2025-05-27 NOTE — PRE-PROCEDURE INSTRUCTIONS
Spoke with patient regarding procedure scheduled on 6.10     Arrival time 0815     Has patient been sick with fever or on antibiotics within the last 7 days? No     Does the patient have any open wounds, sores or rashes? No     Does the patient have any recent fractures? no     Has patient received a vaccination within the last 7 days? No     Received the COVID vaccination? yes     Has the patient stopped all medications as directed? na     Does patient have a pacemaker, defibrillator, or implantable stimulator? No     Does the patient have a ride to and from procedure and someone reliable to remain with patient?  WIFE     Is the patient diabetic? NO     Does the patient have sleep apnea? Or use O2 at home? CHEO     Is the patient receiving sedation? Yes      Is the patient instructed to remain NPO beginning at midnight the night before their procedure? yes     Procedure location confirmed with patient? Yes     Covid- Denies signs/symptoms. Instructed to notify PAT/MD if any changes.

## 2025-06-03 ENCOUNTER — OFFICE VISIT (OUTPATIENT)
Dept: DERMATOLOGY | Facility: CLINIC | Age: 70
End: 2025-06-03
Payer: MEDICARE

## 2025-06-03 DIAGNOSIS — L30.9 ECZEMA, UNSPECIFIED TYPE: ICD-10-CM

## 2025-06-03 DIAGNOSIS — L53.8 ERYTHEMA DYSCHROMICUM PERSTANS: Primary | ICD-10-CM

## 2025-06-03 PROCEDURE — G2211 COMPLEX E/M VISIT ADD ON: HCPCS | Mod: S$GLB,,, | Performed by: PHYSICIAN ASSISTANT

## 2025-06-03 PROCEDURE — 3044F HG A1C LEVEL LT 7.0%: CPT | Mod: CPTII,S$GLB,, | Performed by: PHYSICIAN ASSISTANT

## 2025-06-03 PROCEDURE — 1160F RVW MEDS BY RX/DR IN RCRD: CPT | Mod: CPTII,S$GLB,, | Performed by: PHYSICIAN ASSISTANT

## 2025-06-03 PROCEDURE — 3288F FALL RISK ASSESSMENT DOCD: CPT | Mod: CPTII,S$GLB,, | Performed by: PHYSICIAN ASSISTANT

## 2025-06-03 PROCEDURE — 3066F NEPHROPATHY DOC TX: CPT | Mod: CPTII,S$GLB,, | Performed by: PHYSICIAN ASSISTANT

## 2025-06-03 PROCEDURE — 99214 OFFICE O/P EST MOD 30 MIN: CPT | Mod: S$GLB,,, | Performed by: PHYSICIAN ASSISTANT

## 2025-06-03 PROCEDURE — 1159F MED LIST DOCD IN RCRD: CPT | Mod: CPTII,S$GLB,, | Performed by: PHYSICIAN ASSISTANT

## 2025-06-03 PROCEDURE — 3061F NEG MICROALBUMINURIA REV: CPT | Mod: CPTII,S$GLB,, | Performed by: PHYSICIAN ASSISTANT

## 2025-06-03 PROCEDURE — 1125F AMNT PAIN NOTED PAIN PRSNT: CPT | Mod: CPTII,S$GLB,, | Performed by: PHYSICIAN ASSISTANT

## 2025-06-03 PROCEDURE — 99999 PR PBB SHADOW E&M-EST. PATIENT-LVL II: CPT | Mod: PBBFAC,,, | Performed by: PHYSICIAN ASSISTANT

## 2025-06-03 PROCEDURE — 1101F PT FALLS ASSESS-DOCD LE1/YR: CPT | Mod: CPTII,S$GLB,, | Performed by: PHYSICIAN ASSISTANT

## 2025-06-03 RX ORDER — TRIAMCINOLONE ACETONIDE 1 MG/G
CREAM TOPICAL 2 TIMES DAILY
Qty: 454 G | Refills: 1 | Status: SHIPPED | OUTPATIENT
Start: 2025-06-03 | End: 2025-06-20

## 2025-06-03 RX ORDER — HYDROXYZINE HYDROCHLORIDE 25 MG/1
25 TABLET, FILM COATED ORAL NIGHTLY PRN
Qty: 30 TABLET | Refills: 1 | Status: SHIPPED | OUTPATIENT
Start: 2025-06-03

## 2025-06-03 NOTE — PROGRESS NOTES
Subjective:      Patient ID:  Abdullahi Urias is a 69 y.o. male who presents for   Chief Complaint   Patient presents with    Eczema     Eczema flare up on legs, back, and neck.  Pt reports having a biopsy done previously that came back as eczema.  Currently just using otc that helps calm it down temporarily.       Hx of biopsy proven EDP (doc: 3/17/2020, Dr De La Rosa), last seen in 2022 by Dr. Medina. Here for new c/o eczema of legs, back, groin, and neck.  Duration: 1 month. +Itching, discoloration, dryness.  Current tx: eucerin lotion.             Review of Systems   Constitutional:  Negative for fever and chills.   Gastrointestinal:  Negative for nausea and vomiting.   Skin:  Positive for itching, rash, dry skin and activity-related sunscreen use. Negative for sun sensitivity, daily sunscreen use, recent sunburn and dry lips.   Hematologic/Lymphatic: Does not bruise/bleed easily.       Objective:   Physical Exam   Constitutional: He appears well-developed and well-nourished. No distress.   Neurological: He is alert and oriented to person, place, and time. He is not disoriented.   Psychiatric: He has a normal mood and affect.   Skin:   Areas Examined (abnormalities noted in diagram):   Head / Face Inspection Performed  Neck Inspection Performed  Chest / Axilla Inspection Performed  Back Inspection Performed  RUE Inspected  LUE Inspection Performed  RLE Inspected  LLE Inspection Performed            Diagram Legend     Erythematous scaling macule/papule c/w actinic keratosis       Vascular papule c/w angioma      Pigmented verrucoid papule/plaque c/w seborrheic keratosis      Yellow umbilicated papule c/w sebaceous hyperplasia      Irregularly shaped tan macule c/w lentigo     1-2 mm smooth white papules consistent with Milia      Movable subcutaneous cyst with punctum c/w epidermal inclusion cyst      Subcutaneous movable cyst c/w pilar cyst      Firm pink to brown papule c/w dermatofibroma      Pedunculated fleshy  papule(s) c/w skin tag(s)      Evenly pigmented macule c/w junctional nevus     Mildly variegated pigmented, slightly irregular-bordered macule c/w mildly atypical nevus      Flesh colored to evenly pigmented papule c/w intradermal nevus       Pink pearly papule/plaque c/w basal cell carcinoma      Erythematous hyperkeratotic cursted plaque c/w SCC      Surgical scar with no sign of skin cancer recurrence      Open and closed comedones      Inflammatory papules and pustules      Verrucoid papule consistent consistent with wart     Erythematous eczematous patches and plaques     Dystrophic onycholytic nail with subungual debris c/w onychomycosis     Umbilicated papule    Erythematous-base heme-crusted tan verrucoid plaque consistent with inflamed seborrheic keratosis     Erythematous Silvery Scaling Plaque c/w Psoriasis     See annotation      Assessment / Plan:        Erythema dyschromicum perstans  -     triamcinolone acetonide 0.1% (KENALOG) 0.1 % cream; Apply topically 2 (two) times daily. PRN rash of trunk and legs. Steroid- limit to 2 weeks then taper. Repeat if flaring.  Dispense: 454 g; Refill: 1  Encouraged gentle skin care and emollients. Reserve above rx prn flares    Eczema, unspecified type  -     Ambulatory referral/consult to Dermatology  -     triamcinolone acetonide 0.1% (KENALOG) 0.1 % cream; Apply topically 2 (two) times daily. PRN rash of trunk and legs. Steroid- limit to 2 weeks then taper. Repeat if flaring.  Dispense: 454 g; Refill: 1  -     hydrOXYzine HCL (ATARAX) 25 MG tablet; Take 1 tablet (25 mg total) by mouth nightly as needed for Itching (itching). May cause drowsiness. Do NOT drive or operate heavy machinery.  Dispense: 30 tablet; Refill: 1  Warned of sedation w/above rx. Side effect profile reviewed for topical steroids including atrophy, telangiectasia and striae development with prolonged usage.  Patient acknowledged understanding.             No follow-ups on file.

## 2025-06-09 ENCOUNTER — LAB VISIT (OUTPATIENT)
Dept: LAB | Facility: HOSPITAL | Age: 70
End: 2025-06-09
Attending: INTERNAL MEDICINE
Payer: MEDICARE

## 2025-06-09 DIAGNOSIS — D50.9 IRON DEFICIENCY ANEMIA, UNSPECIFIED IRON DEFICIENCY ANEMIA TYPE: ICD-10-CM

## 2025-06-09 DIAGNOSIS — N18.30 STAGE 3 CHRONIC KIDNEY DISEASE, UNSPECIFIED WHETHER STAGE 3A OR 3B CKD: ICD-10-CM

## 2025-06-09 LAB
ABSOLUTE EOSINOPHIL (OHS): 0.18 K/UL
ABSOLUTE MONOCYTE (OHS): 0.28 K/UL (ref 0.3–1)
ABSOLUTE NEUTROPHIL COUNT (OHS): 1.73 K/UL (ref 1.8–7.7)
ALBUMIN SERPL BCP-MCNC: 4.2 G/DL (ref 3.5–5.2)
ANION GAP (OHS): 8 MMOL/L (ref 8–16)
BASOPHILS # BLD AUTO: 0.02 K/UL
BASOPHILS NFR BLD AUTO: 0.6 %
BUN SERPL-MCNC: 18 MG/DL (ref 8–23)
CALCIUM SERPL-MCNC: 9.3 MG/DL (ref 8.7–10.5)
CHLORIDE SERPL-SCNC: 106 MMOL/L (ref 95–110)
CO2 SERPL-SCNC: 25 MMOL/L (ref 23–29)
CREAT SERPL-MCNC: 1.6 MG/DL (ref 0.5–1.4)
ERYTHROCYTE [DISTWIDTH] IN BLOOD BY AUTOMATED COUNT: 13.9 % (ref 11.5–14.5)
FERRITIN SERPL-MCNC: 139 NG/ML (ref 20–300)
GFR SERPLBLD CREATININE-BSD FMLA CKD-EPI: 46 ML/MIN/1.73/M2
GLUCOSE SERPL-MCNC: 89 MG/DL (ref 70–110)
HCT VFR BLD AUTO: 43.4 % (ref 40–54)
HGB BLD-MCNC: 14 GM/DL (ref 14–18)
IMM GRANULOCYTES # BLD AUTO: 0.02 K/UL (ref 0–0.04)
IMM GRANULOCYTES NFR BLD AUTO: 0.6 % (ref 0–0.5)
LYMPHOCYTES # BLD AUTO: 1.03 K/UL (ref 1–4.8)
MCH RBC QN AUTO: 28.4 PG (ref 27–31)
MCHC RBC AUTO-ENTMCNC: 32.3 G/DL (ref 32–36)
MCV RBC AUTO: 88 FL (ref 82–98)
NUCLEATED RBC (/100WBC) (OHS): 0 /100 WBC
PHOSPHATE SERPL-MCNC: 3.7 MG/DL (ref 2.7–4.5)
PLATELET # BLD AUTO: 156 K/UL (ref 150–450)
PMV BLD AUTO: 11.1 FL (ref 9.2–12.9)
POTASSIUM SERPL-SCNC: 4.8 MMOL/L (ref 3.5–5.1)
RBC # BLD AUTO: 4.93 M/UL (ref 4.6–6.2)
RELATIVE EOSINOPHIL (OHS): 5.5 %
RELATIVE LYMPHOCYTE (OHS): 31.6 % (ref 18–48)
RELATIVE MONOCYTE (OHS): 8.6 % (ref 4–15)
RELATIVE NEUTROPHIL (OHS): 53.1 % (ref 38–73)
SODIUM SERPL-SCNC: 139 MMOL/L (ref 136–145)
WBC # BLD AUTO: 3.26 K/UL (ref 3.9–12.7)

## 2025-06-09 PROCEDURE — 80069 RENAL FUNCTION PANEL: CPT | Mod: HCNC

## 2025-06-09 PROCEDURE — 82728 ASSAY OF FERRITIN: CPT | Mod: HCNC

## 2025-06-09 PROCEDURE — 36415 COLL VENOUS BLD VENIPUNCTURE: CPT

## 2025-06-09 PROCEDURE — 85025 COMPLETE CBC W/AUTO DIFF WBC: CPT | Mod: HCNC

## 2025-06-10 ENCOUNTER — HOSPITAL ENCOUNTER (OUTPATIENT)
Facility: HOSPITAL | Age: 70
Discharge: HOME | End: 2025-06-10
Attending: PHYSICAL MEDICINE & REHABILITATION | Admitting: PHYSICAL MEDICINE & REHABILITATION
Payer: MEDICARE

## 2025-06-10 VITALS
DIASTOLIC BLOOD PRESSURE: 71 MMHG | HEIGHT: 69 IN | HEART RATE: 69 BPM | RESPIRATION RATE: 17 BRPM | BODY MASS INDEX: 35 KG/M2 | OXYGEN SATURATION: 98 % | TEMPERATURE: 98 F | WEIGHT: 236.31 LBS | SYSTOLIC BLOOD PRESSURE: 122 MMHG

## 2025-06-10 DIAGNOSIS — G89.29 CHRONIC RIGHT SHOULDER PAIN: Primary | ICD-10-CM

## 2025-06-10 DIAGNOSIS — M25.511 CHRONIC RIGHT SHOULDER PAIN: Primary | ICD-10-CM

## 2025-06-10 DIAGNOSIS — G89.4 CHRONIC PAIN SYNDROME: ICD-10-CM

## 2025-06-10 PROCEDURE — 77002 NEEDLE LOCALIZATION BY XRAY: CPT | Mod: 26,,, | Performed by: PHYSICAL MEDICINE & REHABILITATION

## 2025-06-10 PROCEDURE — 63600175 PHARM REV CODE 636 W HCPCS: Performed by: PHYSICAL MEDICINE & REHABILITATION

## 2025-06-10 PROCEDURE — 20610 DRAIN/INJ JOINT/BURSA W/O US: CPT | Mod: RT | Performed by: PHYSICAL MEDICINE & REHABILITATION

## 2025-06-10 PROCEDURE — 25500020 PHARM REV CODE 255: Performed by: PHYSICAL MEDICINE & REHABILITATION

## 2025-06-10 PROCEDURE — 20610 DRAIN/INJ JOINT/BURSA W/O US: CPT | Mod: RT,,, | Performed by: PHYSICAL MEDICINE & REHABILITATION

## 2025-06-10 RX ORDER — BUPIVACAINE HYDROCHLORIDE 2.5 MG/ML
INJECTION, SOLUTION EPIDURAL; INFILTRATION; INTRACAUDAL; PERINEURAL
Status: DISCONTINUED | OUTPATIENT
Start: 2025-06-10 | End: 2025-06-10 | Stop reason: HOSPADM

## 2025-06-10 RX ORDER — ONDANSETRON HYDROCHLORIDE 2 MG/ML
4 INJECTION, SOLUTION INTRAVENOUS ONCE AS NEEDED
Status: DISCONTINUED | OUTPATIENT
Start: 2025-06-10 | End: 2025-06-10 | Stop reason: HOSPADM

## 2025-06-10 RX ORDER — FENTANYL CITRATE 50 UG/ML
INJECTION, SOLUTION INTRAMUSCULAR; INTRAVENOUS
Status: DISCONTINUED | OUTPATIENT
Start: 2025-06-10 | End: 2025-06-10 | Stop reason: HOSPADM

## 2025-06-10 RX ORDER — METHYLPREDNISOLONE ACETATE 40 MG/ML
INJECTION, SUSPENSION INTRA-ARTICULAR; INTRALESIONAL; INTRAMUSCULAR; SOFT TISSUE
Status: DISCONTINUED | OUTPATIENT
Start: 2025-06-10 | End: 2025-06-10 | Stop reason: HOSPADM

## 2025-06-10 RX ORDER — MIDAZOLAM HYDROCHLORIDE 1 MG/ML
INJECTION, SOLUTION INTRAMUSCULAR; INTRAVENOUS
Status: DISCONTINUED | OUTPATIENT
Start: 2025-06-10 | End: 2025-06-10 | Stop reason: HOSPADM

## 2025-06-10 NOTE — OP NOTE
PROCEDURE: RIGHT GLENOHUMERAL JOINT INJECTION (Shoulder)    Surgeon: Nehemiah Carmen MD     Assistant: None    Sedation: Conscious sedation provided by M.D    The patient was monitored with continuous pulse oximetry, EKG, and intermittent blood pressure monitors, immediately prior to administration of sedation.  The patient was hemodynamically stable throughout the entire process was responsive to voice, and breathing spontaneously.  Supplemental O2 was provided at 2L/min via nasal cannula.  Patient was comfortable for the duration of the procedure.     There was a total of 2mg IV Midazolam and 50mcg Fentanyl titrated for the procedure    Total sedation time was <10 minutes      Pre-Op diagnosis: Chronic right shoulder pain [M25.511, G89.29]    Post-Op diagnosis: Chronic right shoulder pain [M25.511, G89.29]    EBL: None    Complications: None    Specimens: None      DESCRIPTION OF PROCEDURE: The patient was brought to the procedure room.  IV access was obtained prior to the procedure.  The patient was positioned supine on the fluoroscopy table.  Continuous hemodynamic monitoring was initiated including blood pressure, EKG, and pulse oximetry.  IV sedation was administered incrementally to allow the patient to remain comfortable and conversant throughout the procedure.  The skin overlying the glenohumeral joint of the right shoulder was prepped with chlorhexidine and draped in a sterile fashion.  The skin and subcutaneous tissue was anesthetized using 3 cc of lidocaine 1% on each side.  A 22 gauge, 3 1/2 spinal needle was slowly advanced through under fluoroscopic guidance into the glenohumeral joint until os was encountered and then retracted approximately 2 mm. The needle position was confirmed using  AP fluoroscopic imaging.  Negative aspiration was confirmed. 2 cc of Omnipaque 300 was injected confirming intra-articular contrast spread on the right side. A combination of 4mL 0.25% bupivicaine + 1 mL 40mg/ml  depo-medrol (total volume of 5mL) was injected into each joint. The needle was removed and bleeding was nil.  A sterile dressing was applied. No specimens collected. The patient was taken to the Post-block Recovery Area for further observation.     Patient tolerated the procedure well and was discharged in stable condition.

## 2025-06-10 NOTE — DISCHARGE INSTRUCTIONS

## 2025-06-10 NOTE — H&P
HPI  Patient presenting for Procedure(s) (LRB):  Right shoulder joint injection (Right)     No health changes since previous encounter    Past Medical History:   Diagnosis Date    Aortic stenosis     dr ph[facility]    Asthma     BPH (benign prostatic hyperplasia)     CAD (coronary artery disease)     Cardiomyopathy     CHF (congestive heart failure)     Chronic hoarseness     vocal cord surg    Chronic pain     CKD (chronic kidney disease) stage 3, GFR 30-59 ml/min     CVA (cerebral infarction)     8/2012 olol; reviewed ed note    Ex-smoker     GERD (gastroesophageal reflux disease)     Hepatitis C     treatedharvoni says cured, RNA NEG 5/2025    Hypertension     Mixed hyperlipidemia 02/03/2025    Pancreatitis     Prostate cancer     Prostate cancer     Prostate cancer     PVD (peripheral vascular disease)     Renal insufficiency     Substance abuse     cocaine, etoh , tob in past     Past Surgical History:   Procedure Laterality Date    AORTIC VALVE REPLACEMENT  05/19/2014    Tissue valve replacement    BACK SURGERY      CARDIAC CATHETERIZATION      COLONOSCOPY  2011    COLONOSCOPY N/A 2/24/2021    Procedure: COLONOSCOPY;  Surgeon: Faith Carrillo MD;  Location: [facility] ENDO;  Service: Endoscopy;  Laterality: N/A;    COLONOSCOPY N/A 7/2/2024    Procedure: COLONOSCOPY 6/4 plavix 5 day hold econ;  Surgeon: Prince Griffin MD;  Location: [facility];  Service: Endoscopy;  Laterality: N/A;    EPIDURAL STEROID INJECTION INTO CERVICAL SPINE N/A 6/21/2022    Procedure: C7-T1 IL PIEDAD;  Surgeon: Nehemiah Carmen MD;  Location: [facility] PAIN MGT;  Service: Pain Management;  Laterality: N/A;    EPIDURAL STEROID INJECTION INTO CERVICAL SPINE N/A 3/12/2024    Procedure: C7/T1 IL PIEDAD;  Surgeon: Nehemiah Carmen MD;  Location: [facility] PAIN MGT;  Service: Pain Management;  Laterality: N/A;    EPIDURAL STEROID INJECTION INTO CERVICAL SPINE N/A 4/10/2025    Procedure: Cervical C7/T1 Interlaminar PIEDAD with RN IV Sedation, Takes ASA,  Plavix- will need to hold 5 days prior (clearance from Dr. Mckeon);  Surgeon: Nehemiah Carmen MD;  Location: [facility] PAIN MGT;  Service: Pain Management;  Laterality: N/A;    ESOPHAGOGASTRODUODENOSCOPY N/A 2/24/2021    Procedure: ESOPHAGOGASTRODUODENOSCOPY (EGD);  Surgeon: Faith Carrillo MD;  Location: [facility] ENDO;  Service: Endoscopy;  Laterality: N/A;    ESOPHAGOGASTRODUODENOSCOPY N/A 7/2/2024    Procedure: EGD (ESOPHAGOGASTRODUODENOSCOPY);  Surgeon: Prince Griffin MD;  Location: [facility] ENDO;  Service: Endoscopy;  Laterality: N/A;    ESOPHAGOGASTRODUODENOSCOPY N/A 11/19/2024    Procedure: EGD (ESOPHAGOGASTRODUODENOSCOPY);  Surgeon: Kayleigh Leiva MD;  Location: [facility] ENDO;  Service: Gastroenterology;  Laterality: N/A;    FOOT SURGERY      INSERTION N/A 7/5/2023    Procedure: INSERTION;  Surgeon: Brandon Valencia MD;  Location: [facility] OR;  Service: Neurosurgery;  Laterality: N/A;  Antibiotic Beads    INTRALUMINAL GASTROINTESTINAL TRACT IMAGING VIA CAPSULE N/A 11/1/2024    Procedure: IMAGING PROCEDURE, GI TRACT, INTRALUMINAL, VIA CAPSULE;  Surgeon: [place], First Available;  Location: [facility] ENDO;  Service: Endoscopy;  Laterality: N/A;    LEFT HEART CATHETERIZATION Left 7/24/2020    Procedure: CATHETERIZATION, HEART, LEFT;  Surgeon: Pasha Martin MD;  Location: [facility] CATH LAB;  Service: Cardiology;  Laterality: Left;    PENILE PROSTHESIS IMPLANT      PENILE PROSTHESIS REVISION  04/30/2018    PROSTATECTOMY  09/2019    [facility]    radiation for prostate      REMOVAL OF HARDWARE FROM SPINE N/A 7/5/2023    Procedure: REMOVAL, HARDWARE, SPINE;  Surgeon: Brandon Valencia MD;  Location: [facility] OR;  Service: Neurosurgery;  Laterality: N/A;    REPLACEMENT N/A 7/5/2023    Procedure: REPLACEMENT;  Surgeon: Brandon Valencia MD;  Location: [facility] OR;  Service: Neurosurgery;  Laterality: N/A;  of Sandoval and Screws. [employer]    REVISION OF PROCEDURE INVOLVING INFLATABLE PENILE PROSTHESIS N/A 7/22/2024    Procedure:  "REVISION, PROCEDURE INVOLVING INFLATABLE PENILE PROSTHESIS;  Surgeon: Rommel Rodriguez MD;  Location: [facility] OR;  Service: Urology;  Laterality: N/A;    TRANSFORAMINAL EPIDURAL INJECTION OF STEROID Right 10/20/2022    Procedure: Right  L4/5 + L5/S1 TF PIEDAD RN IV Sedation;  Surgeon: Nehemiah Carmen MD;  Location: [facility] MGT;  Service: Pain Management;  Laterality: Right;    TRANSFORAMINAL LUMBAR INTERBODY FUSION (TLIF) USING COMPUTER-ASSISTED NAVIGATION Bilateral 5/17/2023    Procedure: FUSION, SPINE, LUMBAR, TLIF, USING COMPUTER-ASSISTED NAVIGATION;  Surgeon: Brandon Valencia MD;  Location: [facility] OR;  Service: Neurosurgery;  Laterality: Bilateral;  2 level lumbar fusion at L4-5 and L5-S1    UPPER GASTROINTESTINAL ENDOSCOPY  2011    WASHOUT N/A 7/5/2023    Procedure: WASHOUT;  Surgeon: Brandon Valencia MD;  Location: [facility] OR;  Service: Neurosurgery;  Laterality: N/A;    WOUND EXPLORATION Bilateral 7/5/2023    Procedure: EXPLORATION, WOUND;  Surgeon: Brandon Valencia MD;  Location: [facility] OR;  Service: Neurosurgery;  Laterality: Bilateral;     Review of patient's allergies indicates:  No Known Allergies     Medications Ordered Prior to Encounter[1]     PMHx, PSHx, Allergies, Medications reviewed in epic    ROS negative except pain complaints in HPI    OBJECTIVE:    BP (!) 146/76 (BP Location: Right arm, Patient Position: Sitting)   Pulse 67   Temp 97.6 °F (36.4 °C) (Temporal)   Resp 16   Ht 5' 9" (1.753 m)   Wt 107.2 kg (236 lb 5.3 oz)   SpO2 97%   BMI 34.90 kg/m²     PHYSICAL EXAMINATION:    GENERAL: Well appearing, in no acute distress, alert and oriented x3.  PSYCH:  Mood and affect appropriate.  SKIN: Skin color, texture, turgor normal, no rashes or lesions which will impact the procedure.  CV: RRR with palpation of the radial artery.  PULM: No evidence of respiratory difficulty, symmetric chest rise. Clear to auscultation.  NEURO: Cranial nerves grossly intact.    Plan:    Proceed with procedure " as planned Procedure(s) (LRB):  Right shoulder joint injection (Right)    Nehemiah Carmen MD  06/10/2025                 [1]   Current Facility-Administered Medications on File Prior to Encounter   Medication Dose Route Frequency Provider Last Rate Last Admin    ondansetron injection 4 mg  4 mg Intravenous Once PRN Nehemiah Carmen MD        ondansetron injection 4 mg  4 mg Intravenous Once PRN Nehemiah Carmen MD        sodium chloride 0.9% flush 10 mL  10 mL Intravenous PRN Wilda Horne MD         Current Outpatient Medications on File Prior to Encounter   Medication Sig Dispense Refill    atorvastatin (LIPITOR) 80 MG tablet Take 40 mg by mouth once daily. 0.5 tab of 80mg; once in AM and once in PM      carvediloL (COREG) 12.5 MG tablet Take 1 tablet (12.5 mg total) by mouth 2 (two) times daily. 60 tablet 11    esomeprazole (NEXIUM) 40 MG capsule Take 40 mg by mouth 2 (two) times daily.      losartan (COZAAR) 50 MG tablet Take 1 tablet (50 mg total) by mouth once daily. 30 tablet 11    albuterol (PROVENTIL/VENTOLIN HFA) 90 mcg/actuation inhaler INHALE 2 PUFFS INTO THE LUNGS EVERY 6 HOURS AS NEEDED FOR COUGH 8.5 g 3    allopurinoL (ZYLOPRIM) 100 MG tablet Take 100 mg by mouth once daily. Takes 0.5 tab      ALPRAZolam (XANAX) 1 MG tablet Take 1 mg by mouth daily as needed for Anxiety or Insomnia.      aspirin (ECOTRIN) 81 MG EC tablet Take 1 tablet (81 mg total) by mouth once daily. 90 tablet 3    azelastine (ASTELIN) 137 mcg (0.1 %) nasal spray 1 spray (137 mcg total) by Nasal route 2 (two) times daily. 30 mL 1    clopidogreL (PLAVIX) 75 mg tablet Take 1 tablet (75 mg total) by mouth once daily. 30 tablet 11    diclofenac sodium (VOLTAREN) 1 % Gel Apply 2 g topically 2 (two) times daily. for 7 days 100 g 0    evolocumab (REPATHA SURECLICK) 140 mg/mL PnIj Inject 1 mL (140 mg total) into the skin every 14 (fourteen) days. (Patient not taking: Reported on 6/3/2025) 2 each 9     fluticasone-umeclidin-vilanter (TRELEGY ELLIPTA) 200-62.5-25 mcg inhaler Inhale 1 puff into the lungs once daily. 60 each 5    gabapentin (NEURONTIN) 600 MG tablet Take 600 mg by mouth 3 (three) times daily as needed (pain).      hydrOXYzine HCL (ATARAX) 25 MG tablet Take 1 tablet (25 mg total) by mouth 3 (three) times daily as needed for Itching. (Patient not taking: Reported on 6/3/2025) 30 tablet 0    multivitamin (THERAGRAN) per tablet Take 1 tablet by mouth once daily.      pramoxine-hydrocortisone (PROCTOCREAM-HC) 1-1 % rectal cream Place rectally 2 (two) times daily. 60 g 2    pregabalin (LYRICA) 75 MG capsule Take 1 capsule (75 mg total) by mouth every evening for 5 days, THEN 1 capsule (75 mg total) 2 (two) times daily. 60 capsule 0    sertraline (ZOLOFT) 100 MG tablet TAKE TWO TABLETS BY MOUTH EVERY DAY FOR MENTAL HEALTH DOSE INCREASED TO 200MG/DAY      tiZANidine (ZANAFLEX) 4 MG tablet Take 4 mg by mouth every 8 (eight) hours as needed (pain).

## 2025-06-10 NOTE — DISCHARGE SUMMARY
Discharge Note  Short Stay      SUMMARY     Admit Date: 6/10/2025    Attending Physician: Nehemiah Carmen MD        Discharge Physician: Nehemiah Carmen MD        Discharge Date: 6/10/2025 9:23 AM    Procedure(s) (LRB):  Right shoulder joint injection (Right)    Final Diagnosis: Chronic right shoulder pain [M25.511, G89.29]    Disposition: Home or self care    Patient Instructions:   Current Discharge Medication List        CONTINUE these medications which have NOT CHANGED    Details   atorvastatin (LIPITOR) 80 MG tablet Take 40 mg by mouth once daily. 0.5 tab of 80mg; once in AM and once in PM      carvediloL (COREG) 12.5 MG tablet Take 1 tablet (12.5 mg total) by mouth 2 (two) times daily.  Qty: 60 tablet, Refills: 11    Comments: .      esomeprazole (NEXIUM) 40 MG capsule Take 40 mg by mouth 2 (two) times daily.      losartan (COZAAR) 50 MG tablet Take 1 tablet (50 mg total) by mouth once daily.  Qty: 30 tablet, Refills: 11    Comments: .      albuterol (PROVENTIL/VENTOLIN HFA) 90 mcg/actuation inhaler INHALE 2 PUFFS INTO THE LUNGS EVERY 6 HOURS AS NEEDED FOR COUGH  Qty: 8.5 g, Refills: 3    Associated Diagnoses: Cough, unspecified type      allopurinoL (ZYLOPRIM) 100 MG tablet Take 100 mg by mouth once daily. Takes 0.5 tab      ALPRAZolam (XANAX) 1 MG tablet Take 1 mg by mouth daily as needed for Anxiety or Insomnia.      aspirin (ECOTRIN) 81 MG EC tablet Take 1 tablet (81 mg total) by mouth once daily.  Qty: 90 tablet, Refills: 3      azelastine (ASTELIN) 137 mcg (0.1 %) nasal spray 1 spray (137 mcg total) by Nasal route 2 (two) times daily.  Qty: 30 mL, Refills: 1    Associated Diagnoses: Chronic cough      clopidogreL (PLAVIX) 75 mg tablet Take 1 tablet (75 mg total) by mouth once daily.  Qty: 30 tablet, Refills: 11      diclofenac sodium (VOLTAREN) 1 % Gel Apply 2 g topically 2 (two) times daily. for 7 days  Qty: 100 g, Refills: 0    Associated Diagnoses: Muscle spasms of neck      evolocumab (REPATHA  SURECLICK) 140 mg/mL PnIj Inject 1 mL (140 mg total) into the skin every 14 (fourteen) days.  Qty: 2 each, Refills: 9    Associated Diagnoses: Mixed hyperlipidemia      fluticasone-umeclidin-vilanter (TRELEGY ELLIPTA) 200-62.5-25 mcg inhaler Inhale 1 puff into the lungs once daily.  Qty: 60 each, Refills: 5    Associated Diagnoses: Asthmatic bronchitis with acute exacerbation, unspecified asthma severity, unspecified whether persistent      gabapentin (NEURONTIN) 600 MG tablet Take 600 mg by mouth 3 (three) times daily as needed (pain).      !! HYDROcodone-acetaminophen (NORCO)  mg per tablet Take 1 tablet by mouth daily as needed for Pain (pain).  Qty: 30 tablet, Refills: 0    Comments: Quantity prescribed more than 7 day supply? Yes, quantity medically necessary  Associated Diagnoses: Cervical radiculopathy; Postlaminectomy syndrome of lumbar region; DDD (degenerative disc disease), cervical; Chronic pain syndrome      !! HYDROcodone-acetaminophen (NORCO)  mg per tablet Take 1 tablet by mouth daily as needed for Pain (pain).  Qty: 30 tablet, Refills: 0    Comments: Quantity prescribed more than 7 day supply? Yes, quantity medically necessary  Associated Diagnoses: Cervical radiculopathy; Postlaminectomy syndrome of lumbar region; DDD (degenerative disc disease), cervical; Chronic pain syndrome      !! HYDROcodone-acetaminophen (NORCO)  mg per tablet Take 1 tablet by mouth daily as needed for Pain (pain).  Qty: 30 tablet, Refills: 0    Comments: Quantity prescribed more than 7 day supply? Yes, quantity medically necessary  Associated Diagnoses: Cervical radiculopathy; Postlaminectomy syndrome of lumbar region; DDD (degenerative disc disease), cervical; Chronic pain syndrome      !! hydrOXYzine HCL (ATARAX) 25 MG tablet Take 1 tablet (25 mg total) by mouth 3 (three) times daily as needed for Itching.  Qty: 30 tablet, Refills: 0    Associated Diagnoses: Chronic cough      !! hydrOXYzine HCL (ATARAX)  25 MG tablet Take 1 tablet (25 mg total) by mouth nightly as needed for Itching (itching). May cause drowsiness. Do NOT drive or operate heavy machinery.  Qty: 30 tablet, Refills: 1      multivitamin (THERAGRAN) per tablet Take 1 tablet by mouth once daily.      pramoxine-hydrocortisone (PROCTOCREAM-HC) 1-1 % rectal cream Place rectally 2 (two) times daily.  Qty: 60 g, Refills: 2      pregabalin (LYRICA) 75 MG capsule Take 1 capsule (75 mg total) by mouth every evening for 5 days, THEN 1 capsule (75 mg total) 2 (two) times daily.  Qty: 60 capsule, Refills: 0    Associated Diagnoses: Fall from ladder, initial encounter; Cervical radiculopathy; Cervicalgia; DDD (degenerative disc disease), cervical; Postoperative seroma of musculoskeletal structure after musculoskeletal procedure; Lumbar radiculopathy, chronic      sertraline (ZOLOFT) 100 MG tablet TAKE TWO TABLETS BY MOUTH EVERY DAY FOR MENTAL HEALTH DOSE INCREASED TO 200MG/DAY      tiZANidine (ZANAFLEX) 4 MG tablet Take 4 mg by mouth every 8 (eight) hours as needed (pain).      triamcinolone acetonide 0.1% (KENALOG) 0.1 % cream Apply topically 2 (two) times daily. PRN rash of trunk and legs. Steroid- limit to 2 weeks then taper. Repeat if flaring.  Qty: 454 g, Refills: 1    Associated Diagnoses: Eczema, unspecified type; Erythema dyschromicum perstans       !! - Potential duplicate medications found. Please discuss with provider.              Discharge Diagnosis: Chronic right shoulder pain [M25.511, G89.29]  Condition on Discharge: Stable with no complications to procedure   Diet on Discharge: Same as before.  Activity: as per instruction sheet.  Discharge to: Home with a responsible adult.  Follow up: 2-4 weeks       Please call the office at [phone] if you experience any weakness or loss of sensation, fever > 101.5, pain uncontrolled with oral medications, persistent nausea/vomiting/or diarrhea, redness or drainage from the incisions, or any other  worrisome concerns. If physician on call was not reached or could not communicate with our office for any reason please go to the nearest emergency department

## 2025-06-13 DIAGNOSIS — M50.30 DDD (DEGENERATIVE DISC DISEASE), CERVICAL: ICD-10-CM

## 2025-06-13 DIAGNOSIS — G89.4 CHRONIC PAIN SYNDROME: ICD-10-CM

## 2025-06-13 DIAGNOSIS — M96.1 POSTLAMINECTOMY SYNDROME OF LUMBAR REGION: ICD-10-CM

## 2025-06-13 DIAGNOSIS — M54.12 CERVICAL RADICULOPATHY: ICD-10-CM

## 2025-06-13 RX ORDER — HYDROCODONE BITARTRATE AND ACETAMINOPHEN 10; 325 MG/1; MG/1
1 TABLET ORAL DAILY PRN
Qty: 30 TABLET | Refills: 0 | Status: SHIPPED | OUTPATIENT
Start: 2025-06-13 | End: 2025-07-12

## 2025-06-20 ENCOUNTER — OFFICE VISIT (OUTPATIENT)
Dept: NEPHROLOGY | Facility: CLINIC | Age: 70
End: 2025-06-20
Payer: MEDICARE

## 2025-06-20 VITALS
HEART RATE: 80 BPM | HEIGHT: 69 IN | DIASTOLIC BLOOD PRESSURE: 78 MMHG | SYSTOLIC BLOOD PRESSURE: 138 MMHG | RESPIRATION RATE: 18 BRPM | WEIGHT: 240.94 LBS | BODY MASS INDEX: 35.69 KG/M2

## 2025-06-20 DIAGNOSIS — N18.32 STAGE 3B CHRONIC KIDNEY DISEASE: Primary | ICD-10-CM

## 2025-06-20 DIAGNOSIS — E66.09 CLASS 1 OBESITY DUE TO EXCESS CALORIES WITH SERIOUS COMORBIDITY AND BODY MASS INDEX (BMI) OF 34.0 TO 34.9 IN ADULT: ICD-10-CM

## 2025-06-20 DIAGNOSIS — E66.811 CLASS 1 OBESITY DUE TO EXCESS CALORIES WITH SERIOUS COMORBIDITY AND BODY MASS INDEX (BMI) OF 34.0 TO 34.9 IN ADULT: ICD-10-CM

## 2025-06-20 DIAGNOSIS — I10 HYPERTENSION, UNSPECIFIED TYPE: Chronic | ICD-10-CM

## 2025-06-20 DIAGNOSIS — I50.32 CHRONIC DIASTOLIC HEART FAILURE: ICD-10-CM

## 2025-06-20 DIAGNOSIS — N25.81 SECONDARY HYPERPARATHYROIDISM OF RENAL ORIGIN: ICD-10-CM

## 2025-06-20 DIAGNOSIS — M1A.00X0 IDIOPATHIC CHRONIC GOUT WITHOUT TOPHUS, UNSPECIFIED SITE: ICD-10-CM

## 2025-06-20 PROCEDURE — 99999 PR PBB SHADOW E&M-EST. PATIENT-LVL III: CPT | Mod: PBBFAC,,, | Performed by: NURSE PRACTITIONER

## 2025-06-20 RX ORDER — ZOLPIDEM TARTRATE 12.5 MG/1
12.5 TABLET, FILM COATED, EXTENDED RELEASE ORAL
COMMUNITY
Start: 2025-03-13

## 2025-06-20 NOTE — PROGRESS NOTES
Subjective:       Patient ID: Abdullahi Urias is a 69 y.o. male.  Date of clinic visit: 06/20/2025     Chief Complaint: Chronic Kidney Disease 3b    History of Present Illness    - Mr. Urias presents for a follow-up visit to discuss his kidney function and overall health status.  - 69-year-old male presents for a follow-up visit regarding his chronic kidney disease. He has Stage III chronic kidney disease, which has been stable. He has shoulder and neck pain that has interfered with his ability to exercise, for which he received an injection a few weeks ago. He also has burning pain in his feet when walking. He drinks 7-8 bottles of water daily but denies any shortness of breath or swelling associated with this fluid intake. He has a history of prostate cancer, for which he underwent radiation therapy for 2.5 months in 2023, followed by prostate removal. His recent PSA level was 11.4, which he finds concerning. He also notes unilateral breast enlargement, which he has been meaning to discuss with his healthcare providers.  - He denies adding salt to his food or using soy sauce. He denies any recent gout flare-ups.    - Smoking: Quit on August 24, 2012  Denies alcohol use  Denies recreational drug use  - Occupation: Retired from the state     History: [place]   Pt denies taking NSAIDs, no GI losses, no abx use, no recent infections, no dysuria, no foam/blood in urine,  no cardiopulmonary sx's.   No acute illness, hospitalization or exposure to IV radiocontrast. His laboratory studies and medications were reviewed. All Nephrology related questions were answered to his satisfaction.    Past Medical History:   Diagnosis Date    Aortic stenosis     [facility]    Asthma     BPH (benign prostatic hyperplasia)     CAD (coronary artery disease)     Cardiomyopathy     CHF (congestive heart failure)     Chronic hoarseness     vocal cord surg    Chronic pain     CKD (chronic kidney disease)  stage 3, GFR 30-59 ml/min     CVA (cerebral infarction)     8/2012 olol; reviewed ed note    Ex-smoker     GERD (gastroesophageal reflux disease)     Hepatitis C     treatedharvoni says cured, RNA NEG 5/2025    Hypertension     Mixed hyperlipidemia 02/03/2025    Pancreatitis     Prostate cancer     Prostate cancer     Prostate cancer     PVD (peripheral vascular disease)     Renal insufficiency     Substance abuse     cocaine, etoh , tob in past       Past Surgical History:   Procedure Laterality Date    AORTIC VALVE REPLACEMENT  05/19/2014    Tissue valve replacement    BACK SURGERY      CARDIAC CATHETERIZATION      COLONOSCOPY  2011    COLONOSCOPY N/A 2/24/2021    Procedure: COLONOSCOPY;  Surgeon: Faith Carrillo MD;  Location: [facility];  Service: Endoscopy;  Laterality: N/A;    COLONOSCOPY N/A 7/2/2024    Procedure: COLONOSCOPY 6/4 plavix 5 day hold econ;  Surgeon: Prince Griffin MD;  Location: [facility];  Service: Endoscopy;  Laterality: N/A;    EPIDURAL STEROID INJECTION INTO CERVICAL SPINE N/A 6/21/2022    Procedure: C7-T1 IL PIEDAD;  Surgeon: Nehemiah Carmen MD;  Location: [facility] PAIN MGT;  Service: Pain Management;  Laterality: N/A;    EPIDURAL STEROID INJECTION INTO CERVICAL SPINE N/A 3/12/2024    Procedure: C7/T1 IL PIEDAD;  Surgeon: Nehemiah Carmen MD;  Location: [facility] PAIN MGT;  Service: Pain Management;  Laterality: N/A;    EPIDURAL STEROID INJECTION INTO CERVICAL SPINE N/A 4/10/2025    Procedure: Cervical C7/T1 Interlaminar PIEDAD with RN IV Sedation, Takes ASA, Plavix- will need to hold 5 days prior (clearance from Dr. Mckeon);  Surgeon: Nehemiah Carmen MD;  Location: [facility] PAIN MGT;  Service: Pain Management;  Laterality: N/A;    ESOPHAGOGASTRODUODENOSCOPY N/A 2/24/2021    Procedure: ESOPHAGOGASTRODUODENOSCOPY (EGD);  Surgeon: Faith Carrillo MD;  Location: [facility];  Service: Endoscopy;  Laterality: N/A;    ESOPHAGOGASTRODUODENOSCOPY N/A 7/2/2024    Procedure: EGD  (ESOPHAGOGASTRODUODENOSCOPY);  Surgeon: Prince Griffin MD;  Location: [facility] ENDO;  Service: Endoscopy;  Laterality: N/A;    ESOPHAGOGASTRODUODENOSCOPY N/A 11/19/2024    Procedure: EGD (ESOPHAGOGASTRODUODENOSCOPY);  Surgeon: Kayleigh Leiva MD;  Location: [facility] ENDO;  Service: Gastroenterology;  Laterality: N/A;    FOOT SURGERY      INJECTION, SACROILIAC JOINT Right 6/10/2025    Procedure: Right shoulder joint injection;  Surgeon: Nehemiah Carmen MD;  Location: [facility] PAIN MGT;  Service: Pain Management;  Laterality: Right;    INSERTION N/A 7/5/2023    Procedure: INSERTION;  Surgeon: Brandon Valencia MD;  Location: [facility] OR;  Service: Neurosurgery;  Laterality: N/A;  Antibiotic Beads    INTRALUMINAL GASTROINTESTINAL TRACT IMAGING VIA CAPSULE N/A 11/1/2024    Procedure: IMAGING PROCEDURE, GI TRACT, INTRALUMINAL, VIA CAPSULE;  Surgeon: [place], First Available;  Location: [facility] ENDO;  Service: Endoscopy;  Laterality: N/A;    LEFT HEART CATHETERIZATION Left 7/24/2020    Procedure: CATHETERIZATION, HEART, LEFT;  Surgeon: Pasha Martin MD;  Location: [facility] CATH LAB;  Service: Cardiology;  Laterality: Left;    PENILE PROSTHESIS IMPLANT      PENILE PROSTHESIS REVISION  04/30/2018    PROSTATECTOMY  09/2019    [facility]    radiation for prostate      REMOVAL OF HARDWARE FROM SPINE N/A 7/5/2023    Procedure: REMOVAL, HARDWARE, SPINE;  Surgeon: Brandon Valencia MD;  Location: [facility] OR;  Service: Neurosurgery;  Laterality: N/A;    REPLACEMENT N/A 7/5/2023    Procedure: REPLACEMENT;  Surgeon: Brandon Valencia MD;  Location: [facility] OR;  Service: Neurosurgery;  Laterality: N/A;  of [employer]. [employer]    REVISION OF PROCEDURE INVOLVING INFLATABLE PENILE PROSTHESIS N/A 7/22/2024    Procedure: REVISION, PROCEDURE INVOLVING INFLATABLE PENILE PROSTHESIS;  Surgeon: Rommel Rodriguez MD;  Location: [facility] OR;  Service: Urology;  Laterality: N/A;    TRANSFORAMINAL EPIDURAL INJECTION OF STEROID Right 10/20/2022     "Procedure: Right  L4/5 + L5/S1 TF PIEDAD RN IV Sedation;  Surgeon: Nehemiah Carmen MD;  Location: [facility] PAIN MGT;  Service: Pain Management;  Laterality: Right;    TRANSFORAMINAL LUMBAR INTERBODY FUSION (TLIF) USING COMPUTER-ASSISTED NAVIGATION Bilateral 2023    Procedure: FUSION, SPINE, LUMBAR, TLIF, USING COMPUTER-ASSISTED NAVIGATION;  Surgeon: Brandon Valencia MD;  Location: [facility] OR;  Service: Neurosurgery;  Laterality: Bilateral;  2 level lumbar fusion at L4-5 and L5-S1    UPPER GASTROINTESTINAL ENDOSCOPY      WASHOUT N/A 2023    Procedure: WASHOUT;  Surgeon: Brandon Valencia MD;  Location: [facility] OR;  Service: Neurosurgery;  Laterality: N/A;    WOUND EXPLORATION Bilateral 2023    Procedure: EXPLORATION, WOUND;  Surgeon: Brandon Valencia MD;  Location: [facility] OR;  Service: Neurosurgery;  Laterality: Bilateral;       Family History   Problem Relation Name Age of Onset    Heart disease Mother      Heart disease Father      Heart attack Sister      Heart attack Brother      Colon cancer Neg Hx      Colon polyps Neg Hx      Liver cancer Neg Hx      Inflammatory bowel disease Neg Hx      Liver disease Neg Hx      Rectal cancer Neg Hx      Stomach cancer Neg Hx      Ulcerative colitis Neg Hx         Social History     Socioeconomic History    Marital status:    Tobacco Use    Smoking status: Former     Current packs/day: 0.00     Average packs/day: 1 pack/day for 8.0 years (8.0 ttl pk-yrs)     Types: Cigarettes     Start date: 2004     Quit date: 2012     Years since quittin.8    Smokeless tobacco: Never   Substance and Sexual Activity    Alcohol use: Not Currently     Comment: Sober since 2012    Drug use: Not Currently     Types: "Crack" cocaine, Marijuana     Comment: Quit in     Sexual activity: Yes   Social History Narrative    No pets in household, wife and daughter smokers. [employer].    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; as of  retired     Social " "Drivers of Health     Financial Resource Strain: Low Risk  (2/13/2025)    Overall Financial Resource Strain (CARDIA)     Difficulty of Paying Living Expenses: Not hard at all   Food Insecurity: No Food Insecurity (2/13/2025)    Hunger Vital Sign     Worried About Running Out of Food in the Last Year: Never true     Ran Out of Food in the Last Year: Never true   Transportation Needs: No Transportation Needs (2/13/2025)    PRAPARE - Transportation     Lack of Transportation (Medical): No     Lack of Transportation (Non-Medical): No   Physical Activity: Sufficiently Active (2/13/2025)    Exercise Vital Sign     Days of Exercise per Week: 5 days     Minutes of Exercise per Session: 40 min   Stress: No Stress Concern Present (2/13/2025)    [employer] - Occupational Stress Questionnaire     Feeling of Stress : Only a little   Recent Concern: Stress - Stress Concern Present (1/31/2025)    [employer] - Occupational Stress Questionnaire     Feeling of Stress : Very much   Housing Stability: Low Risk  (2/13/2025)    Housing Stability Vital Sign     Unable to Pay for Housing in the Last Year: No     Number of Times Moved in the Last Year: 0     Homeless in the Last Year: No       Review of Systems   Constitutional:  Negative for fatigue and fever.   Respiratory:  Negative for shortness of breath and wheezing.    Cardiovascular:  Negative for chest pain.   Gastrointestinal:  Negative for abdominal pain.   Genitourinary:  Negative for hematuria.   Psychiatric/Behavioral:  Negative for confusion.          /78   Pulse 80   Resp 18   Ht 5' 9" (1.753 m)   Wt 109.3 kg (240 lb 15.4 oz)   BMI 35.58 kg/m²     BMP  Lab Results   Component Value Date     06/09/2025    K 4.8 06/09/2025     06/09/2025    CO2 25 06/09/2025    BUN 18 06/09/2025    CREATININE 1.6 (H) 06/09/2025    CALCIUM 9.3 06/09/2025    ANIONGAP 8 06/09/2025    EGFRNORACEVR 46 (L) 06/09/2025 "     Lab Results   Component Value Date    WBC 3.26 (L) 06/09/2025    HGB 14.0 06/09/2025    HCT 43.4 06/09/2025    MCV 88 06/09/2025     06/09/2025         Medications Ordered Prior to Encounter[1]       Objective:            Physical Exam  Constitutional:       Appearance: He is obese.   HENT:      Head: Normocephalic.   Cardiovascular:      Rate and Rhythm: Normal rate and regular rhythm.   Pulmonary:      Effort: Pulmonary effort is normal.      Breath sounds: Normal breath sounds.   Skin:     General: Skin is warm and dry.   Neurological:      Mental Status: He is alert and oriented to person, place, and time.         Assessment:       1. Stage 3b chronic kidney disease    2. Class 1 obesity due to excess calories with serious comorbidity and body mass index (BMI) of 34.0 to 34.9 in adult    3. Secondary hyperparathyroidism of renal origin    4. Chronic diastolic heart failure    5. Hypertension, unspecified type    6. Idiopathic chronic gout without tophus, unspecified site        Plan:         Assessment & Plan    I50.32 Chronic diastolic heart failure  N18.32 Stage 3b chronic kidney disease  E66.811, E66.09, Z68.34 Class 1 obesity due to excess calories with serious comorbidity and body mass index (BMI) of 34.0 to 34.9 in adult  N25.81 Secondary hyperparathyroidism of renal origin  I10 Hypertension, unspecified type  M1A.00X0 Idiopathic chronic gout without tophus, unspecified site    STAGE 3B CHRONIC KIDNEY DISEASE:  - CKD stage 3b, stable with creatinine 1.6 and GFR 46.  - Electrolytes WNL.  - Explained CKD staging system  - Discussed importance of maintaining stable BP and glucose for kidney health.  - Discussed importance of proper hydration, balancing fluid intake with heart failure management.  - Mr. Urias to maintain low sodium diet.   -Continue RAASi for kidney protection  - A1C of 5.5, indicating prediabetes.  - Mr. Urias to avoid high sugar foods and limit carbohydrate intake.  - Educated on  relationship between obesity and renal disease progression.  - Repeat labs prior to next appointment.  - Follow up in 6 months.    CLASS 1 OBESITY DUE TO EXCESS CALORIES WITH SERIOUS COMORBIDITY AND BODY MASS INDEX (BMI) OF 34.0 TO 34.9 IN ADULT:  - Obesity (BMI 35.5) as risk factor for CKD progression.  - Recommend increasing consumption of fruits and vegetables.  - Recommend low-impact exercises like walking or stationary bicycle, as tolerated.  - Educated on relationship between obesity and renal disease progression.    HYPERTENSION, UNSPECIFIED TYPE:  - Discussed importance of maintaining stable BP and glucose for kidney health.  - Mr. Urias to maintain low sodium diet.    CHRONIC DIASTOLIC HEART FAILURE:  - Stable heart failure without current symptoms.  - Discussed importance of balancing fluid intake with heart failure management.  - Mr. Urias to maintain low sodium diet.    IDIOPATHIC CHRONIC GOUT WITHOUT TOPHUS, UNSPECIFIED SITE:  - Gout well-controlled on allopurinol.  - Mr. Urias to continue to avoid smoking and alcohol.        RTC 6 months  49 minutes of total time spent on the encounter, which includes face to face time and non-face to face time preparing to see the patient (eg, review of tests), Obtaining and/or reviewing separately obtained history, Documenting clinical information in the electronic or other health record, Independently interpreting results (not separately reported) and communicating results to the patient/family/caregiver, or Care coordination (not separately reported).     HEATHER Johnson    This note was generated with the assistance of ambient listening technology. Verbal consent was obtained by the patient and accompanying visitor(s) for the recording of patient appointment to facilitate this note. I attest to having reviewed and edited the generated note for accuracy, though some syntax or spelling errors may persist. Please contact the author of this note for any  clarification.            [1]   Current Outpatient Medications on File Prior to Visit   Medication Sig Dispense Refill    albuterol (PROVENTIL/VENTOLIN HFA) 90 mcg/actuation inhaler INHALE 2 PUFFS INTO THE LUNGS EVERY 6 HOURS AS NEEDED FOR COUGH 8.5 g 3    allopurinoL (ZYLOPRIM) 100 MG tablet Take 100 mg by mouth once daily. Takes 0.5 tab      ALPRAZolam (XANAX) 1 MG tablet Take 1 mg by mouth daily as needed for Anxiety or Insomnia.      aspirin (ECOTRIN) 81 MG EC tablet Take 1 tablet (81 mg total) by mouth once daily. 90 tablet 3    atorvastatin (LIPITOR) 80 MG tablet Take 40 mg by mouth once daily. 0.5 tab of 80mg; once in AM and once in PM      azelastine (ASTELIN) 137 mcg (0.1 %) nasal spray 1 spray (137 mcg total) by Nasal route 2 (two) times daily. 30 mL 1    carvediloL (COREG) 12.5 MG tablet Take 1 tablet (12.5 mg total) by mouth 2 (two) times daily. 60 tablet 11    clopidogreL (PLAVIX) 75 mg tablet Take 1 tablet (75 mg total) by mouth once daily. 30 tablet 11    diclofenac sodium (VOLTAREN) 1 % Gel Apply 2 g topically 2 (two) times daily. for 7 days 100 g 0    esomeprazole (NEXIUM) 40 MG capsule Take 40 mg by mouth 2 (two) times daily.      fluticasone-umeclidin-vilanter (TRELEGY ELLIPTA) 200-62.5-25 mcg inhaler Inhale 1 puff into the lungs once daily. 60 each 5    gabapentin (NEURONTIN) 600 MG tablet Take 600 mg by mouth 3 (three) times daily as needed (pain).      [START ON 7/8/2025] HYDROcodone-acetaminophen (NORCO)  mg per tablet Take 1 tablet by mouth daily as needed for Pain (pain). 30 tablet 0    HYDROcodone-acetaminophen (NORCO)  mg per tablet Take 1 tablet by mouth daily as needed for Pain (pain). 30 tablet 0    hydrOXYzine HCL (ATARAX) 25 MG tablet Take 1 tablet (25 mg total) by mouth 3 (three) times daily as needed for Itching. 30 tablet 0    hydrOXYzine HCL (ATARAX) 25 MG tablet Take 1 tablet (25 mg total) by mouth nightly as needed for Itching (itching). May cause drowsiness. Do NOT  drive or operate heavy machinery. 30 tablet 1    losartan (COZAAR) 50 MG tablet Take 1 tablet (50 mg total) by mouth once daily. 30 tablet 11    multivitamin (THERAGRAN) per tablet Take 1 tablet by mouth once daily.      pramoxine-hydrocortisone (PROCTOCREAM-HC) 1-1 % rectal cream Place rectally 2 (two) times daily. 60 g 2    sertraline (ZOLOFT) 100 MG tablet TAKE TWO TABLETS BY MOUTH EVERY DAY FOR MENTAL HEALTH DOSE INCREASED TO 200MG/DAY      tiZANidine (ZANAFLEX) 4 MG tablet Take 4 mg by mouth every 8 (eight) hours as needed (pain).      zolpidem (AMBIEN CR) 12.5 MG CR tablet Take 12.5 mg by mouth.      evolocumab (REPATHA SURECLICK) 140 mg/mL PnIj Inject 1 mL (140 mg total) into the skin every 14 (fourteen) days. (Patient not taking: Reported on 6/20/2025) 2 each 9    pregabalin (LYRICA) 75 MG capsule Take 1 capsule (75 mg total) by mouth every evening for 5 days, THEN 1 capsule (75 mg total) 2 (two) times daily. 60 capsule 0    [DISCONTINUED] triamcinolone acetonide 0.1% (KENALOG) 0.1 % cream Apply topically 2 (two) times daily. PRN rash of trunk and legs. Steroid- limit to 2 weeks then taper. Repeat if flaring. (Patient not taking: Reported on 6/20/2025) 454 g 1     Current Facility-Administered Medications on File Prior to Visit   Medication Dose Route Frequency Provider Last Rate Last Admin    aztreonam 2,000 mg in D5W 100 mL IVPB  2,000 mg Intravenous On Call Procedure Rommel Rodriguez MD        ondansetron injection 4 mg  4 mg Intravenous Once PRN Nehemiah Carmen MD        ondansetron injection 4 mg  4 mg Intravenous Once PRN Nehemiah Carmen MD        sodium chloride 0.9% flush 10 mL  10 mL Intravenous PRN Wilda Horne MD

## 2025-06-20 NOTE — PATIENT INSTRUCTIONS
Kidney Health Tips     Low sodium diet: avoid/limit salt, processed foods (boxed or canned), fried foods, fast foods, etc as discussed  Low carbohydrate/sugar diet: avoid/limit sugary drinks, white breads, pasta, rice, etc as discussed  Lose weight. Obesity is a major risk factor to developing or  worsening Chronic Kidney Disease. Obesity can lead to inflammation and hyperfiltration that contribute to kidney damage.  Ensure adequate hydration- Water is best, flavored water/diluted juice (drink to thirst) daily as tolerated with no swelling/shortness of breath  Avoid NSAIDS: Advil, Ibuprofen, Aleve, Naproxen, Meloxicam, etc. (Aspirin is ok), Tylenol is Best as needed for pain/fever  Increase fruit and vegetable consumption: These provide essential nutrients and antioxidants which support kidney health.   Keep blood pressure under control. Take meds as ordered.  Exercise as tolerated. Physical activity improves blood flow to the kidneys.  Follow up as scheduled, labs 1 week prior to visit.

## 2025-06-25 NOTE — PROGRESS NOTES
Established Patient Chronic Pain Note (Follow up visit)    Chief Complaint:   Chief Complaint   Patient presents with    Follow-up       SUBJECTIVE:  Interval History (7/8/2025):  Patient Abdullahi Urias presents today for follow-up visit.  Patient was last seen on 6/10/2025 right shoulder inj with 60% relief.  Regarding his neck and lower back pain. Neck is stable. Low back is bothering him and is a 8/10. Low back pain is mainly on the right goes into the side and back of the right leg to the foot. Feels medications are helping with his pain but interested in possibly doing an PIEDAD in the lumbar spine. Previously followed by Dr. Valencia for fluid collection after surgery. Most recent MRI and aspiration in May/June 2024 with sterile fluid. He feels the pain medication is providing adequate pain relief and reduces the negative effects of chronic pain that affects quality of life.  Patient denies night fever/night sweats, urinary incontinence, bowel incontinence, significant weight loss and significant motor weakness.   Patient denies any other complaints or concerns at this time.      Interval History (5/12/2025):  Patient Abdullahi Urias presents today for follow-up visit.  Patient was last seen on 4/10/2025 C7/T1 IL PIEDAD with 30% relief. He does not feel this worked as well as it did in the past. Pain is 8/10 today. Pain is in the right neck and right shoulder and radiates down the right arm. He has tenderness to the right shoulder and it hurts to lay on his right side. He has had a shoulder injection in the past and states it worked very well for some of his arm and shoulder pain. Would like to repeat.   Requests refills on medcations. He feels the pain medication is providing adequate pain relief and reduces the negative effects of chronic pain that affects quality of life.  Low back pain stable today  Patient denies night fever/night sweats, urinary incontinence, bowel incontinence, significant weight loss and  significant motor weakness.   Patient denies any other complaints or concerns at this time.        Interval History (2/12/2025):   Abdullahi Urias is a 69 y.o. male presents today for follow-up chronic neck and back pain.  Current pain intensity is 7/10.  He continues to utilize tizanidine, Lyrica and also had previous short term supply of Norco provided.  Current pain is of the same type and quality in starts in the cervical myofascial region and radiates down the right upper extremity extending his hands and fingers.  He has had significant relief with cervical PIEDAD previously.  He also continues with chronic lower back pain with radiation down into the right lower extremity to the foot and ankle posterolaterally.    Interval HPI 12/13/2024:  Abdullahi Urias presents today for follow-up visit.  Patient was last seen on 5/12/2025. At that visit, the plan was to reach out to IR to address biopsy of seroma in lumbar spine. Patient reports pain as 8/10 today. He has pain in the neck radiating into shoulders and also lower back pain.     On 11/30/24, he fell off the ladder while putting up [employer] decorations. He did not seek medical attention afterwards. He has swelling and scabbing present to anterior R calf. States it was very swollen at onset.   Patient denies LOC. He fell about 4-5 feet.    Patient did follow up with IR and had biopsy of seroma. Results did not yield any abnormal growth. Patient states he did not follow up with anyone after this was completed.    Patient denies weight loss, fevers, chills, syncope, or LOC.    Interval History (8/14/2024):    Abdullahi Urias is a 69 y.o. male presents today for follow-up lower back and right leg pain.  Current pain intensity is 7/10.  He continues to utilize tizanidine and gabapentin without any difficulty.  He has had piriformis trigger point injections in the past that were beneficial.  He recently saw Neurosurgery who was concerned about the seroma and has requested  IR assistance for CT-guided biopsy/aspiration of the seroma near the surgical site.  This is yet to be completed unfortunately.    Patient is interested in repeat cervical PIEDAD as this was extremely beneficial for the past 5 months.    Patient denies night fever/night sweats, urinary incontinence, bowel incontinence, significant weight loss and significant motor weakness.   Patient denies any other complaints or concerns at this time.      Pain Disability Index Review:      7/8/2025     9:43 AM 2/12/2025     9:14 AM 12/13/2024    11:47 AM   Last 3 PDI Scores   Pain Disability Index (PDI) 56 [phone] 56     Interval HPI 04/09/2024:  Patient Abdullahi Urias presents today for follow-up visit.  Patient was last seen on  3/12/2024 C7/T1 IL PIEDAD with 95% relief.  Neck pain is doing much better and he is no longer having any numbness or tingling in the neck.  He rates his pain today a 4/10.  He does still have some lower back pain which does not radiate.  He continues to follow up with  following his laminectomy.    Interval History (2/26/2024):  Patient Abdullahi Urias presents today for follow-up visit.  Patient is seen seen today for review of his cervical MRI.  He has neck pain which radiates down into the bilateral shoulders with a tingling sensation.  He rates his pain today a 5/10 but states it will go up to a 9/10 especially with flexion of the neck.  He is currently taking gabapentin 600 mg twice a day.  He does take oxycodone sparingly if needed and is requesting a refill.    He also has chronic lumbar radiculopathy and has been seeing Dr. Wilkinson, he reports this is stable currently.    Interval History (3/6/2023):  Patient presents today for follow-up visit.  Patient was last seen on 1/9/23.  Patient reports pain as 7/10 today.  Patients is here today discuss neck pain with radicular symptoms.   Has tingling in R shoulder>L shoulder and will radiate up to his neck but other times it will radiate up to face or down  arm. This has been ongoing for one month now. Although he does not notice pain radiating down his arms, he has discomfort in both sides of his neck.   He will c/p lower back pain if he is standing or bending for a long period of time.     Since his last office visit, he underwent a TLIF at L4-S1 with Dr. Valencia on 5/17/23 followed by a wound exploration on 7/5/23. He is doing well at this time (in regards to his lower back).      Interval History (1/9/2023): Abdullahi Urias presents today for follow-up visit.  he underwent right L4/5 +L5/S1 TF PIEDAD on 10/20/22.  The patient reports that he is/was better following the procedure.  he reports 40% pain relief.  The changes lasted for a few weeks before the pain insidiously returned.  Patient reports pain as 9/10 today. Also has history of vasculogenic claudication. Recently went on a cruise, needed a wheelchair to get on the ship. Continues to report low back pain with radiation into his right lower extremity along L4/5-S1 distribution with associated numbness in the right foot.      Interval History (10/4/2022): Abdullahi Urias presents today for follow-up visit.  he underwent C7/T1 IL PIEDAD on 06/21/2022.  The patient reports that he is/was better following the procedure.  he reports 60% pain relief.  The changes lasted 6 weeks before neck stiffness and arm numbness returned. Patient reports pain as 8/10 today.    Today he reports his primary complaint is low back pain with radiation into right leg into the posterior and lateral aspect along L4-S1 distribution. Reports back pain is worse than neck pain at this time. Also has history of vasculogenic claudication. Patient reports pain is causing him to be depressed and anxious. Pain interferes with daily activities. Util[facility] rollator [facility]. Recently went on a cruise, needed to use a wheelchair due to weakness and pain.    Interval HPI  Abdullahi Urias is a 68 y.o. male who presents to the clinic for a  follow-up.  He reports that his back pain is much improved ever since undergoing bilateral piriformis trigger point injections.  He wants to focus more on his neck and upper extremity pain currently.  Since the last visit, Abdullahi Urias states the pain has been worsening. Current pain intensity is 8/10.    Interval HPI 01/27/2022:  Abdullahi Urias is a 66 y.o. male who presents to the clinic for the evaluation of lower back pain.  He is self referred.  He has past medical history of CVA, substance abuse, asthma, CHF, hypertension, CAD, cardiomyopathy, renal insufficiency, CKD stage 3, H/O hepatitis-C, H/O prostate cancer, multiple other medical comorbidities as listed in his chart.  The pain started several years ago and symptoms have been worsening.The pain is located in the lumbosacral area and radiates to the bilateral lower extremities extending posteriorly to the bilateral feet.  Of note, patient recently had cardiovascular studies done on his lower extremities which did demonstrate vasculogenic claudication.  The pain is described as Aching, burning, tingling and is rated as 8/10. The pain is rated with a score of  6/10 on the BEST day and a score of 10/10 on the WORST day.  Symptoms interfere with daily activity. The pain is exacerbated by activities.  The pain is mitigated by medications.        Non-Pharmacologic Treatments:  Physical Therapy/Home Exercise: yes  Ice/Heat:yes  TENS: yes  Acupuncture: no  Massage: yes  Chiropractic: no    Other: Surgery-   5/17/23: TLIF L4-S1 with Dr. Valencia  7/5/23: Wound exploration with Dr. Valencia        Pain Medications:  - Opioids: Norco, Tylenol 3  - Adjuvant Medications: Ambien, alprazolam, Flexeril, Voltaren gel, gabapentin, lidocaine patch, Zoloft  - Anti-Coagulants: Aspirin, Pletal      Pain Procedures:   -previous lumbar PIEDAD  -previous lumbar MBB/RFA  -previous lumbar laminectomy in 2001 at L5-S1  -01/27/2022:  Bilateral piriformis trigger point injections,  significant relief that is still ongoing  -right L4/5 +L5/S1 TF PIEDAD on 10/20/22 with 40% relief   3/12/2024 C7/T1 IL PIEDAD 95% relief   4/10/2025 C7/T1 IL PIEDAD with 30% relief.  6/10/2025 right shoulder inj with 60% relief    Imaging (Reviewed on 7/8/2025):                MRI lumbar spine 05/31/2024:  Surgical change with hardware and associated artifact L4-S1 posterior approach.  There is a peripherally enhancing fluid loculation at the dorsal postsurgical elements measuring approximately 4 cm X 7 cm epicenter L5 level sterility uncertain possible abscess.     No overt canal compromise.     No overt acute osseous finding     Conus medullaris unremarkable       2/23/2024 cervical MRI  FINDINGS:  The cervical cord reveals normal signal and morphology.     There is straightening/reversal of the normal cervical lordosis.  There is mild to moderate multilevel degenerative disc disease as detailed below.     C1-2: Moderate C1-2 arthrosis.     C2-C3: Mild disc bulge.  Moderate to severe right and mild left facet arthrosis with moderate right foraminal stenosis.     C3-C4: Mild disc bulge.  Bilateral uncovertebral joint spurring with mild foraminal stenosis.     C4-C5: Mild disc bulge.  Bilateral uncovertebral joint spurring with moderate right and mild left foraminal stenosis.     C5-C6: Moderate disc degeneration with disc bulging osteophyte with ventral sac impression.  Mild central stenosis.  Uncovertebral joint spurring with moderate to severe right foraminal stenosis.     C6-C7: Moderate degenerative disc disease with disc bulging osteophyte with mild ventral sac impression.  Uncovertebral joint spurring with mild foraminal stenosis.     C7-T1: Moderate disc degeneration with disc bulging osteophyte eccentric to the left with mild left lateral recess stenosis.  Uncovertebral joint spurring with moderate left and mild right foraminal stenosis.     Impression:     No acute abnormality.  Reversal of the normal cervical  lordosis.     C5-6, C6-7 and C7-T1 degenerative disc disease as above with C5-6 central canal stenosis.  Foraminal stenosis as above.    MRI lumbar spine 01/12/2021:  Visualized distal cord demonstrates normal signal.     No marrow replacement process.  No fracture.  No listhesis.     There are degenerative changes of the lower lumbar spine with mild disc space narrowing, most severe at L5-S1 with marginal spurring with facet arthropathy     L1-L2: There is posterior disc bulge with indentation of the thecal sac.  No spinal canal or neural foraminal encroachment.     L2-L3: Mild facet arthropathy.  Mild posterior disc bulge.  No spinal canal or neural foraminal encroachment.     L3-L4: Bilateral ligamentum flavum and facet arthropathy.  Mild posterior disc bulge with indentation of thecal sac.  No spinal canal or neural foraminal encroachment.     L4-L5: Ligamentum flavum and facet arthropathy with broad-based disc bulge.  No significant spinal canal stenosis.  There is crowding of the lateral recesses with narrowing along the inferior right greater than left neural foramen.     L5-S1: Posterior disc osteophyte complex with facet arthropathy.  Appearance of possible prior right hemilaminectomy changes..  There is severe narrowing along the right neural foramen  with mild to moderate narrowing along the left neural foramen.  No significant spinal canal stenosis        CT cervical spine 12/01/2020:  Vertebral body heights maintained.  2 mm anterolisthesis of C3 on C4.  Disc height loss and osteophyte changes at C6-7 and C7-T1.  Multilevel facet degenerative findings most prevalent at the right C2-3 and left C3-4 levels.     Neck soft tissues are unremarkable.     C2-3: No CT evidence of spinal canal stenosis.  Mild right neural foraminal narrowing.     C3-4: No CT evidence of significant spinal canal stenosis.  Left greater than right facet and uncovertebral degenerative changes results in mild right and severe left  neural foraminal narrowing.     C4-5: Posterior disc osteophyte complex present with mild spinal canal stenosis.  Mild to moderate right neural foraminal narrowing secondary to facet and uncovertebral degenerative findings.     C5-6: Posterior disc osteophyte complex present asymmetric to the right with mild spinal canal stenosis.  Severe right neural foraminal narrowing secondary to facet and uncovertebral degenerative findings.     C6-7: Posterior disc osteophyte complex present causing at least mild spinal canal stenosis.  Left greater than right facet and uncovertebral degenerative findings with mild-to-moderate right and moderate to severe left neural foraminal narrowing.     C7-T1: Posterior disc osteophyte complex with spinal canal stenosis suspected.  Mild left neural foraminal narrowing secondary to uncovertebral degenerative findings.        PMHx,PSHx, Social history, and Family history:  I have reviewed the patient's medical, surgical, social, and family history in detail and updated the computerized patient record.      Review of patient's allergies indicates:  No Known Allergies      Current Outpatient Medications   Medication Sig    albuterol (PROVENTIL/VENTOLIN HFA) 90 mcg/actuation inhaler INHALE 2 PUFFS INTO THE LUNGS EVERY 6 HOURS AS NEEDED FOR COUGH    allopurinoL (ZYLOPRIM) 100 MG tablet Take 100 mg by mouth once daily. Takes 0.5 tab    ALPRAZolam (XANAX) 1 MG tablet Take 1 mg by mouth daily as needed for Anxiety or Insomnia.    aspirin (ECOTRIN) 81 MG EC tablet Take 1 tablet (81 mg total) by mouth once daily.    atorvastatin (LIPITOR) 80 MG tablet Take 40 mg by mouth once daily. 0.5 tab of 80mg; once in AM and once in PM    azelastine (ASTELIN) 137 mcg (0.1 %) nasal spray 1 spray (137 mcg total) by Nasal route 2 (two) times daily.    carvediloL (COREG) 12.5 MG tablet Take 1 tablet (12.5 mg total) by mouth 2 (two) times daily.    clopidogreL (PLAVIX) 75 mg tablet Take 1 tablet (75 mg total) by  mouth once daily.    esomeprazole (NEXIUM) 40 MG capsule Take 40 mg by mouth 2 (two) times daily.    evolocumab (REPATHA SURECLICK) 140 mg/mL PnIj Inject 1 mL (140 mg total) into the skin every 14 (fourteen) days.    fluticasone-umeclidin-vilanter (TRELEGY ELLIPTA) 200-62.5-25 mcg inhaler Inhale 1 puff into the lungs once daily.    gabapentin (NEURONTIN) 600 MG tablet Take 600 mg by mouth 3 (three) times daily as needed (pain).    HYDROcodone-acetaminophen (NORCO)  mg per tablet Take 1 tablet by mouth daily as needed for Pain (pain).    HYDROcodone-acetaminophen (NORCO)  mg per tablet Take 1 tablet by mouth daily as needed for Pain (pain).    hydrOXYzine HCL (ATARAX) 25 MG tablet Take 1 tablet (25 mg total) by mouth 3 (three) times daily as needed for Itching.    hydrOXYzine HCL (ATARAX) 25 MG tablet Take 1 tablet (25 mg total) by mouth nightly as needed for Itching (itching). May cause drowsiness. Do NOT drive or operate heavy machinery.    losartan (COZAAR) 50 MG tablet Take 1 tablet (50 mg total) by mouth once daily.    multivitamin (THERAGRAN) per tablet Take 1 tablet by mouth once daily.    pramoxine-hydrocortisone (PROCTOCREAM-HC) 1-1 % rectal cream Place rectally 2 (two) times daily.    sertraline (ZOLOFT) 100 MG tablet TAKE TWO TABLETS BY MOUTH EVERY DAY FOR MENTAL HEALTH DOSE INCREASED TO 200MG/DAY    zolpidem (AMBIEN CR) 12.5 MG CR tablet Take 12.5 mg by mouth.    diclofenac sodium (VOLTAREN) 1 % Gel Apply 2 g topically 2 (two) times daily. for 7 days    pregabalin (LYRICA) 75 MG capsule Take 1 capsule (75 mg total) by mouth every evening for 5 days, THEN 1 capsule (75 mg total) 2 (two) times daily.    tiZANidine (ZANAFLEX) 4 MG tablet Take 1 tablet (4 mg total) by mouth 2 (two) times daily as needed (pain).     Current Facility-Administered Medications   Medication    aztreonam 2,000 mg in D5W 100 mL IVPB     Facility-Administered Medications Ordered in Other Visits   Medication    ondansetron  "injection 4 mg    ondansetron injection 4 mg    sodium chloride 0.9% flush 10 mL         REVIEW OF SYSTEMS:    GENERAL:  No weight loss, malaise or fevers.  HEENT:   No recent changes in vision or hearing  NECK:  Negative for lumps, no difficulty with swallowing.  RESPIRATORY:  Negative for cough, wheezing or shortness of breath, patient denies any recent URI.  CARDIOVASCULAR:  Negative for chest pain, leg swelling or palpitations.  GI:  Negative for abdominal discomfort, blood in stools or black stools or change in bowel habits.  MUSCULOSKELETAL:  See HPI.  SKIN:  Negative for lesions, rash, and itching.  PSYCH:  No mood disorder or recent psychosocial stressors.  Patients sleep is not disturbed secondary to pain.  HEMATOLOGY/LYMPHOLOGY:  Negative for prolonged bleeding, bruising easily or swollen nodes.  Patient is currently taking anti-coagulants - ASA and Pletal  NEURO:   No history of headaches, syncope, paralysis, seizures or tremors.  All other reviewed and negative other than HPI.       OBJECTIVE:    /82   Pulse 89   Resp 17   Ht 5' 9" (1.753 m)   Wt 108.7 kg (239 lb 12 oz)   BMI 35.40 kg/m²     PHYSICAL EXAMINATION:    GENERAL: Well appearing, in no acute distress, alert and oriented x3.  PSYCH:  Mood and affect appropriate.  SKIN: Skin color, texture, turgor normal, no rashes or lesions.  HEAD/FACE:  Normocephalic, atraumatic. Cranial nerves grossly intact.   NECK:  Tenderness palpation over the bilateral cervical paraspinous muscles.  Spurling's positive  bilaterally, worse on the right.  Limited range of motion secondary to pain in all planes.  Right shoulder: tender across joint space. Positive empty can. Negative neer's. Some limited ROM lifting above shoulder height  PULM: No evidence of respiratory difficulty, symmetric chest rise.  GI:  Soft and non-tender.  BACK:  Surgical scarring evident.  Incision(s) CDI. TTP over lumbar spine.  Positive straight leg raise on the right. Pain with " flexion and extension lumbar spine.  EXTREMITIES: TTP, abrasions and soft tissue swelling present over anterior   Right shoulder: tender to palpation. FROM.  Negative neers and empty can  NEURO: Bilateral upper and lower extremity coordination and muscle stretch reflexes are physiologic and symmetric.  Plantar response are downgoing. No clonus.  No loss of sensation is noted.  GAIT:  Slow, antalgic.      Neuro - Upper Extremities:  - BUE Strength:R/L: D: 5/5; B: 5/5; T: 5/5; WF: 5/5; WE: 5/5; IO: 5/5  - Extremity Reflexes: Brisk and symmetric throughout  - Sensory: Sensation to light touch intact bilaterally    Psych:  Mood and affect is appropriate      LABS:  Lab Results   Component Value Date    WBC 3.26 (L) 06/09/2025    HGB 14.0 06/09/2025    HCT 43.4 06/09/2025    MCV 88 06/09/2025     06/09/2025       CMP  Sodium   Date Value Ref Range Status   06/09/2025 139 136 - 145 mmol/L Final   03/06/2025 141 136 - 145 mmol/L Final     Potassium   Date Value Ref Range Status   06/09/2025 4.8 3.5 - 5.1 mmol/L Final   03/06/2025 5.1 3.5 - 5.1 mmol/L Final     Chloride   Date Value Ref Range Status   06/09/2025 106 95 - 110 mmol/L Final   03/06/2025 109 95 - 110 mmol/L Final     CO2   Date Value Ref Range Status   06/09/2025 25 23 - 29 mmol/L Final   03/06/2025 26 23 - 29 mmol/L Final     Glucose   Date Value Ref Range Status   06/09/2025 89 70 - 110 mg/dL Final   03/06/2025 94 70 - 110 mg/dL Final     BUN   Date Value Ref Range Status   06/09/2025 18 8 - 23 mg/dL Final     Creatinine   Date Value Ref Range Status   06/09/2025 1.6 (H) 0.5 - 1.4 mg/dL Final     Calcium   Date Value Ref Range Status   06/09/2025 9.3 8.7 - 10.5 mg/dL Final   03/06/2025 9.5 8.7 - 10.5 mg/dL Final     Protein Total   Date Value Ref Range Status   05/01/2025 7.4 6.0 - 8.4 gm/dL Final     Total Protein   Date Value Ref Range Status   03/06/2025 7.3 6.0 - 8.4 g/dL Final     Albumin   Date Value Ref Range Status   06/09/2025 4.2 3.5 - 5.2  g/dL Final   03/06/2025 3.9 3.5 - 5.2 g/dL Final     Total Bilirubin   Date Value Ref Range Status   03/06/2025 0.4 0.1 - 1.0 mg/dL Final     Comment:     For infants and newborns, interpretation of results should be based  on gestational age, weight and in agreement with clinical  observations.    Premature Infant recommended reference ranges:  Up to 24 hours.............<8.0 mg/dL  Up to 48 hours............<12.0 mg/dL  3-5 days..................<15.0 mg/dL  6-29 days.................<15.0 mg/dL       Bilirubin Total   Date Value Ref Range Status   05/01/2025 0.4 0.1 - 1.0 mg/dL Final     Comment:     For infants and newborns, interpretation of results should be based   on gestational age, weight and in agreement with clinical   observations.    Premature Infant recommended reference ranges:   0-24 hours:  <8.0 mg/dL   24-48 hours: <12.0 mg/dL   3-5 days:    <15.0 mg/dL   6-29 days:   <15.0 mg/dL     Alkaline Phosphatase   Date Value Ref Range Status   03/06/2025 90 40 - 150 U/L Final     ALP   Date Value Ref Range Status   05/01/2025 97 40 - 150 unit/L Final     AST   Date Value Ref Range Status   05/01/2025 23 11 - 45 unit/L Final   03/06/2025 36 10 - 40 U/L Final     ALT   Date Value Ref Range Status   05/01/2025 28 10 - 44 unit/L Final   03/06/2025 17 10 - 44 U/L Final     Anion Gap   Date Value Ref Range Status   06/09/2025 8 8 - 16 mmol/L Final     eGFR if    Date Value Ref Range Status   06/02/2022 51.1 (A) >60 mL/min/1.73 m^2 Final     eGFR if non    Date Value Ref Range Status   06/02/2022 44.2 (A) >60 mL/min/1.73 m^2 Final     Comment:     Calculation used to obtain the estimated glomerular filtration  rate (eGFR) is the CKD-EPI equation.          Lab Results   Component Value Date    HGBA1C 5.5 03/06/2025       ASSESSMENT: 69 y.o. year old male with neck pain, consistent with     1. Chronic pain syndrome        2. Cervical radiculopathy  [facility] Drug Screen      3.  Pain in other specified joint  Pain Clinic Drug Screen      4. Dorsalgia, unspecified  MRI Lumbar Spine Without Contrast      5. Lumbar radiculopathy                  PLAN:   - Interventions:  will get updated lumbar MRI and eval for right sided lumbar PIEDAD    Consider spinal cord stimulation therapy for chronic back and leg pain-provide the patient with [employer] today    S/p C7/T1 IL PIEDAD 95% relief 3/12/2024 (relief for over 3 months)  S/p right L4/5 +L5/S1 TF PIEDAD on 10/20/22 with 40% relief  S/p C7-T1 IL PIEDAD for diagnostic and therapeutic purposes with 60% relief    - Anticoagulation use: yes Aspirin and Plavix- Dr. Mckeon.       - Medications: I have stressed the importance of physical activity and a home exercise plan to help with pain and improve health. Patient can continue with medications for now since they are providing benefits, using them appropriately, and without side effects.       - Continue Tizanidine 4 mg BID, We have discussed potential deleterious side effects associated with this medication including  dizziness, drowsiness, dry mouth or tingling sensation in the upper or lower extremities.     - no longer using Lyrica    An opioid pain contract was completed on 02/12/2025 after having an extensive conversation about chronic opioid use for pain management. We discussed the risks and benefits of opioids.  I discouraged the patient from escalation of opioid use and try to minimize its use to decrease chance of dependence, tolerance, and opioid-based hyperalgesia.  I reviewed the  in great detail and there are no inconsistencies and it is appropriate with patient's history.  There are no signs of aberrant drug behavior.  The opioid risk tool was also completed, moderate risk.  Pt counseled about the side effects of long term use of opioids including dependence, tolerance, addiction, respiratory depression, somnelence , immune and endocrine dysfunction.  The patient expressed  understanding.    Previously provided Norco 10/325 mg daily p.r.n. severe pain, 30 tablets with future fill dates for 05/12/2025, 6/10/2025, 7/8/2025  Repeat UDS today     report:  Reviewed and consistent with medication use as prescribed.          - Therapy:  Advised patient continue with home exercises as tolerated     - Imaging: Reviewed previous imaging, labs, pathology results.     - Consults/Referrals:  EConsult to cardiology      - Follow up visit: after MRI     - Counseled patient regarding the importance of activity modification and physical therapy     - This condition does not require this patient to take time off of work, and the primary goal of our Pain Management services is to improve the patient's functional capacity.     - Patient Questions: Answered all of the patient's questions regarding diagnosis, therapy, and treatment     Visit today included increased complexity associated with the care of the episodic problem of chronic pain which was addressed and continue to manage the longitudinal care of the patient due to the serious and/or complex managed problem(s) listed above.    The above plan and management options were discussed at length with patient. Patient is in agreement with the above and verbalized understanding.      Visit today included increased complexity associated with the care of the episodic problem of chronic pain which was addressed and continue to manage the longitudinal care of the patient due to the serious and/or complex managed problem(s) listed above.      Nighat Mcmahon NP  Interventional Pain Management  [facility]      UDS 5/12/2025 negative  7/8/2025 pending

## 2025-07-08 ENCOUNTER — OFFICE VISIT (OUTPATIENT)
Dept: PAIN MEDICINE | Facility: CLINIC | Age: 70
End: 2025-07-08
Payer: MEDICARE

## 2025-07-08 VITALS
RESPIRATION RATE: 17 BRPM | WEIGHT: 239.75 LBS | DIASTOLIC BLOOD PRESSURE: 82 MMHG | HEART RATE: 89 BPM | SYSTOLIC BLOOD PRESSURE: 135 MMHG | BODY MASS INDEX: 35.51 KG/M2 | HEIGHT: 69 IN

## 2025-07-08 DIAGNOSIS — M54.9 DORSALGIA, UNSPECIFIED: ICD-10-CM

## 2025-07-08 DIAGNOSIS — M54.12 CERVICAL RADICULOPATHY: ICD-10-CM

## 2025-07-08 DIAGNOSIS — G89.4 CHRONIC PAIN SYNDROME: Primary | ICD-10-CM

## 2025-07-08 DIAGNOSIS — M25.59 PAIN IN OTHER SPECIFIED JOINT: ICD-10-CM

## 2025-07-08 DIAGNOSIS — M54.16 LUMBAR RADICULOPATHY: ICD-10-CM

## 2025-07-08 PROCEDURE — 1159F MED LIST DOCD IN RCRD: CPT | Mod: CPTII,S$GLB,, | Performed by: NURSE PRACTITIONER

## 2025-07-08 PROCEDURE — 3079F DIAST BP 80-89 MM HG: CPT | Mod: CPTII,S$GLB,, | Performed by: NURSE PRACTITIONER

## 2025-07-08 PROCEDURE — 99999 PR PBB SHADOW E&M-EST. PATIENT-LVL IV: CPT | Mod: PBBFAC,,, | Performed by: NURSE PRACTITIONER

## 2025-07-08 PROCEDURE — 3061F NEG MICROALBUMINURIA REV: CPT | Mod: CPTII,S$GLB,, | Performed by: NURSE PRACTITIONER

## 2025-07-08 PROCEDURE — 3044F HG A1C LEVEL LT 7.0%: CPT | Mod: CPTII,S$GLB,, | Performed by: NURSE PRACTITIONER

## 2025-07-08 PROCEDURE — 3066F NEPHROPATHY DOC TX: CPT | Mod: CPTII,S$GLB,, | Performed by: NURSE PRACTITIONER

## 2025-07-08 PROCEDURE — 80355 GABAPENTIN NON-BLOOD: CPT | Performed by: NURSE PRACTITIONER

## 2025-07-08 PROCEDURE — 3075F SYST BP GE 130 - 139MM HG: CPT | Mod: CPTII,S$GLB,, | Performed by: NURSE PRACTITIONER

## 2025-07-08 PROCEDURE — 3008F BODY MASS INDEX DOCD: CPT | Mod: CPTII,S$GLB,, | Performed by: NURSE PRACTITIONER

## 2025-07-08 PROCEDURE — 3288F FALL RISK ASSESSMENT DOCD: CPT | Mod: CPTII,S$GLB,, | Performed by: NURSE PRACTITIONER

## 2025-07-08 PROCEDURE — 99214 OFFICE O/P EST MOD 30 MIN: CPT | Mod: S$GLB,,, | Performed by: NURSE PRACTITIONER

## 2025-07-08 PROCEDURE — G2211 COMPLEX E/M VISIT ADD ON: HCPCS | Mod: S$GLB,,, | Performed by: NURSE PRACTITIONER

## 2025-07-08 PROCEDURE — 1125F AMNT PAIN NOTED PAIN PRSNT: CPT | Mod: CPTII,S$GLB,, | Performed by: NURSE PRACTITIONER

## 2025-07-08 PROCEDURE — 1101F PT FALLS ASSESS-DOCD LE1/YR: CPT | Mod: CPTII,S$GLB,, | Performed by: NURSE PRACTITIONER

## 2025-07-08 RX ORDER — TIZANIDINE 4 MG/1
4 TABLET ORAL 2 TIMES DAILY PRN
Qty: 60 TABLET | Refills: 2 | Status: SHIPPED | OUTPATIENT
Start: 2025-07-08

## 2025-07-08 NOTE — PROGRESS NOTES
Established Patient Chronic Pain Note (Follow up visit)    Chief Complaint:   Chief Complaint   Patient presents with    Neck Pain    Low-back Pain     Right        SUBJECTIVE:  Interval History (7/18/2025):  Patient Abdullahi Urias presents today for follow-up visit.  Patient is being seen for followup of low back pain. Pain is over the right buttocks and radiates to the right outer thigh. Pain to go from a sitting to standing position and with prolonged standing and walking. He rates his pain 7/10 today.  He recently had lumbar MRI updated  Patient denies night fever/night sweats, urinary incontinence, bowel incontinence, significant weight loss and significant motor weakness.   Patient denies any other complaints or concerns at this time.      Interval History (7/8/2025):  Patient Abdullahi Urias presents today for follow-up visit.  Patient was last seen on 6/10/2025 right shoulder inj with 60% relief.  Regarding his neck and lower back pain. Neck is stable. Low back is bothering him and is a 8/10. Low back pain is mainly on the right goes into the side and back of the right leg to the foot. Feels medications are helping with his pain but interested in possibly doing an PIEDAD in the lumbar spine. Previously followed by Dr. Valencia for fluid collection after surgery. Most recent MRI and aspiration in May/June 2024 with sterile fluid. He feels the pain medication is providing adequate pain relief and reduces the negative effects of chronic pain that affects quality of life.  Patient denies night fever/night sweats, urinary incontinence, bowel incontinence, significant weight loss and significant motor weakness.   Patient denies any other complaints or concerns at this time.      Interval History (5/12/2025):  Patient Abdullahi Urias presents today for follow-up visit.  Patient was last seen on 4/10/2025 C7/T1 IL PIEDAD with 30% relief. He does not feel this worked as well as it did in the past. Pain is 8/10 today. Pain is in  the right neck and right shoulder and radiates down the right arm. He has tenderness to the right shoulder and it hurts to lay on his right side. He has had a shoulder injection in the past and states it worked very well for some of his arm and shoulder pain. Would like to repeat.   Requests refills on medcations. He feels the pain medication is providing adequate pain relief and reduces the negative effects of chronic pain that affects quality of life.  Low back pain stable today  Patient denies night fever/night sweats, urinary incontinence, bowel incontinence, significant weight loss and significant motor weakness.   Patient denies any other complaints or concerns at this time.        Interval History (2/12/2025):   Abdullahi Urias is a 69 y.o. male presents today for follow-up chronic neck and back pain.  Current pain intensity is 7/10.  He continues to utilize tizanidine, Lyrica and also had previous short term supply of Norco provided.  Current pain is of the same type and quality in starts in the cervical myofascial region and radiates down the right upper extremity extending his hands and fingers.  He has had significant relief with cervical PIEDAD previously.  He also continues with chronic lower back pain with radiation down into the right lower extremity to the foot and ankle posterolaterally.    Interval HPI 12/13/2024:  Abdullahi Urias presents today for follow-up visit.  Patient was last seen on 7/8/2025. At that visit, the plan was to reach out to IR to address biopsy of seroma in lumbar spine. Patient reports pain as 8/10 today. He has pain in the neck radiating into shoulders and also lower back pain.     On 11/30/24, he fell off the ladder while putting up [employer] decorations. He did not seek medical attention afterwards. He has swelling and scabbing present to anterior R calf. States it was very swollen at onset.   Patient denies LOC. He fell about 4-5 feet.    Patient did follow up with IR and had  biopsy of seroma. Results did not yield any abnormal growth. Patient states he did not follow up with anyone after this was completed.    Patient denies weight loss, fevers, chills, syncope, or LOC.    Interval History (8/14/2024):    Abdullahi Urias is a 69 y.o. male presents today for follow-up lower back and right leg pain.  Current pain intensity is 7/10.  He continues to utilize tizanidine and gabapentin without any difficulty.  He has had piriformis trigger point injections in the past that were beneficial.  He recently saw Neurosurgery who was concerned about the seroma and has requested IR assistance for CT-guided biopsy/aspiration of the seroma near the surgical site.  This is yet to be completed unfortunately.    Patient is interested in repeat cervical PIEDAD as this was extremely beneficial for the past 5 months.    Patient denies night fever/night sweats, urinary incontinence, bowel incontinence, significant weight loss and significant motor weakness.   Patient denies any other complaints or concerns at this time.      Pain Disability Index Review:      7/18/2025     2:01 PM 7/8/2025     9:43 AM 2/12/2025     9:14 AM   Last 3 PDI Scores   Pain Disability Index (PDI) 49 [phone]     Interval HPI 04/09/2024:  Patient Abdullahi Urias presents today for follow-up visit.  Patient was last seen on  3/12/2024 C7/T1 IL PIEDAD with 95% relief.  Neck pain is doing much better and he is no longer having any numbness or tingling in the neck.  He rates his pain today a 4/10.  He does still have some lower back pain which does not radiate.  He continues to follow up with  following his laminectomy.    Interval History (2/26/2024):  Patient Abdullahi Urias presents today for follow-up visit.  Patient is seen seen today for review of his cervical MRI.  He has neck pain which radiates down into the bilateral shoulders with a tingling sensation.  He rates his pain today a 5/10 but states it will go up to a 9/10 especially with  flexion of the neck.  He is currently taking gabapentin 600 mg twice a day.  He does take oxycodone sparingly if needed and is requesting a refill.    He also has chronic lumbar radiculopathy and has been seeing Dr. Wilkinson, he reports this is stable currently.    Interval History (3/6/2023):  Patient presents today for follow-up visit.  Patient was last seen on 1/9/23.  Patient reports pain as 7/10 today.  Patients is here today discuss neck pain with radicular symptoms.   Has tingling in R shoulder>L shoulder and will radiate up to his neck but other times it will radiate up to face or down arm. This has been ongoing for one month now. Although he does not notice pain radiating down his arms, he has discomfort in both sides of his neck.   He will c/p lower back pain if he is standing or bending for a long period of time.     Since his last office visit, he underwent a TLIF at L4-S1 with Dr. Valencia on 5/17/23 followed by a wound exploration on 7/5/23. He is doing well at this time (in regards to his lower back).      Interval History (1/9/2023): Abdullahi Urias presents today for follow-up visit.  he underwent right L4/5 +L5/S1 TF PIEDAD on 10/20/22.  The patient reports that he is/was better following the procedure.  he reports 40% pain relief.  The changes lasted for a few weeks before the pain insidiously returned.  Patient reports pain as 9/10 today. Also has history of vasculogenic claudication. Recently went on a cruise, needed a wheelchair to get on the ship. Continues to report low back pain with radiation into his right lower extremity along L4/5-S1 distribution with associated numbness in the right foot.      Interval History (10/4/2022): Abdullahi Urias presents today for follow-up visit.  he underwent C7/T1 IL PIEDAD on 06/21/2022.  The patient reports that he is/was better following the procedure.  he reports 60% pain relief.  The changes lasted 6 weeks before neck stiffness and arm numbness returned. Patient  reports pain as 8/10 today.    Today he reports his primary complaint is low back pain with radiation into right leg into the posterior and lateral aspect along L4-S1 distribution. Reports back pain is worse than neck pain at this time. Also has history of vasculogenic claudication. Patient reports pain is causing him to be depressed and anxious. Pain interferes with daily activities. Util[facility] rollator [facility] sc[facility]. Recently went on a cruise, needed to use a wheelchair due to weakness and pain.    Interval HPI  Abdullahi Urias is a 68 y.o. male who presents to the clinic for a follow-up.  He reports that his back pain is much improved ever since undergoing bilateral piriformis trigger point injections.  He wants to focus more on his neck and upper extremity pain currently.  Since the last visit, Abdullahi Urias states the pain has been worsening. Current pain intensity is 8/10.    Interval HPI 01/27/2022:  Abdullahi Urias is a 66 y.o. male who presents to the clinic for the evaluation of lower back pain.  He is self referred.  He has past medical history of CVA, substance abuse, asthma, CHF, hypertension, CAD, cardiomyopathy, renal insufficiency, CKD stage 3, H/O hepatitis-C, H/O prostate cancer, multiple other medical comorbidities as listed in his chart.  The pain started several years ago and symptoms have been worsening.The pain is located in the lumbosacral area and radiates to the bilateral lower extremities extending posteriorly to the bilateral feet.  Of note, patient recently had cardiovascular studies done on his lower extremities which did demonstrate vasculogenic claudication.  The pain is described as Aching, burning, tingling and is rated as 8/10. The pain is rated with a score of  6/10 on the BEST day and a score of 10/10 on the WORST day.  Symptoms interfere with daily activity. The pain is exacerbated by activities.  The pain is mitigated by medications.        Non-Pharmacologic  Treatments:  Physical Therapy/Home Exercise: yes  Ice/Heat:yes  TENS: yes  Acupuncture: no  Massage: yes  Chiropractic: no    Other: Surgery-   5/17/23: TLIF L4-S1 with Dr. Valencia  7/5/23: Wound exploration with Dr. Valencia        Pain Medications:  - Opioids: Norco, Tylenol 3  - Adjuvant Medications: Ambien, alprazolam, Flexeril, Voltaren gel, gabapentin, lidocaine patch, Zoloft  - Anti-Coagulants: Aspirin, Pletal      Pain Procedures:   -previous lumbar PIEDAD  -previous lumbar MBB/RFA  -previous lumbar laminectomy in 2001 at L5-S1  -01/27/2022:  Bilateral piriformis trigger point injections, significant relief that is still ongoing  -right L4/5 +L5/S1 TF PIEDAD on 10/20/22 with 40% relief   3/12/2024 C7/T1 IL PIEDAD 95% relief   4/10/2025 C7/T1 IL PIEDAD with 30% relief.  6/10/2025 right shoulder inj with 60% relief    Imaging (Reviewed on 7/18/2025):  7/14/2025 MRI lumbar spine  FINDINGS: Status post L4-S1 PLIF and laminectomy with a 5.1 x 3.8 cm posterior paravertebral postoperative fluid collection/seroma posterior to the L5-S1 laminectomy.  No other complicating process identified.  No discitis or osteomyelitis.  Lumbar spine alignment is normal. No fracture is identified. The conus medullaris is normal in position and appearance. No suspicious bone lesions identified. Degenerative changes and spinal stenosis will be detailed below on a level by level basis.     T12-L1: Unremarkable.     L1-L2: Minor annular disc bulge and facet arthropathy without stenosis.     L2-L3: Minor annular disc bulge and facet arthropathy without stenosis.     L3-L4: Minor annular disc bulge and facet arthropathy without stenosis.     L4-L5: Status post PLIF and posterior decompression.  No significant central canal or foraminal stenosis identified.     L5-S1:Status post PLIF and posterior decompression.  No significant central canal or foraminal stenosis identified.        Impression:     Status post L4-S1 PLIF and laminectomy with a 5.1 x 3.8  cm posterior paravertebral postoperative fluid collection/seroma.  No other complication identified.  Minor disc bulging and facet arthropathy from L1-2 through L4-5.  No significant stenosis or evidence of neural impingement.                MRI lumbar spine 05/31/2024:  Surgical change with hardware and associated artifact L4-S1 posterior approach.  There is a peripherally enhancing fluid loculation at the dorsal postsurgical elements measuring approximately 4 cm X 7 cm epicenter L5 level sterility uncertain possible abscess.     No overt canal compromise.     No overt acute osseous finding     Conus medullaris unremarkable       2/23/2024 cervical MRI  FINDINGS:  The cervical cord reveals normal signal and morphology.     There is straightening/reversal of the normal cervical lordosis.  There is mild to moderate multilevel degenerative disc disease as detailed below.     C1-2: Moderate C1-2 arthrosis.     C2-C3: Mild disc bulge.  Moderate to severe right and mild left facet arthrosis with moderate right foraminal stenosis.     C3-C4: Mild disc bulge.  Bilateral uncovertebral joint spurring with mild foraminal stenosis.     C4-C5: Mild disc bulge.  Bilateral uncovertebral joint spurring with moderate right and mild left foraminal stenosis.     C5-C6: Moderate disc degeneration with disc bulging osteophyte with ventral sac impression.  Mild central stenosis.  Uncovertebral joint spurring with moderate to severe right foraminal stenosis.     C6-C7: Moderate degenerative disc disease with disc bulging osteophyte with mild ventral sac impression.  Uncovertebral joint spurring with mild foraminal stenosis.     C7-T1: Moderate disc degeneration with disc bulging osteophyte eccentric to the left with mild left lateral recess stenosis.  Uncovertebral joint spurring with moderate left and mild right foraminal stenosis.     Impression:     No acute abnormality.  Reversal of the normal cervical lordosis.     C5-6, C6-7 and  C7-T1 degenerative disc disease as above with C5-6 central canal stenosis.  Foraminal stenosis as above.    MRI lumbar spine 01/12/2021:  Visualized distal cord demonstrates normal signal.     No marrow replacement process.  No fracture.  No listhesis.     There are degenerative changes of the lower lumbar spine with mild disc space narrowing, most severe at L5-S1 with marginal spurring with facet arthropathy     L1-L2: There is posterior disc bulge with indentation of the thecal sac.  No spinal canal or neural foraminal encroachment.     L2-L3: Mild facet arthropathy.  Mild posterior disc bulge.  No spinal canal or neural foraminal encroachment.     L3-L4: Bilateral ligamentum flavum and facet arthropathy.  Mild posterior disc bulge with indentation of thecal sac.  No spinal canal or neural foraminal encroachment.     L4-L5: Ligamentum flavum and facet arthropathy with broad-based disc bulge.  No significant spinal canal stenosis.  There is crowding of the lateral recesses with narrowing along the inferior right greater than left neural foramen.     L5-S1: Posterior disc osteophyte complex with facet arthropathy.  Appearance of possible prior right hemilaminectomy changes..  There is severe narrowing along the right neural foramen  with mild to moderate narrowing along the left neural foramen.  No significant spinal canal stenosis        CT cervical spine 12/01/2020:  Vertebral body heights maintained.  2 mm anterolisthesis of C3 on C4.  Disc height loss and osteophyte changes at C6-7 and C7-T1.  Multilevel facet degenerative findings most prevalent at the right C2-3 and left C3-4 levels.     Neck soft tissues are unremarkable.     C2-3: No CT evidence of spinal canal stenosis.  Mild right neural foraminal narrowing.     C3-4: No CT evidence of significant spinal canal stenosis.  Left greater than right facet and uncovertebral degenerative changes results in mild right and severe left neural foraminal narrowing.      C4-5: Posterior disc osteophyte complex present with mild spinal canal stenosis.  Mild to moderate right neural foraminal narrowing secondary to facet and uncovertebral degenerative findings.     C5-6: Posterior disc osteophyte complex present asymmetric to the right with mild spinal canal stenosis.  Severe right neural foraminal narrowing secondary to facet and uncovertebral degenerative findings.     C6-7: Posterior disc osteophyte complex present causing at least mild spinal canal stenosis.  Left greater than right facet and uncovertebral degenerative findings with mild-to-moderate right and moderate to severe left neural foraminal narrowing.     C7-T1: Posterior disc osteophyte complex with spinal canal stenosis suspected.  Mild left neural foraminal narrowing secondary to uncovertebral degenerative findings.        PMHx,PSHx, Social history, and Family history:  I have reviewed the patient's medical, surgical, social, and family history in detail and updated the computerized patient record.      Review of patient's allergies indicates:  No Known Allergies      Current Outpatient Medications   Medication Sig    albuterol (PROVENTIL) 2.5 mg /3 mL (0.083 %) nebulizer solution Take 3 mLs (2.5 mg total) by nebulization every 6 (six) hours as needed for Wheezing or Shortness of Breath. Rescue    albuterol (PROVENTIL/VENTOLIN HFA) 90 mcg/actuation inhaler INHALE 2 PUFFS INTO THE LUNGS EVERY 6 HOURS AS NEEDED FOR COUGH    allopurinoL (ZYLOPRIM) 100 MG tablet Take 100 mg by mouth once daily. Takes 0.5 tab    ALPRAZolam (XANAX) 1 MG tablet Take 1 mg by mouth daily as needed for Anxiety or Insomnia.    aspirin (ECOTRIN) 81 MG EC tablet Take 1 tablet (81 mg total) by mouth once daily.    aspirin-acetaminophen-caffeine 250-250-65 mg (EXCEDRIN MIGRAINE) 250-250-65 mg per tablet Take 1 tablet by mouth every 6 (six) hours as needed for Pain.    atorvastatin (LIPITOR) 80 MG tablet Take 40 mg by mouth once daily. 0.5 tab of  80mg; once in AM and once in PM    azelastine (ASTELIN) 137 mcg (0.1 %) nasal spray 1 spray (137 mcg total) by Nasal route 2 (two) times daily.    carvediloL (COREG) 12.5 MG tablet Take 1 tablet (12.5 mg total) by mouth 2 (two) times daily.    clopidogreL (PLAVIX) 75 mg tablet Take 1 tablet (75 mg total) by mouth once daily.    esomeprazole (NEXIUM) 40 MG capsule Take 40 mg by mouth 2 (two) times daily.    evolocumab (REPATHA SURECLICK) 140 mg/mL PnIj Inject 1 mL (140 mg total) into the skin every 14 (fourteen) days.    fluticasone-umeclidin-vilanter (TRELEGY ELLIPTA) 200-62.5-25 mcg inhaler Inhale 1 puff into the lungs once daily.    gabapentin (NEURONTIN) 600 MG tablet Take 600 mg by mouth 3 (three) times daily as needed (pain).    HYDROcodone-acetaminophen (NORCO)  mg per tablet Take 1 tablet by mouth daily as needed for Pain (pain).    hydrOXYzine HCL (ATARAX) 25 MG tablet Take 1 tablet (25 mg total) by mouth 3 (three) times daily as needed for Itching.    hydrOXYzine HCL (ATARAX) 25 MG tablet Take 1 tablet (25 mg total) by mouth nightly as needed for Itching (itching). May cause drowsiness. Do NOT drive or operate heavy machinery.    losartan (COZAAR) 50 MG tablet Take 1 tablet (50 mg total) by mouth once daily.    multivitamin (THERAGRAN) per tablet Take 1 tablet by mouth once daily.    ofloxacin (OCUFLOX) 0.3 % ophthalmic solution Place 1 drop into both eyes 4 (four) times daily. for 5 days    pramoxine-hydrocortisone (PROCTOCREAM-HC) 1-1 % rectal cream Place rectally 2 (two) times daily.    sertraline (ZOLOFT) 100 MG tablet TAKE TWO TABLETS BY MOUTH EVERY DAY FOR MENTAL HEALTH DOSE INCREASED TO 200MG/DAY    tiZANidine (ZANAFLEX) 4 MG tablet Take 1 tablet (4 mg total) by mouth 2 (two) times daily as needed (pain).    zolpidem (AMBIEN CR) 12.5 MG CR tablet Take 12.5 mg by mouth.    diclofenac sodium (VOLTAREN) 1 % Gel Apply 2 g topically 2 (two) times daily. for 7 days    pregabalin (LYRICA) 75 MG  "capsule Take 1 capsule (75 mg total) by mouth every evening for 5 days, THEN 1 capsule (75 mg total) 2 (two) times daily.     Current Facility-Administered Medications   Medication    aztreonam 2,000 mg in D5W 100 mL IVPB     Facility-Administered Medications Ordered in Other Visits   Medication    ondansetron injection 4 mg    ondansetron injection 4 mg    sodium chloride 0.9% flush 10 mL         REVIEW OF SYSTEMS:    GENERAL:  No weight loss, malaise or fevers.  HEENT:   No recent changes in vision or hearing  NECK:  Negative for lumps, no difficulty with swallowing.  RESPIRATORY:  Negative for cough, wheezing or shortness of breath, patient denies any recent URI.  CARDIOVASCULAR:  Negative for chest pain, leg swelling or palpitations.  GI:  Negative for abdominal discomfort, blood in stools or black stools or change in bowel habits.  MUSCULOSKELETAL:  See HPI.  SKIN:  Negative for lesions, rash, and itching.  PSYCH:  No mood disorder or recent psychosocial stressors.  Patients sleep is not disturbed secondary to pain.  HEMATOLOGY/LYMPHOLOGY:  Negative for prolonged bleeding, bruising easily or swollen nodes.  Patient is currently taking anti-coagulants - ASA and Pletal  NEURO:   No history of headaches, syncope, paralysis, seizures or tremors.  All other reviewed and negative other than HPI.       OBJECTIVE:    /63 (BP Location: Left arm, Patient Position: Sitting)   Pulse 83   Resp 17   Ht 5' 9" (1.753 m)   Wt 109.1 kg (240 lb 6.6 oz)   BMI 35.50 kg/m²     PHYSICAL EXAMINATION:    GENERAL: Well appearing, in no acute distress, alert and oriented x3.  PSYCH:  Mood and affect appropriate.  SKIN: Skin color, texture, turgor normal, no rashes or lesions.  HEAD/FACE:  Normocephalic, atraumatic. Cranial nerves grossly intact.   NECK:  Tenderness palpation over the bilateral cervical paraspinous muscles.  Spurling's positive  bilaterally, worse on the right.  Limited range of motion secondary to pain in all " planes.  Right shoulder: tender across joint space. Positive empty can. Negative neer's. Some limited ROM lifting above shoulder height  PULM: No evidence of respiratory difficulty, symmetric chest rise.  GI:  Soft and non-tender.  BACK:  Surgical scarring evident.  Incision(s) CDI. TTP over lumbar spine.  Positive straight leg raise on the right. Pain with flexion and extension lumbar spine. SIJ TTP Right with positive fabers, compression, and distraction.  EXTREMITIES: TTP, abrasions and soft tissue swelling present over anterior   Right shoulder: tender to palpation. FROM.  Negative neers and empty can  NEURO: Bilateral upper and lower extremity coordination and muscle stretch reflexes are physiologic and symmetric.  Plantar response are downgoing. No clonus.  No loss of sensation is noted.  GAIT:  Slow, antalgic.      Neuro - Upper Extremities:  - BUE Strength:R/L: D: 5/5; B: 5/5; T: 5/5; WF: 5/5; WE: 5/5; IO: 5/5  - Extremity Reflexes: Brisk and symmetric throughout  - Sensory: Sensation to light touch intact bilaterally    Psych:  Mood and affect is appropriate      LABS:  Lab Results   Component Value Date    WBC 3.26 (L) 06/09/2025    HGB 14.0 06/09/2025    HCT 43.4 06/09/2025    MCV 88 06/09/2025     06/09/2025       CMP  Sodium   Date Value Ref Range Status   06/09/2025 139 136 - 145 mmol/L Final   03/06/2025 141 136 - 145 mmol/L Final     Potassium   Date Value Ref Range Status   06/09/2025 4.8 3.5 - 5.1 mmol/L Final   03/06/2025 5.1 3.5 - 5.1 mmol/L Final     Chloride   Date Value Ref Range Status   06/09/2025 106 95 - 110 mmol/L Final   03/06/2025 109 95 - 110 mmol/L Final     CO2   Date Value Ref Range Status   06/09/2025 25 23 - 29 mmol/L Final   03/06/2025 26 23 - 29 mmol/L Final     Glucose   Date Value Ref Range Status   06/09/2025 89 70 - 110 mg/dL Final   03/06/2025 94 70 - 110 mg/dL Final     BUN   Date Value Ref Range Status   06/09/2025 18 8 - 23 mg/dL Final     Creatinine   Date Value  Ref Range Status   06/09/2025 1.6 (H) 0.5 - 1.4 mg/dL Final     Calcium   Date Value Ref Range Status   06/09/2025 9.3 8.7 - 10.5 mg/dL Final   03/06/2025 9.5 8.7 - 10.5 mg/dL Final     Protein Total   Date Value Ref Range Status   05/01/2025 7.4 6.0 - 8.4 gm/dL Final     Total Protein   Date Value Ref Range Status   03/06/2025 7.3 6.0 - 8.4 g/dL Final     Albumin   Date Value Ref Range Status   06/09/2025 4.2 3.5 - 5.2 g/dL Final   03/06/2025 3.9 3.5 - 5.2 g/dL Final     Total Bilirubin   Date Value Ref Range Status   03/06/2025 0.4 0.1 - 1.0 mg/dL Final     Comment:     For infants and newborns, interpretation of results should be based  on gestational age, weight and in agreement with clinical  observations.    Premature Infant recommended reference ranges:  Up to 24 hours.............<8.0 mg/dL  Up to 48 hours............<12.0 mg/dL  3-5 days..................<15.0 mg/dL  6-29 days.................<15.0 mg/dL       Bilirubin Total   Date Value Ref Range Status   05/01/2025 0.4 0.1 - 1.0 mg/dL Final     Comment:     For infants and newborns, interpretation of results should be based   on gestational age, weight and in agreement with clinical   observations.    Premature Infant recommended reference ranges:   0-24 hours:  <8.0 mg/dL   24-48 hours: <12.0 mg/dL   3-5 days:    <15.0 mg/dL   6-29 days:   <15.0 mg/dL     Alkaline Phosphatase   Date Value Ref Range Status   03/06/2025 90 40 - 150 U/L Final     ALP   Date Value Ref Range Status   05/01/2025 97 40 - 150 unit/L Final     AST   Date Value Ref Range Status   05/01/2025 23 11 - 45 unit/L Final   03/06/2025 36 10 - 40 U/L Final     ALT   Date Value Ref Range Status   05/01/2025 28 10 - 44 unit/L Final   03/06/2025 17 10 - 44 U/L Final     Anion Gap   Date Value Ref Range Status   06/09/2025 8 8 - 16 mmol/L Final     eGFR if    Date Value Ref Range Status   06/02/2022 51.1 (A) >60 mL/min/1.73 m^2 Final     eGFR if non    Date  Value Ref Range Status   06/02/2022 44.2 (A) >60 mL/min/1.73 m^2 Final     Comment:     Calculation used to obtain the estimated glomerular filtration  rate (eGFR) is the CKD-EPI equation.          Lab Results   Component Value Date    HGBA1C 5.5 03/06/2025       ASSESSMENT: 69 y.o. year old male with neck pain, consistent with     1. Sacroiliitis  Case Request-RAD/Other Procedure Area: Right SIJ injection      2. Postlaminectomy syndrome of lumbar region        3. Lumbar radiculopathy                    PLAN:   - Interventions:  schedule right SIJ injection  Explained the risks and benefits of the procedure in detail with the patient today in clinic along with alternative treatment options, and the patient elected to pursue the intervention.      If no relief, could consider caudal PIEDAD  Consider spinal cord stimulation therapy for chronic back and leg pain-provide the patient with [employer] previously    S/p C7/T1 IL PIEDAD 95% relief 3/12/2024 (relief for over 3 months)  S/p right L4/5 +L5/S1 TF PIEDAD on 10/20/22 with 40% relief  S/p C7-T1 IL PIEDAD for diagnostic and therapeutic purposes with 60% relief    - Anticoagulation use: yes Aspirin and Plavix- Dr. Mckeon.- do not have to hold for SIJ injection       - Medications: I have stressed the importance of physical activity and a home exercise plan to help with pain and improve health. Patient can continue with medications for now since they are providing benefits, using them appropriately, and without side effects.       - Continue Tizanidine 4 mg BID, We have discussed potential deleterious side effects associated with this medication including  dizziness, drowsiness, dry mouth or tingling sensation in the upper or lower extremities.     - no longer using Lyrica    An opioid pain contract was completed on 02/12/2025 after having an extensive conversation about chronic opioid use for pain management. We discussed the risks and benefits of opioids.  I  discouraged the patient from escalation of opioid use and try to minimize its use to decrease chance of dependence, tolerance, and opioid-based hyperalgesia.  I reviewed the  in great detail and there are no inconsistencies and it is appropriate with patient's history.  There are no signs of aberrant drug behavior.  The opioid risk tool was also completed, moderate risk.  Pt counseled about the side effects of long term use of opioids including dependence, tolerance, addiction, respiratory depression, somnelence , immune and endocrine dysfunction.  The patient expressed understanding.    Previously provided Norco 10/325 mg daily p.r.n. severe pain, 30 tablets with future fill dates for 05/12/2025, 6/10/2025, 7/8/2025     report:  Reviewed and consistent with medication use as prescribed.          - Therapy:  Advised patient continue with home exercises as tolerated     - Imaging: Reviewed previous imaging, labs, pathology results.     - Consults/Referrals:  EConsult to cardiology      - Follow up visit: 4 weeks after injection     - Counseled patient regarding the importance of activity modification and physical therapy     - This condition does not require this patient to take time off of work, and the primary goal of our Pain Management services is to improve the patient's functional capacity.     - Patient Questions: Answered all of the patient's questions regarding diagnosis, therapy, and treatment     Visit today included increased complexity associated with the care of the episodic problem of chronic pain which was addressed and continue to manage the longitudinal care of the patient due to the serious and/or complex managed problem(s) listed above.    The above plan and management options were discussed at length with patient. Patient is in agreement with the above and verbalized understanding.      Visit today included increased complexity associated with the care of the episodic problem of chronic  pain which was addressed and continue to manage the longitudinal care of the patient due to the serious and/or complex managed problem(s) listed above.      Nighat Mcmahon NP  Interventional Pain Management  [facility]      UDS 5/12/2025 negative  7/8/2025 appropriate

## 2025-07-13 ENCOUNTER — OFFICE VISIT (OUTPATIENT)
Dept: URGENT CARE | Facility: CLINIC | Age: 70
End: 2025-07-13
Payer: MEDICARE

## 2025-07-13 VITALS
HEIGHT: 69 IN | BODY MASS INDEX: 35.4 KG/M2 | DIASTOLIC BLOOD PRESSURE: 58 MMHG | TEMPERATURE: 97 F | WEIGHT: 239 LBS | RESPIRATION RATE: 18 BRPM | OXYGEN SATURATION: 96 % | HEART RATE: 78 BPM | SYSTOLIC BLOOD PRESSURE: 121 MMHG

## 2025-07-13 DIAGNOSIS — H10.89 OTHER CONJUNCTIVITIS OF RIGHT EYE: ICD-10-CM

## 2025-07-13 DIAGNOSIS — H57.89 EYE REDNESS: ICD-10-CM

## 2025-07-13 DIAGNOSIS — R03.1 BLOOD PRESSURE LOWER THAN PRIOR MEASUREMENT: ICD-10-CM

## 2025-07-13 DIAGNOSIS — H53.149 PHOTOPHOBIA: ICD-10-CM

## 2025-07-13 DIAGNOSIS — Z86.73 HISTORY OF CVA IN ADULTHOOD: ICD-10-CM

## 2025-07-13 DIAGNOSIS — R51.9 HEADACHE, UNSPECIFIED HEADACHE TYPE: Primary | ICD-10-CM

## 2025-07-13 PROCEDURE — 99214 OFFICE O/P EST MOD 30 MIN: CPT | Mod: S$GLB,,, | Performed by: NURSE PRACTITIONER

## 2025-07-13 RX ORDER — OFLOXACIN 3 MG/ML
1 SOLUTION/ DROPS OPHTHALMIC 4 TIMES DAILY
Qty: 10 ML | Refills: 0 | Status: SHIPPED | OUTPATIENT
Start: 2025-07-13 | End: 2025-07-18

## 2025-07-13 NOTE — PROGRESS NOTES
"Subjective:      Patient ID: Abdullahi Urias is a 69 y.o. male.    Vitals:  height is 5' 9" (1.753 m) and weight is 108.4 kg (239 lb). His tympanic temperature is 97 °F (36.1 °C). His blood pressure is 121/58 (abnormal) and his pulse is 78. His respiration is 18 and oxygen saturation is 96%.     Chief Complaint: Headache    Onset of sxs 07/12/25. Pt is c/o headaches,photophobia,phonophobia,and bilateral eye redness.Pt has taken tylenol to alleviate sxs with no relief.Patient reports headache had a gradual onset and headache is not the worst of his life. He states he previously had a stroke in 2012 which also involved a headache. He states his current headache is not as painful and he does not have the other symptoms he had at that time (facial numbness, weakness, slurring words). He denies nausea, vomiting, chest pain, dyspnea. No other concerns were voiced.     Headache   This is a new problem. The current episode started yesterday. The problem has been unchanged. The pain is located in the Retro-orbital region. The pain does not radiate. The quality of the pain is described as throbbing and shooting. The pain is at a severity of 6/10. The pain is moderate. Associated symptoms include eye redness, phonophobia and photophobia. Pertinent negatives include no blurred vision, dizziness, eye pain, fever, nausea, neck pain, numbness or vomiting. Nothing aggravates the symptoms. He has tried acetaminophen for the symptoms. The treatment provided no relief.       Constitution: Negative for chills and fever.   Neck: Negative for neck pain and neck stiffness.   Cardiovascular:  Negative for chest pain.   Eyes:  Positive for eye discharge, eye redness and photophobia. Negative for eye trauma, foreign body in eye, eye pain, vision loss, double vision and blurred vision.   Respiratory:  Negative for shortness of breath, stridor and wheezing.    Gastrointestinal:  Negative for nausea, vomiting, constipation and diarrhea.   Skin:  " Negative for rash and erythema.   Neurological:  Positive for headaches. Negative for dizziness, light-headedness, facial drooping, speech difficulty, disorientation, altered mental status, numbness and tingling.   Psychiatric/Behavioral:  Negative for altered mental status and disorientation.       Objective:     Physical Exam   Constitutional: He is oriented to person, place, and time. He appears well-developed.  Non-toxic appearance. He does not appear ill. No distress.   HENT:   Head: Normocephalic and atraumatic. Head is without abrasion, without contusion and without laceration.   Ears:   Right Ear: Hearing, tympanic membrane, external ear and ear canal normal.   Left Ear: Hearing, tympanic membrane, external ear and ear canal normal.   Nose: Nose normal. No mucosal edema, rhinorrhea or nasal deformity. No epistaxis. Right sinus exhibits no maxillary sinus tenderness and no frontal sinus tenderness. Left sinus exhibits no maxillary sinus tenderness and no frontal sinus tenderness.   Mouth/Throat: Uvula is midline, oropharynx is clear and moist and mucous membranes are normal. No trismus in the jaw. Normal dentition. No uvula swelling. No posterior oropharyngeal erythema.   Eyes: Conjunctivae, EOM and lids are normal. Pupils are equal, round, and reactive to light. Lids are everted and swept, no foreign bodies found. Right eye exhibits discharge. Right eye exhibits no hordeolum. No foreign body present in the right eye. No scleral icterus. Right eye exhibits normal extraocular motion and no nystagmus. vision grossly intact gaze aligned appropriately   Neck: Trachea normal and phonation normal. Neck supple. No neck rigidity present.   Cardiovascular: Normal rate, regular rhythm, normal heart sounds and normal pulses.   Pulmonary/Chest: Effort normal and breath sounds normal. No stridor. No respiratory distress.   Abdominal: Normal appearance and bowel sounds are normal. He exhibits no distension. Soft. There is  no abdominal tenderness.   Musculoskeletal: Normal range of motion.         General: No deformity. Normal range of motion.   Neurological: no focal deficit. He is alert and oriented to person, place, and time. He displays no weakness. No cranial nerve deficit or sensory deficit. He exhibits normal muscle tone. Coordination and gait normal.   Skin: Skin is warm, dry, intact, not diaphoretic, not pale and no rash. Capillary refill takes less than 2 seconds. No abrasion, No burn, No bruising, No erythema and No ecchymosis   Psychiatric: His speech is normal and behavior is normal. Judgment and thought content normal.   Nursing note and vitals reviewed.      Assessment:     1. Headache, unspecified headache type    2. Photophobia    3. Eye redness    4. Blood pressure lower than prior measurement    5. Other conjunctivitis of right eye    6. History of CVA in adulthood        Plan:       Headache, unspecified headache type    Photophobia    Eye redness    Blood pressure lower than prior measurement    Other conjunctivitis of right eye    History of CVA in adulthood    Other orders  -     ofloxacin (OCUFLOX) 0.3 % ophthalmic solution; Place 1 drop into both eyes 4 (four) times daily. for 5 days  Dispense: 10 mL; Refill: 0  -     aspirin-acetaminophen-caffeine 250-250-65 mg (EXCEDRIN MIGRAINE) 250-250-65 mg per tablet; Take 1 tablet by mouth every 6 (six) hours as needed for Pain.  Dispense: 30 tablet; Refill: 0          Medical Decision Making:   Initial Assessment:   This patient presents with a headache most consistent with benign headache such as a tension type headache vs migraine. No headache red flags. Neurologic exam without evidence of meningismus, AMS, focal neurologic findings so doubt meningitis, encephalitis, stroke. Presentation not consistent with acute intracranial bleed to include SAH (lack of risk factors, headache history). No history of trauma so doubt ICH. Given history and physical temporal arteritis  unlikely, as is acute angle closure glaucoma. Doubt carotid artery dissection given no focal neuro deficits, no neck trauma or recent neck strain. Patient with no signs of increased intracranial pressure or weight loss so less likely mass effect in brain from tumor or abscess or idiopathic intracranial hypertension. Patient given toradol IM in clinic and reports decreased pain/symtpoms Patient discharged home with instructions to see PCP in follow up. ER precautions given             Vision Screening    Right eye Left eye Both eyes   Without correction 20/30 20/30 20/25   With correction        Patient Instructions   HEADACHE:    Drink plenty of fluids to remain hydrated.     Take the prescribed Excedrin migraine for your headache as instructed.     Get plenty rest.     Slowly change from laying, sitting, and standing positions to help lightheadedness/dizziness.    Sit or lie down immediately when dizziness/lightheaded to avoid further injury.     Watch for increase pain, fever, vomiting, neck pain or mental confusion.    If your condition worsens or fails to improve we recommend that you receive another evaluation at the ER immediately or contact your PCP to discuss your concerns or return here.    -You must understand that you've received an urgent care treatment only and that you may be released before all your medical problems are known or treated.   You the patient are responsible for arranging follow- up care, as instructed.       PLEASE READ YOUR DISCHARGE INSTRUCTIONS ENTIRELY AS IT CONTAINS IMPORTANT INFORMATION.     Use the antibiotic drops 4 times daily for 7 days - do not use past 10 days.      Keep hands clean. Wash hands before and after touching near your eyes.      Can use saline drops throughout the day if eyes feel dry or irritated.      Please follow up with an eye doctor in 3 days.      Please go to the ER for severely worsening symptoms (vision changes, pain, fever, swelling around your eye).       Please arrange follow up with your primary medical clinic as soon as possible. You must understand that you've received an Urgent Care treatment only and that you may be released before all of your medical problems are known or treated. You, the patient, will arrange for follow up as instructed. If your symptoms worsen or fail to improve you should go to the Emergency Room.  WE CANNOT RULE OUT ALL POSSIBLE CAUSES OF YOUR SYMPTOMS IN THE URGENT CARE SETTING PLEASE GO TO THE ER IF YOU FEELS YOUR CONDITION IS WORSENING OR YOU WOULD LIKE EMERGENT EVALUATION.

## 2025-07-13 NOTE — PATIENT INSTRUCTIONS
HEADACHE:    Drink plenty of fluids to remain hydrated.     Take the prescribed Excedrin migraine for your headache as instructed.     Get plenty rest.     Slowly change from laying, sitting, and standing positions to help lightheadedness/dizziness.    Sit or lie down immediately when dizziness/lightheaded to avoid further injury.     Watch for increase pain, fever, vomiting, neck pain or mental confusion.    If your condition worsens or fails to improve we recommend that you receive another evaluation at the ER immediately or contact your PCP to discuss your concerns or return here.    -You must understand that you've received an urgent care treatment only and that you may be released before all your medical problems are known or treated.   You the patient are responsible for arranging follow- up care, as instructed.       PLEASE READ YOUR DISCHARGE INSTRUCTIONS ENTIRELY AS IT CONTAINS IMPORTANT INFORMATION.     Use the antibiotic drops 4 times daily for 7 days - do not use past 10 days.      Keep hands clean. Wash hands before and after touching near your eyes.      Can use saline drops throughout the day if eyes feel dry or irritated.      Please follow up with an eye doctor in 3 days.      Please go to the ER for severely worsening symptoms (vision changes, pain, fever, swelling around your eye).      Please arrange follow up with your primary medical clinic as soon as possible. You must understand that you've received an Urgent Care treatment only and that you may be released before all of your medical problems are known or treated. You, the patient, will arrange for follow up as instructed. If your symptoms worsen or fail to improve you should go to the Emergency Room.  WE CANNOT RULE OUT ALL POSSIBLE CAUSES OF YOUR SYMPTOMS IN THE URGENT CARE SETTING PLEASE GO TO THE ER IF YOU FEELS YOUR CONDITION IS WORSENING OR YOU WOULD LIKE EMERGENT EVALUATION.

## 2025-07-14 ENCOUNTER — HOSPITAL ENCOUNTER (OUTPATIENT)
Dept: RADIOLOGY | Facility: HOSPITAL | Age: 70
Discharge: HOME | End: 2025-07-14
Attending: NURSE PRACTITIONER
Payer: MEDICARE

## 2025-07-14 DIAGNOSIS — M54.9 DORSALGIA, UNSPECIFIED: ICD-10-CM

## 2025-07-14 PROCEDURE — 72148 MRI LUMBAR SPINE W/O DYE: CPT | Mod: TC

## 2025-07-14 PROCEDURE — 72148 MRI LUMBAR SPINE W/O DYE: CPT | Mod: 26,,, | Performed by: RADIOLOGY

## 2025-07-15 ENCOUNTER — TELEPHONE (OUTPATIENT)
Dept: URGENT CARE | Facility: CLINIC | Age: 70
End: 2025-07-15
Payer: MEDICARE

## 2025-07-18 ENCOUNTER — OFFICE VISIT (OUTPATIENT)
Dept: PAIN MEDICINE | Facility: CLINIC | Age: 70
End: 2025-07-18
Payer: MEDICARE

## 2025-07-18 ENCOUNTER — HOSPITAL ENCOUNTER (OUTPATIENT)
Dept: RADIOLOGY | Facility: HOSPITAL | Age: 70
Discharge: HOME | End: 2025-07-18
Attending: HOSPITALIST
Payer: OTHER GOVERNMENT

## 2025-07-18 ENCOUNTER — OFFICE VISIT (OUTPATIENT)
Dept: PULMONOLOGY | Facility: CLINIC | Age: 70
End: 2025-07-18
Payer: MEDICARE

## 2025-07-18 VITALS
WEIGHT: 240.44 LBS | HEART RATE: 83 BPM | SYSTOLIC BLOOD PRESSURE: 113 MMHG | DIASTOLIC BLOOD PRESSURE: 63 MMHG | RESPIRATION RATE: 17 BRPM | HEIGHT: 69 IN | BODY MASS INDEX: 35.61 KG/M2

## 2025-07-18 VITALS
OXYGEN SATURATION: 97 % | RESPIRATION RATE: 22 BRPM | BODY MASS INDEX: 35.56 KG/M2 | HEIGHT: 69 IN | SYSTOLIC BLOOD PRESSURE: 124 MMHG | DIASTOLIC BLOOD PRESSURE: 60 MMHG | WEIGHT: 240.06 LBS | HEART RATE: 72 BPM

## 2025-07-18 DIAGNOSIS — M54.16 LUMBAR RADICULOPATHY: ICD-10-CM

## 2025-07-18 DIAGNOSIS — M46.1 SACROILIITIS: Primary | ICD-10-CM

## 2025-07-18 DIAGNOSIS — R91.8 OTHER NONSPECIFIC ABNORMAL FINDING OF LUNG FIELD: ICD-10-CM

## 2025-07-18 DIAGNOSIS — J45.40 MODERATE PERSISTENT ASTHMA WITHOUT COMPLICATION: Primary | ICD-10-CM

## 2025-07-18 DIAGNOSIS — Z87.891 FORMER TOBACCO USE: ICD-10-CM

## 2025-07-18 DIAGNOSIS — R91.1 LUNG NODULE: ICD-10-CM

## 2025-07-18 DIAGNOSIS — J38.2 VOCAL CORD NODULES: ICD-10-CM

## 2025-07-18 DIAGNOSIS — M96.1 POSTLAMINECTOMY SYNDROME OF LUMBAR REGION: ICD-10-CM

## 2025-07-18 DIAGNOSIS — E66.09 CLASS 1 OBESITY DUE TO EXCESS CALORIES WITH SERIOUS COMORBIDITY AND BODY MASS INDEX (BMI) OF 34.0 TO 34.9 IN ADULT: ICD-10-CM

## 2025-07-18 DIAGNOSIS — E66.811 CLASS 1 OBESITY DUE TO EXCESS CALORIES WITH SERIOUS COMORBIDITY AND BODY MASS INDEX (BMI) OF 34.0 TO 34.9 IN ADULT: ICD-10-CM

## 2025-07-18 DIAGNOSIS — R06.02 SOB (SHORTNESS OF BREATH): ICD-10-CM

## 2025-07-18 PROCEDURE — 99999 PR PBB SHADOW E&M-EST. PATIENT-LVL V: CPT | Mod: PBBFAC,,, | Performed by: HOSPITALIST

## 2025-07-18 PROCEDURE — 99999 PR PBB SHADOW E&M-EST. PATIENT-LVL IV: CPT | Mod: PBBFAC,,, | Performed by: NURSE PRACTITIONER

## 2025-07-18 PROCEDURE — 71271 CT THORAX LUNG CANCER SCR C-: CPT | Mod: TC

## 2025-07-18 PROCEDURE — 71271 CT THORAX LUNG CANCER SCR C-: CPT | Mod: 26,,, | Performed by: STUDENT IN AN ORGANIZED HEALTH CARE EDUCATION/TRAINING PROGRAM

## 2025-07-18 PROCEDURE — 99215 OFFICE O/P EST HI 40 MIN: CPT | Mod: PBBFAC,25 | Performed by: HOSPITALIST

## 2025-07-18 RX ORDER — ALBUTEROL SULFATE 0.83 MG/ML
2.5 SOLUTION RESPIRATORY (INHALATION) EVERY 6 HOURS PRN
Qty: 90 ML | Refills: 3 | Status: SHIPPED | OUTPATIENT
Start: 2025-07-18 | End: 2026-07-18

## 2025-07-18 NOTE — ASSESSMENT & PLAN NOTE
- pt interested in glp-1s- discussed needing to show effort on dieting   - reported history of pancreatitis, will discuss further at follow up

## 2025-07-18 NOTE — ASSESSMENT & PLAN NOTE
- follow up with ct chest in 6 months to evaluate new nodules on today's scan in pt with 30 pack year history

## 2025-07-18 NOTE — ASSESSMENT & PLAN NOTE
- reports prior removal of nodules in the past  - with intermittent hoarseness, would like to be evaluated

## 2025-07-18 NOTE — PROGRESS NOTES
Subjective:      Patient ID: Abdullahi Urias is a 69 y.o. male.    Chief Complaint: Bronchitis and Asthma    Interval Hx 7/18/25:    Mr. Urias presents today for follow up asthma. He was last seen 1/22/25- continued on Trelegy.     Continues with dyspnea when walking short distances. Albuterol inhaler sometimes helps a little, hasn't tried to use the nebulizer. Hasn't been able to work out, also with a lot of sweets in diet.    Interim Testing:  - LDCT 7/18/25- Lung-rads 3- new 4.5mm RUL and left perifussural 5.4mm nodules     Pulmonary Interventions:  - Trelegy- using daily  - Albuterol HFA- last used yesterday  - Albuterol neb- forgot about it    Interval Hx 1/22/25:     Mr. Urias presents today for follow up asthma. He was last seen 7/2024- continued on Trelegy.      Has had nasal congestion for the past 2 weeks or so. No dental pain or sinus pressure. No fever or body aches. Has been coughing a little with it. No change in shortness of breath or wheezing. Got back to the gym about three weeks ago.     Pulmonary Interventions:  - Trelegy- daily  - Albuterol HFA- hasn't used in a long time  - Albuterol neb- hasn't had to use in a long time     nterval Hx 7/18/24:     Mr. Urias presents today for follow up ground glass lung nodule and asthma. He was last seen 1/18/24- continued on Trelegy, 6 month LDCT ordered.      Mr. Urias has been doing well, respiratory symptoms controlled. He had been losing weight, but stopped going to the gym after Mother's day, is going to try and get back to going. He is having surgery with Urology on Monday. His last surgery was about May 2023- no prior issues with anesthesia. He intermittently wears his CPAP machine, [facility]     Interim Work Up:  LDCT 7/18/24- Lung-rads 1, resolution of previously seen ground      Pulmonary Interventions:  Albuterol HFA- been a long time since used  Albuterol nebulized- been a long time since used  Trelegy- daily     Interval Hx  1/18/24:     Mr. Urias presents to Pulmonary clinic for follow up asthma. He was last seen 11/21/23- at that visit reported increased TANIA use and productive cough. He was treated for exacerbation with medrol dose pack and z pack. Feeling much better as far as mobility, been going to the gym everyday. Breathing much improved, not having to use TANIA since he finished the steroids.      Interim Work Up:  LDCT 1/18/24- Lung-rads 3, compared to CTA 8/25/22- 8mm LLL groundglass focus, stable 4mm left fissural nodule     Pulmonary Interventions:  Albuterol HFA- last used in November.  Albuterol nebulized- last did one in November  Trelegy 200- daily     Interval Hx 11/21/23:     Mr. Urias presents to Pulmonary clinic for increased use of rescue inhaler. He reports worsening shortness of breath and wheezing since last Thursday. No fever. Productive cough, was clear, now phlegm is yellow. Nebulizer helps, uses three times per day. No Le swelling. No wheezing on exam. No known sick contacts.      Interim Work Up:  CXR today- No acute change     Pulmonary Interventions:   Albuterol HFA- as needed  Albuterol nebulized- TID  Trelegy 200- compliant  Mucinex     Interval Hx 10/18/23:     Mr. Urias presents to Pulmonary clinic for follow up dyspnea. He was last seen 8/2023 following an acute care visit for dyspnea. At that visit he was prescribed a nebulizer with albuterol solution and atrovent nasal spray. Further eval done with CXR, BNP and  D-dimer given his recent surgery. BNP WNL, D-dimer elevated at 4.19. VQ scan normal.     Mr. Urias states that his dyspnea is a little better, but certainly still present. He has recently started with outpatient physical therapy and has been walking around his house lot. He is motivated to try and lose weight.      Interim Testing  VQ scan- Normal  BNP- WNL  D-dimer 4  CXR- No acute findings     Pulmonary Interventions:   Albuterol HFA- Only uses every 2 weeks or so  Albuterol nebulized-  hasn't used it  Trelegy 200- using daily  Atrovent Nasal spray     Interval History 8/18/2023:     Mr. Urias presents to Pulmonary clinic as an acute care visit with increasing dyspnea. Feeling more out of breath with exertion, coughing a little more over the past several weeks. Also with congestion over the past week, productive last night and this morning- clear/yellow. No sore throat. No fever. No thrush. No calf swelling or pain.      Per chart review- since I last saw patient he had his TLIF 5/17/23 with Dr. Valencia which was complicated by post-op infection, treated with hardware removal and IV antibiotics. Completed 6 weeks of IV abx today- 8/18/23, to have PICC removed.     Pulmonary Interventions:   Albuterol- Using 2-3/day, helps  Trelegy- once daily  Flonase- using every day for the past week or so     Interval History 4/18/23:     Mr. Urias is seen in follow up of asthma as well as Pulmonary pre-op assessment for TLIF, I last saw him in March, at that time he was changed to Trelegy from Advair for persistent asthma symptoms (using albuterol 3-4 times daily)     Interim Testing:  Complete PFT 4/2023- Normal spirometry, loop consistent with restrictive pattern, bronchodilator portion not done as he used inhaler prior to exam  6MW- Remained on room air, Aristeo 1-3, walked 89% predicted  ABG - 7.36/41/85/23, done on room air  CXR 4/13- No acute findings, cardiomegaly, no effusions, no pneumo, no focal consolidation     Pulmonary interventions:   Albuterol- not having to use really since starting Trelegy  Trelegy- has seen a big improvement  PDM- Benefited from technique education     HPI 3/7/23:  67 year old male with history of AS s/p bioprosthetic AVR, dCHF, CKD3, HTN, CVA, CHEO ([facility]), former tobacco user, prostate cancer 2020 s/p prostatectomy s/p radiation, in remission who was referred to Pulmonary clinic by Dick Baez MD for evaluation of  asthma. Mr. Urias was admitted to the hospital  "1/25-1/26 for treatment of asthma exacerbation. He was treated with IV steroids, breathing treatments, empiric antibiotics and oxygen as needed. CXR clear, SpO2 98% on room air on day of discharge. Patient reports several months of wheezing. Has been using Advair diskus twice daily and Albuterol inhaler 2-3/day. He did notice that symptoms improved when he was on a cruise last week, and worsened when back in [place].      Quit smoking 2012 (with stroke), about 30 pack years     Pertinent Work Up:  CXR 1/24/2023- Clear chest, no pneumo or effusion. Cardiomegaly.  CTA Chest 8/25/2022: Negative for PE, clear lungs  Eosinophils borderline/mildly elevated 1/2023     Pulmonary Interventions:  Albuterol HFA, using 2-3x/day  Fluticasone- salmeterol 250-50mcg twice daily, been on since end of January    Review of Systems   Respiratory:  Positive for chest tightness and dyspnea on extertion. Negative for cough and wheezing.      Objective:     Physical Exam   Constitutional: He is oriented to person, place, and time. He appears well-developed and well-nourished. He is obese.   Cardiovascular: Normal rate and regular rhythm.   Pulmonary/Chest: Normal expansion, effort normal and breath sounds normal.   Neurological: He is alert and oriented to person, place, and time.   Skin: Skin is warm and dry.     Personal Diagnostic Review  As Above      7/13/2025    10:13 AM 7/8/2025     9:41 AM 6/20/2025     9:26 AM 6/10/2025     9:46 AM 6/10/2025     9:22 AM 6/10/2025     9:17 AM 6/10/2025     9:13 AM   Pulmonary Function Tests   SpO2 96 %   98 % 94 % 100 % 100 %   Height 5' 9" (1.753 m) 5' 9" (1.753 m) 5' 9" (1.753 m)       Weight 108.4 kg (239 lb) 108.7 kg (239 lb 12 oz) 109.3 kg (240 lb 15.4 oz)       BMI (Calculated) 35.3 35.4 35.6            Assessment:     No diagnosis found.     Encounter Medications[1]  No orders of the defined types were placed in this encounter.    Plan:       Problem List Items Addressed This Visit       " Class 1 obesity due to excess calories with serious comorbidity and body mass index (BMI) of 34.0 to 34.9 in adult    - pt interested in glp-1s- discussed needing to show effort on dieting   - reported history of pancreatitis, will discuss further at follow up         Moderate persistent asthma without complication - Primary    - overall controlled, suspect chronic dyspnea multifactorial but heavily influenced by weight  - continue Trelegy and albuterol as needed, discussed adding in nebulizer for acute symptoms         Relevant Medications    albuterol (PROVENTIL) 2.5 mg /3 mL (0.083 %) nebulizer solution    SOB (shortness of breath)    - encouraged increasing activity and focusing on making diet changes         Vocal cord nodules    - reports prior removal of nodules in the past  - with intermittent hoarseness, would like to be evaluated         Relevant Orders    Ambulatory referral/consult to ENT    Lung nodule    - follow up with ct chest in 6 months to evaluate new nodules on today's scan in pt with 30 pack year history         Relevant Orders    CT Chest Without Contrast     Other Visit Diagnoses         Other nonspecific abnormal finding of lung field        Relevant Orders    CT Chest Without Contrast          Follow up in 6 months or sooner as needed.         [1]   Outpatient Encounter Medications as of 7/18/2025   Medication Sig Dispense Refill    albuterol (PROVENTIL/VENTOLIN HFA) 90 mcg/actuation inhaler INHALE 2 PUFFS INTO THE LUNGS EVERY 6 HOURS AS NEEDED FOR COUGH 8.5 g 3    allopurinoL (ZYLOPRIM) 100 MG tablet Take 100 mg by mouth once daily. Takes 0.5 tab      ALPRAZolam (XANAX) 1 MG tablet Take 1 mg by mouth daily as needed for Anxiety or Insomnia.      aspirin (ECOTRIN) 81 MG EC tablet Take 1 tablet (81 mg total) by mouth once daily. 90 tablet 3    aspirin-acetaminophen-caffeine 250-250-65 mg (EXCEDRIN MIGRAINE) 250-250-65 mg per tablet Take 1 tablet by mouth every 6 (six) hours as needed for  Pain. 30 tablet 0    atorvastatin (LIPITOR) 80 MG tablet Take 40 mg by mouth once daily. 0.5 tab of 80mg; once in AM and once in PM      azelastine (ASTELIN) 137 mcg (0.1 %) nasal spray 1 spray (137 mcg total) by Nasal route 2 (two) times daily. 30 mL 1    carvediloL (COREG) 12.5 MG tablet Take 1 tablet (12.5 mg total) by mouth 2 (two) times daily. 60 tablet 11    clopidogreL (PLAVIX) 75 mg tablet Take 1 tablet (75 mg total) by mouth once daily. 30 tablet 11    esomeprazole (NEXIUM) 40 MG capsule Take 40 mg by mouth 2 (two) times daily.      evolocumab (REPATHA SURECLICK) 140 mg/mL PnIj Inject 1 mL (140 mg total) into the skin every 14 (fourteen) days. 2 each 9    fluticasone-umeclidin-vilanter (TRELEGY ELLIPTA) 200-62.5-25 mcg inhaler Inhale 1 puff into the lungs once daily. 60 each 5    gabapentin (NEURONTIN) 600 MG tablet Take 600 mg by mouth 3 (three) times daily as needed (pain).      HYDROcodone-acetaminophen (NORCO)  mg per tablet Take 1 tablet by mouth daily as needed for Pain (pain). 30 tablet 0    hydrOXYzine HCL (ATARAX) 25 MG tablet Take 1 tablet (25 mg total) by mouth 3 (three) times daily as needed for Itching. 30 tablet 0    hydrOXYzine HCL (ATARAX) 25 MG tablet Take 1 tablet (25 mg total) by mouth nightly as needed for Itching (itching). May cause drowsiness. Do NOT drive or operate heavy machinery. 30 tablet 1    losartan (COZAAR) 50 MG tablet Take 1 tablet (50 mg total) by mouth once daily. 30 tablet 11    multivitamin (THERAGRAN) per tablet Take 1 tablet by mouth once daily.      ofloxacin (OCUFLOX) 0.3 % ophthalmic solution Place 1 drop into both eyes 4 (four) times daily. for 5 days 10 mL 0    pramoxine-hydrocortisone (PROCTOCREAM-HC) 1-1 % rectal cream Place rectally 2 (two) times daily. 60 g 2    sertraline (ZOLOFT) 100 MG tablet TAKE TWO TABLETS BY MOUTH EVERY DAY FOR MENTAL HEALTH DOSE INCREASED TO 200MG/DAY      tiZANidine (ZANAFLEX) 4 MG tablet Take 1 tablet (4 mg total) by mouth 2  (two) times daily as needed (pain). 60 tablet 2    zolpidem (AMBIEN CR) 12.5 MG CR tablet Take 12.5 mg by mouth.      diclofenac sodium (VOLTAREN) 1 % Gel Apply 2 g topically 2 (two) times daily. for 7 days 100 g 0    [] HYDROcodone-acetaminophen (NORCO)  mg per tablet Take 1 tablet by mouth daily as needed for Pain (pain). 30 tablet 0    pregabalin (LYRICA) 75 MG capsule Take 1 capsule (75 mg total) by mouth every evening for 5 days, THEN 1 capsule (75 mg total) 2 (two) times daily. 60 capsule 0    [DISCONTINUED] tiZANidine (ZANAFLEX) 4 MG tablet Take 4 mg by mouth every 8 (eight) hours as needed (pain).      [DISCONTINUED] triamcinolone acetonide 0.1% (KENALOG) 0.1 % cream Apply topically 2 (two) times daily. PRN rash of trunk and legs. Steroid- limit to 2 weeks then taper. Repeat if flaring. (Patient not taking: Reported on 2025) 454 g 1     Facility-Administered Encounter Medications as of 2025   Medication Dose Route Frequency Provider Last Rate Last Admin    aztreonam 2,000 mg in D5W 100 mL IVPB  2,000 mg Intravenous On Call Procedure Rommel Rodriguez MD        ondansetron injection 4 mg  4 mg Intravenous Once PRN Nehemiah Carmen MD        ondansetron injection 4 mg  4 mg Intravenous Once PRN Nehemiah Carmen MD        sodium chloride 0.9% flush 10 mL  10 mL Intravenous PRN Wilda Horne MD

## 2025-07-18 NOTE — ASSESSMENT & PLAN NOTE
- overall controlled, suspect chronic dyspnea multifactorial but heavily influenced by weight  - continue Trelegy and albuterol as needed, discussed adding in nebulizer for acute symptoms

## 2025-07-23 ENCOUNTER — OFFICE VISIT (OUTPATIENT)
Dept: OTOLARYNGOLOGY | Facility: CLINIC | Age: 70
End: 2025-07-23
Payer: MEDICARE

## 2025-07-23 DIAGNOSIS — R49.0 DYSPHONIA: Primary | ICD-10-CM

## 2025-07-23 DIAGNOSIS — Z98.890 S/P EXCISION OF VOCAL CORD NODULE: ICD-10-CM

## 2025-07-23 PROCEDURE — 99999 PR PBB SHADOW E&M-EST. PATIENT-LVL IV: CPT | Mod: PBBFAC,HCNC,, | Performed by: OTOLARYNGOLOGY

## 2025-07-23 NOTE — PROGRESS NOTES
Referring Provider:    Self, Aaareferral  No address on file  Subjective:   Patient: Abdullahi Urias [id number], :1955   Visit date:2025 9:14 AM    Chief Complaint:  Hoarse (Pt is coming in for hoarseness pt is able to eat and drink with no issues pt has a history of reflex and taking medications )    HPI:    Prior notes reviewed by myself.  Clinical documentation obtained by nursing staff reviewed.     History of Present Illness    CHIEF COMPLAINT:  Patient presents today for evaluation of hoarseness    HISTORY OF PRESENT ILLNESS:  He reports persistent hoarseness that is progressively worsening in severity over time. He denies difficulty or pain with swallowing.    ENT HISTORY:  He has a history of benign vocal cord nodules with two prior surgical removals.    MEDICAL HISTORY:  He has a history of asthma/ bronchitis.    SOCIAL HISTORY:  He is a former smoker who quit in .    REVIEW OF SYSTEMS:  He denies night sweats, unintentional weight loss, or new lumps/bumps.      ROS:  General: -fever, -chills, -fatigue, -weight gain, -weight loss  Eyes: -vision changes, -redness, -discharge  ENT: -ear pain, -nasal congestion, -sore throat, +hoarseness  Cardiovascular: -chest pain, -palpitations, -lower extremity edema  Respiratory: -cough, -shortness of breath  Gastrointestinal: -abdominal pain, -nausea, -vomiting, -diarrhea, -constipation, -blood in stool  Genitourinary: -dysuria, -hematuria, -frequency  Musculoskeletal: -joint pain, -muscle pain  Skin: -rash, -lesion  Neurological: -headache, -dizziness, -numbness, -tingling  Psychiatric: -anxiety, -depression, -sleep difficulty         Objective:     Physical Exam:  Vitals:  There were no vitals taken for this visit.  General appearance:  Well developed, well nourished, hoarse    Ears:  Otoscopy of external auditory canals and tympanic membranes was normal, clinical speech reception thresholds grossly intact, no mass/lesion of auricle.    Nose:  No  masses/lesions of external nose, nasal mucosa, septum, and turbinates were within normal limits.    Mouth:  No mass/lesion of lips, teeth, gums, hard/soft palate, tongue, tonsils, or oropharynx.    Neck & Lymphatics:  No cervical lymphadenopathy, no neck mass/crepitus/ asymmetry, trachea is midline, no thyroid enlargement/tenderness/mass.    Due to indication in patient's history, presentation or risk factors,  a fiber optic exam was performed.    SEPARATE PROCEDURE NOTE:    ANESTHESIA:  Topical xylocaine with patel-synephrine  FINDINGS:  Normal exam      PROCEDURE:  After verbal consent was obtained, the flexible scope was passed through the patient's nasal cavity without difficulty.  The nasopharynx (adenoid pad) and eustachian tube orifices were first visualized and were found to be normal, without masses or irregularity.  The posterior pharyngeal wall and base of tongue were then examined and no mass or irregular tissue was seen.  The scope was then advanced to the larynx, and the epiglottis, valleculae, and piriform sinuses were normal, without masses or mucosal irregularity.  The false vocal folds and true vocal folds were then examined and were found to have normal mobility (full abduction and adduction) and no masses or mucosal irregularity was seen.  The arytenoids and the interarytenoid area were normal. Visualized portions of the subglottis were also noted to be normal in appearance with normal tracheal mucosa.      [x]  Data Reviewed:    Lab Results   Component Value Date    WBC 3.26 (L) 06/09/2025    HGB 14.0 06/09/2025    HCT 43.4 06/09/2025    MCV 88 06/09/2025    EOSINOPHIL 3.2 03/06/2025    EOSINOPHIL 3.2 03/06/2025           Reviewed notes from . [facility]      Assessment & Plan:   Dysphonia  -     Ambulatory referral/consult to Speech Therapy    S/P excision of vocal cord nodule  -     Ambulatory referral/consult to ENT        Assessment & Plan      IMPRESSION:  - Performed laryngoscopy to visualize  vocal cords.  - Vocal cords appear in good condition with possible minor scarring from previous procedures.  - No evidence of polyps or new growths.  - Hoarseness likely not due to any significant structural abnormalities.    VOCAL CORD NODULES:  - Explained findings of laryngoscopy to patient, noting overall good condition of vocal cords.    DYSPHONIA:  - Referred to voice therapist/speech pathologist for further eval.         Anthony Bailey M.D.  Department of Otolaryngology - Head & Neck Surgery  [address] [facility].  [place] [address]  P: [phone]  F: [phone]        DISCLAIMER: This note was prepared with [employer] voice recognition transcription software. Garbled syntax, mangled pronouns, and other bizarre constructions may be attributed to that software system. While efforts were made to correct any mistakes made by this voice recognition program, some errors and/or omissions may remain in the note that were missed when the note was originally created.     This note was generated with the assistance of ambient listening technology. Verbal consent was obtained by the patient and accompanying visitor(s) for the recording of patient appointment to facilitate this note. I attest to having reviewed and edited the generated note for accuracy, though some syntax or spelling errors may persist. Please contact the author of this note for any clarification.

## 2025-07-23 NOTE — PROGRESS NOTES
Medication Administration Note - Otorhinolaryngology (Ambulatory)    Purpose: Pre-procedural intranasal anesthesia and decongestion prior to nasal endoscopy.  Patient Age Requirement: 13 years and older  Order Type: Written    Medication Administered: Lidocaine Hydrochloride 4% - 1 mL + Phenylephrine Hydrochloride 1.0% (David-Synephrine) - 1 mL  Combined and drawn up into a 3 mL syringe  Medication combination was administered using an attached nasal atomizer    Route: Intranasal  Dosage: up to 1 mL per nostril  Timing: Administered immediately prior to nasal endoscopy    Notes: Medication prepared and administered per clinic protocol. Patient tolerated administration without complication.

## 2025-08-01 DIAGNOSIS — Z95.2 S/P AVR (AORTIC VALVE REPLACEMENT): ICD-10-CM

## 2025-08-01 DIAGNOSIS — I25.10 CORONARY ARTERY DISEASE INVOLVING NATIVE CORONARY ARTERY OF NATIVE HEART WITHOUT ANGINA PECTORIS: Primary | Chronic | ICD-10-CM

## 2025-08-01 DIAGNOSIS — I35.0 NONRHEUMATIC AORTIC VALVE STENOSIS: ICD-10-CM

## 2025-08-04 ENCOUNTER — OFFICE VISIT (OUTPATIENT)
Dept: CARDIOLOGY | Facility: CLINIC | Age: 70
End: 2025-08-04
Payer: MEDICARE

## 2025-08-04 ENCOUNTER — HOSPITAL ENCOUNTER (OUTPATIENT)
Dept: CARDIOLOGY | Facility: HOSPITAL | Age: 70
Discharge: HOME | End: 2025-08-04
Payer: MEDICARE

## 2025-08-04 VITALS
BODY MASS INDEX: 35.62 KG/M2 | HEART RATE: 84 BPM | DIASTOLIC BLOOD PRESSURE: 70 MMHG | SYSTOLIC BLOOD PRESSURE: 114 MMHG | HEIGHT: 69 IN | WEIGHT: 240.5 LBS

## 2025-08-04 DIAGNOSIS — Z95.2 S/P AVR (AORTIC VALVE REPLACEMENT): ICD-10-CM

## 2025-08-04 DIAGNOSIS — G47.33 OSA ON CPAP: ICD-10-CM

## 2025-08-04 DIAGNOSIS — E78.2 MIXED HYPERLIPIDEMIA: ICD-10-CM

## 2025-08-04 DIAGNOSIS — I35.0 NONRHEUMATIC AORTIC VALVE STENOSIS: ICD-10-CM

## 2025-08-04 DIAGNOSIS — I25.10 CORONARY ARTERY DISEASE INVOLVING NATIVE CORONARY ARTERY OF NATIVE HEART WITHOUT ANGINA PECTORIS: Primary | Chronic | ICD-10-CM

## 2025-08-04 DIAGNOSIS — I50.32 CHRONIC DIASTOLIC HEART FAILURE: ICD-10-CM

## 2025-08-04 DIAGNOSIS — I25.10 CORONARY ARTERY DISEASE INVOLVING NATIVE CORONARY ARTERY OF NATIVE HEART WITHOUT ANGINA PECTORIS: Chronic | ICD-10-CM

## 2025-08-04 DIAGNOSIS — R06.09 DYSPNEA ON EXERTION: ICD-10-CM

## 2025-08-04 DIAGNOSIS — I10 PRIMARY HYPERTENSION: Chronic | ICD-10-CM

## 2025-08-04 DIAGNOSIS — I73.9 PAD (PERIPHERAL ARTERY DISEASE): ICD-10-CM

## 2025-08-04 LAB
OHS QRS DURATION: 104 MS
OHS QTC CALCULATION: 477 MS

## 2025-08-04 PROCEDURE — 1101F PT FALLS ASSESS-DOCD LE1/YR: CPT | Mod: CPTII,HCNC,S$GLB,

## 2025-08-04 PROCEDURE — 3008F BODY MASS INDEX DOCD: CPT | Mod: CPTII,HCNC,S$GLB,

## 2025-08-04 PROCEDURE — 93010 ELECTROCARDIOGRAM REPORT: CPT | Mod: HCNC,,, | Performed by: INTERNAL MEDICINE

## 2025-08-04 PROCEDURE — 1159F MED LIST DOCD IN RCRD: CPT | Mod: CPTII,HCNC,S$GLB,

## 2025-08-04 PROCEDURE — 3288F FALL RISK ASSESSMENT DOCD: CPT | Mod: CPTII,HCNC,S$GLB,

## 2025-08-04 PROCEDURE — 93005 ELECTROCARDIOGRAM TRACING: CPT | Mod: HCNC

## 2025-08-04 PROCEDURE — 3066F NEPHROPATHY DOC TX: CPT | Mod: CPTII,HCNC,S$GLB,

## 2025-08-04 PROCEDURE — 99999 PR PBB SHADOW E&M-EST. PATIENT-LVL III: CPT | Mod: PBBFAC,HCNC,,

## 2025-08-04 PROCEDURE — 99214 OFFICE O/P EST MOD 30 MIN: CPT | Mod: HCNC,S$GLB,,

## 2025-08-04 PROCEDURE — 3061F NEG MICROALBUMINURIA REV: CPT | Mod: CPTII,HCNC,S$GLB,

## 2025-08-04 PROCEDURE — 3044F HG A1C LEVEL LT 7.0%: CPT | Mod: CPTII,HCNC,S$GLB,

## 2025-08-04 PROCEDURE — 3078F DIAST BP <80 MM HG: CPT | Mod: CPTII,HCNC,S$GLB,

## 2025-08-04 PROCEDURE — 3074F SYST BP LT 130 MM HG: CPT | Mod: CPTII,HCNC,S$GLB,

## 2025-08-04 NOTE — PROGRESS NOTES
Subjective:   Patient ID:  Abdullahi Urias is a 69 y.o. male who presents for evaluation of chronic CV conditions. His current medical conditions include HTN, CAD, aortic stenosis s/p AVR, PAD, chronic diastolic heart failure, HLD    11/24 visit  Leg calf pain at walking and resolved after rest. No chest pain.   Stopped Lipitor 3 months ago for resting pain. Now questioning ? the resting pain resolved  LDL 80 BP C quit smoking in 2012.  EKG reviewed by myself today reveals NSR nonspecific STT change     2/3/25  RLE calf pain > L side. States that the pain was better after resumed Lipitor 80 mg daily  11/24 LILLIE right 0.59 and left 0.99, LE arterial US showed biphasic waveform in both AFS. Some infra popliteal disease    8/4/25  Patient presents for follow up., He reports that over the past few months he has noticed worsening BIRD. Even putting his shoes on causes shortness of breath. He has recently had to increase how many pillows he's used. No leg swelling. No overt chest pain.   Blood pressures have remained stable. He has been noticing an increase in his weight. He mostly eats out in restaurants.   He is unable to walk far distances due to neuropathy.       Past Medical History:   Diagnosis Date    Aortic stenosis     [facility]    Asthma     BPH (benign prostatic hyperplasia)     CAD (coronary artery disease)     Cardiomyopathy     CHF (congestive heart failure)     Chronic hoarseness     vocal cord surg    Chronic pain     CKD (chronic kidney disease) stage 3, GFR 30-59 ml/min     CVA (cerebral infarction)     8/2012 olol; reviewed ed note    Ex-smoker     GERD (gastroesophageal reflux disease)     Hepatitis C     treatedgabrielavoni says cured, RNA NEG 5/2025    Hypertension     Mixed hyperlipidemia 02/03/2025    Pancreatitis     Prostate cancer     Prostate cancer     Prostate cancer     PVD (peripheral vascular disease)     Renal insufficiency     Substance abuse     cocaine, etoh , tob in past       Past  Surgical History:   Procedure Laterality Date    AORTIC VALVE REPLACEMENT  05/19/2014    Tissue valve replacement    BACK SURGERY      CARDIAC CATHETERIZATION      COLONOSCOPY  2011    COLONOSCOPY N/A 2/24/2021    Procedure: COLONOSCOPY;  Surgeon: Faith Carrillo MD;  Location: [facility];  Service: Endoscopy;  Laterality: N/A;    COLONOSCOPY N/A 7/2/2024    Procedure: COLONOSCOPY 6/4 plavix 5 day hold econ;  Surgeon: Prince Griffin MD;  Location: [facility];  Service: Endoscopy;  Laterality: N/A;    EPIDURAL STEROID INJECTION INTO CERVICAL SPINE N/A 6/21/2022    Procedure: C7-T1 IL PIEDAD;  Surgeon: Nehemiah Carmen MD;  Location: [facility];  Service: Pain Management;  Laterality: N/A;    EPIDURAL STEROID INJECTION INTO CERVICAL SPINE N/A 3/12/2024    Procedure: C7/T1 IL PIEDAD;  Surgeon: Nehemiah Carmen MD;  Location: [facility];  Service: Pain Management;  Laterality: N/A;    EPIDURAL STEROID INJECTION INTO CERVICAL SPINE N/A 4/10/2025    Procedure: Cervical C7/T1 Interlaminar PIEDAD with RN IV Sedation, Takes ASA, Plavix- will need to hold 5 days prior (clearance from Dr. Mckeon);  Surgeon: Nehemiah Carmen MD;  Location: [facility];  Service: Pain Management;  Laterality: N/A;    ESOPHAGOGASTRODUODENOSCOPY N/A 2/24/2021    Procedure: ESOPHAGOGASTRODUODENOSCOPY (EGD);  Surgeon: Faith Carrillo MD;  Location: [facility];  Service: Endoscopy;  Laterality: N/A;    ESOPHAGOGASTRODUODENOSCOPY N/A 7/2/2024    Procedure: EGD (ESOPHAGOGASTRODUODENOSCOPY);  Surgeon: Prince Griffin MD;  Location: [facility];  Service: Endoscopy;  Laterality: N/A;    ESOPHAGOGASTRODUODENOSCOPY N/A 11/19/2024    Procedure: EGD (ESOPHAGOGASTRODUODENOSCOPY);  Surgeon: Kayleigh Leiva MD;  Location: [facility];  Service: Gastroenterology;  Laterality: N/A;    FOOT SURGERY      INJECTION, SACROILIAC JOINT Right 6/10/2025    Procedure: Right shoulder joint injection;  Surgeon: Nehemiah Carmen MD;  Location: [facility];   Service: Pain Management;  Laterality: Right;    INSERTION N/A 7/5/2023    Procedure: INSERTION;  Surgeon: Brandon Valencia MD;  Location: [facility] OR;  Service: Neurosurgery;  Laterality: N/A;  Antibiotic Beads    INTRALUMINAL GASTROINTESTINAL TRACT IMAGING VIA CAPSULE N/A 11/1/2024    Procedure: IMAGING PROCEDURE, GI TRACT, INTRALUMINAL, VIA CAPSULE;  Surgeon: [place], First Available;  Location: [facility] ENDO;  Service: Endoscopy;  Laterality: N/A;    LEFT HEART CATHETERIZATION Left 7/24/2020    Procedure: CATHETERIZATION, HEART, LEFT;  Surgeon: Pasha Martin MD;  Location: [facility] CATH LAB;  Service: Cardiology;  Laterality: Left;    PENILE PROSTHESIS IMPLANT      PENILE PROSTHESIS REVISION  04/30/2018    PROSTATECTOMY  09/2019    [facility]    radiation for prostate      REMOVAL OF HARDWARE FROM SPINE N/A 7/5/2023    Procedure: REMOVAL, HARDWARE, SPINE;  Surgeon: Brandon Valencia MD;  Location: [facility] OR;  Service: Neurosurgery;  Laterality: N/A;    REPLACEMENT N/A 7/5/2023    Procedure: REPLACEMENT;  Surgeon: Brandon Valencia MD;  Location: [facility] OR;  Service: Neurosurgery;  Laterality: N/A;  of Sandoval and Screws. Guided Interventionstronic    REVISION OF PROCEDURE INVOLVING INFLATABLE PENILE PROSTHESIS N/A 7/22/2024    Procedure: REVISION, PROCEDURE INVOLVING INFLATABLE PENILE PROSTHESIS;  Surgeon: Rommel Rodriguez MD;  Location: [facility] OR;  Service: Urology;  Laterality: N/A;    TRANSFORAMINAL EPIDURAL INJECTION OF STEROID Right 10/20/2022    Procedure: Right  L4/5 + L5/S1 TF PIEDAD RN IV Sedation;  Surgeon: Nehemiah Carmen MD;  Location: [facility];  Service: Pain Management;  Laterality: Right;    TRANSFORAMINAL LUMBAR INTERBODY FUSION (TLIF) USING COMPUTER-ASSISTED NAVIGATION Bilateral 5/17/2023    Procedure: FUSION, SPINE, LUMBAR, TLIF, USING COMPUTER-ASSISTED NAVIGATION;  Surgeon: Brandon Valencia MD;  Location: [facility] OR;  Service: Neurosurgery;  Laterality: Bilateral;  2 level lumbar fusion at L4-5 and L5-S1     UPPER GASTROINTESTINAL ENDOSCOPY  2011    WASHOUT N/A 7/5/2023    Procedure: WASHOUT;  Surgeon: Brandon Valencia MD;  Location: [facility] OR;  Service: Neurosurgery;  Laterality: N/A;    WOUND EXPLORATION Bilateral 7/5/2023    Procedure: EXPLORATION, WOUND;  Surgeon: Brandon Valencia MD;  Location: [facility] OR;  Service: Neurosurgery;  Laterality: Bilateral;       Social History[1]    Family History   Problem Relation Name Age of Onset    Heart disease Mother      Heart disease Father      Heart attack Sister      Heart attack Brother      Colon cancer Neg Hx      Colon polyps Neg Hx      Liver cancer Neg Hx      Inflammatory bowel disease Neg Hx      Liver disease Neg Hx      Rectal cancer Neg Hx      Stomach cancer Neg Hx      Ulcerative colitis Neg Hx         Wt Readings from Last 3 Encounters:   08/04/25 109.1 kg (240 lb 8.4 oz)   07/18/25 109.1 kg (240 lb 6.6 oz)   07/18/25 108.9 kg (240 lb 1.3 oz)     Temp Readings from Last 3 Encounters:   07/13/25 97 °F (36.1 °C) (Tympanic)   06/10/25 97.8 °F (36.6 °C) (Temporal)   05/01/25 97.6 °F (36.4 °C) (Oral)     BP Readings from Last 3 Encounters:   08/04/25 114/70   07/18/25 113/63   07/18/25 124/60     Pulse Readings from Last 3 Encounters:   08/04/25 84   07/18/25 83   07/18/25 72       Medications Ordered Prior to Encounter[2]    No cardiac monitor results found for the past 12 months    Results for orders placed during the hospital encounter of 11/02/23    Echo    Interpretation Summary    Left Ventricle: The left ventricle is normal in size. Normal wall thickness. There is concentric remodeling. Normal wall motion. There is normal systolic function with a visually estimated ejection fraction of 55 - 70%. Ejection fraction by visual approximation is 60%. There is indeterminate diastolic function.    Right Ventricle: Normal right ventricular cavity size. Wall thickness is normal. Right ventricle wall motion  is normal. Systolic function is mildly reduced.    Aortic  Valve: There is a transcatheter valve replacement in the aortic position. Aortic valve area by VTI is 1.90 cm². Aortic valve peak velocity is 2.20 m/s. Mean gradient is 10 mmHg. The dimensionless index is 0.62. There is no significant regurgitation.    Tricuspid Valve: There is mild to moderate regurgitation.    Pulmonic Valve: There is mild regurgitation.    Pulmonary Artery: The estimated pulmonary artery systolic pressure is 32 mmHg.    IVC/SVC: Normal venous pressure at 3 mmHg.    No results found for this or any previous visit.        Results for orders placed or performed during the hospital encounter of 08/04/25   EKG 12-lead    Collection Time: 08/04/25  3:55 PM   Result Value Ref Range    QRS Duration 104 ms    [facility] QTC Calculation 477 ms    Narrative    Test Reason : I25.10,Z95.2,I35.0,    Vent. Rate :  84 BPM     Atrial Rate :  84 BPM     P-R Int : 158 ms          QRS Dur : 104 ms      QT Int : 404 ms       P-R-T Axes :  70   4  24 degrees    QTcB Int : 477 ms    Normal sinus rhythm  Minimal voltage criteria for LVH, may be normal variant ( R in aVL )  T wave abnormality, consider anterolateral ischemia  Abnormal ECG  When compared with ECG of 04-Nov-2024 12:56,  Inverted T waves have replaced nonspecific T wave abnormality in Lateral  leads  Confirmed by Jeffery Mckeon ([id number]) on 8/4/2025 9:24:51 PM    Referred By: NATALIE CLARKE           Confirmed By: Jeffery Mckeon         Review of Systems   Constitutional: Positive for weight gain.   HENT: Negative.     Eyes: Negative.    Cardiovascular:  Positive for dyspnea on exertion, leg swelling and orthopnea. Negative for chest pain, irregular heartbeat, near-syncope, palpitations, paroxysmal nocturnal dyspnea and syncope.   Respiratory: Negative.     Skin: Negative.    Musculoskeletal: Negative.    Gastrointestinal: Negative.    Genitourinary: Negative.    Neurological: Negative.    Psychiatric/Behavioral: Negative.           Physical Exam  Vitals and nursing note  reviewed.   Constitutional:       Appearance: Normal appearance.   HENT:      Head: Normocephalic and atraumatic.   Eyes:      General:         Right eye: No discharge.         Left eye: No discharge.      Pupils: Pupils are equal, round, and reactive to light.   Cardiovascular:      Rate and Rhythm: Normal rate and regular rhythm.      Heart sounds: S1 normal and S2 normal. No murmur heard.     No friction rub.   Pulmonary:      Effort: Pulmonary effort is normal. No respiratory distress.      Breath sounds: Normal breath sounds. No rales.   Abdominal:      Palpations: Abdomen is soft.      Tenderness: There is no abdominal tenderness.   Musculoskeletal:      Cervical back: Neck supple.      Right lower leg: Edema present.      Left lower leg: Edema present.   Skin:     General: Skin is warm and dry.   Neurological:      General: No focal deficit present.      Mental Status: He is alert and oriented to person, place, and time.   Psychiatric:         Mood and Affect: Mood normal.         Behavior: Behavior normal.         Thought Content: Thought content normal.         Lab Results   Component Value Date    CHOL 136 03/06/2025    CHOL 140 12/19/2024    CHOL 137 01/22/2024     Lab Results   Component Value Date    HDL 42 03/06/2025    HDL 41 12/19/2024    HDL 44 01/22/2024     Lab Results   Component Value Date    LDLCALC 84.0 03/06/2025    LDLCALC 87.0 12/19/2024    LDLCALC 79.6 01/22/2024     Lab Results   Component Value Date    TRIG 50 03/06/2025    TRIG 60 12/19/2024    TRIG 67 01/22/2024     Lab Results   Component Value Date    CHOLHDL 30.9 03/06/2025    CHOLHDL 29.3 12/19/2024    CHOLHDL 32.1 01/22/2024       Chemistry        Component Value Date/Time     08/04/2025 1639     03/06/2025 0940    K 4.7 08/04/2025 1639    K 5.1 03/06/2025 0940     08/04/2025 1639     03/06/2025 0940    CO2 24 08/04/2025 1639    CO2 26 03/06/2025 0940    BUN 18 08/04/2025 1639    CREATININE 1.6 (H)  08/04/2025 1639    GLU 71 08/04/2025 1639    GLU 94 03/06/2025 0940        Component Value Date/Time    CALCIUM 9.4 08/04/2025 1639    CALCIUM 9.5 03/06/2025 0940    ALKPHOS 97 05/01/2025 1451    ALKPHOS 90 03/06/2025 0940    AST 23 05/01/2025 1451    AST 36 03/06/2025 0940    ALT 28 05/01/2025 1451    ALT 17 03/06/2025 0940    BILITOT 0.4 05/01/2025 1451    BILITOT 0.4 03/06/2025 0940    ESTGFRAFRICA 51.1 (A) 06/02/2022 1246    EGFRNONAA 44.2 (A) 06/02/2022 1246          Lab Results   Component Value Date    TSH 2.854 05/23/2024     Lab Results   Component Value Date    INR 0.9 05/01/2025    INR 0.9 09/04/2024    INR 1.1 07/04/2023    PROTIME 10.9 05/01/2025     Lab Results   Component Value Date    WBC 3.26 (L) 06/09/2025    HGB 14.0 06/09/2025    HCT 43.4 06/09/2025    MCV 88 06/09/2025     06/09/2025       Assessment:      1. Coronary artery disease involving native coronary artery of native heart without angina pectoris    2. Primary hypertension    3. Nonrheumatic aortic valve stenosis    4. S/P AVR (aortic valve replacement) biopresthetic miller 25 mm in 2014     5. PAD (peripheral artery disease)    6. Chronic diastolic heart failure    7. Mixed hyperlipidemia    8. CHEO on CPAP    9. Dyspnea on exertion        Plan:   Coronary artery disease involving native coronary artery of native heart without angina pectoris  -     Echo; Future  -     Nuclear Stress - Cardiology Interpreted; Future    Primary hypertension    Nonrheumatic aortic valve stenosis    S/P AVR (aortic valve replacement) biopresthetic miller 25 mm in 2014   -     Echo; Future    PAD (peripheral artery disease)    Chronic diastolic heart failure  -     Basic metabolic panel; Future; Expected date: 08/04/2025  -     NT-Pro Natriuretic Peptide; Future; Expected date: 08/04/2025    Mixed hyperlipidemia    CHEO on CPAP    Dyspnea on exertion  -     Basic metabolic panel; Future; Expected date: 08/04/2025  -     NT-Pro Natriuretic Peptide;  "Future; Expected date: 2025        Echo, nuclear stress test - BIRD  BNP, BMP   Continue asa, statin, plavix, coreg - CAD  Continue statin, repatha - HLD  Continue losartan, coreg - HTN  RTC 6 months with Dr. Mckeon         [1]   Social History  Tobacco Use    Smoking status: Former     Current packs/day: 0.00     Average packs/day: 1 pack/day for 8.0 years (8.0 ttl pk-yrs)     Types: Cigarettes     Start date: 2004     Quit date: 2012     Years since quittin.9    Smokeless tobacco: Never   Substance Use Topics    Alcohol use: Not Currently     Comment: Sober since 2012    Drug use: Not Currently     Types: "Crack" cocaine, Marijuana     Comment: Quit in    [2]   Current Outpatient Medications on File Prior to Visit   Medication Sig Dispense Refill    albuterol (PROVENTIL) 2.5 mg /3 mL (0.083 %) nebulizer solution Take 3 mLs (2.5 mg total) by nebulization every 6 (six) hours as needed for Wheezing or Shortness of Breath. Rescue 90 mL 3    albuterol (PROVENTIL/VENTOLIN HFA) 90 mcg/actuation inhaler INHALE 2 PUFFS INTO THE LUNGS EVERY 6 HOURS AS NEEDED FOR COUGH 8.5 g 3    allopurinoL (ZYLOPRIM) 100 MG tablet Take 100 mg by mouth once daily. Takes 0.5 tab      ALPRAZolam (XANAX) 1 MG tablet Take 1 mg by mouth daily as needed for Anxiety or Insomnia.      aspirin (ECOTRIN) 81 MG EC tablet Take 1 tablet (81 mg total) by mouth once daily. 90 tablet 3    aspirin-acetaminophen-caffeine 250-250-65 mg (EXCEDRIN MIGRAINE) 250-250-65 mg per tablet Take 1 tablet by mouth every 6 (six) hours as needed for Pain. 30 tablet 0    atorvastatin (LIPITOR) 80 MG tablet Take 40 mg by mouth once daily. 0.5 tab of 80mg; once in AM and once in PM      azelastine (ASTELIN) 137 mcg (0.1 %) nasal spray 1 spray (137 mcg total) by Nasal route 2 (two) times daily. 30 mL 1    carvediloL (COREG) 12.5 MG tablet Take 1 tablet (12.5 mg total) by mouth 2 (two) times daily. 60 tablet 11    clopidogreL (PLAVIX) 75 mg tablet " Take 1 tablet (75 mg total) by mouth once daily. 30 tablet 11    esomeprazole (NEXIUM) 40 MG capsule Take 40 mg by mouth 2 (two) times daily.      evolocumab (REPATHA SURECLICK) 140 mg/mL PnIj Inject 1 mL (140 mg total) into the skin every 14 (fourteen) days. 2 each 9    fluticasone-umeclidin-vilanter (TRELEGY ELLIPTA) 200-62.5-25 mcg inhaler Inhale 1 puff into the lungs once daily. 60 each 5    gabapentin (NEURONTIN) 600 MG tablet Take 600 mg by mouth 3 (three) times daily as needed (pain).      hydrOXYzine HCL (ATARAX) 25 MG tablet Take 1 tablet (25 mg total) by mouth 3 (three) times daily as needed for Itching. 30 tablet 0    hydrOXYzine HCL (ATARAX) 25 MG tablet Take 1 tablet (25 mg total) by mouth nightly as needed for Itching (itching). May cause drowsiness. Do NOT drive or operate heavy machinery. 30 tablet 1    losartan (COZAAR) 50 MG tablet Take 1 tablet (50 mg total) by mouth once daily. 30 tablet 11    multivitamin (THERAGRAN) per tablet Take 1 tablet by mouth once daily.      pramoxine-hydrocortisone (PROCTOCREAM-HC) 1-1 % rectal cream Place rectally 2 (two) times daily. 60 g 2    sertraline (ZOLOFT) 100 MG tablet TAKE TWO TABLETS BY MOUTH EVERY DAY FOR MENTAL HEALTH DOSE INCREASED TO 200MG/DAY      tiZANidine (ZANAFLEX) 4 MG tablet Take 1 tablet (4 mg total) by mouth 2 (two) times daily as needed (pain). 60 tablet 2    zolpidem (AMBIEN CR) 12.5 MG CR tablet Take 12.5 mg by mouth.      [DISCONTINUED] HYDROcodone-acetaminophen (NORCO)  mg per tablet Take 1 tablet by mouth daily as needed for Pain (pain). 30 tablet 0    diclofenac sodium (VOLTAREN) 1 % Gel Apply 2 g topically 2 (two) times daily. for 7 days 100 g 0    pregabalin (LYRICA) 75 MG capsule Take 1 capsule (75 mg total) by mouth every evening for 5 days, THEN 1 capsule (75 mg total) 2 (two) times daily. 60 capsule 0     Current Facility-Administered Medications on File Prior to Visit   Medication Dose Route Frequency Provider Last Rate Last  Admin    aztreonam 2,000 mg in D5W 100 mL IVPB  2,000 mg Intravenous On Call Procedure Rommel Rodriguez MD        ondansetron injection 4 mg  4 mg Intravenous Once PRN Nehemiah Carmen MD        ondansetron injection 4 mg  4 mg Intravenous Once PRN Nehemiah Carmen MD        sodium chloride 0.9% flush 10 mL  10 mL Intravenous PRN Wilda Horne MD

## 2025-08-05 ENCOUNTER — RESULTS FOLLOW-UP (OUTPATIENT)
Dept: CARDIOLOGY | Facility: CLINIC | Age: 70
End: 2025-08-05
Payer: MEDICARE

## 2025-08-05 ENCOUNTER — TELEPHONE (OUTPATIENT)
Dept: CARDIOLOGY | Facility: CLINIC | Age: 70
End: 2025-08-05
Payer: MEDICARE

## 2025-08-05 DIAGNOSIS — M96.1 POSTLAMINECTOMY SYNDROME OF LUMBAR REGION: ICD-10-CM

## 2025-08-05 DIAGNOSIS — M54.12 CERVICAL RADICULOPATHY: ICD-10-CM

## 2025-08-05 DIAGNOSIS — I25.10 CORONARY ARTERY DISEASE INVOLVING NATIVE CORONARY ARTERY OF NATIVE HEART WITHOUT ANGINA PECTORIS: ICD-10-CM

## 2025-08-05 DIAGNOSIS — M50.30 DDD (DEGENERATIVE DISC DISEASE), CERVICAL: ICD-10-CM

## 2025-08-05 DIAGNOSIS — R06.09 DYSPNEA ON EXERTION: Primary | ICD-10-CM

## 2025-08-05 DIAGNOSIS — G89.4 CHRONIC PAIN SYNDROME: ICD-10-CM

## 2025-08-05 RX ORDER — HYDROCODONE BITARTRATE AND ACETAMINOPHEN 10; 325 MG/1; MG/1
1 TABLET ORAL DAILY PRN
Qty: 30 TABLET | Refills: 0 | Status: SHIPPED | OUTPATIENT
Start: 2025-08-05 | End: 2025-09-04

## 2025-08-05 NOTE — TELEPHONE ENCOUNTER
Good Morning/Afternoon,     Pt is requesting for a refill of: HYDROcodone-acetaminophen (NORCO)  mg per tablet   Last filed: 07/08/2025  Last encounter: 07/18/2025  Up coming apt: 09/15/2025  Pharmacy: [id number] - [place], [place] - [address] AT [facility] [address]     Medical Assistant  Myranda VAZQUEZ (CCMA, Interventional Pain Management Surgery Scheduler)

## 2025-08-05 NOTE — TELEPHONE ENCOUNTER
Please advise    PT wants to know if this means he can get his injections for pain this week.      Contacted PT and reviewed results and recommendation-Please let patient know based on his BMI and heart function, his fluid level is stable. Kidney function stable as well       PT stated verbal understanding    ----- Message from Kesha Knight PA-C sent at 8/5/2025  3:14 PM CDT -----  Please let patient know based on his BMI and heart function, his fluid level is stable. Kidney function stable as well  ----- Message -----  From: Lab, Background User  Sent: 8/4/2025  10:00 PM CDT  To: Kesha Knight PA-C

## 2025-08-07 NOTE — TELEPHONE ENCOUNTER
I spoke with patient and he already know what was going on and was fine about cancelling his procedure.    Myranda VAZQUEZ ([facility])

## 2025-08-13 ENCOUNTER — HOSPITAL ENCOUNTER (OUTPATIENT)
Dept: RADIOLOGY | Facility: HOSPITAL | Age: 70
Discharge: HOME | End: 2025-08-13
Payer: MEDICARE

## 2025-08-13 ENCOUNTER — HOSPITAL ENCOUNTER (OUTPATIENT)
Dept: CARDIOLOGY | Facility: HOSPITAL | Age: 70
Discharge: HOME | End: 2025-08-13
Payer: MEDICARE

## 2025-08-13 VITALS
BODY MASS INDEX: 35.55 KG/M2 | WEIGHT: 240 LBS | DIASTOLIC BLOOD PRESSURE: 70 MMHG | HEIGHT: 69 IN | SYSTOLIC BLOOD PRESSURE: 114 MMHG

## 2025-08-13 DIAGNOSIS — Z95.2 S/P AVR (AORTIC VALVE REPLACEMENT): ICD-10-CM

## 2025-08-13 DIAGNOSIS — I25.10 CORONARY ARTERY DISEASE INVOLVING NATIVE CORONARY ARTERY OF NATIVE HEART WITHOUT ANGINA PECTORIS: Chronic | ICD-10-CM

## 2025-08-13 LAB
AORTIC ROOT ANNULUS: 3.6 CM
AORTIC SIZE INDEX (SOV): 1.5 CM/M2
AORTIC SIZE INDEX: 1.6 CM/M2
ASCENDING AORTA: 3.6 CM
AV INDEX (PROSTH): 0.36
AV MEAN GRADIENT: 22 MMHG
AV PEAK GRADIENT: 46 MMHG
AV REGURGITATION PRESSURE HALF TIME: 848 MS
AV VALVE AREA BY VELOCITY RATIO: 1 CM²
AV VALVE AREA: 1.3 CM²
AV VELOCITY RATIO: 0.29
BSA FOR ECHO PROCEDURE: 2.3 M2
CV ECHO LV RWT: 0.52 CM
CV STRESS BASE HR: 63 BPM
DIASTOLIC BLOOD PRESSURE: 56 MMHG
DOP CALC AO PEAK VEL: 3.4 M/S
DOP CALC AO VTI: 63.7 CM
DOP CALC LVOT AREA: 3.5 CM2
DOP CALC LVOT DIAMETER: 2.1 CM
DOP CALC LVOT PEAK VEL: 1 M/S
DOP CALC RVOT PEAK VEL: 0.58 M/S
DOP CALC RVOT VTI: 16.2 CM
DOP CALCLVOT PEAK VEL VTI: 23.2 CM
E WAVE DECELERATION TIME: 248 MSEC
E/A RATIO: 1.03
E/E' RATIO: 13 M/S
ECHO LV POSTERIOR WALL: 1.3 CM (ref 0.6–1.1)
EJECTION FRACTION: 55 %
FRACTIONAL SHORTENING: 34 % (ref 28–44)
INTERVENTRICULAR SEPTUM: 1.3 CM (ref 0.6–1.1)
IVC DIAMETER: 1.59 CM
IVRT: 74 MSEC
LA MAJOR: 5.7 CM
LA MINOR: 4.4 CM
LA WIDTH: 3.9 CM
LEFT ATRIUM AREA SYSTOLIC (APICAL 2 CHAMBER): 11.28 CM2
LEFT ATRIUM AREA SYSTOLIC (APICAL 4 CHAMBER): 21.05 CM2
LEFT ATRIUM SIZE: 3.7 CM
LEFT ATRIUM VOLUME INDEX MOD: 20 ML/M2
LEFT ATRIUM VOLUME INDEX: 27 ML/M2
LEFT ATRIUM VOLUME MOD: 44 ML
LEFT ATRIUM VOLUME: 61 CM3
LEFT INTERNAL DIMENSION IN SYSTOLE: 3.3 CM (ref 2.1–4)
LEFT VENTRICLE DIASTOLIC VOLUME INDEX: 52.47 ML/M2
LEFT VENTRICLE DIASTOLIC VOLUME: 117 ML
LEFT VENTRICLE END SYSTOLIC VOLUME APICAL 2 CHAMBER: 23.83 ML
LEFT VENTRICLE END SYSTOLIC VOLUME APICAL 4 CHAMBER: 59.17 ML
LEFT VENTRICLE MASS INDEX: 117.4 G/M2
LEFT VENTRICLE SYSTOLIC VOLUME INDEX: 19.7 ML/M2
LEFT VENTRICLE SYSTOLIC VOLUME: 44 ML
LEFT VENTRICULAR INTERNAL DIMENSION IN DIASTOLE: 5 CM (ref 3.5–6)
LEFT VENTRICULAR MASS: 261.8 G
LV LATERAL E/E' RATIO: 11.7 M/S
LV SEPTAL E/E' RATIO: 15 M/S
LVED V (TEICH): 117.43 ML
LVES V (TEICH): 44.23 ML
LVOT MG: 2.64 MMHG
LVOT MV: 0.79 CM/S
Lab: 1.9 CM/M
Lab: 2.1 CM/M
MV PEAK A VEL: 1.02 M/S
MV PEAK E VEL: 1.05 M/S
NUC REST EJECTION FRACTION: 57
NUC STRESS EJECTION FRACTION: 61 %
OHS CV CPX 85 PERCENT MAX PREDICTED HEART RATE MALE: 128
OHS CV CPX MAX PREDICTED HEART RATE: 151
OHS CV CPX PATIENT HEIGHT IN: 69
OHS CV CPX PATIENT HEIGHT IN: 69
OHS CV CPX PATIENT IS FEMALE: 0
OHS CV CPX PATIENT IS MALE: 1
OHS CV CPX PEAK DIASTOLIC BLOOD PRESSURE: 80 MMHG
OHS CV CPX PEAK HEAR RATE: 83 BPM
OHS CV CPX PEAK RATE PRESSURE PRODUCT: 9130
OHS CV CPX PEAK SYSTOLIC BLOOD PRESSURE: 110 MMHG
OHS CV CPX PERCENT MAX PREDICTED HEART RATE ACHIEVED: 55
OHS CV CPX RATE PRESSURE PRODUCT PRESENTING: 7119
OHS CV INITIAL DOSE: 10.2 MCG/KG/MIN
OHS CV PEAK DOSE: 29.9 MCG/KG/MIN
PISA AR MAX VEL: 3.09 M/S
PISA MRMAX VEL: 5.23 M/S
PISA TR MAX VEL: 2.9 M/S
PV MEAN GRADIENT: 1 MMHG
RA MAJOR: 5.53 CM
RA PRESSURE ESTIMATED: 3 MMHG
RA WIDTH: 4.33 CM
RIGHT VENTRICLE DIASTOLIC MID DIMENSION: 4.2 CM
RV MID DIAMA: 4.18 CM
RV TB RVSP: 6 MMHG
SINUS: 3.4 CM
STJ: 3.4 CM
SYSTOLIC BLOOD PRESSURE: 113 MMHG
TDI LATERAL: 0.09 M/S
TDI SEPTAL: 0.07 M/S
TDI: 0.08 M/S
TR MAX PG: 34 MMHG
TRICUSPID ANNULAR PLANE SYSTOLIC EXCURSION: 1.7 CM
TV REST PULMONARY ARTERY PRESSURE: 37 MMHG
Z-SCORE OF LEFT VENTRICULAR DIMENSION IN END DIASTOLE: -4.55
Z-SCORE OF LEFT VENTRICULAR DIMENSION IN END SYSTOLE: -2.93

## 2025-08-13 PROCEDURE — 78452 HT MUSCLE IMAGE SPECT MULT: CPT | Mod: HCNC

## 2025-08-13 PROCEDURE — A9502 TC99M TETROFOSMIN: HCPCS | Mod: HCNC

## 2025-08-13 PROCEDURE — 63600175 PHARM REV CODE 636 W HCPCS: Mod: HCNC

## 2025-08-13 PROCEDURE — 93306 TTE W/DOPPLER COMPLETE: CPT | Mod: 26,HCNC,, | Performed by: INTERNAL MEDICINE

## 2025-08-13 PROCEDURE — 93306 TTE W/DOPPLER COMPLETE: CPT | Mod: HCNC

## 2025-08-13 PROCEDURE — 93017 CV STRESS TEST TRACING ONLY: CPT | Mod: HCNC

## 2025-08-13 RX ORDER — REGADENOSON 0.08 MG/ML
0.4 INJECTION, SOLUTION INTRAVENOUS ONCE
Status: COMPLETED | OUTPATIENT
Start: 2025-08-13 | End: 2025-08-13

## 2025-08-13 RX ADMIN — REGADENOSON 0.4 MG: 0.08 INJECTION, SOLUTION INTRAVENOUS at 09:08

## 2025-08-13 RX ADMIN — TETROFOSMIN 29.9 MILLICURIE: 1.38 INJECTION, POWDER, LYOPHILIZED, FOR SOLUTION INTRAVENOUS at 10:08

## 2025-08-13 RX ADMIN — TETROFOSMIN 10.2 MILLICURIE: 1.38 INJECTION, POWDER, LYOPHILIZED, FOR SOLUTION INTRAVENOUS at 08:08

## 2025-08-19 ENCOUNTER — TELEPHONE (OUTPATIENT)
Dept: CARDIOLOGY | Facility: CLINIC | Age: 70
End: 2025-08-19
Payer: MEDICARE

## 2025-08-19 RX ORDER — FUROSEMIDE 20 MG/1
20 TABLET ORAL DAILY
Qty: 30 TABLET | Refills: 11 | Status: SHIPPED | OUTPATIENT
Start: 2025-08-19 | End: 2026-08-19

## 2025-09-03 DIAGNOSIS — J45.901 ASTHMATIC BRONCHITIS WITH ACUTE EXACERBATION, UNSPECIFIED ASTHMA SEVERITY, UNSPECIFIED WHETHER PERSISTENT: ICD-10-CM

## 2025-09-03 RX ORDER — FLUTICASONE FUROATE, UMECLIDINIUM BROMIDE AND VILANTEROL TRIFENATATE 200; 62.5; 25 UG/1; UG/1; UG/1
POWDER RESPIRATORY (INHALATION)
Qty: 60 EACH | Refills: 5 | Status: SHIPPED | OUTPATIENT
Start: 2025-09-03

## 2025-09-04 ENCOUNTER — OFFICE VISIT (OUTPATIENT)
Dept: DERMATOLOGY | Facility: CLINIC | Age: 70
End: 2025-09-04
Payer: MEDICARE

## 2025-09-04 DIAGNOSIS — L30.9 ECZEMA, UNSPECIFIED TYPE: Primary | ICD-10-CM

## 2025-09-04 DIAGNOSIS — L53.8 ERYTHEMA DYSCHROMICUM PERSTANS: ICD-10-CM

## 2025-09-04 PROCEDURE — 99999 PR PBB SHADOW E&M-EST. PATIENT-LVL II: CPT | Mod: PBBFAC,HCNC,, | Performed by: STUDENT IN AN ORGANIZED HEALTH CARE EDUCATION/TRAINING PROGRAM

## 2025-09-04 RX ORDER — CLOBETASOL PROPIONATE 0.5 MG/G
OINTMENT TOPICAL 2 TIMES DAILY
Qty: 60 G | Refills: 2 | Status: SHIPPED | OUTPATIENT
Start: 2025-09-04

## 2025-09-04 RX ORDER — HYDROQUINONE 40 MG/G
CREAM TOPICAL 2 TIMES DAILY
Qty: 30 G | Refills: 1 | Status: SHIPPED | OUTPATIENT
Start: 2025-09-04 | End: 2025-10-04

## 2025-09-04 RX ORDER — TRIAMCINOLONE ACETONIDE 1 MG/G
CREAM TOPICAL
COMMUNITY
Start: 2025-08-20

## (undated) DEVICE — GOWN POLY REINF BRTH SLV XL

## (undated) DEVICE — DRAPE LAP T SHT W/ INSTR PAD

## (undated) DEVICE — BUR LEGEND MIDAS REX 14CM 13MM

## (undated) DEVICE — DURAPREP SURG SCRUB 26ML

## (undated) DEVICE — LOTION DURA PREP REMOVER 40Z

## (undated) DEVICE — SYR 10CC LUER LOCK

## (undated) DEVICE — TAPE SILK 3IN

## (undated) DEVICE — DRAPE STERI INSTRUMENT 1018

## (undated) DEVICE — KIT EVACUATOR 3-SPRING 1/8 DRN

## (undated) DEVICE — BLADE BONE BIOPSY FINE 3.2MM

## (undated) DEVICE — TOWEL OR DISP STRL BLUE 4/PK

## (undated) DEVICE — TRAY CATH 1-LYR URIMTR 16FR

## (undated) DEVICE — UNDERGLOVES BIOGEL PI SIZE 8

## (undated) DEVICE — ELECTRODE REM PLYHSV RETURN 9

## (undated) DEVICE — GLOVE SURGICAL LATEX SZ 7

## (undated) DEVICE — SPONGE PATTY SURGICAL .5X3IN

## (undated) DEVICE — SYR 50CC LL

## (undated) DEVICE — ANGIOTOUCH KIT

## (undated) DEVICE — KIT SYR REUSABLE

## (undated) DEVICE — DRESSING ADH SQUARE 8X8CM

## (undated) DEVICE — MANIFOLD 4 PORT

## (undated) DEVICE — INSERT CUSHIONPRONE VIEW LARGE

## (undated) DEVICE — CONTAINER SPECIMEN OR STER 4OZ

## (undated) DEVICE — SKIN MARKER DEVON 160

## (undated) DEVICE — SYR ONLY LUER LOCK 20CC

## (undated) DEVICE — SPONGE COTTON TRAY 4X4IN

## (undated) DEVICE — NDL SPINAL SPINOCAN 22GX3.5

## (undated) DEVICE — TUBING MEDI-VAC 20FT .25IN

## (undated) DEVICE — DRESSING ANTIMICROBIAL 1 INCH

## (undated) DEVICE — DRAPE OPMI STERILE

## (undated) DEVICE — TRAY SKIN SCRUB WET PREMIUM

## (undated) DEVICE — ALCOHOL 70% ISOP RUBBING 4OZ

## (undated) DEVICE — SPHERE NDI PASSIVE

## (undated) DEVICE — SEALER AQUAMANTYS 2.3 BIPOLAR

## (undated) DEVICE — SUT VICRYL PLUS 0 CT1 18IN

## (undated) DEVICE — Device

## (undated) DEVICE — ADHESIVE DERMABOND ADVANCED

## (undated) DEVICE — KIT RETR PERI/GYN W/O STAYS

## (undated) DEVICE — DRESSING AQUACEL AG ADV 3.5X6

## (undated) DEVICE — ROD CD HORIZON SOLERA 5.5-6X60
Type: IMPLANTABLE DEVICE | Site: SPINE LUMBAR | Status: NON-FUNCTIONAL
Removed: 2023-07-05

## (undated) DEVICE — PACK CATH LAB CUSTOM BR

## (undated) DEVICE — APPLICATOR STRL COT 2INNR 6IN

## (undated) DEVICE — SYR LUER LOCK STERILE 10ML

## (undated) DEVICE — BLADE EZ CLEAN 2.5IN MODIFIED

## (undated) DEVICE — YANKAUER FLEX NO VENT REG CAP

## (undated) DEVICE — DRESSING SURGICAL 1/2X1/2

## (undated) DEVICE — GLOVE BIOGEL ECLIPSE SZ 8

## (undated) DEVICE — SYR B-D DISP CONTROL 10CC100/C

## (undated) DEVICE — DRAPE THREE-QUARTER 53X77IN

## (undated) DEVICE — TUBE SPEC COLL&TRANSPORT 11ML

## (undated) DEVICE — DRAPE SURG W/TWL 17 5/8X23

## (undated) DEVICE — PACK BASIC SETUP SC BR

## (undated) DEVICE — DRAPE MOBILE C-ARM

## (undated) DEVICE — DRAPE C-ARMOR EQUIPMENT COVER

## (undated) DEVICE — GLOVE SIGNATURE ESSNTL LTX 7.5

## (undated) DEVICE — CATH DIAG IMPULSE 6FR FR4

## (undated) DEVICE — SUT VICRYL PLUS 0 CT-1 18IN

## (undated) DEVICE — SWAB CULTURETTE II DUAL

## (undated) DEVICE — DEVICE PERCLOSE SUT CLSR 6FR

## (undated) DEVICE — DRAPE INCISE IOBAN 2 23X17IN

## (undated) DEVICE — DRESSING AQUACEL AG 3.5X10IN

## (undated) DEVICE — UNDERGLOVE BIOGEL PI SZ 6.5 LF

## (undated) DEVICE — CATH IMPULSE FL5 5FR

## (undated) DEVICE — PAD DEFIB CADENCE ADULT R2

## (undated) DEVICE — ADHESIVE MASTISOL VIAL 48/BX

## (undated) DEVICE — NDL ECLIPSE SAFETY 18GX1-1/2IN

## (undated) DEVICE — TIPS REAR EXTENDER

## (undated) DEVICE — BLADE SURG #15 CARBON STEEL

## (undated) DEVICE — UNDERGLOVES BIOGEL PI SIZE 7.5

## (undated) DEVICE — STAPLER SKIN PROXIMATE WIDE

## (undated) DEVICE — TRAY SKIN SCRUB WET 4 COMPART

## (undated) DEVICE — CATH IMPULSE PIGTAIL 6F 110CM

## (undated) DEVICE — SUT 1 36IN PDS II

## (undated) DEVICE — HOOK STAY ELAS 5MM 8EA/PK

## (undated) DEVICE — SUT STRATAFIX 1PDS CTX 18IN

## (undated) DEVICE — COVER LIGHT HANDLE 80/CA

## (undated) DEVICE — KIT MANIFOLD LOW PRESS TUBING

## (undated) DEVICE — DRAPE FULL SHEET 70X100IN

## (undated) DEVICE — KIT INTRO VSSL DIL 6FX11CM

## (undated) DEVICE — GOWN POLY REINF X-LONG XL

## (undated) DEVICE — KIT PT CARE FRME POS JACK

## (undated) DEVICE — TRAY CATH FOL SIL URIMTR 16FR

## (undated) DEVICE — KIT SURGIFLO HEMOSTATIC MATRIX

## (undated) DEVICE — SUT MCRYL PLUS 4-0 PS2 27IN

## (undated) DEVICE — NDL SAFETY 22G X 1.5 ECLIPSE

## (undated) DEVICE — DRESSING TRANS 4X4 TEGADERM

## (undated) DEVICE — COVER PROXIMA MAYO STAND

## (undated) DEVICE — SUT MONOCRYL 4.0 PS2 CP496G

## (undated) DEVICE — CANISTER SUCTION JUMBO 12L

## (undated) DEVICE — DRAPE CORETEMP FLD WRM 56X62IN

## (undated) DEVICE — SOL NACL 0.9% INJ 250ML BG

## (undated) DEVICE — ELECTRODE BLD EXT 6.50 ST MDFD

## (undated) DEVICE — CORD BIPOLAR 12 FOOT

## (undated) DEVICE — NDL SPINAL 20GX3.5 HUB

## (undated) DEVICE — SUT PROLENE 1 CTX 30IN BLUE

## (undated) DEVICE — PANTIES FEMININE NAPKIN LG/XLG

## (undated) DEVICE — GAUZE SPONGE 4X4 12PLY

## (undated) DEVICE — DRESSING CHG FOAM 4MM 1IN

## (undated) DEVICE — DRAPE THREE-QTR REINF 53X77IN

## (undated) DEVICE — SUT VICRYL+ 2-0 SH 18IN

## (undated) DEVICE — GUIDEWIRE EMERALD .035IN 260CM

## (undated) DEVICE — WAX BONE STERILE 2.5G

## (undated) DEVICE — RETRACTOR DEEP SCROTAL INF LF

## (undated) DEVICE — CATH DIAG IMPULSE 6FR FL4

## (undated) DEVICE — SUT VICRYL 3-0 27 SH

## (undated) DEVICE — CONTRAST OMNIPAQUE 240 150ML

## (undated) DEVICE — DRESSING MEPILEX BORD LITE 2X5